# Patient Record
Sex: MALE | Race: WHITE | NOT HISPANIC OR LATINO | Employment: OTHER | URBAN - METROPOLITAN AREA
[De-identification: names, ages, dates, MRNs, and addresses within clinical notes are randomized per-mention and may not be internally consistent; named-entity substitution may affect disease eponyms.]

---

## 2017-02-06 DIAGNOSIS — C61 MALIGNANT NEOPLASM OF PROSTATE (H): ICD-10-CM

## 2017-02-06 LAB — PSA SERPL-MCNC: 1.17 UG/L (ref 0–4)

## 2017-02-06 PROCEDURE — 36415 COLL VENOUS BLD VENIPUNCTURE: CPT | Performed by: UROLOGY

## 2017-02-06 PROCEDURE — 84153 ASSAY OF PSA TOTAL: CPT | Performed by: UROLOGY

## 2017-02-13 ENCOUNTER — OFFICE VISIT (OUTPATIENT)
Dept: UROLOGY | Facility: CLINIC | Age: 77
End: 2017-02-13
Payer: MEDICARE

## 2017-02-13 VITALS — SYSTOLIC BLOOD PRESSURE: 136 MMHG | RESPIRATION RATE: 14 BRPM | HEART RATE: 60 BPM | DIASTOLIC BLOOD PRESSURE: 74 MMHG

## 2017-02-13 DIAGNOSIS — C61 MALIGNANT NEOPLASM OF PROSTATE (H): Primary | ICD-10-CM

## 2017-02-13 PROCEDURE — 99213 OFFICE O/P EST LOW 20 MIN: CPT | Performed by: UROLOGY

## 2017-02-13 RX ORDER — HYDROCHLOROTHIAZIDE 25 MG/1
25 TABLET ORAL DAILY
COMMUNITY
End: 2017-02-13

## 2017-02-13 RX ORDER — LISINOPRIL 5 MG/1
5 TABLET ORAL DAILY
COMMUNITY
End: 2017-02-13

## 2017-02-13 NOTE — PROGRESS NOTES
Chief Complaint   Patient presents with     RECHECK     6 month        Gustavo Ramon is a 76 year old male who presents today for follow up of   Chief Complaint   Patient presents with     RECHECK     6 month     f/u for prostate cancer.  He is s/p cryotherapy on 7/13 for prostate cancer kari 8 with initial psa of 7.4.  He did receive a hormonal shot prior to treatment.  His psa has gone up slowly from 0.27 (2/14) to 0.66 (6/14) and most recently 1.03 and then 1.09 and 0.77 ,1.05, and now 1.17.  He has some urgency of urination but is doing better.  He denies any incontinence/dysuria/hematuria.    Current Outpatient Prescriptions   Medication Sig Dispense Refill     [DISCONTINUED] lisinopril (PRINIVIL/ZESTRIL) 5 MG tablet Take 5 mg by mouth daily       [DISCONTINUED] hydrochlorothiazide (HYDRODIURIL) 25 MG tablet Take 25 mg by mouth daily Take 1/2 tab daily       triamcinolone (KENALOG) 0.1 % cream For Rash use twice daily for 2 weeks, take 2 weeks break. Not for face 80 g 1     hydrochlorothiazide (HYDRODIURIL) 25 MG tablet Take 0.5 tablets (12.5 mg) by mouth daily 45 tablet 1     lisinopril (PRINIVIL,ZESTRIL) 5 MG tablet Take 1 tablet (5 mg) by mouth daily 90 tablet 1     predniSONE (DELTASONE) 5 MG tablet Take 1 tablet (5 mg) by mouth every other day 45 tablet 3     pyridostigmine (MESTINON) 60 MG tablet Take 1 tablet (60 mg) by mouth 4 times daily 360 tablet 3     omeprazole 20 MG tablet Take 1 tablet (20 mg) by mouth every other day 45 tablet 0     aspirin 325 MG tablet Take 325 mg by mouth daily       simvastatin (ZOCOR) 20 MG tablet Take 1 tablet (20 mg) by mouth At Bedtime 90 tablet 3     Cholecalciferol (VITAMIN D) 2000 UNITS tablet Take 2,000 Units by mouth daily       cyanocolbalamin (VITAMIN  B-12) 1000 MCG tablet Take 1 tablet by mouth daily       Multiple Vitamins-Minerals (PRESERVISION/LUTEIN PO) Take 60 mg by mouth daily two tablets daily       Allergies   Allergen Reactions     Nkda [No  Known Drug Allergies]       Past Medical History   Diagnosis Date     Actinic keratosis      AK (actinic keratosis) 11/15/2012     Amaurosis fugax      Basal cell carcinoma 2009     R medial cheek/nose     Central serous retinopathy left     Eye     Diverticulosis 08/2006     DJD (degenerative joint disease)      GERD (gastroesophageal reflux disease)      Hearing loss      High frequency     HTN (hypertension)      Hyperlipidemia LDL goal < 130      Hyperplastic colon polyp 08/2006     IgA monoclonal gammopathy      IgA kappa     Lattice degeneration of retina right     Eye     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     [facility] consult     Rosacea      Vitreous detachment left     Eye     Past Surgical History   Procedure Laterality Date     Arthroscopy knee rt/lt Left Jan 2010     Knee     Diskectomy, lumbar, single sp  2003     L2-3     Soft tissue surgery  May 2010     Basal Cell/right side nose     Ent surgery  1943     Tonsils      Ent surgery  2-22-12     Biopsy lesion right pinna     Ent surgery  2-24-12     Biopsy leson right pinna     Cryotherapy  2013     Postate cancer     Family History   Problem Relation Age of Onset     HEART DISEASE Mother      Alzheimer Disease Mother 73     C.A.D. Father      HEART DISEASE Paternal Grandmother      DIABETES Grandchild      using a pump      DIABETES Paternal Uncle      History     Social History     Marital Status:      Spouse Name: Ceci     Number of Children: 2     Years of Education: 16     Occupational History      Retired     [employer]     Social History Main Topics     Smoking status: Never Smoker      Smokeless tobacco: Never Used     Alcohol Use: No     Drug Use: No     Sexual Activity: No     Other Topics Concern     Not on file     Social History Narrative       REVIEW OF SYSTEMS  =================  C: NEGATIVE for fever, chills, change in weight  I: NEGATIVE for worrisome rashes, moles or lesions  E/M: NEGATIVE for ear, mouth and throat  problems  R: NEGATIVE for significant cough or SHORTNESS OF BREATH,   CV: NEGATIVE for chest pain, palpitations or peripheral edema  GI: NEGATIVE for nausea, abdominal pain, heartburn, or change in bowel habits  NEURO: NEGATIVE any motor/sensory changes  PSYCH: NEGATIVE for recent mood disorder    Physical Exam:  /74 (BP Location: Right arm)  Pulse 60  Resp 14   Patient is pleasant, in no acute distress, good general condition.  Lung: no evidence of respiratory distress    Abdomen: Soft, nondistended, non tender. No masses. No rebound or guarding.   Exam: penis no d/c.  Testis no masses.   No scrotal lesion.  Prostate flat small.  Skin: Warm and dry.  No redness.  Psych: normal mood and affect  Neuro: alert and oriented    Assessment/Plan:   (185) Malignant neoplasm of prostate  (primary encounter diagnosis)  Comment:  psa is stable  Plan: see in 12 months with psa.

## 2017-02-13 NOTE — MR AVS SNAPSHOT
After Visit Summary   2/13/2017    Gustavo Ramon    MRN: [id number]           Patient Information     Date Of Birth          1940        Visit Information        Provider Department      2/13/2017 8:00 AM Glenn Jones MD [facility] [place]        Today's Diagnoses     Malignant neoplasm of prostate    -  1       Follow-ups after your visit        Follow-up notes from your care team     Return in about 1 year (around 2/13/2018) for Lab Work, Routine Visit.      Your next 10 appointments already scheduled     May 25, 2017 10:00 AM CDT   LAB with BE LAB   [place] ([facility])    [address] [place] [address]   [phone]           Patient must bring picture ID.  Patient should be prepared to give a urine specimen  Please do not eat 10-12 hours before your appointment if you are coming in fasting for labs on lipids, cholesterol, or glucose (sugar).  Pregnant women should follow their Care Team instructions. Water with medications is okay. Do not drink coffee or other fluids.   If you have concerns about taking  your medications, please ask at office or if scheduling via [employer], send a message by clicking on Secure Messaging, Message Your Care Team.            Jun 01, 2017 12:30 PM CDT   [facility]   [facility]    [address] [place] [address]   [phone]            Jun 06, 2017  9:20 AM CDT   Return Visit with Raheem Law MD   [facility] [place] ([facility])    [address] [address]   [phone]            Jun 20, 2017 11:15 AM CDT   Return Visit with Brooklyn Treviño MD   [facility]    [address] [place] [address]   [phone]            Feb 05, 2018  8:00 AM CST   LAB with BE LAB   [place] ([facility]  [place])    [address] [place] [address] [phone]           Patient must bring picture ID.  Patient should be prepared to give a urine specimen  Please do not eat 10-12 hours before your appointment if you are coming in fasting for labs on lipids, cholesterol, or glucose (sugar).  Pregnant women should follow their Care Team instructions. Water with medications is okay. Do not drink coffee or other fluids.   If you have concerns about taking  your medications, please ask at office or if scheduling via [employer], send a message by clicking on Secure Messaging, Message Your Care Team.            Feb 12, 2018  8:00 AM CST   [facility] Visit with Glenn Jones MD   [facility])    [address] [place] [address]  [place] [id number] [phone]              Future tests that were ordered for you today     Open Future Orders        Priority Expected Expires Ordered    PSA tumor marker Routine 2/13/2018 2/14/2018 2/13/2017            Who to contact     If you have questions or need follow up information about today's clinic visit or your schedule please contact [facility] directly at [phone].  Normal or non-critical lab and imaging results will be communicated to you by MyChart, letter or phone within 4 business days after the clinic has received the results. If you do not hear from us within 7 days, please contact the clinic through [employer] or phone. If you have a critical or abnormal lab result, we will notify you by phone as soon as possible.  Submit refill requests through [employer] or call your pharmacy and they will forward the refill request to us. Please allow 3 business days for your refill to be completed.          Additional Information About Your Visit        [employer] Information     [employer] gives you secure access to your electronic health record. If you see a primary care provider, you can also send messages to your care team and make  appointments. If you have questions, please call your primary care clinic.  If you do not have a primary care provider, please call [phone] and they will assist you.        Care EveryWhere ID     This is your Care EveryWhere ID. This could be used by other organizations to access your [facility] medical records  [id number]        Your Vitals Were     Pulse Respirations                60 14           Blood Pressure from Last 3 Encounters:   02/13/17 136/74   11/22/16 142/75   09/06/16 132/78    Weight from Last 3 Encounters:   11/22/16 81 kg (178 lb 9.6 oz)   09/06/16 79.4 kg (175 lb)   08/25/16 79.4 kg (175 lb)               Primary Care Provider Office Phone # Fax #    Winston Silverman PA-C [phone]       [facility] [place] [address]  [place] [address]        Thank you!     Thank you for choosing [facility]  for your care. Our goal is always to provide you with excellent care. Hearing back from our patients is one way we can continue to improve our services. Please take a few minutes to complete the written survey that you may receive in the mail after your visit with us. Thank you!             Your Updated Medication List - Protect others around you: Learn how to safely use, store and throw away your medicines at www.disposemymeds.org.          This list is accurate as of: 2/13/17  8:21 AM.  Always use your most recent med list.                   Brand Name Dispense Instructions for use    aspirin 325 MG tablet      Take 325 mg by mouth daily       cyanocobalamin 1000 MCG tablet    vitamin  B-12     Take 1 tablet by mouth daily       hydrochlorothiazide 25 MG tablet    HYDRODIURIL    45 tablet    Take 0.5 tablets (12.5 mg) by mouth daily       lisinopril 5 MG tablet    PRINIVIL/ZESTRIL    90 tablet    Take 1 tablet (5 mg) by mouth daily       omeprazole 20 MG tablet     45 tablet    Take 1 tablet (20 mg) by mouth every other day       predniSONE 5 MG tablet    DELTASONE     45 tablet    Take 1 tablet (5 mg) by mouth every other day       PRESERVISION/LUTEIN PO      Take 60 mg by mouth daily two tablets daily       pyridostigmine 60 MG tablet    MESTINON    360 tablet    Take 1 tablet (60 mg) by mouth 4 times daily       simvastatin 20 MG tablet    ZOCOR    90 tablet    Take 1 tablet (20 mg) by mouth At Bedtime       triamcinolone 0.1 % cream    KENALOG    80 g    For Rash use twice daily for 2 weeks, take 2 weeks break. Not for face       vitamin D 2000 UNITS tablet      Take 2,000 Units by mouth daily

## 2017-02-13 NOTE — NURSING NOTE
"Chief Complaint   Patient presents with     RECHECK     6 month        Initial /74 (BP Location: Right arm)  Pulse 60  Resp 14 Estimated body mass index is 27.36 kg/(m^2) as calculated from the following:    Height as of 11/22/16: 1.721 m (5' 7.75\").    Weight as of 11/22/16: 81 kg (178 lb 9.6 oz).  Medication Reconciliation: complete   Sheila Gagnon, SONIA      "

## 2017-03-23 ENCOUNTER — OFFICE VISIT (OUTPATIENT)
Dept: DERMATOLOGY | Facility: CLINIC | Age: 77
End: 2017-03-23
Payer: MEDICARE

## 2017-03-23 DIAGNOSIS — L57.0 ACTINIC KERATOSES: Primary | ICD-10-CM

## 2017-03-23 PROCEDURE — 99213 OFFICE O/P EST LOW 20 MIN: CPT | Performed by: DERMATOLOGY

## 2017-03-23 NOTE — PATIENT INSTRUCTIONS
Keep on using the Efudex on the cheeks once at night or twice a day at most. Treat for a couple of weeks or stop when it gets very angry.     Vaseline can help the skin be not so irritated.    Let the area heal for at least 2 weeks.

## 2017-03-23 NOTE — NURSING NOTE
Dermatology Rooming Note    Gustavo Ramon's goals for this visit include:   Chief Complaint   Patient presents with     RECHECK     Growth on nose       Is a scribe okay for this visit:YES,     Are records needed for this visit(If yes, obtain release of information): No,      Vitals: There were no vitals taken for this visit.    Referring Provider:  Brooklyn Treviño MD  [facility] DERMATOLOGY  [address]  [place] [address]      Nathalie Quinonez [facility]

## 2017-03-23 NOTE — LETTER
3/23/2017       RE: Gustavo Ramon  [address] [facility] [place] [address]     Dear Colleague,    Thank you for referring your patient, Gustavo Ramon, to the [facility] at [facility]. Please see a copy of my visit note below.    [facility] Dermatology Note      Dermatology Problem List:  1.NMSC  -BCC, right elbow s/p ED and C 1/2016  -BCC, left forearm s/p excision 1/2016  -BCC, right posterior shoulder and left posterior shoulder, s/p Aldara 11/2015  -BCC, nodular and sclerosing, right side of nose per pathology ,(right medial cheek per Dr. Christiansen's note 11/21/2011), s/p excision 5/12/2009 Dr. Bansal  2.MGUS  3. Actinic keratoses  -s/p Efudex 2015  -s/p cryotherapy  4. Seborrheic dermatitis  -clobetasol 0.05% solution  5. Eczema, left dorsal hand  -triamcinolone initiated 2/19/2016  6. Efudex: L nasal side wall and L cheek x 2-3 weeks. Recheck on follow up in 1 month from 3/23/17    Encounter Date: Mar 23, 2017    CC:  Chief Complaint   Patient presents with     RECHECK     Growth on nose         History of Present Illness:  Mr. Gustavo Ramon is a 76 year old male who presents as a follow-up for history of NMSC. The patient was last seen 12/20/2016 when 17 AKs were treated with cryotherapy, pt started on Triam 0.1% cream for eczema. Pt had 2 shave biopsies on the left anterior shoulder which came back as a benign lichenoid keratosis and Actinic keratosis on the left upper arm. About 1.5 weeks ago, pt noticed a small red spot on his nose. He used Efudex for 2 days with no improvement. Pt noticed the lesion grew in size and appear to be mobile. The patient reports no other lesions of concern.      Past Medical History:   Patient Active Problem List   Diagnosis     Rosacea     DJD (degenerative joint disease)     Ocular myasthenia gravis (H)     Macular pigment deposit     HYPERLIPIDEMIA LDL GOAL <130     IgA  monoclonal gammopathy     Retinal hole OS, operculated     Horseshoe tear of retina without detachment OD     History  of basal cell carcinoma     Seborrhea     AK (actinic keratosis)     Malignant neoplasm of prostate (H)     PVD (posterior vitreous detachment)     Amaurosis fugax by Hx neg carotid W/U     Advanced directives, counseling/discussion     Actinic keratosis     Peripheral neuropathy (H)     Nuclear sclerosis of both eyes     Essential hypertension with goal blood pressure less than 140/90     Gastroesophageal reflux disease without esophagitis     Past Medical History:   Diagnosis Date     Actinic keratosis      AK (actinic keratosis) 11/15/2012     Amaurosis fugax      Basal cell carcinoma 2009    R medial cheek/nose     Central serous retinopathy left    Eye     Diverticulosis 08/2006     DJD (degenerative joint disease)      GERD (gastroesophageal reflux disease)      Hearing loss     High frequency     HTN (hypertension)      Hyperlipidemia LDL goal < 130      Hyperplastic colon polyp 08/2006     IgA monoclonal gammopathy     IgA kappa     Lattice degeneration of retina right    Eye     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    [facility] consult     Rosacea      Vitreous detachment left    Eye     Past Surgical History:   Procedure Laterality Date     ARTHROSCOPY KNEE RT/LT Left Jan 2010    Knee     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     DISKECTOMY, LUMBAR, SINGLE SP  2003    L2-3     ENT SURGERY  1943    Tonsils      ENT SURGERY  2-22-12    Biopsy lesion right pinna     ENT SURGERY  2-24-12    Biopsy leson right pinna     SOFT TISSUE SURGERY  May 2010    Basal Cell/right side nose       Social History:  Reviewed and unchanged but kept in chart for clinician convenience  The patient is retired from working as an .  The patient denies use of tanning beds.    Family History:  Reviewed and unchanged but kept in chart for clinician convenience  There is no family history of skin  cancer.    Medications:  Current Outpatient Prescriptions   Medication Sig Dispense Refill     triamcinolone (KENALOG) 0.1 % cream For Rash use twice daily for 2 weeks, take 2 weeks break. Not for face 80 g 1     hydrochlorothiazide (HYDRODIURIL) 25 MG tablet Take 0.5 tablets (12.5 mg) by mouth daily 45 tablet 1     lisinopril (PRINIVIL,ZESTRIL) 5 MG tablet Take 1 tablet (5 mg) by mouth daily 90 tablet 1     predniSONE (DELTASONE) 5 MG tablet Take 1 tablet (5 mg) by mouth every other day 45 tablet 3     pyridostigmine (MESTINON) 60 MG tablet Take 1 tablet (60 mg) by mouth 4 times daily 360 tablet 3     omeprazole 20 MG tablet Take 1 tablet (20 mg) by mouth every other day 45 tablet 0     aspirin 325 MG tablet Take 325 mg by mouth daily       simvastatin (ZOCOR) 20 MG tablet Take 1 tablet (20 mg) by mouth At Bedtime 90 tablet 3     Cholecalciferol (VITAMIN D) 2000 UNITS tablet Take 2,000 Units by mouth daily       cyanocolbalamin (VITAMIN  B-12) 1000 MCG tablet Take 1 tablet by mouth daily       Multiple Vitamins-Minerals (PRESERVISION/LUTEIN PO) Take 60 mg by mouth daily two tablets daily            Allergies   Allergen Reactions     Nkda [No Known Drug Allergies]          Review of Systems:  -Skin: As above in HPI. No additional skin concerns.  -Const: The patient is generally feeling well today.     Physical exam:  -This is a well developed, well-nourished male in no acute distress, in a pleasant mood.    SKIN: Focused exam of the the face was examined.  -On the left nasal sidewall is a 8 mm shallow erosion with a clean base. No evidence of scaling, area appears to be healing.   -scattered red gritty papules on the L face.  -No other lesions of concern on areas examined.     Impression/Plan:  1. Post-Efudex response on the L nasal sidewall: Keep on using vaseline and let the area heal. Will re-examine on 1 month follow up. Likely he over treated an AK with greater than BID application with a thick amount of  efudex.  2. Actinic keratosis, left cheek, spoke about Efudex again and pt up for treating for a few weeks. Don't shave for the time being.  Efudex qday or BID for 2-3 weeks and let it heal for 2 weeks.    Follow-up in 1 months  CC: Dr. Brooklyn Treviño on close of encounter    Staff Involved:  Scribe/Staff      Scribe Disclosure:   I, Myrna Carranza, am serving as a scribe to document services personally performed by Dr. Micheal Key, based on data collection and the provider's statements to me.     Provider Disclosure:   I have reviewed the documentation recorded by the scribe and have edited it as needed. I have personally performed the services documented here and the documentation accurately represents those services and the decisions made by me.     Micheal Key MD, MS    Department of Dermatology  [facility]: Phone: [phone], Fax:[phone]  [facility]: Phone: [phone], Fax: [phone]

## 2017-03-23 NOTE — PROGRESS NOTES
[facility] Dermatology Note      Dermatology Problem List:  1.NMSC  -BCC, right elbow s/p ED and C 1/2016  -BCC, left forearm s/p excision 1/2016  -BCC, right posterior shoulder and left posterior shoulder, s/p Aldara 11/2015  -BCC, nodular and sclerosing, right side of nose per pathology ,(right medial cheek per Dr. Christiansen's note 11/21/2011), s/p excision 5/12/2009 Dr. Bansal  2.MGUS  3. Actinic keratoses  -s/p Efudex 2015  -s/p cryotherapy  4. Seborrheic dermatitis  -clobetasol 0.05% solution  5. Eczema, left dorsal hand  -triamcinolone initiated 2/19/2016  6. Efudex: L nasal side wall and L cheek x 2-3 weeks. Recheck on follow up in 1 month from 3/23/17    Encounter Date: Mar 23, 2017    CC:  Chief Complaint   Patient presents with     RECHECK     Growth on nose         History of Present Illness:  Mr. Gustavo Ramon is a 76 year old male who presents as a follow-up for history of NMSC. The patient was last seen 12/20/2016 when 17 AKs were treated with cryotherapy, pt started on Triam 0.1% cream for eczema. Pt had 2 shave biopsies on the left anterior shoulder which came back as a benign lichenoid keratosis and Actinic keratosis on the left upper arm. About 1.5 weeks ago, pt noticed a small red spot on his nose. He used Efudex for 2 days with no improvement. Pt noticed the lesion grew in size and appear to be mobile. The patient reports no other lesions of concern.      Past Medical History:   Patient Active Problem List   Diagnosis     Rosacea     DJD (degenerative joint disease)     Ocular myasthenia gravis (H)     Macular pigment deposit     HYPERLIPIDEMIA LDL GOAL <130     IgA monoclonal gammopathy     Retinal hole OS, operculated     Horseshoe tear of retina without detachment OD     History  of basal cell carcinoma     Seborrhea     AK (actinic keratosis)     Malignant neoplasm of prostate (H)     PVD (posterior vitreous detachment)     Amaurosis fugax by Hx neg carotid W/U      Advanced directives, counseling/discussion     Actinic keratosis     Peripheral neuropathy (H)     Nuclear sclerosis of both eyes     Essential hypertension with goal blood pressure less than 140/90     Gastroesophageal reflux disease without esophagitis     Past Medical History:   Diagnosis Date     Actinic keratosis      AK (actinic keratosis) 11/15/2012     Amaurosis fugax      Basal cell carcinoma 2009    R medial cheek/nose     Central serous retinopathy left    Eye     Diverticulosis 08/2006     DJD (degenerative joint disease)      GERD (gastroesophageal reflux disease)      Hearing loss     High frequency     HTN (hypertension)      Hyperlipidemia LDL goal < 130      Hyperplastic colon polyp 08/2006     IgA monoclonal gammopathy     IgA kappa     Lattice degeneration of retina right    Eye     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    [facility] consult     Rosacea      Vitreous detachment left    Eye     Past Surgical History:   Procedure Laterality Date     ARTHROSCOPY KNEE RT/LT Left Jan 2010    Knee     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     DISKECTOMY, LUMBAR, SINGLE SP  2003    L2-3     ENT SURGERY  1943    Tonsils      ENT SURGERY  2-22-12    Biopsy lesion right pinna     ENT SURGERY  2-24-12    Biopsy leson right pinna     SOFT TISSUE SURGERY  May 2010    Basal Cell/right side nose       Social History:  Reviewed and unchanged but kept in chart for clinician convenience  The patient is retired from working as an .  The patient denies use of tanning beds.    Family History:  Reviewed and unchanged but kept in chart for clinician convenience  There is no family history of skin cancer.    Medications:  Current Outpatient Prescriptions   Medication Sig Dispense Refill     triamcinolone (KENALOG) 0.1 % cream For Rash use twice daily for 2 weeks, take 2 weeks break. Not for face 80 g 1     hydrochlorothiazide (HYDRODIURIL) 25 MG tablet Take 0.5 tablets (12.5 mg) by mouth daily 45 tablet 1     lisinopril  (PRINIVIL,ZESTRIL) 5 MG tablet Take 1 tablet (5 mg) by mouth daily 90 tablet 1     predniSONE (DELTASONE) 5 MG tablet Take 1 tablet (5 mg) by mouth every other day 45 tablet 3     pyridostigmine (MESTINON) 60 MG tablet Take 1 tablet (60 mg) by mouth 4 times daily 360 tablet 3     omeprazole 20 MG tablet Take 1 tablet (20 mg) by mouth every other day 45 tablet 0     aspirin 325 MG tablet Take 325 mg by mouth daily       simvastatin (ZOCOR) 20 MG tablet Take 1 tablet (20 mg) by mouth At Bedtime 90 tablet 3     Cholecalciferol (VITAMIN D) 2000 UNITS tablet Take 2,000 Units by mouth daily       cyanocolbalamin (VITAMIN  B-12) 1000 MCG tablet Take 1 tablet by mouth daily       Multiple Vitamins-Minerals (PRESERVISION/LUTEIN PO) Take 60 mg by mouth daily two tablets daily            Allergies   Allergen Reactions     Nkda [No Known Drug Allergies]          Review of Systems:  -Skin: As above in HPI. No additional skin concerns.  -Const: The patient is generally feeling well today.     Physical exam:  -This is a well developed, well-nourished male in no acute distress, in a pleasant mood.    SKIN: Focused exam of the the face was examined.  -On the left nasal sidewall is a 8 mm shallow erosion with a clean base. No evidence of scaling, area appears to be healing.   -scattered red gritty papules on the L face.  -No other lesions of concern on areas examined.     Impression/Plan:  1. Post-Efudex response on the L nasal sidewall: Keep on using vaseline and let the area heal. Will re-examine on 1 month follow up. Likely he over treated an AK with greater than BID application with a thick amount of efudex.  2. Actinic keratosis, left cheek, spoke about Efudex again and pt up for treating for a few weeks. Don't shave for the time being.  Efudex qday or BID for 2-3 weeks and let it heal for 2 weeks.    Follow-up in 1 months  CC: Dr. Brooklyn Treviño on close of encounter    Staff Involved:  Scribe/Staff      Scribe Disclosure:   I,  Myrna Carranza, am serving as a scribe to document services personally performed by Dr. Micheal Key, based on data collection and the provider's statements to me.     Provider Disclosure:   I have reviewed the documentation recorded by the scribe and have edited it as needed. I have personally performed the services documented here and the documentation accurately represents those services and the decisions made by me.     Micheal Key MD, MS    Department of Dermatology  [facility]: Phone: [phone], Fax:[phone]  [facility]: Phone: [phone], Fax: [phone]

## 2017-03-23 NOTE — MR AVS SNAPSHOT
After Visit Summary   3/23/2017    Gustavo Ramon    MRN: [id number]           Patient Information     Date Of Birth          1940        Visit Information        Provider Department      3/23/2017 10:15 AM Micheal Key MD [facility]        Today's Diagnoses     Actinic keratoses    -  1      Care Instructions    Keep on using the Efudex on the cheeks once at night or twice a day at most. Treat for a couple of weeks or stop when it gets very angry.     Vaseline can help the skin be not so irritated.    Let the area heal for at least 2 weeks.        Follow-ups after your visit        Your next 10 appointments already scheduled     Apr 27, 2017 10:45 AM CDT   Return Visit with Micheal Key MD   [facility])    [address] [place] [id number] [phone]            May 25, 2017 10:00 AM CDT   LAB with BE LAB   [facility] [place] ([facility])    [address] [place] [address] [id number] [phone]           Patient must bring picture ID.  Patient should be prepared to give a urine specimen  Please do not eat 10-12 hours before your appointment if you are coming in fasting for labs on lipids, cholesterol, or glucose (sugar).  Pregnant women should follow their Care Team instructions. Water with medications is okay. Do not drink coffee or other fluids.   If you have concerns about taking  your medications, please ask at office or if scheduling via [facility], send a message by clicking on Secure Messaging, Message Your Care Team.            Jun 01, 2017 12:30 PM CDT   Return Visit with Susan Eller MD   [facility] ([facility])    [address] [place] [id number] [phone]            Jun 06, 2017  9:20 AM CDT   Return Visit with Raheem Law MD   [facility]    [address] [facility]  [place] [address]   [phone]            Jun 20, 2017 11:15 AM CDT   [facility] Visit with Brooklyn Treviño MD   [facility]    [address] [place] [address] [id number] [phone]            Feb 05, 2018  8:00 AM CST   LAB with BE LAB   [facility] [place] ([facility])    [address] [facility] [place] [address]   [phone]           Patient must bring picture ID.  Patient should be prepared to give a urine specimen  Please do not eat 10-12 hours before your appointment if you are coming in fasting for labs on lipids, cholesterol, or glucose (sugar).  Pregnant women should follow their Care Team instructions. Water with medications is okay. Do not drink coffee or other fluids.   If you have concerns about taking  your medications, please ask at office or if scheduling via [employer], send a message by clicking on Secure Messaging, Message Your Care Team.            Feb 12, 2018  8:00 AM CST   Return Visit with Glenn Jones MD   [facility] ([facility])    [address] [place] [address]  [place] [id number] [phone]              Who to contact     If you have questions or need follow up information about today's clinic visit or your schedule please contact [facility] directly at [phone].  Normal or non-critical lab and imaging results will be communicated to you by MyChart, letter or phone within 4 business days after the clinic has received the results. If you do not hear from us within 7 days, please contact the clinic through MyChart or phone. If you have a critical or abnormal lab result, we will notify you by phone as soon as possible.  Submit refill requests through [employer] or call your pharmacy and they will forward the refill request to us. Please allow 3 business days for your refill to be completed.          Additional Information About Your Visit        [employer]  Information     [employer] gives you secure access to your electronic health record. If you see a primary care provider, you can also send messages to your care team and make appointments. If you have questions, please call your primary care clinic.  If you do not have a primary care provider, please call [phone] and they will assist you.      [employer] is an electronic gateway that provides easy, online access to your medical records. With [employer], you can request a clinic appointment, read your test results, renew a prescription or communicate with your care team.     To access your existing account, please contact your [facility] or call [phone] for assistance.        Care EveryWhere ID     This is your Care EveryWhere ID. This could be used by other organizations to access your [facility] medical records  [id number]         Blood Pressure from Last 3 Encounters:   02/13/17 136/74   11/22/16 142/75   09/06/16 132/78    Weight from Last 3 Encounters:   11/22/16 178 lb 9.6 oz (81 kg)   09/06/16 175 lb (79.4 kg)   08/25/16 175 lb (79.4 kg)              Today, you had the following     No orders found for display       Primary Care Provider Office Phone # Fax #    Winston Silverman PA-C [phone]       [facility] [place] [address] [facility] [address]        Thank you!     Thank you for choosing [facility]  for your care. Our goal is always to provide you with excellent care. Hearing back from our patients is one way we can continue to improve our services. Please take a few minutes to complete the written survey that you may receive in the mail after your visit with us. Thank you!             Your Updated Medication List - Protect others around you: Learn how to safely use, store and throw away your medicines at www.disposemymeds.org.          This list is accurate as of: 3/23/17 11:00 AM.  Always use your most recent med list.                    Brand Name Dispense Instructions for use    aspirin 325 MG tablet      Take 325 mg by mouth daily       cyanocobalamin 1000 MCG tablet    vitamin  B-12     Take 1 tablet by mouth daily       hydrochlorothiazide 25 MG tablet    HYDRODIURIL    45 tablet    Take 0.5 tablets (12.5 mg) by mouth daily       lisinopril 5 MG tablet    PRINIVIL/ZESTRIL    90 tablet    Take 1 tablet (5 mg) by mouth daily       omeprazole 20 MG tablet     45 tablet    Take 1 tablet (20 mg) by mouth every other day       predniSONE 5 MG tablet    DELTASONE    45 tablet    Take 1 tablet (5 mg) by mouth every other day       PRESERVISION/LUTEIN PO      Take 60 mg by mouth daily two tablets daily       pyridostigmine 60 MG tablet    MESTINON    360 tablet    Take 1 tablet (60 mg) by mouth 4 times daily       simvastatin 20 MG tablet    ZOCOR    90 tablet    Take 1 tablet (20 mg) by mouth At Bedtime       triamcinolone 0.1 % cream    KENALOG    80 g    For Rash use twice daily for 2 weeks, take 2 weeks break. Not for face       vitamin D 2000 UNITS tablet      Take 2,000 Units by mouth daily

## 2017-03-25 ENCOUNTER — OFFICE VISIT (OUTPATIENT)
Dept: URGENT CARE | Facility: URGENT CARE | Age: 77
End: 2017-03-25
Payer: MEDICARE

## 2017-03-25 VITALS
TEMPERATURE: 98 F | BODY MASS INDEX: 26.35 KG/M2 | HEART RATE: 78 BPM | WEIGHT: 172 LBS | SYSTOLIC BLOOD PRESSURE: 117 MMHG | DIASTOLIC BLOOD PRESSURE: 77 MMHG | OXYGEN SATURATION: 98 %

## 2017-03-25 DIAGNOSIS — J01.90 ACUTE SINUSITIS WITH SYMPTOMS > 10 DAYS: Primary | ICD-10-CM

## 2017-03-25 PROCEDURE — 99213 OFFICE O/P EST LOW 20 MIN: CPT | Performed by: NURSE PRACTITIONER

## 2017-03-25 RX ORDER — DOXYCYCLINE 100 MG/1
100 CAPSULE ORAL 2 TIMES DAILY
Qty: 20 CAPSULE | Refills: 0 | Status: SHIPPED | OUTPATIENT
Start: 2017-03-25 | End: 2017-06-01

## 2017-03-25 NOTE — MR AVS SNAPSHOT
After Visit Summary   3/25/2017    Gustavo Ramon    MRN: [id number]           Patient Information     Date Of Birth          1940        Visit Information        Provider Department      3/25/2017 2:35 PM Carolee Woodard APRN [facility]        Today's Diagnoses     Acute sinusitis with symptoms > 10 days    -  1       Follow-ups after your visit        Your next 10 appointments already scheduled     Apr 27, 2017 10:45 AM CDT   Return Visit with Micheal Key MD   [facility])    [address] [place] [phone] [phone]            May 25, 2017 10:00 AM CDT   LAB with BE LAB   [facility] ([facility])    [address] [facility] [phone]           Patient must bring picture ID.  Patient should be prepared to give a urine specimen  Please do not eat 10-12 hours before your appointment if you are coming in fasting for labs on lipids, cholesterol, or glucose (sugar).  Pregnant women should follow their Care Team instructions. Water with medications is okay. Do not drink coffee or other fluids.   If you have concerns about taking  your medications, please ask at office or if scheduling via [employer], send a message by clicking on Secure Messaging, Message Your Care Team.            Jun 01, 2017 12:30 PM CDT   Return Visit with Susan Eller MD   [facility] ([facility])    [address] [place] [phone]            Jun 06, 2017  9:20 AM CDT   Return Visit with Raheem Law MD   [facility] ([facility])    [address] [facility] [phone]            Jun 20, 2017 11:15 AM CDT   Return Visit with Brooklyn Treviño MD   [facility] ([facility])    [address] [place] [address]    [phone]            Feb 05, 2018  8:00 AM CST   LAB with BE LAB   [facility] [place] ([facility] [place])    [address] [facility]  [place] [address] [phone]           Patient must bring picture ID.  Patient should be prepared to give a urine specimen  Please do not eat 10-12 hours before your appointment if you are coming in fasting for labs on lipids, cholesterol, or glucose (sugar).  Pregnant women should follow their Care Team instructions. Water with medications is okay. Do not drink coffee or other fluids.   If you have concerns about taking  your medications, please ask at office or if scheduling via [employer], send a message by clicking on Secure Messaging, Message Your Care Team.            Feb 12, 2018  8:00 AM CST   Return Visit with Glenn Jones MD   [facility] [place] ([facility] [place])    [address] [place] [address]  [place] [address] [phone]              Who to contact     If you have questions or need follow up information about today's clinic visit or your schedule please contact [facility] directly at [phone].  Normal or non-critical lab and imaging results will be communicated to you by MyChart, letter or phone within 4 business days after the clinic has received the results. If you do not hear from us within 7 days, please contact the clinic through [employer] or phone. If you have a critical or abnormal lab result, we will notify you by phone as soon as possible.  Submit refill requests through [employer] or call your pharmacy and they will forward the refill request to us. Please allow 3 business days for your refill to be completed.          Additional Information About Your Visit        [employer] Information     [employer] gives you secure access to your electronic health record. If you see a primary care provider, you can also send messages to your care team and make appointments. If you have questions, please call your primary  [facility] clinic.  If you do not have a primary care provider, please call [phone] and they will assist you.        Care EveryWhere ID     This is your Care EveryWhere ID. This could be used by other organizations to access your [facility] medical records  [id number]        Your Vitals Were     Pulse Temperature Pulse Oximetry BMI (Body Mass Index)          78 98  F (36.7  C) (Oral) 98% 26.35 kg/m2         Blood Pressure from Last 3 Encounters:   03/25/17 117/77   02/13/17 136/74   11/22/16 142/75    Weight from Last 3 Encounters:   03/25/17 172 lb (78 kg)   11/22/16 178 lb 9.6 oz (81 kg)   09/06/16 175 lb (79.4 kg)              Today, you had the following     No orders found for display         Today's Medication Changes          These changes are accurate as of: 3/25/17  3:52 PM.  If you have any questions, ask your nurse or doctor.               Start taking these medicines.        Dose/Directions    doxycycline 100 MG capsule   Commonly known as:  VIBRAMYCIN   Used for:  Acute sinusitis with symptoms > 10 days   Started by:  Carolee Woodard APRN CNP        Dose:  100 mg   Take 1 capsule (100 mg) by mouth 2 times daily   Quantity:  20 capsule   Refills:  0            Where to get your medicines      Some of these will need a paper prescription and others can be bought over the counter.  Ask your nurse if you have questions.     Bring a paper prescription for each of these medications     doxycycline 100 MG capsule                Primary Care Provider Office Phone # Fax #    Winston Silverman PA-C [phone]       [facility] [place] [address] [place] [address]        Thank you!     Thank you for choosing [facility]  for your care. Our goal is always to provide you with excellent care. Hearing back from our patients is one way we can continue to improve our services. Please take a few minutes to complete the written survey that you may receive in the mail after your visit  with us. Thank you!             Your Updated Medication List - Protect others around you: Learn how to safely use, store and throw away your medicines at www.disposemymeds.org.          This list is accurate as of: 3/25/17  3:52 PM.  Always use your most recent med list.                   Brand Name Dispense Instructions for use    aspirin 325 MG tablet      Take 325 mg by mouth daily       cyanocobalamin 1000 MCG tablet    vitamin  B-12     Take 1 tablet by mouth daily       doxycycline 100 MG capsule    VIBRAMYCIN    20 capsule    Take 1 capsule (100 mg) by mouth 2 times daily       hydrochlorothiazide 25 MG tablet    HYDRODIURIL    45 tablet    Take 0.5 tablets (12.5 mg) by mouth daily       lisinopril 5 MG tablet    PRINIVIL/ZESTRIL    90 tablet    Take 1 tablet (5 mg) by mouth daily       omeprazole 20 MG tablet     45 tablet    Take 1 tablet (20 mg) by mouth every other day       predniSONE 5 MG tablet    DELTASONE    45 tablet    Take 1 tablet (5 mg) by mouth every other day       PRESERVISION/LUTEIN PO      Take 60 mg by mouth daily two tablets daily       pyridostigmine 60 MG tablet    MESTINON    360 tablet    Take 1 tablet (60 mg) by mouth 4 times daily       simvastatin 20 MG tablet    ZOCOR    90 tablet    Take 1 tablet (20 mg) by mouth At Bedtime       triamcinolone 0.1 % cream    KENALOG    80 g    For Rash use twice daily for 2 weeks, take 2 weeks break. Not for face       vitamin D 2000 UNITS tablet      Take 2,000 Units by mouth daily

## 2017-03-25 NOTE — NURSING NOTE
Chief Complaint   Patient presents with     Cough     cough x10 day, runny nose, fatigue, off white/brownish sputum      Pam Coleman CMA

## 2017-03-25 NOTE — PROGRESS NOTES
SUBJECTIVE:                                                    Gustavo Ramon is a 76 year old male who presents to clinic today for the following health issues:      RESPIRATORY SYMPTOMS      Duration: 10 days    Description  rhinorrhea, cough, fever and fatigue/malaise    Severity: mild    Accompanying signs and symptoms: None    History (predisposing factors):  none    Precipitating or alleviating factors: None    Therapies tried and outcome:  rest and fluids       Problem list and histories reviewed & adjusted, as indicated.  Additional history: as documented    Patient Active Problem List   Diagnosis     Rosacea     DJD (degenerative joint disease)     Ocular myasthenia gravis (H)     Macular pigment deposit     HYPERLIPIDEMIA LDL GOAL <130     IgA monoclonal gammopathy     Retinal hole OS, operculated     Horseshoe tear of retina without detachment OD     History  of basal cell carcinoma     Seborrhea     AK (actinic keratosis)     Malignant neoplasm of prostate (H)     PVD (posterior vitreous detachment)     Amaurosis fugax by Hx neg carotid W/U     Advanced directives, counseling/discussion     Actinic keratosis     Peripheral neuropathy (H)     Nuclear sclerosis of both eyes     Essential hypertension with goal blood pressure less than 140/90     Gastroesophageal reflux disease without esophagitis     Past Surgical History:   Procedure Laterality Date     ARTHROSCOPY KNEE RT/LT Left Jan 2010    Knee     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     DISKECTOMY, LUMBAR, SINGLE SP  2003    L2-3     ENT SURGERY  1943    Tonsils      ENT SURGERY  2-22-12    Biopsy lesion right pinna     ENT SURGERY  2-24-12    Biopsy leson right pinna     SOFT TISSUE SURGERY  May 2010    Basal Cell/right side nose       Social History   Substance Use Topics     Smoking status: Never Smoker     Smokeless tobacco: Never Used     Alcohol use No     Family History   Problem Relation Age of Onset     HEART DISEASE Mother       Alzheimer Disease Mother 73     C.A.D. Father      HEART DISEASE Paternal Grandmother      DIABETES Grandchild      using a pump      DIABETES Paternal Uncle            ROS:  Constitutional, HEENT, cardiovascular, pulmonary, gi and gu systems are negative, except as otherwise noted.    OBJECTIVE:                                                    /77  Pulse 78  Temp 98  F (36.7  C) (Oral)  Wt 172 lb (78 kg)  SpO2 98%  BMI 26.35 kg/m2  Body mass index is 26.35 kg/(m^2).  GENERAL: healthy, alert and no distress  EYES: Eyes grossly normal to inspection, PERRL and conjunctivae and sclerae normal  HENT: normal cephalic/atraumatic, ear canals and TM's normal, rhinorrhea yellow, oropharynx clear, oral mucous membranes moist and sinuses: maxillary tenderness on bilateral  NECK: no adenopathy, no asymmetry, masses, or scars and thyroid normal to palpation  RESP: lungs clear to auscultation - no rales, rhonchi or wheezes  CV: regular rate and rhythm, normal S1 S2, no S3 or S4, no murmur, click or rub, no peripheral edema and peripheral pulses strong  SKIN: no suspicious lesions or rashes    Diagnostic Test Results:  none      ASSESSMENT/PLAN:                                                      1. Acute sinusitis with symptoms > 10 days    - doxycycline (VIBRAMYCIN) 100 MG capsule; Take 1 capsule (100 mg) by mouth 2 times daily  Dispense: 20 capsule; Refill: 0    See Patient Instructions    CORINA Harrell [facility]

## 2017-04-27 ENCOUNTER — MYC MEDICAL ADVICE (OUTPATIENT)
Dept: FAMILY MEDICINE | Facility: CLINIC | Age: 77
End: 2017-04-27

## 2017-04-27 ENCOUNTER — OFFICE VISIT (OUTPATIENT)
Dept: DERMATOLOGY | Facility: CLINIC | Age: 77
End: 2017-04-27
Payer: MEDICARE

## 2017-04-27 DIAGNOSIS — L57.0 ACTINIC KERATOSIS: Primary | ICD-10-CM

## 2017-04-27 DIAGNOSIS — K21.9 GASTROESOPHAGEAL REFLUX DISEASE WITHOUT ESOPHAGITIS: ICD-10-CM

## 2017-04-27 DIAGNOSIS — D69.2 SENILE PURPURA (H): ICD-10-CM

## 2017-04-27 DIAGNOSIS — L82.0 BENIGN LICHENOID KERATOSIS: ICD-10-CM

## 2017-04-27 DIAGNOSIS — L85.3 XEROSIS OF SKIN: ICD-10-CM

## 2017-04-27 PROCEDURE — 99212 OFFICE O/P EST SF 10 MIN: CPT | Performed by: DERMATOLOGY

## 2017-04-27 NOTE — LETTER
4/27/2017     RE: Gustavo Ramon  [address] [facility] [address]     Dear Colleague,    Thank you for referring your patient, Gustavo Ramon, to the [facility]. Please see a copy of my visit note below.    [facility] Dermatology Note      Dermatology Problem List:  1.NMSC  -BCC, right elbow s/p ED and C 1/2016  -BCC, left forearm s/p excision 1/2016  -BCC, right posterior shoulder and left posterior shoulder, s/p Aldara 11/2015  -BCC, nodular and sclerosing, right side of nose per pathology ,(right medial cheek per Dr. Christiansen's note 11/21/2011), s/p excision 5/12/2009 Dr. Bansal  2.MGUS  3. Actinic keratoses: left nasal sidewall and L cheeks in early 2017.   -s/p Efudex 2015  -s/p cryotherapy  4. Seborrheic dermatitis  -clobetasol 0.05% solution    Last FBSE 12/20/2016    Encounter Date: Apr 27, 2017    CC:  Chief Complaint   Patient presents with     Derm Problem     1 month recheck, post efudex treatment         History of Present Illness:  Mr. Gustavo Ramon is a 76 year old male who presents as a follow-up for history of NMSC. The patient was last seen 3/23/2017 when Efudex was restarted for Actinic keratosis on left cheek. Today the pt reports he used the Efudex for a few days and once the skin became red and inflamed he discontinued. Skin healed and now appears smooth to the touch. In addition, he would also like a recheck of BLK on left upper chest, and an AK on the left shoulder which was biopsied 12/20/2016. The patient reports no other lesions of concern.      Past Medical History:   Patient Active Problem List   Diagnosis     Rosacea     DJD (degenerative joint disease)     Ocular myasthenia gravis (H)     Macular pigment deposit     HYPERLIPIDEMIA LDL GOAL <130     IgA monoclonal gammopathy     Retinal hole OS, operculated     Horseshoe tear of retina without detachment OD      History  of basal cell carcinoma     Seborrhea     AK (actinic keratosis)     Malignant neoplasm of prostate (H)     PVD (posterior vitreous detachment)     Amaurosis fugax by Hx neg carotid W/U     Advanced directives, counseling/discussion     Actinic keratosis     Peripheral neuropathy (H)     Nuclear sclerosis of both eyes     Essential hypertension with goal blood pressure less than 140/90     Gastroesophageal reflux disease without esophagitis     Past Medical History:   Diagnosis Date     Actinic keratosis      AK (actinic keratosis) 11/15/2012     Amaurosis fugax      Basal cell carcinoma 2009    R medial cheek/nose     Central serous retinopathy left    Eye     Diverticulosis 08/2006     DJD (degenerative joint disease)      GERD (gastroesophageal reflux disease)      Hearing loss     High frequency     HTN (hypertension)      Hyperlipidemia LDL goal < 130      Hyperplastic colon polyp 08/2006     IgA monoclonal gammopathy     IgA kappa     Lattice degeneration of retina right    Eye     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    [facility] consult     Rosacea      Vitreous detachment left    Eye     Past Surgical History:   Procedure Laterality Date     ARTHROSCOPY KNEE RT/LT Left Jan 2010    Knee     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     DISKECTOMY, LUMBAR, SINGLE SP  2003    L2-3     ENT SURGERY  1943    Tonsils      ENT SURGERY  2-22-12    Biopsy lesion right pinna     ENT SURGERY  2-24-12    Biopsy leson right pinna     SOFT TISSUE SURGERY  May 2010    Basal Cell/right side nose       Social History:  Reviewed and unchanged but kept in chart for clinician convenience  The patient is retired from working as an .  The patient denies use of tanning beds.    Family History:  Reviewed and unchanged but kept in chart for clinician convenience  There is no family history of skin cancer.    Medications:  Current Outpatient Prescriptions   Medication Sig Dispense Refill     doxycycline (VIBRAMYCIN) 100 MG  capsule Take 1 capsule (100 mg) by mouth 2 times daily 20 capsule 0     triamcinolone (KENALOG) 0.1 % cream For Rash use twice daily for 2 weeks, take 2 weeks break. Not for face 80 g 1     hydrochlorothiazide (HYDRODIURIL) 25 MG tablet Take 0.5 tablets (12.5 mg) by mouth daily 45 tablet 1     lisinopril (PRINIVIL,ZESTRIL) 5 MG tablet Take 1 tablet (5 mg) by mouth daily 90 tablet 1     predniSONE (DELTASONE) 5 MG tablet Take 1 tablet (5 mg) by mouth every other day 45 tablet 3     pyridostigmine (MESTINON) 60 MG tablet Take 1 tablet (60 mg) by mouth 4 times daily 360 tablet 3     omeprazole 20 MG tablet Take 1 tablet (20 mg) by mouth every other day 45 tablet 0     aspirin 325 MG tablet Take 325 mg by mouth daily       simvastatin (ZOCOR) 20 MG tablet Take 1 tablet (20 mg) by mouth At Bedtime 90 tablet 3     Cholecalciferol (VITAMIN D) 2000 UNITS tablet Take 2,000 Units by mouth daily       cyanocolbalamin (VITAMIN  B-12) 1000 MCG tablet Take 1 tablet by mouth daily       Multiple Vitamins-Minerals (PRESERVISION/LUTEIN PO) Take 60 mg by mouth daily two tablets daily            Allergies   Allergen Reactions     Nkda [No Known Drug Allergies]          Review of Systems:  -Skin: As above in HPI. No additional skin concerns.  -Const: The patient is generally feeling well today.     Physical exam:  -This is a well developed, well-nourished male in no acute distress, in a pleasant mood.    SKIN: Focused exam of the the face was examined.  -AK on the left nasal sidewall and left cheek are mostly resolved with xerosis.   -The left anterior chest BLK has resolved  -Left shoulder AK has resolved  -There are a few Batman's purpura on b/l extremities   -No other lesions of concern on areas examined.     Impression/Plan:  1. Senile purpura on arms 2/2 sun damage, being on prednisone, and aging.     No further intervention required.     Arnica cream can be helpful as can moisturization.  2. Xerosis of skin: moisturize. Provided  some samples of good moisturizers.    Discussed consistent and daily use of gentle moisturizers, Preferable cream rather than lotion.   3. Actinic keratosis, left cheek, left nose, and left shoulder resolved. No need for efudex.  4. BLK on L chest, biopsy proven. Resolved.      Follow-up in 1 year for skin exam   CC: Dr. Treviño on close of this encounter.       Staff Involved:  Scribe/Staff      Scribe Disclosure:   I, Myrna Carranza, am serving as a scribe to document services personally performed by Dr. Micheal Key, based on data collection and the provider's statements to me.     Provider Disclosure:   I have reviewed the documentation recorded by the scribe and have edited it as needed. I have personally performed the services documented here and the documentation accurately represents those services and the decisions made by me.     Micheal Key MD, MS    Department of Dermatology  [facility]: Phone: [phone], Fax:[phone]  [facility]: Phone: [phone], Fax: [phone]

## 2017-04-27 NOTE — MR AVS SNAPSHOT
After Visit Summary   4/27/2017    Gustavo Ramon    MRN: [id number]           Patient Information     Date Of Birth          1940        Visit Information        Provider Department      4/27/2017 10:45 AM Micheal Key MD [facility]        Today's Diagnoses     Actinic keratosis    -  1    Xerosis of skin        Senile purpura (H)           Follow-ups after your visit        Your next 10 appointments already scheduled     May 25, 2017 10:00 AM CDT   LAB with BE LAB   [facility] ([facility] [place])    [address]  [place] [address]   [phone]           Patient must bring picture ID.  Patient should be prepared to give a urine specimen  Please do not eat 10-12 hours before your appointment if you are coming in fasting for labs on lipids, cholesterol, or glucose (sugar).  Pregnant women should follow their Care Team instructions. Water with medications is okay. Do not drink coffee or other fluids.   If you have concerns about taking  your medications, please ask at office or if scheduling via [employer]leaf, send a message by clicking on Secure Messaging, Message Your Care Team.            Jun 01, 2017 12:30 PM CDT   [facility]   [facility]    [id number] [place] [address]   [phone]            Jun 06, 2017  9:20 AM CDT   Return Visit with Raheem Law MD   [facility] [place] ([facility])    [address]  [place] [address]   [phone]            Dec 07, 2017 10:45 AM CST   [facility] Visit with Brooklyn [facility]ño MD   [facility]    [address] [place] [address]   [phone]            Feb 05, 2018  8:00 AM CST   LAB with BE LAB   [facility] ([facility] [place])    [address]  [place] [address]   [phone]            Patient must bring picture ID.  Patient should be prepared to give a urine specimen  Please do not eat 10-12 hours before your appointment if you are coming in fasting for labs on lipids, cholesterol, or glucose (sugar).  Pregnant women should follow their Care Team instructions. Water with medications is okay. Do not drink coffee or other fluids.   If you have concerns about taking  your medications, please ask at office or if scheduling via [employer], send a message by clicking on Secure Messaging, Message Your Care Team.            Feb 12, 2018  8:00 AM CST   Return Visit with Glenn Jones MD   [facility] ([facility])    [address] [place] [address] [phone]              Who to contact     If you have questions or need follow up information about today's clinic visit or your schedule please contact [facility] directly at [phone].  Normal or non-critical lab and imaging results will be communicated to you by Leondra musichart, letter or phone within 4 business days after the clinic has received the results. If you do not hear from us within 7 days, please contact the clinic through [employer] or phone. If you have a critical or abnormal lab result, we will notify you by phone as soon as possible.  Submit refill requests through [employer] or call your pharmacy and they will forward the refill request to us. Please allow 3 business days for your refill to be completed.          Additional Information About Your Visit        [employer] Information     [employer] gives you secure access to your electronic health record. If you see a primary care provider, you can also send messages to your care team and make appointments. If you have questions, please call your primary care clinic.  If you do not have a primary care provider, please call [phone] and they will assist you.      [employer] is an electronic gateway that provides easy, online access to  your medical records. With [employer], you can request a clinic appointment, read your test results, renew a prescription or communicate with your care team.     To access your existing account, please contact your [facility] or call [phone] for assistance.        Care EveryWhere ID     This is your Care EveryWhere ID. This could be used by other organizations to access your [place] medical records  [id number]         Blood Pressure from Last 3 Encounters:   03/25/17 117/77   02/13/17 136/74   11/22/16 142/75    Weight from Last 3 Encounters:   03/25/17 172 lb (78 kg)   11/22/16 178 lb 9.6 oz (81 kg)   09/06/16 175 lb (79.4 kg)              Today, you had the following     No orders found for display       Primary Care Provider Office Phone # Fax #    Winston Silverman PA-C [phone]       [facility] [place] [address]  [place] [address]        Thank you!     Thank you for choosing [facility]  for your care. Our goal is always to provide you with excellent care. Hearing back from our patients is one way we can continue to improve our services. Please take a few minutes to complete the written survey that you may receive in the mail after your visit with us. Thank you!             Your Updated Medication List - Protect others around you: Learn how to safely use, store and throw away your medicines at www.disposemymeds.org.          This list is accurate as of: 4/27/17 11:02 AM.  Always use your most recent med list.                   Brand Name Dispense Instructions for use    aspirin 325 MG tablet      Take 325 mg by mouth daily       cyanocobalamin 1000 MCG tablet    vitamin  B-12     Take 1 tablet by mouth daily       doxycycline 100 MG capsule    VIBRAMYCIN    20 capsule    Take 1 capsule (100 mg) by mouth 2 times daily       hydrochlorothiazide 25 MG tablet    HYDRODIURIL    45 tablet    Take 0.5 tablets (12.5 mg) by mouth daily        lisinopril 5 MG tablet    PRINIVIL/ZESTRIL    90 tablet    Take 1 tablet (5 mg) by mouth daily       omeprazole 20 MG tablet     45 tablet    Take 1 tablet (20 mg) by mouth every other day       predniSONE 5 MG tablet    DELTASONE    45 tablet    Take 1 tablet (5 mg) by mouth every other day       PRESERVISION/LUTEIN PO      Take 60 mg by mouth daily two tablets daily       pyridostigmine 60 MG tablet    MESTINON    360 tablet    Take 1 tablet (60 mg) by mouth 4 times daily       simvastatin 20 MG tablet    ZOCOR    90 tablet    Take 1 tablet (20 mg) by mouth At Bedtime       triamcinolone 0.1 % cream    KENALOG    80 g    For Rash use twice daily for 2 weeks, take 2 weeks break. Not for face       vitamin D 2000 UNITS tablet      Take 2,000 Units by mouth daily

## 2017-04-27 NOTE — NURSING NOTE
Dermatology Rooming Note    Gustavo Ramon's goals for this visit include:   Chief Complaint   Patient presents with     Derm Problem     1 month recheck, post efudex treatment, concerned about bruising        Is a scribe okay for this visit:YES    Are records needed for this visit(If yes, obtain release of information): No,     Vitals: There were no vitals taken for this visit.    Referring Provider:  No referring provider defined for this encounter.

## 2017-04-27 NOTE — PROGRESS NOTES
[facility] Dermatology Note      Dermatology Problem List:  1.NMSC  -BCC, right elbow s/p ED and C 1/2016  -BCC, left forearm s/p excision 1/2016  -BCC, right posterior shoulder and left posterior shoulder, s/p Aldara 11/2015  -BCC, nodular and sclerosing, right side of nose per pathology ,(right medial cheek per Dr. Christiansen's note 11/21/2011), s/p excision 5/12/2009 Dr. Bansal  2.MGUS  3. Actinic keratoses: left nasal sidewall and L cheeks in early 2017.   -s/p Efudex 2015  -s/p cryotherapy  4. Seborrheic dermatitis  -clobetasol 0.05% solution    Last FBSE 12/20/2016    Encounter Date: Apr 27, 2017    CC:  Chief Complaint   Patient presents with     Derm Problem     1 month recheck, post efudex treatment         History of Present Illness:  Mr. Gustavo Ramon is a 76 year old male who presents as a follow-up for history of NMSC. The patient was last seen 3/23/2017 when Efudex was restarted for Actinic keratosis on left cheek. Today the pt reports he used the Efudex for a few days and once the skin became red and inflamed he discontinued. Skin healed and now appears smooth to the touch. In addition, he would also like a recheck of BLK on left upper chest, and an AK on the left shoulder which was biopsied 12/20/2016. The patient reports no other lesions of concern.      Past Medical History:   Patient Active Problem List   Diagnosis     Rosacea     DJD (degenerative joint disease)     Ocular myasthenia gravis (H)     Macular pigment deposit     HYPERLIPIDEMIA LDL GOAL <130     IgA monoclonal gammopathy     Retinal hole OS, operculated     Horseshoe tear of retina without detachment OD     History  of basal cell carcinoma     Seborrhea     AK (actinic keratosis)     Malignant neoplasm of prostate (H)     PVD (posterior vitreous detachment)     Amaurosis fugax by Hx neg carotid W/U     Advanced directives, counseling/discussion     Actinic keratosis     Peripheral neuropathy (H)     Nuclear  sclerosis of both eyes     Essential hypertension with goal blood pressure less than 140/90     Gastroesophageal reflux disease without esophagitis     Past Medical History:   Diagnosis Date     Actinic keratosis      AK (actinic keratosis) 11/15/2012     Amaurosis fugax      Basal cell carcinoma 2009    R medial cheek/nose     Central serous retinopathy left    Eye     Diverticulosis 08/2006     DJD (degenerative joint disease)      GERD (gastroesophageal reflux disease)      Hearing loss     High frequency     HTN (hypertension)      Hyperlipidemia LDL goal < 130      Hyperplastic colon polyp 08/2006     IgA monoclonal gammopathy     IgA kappa     Lattice degeneration of retina right    Eye     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    [facility] consult     Rosacea      Vitreous detachment left    Eye     Past Surgical History:   Procedure Laterality Date     ARTHROSCOPY KNEE RT/LT Left Jan 2010    Knee     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     DISKECTOMY, LUMBAR, SINGLE SP  2003    L2-3     ENT SURGERY  1943    Tonsils      ENT SURGERY  2-22-12    Biopsy lesion right pinna     ENT SURGERY  2-24-12    Biopsy leson right pinna     SOFT TISSUE SURGERY  May 2010    Basal Cell/right side nose       Social History:  Reviewed and unchanged but kept in chart for clinician convenience  The patient is retired from working as an .  The patient denies use of tanning beds.    Family History:  Reviewed and unchanged but kept in chart for clinician convenience  There is no family history of skin cancer.    Medications:  Current Outpatient Prescriptions   Medication Sig Dispense Refill     doxycycline (VIBRAMYCIN) 100 MG capsule Take 1 capsule (100 mg) by mouth 2 times daily 20 capsule 0     triamcinolone (KENALOG) 0.1 % cream For Rash use twice daily for 2 weeks, take 2 weeks break. Not for face 80 g 1     hydrochlorothiazide (HYDRODIURIL) 25 MG tablet Take 0.5 tablets (12.5 mg) by mouth daily 45 tablet 1     lisinopril  (PRINIVIL,ZESTRIL) 5 MG tablet Take 1 tablet (5 mg) by mouth daily 90 tablet 1     predniSONE (DELTASONE) 5 MG tablet Take 1 tablet (5 mg) by mouth every other day 45 tablet 3     pyridostigmine (MESTINON) 60 MG tablet Take 1 tablet (60 mg) by mouth 4 times daily 360 tablet 3     omeprazole 20 MG tablet Take 1 tablet (20 mg) by mouth every other day 45 tablet 0     aspirin 325 MG tablet Take 325 mg by mouth daily       simvastatin (ZOCOR) 20 MG tablet Take 1 tablet (20 mg) by mouth At Bedtime 90 tablet 3     Cholecalciferol (VITAMIN D) 2000 UNITS tablet Take 2,000 Units by mouth daily       cyanocolbalamin (VITAMIN  B-12) 1000 MCG tablet Take 1 tablet by mouth daily       Multiple Vitamins-Minerals (PRESERVISION/LUTEIN PO) Take 60 mg by mouth daily two tablets daily            Allergies   Allergen Reactions     Nkda [No Known Drug Allergies]          Review of Systems:  -Skin: As above in HPI. No additional skin concerns.  -Const: The patient is generally feeling well today.     Physical exam:  -This is a well developed, well-nourished male in no acute distress, in a pleasant mood.    SKIN: Focused exam of the the face was examined.  -AK on the left nasal sidewall and left cheek are mostly resolved with xerosis.   -The left anterior chest BLK has resolved  -Left shoulder AK has resolved  -There are a few Batman's purpura on b/l extremities   -No other lesions of concern on areas examined.     Impression/Plan:  1. Senile purpura on arms 2/2 sun damage, being on prednisone, and aging.     No further intervention required.     Arnica cream can be helpful as can moisturization.  2. Xerosis of skin: moisturize. Provided some samples of good moisturizers.    Discussed consistent and daily use of gentle moisturizers, Preferable cream rather than lotion.   3. Actinic keratosis, left cheek, left nose, and left shoulder resolved. No need for efudex.  4. BLK on L chest, biopsy proven. Resolved.      Follow-up in 1 year for  skin exam   CC: Dr. Treviño on close of this encounter.       Staff Involved:  Scribe/Staff      Scribe Disclosure:   I, Myrna Carranza, am serving as a scribe to document services personally performed by Dr. Micheal Key, based on data collection and the provider's statements to me.     Provider Disclosure:   I have reviewed the documentation recorded by the scribe and have edited it as needed. I have personally performed the services documented here and the documentation accurately represents those services and the decisions made by me.     Micheal Key MD, MS    Department of Dermatology  [facility] [place]: Phone: [phone], Fax:[phone]  [facility]: Phone: [phone], Fax: [phone]

## 2017-05-25 DIAGNOSIS — D47.2 MGUS (MONOCLONAL GAMMOPATHY OF UNKNOWN SIGNIFICANCE): ICD-10-CM

## 2017-05-25 LAB
ALBUMIN SERPL-MCNC: 3.9 G/DL (ref 3.4–5)
ALP SERPL-CCNC: 68 U/L (ref 40–150)
ALT SERPL W P-5'-P-CCNC: 25 U/L (ref 0–70)
ANION GAP SERPL CALCULATED.3IONS-SCNC: 6 MMOL/L (ref 3–14)
AST SERPL W P-5'-P-CCNC: 19 U/L (ref 0–45)
BASOPHILS # BLD AUTO: 0 10E9/L (ref 0–0.2)
BASOPHILS NFR BLD AUTO: 0.6 %
BILIRUB SERPL-MCNC: 0.5 MG/DL (ref 0.2–1.3)
BUN SERPL-MCNC: 19 MG/DL (ref 7–30)
CALCIUM SERPL-MCNC: 9.6 MG/DL (ref 8.5–10.1)
CHLORIDE SERPL-SCNC: 106 MMOL/L (ref 94–109)
CO2 SERPL-SCNC: 32 MMOL/L (ref 20–32)
CREAT SERPL-MCNC: 1.11 MG/DL (ref 0.66–1.25)
CREAT UR-MCNC: 171 MG/DL
DIFFERENTIAL METHOD BLD: NORMAL
EOSINOPHIL # BLD AUTO: 0.5 10E9/L (ref 0–0.7)
EOSINOPHIL NFR BLD AUTO: 6.8 %
ERYTHROCYTE [DISTWIDTH] IN BLOOD BY AUTOMATED COUNT: 13.9 % (ref 10–15)
GFR SERPL CREATININE-BSD FRML MDRD: 64 ML/MIN/1.7M2
GLUCOSE SERPL-MCNC: 96 MG/DL (ref 70–99)
HCT VFR BLD AUTO: 46.6 % (ref 40–53)
HGB BLD-MCNC: 15.2 G/DL (ref 13.3–17.7)
LYMPHOCYTES # BLD AUTO: 1.2 10E9/L (ref 0.8–5.3)
LYMPHOCYTES NFR BLD AUTO: 17.9 %
MCH RBC QN AUTO: 29.8 PG (ref 26.5–33)
MCHC RBC AUTO-ENTMCNC: 32.6 G/DL (ref 31.5–36.5)
MCV RBC AUTO: 91 FL (ref 78–100)
MONOCYTES # BLD AUTO: 0.7 10E9/L (ref 0–1.3)
MONOCYTES NFR BLD AUTO: 10.1 %
NEUTROPHILS # BLD AUTO: 4.5 10E9/L (ref 1.6–8.3)
NEUTROPHILS NFR BLD AUTO: 64.6 %
PLATELET # BLD AUTO: 178 10E9/L (ref 150–450)
POTASSIUM SERPL-SCNC: 4.6 MMOL/L (ref 3.4–5.3)
PROT SERPL-MCNC: 7.2 G/DL (ref 6.8–8.8)
PROT UR-MCNC: 0.2 G/L
PROT/CREAT 24H UR: 0.12 G/G CR (ref 0–0.2)
RBC # BLD AUTO: 5.1 10E12/L (ref 4.4–5.9)
SODIUM SERPL-SCNC: 144 MMOL/L (ref 133–144)
WBC # BLD AUTO: 6.9 10E9/L (ref 4–11)

## 2017-05-25 PROCEDURE — 85025 COMPLETE CBC W/AUTO DIFF WBC: CPT | Performed by: FAMILY MEDICINE

## 2017-05-25 PROCEDURE — 36415 COLL VENOUS BLD VENIPUNCTURE: CPT | Performed by: FAMILY MEDICINE

## 2017-05-25 PROCEDURE — 83883 ASSAY NEPHELOMETRY NOT SPEC: CPT | Performed by: INTERNAL MEDICINE

## 2017-05-25 PROCEDURE — 82784 ASSAY IGA/IGD/IGG/IGM EACH: CPT | Performed by: INTERNAL MEDICINE

## 2017-05-25 PROCEDURE — 00000402 ZZHCL STATISTIC TOTAL PROTEIN: Performed by: INTERNAL MEDICINE

## 2017-05-25 PROCEDURE — 86335 IMMUNFIX E-PHORSIS/URINE/CSF: CPT | Performed by: INTERNAL MEDICINE

## 2017-05-25 PROCEDURE — 84165 PROTEIN E-PHORESIS SERUM: CPT | Performed by: INTERNAL MEDICINE

## 2017-05-25 PROCEDURE — 84166 PROTEIN E-PHORESIS/URINE/CSF: CPT | Performed by: INTERNAL MEDICINE

## 2017-05-25 PROCEDURE — 84156 ASSAY OF PROTEIN URINE: CPT | Performed by: INTERNAL MEDICINE

## 2017-05-25 PROCEDURE — 80053 COMPREHEN METABOLIC PANEL: CPT | Performed by: FAMILY MEDICINE

## 2017-05-26 LAB
ALBUMIN SERPL ELPH-MCNC: 4.2 G/DL (ref 3.7–5.1)
ALPHA1 GLOB SERPL ELPH-MCNC: 0.3 G/DL (ref 0.2–0.4)
ALPHA2 GLOB SERPL ELPH-MCNC: 0.7 G/DL (ref 0.5–0.9)
B-GLOBULIN SERPL ELPH-MCNC: 0.8 G/DL (ref 0.6–1)
ELP INTERPRETATION URINE: NORMAL
GAMMA GLOB SERPL ELPH-MCNC: 1.3 G/DL (ref 0.7–1.6)
IGA SERPL-MCNC: 572 MG/DL (ref 70–380)
IGG SERPL-MCNC: 867 MG/DL (ref 695–1620)
IGM SERPL-MCNC: 27 MG/DL (ref 60–265)
KAPPA LC UR-MCNC: 2.02 MG/DL (ref 0.33–1.94)
KAPPA LC/LAMBDA SER: 1.33 {RATIO} (ref 0.26–1.65)
LAMBDA LC SERPL-MCNC: 1.52 MG/DL (ref 0.57–2.63)
M PROTEIN SERPL ELPH-MCNC: 0.4 G/DL
PROT ELPH PNL UR ELPH: NORMAL
PROT PATTERN SERPL ELPH-IMP: ABNORMAL

## 2017-05-27 DIAGNOSIS — I10 BENIGN ESSENTIAL HYPERTENSION: ICD-10-CM

## 2017-05-27 NOTE — PROGRESS NOTES
Hematology Follow-up visit:  Date on this visit: 6/1/2017      Referring physician:  Dr.Philip Yunior Wright   Primary Care Physician: DENISSE Jewell  Urologist: Dr. Jones  Neurologist: Dr. Law  Neurologist at [facility]: Dr. Conchis Restrepo.    Diagnosis:   1. Monoclonal gammopathy of unknown significance (MGUS), IgA kappa.   Apparently, he had M spike discovered incidentally in 2009 during evaluation of myasthenia gravis at [facility] in [place]. He has not had a bone marrow biopsy. Serum protein immunofixation electrophoresis showed the presence of  monoclonal IgA immunoglobulin of kappa light chain type as well as small monoclonal free immunoglobulin light chain of kappa chain type.  IgA level was elevated at 620 on 6/7/2011 and urine protein electrophoresis and urine immunofixation electrophoresis showed no M protein. IgA level has remained unchanged and M spike has been stable. He has been followed by Dr. Wright and in 04/2015 transferred his care to [place].    2. Ocular MG    3. Hx of NMSC- -superficial BCC, right posterior shoulder and left posterior shoulder 11/2015- s/p aldara  -BCC, R elbow s/p ED and C 1/2016  -BCC, left forearm s/p excision 1/2016  -BCC, nodular and sclerosing, right side of nose per pathology ,(right medial cheek per Dr. Christiansen's note 11/21/2011), s/p excision 5/12/2009 Dr. Bansal    4. Prostate Cancer -  He has a history of prostate cancer (Dianna 8) and was treated with cryoablation surgery in 11/2013.  He is followed by Dr. Jones.      History Of Present Illness:  Mr. Ramon is a 76 year old male who presents for f/up of IgA kappa MGUS. He has been feeling very well.   He has ocular myasthenia gravis which is controlled with pyridostigmine and low dose prednisone (5 mg PO QOD). He follows up with Dr. Law and was last seen by him in Nov 2016. He was seen by Dr. Jones in February of 2017. His psa has gone up slowly from 0.27 (2/14) to 0.66 (6/14) and most  recently 1.03 and then 1.09 and 0.77 ,1.05, and in Feb 2017 was 1.17.  He has had some urgency on urination, mild, chronic. He was recommended to have PSA rechecked in 12 months.   His serum IGA level and M spike has remained stable.  urine protein electrophoresis/immunofixation showed no M spike and there was no proteinuria on urine testing.   Results for DAISHA LOZOYA (MRN [id number]) as of 6/1/2017 12:40   Ref. Range 11/20/2014 15:28 4/21/2015 14:48 8/10/2015 07:51 5/25/2016 09:54 5/27/2016 05:26 5/25/2017 07:59 5/25/2017 08:14   IGA Latest Ref Range: 70 - 380 mg/dL 644 (H) 685 (H) 627 (H) 627 (H)  572 (H)    IGG Latest Ref Range: 695 - 1620 mg/dL 838 1050 924 795  867    IGM Latest Ref Range: 60 - 265 mg/dL 33 (L) 35 (L) 26 (L) 25 (L)  27 (L)    Immunofix ELP Urine Unknown     No monoclonal pro...  No monoclonal pro...   Immunofixation ELP Unknown   (Note)... (Note)...      Kappa Free Lt Chain Latest Ref Range: 0.33 - 1.94 mg/dL 1.22 1.49  1.87  2.02 (H)    Kappa Lambda Ratio Latest Ref Range: 0.26 - 1.65  0.99 1.04  1.63  1.33    Lambda Free Lt Chain Latest Ref Range: 0.57 - 2.63 mg/dL 1.23 1.43  1.15  1.52    Monoclonal Peak Latest Ref Range: 0.0 g/dL 0.4 (H) 0.5 (H)  0.5 (H)  0.4 (H)     He also has a history of basal cell carcinoma of the skin of the right nose, and has had other multiple BCCs. He was last seen by Dr. Key in 04/2017.  He is a retired . He denies any bone pains, unexplained weight loss, night sweats or chills. He is staying very active.   In addition, a complete 12 point  review of systems is negative.      Past Medical/Surgical History:  Past Medical History:   Diagnosis Date     Actinic keratosis      AK (actinic keratosis) 11/15/2012     Amaurosis fugax      Basal cell carcinoma 2009    R medial cheek/nose     Central serous retinopathy left    Eye     Diverticulosis 08/2006     DJD (degenerative joint disease)      GERD (gastroesophageal reflux disease)       Hearing loss     High frequency     HTN (hypertension)      Hyperlipidemia LDL goal < 130      Hyperplastic colon polyp 08/2006     IgA monoclonal gammopathy     IgA kappa     Lattice degeneration of retina right    Eye     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    [facility] consult     Rosacea      Vitreous detachment left    Eye     Past Surgical History:   Procedure Laterality Date     ARTHROSCOPY KNEE RT/LT Left Jan 2010    Knee     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     DISKECTOMY, LUMBAR, SINGLE SP  2003    L2-3     ENT SURGERY  1943    Tonsils      ENT SURGERY  2-22-12    Biopsy lesion right pinna     ENT SURGERY  2-24-12    Biopsy leson right pinna     SOFT TISSUE SURGERY  May 2010    Basal Cell/right side nose     Allergies:  Allergies as of 06/01/2017 - Review Complete 04/27/2017   Allergen Reaction Noted     Nkda [no known drug allergies]  12/16/2009     Current Medications:  Current Outpatient Prescriptions   Medication Sig Dispense Refill     omeprazole (PRILOSEC) 20 MG CR capsule Take 1 Capsule Daily Concomitant To Prednisone Use 45 capsule 3     doxycycline (VIBRAMYCIN) 100 MG capsule Take 1 capsule (100 mg) by mouth 2 times daily 20 capsule 0     triamcinolone (KENALOG) 0.1 % cream For Rash use twice daily for 2 weeks, take 2 weeks break. Not for face 80 g 1     hydrochlorothiazide (HYDRODIURIL) 25 MG tablet Take 0.5 tablets (12.5 mg) by mouth daily 45 tablet 1     lisinopril (PRINIVIL,ZESTRIL) 5 MG tablet Take 1 tablet (5 mg) by mouth daily 90 tablet 1     predniSONE (DELTASONE) 5 MG tablet Take 1 tablet (5 mg) by mouth every other day 45 tablet 3     pyridostigmine (MESTINON) 60 MG tablet Take 1 tablet (60 mg) by mouth 4 times daily 360 tablet 3     omeprazole 20 MG tablet Take 1 tablet (20 mg) by mouth every other day 45 tablet 0     aspirin 325 MG tablet Take 325 mg by mouth daily       simvastatin (ZOCOR) 20 MG tablet Take 1 tablet (20 mg) by mouth At Bedtime 90 tablet 3     Cholecalciferol (VITAMIN D)  2000 UNITS tablet Take 2,000 Units by mouth daily       cyanocolbalamin (VITAMIN  B-12) 1000 MCG tablet Take 1 tablet by mouth daily       Multiple Vitamins-Minerals (PRESERVISION/LUTEIN PO) Take 60 mg by mouth daily two tablets daily        Physical Exam:  /71  Pulse 75  Temp 97  F (36.1  C) (Oral)  Resp 16  Wt 78.7 kg (173 lb 8 oz)  SpO2 93%  BMI 26.58 kg/m2        GENERAL APPEARANCE: healthy, alert and no distress     HENT: Mouth without ulcers or lesions     NECK: no adenopathy, no asymmetry or masses     LYMPHATICS: No cervical, supraclavicular, axillary  lymphadenopathy     RESP: lungs clear to auscultation - no rales, rhonchi or wheezes     CARDIOVASCULAR: regular rates and rhythm, normal S1 S2     ABDOMEN:  soft, nontender, no HSM or masses and bowel sounds normal     MUSCULOSKELETAL: extremities normal- no gross deformities noted, no evidence of inflammation in joints, FROM in all extremities. No edema b/l LE.     SKIN: no suspicious lesions or rashes     PSYCHIATRIC: mentation appears normal and affect normal    Laboratory/Imaging Studies  urine protein electrophoresis no M protein  urine immunofixation electrophoresis No M protein  Urine protein within normal limits      Results for DAISHA RAMON (MRN [id number]) as of 6/1/2017 12:40   Ref. Range 6/2/2014 15:22 10/8/2014 13:16 2/9/2015 08:51 8/10/2015 07:49 2/8/2016 07:56 2/6/2017 07:59   PSA Latest Ref Range: 0 - 4 ug/L 0.66 1.03 1.09 0.77 1.05 1.17   Results for DAISHA RAMON (MRN [id number]) as of 6/1/2017 12:40   Ref. Range 10/8/2014 13:16 11/20/2014 15:28 4/21/2015 14:48 5/25/2016 09:54 8/25/2016 11:13 5/25/2017 07:59   Creatinine Latest Ref Range: 0.66 - 1.25 mg/dL 1.30 (H) 1.24 0.99 1.03 1.00 1.11       ASSESSMENT/PLAN:   Mr. Ramon is a very pleasant 76 year man with multiple medical problems, including ocular myasthenia gravis, HTN, Hx of prostate cancer and BCC os the skin of the nose, with IgA kappa monoclonal  gammopathy of unknown significance (MGUS).     1. MGUS - M protein stable and IgA level stable. Serum FLC ratio normal. He has no M protein on urine protein electrophoresis/urine immunofixation electrophoresis.   We'll f/up with MGUS labs in one year.    2. Hx of prostate cancer- PSA relatively stable in 02/2017. F/up with Dr. Joens in 02/2018, with repeat PSA.    3. Ocular Myasthenia Gravis- f/up with Dr. Law. Cont low dose prednisone/Mestinon.  4. Hx of multiple BCC including the one removed from the skin of right side of the nose- f/up with Dr. Key in 04/2018.    At the end of our visit patient verbalized understanding and concurred with the plan.

## 2017-05-30 RX ORDER — LISINOPRIL 5 MG/1
TABLET ORAL
Qty: 90 TABLET | Refills: 0 | Status: SHIPPED | OUTPATIENT
Start: 2017-05-30 | End: 2017-08-24

## 2017-05-30 RX ORDER — HYDROCHLOROTHIAZIDE 25 MG/1
TABLET ORAL
Qty: 45 TABLET | Refills: 0 | Status: SHIPPED | OUTPATIENT
Start: 2017-05-30 | End: 2017-08-24

## 2017-05-30 NOTE — TELEPHONE ENCOUNTER
LISINOPRIL      Last Written Prescription Date: 3-1-17  Last Fill Quantity: 90, # refills: 0  [place] [facility] [place] or [facility] prescribing provider: 4-27-17  Next 5 appointments (look out 90 days)     Jun 01, 2017 12:30 PM CDT   Return Visit with Susan Eller MD   [facility] ([facility])    [address] [place] [id number]   [phone]            Jun 06, 2017  9:20 AM CDT   Return Visit with Raheem Law MD   [facility] [place] ([facility])    [address] [facility]  [place] [id number] [phone]                   Potassium   Date Value Ref Range Status   05/25/2017 4.6 3.4 - 5.3 mmol/L Final     Creatinine   Date Value Ref Range Status   05/25/2017 1.11 0.66 - 1.25 mg/dL Final     BP Readings from Last 3 Encounters:   03/25/17 117/77   02/13/17 136/74   11/22/16 142/75     HYDROCHLOROTHIAZIDE      Last Written Prescription Date: 3-1-17  Last Fill Quantity: 45, # refills: 0  [place] [facility] [place] [place] or [facility] prescribing provider: 4-27-17  Next 5 appointments (look out 90 days)     Jun 01, 2017 12:30 PM CDT   Return Visit with Susan Eller MD   [facility] ([facility])    [address] [place] [id number]   [phone]            Jun 06, 2017  9:20 AM CDT   Return Visit with Raheem Law MD   [facility] [place] ([facility])    [address] [facility]  [place] [address] [phone]                   Potassium   Date Value Ref Range Status   05/25/2017 4.6 3.4 - 5.3 mmol/L Final     Creatinine   Date Value Ref Range Status   05/25/2017 1.11 0.66 - 1.25 mg/dL Final     BP Readings from Last 3 Encounters:   03/25/17 117/77   02/13/17 136/74   11/22/16 142/75

## 2017-06-01 ENCOUNTER — ONCOLOGY VISIT (OUTPATIENT)
Dept: ONCOLOGY | Facility: CLINIC | Age: 77
End: 2017-06-01
Payer: MEDICARE

## 2017-06-01 VITALS
OXYGEN SATURATION: 93 % | RESPIRATION RATE: 16 BRPM | SYSTOLIC BLOOD PRESSURE: 111 MMHG | TEMPERATURE: 97 F | HEART RATE: 75 BPM | BODY MASS INDEX: 26.58 KG/M2 | WEIGHT: 173.5 LBS | DIASTOLIC BLOOD PRESSURE: 71 MMHG

## 2017-06-01 DIAGNOSIS — D47.2 IGA MONOCLONAL GAMMOPATHY: Primary | ICD-10-CM

## 2017-06-01 PROCEDURE — 99214 OFFICE O/P EST MOD 30 MIN: CPT | Performed by: INTERNAL MEDICINE

## 2017-06-01 ASSESSMENT — PAIN SCALES - GENERAL: PAINLEVEL: NO PAIN (0)

## 2017-06-01 NOTE — MR AVS SNAPSHOT
After Visit Summary   6/1/2017    Gustavo Ramon    MRN: [id number]           Patient Information     Date Of Birth          1940        Visit Information        Provider Department      6/1/2017 12:30 PM Susan Eller MD [facility]        Today's Diagnoses     IgA monoclonal gammopathy    -  1       Follow-ups after your visit        Your next 10 appointments already scheduled     Jun 06, 2017  9:20 AM CDT   [facility] Visit with Raheem Law MD   [facility]    [address] [facility] [address]   [phone]            Dec 07, 2017 10:45 AM CST   Return Visit with Brooklyn Treviño MD   [facility])    [id number] [place] [id number] [phone]            Feb 05, 2018  8:00 AM CST   LAB with BE LAB   [facility] [place] ([facility])    [address] [facility] [id number] [phone]           Patient must bring picture ID.  Patient should be prepared to give a urine specimen  Please do not eat 10-12 hours before your appointment if you are coming in fasting for labs on lipids, cholesterol, or glucose (sugar).  Pregnant women should follow their Care Team instructions. Water with medications is okay. Do not drink coffee or other fluids.   If you have concerns about taking  your medications, please ask at office or if scheduling via [employer], send a message by clicking on Secure Messaging, Message Your Care Team.            Feb 12, 2018  8:00 AM CST   Return Visit with Glenn Jones MD   [facility] [place] ([address] [address]   [phone]            May 25, 2018  9:40 AM CDT   LAB with LAB FIRST FLOOR [facility] ([facility])    [address] [place] [address] [phone]           Patient must bring  picture ID.  Patient should be prepared to give a urine specimen  Please do not eat 10-12 hours before your appointment if you are coming in fasting for labs on lipids, cholesterol, or glucose (sugar).  Pregnant women should follow their Care Team instructions. Water with medications is okay. Do not drink coffee or other fluids.   If you have concerns about taking  your medications, please ask at office or if scheduling via [employer], send a message by clicking on Secure Messaging, Message Your Care Team.            Jun 01, 2018  1:30 PM CDT   Return Visit with Susan Eller MD   [facility] ([facility])    [address] [place] [address] [phone]              Future tests that were ordered for you today     Open Future Orders        Priority Expected Expires Ordered    CBC with platelets differential Routine  2/26/2020 6/1/2017    Comprehensive metabolic panel Routine  2/26/2020 6/1/2017    Protein electrophoresis random urine Routine  2/26/2020 6/1/2017    Protein immunofixation urine Routine  2/26/2020 6/1/2017    Kappa and lambda light chain Routine  2/26/2020 6/1/2017    Protein electrophoresis Routine  2/26/2020 6/1/2017    Immunoglobulins A G and M Routine  2/26/2020 6/1/2017            Who to contact     If you have questions or need follow up information about today's clinic visit or your schedule please contact [facility] directly at [phone].  Normal or non-critical lab and imaging results will be communicated to you by MyChart, letter or phone within 4 business days after the clinic has received the results. If you do not hear from us within 7 days, please contact the clinic through [employer] or phone. If you have a critical or abnormal lab result, we will notify you by phone as soon as possible.  Submit refill requests through Sp[employer] to Share or call your pharmacy and they will forward the refill request to us. Please allow 3 business  days for your refill to be completed.          Additional Information About Your Visit        [employer] Information     [employer] gives you secure access to your electronic health record. If you see a primary care provider, you can also send messages to your care team and make appointments. If you have questions, please call your primary care clinic.  If you do not have a primary care provider, please call [phone] and they will assist you.      [employer] is an electronic gateway that provides easy, online access to your medical records. With [employer], you can request a clinic appointment, read your test results, renew a prescription or communicate with your care team.     To access your existing account, please contact your [facility] or call [phone] for assistance.        Care EveryWhere ID     This is your Care EveryWhere ID. This could be used by other organizations to access your [place] medical records  [id number]        Your Vitals Were     Pulse Temperature Respirations Pulse Oximetry BMI (Body Mass Index)       75 97  F (36.1  C) (Oral) 16 93% 26.58 kg/m2        Blood Pressure from Last 3 Encounters:   06/01/17 111/71   03/25/17 117/77   02/13/17 136/74    Weight from Last 3 Encounters:   06/01/17 78.7 kg (173 lb 8 oz)   03/25/17 78 kg (172 lb)   11/22/16 81 kg (178 lb 9.6 oz)               Primary Care Provider Office Phone # Fax #    Winston Silverman PA-C [phone]       [facility] [place] [address] [facility] [place] [address]        Thank you!     Thank you for choosing [facility]  for your care. Our goal is always to provide you with excellent care. Hearing back from our patients is one way we can continue to improve our services. Please take a few minutes to complete the written survey that you may receive in the mail after your visit with us. Thank you!             Your Updated Medication List - Protect others around you: Learn how  to safely use, store and throw away your medicines at www.disposemymeds.org.          This list is accurate as of: 6/1/17  2:29 PM.  Always use your most recent med list.                   Brand Name Dispense Instructions for use    aspirin 325 MG tablet      Take 325 mg by mouth daily       cyanocobalamin 1000 MCG tablet    vitamin  B-12     Take 1 tablet by mouth daily       hydrochlorothiazide 25 MG tablet    HYDRODIURIL    45 tablet    TAKE ONE-HALF (1/2) TABLET (12.5 MG) DAILY       lisinopril 5 MG tablet    PRINIVIL/ZESTRIL    90 tablet    TAKE 1 TABLET DAILY       omeprazole 20 MG CR capsule    priLOSEC    45 capsule    Take 1 Capsule Daily Concomitant To Prednisone Use       predniSONE 5 MG tablet    DELTASONE    45 tablet    Take 1 tablet (5 mg) by mouth every other day       PRESERVISION/LUTEIN PO      Take 60 mg by mouth daily two tablets daily       pyridostigmine 60 MG tablet    MESTINON    360 tablet    Take 1 tablet (60 mg) by mouth 4 times daily       simvastatin 20 MG tablet    ZOCOR    90 tablet    Take 1 tablet (20 mg) by mouth At Bedtime       triamcinolone 0.1 % cream    KENALOG    80 g    For Rash use twice daily for 2 weeks, take 2 weeks break. Not for face       vitamin D 2000 UNITS tablet      Take 2,000 Units by mouth daily

## 2017-06-01 NOTE — NURSING NOTE
"Oncology Rooming Note    June 1, 2017 12:37 PM   Gustavo Ramon is a 76 year old male who presents for:    Chief Complaint   Patient presents with     Oncology Clinic Visit     1 year follow up MGUS     Initial Vitals: /71  Pulse 75  Temp 97  F (36.1  C) (Oral)  Resp 16  Wt 78.7 kg (173 lb 8 oz)  SpO2 93%  BMI 26.58 kg/m2 Estimated body mass index is 26.58 kg/(m^2) as calculated from the following:    Height as of 11/22/16: 1.721 m (5' 7.75\").    Weight as of this encounter: 78.7 kg (173 lb 8 oz). Body surface area is 1.94 meters squared.  No Pain (0) Comment: Data Unavailable   No LMP for male patient.  Allergies reviewed: Yes  Medications reviewed: Yes    Medications: Medication refills not needed today.  Pharmacy name entered into EPIC:    [facility] EPRESCR[facility] ONLY  [facility] [place] [place] - [id number] [facility]  [facility] - [address]  [email or web address] - [address]  [email or web address] - [address]         10 minutes for nursing intake (face to face time)     DIANA RUFFIN RN              "

## 2017-06-06 ENCOUNTER — OFFICE VISIT (OUTPATIENT)
Dept: NEUROLOGY | Facility: CLINIC | Age: 77
End: 2017-06-06
Payer: MEDICARE

## 2017-06-06 VITALS
SYSTOLIC BLOOD PRESSURE: 146 MMHG | BODY MASS INDEX: 26.86 KG/M2 | HEART RATE: 73 BPM | HEIGHT: 68 IN | DIASTOLIC BLOOD PRESSURE: 82 MMHG | WEIGHT: 177.2 LBS

## 2017-06-06 DIAGNOSIS — G70.00 MYASTHENIA GRAVIS (H): Primary | ICD-10-CM

## 2017-06-06 DIAGNOSIS — R26.89 BALANCE PROBLEMS: ICD-10-CM

## 2017-06-06 PROCEDURE — 99213 OFFICE O/P EST LOW 20 MIN: CPT | Performed by: PSYCHIATRY & NEUROLOGY

## 2017-06-06 NOTE — MR AVS SNAPSHOT
After Visit Summary   6/6/2017    Gustavo Ramon    MRN: [id number]           Patient Information     Date Of Birth          1940        Visit Information        Provider Department      6/6/2017 9:20 AM Raheem Law MD [facility]        Today's Diagnoses     Myasthenia gravis (H)    -  1    Balance problems          Care Instructions    AFTER VISIT SUMMARY (AVS)    Preventive Neurology: Encouraged to keep physically and mentally active with particular emphasis on daily stretching exercises, walking, and healthy eating.        Follow-up appointment on June 20, 27 reporting by 4:00 to evaluate her initial symptoms of poor balance  Thanks                 Follow-ups after your visit        Follow-up notes from your care team     Return in about 2 weeks (around 6/20/2017) for Additional symptoms.      Your next 10 appointments already scheduled     Jun 20, 2017  4:20 PM CDT   Return Visit with Raheem Law MD   [facility] ([facility])    [address] [facility] [address]   [phone]            Dec 07, 2017 10:45 AM CST   Return Visit with Brooklyn Treviño MD   [facility] ([facility])    [address] [place] [address]   [phone]            Feb 05, 2018  8:00 AM CST   LAB with BE LAB   [facility] [place] ([facility])    [address] [facility] [address]   [phone]           Patient must bring picture ID.  Patient should be prepared to give a urine specimen  Please do not eat 10-12 hours before your appointment if you are coming in fasting for labs on lipids, cholesterol, or glucose (sugar).  Pregnant women should follow their Care Team instructions. Water with medications is okay. Do not drink coffee or other fluids.   If you have concerns about taking  your medications, please ask at office or if scheduling via [employer], send a message by clicking  on Secure Messaging, Message Your Care Team.            Feb 12, 2018  8:00 AM CST   Return Visit with Glenn Jones MD   [facility] ([facility] [place])    [address] [facility]  [place] [address] [phone]            May 25, 2018  9:40 AM CDT   LAB with BE LAB   [facility] [place] ([facility] [place])    [address] [facility]  [place] [id number] [phone]           Patient must bring picture ID.  Patient should be prepared to give a urine specimen  Please do not eat 10-12 hours before your appointment if you are coming in fasting for labs on lipids, cholesterol, or glucose (sugar).  Pregnant women should follow their Care Team instructions. Water with medications is okay. Do not drink coffee or other fluids.   If you have concerns about taking  your medications, please ask at office or if scheduling via [employer], send a message by clicking on Secure Messaging, Message Your Care Team.            Jun 01, 2018  1:30 PM CDT   Return Visit with Susan Eller MD   [facility] ([facility])    [address] [place] [address] [phone]              Who to contact     If you have questions or need follow up information about today's clinic visit or your schedule please contact [facility] [place] directly at [phone].  Normal or non-critical lab and imaging results will be communicated to you by MyChart, letter or phone within 4 business days after the clinic has received the results. If you do not hear from us within 7 days, please contact the clinic through [employer] or phone. If you have a critical or abnormal lab result, we will notify you by phone as soon as possible.  Submit refill requests through [employer] or call your pharmacy and they will forward the refill request to us. Please allow 3 business days for your refill to be completed.          Additional Information About Your Visit        [employer]  "Information     [employer] gives you secure access to your electronic health record. If you see a primary care provider, you can also send messages to your care team and make appointments. If you have questions, please call your primary care clinic.  If you do not have a primary care provider, please call [phone] and they will assist you.        Care EveryWhere ID     This is your Care EveryWhere ID. This could be used by other organizations to access your [place] medical records  [id number]        Your Vitals Were     Pulse Height BMI (Body Mass Index)             73 1.721 m (5' 7.75\") 27.14 kg/m2          Blood Pressure from Last 3 Encounters:   06/06/17 146/82   06/01/17 111/71   03/25/17 117/77    Weight from Last 3 Encounters:   06/06/17 80.4 kg (177 lb 3.2 oz)   06/01/17 78.7 kg (173 lb 8 oz)   03/25/17 78 kg (172 lb)              Today, you had the following     No orders found for display         Today's Medication Changes          These changes are accurate as of: 6/6/17 11:59 PM.  If you have any questions, ask your nurse or doctor.               These medicines have changed or have updated prescriptions.        Dose/Directions    omeprazole 20 MG CR capsule   Commonly known as:  priLOSEC   This may have changed:  additional instructions   Used for:  Gastroesophageal reflux disease without esophagitis        Take 1 Capsule Daily Concomitant To Prednisone Use   Quantity:  45 capsule   Refills:  3                Primary Care Provider Office Phone # Fax #    Winston Silverman PA-C [phone]       [facility] [place] [address]  [place] [address]        Thank you!     Thank you for choosing [facility]  for your care. Our goal is always to provide you with excellent care. Hearing back from our patients is one way we can continue to improve our services. Please take a few minutes to complete the written survey that you may receive in the mail after your visit with us. " Thank you!             Your Updated Medication List - Protect others around you: Learn how to safely use, store and throw away your medicines at www.disposemymeds.org.          This list is accurate as of: 6/6/17 11:59 PM.  Always use your most recent med list.                   Brand Name Dispense Instructions for use    aspirin 325 MG tablet      Take 325 mg by mouth daily       cyanocobalamin 1000 MCG tablet    vitamin  B-12     Take 1 tablet by mouth daily       hydrochlorothiazide 25 MG tablet    HYDRODIURIL    45 tablet    TAKE ONE-HALF (1/2) TABLET (12.5 MG) DAILY       lisinopril 5 MG tablet    PRINIVIL/ZESTRIL    90 tablet    TAKE 1 TABLET DAILY       omeprazole 20 MG CR capsule    priLOSEC    45 capsule    Take 1 Capsule Daily Concomitant To Prednisone Use       predniSONE 5 MG tablet    DELTASONE    45 tablet    Take 1 tablet (5 mg) by mouth every other day       PRESERVISION/LUTEIN PO      Take 60 mg by mouth daily two tablets daily       pyridostigmine 60 MG tablet    MESTINON    360 tablet    Take 1 tablet (60 mg) by mouth 4 times daily       simvastatin 20 MG tablet    ZOCOR    90 tablet    Take 1 tablet (20 mg) by mouth At Bedtime       triamcinolone 0.1 % cream    KENALOG    80 g    For Rash use twice daily for 2 weeks, take 2 weeks break. Not for face       vitamin D 2000 UNITS tablet      Take 2,000 Units by mouth daily

## 2017-06-06 NOTE — NURSING NOTE
"Chief Complaint   Patient presents with     RECHECK     Altered cognition       Initial /82 (BP Location: Left arm, Patient Position: Chair, Cuff Size: Adult Regular)  Pulse 73  Ht 5' 7.75\" (1.721 m)  Wt 177 lb 3.2 oz (80.4 kg)  BMI 27.14 kg/m2 Estimated body mass index is 27.14 kg/(m^2) as calculated from the following:    Height as of this encounter: 5' 7.75\" (1.721 m).    Weight as of this encounter: 177 lb 3.2 oz (80.4 kg).  Medication Reconciliation: complete   Carmen Collier MA      "

## 2017-06-10 NOTE — PATIENT INSTRUCTIONS
AFTER VISIT SUMMARY (AVS)    Preventive Neurology: Encouraged to keep physically and mentally active with particular emphasis on daily stretching exercises, walking, and healthy eating.        Follow-up appointment on June 20, 27 reporting by 4:00 to evaluate her initial symptoms of poor balance  Thanks

## 2017-06-10 NOTE — PROGRESS NOTES
"                                          ESTABLISHED PATIENT NEUROLOGY NOTE    LOCATION: [facility]  DATE OF VISIT: 2017  NAME: Mr.Clifford CEZAR Ramon  : 1940 (76 year old)  MR #: [id number]    PRIMARY/REFERRING PROVIDER: Winston Silverman PA-C    REASON FOR VISIT: Review of symptoms    HISTORY OF PRESENT ILLNESS: Mr. Ramon is 76-year-old man with following issues:  Previous history of myasthenia gravis. He had presented with double vision in . He has not been having any further episodes he continues taking PYRIDOSTIGMINE. He has been evaluated regarding it by [facility] neurologist. No further episodes of double vision or any weakness of arms or legs or head. He relates that he was advised to take the prednisone also. He takes omeprazole to prevent any GI side effects resulting from prednisone.    He relates that to he has the feeling that there is some swelling inside his throat although he does not have any difficulty in swallowing food-solids or liquids.  He gives history of muscle cramps also muscle cramps are mainly over his hand. He may have cramping of the hand lasting for 10 minutes.    He had another concern today he had \"nausea\" after having meal one time and if it is pointing towards anything serious condition.    He did not have any further spell as discussed during his recent last visit.    Today his additional concern is about new symptom of balance. It is not good for last one year. He was doing martial art to while in the . He has not been exercising on a daily basis.    CURRENT MEDICATIONS:   Current Outpatient Prescriptions on File Prior to Visit:  lisinopril (PRINIVIL/ZESTRIL) 5 MG tablet TAKE 1 TABLET DAILY   hydrochlorothiazide (HYDRODIURIL) 25 MG tablet TAKE ONE-HALF (1/2) TABLET (12.5 MG) DAILY   omeprazole (PRILOSEC) 20 MG CR capsule Take 1 Capsule Daily Concomitant To Prednisone Use (Patient taking differently: Take 1 Capsule Daily Concomitant To Prednisone " "Use, MEDICATION TAKEN EVERY OTHER DAY)   predniSONE (DELTASONE) 5 MG tablet Take 1 tablet (5 mg) by mouth every other day   pyridostigmine (MESTINON) 60 MG tablet Take 1 tablet (60 mg) by mouth 4 times daily   aspirin 325 MG tablet Take 325 mg by mouth daily   simvastatin (ZOCOR) 20 MG tablet Take 1 tablet (20 mg) by mouth At Bedtime   Cholecalciferol (VITAMIN D) 2000 UNITS tablet Take 2,000 Units by mouth daily   cyanocolbalamin (VITAMIN  B-12) 1000 MCG tablet Take 1 tablet by mouth daily   Multiple Vitamins-Minerals (PRESERVISION/LUTEIN PO) Take 60 mg by mouth daily two tablets daily   triamcinolone (KENALOG) 0.1 % cream For Rash use twice daily for 2 weeks, take 2 weeks break. Not for face (Patient not taking: Reported on 6/1/2017)     No current facility-administered medications on file prior to visit.   PAST MEDICAL HISTORY: Past Medical History:   Diagnosis Date     Actinic keratosis      AK (actinic keratosis) 11/15/2012     Amaurosis fugax      Basal cell carcinoma 2009    R medial cheek/nose     Central serous retinopathy left    Eye     Diverticulosis 08/2006     DJD (degenerative joint disease)      GERD (gastroesophageal reflux disease)      Hearing loss     High frequency     HTN (hypertension)      Hyperlipidemia LDL goal < 130      Hyperplastic colon polyp 08/2006     IgA monoclonal gammopathy     IgA kappa     Lattice degeneration of retina right    Eye     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    [facility] consult     Rosacea      Vitreous detachment left    Eye     Past Surgical, Personal & Social history reviewed & documented in the EPIC.  GENERAL EXAMINATION:  /82 (BP Location: Left arm, Patient Position: Chair, Cuff Size: Adult Regular)  Pulse 73  Ht 1.721 m (5' 7.75\")  Wt 80.4 kg (177 lb 3.2 oz)  BMI 27.14 kg/m2    IMPRESSION:   Encounter Diagnoses   Name Primary?     Myasthenia gravis (H) Yes     Balance problems    PLANS:   Patient Instructions   AFTER VISIT SUMMARY (AVS)    Preventive " Neurology: Encouraged to keep physically and mentally active with particular emphasis on daily stretching exercises, walking, and healthy eating.    Follow-up appointment on June 20, 27 reporting by 4:00 to evaluate her initial symptoms of poor balance  Thanks     Thanks to Winston Silverman PA-C for allowing me to participate in Mr. Ramon's care. Please feel free to call me with any questions or concerns.       Raheem Law MD, MRCPI  Neurologist    Cc: Winston Silverman PA-C

## 2017-06-20 ENCOUNTER — OFFICE VISIT (OUTPATIENT)
Dept: NEUROLOGY | Facility: CLINIC | Age: 77
End: 2017-06-20
Payer: MEDICARE

## 2017-06-20 VITALS
DIASTOLIC BLOOD PRESSURE: 74 MMHG | HEART RATE: 84 BPM | WEIGHT: 176.6 LBS | HEIGHT: 68 IN | BODY MASS INDEX: 26.76 KG/M2 | SYSTOLIC BLOOD PRESSURE: 129 MMHG

## 2017-06-20 DIAGNOSIS — H35.52 MACULAR PIGMENT DEPOSIT: ICD-10-CM

## 2017-06-20 DIAGNOSIS — D47.2 IGA MONOCLONAL GAMMOPATHY: ICD-10-CM

## 2017-06-20 DIAGNOSIS — G73.3: ICD-10-CM

## 2017-06-20 DIAGNOSIS — R26.89 POOR BALANCE: Primary | ICD-10-CM

## 2017-06-20 PROCEDURE — 84443 ASSAY THYROID STIM HORMONE: CPT | Performed by: PATHOLOGY

## 2017-06-20 PROCEDURE — 36415 COLL VENOUS BLD VENIPUNCTURE: CPT | Performed by: PATHOLOGY

## 2017-06-20 PROCEDURE — 99214 OFFICE O/P EST MOD 30 MIN: CPT | Performed by: PSYCHIATRY & NEUROLOGY

## 2017-06-20 NOTE — PATIENT INSTRUCTIONS
AFTER VISIT SUMMARY (AVS)    Orders Placed This Encounter   Procedures     TSH with free T4 reflex     PHYSICAL THERAPY REFERRAL       Electromyography LOWER LIMBS at [facility]  Reporting by 12:40 PM to  for registration    Diagnostic possibilities reviewed and reasons for work-up explained    Preventive Neurology: Encouraged to keep physically and mentally active with particular emphasis on daily stretching exercises, walking, and healthy eating.    Additional  recommendations after the work-up      Thanks

## 2017-06-20 NOTE — PROGRESS NOTES
ESTABLISHED PATIENT NEUROLOGY NOTE    LOCATION: [facility]   DATE OF VISIT: 2017  NAME: Mr.Clifford CEZAR Ramon  : 1940 (76 year old)  MR #: [id number]    PRIMARY/REFERRING PROVIDER: Winston Silverman PA-C    REASON FOR VISIT: Balance issues    HISTORY OF PRESENT ILLNESS: Mr. Ramon is 77-year-old man with previous history of myasthenia gravis and IgA monoclonal gammopathy.   His balance has not been good for last one year. Walks without any support. He relates that he needs to concentrate while walking. No history of falls. He relates that he is unsteady when he gets up in the night to go to the bathroom. There are enough nightlights in his house. He finds that he has tendency to veer one or the other side. He has history of macular degeneration, affecting predominantly the left eye. He has been seeing ophthalmologist. History of lumbar spine surgery 7 years ago. No history of chronic low back pain. No h/o hip pain or hip surgery. History of bilateral knee surgeries. Denies any knee  pain currently. Denies any ankle pain. Minimal paresthesias of feet. Predominantly over the bottom of the feet. No radiation of feet paresthesias to his legs. No hand paresthesias. No history of neck pain R cervical spine surgery.   No history of diabetes mellitus and/or hypothyroidism.    Previous Diagnostic Studies:  Blood tests:    Component      Latest Ref Rng & Units 2017   WBC      4.0 - 11.0 10e9/L 6.9   RBC Count      4.4 - 5.9 10e12/L 5.10   Hemoglobin      13.3 - 17.7 g/dL 15.2   Hematocrit      40.0 - 53.0 % 46.6   MCV      78 - 100 fl 91   MCH      26.5 - 33.0 pg 29.8   MCHC      31.5 - 36.5 g/dL 32.6   RDW      10.0 - 15.0 % 13.9   Platelet Count      150 - 450 10e9/L 178   Diff Method       Automated Method   % Neutrophils      % 64.6   % Lymphocytes      % 17.9   % Monocytes      % 10.1   % Eosinophils      % 6.8   % Basophils      % 0.6   Absolute  Neutrophil      1.6 - 8.3 10e9/L 4.5   Absolute Lymphocytes      0.8 - 5.3 10e9/L 1.2   Absolute Monocytes      0.0 - 1.3 10e9/L 0.7   Absolute Eosinophils      0.0 - 0.7 10e9/L 0.5   Absolute Basophils      0.0 - 0.2 10e9/L 0.0   Sodium      133 - 144 mmol/L 144   Potassium      3.4 - 5.3 mmol/L 4.6   Chloride      94 - 109 mmol/L 106   Carbon Dioxide      20 - 32 mmol/L 32   Anion Gap      3 - 14 mmol/L 6   Glucose      70 - 99 mg/dL 96   Urea Nitrogen      7 - 30 mg/dL 19   Creatinine      0.66 - 1.25 mg/dL 1.11   GFR Estimate      >60 mL/min/1.7m2 64   GFR Estimate If Black      >60 mL/min/1.7m2 77   Calcium      8.5 - 10.1 mg/dL 9.6   Bilirubin Total      0.2 - 1.3 mg/dL 0.5   Albumin      3.4 - 5.0 g/dL 3.9   Protein Total      6.8 - 8.8 g/dL 7.2   Alkaline Phosphatase      40 - 150 U/L 68   ALT      0 - 70 U/L 25   AST      0 - 45 U/L 19   Albumin Fraction      3.7 - 5.1 g/dL 4.2   Alpha 1 Fraction      0.2 - 0.4 g/dL 0.3   Alpha 2 Fraction      0.5 - 0.9 g/dL 0.7   Beta Fraction      0.6 - 1.0 g/dL 0.8   Gamma Fraction      0.7 - 1.6 g/dL 1.3   Monoclonal Peak      0.0 g/dL 0.4 (H)   ELP Interpretation:       Two monoclonal proteins (about 0.4 and 0.1 g/dL) seen in the gamma fraction. . . .   IGG      695 - 1620 mg/dL 867   IGA      70 - 380 mg/dL 572 (H)   IGM      60 - 265 mg/dL 27 (L)   [place] Free Lt Chain      0.33 - 1.94 mg/dL 2.02 (H)   Lambda Free Lt Chain      0.57 - 2.63 mg/dL 1.52   Kappa Lambda Ratio      0.26 - 1.65 1.33   Protein Random Urine      g/L 0.20   Protein Total Urine g/gr Creatinine      0 - 0.2 g/g Cr 0.12   ELP Interpretation Urine       (Note) . . .   Immunofix ELP Urine       No monoclonal protein seen on immunofixation.  Pathological significance . . .   Creatinine Urine      mg/dL 171       CURRENT MEDICATIONS:   Current Outpatient Prescriptions on File Prior to Visit:  lisinopril (PRINIVIL/ZESTRIL) 5 MG tablet TAKE 1 TABLET DAILY   hydrochlorothiazide (HYDRODIURIL) 25 MG tablet  "TAKE ONE-HALF (1/2) TABLET (12.5 MG) DAILY   omeprazole (PRILOSEC) 20 MG CR capsule Take 1 Capsule Daily Concomitant To Prednisone Use (Patient taking differently: Take 1 Capsule Daily Concomitant To Prednisone Use, MEDICATION TAKEN EVERY OTHER DAY)   predniSONE (DELTASONE) 5 MG tablet Take 1 tablet (5 mg) by mouth every other day   pyridostigmine (MESTINON) 60 MG tablet Take 1 tablet (60 mg) by mouth 4 times daily   aspirin 325 MG tablet Take 325 mg by mouth daily   simvastatin (ZOCOR) 20 MG tablet Take 1 tablet (20 mg) by mouth At Bedtime   Cholecalciferol (VITAMIN D) 2000 UNITS tablet Take 2,000 Units by mouth daily   cyanocolbalamin (VITAMIN  B-12) 1000 MCG tablet Take 1 tablet by mouth daily   Multiple Vitamins-Minerals (PRESERVISION/LUTEIN PO) Take 60 mg by mouth daily two tablets daily   triamcinolone (KENALOG) 0.1 % cream For Rash use twice daily for 2 weeks, take 2 weeks break. Not for face (Patient not taking: Reported on 6/1/2017)     No current facility-administered medications on file prior to visit.   PAST MEDICAL HISTORY: Past Medical History:   Diagnosis Date     Actinic keratosis      AK (actinic keratosis) 11/15/2012     Amaurosis fugax      Basal cell carcinoma 2009    R medial cheek/nose     Central serous retinopathy left    Eye     Diverticulosis 08/2006     DJD (degenerative joint disease)      GERD (gastroesophageal reflux disease)      Hearing loss     High frequency     HTN (hypertension)      Hyperlipidemia LDL goal < 130      Hyperplastic colon polyp 08/2006     IgA monoclonal gammopathy     IgA kappa     Lattice degeneration of retina right    Eye     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    [facility] consult     Rosacea      Vitreous detachment left    Eye     Past Surgical, Personal & Social history reviewed & documented in the [facility].  GENERAL EXAMINATION:  /74 (BP Location: Left arm, Patient Position: Chair, Cuff Size: Adult Regular)  Pulse 84  Ht 1.721 m (5' 7.75\")  Wt 80.1 kg (176 " lb 9.6 oz)  BMI 27.05 kg/m2    LIMITED NEUROLOGICAL EXAMINATION:    Coordination:  Heel-shin test normal bilaterally    Deep Tendon Reflexes:  Lower limbs: Equal and symmetrical with diminished ankle jerks and down going plantars    Sensations:    Touch/Pin prick: Normal at both and upper and lower limbs    Vibration (128 Hz): Diminished at both ankles    Position sense: Normal at both big toes    Gait:    Walks with a normal stride length     Can stand on heels and toes    Can walk on heels and toes    Minimally unsteady with tandem walking    Romberg Sign: Negative      IMPRESSION:   Encounter Diagnoses   Name Primary?     Poor balance Yes     IgA monoclonal gammopathy      Macular pigment deposit      Myasthenic syndromes in other diseases classified elsewhere (H)       COMMENTS:  Possibility of idiopathic peripheral neuropathy although I note that he has IgA monoclonal gammopathy. The other additional possibility would include musculoskeletal causes for his poor balance.     PLANS:   Patient Instructions     AFTER VISIT SUMMARY (AVS)    Orders Placed This Encounter   Procedures     TSH with free T4 reflex     PHYSICAL THERAPY REFERRAL       Electromyography LOWER LIMBS at [facility]  Reporting by 12:40 PM to  for registration    Diagnostic possibilities reviewed and reasons for work-up explained    Preventive Neurology: Encouraged to keep physically and mentally active with particular emphasis on daily stretching exercises, walking, and healthy eating.    Additional  recommendations after the work-up      Thanks to Winston Silverman PA-C for allowing me to participate in Mr. Ramon's care. Please feel free to call me with any questions or concerns.     Total Time: 25 minutes. More than 50% of the time spent on counseling/coordinating the care.    Raheem Law MD, MRCPI  Neurologist    Cc: Winston Silverman PA-C

## 2017-06-20 NOTE — NURSING NOTE
"Chief Complaint   Patient presents with     RECHECK     Balance       Initial /74 (BP Location: Left arm, Patient Position: Chair, Cuff Size: Adult Regular)  Pulse 84  Ht 5' 7.75\" (1.721 m)  Wt 176 lb 9.6 oz (80.1 kg)  BMI 27.05 kg/m2 Estimated body mass index is 27.05 kg/(m^2) as calculated from the following:    Height as of this encounter: 5' 7.75\" (1.721 m).    Weight as of this encounter: 176 lb 9.6 oz (80.1 kg).  Medication Reconciliation: complete   Carmen Collier MA      "

## 2017-06-21 LAB — TSH SERPL DL<=0.005 MIU/L-ACNC: 1.24 MU/L (ref 0.4–4)

## 2017-06-22 NOTE — PROGRESS NOTES
Star Mr. Ramon,     Your TSH is  normal. Follow-up as recommended during your recent visit.      Thanks,       Raheem Law MD, MRCPI  Neurologist

## 2017-07-20 ENCOUNTER — HOSPITAL ENCOUNTER (OUTPATIENT)
Dept: PHYSICAL THERAPY | Facility: CLINIC | Age: 77
Setting detail: THERAPIES SERIES
End: 2017-07-20
Attending: PSYCHIATRY & NEUROLOGY
Payer: MEDICARE

## 2017-07-20 PROCEDURE — G8979 MOBILITY GOAL STATUS: HCPCS | Mod: GP,CI | Performed by: PHYSICAL THERAPIST

## 2017-07-20 PROCEDURE — G8978 MOBILITY CURRENT STATUS: HCPCS | Mod: GP,CI | Performed by: PHYSICAL THERAPIST

## 2017-07-20 PROCEDURE — 40000719 ZZHC STATISTIC PT DEPARTMENT NEURO VISIT: Performed by: PHYSICAL THERAPIST

## 2017-07-20 PROCEDURE — 97161 PT EVAL LOW COMPLEX 20 MIN: CPT | Mod: GP | Performed by: PHYSICAL THERAPIST

## 2017-07-20 PROCEDURE — 97112 NEUROMUSCULAR REEDUCATION: CPT | Mod: GP | Performed by: PHYSICAL THERAPIST

## 2017-07-20 NOTE — PROGRESS NOTES
07/20/17 1500   Quick Adds   Quick Adds Certification   Type of Visit Initial OP PT Evaluation   General Information   Start of Care Date 07/20/17   Referring Physician Raheem Law MD    Orders Evaluate and Treat as Indicated   Order Date 06/20/17   Medical Diagnosis poor balance    Onset of illness/injury or Date of Surgery 06/20/16   Surgical/Medical history reviewed Yes   Pertinent history of current problem (include personal factors and/or comorbidities that impact the POC) Patient with increased feelings of unsteady and balance issues over the last year. Has not fallen but feels less steady on his feet, especially at night when the lights are off. Reports that he was very active most of his life but now is only playing tennis ~1 time per week and thinks that this may be an issue as he is less active- reports that he has no balance issues with tennis though. He feels that her veers right to L at times. He was recently diagnosed with a mild neuropathy at [facility] with neurologist. He has hx of myasthinia gravis- occular version- takes meds to manage this and has been stable.    Pertinent Visual History  has diplopia from MG- states that if he is diligent about taking medicine it happens less frequently.    Prior level of function comment used to be very active- now plays 1.5 hours/week, social with family and friends and leaves the house often    Patient role/Employment history Employed   [facility]   Home/Community Accessibility Comments no stairs -- lives with spouse    Assistive Devices Comments does not use    Patient/Family Goals Statement assess his balance and help to improve    Fall Risk Screen   Fall screen completed by PT   Per patient - Fall 2 or more times in past year? No   Per patient - Fall with injury in past year? No   Timed Up and Go score (seconds) (did FGA and walk speed instead )   Is patient a fall risk? No   Pain   Pain comments no   Cognitive Status Examination    Orientation orientation to person, place and time   Level of Consciousness alert   Follows Commands and Answers Questions 100% of the time   Personal Safety and Judgment intact   Memory intact   Integumentary   Integumentary Comments nothing remarkable    Posture   Posture Comments rigid posture with gait in trunk    Range of Motion (ROM)   ROM Comment DF to neutral B with tightness in plantarflexors with stretching in standing    Strength   Strength Comments 5/5 strength grossly in UE and LEs with testing- able to perform 22 sit<>stands in 30 secs; active with tennis but does feel that his activity tolerance is slightly decreased in the past year    Bed Mobility   Bed Mobility Comments independent   Transfer Skills   Transfer Comments independent with all transfers without his UEs    Gait   Gait Comments ambulates with even step length, appropriate gait speed for age, normal mechanics- does keep posture very rigid with decrease arm swing and head in neutral position foward- likes to look down at ground    Gait Special Tests   Gait Special Tests 25 FOOT TIMED WALK;FUNCTIONAL GAIT ASSESSMENT   Gait Special Tests 25 Foot Timed Walk   Seconds 7.75   Steps 14 Steps   Gait Special Tests Functional Gait Assessment Score out of 30   Score out of 30 24   Comments see PN for details    Balance   Balance Comments SLS x 5 secs on B LEs- challengning to keep balance longer then this. Overall has fair balance but is challenged with higher level tasks such as those on FGA- neuropathy plays a part in this.    Balance Special Tests   Balance Special Tests Single leg stance right;Single leg stance left   Balance Special Tests Single Leg Stance Right,   Right, seconds 5 Seconds   Comments increased sway   Balance Special Tests Single Leg Stance Left   Left, seconds 5 Seconds   Comments increased sway    Sensory Examination   Sensory Perception Comments mild decrease in sensation to light touch on B feet- per MD, decreaed protective  sensation when monofilament was used    Planned Therapy Interventions   Planned Therapy Interventions balance training;gait training;neuromuscular re-education;strengthening;stretching   Clinical Impression   Criteria for Skilled Therapeutic Interventions Met yes, treatment indicated   PT Diagnosis balance impairments   Influenced by the following impairments neuropathy, balance deficits, decreaed activity tolerance    Functional limitations due to impairments decreased safety and confidence with mobility at home and in the community    Clinical Presentation Stable/Uncomplicated   Clinical Presentation Rationale stable medical status, few PT impairments, co-morbidities, motiaved to work with therapy,    Clinical Decision Making (Complexity) Low complexity   Therapy Frequency other (see comments)  (2-3 more sessions )   Predicted Duration of Therapy Intervention (days/wks) 60 day   Risk & Benefits of therapy have been explained Yes   Patient, Family & other staff in agreement with plan of care Yes   Clinical Impression Comments Patient presents with mild balance deficits d/t neuropathy and changes in balance strategies that are impacting his safety and confidence with walking and functional mobility. Patient will benefit from skilled PT to address these impairments and provide education regarding balance and HEP.    Education Assessment   Preferred Learning Style Listening;Demonstration;Pictures/video   Barriers to Learning No barriers   GOALS   PT Eval Goals 1;2   Goal 1   Goal Identifier balance   Goal Description Patient will ambulate with head turns in horizontal and vertical direction in 3/3 trials x 50 feet with decreased lateral deviations from midline of 1 foot width line in order to show improvement with dynamic balance to improve confidence when shopping at [employer].    Target Date 09/17/17   Goal 2   Goal Identifier HEP   Goal Description Patient demonstrate independence with HEP to address balance  deficits with dynamic band static balance in order to improve safety with gait.    Target Date 09/17/17   Total Evaluation Time   Total Evaluation Time (Minutes) 30   Therapy Certification   Certification date from 07/20/17   Certification date to 09/17/17   Medical Diagnosis poor balance

## 2017-07-20 NOTE — PROGRESS NOTES
[facility]        OUTPATIENT PHYSICAL THERAPY FUNCTIONAL EVALUATION  PLAN OF TREATMENT FOR OUTPATIENT REHABILITATION  (COMPLETE FOR INITIAL CLAIMS ONLY)  Patient's Last Name, First Name, M.I.  YOB: 1940  Gustavo Ramon     Provider's Name   [facility]   Medical Record No.  [id number]     Start of Care Date:  07/20/17   Onset Date:  06/20/16   Type:     _X__PT   ____OT  ____SLP Medical Diagnosis:  poor balance     PT Diagnosis:  balance impairments Visits from SOC:  1                              __________________________________________________________________________________  Plan of Treatment/Functional Goals:  balance training, gait training, neuromuscular re-education, strengthening, stretching           GOALS  balance  Patient will ambulate with head turns in horizontal and vertical direction in 3/3 trials x 50 feet with decreased lateral deviations from midline of 1 foot width line in order to show improvement with dynamic balance to improve confidence when shopping at [employer].   09/17/17    HEP  Patient demonstrate independence with HEP to address balance deficits with dynamic band static balance in order to improve safety with gait.   09/17/17                Therapy Frequency:  other (see comments) (2-3 more sessions )   Predicted Duration of Therapy Intervention:  60 day    Alem Mccain, PT                                    I CERTIFY THE NEED FOR THESE SERVICES FURNISHED UNDER        THIS PLAN OF TREATMENT AND WHILE UNDER MY CARE     (Physician co-signature of this document indicates review and certification of the therapy plan).                  Certification Date From:  07/20/17   Certification Date To:  09/17/17    Referring Provider:  Raheem Law MD     Initial Assessment  See Epic Evaluation- Start of Care Date: 07/20/17

## 2017-07-20 NOTE — PROGRESS NOTES
Functional Gait Assessment (FGA): The FGA assesses postural stability during various walking tasks.   Gait assistive device used: none     Patient Score: 24/30  Scores of <22/30 have been correlated with predicting falls in community-dwelling older adults according to Courtney & Robert 2010.   Scores of <18/30 have been correlated with increased risk for falls in patients with Parkinsons Disease according to Silver Duvall Zhou et al 2014.  Minimal Detectable Change for patients with acute/chronic stroke = 4.2 according to Yomi & Bandarschel 2009  Minimal Detectable Change for patients with vestibular disorder = 8 according to Courtney & Robert 2010    Assessment (rationale for performing, application to patient s function & care plan): Performed to assess patient's balance during gait with dynamic challenges- patient overall performing well for age- difficulty when eyes were closed and when he had to perform head turns and tandem walking- appears most likely d/t mild neuropathy. Patient will benefit from HEP for balance to address.     Minutes billed as physical performance test: 8       07/20/17 1600   Signing Clinician's Name / Credentials   Signing clinician's name / credentials Alem Mccain, PT, DPT   Functional Gait Assessment (AILYN Valdez., Margoth GZachariah F., et al. (2004))   1. GAIT LEVEL SURFACE 3   2. CHANGE IN GAIT SPEED 3   3. GAIT WITH HORIZONTAL HEAD TURNS 2   4. GAIT WITH VERTICAL HEAD TURNS 2   5. GAIT AND PIVOT TURN 2   6. STEP OVER OBSTACLE 3   7. GAIT WITH NARROW BASE OF SUPPORT 2   8. GAIT WITH EYES CLOSED 1   9. AMBULATING BACKWARDS 3   10. STEPS 3   Total Functional Gait Assessment Score   TOTAL SCORE: (MAXIMUM SCORE 30) 24

## 2017-08-02 DIAGNOSIS — E78.5 HYPERLIPIDEMIA LDL GOAL <130: ICD-10-CM

## 2017-08-02 RX ORDER — SIMVASTATIN 20 MG
TABLET ORAL
Qty: 90 TABLET | Refills: 0 | Status: SHIPPED | OUTPATIENT
Start: 2017-08-02 | End: 2017-10-31

## 2017-08-02 NOTE — TELEPHONE ENCOUNTER
SIMVASTATIN     Last Written Prescription Date: 5-6-17  Last Fill Quantity: 90, # refills: 2  Last Office Visit with [facility], [facility] or [facility] prescribing provider: 7-20-17       Lab Results   Component Value Date    CHOL 155 09/06/2016     Lab Results   Component Value Date    HDL 53 09/06/2016     Lab Results   Component Value Date    LDL 84 09/06/2016     Lab Results   Component Value Date    TRIG 91 09/06/2016     Lab Results   Component Value Date    CHOLHDLRATIO 5.2 08/15/2012

## 2017-08-18 ENCOUNTER — HOSPITAL ENCOUNTER (OUTPATIENT)
Dept: PHYSICAL THERAPY | Facility: CLINIC | Age: 77
Setting detail: THERAPIES SERIES
End: 2017-08-18
Attending: PSYCHIATRY & NEUROLOGY
Payer: MEDICARE

## 2017-08-18 PROCEDURE — G8980 MOBILITY D/C STATUS: HCPCS | Mod: GP,CI | Performed by: PHYSICAL THERAPIST

## 2017-08-18 PROCEDURE — G8979 MOBILITY GOAL STATUS: HCPCS | Mod: GP,CI | Performed by: PHYSICAL THERAPIST

## 2017-08-18 PROCEDURE — 97112 NEUROMUSCULAR REEDUCATION: CPT | Mod: GP | Performed by: PHYSICAL THERAPIST

## 2017-08-18 PROCEDURE — 40000719 ZZHC STATISTIC PT DEPARTMENT NEURO VISIT: Performed by: PHYSICAL THERAPIST

## 2017-08-18 NOTE — DISCHARGE INSTRUCTIONS
PT exercises and ideas---     1. Continue balance exercises-- standing on one leg, heel toe walking, walking with head turns and practicing on uneven surfaces.    2. Taking slightly larger steps when walking in order to improve foot clearance and stability.     3. STAY ACTIVE! Continue tennis as able and walking for exercise.     4. Balance system education-- sensation feet in your feet, vision and inner are the main components.     HAVE FUN ON VACATION!   Let us know if you need anything in the future. Keep thinking about safety!!    Darius, PT, DPT

## 2017-08-18 NOTE — PROGRESS NOTES
Outpatient Physical Therapy Discharge Note     Patient: Gustavo Ramon  : 1940    Beginning/End Dates of Reporting Period:  17 to 2017; 2 PT sessions total    Referring Provider: Raheem Law MD    Therapy Diagnosis: balance impairments     Client Self Report: Patient is preparing to go on a [place] cruise for two week next year.      Goals:  Goal Identifier balance   Goal Description Patient will ambulate with head turns in horizontal and vertical direction in 3/3 trials x 50 feet with decreased lateral deviations from midline of 1 foot width line in order to show improvement with dynamic balance to improve confidence when shopping at [employer].    Target Date 17   Date Met  17   Progress:     Goal Identifier HEP   Goal Description Patient demonstrate independence with HEP to address balance deficits with dynamic band static balance in order to improve safety with gait.    Target Date 17   Date Met  17   Progress:       Progress Toward Goals:   Met PT goals.      Plan:  Discharge from therapy.    Discharge:    Reason for Discharge: Patient has met all goals.    Equipment Issued: none    Discharge Plan: Patient to continue home program.

## 2017-08-18 NOTE — IP AVS SNAPSHOT
MRN:[id number]                      After Visit Summary   8/18/2017    Gustavo Ramon    MRN: [id number]           Visit Information        Provider Department      8/18/2017  9:45 AM Alem Mccain PT [facility]        Your next 10 appointments already scheduled     Dec 07, 2017 10:45 AM CST   Return Visit with Brooklyn Treviño MD   [facility] ([facility])    [address] [place] [id number]   [phone]            Feb 05, 2018  8:00 AM CST   LAB with BE LAB   [facility] ([facility])    [address] [facility] [address]   [phone]           Patient must bring picture ID. Patient should be prepared to give a urine specimen  Please do not eat 10-12 hours before your appointment if you are coming in fasting for labs on lipids, cholesterol, or glucose (sugar). Pregnant women should follow their Care Team instructions. Water with medications is okay. Do not drink coffee or other fluids. If you have concerns about taking  your medications, please ask at office or if scheduling via tagga, send a message by clicking on Secure Messaging, Message Your Care Team.            Feb 12, 2018  8:00 AM CST   Return Visit with Glenn Jones MD   [facility]    [address] [place] [id number]   [phone]            May 25, 2018  9:40 AM CDT   LAB with BE LAB   [facility] ([facility])    [address] [facility] [address]   [phone]           Patient must bring picture ID. Patient should be prepared to give a urine specimen  Please do not eat 10-12 hours before your appointment if you are coming in fasting for labs on lipids, cholesterol, or glucose (sugar). Pregnant women should follow their Care Team instructions. Water with medications is okay. Do not drink coffee or other fluids. If you have  concerns about taking  your medications, please ask at office or if scheduling via LOAG, send a message by clicking on Secure Messaging, Message Your Care Team.            Jun 01, 2018  1:30 PM CDT   Return Visit with Susan Eller MD   [facility] ([facility])    [address] [address] [place] [address] [phone]                Further instructions from your care team       PT exercises and ideas---     1. Continue balance exercises-- standing on one leg, heel toe walking, walking with head turns and practicing on uneven surfaces.    2. Taking slightly larger steps when walking in order to improve foot clearance and stability.     3. STAY ACTIVE! Continue tennis as able and walking for exercise.     4. Balance system education-- sensation feet in your feet, vision and inner are the main components.     HAVE FUN ON VACATION!   Let us know if you need anything in the future. Keep thinking about safety!!    Darius, PT, DPT            MyChart Information     LOAG gives you secure access to your electronic health record. If you see a primary care provider, you can also send messages to your care team and make appointments. If you have questions, please call your primary care clinic.  If you do not have a primary care provider, please call [phone] and they will assist you.        Care EveryWhere ID     This is your Care EveryWhere ID. This could be used by other organizations to access your [place] medical records  [id number]        Equal Access to Services     RENAY MCCRAY : Hadii ankita Sims, waaxda luqadaha, qaybta kaalkaty cade. So [facility] [phone].    ATENCIÓN: Si habla español, tiene a perez disposición servicios gratuitos de asistencia lingüística. [place] al [phone].    We comply with applicable federal civil rights laws and [place] laws. We do not discriminate on the basis of race, color,  national origin, age, disability sex, sexual orientation or gender identity.

## 2017-08-24 ENCOUNTER — OFFICE VISIT (OUTPATIENT)
Dept: FAMILY MEDICINE | Facility: CLINIC | Age: 77
End: 2017-08-24
Payer: MEDICARE

## 2017-08-24 VITALS
BODY MASS INDEX: 27.11 KG/M2 | OXYGEN SATURATION: 98 % | DIASTOLIC BLOOD PRESSURE: 78 MMHG | WEIGHT: 177 LBS | RESPIRATION RATE: 18 BRPM | HEART RATE: 78 BPM | SYSTOLIC BLOOD PRESSURE: 126 MMHG | TEMPERATURE: 98.2 F

## 2017-08-24 DIAGNOSIS — E78.5 HYPERLIPIDEMIA LDL GOAL <130: ICD-10-CM

## 2017-08-24 DIAGNOSIS — G70.00 OCULAR MYASTHENIA GRAVIS (H): ICD-10-CM

## 2017-08-24 DIAGNOSIS — G70.00 MYASTHENIA GRAVIS WITHOUT EXACERBATION (H): ICD-10-CM

## 2017-08-24 DIAGNOSIS — I10 BENIGN ESSENTIAL HYPERTENSION: Primary | ICD-10-CM

## 2017-08-24 LAB
CHOLEST SERPL-MCNC: NORMAL MG/DL
SODIUM SERPL-SCNC: NORMAL MMOL/L (ref 133–144)

## 2017-08-24 PROCEDURE — 99214 OFFICE O/P EST MOD 30 MIN: CPT | Performed by: PHYSICIAN ASSISTANT

## 2017-08-24 RX ORDER — PYRIDOSTIGMINE BROMIDE 60 MG/1
60 TABLET ORAL 4 TIMES DAILY
Qty: 360 TABLET | Refills: 3 | Status: SHIPPED | OUTPATIENT
Start: 2017-08-24 | End: 2018-11-04

## 2017-08-24 RX ORDER — LISINOPRIL 5 MG/1
5 TABLET ORAL DAILY
Qty: 90 TABLET | Refills: 1 | Status: SHIPPED | OUTPATIENT
Start: 2017-08-24 | End: 2018-02-20

## 2017-08-24 RX ORDER — HYDROCHLOROTHIAZIDE 25 MG/1
12.5 TABLET ORAL DAILY
Qty: 3 TABLET | Refills: 0 | Status: SHIPPED | OUTPATIENT
Start: 2017-08-24 | End: 2018-03-26

## 2017-08-24 RX ORDER — PREDNISONE 5 MG/1
5 TABLET ORAL EVERY OTHER DAY
Qty: 45 TABLET | Refills: 3 | Status: SHIPPED | OUTPATIENT
Start: 2017-08-24 | End: 2018-02-12

## 2017-08-24 RX ORDER — HYDROCHLOROTHIAZIDE 25 MG/1
TABLET ORAL
Qty: 45 TABLET | Refills: 1 | Status: SHIPPED | OUTPATIENT
Start: 2017-08-24 | End: 2017-12-11

## 2017-08-24 NOTE — PROGRESS NOTES
SUBJECTIVE:   Gustavo Ramon is a 77 year old male who presents to clinic today for the following health issues:      Hyperlipidemia Follow-Up      Rate your low fat/cholesterol diet?: fair    Taking statin?  Yes, no muscle aches from statin    Other lipid medications/supplements?:  none    Hypertension Follow-up      Outpatient blood pressures are not being checked.    Low Salt Diet: low salt          Amount of exercise or physical activity: 4-5 days/week for an average of 45-60 minutes    Problems taking medications regularly: No    Medication side effects: none  Diet: low salt and low fat/cholesterol          Problem list and histories reviewed & adjusted, as indicated.  Additional history: as documented        Reviewed and updated as needed this visit by clinical staffTobacco  Allergies  Meds       Reviewed and updated as needed this visit by Provider         All other systems negative except as outline above  OBJECTIVE:  Eye exam - right eye normal lid, conjunctiva, cornea, pupil and fundus, left eye normal lid, conjunctiva, cornea, pupil and fundus.  Thyroid not palpable, not enlarged, no nodules detected.  CHEST:chest clear to IPPA, no tachypnea, retractions or cyanosis and S1, S2 normal, no murmur, no gallop, rate regular.  No peripheral edema noted on exam.  Pulses normal.    Gustavo was seen today for hypertension.    Diagnoses and all orders for this visit:    Benign essential hypertension  -     lisinopril (PRINIVIL/ZESTRIL) 5 MG tablet; Take 1 tablet (5 mg) by mouth daily  -     hydrochlorothiazide (HYDRODIURIL) 25 MG tablet; TAKE ONE-HALF (1/2) TABLET (12.5 MG) DAILY  -     Basic metabolic panel  (Ca, Cl, CO2, Creat, Gluc, K, Na, BUN)  -     hydrochlorothiazide (HYDRODIURIL) 25 MG tablet; Take 0.5 tablets (12.5 mg) by mouth daily    Ocular myasthenia gravis (H)  -     predniSONE (DELTASONE) 5 MG tablet; Take 1 tablet (5 mg) by mouth every other day    Myasthenia gravis without exacerbation  (H)  -     pyridostigmine (MESTINON) 60 MG tablet; Take 1 tablet (60 mg) by mouth 4 times daily    Hyperlipidemia LDL goal <130  -     Lipid panel reflex to direct LDL      work on lifestyle modification  Recheck in 6 mos.

## 2017-08-24 NOTE — MR AVS SNAPSHOT
After Visit Summary   8/24/2017    Gustavo Ramon    MRN: [id number]           Patient Information     Date Of Birth          1940        Visit Information        Provider Department      8/24/2017 7:00 AM Winston Silverman PA-C [facility]        Today's Diagnoses     Benign essential hypertension    -  1    Ocular myasthenia gravis (H)        Myasthenia gravis without exacerbation (H)        Hyperlipidemia LDL goal <130           Follow-ups after your visit        Your next 10 appointments already scheduled     Dec 07, 2017 10:45 AM CST   Return Visit with Brooklyn Treviño MD   [facility] ([facility])    [address] [place] [id number] [phone]            Feb 05, 2018  8:00 AM CST   LAB with BE LAB   [facility]    [address] [facility] [id number] [phone]           Patient must bring picture ID. Patient should be prepared to give a urine specimen  Please do not eat 10-12 hours before your appointment if you are coming in fasting for labs on lipids, cholesterol, or glucose (sugar). Pregnant women should follow their Care Team instructions. Water with medications is okay. Do not drink coffee or other fluids. If you have concerns about taking  your medications, please ask at office or if scheduling via [facility], send a message by clicking on Secure Messaging, Message Your Care Team.            Feb 12, 2018  8:00 AM CST   Return Visit with Glenn Jones MD   [facility] [place] ([facility])    [address] [place] [address]  [place] [address]   [phone]            May 25, 2018  9:40 AM CDT   LAB with BE LAB   [facility] ([facility])    [address] [facility] [id number] [phone]           Patient must bring picture ID. Patient should be prepared to give a urine specimen  Please do not eat 10-12 hours  before your appointment if you are coming in fasting for labs on lipids, cholesterol, or glucose (sugar). Pregnant women should follow their Care Team instructions. Water with medications is okay. Do not drink coffee or other fluids. If you have concerns about taking  your medications, please ask at office or if scheduling via Thrupoint, send a message by clicking on Secure Messaging, Message Your Care Team.            Jun 01, 2018  1:30 PM CDT   Return Visit with Susan Eller MD   [facility] ([facility])    [address] [place] [address] [phone]              Who to contact     Normal or non-critical lab and imaging results will be communicated to you by [facility], letter or phone within 4 business days after the clinic has received the results. If you do not hear from us within 7 days, please contact the clinic through Selerot or phone. If you have a critical or abnormal lab result, we will notify you by phone as soon as possible.  Submit refill requests through Thrupoint or call your pharmacy and they will forward the refill request to us. Please allow 3 business days for your refill to be completed.          If you need to speak with a  for additional information , please call: [phone]             Additional Information About Your Visit        Thrupoint Information     Thrupoint gives you secure access to your electronic health record. If you see a primary care provider, you can also send messages to your care team and make appointments. If you have questions, please call your primary care clinic.  If you do not have a primary care provider, please call [phone] and they will assist you.        Care EveryWhere ID     This is your Care EveryWhere ID. This could be used by other organizations to access your [place] medical records  [id number]        Your Vitals Were     Pulse Temperature Respirations Pulse Oximetry BMI (Body Mass  Index)       78 98.2  F (36.8  C) (Tympanic) 18 98% 27.11 kg/m2        Blood Pressure from Last 3 Encounters:   08/24/17 126/78   06/20/17 129/74   06/06/17 146/82    Weight from Last 3 Encounters:   08/24/17 177 lb (80.3 kg)   06/20/17 176 lb 9.6 oz (80.1 kg)   06/06/17 177 lb 3.2 oz (80.4 kg)              We Performed the Following     Basic metabolic panel  (Ca, Cl, CO2, Creat, Gluc, K, Na, BUN)     Lipid panel reflex to direct LDL          Today's Medication Changes          These changes are accurate as of: 8/24/17  7:37 AM.  If you have any questions, ask your nurse or doctor.               These medicines have changed or have updated prescriptions.        Dose/Directions    * hydrochlorothiazide 25 MG tablet   Commonly known as:  HYDRODIURIL   This may have changed:  See the new instructions.   Used for:  Benign essential hypertension   Changed by:  Winston Silverman PA-C        TAKE ONE-HALF (1/2) TABLET (12.5 MG) DAILY   Quantity:  45 tablet   Refills:  1       * hydrochlorothiazide 25 MG tablet   Commonly known as:  HYDRODIURIL   This may have changed:  You were already taking a medication with the same name, and this prescription was added. Make sure you understand how and when to take each.   Used for:  Benign essential hypertension   Changed by:  Winston Silverman PA-C        Dose:  12.5 mg   Take 0.5 tablets (12.5 mg) by mouth daily   Quantity:  3 tablet   Refills:  0       lisinopril 5 MG tablet   Commonly known as:  PRINIVIL/ZESTRIL   This may have changed:  See the new instructions.   Used for:  Benign essential hypertension   Changed by:  Winston Silverman PA-C        Dose:  5 mg   Take 1 tablet (5 mg) by mouth daily   Quantity:  90 tablet   Refills:  1       omeprazole 20 MG CR capsule   Commonly known as:  priLOSEC   This may have changed:  additional instructions   Used for:  Gastroesophageal reflux disease without esophagitis        Take 1 Capsule Daily Concomitant To Prednisone Use    Quantity:  45 capsule   Refills:  3       * Notice:  This list has 2 medication(s) that are the same as other medications prescribed for you. Read the directions carefully, and ask your doctor or other care provider to review them with you.      Stop taking these medicines if you haven't already. Please contact your care team if you have questions.     triamcinolone 0.1 % cream   Commonly known as:  KENALOG   Stopped by:  Winston Silverman PA-C                Where to get your medicines      These medications were sent to [facility] - [address] [address]     Phone:  [phone]     hydrochlorothiazide 25 MG tablet    lisinopril 5 MG tablet    predniSONE 5 MG tablet    pyridostigmine 60 MG tablet         Some of these will need a paper prescription and others can be bought over the counter.  Ask your nurse if you have questions.     Bring a paper prescription for each of these medications     hydrochlorothiazide 25 MG tablet                Primary Care Provider Office Phone # Fax #    Winston Silverman PA-C [phone]       [address] [facility] [address]        Equal Access to Services     [facility]: Hadii aad ku hadasho Soomaali, waaxda luqadaha, qaybta kaalmada adeegyada, katy hogan hayfran connors . So [facility] [phone].    ATENCIÓN: Si habla español, tiene a perez disposición servicios gratuitos de asistencia lingüística. [facility] [phone].    We comply with applicable federal civil rights laws and [place] laws. We do not discriminate on the basis of race, color, national origin, age, disability sex, sexual orientation or gender identity.            Thank you!     Thank you for choosing [facility]  for your care. Our goal is always to provide you with excellent care. Hearing back from our patients is one way we can continue to improve our services. Please take a few minutes  to complete the written survey that you may receive in the mail after your visit with us. Thank you!             Your Updated Medication List - Protect others around you: Learn how to safely use, store and throw away your medicines at www.disposemymeds.org.          This list is accurate as of: 8/24/17  7:37 AM.  Always use your most recent med list.                   Brand Name Dispense Instructions for use Diagnosis    aspirin 325 MG tablet      Take 325 mg by mouth daily        cyanocobalamin 1000 MCG tablet    vitamin  B-12     Take 1 tablet by mouth daily        * hydrochlorothiazide 25 MG tablet    HYDRODIURIL    45 tablet    TAKE ONE-HALF (1/2) TABLET (12.5 MG) DAILY    Benign essential hypertension       * hydrochlorothiazide 25 MG tablet    HYDRODIURIL    3 tablet    Take 0.5 tablets (12.5 mg) by mouth daily    Benign essential hypertension       lisinopril 5 MG tablet    PRINIVIL/ZESTRIL    90 tablet    Take 1 tablet (5 mg) by mouth daily    Benign essential hypertension       omeprazole 20 MG CR capsule    priLOSEC    45 capsule    Take 1 Capsule Daily Concomitant To Prednisone Use    Gastroesophageal reflux disease without esophagitis       predniSONE 5 MG tablet    DELTASONE    45 tablet    Take 1 tablet (5 mg) by mouth every other day    Ocular myasthenia gravis (H)       PRESERVISION/LUTEIN PO      Take 60 mg by mouth daily two tablets daily        pyridostigmine 60 MG tablet    MESTINON    360 tablet    Take 1 tablet (60 mg) by mouth 4 times daily    Myasthenia gravis without exacerbation (H)       simvastatin 20 MG tablet    ZOCOR    90 tablet    TAKE 1 TABLET AT BEDTIME    Hyperlipidemia LDL goal <130       vitamin D 2000 UNITS tablet      Take 2,000 Units by mouth daily    Vitamin D deficiency       * Notice:  This list has 2 medication(s) that are the same as other medications prescribed for you. Read the directions carefully, and ask your doctor or other care provider to review them with you.

## 2017-08-25 DIAGNOSIS — I10 BENIGN ESSENTIAL HYPERTENSION: ICD-10-CM

## 2017-08-25 DIAGNOSIS — E78.5 HYPERLIPIDEMIA LDL GOAL <130: ICD-10-CM

## 2017-08-25 LAB
ANION GAP SERPL CALCULATED.3IONS-SCNC: 6 MMOL/L (ref 3–14)
BUN SERPL-MCNC: 16 MG/DL (ref 7–30)
CALCIUM SERPL-MCNC: 9.3 MG/DL (ref 8.5–10.1)
CHLORIDE SERPL-SCNC: 106 MMOL/L (ref 94–109)
CHOLEST SERPL-MCNC: 155 MG/DL
CO2 SERPL-SCNC: 28 MMOL/L (ref 20–32)
CREAT SERPL-MCNC: 1.08 MG/DL (ref 0.66–1.25)
GFR SERPL CREATININE-BSD FRML MDRD: 66 ML/MIN/1.7M2
GLUCOSE SERPL-MCNC: 92 MG/DL (ref 70–99)
HDLC SERPL-MCNC: 61 MG/DL
LDLC SERPL CALC-MCNC: 70 MG/DL
NONHDLC SERPL-MCNC: 94 MG/DL
POTASSIUM SERPL-SCNC: 4.7 MMOL/L (ref 3.4–5.3)
SODIUM SERPL-SCNC: 140 MMOL/L (ref 133–144)
TRIGL SERPL-MCNC: 119 MG/DL

## 2017-08-25 PROCEDURE — 80048 BASIC METABOLIC PNL TOTAL CA: CPT | Performed by: PHYSICIAN ASSISTANT

## 2017-08-25 PROCEDURE — 80061 LIPID PANEL: CPT | Performed by: PHYSICIAN ASSISTANT

## 2017-08-25 PROCEDURE — 36415 COLL VENOUS BLD VENIPUNCTURE: CPT | Performed by: PHYSICIAN ASSISTANT

## 2017-09-27 ENCOUNTER — TELEPHONE (OUTPATIENT)
Dept: DERMATOLOGY | Facility: CLINIC | Age: 77
End: 2017-09-27

## 2017-09-27 NOTE — TELEPHONE ENCOUNTER
I left a message for patient to call [facility].  Patient has an appointment with Dr. Treviño on December 7 for skin check.  Provider is not available and appointment needs to be rescheduled.  Please offer to reschedule with Dr. Victor.    Josie Sheehan LPN

## 2017-10-31 DIAGNOSIS — E78.5 HYPERLIPIDEMIA LDL GOAL <130: ICD-10-CM

## 2017-10-31 RX ORDER — SIMVASTATIN 20 MG
TABLET ORAL
Qty: 90 TABLET | Refills: 1 | Status: SHIPPED | OUTPATIENT
Start: 2017-10-31 | End: 2018-06-01

## 2017-12-11 ENCOUNTER — OFFICE VISIT (OUTPATIENT)
Dept: DERMATOLOGY | Facility: CLINIC | Age: 77
End: 2017-12-11

## 2017-12-11 DIAGNOSIS — L57.0 ACTINIC KERATOSIS: ICD-10-CM

## 2017-12-11 DIAGNOSIS — L85.3 XEROSIS CUTIS: ICD-10-CM

## 2017-12-11 DIAGNOSIS — D48.5 NEOPLASM OF UNCERTAIN BEHAVIOR OF SKIN: Primary | ICD-10-CM

## 2017-12-11 DIAGNOSIS — L82.1 SK (SEBORRHEIC KERATOSIS): ICD-10-CM

## 2017-12-11 PROCEDURE — 88342 IMHCHEM/IMCYTCHM 1ST ANTB: CPT | Performed by: DERMATOLOGY

## 2017-12-11 PROCEDURE — 88305 TISSUE EXAM BY PATHOLOGIST: CPT | Performed by: DERMATOLOGY

## 2017-12-11 PROCEDURE — 99213 OFFICE O/P EST LOW 20 MIN: CPT | Mod: 25 | Performed by: DERMATOLOGY

## 2017-12-11 PROCEDURE — 17003 DESTRUCT PREMALG LES 2-14: CPT | Performed by: DERMATOLOGY

## 2017-12-11 PROCEDURE — 11100 HC BIOPSY SKIN/SUBQ/MUC MEM, SINGLE LESION: CPT | Mod: 59 | Performed by: DERMATOLOGY

## 2017-12-11 PROCEDURE — 17000 DESTRUCT PREMALG LESION: CPT | Performed by: DERMATOLOGY

## 2017-12-11 ASSESSMENT — PAIN SCALES - GENERAL: PAINLEVEL: NO PAIN (0)

## 2017-12-11 NOTE — NURSING NOTE
Dermatology Rooming Note    Gustavo Ramon's goals for this visit include:   Chief Complaint   Patient presents with     Skin Check     area of concern scalp  hx BCC       Is a scribe okay for this visit:YES    Are records needed for this visit(If yes, obtain release of information): No     Vitals: There were no vitals taken for this visit.    Referring Provider:  No referring provider defined for this encounter.    Josie Sheehan LPN

## 2017-12-11 NOTE — PROGRESS NOTES
[facility] Dermatology Note    Dermatology Problem List:  1.NMSC  -BCC, right elbow s/p ED and C 1/2016  -BCC, left forearm s/p excision 1/2016  -BCC, right posterior shoulder and left posterior shoulder, s/p Aldara 11/2015  -BCC, nodular and sclerosing, right side of nose per pathology, (right medial cheek per Dr. Christiansen's note 11/21/2011), s/p excision 5/12/2009 Dr. Bansal    2. MGUS  3. Actinic keratoses: left nasal sidewall and L cheeks in early 2017.   -s/p Efudex 2015  -s/p cryotherapy    4. Seborrheic dermatitis  -clobetasol 0.05% solution    Last FBSE 12/20/2016    Encounter Date: Dec 11, 2017    CC:  Chief Complaint   Patient presents with     Skin Check     area of concern scalp  hx BCC     History of Present Illness:  Mr. Gustavo Ramon is a 77 year old male with a history of NMSC who presents today for a skin check. He says he has always caught previous skin cancers early and has no concerns for recurrence in any of these sites. He has no particular areas of concern, but he mentioned recent use of Efudex cream due to some rough texture on his forehead. He reports having used the Efudex for about 2 weeks straight, once or twice a day, about 3 weeks ago. He also admits to using Efudex cream on his scalp a bit in the past, but was less specific about his use in that region.     The patient is otherwise feeling well. There are no other skin concerns at this time.      Past Medical History:   Patient Active Problem List   Diagnosis     Rosacea     DJD (degenerative joint disease)     Ocular myasthenia gravis (H)     Macular pigment deposit     HYPERLIPIDEMIA LDL GOAL <130     IgA monoclonal gammopathy     Retinal hole OS, operculated     Horseshoe tear of retina without detachment OD     History  of basal cell carcinoma     Seborrhea     AK (actinic keratosis)     Malignant neoplasm of prostate (H)     PVD (posterior vitreous detachment)     Amaurosis fugax by Hx neg carotid W/U      Advanced directives, counseling/discussion     Actinic keratosis     Peripheral neuropathy     Nuclear sclerosis of both eyes     Essential hypertension with goal blood pressure less than 140/90     Gastroesophageal reflux disease without esophagitis     Past Medical History:   Diagnosis Date     Actinic keratosis      AK (actinic keratosis) 11/15/2012     Amaurosis fugax      Basal cell carcinoma 2009    R medial cheek/nose     Central serous retinopathy left    Eye     Diverticulosis 08/2006     DJD (degenerative joint disease)      GERD (gastroesophageal reflux disease)      Hearing loss     High frequency     HTN (hypertension)      Hyperlipidemia LDL goal < 130      Hyperplastic colon polyp 08/2006     IgA monoclonal gammopathy     IgA kappa     Lattice degeneration of retina right    Eye     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    [facility] consult     Rosacea      Vitreous detachment left    Eye     Past Surgical History:   Procedure Laterality Date     ARTHROSCOPY KNEE RT/LT Left Jan 2010    Knee     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     DISKECTOMY, LUMBAR, SINGLE SP  2003    L2-3     ENT SURGERY  1943    Tonsils      ENT SURGERY  2-22-12    Biopsy lesion right pinna     ENT SURGERY  2-24-12    Biopsy leson right pinna     SOFT TISSUE SURGERY  May 2010    Basal Cell/right side nose       Social History:  The patient is retired from working as an .  The patient denies use of tanning beds.    Family History:  There is no family history of skin cancer.    Medications:  Current Outpatient Prescriptions   Medication Sig Dispense Refill     simvastatin (ZOCOR) 20 MG tablet TAKE 1 TABLET AT BEDTIME 90 tablet 1     lisinopril (PRINIVIL/ZESTRIL) 5 MG tablet Take 1 tablet (5 mg) by mouth daily 90 tablet 1     predniSONE (DELTASONE) 5 MG tablet Take 1 tablet (5 mg) by mouth every other day 45 tablet 3     pyridostigmine (MESTINON) 60 MG tablet Take 1 tablet (60 mg) by mouth 4 times daily 360 tablet 3     omeprazole  (PRILOSEC) 20 MG CR capsule Take 1 Capsule Daily Concomitant To Prednisone Use (Patient taking differently: Take 1 Capsule Daily Concomitant To Prednisone Use, MEDICATION TAKEN EVERY OTHER DAY) 45 capsule 3     aspirin 325 MG tablet Take 325 mg by mouth daily       Cholecalciferol (VITAMIN D) 2000 UNITS tablet Take 2,000 Units by mouth daily       cyanocolbalamin (VITAMIN  B-12) 1000 MCG tablet Take 1 tablet by mouth daily       Multiple Vitamins-Minerals (PRESERVISION/LUTEIN PO) Take 60 mg by mouth daily two tablets daily       hydrochlorothiazide (HYDRODIURIL) 25 MG tablet Take 0.5 tablets (12.5 mg) by mouth daily (Patient not taking: Reported on 12/11/2017) 3 tablet 0     [DISCONTINUED] hydrochlorothiazide (HYDRODIURIL) 25 MG tablet TAKE ONE-HALF (1/2) TABLET (12.5 MG) DAILY 45 tablet 1       Allergies   Allergen Reactions     Nkda [No Known Drug Allergies]      Review of Systems:  -Skin: As above in HPI. No additional skin concerns.  -Const: The patient is generally feeling well today.     Physical exam:  - This is a well developed, well-nourished male in no acute distress, in a pleasant mood.    SKIN: -Total skin excluding the undergarment areas was performed. The exam included the head/face, neck, both arms, chest, back, abdomen, both legs, digits and/or nails.   - Waxy stuck on tan brown macules and papules scattered on back  - Fine scale on lower leg and ankles   - 1.2 cm pink minimally elevated plaque with central scar, left deltoid   - There are erythematous macules with overyling adherent scale on the scalp x8, right temple, right sideburn and right ear x2.   - No other lesions of concern on areas examined.     Impression/Plan:  1. Seborrheic Keratosis, scattered on back    Benign nature discussed. No further intervention required.     2. Neoplasm of Unknown behavior, left deltoid. The differential diagnosis partially treated BCC, SCC, LPLK, irritant dermatitis.     Shave biopsy:  After discussion of  benefits and risks including but not limited to bleeding/bruising, pain/swelling, infection, scar, incomplete removal, nerve damage/numbness, recurrence, and non-diagnostic biopsy, written consent, verbal consent and photographs were obtained. Time-out was performed. The area was cleaned with isopropyl alcohol.  was injected to obtain adequate anesthesia of the lesion on the left deltoid. 2 ml of 1% lidocaine with 1:100,000 epinephrine was injected to obtain adequate anesthesia. A  shave biopsy was performed. Hemostasis was achieved with aluminium chloride. Vaseline and a sterile dressing were applied. The patient tolerated the procedure and no complications were noted. The patient was provided with verbal and written post care instructions.      Pending biopsy results     **Update- Scar with lichenoid keratosis.    3. Actinic Keratosis, scattered on scalp x8, right temple, right sideburn, right ear x2    Precancerous nature reviewed. Treatment options dicussed.     Cryotherapy procedure note: After verbal consent and discussion of risks and benefits including but no limited to dyspigmentation/scar, blister, and pain, 12 were treated with 1-2mm freeze border for 2 cycles with liquid nitrogen. Post cryotherapy instructions were provided.     4. Xerosis, lower legs and ankles   Dry skin care reviewed.     Follow-up in 6 months for skin exam, or earlier for new/changing lesions or pending biopsy results.    Staff Involved:  Scribe/Staff    Scribe Disclosure:   I, Gennaro Matta, am serving as a scribe to document services personally performed by Dr. Shashank Victor DO, based on data collection and the provider's statements to me.     Provider Disclosure:   The documentation recorded by the scribe accurately reflects the services I personally performed and the decisions made by me.  I personally performed the procedures today.    Shashank Victor DO    Department of Dermatology  [facility]  [facility]: Phone: [phone], Fax:[phone]  [facility]: Phone: [phone], Fax: [phone]

## 2017-12-11 NOTE — MR AVS SNAPSHOT
After Visit Summary   12/11/2017    Gustavo Ramon    MRN: [id number]           Patient Information     Date Of Birth          1940        Visit Information        Provider Department      12/11/2017 2:40 PM Shashank Victor MD [facility]        Care Instructions    Cryotherapy    What is it?    Use of a very cold liquid, such as liquid nitrogen, to freeze and destroy abnormal skin cells that need to be removed    What should I expect?    Tenderness and redness    A small blister that might grow and fill with dark purple blood. There may be crusting.    More than one treatment may be needed if the lesions do not go away.    How do I care for the treated area?    Gently wash the area with your hands when bathing.    Use a thin layer of Vaseline to help with healing. You may use a Band-Aid.     The area should heal within 7-10 days and may leave behind a pink or lighter color.     Do not use an antibiotic or Neosporin ointment.     You may take acetaminophen (Tylenol) for pain.     Call your Doctor if you have:    Severe pain    Signs of infection (warmth, redness, cloudy yellow drainage, and or a bad smell)    Questions or concerns    Who should I call with questions?       [facility]: [phone]       [facility]: [phone]       For urgent needs outside of business hours call the [facility] at [phone]        and ask for the dermatology resident on call      Wound Care After a Biopsy    What is a skin biopsy?  A skin biopsy allows the doctor to examine a very small piece of tissue under the microscope to determine the diagnosis and the best treatment for the skin condition. A local anesthetic (numbing medicine)  is injected with a very small needle into the skin area to be tested. A small piece of skin is taken from the area. Sometimes a suture (stitch) is used.     What are the risks of a skin  biopsy?  I will experience scar, bleeding, swelling, pain, crusting and redness. I may experience incomplete removal or recurrence. Risks of this procedure are excessive bleeding, bruising, infection, nerve damage, numbness, thick (hypertrophic or keloidal) scar and non-diagnostic biopsy.    How should I care for my wound for the first 24 hours?    Keep the wound dry and covered for 24 hours    If it bleeds, hold direct pressure on the area for 15 minutes. If bleeding does not stop then go to the emergency room    Avoid strenuous exercise the first 1-2 days or as your doctor instructs you    How should I care for the wound after 24 hours?    After 24 hours, remove the bandage    You may bathe or shower as normal    If you had a scalp biopsy, you can shampoo as usual and can use shower water to clean the biopsy site daily    Clean the wound twice a day with gentle soap and water    Do not scrub, be gentle    Apply white petroleum/Vaseline after cleaning the wound with a cotton swab or a clean finger, and keep the site covered with a Bandaid /bandage. Bandages are not necessary with a scalp biopsy    If you are unable to cover the site with a Bandaid /bandage, re-apply ointment 2-3 times a day to keep the site moist. Moisture will help with healing    Avoid strenuous activity for first 1-2 days    Avoid lakes, rivers, pools, and oceans until the stitches are removed or the site is healed    How do I clean my wound?    Wash hands thoroughly with soap or use hand  before all wound care    Clean the wound with gentle soap and water    Apply white petroleum/Vaseline  to wound after it is clean    Replace the Bandaid /bandage to keep the wound covered for the first few days or as instructed by your doctor    If you had a scalp biopsy, warm shower water to the area on a daily basis should suffice    What should I use to clean my wound?     Cotton-tipped applicators (Qtips )    White petroleum jelly (Vaseline ). Use  a clean new container and use Q-tips to apply.    Bandaids   as needed    Gentle soap     How should I care for my wound long term?    Do not get your wound dirty    Keep up with wound care for one week or until the area is healed.    A small scab will form and fall off by itself when the area is completely healed. The area will be red and will become pink in color as it heals. Sun protection is very important for how your scar will turn out. Sunscreen with an SPF 30 or greater is recommended once the area is healed.    You should have some soreness but it should be mild and slowly go away over several days. Talk to your doctor about using tylenol for pain,    When should I call my doctor?  If you have increased:     Pain or swelling    Pus or drainage (clear or slightly yellow drainage is ok)    Temperature over 100F    Spreading redness or warmth around wound    When will I hear about my results?  The biopsy results can take 2-3 weeks to come back. The clinic will call you with the results, send you a Author[facility] message, or have you schedule a follow-up clinic or phone time to discuss the results. Contact our clinics if you do not hear from us in 3 weeks.     Who should I call with questions?    [facility]: [phone]     [facility]: [phone]    For urgent needs outside of business hours call the [facility] at [phone] and ask for the dermatology resident on call              Follow-ups after your visit        Follow-up notes from your care team     Return in about 6 months (around 6/11/2018) for skin check.      Your next 10 appointments already scheduled     Feb 05, 2018  8:00 AM CST   LAB with BE LAB   [facility] [place] ([facility]    [address] [id number] [phone]           Please do not eat 10-12 hours before your appointment if you are coming in fasting for labs on lipids,  cholesterol, or glucose (sugar). This does not apply to pregnant women. Water, hot tea and black coffee (with nothing added) are okay. Do not drink other fluids, diet soda or chew gum.            Feb 12, 2018  8:00 AM CST   Return Visit with Glenn Jones MD   [facility]    [address]  [place] [address]   [phone]            May 25, 2018  9:40 AM CDT   LAB with BE LAB   [facility] ([facility])    [address]  [place] [id number] [phone]           Please do not eat 10-12 hours before your appointment if you are coming in fasting for labs on lipids, cholesterol, or glucose (sugar). This does not apply to pregnant women. Water, hot tea and black coffee (with nothing added) are okay. Do not drink other fluids, diet soda or chew gum.            Jun 01, 2018  1:30 PM CDT   [facility]   [facility]    [id number] [place] [address] [phone]            Jun 26, 2018  2:30 PM CDT   [facility] Visit with Brooklyn Treviño MD   [facility]    [address] [place] [address] [phone]              Who to contact     If you have questions or need follow up information about today's clinic visit or your schedule please contact [facility] directly at [phone].  Normal or non-critical lab and imaging results will be communicated to you by MyChart, letter or phone within 4 business days after the clinic has received the results. If you do not hear from us within 7 days, please contact the clinic through MyChart or phone. If you have a critical or abnormal lab result, we will notify you by phone as soon as possible.  Submit refill requests through [facility] or call your pharmacy and they will forward the refill request to us. Please allow 3 business  days for your refill to be completed.          Additional Information About Your Visit        [facility] Information     [facility] gives you secure access to your electronic health record. If you see a primary care provider, you can also send messages to your care team and make appointments. If you have questions, please call your primary care clinic.  If you do not have a primary care provider, please call [phone] and they will assist you.      [facility] is an electronic gateway that provides easy, online access to your medical records. With [facility], you can request a clinic appointment, read your test results, renew a prescription or communicate with your care team.     To access your existing account, please contact your [facility] or call [phone] for assistance.        Care EveryWhere ID     This is your Care EveryWhere ID. This could be used by other organizations to access your [place] medical records  [id number]         Blood Pressure from Last 3 Encounters:   08/24/17 126/78   06/20/17 129/74   06/06/17 146/82    Weight from Last 3 Encounters:   08/24/17 80.3 kg (177 lb)   06/20/17 80.1 kg (176 lb 9.6 oz)   06/06/17 80.4 kg (177 lb 3.2 oz)              Today, you had the following     No orders found for display         Today's Medication Changes          These changes are accurate as of: 12/11/17  3:58 PM.  If you have any questions, ask your nurse or doctor.               These medicines have changed or have updated prescriptions.        Dose/Directions    omeprazole 20 MG CR capsule   Commonly known as:  priLOSEC   This may have changed:  additional instructions   Used for:  Gastroesophageal reflux disease without esophagitis        Take 1 Capsule Daily Concomitant To Prednisone Use   Quantity:  45 capsule   Refills:  3                Primary Care Provider Office Phone # Fax #    Winston Silverman PA-C [phone]       [address]  [id number]        Equal Access to Services     [facility]: Hadii aad barber jossedeja Silvestreali, wacameliada luqjumana, qaybta kakellykaty garrison. So [facility] [phone].    ATENCIÓN: Si habla español, tiene a perez disposición servicios gratuitos de asistencia lingüística. Llame al [phone].    We comply with applicable federal civil rights laws and [place] laws. We do not discriminate on the basis of race, color, national origin, age, disability, sex, sexual orientation, or gender identity.            Thank you!     Thank you for choosing [facility]  for your care. Our goal is always to provide you with excellent care. Hearing back from our patients is one way we can continue to improve our services. Please take a few minutes to complete the written survey that you may receive in the mail after your visit with us. Thank you!             Your Updated Medication List - Protect others around you: Learn how to safely use, store and throw away your medicines at www.disposemymeds.org.          This list is accurate as of: 12/11/17  3:58 PM.  Always use your most recent med list.                   Brand Name Dispense Instructions for use Diagnosis    aspirin 325 MG tablet      Take 325 mg by mouth daily        cyanocobalamin 1000 MCG tablet    vitamin  B-12     Take 1 tablet by mouth daily        hydrochlorothiazide 25 MG tablet    HYDRODIURIL    3 tablet    Take 0.5 tablets (12.5 mg) by mouth daily    Benign essential hypertension       lisinopril 5 MG tablet    PRINIVIL/ZESTRIL    90 tablet    Take 1 tablet (5 mg) by mouth daily    Benign essential hypertension       omeprazole 20 MG CR capsule    priLOSEC    45 capsule    Take 1 Capsule Daily Concomitant To Prednisone Use    Gastroesophageal reflux disease without esophagitis       predniSONE 5 MG tablet    DELTASONE    45 tablet    Take 1 tablet (5 mg) by mouth every other day    Ocular myasthenia gravis (H)        PRESERVISION/LUTEIN PO      Take 60 mg by mouth daily two tablets daily        pyridostigmine 60 MG tablet    MESTINON    360 tablet    Take 1 tablet (60 mg) by mouth 4 times daily    Myasthenia gravis without exacerbation (H)       simvastatin 20 MG tablet    ZOCOR    90 tablet    TAKE 1 TABLET AT BEDTIME    Hyperlipidemia LDL goal <130       vitamin D 2000 UNITS tablet      Take 2,000 Units by mouth daily    Vitamin D deficiency

## 2017-12-11 NOTE — PATIENT INSTRUCTIONS
Cryotherapy    What is it?    Use of a very cold liquid, such as liquid nitrogen, to freeze and destroy abnormal skin cells that need to be removed    What should I expect?    Tenderness and redness    A small blister that might grow and fill with dark purple blood. There may be crusting.    More than one treatment may be needed if the lesions do not go away.    How do I care for the treated area?    Gently wash the area with your hands when bathing.    Use a thin layer of Vaseline to help with healing. You may use a Band-Aid.     The area should heal within 7-10 days and may leave behind a pink or lighter color.     Do not use an antibiotic or Neosporin ointment.     You may take acetaminophen (Tylenol) for pain.     Call your Doctor if you have:    Severe pain    Signs of infection (warmth, redness, cloudy yellow drainage, and or a bad smell)    Questions or concerns    Who should I call with questions?       [facility]: [phone]       [facility]: [phone]       For urgent needs outside of business hours call the [facility] at [phone]        and ask for the dermatology resident on call      Wound Care After a Biopsy    What is a skin biopsy?  A skin biopsy allows the doctor to examine a very small piece of tissue under the microscope to determine the diagnosis and the best treatment for the skin condition. A local anesthetic (numbing medicine)  is injected with a very small needle into the skin area to be tested. A small piece of skin is taken from the area. Sometimes a suture (stitch) is used.     What are the risks of a skin biopsy?  I will experience scar, bleeding, swelling, pain, crusting and redness. I may experience incomplete removal or recurrence. Risks of this procedure are excessive bleeding, bruising, infection, nerve damage, numbness, thick (hypertrophic or keloidal) scar and non-diagnostic biopsy.    How should I care  for my wound for the first 24 hours?    Keep the wound dry and covered for 24 hours    If it bleeds, hold direct pressure on the area for 15 minutes. If bleeding does not stop then go to the emergency room    Avoid strenuous exercise the first 1-2 days or as your doctor instructs you    How should I care for the wound after 24 hours?    After 24 hours, remove the bandage    You may bathe or shower as normal    If you had a scalp biopsy, you can shampoo as usual and can use shower water to clean the biopsy site daily    Clean the wound twice a day with gentle soap and water    Do not scrub, be gentle    Apply white petroleum/Vaseline after cleaning the wound with a cotton swab or a clean finger, and keep the site covered with a Bandaid /bandage. Bandages are not necessary with a scalp biopsy    If you are unable to cover the site with a Bandaid /bandage, re-apply ointment 2-3 times a day to keep the site moist. Moisture will help with healing    Avoid strenuous activity for first 1-2 days    Avoid lakes, rivers, pools, and oceans until the stitches are removed or the site is healed    How do I clean my wound?    Wash hands thoroughly with soap or use hand  before all wound care    Clean the wound with gentle soap and water    Apply white petroleum/Vaseline  to wound after it is clean    Replace the Bandaid /bandage to keep the wound covered for the first few days or as instructed by your doctor    If you had a scalp biopsy, warm shower water to the area on a daily basis should suffice    What should I use to clean my wound?     Cotton-tipped applicators (Qtips )    White petroleum jelly (Vaseline ). Use a clean new container and use Q-tips to apply.    Bandaids   as needed    Gentle soap     How should I care for my wound long term?    Do not get your wound dirty    Keep up with wound care for one week or until the area is healed.    A small scab will form and fall off by itself when the area is completely  healed. The area will be red and will become pink in color as it heals. Sun protection is very important for how your scar will turn out. Sunscreen with an SPF 30 or greater is recommended once the area is healed.    You should have some soreness but it should be mild and slowly go away over several days. Talk to your doctor about using tylenol for pain,    When should I call my doctor?  If you have increased:     Pain or swelling    Pus or drainage (clear or slightly yellow drainage is ok)    Temperature over 100F    Spreading redness or warmth around wound    When will I hear about my results?  The biopsy results can take 2-3 weeks to come back. The clinic will call you with the results, send you a [employer] message, or have you schedule a follow-up clinic or phone time to discuss the results. Contact our clinics if you do not hear from us in 3 weeks.     Who should I call with questions?    [facility]: [phone]     [facility]: [phone]    For urgent needs outside of business hours call the [facility] at [phone] and ask for the dermatology resident on call

## 2017-12-14 ENCOUNTER — TELEPHONE (OUTPATIENT)
Dept: DERMATOLOGY | Facility: CLINIC | Age: 77
End: 2017-12-14

## 2017-12-14 LAB — COPATH REPORT: NORMAL

## 2017-12-14 NOTE — TELEPHONE ENCOUNTER
Surgical pathology exam   Status:  Final result   Visible to patient:  Yes (MyChart) Dx:  Neoplasm of uncertain behavior of skin Order: [id number]       Notes Recorded by Lana Quintanilla LPN on 12/14/2017 at 3:46 PM  Patient returned call and pathology results were given. Patient understood and had no further questions at this time. Patient will follow up with Dr. Benavides 6/2018.    Lana Quintanilla LPN    ------    Notes Recorded by Lana Quintanilla LPN on 12/14/2017 at 3:36 PM  Writer left message for patient to call [employer] at [phone].    Lana Quintanilla LPN    ------    Notes Recorded by Uzma Benavides MD on 12/14/2017 at 1:49 PM  Please call the patient to let him know the biopsy did not show skin cancer.   It showed a small area of inflammation. Sometimes the body creates an inflammatory response to a freckle. This is benign, but no one really knows why this happens.   As long as the wound is healing well, no additional treatment is necessary.  Keep follow up in 6 months. Thank you.        3d ago       Copath Report Patient Name: DAISHA LOZOYA   MR#: [id number]   Specimen #: [id number]   Collected: 12/11/2017   Received: 12/12/2017   Reported: 12/14/2017 10:24   Ordering Phy(s): UZMA BENAVIDES     For improved result formatting, select 'View Enhanced Report Format'   under Linked Documents section.     SPECIMEN(S):   Shave, left deltoid                Lana Quintanilla LPN

## 2018-01-06 ENCOUNTER — OFFICE VISIT (OUTPATIENT)
Dept: URGENT CARE | Facility: URGENT CARE | Age: 78
End: 2018-01-06
Payer: MEDICARE

## 2018-01-06 VITALS
TEMPERATURE: 101.4 F | OXYGEN SATURATION: 95 % | HEART RATE: 95 BPM | SYSTOLIC BLOOD PRESSURE: 134 MMHG | DIASTOLIC BLOOD PRESSURE: 76 MMHG

## 2018-01-06 DIAGNOSIS — D84.9 IMMUNOSUPPRESSION (H): ICD-10-CM

## 2018-01-06 DIAGNOSIS — J10.1 INFLUENZA A: ICD-10-CM

## 2018-01-06 DIAGNOSIS — G70.00 OCULAR MYASTHENIA GRAVIS (H): ICD-10-CM

## 2018-01-06 DIAGNOSIS — R50.9 FEBRILE ILLNESS: Primary | ICD-10-CM

## 2018-01-06 LAB
FLUAV+FLUBV AG SPEC QL: NEGATIVE
FLUAV+FLUBV AG SPEC QL: POSITIVE
SPECIMEN SOURCE: ABNORMAL

## 2018-01-06 PROCEDURE — 99214 OFFICE O/P EST MOD 30 MIN: CPT | Performed by: FAMILY MEDICINE

## 2018-01-06 PROCEDURE — 87804 INFLUENZA ASSAY W/OPTIC: CPT | Performed by: FAMILY MEDICINE

## 2018-01-06 RX ORDER — ACETAMINOPHEN 325 MG/1
650 TABLET ORAL ONCE
Qty: 2 TABLET | Refills: 0 | COMMUNITY
Start: 2018-01-06 | End: 2019-04-16

## 2018-01-06 RX ORDER — OSELTAMIVIR PHOSPHATE 75 MG/1
75 CAPSULE ORAL 2 TIMES DAILY
Qty: 10 CAPSULE | Refills: 0 | Status: SHIPPED | OUTPATIENT
Start: 2018-01-06 | End: 2018-03-26

## 2018-01-06 NOTE — NURSING NOTE
"Chief Complaint   Patient presents with     Fever     weakness, little cough, chills- 2 days       Initial /76  Pulse 95  Temp 101.4  F (38.6  C) (Oral)  SpO2 95% Estimated body mass index is 27.11 kg/(m^2) as calculated from the following:    Height as of 6/20/17: 5' 7.75\" (1.721 m).    Weight as of 8/24/17: 177 lb (80.3 kg).  Medication Reconciliation: complete     TRIXIE HALL      "

## 2018-01-06 NOTE — MR AVS SNAPSHOT
After Visit Summary   1/6/2018    Gustavo Ramon    MRN: [id number]           Patient Information     Date Of Birth          1940        Visit Information        Provider Department      1/6/2018 3:40 PM Kimber Hicks MD [facility]        Today's Diagnoses     Febrile illness    -  1    Influenza A        Immunosuppression (H)        Ocular myasthenia gravis (H)           Follow-ups after your visit        Your next 10 appointments already scheduled     Feb 05, 2018  8:00 AM CST   LAB with BE LAB   [facility] [place] ([facility])    [address]  [place] [address]   [phone]           Please do not eat 10-12 hours before your appointment if you are coming in fasting for labs on lipids, cholesterol, or glucose (sugar). This does not apply to pregnant women. Water, hot tea and black coffee (with nothing added) are okay. Do not drink other fluids, diet soda or chew gum.            Feb 12, 2018  8:00 AM CST   [facility] Visit with Glenn Jones MD   [facility] ([facility] [place]  [place] [address]   [phone]            May 25, 2018  9:40 AM CDT   LAB with BE LAB   [facility] [place] ([facility] [place])    [address]  [place] [address]   [phone]           Please do not eat 10-12 hours before your appointment if you are coming in fasting for labs on lipids, cholesterol, or glucose (sugar). This does not apply to pregnant women. Water, hot tea and black coffee (with nothing added) are okay. Do not drink other fluids, diet soda or chew gum.            Jun 01, 2018  1:30 PM CDT   Return Visit with Susan Eller MD   [facility]    [address] [place] [id number] [phone]            Jun 26, 2018  2:30 PM CDT   Return Visit with Brooklyn Treviño MD   [facility]  [facility]    [address] [place] [address] [phone]              Who to contact     If you have questions or need follow up information about today's clinic visit or your schedule please contact [facility] directly at [phone].  Normal or non-critical lab and imaging results will be communicated to you by MyChart, letter or phone within 4 business days after the clinic has received the results. If you do not hear from us within 7 days, please contact the clinic through [employer] or phone. If you have a critical or abnormal lab result, we will notify you by phone as soon as possible.  Submit refill requests through [employer] or call your pharmacy and they will forward the refill request to us. Please allow 3 business days for your refill to be completed.          Additional Information About Your Visit        MyChart Information     [employer] gives you secure access to your electronic health record. If you see a primary care provider, you can also send messages to your care team and make appointments. If you have questions, please call your primary care clinic.  If you do not have a primary care provider, please call [phone] and they will assist you.        Care EveryWhere ID     This is your Care EveryWhere ID. This could be used by other organizations to access your [place] medical records  [id number]        Your Vitals Were     Pulse Temperature Pulse Oximetry             95 101.4  F (38.6  C) (Oral) 95%          Blood Pressure from Last 3 Encounters:   01/06/18 134/76   08/24/17 126/78   06/20/17 129/74    Weight from Last 3 Encounters:   08/24/17 177 lb (80.3 kg)   06/20/17 176 lb 9.6 oz (80.1 kg)   06/06/17 177 lb 3.2 oz (80.4 kg)              We Performed the Following     Influenza A/B antigen          Today's Medication Changes          These changes are accurate as of: 1/6/18  7:09 PM.  If you have any questions, ask your nurse or doctor.                Start taking these medicines.        Dose/Directions    oseltamivir 75 MG capsule   Commonly known as:  TAMIFLU   Used for:  Influenza A        Dose:  75 mg   Take 1 capsule (75 mg) by mouth 2 times daily   Quantity:  10 capsule   Refills:  0         These medicines have changed or have updated prescriptions.        Dose/Directions    omeprazole 20 MG CR capsule   Commonly known as:  priLOSEC   This may have changed:  additional instructions   Used for:  Gastroesophageal reflux disease without esophagitis        Take 1 Capsule Daily Concomitant To Prednisone Use   Quantity:  45 capsule   Refills:  3            Where to get your medicines      These medications were sent to [id number] - [place] - [address]  [address], [place] [address]     Phone:  [phone]     oseltamivir 75 MG capsule                Primary Care Provider Office Phone # Fax #    Winston Cuca Silverman PA-C [phone] [address]  [place] [address]        Equal Access to Services     [facility]: Hadii aad ku hadasho Soomaali, waaxda luqadaha, qaybta kaalmada adeegyada, waxay idiin hayaan yunior kharaxavi connors . So [facility] [phone].    ATENCIÓN: Si habla español, tiene a perez disposición servicios gratuitos de asistencia lingüística. [facility] [phone].    We comply with applicable federal civil rights laws and [place] laws. We do not discriminate on the basis of race, color, national origin, age, disability, sex, sexual orientation, or gender identity.            Thank you!     Thank you for choosing [facility]  [facility]. Our goal is always to provide you with excellent care. Hearing back from our patients is one way we can continue to improve our services. Please take a few minutes to complete the written survey that you may receive in the mail after your visit with us. Thank you!             Your Updated Medication List -  Protect others around you: Learn how to safely use, store and throw away your medicines at www.disposemymeds.org.          This list is accurate as of: 1/6/18  7:09 PM.  Always use your most recent med list.                   Brand Name Dispense Instructions for use Diagnosis    acetaminophen 325 MG tablet    TYLENOL    2 tablet    Take 2 tablets (650 mg) by mouth once for 1 dose    Febrile illness, Influenza A       aspirin 325 MG tablet      Take 325 mg by mouth daily        cyanocobalamin 1000 MCG tablet    vitamin  B-12     Take 1 tablet by mouth daily        hydrochlorothiazide 25 MG tablet    HYDRODIURIL    3 tablet    Take 0.5 tablets (12.5 mg) by mouth daily    Benign essential hypertension       lisinopril 5 MG tablet    PRINIVIL/ZESTRIL    90 tablet    Take 1 tablet (5 mg) by mouth daily    Benign essential hypertension       omeprazole 20 MG CR capsule    priLOSEC    45 capsule    Take 1 Capsule Daily Concomitant To Prednisone Use    Gastroesophageal reflux disease without esophagitis       oseltamivir 75 MG capsule    TAMIFLU    10 capsule    Take 1 capsule (75 mg) by mouth 2 times daily    Influenza A       predniSONE 5 MG tablet    DELTASONE    45 tablet    Take 1 tablet (5 mg) by mouth every other day    Ocular myasthenia gravis (H)       PRESERVISION/LUTEIN PO      Take 60 mg by mouth daily two tablets daily        pyridostigmine 60 MG tablet    MESTINON    360 tablet    Take 1 tablet (60 mg) by mouth 4 times daily    Myasthenia gravis without exacerbation (H)       simvastatin 20 MG tablet    ZOCOR    90 tablet    TAKE 1 TABLET AT BEDTIME    Hyperlipidemia LDL goal <130       vitamin D 2000 UNITS tablet      Take 2,000 Units by mouth daily    Vitamin D deficiency

## 2018-01-06 NOTE — PROGRESS NOTES
SUBJECTIVE:                                                    Gustavo Ramon is a 77 year old male who presents to clinic today for the following health issues:    Accompanied by wife    RESPIRATORY SYMPTOMS      Duration: 2 days    Description  cough, fever and chills    Severity: moderate    Accompanying signs and symptoms: None    History (predisposing factors):  none    Precipitating or alleviating factors: None    Therapies tried and outcome:  none    2 days ago felt very tired   Was trying to take his medications and felt nauseous.   Very weak and tired  Has been going on for past couple of days  Colds, sinus congestion, facial pain  Cough Yes  Fever Yes     No chest pain no shortness of breath no orthopnea pnd or edema     Problem list and histories reviewed & adjusted, as indicated.  Additional history: as documented    Problem list, Medication list, Allergies, and Medical/Social/Surgical histories reviewed in EPIC and updated as appropriate.    ROS:  Constitutional, HEENT, cardiovascular, pulmonary, gi and gu systems are negative, except as otherwise noted.    OBJECTIVE:                                                    /76  Pulse 95  Temp 101.4  F (38.6  C) (Oral)  SpO2 95%  There is no height or weight on file to calculate BMI.  GENERAL: alert and no distress  EYES: Eyes grossly normal to inspection, PERRL and conjunctivae and sclerae normal  HENT: ear canals and TM's normal, nose and mouth without ulcers or lesions  Sinuses: turbinates erythematous   NECK: no adenopathy, no asymmetry, masses, or scars and thyroid normal to palpation  RESP: lungs clear to auscultation - no rales, rhonchi or wheezes   CV: regular rate and rhythm, normal S1 S2, no S3 or S4, no murmur, click or rub, no peripheral edema and peripheral pulses strong  ABDOMEN: soft, nontender, no hepatosplenomegaly, no masses and bowel sounds normal  MS: no gross musculoskeletal defects noted, no edema  SKIN: no suspicious  lesions or rashes  NEURO: Normal strength and tone, mentation intact and speech normal  Cranial nerves were intact. MMT 5/5 bilateral upper and lower extremities. Sensory was intact. No gross neurologic deficits. Normal gait and cerebellar function at his baseline. No meningeal signs  PSYCH: mentation appears normal, affect normal/bright    Diagnostic Test Results:  Results for orders placed or performed in visit on 01/06/18 (from the past 24 hour(s))   Influenza A/B antigen   Result Value Ref Range    Influenza A/B Agn Specimen Nasal     Influenza A Positive (A) NEG^Negative    Influenza B Negative NEG^Negative        ASSESSMENT/PLAN:                                                        ICD-10-CM    1. Febrile illness R50.9 Influenza A/B antigen     acetaminophen (TYLENOL) 325 MG tablet   2. Influenza A J10.1 oseltamivir (TAMIFLU) 75 MG capsule     acetaminophen (TYLENOL) 325 MG tablet   3. Immunosuppression (H) D89.9    4. Ocular myasthenia gravis (H) G70.00      Prescribed with tamiflu  Patient high risk with influenza given his immunosuppression (on prednisone) and history of MG although this is currently not flared up  He complains of shakes and chills. None seen but he does have a fever, given a dose of tylenol  Given his high risk category, recommended ER evaluation for additional labs and possible IV fluids as per wife patient appeared so weak this morning could hardly get out of bed. Patient however declined  He knows that he is at an increased risk with influenza  Complications of influenza and signs or symptoms of pneumonia and meningitis discussed if concerns go to ER.  Recommend follow up with primary care provider if no relief in 2 days, sooner if worse  Adverse reactions of medications discussed.  Over the counter medications discussed.   Aware to come back in if with worsening symptoms or if no relief despite treatment plan  Patient voiced understanding and had no further questions.     [facility]  MD Kimber Pineda MD  [facility]

## 2018-02-05 DIAGNOSIS — C61 MALIGNANT NEOPLASM OF PROSTATE (H): ICD-10-CM

## 2018-02-05 LAB — PSA SERPL-MCNC: 1.21 UG/L (ref 0–4)

## 2018-02-05 PROCEDURE — 36415 COLL VENOUS BLD VENIPUNCTURE: CPT | Performed by: UROLOGY

## 2018-02-05 PROCEDURE — 84153 ASSAY OF PSA TOTAL: CPT | Performed by: UROLOGY

## 2018-02-12 ENCOUNTER — OFFICE VISIT (OUTPATIENT)
Dept: UROLOGY | Facility: CLINIC | Age: 78
End: 2018-02-12
Payer: MEDICARE

## 2018-02-12 VITALS — RESPIRATION RATE: 16 BRPM | HEART RATE: 80 BPM | DIASTOLIC BLOOD PRESSURE: 62 MMHG | SYSTOLIC BLOOD PRESSURE: 130 MMHG

## 2018-02-12 DIAGNOSIS — C61 MALIGNANT NEOPLASM OF PROSTATE (H): Primary | ICD-10-CM

## 2018-02-12 PROCEDURE — 99213 OFFICE O/P EST LOW 20 MIN: CPT | Performed by: UROLOGY

## 2018-02-12 NOTE — MR AVS SNAPSHOT
After Visit Summary   2/12/2018    Gustavo Ramon    MRN: [id number]           Patient Information     Date Of Birth          1940        Visit Information        Provider Department      2/12/2018 8:00 AM Jones, Glenn Long, MD [facility] [place]        Today's Diagnoses     Malignant neoplasm of prostate    -  1       Follow-ups after your visit        Your next 10 appointments already scheduled     May 25, 2018  9:40 AM CDT   LAB with BE LAB   [place] [facility] ([facility])    [address] [address] at the [facility]  [place] [address]   [phone]           Please do not eat 10-12 hours before your appointment if you are coming in fasting for labs on lipids, cholesterol, or glucose (sugar). This does not apply to pregnant women. Water, hot tea and black coffee (with nothing added) are okay. Do not drink other fluids, diet soda or chew gum.            Jun 01, 2018  1:30 PM CDT   Return Visit with Susan Eller MD   [facility])    [address] [place] [address]   [phone]            Jun 26, 2018  2:30 PM CDT   Return Visit with Brooklyn Treviño MD   [facility])    [address] [place] [address]   [phone]            Feb 05, 2019  8:30 AM CST   LAB with BE LAB   [place] [facility] ([facility] [place])    [address] [address] beds at the [facility]  [place] [address]   [phone]           Please do not eat 10-12 hours before your appointment if you are coming in fasting for labs on lipids, cholesterol, or glucose (sugar). This does not apply to pregnant women. Water, hot tea and black coffee (with nothing added) are okay. Do not drink other fluids, diet soda or chew gum.            Feb 12, 2019  8:00 AM CST   Return Visit with MD [facility] [place] [facility] ([facility])    [address] [place] [address]  [place]  [address]   [phone]              Future tests that were ordered for you today     Open Future Orders        Priority Expected Expires Ordered    PSA tumor marker Routine 2/12/2019 2/13/2019 2/12/2018            Who to contact     If you have questions or need follow up information about today's clinic visit or your schedule please contact [facility] directly at [phone].  Normal or non-critical lab and imaging results will be communicated to you by Siperianhart, letter or phone within 4 business days after the clinic has received the results. If you do not hear from us within 7 days, please contact the clinic through BiggerBoatt or phone. If you have a critical or abnormal lab result, we will notify you by phone as soon as possible.  Submit refill requests through FusionAds or call your pharmacy and they will forward the refill request to us. Please allow 3 business days for your refill to be completed.          Additional Information About Your Visit        SiperianharQuantcast Information     FusionAds gives you secure access to your electronic health record. If you see a primary care provider, you can also send messages to your care team and make appointments. If you have questions, please call your primary care clinic.  If you do not have a primary care provider, please call [phone] and they will assist you.        Care EveryWhere ID     This is your Care EveryWhere ID. This could be used by other organizations to access your [facility] medical records  [id number]        Your Vitals Were     Pulse Respirations                80 16           Blood Pressure from Last 3 Encounters:   02/12/18 130/62   01/06/18 134/76   08/24/17 126/78    Weight from Last 3 Encounters:   08/24/17 80.3 kg (177 lb)   06/20/17 80.1 kg (176 lb 9.6 oz)   06/06/17 80.4 kg (177 lb 3.2 oz)                 Today's Medication Changes          These changes are accurate as of 2/12/18  8:28 AM.  If you have any questions, ask your nurse or  doctor.               These medicines have changed or have updated prescriptions.        Dose/Directions    omeprazole 20 MG CR capsule   Commonly known as:  priLOSEC   This may have changed:  additional instructions   Used for:  Gastroesophageal reflux disease without esophagitis        Take 1 Capsule Daily Concomitant To Prednisone Use   Quantity:  45 capsule   Refills:  3                Primary Care Provider Office Phone # Fax #    Winston Cuca Silverman PA-C [phone]       [address] [place] [address]        Equal Access to Services     [facility] : Hadii aad ku hadasho Soomaali, waaxda luqadaha, qaybta kaalmada adeegyada, waxay idiin hayaan adeeg kharash la'fran . So [facility] [phone].    ATENCIÓN: Si habla español, tiene a perez disposición servicios gratuitos de asistencia lingüística. [facility] [phone].    We comply with applicable federal civil rights laws and [place] laws. We do not discriminate on the basis of race, color, national origin, age, disability, sex, sexual orientation, or gender identity.            Thank you!     Thank you for choosing [facility]  for your care. Our goal is always to provide you with excellent care. Hearing back from our patients is one way we can continue to improve our services. Please take a few minutes to complete the written survey that you may receive in the mail after your visit with us. Thank you!             Your Updated Medication List - Protect others around you: Learn how to safely use, store and throw away your medicines at www.disposemymeds.org.          This list is accurate as of 2/12/18  8:29 AM.  Always use your most recent med list.                   Brand Name Dispense Instructions for use Diagnosis    acetaminophen 325 MG tablet    TYLENOL    2 tablet    Take 2 tablets (650 mg) by mouth once for 1 dose    Febrile illness, Influenza A       aspirin 325 MG tablet      Take 325 mg by mouth daily        cyanocobalamin  1000 MCG tablet    vitamin  B-12     Take 1 tablet by mouth daily        hydrochlorothiazide 25 MG tablet    HYDRODIURIL    3 tablet    Take 0.5 tablets (12.5 mg) by mouth daily    Benign essential hypertension       lisinopril 5 MG tablet    PRINIVIL/ZESTRIL    90 tablet    Take 1 tablet (5 mg) by mouth daily    Benign essential hypertension       omeprazole 20 MG CR capsule    priLOSEC    45 capsule    Take 1 Capsule Daily Concomitant To Prednisone Use    Gastroesophageal reflux disease without esophagitis       oseltamivir 75 MG capsule    TAMIFLU    10 capsule    Take 1 capsule (75 mg) by mouth 2 times daily    Influenza A       PRESERVISION/LUTEIN PO      Take 60 mg by mouth daily two tablets daily        pyridostigmine 60 MG tablet    MESTINON    360 tablet    Take 1 tablet (60 mg) by mouth 4 times daily    Myasthenia gravis without exacerbation (H)       simvastatin 20 MG tablet    ZOCOR    90 tablet    TAKE 1 TABLET AT BEDTIME    Hyperlipidemia LDL goal <130       vitamin D 2000 UNITS tablet      Take 2,000 Units by mouth daily    Vitamin D deficiency

## 2018-02-12 NOTE — PROGRESS NOTES
Chief Complaint   Patient presents with     RECHECK     PSA       Gustavo Ramon is a 77 year old male who presents today for follow up of   Chief Complaint   Patient presents with     RECHECK     PSA    f/u for prostate cancer.  He is s/p cryotherapy on 7/13 for prostate cancer kari 8 with initial psa of 7.4.  He did receive a hormonal shot prior to treatment.  His psa has gone up slowly from 0.27 (2/14) to 0.66 (6/14) and most recently 1.03 and then 1.09 and 0.77 ,1.05, 1.17 and now 1.21.  He has some urgency of urination but is doing better.  He denies any incontinence/dysuria/hematuria.    Current Outpatient Prescriptions   Medication Sig Dispense Refill     oseltamivir (TAMIFLU) 75 MG capsule Take 1 capsule (75 mg) by mouth 2 times daily 10 capsule 0     acetaminophen (TYLENOL) 325 MG tablet Take 2 tablets (650 mg) by mouth once for 1 dose 2 tablet 0     simvastatin (ZOCOR) 20 MG tablet TAKE 1 TABLET AT BEDTIME 90 tablet 1     lisinopril (PRINIVIL/ZESTRIL) 5 MG tablet Take 1 tablet (5 mg) by mouth daily 90 tablet 1     pyridostigmine (MESTINON) 60 MG tablet Take 1 tablet (60 mg) by mouth 4 times daily 360 tablet 3     hydrochlorothiazide (HYDRODIURIL) 25 MG tablet Take 0.5 tablets (12.5 mg) by mouth daily 3 tablet 0     omeprazole (PRILOSEC) 20 MG CR capsule Take 1 Capsule Daily Concomitant To Prednisone Use (Patient taking differently: Take 1 Capsule Daily Concomitant To Prednisone Use, MEDICATION TAKEN EVERY OTHER DAY) 45 capsule 3     aspirin 325 MG tablet Take 325 mg by mouth daily       Cholecalciferol (VITAMIN D) 2000 UNITS tablet Take 2,000 Units by mouth daily       cyanocolbalamin (VITAMIN  B-12) 1000 MCG tablet Take 1 tablet by mouth daily       Multiple Vitamins-Minerals (PRESERVISION/LUTEIN PO) Take 60 mg by mouth daily two tablets daily       Allergies   Allergen Reactions     Nkda [No Known Drug Allergies]       Past Medical History:   Diagnosis Date     Actinic keratosis      AK (actinic  keratosis) 11/15/2012     Amaurosis fugax      Basal cell carcinoma 2009    R medial cheek/nose     Central serous retinopathy left    Eye     Diverticulosis 08/2006     DJD (degenerative joint disease)      GERD (gastroesophageal reflux disease)      Hearing loss     High frequency     HTN (hypertension)      Hyperlipidemia LDL goal < 130      Hyperplastic colon polyp 08/2006     IgA monoclonal gammopathy     IgA kappa     Lattice degeneration of retina right    Eye     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    [facility] consult     Rosacea      Vitreous detachment left    Eye     Past Surgical History:   Procedure Laterality Date     ARTHROSCOPY KNEE RT/LT Left Jan 2010    Knee     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     DISKECTOMY, LUMBAR, SINGLE SP  2003    L2-3     ENT SURGERY  1943    Tonsils      ENT SURGERY  2-22-12    Biopsy lesion right pinna     ENT SURGERY  2-24-12    Biopsy leson right pinna     SOFT TISSUE SURGERY  May 2010    Basal Cell/right side nose     Family History   Problem Relation Age of Onset     HEART DISEASE Mother      Alzheimer Disease Mother 73     C.A.D. Father      HEART DISEASE Paternal Grandmother      DIABETES Grandchild      using a pump      DIABETES Paternal Uncle      History     Social History     Marital Status:      Spouse Name: Ceci     Number of Children: 2     Years of Education: 16     Occupational History      Retired     2001, Electrical     Social History Main Topics     Smoking status: Never Smoker      Smokeless tobacco: Never Used     Alcohol Use: No     Drug Use: No     Sexual Activity: No     Other Topics Concern     Not on file     Social History Narrative       REVIEW OF SYSTEMS  =================  C: NEGATIVE for fever, chills, change in weight  I: NEGATIVE for worrisome rashes, moles or lesions  E/M: NEGATIVE for ear, mouth and throat problems  R: NEGATIVE for significant cough or SHORTNESS OF BREATH,   CV: NEGATIVE for chest pain, palpitations or  peripheral edema  GI: NEGATIVE for nausea, abdominal pain, heartburn, or change in bowel habits  NEURO: NEGATIVE any motor/sensory changes  PSYCH: NEGATIVE for recent mood disorder    Physical Exam:  /62 (BP Location: Right arm, Patient Position: Chair, Cuff Size: Adult Regular)  Pulse 80  Resp 16   Patient is pleasant, in no acute distress, good general condition.  Lung: no evidence of respiratory distress    Abdomen: Soft, nondistended, non tender. No masses. No rebound or guarding.   Exam: penis no d/c.  Testis no masses.   No scrotal lesion.  Prostate flat small.  Skin: Warm and dry.  No redness.  Psych: normal mood and affect  Neuro: alert and oriented    Assessment/Plan:   (185) Malignant neoplasm of prostate  (primary encounter diagnosis)  Comment:  psa is stable  Plan: see in 12 months with psa.

## 2018-02-20 DIAGNOSIS — I10 BENIGN ESSENTIAL HYPERTENSION: ICD-10-CM

## 2018-02-20 RX ORDER — LISINOPRIL 5 MG/1
TABLET ORAL
Qty: 30 TABLET | Refills: 0 | Status: SHIPPED | OUTPATIENT
Start: 2018-02-20 | End: 2018-03-20

## 2018-02-20 RX ORDER — HYDROCHLOROTHIAZIDE 25 MG/1
TABLET ORAL
Qty: 15 TABLET | Refills: 0 | Status: SHIPPED | OUTPATIENT
Start: 2018-02-20 | End: 2018-03-20

## 2018-02-20 NOTE — TELEPHONE ENCOUNTER
"Requested Prescriptions   Pending Prescriptions Disp Refills     hydrochlorothiazide (HYDRODIURIL) 25 MG tablet [Pharmacy Med Name: HYDROCHLOROTHIAZIDE TABS 25MG] 45 tablet 1    Last Written Prescription Date:  11-24-17  Last Fill Quantity: 45,  # refills: 4   Last office visit: 8/24/2017 with prescribing provider:  8-24-17   Future Office Visit:   Sig: TAKE ONE-HALF (1/2) TABLET DAILY    Diuretics (Including Combos) Protocol Passed    2/20/2018  1:45 AM       Passed - Blood pressure under 140/90 in past 12 months    BP Readings from Last 3 Encounters:   02/12/18 130/62   01/06/18 134/76   08/24/17 126/78                Passed - Recent or future visit with authorizing provider's specialty    Patient had office visit in the last year or has a visit in the next 30 days with authorizing provider.  See \"Patient Info\" tab in inbasket, or \"Choose Columns\" in Meds & Orders section of the refill encounter.            Passed - Patient is age 18 or older       Passed - Normal serum creatinine on file in past 12 months    Recent Labs   Lab Test  08/25/17   0748   CR  1.08             Passed - Normal serum potassium on file in past 12 months    Recent Labs   Lab Test  08/25/17   0748   POTASSIUM  4.7                   Passed - Normal serum sodium on file in past 12 months    Recent Labs   Lab Test  08/25/17   0748   NA  140              lisinopril (PRINIVIL/ZESTRIL) 5 MG tablet [Pharmacy Med Name: LISINOPRIL TABS 5MG] 90 tablet 1    Last Written Prescription Date:  11-24-17  Last Fill Quantity: 90,  # refills: 1   Last office visit: 8/24/2017 with prescribing provider:  8-24-17   Future Office Visit:   Sig: TAKE 1 TABLET DAILY    ACE Inhibitors (Including Combos) Protocol Passed    2/20/2018  1:45 AM       Passed - Blood pressure under 140/90 in past 12 months    BP Readings from Last 3 Encounters:   02/12/18 130/62   01/06/18 134/76   08/24/17 126/78                Passed - Recent or future visit with authorizing provider's " "specialty    Patient had office visit in the last year or has a visit in the next 30 days with authorizing provider.  See \"Patient Info\" tab in inbasket, or \"Choose Columns\" in Meds & Orders section of the refill encounter.            Passed - Patient is age 18 or older       Passed - Normal serum creatinine on file in past 12 months    Recent Labs   Lab Test  08/25/17   0748   07/25/13   1123   CR  1.08   < >   --    CRPOC   --    --   1.1    < > = values in this interval not displayed.            Passed - Normal serum potassium on file in past 12 months    Recent Labs   Lab Test  08/25/17   0748   POTASSIUM  4.7             "

## 2018-02-20 NOTE — TELEPHONE ENCOUNTER
Medication is being filled for 1 time refill only due to:  Patient needs to be seen because over due for appt, reminder sent..   Ava Krueger RN

## 2018-03-01 ENCOUNTER — OFFICE VISIT (OUTPATIENT)
Dept: OPHTHALMOLOGY | Facility: CLINIC | Age: 78
End: 2018-03-01
Payer: MEDICARE

## 2018-03-01 DIAGNOSIS — H43.813 PVD (POSTERIOR VITREOUS DETACHMENT), BILATERAL: ICD-10-CM

## 2018-03-01 DIAGNOSIS — G45.3 AMAUROSIS FUGAX: ICD-10-CM

## 2018-03-01 DIAGNOSIS — H52.4 PRESBYOPIA: ICD-10-CM

## 2018-03-01 DIAGNOSIS — G70.00 OCULAR MYASTHENIA GRAVIS (H): ICD-10-CM

## 2018-03-01 DIAGNOSIS — H25.13 NUCLEAR SCLEROSIS OF BOTH EYES: Primary | ICD-10-CM

## 2018-03-01 DIAGNOSIS — H35.52 MACULAR PIGMENT DEPOSIT: ICD-10-CM

## 2018-03-01 DIAGNOSIS — H33.322 RETINAL HOLE, LEFT: ICD-10-CM

## 2018-03-01 DIAGNOSIS — H33.311 HORSESHOE TEAR OF RETINA WITHOUT DETACHMENT, RIGHT: ICD-10-CM

## 2018-03-01 PROCEDURE — 92015 DETERMINE REFRACTIVE STATE: CPT | Mod: GY | Performed by: STUDENT IN AN ORGANIZED HEALTH CARE EDUCATION/TRAINING PROGRAM

## 2018-03-01 PROCEDURE — 92014 COMPRE OPH EXAM EST PT 1/>: CPT | Performed by: STUDENT IN AN ORGANIZED HEALTH CARE EDUCATION/TRAINING PROGRAM

## 2018-03-01 ASSESSMENT — REFRACTION_MANIFEST
OD_AXIS: 005
OD_ADD: +2.50
OD_CYLINDER: +1.25
OS_CYLINDER: +1.25
OS_SPHERE: -1.25
OS_ADD: +2.50
OD_SPHERE: -1.75
OS_AXIS: 005

## 2018-03-01 ASSESSMENT — SLIT LAMP EXAM - LIDS
COMMENTS: NORMAL
COMMENTS: NORMAL

## 2018-03-01 ASSESSMENT — VISUAL ACUITY
OD_PH_SC: 20/25-1
OS_SC: 20/50-1
OS_SC+: ECC
OS_PH_SC: 20/40-1
OD_SC: 20/40
METHOD: SNELLEN - LINEAR

## 2018-03-01 ASSESSMENT — CONF VISUAL FIELD
METHOD: COUNTING FINGERS
OS_NORMAL: 1
OD_NORMAL: 1

## 2018-03-01 ASSESSMENT — CUP TO DISC RATIO
OS_RATIO: 0.15
OD_RATIO: 0.2

## 2018-03-01 ASSESSMENT — EXTERNAL EXAM - RIGHT EYE: OD_EXAM: NORMAL

## 2018-03-01 ASSESSMENT — TONOMETRY
OD_IOP_MMHG: 12
OS_IOP_MMHG: 13
IOP_METHOD: APPLANATION

## 2018-03-01 ASSESSMENT — EXTERNAL EXAM - LEFT EYE: OS_EXAM: NORMAL

## 2018-03-01 NOTE — LETTER
3/1/2018         RE: Gustavo Ramon  [address] [facility] [address]        Dear Colleague,    Thank you for referring your patient, Gustavo Ramon, to the [facility]. His eye exam is stable.  Please see a copy of my visit note below.     Current Eye Medications:  none     Subjective:  Complete eye exam. Vision is doing well right, Vision is weak left eye. Zero pain, or discomfort both eyes. Has twitch in left eye on and off for several years, usually happens when getting ready to play tennis. Having a harder time keeping lids open both eyes.     Objective:  See Ophthalmology Exam.      Assessment:  Gustavo Ramon is a 77 year old male who presents with:   Encounter Diagnoses   Name Primary?     Nuclear sclerosis of both eyes Not visually significant      Retinal hole, left operculated      Ocular myasthenia gravis (H) On Prednisone 5mg every other day for years now     Macular pigment deposit      Horseshoe tear of retina without detachment,od      Amaurosis fugax by Hx neg carotid W/U      PVD (posterior vitreous detachment), bilateral        Plan:  Glasses prescription given - optional     Marjorie Mcclellan MD  [phone]      Again, thank you for allowing me to participate in the care of your patient.        Sincerely,        Marjorie Mcclellan MD

## 2018-03-01 NOTE — MR AVS SNAPSHOT
After Visit Summary   3/1/2018    Gustavo Ramon    MRN: [id number]           Patient Information     Date Of Birth          1940        Visit Information        Provider Department      3/1/2018 3:00 PM Mar[facility]an [facility]        Today's Diagnoses     Nuclear sclerosis of both eyes    -  1    Macular pigment deposit        Retinal hole, left operculated        PVD (posterior vitreous detachment), bilateral        Horseshoe tear of retina without detachment,od        Ocular myasthenia gravis (H)        Amaurosis fugax by Hx neg carotid W/U        Presbyopia          Care Instructions    Glasses prescription given - optional     Marjorie Mcclellan MD  [phone]            Follow-ups after your visit        Follow-up notes from your care team     Return in about 1 year (around 3/1/2019) for Complete Exam.      Your next 10 appointments already scheduled     May 25, 2018  9:40 AM CDT   LAB with BE LAB   [place] [facility] ([facility] [place])    [address] [facility]  [place] [id number] [phone]           Please do not eat 10-12 hours before your appointment if you are coming in fasting for labs on lipids, cholesterol, or glucose (sugar). This does not apply to pregnant women. Water, hot tea and black coffee (with nothing added) are okay. Do not drink other fluids, diet soda or chew gum.            Jun 01, 2018  1:30 PM CDT   Return Visit with Susan Eller MD   [facility])    [address] [place] [address] [phone]            Jun 26, 2018  2:30 PM CDT   Return Visit with Brooklyn Treviño MD   [facility] ([facility])    [address] [address] [place] [address] [phone]            Feb 05, 2019  8:30 AM CST   LAB with BE LAB   [place] [facility] ([facility] [place])    [address] [facility]  [place] [address]    [phone]           Please do not eat 10-12 hours before your appointment if you are coming in fasting for labs on lipids, cholesterol, or glucose (sugar). This does not apply to pregnant women. Water, hot tea and black coffee (with nothing added) are okay. Do not drink other fluids, diet soda or chew gum.            Feb 12, 2019  8:00 AM CST   Return Visit with Glenn Jones MD   [facility] [place] [facility] [address] [phone]              Who to contact     If you have questions or need follow up information about today's clinic visit or your schedule please contact [facility] directly at [phone].  Normal or non-critical lab and imaging results will be communicated to you by MyChart, letter or phone within 4 business days after the clinic has received the results. If you do not hear from us within 7 days, please contact the clinic through MyChart or phone. If you have a critical or abnormal lab result, we will notify you by phone as soon as possible.  Submit refill requests through Billdesk or call your pharmacy and they will forward the refill request to us. Please allow 3 business days for your refill to be completed.          Additional Information About Your Visit        WeTOWNShart Information     Billdesk gives you secure access to your electronic health record. If you see a primary care provider, you can also send messages to your care team and make appointments. If you have questions, please call your primary care clinic.  If you do not have a primary care provider, please call [phone] and they will assist you.        Care EveryWhere ID     This is your Care EveryWhere ID. This could be used by other organizations to access your [facility] medical records  [id number]         Blood Pressure from Last 3 Encounters:   02/12/18 130/62   01/06/18 134/76   08/24/17 126/78    Weight from Last 3 Encounters:   08/24/17  80.3 kg (177 lb)   06/20/17 80.1 kg (176 lb 9.6 oz)   06/06/17 80.4 kg (177 lb 3.2 oz)              We Performed the Following     EYE EXAM (SIMPLE-NONBILLABLE)     REFRACTIVE STATUS          Today's Medication Changes          These changes are accurate as of 3/1/18  3:44 PM.  If you have any questions, ask your nurse or doctor.               These medicines have changed or have updated prescriptions.        Dose/Directions    omeprazole 20 MG CR capsule   Commonly known as:  priLOSEC   This may have changed:  additional instructions   Used for:  Gastroesophageal reflux disease without esophagitis        Take 1 Capsule Daily Concomitant To Prednisone Use   Quantity:  45 capsule   Refills:  3                Primary Care Provider Office Phone # Fax #    Winston Silverman PA-C [phone]       [address] [place] [address]        Equal Access to Services     [facility]: Hadii ankita blandon hadasho Sorachel, waaxda luqadaha, qaybta kaalmada yunioryagómez, katy connors . So [facility] [phone].    ATENCIÓN: Si habla español, tiene a perez disposición servicios gratuitos de asistencia lingüística. [facility] [phone].    We comply with applicable federal civil rights laws and [place] laws. We do not discriminate on the basis of race, color, national origin, age, disability, sex, sexual orientation, or gender identity.            Thank you!     Thank you for choosing [facility]  for your care. Our goal is always to provide you with excellent care. Hearing back from our patients is one way we can continue to improve our services. Please take a few minutes to complete the written survey that you may receive in the mail after your visit with us. Thank you!             Your Updated Medication List - Protect others around you: Learn how to safely use, store and throw away your medicines at www.disposemymeds.org.          This list is accurate as of 3/1/18  3:44 PM.   Always use your most recent med list.                   Brand Name Dispense Instructions for use Diagnosis    acetaminophen 325 MG tablet    TYLENOL    2 tablet    Take 2 tablets (650 mg) by mouth once for 1 dose    Febrile illness, Influenza A       aspirin 325 MG tablet      Take 325 mg by mouth daily        cyanocobalamin 1000 MCG tablet    vitamin  B-12     Take 1 tablet by mouth daily        * hydrochlorothiazide 25 MG tablet    HYDRODIURIL    3 tablet    Take 0.5 tablets (12.5 mg) by mouth daily    Benign essential hypertension       * hydrochlorothiazide 25 MG tablet    HYDRODIURIL    15 tablet    TAKE ONE-HALF (1/2) TABLET DAILY    Benign essential hypertension       lisinopril 5 MG tablet    PRINIVIL/ZESTRIL    30 tablet    TAKE 1 TABLET DAILY    Benign essential hypertension       omeprazole 20 MG CR capsule    priLOSEC    45 capsule    Take 1 Capsule Daily Concomitant To Prednisone Use    Gastroesophageal reflux disease without esophagitis       oseltamivir 75 MG capsule    TAMIFLU    10 capsule    Take 1 capsule (75 mg) by mouth 2 times daily    Influenza A       PRESERVISION/LUTEIN PO      Take 60 mg by mouth daily two tablets daily        pyridostigmine 60 MG tablet    MESTINON    360 tablet    Take 1 tablet (60 mg) by mouth 4 times daily    Myasthenia gravis without exacerbation (H)       simvastatin 20 MG tablet    ZOCOR    90 tablet    TAKE 1 TABLET AT BEDTIME    Hyperlipidemia LDL goal <130       vitamin D 2000 UNITS tablet      Take 2,000 Units by mouth daily    Vitamin D deficiency       * Notice:  This list has 2 medication(s) that are the same as other medications prescribed for you. Read the directions carefully, and ask your doctor or other care provider to review them with you.

## 2018-03-01 NOTE — PROGRESS NOTES
Current Eye Medications:  none     Subjective:  Complete eye exam. Vision is doing well right, Vision is weak left eye. Zero pain, or discomfort both eyes. Has twitch in left eye on and off for several years, usually happens when getting ready to play tennis. Having a harder time keeping lids open both eyes.     Objective:  See Ophthalmology Exam.      Assessment:  Gustavo Ramon is a 77 year old male who presents with:   Encounter Diagnoses   Name Primary?     Nuclear sclerosis of both eyes Not visually significant      Retinal hole, left operculated      Ocular myasthenia gravis (H) On Prednisone 5mg every other day for years now     Macular pigment deposit      Horseshoe tear of retina without detachment,od      Amaurosis fugax by Hx neg carotid W/U      PVD (posterior vitreous detachment), bilateral        Plan:  Glasses prescription given - optional     Marjorie Mcclellan MD  [phone]

## 2018-03-20 ENCOUNTER — MYC MEDICAL ADVICE (OUTPATIENT)
Dept: FAMILY MEDICINE | Facility: CLINIC | Age: 78
End: 2018-03-20

## 2018-03-20 DIAGNOSIS — K21.9 GASTROESOPHAGEAL REFLUX DISEASE WITHOUT ESOPHAGITIS: ICD-10-CM

## 2018-03-20 DIAGNOSIS — I10 BENIGN ESSENTIAL HYPERTENSION: ICD-10-CM

## 2018-03-20 NOTE — TELEPHONE ENCOUNTER
"Requested Prescriptions   Pending Prescriptions Disp Refills     lisinopril (PRINIVIL/ZESTRIL) 5 MG tablet [Pharmacy Med Name: LISINOPRIL TABS 5MG] 30 tablet 0    Last Written Prescription Date:  2/21/18  Last Fill Quantity: 30,  # refills: 0   Last office visit: 8/24/2017 with prescribing provider:  8/24/17 KIMBERLI Silverman   Future Office Visit:   Next 5 appointments (look out 90 days)     Jun 01, 2018  1:30 PM CDT   [facility]   [facility]    [address] [address] [place] [address] [phone]                Sig: TAKE 1 TABLET DAILY (DUE FOR APPOINTMENT FOR FURTHER REFILLS)    ACE Inhibitors (Including Combos) Protocol Passed    3/20/2018 11:42 AM       Passed - Blood pressure under 140/90 in past 12 months    BP Readings from Last 3 Encounters:   02/12/18 130/62   01/06/18 134/76   08/24/17 126/78                Passed - Recent (12 mo) or future (30 days) visit within the authorizing provider's specialty    Patient had office visit in the last 12 months or has a visit in the next 30 days with authorizing provider or within the authorizing provider's specialty.  See \"Patient Info\" tab in inbasket, or \"Choose Columns\" in Meds & Orders section of the refill encounter.           Passed - Patient is age 18 or older       Passed - Normal serum creatinine on file in past 12 months    Recent Labs   Lab Test  08/25/17   0748   07/25/13   1123   CR  1.08   < >   --    CRPOC   --    --   1.1    < > = values in this interval not displayed.            Passed - Normal serum potassium on file in past 12 months    Recent Labs   Lab Test  08/25/17   0748   POTASSIUM  4.7             hydrochlorothiazide (HYDRODIURIL) 25 MG tablet [Pharmacy Med Name: HYDROCHLOROTHIAZIDE TABS 25MG] 15 tablet 0    Last Written Prescription Date:  2/21/18  Last Fill Quantity: 15,  # refills: 0   Last office visit: 8/24/2017 with prescribing provider:  8/24/17 KIMBERLI Silverman " "  Future Office Visit:   Next 5 appointments (look out 90 days)     Jun 01, 2018  1:30 PM CDT   Return Visit with Susan Eller MD   [facility]    [address] [address] [place] [address] [phone]                Sig: TAKE ONE-HALF (1/2) TABLET DAILY (DUE FOR APPOINTMENT FOR FURTHER REFILLS)    Diuretics (Including Combos) Protocol Passed    3/20/2018 11:42 AM       Passed - Blood pressure under 140/90 in past 12 months    BP Readings from Last 3 Encounters:   02/12/18 130/62   01/06/18 134/76   08/24/17 126/78                Passed - Recent (12 mo) or future (30 days) visit within the authorizing provider's specialty    Patient had office visit in the last 12 months or has a visit in the next 30 days with authorizing provider or within the authorizing provider's specialty.  See \"Patient Info\" tab in inbasket, or \"Choose Columns\" in Meds & Orders section of the refill encounter.           Passed - Patient is age 18 or older       Passed - Normal serum creatinine on file in past 12 months    Recent Labs   Lab Test  08/25/17   0748   CR  1.08             Passed - Normal serum potassium on file in past 12 months    Recent Labs   Lab Test  08/25/17   0748   POTASSIUM  4.7                   Passed - Normal serum sodium on file in past 12 months    Recent Labs   Lab Test  08/25/17   0748   NA  140              omeprazole (PRILOSEC) 20 MG CR capsule [Pharmacy Med Name: OMEPRAZOLE CAPS 20MG] 45 capsule 3    Last Written Prescription Date:  7/18/17  Last Fill Quantity: 45,  # refills: 3   Last office visit: 8/24/2017 with prescribing provider:  8/24/17 KIMBERLI Silverman   Future Office Visit:   Next 5 appointments (look out 90 days)     Jun 01, 2018  1:30 PM CDT   Return Visit with Susan Eller MD   [facility] ([facility])    [address] [place] [id number] [phone]                Sig: TAKE 1 CAPSULE DAILY " "CONCOMITANT TO PREDNISONE USE    PPI Protocol Passed    3/20/2018 11:42 AM       Passed - Not on Clopidogrel (unless Pantoprazole ordered)       Passed - No diagnosis of osteoporosis on record       Passed - Recent (12 mo) or future (30 days) visit within the authorizing provider's specialty    Patient had office visit in the last 12 months or has a visit in the next 30 days with authorizing provider or within the authorizing provider's specialty.  See \"Patient Info\" tab in inbasket, or \"Choose Columns\" in Meds & Orders section of the refill encounter.           Passed - Patient is age 18 or older        "

## 2018-03-21 RX ORDER — HYDROCHLOROTHIAZIDE 25 MG/1
TABLET ORAL
Qty: 15 TABLET | Refills: 0 | Status: SHIPPED | OUTPATIENT
Start: 2018-03-21 | End: 2018-03-26

## 2018-03-21 RX ORDER — LISINOPRIL 5 MG/1
TABLET ORAL
Qty: 30 TABLET | Refills: 0 | Status: SHIPPED | OUTPATIENT
Start: 2018-03-21 | End: 2018-03-26

## 2018-03-26 ENCOUNTER — OFFICE VISIT (OUTPATIENT)
Dept: FAMILY MEDICINE | Facility: CLINIC | Age: 78
End: 2018-03-26
Payer: MEDICARE

## 2018-03-26 ENCOUNTER — RADIANT APPOINTMENT (OUTPATIENT)
Dept: GENERAL RADIOLOGY | Facility: CLINIC | Age: 78
End: 2018-03-26
Attending: PHYSICIAN ASSISTANT
Payer: MEDICARE

## 2018-03-26 VITALS
HEIGHT: 68 IN | DIASTOLIC BLOOD PRESSURE: 57 MMHG | BODY MASS INDEX: 26.83 KG/M2 | TEMPERATURE: 97.5 F | SYSTOLIC BLOOD PRESSURE: 125 MMHG | HEART RATE: 67 BPM | OXYGEN SATURATION: 99 % | RESPIRATION RATE: 18 BRPM | WEIGHT: 177 LBS

## 2018-03-26 DIAGNOSIS — R20.2 PARESTHESIA OF RIGHT UPPER EXTREMITY: ICD-10-CM

## 2018-03-26 DIAGNOSIS — R20.2 PARESTHESIA OF RIGHT UPPER EXTREMITY: Primary | ICD-10-CM

## 2018-03-26 DIAGNOSIS — I10 BENIGN ESSENTIAL HYPERTENSION: ICD-10-CM

## 2018-03-26 PROCEDURE — 72040 X-RAY EXAM NECK SPINE 2-3 VW: CPT | Mod: FY

## 2018-03-26 PROCEDURE — 99214 OFFICE O/P EST MOD 30 MIN: CPT | Performed by: PHYSICIAN ASSISTANT

## 2018-03-26 RX ORDER — HYDROCHLOROTHIAZIDE 25 MG/1
12.5 TABLET ORAL DAILY
Qty: 45 TABLET | Refills: 1 | Status: SHIPPED | OUTPATIENT
Start: 2018-03-26 | End: 2018-09-25

## 2018-03-26 RX ORDER — LISINOPRIL 5 MG/1
TABLET ORAL
Qty: 90 TABLET | Refills: 1 | Status: SHIPPED | OUTPATIENT
Start: 2018-03-26 | End: 2018-09-25

## 2018-03-26 NOTE — PROGRESS NOTES
SUBJECTIVE:   Gustavo Ramon is a 77 year old male who presents to clinic today for the following health issues:      Hyperlipidemia Follow-Up      Rate your low fat/cholesterol diet?: fair    Taking statin?  Yes, no muscle aches from statin    Other lipid medications/supplements?:  none    Hypertension Follow-up      Outpatient blood pressures are not being checked.    Low Salt Diet: not monitoring salt      Amount of exercise or physical activity: None    Problems taking medications regularly: No    Medication side effects: none    Diet: low fat/cholesterol      Numbness/tingling- noted right arm over the past 3 months. Stretching seems to improve numbness.    No neck pain. No ha's . No weakness.   Recheck of blood pressure . No chest pain/sob/palps.  No extremity pain. No profound vision changes.   Problem list and histories reviewed & adjusted, as indicated.  Additional history: as documented    BP Readings from Last 3 Encounters:   03/26/18 125/57   02/12/18 130/62   01/06/18 134/76    Wt Readings from Last 3 Encounters:   03/26/18 177 lb (80.3 kg)   08/24/17 177 lb (80.3 kg)   06/20/17 176 lb 9.6 oz (80.1 kg)                    Reviewed and updated as needed this visit by clinical staff  Tobacco  Allergies  Meds  Med Hx  Surg Hx  Fam Hx  Soc Hx      Reviewed and updated as needed this visit by Provider  Tobacco  Med Hx  Surg Hx  Fam Hx  Soc Hx        All other systems negative except as outline above  OBJECTIVE:    Eye exam - right eye normal lid, conjunctiva, cornea, pupil and fundus, left eye normal lid, conjunctiva, cornea, pupil and fundus.  Thyroid not palpable, not enlarged, no nodules detected.  CHEST:chest clear to IPPA, no tachypnea, retractions or cyanosis and S1, S2 normal, no murmur, no gallop, rate regular.  Neck rom limited.  Exam for Carpal Tunnel syndrome shows both sides negative - Tinel and Phalen tests are negative, normal sensation, no atrophy.  Negative spurlings  test.  Negative TOS tests.  Cervical trigger points present  His disks are flat. Pupils equal, round, reactive to light. Extraocular movements full. Visual fields full. Face moves symmetrically. Tongue midline. Hearing mildly decreased to finger-rubbing at approximately 6-8 inches. Neck without bruits. CV: S1, S2. Motor strength 5/5. Reflexes were 2/4. Toe signs were downgoing. Normal position sense. Good finger-nose-finger and fine finger movement. Gait: he jeremiah from a chair without difficulty and has a mildly broad-based gait.    Gustavo was seen today for numbness, hypertension and lipids.    Diagnoses and all orders for this visit:    Paresthesia of right upper extremity  -     XR Cervical Spine 2/3 Views; Future  -     [facility] PT, HAND, AND CHIROPRACTIC REFERRAL    Benign essential hypertension  -     lisinopril (PRINIVIL/ZESTRIL) 5 MG tablet; TAKE 1 TABLET DAILY (DUE FOR APPOINTMENT FOR FURTHER REFILLS)  -     hydrochlorothiazide (HYDRODIURIL) 25 MG tablet; Take 0.5 tablets (12.5 mg) by mouth daily      Advised supportive and symptomatic treatment.  Follow up with Provider - if condition persists or worsens.

## 2018-03-26 NOTE — MR AVS SNAPSHOT
After Visit Summary   3/26/2018    Gustavo Ramon    MRN: [id number]           Patient Information     Date Of Birth          1940        Visit Information        Provider Department      3/26/2018 12:00 PM Winston Silverman PA-C [facility]        Today's Diagnoses     Paresthesia of right upper extremity    -  1    Benign essential hypertension           Follow-ups after your visit        Additional Services     [facility] PT, HAND, AND CHIROPRACTIC REFERRAL       **This order will print in the [facility] Scheduling Office**    Physical Therapy, Hand Therapy and Chiropractic Care are available through:    *[facility]  *[facility]  *[facility]    Call one number to schedule at any of the above locations: [phone].    Your provider has referred you to: Physical Therapy at [facility] or [facility]    Indication/Reason for Referral: right upper extremity paresthesias (suspect cervical radiculitis related to muscle tightness)  Onset of Illness: several wks  Therapy Orders: Evaluate and Treat  Special Programs: None  Special Request: None    Kate Love      Additional Comments for the Therapist or Chiropractor:     Please be aware that coverage of these services is subject to the terms and limitations of your health insurance plan.  Call member services at your health plan with any benefit or coverage questions.      Please bring the following to your appointment:    *Your personal calendar for scheduling future appointments  *Comfortable clothing                  Your next 10 appointments already scheduled     May 25, 2018  9:40 AM CDT   LAB with BE LAB   [facility] ([facility] [place])    [address] [facility]  [place] [id number] [phone]           Please do not eat 10-12 hours before your appointment if you are coming in fasting for labs on lipids, cholesterol, or glucose (sugar). This does not apply to pregnant  women. Water, hot tea and black coffee (with nothing added) are okay. Do not drink other fluids, diet soda or chew gum.            Jun 01, 2018  1:30 PM CDT   Return Visit with Susan Eller MD   [facility] ([facility])    [address] [place] [address]   [phone]            Jun 26, 2018  2:30 PM CDT   Return Visit with Brooklyn Treviño MD   [facility] ([facility])    [address] [place] [address]   [phone]            Feb 05, 2019  8:30 AM CST   LAB with  LAB   [facility] ([facility])    [address] [place] [id number] [phone]           Please do not eat 10-12 hours before your appointment if you are coming in fasting for labs on lipids, cholesterol, or glucose (sugar). This does not apply to pregnant women. Water, hot tea and black coffee (with nothing added) are okay. Do not drink other fluids, diet soda or chew gum.            Feb 12, 2019  8:00 AM CST   Return Visit with Glenn Jones MD   [facility] ([address] [address] [phone]              Who to contact     Normal or non-critical lab and imaging results will be communicated to you by MyChart, letter or phone within 4 business days after the clinic has received the results. If you do not hear from us within 7 days, please contact the clinic through MyChart or phone. If you have a critical or abnormal lab result, we will notify you by phone as soon as possible.  Submit refill requests through [employer] or call your pharmacy and they will forward the refill request to us. Please allow 3 business days for your refill to be completed.          If you need to speak with a  for additional information , please call: [phone]             Additional Information About Your Visit        MyChart Information      "[employer] gives you secure access to your electronic health record. If you see a primary care provider, you can also send messages to your care team and make appointments. If you have questions, please call your primary care clinic.  If you do not have a primary care provider, please call [phone] and they will assist you.        Care EveryWhere ID     This is your Care EveryWhere ID. This could be used by other organizations to access your [facility] medical records  [id number]        Your Vitals Were     Pulse Temperature Respirations Height Pulse Oximetry BMI (Body Mass Index)    67 97.5  F (36.4  C) (Tympanic) 18 5' 7.75\" (1.721 m) 99% 27.11 kg/m2       Blood Pressure from Last 3 Encounters:   03/26/18 125/57   02/12/18 130/62   01/06/18 134/76    Weight from Last 3 Encounters:   03/26/18 177 lb (80.3 kg)   08/24/17 177 lb (80.3 kg)   06/20/17 176 lb 9.6 oz (80.1 kg)              We Performed the Following     [facility] PT, HAND, AND CHIROPRACTIC REFERRAL          Today's Medication Changes          These changes are accurate as of 3/26/18  1:16 PM.  If you have any questions, ask your nurse or doctor.               These medicines have changed or have updated prescriptions.        Dose/Directions    hydrochlorothiazide 25 MG tablet   Commonly known as:  HYDRODIURIL   This may have changed:  Another medication with the same name was removed. Continue taking this medication, and follow the directions you see here.   Used for:  Benign essential hypertension   Changed by:  Winston Silverman PA-C        Dose:  12.5 mg   Take 0.5 tablets (12.5 mg) by mouth daily   Quantity:  45 tablet   Refills:  1       lisinopril 5 MG tablet   Commonly known as:  PRINIVIL/ZESTRIL   This may have changed:  See the new instructions.   Used for:  Benign essential hypertension   Changed by:  Winston Silverman PA-C        TAKE 1 TABLET DAILY (DUE FOR APPOINTMENT FOR FURTHER REFILLS)   Quantity:  90 tablet   Refills:  1         Stop taking " these medicines if you haven't already. Please contact your care team if you have questions.     oseltamivir 75 MG capsule   Commonly known as:  TAMIFLU   Stopped by:  Winston Silverman PA-C                Where to get your medicines      These medications were sent to [facility] - [place], [place] - [address] [place] [address]  [address] [facility] [address]     Phone:  [phone]     hydrochlorothiazide 25 MG tablet    lisinopril 5 MG tablet                Primary Care Provider Office Phone # Fax #    Winston Silverman PA-C [phone] [address] [facility] [address] [facility] [address]        Equal Access to Services     [facility] : Hadii ankita blandon hadasho Soomaali, waaxda luqadaha, qaybta kaalmada adeegyada, katy connors . So [facility] [phone].    ATENCIÓN: Si habla español, tiene a perez disposición servicios gratuitos de asistencia lingüística. [facility] [phone].    We comply with applicable federal civil rights laws and [place] laws. We do not discriminate on the basis of race, color, national origin, age, disability, sex, sexual orientation, or gender identity.            Thank you!     Thank you for choosing [facility]  for your care. Our goal is always to provide you with excellent care. Hearing back from our patients is one way we can continue to improve our services. Please take a few minutes to complete the written survey that you may receive in the mail after your visit with us. Thank you!             Your Updated Medication List - Protect others around you: Learn how to safely use, store and throw away your medicines at www.disposemymeds.org.          This list is accurate as of 3/26/18  1:16 PM.  Always use your most recent med list.                   Brand Name Dispense Instructions for use Diagnosis    acetaminophen 325 MG tablet    TYLENOL    2 tablet    Take 2 tablets (650 mg) by mouth once for 1 dose    Febrile illness,  Influenza A       aspirin 325 MG tablet      Take 325 mg by mouth daily        cyanocobalamin 1000 MCG tablet    vitamin  B-12     Take 1 tablet by mouth daily        hydrochlorothiazide 25 MG tablet    HYDRODIURIL    45 tablet    Take 0.5 tablets (12.5 mg) by mouth daily    Benign essential hypertension       lisinopril 5 MG tablet    PRINIVIL/ZESTRIL    90 tablet    TAKE 1 TABLET DAILY (DUE FOR APPOINTMENT FOR FURTHER REFILLS)    Benign essential hypertension       omeprazole 20 MG CR capsule    priLOSEC    45 capsule    TAKE 1 CAPSULE DAILY CONCOMITANT TO PREDNISONE USE    Gastroesophageal reflux disease without esophagitis       PRESERVISION/LUTEIN PO      Take 60 mg by mouth daily two tablets daily        pyridostigmine 60 MG tablet    MESTINON    360 tablet    Take 1 tablet (60 mg) by mouth 4 times daily    Myasthenia gravis without exacerbation (H)       simvastatin 20 MG tablet    ZOCOR    90 tablet    TAKE 1 TABLET AT BEDTIME    Hyperlipidemia LDL goal <130       vitamin D 2000 UNITS tablet      Take 2,000 Units by mouth daily    Vitamin D deficiency

## 2018-03-27 ENCOUNTER — THERAPY VISIT (OUTPATIENT)
Dept: PHYSICAL THERAPY | Facility: CLINIC | Age: 78
End: 2018-03-27
Payer: MEDICARE

## 2018-03-27 DIAGNOSIS — M54.2 CERVICAL PAIN: ICD-10-CM

## 2018-03-27 DIAGNOSIS — M54.12 CERVICAL RADICULOPATHY: Primary | ICD-10-CM

## 2018-03-27 PROCEDURE — 97110 THERAPEUTIC EXERCISES: CPT | Mod: GP | Performed by: PHYSICAL THERAPIST

## 2018-03-27 PROCEDURE — G8981 BODY POS CURRENT STATUS: HCPCS | Mod: GP | Performed by: PHYSICAL THERAPIST

## 2018-03-27 PROCEDURE — G8982 BODY POS GOAL STATUS: HCPCS | Mod: GP | Performed by: PHYSICAL THERAPIST

## 2018-03-27 PROCEDURE — 97161 PT EVAL LOW COMPLEX 20 MIN: CPT | Mod: GP | Performed by: PHYSICAL THERAPIST

## 2018-03-27 NOTE — LETTER
DEPARTMENT OF HEALTH AND HUMAN SERVICES  CENTERS FOR MEDICARE & MEDICAID SERVICES    PLAN/UPDATED PLAN OF PROGRESS FOR OUTPATIENT REHABILITATION    PATIENTS NAME:  Gustavo Ramon   : 1940  PROVIDER NUMBER:    [id number]  HICN: [id number]  PROVIDER NAME: [facility]  MEDICAL RECORD NUMBER: [id number]   START OF CARE DATE:  SOC Date: 18   TYPE:  PT  PRIMARY/TREATMENT DIAGNOSIS: (Pertinent Medical Diagnosis)     Cervical pain  Cervical radiculopathy    VISITS FROM START OF CARE:  Rxs Used: 1     [place] [facility] Initial Evaluation  Subjective:  Patient is a 77 year old male presenting with rehab cervical spine hpi. The history is provided by the patient. No  was used. Gustavo Ramon is a 77 year old male with a cervical spine (R u/e) condition.  Condition occurred with:  Insidious onset.  Condition occurred: for unknown reasons.  This is a new condition  Date of MD order for this episode was 3-26-18. Stephen states he has had neck pain for about 10 years off and on. The pain is after tennis or more physical activity. He can sit or be on the computer without neck pain, but he does have R u/e numbness and tingling with the sitting. Stephen c/o tingling in the R u/e for several weeks, unsure cause. It will happen 3-4x/day. The tingling is in the R shoulder to hand, mainly the forearm to the lateral 2 fingers. He very rarely with have tingling L forearm to hand. Patient reports pain:  Central cervical spine.  Radiates to:  Shoulder right, upper arm right, hand right and lower arm right.  Pain is described as aching and is intermittent and reported as 5/10.  Associated symptoms:  Tingling, loss of motion/stiffness and numbness. Pain is worse during the day.  Symptoms are exacerbated by other (staying still) and relieved by activity/movement.  Since onset symptoms are unchanged.  Special tests:  X-ray.      General health as reported by patient  is excellent.  Pertinent medical history includes:  Osteoarthritis and cancer.  Medical allergies: no.  Other surgeries include:  Other (prostate).  Current medications:  High blood pressure medication and other (med for eye mm).  Current occupation is retired.  Primary job tasks include:  Other (computer, home activites).  Barriers include:  None as reported by the patient.  Red flags:  None as reported by the patient.    Objective:  Standing Alignment:    Cervical/Thoracic:  Forward head and thoracic kyphosis increased  Shoulder/UE:  Rounded shoulders  Flexibility/Screens:   Spine:  Decreased left spine flexibility:  Upper Trap and Levator  Decreased right spine flexibility:  Upper Trap and Levator       Cervical/Thoracic Evaluation  PATIENTS NAME:  Gustavo Ramon   : 1940  AROM:  AROM Cervical:  Flexion:            100%  Extension:       25% pain  Rotation:         Left: 50%     Right: 25%  Side Bend:      Left: 10% pain     Right:  25%    Headaches: cervical  Cervical Myotomes:    C4 (shrug):  Left: 5    Right: 5  C5 (Deltoid):  Left: 5    Right: 4  C6 (Biceps):  Left: 5    Right: 5  C7 (Triceps):  Left: 5    Right: 5  DTR's:  normal  Cervical Dermatomes:  normal  Cervical Palpation:    Tenderness present at Left:    Upper Trap; Levator; Erector Spinae and Suboccipitals  Tenderness present at Right:    Upper Trap; Levator; Erector Spinae and Suboccipitals  Spinal Segmental Conclusions:  P/a glides stiff mid thoracic, mobile through cervical spine except upper cervical  [facility] Cervical Evaluation    Posture:  Sitting: poor  Standing: fair  Protruding Head: yes  Correction of Posture: better  Test Movements:  PRO: During: no effect  After: no better    Repeat PRO: During: no effect  After: peripheralizing    RET: During: produces  After: no better    Repeat RET: During: produces  After: centralizing    Assessment/Plan:    Patient is a 77 year old male with cervical complaints.    Patient has the  following significant findings with corresponding treatment plan.                Diagnosis 1:  Cervical radiculopathy/cervical pain  Pain -  manual therapy, self management, education, directional preference exercise and home program  Decreased ROM/flexibility - manual therapy, therapeutic exercise and home program  Decreased joint mobility - manual therapy, therapeutic exercise and home program  Decreased strength - therapeutic exercise, therapeutic activities and home program  Decreased function - therapeutic activities and home program  Impaired posture - neuro re-education, therapeutic activities and home program    Therapy Evaluation Codes:   1) History comprised of:   Personal factors that impact the plan of care:      Age.    Comorbidity factors that impact the plan of care are:      Osteoarthritis.     Medications impacting care: None.  2) Examination of Body Systems comprised of:  PATIENTS NAME:  Gustavo Ramon   : 1940   Body structures and functions that impact the plan of care:      Cervical spine.   Activity limitations that impact the plan of care are:      Lifting, Reading/Computer work and Sitting.  3) Clinical presentation characteristics are:   Stable/Uncomplicated.  4) Decision-Making    Low complexity using standardized patient assessment instrument and/or measureable assessment of functional outcome.  Cumulative Therapy Evaluation is: Low complexity.  Previous and current functional limitations:  (See Goal Flow Sheet for this information)    Short term and Long term goals: (See Goal Flow Sheet for this information)   Communication ability:  Patient appears to be able to clearly communicate and understand verbal and written communication and follow directions correctly.  Treatment Explanation - The following has been discussed with the patient:   RX ordered/plan of care  Anticipated outcomes  Possible risks and side effects  This patient would benefit from PT intervention to resume  "normal activities.   Rehab potential is good.  Frequency:  1 X week, once daily  Duration:  for 6 weeks  Discharge Plan:  Achieve all LTG.  Independent in home treatment program.  Reach maximal therapeutic benefit.  Please refer to the daily flowsheet for treatment today, total treatment time and time spent performing 1:1 timed codes.         Caregiver Signature/Credentials _____________________________ Date ________      Treating Provider: Amanda Rosenberg PT   I have reviewed and certified the need for these services and plan of treatment while under my care.        PHYSICIAN'S SIGNATURE:   _________________________________________  Date___________   Winston Silverman PA-C    Certification period:  Beginning of Cert date period: 03/27/18 to  End of Cert period date: 06/24/18     Functional Level Progress Report: Please see attached \"Goal Flow sheet for Functional level.\"    ____X____ Continue Services or       ________ DC Services                Service dates: From  SOC Date: 03/27/18 date to present                         "

## 2018-03-27 NOTE — PROGRESS NOTES
[place] [facility] Initial Evaluation  Subjective:  Patient is a 77 year old male presenting with rehab cervical spine hpi. The history is provided by the patient. No  was used.   Gustavo Ramon is a 77 year old male with a cervical spine (R u/e) condition.  Condition occurred with:  Insidious onset.  Condition occurred: for unknown reasons.  This is a new condition  Date of MD order for this episode was 3-26-18. Stephen states he has had neck pain for about 10 years off and on. The pain is after tennis or more physical activity. He can sit or be on the computer without neck pain, but he does have R u/e numbness and tingling with the sitting. Stephen c/o tingling in the R u/e for several weeks, unsure cause. It will happen 3-4x/day. The tingling is in the R shoulder to hand, mainly the forearm to the lateral 2 fingers. He very rarely with have tingling L forearm to hand. .    Patient reports pain:  Central cervical spine.  Radiates to:  Shoulder right, upper arm right, hand right and lower arm right.  Pain is described as aching and is intermittent and reported as 5/10.  Associated symptoms:  Tingling, loss of motion/stiffness and numbness. Pain is worse during the day.  Symptoms are exacerbated by other (staying still) and relieved by activity/movement.  Since onset symptoms are unchanged.  Special tests:  X-ray.      General health as reported by patient is excellent.  Pertinent medical history includes:  Osteoarthritis and cancer.  Medical allergies: no.  Other surgeries include:  Other (prostate).  Current medications:  High blood pressure medication and other (med for eye mm).  Current occupation is retired.    Primary job tasks include:  Other (computer, home activites).    Barriers include:  None as reported by the patient.    Red flags:  None as reported by the patient.                        Objective:  Standing Alignment:    Cervical/Thoracic:  Forward head and thoracic  kyphosis increased  Shoulder/UE:  Rounded shoulders                  Flexibility/Screens:         Spine:  Decreased left spine flexibility:  Upper Trap and Levator    Decreased right spine flexibility:  Upper Trap and Levator                  Cervical/Thoracic Evaluation    AROM:  AROM Cervical:    Flexion:            100%  Extension:       25% pain  Rotation:         Left: 50%     Right: 25%  Side Bend:      Left: 10% pain     Right:  25%      Headaches: cervical  Cervical Myotomes:        C4 (shrug):  Left: 5    Right: 5  C5 (Deltoid):  Left: 5    Right: 4  C6 (Biceps):  Left: 5    Right: 5  C7 (Triceps):  Left: 5    Right: 5      DTR's:  normal          Cervical Dermatomes:  normal                    Cervical Palpation:    Tenderness present at Left:    Upper Trap; Levator; Erector Spinae and Suboccipitals  Tenderness present at Right:    Upper Trap; Levator; Erector Spinae and Suboccipitals      Spinal Segmental Conclusions:  P/a glides stiff mid thoracic, mobile through cervical spine except upper cervical                                                  Dorota Cervical Evaluation    Posture:  Sitting: poor  Standing: fair  Protruding Head: yes    Correction of Posture: better      Test Movements:    PRO: During: no effect  After: no better    Repeat PRO: During: no effect  After: peripheralizing    RET: During: produces  After: no better    Repeat RET: During: produces  After: centralizing                                                                       ROS    Assessment/Plan:    Patient is a 77 year old male with cervical complaints.    Patient has the following significant findings with corresponding treatment plan.                Diagnosis 1:  Cervical radiculopathy/cervical pain  Pain -  manual therapy, self management, education, directional preference exercise and home program  Decreased ROM/flexibility - manual therapy, therapeutic exercise and home program  Decreased joint mobility - manual  therapy, therapeutic exercise and home program  Decreased strength - therapeutic exercise, therapeutic activities and home program  Decreased function - therapeutic activities and home program  Impaired posture - neuro re-education, therapeutic activities and home program    Therapy Evaluation Codes:   1) History comprised of:   Personal factors that impact the plan of care:      Age.    Comorbidity factors that impact the plan of care are:      Osteoarthritis.     Medications impacting care: None.  2) Examination of Body Systems comprised of:   Body structures and functions that impact the plan of care:      Cervical spine.   Activity limitations that impact the plan of care are:      Lifting, Reading/Computer work and Sitting.  3) Clinical presentation characteristics are:   Stable/Uncomplicated.  4) Decision-Making    Low complexity using standardized patient assessment instrument and/or measureable assessment of functional outcome.  Cumulative Therapy Evaluation is: Low complexity.    Previous and current functional limitations:  (See Goal Flow Sheet for this information)    Short term and Long term goals: (See Goal Flow Sheet for this information)     Communication ability:  Patient appears to be able to clearly communicate and understand verbal and written communication and follow directions correctly.  Treatment Explanation - The following has been discussed with the patient:   RX ordered/plan of care  Anticipated outcomes  Possible risks and side effects  This patient would benefit from PT intervention to resume normal activities.   Rehab potential is good.    Frequency:  1 X week, once daily  Duration:  for 6 weeks  Discharge Plan:  Achieve all LTG.  Independent in home treatment program.  Reach maximal therapeutic benefit.    Please refer to the daily flowsheet for treatment today, total treatment time and time spent performing 1:1 timed codes.

## 2018-03-27 NOTE — MR AVS SNAPSHOT
After Visit Summary   3/27/2018    Gustavo Ramon    MRN: [id number]           Patient Information     Date Of Birth          1940        Visit Information        Provider Department      3/27/2018 12:30 PM Amanda Rosenberg PT [facility] [place] [facility]        Today's Diagnoses     Cervical radiculopathy    -  1    Cervical pain           Follow-ups after your visit        Your next 10 appointments already scheduled     May 25, 2018  9:40 AM CDT   LAB with BE LAB   [place] [facility] ([facility])    [address]  [place] [address]   [phone]           Please do not eat 10-12 hours before your appointment if you are coming in fasting for labs on lipids, cholesterol, or glucose (sugar). This does not apply to pregnant women. Water, hot tea and black coffee (with nothing added) are okay. Do not drink other fluids, diet soda or chew gum.            Jun 01, 2018  1:30 PM CDT   Return Visit with Susan Eller MD   [facility])    [address] [place] [id number]   [phone]            Jun 26, 2018  2:30 PM CDT   Return Visit with Brooklyn Treviño MD   [facility])    [address] [place] [id number]   [phone]            Feb 05, 2019  8:30 AM CST   LAB with BE LAB   [place] [facility] ([facility] [place])    [address]  [place] [address]   [phone]           Please do not eat 10-12 hours before your appointment if you are coming in fasting for labs on lipids, cholesterol, or glucose (sugar). This does not apply to pregnant women. Water, hot tea and black coffee (with nothing added) are okay. Do not drink other fluids, diet soda or chew gum.            Feb 12, 2019  8:00 AM CST   Return Visit with Glenn Jones MD   [facility]    [address] [place] [id number] [phone]              Who to contact     If you have questions or need follow up information about today's clinic visit or your schedule please contact [facility] directly at [phone].  Normal or non-critical lab and imaging results will be communicated to you by MyChart, letter or phone within 4 business days after the clinic has received the results. If you do not hear from us within 7 days, please contact the clinic through [employer] [facility] or phone. If you have a critical or abnormal lab result, we will notify you by phone as soon as possible.  Submit refill requests through [employer] (mytaxi) or call your pharmacy and they will forward the refill request to us. Please allow 3 business days for your refill to be completed.          Additional Information About Your Visit        MyChart Information     [employer] (mytaxi) gives you secure access to your electronic health record. If you see a primary care provider, you can also send messages to your care team and make appointments. If you have questions, please call your primary care clinic.  If you do not have a primary care provider, please call [phone] and they will assist you.        Care EveryWhere ID     This is your Care EveryWhere ID. This could be used by other organizations to access your [facility] medical records  [id number]         Blood Pressure from Last 3 Encounters:   03/26/18 125/57   02/12/18 130/62   01/06/18 134/76    Weight from Last 3 Encounters:   03/26/18 80.3 kg (177 lb)   08/24/17 80.3 kg (177 lb)   06/20/17 80.1 kg (176 lb 9.6 oz)              We Performed the Following     LUIS CERT REPORT     LUIS Inital Eval Report     PT Eval, Low Complexity ([id number])     Therapeutic Exercises        Primary Care Provider Office Phone # Fax #    Winston Silverman PA-C [phone]       [address] [place] [address]        Equal Access to Services     RENAY MCCRAY : Hadii aad ku  figueroa Sims, wacameliada lutiffanieadaha, qaybta kakellyda steff, katy lenoin hayaan julirita agnieszkachin laRicardofran álvaro. So [facility] [phone].    ATENCIÓN: Si jollyla daniel, tiene a perez disposición servicios gratuitos de asistencia lingüística. [place] al [phone].    We comply with applicable federal civil rights laws and [place] laws. We do not discriminate on the basis of race, color, national origin, age, disability, sex, sexual orientation, or gender identity.            Thank you!     Thank you for choosing [facility]  for your care. Our goal is always to provide you with excellent care. Hearing back from our patients is one way we can continue to improve our services. Please take a few minutes to complete the written survey that you may receive in the mail after your visit with us. Thank you!             Your Updated Medication List - Protect others around you: Learn how to safely use, store and throw away your medicines at www.disposemymeds.org.          This list is accurate as of 3/27/18  3:23 PM.  Always use your most recent med list.                   Brand Name Dispense Instructions for use Diagnosis    acetaminophen 325 MG tablet    TYLENOL    2 tablet    Take 2 tablets (650 mg) by mouth once for 1 dose    Febrile illness, Influenza A       aspirin 325 MG tablet      Take 325 mg by mouth daily        cyanocobalamin 1000 MCG tablet    vitamin  B-12     Take 1 tablet by mouth daily        hydrochlorothiazide 25 MG tablet    HYDRODIURIL    45 tablet    Take 0.5 tablets (12.5 mg) by mouth daily    Benign essential hypertension       lisinopril 5 MG tablet    PRINIVIL/ZESTRIL    90 tablet    TAKE 1 TABLET DAILY (DUE FOR APPOINTMENT FOR FURTHER REFILLS)    Benign essential hypertension       omeprazole 20 MG CR capsule    priLOSEC    45 capsule    TAKE 1 CAPSULE DAILY CONCOMITANT TO PREDNISONE USE    Gastroesophageal reflux disease without esophagitis       PRESERVISION/LUTEIN PO      Take  60 mg by mouth daily two tablets daily        pyridostigmine 60 MG tablet    MESTINON    360 tablet    Take 1 tablet (60 mg) by mouth 4 times daily    Myasthenia gravis without exacerbation (H)       simvastatin 20 MG tablet    ZOCOR    90 tablet    TAKE 1 TABLET AT BEDTIME    Hyperlipidemia LDL goal <130       vitamin D 2000 UNITS tablet      Take 2,000 Units by mouth daily    Vitamin D deficiency

## 2018-03-28 DIAGNOSIS — K21.9 GASTROESOPHAGEAL REFLUX DISEASE WITHOUT ESOPHAGITIS: ICD-10-CM

## 2018-03-28 NOTE — TELEPHONE ENCOUNTER
"Requested Prescriptions   Pending Prescriptions Disp Refills     omeprazole (PRILOSEC) 20 MG CR capsule  Last Written Prescription Date:  ?  Last Fill Quantity: 90,  # refills: 0  Last office visit: 3/26/2018 with prescribing provider:  3/26/18 KIMBERLI Silverman   Future Office Visit:   Next 5 appointments (look out 90 days)     Jun 01, 2018  1:30 PM CDT   Return Visit with Susan Eller MD   [facility])    [address] [place] [id number]   [phone]            Jun 26, 2018  2:30 PM CDT   Return Visit with Brooklyn Treviño MD   [facility])    [address] [place] [id number] [phone]                45 capsule 0    PPI Protocol Passed    3/28/2018  1:22 PM       Passed - Not on Clopidogrel (unless Pantoprazole ordered)       Passed - No diagnosis of osteoporosis on record       Passed - Recent (12 mo) or future (30 days) visit within the authorizing provider's specialty    Patient had office visit in the last 12 months or has a visit in the next 30 days with authorizing provider or within the authorizing provider's specialty.  See \"Patient Info\" tab in inbasket, or \"Choose Columns\" in Meds & Orders section of the refill encounter.           Passed - Patient is age 18 or older        "

## 2018-03-29 ENCOUNTER — TELEPHONE (OUTPATIENT)
Dept: FAMILY MEDICINE | Facility: CLINIC | Age: 78
End: 2018-03-29

## 2018-04-13 DIAGNOSIS — K21.9 GASTROESOPHAGEAL REFLUX DISEASE WITHOUT ESOPHAGITIS: ICD-10-CM

## 2018-04-13 NOTE — TELEPHONE ENCOUNTER
"Requested Prescriptions   Pending Prescriptions Disp Refills     omeprazole (PRILOSEC) 20 MG CR capsule  Last Written Prescription Date:  3/2018  Last Fill Quantity: 90,  # refills: 0   Last office visit: 3/26/2018 with prescribing provider:  3/26/18 KIMBERLI RHOADES   Future Office Visit:   Next 5 appointments (look out 90 days)     Jun 01, 2018  1:30 PM CDT   Return Visit with Susan Eller MD   [facility])    [address] [place] [address]   [phone]            Jun 26, 2018  2:30 PM CDT   Return Visit with Brooklyn Treviño MD   [facility])    [address] [place] [id number] [phone]                45 capsule 0    PPI Protocol Passed    4/13/2018 12:16 PM       Passed - Not on Clopidogrel (unless Pantoprazole ordered)       Passed - No diagnosis of osteoporosis on record       Passed - Recent (12 mo) or future (30 days) visit within the authorizing provider's specialty    Patient had office visit in the last 12 months or has a visit in the next 30 days with authorizing provider or within the authorizing provider's specialty.  See \"Patient Info\" tab in inbasket, or \"Choose Columns\" in Meds & Orders section of the refill encounter.           Passed - Patient is age 18 or older        "

## 2018-04-13 NOTE — TELEPHONE ENCOUNTER
Routing refill request to provider for review/approval because:  This is mail order can this be for 90 tabs?  Pended for completion and approval.  Ava Krueger RN

## 2018-04-18 ENCOUNTER — THERAPY VISIT (OUTPATIENT)
Dept: PHYSICAL THERAPY | Facility: CLINIC | Age: 78
End: 2018-04-18
Payer: MEDICARE

## 2018-04-18 DIAGNOSIS — M54.2 CERVICAL PAIN: ICD-10-CM

## 2018-04-18 DIAGNOSIS — M54.12 CERVICAL RADICULOPATHY: ICD-10-CM

## 2018-04-18 PROCEDURE — 97110 THERAPEUTIC EXERCISES: CPT | Mod: GP | Performed by: PHYSICAL THERAPIST

## 2018-04-18 PROCEDURE — G8982 BODY POS GOAL STATUS: HCPCS | Mod: GP | Performed by: PHYSICAL THERAPIST

## 2018-04-18 PROCEDURE — G8983 BODY POS D/C STATUS: HCPCS | Mod: GP | Performed by: PHYSICAL THERAPIST

## 2018-04-18 PROCEDURE — 97112 NEUROMUSCULAR REEDUCATION: CPT | Mod: GP | Performed by: PHYSICAL THERAPIST

## 2018-04-18 NOTE — MR AVS SNAPSHOT
After Visit Summary   4/18/2018    Gustavo Ramon    MRN: [id number]           Patient Information     Date Of Birth          1940        Visit Information        Provider Department      4/18/2018 2:00 PM Amanda Rosenberg PT [facility] [place] [facility]        Today's Diagnoses     Cervical radiculopathy        Cervical pain           Follow-ups after your visit        Your next 10 appointments already scheduled     May 25, 2018  9:40 AM CDT   LAB with BE LAB   [facility] [place] ([facility])    [address]  [place] [address]   [phone]           Please do not eat 10-12 hours before your appointment if you are coming in fasting for labs on lipids, cholesterol, or glucose (sugar). This does not apply to pregnant women. Water, hot tea and black coffee (with nothing added) are okay. Do not drink other fluids, diet soda or chew gum.            Jun 01, 2018  1:30 PM CDT   Return Visit with Susan Eller MD   [facility])    [address] [place] [address]   [phone]            Jun 26, 2018  2:30 PM CDT   Return Visit with Brooklyn Treviño MD   [facility])    [address] [place] [address]   [phone]            Feb 05, 2019  8:30 AM CST   LAB with BE LAB   [facility] ([facility] [place])    [address]  [place] [address]   [phone]           Please do not eat 10-12 hours before your appointment if you are coming in fasting for labs on lipids, cholesterol, or glucose (sugar). This does not apply to pregnant women. Water, hot tea and black coffee (with nothing added) are okay. Do not drink other fluids, diet soda or chew gum.            Feb 12, 2019  8:00 AM CST   Return Visit with Glenn Jones MD   [facility] ([facility])    [address] [place] [address]  [place] [address] [phone]              Who to contact     If you have questions or need follow up information about today's clinic visit or your schedule please contact [facility] directly at [phone].  Normal or non-critical lab and imaging results will be communicated to you by MyChart, letter or phone within 4 business days after the clinic has received the results. If you do not hear from us within 7 days, please contact the clinic through [employer] or phone. If you have a critical or abnormal lab result, we will notify you by phone as soon as possible.  Submit refill requests through [employer] or call your pharmacy and they will forward the refill request to us. Please allow 3 business days for your refill to be completed.          Additional Information About Your Visit        [employer] Information     [employer] gives you secure access to your electronic health record. If you see a primary care provider, you can also send messages to your care team and make appointments. If you have questions, please call your primary care clinic.  If you do not have a primary care provider, please call [phone] and they will assist you.        Care EveryWhere ID     This is your Care EveryWhere ID. This could be used by other organizations to access your [facility] [place] medical records  [id number]         Blood Pressure from Last 3 Encounters:   03/26/18 125/57   02/12/18 130/62   01/06/18 134/76    Weight from Last 3 Encounters:   03/26/18 80.3 kg (177 lb)   08/24/17 80.3 kg (177 lb)   06/20/17 80.1 kg (176 lb 9.6 oz)              We Performed the Following     LUIS Progress Notes Report     Neuromuscular Re-Education     Therapeutic Exercises        Primary Care Provider Office Phone # Fax #    Winston Silverman PA-C [phone]       [address] [place] [address]        Equal Access to Services     RENAY MCCRAY : vee Garcia,  silver richardson julikaty finch lenoin hayaan adeeg kharash la'aan ah. So [facility] [phone].    ATENCIÓN: Si desean sanchez, tiene a perez disposición servicios gratuitos de asistencia lingüística. [place] al [phone].    We comply with applicable federal civil rights laws and [place] laws. We do not discriminate on the basis of race, color, national origin, age, disability, sex, sexual orientation, or gender identity.            Thank you!     Thank you for choosing [facility]  for your care. Our goal is always to provide you with excellent care. Hearing back from our patients is one way we can continue to improve our services. Please take a few minutes to complete the written survey that you may receive in the mail after your visit with us. Thank you!             Your Updated Medication List - Protect others around you: Learn how to safely use, store and throw away your medicines at www.disposemymeds.org.          This list is accurate as of 4/18/18  3:45 PM.  Always use your most recent med list.                   Brand Name Dispense Instructions for use Diagnosis    acetaminophen 325 MG tablet    TYLENOL    2 tablet    Take 2 tablets (650 mg) by mouth once for 1 dose    Febrile illness, Influenza A       aspirin 325 MG tablet      Take 325 mg by mouth daily        cyanocobalamin 1000 MCG tablet    vitamin  B-12     Take 1 tablet by mouth daily        hydrochlorothiazide 25 MG tablet    HYDRODIURIL    45 tablet    Take 0.5 tablets (12.5 mg) by mouth daily    Benign essential hypertension       lisinopril 5 MG tablet    PRINIVIL/ZESTRIL    90 tablet    TAKE 1 TABLET DAILY (DUE FOR APPOINTMENT FOR FURTHER REFILLS)    Benign essential hypertension       omeprazole 20 MG CR capsule    priLOSEC    90 capsule    TAKE 1 CAPSULE DAILY CONCOMITANT TO PREDNISONE USE    Gastroesophageal reflux disease without esophagitis       PRESERVISION/LUTEIN PO      Take 60 mg by mouth daily two tablets  daily        pyridostigmine 60 MG tablet    MESTINON    360 tablet    Take 1 tablet (60 mg) by mouth 4 times daily    Myasthenia gravis without exacerbation (H)       simvastatin 20 MG tablet    ZOCOR    90 tablet    TAKE 1 TABLET AT BEDTIME    Hyperlipidemia LDL goal <130       vitamin D 2000 units tablet      Take 2,000 Units by mouth daily    Vitamin D deficiency

## 2018-04-18 NOTE — PROGRESS NOTES
Subjective:  HPI                    Objective:  System    Physical Exam    General     ROS    Assessment/Plan:    DISCHARGE REPORT    Progress reporting period is from 3-27-18 to 4-18-18, 2 visits.       SUBJECTIVE  Subjective changes noted by patient:  .  Subjective: Spoke with pt by phone 2 weeks ago and was doing better. In for f/u today. States he is having much less tingling, may feel in evening(minimal intensity) to forearm.  Sitting is much improved, he has really been working on his posture. States he feels ready to continue on his own with his HEP and will call if he has any setbacks.      Current Pain level: 1/10.      Initial Pain level: 5/10 (intermittent).   Changes in function:  Yes (See Goal flowsheet attached for changes in current functional level)  Adverse reaction to treatment or activity: None    OBJECTIVE  Changes noted in objective findings:  Yes,   Objective: Posture improved though forward head still present. CAROM flx 100%, ext 25%, R rot 25%, L rot 50%, SB R 25%, L 10%  U/e strength 5/5.  Progressed to deep neck flexor strength and cerv ret with OP and tolerated both well.      ASSESSMENT/PLAN  Updated problem list and treatment plan: Diagnosis 1:  Cervical radiculopathy-continue HEP    STG/LTGs have been met or progress has been made towards goals:  Yes (See Goal flow sheet completed today.)  Assessment of Progress: The patient's condition is improving.  Self Management Plans:  Patient is independent in a home treatment program.  Patient is independent in self management of symptoms.  I have re-evaluated this patient and find that the nature, scope, duration and intensity of the therapy is appropriate for the medical condition of the patient.  Gustavo continues to require the following intervention to meet STG and LTG's:  PT intervention is no longer required to meet STG/LTG.    Recommendations:  This patient is ready to be discharged from therapy and continue their home treatment  program.  Stephen will call if he has any further issues otherwise will continue with planned HEP    Please refer to the daily flowsheet for treatment today, total treatment time and time spent performing 1:1 timed codes.

## 2018-05-25 DIAGNOSIS — D47.2 IGA MONOCLONAL GAMMOPATHY: ICD-10-CM

## 2018-05-25 LAB
ALBUMIN SERPL-MCNC: 4 G/DL (ref 3.4–5)
ALP SERPL-CCNC: 74 U/L (ref 40–150)
ALT SERPL W P-5'-P-CCNC: 20 U/L (ref 0–70)
ANION GAP SERPL CALCULATED.3IONS-SCNC: 8 MMOL/L (ref 3–14)
AST SERPL W P-5'-P-CCNC: 18 U/L (ref 0–45)
BASOPHILS # BLD AUTO: 0 10E9/L (ref 0–0.2)
BASOPHILS NFR BLD AUTO: 0.5 %
BILIRUB SERPL-MCNC: 0.6 MG/DL (ref 0.2–1.3)
BUN SERPL-MCNC: 21 MG/DL (ref 7–30)
CALCIUM SERPL-MCNC: 9.3 MG/DL (ref 8.5–10.1)
CHLORIDE SERPL-SCNC: 105 MMOL/L (ref 94–109)
CO2 SERPL-SCNC: 31 MMOL/L (ref 20–32)
CREAT SERPL-MCNC: 1.13 MG/DL (ref 0.66–1.25)
DIFFERENTIAL METHOD BLD: NORMAL
EOSINOPHIL # BLD AUTO: 0.2 10E9/L (ref 0–0.7)
EOSINOPHIL NFR BLD AUTO: 3.3 %
ERYTHROCYTE [DISTWIDTH] IN BLOOD BY AUTOMATED COUNT: 13.8 % (ref 10–15)
GFR SERPL CREATININE-BSD FRML MDRD: 62 ML/MIN/1.7M2
GLUCOSE SERPL-MCNC: 97 MG/DL (ref 70–99)
HCT VFR BLD AUTO: 45.6 % (ref 40–53)
HGB BLD-MCNC: 15 G/DL (ref 13.3–17.7)
LYMPHOCYTES # BLD AUTO: 1.2 10E9/L (ref 0.8–5.3)
LYMPHOCYTES NFR BLD AUTO: 16.2 %
MCH RBC QN AUTO: 29.4 PG (ref 26.5–33)
MCHC RBC AUTO-ENTMCNC: 32.9 G/DL (ref 31.5–36.5)
MCV RBC AUTO: 89 FL (ref 78–100)
MONOCYTES # BLD AUTO: 0.8 10E9/L (ref 0–1.3)
MONOCYTES NFR BLD AUTO: 11 %
NEUTROPHILS # BLD AUTO: 5 10E9/L (ref 1.6–8.3)
NEUTROPHILS NFR BLD AUTO: 69 %
PLATELET # BLD AUTO: 187 10E9/L (ref 150–450)
POTASSIUM SERPL-SCNC: 4.3 MMOL/L (ref 3.4–5.3)
PROT SERPL-MCNC: 7.8 G/DL (ref 6.8–8.8)
RBC # BLD AUTO: 5.11 10E12/L (ref 4.4–5.9)
SODIUM SERPL-SCNC: 144 MMOL/L (ref 133–144)
WBC # BLD AUTO: 7.3 10E9/L (ref 4–11)

## 2018-05-25 PROCEDURE — 84166 PROTEIN E-PHORESIS/URINE/CSF: CPT | Performed by: INTERNAL MEDICINE

## 2018-05-25 PROCEDURE — 84165 PROTEIN E-PHORESIS SERUM: CPT | Performed by: INTERNAL MEDICINE

## 2018-05-25 PROCEDURE — 85025 COMPLETE CBC W/AUTO DIFF WBC: CPT | Performed by: INTERNAL MEDICINE

## 2018-05-25 PROCEDURE — 00000402 ZZHCL STATISTIC TOTAL PROTEIN: Performed by: INTERNAL MEDICINE

## 2018-05-25 PROCEDURE — 82784 ASSAY IGA/IGD/IGG/IGM EACH: CPT | Performed by: INTERNAL MEDICINE

## 2018-05-25 PROCEDURE — 83883 ASSAY NEPHELOMETRY NOT SPEC: CPT | Performed by: INTERNAL MEDICINE

## 2018-05-25 PROCEDURE — 80053 COMPREHEN METABOLIC PANEL: CPT | Performed by: INTERNAL MEDICINE

## 2018-05-25 PROCEDURE — 36415 COLL VENOUS BLD VENIPUNCTURE: CPT | Performed by: INTERNAL MEDICINE

## 2018-05-25 PROCEDURE — 86335 IMMUNFIX E-PHORSIS/URINE/CSF: CPT | Performed by: INTERNAL MEDICINE

## 2018-05-29 LAB
ALBUMIN SERPL ELPH-MCNC: 4.5 G/DL (ref 3.7–5.1)
ALPHA1 GLOB SERPL ELPH-MCNC: 0.3 G/DL (ref 0.2–0.4)
ALPHA2 GLOB SERPL ELPH-MCNC: 0.7 G/DL (ref 0.5–0.9)
B-GLOBULIN SERPL ELPH-MCNC: 0.8 G/DL (ref 0.6–1)
GAMMA GLOB SERPL ELPH-MCNC: 1.4 G/DL (ref 0.7–1.6)
IGA SERPL-MCNC: 647 MG/DL (ref 70–380)
IGG SERPL-MCNC: 980 MG/DL (ref 695–1620)
IGM SERPL-MCNC: 29 MG/DL (ref 60–265)
KAPPA LC UR-MCNC: 1.93 MG/DL (ref 0.33–1.94)
KAPPA LC/LAMBDA SER: 1.41 {RATIO} (ref 0.26–1.65)
LAMBDA LC SERPL-MCNC: 1.37 MG/DL (ref 0.57–2.63)
M PROTEIN SERPL ELPH-MCNC: 0.4 G/DL
PROT ELPH PNL UR ELPH: NORMAL
PROT PATTERN SERPL ELPH-IMP: ABNORMAL
PROT PATTERN UR ELPH-IMP: NORMAL

## 2018-06-01 DIAGNOSIS — E78.5 HYPERLIPIDEMIA LDL GOAL <130: ICD-10-CM

## 2018-06-01 RX ORDER — SIMVASTATIN 20 MG
TABLET ORAL
Qty: 90 TABLET | Refills: 0 | Status: SHIPPED | OUTPATIENT
Start: 2018-06-01 | End: 2018-08-30

## 2018-06-01 NOTE — TELEPHONE ENCOUNTER
"Requested Prescriptions   Pending Prescriptions Disp Refills     simvastatin (ZOCOR) 20 MG tablet [Pharmacy Med Name: SIMVASTATIN TABS 20MG] 90 tablet 1    Last Written Prescription Date:  1/29/18  Last Fill Quantity: 90,  # refills: 1   Last office visit: 3/26/2018 with prescribing provider:  3/26/18 KIMBERLI Silverman   Future Office Visit:   Next 5 appointments (look out 90 days)     Jun 14, 2018  1:00 PM CDT   Return Visit with Susan Eller MD   [facility])    [address] [place] [id number] [phone]            Jun 26, 2018  2:30 PM CDT   Return Visit with Brooklyn Treviño MD   [facility])    [address] [place] [address] [phone]                Sig: TAKE 1 TABLET AT BEDTIME    Statins Protocol Passed    6/1/2018  2:14 AM       Passed - LDL on file in past 12 months    Recent Labs   Lab Test  08/25/17   0748   LDL  70            Passed - No abnormal creatine kinase in past 12 months    Recent Labs   Lab Test  08/10/15   0751   CKT  115               Passed - Recent (12 mo) or future (30 days) visit within the authorizing provider's specialty    Patient had office visit in the last 12 months or has a visit in the next 30 days with authorizing provider or within the authorizing provider's specialty.  See \"Patient Info\" tab in inbasket, or \"Choose Columns\" in Meds & Orders section of the refill encounter.           Passed - Patient is age 18 or older        "

## 2018-06-14 ENCOUNTER — ONCOLOGY VISIT (OUTPATIENT)
Dept: ONCOLOGY | Facility: CLINIC | Age: 78
End: 2018-06-14
Payer: MEDICARE

## 2018-06-14 VITALS
SYSTOLIC BLOOD PRESSURE: 132 MMHG | TEMPERATURE: 97.7 F | WEIGHT: 170 LBS | RESPIRATION RATE: 15 BRPM | OXYGEN SATURATION: 98 % | DIASTOLIC BLOOD PRESSURE: 76 MMHG | BODY MASS INDEX: 25.76 KG/M2 | HEART RATE: 65 BPM | HEIGHT: 68 IN

## 2018-06-14 DIAGNOSIS — D47.2 MGUS (MONOCLONAL GAMMOPATHY OF UNKNOWN SIGNIFICANCE): Primary | ICD-10-CM

## 2018-06-14 PROCEDURE — 99214 OFFICE O/P EST MOD 30 MIN: CPT | Performed by: INTERNAL MEDICINE

## 2018-06-14 RX ORDER — PREDNISONE 5 MG/1
5 TABLET ORAL EVERY OTHER DAY
COMMUNITY
Start: 2018-06-14 | End: 2018-11-01

## 2018-06-14 ASSESSMENT — PAIN SCALES - GENERAL: PAINLEVEL: NO PAIN (0)

## 2018-06-14 NOTE — MR AVS SNAPSHOT
After Visit Summary   6/14/2018    Gustavo Ramon    MRN: [id number]           Patient Information     Date Of Birth          1940        Visit Information        Provider Department      6/14/2018 1:00 PM Susan Eller MD [facility]        Today's Diagnoses     MGUS (monoclonal gammopathy of unknown significance)    -  1       Follow-ups after your visit        Your next 10 appointments already scheduled     Jun 26, 2018  2:30 PM CDT   Return Visit with Brooklyn Treviño MD   [facility]    [address] [place] [address]   [phone]            Feb 05, 2019  8:30 AM CST   LAB with BE LAB   [facility])    [address] [place] [id number] [phone]           Please do not eat 10-12 hours before your appointment if you are coming in fasting for labs on lipids, cholesterol, or glucose (sugar). This does not apply to pregnant women. Water, hot tea and black coffee (with nothing added) are okay. Do not drink other fluids, diet soda or chew gum.            Feb 12, 2019  8:00 AM CST   Return Visit with Glenn Jones MD   [facility]    [address] [place] [address]   [phone]              Future tests that were ordered for you today     Open Future Orders        Priority Expected Expires Ordered    Immunoglobulins A G and M Routine  6/14/2019 6/14/2018    CBC with platelets differential Routine  6/14/2019 6/14/2018    Comprehensive metabolic panel Routine  6/14/2019 6/14/2018    Protein electrophoresis Routine  6/14/2019 6/14/2018    Petronila and lambda light chain Routine  6/14/2019 6/14/2018    Protein immunofixation urine Routine  6/14/2019 6/14/2018    Protein electrophoresis random urine Routine  6/14/2019 6/14/2018            Who to contact     If you have questions or need follow up  "information about today's clinic visit or your schedule please contact [facility] directly at [phone].  Normal or non-critical lab and imaging results will be communicated to you by [employer], letter or phone within 4 business days after the clinic has received the results. If you do not hear from us within 7 days, please contact the clinic through [employer] or phone. If you have a critical or abnormal lab result, we will notify you by phone as soon as possible.  Submit refill requests through [employer] or call your pharmacy and they will forward the refill request to us. Please allow 3 business days for your refill to be completed.          Additional Information About Your Visit        [employer] Information     [employer] gives you secure access to your electronic health record. If you see a primary care provider, you can also send messages to your care team and make appointments. If you have questions, please call your primary care clinic.  If you do not have a primary care provider, please call [phone] and they will assist you.      [employer] is an electronic gateway that provides easy, online access to your medical records. With [employer], you can request a clinic appointment, read your test results, renew a prescription or communicate with your care team.     To access your existing account, please contact your [facility] or call [phone] for assistance.        Care EveryWhere ID     This is your Care EveryWhere ID. This could be used by other organizations to access your [facility] medical records  [id number]        Your Vitals Were     Pulse Temperature Respirations Height Pulse Oximetry BMI (Body Mass Index)    65 97.7  F (36.5  C) 15 1.721 m (5' 7.76\") 98% 26.03 kg/m2       Blood Pressure from Last 3 Encounters:   06/14/18 132/76   03/26/18 125/57   02/12/18 130/62    Weight from Last 3 Encounters:   06/14/18 77.1 kg (170 lb)   03/26/18 80.3 kg (177 lb) "   08/24/17 80.3 kg (177 lb)               Primary Care Provider Office Phone # Fax #    Winston Cuca Silverman PA-C [phone]       [address] [place] [address]        Equal Access to Services     [facility] MAHENDRA : Hadii aad ku hadasho Soomaali, waaxda luqadaha, qaybta kaalmada adeegyada, waxay idiin hayaan adeeg jay latanishajose luis . So [facility] [phone].    ATENCIÓN: Si habla español, tiene a perez disposición servicios gratuitos de asistencia lingüística. Llame al [phone].    We comply with applicable federal civil rights laws and [place] laws. We do not discriminate on the basis of race, color, national origin, age, disability, sex, sexual orientation, or gender identity.            Thank you!     Thank you for choosing [facility]  for your care. Our goal is always to provide you with excellent care. Hearing back from our patients is one way we can continue to improve our services. Please take a few minutes to complete the written survey that you may receive in the mail after your visit with us. Thank you!             Your Updated Medication List - Protect others around you: Learn how to safely use, store and throw away your medicines at www.disposemymeds.org.          This list is accurate as of 6/14/18  1:26 PM.  Always use your most recent med list.                   Brand Name Dispense Instructions for use Diagnosis    acetaminophen 325 MG tablet    TYLENOL    2 tablet    Take 2 tablets (650 mg) by mouth once for 1 dose    Febrile illness, Influenza A       aspirin 325 MG tablet      Take 325 mg by mouth daily        cyanocobalamin 1000 MCG tablet    vitamin  B-12     Take 1 tablet by mouth daily        hydrochlorothiazide 25 MG tablet    HYDRODIURIL    45 tablet    Take 0.5 tablets (12.5 mg) by mouth daily    Benign essential hypertension       lisinopril 5 MG tablet    PRINIVIL/ZESTRIL    90 tablet    TAKE 1 TABLET DAILY (DUE FOR APPOINTMENT FOR FURTHER REFILLS)    Benign  essential hypertension       omeprazole 20 MG CR capsule    priLOSEC    90 capsule    TAKE 1 CAPSULE DAILY CONCOMITANT TO PREDNISONE USE    Gastroesophageal reflux disease without esophagitis       predniSONE 5 MG tablet    DELTASONE     Take 1 tablet (5 mg) by mouth every other day        PRESERVISION/LUTEIN PO      Take 60 mg by mouth daily two tablets daily        pyridostigmine 60 MG tablet    MESTINON    360 tablet    Take 1 tablet (60 mg) by mouth 4 times daily    Myasthenia gravis without exacerbation (H)       simvastatin 20 MG tablet    ZOCOR    90 tablet    TAKE 1 TABLET AT BEDTIME    Hyperlipidemia LDL goal <130       vitamin D 2000 units tablet      Take 2,000 Units by mouth daily    Vitamin D deficiency

## 2018-06-14 NOTE — NURSING NOTE
"Oncology Rooming Note    June 14, 2018 1:05 PM   Gustavo Ramon is a 77 year old male who presents for:    Chief Complaint   Patient presents with     Oncology Clinic Visit     1 year follow up      Initial Vitals: /76  Pulse 65  Temp 97.7  F (36.5  C)  Resp 15  Ht 1.721 m (5' 7.76\")  Wt 77.1 kg (170 lb)  SpO2 98%  BMI 26.03 kg/m2 Estimated body mass index is 26.03 kg/(m^2) as calculated from the following:    Height as of this encounter: 1.721 m (5' 7.76\").    Weight as of this encounter: 77.1 kg (170 lb). Body surface area is 1.92 meters squared.  No Pain (0) Comment: Data Unavailable   No LMP for male patient.  Allergies reviewed: Yes  Medications reviewed: Yes    Medications: Medication refills not needed today.  Pharmacy name entered into EPIC:    [facility] - EPRESCRIBE ONLY  [facility] [place] [address] [facility] [place]  [facility] [address]  [facility] - [address]  [facility] - [address] DR[facility] STORE [address] [place] [address] [place]  [address] [facility] [address]      5 minutes for nursing intake (face to face time)     Josie Doherty LPN              "

## 2018-06-14 NOTE — LETTER
6/14/2018         RE: Gustavo Ramon  [address] [facility]  [place] [address]        Dear Colleague,    Thank you for referring your patient, Gustavo Ramon, to the [facility]. Please see a copy of my visit note below.    Hematology Follow-up visit:  Date on this visit: Jun 14, 2018        Referring physician:  Dr.Philip Yunior Wright   Primary Care Physician: DENISSE Jewell  Urologist: Dr. Jones  Neurologist: Dr. Law  Neurologist at [facility]: Dr. Conchis Restrepo.    Diagnosis:   1. Monoclonal gammopathy of unknown significance (MGUS), IgA kappa.   Apparently, he had M spike discovered incidentally in 2009 during evaluation of myasthenia gravis at [facility] in [place]. He has not had a bone marrow biopsy. Serum protein immunofixation electrophoresis showed the presence of  monoclonal IgA immunoglobulin of kappa light chain type as well as small monoclonal free immunoglobulin light chain of kappa chain type.  IgA level was elevated at 620 on 6/7/2011 and urine protein electrophoresis and urine immunofixation electrophoresis showed no M protein. IgA level has remained unchanged and M spike has been stable. He has been followed by Dr. Wright and in 04/2015 transferred his care to [place].    2. Ocular MG    3. Hx of NMSC- -superficial BCC, right posterior shoulder and left posterior shoulder 11/2015- s/p aldara  -BCC, R elbow s/p ED and C 1/2016  -BCC, left forearm s/p excision 1/2016  -BCC, nodular and sclerosing, right side of nose per pathology ,(right medial cheek per Dr. Christiansen's note 11/21/2011), s/p excision 5/12/2009 Dr. Bansal    4. Prostate Cancer -  He has a history of prostate cancer (Dianna 8) and was treated with cryoablation surgery in 11/2013.  He is followed by Dr. Jones.      History Of Present Illness:  Mr. Ramon is a 76 year old male who presents for f/up of IgA kappa MGUS. He has been feeling very well.   He has ocular myasthenia gravis  which is controlled with pyridostigmine and low dose prednisone (5 mg PO QOD). He takes Omeprazole on the days that he takes PO prednisone.He follows up with Dr. Law and was last seen by him in June 2017. He was seen by Dr. Jones in February of 2018.    He was recommended to have PSA rechecked in 12 months.   His serum IGA level and M spike has remained stable.  urine protein electrophoresis/immunofixation showed no M spike.   RResults for DAISHA LOZOYA (MRN [id number]) as of 6/14/2018 13:10   Ref. Range 4/21/2015 14:48 8/10/2015 07:51 5/25/2016 09:54 5/27/2016 05:26 5/25/2017 07:59 5/25/2017 08:14 5/25/2018 09:39   IGA Latest Ref Range: 70 - 380 mg/dL 685 (H) 627 (H) 627 (H)  572 (H)  647 (H)   IGG Latest Ref Range: 695 - 1620 mg/dL 1050 924 795  867  980   IGM Latest Ref Range: 60 - 265 mg/dL 35 (L) 26 (L) 25 (L)  27 (L)  29 (L)   Immunofix ELP Urine Unknown    No monoclonal pro...  No monoclonal pro... No monoclonal pro...   Immunofixation ELP Unknown  (Note)... (Note)...       Kappa Free Lt Chain Latest Ref Range: 0.33 - 1.94 mg/dL 1.49  1.87  2.02 (H)  1.93   Kappa Lambda Ratio Latest Ref Range: 0.26 - 1.65  1.04  1.63  1.33  1.41   Lambda Free Lt Chain Latest Ref Range: 0.57 - 2.63 mg/dL 1.43  1.15  1.52  1.37   Monoclonal Peak Latest Ref Range: 0.0 g/dL 0.5 (H)  0.5 (H)  0.4 (H)  0.4 (H)      He also has a history of basal cell carcinoma of the skin of the right nose, and has had other multiple BCCs. He was last seen by Dr. Victor in 12/2017.  He is a retired . He denies any bone pains, unexplained weight loss, night sweats or chills. He is staying active. He printed out his lab results with graphs and had multiple questions today all of which were answered.  In addition, a complete 12 point  review of systems is negative.      Past Medical/Surgical History:  Past Medical History:   Diagnosis Date     Actinic keratosis      AK (actinic keratosis) 11/15/2012     Amaurosis fugax       Basal cell carcinoma 2009    R medial cheek/nose     Central serous retinopathy left    Eye     Diverticulosis 08/2006     DJD (degenerative joint disease)      GERD (gastroesophageal reflux disease)      Hearing loss     High frequency     HTN (hypertension)      Hyperlipidemia LDL goal < 130      Hyperplastic colon polyp 08/2006     IgA monoclonal gammopathy     IgA kappa     Lattice degeneration of retina right    Eye     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    [facility] consult     Rosacea      Vitreous detachment left    Eye     Past Surgical History:   Procedure Laterality Date     ARTHROSCOPY KNEE RT/LT Left Jan 2010    Knee     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     DISKECTOMY, LUMBAR, SINGLE SP  2003    L2-3     ENT SURGERY  1943    Tonsils      ENT SURGERY  2-22-12    Biopsy lesion right pinna     ENT SURGERY  2-24-12    Biopsy leson right pinna     SOFT TISSUE SURGERY  May 2010    Basal Cell/right side nose     Allergies:  Allergies as of 06/14/2018 - Loi as Reviewed 06/14/2018   Allergen Reaction Noted     Nkda [no known drug allergies]  12/16/2009     Current Medications:  Current Outpatient Prescriptions   Medication Sig Dispense Refill     acetaminophen (TYLENOL) 325 MG tablet Take 2 tablets (650 mg) by mouth once for 1 dose 2 tablet 0     aspirin 325 MG tablet Take 325 mg by mouth daily       Cholecalciferol (VITAMIN D) 2000 UNITS tablet Take 2,000 Units by mouth daily       cyanocolbalamin (VITAMIN  B-12) 1000 MCG tablet Take 1 tablet by mouth daily       hydrochlorothiazide (HYDRODIURIL) 25 MG tablet Take 0.5 tablets (12.5 mg) by mouth daily 45 tablet 1     lisinopril (PRINIVIL/ZESTRIL) 5 MG tablet TAKE 1 TABLET DAILY (DUE FOR APPOINTMENT FOR FURTHER REFILLS) 90 tablet 1     Multiple Vitamins-Minerals (PRESERVISION/LUTEIN PO) Take 60 mg by mouth daily two tablets daily       omeprazole (PRILOSEC) 20 MG CR capsule TAKE 1 CAPSULE DAILY CONCOMITANT TO PREDNISONE USE 90 capsule 1     pyridostigmine  "(MESTINON) 60 MG tablet Take 1 tablet (60 mg) by mouth 4 times daily 360 tablet 3     simvastatin (ZOCOR) 20 MG tablet TAKE 1 TABLET AT BEDTIME 90 tablet 0      Physical Exam:  /76  Pulse 65  Temp 97.7  F (36.5  C)  Resp 15  Ht 1.721 m (5' 7.76\")  Wt 77.1 kg (170 lb)  SpO2 98%  BMI 26.03 kg/m2        GENERAL APPEARANCE: healthy, alert and no distress       NECK: no adenopathy, no asymmetry or masses      MUSCULOSKELETAL: extremities normal- no gross deformities noted, no evidence of inflammation in joints, FROM in all extremities. No edema b/l LE.     SKIN: no suspicious lesions or rashes     PSYCHIATRIC: mentation appears normal and affect normal    Laboratory/Imaging Studies  Labs reviewed and documented in the EMR.  Lab Results   Component Value Date    WBC 7.3 05/25/2018     Lab Results   Component Value Date    RBC 5.11 05/25/2018     Lab Results   Component Value Date    HGB 15.0 05/25/2018     Lab Results   Component Value Date    HCT 45.6 05/25/2018     No components found for: MCT  Lab Results   Component Value Date    MCV 89 05/25/2018     Lab Results   Component Value Date    MCH 29.4 05/25/2018     Lab Results   Component Value Date    MCHC 32.9 05/25/2018     Lab Results   Component Value Date    RDW 13.8 05/25/2018     Lab Results   Component Value Date     05/25/2018     CMP unremarkable  urine protein electrophoresis no M protein  urine immunofixation electrophoresis No M protein      Results for DAISHA RAMON (MRN [id number]) as of 6/14/2018 13:10   Ref. Range 10/8/2014 13:16 2/9/2015 08:51 8/10/2015 07:49 2/8/2016 07:56 2/6/2017 07:59 2/5/2018 07:54   PSA Latest Ref Range: 0 - 4 ug/L 1.03 1.09 0.77 1.05 1.17 1.21     ASSESSMENT/PLAN:   Mr. Ramon is a very pleasant 77 year man with multiple medical problems, including ocular myasthenia gravis, HTN, Hx of prostate cancer and BCC os the skin of the nose, with IgA kappa monoclonal gammopathy of unknown significance (MGUS). "     1. MGUS - M protein stable and IgA level stable. Serum FLC ratio normal. He has no M protein on urine protein electrophoresis/urine immunofixation electrophoresis.   We'll f/up with MGUS labs in 12-14 months.    2. Hx of prostate cancer- PSA relatively stable in 02/2018. F/up with Dr. Jones in 02/2019, with repeat PSA.    3. Ocular Myasthenia Gravis- f/up with Dr. Law. Cont low dose prednisone/Mestinon.  4. Hx of multiple BCC including the one removed from the skin of right side of the nose- f/up with dermatology later this month (Dr. Treviño)    At the end of our visit patient verbalized understanding and concurred with the plan.    Again, thank you for allowing me to participate in the care of your patient.        Sincerely,        Susan Eller MD, MD

## 2018-06-26 ENCOUNTER — OFFICE VISIT (OUTPATIENT)
Dept: DERMATOLOGY | Facility: CLINIC | Age: 78
End: 2018-06-26
Payer: MEDICARE

## 2018-06-26 DIAGNOSIS — Z85.828 HISTORY OF NONMELANOMA SKIN CANCER: ICD-10-CM

## 2018-06-26 DIAGNOSIS — D48.5 NEOPLASM OF UNCERTAIN BEHAVIOR OF SKIN: ICD-10-CM

## 2018-06-26 DIAGNOSIS — L57.0 AK (ACTINIC KERATOSIS): ICD-10-CM

## 2018-06-26 DIAGNOSIS — L82.1 SEBORRHEIC KERATOSIS: Primary | ICD-10-CM

## 2018-06-26 PROCEDURE — 11100 HC BIOPSY SKIN/SUBQ/MUC MEM, EACH ADDTL LESION: CPT | Mod: 59 | Performed by: DERMATOLOGY

## 2018-06-26 PROCEDURE — 88305 TISSUE EXAM BY PATHOLOGIST: CPT | Mod: TC | Performed by: DERMATOLOGY

## 2018-06-26 PROCEDURE — 11101 HC DESTRUCT PREMALIGNANT LESION, 15 OR MORE: CPT | Performed by: DERMATOLOGY

## 2018-06-26 PROCEDURE — 17004 DESTROY PREMAL LESIONS 15/>: CPT | Performed by: DERMATOLOGY

## 2018-06-26 PROCEDURE — 99213 OFFICE O/P EST LOW 20 MIN: CPT | Mod: 25 | Performed by: DERMATOLOGY

## 2018-06-26 ASSESSMENT — PAIN SCALES - GENERAL: PAINLEVEL: NO PAIN (0)

## 2018-06-26 NOTE — NURSING NOTE
Dermatology Rooming Note    Gustavo Ramon's goals for this visit include:   Chief Complaint   Patient presents with     Skin Check     area of concern left lower eyelid, forehead and right helix hx NMSC       Is a scribe okay for this visit:YES    Are records needed for this visit(If yes, obtain release of information): No     Vitals: There were no vitals taken for this visit.    Referring Provider:  No referring provider defined for this encounter.    Josie Fontaine LPN

## 2018-06-26 NOTE — PATIENT INSTRUCTIONS
CRYOTHERAPY    What is it?    Use of a very cold liquid, such as liquid nitrogen, to freeze and destroy abnormal skin cells that need to be removed    What should I expect?    Tenderness and redness    A small blister that might grow and fill with dark purple blood. There may be crusting.    More than one treatment may be needed if the lesions do not go away.    How do I care for the treated area?    Gently wash the area with your hands when bathing.    Use a thin layer of Vaselin to help with healing. You may use a Band-Aid.     The area should heal within 7-10 days.    Do not use an antibiotic or Neosporin ointment.     You may take acetaminophen (Tylenol) for pain.     Call your Doctor if you have:    Severe pain    Signs of infection (warmth, redness, cloudy yellow drainage, and or a bad smell)    Questions or concerns    Contact infromation:  [facility] [phone]  [facility] [phone]      Wound Care After a Biopsy    What is a skin biopsy?  A skin biopsy allows the doctor to examine a very small piece of tissue under the microscope to determine the diagnosis and the best treatment for the skin condition. A local anesthetic (numbing medicine)  is injected with a very small needle into the skin area to be tested. A small piece of skin is taken from the area. Sometimes a suture (stitch) is used.     What are the risks of a skin biopsy?  I will experience scar, bleeding, swelling, pain, crusting and redness. I may experience incomplete removal or recurrence. Risks of this procedure are excessive bleeding, bruising, infection, nerve damage, numbness, thick (hypertrophic or keloidal) scar and non-diagnostic biopsy.    How should I care for my wound for the first 24 hours?    Keep the wound dry and covered for 24 hours    If it bleeds, hold direct pressure on the area for 15 minutes. If bleeding does not stop then go to the emergency  room    Avoid strenuous exercise the first 1-2 days or as your doctor instructs you    How should I care for the wound after 24 hours?    After 24 hours, remove the bandage    You may bathe or shower as normal    If you had a scalp biopsy, you can shampoo as usual and can use shower water to clean the biopsy site daily    Clean the wound twice a day with gentle soap and water    Do not scrub, be gentle    Apply white petroleum/Vaseline after cleaning the wound with a cotton swab or a clean finger, and keep the site covered with a Bandaid /bandage. Bandages are not necessary with a scalp biopsy    If you are unable to cover the site with a Bandaid /bandage, re-apply ointment 2-3 times a day to keep the site moist. Moisture will help with healing    Avoid strenuous activity for first 1-2 days    Avoid lakes, rivers, pools, and oceans until the stitches are removed or the site is healed    How do I clean my wound?    Wash hands thoroughly with soap or use hand  before all wound care    Clean the wound with gentle soap and water    Apply white petroleum/Vaseline  to wound after it is clean    Replace the Bandaid /bandage to keep the wound covered for the first few days or as instructed by your doctor    If you had a scalp biopsy, warm shower water to the area on a daily basis should suffice    What should I use to clean my wound?     Cotton-tipped applicators (Qtips )    White petroleum jelly (Vaseline ). Use a clean new container and use Q-tips to apply.    Bandaids   as needed    Gentle soap     How should I care for my wound long term?    Do not get your wound dirty    Keep up with wound care for one week or until the area is healed.    A small scab will form and fall off by itself when the area is completely healed. The area will be red and will become pink in color as it heals. Sun protection is very important for how your scar will turn out. Sunscreen with an SPF 30 or greater is recommended once the area  is healed.      You should have some soreness but it should be mild and slowly go away over several days. Talk to your doctor about using tylenol for pain,    When should I call my doctor?  If you have increased:     Pain or swelling    Pus or drainage (clear or slightly yellow drainage is ok)    Temperature over 100F    Spreading redness or warmth around wound    When will I hear about my results?  The biopsy results can take 2-3 weeks to come back. The clinic will call you with the results, send you a [employer] message, or have you schedule a follow-up clinic or phone time to discuss the results. Contact our clinics if you do not hear from us in 3 weeks.     Who should I call with questions?    [facility]: [phone]     [facility]: [phone]    For urgent needs outside of business hours call the [facility] at [phone] and ask for the dermatology resident on call

## 2018-06-26 NOTE — PROGRESS NOTES
[facility] Dermatology Note    Dermatology Problem List:  1.NMSC  -BCC, right elbow s/p ED and C 1/2016  -BCC, left forearm s/p excision 1/2016  -BCC, right posterior shoulder and left posterior shoulder, s/p Aldara 11/2015  -BCC, nodular and sclerosing, right side of nose per pathology, (right medial cheek per Dr. Christiansen's note 11/21/2011), s/p excision 5/12/2009 Dr. Bansal    2. MGUS  3. Actinic keratoses: left nasal sidewall and L cheeks in early 2017.   -s/p Efudex 2015  -s/p cryotherapy    4. Seborrheic dermatitis  -clobetasol 0.05% solution    Last FBSE 12/20/2016    Encounter Date: Jun 26, 2018    CC:  Chief Complaint   Patient presents with     Skin Check     area of concern left lower eyelid, forehead and right helix hx NMSC     History of Present Illness:  Mr. Gustavo Ramon is a 78 year old male with a history of NMSC who presents today for a skin check. The patient was last seen 12/11/2017 when the patient was treated with shave biopsy showing lichenoid keratosis. The patient has several areas of concern:    1) On the left lower eyelid there is non tender. He used efudex for a few days until there was redness and stopped. He did this a doug time ago. It came bck     2) On the forehead there is a similar lesion to the left lower lid     3) On the right helix there is a spot he noted    The patient is otherwise feeling well. There are no other skin concerns at this time.      Past Medical History:   Patient Active Problem List   Diagnosis     Rosacea     DJD (degenerative joint disease)     Ocular myasthenia gravis (H)     Macular pigment deposit     HYPERLIPIDEMIA LDL GOAL <130     IgA monoclonal gammopathy     Retinal hole OS, operculated     Horseshoe tear of retina without detachment OD     History  of basal cell carcinoma     Seborrhea     AK (actinic keratosis)     Malignant neoplasm of prostate (H)     PVD (posterior vitreous detachment)     Amaurosis fugax by Hx neg  carotid W/U     Advanced directives, counseling/discussion     Actinic keratosis     Peripheral neuropathy     Nuclear sclerosis of both eyes     Essential hypertension with goal blood pressure less than 140/90     Gastroesophageal reflux disease without esophagitis     Past Medical History:   Diagnosis Date     Actinic keratosis      AK (actinic keratosis) 11/15/2012     Amaurosis fugax      Basal cell carcinoma 2009    R medial cheek/nose     Central serous retinopathy left    Eye     Diverticulosis 08/2006     DJD (degenerative joint disease)      GERD (gastroesophageal reflux disease)      Hearing loss     High frequency     HTN (hypertension)      Hyperlipidemia LDL goal < 130      Hyperplastic colon polyp 08/2006     IgA monoclonal gammopathy     IgA kappa     Lattice degeneration of retina right    Eye     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    [facility] consult     Rosacea      Vitreous detachment left    Eye     Past Surgical History:   Procedure Laterality Date     ARTHROSCOPY KNEE RT/LT Left Jan 2010    Knee     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     DISKECTOMY, LUMBAR, SINGLE SP  2003    L2-3     ENT SURGERY  1943    Tonsils      ENT SURGERY  2-22-12    Biopsy lesion right pinna     ENT SURGERY  2-24-12    Biopsy leson right pinna     SOFT TISSUE SURGERY  May 2010    Basal Cell/right side nose       Social History:  The patient is retired from working as an . He has a daughter and son  The patient denies use of tanning beds. He is [place].    Family History:  There is no family history of skin cancer.  Kept in chart for convenience.       Medications:  Current Outpatient Prescriptions   Medication Sig Dispense Refill     acetaminophen (TYLENOL) 325 MG tablet Take 2 tablets (650 mg) by mouth once for 1 dose 2 tablet 0     aspirin 325 MG tablet Take 325 mg by mouth daily       Cholecalciferol (VITAMIN D) 2000 UNITS tablet Take 2,000 Units by mouth daily       cyanocolbalamin (VITAMIN  B-12) 1000 MCG  tablet Take 1 tablet by mouth daily       hydrochlorothiazide (HYDRODIURIL) 25 MG tablet Take 0.5 tablets (12.5 mg) by mouth daily 45 tablet 1     lisinopril (PRINIVIL/ZESTRIL) 5 MG tablet TAKE 1 TABLET DAILY (DUE FOR APPOINTMENT FOR FURTHER REFILLS) 90 tablet 1     Multiple Vitamins-Minerals (PRESERVISION/LUTEIN PO) Take 60 mg by mouth daily two tablets daily       omeprazole (PRILOSEC) 20 MG CR capsule TAKE 1 CAPSULE DAILY CONCOMITANT TO PREDNISONE USE 90 capsule 1     predniSONE (DELTASONE) 5 MG tablet Take 1 tablet (5 mg) by mouth every other day       pyridostigmine (MESTINON) 60 MG tablet Take 1 tablet (60 mg) by mouth 4 times daily 360 tablet 3     simvastatin (ZOCOR) 20 MG tablet TAKE 1 TABLET AT BEDTIME 90 tablet 0       Allergies   Allergen Reactions     Nkda [No Known Drug Allergies]      Review of Systems:  -Skin: As above in HPI. No additional skin concerns.  -Const: The patient is generally feeling well today.     Physical exam:  - This is a well developed, well-nourished male in no acute distress, in a pleasant mood.    SKIN: -Total skin excluding the undergarment areas was performed. The exam included the head/face, neck, both arms, chest, back, abdomen, both legs, digits and/or nails.   - Waxy stuck on tan brown macules and papules scattered on forehead and the trunk and extremities.  - There are erythematous macules with overyling adherent scale on the left cheek and forehead, right cheek, right helix, left helix, vertex scalp, right forearm, right dorsal hand, left forearm   - Scattered brown macules on sun exposed areas.  -There is a waxy stuck on tan to brown papule on the trunk and extremities.  - On the central foread skin colored scaly patch 1.4 cm    - On the left infraorbital erythematous macule with scale 5 mm    - No other lesions of concern on areas examined.     Impression/Plan:  1. Hx of NMSC    Recommend sunscreens SPF #30 or greater, protective clothing and avoidance of tanning  beds.    2. Seborrheic Keratosis, non irritated, solar lentigines     Benign nature discussed. No further intervention required.     3. Neoplasm of uncertain behavior     A -  On the central foread skin colored scaly patch 1.4 cm possibly SK been treated with cryo will not resolve    After discussion of benefits and risks including but not limited to bleeding, infection, scar, incomplete removal, and non-diagnostic biopsy, written consent and photographs were obtained. The area was cleaned with isopropyl alcohol. 0.5 mL of 1% lidocaine was injected to obtain adequate anesthesia of the lesion on the central forehead, measuring 1.4 cm. A shave biopsywas performed. Hemostasis was achieved with aluminium chloride. Vaseline and a sterile dressing were applied. The patient tolerated the procedure and no complications were noted. The patient was provided with verbal and written post care instructions.       B - On the left infraorbital erythematous macule with scale 5 mm failed efudex SCC vs AK     After discussion of benefits and risks including but not limited to bleeding, infection, scar, incomplete removal, recurrence, and non-diagnostic biopsy, written consent and photographs were obtained. The area was cleaned with isopropyl alcohol. 0.5 mL of 1% lidocaine with epinephrine was injected to obtain adequate anesthesia of the lesion on the left infraorbital. A shave biopsy was performed. Hemostasis was achieved with aluminium chloride. Vaseline and a sterile dressing were applied. The patient tolerated the procedure and no complications were noted. The patient was provided with verbal and written post care instructions.       4. Actinic Keratosis, left cheek and forehead, right cheek, right helix, left helix, vertex scalp, dorsal hands, right forearm, left forearm     For Aks, Continue Efudex twice daily for 2-3 weeks or until the onset of irritation when rough areas occur on the face.    Cryotherapy procedure note:  After verbal consent and discussion of risks and benefits including but no limited to dyspigmentation/scar, blister, and pain, 17 were treated with 1-2mm freeze border for 2 cycles with liquid nitrogen. Post cryotherapy instructions were provided.       Follow-up in 6 months, earlier pending biopsy, earlier for new or changing lesions.     Staff Involved:  Scribe/Staff    Scribe Disclosure:   I, Deisy Pollard, am serving as a scribe to document services personally performed by Dr. Brooklyn Treviño, based on data collection and the provider's statements to me.       Provider Disclosure:   The documentation recorded by the scribe accurately reflects the services I personally performed and the decisions made by me.    Brooklyn Treviño MD    Department of Dermatology  [facility]: Phone: [phone], Fax:[phone]  [facility]: Phone: [phone], Fax: [phone]

## 2018-06-26 NOTE — MR AVS SNAPSHOT
After Visit Summary   6/26/2018    Gustavo Ramon    MRN: [id number]           Patient Information     Date Of Birth          1940        Visit Information        Provider Department      6/26/2018 2:30 PM Brooklyn Treviño MD [facility]        Today's Diagnoses     Seborrheic keratosis    -  1    History of nonmelanoma skin cancer        AK (actinic keratosis)        Neoplasm of uncertain behavior of skin          Care Instructions    CRYOTHERAPY    What is it?    Use of a very cold liquid, such as liquid nitrogen, to freeze and destroy abnormal skin cells that need to be removed    What should I expect?    Tenderness and redness    A small blister that might grow and fill with dark purple blood. There may be crusting.    More than one treatment may be needed if the lesions do not go away.    How do I care for the treated area?    Gently wash the area with your hands when bathing.    Use a thin layer of Vaselin to help with healing. You may use a Band-Aid.     The area should heal within 7-10 days.    Do not use an antibiotic or Neosporin ointment.     You may take acetaminophen (Tylenol) for pain.     Call your Doctor if you have:    Severe pain    Signs of infection (warmth, redness, cloudy yellow drainage, and or a bad smell)    Questions or concerns    Contact infromation:  [facility] [phone]  [facility] [phone]      Wound Care After a Biopsy    What is a skin biopsy?  A skin biopsy allows the doctor to examine a very small piece of tissue under the microscope to determine the diagnosis and the best treatment for the skin condition. A local anesthetic (numbing medicine)  is injected with a very small needle into the skin area to be tested. A small piece of skin is taken from the area. Sometimes a suture (stitch) is used.     What are the risks of a skin biopsy?  I will experience scar, bleeding,  swelling, pain, crusting and redness. I may experience incomplete removal or recurrence. Risks of this procedure are excessive bleeding, bruising, infection, nerve damage, numbness, thick (hypertrophic or keloidal) scar and non-diagnostic biopsy.    How should I care for my wound for the first 24 hours?    Keep the wound dry and covered for 24 hours    If it bleeds, hold direct pressure on the area for 15 minutes. If bleeding does not stop then go to the emergency room    Avoid strenuous exercise the first 1-2 days or as your doctor instructs you    How should I care for the wound after 24 hours?    After 24 hours, remove the bandage    You may bathe or shower as normal    If you had a scalp biopsy, you can shampoo as usual and can use shower water to clean the biopsy site daily    Clean the wound twice a day with gentle soap and water    Do not scrub, be gentle    Apply white petroleum/Vaseline after cleaning the wound with a cotton swab or a clean finger, and keep the site covered with a Bandaid /bandage. Bandages are not necessary with a scalp biopsy    If you are unable to cover the site with a Bandaid /bandage, re-apply ointment 2-3 times a day to keep the site moist. Moisture will help with healing    Avoid strenuous activity for first 1-2 days    Avoid lakes, rivers, pools, and oceans until the stitches are removed or the site is healed    How do I clean my wound?    Wash hands thoroughly with soap or use hand  before all wound care    Clean the wound with gentle soap and water    Apply white petroleum/Vaseline  to wound after it is clean    Replace the Bandaid /bandage to keep the wound covered for the first few days or as instructed by your doctor    If you had a scalp biopsy, warm shower water to the area on a daily basis should suffice    What should I use to clean my wound?     Cotton-tipped applicators (Qtips )    White petroleum jelly (Vaseline ). Use a clean new container and use Q-tips to  apply.    Bandaids   as needed    Gentle soap     How should I care for my wound long term?    Do not get your wound dirty    Keep up with wound care for one week or until the area is healed.    A small scab will form and fall off by itself when the area is completely healed. The area will be red and will become pink in color as it heals. Sun protection is very important for how your scar will turn out. Sunscreen with an SPF 30 or greater is recommended once the area is healed.      You should have some soreness but it should be mild and slowly go away over several days. Talk to your doctor about using tylenol for pain,    When should I call my doctor?  If you have increased:     Pain or swelling    Pus or drainage (clear or slightly yellow drainage is ok)    Temperature over 100F    Spreading redness or warmth around wound    When will I hear about my results?  The biopsy results can take 2-3 weeks to come back. The clinic will call you with the results, send you a Racemit message, or have you schedule a follow-up clinic or phone time to discuss the results. Contact our clinics if you do not hear from us in 3 weeks.     Who should I call with questions?    [facility]: [phone]     [facility]: [phone]    For urgent needs outside of business hours call the [facility] at [phone] and ask for the dermatology resident on call              Follow-ups after your visit        Follow-up notes from your care team     Return in about 6 months (around 12/26/2018) for hx of AKs.      Your next 10 appointments already scheduled     Jan 08, 2019  2:30 PM CST   Return Visit with Brooklyn Treviño MD   [facility] ([facility]    [address] [place] [id number] [phone]            Feb 05, 2019  8:30 AM CST   LAB with BE LAB   [facility]    [address] [facility]  [place] [id number] [phone]           Please do not eat 10-12 hours before your appointment if you are coming in fasting for labs on lipids, cholesterol, or glucose (sugar). This does not apply to pregnant women. Water, hot tea and black coffee (with nothing added) are okay. Do not drink other fluids, diet soda or chew gum.            Feb 12, 2019  8:00 AM CST   Return Visit with Glenn Jones MD   [facility] ([facility]    [address] [place] [address] [place] [id number] [phone]              Who to contact     If you have questions or need follow up information about today's clinic visit or your schedule please contact [facility] directly at [phone].  Normal or non-critical lab and imaging results will be communicated to you by [employer], letter or phone within 4 business days after the clinic has received the results. If you do not hear from us within 7 days, please contact the clinic through [employer] or phone. If you have a critical or abnormal lab result, we will notify you by phone as soon as possible.  Submit refill requests through [employer] or call your pharmacy and they will forward the refill request to us. Please allow 3 business days for your refill to be completed.          Additional Information About Your Visit        [employer] Information     [employer] gives you secure access to your electronic health record. If you see a primary care provider, you can also send messages to your care team and make appointments. If you have questions, please call your primary care clinic.  If you do not have a primary care provider, please call [phone] and they will assist you.      [employer] is an electronic gateway that provides easy, online access to your medical records. With [employer], you can request a clinic appointment, read your test results, renew a prescription or communicate with your care team.     To access your existing account,  please contact your [facility] or call [phone] for assistance.        Care EveryWhere ID     This is your Care EveryWhere ID. This could be used by other organizations to access your [place] medical records  [id number]         Blood Pressure from Last 3 Encounters:   06/14/18 132/76   03/26/18 125/57   02/12/18 130/62    Weight from Last 3 Encounters:   06/14/18 77.1 kg (170 lb)   03/26/18 80.3 kg (177 lb)   08/24/17 80.3 kg (177 lb)              We Performed the Following     BIOPSY SKIN/SUBQ/MUC MEM, EACH ADDTL LESION     BIOPSY SKIN/SUBQ/MUC MEM, SINGLE LESION     Dermatological path order and indications     DESTRUCT PREMALIGNANT LESION, 15 OR MORE        Primary Care Provider Office Phone # Fax #    Winston Silverman PA-C [phone]       [address]  [place] [address]        Equal Access to Services     [facility]: Hadii aad ku hadasho Soomaali, waaxda luqadaha, qaybta kaalmada adeegyada, waxay idiin hayaan adeeg kharash la'fran . So [facility] [phone].    ATENCIÓN: Si habla español, tiene a perez disposición servicios gratuitos de asistencia lingüística. Llame al [phone].    We comply with applicable federal civil rights laws and [place] laws. We do not discriminate on the basis of race, color, national origin, age, disability, sex, sexual orientation, or gender identity.            Thank you!     Thank you for choosing [facility]  for your care. Our goal is always to provide you with excellent care. Hearing back from our patients is one way we can continue to improve our services. Please take a few minutes to complete the written survey that you may receive in the mail after your visit with us. Thank you!             Your Updated Medication List - Protect others around you: Learn how to safely use, store and throw away your medicines at www.disposemymeds.org.          This list is accurate as of 6/26/18  3:33 PM.  Always use  your most recent med list.                   Brand Name Dispense Instructions for use Diagnosis    acetaminophen 325 MG tablet    TYLENOL    2 tablet    Take 2 tablets (650 mg) by mouth once for 1 dose    Febrile illness, Influenza A       aspirin 325 MG tablet      Take 325 mg by mouth daily        cyanocobalamin 1000 MCG tablet    vitamin  B-12     Take 1 tablet by mouth daily        hydrochlorothiazide 25 MG tablet    HYDRODIURIL    45 tablet    Take 0.5 tablets (12.5 mg) by mouth daily    Benign essential hypertension       lisinopril 5 MG tablet    PRINIVIL/ZESTRIL    90 tablet    TAKE 1 TABLET DAILY (DUE FOR APPOINTMENT FOR FURTHER REFILLS)    Benign essential hypertension       omeprazole 20 MG CR capsule    priLOSEC    90 capsule    TAKE 1 CAPSULE DAILY CONCOMITANT TO PREDNISONE USE    Gastroesophageal reflux disease without esophagitis       predniSONE 5 MG tablet    DELTASONE     Take 1 tablet (5 mg) by mouth every other day        PRESERVISION/LUTEIN PO      Take 60 mg by mouth daily two tablets daily        pyridostigmine 60 MG tablet    MESTINON    360 tablet    Take 1 tablet (60 mg) by mouth 4 times daily    Myasthenia gravis without exacerbation (H)       simvastatin 20 MG tablet    ZOCOR    90 tablet    TAKE 1 TABLET AT BEDTIME    Hyperlipidemia LDL goal <130       vitamin D 2000 units tablet      Take 2,000 Units by mouth daily    Vitamin D deficiency

## 2018-07-02 LAB — COPATH REPORT: NORMAL

## 2018-07-09 ENCOUNTER — TELEPHONE (OUTPATIENT)
Dept: DERMATOLOGY | Facility: CLINIC | Age: 78
End: 2018-07-09

## 2018-07-09 NOTE — TELEPHONE ENCOUNTER
Notes Recorded by Gladys Kurtz RN on 7/9/2018 at 9:15 AM  Pt returned call and notified of results and Dr. Victor's recommendations. Pt verbalized understanding. RN offered first available appt with Dr. Victor for a consult here in  on 7/19. Pt states that he will not be here that day. Next available appt is 7/26. Pt scheduled....Gladys Kurtz RN    ------    Notes Recorded by Shashank Victor MD on 7/6/2018 at 1:43 PM  I'll see him for a consult and decide then.   It looks like we can probably repair it. Thank you.  ------    Notes Recorded by Josie Fontaine LPN on 7/6/2018 at 12:49 PM  Left message for patient to call [facility] in [place] back at [phone]. Please schedule patient for a MOHS consult.     Dr. Victor please review pictures in chart, would you like us to set up for closure with occuplastics already or would you like to see patient for consult and decide at that time?     Thanks  Josie Fontaine LPN    ------    Notes Recorded by Brooklyn Treviño MD on 7/5/2018 at 6:50 PM  SCCIS, left infraorbital-notify and needs mohs  Central forehead HAK-precancer        13d ago       Copath Report Patient Name: DAISHA LOZOYA   MR#: [id number]   Specimen #: [id number]   Collected: 6/26/2018   Received: 6/27/2018   Reported: 7/2/2018 14:19   Ordering Phy(s): BROOKLYN TREVIÑO     For improved result formatting, select 'View Enhanced Report Format' under    Linked Documents section.     SPECIMEN(S):   A: Skin, left infraorbital   B: Skin, central forehead

## 2018-07-26 ENCOUNTER — OFFICE VISIT (OUTPATIENT)
Dept: DERMATOLOGY | Facility: CLINIC | Age: 78
End: 2018-07-26
Payer: MEDICARE

## 2018-07-26 DIAGNOSIS — D04.10: Primary | ICD-10-CM

## 2018-07-26 PROCEDURE — 99212 OFFICE O/P EST SF 10 MIN: CPT | Performed by: DERMATOLOGY

## 2018-07-26 ASSESSMENT — PAIN SCALES - GENERAL: PAINLEVEL: NO PAIN (0)

## 2018-07-26 NOTE — PROGRESS NOTES
[facility] Dermatology Note    Dermatology Problem List:  1.NMSC  - SCC, L infraorbital, Mohs scheduled   - BCC, R elbow, s/p ED & C 1/12/16  - BCC, L forearm, s/p excision 1/12/16  - BCC, R posterior shoulder and L posterior shoulder, s/p Aldara 11/2015  - BCC, R side of nose per pathology, (R medial cheek per Dr. Christiansen's note 11/21/2011), s/p excision 5/12/2009   2. Actinic keratoses  - s/p Efudex 2015 & cryotherapy  3. Seborrheic dermatitis  - clobetasol 0.05% solution  4. MGUS      Last TBSE 6/26/18     Encounter Date: Jul 26, 2018    CC:  Chief Complaint   Patient presents with     Consult For     MOHS left infraorbital SCCIS     History of Present Illness:  Mr. Gustavo Ramon is a 78 year old male who presents today for a Mohs consultation regarding a squamous cell carcinoma, at least in situ, that was diagnosed on his left infraorbital after a biopsy with Dr. Treviño on 6/26/18. The patient has history of several other non-melanoma skin cancers that were treated mostly with excision and ED & C. He has undergone Mohs surgery once before many years ago, but would like a reminder of what the process is like. He has some questions about his diagnosis as well, since he has mostly had basal cell carcinoma. He also inquires about whether or not his eye will be covered in case he needs a  the day of surgery. The patient is otherwise feeling well. There are no other skin concerns at this time.      Past Medical History:   Patient Active Problem List   Diagnosis     Rosacea     DJD (degenerative joint disease)     Ocular myasthenia gravis (H)     Macular pigment deposit     HYPERLIPIDEMIA LDL GOAL <130     IgA monoclonal gammopathy     Retinal hole OS, operculated     Horseshoe tear of retina without detachment OD     History  of basal cell carcinoma     Seborrhea     AK (actinic keratosis)     Malignant neoplasm of prostate (H)     PVD (posterior vitreous detachment)     Amaurosis fugax by  Hx neg carotid W/U     Advanced directives, counseling/discussion     Actinic keratosis     Peripheral neuropathy     Nuclear sclerosis of both eyes     Essential hypertension with goal blood pressure less than 140/90     Gastroesophageal reflux disease without esophagitis     Past Medical History:   Diagnosis Date     Actinic keratosis      AK (actinic keratosis) 11/15/2012     Amaurosis fugax      Basal cell carcinoma 2009    R medial cheek/nose     Central serous retinopathy left    Eye     Diverticulosis 08/2006     DJD (degenerative joint disease)      GERD (gastroesophageal reflux disease)      Hearing loss     High frequency     HTN (hypertension)      Hyperlipidemia LDL goal < 130      Hyperplastic colon polyp 08/2006     IgA monoclonal gammopathy     IgA kappa     Lattice degeneration of retina right    Eye     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    [place] consult     Rosacea      Vitreous detachment left    Eye     Past Surgical History:   Procedure Laterality Date     ARTHROSCOPY KNEE RT/LT Left Jan 2010    Knee     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     DISKECTOMY, LUMBAR, SINGLE SP  2003    L2-3     ENT SURGERY  1943    Tonsils      ENT SURGERY  2-22-12    Biopsy lesion right pinna     ENT SURGERY  2-24-12    Biopsy leson right pinna     SOFT TISSUE SURGERY  May 2010    Basal Cell/right side nose       Social History:  The patient is retired from working as an . He has a daughter and son  The patient denies use of tanning beds. He is [place].    Family History:  There is no family history of skin cancer.    Medications:  Current Outpatient Prescriptions   Medication Sig Dispense Refill     aspirin 325 MG tablet Take 325 mg by mouth daily       Cholecalciferol (VITAMIN D) 2000 UNITS tablet Take 2,000 Units by mouth daily       cyanocolbalamin (VITAMIN  B-12) 1000 MCG tablet Take 1 tablet by mouth daily       hydrochlorothiazide (HYDRODIURIL) 25 MG tablet Take 0.5 tablets (12.5 mg) by mouth daily  45 tablet 1     lisinopril (PRINIVIL/ZESTRIL) 5 MG tablet TAKE 1 TABLET DAILY (DUE FOR APPOINTMENT FOR FURTHER REFILLS) 90 tablet 1     Multiple Vitamins-Minerals (PRESERVISION/LUTEIN PO) Take 60 mg by mouth daily two tablets daily       omeprazole (PRILOSEC) 20 MG CR capsule TAKE 1 CAPSULE DAILY CONCOMITANT TO PREDNISONE USE 90 capsule 1     predniSONE (DELTASONE) 5 MG tablet Take 1 tablet (5 mg) by mouth every other day       pyridostigmine (MESTINON) 60 MG tablet Take 1 tablet (60 mg) by mouth 4 times daily 360 tablet 3     simvastatin (ZOCOR) 20 MG tablet TAKE 1 TABLET AT BEDTIME 90 tablet 0     acetaminophen (TYLENOL) 325 MG tablet Take 2 tablets (650 mg) by mouth once for 1 dose 2 tablet 0       Allergies   Allergen Reactions     Nkda [No Known Drug Allergies]        Review of Systems:  -Skin Establ Pt: The patient denies any new rash, pruritus, or lesions that are symptomatic, changing or bleeding, except as per HPI.  -Constitutional: The patient is feeling generally well.    Physical exam:  Vitals: There were no vitals taken for this visit.  GEN: This is a well developed, well-nourished male in no acute distress, in a pleasant mood.    SKIN: Focused examination of the face was performed.  - 0.4 x 0.2 cm pink atrophic macule on the left infraorbital.   - No other lesions of concern on areas examined.       Impression/Plan:  1. Squamous cell carcinoma, at least in situ, left infraorbital     Reviewed nature and etiology of squamous cell carcinoma.    AUC Score = 7    Risks, benefits, and process of Mohs micrographic surgery were discussed including possibility of damage to surrounding anatomical structures and infection. Patient is comfortable proceeding with the surgery; consent form was obtained. He will be scheduled early September upon his return from [place].     Discussed possible repairs for the resulting defect.     Instructed to find a designated  for surgery day.     No indications for pre-op  antibiotics.    Pre-op written instructions provided.     Follow-up as scheduled for MMS.       Staff Involved:    Scribe Disclosure  I, Gennaro Cox, am serving as a scribe to document services personally performed by Dr. Shashank Victor DO, based on data collection and the provider's statements to me.     Provider Disclosure:   The documentation recorded by the scribe accurately reflects the services I personally performed and the decisions made by me.    Shashank Victor DO    Department of Dermatology  [facility]: Phone: [phone], Fax:[phone]  [facility]: Phone: [phone], Fax: [phone]

## 2018-07-26 NOTE — PATIENT INSTRUCTIONS
Mohs Cutaneous Micrographic Surgery Unit  Shashank Victor DO      Pre-Surgical Instruction Sheet    1. Expect to be here majority of the morning.  The Mohs surgical procedure can be an extensive surgery requiring multiple stages. Each stage may take 1-2 hours.    ? You may eat breakfast  ? You may bring snacks or beverages with you  ? Take all normal medications morning of surgery -- unless otherwise indicated by your physician  ? If you have an artificial heart valve, or joint replacement within two years, we will prescribe an antibiotic. Please take one hour prior to surgery.    2. You will need a  to be with you if your surgical site is close to your eyes. You can get swelling/bruising immediately after surgery and will go home with a big bulky bandage that could obstruct your view of driving safely.    3. Wear comfortable clothing -- preferably a button down shirt or a loose fitted shirt. (to avoid pulling over pressure bandage off when you get home) If your surgery site is on your leg, try wearing loose fitted pants. If your surgery site is on your arm, try wearing a short-sleeve shirt.    4. Bring reading material or other items to help pass time. We do have internet access available if you own a laptop, iPad, etc.)    5. Women - do NOT wear make-up. We will be washing it off anyone needing surgery on the face    6. Do NOT stop blood thinning medication unless instructed to do so by your surgeon. This will include: Warfarin, Coumadin, Jantoven, Aspirin, Plavix and Pradaxa. If you are taking Warfarin or Coumadin, please have your INR blood test results sent to our office no more than 7 days prior to your surgery.     7. Tylenol is a good alternative to take for headaches and pain, and can be taken throughout your surgery and postoperative recovery.    8. If your surgery is on your trunk, arms, hands, legs, feet, fingernail or a toenail, please wash the area with Hibiclens, an over-the-counter antiseptic  soap, the night before and morning of your surgery.     If you have any questions, please feel free to contact us.    Phone numbers:  During business hours (M-F 8:00-4:30 p.m.)  [facility]:  [phone] - select option 1 for appt. desk  [phone] - select option 3 for nurse triage line  [facility]:   [phone] - goes straight to call center   ---------------------------------------------------------  Evenings/Weekends/Holidays  Hospital - [phone]   TTY for hearing [phone]  *Ask  to page dermatologist on-call  Emergency Fkyh-[phone]  TTY for hearing impaired- [phone]

## 2018-07-26 NOTE — NURSING NOTE
Gustavo Ramon's goals for this visit include: consult for MOHS SCCIS left infraorbital  He requests these members of his care team be copied on today's visit information:     PCP: Winston Silverman    Referring Provider:  No referring provider defined for this encounter.    There were no vitals taken for this visit.    Do you need any medication refills at today's visit? Darcie Fontaine LPN

## 2018-07-26 NOTE — LETTER
"        Mr.Clifford CEZAR Ramon  [address] [place] [id number]        July 26, 2018    Dear ,    You have an upcoming appointment with Dr. Victor for Mohs surgery. It is scheduled for Sept 6, 2018 at 7:30 AM. Please check-in 15 minutes prior to your appointment at check-in desk \"D\" on the 2nd floor. Our address is [address] [place] [address].  Please review these important reminders:    1) Expect to be here the majority of the morning.  The Mohs surgical procedure can be an extensive surgery requiring multiple stages. Each stage may take 1-2 hours.     2) You may eat breakfast. You may bring snacks or beverages with you.  3) Take all normal medications morning of surgery -- unless otherwise indicated by your physician   If you have an artificial heart valve we will prescribe an antibiotic. Please take one hour prior to surgery.     4) You will need a  to be with you if your surgical site is close to your eyes. You can get swelling/bruising immediately after surgery and will go home with a big bulky bandage that could obstruct your view of driving safely.     5) Wear comfortable clothing -- preferably a button down shirt or a loose fitted shirt (to avoid pulling over pressure bandage off when you get home). If your surgery site is on your leg, try wearing loose fitted pants. If your surgery site is on your arm, try wearing a short-sleeve shirt.     6) Bring reading material or other items to help pass time. We do have internet access available if you own a laptop, iPad, etc.    7) Women - do NOT wear make-up. We will be washing it off anyone needing surgery on the face     8) Do NOT stop blood thinning medication unless instructed to do so by your surgeon. This will include: Warfarin, Coumadin, Eliquis, Jantoven, Aspirin, Plavix and Pradaxa. If you are taking Warfarin or Coumadin, please have your INR blood test results sent to our office no more than 7 days prior to your surgery. "     9) Tylenol is a good alternative to take for headaches and pain, and can be taken throughout your surgery and postoperative recovery.    If you need to reschedule or have any questions or concerns, please call me at [phone].    Sincerely,      [facility]

## 2018-08-30 DIAGNOSIS — E78.5 HYPERLIPIDEMIA LDL GOAL <130: ICD-10-CM

## 2018-08-30 RX ORDER — SIMVASTATIN 20 MG
TABLET ORAL
Qty: 90 TABLET | Refills: 0 | Status: SHIPPED | OUTPATIENT
Start: 2018-08-30 | End: 2018-11-28

## 2018-08-30 NOTE — TELEPHONE ENCOUNTER
"Requested Prescriptions   Pending Prescriptions Disp Refills     simvastatin (ZOCOR) 20 MG tablet [Pharmacy Med Name: SIMVASTATIN TABS 20MG] 90 tablet 0    Last Written Prescription Date:  6-3-18  Last Fill Quantity: 90,  # refills: 0   Last office visit: 3/26/2018 with prescribing provider:  3-26-18   Future Office Visit:   Sig: TAKE 1 TABLET AT BEDTIME    Statins Protocol Failed    8/30/2018  2:05 AM       Failed - LDL on file in past 12 months    Recent Labs   Lab Test  08/25/17   0748   LDL  70            Passed - No abnormal creatine kinase in past 12 months    Recent Labs   Lab Test  08/10/15   0751   CKT  115               Passed - Recent (12 mo) or future (30 days) visit within the authorizing provider's specialty    Patient had office visit in the last 12 months or has a visit in the next 30 days with authorizing provider or within the authorizing provider's specialty.  See \"Patient Info\" tab in inbasket, or \"Choose Columns\" in Meds & Orders section of the refill encounter.           Passed - Patient is age 18 or older        "

## 2018-09-06 ENCOUNTER — OFFICE VISIT (OUTPATIENT)
Dept: DERMATOLOGY | Facility: CLINIC | Age: 78
End: 2018-09-06
Payer: MEDICARE

## 2018-09-06 VITALS — SYSTOLIC BLOOD PRESSURE: 111 MMHG | OXYGEN SATURATION: 98 % | DIASTOLIC BLOOD PRESSURE: 68 MMHG | HEART RATE: 64 BPM

## 2018-09-06 DIAGNOSIS — D04.10: Primary | ICD-10-CM

## 2018-09-06 PROCEDURE — 17311 MOHS 1 STAGE H/N/HF/G: CPT | Performed by: DERMATOLOGY

## 2018-09-06 PROCEDURE — 12051 INTMD RPR FACE/MM 2.5 CM/<: CPT | Performed by: DERMATOLOGY

## 2018-09-06 ASSESSMENT — PAIN SCALES - GENERAL: PAINLEVEL: NO PAIN (0)

## 2018-09-06 NOTE — NURSING NOTE
Gustavo Ramon's goals for this visit include:   Chief Complaint   Patient presents with     Procedure     MOHS left infraorbital SCCIS       He requests these members of his care team be copied on today's visit information:     PCP: Winston Silverman    Referring Provider:  No referring provider defined for this encounter.    /68  Pulse 64  SpO2 98%    Do you need any medication refills at today's visit? Darcie Fontaine LPN

## 2018-09-06 NOTE — MR AVS SNAPSHOT
After Visit Summary   9/6/2018    Gustavo Ramon    MRN: [id number]           Patient Information     Date Of Birth          1940        Visit Information        Provider Department      9/6/2018 7:30 AM Shashank Victor MD [facility]        Care Instructions    MOHS Wound Care Instructions  I will experience scar, altered skin color, bleeding, swelling, pain, crusting and redness. I may experience altered sensation. Risks are excessive bleeding, infection, muscle weakness, thick (hypertrophic or keloidal) scar, and recurrence,. A second procedure may be recommended to obtain the best cosmetic or functional result.  Possible complications of any surgical procedure are bleeding, infection, scarring, alteration in skin color and sensation, muscle weakness in the area, wound dehiscence or seperation, or recurrence of the lesion or disease. On occasion, after healing, a secondary procedure or revision may be recommended in order to obtain the best cosmetic or functional result.   After your surgery, a pressure bandage will be placed over the area that has sutures. This will help prevent bleeding. Please follow these instructions until you come back to clinic, as they will help you to prevent complications as your wound heals.  For the First 48 hours After Surgery:  1. Leave the pressure bandage on and keep it dry. If it should come loose, you may retape it, but do not take it off.  2. Relax and take it easy. Do not do any vigorous exercise, heavy lifting, or bending forward. This could cause the wound to bleed.  3. Post-operative pain is usually mild. You may take plain or extra strength Tylenol every 4 hours as needed (do not take more than 4,000mg in one day). Do not take any medicine that contains aspirin, ibuprofen or motrin unless you have been recommended these by a doctor.  Avoid alcohol and vitamin E as these may increase your tendency to bleed.  4. You may put an ice pack  around the bandaged area for 20 minutes every 2-3 hours. This may help reduce swelling, bruising, and pain. Make sure the ice pack is waterproof so that the pressure bandage does not get wet.   5. You may see a small amount of drainage or blood on your pressure bandage. This is normal. However, if drainage or bleeding continues or saturates the bandage, you will need to apply firm pressure over the bandage with a washcloth for 15 minutes. If bleeding continues after applying pressure for 15 minutes then go to the nearest emergency room.  48 Hours After Surgery  Carefully remove the bandage and start daily wound care and dressing changes. You may also now shower and get the wound wet. Wash wound with a mild soap and water.  Use caution when washing the wound. Be gentle and do not let the forceful shower stream hit the wound directly.  PAT dry.  Daily Wound Care:  1. Wash wound with a mild soap and water.  Use caution when washing the wound, be gentle and do not let the forceful shower stream hit the wound directly.  2. PAT DRY.  3. Apply Vaseline (from a new container or tube) over the suture line with a Q-tip. It is very important to keep the wound continuously moist, as wounds heal best in a moist environment.  4.  Keep the site covered until sutures are removed, you can cover it with a Telfa (non-stick) dressing and tape or a band-aid.    5. If you are unable to keep wound covered, you must apply Vaseline every 2 - 3 hours (while awake) to ensure it is being kept moist for optimal healing. A dressing overnight is recommended to keep the area moist.   Call Us If:  1. You have pain that is not controlled with Tylenol.  2. You have signs or symptoms of an infection, such as: fever over 100 degrees F, redness, warmth, or foul-smelling or yellow/creamy drainage from the wound.  Who should I call with questions?    [facility]: [phone]     [facility]:  [phone]    For urgent needs outside of business hours call the [facility] at [phone] and ask for the dermatology resident on call                Follow-ups after your visit        Follow-up notes from your care team     Return in about 6 months (around 3/6/2019) for skin check hx [facility].      Your next 10 appointments already scheduled     Sep 20, 2018  1:00 PM CDT   [facility]    [id number] [place] [id number] [phone]            Jan 08, 2019  2:30 PM CST   Return Visit with Brooklyn Treviño MD   [facility]    [address] [place] [address] [phone]            Feb 05, 2019  8:30 AM CST   LAB with [facility] LAB   [facility]    [address] [place] [id number] [phone]           Please do not eat 10-12 hours before your appointment if you are coming in fasting for labs on lipids, cholesterol, or glucose (sugar). This does not apply to pregnant women. Water, hot tea and black coffee (with nothing added) are okay. Do not drink other fluids, diet soda or chew gum.            Feb 12, 2019  8:00 AM CST   Return Visit with Glenn Jones MD   [facility] ([address] [address]   [phone]            Aug 15, 2019 12:30 PM CDT   Return Visit with Susan Eller MD   [facility]    [address] [place] [address] [phone]              Who to contact     If you have questions or need follow up information about today's clinic visit or your schedule please contact [facility] directly at [phone].  Normal or non-critical lab and imaging results will be communicated to you by MyChart, letter or phone within 4 business days after  the clinic has received the results. If you do not hear from us within 7 days, please contact the clinic through [employer] or phone. If you have a critical or abnormal lab result, we will notify you by phone as soon as possible.  Submit refill requests through [employer] or call your pharmacy and they will forward the refill request to us. Please allow 3 business days for your refill to be completed.          Additional Information About Your Visit        [employer] Information     [employer] gives you secure access to your electronic health record. If you see a primary care provider, you can also send messages to your care team and make appointments. If you have questions, please call your primary care clinic.  If you do not have a primary care provider, please call [phone] and they will assist you.      [employer] is an electronic gateway that provides easy, online access to your medical records. With [employer], you can request a clinic appointment, read your test results, renew a prescription or communicate with your care team.     To access your existing account, please contact your [facility] or call [phone] for assistance.        Care EveryWhere ID     This is your Care EveryWhere ID. This could be used by other organizations to access your [place] medical records  [id number]        Your Vitals Were     Pulse Pulse Oximetry                64 98%           Blood Pressure from Last 3 Encounters:   09/06/18 111/68   06/14/18 132/76   03/26/18 125/57    Weight from Last 3 Encounters:   06/14/18 77.1 kg (170 lb)   03/26/18 80.3 kg (177 lb)   08/24/17 80.3 kg (177 lb)              Today, you had the following     No orders found for display       Primary Care Provider Office Phone # Fax #    Winston Silverman PA-C [phone]       [address] [place] [address]        Equal Access to Services     RENAY MCCRAY : vee Garcia, silver  katy zhurita connors ah. [facility] [phone].    ATENCIÓN: Si desean sanchez, tiene a perez disposición servicios gratuitos de asistencia lingüística. [place] al [phone].    We comply with applicable federal civil rights laws and [place] laws. We do not discriminate on the basis of race, color, national origin, age, disability, sex, sexual orientation, or gender identity.            Thank you!     Thank you for choosing [facility]  for your care. Our goal is always to provide you with excellent care. Hearing back from our patients is one way we can continue to improve our services. Please take a few minutes to complete the written survey that you may receive in the mail after your visit with us. Thank you!             Your Updated Medication List - Protect others around you: Learn how to safely use, store and throw away your medicines at www.disposemymeds.org.          This list is accurate as of 9/6/18  9:54 AM.  Always use your most recent med list.                   Brand Name Dispense Instructions for use Diagnosis    acetaminophen 325 MG tablet    TYLENOL    2 tablet    Take 2 tablets (650 mg) by mouth once for 1 dose    Febrile illness, Influenza A       aspirin 325 MG tablet      Take 325 mg by mouth daily        cyanocobalamin 1000 MCG tablet    vitamin  B-12     Take 1 tablet by mouth daily        hydrochlorothiazide 25 MG tablet    HYDRODIURIL    45 tablet    Take 0.5 tablets (12.5 mg) by mouth daily    Benign essential hypertension       lisinopril 5 MG tablet    PRINIVIL/ZESTRIL    90 tablet    TAKE 1 TABLET DAILY (DUE FOR APPOINTMENT FOR FURTHER REFILLS)    Benign essential hypertension       omeprazole 20 MG CR capsule    priLOSEC    90 capsule    TAKE 1 CAPSULE DAILY CONCOMITANT TO PREDNISONE USE    Gastroesophageal reflux disease without esophagitis       predniSONE 5 MG tablet    DELTASONE     Take 1 tablet (5 mg) by mouth every other day         PRESERVISION/LUTEIN PO      Take 60 mg by mouth daily two tablets daily        pyridostigmine 60 MG tablet    MESTINON    360 tablet    Take 1 tablet (60 mg) by mouth 4 times daily    Myasthenia gravis without exacerbation (H)       simvastatin 20 MG tablet    ZOCOR    90 tablet    TAKE 1 TABLET AT BEDTIME    Hyperlipidemia LDL goal <130       vitamin D 2000 units tablet      Take 2,000 Units by mouth daily    Vitamin D deficiency

## 2018-09-06 NOTE — PROGRESS NOTES
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  The area was cleansed with chlorhexidine and infiltrated with 1% Lidocaine and epinephrine. Tumor was debulked. Using a #15-blade, complete excision was made around the tumor in 1 section.  Hemostasis was obtained by electrodesiccation.  A dressing was placed.  Tissue was kept as one tissue block that was subsequently mapped, color coded and processed in the Mohs Laboratory.  Microscopic tumor was no found in any of the tissue blocks.    With the lesion clear of micrographic tumor, surgery was considered complete.  The defect extended to the muscle and measured 1.1 x 1.0 cm.    RECONSTRUCTIVE DERMATOLOGIC SURGERY REPORT  We discussed the options for wound management in full with the patient including risks/benefits/possible outcomes.       INTERMEDIATE LINEAR LAYERED CLOSURE    The treatment area was anesthetized with 1% Lidocaine and epinephrine.  The area was washed with Betadine, rinsed with saline and draped with sterile towels.  The wound edges were undermined.  Hemostasis was obtained by electrocoagulation.  Closure was oriented to avoid tension on the lower lid free margin. Deep dermal and subcutaneous closure was performed using 5-0 Vicryl deep, intradermal and subcutaneous sutures.  One standing cones was removed by triangulation.  Final cutaneous approximation was achieved with 5-0 Fast absorbing gut simple running sutures.       The final repair length was 1.8 cm.  Total  anesthesia used was 4.5 ml and estimated blood loss was less than 5.0 ml.  A sterile pressure dressing was applied and wound care instructions, with a written handout, were given.  The patient was discharged from the [facility] alert and ambulatory.    Shashank Victor, DO             Test Performed at:   Dept. of Dermatology   [facility]   [address] [place] [address]          [phone]

## 2018-09-06 NOTE — PATIENT INSTRUCTIONS
MOHS Wound Care Instructions  I will experience scar, altered skin color, bleeding, swelling, pain, crusting and redness. I may experience altered sensation. Risks are excessive bleeding, infection, muscle weakness, thick (hypertrophic or keloidal) scar, and recurrence,. A second procedure may be recommended to obtain the best cosmetic or functional result.  Possible complications of any surgical procedure are bleeding, infection, scarring, alteration in skin color and sensation, muscle weakness in the area, wound dehiscence or seperation, or recurrence of the lesion or disease. On occasion, after healing, a secondary procedure or revision may be recommended in order to obtain the best cosmetic or functional result.   After your surgery, a pressure bandage will be placed over the area that has sutures. This will help prevent bleeding. Please follow these instructions until you come back to clinic, as they will help you to prevent complications as your wound heals.  For the First 48 hours After Surgery:  1. Leave the pressure bandage on and keep it dry. If it should come loose, you may retape it, but do not take it off.  2. Relax and take it easy. Do not do any vigorous exercise, heavy lifting, or bending forward. This could cause the wound to bleed.  3. Post-operative pain is usually mild. You may take plain or extra strength Tylenol every 4 hours as needed (do not take more than 4,000mg in one day). Do not take any medicine that contains aspirin, ibuprofen or motrin unless you have been recommended these by a doctor.  Avoid alcohol and vitamin E as these may increase your tendency to bleed.  4. You may put an ice pack around the bandaged area for 20 minutes every 2-3 hours. This may help reduce swelling, bruising, and pain. Make sure the ice pack is waterproof so that the pressure bandage does not get wet.   5. You may see a small amount of drainage or blood on your pressure bandage. This is normal. However, if  drainage or bleeding continues or saturates the bandage, you will need to apply firm pressure over the bandage with a washcloth for 15 minutes. If bleeding continues after applying pressure for 15 minutes then go to the nearest emergency room.  48 Hours After Surgery  Carefully remove the bandage and start daily wound care and dressing changes. You may also now shower and get the wound wet. Wash wound with a mild soap and water.  Use caution when washing the wound. Be gentle and do not let the forceful shower stream hit the wound directly.  PAT dry.  Daily Wound Care:  1. Wash wound with a mild soap and water.  Use caution when washing the wound, be gentle and do not let the forceful shower stream hit the wound directly.  2. PAT DRY.  3. Apply Vaseline (from a new container or tube) over the suture line with a Q-tip. It is very important to keep the wound continuously moist, as wounds heal best in a moist environment.  4.  Keep the site covered until sutures are removed, you can cover it with a Telfa (non-stick) dressing and tape or a band-aid.    5. If you are unable to keep wound covered, you must apply Vaseline every 2 - 3 hours (while awake) to ensure it is being kept moist for optimal healing. A dressing overnight is recommended to keep the area moist.   Call Us If:  1. You have pain that is not controlled with Tylenol.  2. You have signs or symptoms of an infection, such as: fever over 100 degrees F, redness, warmth, or foul-smelling or yellow/creamy drainage from the wound.  Who should I call with questions?    [facility]: [phone]     [facility]: [phone]    For urgent needs outside of business hours call the [facility] at [phone] and ask for the dermatology resident on call

## 2018-09-06 NOTE — NURSING NOTE
Vaseline and pressure dressing applied to Mohs site on left infraorbital.  Wound care instructions reviewed with patient and AVS provided.  Patient verbalized understanding. Patient will follow up for wound check in two weeks. No further questions or concerns at this time.    Josie Fontaine LPN

## 2018-09-20 ENCOUNTER — OFFICE VISIT (OUTPATIENT)
Dept: DERMATOLOGY | Facility: CLINIC | Age: 78
End: 2018-09-20
Payer: MEDICARE

## 2018-09-20 DIAGNOSIS — D04.10: Primary | ICD-10-CM

## 2018-09-20 DIAGNOSIS — K21.9 GASTROESOPHAGEAL REFLUX DISEASE WITHOUT ESOPHAGITIS: ICD-10-CM

## 2018-09-20 DIAGNOSIS — L57.0 ACTINIC KERATOSIS: ICD-10-CM

## 2018-09-20 PROCEDURE — 99212 OFFICE O/P EST SF 10 MIN: CPT | Performed by: DERMATOLOGY

## 2018-09-20 ASSESSMENT — PAIN SCALES - GENERAL: PAINLEVEL: NO PAIN (0)

## 2018-09-20 NOTE — PROGRESS NOTES
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 Christiansen's note 11/21/2011), s/p excision 5/12/2009   2. Actinic keratoses  - s/p Efudex 2015 & cryotherapy  3. Seborrheic dermatitis  - clobetasol 0.05% solution  4. MGUS       Last TBSE 6/26/18     Encounter Date: Sep 20, 2018    CC:   Chief Complaint   Patient presents with     Wound Check     left infraorbital sp MOHS 9-6-18     History of Present Illness:  Mr. Gustavo Ramon is a 78 year old male who presents as a follow-up for post op wound check. The patient was last seen 9/6/18 when he had MMS for an SCCIS on the left infraorbital lid. He reports that the surgery went well, no issues. Did not have any pain. Had absorbable sutures placed and these dissolved without issue. He is happy with the appearance of the scar. No tightness or issues with movement of the lower eyelid. No additional skin concerns today.    Past Medical History:   Patient Active Problem List   Diagnosis     Rosacea     DJD (degenerative joint disease)     Ocular myasthenia gravis (H)     Macular pigment deposit     HYPERLIPIDEMIA LDL GOAL <130     IgA monoclonal gammopathy     Retinal hole OS, operculated     Horseshoe tear of retina without detachment OD     History  of basal cell carcinoma     Seborrhea     AK (actinic keratosis)     Malignant neoplasm of prostate (H)     PVD (posterior vitreous detachment)     Amaurosis fugax by Hx neg carotid W/U     Advanced directives, counseling/discussion     Actinic keratosis     Peripheral neuropathy     Nuclear sclerosis of both eyes     Essential hypertension with goal blood pressure less than 140/90     Gastroesophageal reflux disease without esophagitis      Past Medical History:   Diagnosis Date     Actinic keratosis      AK (actinic keratosis) 11/15/2012     Amaurosis fugax      Basal cell carcinoma 2009    R medial cheek/nose     Central serous retinopathy left    Eye     Diverticulosis 08/2006     DJD (degenerative joint disease)      GERD (gastroesophageal reflux disease)      Hearing loss     High frequency     HTN (hypertension)      Hyperlipidemia LDL goal < 130      Hyperplastic colon polyp 08/2006     IgA monoclonal gammopathy     IgA kappa     Lattice degeneration of retina right    Eye     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    [facility] consult     Rosacea      Vitreous detachment left    Eye     Past Surgical History:   Procedure Laterality Date     ARTHROSCOPY KNEE RT/LT Left Jan 2010    Knee     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     DISKECTOMY, LUMBAR, SINGLE SP  2003    L2-3     ENT SURGERY  1943    Tonsils      ENT SURGERY  2-22-12    Biopsy lesion right pinna     ENT SURGERY  2-24-12    Biopsy leson right pinna     SOFT TISSUE SURGERY  May 2010    Basal Cell/right side nose       Social History:   reports that he has never smoked. He has never used smokeless tobacco. He reports that he does not drink alcohol or use illicit drugs.    Family History:  Family History   Problem Relation Age of Onset     HEART DISEASE Mother      Alzheimer Disease Mother 73     C.A.D. Father      Diabetes Grandchild      using a pump      Diabetes Paternal Uncle      HEART DISEASE Paternal Grandmother      Glaucoma No family hx of      Macular Degeneration No family hx of        Medications:  Current Outpatient Prescriptions   Medication Sig Dispense Refill     acetaminophen (TYLENOL) 325 MG tablet Take 2 tablets (650 mg) by mouth once for 1 dose 2 tablet 0     aspirin 325 MG tablet Take 325 mg by mouth daily       Cholecalciferol (VITAMIN D) 2000 UNITS tablet Take 2,000 Units by mouth daily       cyanocolbalamin (VITAMIN  B-12) 1000 MCG tablet Take 1 tablet by mouth  daily       hydrochlorothiazide (HYDRODIURIL) 25 MG tablet Take 0.5 tablets (12.5 mg) by mouth daily 45 tablet 1     lisinopril (PRINIVIL/ZESTRIL) 5 MG tablet TAKE 1 TABLET DAILY (DUE FOR APPOINTMENT FOR FURTHER REFILLS) 90 tablet 1     Multiple Vitamins-Minerals (PRESERVISION/LUTEIN PO) Take 60 mg by mouth daily two tablets daily       omeprazole (PRILOSEC) 20 MG CR capsule TAKE 1 CAPSULE DAILY (CONCOMITANT TO PREDNISONE USE) 90 capsule 3     predniSONE (DELTASONE) 5 MG tablet Take 1 tablet (5 mg) by mouth every other day       pyridostigmine (MESTINON) 60 MG tablet Take 1 tablet (60 mg) by mouth 4 times daily 360 tablet 3     simvastatin (ZOCOR) 20 MG tablet TAKE 1 TABLET AT BEDTIME 90 tablet 0        Allergies   Allergen Reactions     Nkda [No Known Drug Allergies]        Review of Systems:  -As per HPI  -Skin: As above in HPI. No additional skin concerns.    Physical exam:  Vitals: There were no vitals taken for this visit.  GEN: This is a well developed, well-nourished male in no acute distress, in a pleasant mood.    SKIN: Focused exam of the face performed to reveal  - Pink linear well healed surgical scar on the left infraorbital eyelid  - No ectropion.   - Erythematous macules with overyling adherent scale on the upper forehead and nasal tip.   - No other lesions of concern on areas examined.     Impression/Plan:  1. Hx of SCCIS, L infraorbital, s/p MMS 9/6/18, wound healing well without complications    Recommend scar massage 5 minutes per day    Recommend daily sunscreen SPF 15 or greater    Follow up with Dr. Treviño for next skin check    2.   Actinic keratosis, upper forehead and nasal tip     Lesions inflamed from patient applying Efudex intermittently over the past 2 weeks.     Start consistent Efudex cream BID for 2 weeks.    Will recheck at next visit.     Follow-up as scheduled in 4 months with Dr. Treviño for skin check      Dr. Victor staffed the patient.    Staff Involved:  Resident(Vinh Yin  Michel)/Staff(as above)    Staff Physician Comments:   I saw and evaluated the patient with the resident (Dr. Vinh Pearson) and I agree with the assessment and plan as above.     Shashank Victor DO    Department of Dermatology  [facility]: Phone: [phone], Fax:[phone]  [facility]: Phone: [phone], Fax: [phone]

## 2018-09-20 NOTE — MR AVS SNAPSHOT
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 Massage along the scar line in circular motions with your fingertip and a little petroleum jelly, applying substantial pressure. Do this for 1-2 minutes, 5-10 times a day.                Follow-ups after your visit        Your next 10 appointments already scheduled     Dec 06, 2018  2:00 PM CST   Return Visit with Shashank Victor MD   [facility] ([facility])    [address] [place] [address] [phone]            Jan 08, 2019  2:30 PM CST   Return Visit with Brooklyn Treviño MD   [facility])    [address] [place] [address]   [phone]            Feb 05, 2019  8:30 AM CST   LAB with BE LAB   [facility] ([address] [id number] [phone]           Please do not eat 10-12 hours before your appointment if you are coming in fasting for labs on lipids, cholesterol, or glucose (sugar). This does not apply to pregnant women. Water, hot tea and black coffee (with nothing added) are okay. Do not drink other fluids, diet soda or chew gum.            Feb 12, 2019  8:00 AM CST   Return Visit with Glenn Jones MD   [facility] ([address] [address] [phone]            Aug 15, 2019 12:30 PM CDT   Return Visit with Susan Eller MD     [facility] ([email or web address]    [address] [address] [place] [address] [phone]              Who to contact     If you have questions or need follow up information about today's clinic visit or your schedule please contact [facility] directly at [phone].  Normal or non-critical lab and imaging results will be communicated to you by MyChart, letter or phone within 4 business days after the clinic has received the results. If you do not hear from us within 7 days, please contact the clinic through Pro[employer]ush[employer] or phone. If you have a critical or abnormal lab result, we will notify you by phone as soon as possible.  Submit refill requests through [email or web address] or call your pharmacy and they will forward the refill request to us. Please allow 3 business days for your refill to be completed.          Additional Information About Your Visit        [email or web address] Information     [email or web address] gives you secure access to your electronic health record. If you see a primary care provider, you can also send messages to your care team and make appointments. If you have questions, please call your primary care clinic.  If you do not have a primary care provider, please call [phone] and they will assist you.      [email or web address] is an electronic gateway that provides easy, online access to your medical records. With [email or web address], you can request a clinic appointment, read your test results, renew a prescription or communicate with your care team.     To access your existing account, please contact your [facility] or call [phone] for assistance.        Care EveryWhere ID     This is your Care EveryWhere ID. This could be used by other organizations to access your [facility] medical records  [id number]         Blood Pressure from Last 3 Encounters:   09/06/18 111/68   06/14/18 132/76   03/26/18 125/57    Weight from Last 3 Encounters:   06/14/18 77.1 kg (170 lb)    03/26/18 80.3 kg (177 lb)   08/24/17 80.3 kg (177 lb)              Today, you had the following     No orders found for display         Today's Medication Changes          These changes are accurate as of 9/20/18 11:59 PM.  If you have any questions, ask your nurse or doctor.               These medicines have changed or have updated prescriptions.        Dose/Directions    omeprazole 20 MG CR capsule   Commonly known as:  priLOSEC   This may have changed:  See the new instructions.   Used for:  Gastroesophageal reflux disease without esophagitis   Changed by:  Winston Silverman PA-C        TAKE 1 CAPSULE DAILY (CONCOMITANT TO PREDNISONE USE)   Quantity:  90 capsule   Refills:  3            Where to get your medicines      These medications were sent to [facility] - [address] [address]     Phone:  [phone]     omeprazole 20 MG CR capsule                Primary Care Provider Office Phone # Fax #    Winston Silverman PA-C [phone]       [address] [address] [facility] [address]        Equal Access to Services     [facility]: Hadii aad ku hadasho Soomaali, waaxda luqadaha, qaybta kaalmada adeegyada, katy connors . So [facility] [phone].    ATENCIÓN: Si habla español, tiene a perez disposición servicios gratuitos de asistencia lingüística. [facility] [phone].    We comply with applicable federal civil rights laws and [place] laws. We do not discriminate on the basis of race, color, national origin, age, disability, sex, sexual orientation, or gender identity.            Thank you!     Thank you for choosing [facility]  for your care. Our goal is always to provide you with excellent care. Hearing back from our patients is one way we can continue to improve our services. Please take a few minutes to complete the written survey that you may receive in the mail after your  visit with us. Thank you!             Your Updated Medication List - Protect others around you: Learn how to safely use, store and throw away your medicines at www.disposemymeds.org.          This list is accurate as of 9/20/18 11:59 PM.  Always use your most recent med list.                   Brand Name Dispense Instructions for use Diagnosis    acetaminophen 325 MG tablet    TYLENOL    2 tablet    Take 2 tablets (650 mg) by mouth once for 1 dose    Febrile illness, Influenza A       aspirin 325 MG tablet      Take 325 mg by mouth daily        cyanocobalamin 1000 MCG tablet    vitamin  B-12     Take 1 tablet by mouth daily        omeprazole 20 MG CR capsule    priLOSEC    90 capsule    TAKE 1 CAPSULE DAILY (CONCOMITANT TO PREDNISONE USE)    Gastroesophageal reflux disease without esophagitis       predniSONE 5 MG tablet    DELTASONE     Take 1 tablet (5 mg) by mouth every other day        PRESERVISION/LUTEIN PO      Take 60 mg by mouth daily two tablets daily        pyridostigmine 60 MG tablet    MESTINON    360 tablet    Take 1 tablet (60 mg) by mouth 4 times daily    Myasthenia gravis without exacerbation (H)       simvastatin 20 MG tablet    ZOCOR    90 tablet    TAKE 1 TABLET AT BEDTIME    Hyperlipidemia LDL goal <130       vitamin D 2000 units tablet      Take 2,000 Units by mouth daily    Vitamin D deficiency

## 2018-09-20 NOTE — NURSING NOTE
Gustavo Ramon's goals for this visit include:   Chief Complaint   Patient presents with     Wound Check     left infraorbital sp MOHS 9-6-18       He requests these members of his care team be copied on today's visit information:     PCP: Winston Silverman    Referring Provider:  No referring provider defined for this encounter.    There were no vitals taken for this visit.    Do you need any medication refills at today's visit? Darcie Fontaine LPN

## 2018-09-20 NOTE — TELEPHONE ENCOUNTER
"Requested Prescriptions   Pending Prescriptions Disp Refills     omeprazole (PRILOSEC) 20 MG CR capsule [Pharmacy Med Name: OMEPRAZOLE DR CAPS 20MG] 90 capsule 1    Last Written Prescription Date:  7-11-18  Last Fill Quantity: 90,  # refills: 1   Last office visit: 3/26/2018 with prescribing provider:  3-26-18   Future Office Visit:   Next 5 appointments (look out 90 days)     Sep 20, 2018  1:00 PM CDT   Return Visit with Shashank Victor MD   [facility]    [address] [place] [address] [phone]                Sig: TAKE 1 CAPSULE DAILY (CONCOMITANT TO PREDNISONE USE)    PPI Protocol Passed    9/20/2018  2:20 AM       Passed - Not on Clopidogrel (unless Pantoprazole ordered)       Passed - No diagnosis of osteoporosis on record       Passed - Recent (12 mo) or future (30 days) visit within the authorizing provider's specialty    Patient had office visit in the last 12 months or has a visit in the next 30 days with authorizing provider or within the authorizing provider's specialty.  See \"Patient Info\" tab in inbasket, or \"Choose Columns\" in Meds & Orders section of the refill encounter.           Passed - Patient is age 18 or older        "

## 2018-09-20 NOTE — TELEPHONE ENCOUNTER
Prescription approved per [facility] Refill Protocol.  Johanny Haynes RN on 9/20/2018 at 8:01 AM

## 2018-09-25 DIAGNOSIS — I10 BENIGN ESSENTIAL HYPERTENSION: ICD-10-CM

## 2018-09-25 RX ORDER — LISINOPRIL 5 MG/1
TABLET ORAL
Qty: 90 TABLET | Refills: 1 | Status: SHIPPED | OUTPATIENT
Start: 2018-09-25 | End: 2019-02-26

## 2018-09-25 RX ORDER — HYDROCHLOROTHIAZIDE 25 MG/1
TABLET ORAL
Qty: 45 TABLET | Refills: 1 | Status: SHIPPED | OUTPATIENT
Start: 2018-09-25 | End: 2019-02-26

## 2018-09-25 NOTE — TELEPHONE ENCOUNTER
Prescription approved per [facility] Refill Protocol.  Nallely Armendariz RN, BSN, PHN

## 2018-09-25 NOTE — TELEPHONE ENCOUNTER
"Requested Prescriptions   Pending Prescriptions Disp Refills     lisinopril (PRINIVIL/ZESTRIL) 5 MG tablet [Pharmacy Med Name: LISINOPRIL TABS 5MG] 90 tablet 1    Last Written Prescription Date:  6-27-18  Last Fill Quantity: 90,  # refills: 1   Last office visit: 3/26/2018 with prescribing provider:  3-26-18   Future Office Visit:   Next 5 appointments (look out 90 days)     Dec 06, 2018  2:00 PM CST   Return Visit with Shashank Victor MD   [facility])    [address] [place] [address] [phone]                Sig: TAKE 1 TABLET DAILY (DUE FOR APPOINTMENT FOR FURTHER REFILLS)    ACE Inhibitors (Including Combos) Protocol Passed    9/25/2018  3:07 AM       Passed - Blood pressure under 140/90 in past 12 months    BP Readings from Last 3 Encounters:   09/06/18 111/68   06/14/18 132/76   03/26/18 125/57                Passed - Recent (12 mo) or future (30 days) visit within the authorizing provider's specialty    Patient had office visit in the last 12 months or has a visit in the next 30 days with authorizing provider or within the authorizing provider's specialty.  See \"Patient Info\" tab in inbasket, or \"Choose Columns\" in Meds & Orders section of the refill encounter.           Passed - Patient is age 18 or older       Passed - Normal serum creatinine on file in past 12 months    Recent Labs   Lab Test  05/25/18   0939   07/25/13   1123   CR  1.13   < >   --    CRPOC   --    --   1.1    < > = values in this interval not displayed.            Passed - Normal serum potassium on file in past 12 months    Recent Labs   Lab Test  05/25/18   0939   POTASSIUM  4.3             hydrochlorothiazide (HYDRODIURIL) 25 MG tablet [Pharmacy Med Name: HYDROCHLOROTHIAZIDE TABS 25MG] 45 tablet 1    Last Written Prescription Date:  6-27-18  Last Fill Quantity: 45,  # refills: 1   Last office visit: 3/26/2018 with prescribing provider:  3-26-18   Future Office Visit: " "  Next 5 appointments (look out 90 days)     Dec 06, 2018  2:00 PM CST   Return Visit with Shashank Victor MD   [facility])    [address] [address] [place] [address] [phone]                Sig: TAKE ONE-HALF (1/2) TABLET DAILY    Diuretics (Including Combos) Protocol Passed    9/25/2018  3:07 AM       Passed - Blood pressure under 140/90 in past 12 months    BP Readings from Last 3 Encounters:   09/06/18 111/68   06/14/18 132/76   03/26/18 125/57                Passed - Recent (12 mo) or future (30 days) visit within the authorizing provider's specialty    Patient had office visit in the last 12 months or has a visit in the next 30 days with authorizing provider or within the authorizing provider's specialty.  See \"Patient Info\" tab in inbasket, or \"Choose Columns\" in Meds & Orders section of the refill encounter.           Passed - Patient is age 18 or older       Passed - Normal serum creatinine on file in past 12 months    Recent Labs   Lab Test  05/25/18   0939   CR  1.13             Passed - Normal serum potassium on file in past 12 months    Recent Labs   Lab Test  05/25/18   0939   POTASSIUM  4.3                   Passed - Normal serum sodium on file in past 12 months    Recent Labs   Lab Test  05/25/18   0939   NA  144              "

## 2018-11-01 DIAGNOSIS — G70.00 OCULAR MYASTHENIA GRAVIS (H): Primary | ICD-10-CM

## 2018-11-01 NOTE — TELEPHONE ENCOUNTER
Requested Prescriptions   Pending Prescriptions Disp Refills     predniSONE (DELTASONE) 5 MG tablet [Pharmacy Med Name: PREDNISONE  TABS 5MG] 45 tablet 3     Sig: TAKE 1 TABLET EVERY OTHER DAY    There is no refill protocol information for this order      Last Written Prescription Date:  8-3-18  Last Fill Quantity: 45,  # refills: 3   Last office visit: 3/26/2018 with prescribing provider:  3-26-18   Future Office Visit:   Next 5 appointments (look out 90 days)     Dec 06, 2018  2:00 PM CST   Return Visit with Shashank Victor MD   [facility])    [address] [place] [address]   [phone]            Jan 08, 2019  2:30 PM CST   Return Visit with Brooklyn Treviño MD   [facility] ([facility])    [address] [place] [address]   [phone]

## 2018-11-02 NOTE — TELEPHONE ENCOUNTER
Routing refill request to provider for review/approval because:  Drug not on the [facility] refill protocol   Johanny Haynes, RN, BSN

## 2018-11-04 DIAGNOSIS — G70.00 MYASTHENIA GRAVIS WITHOUT EXACERBATION (H): ICD-10-CM

## 2018-11-05 RX ORDER — PYRIDOSTIGMINE BROMIDE 60 MG/1
TABLET ORAL
Qty: 360 TABLET | Refills: 3 | Status: SHIPPED | OUTPATIENT
Start: 2018-11-05 | End: 2019-02-26

## 2018-11-05 RX ORDER — PREDNISONE 5 MG/1
TABLET ORAL
Qty: 45 TABLET | Refills: 3 | Status: SHIPPED | OUTPATIENT
Start: 2018-11-05 | End: 2019-02-26

## 2018-11-05 NOTE — TELEPHONE ENCOUNTER
Routing refill request to provider for review/approval because:  Drug not on the [facility] refill protocol     Requested Prescriptions   Pending Prescriptions Disp Refills     pyridostigmine (MESTINON) 60 MG tablet [Pharmacy Med Name: PYRIDOSTIGMINE BROM TABS 60MG] 360 tablet 3     Sig: TAKE 1 TABLET FOUR TIMES A DAY    There is no refill protocol information for this order        Nallely Armendariz RN, BSN, PHN\

## 2018-11-05 NOTE — TELEPHONE ENCOUNTER
hesham is due for a recheck. Have him make an appt to see me for a wellness visit and fasting lab work

## 2018-11-05 NOTE — TELEPHONE ENCOUNTER
Requested Prescriptions   Pending Prescriptions Disp Refills     pyridostigmine (MESTINON) 60 MG tablet [Pharmacy Med Name: PYRIDOSTIGMINE BROM TABS 60MG] 360 tablet 3     Sig: TAKE 1 TABLET FOUR TIMES A DAY    There is no refill protocol information for this order      Last Written Prescription Date:  8-6-18  Last Fill Quantity: 360,  # refills: 3   Last office visit: 3/26/2018 with prescribing provider:  3-26-18   Future Office Visit:   Next 5 appointments (look out 90 days)     Dec 06, 2018  2:00 PM CST   Return Visit with Shashank Victor MD   [facility])    [address] [place] [address]   [phone]            Jan 08, 2019  2:30 PM CST   Return Visit with Brooklyn Trevñio MD   [facility])    [address] [place] [address]   [phone]

## 2018-11-28 DIAGNOSIS — E78.5 HYPERLIPIDEMIA LDL GOAL <130: ICD-10-CM

## 2018-11-28 NOTE — TELEPHONE ENCOUNTER
"Requested Prescriptions   Pending Prescriptions Disp Refills     simvastatin (ZOCOR) 20 MG tablet [Pharmacy Med Name: SIMVASTATIN TABS 20MG] 90 tablet 0    Last Written Prescription Date: 8 -30-18  Last Fill Quantity: 90,  # refills: 0   Last office visit: 3/26/2018 with prescribing provider:  3-26-18   Future Office Visit:   Next 5 appointments (look out 90 days)     Dec 06, 2018  2:00 PM CST   Return Visit with Shashank Victor MD   [facility])    [address] [place] [address]   [phone]            Jan 08, 2019  2:30 PM CST   Return Visit with Brooklyn Treviño MD   [facility])    [address] [place] [address]   [phone]            Feb 12, 2019  8:00 AM CST   Return Visit with Glenn Jones MD   [facility] ([address] [address]   [phone]                Sig: TAKE 1 TABLET AT BEDTIME (DUE FOR A FASTING LAB APPOINTMENT)    Statins Protocol Failed    11/28/2018  2:28 AM       Failed - LDL on file in past 12 months    Recent Labs   Lab Test  08/25/17   0748   LDL  70            Passed - No abnormal creatine kinase in past 12 months    Recent Labs   Lab Test  08/10/15   0751   CKT  115               Passed - Recent (12 mo) or future (30 days) visit within the authorizing provider's specialty    Patient had office visit in the last 12 months or has a visit in the next 30 days with authorizing provider or within the authorizing provider's specialty.  See \"Patient Info\" tab in inbasket, or \"Choose Columns\" in Meds & Orders section of the refill encounter.             Passed - Patient is age 18 or older        "

## 2018-11-28 NOTE — TELEPHONE ENCOUNTER
Routing refill request to provider for review/approval because:  Amalia given x1 and patient did not follow up, please advise

## 2018-11-29 RX ORDER — SIMVASTATIN 20 MG
TABLET ORAL
Qty: 30 TABLET | Refills: 0 | Status: SHIPPED | OUTPATIENT
Start: 2018-11-29 | End: 2019-02-26

## 2018-12-20 ENCOUNTER — OFFICE VISIT (OUTPATIENT)
Dept: DERMATOLOGY | Facility: CLINIC | Age: 78
End: 2018-12-20
Payer: MEDICARE

## 2018-12-20 DIAGNOSIS — D04.39 SQUAMOUS CELL CARCINOMA IN SITU OF SKIN OF LEFT CHEEK: Primary | ICD-10-CM

## 2018-12-20 PROCEDURE — 99024 POSTOP FOLLOW-UP VISIT: CPT | Performed by: DERMATOLOGY

## 2018-12-20 ASSESSMENT — PAIN SCALES - GENERAL: PAINLEVEL: NO PAIN (0)

## 2018-12-20 NOTE — NURSING NOTE
Gustavo Ramon's goals for this visit include:   Chief Complaint   Patient presents with     Wound Check     3 month left infraorbital       He requests these members of his care team be copied on today's visit information:     PCP: Winston Silverman    Referring Provider:  No referring provider defined for this encounter.    There were no vitals taken for this visit.    Do you need any medication refills at today's visit? Darcie Fontaine LPN

## 2018-12-20 NOTE — PROGRESS NOTES
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 The patient says the site has healed very well since his last visit. He has no concerns regarding how the scar looks. He feels content with the results overall. No other skin concerns at this time.   Objective: On exam, there is an appropriately healed surgical site on the left infraorbital cheek with no nodularity, tenderness or surrounding erythema.   Assessment and Plan: Appropriately healed surgical site without any signs of infection    Recommended sunscreens SPF #50 or greater and protective clothing. Risk of scar dyspigmentation discussed.     Continue periodic self skin exams and report of any new or changing lesions.     Please refer to previous clinic note for details regarding treatment.  Follow up in January for skin exam with Dr. Treviño.     Staff:    Scribe Disclosure  I, Gennaro Cox, am serving as a scribe to document services personally performed by Dr. Shashank Victor DO, based on data collection and the provider's statements to me.     Provider Disclosure:   The documentation recorded by the scribe accurately reflects the services I personally performed and the decisions made by me.    Shashank Victor DO    Department of Dermatology  [facility]: Phone: [phone], Fax:[phone]  [facility]: Phone: [phone], Fax: [phone]

## 2018-12-20 NOTE — LETTER
12/20/2018         RE: Gustavo Ramon  [address] [facility] [address]        Dear Colleague,    Thank you for referring your patient, Gustavo Ramon, to the [facility]. Please see a copy of my visit note below.    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 The patient says the site has healed very well since his last visit. He has no concerns regarding how the scar looks. He feels content with the results overall. No other skin concerns at this time.   Objective: On exam, there is an appropriately healed surgical site on the left infraorbital cheek with no nodularity, tenderness or surrounding erythema.   Assessment and Plan: Appropriately healed surgical site without any signs of infection    Recommended sunscreens SPF #50 or greater and protective clothing. Risk of scar dyspigmentation discussed.     Continue periodic self skin exams and report of any new or changing lesions.     Please refer to previous clinic note for details regarding treatment.  Follow up in January for skin exam with Dr. Treviño.     Staff:    Scribe Disclosure  I, Gennaro Cox, am serving as a scribe to document services personally performed by Dr. Shashank Victor DO, based on data collection and the provider's statements to me.     Provider Disclosure:   The documentation recorded by the scribe accurately reflects the services I personally performed and the decisions made by me.    Shashank Victor DO    Department of Dermatology  [facility]: Phone: [phone], Fax:[phone]  [facility]: Phone: [phone], Fax: [phone]    Again, thank you for allowing me to  participate in the care of your patient.        Sincerely,        Shashank Victor MD

## 2019-01-08 ENCOUNTER — OFFICE VISIT (OUTPATIENT)
Dept: DERMATOLOGY | Facility: CLINIC | Age: 79
End: 2019-01-08
Payer: MEDICARE

## 2019-01-08 DIAGNOSIS — L57.0 ACTINIC KERATOSIS: ICD-10-CM

## 2019-01-08 DIAGNOSIS — D48.5 NEOPLASM OF UNCERTAIN BEHAVIOR OF SKIN: Primary | ICD-10-CM

## 2019-01-08 PROCEDURE — 11102 TANGNTL BX SKIN SINGLE LES: CPT | Performed by: DERMATOLOGY

## 2019-01-08 PROCEDURE — 17000 DESTRUCT PREMALG LESION: CPT | Mod: 59 | Performed by: DERMATOLOGY

## 2019-01-08 PROCEDURE — 99213 OFFICE O/P EST LOW 20 MIN: CPT | Mod: 25 | Performed by: DERMATOLOGY

## 2019-01-08 PROCEDURE — 11103 TANGNTL BX SKIN EA SEP/ADDL: CPT | Performed by: DERMATOLOGY

## 2019-01-08 PROCEDURE — 17003 DESTRUCT PREMALG LES 2-14: CPT | Performed by: DERMATOLOGY

## 2019-01-08 PROCEDURE — 88305 TISSUE EXAM BY PATHOLOGIST: CPT | Mod: TC | Performed by: DERMATOLOGY

## 2019-01-08 ASSESSMENT — PAIN SCALES - GENERAL: PAINLEVEL: NO PAIN (0)

## 2019-01-08 NOTE — PROGRESS NOTES
"[facility] Dermatology Note    Dermatology Problem List:  1.NMSC  - SCC, L infraorbital, Mohs scheduled   - BCC, R elbow, s/p ED & C 1/12/16  - BCC, L forearm, s/p excision 1/12/16  - BCC, R posterior shoulder and L posterior shoulder, s/p Aldara 11/2015  - BCC, R side of nose per pathology, (R medial cheek per Dr. Christiansen's note 11/21/2011), s/p excision 5/12/2009   2. Actinic keratoses  - s/p Efudex 2015 & cryotherapy  3. Seborrheic dermatitis  - clobetasol 0.05% solution  4. MGUS     Encounter Date: Jan 8, 2019    CC:  Chief Complaint   Patient presents with     RECHECK     Gustavo is returning for a 6 month recheck; severl areas of concern on the scalp, ears, chest, and left ankle.      History of Present Illness:  Mr. Gustavo Ramon is a 78 year old male with a history of NMSC who presents today for a skin check. The patient was last seen with Dr. Victor on 12/20/18 for wound check of the Mohs site on the left infraorbital. Today, the patient has a number of new concerns to address. On his scalp, there are some crusty spots that he would like checked. He had dandruff for awhile, but he put Efudex on his scalp, and it went away. On the ears, there is also some similar rough patches.Next, the patient reports that his chest there is a lesion that \"doesn't want to go away.\" He picks at it frequently. Finally, on the left ankle, he had a lesion which he treated with Efudex. There is some residual redness in this area. He treated it roughly 6 months. Ago.     Past Medical History:   Patient Active Problem List   Diagnosis     Rosacea     DJD (degenerative joint disease)     Ocular myasthenia gravis (H)     Macular pigment deposit     HYPERLIPIDEMIA LDL GOAL <130     IgA monoclonal gammopathy     Retinal hole OS, operculated     Horseshoe tear of retina without detachment OD     History  of basal cell carcinoma     Seborrhea     AK (actinic keratosis)     Malignant neoplasm of prostate (H)     " PVD (posterior vitreous detachment)     Amaurosis fugax by Hx neg carotid W/U     Advanced directives, counseling/discussion     Actinic keratosis     Peripheral neuropathy     Nuclear sclerosis of both eyes     Essential hypertension with goal blood pressure less than 140/90     Gastroesophageal reflux disease without esophagitis     Past Medical History:   Diagnosis Date     Actinic keratosis      AK (actinic keratosis) 11/15/2012     Amaurosis fugax      Basal cell carcinoma 2009    R medial cheek/nose     Central serous retinopathy left    Eye     Diverticulosis 08/2006     DJD (degenerative joint disease)      GERD (gastroesophageal reflux disease)      Hearing loss     High frequency     HTN (hypertension)      Hyperlipidemia LDL goal < 130      Hyperplastic colon polyp 08/2006     IgA monoclonal gammopathy     IgA kappa     Lattice degeneration of retina right    Eye     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    [facility] consult     Rosacea      Vitreous detachment left    Eye     Past Surgical History:   Procedure Laterality Date     ARTHROSCOPY KNEE RT/LT Left Jan 2010    Knee     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     DISKECTOMY, LUMBAR, SINGLE SP  2003    L2-3     ENT SURGERY  1943    Tonsils      ENT SURGERY  2-22-12    Biopsy lesion right pinna     ENT SURGERY  2-24-12    Biopsy leson right pinna     SOFT TISSUE SURGERY  May 2010    Basal Cell/right side nose       Social History:  The patient is retired from working as an . He has a daughter and son  The patient denies use of tanning beds. He is [place].    Family History:  There is no family history of skin cancer.  Kept in chart for convenience.       Medications:  Current Outpatient Medications   Medication Sig Dispense Refill     aspirin 325 MG tablet Take 325 mg by mouth daily       Cholecalciferol (VITAMIN D) 2000 UNITS tablet Take 2,000 Units by mouth daily       cyanocolbalamin (VITAMIN  B-12) 1000 MCG tablet Take 1 tablet by mouth daily        hydrochlorothiazide (HYDRODIURIL) 25 MG tablet TAKE ONE-HALF (1/2) TABLET DAILY 45 tablet 1     lisinopril (PRINIVIL/ZESTRIL) 5 MG tablet TAKE 1 TABLET DAILY (DUE FOR APPOINTMENT FOR FURTHER REFILLS) 90 tablet 1     Multiple Vitamins-Minerals (PRESERVISION/LUTEIN PO) Take 60 mg by mouth daily two tablets daily       omeprazole (PRILOSEC) 20 MG CR capsule TAKE 1 CAPSULE DAILY (CONCOMITANT TO PREDNISONE USE) 90 capsule 3     predniSONE (DELTASONE) 5 MG tablet TAKE 1 TABLET EVERY OTHER DAY 45 tablet 3     pyridostigmine (MESTINON) 60 MG tablet TAKE 1 TABLET FOUR TIMES A  tablet 3     simvastatin (ZOCOR) 20 MG tablet TAKE 1 TABLET AT BEDTIME (DUE FOR A FASTING LAB APPOINTMENT) 30 tablet 0     acetaminophen (TYLENOL) 325 MG tablet Take 2 tablets (650 mg) by mouth once for 1 dose 2 tablet 0     cholecalciferol (VITAMIN D3) 22338 UNITS capsule Take 1 capsule (50,000 Units) by mouth once a week 8 capsule 0     pyridostigmine (MESTINON) 60 MG tablet Take 1 tablet (60 mg) by mouth 3 times daily (Patient taking differently: Take 60 mg by mouth 4 times daily ) 270 tablet 3       Allergies   Allergen Reactions     Nkda [No Known Drug Allergies]      Review of Systems:  -Skin: As above in HPI. No additional skin concerns.  -Const: The patient is generally feeling well today.     Physical exam:  - This is a well developed, well-nourished male in no acute distress, in a pleasant mood.    SKIN: Total skin excluding the undergarment areas was performed. The exam included the head/face, neck, both arms, chest, back, abdomen, both legs, digits and/or nails.   - There is no erythema, telangectasias, nodularity, or pigmentation on the sites of prior skin cancer.  - Faint salmon patch at the site of prior lesion on the left ankle  - There are erythematous macules with overyling adherent scale on the crown x6, right preauricular, left preauricular, right lower leg  - There is an erythematous patch with overlying scale on the  right cheek, 1 cm  - On the central chest, there is a 5 mm erythematous macule with overlying scale  - There are waxy stuck on tan to brown papules on the trunk.  -left ear normal  - No other lesions of concern on areas examined.     Impression/Plan:  1. Hx of NMSC    Recommend sunscreens SPF #30 or greater, protective clothing and avoidance of tanning beds.    2. Seborrheic Keratosis, non irritated, solar lentigines     Benign nature discussed. No further intervention required.     3. HAK, central forehead s/p biopsy - resolved    No further intervention needed.    4. Actinic Keratosis, crown x6, right preauricular, left preauricular, right lower leg  Cryotherapy procedure note: After verbal consent and discussion of risks and benefits including but no limited to dyspigmentation/scar, blister, and pain, 9 was(were) treated with 1-2mm freeze border for 2 cycles with liquid nitrogen. Post cryotherapy instructions were provided.   Discussed risks and benefits of PDT including but not limited to discomfort and pain with procedure, time for procedure, need for avoidance of sun exposure after treatment, expected irritation after treatment, risk of exuberant reaction. Consent obtained in clinic today.     5. Faint salmon patch at the site of prior lesion on the left ankle    Will recheck in 3 months - it it changes, we will consider biopsy.     6. There is an erythematous patch with overlying scale on the right cheek, 1 cm. Has failed cryotherapy in the past. Differential includes AK vs other.     Shave biopsy:  After discussion of benefits and risks including but not limited to bleeding/bruising, pain/swelling, infection, scar, incomplete removal, nerve damage/numbness, recurrence, and non-diagnostic biopsy, written consent, verbal consent and photographs were obtained. Time-out was performed. The area was cleaned with isopropyl alcohol. 0.5mL of 1% lidocaine with epinephrine was injected to obtain adequate anesthesia of  the lesion on the right cheek. A shave biopsy was performed. Hemostasis was achieved with aluminium chloride. Vaseline and a sterile dressing were applied. The patient tolerated the procedure and no complications were noted. The patient was provided with verbal and written post care instructions.     7. On the central chest, there is a 5 mm erythematous macule with overlying scale. NUB. Differential includes AK vs other.    Shave biopsy:  After discussion of benefits and risks including but not limited to bleeding/bruising, pain/swelling, infection, scar, incomplete removal, nerve damage/numbness, recurrence, and non-diagnostic biopsy, written consent, verbal consent and photographs were obtained. Time-out was performed. The area was cleaned with isopropyl alcohol. 0.5mL of 1% lidocaine with epinephrine was injected to obtain adequate anesthesia of the lesion on the central chest. A shave biopsy was performed. Hemostasis was achieved with aluminium chloride. Vaseline and a sterile dressing were applied. The patient tolerated the procedure and no complications were noted. The patient was provided with verbal and written post care instructions.     Follow-up in 3 months, recheck left lower leg spot check  Staff Involved:  Scribe/Staff    Scribe Disclosure  I, Darwin Miller, am serving as a scribe to document services personally performed by Dr. Brooklyn Treviño MD, based on data collection and the provider's statements to me.     Provider Disclosure:   The documentation recorded by the scribe accurately reflects the services I personally performed and the decisions made by me.    Brooklyn Treviño MD    Department of Dermatology  [facility][note 3]: Phone: [phone], Fax:[phone]  [facility]: Phone: [phone], Fax: [phone]

## 2019-01-08 NOTE — NURSING NOTE
Gustavo Ramon's goals for this visit include:   Chief Complaint   Patient presents with     RECHECK     Gustavo is returning for a 6 month recheck; severl areas of concern on the scalp, ears, chest, and left ankle.        He requests these members of his care team be copied on today's visit information:     PCP: Winston Silverman    Referring Provider:  No referring provider defined for this encounter.    There were no vitals taken for this visit.    Do you need any medication refills at today's visit? No  Shama Warren LPN

## 2019-01-08 NOTE — PATIENT INSTRUCTIONS
Wound Care After a Biopsy    What is a skin biopsy?  A skin biopsy allows the doctor to examine a very small piece of tissue under the microscope to determine the diagnosis and the best treatment for the skin condition. A local anesthetic (numbing medicine)  is injected with a very small needle into the skin area to be tested. A small piece of skin is taken from the area. Sometimes a suture (stitch) is used.     What are the risks of a skin biopsy?  I will experience scar, bleeding, swelling, pain, crusting and redness. I may experience incomplete removal or recurrence. Risks of this procedure are excessive bleeding, bruising, infection, nerve damage, numbness, thick (hypertrophic or keloidal) scar and non-diagnostic biopsy.    How should I care for my wound for the first 24 hours?    Keep the wound dry and covered for 24 hours    If it bleeds, hold direct pressure on the area for 15 minutes. If bleeding does not stop then go to the emergency room    Avoid strenuous exercise the first 1-2 days or as your doctor instructs you    How should I care for the wound after 24 hours?    After 24 hours, remove the bandage    You may bathe or shower as normal    If you had a scalp biopsy, you can shampoo as usual and can use shower water to clean the biopsy site daily    Clean the wound twice a day with gentle soap and water    Do not scrub, be gentle    Apply white petroleum/Vaseline after cleaning the wound with a cotton swab or a clean finger, and keep the site covered with a Bandaid /bandage. Bandages are not necessary with a scalp biopsy    If you are unable to cover the site with a Bandaid /bandage, re-apply ointment 2-3 times a day to keep the site moist. Moisture will help with healing    Avoid strenuous activity for first 1-2 days    Avoid lakes, rivers, pools, and oceans until the stitches are removed or the site is healed    How do I clean my wound?    Wash hands thoroughly with soap or use hand  before all  wound care    Clean the wound with gentle soap and water    Apply white petroleum/Vaseline  to wound after it is clean    Replace the Bandaid /bandage to keep the wound covered for the first few days or as instructed by your doctor    If you had a scalp biopsy, warm shower water to the area on a daily basis should suffice    What should I use to clean my wound?     Cotton-tipped applicators (Qtips )    White petroleum jelly (Vaseline ). Use a clean new container and use Q-tips to apply.    Bandaids   as needed    Gentle soap     How should I care for my wound long term?    Do not get your wound dirty    Keep up with wound care for one week or until the area is healed.    A small scab will form and fall off by itself when the area is completely healed. The area will be red and will become pink in color as it heals. Sun protection is very important for how your scar will turn out. Sunscreen with an SPF 30 or greater is recommended once the area is healed.    You should have some soreness but it should be mild and slowly go away over several days. Talk to your doctor about using tylenol for pain,    When should I call my doctor?  If you have increased:     Pain or swelling    Pus or drainage (clear or slightly yellow drainage is ok)    Temperature over 100F    Spreading redness or warmth around wound    When will I hear about my results?  The biopsy results can take 2-3 weeks to come back. The clinic will call you with the results, send you a [facility] message, or have you schedule a follow-up clinic or phone time to discuss the results. Contact our clinics if you do not hear from us in 3 weeks.     Who should I call with questions?    [facility]: [phone]     [facility]: [phone]    For urgent needs outside of business hours call the [facility] at [phone] and ask for the dermatology resident on call  Cryotherapy    What is it?    Use of  a very cold liquid, such as liquid nitrogen, to freeze and destroy abnormal skin cells that need to be removed    What should I expect?    Tenderness and redness    A small blister that might grow and fill with dark purple blood. There may be crusting.    More than one treatment may be needed if the lesions do not go away.    How do I care for the treated area?    Gently wash the area with your hands when bathing.    Use a thin layer of Vaseline to help with healing. You may use a Band-Aid.     The area should heal within 7-10 days and may leave behind a pink or lighter color.     Do not use an antibiotic or Neosporin ointment.     You may take acetaminophen (Tylenol) for pain.     Call your Doctor if you have:    Severe pain    Signs of infection (warmth, redness, cloudy yellow drainage, and or a bad smell)    Questions or concerns    Who should I call with questions?       [facility]: [phone]       [facility]: [phone]       For urgent needs outside of business hours call the [facility] at [phone]        and ask for the dermatology resident on call

## 2019-01-10 ASSESSMENT — PAIN SCALES - GENERAL: PAINLEVEL: NO PAIN (0)

## 2019-01-10 NOTE — NURSING NOTE
The following medication was given:     MEDICATION:  Lidocaine with epinephrine 1% [id number]  ROUTE: SQ  SITE: see procedure note  DOSE: 2cc  LOT #: -[id number]  : Pau  EXPIRATION DATE: 1Aug2019  NDC#: 3074-2810-92   Was there drug waste? No  Multi-dose vial: Yes    Shama Warren LPN  January 10, 2019

## 2019-01-14 LAB — COPATH REPORT: NORMAL

## 2019-02-05 DIAGNOSIS — C61 MALIGNANT NEOPLASM OF PROSTATE (H): ICD-10-CM

## 2019-02-05 LAB — PSA SERPL-MCNC: 1.03 UG/L (ref 0–4)

## 2019-02-05 PROCEDURE — 84153 ASSAY OF PSA TOTAL: CPT | Performed by: UROLOGY

## 2019-02-05 PROCEDURE — 36415 COLL VENOUS BLD VENIPUNCTURE: CPT | Performed by: UROLOGY

## 2019-02-12 ENCOUNTER — OFFICE VISIT (OUTPATIENT)
Dept: UROLOGY | Facility: CLINIC | Age: 79
End: 2019-02-12
Payer: MEDICARE

## 2019-02-12 VITALS — DIASTOLIC BLOOD PRESSURE: 70 MMHG | HEART RATE: 68 BPM | SYSTOLIC BLOOD PRESSURE: 112 MMHG | RESPIRATION RATE: 16 BRPM

## 2019-02-12 DIAGNOSIS — C61 MALIGNANT NEOPLASM OF PROSTATE (H): Primary | ICD-10-CM

## 2019-02-12 PROCEDURE — 99213 OFFICE O/P EST LOW 20 MIN: CPT | Performed by: UROLOGY

## 2019-02-12 NOTE — PROGRESS NOTES
Chief Complaint   Patient presents with     RECHECK       Gustavopadmini Ramon is a 78 year old male who presents today for follow up of   Chief Complaint   Patient presents with     RECHECK    f/u for prostate cancer.  He is s/p cryotherapy on 7/13 for prostate cancer kari 8 with initial psa of 7.4.  He did receive a hormonal shot prior to treatment.  His psa has gone up slowly from 0.27 (2/14) to 0.66 (6/14) and most recently 1.03 and then 1.09 and 0.77 ,1.05, 1.17, 1.21and now 1.03.  He has some urgency of urination but is doing better.  He denies any incontinence/dysuria/hematuria.    Current Outpatient Medications   Medication Sig Dispense Refill     acetaminophen (TYLENOL) 325 MG tablet Take 2 tablets (650 mg) by mouth once for 1 dose 2 tablet 0     aspirin 325 MG tablet Take 325 mg by mouth daily       Cholecalciferol (VITAMIN D) 2000 UNITS tablet Take 2,000 Units by mouth daily       cyanocolbalamin (VITAMIN  B-12) 1000 MCG tablet Take 1 tablet by mouth daily       hydrochlorothiazide (HYDRODIURIL) 25 MG tablet TAKE ONE-HALF (1/2) TABLET DAILY 45 tablet 1     lisinopril (PRINIVIL/ZESTRIL) 5 MG tablet TAKE 1 TABLET DAILY (DUE FOR APPOINTMENT FOR FURTHER REFILLS) 90 tablet 1     Multiple Vitamins-Minerals (PRESERVISION/LUTEIN PO) Take 60 mg by mouth daily two tablets daily       omeprazole (PRILOSEC) 20 MG CR capsule TAKE 1 CAPSULE DAILY (CONCOMITANT TO PREDNISONE USE) 90 capsule 3     predniSONE (DELTASONE) 5 MG tablet TAKE 1 TABLET EVERY OTHER DAY 45 tablet 3     pyridostigmine (MESTINON) 60 MG tablet TAKE 1 TABLET FOUR TIMES A  tablet 3     simvastatin (ZOCOR) 20 MG tablet TAKE 1 TABLET AT BEDTIME (DUE FOR A FASTING LAB APPOINTMENT) 30 tablet 0     cholecalciferol (VITAMIN D3) 60607 UNITS capsule Take 1 capsule (50,000 Units) by mouth once a week 8 capsule 0     pyridostigmine (MESTINON) 60 MG tablet Take 1 tablet (60 mg) by mouth 3 times daily (Patient taking differently: Take 60 mg by mouth 4  times daily ) 270 tablet 3     Allergies   Allergen Reactions     Nkda [No Known Drug Allergies]       Past Medical History:   Diagnosis Date     Actinic keratosis      AK (actinic keratosis) 11/15/2012     Amaurosis fugax      Basal cell carcinoma 2009    R medial cheek/nose     Central serous retinopathy left    Eye     Diverticulosis 08/2006     DJD (degenerative joint disease)      GERD (gastroesophageal reflux disease)      Hearing loss     High frequency     History of nonmelanoma skin cancer      History of nonmelanoma skin cancer      HTN (hypertension)      Hyperlipidemia LDL goal < 130      Hyperplastic colon polyp 08/2006     IgA monoclonal gammopathy     IgA kappa     Lattice degeneration of retina right    Eye     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 Father      Diabetes Grandchild         using a pump      Diabetes Paternal Uncle      Heart Disease Paternal Grandmother      Glaucoma No family hx of      Macular Degeneration No family hx of      Melanoma No family hx of      Skin Cancer No family hx of      History     Social History     Marital Status:      Spouse Name: Ceci     Number of Children: 2     Years of Education: 16     Occupational History      Retired     2001, Electrical     Social History Main Topics     Smoking status: Never Smoker      Smokeless tobacco: Never Used     Alcohol Use: No     Drug Use:  No     Sexual Activity: No     Other Topics Concern     Not on file     Social History Narrative       REVIEW OF SYSTEMS  =================  C: NEGATIVE for fever, chills, change in weight  I: NEGATIVE for worrisome rashes, moles or lesions  E/M: NEGATIVE for ear, mouth and throat problems  R: NEGATIVE for significant cough or SHORTNESS OF BREATH,   CV: NEGATIVE for chest pain, palpitations or peripheral edema  GI: NEGATIVE for nausea, abdominal pain, heartburn, or change in bowel habits  NEURO: NEGATIVE any motor/sensory changes  PSYCH: NEGATIVE for recent mood disorder    Physical Exam:  /70 (BP Location: Right arm, Patient Position: Chair, Cuff Size: Adult Regular)   Pulse 68   Resp 16    Patient is pleasant, in no acute distress, good general condition.  Lung: no evidence of respiratory distress    Abdomen: Soft, nondistended, non tender. No masses. No rebound or guarding.   Exam: penis no d/c.  Testis no masses.   No scrotal lesion.  Prostate flat small.  Skin: Warm and dry.  No redness.  Psych: normal mood and affect  Neuro: alert and oriented    Assessment/Plan:   (185) Malignant neoplasm of prostate  (primary encounter diagnosis)  Comment:  psa is stable  Plan: see in 12 months with psa.

## 2019-02-26 ENCOUNTER — OFFICE VISIT (OUTPATIENT)
Dept: FAMILY MEDICINE | Facility: CLINIC | Age: 79
End: 2019-02-26
Payer: MEDICARE

## 2019-02-26 VITALS
SYSTOLIC BLOOD PRESSURE: 119 MMHG | HEIGHT: 68 IN | BODY MASS INDEX: 26.98 KG/M2 | WEIGHT: 178 LBS | DIASTOLIC BLOOD PRESSURE: 70 MMHG | OXYGEN SATURATION: 97 % | TEMPERATURE: 98.1 F | HEART RATE: 70 BPM | RESPIRATION RATE: 16 BRPM

## 2019-02-26 DIAGNOSIS — E78.5 HYPERLIPIDEMIA LDL GOAL <130: ICD-10-CM

## 2019-02-26 DIAGNOSIS — K21.9 GASTROESOPHAGEAL REFLUX DISEASE WITHOUT ESOPHAGITIS: ICD-10-CM

## 2019-02-26 DIAGNOSIS — G70.00 OCULAR MYASTHENIA GRAVIS (H): ICD-10-CM

## 2019-02-26 DIAGNOSIS — G70.00 MYASTHENIA GRAVIS WITHOUT EXACERBATION (H): ICD-10-CM

## 2019-02-26 DIAGNOSIS — I10 BENIGN ESSENTIAL HYPERTENSION: ICD-10-CM

## 2019-02-26 DIAGNOSIS — I10 ESSENTIAL HYPERTENSION WITH GOAL BLOOD PRESSURE LESS THAN 140/90: Primary | ICD-10-CM

## 2019-02-26 LAB
ANION GAP SERPL CALCULATED.3IONS-SCNC: 5 MMOL/L (ref 3–14)
BUN SERPL-MCNC: 26 MG/DL (ref 7–30)
CALCIUM SERPL-MCNC: 9.1 MG/DL (ref 8.5–10.1)
CHLORIDE SERPL-SCNC: 107 MMOL/L (ref 94–109)
CHOLEST SERPL-MCNC: 163 MG/DL
CO2 SERPL-SCNC: 30 MMOL/L (ref 20–32)
CREAT SERPL-MCNC: 1.06 MG/DL (ref 0.66–1.25)
GFR SERPL CREATININE-BSD FRML MDRD: 66 ML/MIN/{1.73_M2}
GLUCOSE SERPL-MCNC: 91 MG/DL (ref 70–99)
HDLC SERPL-MCNC: 61 MG/DL
LDLC SERPL CALC-MCNC: 87 MG/DL
NONHDLC SERPL-MCNC: 102 MG/DL
POTASSIUM SERPL-SCNC: 3.9 MMOL/L (ref 3.4–5.3)
SODIUM SERPL-SCNC: 142 MMOL/L (ref 133–144)
TRIGL SERPL-MCNC: 76 MG/DL

## 2019-02-26 PROCEDURE — 80061 LIPID PANEL: CPT | Performed by: PHYSICIAN ASSISTANT

## 2019-02-26 PROCEDURE — 99214 OFFICE O/P EST MOD 30 MIN: CPT | Performed by: PHYSICIAN ASSISTANT

## 2019-02-26 PROCEDURE — 80048 BASIC METABOLIC PNL TOTAL CA: CPT | Performed by: PHYSICIAN ASSISTANT

## 2019-02-26 PROCEDURE — 36415 COLL VENOUS BLD VENIPUNCTURE: CPT | Performed by: PHYSICIAN ASSISTANT

## 2019-02-26 RX ORDER — PREDNISONE 5 MG/1
TABLET ORAL
Qty: 45 TABLET | Refills: 3 | Status: SHIPPED | OUTPATIENT
Start: 2019-02-26 | End: 2020-03-31

## 2019-02-26 RX ORDER — HYDROCHLOROTHIAZIDE 25 MG/1
12.5 TABLET ORAL DAILY
Qty: 45 TABLET | Refills: 1 | Status: SHIPPED | OUTPATIENT
Start: 2019-02-26 | End: 2019-09-19

## 2019-02-26 RX ORDER — PYRIDOSTIGMINE BROMIDE 60 MG/1
TABLET ORAL
Qty: 360 TABLET | Refills: 3 | Status: SHIPPED | OUTPATIENT
Start: 2019-02-26 | End: 2020-05-05

## 2019-02-26 RX ORDER — SIMVASTATIN 20 MG
TABLET ORAL
Qty: 30 TABLET | Refills: 0 | Status: SHIPPED | OUTPATIENT
Start: 2019-02-26 | End: 2019-03-18

## 2019-02-26 RX ORDER — LISINOPRIL 5 MG/1
TABLET ORAL
Qty: 90 TABLET | Refills: 1 | Status: SHIPPED | OUTPATIENT
Start: 2019-02-26 | End: 2019-09-01

## 2019-02-26 ASSESSMENT — MIFFLIN-ST. JEOR: SCORE: 1487.93

## 2019-02-26 NOTE — PROGRESS NOTES
SUBJECTIVE:   Gustavo Ramon is a 78 year old male who presents to clinic today for the following health issues:      Hyperlipidemia Follow-Up      Rate your low fat/cholesterol diet?: not monitoring fat    Taking statin?  Yes, no muscle aches from statin    Other lipid medications/supplements?:  none    Hypertension Follow-up      Outpatient blood pressures are not being checked.    Low Salt Diet: not monitoring salt      Amount of exercise or physical activity: None    Problems taking medications regularly: No    Medication side effects: none    Diet: regular (no restrictions)      No chest pain/sob/palps  No edema.  Some minimal dysphagia.  More challenge with speaking with normal voice quality. Thickening of his saliva.   No choking events.   Problem list and histories reviewed & adjusted, as indicated.  Additional history: as documented    BP Readings from Last 3 Encounters:   02/26/19 119/70   02/12/19 112/70   09/06/18 111/68    Wt Readings from Last 3 Encounters:   02/26/19 80.7 kg (178 lb)   06/14/18 77.1 kg (170 lb)   03/26/18 80.3 kg (177 lb)                    Reviewed and updated as needed this visit by clinical staff  Tobacco  Allergies       Reviewed and updated as needed this visit by Provider         All other systems negative except as outline above  OBJECTIVE:  Eye exam - right eye normal lid, conjunctiva, cornea, pupil and fundus, left eye normal lid, conjunctiva, cornea, pupil and fundus.  Thyroid not palpable, not enlarged, no nodules detected.  CHEST:chest clear to IPPA, no tachypnea, retractions or cyanosis and S1, S2 normal, no murmur, no gallop, rate regular.  Foot exam - both sides normal; no swelling, tenderness or skin or vascular lesions. Color and temperature is normal. Sensation is intact. Peripheral pulses are palpable. Toenails are normal.    Gustavo was seen today for lipids and hypertension.    Diagnoses and all orders for this visit:    Essential hypertension with goal  blood pressure less than 140/90  -     Lipid panel reflex to direct LDL Fasting    Hyperlipidemia LDL goal <130  -     Basic metabolic panel  (Ca, Cl, CO2, Creat, Gluc, K, Na, BUN)  -     simvastatin (ZOCOR) 20 MG tablet; TAKE 1 TABLET AT BEDTIME (DUE FOR A FASTING LAB APPOINTMENT)    Benign essential hypertension  -     hydrochlorothiazide (HYDRODIURIL) 25 MG tablet; Take 0.5 tablets (12.5 mg) by mouth daily  -     lisinopril (PRINIVIL/ZESTRIL) 5 MG tablet; TAKE 1 TABLET DAILY (DUE FOR APPOINTMENT FOR FURTHER REFILLS)    Myasthenia gravis without exacerbation (H)  -     pyridostigmine (MESTINON) 60 MG tablet; TAKE 1 TABLET FOUR TIMES A DAY  -     NEUROLOGY ADULT REFERRAL    Ocular myasthenia gravis (H)  -     predniSONE (DELTASONE) 5 MG tablet; TAKE 1 TABLET EVERY OTHER DAY.    Gastroesophageal reflux disease without esophagitis  -     omeprazole (PRILOSEC) 20 MG DR capsule; TAKE 1 CAPSULE DAILY (CONCOMITANT TO PREDNISONE USE)    work on lifestyle modification  Advised supportive and symptomatic treatment.  Follow up with Provider - if condition persists or worsens.

## 2019-03-05 ENCOUNTER — OFFICE VISIT (OUTPATIENT)
Dept: OPHTHALMOLOGY | Facility: CLINIC | Age: 79
End: 2019-03-05
Payer: MEDICARE

## 2019-03-05 DIAGNOSIS — H25.13 NUCLEAR SCLEROSIS OF BOTH EYES: ICD-10-CM

## 2019-03-05 DIAGNOSIS — H35.52 MACULAR PIGMENT DEPOSIT: ICD-10-CM

## 2019-03-05 DIAGNOSIS — H52.4 PRESBYOPIA: ICD-10-CM

## 2019-03-05 DIAGNOSIS — H33.322 RETINAL HOLE, LEFT: ICD-10-CM

## 2019-03-05 DIAGNOSIS — H33.311 HORSESHOE TEAR OF RETINA WITHOUT DETACHMENT, RIGHT: ICD-10-CM

## 2019-03-05 DIAGNOSIS — H43.813 PVD (POSTERIOR VITREOUS DETACHMENT), BILATERAL: ICD-10-CM

## 2019-03-05 DIAGNOSIS — G70.00 OCULAR MYASTHENIA GRAVIS (H): Primary | ICD-10-CM

## 2019-03-05 PROCEDURE — 92015 DETERMINE REFRACTIVE STATE: CPT | Mod: GY | Performed by: STUDENT IN AN ORGANIZED HEALTH CARE EDUCATION/TRAINING PROGRAM

## 2019-03-05 PROCEDURE — 92014 COMPRE OPH EXAM EST PT 1/>: CPT | Performed by: STUDENT IN AN ORGANIZED HEALTH CARE EDUCATION/TRAINING PROGRAM

## 2019-03-05 ASSESSMENT — REFRACTION_WEARINGRX
OS_ADD: +1.50
OS_SPHERE: -1.25
OS_AXIS: 022
OD_AXIS: 160
OS_ADD: +2.50
OD_ADD: +2.50
OD_SPHERE: -1.00
SPECS_TYPE: PAL
OD_SPHERE: -1.75
OD_AXIS: 001
SPECS_TYPE: PAL
OD_CYLINDER: +0.75
OD_ADD: +1.50
OD_CYLINDER: +0.25
OS_CYLINDER: +1.00
OS_AXIS: 004
OS_SPHERE: -1.00
OS_CYLINDER: +0.50

## 2019-03-05 ASSESSMENT — VISUAL ACUITY
METHOD: SNELLEN - LINEAR
OD_PH_SC+: +2
OS_SC: 20/60 ECC
OD_SC: 20/50
OS_SC+: -2
OD_SC: 3+
OS_SC: 8-
OD_PH_SC: 20/40
OD_SC+: +1

## 2019-03-05 ASSESSMENT — REFRACTION_MANIFEST
OD_AXIS: 175
OS_SPHERE: -1.25
OS_ADD: +2.75
OD_ADD: +2.75
OS_CYLINDER: +1.00
OD_SPHERE: -2.00
OD_CYLINDER: +1.00
OS_AXIS: 176

## 2019-03-05 ASSESSMENT — TONOMETRY
IOP_METHOD: APPLANATION
OS_IOP_MMHG: 17
OD_IOP_MMHG: 15

## 2019-03-05 ASSESSMENT — CONF VISUAL FIELD
OS_NORMAL: 1
OD_NORMAL: 1

## 2019-03-05 ASSESSMENT — EXTERNAL EXAM - LEFT EYE: OS_EXAM: NORMAL

## 2019-03-05 ASSESSMENT — CUP TO DISC RATIO
OS_RATIO: 0.15
OD_RATIO: 0.2

## 2019-03-05 ASSESSMENT — SLIT LAMP EXAM - LIDS
COMMENTS: 1+ BLEPHARITIS, 1+ MEIBOMIAN GLAND DYSFUNCTION
COMMENTS: 1+ BLEPHARITIS, 1+ MEIBOMIAN GLAND DYSFUNCTION

## 2019-03-05 ASSESSMENT — EXTERNAL EXAM - RIGHT EYE: OD_EXAM: NORMAL

## 2019-03-05 NOTE — PROGRESS NOTES
Current Eye Medications:  PreserVision  2 tabs twice a day.  No eye drops.       Subjective:  Comprehensive Eye Exam.  Patient complains of a gradual decrease in vision, right eye (without correction), distance and near.  He feels his vision in his left eye has remained about the same.  No changes in his diplopia, it continues to be controlled with Prednisone and Mestinon dosages. Still on 5mg prednisone every other day.  His wife is able to read road signs from a further distance, than he is.  He is not wearing glasses for distance or near currently- he just avoids them because he doesn't like the inconvenience of wearing glasses.  He brought 2 pair of glasses today, but doesn't wear either of them.      Objective:  See Ophthalmology Exam.       Assessment:  Gustavo Ramon is a 78 year old male who presents with:   Encounter Diagnoses   Name Primary?     Ocular myasthenia gravis (H) Stable on prednisone and Mestinon.    Is working on getting established with a new neurologist after Dr. Law retired.      Nuclear sclerosis of both eyes      Retinal hole, left operculated      Macular pigment deposit      Horseshoe tear of retina without detachment,od  Old, stable     PVD (posterior vitreous detachment), bilateral        Plan:  Continue Preservision tablets    Glasses prescription given - optional. Recommend wearing glasses when driving.    Marjorie Mcclellan MD  [phone]

## 2019-03-05 NOTE — LETTER
3/5/2019       RE: Gustavo Ramon  [address] [facility] [address]      Dear Winston,    Thank you for referring your patient, Gustavo Ramon, to the [facility].     His eye exam is stable.  Please see a copy of my visit note below.     Current Eye Medications:  PreserVision  2 tabs twice a day.  No eye drops.       Subjective:  Comprehensive Eye Exam.  Patient complains of a gradual decrease in vision, right eye (without correction), distance and near.  He feels his vision in his left eye has remained about the same.  No changes in his diplopia, it continues to be controlled with Prednisone and Mestinon dosages. Still on 5mg prednisone every other day.  His wife is able to read road signs from a further distance, than he is.  He is not wearing glasses for distance or near currently- he just avoids them because he doesn't like the inconvenience of wearing glasses.  He brought 2 pair of glasses today, but doesn't wear either of them.      Objective:  See Ophthalmology Exam.       Assessment:  Gustavo Ramon is a 78 year old male who presents with:   Encounter Diagnoses   Name Primary?     Ocular myasthenia gravis (H) Stable on prednisone and Mestinon.    Is working on getting established with a new neurologist after Dr. Law retired.      Nuclear sclerosis of both eyes      Retinal hole, left operculated      Macular pigment deposit      Horseshoe tear of retina without detachment,od  Old, stable     PVD (posterior vitreous detachment), bilateral        Plan:  Continue Preservision tablets    Glasses prescription given - optional. Recommend wearing glasses when driving.    Marjorie Mcclellan MD  [phone]            Again, thank you for allowing me to participate in the care of your patient.        Sincerely,        Marjorie Mcclellan MD

## 2019-03-05 NOTE — PATIENT INSTRUCTIONS
Continue Preservision tablets    Glasses prescription given - optional. Recommend wearing glasses when driving.    Marjorie Mcclellan MD  [phone]

## 2019-03-25 ENCOUNTER — MYC MEDICAL ADVICE (OUTPATIENT)
Dept: FAMILY MEDICINE | Facility: CLINIC | Age: 79
End: 2019-03-25

## 2019-03-25 DIAGNOSIS — E55.9 VITAMIN D DEFICIENCY: Primary | ICD-10-CM

## 2019-03-25 DIAGNOSIS — E53.8 VITAMIN B12 DEFICIENCY (NON ANEMIC): ICD-10-CM

## 2019-04-02 RX ORDER — CHOLECALCIFEROL (VITAMIN D3) 50 MCG
1 TABLET ORAL DAILY
Qty: 90 TABLET | Refills: 1 | Status: ON HOLD | OUTPATIENT
Start: 2019-04-02

## 2019-04-02 RX ORDER — ANTIOX #8/OM3/DHA/EPA/LUT/ZEAX 250-2.5 MG
CAPSULE ORAL
Qty: 90 CAPSULE | Refills: 1 | Status: SHIPPED | OUTPATIENT
Start: 2019-04-02 | End: 2022-04-29

## 2019-04-02 RX ORDER — LANOLIN ALCOHOL/MO/W.PET/CERES
1000 CREAM (GRAM) TOPICAL DAILY
Qty: 90 TABLET | Refills: 1 | Status: ON HOLD | OUTPATIENT
Start: 2019-04-02

## 2019-04-03 ENCOUNTER — MYC MEDICAL ADVICE (OUTPATIENT)
Dept: FAMILY MEDICINE | Facility: CLINIC | Age: 79
End: 2019-04-03

## 2019-04-03 NOTE — LETTER
[facility] [place]  [address] [facility]  [place] [address]  Phone: [phone]    April 5, 2019        Gustavo Ramon  [address] [place] [address]  [place] [address]          To whom it may concern:    RE: Gustavo Chen has been under my care for several years now. He requires 3 over the counter vitamins to maintain a more optimal level of health and wellness. These include a vitamin B12 and vitamin D3 supplement to help prevent deficient levels of these vitamins. Also, preservision, which has been recommended by his ophthalmologist to help mitigate the progression of macular degeneration.     Please contact me for questions or concerns.      Sincerely,        [place] Silverman PA-C

## 2019-04-05 NOTE — TELEPHONE ENCOUNTER
Call hesham and find out why he is taking the preservision? Who recommended it. Also, confirm that he simply needs a note stating why he is taking the vit d3, b12 and preservision?    bhakti

## 2019-04-05 NOTE — TELEPHONE ENCOUNTER
"Spoke with patient, stating that his ophthalmologist advised him to take the preservision. It is being used to \"hold off on advanced macular degeneration\". Pt is taking 2 pills per day as directed by his ophthalmologist.    His neurologist Dr. Drew Law ([facility]) recommended the other OTC vitamins.    Per patient he needs a letter from [place] stating that these OTC are necessary to continue for optimal health for patient.     The letter is so that patient can get reimbursement.     Patient will  letter from clinic when ready.    Johanny Haynes, RN, BSN            "

## 2019-04-16 ENCOUNTER — OFFICE VISIT (OUTPATIENT)
Dept: DERMATOLOGY | Facility: CLINIC | Age: 79
End: 2019-04-16
Payer: MEDICARE

## 2019-04-16 DIAGNOSIS — D48.5 NEOPLASM OF UNCERTAIN BEHAVIOR OF SKIN: Primary | ICD-10-CM

## 2019-04-16 DIAGNOSIS — L57.0 ACTINIC KERATOSIS: ICD-10-CM

## 2019-04-16 DIAGNOSIS — R21 RASH: ICD-10-CM

## 2019-04-16 PROCEDURE — 11102 TANGNTL BX SKIN SINGLE LES: CPT | Performed by: DERMATOLOGY

## 2019-04-16 PROCEDURE — 99213 OFFICE O/P EST LOW 20 MIN: CPT | Mod: 25 | Performed by: DERMATOLOGY

## 2019-04-16 PROCEDURE — 88305 TISSUE EXAM BY PATHOLOGIST: CPT | Mod: TC | Performed by: DERMATOLOGY

## 2019-04-16 RX ORDER — TRIAMCINOLONE ACETONIDE 1 MG/G
CREAM TOPICAL
Qty: 45 G | Refills: 0 | Status: SHIPPED | OUTPATIENT
Start: 2019-04-16 | End: 2021-07-15

## 2019-04-16 ASSESSMENT — PAIN SCALES - GENERAL: PAINLEVEL: NO PAIN (0)

## 2019-04-16 NOTE — LETTER
4/16/2019         RE: Gustavo Ramon  [address] [facility] [address]        Dear Colleague,    Thank you for referring your patient, Gustavo Ramon, to the [facility]. Please see a copy of my visit note below.    [facility] Dermatology Note    Dermatology Problem List:  1.NMSC  - SCC, L infraorbital, Mohs scheduled   - BCC, R elbow, s/p ED & C 1/12/16  - BCC, L forearm, s/p excision 1/12/16  - BCC, R posterior shoulder and L posterior shoulder, s/p [place] 11/2015  - BCC, R side of nose per pathology, (R medial cheek per Dr. Christiansen's note 11/21/2011), s/p excision 5/12/2009   2. Actinic keratoses  - s/p Efudex 2015 & cryotherapy  3. Seborrheic dermatitis  - clobetasol 0.05% solution  4. MGUS     Encounter Date: Apr 16, 2019    CC:  Chief Complaint   Patient presents with     RECHECK     Spot check     History of Present Illness:  Mr. Gustavo Ramon is a 78 year old male with a history of NMSC who presents today for a skin check. The patient was last seen with 1/8/18 when he had a pigmented AK on the right cheek and an acantholytic and hypertrophic actinic keratosis on the central chest biopsied. Today, the patient reports that he has multiple areas of concern that he would like to address in addition to these, there was a patch on the left ankle we planned to recheck. The patient has also noticed lesions on the left forearm and chest he would like examined. Denies pain, pruritus in these areas. Otherwise feeling well.     Past Medical History:   Patient Active Problem List   Diagnosis     Rosacea     DJD (degenerative joint disease)     Ocular myasthenia gravis (H)     Macular pigment deposit     HYPERLIPIDEMIA LDL GOAL <130     IgA monoclonal gammopathy     Retinal hole OS, operculated     Horseshoe tear of retina without detachment OD     History  of basal cell carcinoma     Seborrhea     AK (actinic keratosis)     Malignant neoplasm of  prostate (H)     PVD (posterior vitreous detachment)     Amaurosis fugax by Hx neg carotid W/U     Advanced directives, counseling/discussion     Actinic keratosis     Peripheral neuropathy     Nuclear sclerosis of both eyes     Essential hypertension with goal blood pressure less than 140/90     Gastroesophageal reflux disease without esophagitis     Past Medical History:   Diagnosis Date     Actinic keratosis      AK (actinic keratosis) 11/15/2012     Amaurosis fugax      Basal cell carcinoma 2009    R medial cheek/nose     Central serous retinopathy left    Eye     Diverticulosis 08/2006     DJD (degenerative joint disease)      GERD (gastroesophageal reflux disease)      Hearing loss     High frequency     History of nonmelanoma skin cancer      History of nonmelanoma skin cancer      HTN (hypertension)      Hyperlipidemia LDL goal < 130      Hyperplastic colon polyp 08/2006     IgA monoclonal gammopathy     IgA kappa     Lattice degeneration of retina right    Eye     Nonsenile cataract      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    [place] consult     Rosacea      Vitreous detachment left    Eye     Past Surgical History:   Procedure Laterality Date     ARTHROSCOPY KNEE RT/LT Left Jan 2010    Knee     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     DISKECTOMY, LUMBAR, SINGLE SP  2003    L2-3     ENT SURGERY  1943    Tonsils      ENT SURGERY  2-22-12    Biopsy lesion right pinna     ENT SURGERY  2-24-12    Biopsy leson right pinna     SOFT TISSUE SURGERY  May 2010    Basal Cell/right side nose       Social History:  The patient is retired from working as an . He has a daughter and son  The patient denies use of tanning beds. He is [place].    Family History:  There is no family history of skin cancer.  Kept in chart for convenience.       Medications:  Current Outpatient Medications   Medication Sig Dispense Refill     aspirin 325 MG tablet Take 325 mg by mouth daily       cyanocobalamin (VITAMIN B-12) 1000 MCG  tablet Take 1 tablet (1,000 mcg) by mouth daily 90 tablet 1     hydrochlorothiazide (HYDRODIURIL) 25 MG tablet Take 0.5 tablets (12.5 mg) by mouth daily 45 tablet 1     lisinopril (PRINIVIL/ZESTRIL) 5 MG tablet TAKE 1 TABLET DAILY (DUE FOR APPOINTMENT FOR FURTHER REFILLS) 90 tablet 1     Multiple Vitamins-Minerals (PRESERVISION AREDS 2) CAPS Take 2 soft gel caps daily 90 capsule 1     omeprazole (PRILOSEC) 20 MG DR capsule TAKE 1 CAPSULE DAILY (CONCOMITANT TO PREDNISONE USE) 90 capsule 3     predniSONE (DELTASONE) 5 MG tablet TAKE 1 TABLET EVERY OTHER DAY. 45 tablet 3     pyridostigmine (MESTINON) 60 MG tablet TAKE 1 TABLET FOUR TIMES A  tablet 3     simvastatin (ZOCOR) 20 MG tablet TAKE 1 TABLET AT BEDTIME (DUE FOR A FASTING LAB APPOINTMENT) 90 tablet 1     vitamin D3 (CHOLECALCIFEROL) 2000 units tablet Take 1 tablet by mouth daily 90 tablet 1       Allergies   Allergen Reactions     Nkda [No Known Drug Allergies]      Review of Systems:  -Skin: As above in HPI. No additional skin concerns.  -Const: Otherwise feeling well, in usual state of health.     Physical exam:  - This is a well developed, well-nourished male in no acute distress, in a pleasant mood.    SKIN: Total skin excluding the undergarment areas was performed. The exam included the head/face, neck, both arms, chest, back, abdomen, both legs, digits and/or nails.   - There is no erythema, telangectasias, nodularity, or pigmentation on the sites of prior skin cancer.  - Eczematous patch on the left upper arm   - Left distal forearm, 1 cm erythematous patch with scale  - There are erythematous macules with overyling adherent scale on the cheeks, crown forearms.   - Erythema and scale on the chest   - Left ankle is clear  - Well healed biopsy scar, central chest  - No other lesions of concern on areas examined.     Impression/Plan:  1. Hx of NMSC    Recommend sunscreens SPF #30 or greater, protective clothing and avoidance of tanning beds.    2.   Actinic keratoses, including pigmented AK, right cheek, forearms, scalp    For the forehead, temples, cheeks, temples, crown, dorsal hands, forearms - Discussed risks and benefits of PDT including but not limited to discomfort and pain with procedure, time for procedure, need for avoidance of sun exposure after treatment, expected irritation after treatment, risk of exuberant reaction. Consent obtained in clinic today.  No history of shingles or HSV in treatment area.     3. Acantholyic/hypertrophic HAK, central chest s/p biopsy 1/8/19 - resolved.     No further intervention needed.     5. Faint salmon patch at the site of prior lesion on the left ankle - resolved.     No further intervention needed.      6. Eczematous patch on the left upper arm      Start triamcinolone 0.1% cream, apply BID for up to two weeks.     7. Left distal forearm, 1 cm erythematous patch with scale. Neoplasm of uncertain behavior. Differential includes AK vs SCC    Shave biopsy:  After discussion of benefits and risks including but not limited to bleeding/bruising, pain/swelling, infection, scar, incomplete removal, nerve damage/numbness, recurrence, and non-diagnostic biopsy, written consent, verbal consent and photographs were obtained. Time-out was performed. The area was cleaned with isopropyl alcohol. 0.5mL of 1% lidocaine with epinephrine was injected to obtain adequate anesthesia of the lesion on the left distal forearm. A shave biopsy was performed. Hemostasis was achieved with aluminium chloride. Vaseline and a sterile dressing were applied. The patient tolerated the procedure and no complications were noted. The patient was provided with verbal and written post care instructions.     8. Erythema and scale on the chest - consistent with early seborrheic dermatitis. RECHECK at follow up  9. On hydrochlorothiazide, reviewed possible increased risk of skin cancer, will have him review with Dr. Silverman.     .  Follow-up in 4 months.    Staff Involved:  Scribe/Staff    Scribe Disclosure  I, Darwin Miller, am serving as a scribe to document services personally performed by Dr. Brooklyn Treviño MD, based on data collection and the provider's statements to me.     Provider Disclosure:   The documentation recorded by the scribe accurately reflects the services I personally performed and the decisions made by me.    Brooklyn Treviño MD    Department of Dermatology  [facility]: Phone: [phone], Fax:[phone]  [facility] [place]: Phone: [phone], Fax: [phone]        Again, thank you for allowing me to participate in the care of your patient.        Sincerely,        Brooklyn Treviño MD

## 2019-04-16 NOTE — NURSING NOTE
Gustavo Ramon's goals for this visit include:   Chief Complaint   Patient presents with     RECHECK     Spot check       He requests these members of his care team be copied on today's visit information:     PCP: Winston Silverman    Referring Provider:  No referring provider defined for this encounter.    There were no vitals taken for this visit.    Do you need any medication refills at today's visit? No  Shama Warren LPN

## 2019-04-16 NOTE — PROGRESS NOTES
[facility] Dermatology Note    Dermatology Problem List:  1.NMSC  - SCC, L infraorbital, Mohs scheduled   - BCC, R elbow, s/p ED & C 1/12/16  - BCC, L forearm, s/p excision 1/12/16  - BCC, R posterior shoulder and L posterior shoulder, s/p Aldara 11/2015  - BCC, R side of nose per pathology, (R medial cheek per Dr. Christiansen's note 11/21/2011), s/p excision 5/12/2009   2. Actinic keratoses  - s/p Efudex 2015 & cryotherapy  3. Seborrheic dermatitis  - clobetasol 0.05% solution  4. MGUS     Encounter Date: Apr 16, 2019    CC:  Chief Complaint   Patient presents with     RECHECK     Spot check     History of Present Illness:  Mr. Gustavo Ramon is a 78 year old male with a history of NMSC who presents today for a skin check. The patient was last seen with 1/8/18 when he had a pigmented AK on the right cheek and an acantholytic and hypertrophic actinic keratosis on the central chest biopsied. Today, the patient reports that he has multiple areas of concern that he would like to address in addition to these, there was a patch on the left ankle we planned to recheck. The patient has also noticed lesions on the left forearm and chest he would like examined. Denies pain, pruritus in these areas. Otherwise feeling well.     Past Medical History:   Patient Active Problem List   Diagnosis     Rosacea     DJD (degenerative joint disease)     Ocular myasthenia gravis (H)     Macular pigment deposit     HYPERLIPIDEMIA LDL GOAL <130     IgA monoclonal gammopathy     Retinal hole OS, operculated     Horseshoe tear of retina without detachment OD     History  of basal cell carcinoma     Seborrhea     AK (actinic keratosis)     Malignant neoplasm of prostate (H)     PVD (posterior vitreous detachment)     Amaurosis fugax by Hx neg carotid W/U     Advanced directives, counseling/discussion     Actinic keratosis     Peripheral neuropathy     Nuclear sclerosis of both eyes     Essential hypertension with goal blood  pressure less than 140/90     Gastroesophageal reflux disease without esophagitis     Past Medical History:   Diagnosis Date     Actinic keratosis      AK (actinic keratosis) 11/15/2012     Amaurosis fugax      Basal cell carcinoma 2009    R medial cheek/nose     Central serous retinopathy left    Eye     Diverticulosis 08/2006     DJD (degenerative joint disease)      GERD (gastroesophageal reflux disease)      Hearing loss     High frequency     History of nonmelanoma skin cancer      History of nonmelanoma skin cancer      HTN (hypertension)      Hyperlipidemia LDL goal < 130      Hyperplastic colon polyp 08/2006     IgA monoclonal gammopathy     IgA kappa     Lattice degeneration of retina right    Eye     Nonsenile cataract      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    [facility] consult     Rosacea      Vitreous detachment left    Eye     Past Surgical History:   Procedure Laterality Date     ARTHROSCOPY KNEE RT/LT Left Jan 2010    Knee     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     DISKECTOMY, LUMBAR, SINGLE SP  2003    L2-3     ENT SURGERY  1943    Tonsils      ENT SURGERY  2-22-12    Biopsy lesion right pinna     ENT SURGERY  2-24-12    Biopsy leson right pinna     SOFT TISSUE SURGERY  May 2010    Basal Cell/right side nose       Social History:  The patient is retired from working as an . He has a daughter and son  The patient denies use of tanning beds. He is [place].    Family History:  There is no family history of skin cancer.  Kept in chart for convenience.       Medications:  Current Outpatient Medications   Medication Sig Dispense Refill     aspirin 325 MG tablet Take 325 mg by mouth daily       cyanocobalamin (VITAMIN B-12) 1000 MCG tablet Take 1 tablet (1,000 mcg) by mouth daily 90 tablet 1     hydrochlorothiazide (HYDRODIURIL) 25 MG tablet Take 0.5 tablets (12.5 mg) by mouth daily 45 tablet 1     lisinopril (PRINIVIL/ZESTRIL) 5 MG tablet TAKE 1 TABLET DAILY (DUE FOR APPOINTMENT FOR FURTHER REFILLS)  90 tablet 1     Multiple Vitamins-Minerals (PRESERVISION AREDS 2) CAPS Take 2 soft gel caps daily 90 capsule 1     omeprazole (PRILOSEC) 20 MG DR capsule TAKE 1 CAPSULE DAILY (CONCOMITANT TO PREDNISONE USE) 90 capsule 3     predniSONE (DELTASONE) 5 MG tablet TAKE 1 TABLET EVERY OTHER DAY. 45 tablet 3     pyridostigmine (MESTINON) 60 MG tablet TAKE 1 TABLET FOUR TIMES A  tablet 3     simvastatin (ZOCOR) 20 MG tablet TAKE 1 TABLET AT BEDTIME (DUE FOR A FASTING LAB APPOINTMENT) 90 tablet 1     vitamin D3 (CHOLECALCIFEROL) 2000 units tablet Take 1 tablet by mouth daily 90 tablet 1       Allergies   Allergen Reactions     Nkda [No Known Drug Allergies]      Review of Systems:  -Skin: As above in HPI. No additional skin concerns.  -Const: Otherwise feeling well, in usual state of health.     Physical exam:  - This is a well developed, well-nourished male in no acute distress, in a pleasant mood.    SKIN: Total skin excluding the undergarment areas was performed. The exam included the head/face, neck, both arms, chest, back, abdomen, both legs, digits and/or nails.   - There is no erythema, telangectasias, nodularity, or pigmentation on the sites of prior skin cancer.  - Eczematous patch on the left upper arm   - Left distal forearm, 1 cm erythematous patch with scale  - There are erythematous macules with overyling adherent scale on the cheeks, crown forearms.   - Erythema and scale on the chest   - Left ankle is clear  - Well healed biopsy scar, central chest  - No other lesions of concern on areas examined.     Impression/Plan:  1. Hx of NMSC    Recommend sunscreens SPF #30 or greater, protective clothing and avoidance of tanning beds.    2.  Actinic keratoses, including pigmented AK, right cheek, forearms, scalp    For the forehead, temples, cheeks, temples, crown, dorsal hands, forearms - Discussed risks and benefits of PDT including but not limited to discomfort and pain with procedure, time for procedure,  need for avoidance of sun exposure after treatment, expected irritation after treatment, risk of exuberant reaction. Consent obtained in clinic today.  No history of shingles or HSV in treatment area.     3. Acantholyic/hypertrophic HAK, central chest s/p biopsy 1/8/19 - resolved.     No further intervention needed.     5. Faint salmon patch at the site of prior lesion on the left ankle - resolved.     No further intervention needed.      6. Eczematous patch on the left upper arm      Start triamcinolone 0.1% cream, apply BID for up to two weeks.     7. Left distal forearm, 1 cm erythematous patch with scale. Neoplasm of uncertain behavior. Differential includes AK vs SCC    Shave biopsy:  After discussion of benefits and risks including but not limited to bleeding/bruising, pain/swelling, infection, scar, incomplete removal, nerve damage/numbness, recurrence, and non-diagnostic biopsy, written consent, verbal consent and photographs were obtained. Time-out was performed. The area was cleaned with isopropyl alcohol. 0.5mL of 1% lidocaine with epinephrine was injected to obtain adequate anesthesia of the lesion on the left distal forearm. A shave biopsy was performed. Hemostasis was achieved with aluminium chloride. Vaseline and a sterile dressing were applied. The patient tolerated the procedure and no complications were noted. The patient was provided with verbal and written post care instructions.     8. Erythema and scale on the chest - consistent with early seborrheic dermatitis. RECHECK at follow up  9. On hydrochlorothiazide, reviewed possible increased risk of skin cancer, will have him review with Dr. Silverman.     .  Follow-up in 4 months.   Staff Involved:  Scribe/Staff    Scribe Disclosure  I, Darwin Miller, am serving as a scribe to document services personally performed by Dr. Brooklyn Treviño MD, based on data collection and the provider's statements to me.     Provider Disclosure:   The documentation recorded  by the scribe accurately reflects the services I personally performed and the decisions made by me.    Brooklyn Treviño MD    Department of Dermatology  [facility]: Phone: [phone], Fax:[phone]  [facility]: Phone: [phone], Fax: [phone]

## 2019-04-17 ENCOUNTER — DOCUMENTATION ONLY (OUTPATIENT)
Dept: DERMATOLOGY | Facility: CLINIC | Age: 79
End: 2019-04-17

## 2019-04-17 NOTE — PROGRESS NOTES
Financial Counselor Review:    Procedure to be performed (include CPT code): Photodynamic Therapy - 84244    Diagnosis code (include ICD-10 code): Actinic keratosis [L57.0]     Medication order (include J code):Yes Levulan -     Medication dose and frequency: 1-2 Levulan Sticks    Cosmetic procedure/medication: No    Coverage and patient financial responsibility information: YES    Does patient need to be contacted by Financial Counselor: YES    Route response back to the dermatology patient care [place] - [id number]

## 2019-04-19 NOTE — PROGRESS NOTES
Patient has Medicare as Primary and  for life, this will follow Medicare guidelines and no Pre-auth or Pre-d is needed on this plan. Patient will have a small or very little bill since he had two insurances on here.     Called and left vm to call us back if he would like benefits or if he wants to schedule this.

## 2019-04-22 LAB — COPATH REPORT: NORMAL

## 2019-04-24 NOTE — PROGRESS NOTES
I spoke with Stephen and reviewed PDT.  He will call us back when he is ready to schedule.    Marsha Dahl RN

## 2019-04-29 ENCOUNTER — TELEPHONE (OUTPATIENT)
Dept: DERMATOLOGY | Facility: CLINIC | Age: 79
End: 2019-04-29

## 2019-04-29 NOTE — TELEPHONE ENCOUNTER
Notes recorded by Josie Fontaine LPN on 4/29/2019 at 3:30 PM CDT  Called and informed patient of results. Patient verbalized understanding and had no further questions or concerns. Patient scheduled previously for another spot check in august therefore will have spot on forearm checked at that time.    Josie Fontaine LPN    ------    Notes recorded by Shama Warren LPN on 4/25/2019 at 9:43 AM CDT  Writer left a voice message to return office call from [employer] [place] at [phone].    Shama Warren LPN  ------    Notes recorded by Brooklyn Treviño MD on 4/24/2019 at 4:52 PM CDT  Precancer, recheck within 6 months   Dermatological path order and indications   Order: [id number]   Status:  Final result   Visible to patient:  Yes (MyChart) Dx:  Neoplasm of uncertain behavior of skin   Component 13d ago   Copath Report Patient Name: DAISHA LOZOYA   MR#: [id number]   Specimen #: [id number]   Collected: 4/16/2019   Received: 4/17/2019   Reported: 4/22/2019 14:32   Ordering Phy(s): BROOKLYN TREVIÑO     For improved result formatting, select 'View Enhanced Report Format' under    Linked Documents section.     SPECIMEN(S):   Skin, left distal forearm     FINAL DIAGNOSIS:   Skin, left distal forearm:   - Lichenoid actinic keratosis - (see comment)               Josie Fontaine LPN

## 2019-05-01 ENCOUNTER — TELEPHONE (OUTPATIENT)
Dept: NEUROLOGY | Facility: CLINIC | Age: 79
End: 2019-05-01

## 2019-05-01 NOTE — TELEPHONE ENCOUNTER
FUTURE VISIT INFORMATION        FUTURE VISIT INFORMATION:    Date: 5/2/19    Time:  0930    Location: [facility]   REFERRAL INFORMATION:    Referring provider:  Winston Silverman PA-C    Referring providers clinic:  [facility]    Reason for visit/diagnosis:  Myasthenia Gravis        NOTES (FOR ALL VISITS) STATUS DETAILS   OFFICE NOTE from referring provider     OFFICE NOTE from other specialist Printed  Dr. Law (neuro) 6/7/11, 1/28/14, 2/24/15, 9/15/15, 9/13/16, 11/22/16, 6/6/17, 6/20/17  Dr. Albarado ([facility]) 11/13/12  Dr. Ann (neuro) 5/5/14  Dr. Restrepo (neuro) 2/10/15, 8/12/14, 11/18/14  Dr. Mcclellan ([facility]) 3/1/18, 3/5/19  Heme/Onc notes 2/14/14 to 6/14/18  **will have pt sign for CE when arrives**   DISCHARGE SUMMARY from hospital      DISCHARGE REPORT from the ER     OPERATIVE REPORT      MEDICATION LIST In Epic     IMAGING  (FOR ALL VISITS)       EMG      EEG      MRI (HEAD, NECK, SPINE) Printed (images requested from [facility])  MRA [place] 8/24/16  MRA Carotid 8/24/16  MRI Brain 8/24/16                 OTHER

## 2019-05-02 ENCOUNTER — OFFICE VISIT (OUTPATIENT)
Dept: NEUROLOGY | Facility: CLINIC | Age: 79
End: 2019-05-02
Payer: MEDICARE

## 2019-05-02 VITALS
HEART RATE: 77 BPM | SYSTOLIC BLOOD PRESSURE: 116 MMHG | DIASTOLIC BLOOD PRESSURE: 75 MMHG | HEIGHT: 68 IN | TEMPERATURE: 97.2 F | WEIGHT: 177 LBS | BODY MASS INDEX: 26.83 KG/M2 | RESPIRATION RATE: 16 BRPM

## 2019-05-02 DIAGNOSIS — G70.00 OCULAR MYASTHENIA GRAVIS (H): Primary | ICD-10-CM

## 2019-05-02 PROCEDURE — 99358 PROLONG SERVICE W/O CONTACT: CPT | Performed by: PSYCHIATRY & NEUROLOGY

## 2019-05-02 PROCEDURE — 99204 OFFICE O/P NEW MOD 45 MIN: CPT | Performed by: PSYCHIATRY & NEUROLOGY

## 2019-05-02 ASSESSMENT — MIFFLIN-ST. JEOR: SCORE: 1483.4

## 2019-05-02 NOTE — NURSING NOTE
"Chief Complaint   Patient presents with     Myasthenia Gravis     consult       Initial /75 (BP Location: Right arm, Patient Position: Chair, Cuff Size: Adult Regular)   Pulse 77   Temp 97.2  F (36.2  C) (Tympanic)   Resp 16   Ht 1.721 m (5' 7.75\")   Wt 80.3 kg (177 lb)   BMI 27.11 kg/m   Estimated body mass index is 27.11 kg/m  as calculated from the following:    Height as of this encounter: 1.721 m (5' 7.75\").    Weight as of this encounter: 80.3 kg (177 lb).  Medications and allergies reviewed.    Yun SHORT CMA    "

## 2019-05-02 NOTE — LETTER
5/2/2019         RE: Gustavo Ramon  [address] [facility] [address]        Dear Colleague,    Thank you for referring your patient, Gustavo Ramon, to the [facility]. Please see a copy of my visit note below.    INITIAL NEUROLOGY CONSULTATION    DATE OF VISIT: 5/2/2019  MRN: [id number]  PATIENT NAME: Gustavo Ramon  YOB: 1940    REFERRING PROVIDER: No ref. provider found    Chief Complaint   Patient presents with     Myasthenia Gravis     consult       SUBJECTIVE:                                                      HPI:   Gustavo Ramon is a 78 year old male whom I was asked by Winston Silverman PA-C, to see in consultation for myasthenia gravis. Per chart review, The patient saw Dr. Law for the same in 2011. 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 He had an episode of double vision in 2003, self-resolving. Then had a similar event in 2007 which led to diagnosis. He was on pyridostigmine then, but not taking as previously directed. Ach R abs reportedly negative. Striated antibodies positive. No thymoma on CT. Dr. Law noted he also had IgA kappa monoclonal gammopathy, which required further evaluation. Ophthalomologist was Dr. Albarado. In 2012 Dr. Albarado had concerns about amaurosis fugax symptoms in the patient and recommended carotid US.  US showed <50% stenosis in 11.2012. Neurology notes  in 2012, where Dr. Law noted that the patient was not taking the pyridostigmine as directed (maybe 1-2 per day). He had been intended to be on the medication 4 times daily. He learned then that the patient also had been diagnosed with MGUS at [facility]. In 2014, the patient was seen back at [facility] for recurrence of diplopia treated with steroids. Plan was to taper the steroid over time, but symptoms reportedly resolved within days. He had another flare later that year, for which the prednisone was increased. Adjustments continued. No  steroid-sparing agent was tried at that time, as far as I can tell.  There was also concern about peripheral neuropathy along the way, and followed up with Dr. Law about this in 2015. He recommended lab work but did not find clear evidence of of peripheral neuropathy on exam. He did follow-up with hematology about the IGA gammopathy as well.     In 2016, Dr. Law saw the patient again for concerns regarding possible TIA. MRI/MRA unremarkable. No further spells upon follow-up. No PFO on echocardiogram. Next visit he complained about balance problems. Electromyogram, physical therapy and TSH were ordered. He saw ophthalmology last spring. Denied new symptoms. He reported controlled diplopia at a 3.2019 visit as well.     The patient is currently taking pyridostigmine 60mg QID and Prednisone 5mg every other day.    The patient tells me the double vision is under control. He says that they played around a lot with medications. He mentions that he takes prednisone every other day and omeprazole on those days. He clarifies that he does take pyridostigmine QID. He has no problems with the medications. He says he occasionally misses a dose of the pyridostigmine. He clarifies that he has not had to try an steroid-sparing agents.    He denies problems with breathing or swallowing. He has occasional problems with speech slowing down but these are brief. He does not think additional testing is warranted at this time.     He says it has been years since he has had a flare of his double vision.  He does still have some balance problems. For the most part he has been able to stay active. Physical therapy did help. No recent falls. He admits he has been more sedentary lately, but no distinct problems with weakness.     He mentions the event from 2016. He denies LOC with the injury in the boat. He tells me he was incontinent of urine twice. This occurred in setting of not feeling very hungry. He blackout out for a few seconds.  He says nothing like this has happened since.     Recent BMP was normal.       Past Medical History:   Diagnosis Date     Actinic keratosis      AK (actinic keratosis) 11/15/2012     Amaurosis fugax      Basal cell carcinoma 2009    R medial cheek/nose     Central serous retinopathy left    Eye     Diverticulosis 08/2006     DJD (degenerative joint disease)      GERD (gastroesophageal reflux disease)      Hearing loss     High frequency     History of nonmelanoma skin cancer      History of nonmelanoma skin cancer      HTN (hypertension)      Hyperlipidemia LDL goal < 130      Hyperplastic colon polyp 08/2006     IgA monoclonal gammopathy     IgA kappa     Lattice degeneration of retina right    Eye     Nonsenile cataract      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    [facility] consult     Rosacea      Vitreous detachment left    Eye     Past Surgical History:   Procedure Laterality Date     ARTHROSCOPY KNEE RT/LT Left Jan 2010    Knee     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     DISKECTOMY, LUMBAR, SINGLE SP  2003    L2-3     ENT SURGERY  1943    Tonsils      ENT SURGERY  2-22-12    Biopsy lesion right pinna     ENT SURGERY  2-24-12    Biopsy leson right pinna     SOFT TISSUE SURGERY  May 2010    Basal Cell/right side nose         Current Outpatient Medications on File Prior to Visit:  aspirin 325 MG tablet Take 325 mg by mouth daily   cyanocobalamin (VITAMIN B-12) 1000 MCG tablet Take 1 tablet (1,000 mcg) by mouth daily   hydrochlorothiazide (HYDRODIURIL) 25 MG tablet Take 0.5 tablets (12.5 mg) by mouth daily   lisinopril (PRINIVIL/ZESTRIL) 5 MG tablet TAKE 1 TABLET DAILY (DUE FOR APPOINTMENT FOR FURTHER REFILLS)   Multiple Vitamins-Minerals (PRESERVISION AREDS 2) CAPS Take 2 soft gel caps daily   omeprazole (PRILOSEC) 20 MG DR capsule TAKE 1 CAPSULE DAILY (CONCOMITANT TO PREDNISONE USE)   predniSONE (DELTASONE) 5 MG tablet TAKE 1 TABLET EVERY OTHER DAY.   pyridostigmine (MESTINON) 60 MG tablet TAKE 1 TABLET FOUR TIMES A  "DAY   simvastatin (ZOCOR) 20 MG tablet TAKE 1 TABLET AT BEDTIME (DUE FOR A FASTING LAB APPOINTMENT)   triamcinolone (KENALOG) 0.1 % external cream Apply twice daily for 2 weeks to rash on left upper arm   vitamin D3 (CHOLECALCIFEROL) 2000 units tablet Take 1 tablet by mouth daily     No current facility-administered medications on file prior to visit.   Allergies   Allergen Reactions     Nkda [No Known Drug Allergies]         Problem (# of Occurrences) Relation (Name,Age of Onset)    Alzheimer Disease (1) Mother (73)    C.A.D. (1) Father    Diabetes (2) Grandchild: using a pump , Paternal Uncle    Heart Disease (2) Mother, Paternal Grandmother       Negative family history of: Glaucoma, Macular Degeneration, Melanoma, Skin Cancer        Social History     Tobacco Use     Smoking status: Never Smoker     Smokeless tobacco: Never Used   Substance Use Topics     Alcohol use: No     Alcohol/week: 0.0 oz     Drug use: No       REVIEW OF SYSTEMS:                                                      10-point review of systems is negative except as mentioned above in HPI.     EXAM:                                                      Physical Exam:   Vitals: /75 (BP Location: Right arm, Patient Position: Chair, Cuff Size: Adult Regular)   Pulse 77   Temp 97.2  F (36.2  C) (Tympanic)   Resp 16   Ht 1.721 m (5' 7.75\")   Wt 80.3 kg (177 lb)   BMI 27.11 kg/m     BMI= Body mass index is 27.11 kg/m .  GENERAL: NAD.  HEENT: NC/AT.   CV: RRR. S1, S2.   NECK: No bruits.  Neurologic:  MENTAL STATUS: Alert, attentive. Speech is fluent, tangential. Normal comprehension. Normal concentration. Adequate fund of knowledge.   CRANIAL NERVES: Discs flat. Visual fields intact to confrontation. Pupils equally, round and reactive to light. Facial sensation and movement normal. EOM full. Hearing intact to conversation. Trapezius strength intact. Palate moves symmetrically. Tongue midline.  MOTOR: 5/5 in proximal and distal muscle " groups of upper and lower extremities. Tone and bulk normal.   DTRs: Intact and symmetric. Babinski down-going bilaterally.   SENSATION: Normal light touch and pinprick. Intact proprioception. Vibration: Decreased at both ankles.   COORDINATION: Normal finger nose finger. Finger tapping normal. Knee heel shin normal.  STATION AND GAIT: Romberg negative. Few seconds per single foot stand.     ASSESSMENT and PLAN:                                                      Assessment and Plan:     ICD-10-CM    1. Ocular myasthenia gravis (H) G70.00         Mr.. Ramon is a pleasant 77 yo man here for ocular myasthenia gravis. He has not had any flares of symptoms for years now, per patient. He relays no diplopia in recent passed. He is tolerating the Mestinon and Prednisone without difficulty so we will  Not make any changes for now. I would like to see Gustavo back in about 6 months. I asked him to let me know if the diplopia returns or if he develops any other concerns in the meantime. He understands and agrees with the plan.     Patient Instructions:  Continue the pyridostigmine 60mg four times daily.  Continue the prednisone 5mg every other day.   Annual eye exams.  Return to Neurology in 6 months. Let us know if any concerns arise in the meantime.     Total Time: 45 minutes were spent with the patient. Additional 45 minutes reviewing chart. More than 50% of the time spent on counseling (as described above in Assessment and Plan/Instructions) /coordinating the care.    Carmella Blake MD  Neurology    CC: Winston Silverman PA-C      Again, thank you for allowing me to participate in the care of your patient.        Sincerely,        Carmella Blake MD

## 2019-05-02 NOTE — PROGRESS NOTES
INITIAL NEUROLOGY CONSULTATION    DATE OF VISIT: 5/2/2019  MRN: [id number]  PATIENT NAME: Gustavo Ramon  YOB: 1940    REFERRING PROVIDER: No ref. provider found    Chief Complaint   Patient presents with     Myasthenia Gravis     consult       SUBJECTIVE:                                                      HPI:   Gustavo Ramon is a 78 year old male whom I was asked by Winston Silverman PA-C, to see in consultation for myasthenia gravis. Per chart review, The patient saw Dr. Law for the same in 2011. 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 He had an episode of double vision in 2003, self-resolving. Then had a similar event in 2007 which led to diagnosis. He was on pyridostigmine then, but not taking as previously directed. Ach R abs reportedly negative. Striated antibodies positive. No thymoma on CT. Dr. Law noted he also had IgA kappa monoclonal gammopathy, which required further evaluation. Ophthalomologist was Dr. Albarado. In 2012 Dr. Albarado had concerns about amaurosis fugax symptoms in the patient and recommended carotid US.  US showed <50% stenosis in 11.2012. Neurology notes  in 2012, where Dr. Law noted that the patient was not taking the pyridostigmine as directed (maybe 1-2 per day). He had been intended to be on the medication 4 times daily. He learned then that the patient also had been diagnosed with MGUS at [facility]. In 2014, the patient was seen back at [facility] for recurrence of diplopia treated with steroids. Plan was to taper the steroid over time, but symptoms reportedly resolved within days. He had another flare later that year, for which the prednisone was increased. Adjustments continued. No steroid-sparing agent was tried at that time, as far as I can tell.  There was also concern about peripheral neuropathy along the way, and followed up with Dr. Law about this in 2015. He recommended lab work but did not find clear evidence of of peripheral neuropathy  on exam. He did follow-up with hematology about the IGA gammopathy as well.     In 2016, Dr. Law saw the patient again for concerns regarding possible TIA. MRI/MRA unremarkable. No further spells upon follow-up. No PFO on echocardiogram. Next visit he complained about balance problems. Electromyogram, physical therapy and TSH were ordered. He saw ophthalmology last spring. Denied new symptoms. He reported controlled diplopia at a 3.2019 visit as well.     The patient is currently taking pyridostigmine 60mg QID and Prednisone 5mg every other day.    The patient tells me the double vision is under control. He says that they played around a lot with medications. He mentions that he takes prednisone every other day and omeprazole on those days. He clarifies that he does take pyridostigmine QID. He has no problems with the medications. He says he occasionally misses a dose of the pyridostigmine. He clarifies that he has not had to try an steroid-sparing agents.    He denies problems with breathing or swallowing. He has occasional problems with speech slowing down but these are brief. He does not think additional testing is warranted at this time.     He says it has been years since he has had a flare of his double vision.  He does still have some balance problems. For the most part he has been able to stay active. Physical therapy did help. No recent falls. He admits he has been more sedentary lately, but no distinct problems with weakness.     He mentions the event from 2016. He denies LOC with the injury in the boat. He tells me he was incontinent of urine twice. This occurred in setting of not feeling very hungry. He blackout out for a few seconds. He says nothing like this has happened since.     Recent BMP was normal.       Past Medical History:   Diagnosis Date     Actinic keratosis      AK (actinic keratosis) 11/15/2012     Amaurosis fugax      Basal cell carcinoma 2009    R medial cheek/nose     Central serous  retinopathy left    Eye     Diverticulosis 08/2006     DJD (degenerative joint disease)      GERD (gastroesophageal reflux disease)      Hearing loss     High frequency     History of nonmelanoma skin cancer      History of nonmelanoma skin cancer      HTN (hypertension)      Hyperlipidemia LDL goal < 130      Hyperplastic colon polyp 08/2006     IgA monoclonal gammopathy     IgA kappa     Lattice degeneration of retina right    Eye     Nonsenile cataract      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    [facility] consult     Rosacea      Vitreous detachment left    Eye     Past Surgical History:   Procedure Laterality Date     ARTHROSCOPY KNEE RT/LT Left Jan 2010    Knee     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     DISKECTOMY, LUMBAR, SINGLE SP  2003    L2-3     ENT SURGERY  1943    Tonsils      ENT SURGERY  2-22-12    Biopsy lesion right pinna     ENT SURGERY  2-24-12    Biopsy leson right pinna     SOFT TISSUE SURGERY  May 2010    Basal Cell/right side nose         Current Outpatient Medications on File Prior to Visit:  aspirin 325 MG tablet Take 325 mg by mouth daily   cyanocobalamin (VITAMIN B-12) 1000 MCG tablet Take 1 tablet (1,000 mcg) by mouth daily   hydrochlorothiazide (HYDRODIURIL) 25 MG tablet Take 0.5 tablets (12.5 mg) by mouth daily   lisinopril (PRINIVIL/ZESTRIL) 5 MG tablet TAKE 1 TABLET DAILY (DUE FOR APPOINTMENT FOR FURTHER REFILLS)   Multiple Vitamins-Minerals (PRESERVISION AREDS 2) CAPS Take 2 soft gel caps daily   omeprazole (PRILOSEC) 20 MG DR capsule TAKE 1 CAPSULE DAILY (CONCOMITANT TO PREDNISONE USE)   predniSONE (DELTASONE) 5 MG tablet TAKE 1 TABLET EVERY OTHER DAY.   pyridostigmine (MESTINON) 60 MG tablet TAKE 1 TABLET FOUR TIMES A DAY   simvastatin (ZOCOR) 20 MG tablet TAKE 1 TABLET AT BEDTIME (DUE FOR A FASTING LAB APPOINTMENT)   triamcinolone (KENALOG) 0.1 % external cream Apply twice daily for 2 weeks to rash on left upper arm   vitamin D3 (CHOLECALCIFEROL) 2000 units tablet Take 1 tablet by  "mouth daily     No current facility-administered medications on file prior to visit.   Allergies   Allergen Reactions     Nkda [No Known Drug Allergies]         Problem (# of Occurrences) Relation (Name,Age of Onset)    Alzheimer Disease (1) Mother (73)    C.A.D. (1) Father    Diabetes (2) Grandchild: using a pump , Paternal Uncle    Heart Disease (2) Mother, Paternal Grandmother       Negative family history of: Glaucoma, Macular Degeneration, Melanoma, Skin Cancer        Social History     Tobacco Use     Smoking status: Never Smoker     Smokeless tobacco: Never Used   Substance Use Topics     Alcohol use: No     Alcohol/week: 0.0 oz     Drug use: No       REVIEW OF SYSTEMS:                                                      10-point review of systems is negative except as mentioned above in HPI.     EXAM:                                                      Physical Exam:   Vitals: /75 (BP Location: Right arm, Patient Position: Chair, Cuff Size: Adult Regular)   Pulse 77   Temp 97.2  F (36.2  C) (Tympanic)   Resp 16   Ht 1.721 m (5' 7.75\")   Wt 80.3 kg (177 lb)   BMI 27.11 kg/m    BMI= Body mass index is 27.11 kg/m .  GENERAL: NAD.  HEENT: NC/AT.   CV: RRR. S1, S2.   NECK: No bruits.  Neurologic:  MENTAL STATUS: Alert, attentive. Speech is fluent, tangential. Normal comprehension. Normal concentration. Adequate fund of knowledge.   CRANIAL NERVES: Discs flat. Visual fields intact to confrontation. Pupils equally, round and reactive to light. Facial sensation and movement normal. EOM full. Hearing intact to conversation. Trapezius strength intact. Palate moves symmetrically. Tongue midline.  MOTOR: 5/5 in proximal and distal muscle groups of upper and lower extremities. Tone and bulk normal.   DTRs: Intact and symmetric. Babinski down-going bilaterally.   SENSATION: Normal light touch and pinprick. Intact proprioception. Vibration: Decreased at both ankles.   COORDINATION: Normal finger nose finger. " Finger tapping normal. Knee heel shin normal.  STATION AND GAIT: Romberg negative. Few seconds per single foot stand.     ASSESSMENT and PLAN:                                                      Assessment and Plan:     ICD-10-CM    1. Ocular myasthenia gravis (H) G70.00         Mr.. Ramon is a pleasant 77 yo man here for ocular myasthenia gravis. He has not had any flares of symptoms for years now, per patient. He relays no diplopia in recent passed. He is tolerating the Mestinon and Prednisone without difficulty so we will  Not make any changes for now. I would like to see Gustavo back in about 6 months. I asked him to let me know if the diplopia returns or if he develops any other concerns in the meantime. He understands and agrees with the plan.     Patient Instructions:  Continue the pyridostigmine 60mg four times daily.  Continue the prednisone 5mg every other day.   Annual eye exams.  Return to Neurology in 6 months. Let us know if any concerns arise in the meantime.     Total Time: 45 minutes were spent with the patient. Additional 45 minutes reviewing chart. More than 50% of the time spent on counseling (as described above in Assessment and Plan/Instructions) /coordinating the care.    Carmella Blake MD  Neurology    CC: Winston Silverman PA-C

## 2019-05-02 NOTE — PATIENT INSTRUCTIONS
Plan:    Continue the pyridostigmine 60mg four times daily.  Continue the prednisone 5mg every other day.   Annual eye exams.  Return to Neurology in 6 months. Let us know if any concerns arise in the meantime.

## 2019-08-07 ENCOUNTER — TELEPHONE (OUTPATIENT)
Dept: DERMATOLOGY | Facility: CLINIC | Age: 79
End: 2019-08-07

## 2019-08-07 NOTE — TELEPHONE ENCOUNTER
[facility] Call Center    Phone Message    May a detailed message be left on voicemail: yes    Reason for Call: Other: Pt called and requested to cancel his appt with Dr. Treviño on 8/13 as he wanted to go out of town.  He also wanted to schedule from order in April for his photodynamic therapy.  I called the clinic - no answer.  Please call pt to schedule and decide on whether he should cancel the 8/13 appt to see [facility].     Action Taken: Message routed to:  Adult Clinics: Dermatology p [phone]

## 2019-08-07 NOTE — TELEPHONE ENCOUNTER
I spoke with Stephen and rescheduled his full skin exam on October with recheck of AK on left forearm.    Patient wishes to start PDT in September.  I reviewed the PDT prep instructions with him.  I reminded him to bring a wide brimmed hat and the need for strict sun avoidance 7 days following the procedure.  Pt was advised to shave 24 hours prior to procedure.  He was notified to contact us if he is started on an antibiotic.    He verbalized understanding and agreed with the plan.    Marsha Dahl RN

## 2019-08-14 ENCOUNTER — MYC MEDICAL ADVICE (OUTPATIENT)
Dept: OPHTHALMOLOGY | Facility: CLINIC | Age: 79
End: 2019-08-14

## 2019-08-15 ENCOUNTER — ONCOLOGY VISIT (OUTPATIENT)
Dept: ONCOLOGY | Facility: CLINIC | Age: 79
End: 2019-08-15
Payer: MEDICARE

## 2019-08-15 VITALS
RESPIRATION RATE: 16 BRPM | OXYGEN SATURATION: 99 % | WEIGHT: 195.4 LBS | HEART RATE: 92 BPM | HEIGHT: 68 IN | SYSTOLIC BLOOD PRESSURE: 122 MMHG | BODY MASS INDEX: 29.61 KG/M2 | TEMPERATURE: 98.5 F | DIASTOLIC BLOOD PRESSURE: 73 MMHG

## 2019-08-15 DIAGNOSIS — D47.2 MONOCLONAL PARAPROTEINEMIA: ICD-10-CM

## 2019-08-15 DIAGNOSIS — D50.8 OTHER IRON DEFICIENCY ANEMIA: ICD-10-CM

## 2019-08-15 DIAGNOSIS — D64.9 NORMOCYTIC ANEMIA: Primary | ICD-10-CM

## 2019-08-15 DIAGNOSIS — D47.2 MGUS (MONOCLONAL GAMMOPATHY OF UNKNOWN SIGNIFICANCE): ICD-10-CM

## 2019-08-15 LAB
ALBUMIN SERPL-MCNC: 3.8 G/DL (ref 3.4–5)
ALP SERPL-CCNC: 71 U/L (ref 40–150)
ALT SERPL W P-5'-P-CCNC: 24 U/L (ref 0–70)
ANION GAP SERPL CALCULATED.3IONS-SCNC: 4 MMOL/L (ref 3–14)
AST SERPL W P-5'-P-CCNC: 20 U/L (ref 0–45)
BASOPHILS # BLD AUTO: 0.1 10E9/L (ref 0–0.2)
BASOPHILS NFR BLD AUTO: 1 %
BILIRUB SERPL-MCNC: 0.5 MG/DL (ref 0.2–1.3)
BUN SERPL-MCNC: 18 MG/DL (ref 7–30)
CALCIUM SERPL-MCNC: 8.7 MG/DL (ref 8.5–10.1)
CHLORIDE SERPL-SCNC: 109 MMOL/L (ref 94–109)
CO2 SERPL-SCNC: 28 MMOL/L (ref 20–32)
CREAT SERPL-MCNC: 1.01 MG/DL (ref 0.66–1.25)
DIFFERENTIAL METHOD BLD: ABNORMAL
EOSINOPHIL # BLD AUTO: 0.2 10E9/L (ref 0–0.7)
EOSINOPHIL NFR BLD AUTO: 3.3 %
ERYTHROCYTE [DISTWIDTH] IN BLOOD BY AUTOMATED COUNT: 14.6 % (ref 10–15)
FERRITIN SERPL-MCNC: 13 NG/ML (ref 26–388)
FOLATE SERPL-MCNC: 18.2 NG/ML
GFR SERPL CREATININE-BSD FRML MDRD: 69 ML/MIN/{1.73_M2}
GLUCOSE SERPL-MCNC: 91 MG/DL (ref 70–99)
HCT VFR BLD AUTO: 40 % (ref 40–53)
HGB BLD-MCNC: 12.6 G/DL (ref 13.3–17.7)
IMM GRANULOCYTES # BLD: 0 10E9/L (ref 0–0.4)
IMM GRANULOCYTES NFR BLD: 0.5 %
IRON SATN MFR SERPL: 14 % (ref 15–46)
IRON SERPL-MCNC: 52 UG/DL (ref 35–180)
LYMPHOCYTES # BLD AUTO: 1.1 10E9/L (ref 0.8–5.3)
LYMPHOCYTES NFR BLD AUTO: 18 %
MCH RBC QN AUTO: 26.5 PG (ref 26.5–33)
MCHC RBC AUTO-ENTMCNC: 31.5 G/DL (ref 31.5–36.5)
MCV RBC AUTO: 84 FL (ref 78–100)
MONOCYTES # BLD AUTO: 0.6 10E9/L (ref 0–1.3)
MONOCYTES NFR BLD AUTO: 10.3 %
NEUTROPHILS # BLD AUTO: 3.9 10E9/L (ref 1.6–8.3)
NEUTROPHILS NFR BLD AUTO: 66.9 %
PLATELET # BLD AUTO: 210 10E9/L (ref 150–450)
POTASSIUM SERPL-SCNC: 3.9 MMOL/L (ref 3.4–5.3)
PROT SERPL-MCNC: 7.1 G/DL (ref 6.8–8.8)
RBC # BLD AUTO: 4.76 10E12/L (ref 4.4–5.9)
SODIUM SERPL-SCNC: 141 MMOL/L (ref 133–144)
TIBC SERPL-MCNC: 373 UG/DL (ref 240–430)
TSH SERPL DL<=0.005 MIU/L-ACNC: 1.9 MU/L (ref 0.4–4)
VIT B12 SERPL-MCNC: 1436 PG/ML (ref 193–986)
WBC # BLD AUTO: 5.8 10E9/L (ref 4–11)

## 2019-08-15 PROCEDURE — 83883 ASSAY NEPHELOMETRY NOT SPEC: CPT | Performed by: INTERNAL MEDICINE

## 2019-08-15 PROCEDURE — 82746 ASSAY OF FOLIC ACID SERUM: CPT | Performed by: INTERNAL MEDICINE

## 2019-08-15 PROCEDURE — 83550 IRON BINDING TEST: CPT | Performed by: INTERNAL MEDICINE

## 2019-08-15 PROCEDURE — 99214 OFFICE O/P EST MOD 30 MIN: CPT | Performed by: INTERNAL MEDICINE

## 2019-08-15 PROCEDURE — 00000402 ZZHCL STATISTIC TOTAL PROTEIN: Performed by: INTERNAL MEDICINE

## 2019-08-15 PROCEDURE — 85025 COMPLETE CBC W/AUTO DIFF WBC: CPT | Performed by: INTERNAL MEDICINE

## 2019-08-15 PROCEDURE — 82607 VITAMIN B-12: CPT | Performed by: INTERNAL MEDICINE

## 2019-08-15 PROCEDURE — 82784 ASSAY IGA/IGD/IGG/IGM EACH: CPT | Performed by: INTERNAL MEDICINE

## 2019-08-15 PROCEDURE — 85045 AUTOMATED RETICULOCYTE COUNT: CPT | Performed by: INTERNAL MEDICINE

## 2019-08-15 PROCEDURE — 84443 ASSAY THYROID STIM HORMONE: CPT | Performed by: INTERNAL MEDICINE

## 2019-08-15 PROCEDURE — 80053 COMPREHEN METABOLIC PANEL: CPT | Performed by: INTERNAL MEDICINE

## 2019-08-15 PROCEDURE — 36415 COLL VENOUS BLD VENIPUNCTURE: CPT | Performed by: INTERNAL MEDICINE

## 2019-08-15 PROCEDURE — 40000611 ZZHCL STATISTIC MORPHOLOGY W/INTERP HEMEPATH TC 85060: Performed by: INTERNAL MEDICINE

## 2019-08-15 PROCEDURE — 84165 PROTEIN E-PHORESIS SERUM: CPT | Performed by: INTERNAL MEDICINE

## 2019-08-15 PROCEDURE — 82728 ASSAY OF FERRITIN: CPT | Performed by: INTERNAL MEDICINE

## 2019-08-15 PROCEDURE — 83540 ASSAY OF IRON: CPT | Performed by: INTERNAL MEDICINE

## 2019-08-15 ASSESSMENT — MIFFLIN-ST. JEOR: SCORE: 1561.96

## 2019-08-15 ASSESSMENT — PAIN SCALES - GENERAL: PAINLEVEL: MILD PAIN (2)

## 2019-08-15 NOTE — NURSING NOTE
"Oncology Rooming Note    August 15, 2019 12:35 PM   Gustavo Ramon is a 79 year old male who presents for:    Chief Complaint   Patient presents with     Oncology Clinic Visit     Follow up     Initial Vitals: /73 (BP Location: Right arm)   Pulse 92   Temp 98.5  F (36.9  C) (Oral)   Resp 16   Ht 1.721 m (5' 7.76\")   Wt 88.6 kg (195 lb 6.4 oz)   SpO2 99%   BMI 29.92 kg/m   Estimated body mass index is 29.92 kg/m  as calculated from the following:    Height as of this encounter: 1.721 m (5' 7.76\").    Weight as of this encounter: 88.6 kg (195 lb 6.4 oz). Body surface area is 2.06 meters squared.  Mild Pain (2) Comment: Data Unavailable   No LMP for male patient.  Allergies reviewed: Yes  Medications reviewed: Yes    Medications: Medication refills not needed today.  Pharmacy name entered into EPIC:    [facility] EPRESCRIB[facility] ONLY  [facility] [id number]  [facility] - [facility], [place] - [address]    Saskia Townsend LPN              "

## 2019-08-15 NOTE — LETTER
8/15/2019         RE: Gustavo Ramon  [address] [facility]  [place] [address]        Dear Colleague,    Thank you for referring your patient, Gustavo Ramon, to the [facility]. Please see a copy of my visit note below.    Hematology Follow-up visit:  Date on this visit: Aug 15, 2019        Referring physician:  Dr.Philip Yunior Wright   Primary Care Physician: DENISSE Jewell  Urologist: Dr. Jones  Neurologist: Dr. Law  Neurologist at [facility]: Dr. Conchis Restrepo.    Diagnosis:   1. Monoclonal gammopathy of unknown significance (MGUS), IgA kappa.   Apparently, he had M spike discovered incidentally in 2009 during evaluation of myasthenia gravis at [facility] in [place]. He has not had a bone marrow biopsy. Serum protein immunofixation electrophoresis showed the presence of  monoclonal IgA immunoglobulin of kappa light chain type as well as small monoclonal free immunoglobulin light chain of kappa chain type.  IgA level was elevated at 620 on 6/7/2011 and urine protein electrophoresis and urine immunofixation electrophoresis showed no M protein. IgA level has remained unchanged and M spike has been stable. He has been followed by Dr. Wright and in 04/2015 transferred his care to [facility].    2. Ocular MG    3. Hx of NMSC- -superficial BCC, right posterior shoulder and left posterior shoulder 11/2015- s/p aldara  -BCC, R elbow s/p ED and C 1/2016  -BCC, left forearm s/p excision 1/2016  -BCC, nodular and sclerosing, right side of nose per pathology ,(right medial cheek per Dr. Christiansen's note 11/21/2011), s/p excision 5/12/2009 Dr. Bansal    4. Prostate Cancer -  He has a history of prostate cancer (Dianna 8) and was treated with cryoablation surgery in 11/2013.  He is followed by Dr. Jones.      History Of Present Illness:  Mr. Ramon is a 79 year old male who presents for f/up of IgA kappa MGUS. He has been feeling very well.    He was seen by Dr. Jones in  February of 2019.    He was recommended to have PSA rechecked in 12 months.   His serum IGA level and M spike has remained stable.  urine protein electrophoresis/immunofixation showed no M spike.     Results for DAISHA LOZOYA (MRN [id number]) as of 9/4/2019 20:28   Ref. Range 5/25/2016 09:54 5/27/2016 05:26 5/25/2017 07:59 5/25/2017 08:14 5/25/2018 09:39 8/15/2019 12:40   IGA Latest Ref Range: 70 - 380 mg/dL 627 (H)  572 (H)  647 (H) 654 (H)   IGG Latest Ref Range: 695 - 1,620 mg/dL 795  867  980 881   IGM Latest Ref Range: 60 - 265 mg/dL 25 (L)  27 (L)  29 (L) 22 (L)   Immunofix ELP Urine Unknown  No monoclonal pro...  No monoclonal pro... No monoclonal pro...    Immunofixation ELP Unknown (Note)...        Kappa Free Lt Chain Latest Ref Range: 0.33 - 1.94 mg/dL 1.87  2.02 (H)  1.93 2.96 (H)   Kappa Lambda Ratio Latest Ref Range: 0.26 - 1.65  1.63  1.33  1.41 2.24 (H)   Lambda Free Lt Chain Latest Ref Range: 0.57 - 2.63 mg/dL 1.15  1.52  1.37 1.32   Monoclonal Peak Latest Ref Range: 0.0 g/dL 0.5 (H)  0.4 (H)  0.4 (H) 0.5 (H)      hemoglobin dropped down to 12.6 g/dL.  Iron studies consistent with iron deficiency anemia.    He also has a history of basal cell carcinoma of the skin of the right nose, and has had other multiple BCCs.  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  He follows with dermatology closely and was last seen by Dr. Treviño in April 2019. He has ocular myasthenia gravis which is controlled with pyridostigmine and low dose prednisone (5 mg PO QOD). He now follows with Dr. Blake. He  is a retired . He denies any bone pains, unexplained weight loss, night sweats or chills. He is staying active.  In addition, a complete 12 point  review of systems is negative.      Past Medical/Surgical History:  Past Medical History:   Diagnosis Date     Actinic keratosis      AK (actinic keratosis) 11/15/2012     Amaurosis fugax      Basal cell  carcinoma 2009    R medial cheek/nose     Central serous retinopathy left    Eye     Diverticulosis 08/2006     DJD (degenerative joint disease)      GERD (gastroesophageal reflux disease)      Hearing loss     High frequency     History of nonmelanoma skin cancer      History of nonmelanoma skin cancer      HTN (hypertension)      Hyperlipidemia LDL goal < 130      Hyperplastic colon polyp 08/2006     IgA monoclonal gammopathy     IgA kappa     Lattice degeneration of retina right    Eye     Nonsenile cataract      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    [facility] consult     Rosacea      Vitreous detachment left    Eye     Past Surgical History:   Procedure Laterality Date     ARTHROSCOPY KNEE RT/LT Left Jan 2010    Knee     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     DISKECTOMY, LUMBAR, SINGLE SP  2003    L2-3     ENT SURGERY  1943    Tonsils      ENT SURGERY  2-22-12    Biopsy lesion right pinna     ENT SURGERY  2-24-12    Biopsy leson right pinna     SOFT TISSUE SURGERY  May 2010    Basal Cell/right side nose     Allergies:  Allergies as of 08/15/2019 - Reviewed 05/02/2019   Allergen Reaction Noted     Nkda [no known drug allergies]  12/16/2009     Current Medications:  Current Outpatient Medications   Medication Sig Dispense Refill     aspirin 325 MG tablet Take 325 mg by mouth daily       cyanocobalamin (VITAMIN B-12) 1000 MCG tablet Take 1 tablet (1,000 mcg) by mouth daily 90 tablet 1     hydrochlorothiazide (HYDRODIURIL) 25 MG tablet Take 0.5 tablets (12.5 mg) by mouth daily 45 tablet 1     lisinopril (PRINIVIL/ZESTRIL) 5 MG tablet TAKE 1 TABLET DAILY (DUE FOR APPOINTMENT FOR FURTHER REFILLS) 90 tablet 1     Multiple Vitamins-Minerals (PRESERVISION AREDS 2) CAPS Take 2 soft gel caps daily 90 capsule 1     omeprazole (PRILOSEC) 20 MG DR capsule TAKE 1 CAPSULE DAILY (CONCOMITANT TO PREDNISONE USE) 90 capsule 3     predniSONE (DELTASONE) 5 MG tablet TAKE 1 TABLET EVERY OTHER DAY. 45 tablet 3     pyridostigmine  "(MESTINON) 60 MG tablet TAKE 1 TABLET FOUR TIMES A  tablet 3     simvastatin (ZOCOR) 20 MG tablet TAKE 1 TABLET AT BEDTIME (DUE FOR A FASTING LAB APPOINTMENT) 90 tablet 1     triamcinolone (KENALOG) 0.1 % external cream Apply twice daily for 2 weeks to rash on left upper arm 45 g 0     vitamin D3 (CHOLECALCIFEROL) 2000 units tablet Take 1 tablet by mouth daily 90 tablet 1      Physical Exam:      /73 (BP Location: Right arm)   Pulse 92   Temp 98.5  F (36.9  C) (Oral)   Resp 16   Ht 1.721 m (5' 7.76\")   Wt 88.6 kg (195 lb 6.4 oz)   SpO2 99%   BMI 29.92 kg/m         GENERAL APPEARANCE: healthy, alert and no distress       NECK: no adenopathy, no asymmetry or masses  CV: S1S2 reg  Resp: CTA b/l  Abdomen: +BS, soft, NT      MUSCULOSKELETAL: extremities normal- no gross deformities noted, no evidence of inflammation in joints, FROM in all extremities. No edema b/l LE.     SKIN: no suspicious lesions or rashes     PSYCHIATRIC: mentation appears normal and affect normal    Laboratory/Imaging Studies    Labs reviewed and documented in the EMR.    Results for DAISHA RAMON (MRN [id number]) as of 8/15/2019 12:27   Ref. Range 2/8/2016 07:56 2/6/2017 07:59 2/5/2018 07:54 2/5/2019 08:27   PSA Latest Ref Range: 0 - 4 ug/L 1.05 1.17 1.21 1.03       ASSESSMENT/PLAN:   Mr. 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     1. MGUS - M protein stable and IgA level relatively stable. Serum FLC ratio is mildly elevated. We'll check MGUS labs one more time with next visit. If FLC ratio is normal or stable at that time, we can discontinue MGUS lab follow-up, as risk of progression to a hematologic malignancy with M spike <1.5 g/sl and normal serum FLC ratio is very low, on the order of 5 % in 20 years.    2.  Normocytic " anemia-this is new compared to 14 months ago when his hemoglobin was normal.   B12 level, folate, iron, TIBC, ferritin, TSH, reticulocyte count checked and workup c/w iron deficiency anemia.  We will have patient see his PCP Winston Silverman and proceed with EGD and colonoscopy for evaluation of an occult blood loss.   He is on low dose prednisone for MG but is also on PPI.    After he has the procedures done he could start on oral ferrous sulfate 325 mg p.o. every other day and return in 3 months to see our NP, with repeat hemoglobin and iron studies.  3. Hx of prostate cancer- PSA  stable in 02/2019. F/up with Dr. Jones in 02/2020, with repeat PSA.    4. Hx of multiple BCC and squamous cell carcinoma in situ left infraorbital in June 2018- f/up with Dr. Treviño  5. Ocular Myasthenia Gravis- f/up with neurology. Cont low dose prednisone/Mestinon.  At the end of our visit patient verbalized understanding and concurred with the plan.    Addendum:  He was seen by PCP Winston Silverman on 9/3/2019. EGD and colonoscopy is planned.    Again, thank you for allowing me to participate in the care of your patient.        Sincerely,        Susan Eller MD, MD

## 2019-08-15 NOTE — PROGRESS NOTES
Hematology Follow-up visit:  Date on this visit: Aug 15, 2019        Referring physician:  Dr.Philip Yunior Wright   Primary Care Physician: DENISSE Jewell  Urologist: Dr. Jones  Neurologist: Dr. Law  Neurologist at [facility]: Dr. Conchis Restrepo.    Diagnosis:   1. Monoclonal gammopathy of unknown significance (MGUS), IgA kappa.   Apparently, he had M spike discovered incidentally in 2009 during evaluation of myasthenia gravis at [facility] in [place]. He has not had a bone marrow biopsy. Serum protein immunofixation electrophoresis showed the presence of  monoclonal IgA immunoglobulin of kappa light chain type as well as small monoclonal free immunoglobulin light chain of kappa chain type.  IgA level was elevated at 620 on 6/7/2011 and urine protein electrophoresis and urine immunofixation electrophoresis showed no M protein. IgA level has remained unchanged and M spike has been stable. He has been followed by Dr. Wright and in 04/2015 transferred his care to [place].    2. Ocular MG    3. Hx of NMSC- -superficial BCC, right posterior shoulder and left posterior shoulder 11/2015- s/p aldara  -BCC, R elbow s/p ED and C 1/2016  -BCC, left forearm s/p excision 1/2016  -BCC, nodular and sclerosing, right side of nose per pathology ,(right medial cheek per Dr. Christiansen's note 11/21/2011), s/p excision 5/12/2009 Dr. Bansal    4. Prostate Cancer -  He has a history of prostate cancer (Dianna 8) and was treated with cryoablation surgery in 11/2013.  He is followed by Dr. Jones.      History Of Present Illness:  Mr. Lozoya is a 79 year old male who presents for f/up of IgA kappa MGUS. He has been feeling very well.    He was seen by Dr. Jones in February of 2019.    He was recommended to have PSA rechecked in 12 months.   His serum IGA level and M spike has remained stable.  urine protein electrophoresis/immunofixation showed no M spike.     Results for DAISHA LOZOYA (MRN [id number]) as of 9/4/2019  20:28   Ref. Range 5/25/2016 09:54 5/27/2016 05:26 5/25/2017 07:59 5/25/2017 08:14 5/25/2018 09:39 8/15/2019 12:40   IGA Latest Ref Range: 70 - 380 mg/dL 627 (H)  572 (H)  647 (H) 654 (H)   IGG Latest Ref Range: 695 - 1,620 mg/dL 795  867  980 881   IGM Latest Ref Range: 60 - 265 mg/dL 25 (L)  27 (L)  29 (L) 22 (L)   Immunofix ELP Urine Unknown  No monoclonal pro...  No monoclonal pro... No monoclonal pro...    Immunofixation ELP Unknown (Note)...        Kappa Free Lt Chain Latest Ref Range: 0.33 - 1.94 mg/dL 1.87  2.02 (H)  1.93 2.96 (H)   Kappa Lambda Ratio Latest Ref Range: 0.26 - 1.65  1.63  1.33  1.41 2.24 (H)   Lambda Free Lt Chain Latest Ref Range: 0.57 - 2.63 mg/dL 1.15  1.52  1.37 1.32   Monoclonal Peak Latest Ref Range: 0.0 g/dL 0.5 (H)  0.4 (H)  0.4 (H) 0.5 (H)      hemoglobin dropped down to 12.6 g/dL.  Iron studies consistent with iron deficiency anemia.    He also has a history of basal cell carcinoma of the skin of the right nose, and has had other multiple BCCs.  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  He follows with dermatology closely and was last seen by Dr. Treviño in April 2019. He has ocular myasthenia gravis which is controlled with pyridostigmine and low dose prednisone (5 mg PO QOD). He now follows with Dr. Blake. He  is a retired . He denies any bone pains, unexplained weight loss, night sweats or chills. He is staying active.  In addition, a complete 12 point  review of systems is negative.      Past Medical/Surgical History:  Past Medical History:   Diagnosis Date     Actinic keratosis      AK (actinic keratosis) 11/15/2012     Amaurosis fugax      Basal cell carcinoma 2009    R medial cheek/nose     Central serous retinopathy left    Eye     Diverticulosis 08/2006     DJD (degenerative joint disease)      GERD (gastroesophageal reflux disease)      Hearing loss     High frequency     History of nonmelanoma skin cancer       History of nonmelanoma skin cancer      HTN (hypertension)      Hyperlipidemia LDL goal < 130      Hyperplastic colon polyp 08/2006     IgA monoclonal gammopathy     IgA kappa     Lattice degeneration of retina right    Eye     Nonsenile cataract      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    [place] consult     Rosacea      Vitreous detachment left    Eye     Past Surgical History:   Procedure Laterality Date     ARTHROSCOPY KNEE RT/LT Left Jan 2010    Knee     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     DISKECTOMY, LUMBAR, SINGLE SP  2003    L2-3     ENT SURGERY  1943    Tonsils      ENT SURGERY  2-22-12    Biopsy lesion right pinna     ENT SURGERY  2-24-12    Biopsy leson right pinna     SOFT TISSUE SURGERY  May 2010    Basal Cell/right side nose     Allergies:  Allergies as of 08/15/2019 - Reviewed 05/02/2019   Allergen Reaction Noted     Nkda [no known drug allergies]  12/16/2009     Current Medications:  Current Outpatient Medications   Medication Sig Dispense Refill     aspirin 325 MG tablet Take 325 mg by mouth daily       cyanocobalamin (VITAMIN B-12) 1000 MCG tablet Take 1 tablet (1,000 mcg) by mouth daily 90 tablet 1     hydrochlorothiazide (HYDRODIURIL) 25 MG tablet Take 0.5 tablets (12.5 mg) by mouth daily 45 tablet 1     lisinopril (PRINIVIL/ZESTRIL) 5 MG tablet TAKE 1 TABLET DAILY (DUE FOR APPOINTMENT FOR FURTHER REFILLS) 90 tablet 1     Multiple Vitamins-Minerals (PRESERVISION AREDS 2) CAPS Take 2 soft gel caps daily 90 capsule 1     omeprazole (PRILOSEC) 20 MG DR capsule TAKE 1 CAPSULE DAILY (CONCOMITANT TO PREDNISONE USE) 90 capsule 3     predniSONE (DELTASONE) 5 MG tablet TAKE 1 TABLET EVERY OTHER DAY. 45 tablet 3     pyridostigmine (MESTINON) 60 MG tablet TAKE 1 TABLET FOUR TIMES A  tablet 3     simvastatin (ZOCOR) 20 MG tablet TAKE 1 TABLET AT BEDTIME (DUE FOR A FASTING LAB APPOINTMENT) 90 tablet 1     triamcinolone (KENALOG) 0.1 % external cream Apply twice daily for 2 weeks to rash on left  "upper arm 45 g 0     vitamin D3 (CHOLECALCIFEROL) 2000 units tablet Take 1 tablet by mouth daily 90 tablet 1      Physical Exam:      /73 (BP Location: Right arm)   Pulse 92   Temp 98.5  F (36.9  C) (Oral)   Resp 16   Ht 1.721 m (5' 7.76\")   Wt 88.6 kg (195 lb 6.4 oz)   SpO2 99%   BMI 29.92 kg/m        GENERAL APPEARANCE: healthy, alert and no distress       NECK: no adenopathy, no asymmetry or masses  CV: S1S2 reg  Resp: CTA b/l  Abdomen: +BS, soft, NT      MUSCULOSKELETAL: extremities normal- no gross deformities noted, no evidence of inflammation in joints, FROM in all extremities. No edema b/l LE.     SKIN: no suspicious lesions or rashes     PSYCHIATRIC: mentation appears normal and affect normal    Laboratory/Imaging Studies    Labs reviewed and documented in the EMR.    Results for DAISHA RAMON (MRN [id number]) as of 8/15/2019 12:27   Ref. Range 2/8/2016 07:56 2/6/2017 07:59 2/5/2018 07:54 2/5/2019 08:27   PSA Latest Ref Range: 0 - 4 ug/L 1.05 1.17 1.21 1.03       ASSESSMENT/PLAN:   Mr. 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     1. MGUS - M protein stable and IgA level relatively stable. Serum FLC ratio is mildly elevated. We'll check MGUS labs one more time with next visit. If FLC ratio is normal or stable at that time, we can discontinue MGUS lab follow-up, as risk of progression to a hematologic malignancy with M spike <1.5 g/sl and normal serum FLC ratio is very low, on the order of 5 % in 20 years.    2.  Normocytic anemia-this is new compared to 14 months ago when his hemoglobin was normal.   B12 level, folate, iron, TIBC, ferritin, TSH, reticulocyte count checked and workup c/w iron deficiency anemia.  We will have patient see his PCP Winston Silverman and proceed with EGD and colonoscopy for " evaluation of an occult blood loss.   He is on low dose prednisone for MG but is also on PPI.    After he has the procedures done he could start on oral ferrous sulfate 325 mg p.o. every other day and return in 3 months to see our NP, with repeat hemoglobin and iron studies.  3. Hx of prostate cancer- PSA  stable in 02/2019. F/up with Dr. Jones in 02/2020, with repeat PSA.    4. Hx of multiple BCC and squamous cell carcinoma in situ left infraorbital in June 2018- f/up with Dr. Treviño  5. Ocular Myasthenia Gravis- f/up with neurology. Cont low dose prednisone/Mestinon.  At the end of our visit patient verbalized understanding and concurred with the plan.    Addendum:  He was seen by PCP Winston Silverman on 9/3/2019. EGD and colonoscopy is planned.

## 2019-08-15 NOTE — TELEPHONE ENCOUNTER
Will ask Dr. Mcclellan if we should see pt or should he see his neurologist.  I spoke with  and she said pt should contact his neurologist of a follow up appt. I called pt and to passed this info to him, pt stated he will be calling his neurologist.

## 2019-08-16 LAB
ALBUMIN SERPL ELPH-MCNC: 3.9 G/DL (ref 3.7–5.1)
ALPHA1 GLOB SERPL ELPH-MCNC: 0.3 G/DL (ref 0.2–0.4)
ALPHA2 GLOB SERPL ELPH-MCNC: 0.7 G/DL (ref 0.5–0.9)
B-GLOBULIN SERPL ELPH-MCNC: 0.8 G/DL (ref 0.6–1)
COPATH REPORT: NORMAL
GAMMA GLOB SERPL ELPH-MCNC: 1.2 G/DL (ref 0.7–1.6)
IGA SERPL-MCNC: 654 MG/DL (ref 70–380)
IGG SERPL-MCNC: 881 MG/DL (ref 695–1620)
IGM SERPL-MCNC: 22 MG/DL (ref 60–265)
KAPPA LC UR-MCNC: 2.96 MG/DL (ref 0.33–1.94)
KAPPA LC/LAMBDA SER: 2.24 {RATIO} (ref 0.26–1.65)
LAMBDA LC SERPL-MCNC: 1.32 MG/DL (ref 0.57–2.63)
M PROTEIN SERPL ELPH-MCNC: 0.5 G/DL
PROT PATTERN SERPL ELPH-IMP: ABNORMAL
RETICS # AUTO: 42.8 10E9/L (ref 25–95)
RETICS/RBC NFR AUTO: 0.9 % (ref 0.5–2)

## 2019-09-01 DIAGNOSIS — I10 BENIGN ESSENTIAL HYPERTENSION: ICD-10-CM

## 2019-09-03 ENCOUNTER — OFFICE VISIT (OUTPATIENT)
Dept: FAMILY MEDICINE | Facility: CLINIC | Age: 79
End: 2019-09-03
Payer: MEDICARE

## 2019-09-03 VITALS
SYSTOLIC BLOOD PRESSURE: 120 MMHG | DIASTOLIC BLOOD PRESSURE: 72 MMHG | HEIGHT: 68 IN | OXYGEN SATURATION: 97 % | HEART RATE: 69 BPM | RESPIRATION RATE: 18 BRPM | WEIGHT: 195 LBS | TEMPERATURE: 97.8 F | BODY MASS INDEX: 29.55 KG/M2

## 2019-09-03 DIAGNOSIS — R63.5 WEIGHT GAIN: ICD-10-CM

## 2019-09-03 DIAGNOSIS — K21.9 GASTROESOPHAGEAL REFLUX DISEASE WITHOUT ESOPHAGITIS: ICD-10-CM

## 2019-09-03 DIAGNOSIS — I10 ESSENTIAL HYPERTENSION WITH GOAL BLOOD PRESSURE LESS THAN 140/90: ICD-10-CM

## 2019-09-03 DIAGNOSIS — Z12.11 SPECIAL SCREENING FOR MALIGNANT NEOPLASMS, COLON: ICD-10-CM

## 2019-09-03 DIAGNOSIS — R71.0 DROP IN HEMOGLOBIN: Primary | ICD-10-CM

## 2019-09-03 LAB
BASOPHILS # BLD AUTO: 0.1 10E9/L (ref 0–0.2)
BASOPHILS NFR BLD AUTO: 0.6 %
DIFFERENTIAL METHOD BLD: ABNORMAL
EOSINOPHIL # BLD AUTO: 0.4 10E9/L (ref 0–0.7)
EOSINOPHIL NFR BLD AUTO: 5.4 %
ERYTHROCYTE [DISTWIDTH] IN BLOOD BY AUTOMATED COUNT: 14.9 % (ref 10–15)
HCT VFR BLD AUTO: 40 % (ref 40–53)
HGB BLD-MCNC: 12.7 G/DL (ref 13.3–17.7)
LYMPHOCYTES # BLD AUTO: 1.3 10E9/L (ref 0.8–5.3)
LYMPHOCYTES NFR BLD AUTO: 17.2 %
MCH RBC QN AUTO: 26.7 PG (ref 26.5–33)
MCHC RBC AUTO-ENTMCNC: 31.8 G/DL (ref 31.5–36.5)
MCV RBC AUTO: 84 FL (ref 78–100)
MONOCYTES # BLD AUTO: 1 10E9/L (ref 0–1.3)
MONOCYTES NFR BLD AUTO: 13.1 %
NEUTROPHILS # BLD AUTO: 4.9 10E9/L (ref 1.6–8.3)
NEUTROPHILS NFR BLD AUTO: 63.7 %
PLATELET # BLD AUTO: 228 10E9/L (ref 150–450)
RBC # BLD AUTO: 4.75 10E12/L (ref 4.4–5.9)
TSH SERPL DL<=0.005 MIU/L-ACNC: 1.94 MU/L (ref 0.4–4)
WBC # BLD AUTO: 7.7 10E9/L (ref 4–11)

## 2019-09-03 PROCEDURE — 84443 ASSAY THYROID STIM HORMONE: CPT | Performed by: PHYSICIAN ASSISTANT

## 2019-09-03 PROCEDURE — 85025 COMPLETE CBC W/AUTO DIFF WBC: CPT | Performed by: PHYSICIAN ASSISTANT

## 2019-09-03 PROCEDURE — 99214 OFFICE O/P EST MOD 30 MIN: CPT | Performed by: PHYSICIAN ASSISTANT

## 2019-09-03 PROCEDURE — 36415 COLL VENOUS BLD VENIPUNCTURE: CPT | Performed by: PHYSICIAN ASSISTANT

## 2019-09-03 RX ORDER — LISINOPRIL 5 MG/1
TABLET ORAL
Qty: 90 TABLET | Refills: 1 | Status: SHIPPED | OUTPATIENT
Start: 2019-09-03 | End: 2020-03-02

## 2019-09-03 ASSESSMENT — MIFFLIN-ST. JEOR: SCORE: 1560.04

## 2019-09-03 NOTE — TELEPHONE ENCOUNTER
Prescription approved per [facility] Refill Protocol.  Johanny Haynes, RN, BSN

## 2019-09-03 NOTE — TELEPHONE ENCOUNTER
"Requested Prescriptions   Pending Prescriptions Disp Refills     lisinopril (PRINIVIL/ZESTRIL) 5 MG tablet [Pharmacy Med Name: LISINOPRIL TABS 5MG] 90 tablet 4     Sig: TAKE 1 TABLET DAILY (DUE FOR APPOINTMENT FOR FURTHER REFILLS)       ACE Inhibitors (Including Combos) Protocol Passed - 9/1/2019  5:34 PM        Passed - Blood pressure under 140/90 in past 12 months     BP Readings from Last 3 Encounters:   08/15/19 122/73   05/02/19 116/75   02/26/19 119/70                 Passed - Recent (12 mo) or future (30 days) visit within the authorizing provider's specialty     Patient had office visit in the last 12 months or has a visit in the next 30 days with authorizing provider or within the authorizing provider's specialty.  See \"Patient Info\" tab in inbasket, or \"Choose Columns\" in Meds & Orders section of the refill encounter.              Passed - Medication is active on med list        Passed - Patient is age 18 or older        Passed - Normal serum creatinine on file in past 12 months     Recent Labs   Lab Test 08/15/19  1240  07/25/13  1123   CR 1.01   < >  --    CRPOC  --   --  1.1    < > = values in this interval not displayed.             Passed - Normal serum potassium on file in past 12 months     Recent Labs   Lab Test 08/15/19  1240   POTASSIUM 3.9             Last Written Prescription Date:  2/26/19  Last Fill Quantity: 90,  # refills: 1   Last office visit: 2/26/2019 with prescribing provider:  Winston Silverman     Future Office Visit:   Next 5 appointments (look out 90 days)    Sep 03, 2019  9:00 AM CDT  SHORT with Winston Silverman PA-C  [facility] ([facility]) [address] [facility] [id number] [phone]   Sep 09, 2019  9:00 AM CDT  Nurse Only with NURSE ONLY MG DERM  [facility] ([facility]) [address] [address] [place] [id number] [phone]   Oct 07, 2019  1:30 PM CDT  Nurse Only with NURSE ONLY MG DERM  M " [facility] ([facility]) [address] [place] [phone]  [phone]   Oct 28, 2019  1:00 PM CDT  Return Visit with Shashank Victor MD  [facility] ([facility]) [address] [place] [phone]  [phone]   Nov 04, 2019  9:00 AM CST  Nurse Only with NURSE ONLY MG DERM  [facility] ([facility]) [address] [place] [phone]  [phone]

## 2019-09-03 NOTE — PROGRESS NOTES
"Subjective     Gustavopadmini Ramon is a 79 year old male who presents to clinic today for the following health issues:    HPI        Recent drop in hgb.   No dietary changes.   Recent weight gain. No significant fatigue.   History of gerd. Patient denies DAJUAN abd pain. No n/v/d/c. No hematochezia or melena. No hematuria.   Over due for a colonoscopy.     Allergies   Allergen Reactions     Nkda [No Known Drug Allergies]      BP Readings from Last 3 Encounters:   09/03/19 120/72   08/15/19 122/73   05/02/19 116/75    Wt Readings from Last 3 Encounters:   09/03/19 88.5 kg (195 lb)   08/15/19 88.6 kg (195 lb 6.4 oz)   05/02/19 80.3 kg (177 lb)                      Reviewed and updated as needed this visit by Provider         Review of Systems   ROS COMP: Constitutional, HEENT, cardiovascular, pulmonary, GI, , musculoskeletal, neuro, skin, endocrine and psych systems are negative, except as otherwise noted.      Objective    /72   Pulse 69   Temp 97.8  F (36.6  C) (Tympanic)   Resp 18   Ht 1.721 m (5' 7.75\")   Wt 88.5 kg (195 lb)   SpO2 97%   BMI 29.87 kg/m    Body mass index is 29.87 kg/m .  Physical Exam   Eye exam - right eye normal lid, conjunctiva, cornea, pupil and fundus, left eye normal lid, conjunctiva, cornea, pupil and fundus.  Thyroid not palpable, not enlarged, no nodules detected.  CHEST:chest clear to IPPA, no tachypnea, retractions or cyanosis and S1, S2 normal, no murmur, no gallop, rate regular.  The abdomen is soft without tenderness, guarding, mass, rebound or organomegaly. Bowel sounds are normal. No CVA tenderness or inguinal adenopathy noted.  No edema.     Gustavo was seen today for anemia.    Diagnoses and all orders for this visit:    Drop in hemoglobin  -     GASTROENTEROLOGY ADULT REF PROCEDURE ONLY [facility] [phone]; No Preference - GI Provider Only  -     CBC with platelets differential    Weight gain  -     TSH with free T4 reflex    Essential hypertension with " goal blood pressure less than 140/90    Gastroesophageal reflux disease without esophagitis  -     GASTROENTEROLOGY ADULT REF PROCEDURE ONLY [facility] [phone]; No Preference - GI Provider Only    Special screening for malignant neoplasms, colon  -     GASTROENTEROLOGY ADULT REF PROCEDURE ONLY [facility] [phone]; No Preference - GI Provider Only      work on lifestyle modification  Recheck in 6 mos

## 2019-09-04 ENCOUNTER — TELEPHONE (OUTPATIENT)
Dept: DERMATOLOGY | Facility: CLINIC | Age: 79
End: 2019-09-04

## 2019-09-04 NOTE — TELEPHONE ENCOUNTER
Called patient. He was wondering if there was anything that he needed to do or bring for the appointment.  I told him to bring a wide brimmed hat and to not shave for 24 hours prior to the procedure.  I also told him to have some ice packs made up for the day of and the day after to help decrease any redness or swelling that he may have.  He then asked if he will need anyone with him and I told him that he will not.    He did not have any additional questions.    Annie Otero, CMA

## 2019-09-04 NOTE — TELEPHONE ENCOUNTER
Health Call Center    Phone Message    May a detailed message be left on voicemail: yes    Reason for Call: Other: Patient has questions about his prep for his procedure on 09/09/2019. Please advise.     Action Taken: Message routed to:  Adult Clinics: Dermatology p [phone]

## 2019-09-09 ENCOUNTER — DOCUMENTATION ONLY (OUTPATIENT)
Dept: OPHTHALMOLOGY | Facility: CLINIC | Age: 79
End: 2019-09-09

## 2019-09-09 ENCOUNTER — ALLIED HEALTH/NURSE VISIT (OUTPATIENT)
Dept: NURSING | Facility: CLINIC | Age: 79
End: 2019-09-09
Payer: MEDICARE

## 2019-09-09 DIAGNOSIS — L57.0 ACTINIC KERATOSIS: Primary | ICD-10-CM

## 2019-09-09 PROCEDURE — 96567 PDT DSTR PRMLG LES SKN: CPT | Performed by: DERMATOLOGY

## 2019-09-09 ASSESSMENT — PAIN SCALES - GENERAL: PAINLEVEL: NO PAIN (0)

## 2019-09-09 NOTE — PATIENT INSTRUCTIONS
Photodynamic Therapy (PDT) Home Care Instructions  I will experience redness, swelling, pain and discomfort which will last 5-10 days. I may experience pinkness or redness that persists for 2-3 weeks but longer duration is possible in some individuals. Risks are infection, eye injury, blistering, reactivation of the cold sore virus, skin lightening, skin darkening or scarring. Multiple treatments may be required.   DAY OF TREATMENT  1) Wash treated area with mild cleanser.  Redness of the treated skin is expected and can vary significantly from person to person and treatment to treatment.  2) Apply SPF 50 before leaving your home/office and while driving to and from work.  3) Remain indoors if possible and avoid direct as well as indirect sunlight.  If your head or face were treated, wear a broad brimmed hat wile going to and from your car.  If your hands/arms were treated, were long sleeves and gloves.  4) Ice packs every 1-2 hours may be helpful as well.  5) In the evening, cleanse treated area gently.  Your skin will feel dry; keep treated area moisturized with a moisturizer.  DAY TWO  1) IYou may take a shower.  Men should NOT shave if their face was treated.  2) Cleanse treated area gently.  3) Apply SPF 50  4) Most discomfort usually subsides by Day 3.  5) You should avoid direct sunlight and try to remain indoors on Day 2.  The sensitivity to sunlight will significantly decrease after 48 hours.  6) You should soak the treated areas with as soft washcloth with cold water for 20 minutes every 4-6 hours.  Ice may be applied after the cold-water soaks, if this feels good.  The area should be patted dry and moisturized following the cold-water soaks.  7) Follow evening routine as you did on Day 1    DAYS 3- 7   1) Try to avoid direct sunlight and minimize incidental exposure for one week.  NO BEACHES!  Continue using SPF 50.  This is very important in protecting your newly rejuvenated skin.  2) You may begin  applying make-up once any crusting has healed.  The area may be slightly red for a few weeks.  3) The skin will feel dry and tightened.  Moisturize once to twice daily.  Please plan to be at your appointment for approximately 90 minutes.  Your treatments are scheduled as follows.    Who should I call with questions?    [facility]: [phone]     [facility]: [phone]    For urgent needs outside of business hours call the [facility] at [phone] and ask for the dermatology resident on call

## 2019-09-09 NOTE — NURSING NOTE
Gustavo Ramon comes into clinic today at the request of Dr. Treviño  Ordering Provider for PDT.    Photodynamic Therapy Intake:    If patient has a hx of HSV or VZV (shingles in treatment area) they have been given prophylaxis or documented that they refused it:  NO    1.Close monitoring for more significant reaction, and avoid saran wrap if YES to any of the following:  New medications since seen by physician? NO (If yes, contact supervising physician)  NSAIDs, ibuprofen, aspirin, naproxen, piroxicam: YES  Antiarrhythmics (amiodarone, quinidine): NO  Hydrochlorothiazide: YES    2. Will hold PDT for YES to any of the following:  Pregnancy/breastfeeding/unknown pregnancy: NO  Sun burn: NO Dr. Victor assessed pt's skin and ok'd proceeding with treatment  Tetracyclines such as doxycycline: NO  Ciprofloxacin: NO  Methotrexate: NO    3. Will hold LEVULAN for YES to any of the following:   Treatment today is for acne: NO    4. If patient is receiving sand paper(RN only procedure) patient has no history of MRSA:  NO AND Laser intake checklist is complete: YES       5. Consent within 90 days with MD verified in patient chart?  YES  The sites to be treated were verified and discussed with patient. Sites verified were forehead, temples, cheeks and crown of scalp and dorsal hands   Expected symptoms and/or complications of redness, peeling, stinging, and burning were reviewed.  Comfort measures including moisturizer, cool compresses, and sun protection were also reviewed with patient.  After review, patient verbalized understanding of procedure and consent form signed by patient (as per MD documentation) and patient agrees to proceed with treatment.     Treatment site(s) cleansed with [employer]: YES   Treatment site(s) de-greased with Acetone: YES  Applied 2 Levulan stick to the site/s.  No saran wrap and no sand paper used     MEDICATION: Levulan  DOSE: 1.5 mL  ROUTE: Topical  : [employer]  LOCATION GIVEN: temples, forehead, cheeks, crown of scalp and dorsal hands  LOT NO: [id number]  EXP. DATE: 12/20  NDC: 24502-010-16    TREATMENT NUMBER (30 maximum treatments per site): 1    Photodynamic Therapy(PDT) Post Op Note    Patient successfully completed PDT treatment today. Patient tolerated treatment without complication.  Sites were cleansed with mild soap and water and SPF 50 was applied after treatment.     Sun protection was reviewed with patient:  Patient wore long sleeve shirt to protect or to shield arms and Patient brought personal hat    After care instructions were reviewed with patient. AVS printed with clinic contact information and given to patient. Patient verbalized understanding and had no further questions or concerns at this time. Patient left clinic in good condition.    This service provided today was under the supervising provider of the day Dr. Victor, who was available if needed.    Gladys Kurtz RN

## 2019-09-19 DIAGNOSIS — I10 BENIGN ESSENTIAL HYPERTENSION: ICD-10-CM

## 2019-09-19 NOTE — TELEPHONE ENCOUNTER
"Requested Prescriptions   Pending Prescriptions Disp Refills     hydrochlorothiazide (HYDRODIURIL) 25 MG tablet [Pharmacy Med Name: HYDROCHLOROTHIAZIDE TABS 25MG]  Last Written Prescription Date:  06/21/19  Last Fill Quantity: 45,  # refills: 4   Last office visit: 9/3/2019 with prescribing provider:  NUZHAT Silverman   Future Office Visit:   Next 5 appointments (look out 90 days)    Oct 07, 2019  1:30 PM CDT  Nurse Only with NURSE ONLY MG DERM  [facility]) [id number] [place] [phone]  [phone]   Oct 28, 2019  1:00 PM CDT  Return Visit with Shashank Victor MD  [facility]) [id number] [place] [phone]  [phone]   Nov 04, 2019  9:00 AM CST  Nurse Only with NURSE ONLY MG DERM  [facility]) [address] [place] [phone]  [phone]          45 tablet 4     Sig: TAKE ONE-HALF (1/2) TABLET DAILY       Diuretics (Including Combos) Protocol Passed - 9/19/2019  2:32 AM        Passed - Blood pressure under 140/90 in past 12 months     BP Readings from Last 3 Encounters:   09/03/19 120/72   08/15/19 122/73   05/02/19 116/75                 Passed - Recent (12 mo) or future (30 days) visit within the authorizing provider's specialty     Patient had office visit in the last 12 months or has a visit in the next 30 days with authorizing provider or within the authorizing provider's specialty.  See \"Patient Info\" tab in inbasket, or \"Choose Columns\" in Meds & Orders section of the refill encounter.              Passed - Medication is active on med list        Passed - Patient is age 18 or older        Passed - Normal serum creatinine on file in past 12 months     Recent Labs   Lab Test 08/15/19  1240   CR 1.01              Passed - Normal serum potassium on file in past 12 months     Recent Labs   Lab Test 08/15/19  1240   POTASSIUM 3.9 "                    Passed - Normal serum sodium on file in past 12 months     Recent Labs   Lab Test 08/15/19  1240

## 2019-09-20 RX ORDER — HYDROCHLOROTHIAZIDE 25 MG/1
TABLET ORAL
Qty: 45 TABLET | Refills: 1 | Status: SHIPPED | OUTPATIENT
Start: 2019-09-20 | End: 2020-03-19

## 2019-09-24 ENCOUNTER — HOSPITAL ENCOUNTER (OUTPATIENT)
Facility: AMBULATORY SURGERY CENTER | Age: 79
Discharge: HOME | End: 2019-09-24
Attending: SURGERY | Admitting: SURGERY
Payer: MEDICARE

## 2019-09-24 VITALS
HEART RATE: 70 BPM | RESPIRATION RATE: 16 BRPM | TEMPERATURE: 97.5 F | BODY MASS INDEX: 26.81 KG/M2 | WEIGHT: 175 LBS | DIASTOLIC BLOOD PRESSURE: 78 MMHG | SYSTOLIC BLOOD PRESSURE: 117 MMHG | OXYGEN SATURATION: 95 %

## 2019-09-24 LAB
COLONOSCOPY: NORMAL
UPPER GI ENDOSCOPY: NORMAL

## 2019-09-24 PROCEDURE — G0500 MOD SEDAT ENDO SERVICE >5YRS: HCPCS | Performed by: SURGERY

## 2019-09-24 PROCEDURE — G8918 PT W/O PREOP ORDER IV AB PRO: HCPCS

## 2019-09-24 PROCEDURE — 43239 EGD BIOPSY SINGLE/MULTIPLE: CPT | Mod: 51 | Performed by: SURGERY

## 2019-09-24 PROCEDURE — 43239 EGD BIOPSY SINGLE/MULTIPLE: CPT

## 2019-09-24 PROCEDURE — G0121 COLON CA SCRN NOT HI RSK IND: HCPCS | Performed by: SURGERY

## 2019-09-24 PROCEDURE — 45378 DIAGNOSTIC COLONOSCOPY: CPT

## 2019-09-24 PROCEDURE — G8907 PT DOC NO EVENTS ON DISCHARG: HCPCS

## 2019-09-24 RX ORDER — LIDOCAINE 40 MG/G
CREAM TOPICAL
Status: DISCONTINUED | OUTPATIENT
Start: 2019-09-24 | End: 2019-09-25 | Stop reason: HOSPADM

## 2019-09-24 RX ORDER — PROCHLORPERAZINE MALEATE 5 MG
5 TABLET ORAL EVERY 6 HOURS PRN
Status: DISCONTINUED | OUTPATIENT
Start: 2019-09-24 | End: 2019-09-25 | Stop reason: HOSPADM

## 2019-09-24 RX ORDER — FENTANYL CITRATE 50 UG/ML
INJECTION, SOLUTION INTRAMUSCULAR; INTRAVENOUS PRN
Status: DISCONTINUED | OUTPATIENT
Start: 2019-09-24 | End: 2019-09-24 | Stop reason: HOSPADM

## 2019-09-24 RX ORDER — ONDANSETRON 2 MG/ML
4 INJECTION INTRAMUSCULAR; INTRAVENOUS EVERY 6 HOURS PRN
Status: DISCONTINUED | OUTPATIENT
Start: 2019-09-24 | End: 2019-09-25 | Stop reason: HOSPADM

## 2019-09-24 RX ORDER — NALOXONE HYDROCHLORIDE 0.4 MG/ML
.1-.4 INJECTION, SOLUTION INTRAMUSCULAR; INTRAVENOUS; SUBCUTANEOUS
Status: DISCONTINUED | OUTPATIENT
Start: 2019-09-24 | End: 2019-09-25 | Stop reason: HOSPADM

## 2019-09-24 RX ORDER — METOCLOPRAMIDE HYDROCHLORIDE 5 MG/ML
10 INJECTION INTRAMUSCULAR; INTRAVENOUS EVERY 6 HOURS PRN
Status: DISCONTINUED | OUTPATIENT
Start: 2019-09-24 | End: 2019-09-25 | Stop reason: HOSPADM

## 2019-09-24 RX ORDER — METOCLOPRAMIDE 10 MG/1
10 TABLET ORAL EVERY 6 HOURS PRN
Status: DISCONTINUED | OUTPATIENT
Start: 2019-09-24 | End: 2019-09-25 | Stop reason: HOSPADM

## 2019-09-24 RX ORDER — ONDANSETRON 4 MG/1
4 TABLET, ORALLY DISINTEGRATING ORAL EVERY 6 HOURS PRN
Status: DISCONTINUED | OUTPATIENT
Start: 2019-09-24 | End: 2019-09-25 | Stop reason: HOSPADM

## 2019-09-24 RX ORDER — FLUMAZENIL 0.1 MG/ML
0.2 INJECTION, SOLUTION INTRAVENOUS
Status: SHIPPED | OUTPATIENT
Start: 2019-09-24 | End: 2019-09-24

## 2019-09-24 RX ORDER — ONDANSETRON 2 MG/ML
4 INJECTION INTRAMUSCULAR; INTRAVENOUS
Status: DISCONTINUED | OUTPATIENT
Start: 2019-09-24 | End: 2019-09-25 | Stop reason: HOSPADM

## 2019-09-26 LAB — COPATH REPORT: NORMAL

## 2019-10-04 ENCOUNTER — HEALTH MAINTENANCE LETTER (OUTPATIENT)
Age: 79
End: 2019-10-04

## 2019-10-07 ENCOUNTER — ALLIED HEALTH/NURSE VISIT (OUTPATIENT)
Dept: NURSING | Facility: CLINIC | Age: 79
End: 2019-10-07
Payer: MEDICARE

## 2019-10-07 DIAGNOSIS — L57.0 ACTINIC KERATOSIS: Primary | ICD-10-CM

## 2019-10-07 PROCEDURE — 96567 PDT DSTR PRMLG LES SKN: CPT | Performed by: DERMATOLOGY

## 2019-10-07 PROCEDURE — 99207 ZZC NO CHARGE NURSE ONLY: CPT | Performed by: DERMATOLOGY

## 2019-10-07 NOTE — PATIENT INSTRUCTIONS
Photodynamic Therapy (PDT) Home Care Instructions  I will experience redness, swelling, pain and discomfort which will last 5-10 days. I may experience pinkness or redness that persists for 2-3 weeks but longer duration is possible in some individuals. Risks are infection, eye injury, blistering, reactivation of the cold sore virus, skin lightening, skin darkening or scarring. Multiple treatments may be required.   DAY OF TREATMENT  1) Wash treated area with mild cleanser.  Redness of the treated skin is expected and can vary significantly from person to person and treatment to treatment.  2) Apply SPF 50 before leaving your home/office and while driving to and from work.  3) Remain indoors if possible and avoid direct as well as indirect sunlight.  If your head or face were treated, wear a broad brimmed hat wile going to and from your car.  If your hands/arms were treated, were long sleeves and gloves.  4) Ice packs every 1-2 hours may be helpful as well.  5) In the evening, cleanse treated area gently.  Your skin will feel dry; keep treated area moisturized with a moisturizer.  6) If you have a history of cold sores, take (1) Valtrex 500mg tablet before bedtime, which will be prescribed by your physician.  DAY TWO  1) If you have a history of cold sores, take (1) Valtrex 500 mg tablets in the morning and in the evening on days 2 and 3.  2) You may take a shower.  Men should NOT shave if their face was treated.  3) Cleanse treated area gently.  4) Apply SPF 50  5) Most discomfort usually subsides by Day 3.  6) You should avoid direct sunlight and try to remain indoors on Day 2.  The sensitivity to sunlight will significantly decrease after 48 hours.  7) You should soak the treated areas with as soft washcloth with cold water for 20 minutes every 4-6 hours.  Ice may be applied after the cold-water soaks, if this feels good.  The area should be patted dry and moisturized following the cold-water soaks.  8) Follow  evening routine as you did on Day 1    DAYS 3- 7   1) Try to avoid direct sunlight and minimize incidental exposure for one week.  NO BEACHES!  Continue using SPF 50.  This is very important in protecting your newly rejuvenated skin.  2) You may begin applying make-up once any crusting has healed.  The area may be slightly red for a few weeks.  3) The skin will feel dry and tightened.  Moisturize once to twice daily.  Please plan to be at your appointment for approximately 90 minutes.  Your treatments are scheduled as follows.  Your next PDT is scheduled for November 04, at 9:00am.    Who should I call with questions?    [facility]: [phone]     [facility]: [phone]    For urgent needs outside of business hours call the [facility] at [phone] and ask for the dermatology resident on call

## 2019-10-07 NOTE — NURSING NOTE
Gustavo Ramon's goals for this visit include:   Chief Complaint   Patient presents with     Phototherapy     PDT cheeks, scalp, forehead dorsal hands        Photodynamic Therapy Intake:    If patient has a hx of HSV or VZV (shingles in treatment area) they have been given prophylaxis or documented that they refused it:  NO    1.Close monitoring for more significant reaction, and avoid saran wrap if YES to any of the following:  New medications since seen by physician? NO (If yes, contact supervising physician)  NSAIDs, ibuprofen, aspirin, naproxen, piroxicam: YES  Antiarrhythmics (amiodarone, quinidine): NO  Hydrochlorothiazide: yes, and lisinopril    2. Will hold PDT for YES to any of the following:  Pregnancy/breastfeeding/unknown pregnancy: N/A  Sun burn: NO  Tetracyclines such as doxycycline: NO  Ciprofloxacin: NO  Methotrexate: NO    3. Will hold LEVULAN for YES to any of the following:   Treatment today is for acne: NO    4. If patient is receiving sand paper(RN only procedure) patient has no history of MRSA:  NO AND Laser intake checklist is complete: yes        5. Consent within 90 days with MD verified in patient chart?  YES  The sites to be treated were verified and discussed with patient. Sites verified were scalp, temples, forehead, cheeks, forearms   Expected symptoms and/or complications of redness, peeling, stinging, and burning were reviewed.  Comfort measures including moisturizer, cool compresses, and sun protection were also reviewed with patient.  After review, patient verbalized understanding of procedure and consent form signed by patient (as per MD documentation) and patient agrees to proceed with treatment.     Treatment site(s) cleansed with [employer]: YES   Treatment site(s) de-greased with Acetone: YES  Applied 3 Levulan stick to the site/s. (1 applied to forearms in error, cleansed)  .     MEDICATION: Levulan  DOSE: 1.5 mL  ROUTE: Topical  : [employer]  LOCATION GIVEN: scalp, temples,cheeks, forehead, dorsal hands  LOT NO: [id number]  EXP. DATE: 1/21  ND: 03198-582-21    TREATMENT NUMBER (30 maximum treatments per site): 2    Photodynamic Therapy(PDT) Post Op Note    Patient successfully completed PDT treatment today. Patient tolerated treatment without complication.  Sites were cleansed with mild soap and water and SPF 50 was applied after treatment.     Sun protection was reviewed with patient:  Patient brought personal hat    After care instructions were reviewed with patient. AVS printed with clinic contact information and given to patient. Patient verbalized understanding and had no further questions or concerns at this time. Patient left clinic in good condition.

## 2019-10-19 DIAGNOSIS — E78.5 HYPERLIPIDEMIA LDL GOAL <130: ICD-10-CM

## 2019-10-21 RX ORDER — SIMVASTATIN 20 MG
TABLET ORAL
Qty: 90 TABLET | Refills: 0 | Status: SHIPPED | OUTPATIENT
Start: 2019-10-21 | End: 2020-02-11

## 2019-10-21 NOTE — TELEPHONE ENCOUNTER
"Requested Prescriptions   Pending Prescriptions Disp Refills     simvastatin (ZOCOR) 20 MG tablet [Pharmacy Med Name: SIMVASTATIN TABS 20MG]  Last Written Prescription Date:  08/08/*19  Last Fill Quantity: 90,  # refills: 4   Last office visit: 9/3/2019 with prescribing provider:  NUZHAT Silverman   Future Office Visit:   Next 5 appointments (look out 90 days)    Oct 28, 2019  1:00 PM CDT  Return Visit with Shashank Victor MD  [facility]) [address] [place] [id number]  [phone]   Nov 04, 2019  9:00 AM CST  Nurse Only with NURSE ONLY [facility]  [facility]) [address] [place] [id number]  [phone]   Jan 02, 2020 10:45 AM CST  Return Visit with CORINA Walters CNP  [facility]) [address] [place] [id number]  [phone]          90 tablet 4     Sig: TAKE 1 TABLET AT BEDTIME (DUE FOR A FASTING LAB APPOINTMENT)       [facility] Protocol Passed - 10/19/2019  2:04 AM        Passed - LDL on file in past 12 months     Recent Labs   Lab Test 02/26/19  0914   LDL 87             Passed - No abnormal creatine kinase in past 12 months     Recent Labs   Lab Test 08/10/15  0751                   Passed - Recent (12 mo) or future (30 days) visit within the authorizing provider's specialty     Patient has had an office visit with the authorizing provider or a provider within the authorizing providers department within the previous 12 mos or has a future within next 30 days. See \"Patient Info\" tab in inbasket, or \"Choose Columns\" in Meds & Orders section of the refill encounter.              Passed - Medication is active on med list        Passed - Patient is age 18 or older          "

## 2019-10-21 NOTE — TELEPHONE ENCOUNTER
Prescription approved per [facility] Refill Protocol.  Ava Krueger RN

## 2019-10-28 ENCOUNTER — OFFICE VISIT (OUTPATIENT)
Dept: DERMATOLOGY | Facility: CLINIC | Age: 79
End: 2019-10-28
Payer: MEDICARE

## 2019-10-28 DIAGNOSIS — L21.9 DERMATITIS, SEBORRHEIC: ICD-10-CM

## 2019-10-28 DIAGNOSIS — Z85.828 HISTORY OF BASAL CELL CARCINOMA OF SKIN: ICD-10-CM

## 2019-10-28 DIAGNOSIS — D18.01 CHERRY ANGIOMA: ICD-10-CM

## 2019-10-28 DIAGNOSIS — L82.1 SEBORRHEIC KERATOSIS: ICD-10-CM

## 2019-10-28 DIAGNOSIS — Z86.007 HISTORY OF SQUAMOUS CELL CARCINOMA IN SITU OF SKIN: ICD-10-CM

## 2019-10-28 DIAGNOSIS — L57.0 AK (ACTINIC KERATOSIS): ICD-10-CM

## 2019-10-28 DIAGNOSIS — D48.5 NEOPLASM OF UNCERTAIN BEHAVIOR OF SKIN: Primary | ICD-10-CM

## 2019-10-28 PROCEDURE — 11102 TANGNTL BX SKIN SINGLE LES: CPT | Performed by: DERMATOLOGY

## 2019-10-28 PROCEDURE — 17003 DESTRUCT PREMALG LES 2-14: CPT | Performed by: DERMATOLOGY

## 2019-10-28 PROCEDURE — 17000 DESTRUCT PREMALG LESION: CPT | Mod: 59 | Performed by: DERMATOLOGY

## 2019-10-28 PROCEDURE — 88305 TISSUE EXAM BY PATHOLOGIST: CPT | Mod: TC | Performed by: DERMATOLOGY

## 2019-10-28 PROCEDURE — 99213 OFFICE O/P EST LOW 20 MIN: CPT | Mod: 25 | Performed by: DERMATOLOGY

## 2019-10-28 NOTE — PROGRESS NOTES
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 Christiansen's note 11/21/2011), s/p excision 5/12/2009   2. Actinic keratoses  - s/p Efudex 2015 & cryotherapy  3. Seborrheic dermatitis  - clobetasol 0.05% solution  4. MGUS       Last TBSE 6/26/18     Encounter Date: Oct 28, 2019    CC:  Chief Complaint   Patient presents with     Derm Problem     skin check- check ak on forearm      History of Present Illness:    No new skin cnacer concerns.   Has 3rd of 3 PDT treatments in 1 week. The first 2 seem to be effective.   Has also been using some Efudex 1-2 weeks and they tend to resolve.       There was a bump on the back of his left hand. It caught on his pocket tore and started to bleed. It seems to be better controlled now. No major bump before the bump. Has fragile skin and takes prednisone.           Mr. Gustavo Ramon is a 79 year old male with a history of NMSC who presents today for a skin check. The patient was last seen with Dr. Victor on 12/20/18 for wound check of the Mohs site on the left infraorbital. Today, the patient has a number of new concerns to address. On his scalp, there are some crusty spots that he would like checked. He had dandruff for awhile, but he put Efudex on his scalp, and it went away. On the ears, there is also some similar rough patches.Next, the patient reports that his chest there is a lesion that \"doesn't want to go away.\" He picks at it frequently. Finally, on the left ankle, he had a lesion which he treated with Efudex. There is some residual redness in this area. He treated it roughly 6 months. Ago.     Past Medical History:   Patient Active Problem List   Diagnosis     Rosacea     DJD " (degenerative joint disease)     Ocular myasthenia gravis (H)     Macular pigment deposit     HYPERLIPIDEMIA LDL GOAL <130     IgA monoclonal gammopathy     Retinal hole OS, operculated     Horseshoe tear of retina without detachment OD     History  of basal cell carcinoma     Seborrhea     AK (actinic keratosis)     Malignant neoplasm of prostate (H)     PVD (posterior vitreous detachment)     Amaurosis fugax by Hx neg carotid W/U     Advanced directives, counseling/discussion     Actinic keratosis     Peripheral neuropathy     Nuclear sclerosis of both eyes     Essential hypertension with goal blood pressure less than 140/90     Gastroesophageal reflux disease without esophagitis     Past Medical History:   Diagnosis Date     Actinic keratosis      AK (actinic keratosis) 11/15/2012     Amaurosis fugax      Basal cell carcinoma 2009    R medial cheek/nose     Central serous retinopathy left    Eye     Diverticulosis 08/2006     DJD (degenerative joint disease)      GERD (gastroesophageal reflux disease)      Hearing loss     High frequency     History of nonmelanoma skin cancer      History of nonmelanoma skin cancer      HTN (hypertension)      Hyperlipidemia LDL goal < 130      Hyperplastic colon polyp 08/2006     IgA monoclonal gammopathy     IgA kappa     Lattice degeneration of retina right    Eye     Nonsenile cataract      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    [facility] consult     Rosacea      Vitreous detachment left    Eye     Past Surgical History:   Procedure Laterality Date     ARTHROSCOPY KNEE RT/LT Left Jan 2010    Knee     COLONOSCOPY WITH CO2 INSUFFLATION N/A 9/24/2019    Procedure: COLONOSCOPY, WITH CO2 INSUFFLATION;  Surgeon: Loi Levine MD;  Location:  OR     COMBINED ESOPHAGOSCOPY, GASTROSCOPY, DUODENOSCOPY (EGD) WITH CO2 INSUFFLATION N/A 9/24/2019    Procedure: ESOPHAGOGASTRODUODENOSCOPY, WITH CO2 INSUFFLATION;  Surgeon: Loi Levine MD;  Location:  OR     CRYOTHERAPY   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     DISKECTOMY, LUMBAR, SINGLE SP  2003    L2-3     ENT SURGERY  1943    Tonsils      ENT SURGERY  2-22-12    Biopsy lesion right pinna     ENT SURGERY  2-24-12    Biopsy leson right pinna     ESOPHAGOSCOPY, GASTROSCOPY, DUODENOSCOPY (EGD), COMBINED N/A 9/24/2019    Procedure: Esophagogastroduodenoscopy, With Biopsy;  Surgeon: Loi Levine MD;  Location: [facility] OR     SOFT TISSUE SURGERY  May 2010    Basal Cell/right side nose       Social History:  The patient is retired from working as an . He has a daughter and son  The patient denies use of tanning beds. He is [place].    Family History:  There is no family history of skin cancer.  Kept in chart for convenience.       Medications:  Current Outpatient Medications   Medication Sig Dispense Refill     aspirin 325 MG tablet Take 325 mg by mouth daily       cyanocobalamin (VITAMIN B-12) 1000 MCG tablet Take 1 tablet (1,000 mcg) by mouth daily 90 tablet 1     hydrochlorothiazide (HYDRODIURIL) 25 MG tablet TAKE ONE-HALF (1/2) TABLET DAILY 45 tablet 1     lisinopril (PRINIVIL/ZESTRIL) 5 MG tablet TAKE 1 TABLET DAILY 90 tablet 1     Multiple Vitamins-Minerals (PRESERVISION AREDS 2) CAPS Take 2 soft gel caps daily 90 capsule 1     omeprazole (PRILOSEC) 20 MG DR capsule TAKE 1 CAPSULE DAILY (CONCOMITANT TO PREDNISONE USE) 90 capsule 3     predniSONE (DELTASONE) 5 MG tablet TAKE 1 TABLET EVERY OTHER DAY. 45 tablet 3     pyridostigmine (MESTINON) 60 MG tablet TAKE 1 TABLET FOUR TIMES A  tablet 3     simvastatin (ZOCOR) 20 MG tablet TAKE 1 TABLET AT BEDTIME 90 tablet 0     triamcinolone (KENALOG) 0.1 % external cream Apply twice daily for 2 weeks to rash on left upper arm 45 g 0     vitamin D3 (CHOLECALCIFEROL) 2000 units tablet Take 1 tablet by mouth daily 90 tablet 1       Allergies   Allergen Reactions     Nkda [No Known Drug Allergies]      Review of Systems:  -Skin: As above in HPI. No additional skin concerns.  -Const: The  patient is generally feeling well today.     Physical exam:  - This is a well developed, well-nourished male in no acute distress, in a pleasant mood.    SKIN: Total skin excluding the undergarment areas was performed. The exam included the head/face, neck, both arms, chest, back, abdomen, both legs, digits and/or nails.   - There is no erythema, telangectasias, nodularity, or pigmentation on the sites of prior skin cancer.  - Faint salmon patch at the site of prior lesion on the left ankle  - There are erythematous macules with overyling adherent scale on the crown x6, right preauricular, left preauricular, right lower leg  - There is an erythematous patch with overlying scale on the right cheek, 1 cm  - On the central chest, there is a 5 mm erythematous macule with overlying scale  - There are waxy stuck on tan to brown papules on the trunk.  -left ear normal  - No other lesions of concern on areas examined.     Impression/Plan:  1. Hx of NMSC    No evidence of recurrent disease.     Recommend sunscreens SPF #30 or greater, protective clothing and avoidance of tanning beds.    Continue Skin cancer screening every 6 months.     Agree with discussion of hydrochlorothiazide alternatives with PCP. BCC is his most common skin cancer. Literature is mixed on the risk of BCC associated with hydrochlorothiazide, SCC association is stronger.     2. Actinic Keratosis, Face and right and left forearms  Agree with 3rd of 3 PDT sessions next week.   Still with palpable AK's. Treated as below, but plan for 3rd PDT and reassess in 3-4 weeks.   Cryotherapy procedure note: After verbal consent and discussion of risks and benefits including but no limited to dyspigmentation/scar, blister, and pain, 9 was(were) treated with 1-2mm freeze border for 2 cycles with liquid nitrogen. Post cryotherapy instructions were provided.     3. There is an erythematous patch with overlying scale on left mid back, 1 cm. Differential includes SCCIS,  Healing skin/folliculitis, ISK     Shave biopsy:  After discussion of benefits and risks including but not limited to bleeding/bruising, pain/swelling, infection, scar, incomplete removal, nerve damage/numbness, recurrence, and non-diagnostic biopsy, written consent, verbal consent and photographs were obtained. Time-out was performed. The area was cleaned with isopropyl alcohol. 0.5mL of 1% lidocaine with epinephrine was injected to obtain adequate anesthesia of the lesion on the left mid back. A shave biopsy was performed. Hemostasis was achieved with aluminium chloride. Vaseline and a sterile dressing were applied. The patient tolerated the procedure and no complications were noted. The patient was provided with verbal and written post care instructions.     4. Seborrheic dermatitis, forehead and eyebrows.    Discussed nature. Patient will try Dandruff shampoo as a face wash.                If not improvement after 2 weeks, ok to add OTC Hydrocortisone.     5. Seborrheic keratosis, Cherry angioma.        Benign nature reviewed.     Follow up 3-4 weeks post PDT. check     Staff Involved:  Scribe/Staff    I personally performed the procedures today.    Shashank Victor DO    Department of Dermatology  [facility]: Phone: [phone], Fax:[phone]  [facility]: Phone: [phone], Fax: [phone]

## 2019-10-28 NOTE — LETTER
"    10/28/2019         RE: Gustavo Ramon  [address] [facility] [address]        Dear Colleague,    Thank you for referring your patient, Gustavo Ramon, to the [facility]. Please see a copy of my visit note below.    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 Christiansen's note 11/21/2011), s/p excision 5/12/2009   2. Actinic keratoses  - s/p Efudex 2015 & cryotherapy  3. Seborrheic dermatitis  - clobetasol 0.05% solution  4. MGUS       Last TBSE 6/26/18     Encounter Date: Oct 28, 2019    CC:  Chief Complaint   Patient presents with     Derm Problem     skin check- check ak on forearm      History of Present Illness:    No new skin cnacer concerns.   Has 3rd of 3 PDT treatments in 1 week. The first 2 seem to be effective.   Has also been using some Efudex 1-2 weeks and they tend to resolve.       There was a bump on the back of his left hand. It caught on his pocket tore and started to bleed. It seems to be better controlled now. No major bump before the bump. Has fragile skin and takes prednisone.           Mr. Gustavo Ramon is a 79 year old male with a history of NMSC who presents today for a skin check. The patient was last seen with Dr. Vitcor on 12/20/18 for wound check of the Mohs site on the left infraorbital. Today, the patient has a number of new concerns to address. On his scalp, there are some crusty spots that he would like checked. He had dandruff for awhile, but he put Efudex on his scalp, and it went away. On the ears, there is also some similar rough patches.Next, the patient reports that his chest there is a lesion that \"doesn't want to go away.\" He picks at it " frequently. Finally, on the left ankle, he had a lesion which he treated with Efudex. There is some residual redness in this area. He treated it roughly 6 months. Ago.     Past Medical History:   Patient Active Problem List   Diagnosis     Rosacea     DJD (degenerative joint disease)     Ocular myasthenia gravis (H)     Macular pigment deposit     HYPERLIPIDEMIA LDL GOAL <130     IgA monoclonal gammopathy     Retinal hole OS, operculated     Horseshoe tear of retina without detachment OD     History  of basal cell carcinoma     Seborrhea     AK (actinic keratosis)     Malignant neoplasm of prostate (H)     PVD (posterior vitreous detachment)     Amaurosis fugax by Hx neg carotid W/U     Advanced directives, counseling/discussion     Actinic keratosis     Peripheral neuropathy     Nuclear sclerosis of both eyes     Essential hypertension with goal blood pressure less than 140/90     Gastroesophageal reflux disease without esophagitis     Past Medical History:   Diagnosis Date     Actinic keratosis      AK (actinic keratosis) 11/15/2012     Amaurosis fugax      Basal cell carcinoma 2009    R medial cheek/nose     Central serous retinopathy left    Eye     Diverticulosis 08/2006     DJD (degenerative joint disease)      GERD (gastroesophageal reflux disease)      Hearing loss     High frequency     History of nonmelanoma skin cancer      History of nonmelanoma skin cancer      HTN (hypertension)      Hyperlipidemia LDL goal < 130      Hyperplastic colon polyp 08/2006     IgA monoclonal gammopathy     IgA kappa     Lattice degeneration of retina right    Eye     Nonsenile cataract      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    [facility] consult     Rosacea      Vitreous detachment left    Eye     Past Surgical History:   Procedure Laterality Date     ARTHROSCOPY KNEE RT/LT Left Jan 2010    Knee     COLONOSCOPY WITH CO2 INSUFFLATION N/A 9/24/2019    Procedure: COLONOSCOPY, WITH CO2 INSUFFLATION;  Surgeon: Loi Levine,  MD;  Location: [facility] OR     COMBINED ESOPHAGOSCOPY, GASTROSCOPY, DUODENOSCOPY (EGD) WITH CO2 INSUFFLATION N/A 9/24/2019    Procedure: ESOPHAGOGASTRODUODENOSCOPY, WITH CO2 INSUFFLATION;  Surgeon: Loi Levine MD;  Location: [facility] OR     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     DISKECTOMY, LUMBAR, SINGLE SP  2003    L2-3     ENT SURGERY  1943    Tonsils      ENT SURGERY  2-22-12    Biopsy lesion right pinna     ENT SURGERY  2-24-12    Biopsy leson right pinna     ESOPHAGOSCOPY, GASTROSCOPY, DUODENOSCOPY (EGD), COMBINED N/A 9/24/2019    Procedure: Esophagogastroduodenoscopy, With Biopsy;  Surgeon: Loi Levine MD;  Location: [facility] OR     SOFT TISSUE SURGERY  May 2010    Basal Cell/right side nose       Social History:  The patient is retired from working as an . He has a daughter and son  The patient denies use of tanning beds. He is [place].    Family History:  There is no family history of skin cancer.  Kept in chart for convenience.       Medications:  Current Outpatient Medications   Medication Sig Dispense Refill     aspirin 325 MG tablet Take 325 mg by mouth daily       cyanocobalamin (VITAMIN B-12) 1000 MCG tablet Take 1 tablet (1,000 mcg) by mouth daily 90 tablet 1     hydrochlorothiazide (HYDRODIURIL) 25 MG tablet TAKE ONE-HALF (1/2) TABLET DAILY 45 tablet 1     lisinopril (PRINIVIL/ZESTRIL) 5 MG tablet TAKE 1 TABLET DAILY 90 tablet 1     Multiple Vitamins-Minerals (PRESERVISION AREDS 2) CAPS Take 2 soft gel caps daily 90 capsule 1     omeprazole (PRILOSEC) 20 MG DR capsule TAKE 1 CAPSULE DAILY (CONCOMITANT TO PREDNISONE USE) 90 capsule 3     predniSONE (DELTASONE) 5 MG tablet TAKE 1 TABLET EVERY OTHER DAY. 45 tablet 3     pyridostigmine (MESTINON) 60 MG tablet TAKE 1 TABLET FOUR TIMES A  tablet 3     simvastatin (ZOCOR) 20 MG tablet TAKE 1 TABLET AT BEDTIME 90 tablet 0     triamcinolone (KENALOG) 0.1 % external cream Apply twice daily for 2 weeks to rash on left upper arm 45 g 0      vitamin D3 (CHOLECALCIFEROL) 2000 units tablet Take 1 tablet by mouth daily 90 tablet 1       Allergies   Allergen Reactions     Nkda [No Known Drug Allergies]      Review of Systems:  -Skin: As above in HPI. No additional skin concerns.  -Const: The patient is generally feeling well today.     Physical exam:  - This is a well developed, well-nourished male in no acute distress, in a pleasant mood.    SKIN: Total skin excluding the undergarment areas was performed. The exam included the head/face, neck, both arms, chest, back, abdomen, both legs, digits and/or nails.   - There is no erythema, telangectasias, nodularity, or pigmentation on the sites of prior skin cancer.  - Faint salmon patch at the site of prior lesion on the left ankle  - There are erythematous macules with overyling adherent scale on the crown x6, right preauricular, left preauricular, right lower leg  - There is an erythematous patch with overlying scale on the right cheek, 1 cm  - On the central chest, there is a 5 mm erythematous macule with overlying scale  - There are waxy stuck on tan to brown papules on the trunk.  -left ear normal  - No other lesions of concern on areas examined.     Impression/Plan:  1. Hx of NMSC    No evidence of recurrent disease.     Recommend sunscreens SPF #30 or greater, protective clothing and avoidance of tanning beds.    Continue Skin cancer screening every 6 months.     Agree with discussion of hydrochlorothiazide alternatives with PCP. BCC is his most common skin cancer. Literature is mixed on the risk of BCC associated with hydrochlorothiazide, SCC association is stronger.     2. Actinic Keratosis, Face and right and left forearms  Agree with 3rd of 3 PDT sessions next week.   Still with palpable AK's. Treated as below, but plan for 3rd PDT and reassess in 3-4 weeks.   Cryotherapy procedure note: After verbal consent and discussion of risks and benefits including but no limited to dyspigmentation/scar,  blister, and pain, 9 was(were) treated with 1-2mm freeze border for 2 cycles with liquid nitrogen. Post cryotherapy instructions were provided.     3. There is an erythematous patch with overlying scale on left mid back, 1 cm. Differential includes SCCIS, Healing skin/folliculitis, ISK     Shave biopsy:  After discussion of benefits and risks including but not limited to bleeding/bruising, pain/swelling, infection, scar, incomplete removal, nerve damage/numbness, recurrence, and non-diagnostic biopsy, written consent, verbal consent and photographs were obtained. Time-out was performed. The area was cleaned with isopropyl alcohol. 0.5mL of 1% lidocaine with epinephrine was injected to obtain adequate anesthesia of the lesion on the left mid back. A shave biopsy was performed. Hemostasis was achieved with aluminium chloride. Vaseline and a sterile dressing were applied. The patient tolerated the procedure and no complications were noted. The patient was provided with verbal and written post care instructions.     4. Seborrheic dermatitis, forehead and eyebrows.    Discussed nature. Patient will try Dandruff shampoo as a face wash.                If not improvement after 2 weeks, ok to add OTC Hydrocortisone.     5. Seborrheic keratosis, Cherry angioma.        Benign nature reviewed.     Follow up 3-4 weeks post PDT. check     Staff Involved:  Scribe/Staff    I personally performed the procedures today.    Shashank Victor DO    Department of Dermatology  [facility]: Phone: [phone], Fax:[phone]  [facility]: Phone: [phone], Fax: [phone]          Again, thank you for allowing me to participate in the care of your patient.        Sincerely,        Shashank Victor MD

## 2019-10-28 NOTE — PATIENT INSTRUCTIONS
Wound Care After a Biopsy    What is a skin biopsy?  A skin biopsy allows the doctor to examine a very small piece of tissue under the microscope to determine the diagnosis and the best treatment for the skin condition. A local anesthetic (numbing medicine)  is injected with a very small needle into the skin area to be tested. A small piece of skin is taken from the area. Sometimes a suture (stitch) is used.     What are the risks of a skin biopsy?  I will experience scar, bleeding, swelling, pain, crusting and redness. I may experience incomplete removal or recurrence. Risks of this procedure are excessive bleeding, bruising, infection, nerve damage, numbness, thick (hypertrophic or keloidal) scar and non-diagnostic biopsy.    How should I care for my wound for the first 24 hours?    Keep the wound dry and covered for 24 hours    If it bleeds, hold direct pressure on the area for 15 minutes. If bleeding does not stop then go to the emergency room    Avoid strenuous exercise the first 1-2 days or as your doctor instructs you    How should I care for the wound after 24 hours?    After 24 hours, remove the bandage    You may bathe or shower as normal    If you had a scalp biopsy, you can shampoo as usual and can use shower water to clean the biopsy site daily    Clean the wound twice a day with gentle soap and water    Do not scrub, be gentle    Apply white petroleum/Vaseline after cleaning the wound with a cotton swab or a clean finger, and keep the site covered with a Bandaid /bandage. Bandages are not necessary with a scalp biopsy    If you are unable to cover the site with a Bandaid /bandage, re-apply ointment 2-3 times a day to keep the site moist. Moisture will help with healing    Avoid strenuous activity for first 1-2 days    Avoid lakes, rivers, pools, and oceans until the stitches are removed or the site is healed    How do I clean my wound?    Wash hands thoroughly with soap or use hand  before all  wound care    Clean the wound with gentle soap and water    Apply white petroleum/Vaseline  to wound after it is clean    Replace the Bandaid /bandage to keep the wound covered for the first few days or as instructed by your doctor    If you had a scalp biopsy, warm shower water to the area on a daily basis should suffice    What should I use to clean my wound?     Cotton-tipped applicators (Qtips )    White petroleum jelly (Vaseline ). Use a clean new container and use Q-tips to apply.    Bandaids   as needed    Gentle soap     How should I care for my wound long term?    Do not get your wound dirty    Keep up with wound care for one week or until the area is healed.    A small scab will form and fall off by itself when the area is completely healed. The area will be red and will become pink in color as it heals. Sun protection is very important for how your scar will turn out. Sunscreen with an SPF 30 or greater is recommended once the area is healed.      You should have some soreness but it should be mild and slowly go away over several days. Talk to your doctor about using tylenol for pain,    When should I call my doctor?  If you have increased:     Pain or swelling    Pus or drainage (clear or slightly yellow drainage is ok)    Temperature over 100F    Spreading redness or warmth around wound    When will I hear about my results?  The biopsy results can take 2-3 weeks to come back. The clinic will call you with the results, send you a [employer] message, or have you schedule a follow-up clinic or phone time to discuss the results. Contact our clinics if you do not hear from us in 3 weeks.     Who should I call with questions?    [facility]: [phone]     [facility]: [phone]    For urgent needs outside of business hours call the [facility] at [phone] and ask for the dermatology resident on call    Cryotherapy    What is it?    Use  of a very cold liquid, such as liquid nitrogen, to freeze and destroy abnormal skin cells that need to be removed    What should I expect?    Tenderness and redness    A small blister that might grow and fill with dark purple blood. There may be crusting.    More than one treatment may be needed if the lesions do not go away.    How do I care for the treated area?    Gently wash the area with your hands when bathing.    Use a thin layer of Vaseline to help with healing. You may use a Band-Aid.     The area should heal within 7-10 days and may leave behind a pink or lighter color.     Do not use an antibiotic or Neosporin ointment.     You may take acetaminophen (Tylenol) for pain.     Call your Doctor if you have:    Severe pain    Signs of infection (warmth, redness, cloudy yellow drainage, and or a bad smell)    Questions or concerns    Who should I call with questions?       [facility]: [phone]       [facility]: [phone]       For urgent needs outside of business hours call the [facility] at [phone]        and ask for the dermatology resident on call

## 2019-10-30 LAB — COPATH REPORT: NORMAL

## 2019-10-31 ENCOUNTER — TELEPHONE (OUTPATIENT)
Dept: DERMATOLOGY | Facility: CLINIC | Age: 79
End: 2019-10-31

## 2019-10-31 NOTE — TELEPHONE ENCOUNTER
Associated Results     Result Notes for Dermatological path order and indications     Notes recorded by Lana Quintanilla LPN on 10/31/2019 at 1:42 PM CDT  Writer called and spoke with patient with pathology results. Patient verbalized understanding, stated the wound was healing well and has no additional questions at this time.    Lana Quintanilla LPN    ------    Notes recorded by Uzma Benavides MD on 10/31/2019 at 12:44 PM CDT  Please call the patient with pathology results.  The biopsy from his back was a benign keratosis.   As long as the wound is healing well, no additional surgery is necessary.  Thank you.    Dermatological path order and indications   Order: [id number]   Status:  Final result   Visible to patient:  Yes (MyChart) Dx:  Neoplasm of uncertain behavior of skin   Component 3d ago   Copath Report Patient Name: DAISHA LOZOYA   MR#: [id number]   Specimen #: [id number]   Collected: 10/28/2019   Received: 10/29/2019   Reported: 10/30/2019 19:35   Ordering Phy(s): UZMA BENAVIDES     For improved result formatting, select 'View Enhanced Report Format' under    Linked Documents section.     SPECIMEN(S):   Shave, left mid back     FINAL DIAGNOSIS:   Skin, left mid back:   - Benign lichenoid keratosis - (see description)            Lana Quintanilla LPN

## 2019-11-04 ENCOUNTER — ALLIED HEALTH/NURSE VISIT (OUTPATIENT)
Dept: NURSING | Facility: CLINIC | Age: 79
End: 2019-11-04
Payer: MEDICARE

## 2019-11-04 DIAGNOSIS — L57.0 ACTINIC KERATOSIS: Primary | ICD-10-CM

## 2019-11-04 PROCEDURE — 96567 PDT DSTR PRMLG LES SKN: CPT | Performed by: DERMATOLOGY

## 2019-11-04 NOTE — PATIENT INSTRUCTIONS
Photodynamic Therapy (PDT) Home Care Instructions  I will experience redness, swelling, pain and discomfort which will last 5-10 days. I may experience pinkness or redness that persists for 2-3 weeks but longer duration is possible in some individuals. Risks are infection, eye injury, blistering, reactivation of the cold sore virus, skin lightening, skin darkening or scarring. Multiple treatments may be required.   DAY OF TREATMENT  1) Wash treated area with mild cleanser.  Redness of the treated skin is expected and can vary significantly from person to person and treatment to treatment.  2) Apply SPF 50 before leaving your home/office and while driving to and from work.  3) Remain indoors if possible and avoid direct as well as indirect sunlight.  If your head or face were treated, wear a broad brimmed hat wile going to and from your car.  If your hands/arms were treated, were long sleeves and gloves.  4) Ice packs every 1-2 hours may be helpful as well.  5) In the evening, cleanse treated area gently.  Your skin will feel dry; keep treated area moisturized with a moisturizer.  DAY TWO  1) You may take a shower.  Men should NOT shave if their face was treated.  2) Cleanse treated area gently.  3) Apply SPF 50  4) Most discomfort usually subsides by Day 3.  5) You should avoid direct sunlight and try to remain indoors on Day 2.  The sensitivity to sunlight will significantly decrease after 48 hours.  6) You should soak the treated areas with as soft washcloth with cold water for 20 minutes every 4-6 hours.  Ice may be applied after the cold-water soaks, if this feels good.  The area should be patted dry and moisturized following the cold-water soaks.  7) Follow evening routine as you did on Day 1    DAYS 3- 7   1) Try to avoid direct sunlight and minimize incidental exposure for one week.  NO BEACHES!  Continue using SPF 50.  This is very important in protecting your newly rejuvenated skin.  2) You may begin  applying make-up once any crusting has healed.  The area may be slightly red for a few weeks.  3) The skin will feel dry and tightened.  Moisturize once to twice daily.  Please plan to be at your appointment for approximately 90 minutes.  Your treatments are scheduled as follows.  Who should I call with questions?    [facility]: [phone]     [facility]: [phone]    For urgent needs outside of business hours call the [facility] at [phone] and ask for the dermatology resident on call

## 2019-11-04 NOTE — NURSING NOTE
Gustavo Ramon comes into clinic today at the request of   Ordering Provider for PDT .    Photodynamic Therapy Intake:    If patient has a hx of HSV or VZV (shingles in treatment area) they have been given prophylaxis or documented that they refused it:  NO    1.Close monitoring for more significant reaction, and avoid saran wrap if YES to any of the following:  New medications since seen by physician? NO (If yes, contact supervising physician)  NSAIDs, ibuprofen, aspirin, naproxen, piroxicam: YES  Antiarrhythmics (amiodarone, quinidine): NO  Hydrochlorothiazide: YES    2. Will hold PDT for YES to any of the following:  Pregnancy/breastfeeding/unknown pregnancy: N/A  Sun burn: NO  Tetracyclines such as doxycycline: NO  Ciprofloxacin: NO  Methotrexate: NO    3. Will hold LEVULAN for YES to any of the following:   Treatment today is for acne: NO    4. If patient is receiving sand paper(RN only procedure) patient has no history of MRSA:  NO AND Laser intake checklist is complete: YES       5. Consent within 90 days with MD verified in patient chart?  YES  The sites to be treated were verified and discussed with patient. Sites verified were temple, cheeks, forehead, crown of scalp and dorsal hands .   Expected symptoms and/or complications of redness, peeling, stinging, and burning were reviewed.  Comfort measures including moisturizer, cool compresses, and sun protection were also reviewed with patient.  After review, patient verbalized understanding of procedure and consent form signed by patient (as per MD documentation) and patient agrees to proceed with treatment.     Treatment site(s) cleansed with [employer]: YES   Treatment site(s) de-greased with Acetone: YES  Applied 2 Levulan stick to the site/s.  NO, saran wrap applied to sites , no sand paper used on sites .     MEDICATION: Levulan  DOSE: 1.5 mL  ROUTE: Topical  : [employer]  LOCATION GIVEN: Temples, cheeks, forehead,  crown of scalp and dorsal hands   LOT NO: [id number]  EXP. DATE: 02/2021  NDC: 31863-570-13    TREATMENT NUMBER (30 maximum treatments per site): 3    Photodynamic Therapy(PDT) Post Op Note    Patient successfully completed PDT treatment today. Patient tolerated treatment without complication.  Sites were cleansed with mild soap and water and SPF 50 was applied after treatment.     Sun protection was reviewed with patient:  Patient wore long sleeve shirt to protect or to shield arms and Patient brought personal hat    After care instructions were reviewed with patient. AVS printed with clinic contact information and given to patient. Patient verbalized understanding and had no further questions or concerns at this time. Patient left clinic in good condition.    This service provided today was under the supervising provider of the day Dr. Victor, who was available if needed.    Gladys Kurtz RN

## 2019-12-04 ENCOUNTER — OFFICE VISIT (OUTPATIENT)
Dept: DERMATOLOGY | Facility: CLINIC | Age: 79
End: 2019-12-04
Payer: MEDICARE

## 2019-12-04 DIAGNOSIS — Z86.007 HISTORY OF SQUAMOUS CELL CARCINOMA IN SITU OF SKIN: ICD-10-CM

## 2019-12-04 DIAGNOSIS — L57.0 AK (ACTINIC KERATOSIS): Primary | ICD-10-CM

## 2019-12-04 DIAGNOSIS — Z85.828 HISTORY OF BASAL CELL CARCINOMA OF SKIN: ICD-10-CM

## 2019-12-04 DIAGNOSIS — L28.1 PRURIGO NODULARIS: ICD-10-CM

## 2019-12-04 PROCEDURE — 17003 DESTRUCT PREMALG LES 2-14: CPT | Mod: 59 | Performed by: DERMATOLOGY

## 2019-12-04 PROCEDURE — 17110 DESTRUCTION B9 LES UP TO 14: CPT | Performed by: DERMATOLOGY

## 2019-12-04 PROCEDURE — 17000 DESTRUCT PREMALG LESION: CPT | Mod: 59 | Performed by: DERMATOLOGY

## 2019-12-04 RX ORDER — FLUOROURACIL 50 MG/G
CREAM TOPICAL
Qty: 40 G | Refills: 1 | Status: SHIPPED | OUTPATIENT
Start: 2019-12-04 | End: 2020-04-07

## 2019-12-04 RX ORDER — CALCIPOTRIENE 50 UG/G
OINTMENT TOPICAL
Qty: 60 G | Refills: 1 | Status: SHIPPED | OUTPATIENT
Start: 2019-12-04 | End: 2020-04-07

## 2019-12-04 ASSESSMENT — PAIN SCALES - GENERAL: PAINLEVEL: NO PAIN (0)

## 2019-12-04 NOTE — NURSING NOTE
Gustavo Ramon's goals for this visit include:   Chief Complaint   Patient presents with     RECHECK     post PDT X3 on Temples, cheeks, forehead, crown of scalp and dorsal hands      Derm Problem     nonhealing area on left elbow       He requests these members of his care team be copied on today's visit information:     PCP: Winston Silverman    Referring Provider:  No referring provider defined for this encounter.    There were no vitals taken for this visit.    Do you need any medication refills at today's visit? Darcie Fontaine LPN

## 2019-12-04 NOTE — PROGRESS NOTES
[facility] D[facility]atology Note    Dermatology Problem List:  1. 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 Christiansen's note 11/21/11), s/p excision 5/12/09   2. Actinic keratoses  - s/p Efudex 2015, PDT (x3), LN2  3. Seborrheic dermatitis  - clobetasol 0.05% solution  4. MGUS       Last TBSE 10/28/19     Encounter Date: Dec 4, 2019    CC:  Chief Complaint   Patient presents with     RECHECK     post PDT X3 on Temples, cheeks, forehead, crown of scalp and dorsal hands      Derm Problem     nonhealing area on left elbow     History of Present Illness:  Mr. Gustavo Ramon is a 79 year old male who presents today for a post-PDT follow up. The patient has undergone 3 PDT treatments since September. He reports improvement on the areas treated. Notes many lesions that seemed to flake off. Also reports a lesion on the left elbow that has been non-resolving. Not sure if the lesion is in the process of healing. He tried applying efudex on it without resolution. Has been applying petroleum jelly lately to get it to heal. The patient is otherwise feeling well. There are no other skin concerns at this time.      Past Medical History:   Patient Active Problem List   Diagnosis     Rosacea     DJD (degenerative joint disease)     Ocular myasthenia gravis (H)     Macular pigment deposit     HYPERLIPIDEMIA LDL GOAL <130     IgA monoclonal gammopathy     Retinal hole OS, operculated     Horseshoe tear of retina without detachment OD     History  of basal cell carcinoma     Seborrhea     AK (actinic keratosis)     Malignant neoplasm of prostate (H)     PVD (posterior vitreous detachment)     Amaurosis fugax by Hx neg carotid W/U     Advanced directives, counseling/discussion      Actinic keratosis     Peripheral neuropathy     Nuclear sclerosis of both eyes     Essential hypertension with goal blood pressure less than 140/90     Gastroesophageal reflux disease without esophagitis     Past Medical History:   Diagnosis Date     Actinic keratosis      AK (actinic keratosis) 11/15/2012     Amaurosis fugax      Basal cell carcinoma 2009    R medial cheek/nose     Central serous retinopathy left    Eye     Diverticulosis 08/2006     DJD (degenerative joint disease)      GERD (gastroesophageal reflux disease)      Hearing loss     High frequency     History of nonmelanoma skin cancer      History of nonmelanoma skin cancer      HTN (hypertension)      Hyperlipidemia LDL goal < 130      Hyperplastic colon polyp 08/2006     IgA monoclonal gammopathy     IgA kappa     Lattice degeneration of retina right    Eye     Nonsenile cataract      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    [place] consult     Rosacea      Vitreous detachment left    Eye     Past Surgical History:   Procedure Laterality Date     ARTHROSCOPY KNEE RT/LT Left Jan 2010    Knee     COLONOSCOPY WITH CO2 INSUFFLATION N/A 9/24/2019    Procedure: COLONOSCOPY, WITH CO2 INSUFFLATION;  Surgeon: Loi Levine MD;  Location: [facility] OR     COMBINED ESOPHAGOSCOPY, GASTROSCOPY, DUODENOSCOPY (EGD) WITH CO2 INSUFFLATION N/A 9/24/2019    Procedure: ESOPHAGOGASTRODUODENOSCOPY, WITH CO2 INSUFFLATION;  Surgeon: Loi Levine MD;  Location: [facility] OR     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     DISKECTOMY, LUMBAR, SINGLE SP  2003    L2-3     ENT SURGERY  1943    Tonsils      ENT SURGERY  2-22-12    Biopsy lesion right pinna     ENT SURGERY  2-24-12    Biopsy leson right pinna     ESOPHAGOSCOPY, GASTROSCOPY, DUODENOSCOPY (EGD), COMBINED N/A 9/24/2019    Procedure: Esophagogastroduodenoscopy, With Biopsy;  Surgeon: Loi Levine MD;  Location: [facility] OR     SOFT TISSUE SURGERY  May 2010    Basal Cell/right side nose       Social History:  The  patient is retired from working as an . He has a daughter and son  The patient denies use of tanning beds. He is Citizen of [place].    Family History:  There is no family history of skin cancer.  Kept in chart for convenience.       Medications:  Current Outpatient Medications   Medication Sig Dispense Refill     aspirin 325 MG tablet Take 325 mg by mouth daily       cyanocobalamin (VITAMIN B-12) 1000 MCG tablet Take 1 tablet (1,000 mcg) by mouth daily 90 tablet 1     hydrochlorothiazide (HYDRODIURIL) 25 MG tablet TAKE ONE-HALF (1/2) TABLET DAILY 45 tablet 1     lisinopril (PRINIVIL/ZESTRIL) 5 MG tablet TAKE 1 TABLET DAILY 90 tablet 1     Multiple Vitamins-Minerals (PRESERVISION AREDS 2) CAPS Take 2 soft gel caps daily 90 capsule 1     omeprazole (PRILOSEC) 20 MG DR capsule TAKE 1 CAPSULE DAILY (CONCOMITANT TO PREDNISONE USE) 90 capsule 3     predniSONE (DELTASONE) 5 MG tablet TAKE 1 TABLET EVERY OTHER DAY. 45 tablet 3     pyridostigmine (MESTINON) 60 MG tablet TAKE 1 TABLET FOUR TIMES A  tablet 3     simvastatin (ZOCOR) 20 MG tablet TAKE 1 TABLET AT BEDTIME 90 tablet 0     triamcinolone (KENALOG) 0.1 % external cream Apply twice daily for 2 weeks to rash on left upper arm 45 g 0     vitamin D3 (CHOLECALCIFEROL) 2000 units tablet Take 1 tablet by mouth daily 90 tablet 1       Allergies   Allergen Reactions     Nkda [No Known Drug Allergies]      Review of Systems:  -Skin: As above in HPI. No additional skin concerns.  -Const: The patient is generally feeling well today.     Physical exam:  - This is a well developed, well-nourished male in no acute distress, in a pleasant mood.    SKIN: Sun-exposed skin, which includes the head/face, neck, both arms, digits, and/or nails was examined.   - Diffuse erythematous macules with overyling adherent scale on the frontal scalp, vertex scalp, dorsal hands, right cheek.  - 5 mm skin colored papule with superficial scale on the left elbow.   - No other lesions of concern on  areas examined.     Impression/Plan:  1.   Actinic Keratosis, improved with 3 PDT sessions, however still with palpable AK's.   Cryotherapy procedure note: After verbal consent and discussion of risks and benefits including but no limited to dyspigmentation/scar, blister, and pain, 10 were treated with 1-2mm freeze border for 2 cycles with liquid nitrogen. Post cryotherapy instructions were provided.   Start Efudex + calcipotriene cream. Apply a thin layer to scalp and forehead BID for 4 days. Complete total of 3 cycles with 2 weeks of healing in between.    2.   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   Cryotherapy procedure note: After verbal consent and discussion of risks and benefits including but no limited to dyspigmentation/scar, blister, and pain, 1 were treated with 1-2mm freeze border for 2 cycles with liquid nitrogen. Post cryotherapy instructions were provided.     Follow up in 3 months for recheck, earlier as needed for new or changing lesions.        Staff Involved:    Scribe Disclosure  I, Gennaro King, am serving as a scribe to document services personally performed by Dr. Shashank Victor DO, based on data collection and the provider's statements to me.     Provider Disclosure:   The documentation recorded by the scribe accurately reflects the services I personally performed and the decisions made by me.  I personally performed the procedures today.    Shashank Victor DO    Department of Dermatology  [facility]: Phone: [phone], Fax:[phone]  [facility]: Phone: [phone], Fax: [phone]

## 2019-12-04 NOTE — LETTER
12/4/2019         RE: Gustavo Ramon  [address] [facility] [id number]        Dear Colleague,    Thank you for referring your patient, Gustavo Ramon, to the [facility]. Please see a copy of my visit note below.    [facility] Dermatology Note    Dermatology Problem List:  1. 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 Christiansen's note 11/21/11), s/p excision 5/12/09   2. Actinic keratoses  - s/p Efudex 2015, PDT (x3), LN2  3. Seborrheic dermatitis  - clobetasol 0.05% solution  4. MGUS       Last TBSE 10/28/19     Encounter Date: Dec 4, 2019    CC:  Chief Complaint   Patient presents with     RECHECK     post PDT X3 on Temples, cheeks, forehead, crown of scalp and dorsal hands      Derm Problem     nonhealing area on left elbow     History of Present Illness:  Mr. uGstavo Ramon is a 79 year old male who presents today for a post-PDT follow up. The patient has undergone 3 PDT treatments since September. He reports improvement on the areas treated. Notes many lesions that seemed to flake off. Also reports a lesion on the left elbow that has been non-resolving. Not sure if the lesion is in the process of healing. He tried applying efudex on it without resolution. Has been applying petroleum jelly lately to get it to heal. The patient is otherwise feeling well. There are no other skin concerns at this time.      Past Medical History:   Patient Active Problem List   Diagnosis     Rosacea     DJD (degenerative joint disease)     Ocular myasthenia gravis (H)     Macular pigment deposit     HYPERLIPIDEMIA LDL GOAL <130     IgA monoclonal gammopathy     Retinal hole OS, operculated     Horseshoe tear of retina without  detachment OD     History  of basal cell carcinoma     Seborrhea     AK (actinic keratosis)     Malignant neoplasm of prostate (H)     PVD (posterior vitreous detachment)     Amaurosis fugax by Hx neg carotid W/U     Advanced directives, counseling/discussion     Actinic keratosis     Peripheral neuropathy     Nuclear sclerosis of both eyes     Essential hypertension with goal blood pressure less than 140/90     Gastroesophageal reflux disease without esophagitis     Past Medical History:   Diagnosis Date     Actinic keratosis      AK (actinic keratosis) 11/15/2012     Amaurosis fugax      Basal cell carcinoma 2009    R medial cheek/nose     Central serous retinopathy left    Eye     Diverticulosis 08/2006     DJD (degenerative joint disease)      GERD (gastroesophageal reflux disease)      Hearing loss     High frequency     History of nonmelanoma skin cancer      History of nonmelanoma skin cancer      HTN (hypertension)      Hyperlipidemia LDL goal < 130      Hyperplastic colon polyp 08/2006     IgA monoclonal gammopathy     IgA kappa     Lattice degeneration of retina right    Eye     Nonsenile cataract      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    [place] consult     Rosacea      Vitreous detachment left    Eye     Past Surgical History:   Procedure Laterality Date     ARTHROSCOPY KNEE RT/LT Left Jan 2010    Knee     COLONOSCOPY WITH CO2 INSUFFLATION N/A 9/24/2019    Procedure: COLONOSCOPY, WITH CO2 INSUFFLATION;  Surgeon: Loi Levine MD;  Location: [facility] OR     COMBINED ESOPHAGOSCOPY, GASTROSCOPY, DUODENOSCOPY (EGD) WITH CO2 INSUFFLATION N/A 9/24/2019    Procedure: ESOPHAGOGASTRODUODENOSCOPY, WITH CO2 INSUFFLATION;  Surgeon: Loi Levine MD;  Location: [facility] OR     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     DISKECTOMY, LUMBAR, SINGLE SP  2003    L2-3     ENT SURGERY  1943    Tonsils      ENT SURGERY  2-22-12    Biopsy lesion right pinna     ENT SURGERY  2-24-12    Biopsy leson right pinna      ESOPHAGOSCOPY, GASTROSCOPY, DUODENOSCOPY (EGD), COMBINED N/A 9/24/2019    Procedure: Esophagogastroduodenoscopy, With Biopsy;  Surgeon: Loi Levine MD;  Location: [facility] OR     SOFT TISSUE SURGERY  May 2010    Basal Cell/right side nose       Social History:  The patient is retired from working as an . He has a daughter and son  The patient denies use of tanning beds. He is [place].    Family History:  There is no family history of skin cancer.  Kept in chart for convenience.       Medications:  Current Outpatient Medications   Medication Sig Dispense Refill     aspirin 325 MG tablet Take 325 mg by mouth daily       cyanocobalamin (VITAMIN B-12) 1000 MCG tablet Take 1 tablet (1,000 mcg) by mouth daily 90 tablet 1     hydrochlorothiazide (HYDRODIURIL) 25 MG tablet TAKE ONE-HALF (1/2) TABLET DAILY 45 tablet 1     lisinopril (PRINIVIL/ZESTRIL) 5 MG tablet TAKE 1 TABLET DAILY 90 tablet 1     Multiple Vitamins-Minerals (PRESERVISION AREDS 2) CAPS Take 2 soft gel caps daily 90 capsule 1     omeprazole (PRILOSEC) 20 MG DR capsule TAKE 1 CAPSULE DAILY (CONCOMITANT TO PREDNISONE USE) 90 capsule 3     predniSONE (DELTASONE) 5 MG tablet TAKE 1 TABLET EVERY OTHER DAY. 45 tablet 3     pyridostigmine (MESTINON) 60 MG tablet TAKE 1 TABLET FOUR TIMES A  tablet 3     simvastatin (ZOCOR) 20 MG tablet TAKE 1 TABLET AT BEDTIME 90 tablet 0     triamcinolone (KENALOG) 0.1 % external cream Apply twice daily for 2 weeks to rash on left upper arm 45 g 0     vitamin D3 (CHOLECALCIFEROL) 2000 units tablet Take 1 tablet by mouth daily 90 tablet 1       Allergies   Allergen Reactions     Nkda [No Known Drug Allergies]      Review of Systems:  -Skin: As above in HPI. No additional skin concerns.  -Const: The patient is generally feeling well today.     Physical exam:  - This is a well developed, well-nourished male in no acute distress, in a pleasant mood.    SKIN: Sun-exposed skin, which includes the head/face, neck, both  arms, digits, and/or nails was examined.   - Diffuse erythematous macules with overyling adherent scale on the frontal scalp, vertex scalp, dorsal hands, right cheek.  - 5 mm skin colored papule with superficial scale on the left elbow.   - No other lesions of concern on areas examined.     Impression/Plan:  1.   Actinic Keratosis, improved with 3 PDT sessions, however still with palpable AK's.   Cryotherapy procedure note: After verbal consent and discussion of risks and benefits including but no limited to dyspigmentation/scar, blister, and pain, 10 were treated with 1-2mm freeze border for 2 cycles with liquid nitrogen. Post cryotherapy instructions were provided.   Start Efudex + calcipotriene cream. Apply a thin layer to scalp and forehead BID for 4 days. Complete total of 3 cycles with 2 weeks of healing in between.    2.   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   Cryotherapy procedure note: After verbal consent and discussion of risks and benefits including but no limited to dyspigmentation/scar, blister, and pain, 1 were treated with 1-2mm freeze border for 2 cycles with liquid nitrogen. Post cryotherapy instructions were provided.     Follow up in 3 months for recheck, earlier as needed for new or changing lesions.        Staff Involved:    Scribe Disclosure  I, Gennaro King, am serving as a scribe to document services personally performed by Dr. Shashank Victor DO, based on data collection and the provider's statements to me.     Provider Disclosure:   The documentation recorded by the scribe accurately reflects the services I personally performed and the decisions made by me.  I personally performed the procedures today.    Shashank Victor DO    Department of Dermatology  [facility]: Phone: [phone], Fax:[phone]  [facility]: Phone: [phone], Fax: [phone]          Again, thank you for allowing me to participate in the care of your patient.        Sincerely,        Shashank Victor MD

## 2019-12-04 NOTE — PATIENT INSTRUCTIONS
Efudex Treatment    Today, you are being prescribed Fluorouracil (Efudex) and Calcipotriene, two topical creams used for the treatment of Actinic Keratosis (AK's).  The medication is working to eliminate the unhealthy cells. Even though this treatment may be unattractive and somewhat uncomfortable.    Apply a thin layer to scalp and forehead twice daily for 4 days. Repeat after 2 weeks of healing.     You may experience some mild discomfort while being treated.    You will want to stop any other creams such as glycolic acid products, retin A, Tazorac, etc. to the area. You may use bland makeup/cover-up as long as it doesn't sting or cause you discomfort.    Apply the cream at night as your physician recommends. Use a cotton-tipped applicator, or use gloves if applying it with your fingertips. If applied with unprotected fingertips, it is important to wash your hands well after you apply this medicine.     Keep this medication away from pets.    We recommend avoiding excessive sun exposure to the treated area    You may use moisturizing creams over bothersome areas such as Vanicream or Cetaphil cream if the reaction becomes too bothersome. Please, call the clinic if this occurs.   Potential Side Effects    Your treated areas may be unsightly during therapy.  This will improve slowly following the discontinuation of therapy.     During the first week of application, mild inflammation may occur.     During the following weeks, redness, and swelling may occur with some crusting and burning.     Lesions resolve as the skin exfoliates.     Over 1 to 2 weeks, new skin grows into the treatment area.    Keep this medication away from pets  Specific side effects that usually do not require medical attention (report to your doctor or health care professional if they continue or are bothersome) include: Red or dark-colored skin     Mild erosion (loss of upper layer of skin)     Mild eye irritation including burning, itching,  sensitivity, stinging, or watering     Increased sensitivity of the skin to sun and ultraviolet light     Pain and burning of the affected area     Dryness, scaling or swelling of the affected area     Skin rash, itching of the affected area     Tenderness     If you have severe pain, please, call the clinic immediately and indicate that you have pain. Ask for a call from the RN.   Who should I call with questions?     [facility]: [phone]     [facility]: [phone]     For urgent needs outside of business hours call the [facility] at [phone]  and ask for the dermatology resident on call

## 2019-12-30 DIAGNOSIS — D50.8 OTHER IRON DEFICIENCY ANEMIA: ICD-10-CM

## 2019-12-30 DIAGNOSIS — D47.2 MGUS (MONOCLONAL GAMMOPATHY OF UNKNOWN SIGNIFICANCE): ICD-10-CM

## 2019-12-30 LAB
FERRITIN SERPL-MCNC: 10 NG/ML (ref 26–388)
HGB BLD-MCNC: 11.2 G/DL (ref 13.3–17.7)
IGA SERPL-MCNC: 648 MG/DL (ref 84–499)
IGG SERPL-MCNC: 827 MG/DL (ref 610–1616)
IGM SERPL-MCNC: 26 MG/DL (ref 35–242)
IRON SATN MFR SERPL: 6 % (ref 15–46)
IRON SERPL-MCNC: 24 UG/DL (ref 35–180)
KAPPA LC UR-MCNC: 3.28 MG/DL (ref 0.33–1.94)
KAPPA LC/LAMBDA SER: 2.78 {RATIO} (ref 0.26–1.65)
LAMBDA LC SERPL-MCNC: 1.18 MG/DL (ref 0.57–2.63)
TIBC SERPL-MCNC: 395 UG/DL (ref 240–430)

## 2019-12-30 PROCEDURE — 83883 ASSAY NEPHELOMETRY NOT SPEC: CPT | Performed by: INTERNAL MEDICINE

## 2019-12-30 PROCEDURE — 84165 PROTEIN E-PHORESIS SERUM: CPT | Performed by: INTERNAL MEDICINE

## 2019-12-30 PROCEDURE — 36415 COLL VENOUS BLD VENIPUNCTURE: CPT | Performed by: INTERNAL MEDICINE

## 2019-12-30 PROCEDURE — 82784 ASSAY IGA/IGD/IGG/IGM EACH: CPT | Performed by: INTERNAL MEDICINE

## 2019-12-30 PROCEDURE — 82728 ASSAY OF FERRITIN: CPT | Performed by: INTERNAL MEDICINE

## 2019-12-30 PROCEDURE — 83550 IRON BINDING TEST: CPT | Performed by: INTERNAL MEDICINE

## 2019-12-30 PROCEDURE — 83540 ASSAY OF IRON: CPT | Performed by: INTERNAL MEDICINE

## 2019-12-30 PROCEDURE — 00000402 ZZHCL STATISTIC TOTAL PROTEIN: Performed by: INTERNAL MEDICINE

## 2019-12-30 PROCEDURE — 85018 HEMOGLOBIN: CPT | Performed by: INTERNAL MEDICINE

## 2019-12-31 LAB
ALBUMIN SERPL ELPH-MCNC: 3.9 G/DL (ref 3.7–5.1)
ALPHA1 GLOB SERPL ELPH-MCNC: 0.2 G/DL (ref 0.2–0.4)
ALPHA2 GLOB SERPL ELPH-MCNC: 0.6 G/DL (ref 0.5–0.9)
B-GLOBULIN SERPL ELPH-MCNC: 0.7 G/DL (ref 0.6–1)
GAMMA GLOB SERPL ELPH-MCNC: 1.1 G/DL (ref 0.7–1.6)
M PROTEIN SERPL ELPH-MCNC: 0.5 G/DL
PROT PATTERN SERPL ELPH-IMP: ABNORMAL

## 2020-01-02 ENCOUNTER — ONCOLOGY VISIT (OUTPATIENT)
Dept: ONCOLOGY | Facility: CLINIC | Age: 80
End: 2020-01-02
Attending: INTERNAL MEDICINE
Payer: MEDICARE

## 2020-01-02 VITALS
BODY MASS INDEX: 26.81 KG/M2 | TEMPERATURE: 98 F | WEIGHT: 176.9 LBS | HEART RATE: 71 BPM | DIASTOLIC BLOOD PRESSURE: 75 MMHG | SYSTOLIC BLOOD PRESSURE: 133 MMHG | HEIGHT: 68 IN | OXYGEN SATURATION: 98 % | RESPIRATION RATE: 16 BRPM

## 2020-01-02 DIAGNOSIS — D50.9 IRON DEFICIENCY ANEMIA, UNSPECIFIED IRON DEFICIENCY ANEMIA TYPE: Primary | ICD-10-CM

## 2020-01-02 DIAGNOSIS — D47.2 MGUS (MONOCLONAL GAMMOPATHY OF UNKNOWN SIGNIFICANCE): ICD-10-CM

## 2020-01-02 PROCEDURE — 99214 OFFICE O/P EST MOD 30 MIN: CPT | Performed by: NURSE PRACTITIONER

## 2020-01-02 RX ORDER — FERROUS SULFATE 325(65) MG
TABLET ORAL
Qty: 60 TABLET | Refills: 1 | Status: SHIPPED | OUTPATIENT
Start: 2020-01-02 | End: 2020-02-26

## 2020-01-02 ASSESSMENT — PAIN SCALES - GENERAL: PAINLEVEL: NO PAIN (0)

## 2020-01-02 ASSESSMENT — MIFFLIN-ST. JEOR: SCORE: 1478.04

## 2020-01-02 NOTE — PROGRESS NOTES
Hematology Follow-up visit:January 2, 2020     Referring physician:  Dr.Philip Yunior Wright   Primary Care Physician: DENISSE Jewell  Urologist: Dr. Jones  Neurologist: Dr. Law  Neurologist at [facility]: Dr. Cocnhis Restrepo.    Diagnosis:   1. Monoclonal gammopathy of unknown significance (MGUS), IgA kappa.   Apparently, he had M spike discovered incidentally in 2009 during evaluation of myasthenia gravis at [facility] in [place]. He has not had a bone marrow biopsy. Serum protein immunofixation electrophoresis showed the presence of  monoclonal IgA immunoglobulin of kappa light chain type as well as small monoclonal free immunoglobulin light chain of kappa chain type.  IgA level was elevated at 620 on 6/7/2011 and urine protein electrophoresis and urine immunofixation electrophoresis showed no M protein. IgA level has remained unchanged and M spike has been stable. He has been followed by Dr. Wright and in 04/2015 transferred his care to [place].    2. Ocular MG    3. Hx of NMSC- -superficial BCC, right posterior shoulder and left posterior shoulder 11/2015- s/p aldara  -BCC, R elbow s/p ED and C 1/2016  -BCC, left forearm s/p excision 1/2016  -BCC, nodular and sclerosing, right side of nose per pathology ,(right medial cheek per Dr. Christiansen's note 11/21/2011), s/p excision 5/12/2009 Dr. Bansal    4. Prostate Cancer -  He has a history of prostate cancer ([place] 8) and was treated with cryoablation surgery in 11/2013.  He is followed by Dr. Jones.      History Of Present Illness:  Mr. Ramon is a 79 year old male who presents for f/up of IgA kappa MGUS and iron deficiency anemia. He reports he has been feeling well but is being treated for actinic keratosis to face and scalp. He does not feel any more or less fatigued. He continues to eat well and maintain weight with no new illness. He admits he did not start iron supplement after scoping procedures that took place in September as previously  recommended- he did not remember being told to start this supplement.   Rest of comprehensive review of systems is negative.     Past Medical/Surgical History:  Past Medical History:   Diagnosis Date     Actinic keratosis      AK (actinic keratosis) 11/15/2012     Amaurosis fugax      Basal cell carcinoma 2009    R medial cheek/nose     Central serous retinopathy left    Eye     Diverticulosis 08/2006     DJD (degenerative joint disease)      GERD (gastroesophageal reflux disease)      Hearing loss     High frequency     History of nonmelanoma skin cancer      History of nonmelanoma skin cancer      HTN (hypertension)      Hyperlipidemia LDL goal < 130      Hyperplastic colon polyp 08/2006     IgA monoclonal gammopathy     IgA kappa     Lattice degeneration of retina right    Eye     Nonsenile cataract      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    [place] consult     Rosacea      Vitreous detachment left    Eye     Past Surgical History:   Procedure Laterality Date     ARTHROSCOPY KNEE RT/LT Left Jan 2010    Knee     COLONOSCOPY WITH CO2 INSUFFLATION N/A 9/24/2019    Procedure: COLONOSCOPY, WITH CO2 INSUFFLATION;  Surgeon: Loi Levine MD;  Location: [facility]     COMBINED ESOPHAGOSCOPY, GASTROSCOPY, DUODENOSCOPY (EGD) WITH CO2 INSUFFLATION N/A 9/24/2019    Procedure: ESOPHAGOGASTRODUODENOSCOPY, WITH CO2 INSUFFLATION;  Surgeon: Loi Levine MD;  Location: [facility] OR     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     DISKECTOMY, LUMBAR, SINGLE SP  2003    L2-3     ENT SURGERY  1943    Tonsils      ENT SURGERY  2-22-12    Biopsy lesion right pinna     ENT SURGERY  2-24-12    Biopsy leson right pinna     ESOPHAGOSCOPY, GASTROSCOPY, DUODENOSCOPY (EGD), COMBINED N/A 9/24/2019    Procedure: Esophagogastroduodenoscopy, With Biopsy;  Surgeon: Loi Levine MD;  Location: [facility] OR     SOFT TISSUE SURGERY  May 2010    Basal Cell/right side nose     Allergies:  Allergies as of 01/02/2020 - Reviewed 12/04/2019   Allergen  "Reaction Noted     Nkda [no known drug allergies]  12/16/2009     Current Medications:  Current Outpatient Medications   Medication Sig Dispense Refill     aspirin 325 MG tablet Take 325 mg by mouth daily       calcipotriene (DOVONOX) 0.005 % external ointment Apply a thin layer to scalp and forehead twice daily for 4 days. Repeat after 2 weeks of healing. 60 g 1     cyanocobalamin (VITAMIN B-12) 1000 MCG tablet Take 1 tablet (1,000 mcg) by mouth daily 90 tablet 1     fluorouracil (EFUDEX) 5 % external cream Apply a thin layer to affected scalp and fore head twice daily for 4 days, then repeat after 2 weeks of healing. 40 g 1     hydrochlorothiazide (HYDRODIURIL) 25 MG tablet TAKE ONE-HALF (1/2) TABLET DAILY 45 tablet 1     lisinopril (PRINIVIL/ZESTRIL) 5 MG tablet TAKE 1 TABLET DAILY 90 tablet 1     Multiple Vitamins-Minerals (PRESERVISION AREDS 2) CAPS Take 2 soft gel caps daily 90 capsule 1     omeprazole (PRILOSEC) 20 MG DR capsule TAKE 1 CAPSULE DAILY (CONCOMITANT TO PREDNISONE USE) 90 capsule 3     predniSONE (DELTASONE) 5 MG tablet TAKE 1 TABLET EVERY OTHER DAY. 45 tablet 3     pyridostigmine (MESTINON) 60 MG tablet TAKE 1 TABLET FOUR TIMES A  tablet 3     simvastatin (ZOCOR) 20 MG tablet TAKE 1 TABLET AT BEDTIME 90 tablet 0     triamcinolone (KENALOG) 0.1 % external cream Apply twice daily for 2 weeks to rash on left upper arm 45 g 0     vitamin D3 (CHOLECALCIFEROL) 2000 units tablet Take 1 tablet by mouth daily 90 tablet 1      Physical Exam:/75 (BP Location: Right arm)   Pulse 71   Temp 98  F (36.7  C) (Oral)   Resp 16   Ht 1.721 m (5' 7.76\")   Wt 80.2 kg (176 lb 14.4 oz)   SpO2 98%   BMI 27.09 kg/m       Constitutional: Alert, cooperative, moving on own with no help needed to exam table  ENT: Eyes bright , No mouth sores  Neck: Supple, No adenopathy.  Cardiac: Heart rate and rhythm is regular and strong without murmur  Respiratory: Breathing easy. Lung sounds clear to " auscultation  Abdomen: Soft, non-tender, BS normal. No masses or organomegaly  MS: Muscle tone fair to good, extremities normal with edema that is now new- keeps feet up in evenings.   Skin: has redness from actinic keratosis treatment to forehead and scalp.   Neuro: Sensory grossly WNL  Lymph: Normal ant/post cervical, axillary, supraclavicular nodes  Psych: Mentation appears normal and affect normal/bright with no mental lapses noted but wants instructions clearly written so he will remember.     Laboratory/Imaging Studies:    Ref. Range 8/15/2019 12:40 9/3/2019 09:40 12/30/2019 09:10   Ferritin Latest Ref Range: 26 - 388 ng/mL 13 (L)  10 (L)   Folate Latest Ref Range: >5.4 ng/mL 18.2     Iron Latest Ref Range: 35 - 180 ug/dL 52  24 (L)   Iron Binding Cap Latest Ref Range: 240 - 430 ug/dL 373  395      Ref. Range 9/3/2019 09:40 12/30/2019 09:10   WBC Latest Ref Range: 4.0 - 11.0 10e9/L 7.7    Hemoglobin Latest Ref Range: 13.3 - 17.7 g/dL 12.7 (L) 11.2 (L)   Hematocrit Latest Ref Range: 40.0 - 53.0 % 40.0    Platelet Count Latest Ref Range: 150 - 450 10e9/L 228    RBC Count Latest Ref Range: 4.4 - 5.9 10e12/L 4.75    MCV Latest Ref Range: 78 - 100 fl 84    MCH Latest Ref Range: 26.5 - 33.0 pg 26.7    MCHC Latest Ref Range: 31.5 - 36.5 g/dL 31.8    RDW Latest Ref Range: 10.0 - 15.0 % 14.9       Ref. Range 8/15/2019 12:40 12/30/2019 09:10   Albumin Fraction Latest Ref Range: 3.7 - 5.1 g/dL 3.9 3.9   Alpha 1 Fraction Latest Ref Range: 0.2 - 0.4 g/dL 0.3 0.2   Alpha 2 Fraction Latest Ref Range: 0.5 - 0.9 g/dL 0.7 0.6   Beta Fraction Latest Ref Range: 0.6 - 1.0 g/dL 0.8 0.7   ELP Interpretation: Unknown Two monoclonal pr... Two monoclonal pr...   Gamma Fraction Latest Ref Range: 0.7 - 1.6 g/dL 1.2 1.1   IGA Latest Ref Range: 84 - 499 mg/dL 654 (H) 648 (H)   IGG Latest Ref Range: 610 - 1,616 mg/dL 881 827   IGM Latest Ref Range: 35 - 242 mg/dL 22 (L) 26 (L)   Kappa Free Lt Chain Latest Ref Range: 0.33 - 1.94 mg/dL 2.96  (H) 3.28 (H)   Kappa Lambda Ratio Latest Ref Range: 0.26 - 1.65  2.24 (H) 2.78 (H)   Lambda Free Lt Chain Latest Ref Range: 0.57 - 2.63 mg/dL 1.32 1.18   Monoclonal Peak Latest Ref Range: 0.0 g/dL 0.5 (H) 0.5 (H)       ASSESSMENT/PLAN:.   Iron deficiency anemia-Pt completed EGD and colonoscopy without source of bleeding evident.He denies new signs of blood loss but has seen a continued decline in iron. He did not remember being asked to add oral iron to his routine so is currently not on oral ferritin.   He would like to try oral supplementation prior to any IV iron so will order for him to use ferrous sulfate 325 mg every other day and return for recheck with Hgb and iron studies in 2-3 months but warned if any sign of bleeding or increased fatigue He may have lab recheck earlier.   Return with Hgb and Ferritin with TIBC in 3 months with Dr Eller.   Written instructions given to patient in AVS and iron supplement was ordered from pharmacy.    IgA MGUS - M protein is stable from last check 3 months previous and IgA level is down slightly to stable. Serum FLC ratio is mildly elevated and mild increase since last visit. Pt will need review with future visit but no sign of transformation at this time.   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   This was a 25 min visit with > 50% in education and management of concerns

## 2020-01-02 NOTE — LETTER
1/2/2020         RE: Gustavo Ramon  [address] [facility]  [place] [address]        Dear Colleague,    Thank you for referring your patient, Gustavo Ramon, to the [facility]. Please see a copy of my visit note below.    Hematology Follow-up visit:January 2, 2020     Referring physician:  Dr.Philip Yunior Wright   Primary Care Physician: DENISSE Jewell  Urologist: Dr. Jones  Neurologist: Dr. Law  Neurologist at [facility]: Dr. Conchis Restrepo.    Diagnosis:   1. Monoclonal gammopathy of unknown significance (MGUS), IgA kappa.   Apparently, he had M spike discovered incidentally in 2009 during evaluation of myasthenia gravis at [facility] in [place]. He has not had a bone marrow biopsy. Serum protein immunofixation electrophoresis showed the presence of  monoclonal IgA immunoglobulin of kappa light chain type as well as small monoclonal free immunoglobulin light chain of kappa chain type.  IgA level was elevated at 620 on 6/7/2011 and urine protein electrophoresis and urine immunofixation electrophoresis showed no M protein. IgA level has remained unchanged and M spike has been stable. He has been followed by Dr. Wright and in 04/2015 transferred his care to [place].    2. Ocular MG    3. Hx of NMSC- -superficial BCC, right posterior shoulder and left posterior shoulder 11/2015- s/p aldara  -BCC, R elbow s/p ED and C 1/2016  -BCC, left forearm s/p excision 1/2016  -BCC, nodular and sclerosing, right side of nose per pathology ,(right medial cheek per Dr. Christiansen's note 11/21/2011), s/p excision 5/12/2009 Dr. Bansal    4. Prostate Cancer -  He has a history of prostate cancer ([place] 8) and was treated with cryoablation surgery in 11/2013.  He is followed by Dr. Jones.      History Of Present Illness:  Mr. Ramon is a 79 year old male who presents for f/up of IgA kappa MGUS and iron deficiency anemia. He reports he has been feeling well but is being treated for  actinic keratosis to face and scalp. He does not feel any more or less fatigued. He continues to eat well and maintain weight with no new illness. He admits he did not start iron supplement after scoping procedures that took place in September as previously recommended- he did not remember being told to start this supplement.   Rest of comprehensive review of systems is negative.     Past Medical/Surgical History:  Past Medical History:   Diagnosis Date     Actinic keratosis      AK (actinic keratosis) 11/15/2012     Amaurosis fugax      Basal cell carcinoma 2009    R medial cheek/nose     Central serous retinopathy left    Eye     Diverticulosis 08/2006     DJD (degenerative joint disease)      GERD (gastroesophageal reflux disease)      Hearing loss     High frequency     History of nonmelanoma skin cancer      History of nonmelanoma skin cancer      HTN (hypertension)      Hyperlipidemia LDL goal < 130      Hyperplastic colon polyp 08/2006     IgA monoclonal gammopathy     IgA kappa     Lattice degeneration of retina right    Eye     Nonsenile cataract      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    [place] consult     Rosacea      Vitreous detachment left    Eye     Past Surgical History:   Procedure Laterality Date     ARTHROSCOPY KNEE RT/LT Left Jan 2010    Knee     COLONOSCOPY WITH CO2 INSUFFLATION N/A 9/24/2019    Procedure: COLONOSCOPY, WITH CO2 INSUFFLATION;  Surgeon: Loi Levine MD;  Location: [facility] OR     COMBINED ESOPHAGOSCOPY, GASTROSCOPY, DUODENOSCOPY (EGD) WITH CO2 INSUFFLATION N/A 9/24/2019    Procedure: ESOPHAGOGASTRODUODENOSCOPY, WITH CO2 INSUFFLATION;  Surgeon: Loi Levine MD;  Location: [facility] OR     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     DISKECTOMY, LUMBAR, SINGLE SP  2003    L2-3     ENT SURGERY  1943    Tonsils      ENT SURGERY  2-22-12    Biopsy lesion right pinna     ENT SURGERY  2-24-12    Biopsy leson right pinna     ESOPHAGOSCOPY, GASTROSCOPY, DUODENOSCOPY (EGD), COMBINED N/A  "9/24/2019    Procedure: Esophagogastroduodenoscopy, With Biopsy;  Surgeon: Loi Levine MD;  Location: [facility] OR     SOFT TISSUE SURGERY  May 2010    Basal Cell/right side nose     Allergies:  Allergies as of 01/02/2020 - Reviewed 12/04/2019   Allergen Reaction Noted     Nkda [no known drug allergies]  12/16/2009     Current Medications:  Current Outpatient Medications   Medication Sig Dispense Refill     aspirin 325 MG tablet Take 325 mg by mouth daily       calcipotriene (DOVONOX) 0.005 % external ointment Apply a thin layer to scalp and forehead twice daily for 4 days. Repeat after 2 weeks of healing. 60 g 1     cyanocobalamin (VITAMIN B-12) 1000 MCG tablet Take 1 tablet (1,000 mcg) by mouth daily 90 tablet 1     fluorouracil (EFUDEX) 5 % external cream Apply a thin layer to affected scalp and fore head twice daily for 4 days, then repeat after 2 weeks of healing. 40 g 1     hydrochlorothiazide (HYDRODIURIL) 25 MG tablet TAKE ONE-HALF (1/2) TABLET DAILY 45 tablet 1     lisinopril (PRINIVIL/ZESTRIL) 5 MG tablet TAKE 1 TABLET DAILY 90 tablet 1     Multiple Vitamins-Minerals (PRESERVISION AREDS 2) CAPS Take 2 soft gel caps daily 90 capsule 1     omeprazole (PRILOSEC) 20 MG DR capsule TAKE 1 CAPSULE DAILY (CONCOMITANT TO PREDNISONE USE) 90 capsule 3     predniSONE (DELTASONE) 5 MG tablet TAKE 1 TABLET EVERY OTHER DAY. 45 tablet 3     pyridostigmine (MESTINON) 60 MG tablet TAKE 1 TABLET FOUR TIMES A  tablet 3     simvastatin (ZOCOR) 20 MG tablet TAKE 1 TABLET AT BEDTIME 90 tablet 0     triamcinolone (KENALOG) 0.1 % external cream Apply twice daily for 2 weeks to rash on left upper arm 45 g 0     vitamin D3 (CHOLECALCIFEROL) 2000 units tablet Take 1 tablet by mouth daily 90 tablet 1      Physical Exam:/75 (BP Location: Right arm)   Pulse 71   Temp 98  F (36.7  C) (Oral)   Resp 16   Ht 1.721 m (5' 7.76\")   Wt 80.2 kg (176 lb 14.4 oz)   SpO2 98%   BMI 27.09 kg/m        Constitutional: Alert, " cooperative, moving on own with no help needed to exam table  ENT: Eyes bright , No mouth sores  Neck: Supple, No adenopathy.  Cardiac: Heart rate and rhythm is regular and strong without murmur  Respiratory: Breathing easy. Lung sounds clear to auscultation  Abdomen: Soft, non-tender, BS normal. No masses or organomegaly  MS: Muscle tone fair to good, extremities normal with edema that is now new- keeps feet up in evenings.   Skin: has redness from actinic keratosis treatment to forehead and scalp.   Neuro: Sensory grossly WNL  Lymph: Normal ant/post cervical, axillary, supraclavicular nodes  Psych: Mentation appears normal and affect normal/bright with no mental lapses noted but wants instructions clearly written so he will remember.     Laboratory/Imaging Studies:    Ref. Range 8/15/2019 12:40 9/3/2019 09:40 12/30/2019 09:10   Ferritin Latest Ref Range: 26 - 388 ng/mL 13 (L)  10 (L)   Folate Latest Ref Range: >5.4 ng/mL 18.2     Iron Latest Ref Range: 35 - 180 ug/dL 52  24 (L)   Iron Binding Cap Latest Ref Range: 240 - 430 ug/dL 373  395      Ref. Range 9/3/2019 09:40 12/30/2019 09:10   WBC Latest Ref Range: 4.0 - 11.0 10e9/L 7.7    Hemoglobin Latest Ref Range: 13.3 - 17.7 g/dL 12.7 (L) 11.2 (L)   Hematocrit Latest Ref Range: 40.0 - 53.0 % 40.0    Platelet Count Latest Ref Range: 150 - 450 10e9/L 228    RBC Count Latest Ref Range: 4.4 - 5.9 10e12/L 4.75    MCV Latest Ref Range: 78 - 100 fl 84    MCH Latest Ref Range: 26.5 - 33.0 pg 26.7    MCHC Latest Ref Range: 31.5 - 36.5 g/dL 31.8    RDW Latest Ref Range: 10.0 - 15.0 % 14.9       Ref. Range 8/15/2019 12:40 12/30/2019 09:10   Albumin Fraction Latest Ref Range: 3.7 - 5.1 g/dL 3.9 3.9   Alpha 1 Fraction Latest Ref Range: 0.2 - 0.4 g/dL 0.3 0.2   Alpha 2 Fraction Latest Ref Range: 0.5 - 0.9 g/dL 0.7 0.6   Beta Fraction Latest Ref Range: 0.6 - 1.0 g/dL 0.8 0.7   ELP Interpretation: Unknown Two monoclonal pr... Two monoclonal pr...   Gamma Fraction Latest Ref Range:  0.7 - 1.6 g/dL 1.2 1.1   IGA Latest Ref Range: 84 - 499 mg/dL 654 (H) 648 (H)   IGG Latest Ref Range: 610 - 1,616 mg/dL 881 827   IGM Latest Ref Range: 35 - 242 mg/dL 22 (L) 26 (L)   Kappa Free Lt Chain Latest Ref Range: 0.33 - 1.94 mg/dL 2.96 (H) 3.28 (H)   Kappa Lambda Ratio Latest Ref Range: 0.26 - 1.65  2.24 (H) 2.78 (H)   Lambda Free Lt Chain Latest Ref Range: 0.57 - 2.63 mg/dL 1.32 1.18   Monoclonal Peak Latest Ref Range: 0.0 g/dL 0.5 (H) 0.5 (H)       ASSESSMENT/PLAN:.   Iron deficiency anemia-Pt completed EGD and colonoscopy without source of bleeding evident.He denies new signs of blood loss but has seen a continued decline in iron. He did not remember being asked to add oral iron to his routine so is currently not on oral ferritin.   He would like to try oral supplementation prior to any IV iron so will order for him to use ferrous sulfate 325 mg every other day and return for recheck with Hgb and iron studies in 2-3 months but warned if any sign of bleeding or increased fatigue He may have lab recheck earlier.   Return with Hgb and Ferritin with TIBC in 3 months with Dr Eller.   Written instructions given to patient in AVS and iron supplement was ordered from pharmacy.    IgA MGUS - M protein is stable from last check 3 months previous and IgA level is down slightly to stable. Serum FLC ratio is mildly elevated and mild increase since last visit. Pt will need review with future visit but no sign of transformation at this time.   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   This was a 25 min visit with > 50% in education and management of concerns     Again, thank you for allowing me to participate in  the care of your patient.        Sincerely,        Nika Babcock, ABDIRASHID, APRN CNP

## 2020-01-02 NOTE — NURSING NOTE
"Oncology Rooming Note    January 2, 2020 10:49 AM   Gustavo Ramon is a 79 year old male who presents for:    Chief Complaint   Patient presents with     Oncology Clinic Visit     4 month follow up     Initial Vitals: /75 (BP Location: Right arm)   Pulse 71   Temp 98  F (36.7  C) (Oral)   Resp 16   Ht 1.721 m (5' 7.76\")   Wt 80.2 kg (176 lb 14.4 oz)   SpO2 98%   BMI 27.09 kg/m   Estimated body mass index is 27.09 kg/m  as calculated from the following:    Height as of this encounter: 1.721 m (5' 7.76\").    Weight as of this encounter: 80.2 kg (176 lb 14.4 oz). Body surface area is 1.96 meters squared.  No Pain (0) Comment: Data Unavailable   No LMP for male patient.  Allergies reviewed: Yes  Medications reviewed: Yes    Medications: Medication refills not needed today.  Pharmacy name entered into EPIC:    [facility] EPRESCRIB[facility] ONLY  [facility] - [id number] [facility]  [facility] HOME DELIVERY - [facility], [place] - [address] [facility]    Saskia Townsend LPN              "

## 2020-02-04 DIAGNOSIS — C61 MALIGNANT NEOPLASM OF PROSTATE (H): ICD-10-CM

## 2020-02-04 LAB — PSA SERPL-MCNC: 0.94 UG/L (ref 0–4)

## 2020-02-04 PROCEDURE — 36415 COLL VENOUS BLD VENIPUNCTURE: CPT | Performed by: UROLOGY

## 2020-02-04 PROCEDURE — 84153 ASSAY OF PSA TOTAL: CPT | Performed by: UROLOGY

## 2020-02-08 ENCOUNTER — HEALTH MAINTENANCE LETTER (OUTPATIENT)
Age: 80
End: 2020-02-08

## 2020-02-09 DIAGNOSIS — E78.5 HYPERLIPIDEMIA LDL GOAL <130: ICD-10-CM

## 2020-02-10 NOTE — TELEPHONE ENCOUNTER
"Requested Prescriptions   Pending Prescriptions Disp Refills     simvastatin (ZOCOR) 20 MG tablet [Pharmacy Med Name: SIMVASTATIN TABS 20MG] 90 tablet 4     Sig: TAKE 1 TABLET AT BEDTIME   Last Written Prescription Date:  10-21-19  Last Fill Quantity: 90,  # refills: 4   Last office visit: 9/3/2019 with prescribing provider:  9-3-19   Future Office Visit:   Next 5 appointments (look out 90 days)    Feb 11, 2020  9:00 AM CST  Return Visit with Glenn Jones MD  [facility] ([address] [address]  [phone]   Apr 07, 2020 11:00 AM CDT  Return Visit with Susan Eller MD  [facility]) [address] [place] [id number] [phone]   May 05, 2020 11:00 AM CDT  Return Visit with Brooklyn Treviño MD  [facility]) [address] [place] [id number] [phone]           Statins Protocol Passed - 2/9/2020  9:11 PM        Passed - LDL on file in past 12 months     Recent Labs   Lab Test 02/26/19  0914   LDL 87             Passed - No abnormal creatine kinase in past 12 months     Recent Labs   Lab Test 08/10/15  0751                   Passed - Recent (12 mo) or future (30 days) visit within the authorizing provider's specialty     Patient has had an office visit with the authorizing provider or a provider within the authorizing providers department within the previous 12 mos or has a future within next 30 days. See \"Patient Info\" tab in inbasket, or \"Choose Columns\" in Meds & Orders section of the refill encounter.              Passed - Medication is active on med list        Passed - Patient is age 18 or older        "

## 2020-02-11 ENCOUNTER — OFFICE VISIT (OUTPATIENT)
Dept: UROLOGY | Facility: CLINIC | Age: 80
End: 2020-02-11
Payer: MEDICARE

## 2020-02-11 VITALS — DIASTOLIC BLOOD PRESSURE: 83 MMHG | OXYGEN SATURATION: 99 % | HEART RATE: 76 BPM | SYSTOLIC BLOOD PRESSURE: 133 MMHG

## 2020-02-11 DIAGNOSIS — C61 MALIGNANT NEOPLASM OF PROSTATE (H): Primary | ICD-10-CM

## 2020-02-11 PROCEDURE — 99213 OFFICE O/P EST LOW 20 MIN: CPT | Performed by: UROLOGY

## 2020-02-11 RX ORDER — SIMVASTATIN 20 MG
TABLET ORAL
Qty: 30 TABLET | Refills: 0 | Status: SHIPPED | OUTPATIENT
Start: 2020-02-11 | End: 2020-03-13

## 2020-02-11 NOTE — PROGRESS NOTES
Chief Complaint   Patient presents with     RECHECK       Gustavopadmini Ramon is a 79 year old male who presents today for follow up of   Chief Complaint   Patient presents with     RECHECK    f/u for prostate cancer.  He is s/p cryotherapy on 7/13 for prostate cancer kari 8 with initial psa of 7.4.  He did receive a hormonal shot prior to treatment.  His psa has gone up slowly from 0.27 (2/14) to 0.66 (6/14) and most recently is 0.94 then 1.03 and then 1.09 and 0.77 ,1.05, 1.17, 1.21and now 1.03.  He has some urgency of urination but is doing better.  He denies any incontinence/dysuria/hematuria.    Current Outpatient Medications   Medication Sig Dispense Refill     aspirin 325 MG tablet Take 325 mg by mouth daily       calcipotriene (DOVONOX) 0.005 % external ointment Apply a thin layer to scalp and forehead twice daily for 4 days. Repeat after 2 weeks of healing. 60 g 1     cyanocobalamin (VITAMIN B-12) 1000 MCG tablet Take 1 tablet (1,000 mcg) by mouth daily 90 tablet 1     ferrous sulfate (FEROSUL) 325 (65 Fe) MG tablet Use 1 tab every other day with orange juice 60 tablet 1     fluorouracil (EFUDEX) 5 % external cream Apply a thin layer to affected scalp and fore head twice daily for 4 days, then repeat after 2 weeks of healing. 40 g 1     hydrochlorothiazide (HYDRODIURIL) 25 MG tablet TAKE ONE-HALF (1/2) TABLET DAILY 45 tablet 1     lisinopril (PRINIVIL/ZESTRIL) 5 MG tablet TAKE 1 TABLET DAILY 90 tablet 1     Multiple Vitamins-Minerals (PRESERVISION AREDS 2) CAPS Take 2 soft gel caps daily 90 capsule 1     omeprazole (PRILOSEC) 20 MG DR capsule TAKE 1 CAPSULE DAILY (CONCOMITANT TO PREDNISONE USE) 90 capsule 3     predniSONE (DELTASONE) 5 MG tablet TAKE 1 TABLET EVERY OTHER DAY. 45 tablet 3     pyridostigmine (MESTINON) 60 MG tablet TAKE 1 TABLET FOUR TIMES A  tablet 3     simvastatin (ZOCOR) 20 MG tablet TAKE 1 TABLET AT BEDTIME 90 tablet 0     triamcinolone (KENALOG) 0.1 % external cream Apply twice  daily for 2 weeks to rash on left upper arm 45 g 0     vitamin D3 (CHOLECALCIFEROL) 2000 units tablet Take 1 tablet by mouth daily 90 tablet 1     Allergies   Allergen Reactions     Nkda [No Known Drug Allergies]       Past Medical History:   Diagnosis Date     Actinic keratosis      AK (actinic keratosis) 11/15/2012     Amaurosis fugax      Basal cell carcinoma 2009    R medial cheek/nose     Central serous retinopathy left    Eye     Diverticulosis 08/2006     DJD (degenerative joint disease)      GERD (gastroesophageal reflux disease)      Hearing loss     High frequency     History of nonmelanoma skin cancer      History of nonmelanoma skin cancer      HTN (hypertension)      Hyperlipidemia LDL goal < 130      Hyperplastic colon polyp 08/2006     IgA monoclonal gammopathy     IgA kappa     Lattice degeneration of retina right    Eye     Nonsenile cataract      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    [place] consult     Rosacea      Vitreous detachment left    Eye     Past Surgical History:   Procedure Laterality Date     ARTHROSCOPY KNEE RT/LT Left Jan 2010    Knee     COLONOSCOPY WITH CO2 INSUFFLATION N/A 9/24/2019    Procedure: COLONOSCOPY, WITH CO2 INSUFFLATION;  Surgeon: Loi Levine MD;  Location: [facility] OR     COMBINED ESOPHAGOSCOPY, GASTROSCOPY, DUODENOSCOPY (EGD) WITH CO2 INSUFFLATION N/A 9/24/2019    Procedure: ESOPHAGOGASTRODUODENOSCOPY, WITH CO2 INSUFFLATION;  Surgeon: Loi Levine MD;  Location: [facility] OR     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     DISKECTOMY, LUMBAR, SINGLE SP  2003    L2-3     ENT SURGERY  1943    Tonsils      ENT SURGERY  2-22-12    Biopsy lesion right pinna     ENT SURGERY  2-24-12    Biopsy leson right pinna     ESOPHAGOSCOPY, GASTROSCOPY, DUODENOSCOPY (EGD), COMBINED N/A 9/24/2019    Procedure: Esophagogastroduodenoscopy, With Biopsy;  Surgeon: Loi Levine MD;  Location: [facility] OR     SOFT TISSUE SURGERY  May 2010    Basal Cell/right side nose     Family History    Problem Relation Age of Onset     Heart Disease Mother      Alzheimer Disease Mother 73     C.A.D. Father      Diabetes Grandchild         using a pump      Diabetes Paternal Uncle      Heart Disease Paternal Grandmother      Glaucoma No family hx of      Macular Degeneration No family hx of      Melanoma No family hx of      Skin Cancer No family hx of      History     Social History     Marital Status:      Spouse Name: Ceci     Number of Children: 2     Years of Education: 16     Occupational History      Retired     2001, Electrical     Social History Main Topics     Smoking status: Never Smoker      Smokeless tobacco: Never Used     Alcohol Use: No     Drug Use: No     Sexual Activity: No     Other Topics Concern     Not on file     Social History Narrative       REVIEW OF SYSTEMS  =================  C: NEGATIVE for fever, chills, change in weight  I: NEGATIVE for worrisome rashes, moles or lesions  E/M: NEGATIVE for ear, mouth and throat problems  R: NEGATIVE for significant cough or SHORTNESS OF BREATH,   CV: NEGATIVE for chest pain, palpitations or peripheral edema  GI: NEGATIVE for nausea, abdominal pain, heartburn, or change in bowel habits  NEURO: NEGATIVE any motor/sensory changes  PSYCH: NEGATIVE for recent mood disorder    Physical Exam:  /83 (BP Location: Right arm, Patient Position: Chair, Cuff Size: Adult Large)   Pulse 76   SpO2 99%    Patient is pleasant, in no acute distress, good general condition.  Lung: no evidence of respiratory distress    Abdomen: Soft, nondistended, non tender. No masses. No rebound or guarding.   Exam: penis no d/c.  Testis no masses.   No scrotal lesion.  Prostate flat small.  Skin: Warm and dry.  No redness.  Psych: normal mood and affect  Neuro: alert and oriented    Assessment/Plan:   (185) Malignant neoplasm of prostate  (primary encounter diagnosis)  Comment:  psa is stable  Plan: see in 12 months with psa.

## 2020-02-11 NOTE — TELEPHONE ENCOUNTER
Prescription approved per [facility] Refill Protocol.  30 day supply with reminder given.

## 2020-02-26 DIAGNOSIS — D50.9 IRON DEFICIENCY ANEMIA, UNSPECIFIED IRON DEFICIENCY ANEMIA TYPE: ICD-10-CM

## 2020-02-26 RX ORDER — FERROUS SULFATE 325(65) MG
TABLET ORAL
Qty: 60 TABLET | Refills: 1 | Status: SHIPPED | OUTPATIENT
Start: 2020-02-26 | End: 2020-05-08

## 2020-03-01 DIAGNOSIS — I10 BENIGN ESSENTIAL HYPERTENSION: ICD-10-CM

## 2020-03-02 RX ORDER — LISINOPRIL 5 MG/1
TABLET ORAL
Qty: 90 TABLET | Refills: 0 | Status: SHIPPED | OUTPATIENT
Start: 2020-03-02 | End: 2020-06-02

## 2020-03-02 NOTE — TELEPHONE ENCOUNTER
"Requested Prescriptions   Pending Prescriptions Disp Refills     lisinopril (ZESTRIL) 5 MG tablet [Pharmacy Med Name: LISINOPRIL TABS 5MG] 90 tablet 4     Sig: TAKE 1 TABLET DAILY   Last Written Prescription Date:  12/16/19  Last Fill Quantity: 90,  # refills: 4   Last office visit: 9/3/2019 with prescribing provider:  NUZHAT Silverman   Future Office Visit:   Next 5 appointments (look out 90 days)    Apr 07, 2020 11:00 AM CDT  [facility] with Susan [facility]er MD  [facility] [id number] [place] [id number]  [phone]   May 05, 2020 11:00 AM CDT  Return Visit with Brooklyn Treviño MD  [facility] [address] [place] [id number] [phone]             ACE Inhibitors (Including Combos) Protocol Passed - 3/1/2020  5:16 AM        Passed - Blood pressure under 140/90 in past 12 months     BP Readings from Last 3 Encounters:   02/11/20 133/83   01/02/20 133/75   09/24/19 117/78                 Passed - Recent (12 mo) or future (30 days) visit within the authorizing provider's specialty     Patient has had an office visit with the authorizing provider or a provider within the authorizing providers department within the previous 12 mos or has a future within next 30 days. See \"Patient Info\" tab in inbasket, or \"Choose Columns\" in Meds & Orders section of the refill encounter.              Passed - Medication is active on med list        Passed - Patient is age 18 or older        Passed - Normal serum creatinine on file in past 12 months     Recent Labs   Lab Test 08/15/19  1240  07/25/13  1123   CR 1.01   < >  --    CRPOC  --   --  1.1    < > = values in this interval not displayed.             Passed - Normal serum potassium on file in past 12 months     Recent Labs   Lab Test 08/15/19  1240   POTASSIUM 3.9               "

## 2020-03-02 NOTE — TELEPHONE ENCOUNTER
Prescription approved per [facility] Refill Protocol.    BP Readings from Last 3 Encounters:   02/11/20 133/83   01/02/20 133/75   09/24/19 117/78         Given 90 day supply with note to call to schedule annual physical.     Susy Dudley RN BSN

## 2020-03-12 DIAGNOSIS — E78.5 HYPERLIPIDEMIA LDL GOAL <130: ICD-10-CM

## 2020-03-12 NOTE — TELEPHONE ENCOUNTER
"Requested Prescriptions   Pending Prescriptions Disp Refills     simvastatin (ZOCOR) 20 MG tablet [Pharmacy Med Name: SIMVASTATIN TABS 20MG] 30 tablet 11     Sig: TAKE 1 TABLET AT BEDTIME (DUE FOR APPOINTMENT FOR FURTHER REFILLS)   Last Written Prescription Date:  2-11-20  Last Fill Quantity: 30,  # refills: 11   Last office visit: 9/3/2019 with prescribing provider:  9-3-19   Future Office Visit:   Next 5 appointments (look out 90 days)    Apr 07, 2020 11:00 AM CDT  [facility] [id number] [place] [phone]  [phone]   May 05, 2020 11:00 AM CDT  [facility] Visit with Brooklyn [facility] [address] [place] [phone]  [phone]           Statins Protocol Failed - 3/12/2020  1:29 PM        Failed - LDL on file in past 12 months     Recent Labs   Lab Test 02/26/19  0914   LDL 87             Passed - No abnormal creatine kinase in past 12 months     Recent Labs   Lab Test 08/10/15  0751                   Passed - Recent (12 mo) or future (30 days) visit within the authorizing provider's specialty     Patient has had an office visit with the authorizing provider or a provider within the authorizing providers department within the previous 12 mos or has a future within next 30 days. See \"Patient Info\" tab in inbasket, or \"Choose Columns\" in Meds & Orders section of the refill encounter.              Passed - Medication is active on med list        Passed - Patient is age 18 or older           "

## 2020-03-14 RX ORDER — SIMVASTATIN 20 MG
TABLET ORAL
Qty: 30 TABLET | Refills: 0 | Status: SHIPPED | OUTPATIENT
Start: 2020-03-14 | End: 2020-04-10

## 2020-03-14 NOTE — TELEPHONE ENCOUNTER
Routing refill request to provider for review/approval because:  Labs not current:  ldl  Patient needs to be seen because:  Due for follow up 3/3/20      Medication pended for approval, 30 day supply with reminder.

## 2020-03-18 DIAGNOSIS — I10 BENIGN ESSENTIAL HYPERTENSION: ICD-10-CM

## 2020-03-18 NOTE — TELEPHONE ENCOUNTER
"Requested Prescriptions   Pending Prescriptions Disp Refills     hydrochlorothiazide (HYDRODIURIL) 25 MG tablet [Pharmacy Med Name: HYDROCHLOROTHIAZIDE TABS 25MG] 45 tablet 3     Sig: TAKE ONE-HALF (1/2) TABLET DAILY   Last Written Prescription Date:  12-20-19  Last Fill Quantity: 45,  # refills: 3   Last office visit: 9/3/2019 with prescribing provider:  9-3-19   Future Office Visit:   Next 5 appointments (look out 90 days)    Apr 07, 2020 11:00 AM CDT  Return Visit with Susan Eller MD  [facility]) [id number] [place] [phone]  [phone]   May 05, 2020 11:00 AM CDT  Return Visit with Brooklyn Treviño MD  [facility]) [id number] [place] [phone]  [phone]           Diuretics (Including Combos) Protocol Passed - 3/18/2020  2:22 AM        Passed - Blood pressure under 140/90 in past 12 months     BP Readings from Last 3 Encounters:   02/11/20 133/83   01/02/20 133/75   09/24/19 117/78                 Passed - Recent (12 mo) or future (30 days) visit within the authorizing provider's specialty     Patient has had an office visit with the authorizing provider or a provider within the authorizing providers department within the previous 12 mos or has a future within next 30 days. See \"Patient Info\" tab in inbasket, or \"Choose Columns\" in Meds & Orders section of the refill encounter.              Passed - Medication is active on med list        Passed - Patient is age 18 or older        Passed - Normal serum creatinine on file in past 12 months     Recent Labs   Lab Test 08/15/19  1240   CR 1.01              Passed - Normal serum potassium on file in past 12 months     Recent Labs   Lab Test 08/15/19  1240   POTASSIUM 3.9                    Passed - Normal serum sodium on file in past 12 months     Recent Labs   Lab Test 08/15/19  1240                    "

## 2020-03-19 RX ORDER — HYDROCHLOROTHIAZIDE 25 MG/1
TABLET ORAL
Qty: 45 TABLET | Refills: 3 | Status: SHIPPED | OUTPATIENT
Start: 2020-03-19 | End: 2020-11-17

## 2020-04-06 NOTE — PROGRESS NOTES
"Subjective     Gustavo Ramon is a 79 year old male who is being evaluated via a billable telephone visit.      The patient has been notified of following:     \"This telephone visit will be conducted via a call between you and your physician/provider. We have found that certain health care needs can be provided without the need for a physical exam.  This service lets us provide the care you need with a short phone conversation.  If a prescription is necessary we can send it directly to your pharmacy.  If lab work is needed we can place an order for that and you can then stop by our lab to have the test done at a later time.    If during the course of the call the physician/provider feels a telephone visit is not appropriate, you will not be charged for this service.\"     Patient has given verbal consent for Telephone visit? Yes    Gustavo Ramon complains of f/up of IgA kappa MGUS and worsening anemia. Also, has a Hx of prostate cancer and MG.  Mspike and IgA levels are stable. [place] free light chains are slightly higher.   Results for ABDOULAYE RAMON (MRN [id number]) as of 4/6/2020 12:45   Ref. Range 5/25/2017 07:59 5/25/2017 08:14 5/25/2018 09:39 8/15/2019 12:40 12/30/2019 09:10   IGA Latest Ref Range: 84 - 499 mg/dL 572 (H)  647 (H) 654 (H) 648 (H)   IGG Latest Ref Range: 610 - 1,616 mg/dL 867  980 881 827   IGM Latest Ref Range: 35 - 242 mg/dL 27 (L)  29 (L) 22 (L) 26 (L)   Immunofix ELP Urine Unknown  No monoclonal pro... No monoclonal pro...     Kappa Free Lt Chain Latest Ref Range: 0.33 - 1.94 mg/dL 2.02 (H)  1.93 2.96 (H) 3.28 (H)   Kappa Lambda Ratio Latest Ref Range: 0.26 - 1.65  1.33  1.41 2.24 (H) 2.78 (H)   Lambda Free Lt Chain Latest Ref Range: 0.57 - 2.63 mg/dL 1.52  1.37 1.32 1.18   Monoclonal Peak Latest Ref Range: 0.0 g/dL 0.4 (H)  0.4 (H) 0.5 (H) 0.5 (H)   He has also had anemia, worsening as below.  He has not had his hemoglobin and iron studies rechecked secondary to Covid-19 " outbreak.  Results for ABDOULAYE LOZOYA (MRN [id number]) as of 4/6/2020 18:43   Ref. Range 4/21/2015 14:48 5/25/2016 09:54 5/25/2017 07:59 5/25/2018 09:39 8/15/2019 12:40 9/3/2019 09:40 12/30/2019 09:10   Hemoglobin Latest Ref Range: 13.3 - 17.7 g/dL 15.4 15.5 15.2 15.0 12.6 (L) 12.7 (L) 11.2 (L)     Creatinine was 1.01 on 8/15/2019. Still with low iron levels and Fe %. Ferritin low at 10 on 12/30/2019 and low on 8/15/2019 also low at 13.  PSA has been as below:  Results for ABDOULAYE LOZOYA (MRN [id number]) as of 4/6/2020 18:43   Ref. Range 2/5/2018 07:54 2/5/2019 08:27 2/4/2020 08:59   PSA Latest Ref Range: 0 - 4 ug/L 1.21 1.03 0.94   He has been on ferrous sulfate supplementation 325 mg PO QOD for three months.  He did have EGD in 09/2019 which showed in addition to hiatal hernia:  Esophageal mucosal changes suspicious for                             short-segment Stewart's esophagus, classified as                             Stewart's stage C1-M3 per Goodyear criteria. Biopsied.                             - 2 cm type-I sliding hiatal hernia.                             - Erythematous mucosa in the incisura.                             - Normal examined duodenum.                             - Biopsies were obtained at the incisura and in the                             gastric antrum.   Biopsies showed:  A. STOMACH, ANTRUM, BIOPSY:   - Gastric antral mucosa with no significant inflammation   - No H. pylori like organisms identified on routine staining   - Negative for intestinal metaplasia or dysplasia     B. ESOPHAGUS, AT 38CM, BIOPSY:   - Metaplastic columnar epithelium with goblet cells, consistent with   Stewart's esophagus   - Negative for dysplasia   He was recommended to start on daily PPI but has been taking it every other day as below.  Colonoscopy at that time showed diverticulosis, and normal appearing examined portion of the ileum.   He is feeling very well.  He has had no constitutional symptoms.  He  denies shortness of breath chest pain or headache.  He has been exercising at home and lifting weights as well.  Currently, he has been taking omeprazole every other day on the days that he takes 5 mg of prednisone for his myasthenia gravis.  He has remained on ferrous sulfate supplementation 325 mg p.o. daily on the days that he is not taking omeprazole.  He has been on ferrous sulfate supplementation for the past 3 months since January 2020.  He denies any bone pains.  In addition, a complete 12 point  review of systems is negative.      ALLERGIES  Nkda [no known drug allergies]    Current Outpatient Medications   Medication     aspirin 325 MG tablet     calcipotriene (DOVONOX) 0.005 % external ointment     cyanocobalamin (VITAMIN B-12) 1000 MCG tablet     ferrous sulfate (FEROSUL) 325 (65 Fe) MG tablet     fluorouracil (EFUDEX) 5 % external cream     hydrochlorothiazide (HYDRODIURIL) 25 MG tablet     lisinopril (ZESTRIL) 5 MG tablet     Multiple Vitamins-Minerals (PRESERVISION AREDS 2) CAPS     omeprazole (PRILOSEC) 20 MG DR capsule     predniSONE (DELTASONE) 5 MG tablet     pyridostigmine (MESTINON) 60 MG tablet     simvastatin (ZOCOR) 20 MG tablet     triamcinolone (KENALOG) 0.1 % external cream     vitamin D3 (CHOLECALCIFEROL) 2000 units tablet     Current Facility-Administered Medications   Medication     aminolevulinic acid (LEVULAN) solution 1.5 mL                   Objective   Reported vitals:    Psych: Alert and oriented times 3; coherent speech, normal   rate and volume, able to articulate logical thoughts, able   to abstract reason, no tangential thoughts, no hallucinations   or delusions  His affect is normal.    Labs:   Results for ABDOULAYE LOZOYA (MRN [id number]) as of 4/6/2020 12:45   Ref. Range 9/3/2019 09:40 12/30/2019 09:10   WBC Latest Ref Range: 4.0 - 11.0 10e9/L 7.7    Hemoglobin Latest Ref Range: 13.3 - 17.7 g/dL 12.7 (L) 11.2 (L)   Hematocrit Latest Ref Range: 40.0 - 53.0 % 40.0    Platelet  Count Latest Ref Range: 150 - 450 10e9/L 228    RBC Count Latest Ref Range: 4.4 - 5.9 10e12/L 4.75    MCV Latest Ref Range: 78 - 100 fl 84    MCH Latest Ref Range: 26.5 - 33.0 pg 26.7    MCHC Latest Ref Range: 31.5 - 36.5 g/dL 31.8    RDW Latest Ref Range: 10.0 - 15.0 % 14.9    Diff Method Unknown Automated Method    % Neutrophils Latest Units: % 63.7    % Lymphocytes Latest Units: % 17.2    % Monocytes Latest Units: % 13.1    % Eosinophils Latest Units: % 5.4    % Basophils Latest Units: % 0.6    Absolute Neutrophil Latest Ref Range: 1.6 - 8.3 10e9/L 4.9    Absolute Lymphocytes Latest Ref Range: 0.8 - 5.3 10e9/L 1.3    Absolute Monocytes Latest Ref Range: 0.0 - 1.3 10e9/L 1.0    Absolute Eosinophils Latest Ref Range: 0.0 - 0.7 10e9/L 0.4    Absolute Basophils Latest Ref Range: 0.0 - 0.2 10e9/L 0.1    Results for ABDOULAYE RAMON (MRN [id number]) as of 4/6/2020 12:45   Ref. Range 5/25/2018 09:39 8/15/2019 12:40 9/3/2019 09:40 12/30/2019 09:10   Hemoglobin Latest Ref Range: 13.3 - 17.7 g/dL 15.0 12.6 (L) 12.7 (L) 11.2 (L)     Assessment/Plan:     Mr. 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     1. MGUS - M protein stable and IgA level relatively stable. However, serum free kappa light chains slightly higher and serum FLC ratio is mildly elevated. We'll check MGUS labs in 9 -12 months from previous, due September-December 2020, and see the patient back at that time.      2.  Iron deficiency anemia, asymptomatic- he was noted to be newly anemic in August and September 2019 as compared to 2018.   B12 level, folate, iron, TIBC, ferritin, TSH, reticulocyte count checked and workup c/w iron deficiency anemia.  He was found to have changes of Stewart's esophagus on upper EGD and erythematous mucosa  in the incisura but no signs of  active bleeding.  It is possible that he had a silent GI bleeding event. He is on low dose prednisone for MG but is also on PPI although he has been taking omeprazole every other day and not daily as recommended for Stewart's esophagus.  We have discussed the recommendations to take omeprazole 20 mg orally daily and he will start starting today.  He has remained on oral ferrous sulfate 325 mg p.o. every other day since January 2020.  He has not yet had repeat hemoglobin or iron studies done due to COVID-19 outbreak.  He is entirely asymptomatic.  He will go into [facility] in 4-6 weeks once home quarantine and strict social distancing isolation restrictions are lifted and have hemoglobin and iron studies rechecked to see if those are improving with oral iron supplementation.  We will inform the patient of the results of his testing.  If his hemoglobin and iron stores are improving, we will plan to see him back in December 2020, with repeat CBCd, iron studies and MGUS labs.  If his hemoglobin and iron stores are not improving or declining, we will consider proceeding with intravenous iron administration to replete his iron stores and also with further evaluation if his hemoglobin drops.    3. Hx of prostate cancer- PSA  stable in 02/2020. F/up with Dr. Jones annually, with repeat PSA.    4. Ocular Myasthenia Gravis- f/up with neurology. Cont low dose prednisone/Mestinon.  At the end of our visit patient verbalized understanding and concurred with the plan.    Telephone visit time: 21 minutes

## 2020-04-07 ENCOUNTER — VIRTUAL VISIT (OUTPATIENT)
Dept: ONCOLOGY | Facility: CLINIC | Age: 80
End: 2020-04-07
Attending: NURSE PRACTITIONER
Payer: MEDICARE

## 2020-04-07 VITALS — BODY MASS INDEX: 27.26 KG/M2 | WEIGHT: 178 LBS

## 2020-04-07 DIAGNOSIS — D50.9 IRON DEFICIENCY ANEMIA, UNSPECIFIED IRON DEFICIENCY ANEMIA TYPE: Primary | ICD-10-CM

## 2020-04-07 DIAGNOSIS — D47.2 MGUS (MONOCLONAL GAMMOPATHY OF UNKNOWN SIGNIFICANCE): ICD-10-CM

## 2020-04-07 PROCEDURE — 99443 ZZC PHYSICIAN TELEPHONE EVALUATION 21-30 MIN: CPT | Performed by: INTERNAL MEDICINE

## 2020-04-07 ASSESSMENT — PAIN SCALES - GENERAL: PAINLEVEL: NO PAIN (0)

## 2020-04-07 NOTE — NURSING NOTE
"Gustavo Ramon is a 79 year old male who is being evaluated via a billable telephone visit.      The patient has been notified of following:     \"This telephone visit will be conducted via a call between you and your physician/provider. We have found that certain health care needs can be provided without the need for a physical exam.  This service lets us provide the care you need with a short phone conversation.  If a prescription is necessary we can send it directly to your pharmacy.  If lab work is needed we can place an order for that and you can then stop by our lab to have the test done at a later time.    If during the course of the call the physician/provider feels a telephone visit is not appropriate, you will not be charged for this service.\"     Patient has given verbal consent for Telephone visit?  Yes    Gustavo Ramon complains of    Chief Complaint   Patient presents with     Oncology Clinic Visit     3 month follow up       I have reviewed and updated the patient's Past Medical History, Social History, Family History and Medication List.    ALLERGIES  Nkda [no known drug allergies]    Annie Otero CMA    "

## 2020-04-07 NOTE — LETTER
"    4/7/2020         RE: Gustavo Ramon  [address] [facility] [address]        Dear Colleague,    Thank you for referring your patient, Gustavo Ramon, to the [facility]. Please see a copy of my visit note below.    Subjective     Gustavo Ramon is a 79 year old male who is being evaluated via a billable telephone visit.      The patient has been notified of following:     \"This telephone visit will be conducted via a call between you and your physician/provider. We have found that certain health care needs can be provided without the need for a physical exam.  This service lets us provide the care you need with a short phone conversation.  If a prescription is necessary we can send it directly to your pharmacy.  If lab work is needed we can place an order for that and you can then stop by our lab to have the test done at a later time.    If during the course of the call the physician/provider feels a telephone visit is not appropriate, you will not be charged for this service.\"     Patient has given verbal consent for Telephone visit? Yes    Gustavo Ramon complains of f/up of IgA kappa MGUS and worsening anemia. Also, has a Hx of prostate cancer and MG.  Mspike and IgA levels are stable. [place] free light chains are slightly higher.   Results for ABDOULAYE RAMON (MRN [id number]) as of 4/6/2020 12:45   Ref. Range 5/25/2017 07:59 5/25/2017 08:14 5/25/2018 09:39 8/15/2019 12:40 12/30/2019 09:10   IGA Latest Ref Range: 84 - 499 mg/dL 572 (H)  647 (H) 654 (H) 648 (H)   IGG Latest Ref Range: 610 - 1,616 mg/dL 867  980 881 827   IGM Latest Ref Range: 35 - 242 mg/dL 27 (L)  29 (L) 22 (L) 26 (L)   Immunofix ELP Urine Unknown  No monoclonal pro... No monoclonal pro...     Kappa Free Lt Chain Latest Ref Range: 0.33 - 1.94 mg/dL 2.02 (H)  1.93 2.96 (H) 3.28 (H)   Kappa Lambda Ratio Latest Ref Range: 0.26 - 1.65  1.33  1.41 2.24 (H) 2.78 (H)   Lambda Free Lt Chain Latest Ref " Range: 0.57 - 2.63 mg/dL 1.52  1.37 1.32 1.18   Monoclonal Peak Latest Ref Range: 0.0 g/dL 0.4 (H)  0.4 (H) 0.5 (H) 0.5 (H)   He has also had anemia, worsening as below.  He has not had his hemoglobin and iron studies rechecked secondary to Covid-19 outbreak.  Results for ABDOULAYE LOZOYA (MRN [id number]) as of 4/6/2020 18:43   Ref. Range 4/21/2015 14:48 5/25/2016 09:54 5/25/2017 07:59 5/25/2018 09:39 8/15/2019 12:40 9/3/2019 09:40 12/30/2019 09:10   Hemoglobin Latest Ref Range: 13.3 - 17.7 g/dL 15.4 15.5 15.2 15.0 12.6 (L) 12.7 (L) 11.2 (L)     Creatinine was 1.01 on 8/15/2019. Still with low iron levels and Fe %. Ferritin low at 10 on 12/30/2019 and low on 8/15/2019 also low at 13.  PSA has been as below:  Results for ABDOULAYE LOZOYA (MRN [id number]) as of 4/6/2020 18:43   Ref. Range 2/5/2018 07:54 2/5/2019 08:27 2/4/2020 08:59   PSA Latest Ref Range: 0 - 4 ug/L 1.21 1.03 0.94   He has been on ferrous sulfate supplementation 325 mg PO QOD for three months.  He did have EGD in 09/2019 which showed in addition to hiatal hernia:  Esophageal mucosal changes suspicious for                             short-segment Stewart's esophagus, classified as                             Stewart's stage C1-M3 per [place] criteria. Biopsied.                             - 2 cm type-I sliding hiatal hernia.                             - Erythematous mucosa in the incisura.                             - Normal examined duodenum.                             - Biopsies were obtained at the incisura and in the                             gastric antrum.   Biopsies showed:  A. STOMACH, ANTRUM, BIOPSY:   - Gastric antral mucosa with no significant inflammation   - No H. pylori like organisms identified on routine staining   - Negative for intestinal metaplasia or dysplasia     B. ESOPHAGUS, AT 38CM, BIOPSY:   - Metaplastic columnar epithelium with goblet cells, consistent with   Stewart's esophagus   - Negative for dysplasia   He was  recommended to start on daily PPI but has been taking it every other day as below.  Colonoscopy at that time showed diverticulosis, and normal appearing examined portion of the ileum.   He is feeling very well.  He has had no constitutional symptoms.  He denies shortness of breath chest pain or headache.  He has been exercising at home and lifting weights as well.  Currently, he has been taking omeprazole every other day on the days that he takes 5 mg of prednisone for his myasthenia gravis.  He has remained on ferrous sulfate supplementation 325 mg p.o. daily on the days that he is not taking omeprazole.  He has been on ferrous sulfate supplementation for the past 3 months since January 2020.  He denies any bone pains.  In addition, a complete 12 point  review of systems is negative.      ALLERGIES  Nkda [no known drug allergies]    Current Outpatient Medications   Medication     aspirin 325 MG tablet     calcipotriene (DOVONOX) 0.005 % external ointment     cyanocobalamin (VITAMIN B-12) 1000 MCG tablet     ferrous sulfate (FEROSUL) 325 (65 Fe) MG tablet     fluorouracil (EFUDEX) 5 % external cream     hydrochlorothiazide (HYDRODIURIL) 25 MG tablet     lisinopril (ZESTRIL) 5 MG tablet     Multiple Vitamins-Minerals (PRESERVISION AREDS 2) CAPS     omeprazole (PRILOSEC) 20 MG DR capsule     predniSONE (DELTASONE) 5 MG tablet     pyridostigmine (MESTINON) 60 MG tablet     simvastatin (ZOCOR) 20 MG tablet     triamcinolone (KENALOG) 0.1 % external cream     vitamin D3 (CHOLECALCIFEROL) 2000 units tablet     Current Facility-Administered Medications   Medication     aminolevulinic acid (LEVULAN) solution 1.5 mL                   Objective   Reported vitals:    Psych: Alert and oriented times 3; coherent speech, normal   rate and volume, able to articulate logical thoughts, able   to abstract reason, no tangential thoughts, no hallucinations   or delusions  His affect is normal.    Labs:   Results for ABDOULAYE LOZOYA  (MRN [id number]) as of 4/6/2020 12:45   Ref. Range 9/3/2019 09:40 12/30/2019 09:10   WBC Latest Ref Range: 4.0 - 11.0 10e9/L 7.7    Hemoglobin Latest Ref Range: 13.3 - 17.7 g/dL 12.7 (L) 11.2 (L)   Hematocrit Latest Ref Range: 40.0 - 53.0 % 40.0    Platelet Count Latest Ref Range: 150 - 450 10e9/L 228    RBC Count Latest Ref Range: 4.4 - 5.9 10e12/L 4.75    MCV Latest Ref Range: 78 - 100 fl 84    MCH Latest Ref Range: 26.5 - 33.0 pg 26.7    MCHC Latest Ref Range: 31.5 - 36.5 g/dL 31.8    RDW Latest Ref Range: 10.0 - 15.0 % 14.9    Diff Method Unknown Automated Method    % Neutrophils Latest Units: % 63.7    % Lymphocytes Latest Units: % 17.2    % Monocytes Latest Units: % 13.1    % Eosinophils Latest Units: % 5.4    % Basophils Latest Units: % 0.6    Absolute Neutrophil Latest Ref Range: 1.6 - 8.3 10e9/L 4.9    Absolute Lymphocytes Latest Ref Range: 0.8 - 5.3 10e9/L 1.3    Absolute Monocytes Latest Ref Range: 0.0 - 1.3 10e9/L 1.0    Absolute Eosinophils Latest Ref Range: 0.0 - 0.7 10e9/L 0.4    Absolute Basophils Latest Ref Range: 0.0 - 0.2 10e9/L 0.1    Results for ABDOULAYE RAMON (MRN [id number]) as of 4/6/2020 12:45   Ref. Range 5/25/2018 09:39 8/15/2019 12:40 9/3/2019 09:40 12/30/2019 09:10   Hemoglobin Latest Ref Range: 13.3 - 17.7 g/dL 15.0 12.6 (L) 12.7 (L) 11.2 (L)     Assessment/Plan:     Mr. 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     1. MGUS - M protein stable and IgA level relatively stable. However, serum free kappa light chains slightly higher and serum FLC ratio is mildly elevated. We'll check MGUS labs in 9 -12 months from previous, due September-December 2020, and see the patient back at that time.      2.  Iron deficiency anemia, asymptomatic- he was noted to be newly anemic in August and September 2019  as compared to 2018.   B12 level, folate, iron, TIBC, ferritin, TSH, reticulocyte count checked and workup c/w iron deficiency anemia.  He was found to have changes of Stewart's esophagus on upper EGD and erythematous mucosa  in the incisura but no signs of active bleeding.  It is possible that he had a silent GI bleeding event. He is on low dose prednisone for MG but is also on PPI although he has been taking omeprazole every other day and not daily as recommended for Stewart's esophagus.  We have discussed the recommendations to take omeprazole 20 mg orally daily and he will start starting today.  He has remained on oral ferrous sulfate 325 mg p.o. every other day since January 2020.  He has not yet had repeat hemoglobin or iron studies done due to COVID-19 outbreak.  He is entirely asymptomatic.  He will go into [facility] lab in 4-6 weeks once home quarantine and strict social distancing isolation restrictions are lifted and have hemoglobin and iron studies rechecked to see if those are improving with oral iron supplementation.  We will inform the patient of the results of his testing.  If his hemoglobin and iron stores are improving, we will plan to see him back in December 2020, with repeat CBCd, iron studies and MGUS labs.  If his hemoglobin and iron stores are not improving or declining, we will consider proceeding with intravenous iron administration to replete his iron stores and also with further evaluation if his hemoglobin drops.    3. Hx of prostate cancer- PSA  stable in 02/2020. F/up with Dr. Jones annually, with repeat PSA.    4. Ocular Myasthenia Gravis- f/up with neurology. Cont low dose prednisone/Mestinon.  At the end of our visit patient verbalized understanding and concurred with the plan.    Telephone visit time: 21 minutes      Again, thank you for allowing me to participate in the care of your patient.        Sincerely,        Susan Eller MD, MD

## 2020-04-08 DIAGNOSIS — K21.9 GASTROESOPHAGEAL REFLUX DISEASE WITHOUT ESOPHAGITIS: ICD-10-CM

## 2020-04-08 NOTE — TELEPHONE ENCOUNTER
"Requested Prescriptions   Pending Prescriptions Disp Refills     omeprazole (PRILOSEC) 20 MG DR capsule [Pharmacy Med Name: OMEPRAZOLE DR CAPS 20MG] 90 capsule 3     Sig: TAKE 1 CAPSULE DAILY ( CONCOMITANT TO PREDNISONE USE )   Last Written Prescription Date:  2-28-20  Last Fill Quantity: 90,  # refills: 3   Last office visit: 9/3/2019 with prescribing provider:  9-3-19   Future Office Visit:   Next 5 appointments (look out 90 days)    May 05, 2020 11:00 AM CDT  Return Visit with Brooklyn Treviño MD  [facility] ([facility]) [id number] [place] [id number] [phone]           PPI Protocol Passed - 4/8/2020  9:19 AM        Passed - Not on Clopidogrel (unless Pantoprazole ordered)        Passed - No diagnosis of osteoporosis on record        Passed - Recent (12 mo) or future (30 days) visit within the authorizing provider's specialty     Patient has had an office visit with the authorizing provider or a provider within the authorizing providers department within the previous 12 mos or has a future within next 30 days. See \"Patient Info\" tab in inbasket, or \"Choose Columns\" in Meds & Orders section of the refill encounter.              Passed - Medication is active on med list        Passed - Patient is age 18 or older           "

## 2020-04-09 ENCOUNTER — MYC MEDICAL ADVICE (OUTPATIENT)
Dept: FAMILY MEDICINE | Facility: CLINIC | Age: 80
End: 2020-04-09

## 2020-04-09 DIAGNOSIS — E78.5 HYPERLIPIDEMIA LDL GOAL <130: ICD-10-CM

## 2020-04-09 DIAGNOSIS — E78.5 HYPERLIPIDEMIA LDL GOAL <130: Primary | ICD-10-CM

## 2020-04-09 NOTE — TELEPHONE ENCOUNTER
"Requested Prescriptions   Pending Prescriptions Disp Refills     simvastatin (ZOCOR) 20 MG tablet [Pharmacy Med Name: SIMVASTATIN TABS 20MG] 30 tablet 11     Sig: TAKE 1 TABLET AT BEDTIME (DUE FOR APPOINTMENT FOR FURTHER REFILLS)   Last Written Prescription Date:  3-14-20  Last Fill Quantity: 30,  # refills: 11   Last office visit: 9/3/2019 with prescribing provider:  9-3-19   Future Office Visit:   Next 5 appointments (look out 90 days)    May 05, 2020 11:00 AM CDT  Return Visit with Brooklyn Treviño MD  [facility] ([facility]) [id number] [place] [id number] [phone]           Statins Protocol Failed - 4/9/2020  9:11 AM        Failed - LDL on file in past 12 months     Recent Labs   Lab Test 02/26/19  0914   LDL 87             Passed - No abnormal creatine kinase in past 12 months     Recent Labs   Lab Test 08/10/15  0751                   Passed - Recent (12 mo) or future (30 days) visit within the authorizing provider's specialty     Patient has had an office visit with the authorizing provider or a provider within the authorizing providers department within the previous 12 mos or has a future within next 30 days. See \"Patient Info\" tab in inbasket, or \"Choose Columns\" in Meds & Orders section of the refill encounter.              Passed - Medication is active on med list        Passed - Patient is age 18 or older           "

## 2020-04-10 RX ORDER — SIMVASTATIN 20 MG
TABLET ORAL
Qty: 30 TABLET | Refills: 4 | Status: SHIPPED | OUTPATIENT
Start: 2020-04-10 | End: 2020-07-30

## 2020-04-10 NOTE — TELEPHONE ENCOUNTER
Please let him know that he needs a 12 hr fasting blood draw post COVID for further refills. Labs and meds ordered.

## 2020-04-16 NOTE — TELEPHONE ENCOUNTER
Spoke with patient informed as below. Patient verbalized understanding, had no further questions or concerns.

## 2020-04-17 ENCOUNTER — MYC MEDICAL ADVICE (OUTPATIENT)
Dept: DERMATOLOGY | Facility: CLINIC | Age: 80
End: 2020-04-17

## 2020-04-22 NOTE — TELEPHONE ENCOUNTER
Patient states he has no areas of concern and appointment postponed to July    Josie Fontaine LPN

## 2020-04-29 ENCOUNTER — MYC MEDICAL ADVICE (OUTPATIENT)
Dept: FAMILY MEDICINE | Facility: CLINIC | Age: 80
End: 2020-04-29

## 2020-05-01 ENCOUNTER — VIRTUAL VISIT (OUTPATIENT)
Dept: FAMILY MEDICINE | Facility: CLINIC | Age: 80
End: 2020-05-01
Payer: MEDICARE

## 2020-05-01 DIAGNOSIS — G70.00 OCULAR MYASTHENIA GRAVIS (H): Primary | ICD-10-CM

## 2020-05-01 PROCEDURE — 99443 ZZC PHYSICIAN TELEPHONE EVALUATION 21-30 MIN: CPT | Performed by: PHYSICIAN ASSISTANT

## 2020-05-01 NOTE — PROGRESS NOTES
"Gustavo Ramon is a 79 year old male who is being evaluated via a billable telephone visit.      The patient has been notified of following:     \"This telephone visit will be conducted via a call between you and your physician/provider. We have found that certain health care needs can be provided without the need for a physical exam.  This service lets us provide the care you need with a short phone conversation.  If a prescription is necessary we can send it directly to your pharmacy.  If lab work is needed we can place an order for that and you can then stop by our lab to have the test done at a later time.    Telephone visits are billed at different rates depending on your insurance coverage. During this emergency period, for some insurers they may be billed the same as an in-person visit.  Please reach out to your insurance provider with any questions.    If during the course of the call the physician/provider feels a telephone visit is not appropriate, you will not be charged for this service.\"    Patient has given verbal consent for Telephone visit?  Yes    How would you like to obtain your AVS? MyChart    Subjective     Gustavo Ramon is a 79 year old male who presents to clinic today for the following health issues:    HPI  Eye(s) Problem      Duration: double vision ongoing for 10+ years, has been taking pyridostigmine and prednisone for this and it has kept it under control.  On Thursday, April 23rd, he woke up with the double vision and it hasn't gone away    Description:   Location: Left greater than Right  Pain: no   Redness: no   Discharge: no     Accompanying signs and symptoms: Double vision    History (Trauma, foreign body exposure,): Double vision x 10yrs +    Precipitating or alleviating factors (contact use): None    Therapies tried and outcome: pyridostigmine and prednisone    Seems to be left sided.   Currently taking pyridostigmine 60 mg qid and prednisone (every other day).  No ha's, " dizziness, hearing changes, unilateral paresthesia/paresis.   Hasn't seen neurology for several years.           Patient Active Problem List   Diagnosis     Rosacea     DJD (degenerative joint disease)     Ocular myasthenia gravis (H)     Macular pigment deposit     HYPERLIPIDEMIA LDL GOAL <130     IgA monoclonal gammopathy     Retinal hole OS, operculated     Horseshoe tear of retina without detachment OD     History  of basal cell carcinoma     Seborrhea     AK (actinic keratosis)     Malignant neoplasm of prostate (H)     PVD (posterior vitreous detachment)     Amaurosis fugax by Hx neg carotid W/U     Advanced directives, counseling/discussion     Actinic keratosis     Peripheral neuropathy     Nuclear sclerosis of both eyes     Essential hypertension with goal blood pressure less than 140/90     Gastroesophageal reflux disease without esophagitis     Past Surgical History:   Procedure Laterality Date     ARTHROSCOPY KNEE RT/LT Left Jan 2010    Knee     COLONOSCOPY WITH CO2 INSUFFLATION N/A 9/24/2019    Procedure: COLONOSCOPY, WITH CO2 INSUFFLATION;  Surgeon: Loi Levine MD;  Location: [facility] OR     COMBINED ESOPHAGOSCOPY, GASTROSCOPY, DUODENOSCOPY (EGD) WITH CO2 INSUFFLATION N/A 9/24/2019    Procedure: ESOPHAGOGASTRODUODENOSCOPY, WITH CO2 INSUFFLATION;  Surgeon: Loi Levine MD;  Location: [facility] OR     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     DISKECTOMY, LUMBAR, SINGLE SP  2003    L2-3     ENT SURGERY  1943    Tonsils      ENT SURGERY  2-22-12    Biopsy lesion right pinna     ENT SURGERY  2-24-12    Biopsy leson right pinna     ESOPHAGOSCOPY, GASTROSCOPY, DUODENOSCOPY (EGD), COMBINED N/A 9/24/2019    Procedure: Esophagogastroduodenoscopy, With Biopsy;  Surgeon: Loi Levine MD;  Location: [facility] OR     SOFT TISSUE SURGERY  May 2010    Basal Cell/right side nose       Social History     Tobacco Use     Smoking status: Never Smoker     Smokeless tobacco: Never Used   Substance Use Topics      Alcohol use: No     Alcohol/week: 0.0 standard drinks     Family History   Problem Relation Age of Onset     Heart Disease Mother      Alzheimer Disease Mother 73     C.A.D. Father      Diabetes Grandchild         using a pump      Diabetes Paternal Uncle      Heart Disease Paternal Grandmother      Glaucoma No family hx of      Macular Degeneration No family hx of      Melanoma No family hx of      Skin Cancer No family hx of          Current Outpatient Medications   Medication Sig Dispense Refill     aspirin 325 MG tablet Take 325 mg by mouth daily       cyanocobalamin (VITAMIN B-12) 1000 MCG tablet Take 1 tablet (1,000 mcg) by mouth daily 90 tablet 1     ferrous sulfate (FEROSUL) 325 (65 Fe) MG tablet Use 1 tab every other day with orange juice 60 tablet 1     hydrochlorothiazide (HYDRODIURIL) 25 MG tablet TAKE ONE-HALF (1/2) TABLET DAILY 45 tablet 3     lisinopril (ZESTRIL) 5 MG tablet TAKE 1 TABLET DAILY 90 tablet 0     Multiple Vitamins-Minerals (PRESERVISION AREDS 2) CAPS Take 2 soft gel caps daily 90 capsule 1     omeprazole (PRILOSEC) 20 MG DR capsule TAKE 1 CAPSULE DAILY ( CONCOMITANT TO PREDNISONE USE ) 90 capsule 0     predniSONE (DELTASONE) 5 MG tablet TAKE 1 TABLET EVERY OTHER DAY 45 tablet 3     pyridostigmine (MESTINON) 60 MG tablet TAKE 1 TABLET FOUR TIMES A  tablet 3     simvastatin (ZOCOR) 20 MG tablet TAKE 1 TABLET AT BEDTIME (DUE FOR APPOINTMENT FOR FURTHER REFILLS) 30 tablet 4     triamcinolone (KENALOG) 0.1 % external cream Apply twice daily for 2 weeks to rash on left upper arm 45 g 0     vitamin D3 (CHOLECALCIFEROL) 2000 units tablet Take 1 tablet by mouth daily 90 tablet 1     Allergies   Allergen Reactions     Nkda [No Known Drug Allergies]      Recent Labs   Lab Test 09/03/19  0940 08/15/19  1240 02/26/19  0914 05/25/18  0939 08/25/17  0748  05/25/17  0759 09/06/16  0812   LDL  --   --  87  --  70  --   --  84   HDL  --   --  61  --  61  --   --  53   TRIG  --   --  76  --  119  --    --  91   ALT  --  24  --  20  --   --  25  --    CR  --  1.01 1.06 1.13 1.08  --  1.11  --    GFRESTIMATED  --  69 66 62 66  --  64  --    GFRESTBLACK  --  80 76 76 80  --  77  --    POTASSIUM  --  3.9 3.9 4.3 4.7  --  4.6  --    TSH 1.94 1.90  --   --   --    < >  --   --     < > = values in this interval not displayed.      BP Readings from Last 3 Encounters:   02/11/20 133/83   01/02/20 133/75   09/24/19 117/78    Wt Readings from Last 3 Encounters:   04/07/20 80.7 kg (178 lb)   01/02/20 80.2 kg (176 lb 14.4 oz)   09/24/19 79.4 kg (175 lb)                    Reviewed and updated as needed this visit by Provider         Review of Systems   ROS COMP: Constitutional, HEENT, cardiovascular, pulmonary, GI, , musculoskeletal, neuro, skin, endocrine and psych systems are negative, except as otherwise noted.       Objective   Reported vitals:  There were no vitals taken for this visit.   healthy, alert and no distress  PSYCH: Alert and oriented times 3; coherent speech, normal   rate and volume, able to articulate logical thoughts, able   to abstract reason, no tangential thoughts, no hallucinations   or delusions  His affect is normal  RESP: No cough, no audible wheezing, able to talk in full sentences  Remainder of exam unable to be completed due to telephone visits    Diagnostic Test Results:  Labs reviewed in Epic        Assessment/Plan:  1. Ocular myasthenia gravis (H)  Flare up :  - NEUROLOGY ADULT REFERRAL  Trial increase of his pyridostigmine from 60 mg qid to 60 mg q2 hours.  Titrate prednisone from 5 mg every other day to 5 mg daily for 3-4 days, then increase to 10 mg daily.     F/u by phone next week.        Phone call duration:  28 minutes    Winston Silverman PA-C

## 2020-05-05 NOTE — PROGRESS NOTES
"Gustavo Ramon is a 79 year old male who is being evaluated via a billable telephone visit.      The patient has been notified of following:     \"This telephone visit will be conducted via a call between you and your physician/provider. We have found that certain health care needs can be provided without the need for a physical exam.  This service lets us provide the care you need with a short phone conversation.  If a prescription is necessary we can send it directly to your pharmacy.  If lab work is needed we can place an order for that and you can then stop by our lab to have the test done at a later time.    Telephone visits are billed at different rates depending on your insurance coverage. During this emergency period, for some insurers they may be billed the same as an in-person visit.  Please reach out to your insurance provider with any questions.    If during the course of the call the physician/provider feels a telephone visit is not appropriate, you will not be charged for this service.\"    Patient has given verbal consent for Telephone visit?  Yes    What phone number would you like to be contacted at? [phone]    How would you like to obtain your AVS? Carlita    Subjective     Gustavo Ramon is a 79 year old male who presents to clinic today for the following health issues:    HPI  Medication Followup of eye issues    Taking Medication as prescribed: yes    Side Effects:  None    Medication Helping Symptoms:  Yes-some improvement    Some modest improvement.   Diplopia seems to effect the left eye more profoundly. Some ptosis as well.   No dysarthria, dysphagia, walking difficulty.         Patient Active Problem List   Diagnosis     Rosacea     DJD (degenerative joint disease)     Ocular myasthenia gravis (H)     Macular pigment deposit     HYPERLIPIDEMIA LDL GOAL <130     IgA monoclonal gammopathy     Retinal hole OS, operculated     Horseshoe tear of retina without detachment OD     " History  of basal cell carcinoma     Seborrhea     AK (actinic keratosis)     Malignant neoplasm of prostate (H)     PVD (posterior vitreous detachment)     Amaurosis fugax by Hx neg carotid W/U     Advanced directives, counseling/discussion     Actinic keratosis     Peripheral neuropathy     Nuclear sclerosis of both eyes     Essential hypertension with goal blood pressure less than 140/90     Gastroesophageal reflux disease without esophagitis     Past Surgical History:   Procedure Laterality Date     ARTHROSCOPY KNEE RT/LT Left Jan 2010    Knee     COLONOSCOPY WITH CO2 INSUFFLATION N/A 9/24/2019    Procedure: COLONOSCOPY, WITH CO2 INSUFFLATION;  Surgeon: Loi Levine MD;  Location: [facility] OR     COMBINED ESOPHAGOSCOPY, GASTROSCOPY, DUODENOSCOPY (EGD) WITH CO2 INSUFFLATION N/A 9/24/2019    Procedure: ESOPHAGOGASTRODUODENOSCOPY, WITH CO2 INSUFFLATION;  Surgeon: Loi Levine MD;  Location: [facility] OR     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     DISKECTOMY, LUMBAR, SINGLE SP  2003    L2-3     ENT SURGERY  1943    Tonsils      ENT SURGERY  2-22-12    Biopsy lesion right pinna     ENT SURGERY  2-24-12    Biopsy leson right pinna     ESOPHAGOSCOPY, GASTROSCOPY, DUODENOSCOPY (EGD), COMBINED N/A 9/24/2019    Procedure: Esophagogastroduodenoscopy, With Biopsy;  Surgeon: Loi Levine MD;  Location: [facility]     SOFT TISSUE SURGERY  May 2010    Basal Cell/right side nose       Social History     Tobacco Use     Smoking status: Never Smoker     Smokeless tobacco: Never Used   Substance Use Topics     Alcohol use: No     Alcohol/week: 0.0 standard drinks     Family History   Problem Relation Age of Onset     Heart Disease Mother      Alzheimer Disease Mother 73     C.A.D. Father      Diabetes Grandchild         using a pump      Diabetes Paternal Uncle      Heart Disease Paternal Grandmother      Glaucoma No family hx of      Macular Degeneration No family hx of      Melanoma No family hx of      Skin Cancer  No family hx of          Current Outpatient Medications   Medication Sig Dispense Refill     aspirin 325 MG tablet Take 325 mg by mouth daily       cyanocobalamin (VITAMIN B-12) 1000 MCG tablet Take 1 tablet (1,000 mcg) by mouth daily 90 tablet 1     ferrous sulfate (FEROSUL) 325 (65 Fe) MG tablet Use 1 tab every other day with orange juice 60 tablet 1     hydrochlorothiazide (HYDRODIURIL) 25 MG tablet TAKE ONE-HALF (1/2) TABLET DAILY 45 tablet 3     lisinopril (ZESTRIL) 5 MG tablet TAKE 1 TABLET DAILY 90 tablet 0     Multiple Vitamins-Minerals (PRESERVISION AREDS 2) CAPS Take 2 soft gel caps daily 90 capsule 1     omeprazole (PRILOSEC) 20 MG DR capsule TAKE 1 CAPSULE DAILY ( CONCOMITANT TO PREDNISONE USE ) 90 capsule 0     predniSONE (DELTASONE) 5 MG tablet TAKE 1 TABLET EVERY OTHER DAY 45 tablet 3     pyridostigmine (MESTINON) 60 MG tablet TAKE 1 TABLET every 2 hours 540 tablet 3     simvastatin (ZOCOR) 20 MG tablet TAKE 1 TABLET AT BEDTIME (DUE FOR APPOINTMENT FOR FURTHER REFILLS) 30 tablet 4     vitamin D3 (CHOLECALCIFEROL) 2000 units tablet Take 1 tablet by mouth daily 90 tablet 1     triamcinolone (KENALOG) 0.1 % external cream Apply twice daily for 2 weeks to rash on left upper arm (Patient not taking: Reported on 5/5/2020) 45 g 0     Allergies   Allergen Reactions     Nkda [No Known Drug Allergies]      Recent Labs   Lab Test 09/03/19  0940 08/15/19  1240 02/26/19  0914 05/25/18  0939 08/25/17  0748  05/25/17  0759 09/06/16  0812   LDL  --   --  87  --  70  --   --  84   HDL  --   --  61  --  61  --   --  53   TRIG  --   --  76  --  119  --   --  91   ALT  --  24  --  20  --   --  25  --    CR  --  1.01 1.06 1.13 1.08  --  1.11  --    GFRESTIMATED  --  69 66 62 66  --  64  --    GFRESTBLACK  --  80 76 76 80  --  77  --    POTASSIUM  --  3.9 3.9 4.3 4.7  --  4.6  --    TSH 1.94 1.90  --   --   --    < >  --   --     < > = values in this interval not displayed.      BP Readings from Last 3 Encounters:    02/11/20 133/83   01/02/20 133/75   09/24/19 117/78    Wt Readings from Last 3 Encounters:   04/07/20 80.7 kg (178 lb)   01/02/20 80.2 kg (176 lb 14.4 oz)   09/24/19 79.4 kg (175 lb)                    Reviewed and updated as needed this visit by Provider         Review of Systems   ROS COMP: Constitutional, HEENT, cardiovascular, pulmonary, GI, , musculoskeletal, neuro, skin, endocrine and psych systems are negative, except as otherwise noted.       Objective   Reported vitals:  There were no vitals taken for this visit.   healthy, alert and no distress  PSYCH: Alert and oriented times 3; coherent speech, normal   rate and volume, able to articulate logical thoughts, able   to abstract reason, no tangential thoughts, no hallucinations   or delusions  His affect is normal  RESP: No cough, no audible wheezing, able to talk in full sentences  Remainder of exam unable to be completed due to telephone visits    Diagnostic Test Results:  Labs reviewed in Epic        Assessment/Plan:  1. Myasthenia gravis without exacerbation (H)  Will increase his dose to 120 mg qid (was taking 60 mg six times a day)  - pyridostigmine (MESTINON) 60 MG tablet; Take 2 tablets (120 mg) by mouth 4 times daily TAKE 1 TABLET every 2 hours  Dispense: 540 tablet; Refill: 3    2. Ocular myasthenia gravis (H)  Increase from 5 mg to 10 mg daily.  - predniSONE (DELTASONE) 5 MG tablet; Take 2 tablets (10 mg) by mouth daily  Dispense: 45 tablet; Refill: 3    work on lifestyle modification  Recheck in 1-2 weeks.        Phone call duration:  14 minutes    Winston Silverman PA-C

## 2020-05-06 ENCOUNTER — VIRTUAL VISIT (OUTPATIENT)
Dept: FAMILY MEDICINE | Facility: CLINIC | Age: 80
End: 2020-05-06
Payer: MEDICARE

## 2020-05-06 DIAGNOSIS — G70.00 MYASTHENIA GRAVIS WITHOUT EXACERBATION (H): ICD-10-CM

## 2020-05-06 DIAGNOSIS — G70.00 OCULAR MYASTHENIA GRAVIS (H): ICD-10-CM

## 2020-05-06 PROCEDURE — 99442 ZZC PHYSICIAN TELEPHONE EVALUATION 11-20 MIN: CPT | Performed by: PHYSICIAN ASSISTANT

## 2020-05-06 RX ORDER — PREDNISONE 5 MG/1
10 TABLET ORAL DAILY
Qty: 45 TABLET | Refills: 3 | COMMUNITY
Start: 2020-05-06 | End: 2020-05-08

## 2020-05-06 RX ORDER — PYRIDOSTIGMINE BROMIDE 60 MG/1
120 TABLET ORAL 4 TIMES DAILY
Qty: 540 TABLET | Refills: 3 | COMMUNITY
Start: 2020-05-06 | End: 2020-05-08

## 2020-05-06 NOTE — LETTER
[facility] [place]  [address]  [place] [address]  Phone: [phone]    May 6, 2020        Gustavo Ramon  [address] [place] [address]  [place] [address]          To whom it may concern:    RE: Gustavo Chen has been under my care for several years now. He requires 3 over the counter vitamins to maintain a more optimal level of health and wellness. These include a vitamin B12 and vitamin D3 supplement to help prevent deficient levels of these vitamins. Also, preservision, which has been recommended by his ophthalmologist to help mitigate the progression of macular degeneration.     Please contact me for questions or concerns.      Sincerely,        Winston Silverman PA-C

## 2020-05-08 ENCOUNTER — MYC MEDICAL ADVICE (OUTPATIENT)
Dept: DERMATOLOGY | Facility: CLINIC | Age: 80
End: 2020-05-08

## 2020-05-08 DIAGNOSIS — D50.9 IRON DEFICIENCY ANEMIA, UNSPECIFIED IRON DEFICIENCY ANEMIA TYPE: ICD-10-CM

## 2020-05-08 RX ORDER — FERROUS SULFATE 325(65) MG
TABLET ORAL
Qty: 60 TABLET | Refills: 1 | Status: SHIPPED | OUTPATIENT
Start: 2020-05-08 | End: 2022-04-07

## 2020-05-13 ENCOUNTER — PRE VISIT (OUTPATIENT)
Dept: NEUROLOGY | Facility: CLINIC | Age: 80
End: 2020-05-13

## 2020-05-13 NOTE — TELEPHONE ENCOUNTER
FUTURE VISIT INFORMATION      FUTURE VISIT INFORMATION:    Date: 5/21/2020    Time: 9am     Location: [facility]  REFERRAL INFORMATION:    Referring provider:  Winston Silverman PA-C    Referring providers clinic:  [facility] [place]     Reason for visit/diagnosis  Ocular Myasthenia     RECORDS REQUESTED FROM:       Clinic name Comments Records Status Imaging Status   Internal Winston Silverman -5/6/2020, 5/1/2020, 9/13/2019 Epic N/A

## 2020-05-14 ENCOUNTER — DOCUMENTATION ONLY (OUTPATIENT)
Dept: CARE COORDINATION | Facility: CLINIC | Age: 80
End: 2020-05-14

## 2020-05-18 ASSESSMENT — ENCOUNTER SYMPTOMS
MUSCLE CRAMPS: 1
NUMBNESS: 0
JOINT SWELLING: 0
STIFFNESS: 1
DOUBLE VISION: 1
MEMORY LOSS: 0
MYALGIAS: 0
EYE PAIN: 0
SEIZURES: 0
HEMATURIA: 0
SPEECH CHANGE: 0
PARALYSIS: 0
ARTHRALGIAS: 0
DIZZINESS: 1
EYE IRRITATION: 1
SWOLLEN GLANDS: 0
EYE WATERING: 0
FLANK PAIN: 0
WEAKNESS: 0
DISTURBANCES IN COORDINATION: 0
DYSURIA: 0
NECK PAIN: 0
HEADACHES: 0
LOSS OF CONSCIOUSNESS: 0
TREMORS: 0
MUSCLE WEAKNESS: 0
DIFFICULTY URINATING: 0
EYE REDNESS: 0
BRUISES/BLEEDS EASILY: 1
TINGLING: 1
BACK PAIN: 0

## 2020-05-21 ENCOUNTER — VIRTUAL VISIT (OUTPATIENT)
Dept: NEUROLOGY | Facility: CLINIC | Age: 80
End: 2020-05-21
Attending: PHYSICIAN ASSISTANT
Payer: MEDICARE

## 2020-05-21 DIAGNOSIS — G70.00 MG, OCULAR (MYASTHENIA GRAVIS) (H): Primary | ICD-10-CM

## 2020-05-21 RX ORDER — PREDNISONE 5 MG/1
TABLET ORAL
Qty: 120 TABLET | Refills: 1 | Status: SHIPPED | OUTPATIENT
Start: 2020-05-21 | End: 2020-09-22
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
## 2020-05-21 NOTE — PROGRESS NOTES
"Gustavo Ramon is a 79 year old male who is being evaluated via a billable video visit.      The patient has been notified of following:     \"This video visit will be conducted via a call between you and your physician/provider. We have found that certain health care needs can be provided without the need for an in-person physical exam.  This service lets us provide the care you need with a video conversation.  If a prescription is necessary we can send it directly to your pharmacy.  If lab work is needed we can place an order for that and you can then stop by our lab to have the test done at a later time.    Video visits are billed at different rates depending on your insurance coverage.  Please reach out to your insurance provider with any questions.    If during the course of the call the physician/provider feels a video visit is not appropriate, you will not be charged for this service.\"    Patient has given verbal consent for Video visit? YES    How would you like to obtain your AVS? JessicaLaredo    Patient would like the video invitation sent by: [email or web address]    Will anyone else be joining your video visit? No        Video-Visit Details    Type of service:  Video Visit    Video Start Time: 9.00 am  Video End Time: 9.38 am    Originating Location (pt. Location): Home    Distant Location (provider location):  [facility] NEUROLOGY     Platform used for Video Visit: Billy Rosenbaum, EMT        "

## 2020-05-21 NOTE — PATIENT INSTRUCTIONS
Increase your prednisone to 15 mg daily now, for 1 week, then 20 mg daily if 15 is not enough to control the double vision  Take pyridostigmine 60 mg every 4 hours while awake. I do not recommend taking it more often.    Follow up in 1 month by video visit.     Thank you!

## 2020-05-31 DIAGNOSIS — I10 BENIGN ESSENTIAL HYPERTENSION: ICD-10-CM

## 2020-06-02 RX ORDER — LISINOPRIL 5 MG/1
TABLET ORAL
Qty: 90 TABLET | Refills: 0 | Status: SHIPPED | OUTPATIENT
Start: 2020-06-02 | End: 2020-09-16

## 2020-06-02 NOTE — TELEPHONE ENCOUNTER
Prescription approved per [facility] Refill Protocol.  Johanny Haynes, RN, BSN

## 2020-06-30 ENCOUNTER — OFFICE VISIT (OUTPATIENT)
Dept: OPHTHALMOLOGY | Facility: CLINIC | Age: 80
End: 2020-06-30
Payer: MEDICARE

## 2020-06-30 DIAGNOSIS — H33.322 RETINAL HOLE, LEFT: ICD-10-CM

## 2020-06-30 DIAGNOSIS — H33.311 HORSESHOE TEAR OF RETINA WITHOUT DETACHMENT, RIGHT: ICD-10-CM

## 2020-06-30 DIAGNOSIS — H35.52 MACULAR PIGMENT DEPOSIT: ICD-10-CM

## 2020-06-30 DIAGNOSIS — Z01.00 EXAMINATION OF EYES AND VISION: Primary | ICD-10-CM

## 2020-06-30 DIAGNOSIS — G70.00 OCULAR MYASTHENIA GRAVIS (H): ICD-10-CM

## 2020-06-30 DIAGNOSIS — H43.813 PVD (POSTERIOR VITREOUS DETACHMENT), BILATERAL: ICD-10-CM

## 2020-06-30 DIAGNOSIS — H25.13 NUCLEAR SCLEROSIS OF BOTH EYES: ICD-10-CM

## 2020-06-30 DIAGNOSIS — H52.13 MYOPIA OF BOTH EYES: ICD-10-CM

## 2020-06-30 DIAGNOSIS — H52.4 PRESBYOPIA: ICD-10-CM

## 2020-06-30 DIAGNOSIS — H52.223 REGULAR ASTIGMATISM OF BOTH EYES: ICD-10-CM

## 2020-06-30 PROCEDURE — 92014 COMPRE OPH EXAM EST PT 1/>: CPT | Performed by: STUDENT IN AN ORGANIZED HEALTH CARE EDUCATION/TRAINING PROGRAM

## 2020-06-30 ASSESSMENT — REFRACTION_MANIFEST
OS_CYLINDER: +1.25
OS_ADD: +2.75
OD_SPHERE: -2.00
OD_ADD: +2.75
OD_AXIS: 175
OS_SPHERE: -1.25
OD_CYLINDER: +1.00
OS_AXIS: 180

## 2020-06-30 ASSESSMENT — REFRACTION_WEARINGRX
OD_SPHERE: -1.00
OS_ADD: +1.50
SPECS_TYPE: PAL
OD_CYLINDER: +0.75
SPECS_TYPE: PAL
OS_AXIS: 004
OD_SPHERE: -1.75
OS_SPHERE: -1.25
OD_AXIS: 160
OS_CYLINDER: +0.50
OS_ADD: +2.50
OS_AXIS: 022
OD_AXIS: 001
OS_SPHERE: -1.00
OD_ADD: +1.50
OD_ADD: +2.50
OS_CYLINDER: +1.00
OD_CYLINDER: +0.25

## 2020-06-30 ASSESSMENT — TONOMETRY
IOP_METHOD: APPLANATION
OD_IOP_MMHG: 13
OS_IOP_MMHG: 17

## 2020-06-30 ASSESSMENT — CONF VISUAL FIELD
OD_NORMAL: 1
METHOD: COUNTING FINGERS
OS_NORMAL: 1

## 2020-06-30 ASSESSMENT — EXTERNAL EXAM - RIGHT EYE: OD_EXAM: NORMAL

## 2020-06-30 ASSESSMENT — EXTERNAL EXAM - LEFT EYE: OS_EXAM: NORMAL

## 2020-06-30 ASSESSMENT — VISUAL ACUITY
OD_CC+: +1
METHOD: SNELLEN - LINEAR
OD_CC: 20/40
OS_CC: 20/40 SEARCHING
CORRECTION_TYPE: GLASSES
OS_CC+: +1

## 2020-06-30 ASSESSMENT — CUP TO DISC RATIO
OD_RATIO: 0.2
OS_RATIO: 0.15

## 2020-06-30 NOTE — PATIENT INSTRUCTIONS
Continue taking an eye vitamin with AREDS2 on the packaging (like Preservision, iCaps, or generic). Follow dosing directions on the package.      Glasses prescription given     Continue ocular myasthenia gravis treatment as recommended by your neurologist.     Marjorie Mcclellan MD  [phone]

## 2020-06-30 NOTE — LETTER
6/30/2020         RE: Gustavo Ramon  [address] [facility] [address]        Dear Colleague,    Thank you for referring your patient, Gustavo Ramon, to the [facility]. Please see a copy of my visit note below.     Current Eye Medications:  Preservision twice a day      Subjective:  Complete eye exam. Patient sees Neurologist at [facility] for double vision. Started increased dose of Pyridostigmine and Prednisone 20 mg of prednisone daily for 1 month. Now vision has kind of stabilized. Images cross sometimes, not having now. Has times when in car images are little out of alignment. Vision is very clear with each eye. Did have tired left upper eyelid would droop for a while improved with increase dose of Pyridostigmine. No eye pain or discomfort in either eye.      Objective:  See Ophthalmology Exam.       Assessment:  Gustavo Ramon is a 80 year old male who presents with:   Encounter Diagnoses   Name Primary?     Examination of eyes and vision      Myopia of both eyes      Regular astigmatism of both eyes      Presbyopia        Ocular myasthenia gravis (H) Has follow up appointment with neurology coming up.      Nuclear sclerosis of both eyes      Macular pigment deposit Stable     Retinal hole, left operculated      Horseshoe tear of retina without detachment,od      PVD (posterior vitreous detachment), bilateral        Plan:  Continue taking an eye vitamin with AREDS2 on the packaging (like Preservision, iCaps, or generic). Follow dosing directions on the package.      Glasses prescription given     Continue ocular myasthenia gravis treatment as recommended by your neurologist.     Marjorie Mcclellan MD  [phone]        Again, thank you for allowing me to participate in the care of your patient.        Sincerely,        Marjorie Mcclellan MD

## 2020-07-21 ENCOUNTER — OFFICE VISIT (OUTPATIENT)
Dept: DERMATOLOGY | Facility: CLINIC | Age: 80
End: 2020-07-21
Payer: MEDICARE

## 2020-07-21 DIAGNOSIS — L57.0 AK (ACTINIC KERATOSIS): ICD-10-CM

## 2020-07-21 DIAGNOSIS — Z85.828 HISTORY OF NONMELANOMA SKIN CANCER: Primary | ICD-10-CM

## 2020-07-21 DIAGNOSIS — D48.5 NEOPLASM OF UNCERTAIN BEHAVIOR OF SKIN: ICD-10-CM

## 2020-07-21 DIAGNOSIS — Z87.2 HISTORY OF ACTINIC KERATOSIS: ICD-10-CM

## 2020-07-21 PROCEDURE — 88305 TISSUE EXAM BY PATHOLOGIST: CPT | Mod: TC | Performed by: DERMATOLOGY

## 2020-07-21 PROCEDURE — 17004 DESTROY PREMAL LESIONS 15/>: CPT | Performed by: DERMATOLOGY

## 2020-07-21 PROCEDURE — 11103 TANGNTL BX SKIN EA SEP/ADDL: CPT | Performed by: DERMATOLOGY

## 2020-07-21 PROCEDURE — 99213 OFFICE O/P EST LOW 20 MIN: CPT | Mod: 25 | Performed by: DERMATOLOGY

## 2020-07-21 PROCEDURE — 11102 TANGNTL BX SKIN SINGLE LES: CPT | Mod: 59 | Performed by: DERMATOLOGY

## 2020-07-21 ASSESSMENT — PAIN SCALES - GENERAL: PAINLEVEL: NO PAIN (0)

## 2020-07-21 NOTE — NURSING NOTE
The following medication was given:     MEDICATION:  Lidocaine with epinephrine 1% 1:[id number]  ROUTE: SQ  SITE: see procedure note  DOSE: 1.5cc  LOT #: -[id number]  : Pau  EXPIRATION DATE: 1-1-2021  NDC#: 1580-7922-20   Was there drug waste? 0.5cc  Multi-dose vial: Yes    Josie Fontaine LPN  July 21, 2020

## 2020-07-21 NOTE — LETTER
7/21/2020         RE: Gustavo Ramon  [address] [facility] [address]        Dear Colleague,    Thank you for referring your patient, Gustavo Ramon, to the [facility]. Please see a copy of my visit note below.    [facility] Dermatology Note    Dermatology Problem List:  1. 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 Christiansen's note 11/21/11), s/p excision 5/12/09   2. Actinic keratoses  - s/p Efudex 2015, PDT (x3), LN2  3. Seborrheic dermatitis  - clobetasol 0.05% solution  4. MGUS    5. NUB sp biopsy 7/21/2020 right upper arm, left upper back and right lateral cheek      Last TBSE 7/21/2020    Encounter Date: Jul 21, 2020    CC:  Chief Complaint   Patient presents with     Derm Problem     Skin check - right cheek lesion, 2 lesions on left cheek, 1 lesion on nose, 1 lesion on scalp, lower right leg tenderness. Hx of nmsc      History of Present Illness:  Mr. Gustavo Ramon is a 79 year old male who presents today for skin check. The patient has undergone 3 PDT treatments since September and instructed at last visit with Dr. Victor 12-4-2019 to use Efudex + calcipotriene cream on forehead and scalp.  Patient reports areas of concern on the right and left cheek. The patient is otherwise feeling well. There are no other skin concerns at this time. Pt reprots lsion on left elbow fell off      Past Medical History:   Patient Active Problem List   Diagnosis     Rosacea     DJD (degenerative joint disease)     Ocular myasthenia gravis (H)     Macular pigment deposit     HYPERLIPIDEMIA LDL GOAL <130     IgA monoclonal gammopathy     Retinal hole OS, operculated     Horseshoe tear of retina without detachment OD      History  of basal cell carcinoma     Seborrhea     AK (actinic keratosis)     Malignant neoplasm of prostate (H)     PVD (posterior vitreous detachment)     Amaurosis fugax by Hx neg carotid W/U     Advanced directives, counseling/discussion     Actinic keratosis     Peripheral neuropathy     Nuclear sclerosis of both eyes     Essential hypertension with goal blood pressure less than 140/90     Gastroesophageal reflux disease without esophagitis     Past Medical History:   Diagnosis Date     Actinic keratosis      AK (actinic keratosis) 11/15/2012     Amaurosis fugax      Basal cell carcinoma 2009    R medial cheek/nose     Central serous retinopathy left    Eye     Diverticulosis 08/2006     DJD (degenerative joint disease)      GERD (gastroesophageal reflux disease)      Hearing loss     High frequency     History of nonmelanoma skin cancer      History of nonmelanoma skin cancer      HTN (hypertension)      Hyperlipidemia LDL goal < 130      Hyperplastic colon polyp 08/2006     IgA monoclonal gammopathy     IgA kappa     Lattice degeneration of retina right    Eye     Nonsenile cataract      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    [facility] consult     Rosacea      Vitreous detachment left    Eye     Past Surgical History:   Procedure Laterality Date     ARTHROSCOPY KNEE RT/LT Left Jan 2010    Knee     COLONOSCOPY WITH CO2 INSUFFLATION N/A 9/24/2019    Procedure: COLONOSCOPY, WITH CO2 INSUFFLATION;  Surgeon: Loi Levine MD;  Location: [facility] OR     COMBINED ESOPHAGOSCOPY, GASTROSCOPY, DUODENOSCOPY (EGD) WITH CO2 INSUFFLATION N/A 9/24/2019    Procedure: ESOPHAGOGASTRODUODENOSCOPY, WITH CO2 INSUFFLATION;  Surgeon: Loi Levine MD;  Location: [facility] OR     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     DISKECTOMY, LUMBAR, SINGLE SP  2003    L2-3     ENT SURGERY  1943    Tonsils      ENT SURGERY  2-22-12    Biopsy lesion right pinna     ENT SURGERY  2-24-12    Biopsy leson right pinna     ESOPHAGOSCOPY, GASTROSCOPY,  DUODENOSCOPY (EGD), COMBINED N/A 9/24/2019    Procedure: Esophagogastroduodenoscopy, With Biopsy;  Surgeon: Loi Levine MD;  Location: [facility] OR     SOFT TISSUE SURGERY  May 2010    Basal Cell/right side nose       Social History:  The patient is retired from working as an . He has a daughter and son  The patient denies use of tanning beds. He is [place].  Kept in chart for convenience.       Family History:  There is no family history of skin cancer.  Kept in chart for convenience.       Medications:  Current Outpatient Medications   Medication Sig Dispense Refill     aspirin 325 MG tablet Take 325 mg by mouth daily       cyanocobalamin (VITAMIN B-12) 1000 MCG tablet Take 1 tablet (1,000 mcg) by mouth daily 90 tablet 1     ferrous sulfate (FEROSUL) 325 (65 Fe) MG tablet Use 1 tab every other day with orange juice 60 tablet 1     hydrochlorothiazide (HYDRODIURIL) 25 MG tablet TAKE ONE-HALF (1/2) TABLET DAILY 45 tablet 3     lisinopril (ZESTRIL) 5 MG tablet TAKE 1 TABLET DAILY 90 tablet 0     Multiple Vitamins-Minerals (PRESERVISION AREDS 2) CAPS Take 2 soft gel caps daily 90 capsule 1     omeprazole (PRILOSEC) 20 MG DR capsule TAKE 1 CAPSULE DAILY ( CONCOMITANT TO PREDNISONE USE ) 90 capsule 0     predniSONE (DELTASONE) 20 MG tablet Take 1.5 tablets (30 mg) by mouth daily 45 tablet 1     simvastatin (ZOCOR) 20 MG tablet TAKE 1 TABLET AT BEDTIME (DUE FOR APPOINTMENT FOR FURTHER REFILLS) 30 tablet 4     sulfamethoxazole-trimethoprim (BACTRIM DS) 800-160 MG tablet Take 1 tablet by mouth three times a week 12 tablet 1     triamcinolone (KENALOG) 0.1 % external cream Apply twice daily for 2 weeks to rash on left upper arm 45 g 0     vitamin D3 (CHOLECALCIFEROL) 2000 units tablet Take 1 tablet by mouth daily 90 tablet 1     predniSONE (DELTASONE) 5 MG tablet Take 3 tablets daily for 1 week, then 4 tablets daily. 120 tablet 1     predniSONE (DELTASONE) 5 MG tablet Take 2 tablets (10 mg) by mouth daily 60  tablet 3     pyridostigmine (MESTINON) 60 MG tablet Take 2 tablets (120 mg) by mouth 4 times daily 720 tablet 3       Allergies   Allergen Reactions     Nkda [No Known Drug Allergies]      Review of Systems:  -Skin: As above in HPI. No additional skin concerns.  -Const: The patient is generally feeling well today.     Physical exam:  - This is a well developed, well-nourished male in no acute distress, in a pleasant mood.    SKIN:  Total skin excluding the undergarment areas was performed. The exam included the head/face, neck, both arms, chest, back, abdomen, both legs, digits and/or nails.       -There is/are erythematous macules with overlying adherent scale on the cheeks, nose , left nasal side wall and corwn and brows  -There is a well healed surgical scar without erythema, nodularity or telangiectasias on the sites of prior skin cancer.   -5mm red papule with pigment, left upper back, right upper arm X2  -1cm red scaly patch right pre auricular  -no ak of left distal forearm  - No other lesions of concern on areas examined.     Impression/Plan:  1. History NMSC-no recurrence  -sun protection    2.   MGUS    3. AK right brow, left brow, left preauricular, left nasal tip X2, left nasal sidewall, crown, left post auricular, posterior neck  -Cryotherapy procedure note: After verbal consent and discussion of risks and benefits including but no limited to dyspigmentation/scar, blister, and pain, 16was(were) treated with 1-2mm freeze border for 2 cycles with liquid nitrogen. Post cryotherapy instructions were provided.       4. Shave biopsy:  Right lateral cheek 1cm scaly patch AK vs SCC      After discussion of benefits and risks including but not limited to bleeding/bruising, pain/swelling, infection, scar, incomplete removal, nerve damage/numbness, recurrence, and non-diagnostic biopsy, written consent, verbal consent and photographs were obtained. Time-out was performed. The area was cleaned with isopropyl  alcohol. 0.5mL of 1% lidocaine with epinephrine was injected to obtain adequate anesthesia of the lesion on the right lateral cheek. A shave biopsy was performed. Hemostasis was achieved with aluminium chloride. Vaseline and a sterile dressing were applied. The patient tolerated the procedure and no complications were noted. The patient was provided with verbal and written post care instructions.     5. Shave biopsy: left upper back 5mm red papule with pigment BCC vs melanoma     After discussion of benefits and risks including but not limited to bleeding/bruising, pain/swelling, infection, scar, incomplete removal, nerve damage/numbness, recurrence, and non-diagnostic biopsy, written consent, verbal consent and photographs were obtained. Time-out was performed. The area was cleaned with isopropyl alcohol. 0.5mL of 1% lidocaine with epinephrine was injected to obtain adequate anesthesia of the lesion on the left upper back. A shave biopsy was performed. Hemostasis was achieved with aluminium chloride. Vaseline and a sterile dressing were applied. The patient tolerated the procedure and no complications were noted. The patient was provided with verbal and written post care instructions.     6. Shave biopsy: right upper arm 5mm red papule with pigment BCC vs melanoma     After discussion of benefits and risks including but not limited to bleeding/bruising, pain/swelling, infection, scar, incomplete removal, nerve damage/numbness, recurrence, and non-diagnostic biopsy, written consent, verbal consent and photographs were obtained. Time-out was performed. The area was cleaned with isopropyl alcohol. 0.5mL of 1% lidocaine with epinephrine was injected to obtain adequate anesthesia of the lesion on the right upper arm. A shave biopsy was performed. Hemostasis was achieved with aluminium chloride. Vaseline and a sterile dressing were applied. The patient tolerated the procedure and no complications were noted. The patient  was provided with verbal and written post care instructions.     Follow up in 2 months for photos and phone to discuss efudex treatment of temples and cheeks, earlier as needed for new or changing lesions.        Staff Involved:    Scribe Disclosure  I, Josie Fontaine LPN, am serving as a scribe to document services personally performed by Dr. Shashank Victor DO, based on data collection and the provider's statements to me.     Provider Disclosure:   The documentation recorded by the scribe accurately reflects the services I personally performed and the decisions made by me.    Brooklyn Treviño MD    Department of Dermatology  [facility]: Phone: [phone], Fax:[phone]  [facility]: Phone: [phone], Fax: [phone]              Again, thank you for allowing me to participate in the care of your patient.        Sincerely,        Brooklyn Treviño MD

## 2020-07-21 NOTE — NURSING NOTE
Gustavo Ramon's goals for this visit include:   Chief Complaint   Patient presents with     Derm Problem     Skin check - right cheek lesion, 2 lesions on left cheek, 1 lesion on nose, 1 lesion on scalp, lower right leg tenderness. Hx of nmsc        He requests these members of his care team be copied on today's visit information: Yes     PCP: Winston Silverman    Referring Provider:  No referring provider defined for this encounter.    There were no vitals taken for this visit.    Do you need any medication refills at today's visit? No  LXIONG[employer], MEDICAL ASSISTANT

## 2020-07-21 NOTE — PATIENT INSTRUCTIONS
Cryotherapy    What is it?    Use of a very cold liquid, such as liquid nitrogen, to freeze and destroy abnormal skin cells that need to be removed    What should I expect?    Tenderness and redness    A small blister that might grow and fill with dark purple blood. There may be crusting.    More than one treatment may be needed if the lesions do not go away.    How do I care for the treated area?    Gently wash the area with your hands when bathing.    Use a thin layer of Vaseline to help with healing. You may use a Band-Aid.     The area should heal within 7-10 days and may leave behind a pink or lighter color.     Do not use an antibiotic or Neosporin ointment.     You may take acetaminophen (Tylenol) for pain.     Call your Doctor if you have:    Severe pain    Signs of infection (warmth, redness, cloudy yellow drainage, and or a bad smell)    Questions or concerns    Who should I call with questions?       [facility]: [phone]       [facility]: [phone]       For urgent needs outside of business hours call the [facility] at [phone]        and ask for the dermatology resident on call      Wound Care After a Biopsy    What is a skin biopsy?  A skin biopsy allows the doctor to examine a very small piece of tissue under the microscope to determine the diagnosis and the best treatment for the skin condition. A local anesthetic (numbing medicine)  is injected with a very small needle into the skin area to be tested. A small piece of skin is taken from the area. Sometimes a suture (stitch) is used.     What are the risks of a skin biopsy?  I will experience scar, bleeding, swelling, pain, crusting and redness. I may experience incomplete removal or recurrence. Risks of this procedure are excessive bleeding, bruising, infection, nerve damage, numbness, thick (hypertrophic or keloidal) scar and non-diagnostic biopsy.    How should I care  for my wound for the first 24 hours?    Keep the wound dry and covered for 24 hours    If it bleeds, hold direct pressure on the area for 15 minutes. If bleeding does not stop then go to the emergency room    Avoid strenuous exercise the first 1-2 days or as your doctor instructs you    How should I care for the wound after 24 hours?    After 24 hours, remove the bandage    You may bathe or shower as normal    If you had a scalp biopsy, you can shampoo as usual and can use shower water to clean the biopsy site daily    Clean the wound twice a day with gentle soap and water    Do not scrub, be gentle    Apply white petroleum/Vaseline after cleaning the wound with a cotton swab or a clean finger, and keep the site covered with a Bandaid /bandage. Bandages are not necessary with a scalp biopsy    If you are unable to cover the site with a Bandaid /bandage, re-apply ointment 2-3 times a day to keep the site moist. Moisture will help with healing    Avoid strenuous activity for first 1-2 days    Avoid lakes, rivers, pools, and oceans until the stitches are removed or the site is healed    How do I clean my wound?    Wash hands thoroughly with soap or use hand  before all wound care    Clean the wound with gentle soap and water    Apply white petroleum/Vaseline  to wound after it is clean    Replace the Bandaid /bandage to keep the wound covered for the first few days or as instructed by your doctor    If you had a scalp biopsy, warm shower water to the area on a daily basis should suffice    What should I use to clean my wound?     Cotton-tipped applicators (Qtips )    White petroleum jelly (Vaseline ). Use a clean new container and use Q-tips to apply.    Bandaids   as needed    Gentle soap     How should I care for my wound long term?    Do not get your wound dirty    Keep up with wound care for one week or until the area is healed.    A small scab will form and fall off by itself when the area is completely  healed. The area will be red and will become pink in color as it heals. Sun protection is very important for how your scar will turn out. Sunscreen with an SPF 30 or greater is recommended once the area is healed.      You should have some soreness but it should be mild and slowly go away over several days. Talk to your doctor about using tylenol for pain,    When should I call my doctor?  If you have increased:     Pain or swelling    Pus or drainage (clear or slightly yellow drainage is ok)    Temperature over 100F    Spreading redness or warmth around wound    When will I hear about my results?  The biopsy results can take 2-3 weeks to come back. The clinic will call you with the results, send you a [employer] message, or have you schedule a follow-up clinic or phone time to discuss the results. Contact our clinics if you do not hear from us in 3 weeks.     Who should I call with questions?    [facility]: [phone]     [facility]: [phone]    For urgent needs outside of business hours call the [facility] at [phone] and ask for the dermatology resident on call

## 2020-07-21 NOTE — PROGRESS NOTES
[facility] Dermatology Note    Dermatology Problem List:  1. 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 Christiansen's note 11/21/11), s/p excision 5/12/09   2. Actinic keratoses  - s/p Efudex 2015, PDT (x3), LN2  3. Seborrheic dermatitis  - clobetasol 0.05% solution  4. MGUS    5. NUB sp biopsy 7/21/2020 right upper arm, left upper back and right lateral cheek      Last TBSE 7/21/2020    Encounter Date: Jul 21, 2020    CC:  Chief Complaint   Patient presents with     Derm Problem     Skin check - right cheek lesion, 2 lesions on left cheek, 1 lesion on nose, 1 lesion on scalp, lower right leg tenderness. Hx of nmsc      History of Present Illness:  Mr. Gustavo Ramon is a 79 year old male who presents today for skin check. The patient has undergone 3 PDT treatments since September and instructed at last visit with Dr. Victor 12-4-2019 to use Efudex + calcipotriene cream on forehead and scalp.  Patient reports areas of concern on the right and left cheek. The patient is otherwise feeling well. There are no other skin concerns at this time. Pt reprots lsion on left elbow fell off      Past Medical History:   Patient Active Problem List   Diagnosis     Rosacea     DJD (degenerative joint disease)     Ocular myasthenia gravis (H)     Macular pigment deposit     HYPERLIPIDEMIA LDL GOAL <130     IgA monoclonal gammopathy     Retinal hole OS, operculated     Horseshoe tear of retina without detachment OD     History  of basal cell carcinoma     Seborrhea     AK (actinic keratosis)     Malignant neoplasm of prostate (H)     PVD (posterior vitreous detachment)     Amaurosis fugax by Hx neg carotid W/U     Advanced directives, counseling/discussion     Actinic keratosis      Peripheral neuropathy     Nuclear sclerosis of both eyes     Essential hypertension with goal blood pressure less than 140/90     Gastroesophageal reflux disease without esophagitis     Past Medical History:   Diagnosis Date     Actinic keratosis      AK (actinic keratosis) 11/15/2012     Amaurosis fugax      Basal cell carcinoma 2009    R medial cheek/nose     Central serous retinopathy left    Eye     Diverticulosis 08/2006     DJD (degenerative joint disease)      GERD (gastroesophageal reflux disease)      Hearing loss     High frequency     History of nonmelanoma skin cancer      History of nonmelanoma skin cancer      HTN (hypertension)      Hyperlipidemia LDL goal < 130      Hyperplastic colon polyp 08/2006     IgA monoclonal gammopathy     IgA kappa     Lattice degeneration of retina right    Eye     Nonsenile cataract      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    [place] consult     Rosacea      Vitreous detachment left    Eye     Past Surgical History:   Procedure Laterality Date     ARTHROSCOPY KNEE RT/LT Left Jan 2010    Knee     COLONOSCOPY WITH CO2 INSUFFLATION N/A 9/24/2019    Procedure: COLONOSCOPY, WITH CO2 INSUFFLATION;  Surgeon: Loi Levine MD;  Location: [facility] OR     COMBINED ESOPHAGOSCOPY, GASTROSCOPY, DUODENOSCOPY (EGD) WITH CO2 INSUFFLATION N/A 9/24/2019    Procedure: ESOPHAGOGASTRODUODENOSCOPY, WITH CO2 INSUFFLATION;  Surgeon: Loi Levine MD;  Location: [facility] OR     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     DISKECTOMY, LUMBAR, SINGLE SP  2003    L2-3     ENT SURGERY  1943    Tonsils      ENT SURGERY  2-22-12    Biopsy lesion right pinna     ENT SURGERY  2-24-12    Biopsy leson right pinna     ESOPHAGOSCOPY, GASTROSCOPY, DUODENOSCOPY (EGD), COMBINED N/A 9/24/2019    Procedure: Esophagogastroduodenoscopy, With Biopsy;  Surgeon: Loi Levine MD;  Location: [facility] OR     SOFT TISSUE SURGERY  May 2010    Basal Cell/right side nose       Social History:  The patient is retired  from working as an . He has a daughter and son  The patient denies use of tanning beds. He is [place].  Kept in chart for convenience.       Family History:  There is no family history of skin cancer.  Kept in chart for convenience.       Medications:  Current Outpatient Medications   Medication Sig Dispense Refill     aspirin 325 MG tablet Take 325 mg by mouth daily       cyanocobalamin (VITAMIN B-12) 1000 MCG tablet Take 1 tablet (1,000 mcg) by mouth daily 90 tablet 1     ferrous sulfate (FEROSUL) 325 (65 Fe) MG tablet Use 1 tab every other day with orange juice 60 tablet 1     hydrochlorothiazide (HYDRODIURIL) 25 MG tablet TAKE ONE-HALF (1/2) TABLET DAILY 45 tablet 3     lisinopril (ZESTRIL) 5 MG tablet TAKE 1 TABLET DAILY 90 tablet 0     Multiple Vitamins-Minerals (PRESERVISION AREDS 2) CAPS Take 2 soft gel caps daily 90 capsule 1     omeprazole (PRILOSEC) 20 MG DR capsule TAKE 1 CAPSULE DAILY ( CONCOMITANT TO PREDNISONE USE ) 90 capsule 0     predniSONE (DELTASONE) 20 MG tablet Take 1.5 tablets (30 mg) by mouth daily 45 tablet 1     simvastatin (ZOCOR) 20 MG tablet TAKE 1 TABLET AT BEDTIME (DUE FOR APPOINTMENT FOR FURTHER REFILLS) 30 tablet 4     sulfamethoxazole-trimethoprim (BACTRIM DS) 800-160 MG tablet Take 1 tablet by mouth three times a week 12 tablet 1     triamcinolone (KENALOG) 0.1 % external cream Apply twice daily for 2 weeks to rash on left upper arm 45 g 0     vitamin D3 (CHOLECALCIFEROL) 2000 units tablet Take 1 tablet by mouth daily 90 tablet 1     predniSONE (DELTASONE) 5 MG tablet Take 3 tablets daily for 1 week, then 4 tablets daily. 120 tablet 1     predniSONE (DELTASONE) 5 MG tablet Take 2 tablets (10 mg) by mouth daily 60 tablet 3     pyridostigmine (MESTINON) 60 MG tablet Take 2 tablets (120 mg) by mouth 4 times daily 720 tablet 3       Allergies   Allergen Reactions     Nkda [No Known Drug Allergies]      Review of Systems:  -Skin: As above in HPI. No additional skin  concerns.  -Const: The patient is generally feeling well today.     Physical exam:  - This is a well developed, well-nourished male in no acute distress, in a pleasant mood.    SKIN:  Total skin excluding the undergarment areas was performed. The exam included the head/face, neck, both arms, chest, back, abdomen, both legs, digits and/or nails.       -There is/are erythematous macules with overlying adherent scale on the cheeks, nose , left nasal side wall and corwn and brows  -There is a well healed surgical scar without erythema, nodularity or telangiectasias on the sites of prior skin cancer.   -5mm red papule with pigment, left upper back, right upper arm X2  -1cm red scaly patch right pre auricular  -no ak of left distal forearm  - No other lesions of concern on areas examined.     Impression/Plan:  1. History NMSC-no recurrence  -sun protection    2.   MGUS    3. AK right brow, left brow, left preauricular, left nasal tip X2, left nasal sidewall, crown, left post auricular, posterior neck  -Cryotherapy procedure note: After verbal consent and discussion of risks and benefits including but no limited to dyspigmentation/scar, blister, and pain, 16was(were) treated with 1-2mm freeze border for 2 cycles with liquid nitrogen. Post cryotherapy instructions were provided.       4. Shave biopsy:  Right lateral cheek 1cm scaly patch AK vs SCC      After discussion of benefits and risks including but not limited to bleeding/bruising, pain/swelling, infection, scar, incomplete removal, nerve damage/numbness, recurrence, and non-diagnostic biopsy, written consent, verbal consent and photographs were obtained. Time-out was performed. The area was cleaned with isopropyl alcohol. 0.5mL of 1% lidocaine with epinephrine was injected to obtain adequate anesthesia of the lesion on the right lateral cheek. A shave biopsy was performed. Hemostasis was achieved with aluminium chloride. Vaseline and a sterile dressing were applied.  The patient tolerated the procedure and no complications were noted. The patient was provided with verbal and written post care instructions.     5. Shave biopsy: left upper back 5mm red papule with pigment BCC vs melanoma     After discussion of benefits and risks including but not limited to bleeding/bruising, pain/swelling, infection, scar, incomplete removal, nerve damage/numbness, recurrence, and non-diagnostic biopsy, written consent, verbal consent and photographs were obtained. Time-out was performed. The area was cleaned with isopropyl alcohol. 0.5mL of 1% lidocaine with epinephrine was injected to obtain adequate anesthesia of the lesion on the left upper back. A shave biopsy was performed. Hemostasis was achieved with aluminium chloride. Vaseline and a sterile dressing were applied. The patient tolerated the procedure and no complications were noted. The patient was provided with verbal and written post care instructions.     6. Shave biopsy: right upper arm 5mm red papule with pigment BCC vs melanoma     After discussion of benefits and risks including but not limited to bleeding/bruising, pain/swelling, infection, scar, incomplete removal, nerve damage/numbness, recurrence, and non-diagnostic biopsy, written consent, verbal consent and photographs were obtained. Time-out was performed. The area was cleaned with isopropyl alcohol. 0.5mL of 1% lidocaine with epinephrine was injected to obtain adequate anesthesia of the lesion on the right upper arm. A shave biopsy was performed. Hemostasis was achieved with aluminium chloride. Vaseline and a sterile dressing were applied. The patient tolerated the procedure and no complications were noted. The patient was provided with verbal and written post care instructions.     Follow up in 2 months for photos and phone to discuss efudex treatment of temples and cheeks, earlier as needed for new or changing lesions.        Staff Involved:    Nathen Disclosure  I,  Josie Fontaine LPN, am serving as a scribe to document services personally performed by Dr. Shashank Victor DO, based on data collection and the provider's statements to me.     Provider Disclosure:   The documentation recorded by the scribe accurately reflects the services I personally performed and the decisions made by me.    Brooklyn Treviño MD    Department of Dermatology  [facility]: Phone: [phone], Fax:[phone]  [facility]: Phone: [phone], Fax: [phone]

## 2020-07-25 LAB — COPATH REPORT: NORMAL

## 2020-08-20 DIAGNOSIS — Z29.89 NEED FOR PNEUMOCYSTIS PROPHYLAXIS: ICD-10-CM

## 2020-08-21 RX ORDER — SULFAMETHOXAZOLE/TRIMETHOPRIM 800-160 MG
TABLET ORAL
Qty: 12 TABLET | Refills: 12 | Status: SHIPPED | OUTPATIENT
Start: 2020-08-21 | End: 2021-02-08

## 2020-08-27 ENCOUNTER — OFFICE VISIT (OUTPATIENT)
Dept: DERMATOLOGY | Facility: CLINIC | Age: 80
End: 2020-08-27
Payer: MEDICARE

## 2020-08-27 VITALS — HEART RATE: 101 BPM | DIASTOLIC BLOOD PRESSURE: 79 MMHG | SYSTOLIC BLOOD PRESSURE: 139 MMHG

## 2020-08-27 DIAGNOSIS — D48.5 NEOPLASM OF UNCERTAIN BEHAVIOR OF SKIN: Primary | ICD-10-CM

## 2020-08-27 DIAGNOSIS — C44.519 BASAL CELL CARCINOMA (BCC) OF BACK: ICD-10-CM

## 2020-08-27 DIAGNOSIS — Z86.007 HISTORY OF SQUAMOUS CELL CARCINOMA IN SITU OF SKIN: ICD-10-CM

## 2020-08-27 DIAGNOSIS — C44.612 BASAL CELL CARCINOMA, ARM, RIGHT: ICD-10-CM

## 2020-08-27 PROCEDURE — 11102 TANGNTL BX SKIN SINGLE LES: CPT | Mod: 59 | Performed by: DERMATOLOGY

## 2020-08-27 PROCEDURE — 17263 DSTRJ MAL LES T/A/L 2.1-3.0: CPT | Mod: GC | Performed by: DERMATOLOGY

## 2020-08-27 PROCEDURE — 88305 TISSUE EXAM BY PATHOLOGIST: CPT | Mod: TC | Performed by: DERMATOLOGY

## 2020-08-27 PROCEDURE — 17262 DSTRJ MAL LES T/A/L 1.1-2.0: CPT | Mod: GC | Performed by: DERMATOLOGY

## 2020-08-27 ASSESSMENT — PAIN SCALES - GENERAL: PAINLEVEL: NO PAIN (0)

## 2020-08-27 NOTE — NURSING NOTE
The following medication was given:     MEDICATION:  Lidocaine with epinephrine 1% [id number]  ROUTE: SQ  SITE: see procedure note  DOSE: 6.5cc  LOT #: -[id number]  : Pau  EXPIRATION DATE: 1-2-2021  NDC#: 2842-5542-55   Was there drug waste? 1.5cc  Multi-dose vial: Yes    Josie Fontaine LPN  August 27, 2020

## 2020-08-27 NOTE — PATIENT INSTRUCTIONS
Wound Care:  Electrodesiccation and Curettage     I will experience scar, altered skin color, bleeding, swelling, pain, crusting and redness. I may experience altered sensation. Risks are excessive bleeding, infection, muscle weakness, thick (hypertrophic or keloidal) scar, and recurrence,. A second procedure may be recommended to obtain the best cosmetic or functional result.    What is electrodesiccation and curettage ?    Scraping off tissue (curettage)    Destroy tissue using electric current or cautery (electrodessication)    How do I perform wound care?    Keep dressing in place for two days. You may shower with the dressing in place(do not get wet)    After 2 days, wash hands and remove dressing. Clean wound with cotton-swab soaked in hydrogen peroxide to remove drainage and crust    Put on a thick layer of Vaseline on the wound using a cotton-swab     Cover the wound with a Band-AidTM to protect from dust and tight clothing    If wound is draining before two days, change your dressing as described above sooner    During wound care, do not allow the area to dry out or form a scab    What do I need?    Hydrogen peroxide     Cotton-swabs     Vaseline or petroleum jelly     Band-AidsTM or dressing supplies as needed     When should I call the doctor?  [facility]: [phone]  [facility]: [phone]  For urgent needs outside of business hours call the [facility] at [phone] and ask for the dermatology resident on call        Wound Care After a Biopsy    What is a skin biopsy?  A skin biopsy allows the doctor to examine a very small piece of tissue under the microscope to determine the diagnosis and the best treatment for the skin condition. A local anesthetic (numbing medicine)  is injected with a very small needle into the skin area to be tested. A small piece of skin is taken from the area. Sometimes a suture (stitch) is used.      What are the risks of a skin biopsy?  I will experience scar, bleeding, swelling, pain, crusting and redness. I may experience incomplete removal or recurrence. Risks of this procedure are excessive bleeding, bruising, infection, nerve damage, numbness, thick (hypertrophic or keloidal) scar and non-diagnostic biopsy.    How should I care for my wound for the first 24 hours?    Keep the wound dry and covered for 24 hours    If it bleeds, hold direct pressure on the area for 15 minutes. If bleeding does not stop then go to the emergency room    Avoid strenuous exercise the first 1-2 days or as your doctor instructs you    How should I care for the wound after 24 hours?    After 24 hours, remove the bandage    You may bathe or shower as normal    If you had a scalp biopsy, you can shampoo as usual and can use shower water to clean the biopsy site daily    Clean the wound twice a day with gentle soap and water    Do not scrub, be gentle    Apply white petroleum/Vaseline after cleaning the wound with a cotton swab or a clean finger, and keep the site covered with a Bandaid /bandage. Bandages are not necessary with a scalp biopsy    If you are unable to cover the site with a Bandaid /bandage, re-apply ointment 2-3 times a day to keep the site moist. Moisture will help with healing    Avoid strenuous activity for first 1-2 days    Avoid lakes, rivers, pools, and oceans until the stitches are removed or the site is healed    How do I clean my wound?    Wash hands thoroughly with soap or use hand  before all wound care    Clean the wound with gentle soap and water    Apply white petroleum/Vaseline  to wound after it is clean    Replace the Bandaid /bandage to keep the wound covered for the first few days or as instructed by your doctor    If you had a scalp biopsy, warm shower water to the area on a daily basis should suffice    What should I use to clean my wound?     Cotton-tipped applicators (Qtips )    White  petroleum jelly (Vaseline ). Use a clean new container and use Q-tips to apply.    Bandaids   as needed    Gentle soap     How should I care for my wound long term?    Do not get your wound dirty    Keep up with wound care for one week or until the area is healed.    A small scab will form and fall off by itself when the area is completely healed. The area will be red and will become pink in color as it heals. Sun protection is very important for how your scar will turn out. Sunscreen with an SPF 30 or greater is recommended once the area is healed.    You should have some soreness but it should be mild and slowly go away over several days. Talk to your doctor about using tylenol for pain,    When should I call my doctor?  If you have increased:     Pain or swelling    Pus or drainage (clear or slightly yellow drainage is ok)    Temperature over 100F    Spreading redness or warmth around wound    When will I hear about my results?  The biopsy results can take 2-3 weeks to come back. The clinic will call you with the results, send you a [employer] message, or have you schedule a follow-up clinic or phone time to discuss the results. Contact our clinics if you do not hear from us in 3 weeks.     Who should I call with questions?    [facility]: [phone]     [facility]: [phone]    For urgent needs outside of business hours call the [facility] at [phone] and ask for the dermatology resident on call

## 2020-08-27 NOTE — LETTER
8/27/2020         RE: Gustavo Ramon  [id number] [facility] [address]        Dear Colleague,    Thank you for referring your patient, Gustavo Ramon, to the [facility]. Please see a copy of my visit note below.    Brief Pre-Procedure Note    Dermatology Surgery Clinic  [facility]  [address] [place] [id number]    Dermatology Problem List:  1. 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 Christiansen's note 11/21/11), s/p excision 5/12/09   - BCC - Right upper arm, s/p ED&C 8/27/2020  - BCC - Left upper back, s/p ED&C 8/27/2020  2. Actinic keratoses  - s/p Efudex 2015, PDT (x3), LN2  3. Seborrheic dermatitis  - clobetasol 0.05% solution  4. MGUS   5. NUB - Left nasal side wall, bx [id number]      Subjective: Mr. Chen is a very pleasant 80 year old man with history of nonmelanoma skin cancer who presents today for treatment of two recently biopsied BCCs (Right upper arm and left upper back).     Overall, the patient is doing very well today.  He also has a lesion of concern over the left nasal side wall.     Objective: An exam of the trunk and face was performed today   - Left nasal side wall with an approximately 0.8 x 0.8 cm pearly, atrophic and somewhat depressed papule with rolled boarders and surrounding telangiectasias   - Right upper arm with healing erosion  - Left upper back with healing erosion.     Assessment and Plan:   (1)  Basal cell carcinoma x 2 - right upper arm and left upper back  - Discussed the nature of the diagnosis/condition  - Discussed the treatment options, including the risks benefits and expectations of these options.   - Patient elected for ED&C today for  both lesions     (2) Neoplasm of uncertain behavior of the left nasal side wall   - Favor basal cell carcinoma  - Biopsy today       1. Shave biopsy:  Left nasal sidewall     After discussion of benefits and risks including but not limited to bleeding/bruising, pain/swelling, infection, scar, incomplete removal, nerve damage/numbness, recurrence, and non-diagnostic biopsy, written consent, verbal consent and photographs were obtained. Time-out was performed. The area was cleaned with isopropyl alcohol. 0.5mL of 1% lidocaine with epinephrine was injected to obtain adequate anesthesia of the lesion on the 0.5cc. A shave biopsy was performed. Hemostasis was achieved with aluminium chloride. Vaseline and a sterile dressing were applied. The patient tolerated the procedure and no complications were noted. The patient was provided with verbal and written post care instructions.         Dermatology Procedure Note: Electrodesiccation and Curettage    PREOPERATIVE DIAGNOSIS: BCC    POSTOPERATIVE DIAGNOSIS: same    LOCATION: left upper back    SIZE: 1.0X0.8 cm     Treatment options including electrodessiccation and curettage (ED and C), excision and topicals were reviewed.  The expected cure rates, healing times and anticipated scars of each option were discussed and the patient elects to proceed with ED and C.     The risks and benefits of the procedure were described to the patient.  These include but are not limited to bleeding, infection, scar, incomplete removal, and recurrence. Written informed consent was obtained. Time-out was performed. The above site was cleansed with and injected with 2mL1% lidocaine with epinephrine. Once anesthesia was obtained, the site was prepped with Chlorhexidine and rinsed with sterile saline. The lesion was curetted with  in 3 directions with a 4 mm margin and this was followed by electrodessication.  This process was repeated three times. The defect measured 1.2 x 1.2 cm. Vaseline and a  bandage were applied to the wound. The patient tolerated the procedure well and was given post care instructions.          Dermatology Procedure Note: Electrodesiccation and Curettage    PREOPERATIVE DIAGNOSIS: BCC    POSTOPERATIVE DIAGNOSIS: same    LOCATION: right upper arm    SIZE: 1.3X1.2 cm     Treatment options including electrodessiccation and curettage (ED and C), excision and topicals were reviewed.  The expected cure rates, healing times and anticipated scars of each option were discussed and the patient elects to proceed with ED and C.     The risks and benefits of the procedure were described to the patient.  These include but are not limited to bleeding, infection, scar, incomplete removal, and recurrence. Written informed consent was obtained. Time-out was performed. The above site was cleansed with and injected with 4mL1% lidocaine with epinephrine. Once anesthesia was obtained, the site was prepped with Chlorhexidine and rinsed with sterile saline. The lesion was curetted with  in 3 directions with a 4 mm margin and this was followed by electrodessication.  This process was repeated three times. The defect measured 1.5 x 1.2 cm. Vaseline and a bandage were applied to the wound. The patient tolerated the procedure well and was given post care instructions.    Clinical Follow-up: September 2020 as scheduled with Dr. Hudson Victor staffed the patient.     Staff Involved:  Scribe Disclosure:   I, Josie Fontaine, am serving as a scribe to document services personally performed by Dr. Victor, based on data collection and the provider's statements to me.       Ilia Alford MD  PGY-6    Micrographic Surgery and Dermatologic Oncology Fellow  August 30, 2020      Staff Physician Comments:   I saw and evaluated the patient with the Mohs Surgery Fellow (Dr. Ilia Alford) and I agree with the assessment and plan and the above description of the procedure as recorded by the scribe. I was present for the  key portions of the procedure and entire exam.    Shashank Victor DO    Department of Dermatology  [facility]: Phone: [phone], Fax:[phone]  [facility]: Phone: [phone], Fax: [phone]      Again, thank you for allowing me to participate in the care of your patient.        Sincerely,        Shashank Victor MD

## 2020-08-27 NOTE — NURSING NOTE
Gustavo CEZAR Tristen's goals for this visit include:   Chief Complaint   Patient presents with     Derm Problem     Stephen is here today for a ED and C on right upper arm and left upper back. Also has spot of concern on nose.        He requests these members of his care team be copied on today's visit information:     PCP: Winston Silverman    Referring Provider:  No referring provider defined for this encounter.    /79 (BP Location: Right arm, Patient Position: Sitting, Cuff Size: Adult Regular)   Pulse 101     Do you need any medication refills at today's visit? No    Shaylee Livingston LPN

## 2020-08-27 NOTE — PROGRESS NOTES
Brief Pre-Procedure Note    Dermatology Surgery Clinic  [facility]  [address] [place] [id number]    Dermatology Problem List:  1. 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 Christiansen's note 11/21/11), s/p excision 5/12/09   - BCC - Right upper arm, s/p ED&C 8/27/2020  - BCC - Left upper back, s/p ED&C 8/27/2020  2. Actinic keratoses  - s/p Efudex 2015, PDT (x3), LN2  3. Seborrheic dermatitis  - clobetasol 0.05% solution  4. MGUS   5. NUB - Left nasal side wall, bx [id number]      Subjective: Mr. Chen is a very pleasant 80 year old man with history of nonmelanoma skin cancer who presents today for treatment of two recently biopsied BCCs (Right upper arm and left upper back).     Overall, the patient is doing very well today.  He also has a lesion of concern over the left nasal side wall.     Objective: An exam of the trunk and face was performed today   - Left nasal side wall with an approximately 0.8 x 0.8 cm pearly, atrophic and somewhat depressed papule with rolled boarders and surrounding telangiectasias   - Right upper arm with healing erosion  - Left upper back with healing erosion.     Assessment and Plan:   (1)  Basal cell carcinoma x 2 - right upper arm and left upper back  - Discussed the nature of the diagnosis/condition  - Discussed the treatment options, including the risks benefits and expectations of these options.   - Patient elected for ED&C today for both lesions     (2) Neoplasm of uncertain behavior of the left nasal side wall   - Favor basal cell carcinoma  - Biopsy today       1. Shave biopsy:  Left nasal sidewall     After discussion of benefits and risks including but not limited to bleeding/bruising,  pain/swelling, infection, scar, incomplete removal, nerve damage/numbness, recurrence, and non-diagnostic biopsy, written consent, verbal consent and photographs were obtained. Time-out was performed. The area was cleaned with isopropyl alcohol. 0.5mL of 1% lidocaine with epinephrine was injected to obtain adequate anesthesia of the lesion on the 0.5cc. A shave biopsy was performed. Hemostasis was achieved with aluminium chloride. Vaseline and a sterile dressing were applied. The patient tolerated the procedure and no complications were noted. The patient was provided with verbal and written post care instructions.         Dermatology Procedure Note: Electrodesiccation and Curettage    PREOPERATIVE DIAGNOSIS: BCC    POSTOPERATIVE DIAGNOSIS: same    LOCATION: left upper back    SIZE: 1.0X0.8 cm     Treatment options including electrodessiccation and curettage (ED and C), excision and topicals were reviewed.  The expected cure rates, healing times and anticipated scars of each option were discussed and the patient elects to proceed with ED and C.     The risks and benefits of the procedure were described to the patient.  These include but are not limited to bleeding, infection, scar, incomplete removal, and recurrence. Written informed consent was obtained. Time-out was performed. The above site was cleansed with and injected with 2mL1% lidocaine with epinephrine. Once anesthesia was obtained, the site was prepped with Chlorhexidine and rinsed with sterile saline. The lesion was curetted with  in 3 directions with a 4 mm margin and this was followed by electrodessication.  This process was repeated three times. The defect measured 1.2 x 1.2 cm. Vaseline and a bandage were applied to the wound. The patient tolerated the procedure well and was given post care instructions.          Dermatology Procedure Note: Electrodesiccation and Curettage    PREOPERATIVE DIAGNOSIS: BCC    POSTOPERATIVE DIAGNOSIS: same    LOCATION:  right upper arm    SIZE: 1.3X1.2 cm     Treatment options including electrodessiccation and curettage (ED and C), excision and topicals were reviewed.  The expected cure rates, healing times and anticipated scars of each option were discussed and the patient elects to proceed with ED and C.     The risks and benefits of the procedure were described to the patient.  These include but are not limited to bleeding, infection, scar, incomplete removal, and recurrence. Written informed consent was obtained. Time-out was performed. The above site was cleansed with and injected with 4mL1% lidocaine with epinephrine. Once anesthesia was obtained, the site was prepped with Chlorhexidine and rinsed with sterile saline. The lesion was curetted with  in 3 directions with a 4 mm margin and this was followed by electrodessication.  This process was repeated three times. The defect measured 1.5 x 1.2 cm. Vaseline and a bandage were applied to the wound. The patient tolerated the procedure well and was given post care instructions.    Clinical Follow-up: September 2020 as scheduled with Dr. Hudson Victor staffed the patient.     Staff Involved:  Scribe Disclosure:   I, Josie Fontaine, am serving as a scribe to document services personally performed by Dr. Victor, based on data collection and the provider's statements to me.       Ilia Alford MD  PGY-6    Micrographic Surgery and Dermatologic Oncology Fellow  August 30, 2020      Staff Physician Comments:   I saw and evaluated the patient with the Mohs Surgery Fellow (Dr. Ilia Alford) and I agree with the assessment and plan and the above description of the procedure as recorded by the scribe. I was present for the key portions of the procedure and entire exam.    Shashank Victor DO    Department of Dermatology  [facility]: Phone: [phone], Fax:[phone]  [facility]  Clinical Surgery [facility]: Phone: [phone], Fax: [phone]

## 2020-08-31 LAB — COPATH REPORT: NORMAL

## 2020-09-02 ENCOUNTER — TELEPHONE (OUTPATIENT)
Dept: DERMATOLOGY | Facility: CLINIC | Age: 80
End: 2020-09-02

## 2020-09-02 NOTE — TELEPHONE ENCOUNTER
[facility] Dermatologic Surgery Pre-Surgery Screening Note     Pre-screening Information:  Hx of Skin Cancer: Yes  Hx of Mohs Surgery: Yes  Transplant: No  Immunocompromised: Yes  Immunosuppressive medication(s): Prednisone  Current Anticoagulant(s): Aspirin  Bleeding Disorder(s): No  Stent: No  Pacemaker: No  Defibrillator: No  Brain/Nerve Stimulator: No  Endocarditis/Rheumatic Fever Hx: No  Vascular graft: No  Prophylactic Antibiotic Needed: No  Congenital heart defect: No  Prosthetic Heart Valve: No  Lesion on Leg/Groin: No  Prosthetic Joint : No  Diabetic: No  HIV/AIDS: No  Hepatitis: No  Pregnant: No  Prior Problem with Local Anesthesia: No  Patient wears CPAP mask: No  Current Tobacco Use: No  Current Alcohol Use: No  Extended Care Facility: No  Occupation: retired  Referring MD: n/a   needed?: No  Do you have mobility issues?: No  Do you use any assistive devices/DME?: No  Do you have any issues with lying for long periods of time?: No      Medications/Allergies  Medications and allergies review with patient: Yes     Current Outpatient Medications   Medication Sig Dispense Refill     aspirin 325 MG tablet Take 325 mg by mouth daily       cyanocobalamin (VITAMIN B-12) 1000 MCG tablet Take 1 tablet (1,000 mcg) by mouth daily 90 tablet 1     ferrous sulfate (FEROSUL) 325 (65 Fe) MG tablet Use 1 tab every other day with orange juice 60 tablet 1     hydrochlorothiazide (HYDRODIURIL) 25 MG tablet TAKE ONE-HALF (1/2) TABLET DAILY 45 tablet 3     lisinopril (ZESTRIL) 5 MG tablet TAKE 1 TABLET DAILY 90 tablet 0     Multiple Vitamins-Minerals (PRESERVISION AREDS 2) CAPS Take 2 soft gel caps daily 90 capsule 1     omeprazole (PRILOSEC) 20 MG DR capsule TAKE 1 CAPSULE DAILY ( CONCOMITANT TO PREDNISONE USE ) 90 capsule 0     predniSONE (DELTASONE) 20 MG tablet Take 1.5 tablets (30 mg) by mouth daily 45 tablet 1     predniSONE (DELTASONE) 5 MG tablet Take 3 tablets daily for 1 week, then 4 tablets  daily. 120 tablet 1     predniSONE (DELTASONE) 5 MG tablet Take 2 tablets (10 mg) by mouth daily 60 tablet 3     pyridostigmine (MESTINON) 60 MG tablet Take 2 tablets (120 mg) by mouth 4 times daily 720 tablet 3     simvastatin (ZOCOR) 20 MG tablet TAKE 1 TABLET AT BEDTIME (DUE FOR APPOINTMENT FOR FURTHER REFILLS) 90 tablet 1     sulfamethoxazole-trimethoprim (BACTRIM DS) 800-160 MG tablet TAKE 1 TABLET THREE TIMES A WEEK 12 tablet 12     triamcinolone (KENALOG) 0.1 % external cream Apply twice daily for 2 weeks to rash on left upper arm 45 g 0     vitamin D3 (CHOLECALCIFEROL) 2000 units tablet Take 1 tablet by mouth daily 90 tablet 1     Allergies   Allergen Reactions     Nkda [No Known Drug Allergies]        Pertinent Labs:  Last Creatine:   Creatinine   Date Value Ref Range Status   08/15/2019 1.01 0.66 - 1.25 mg/dL Final     Last INR: No results found for: INR    Other Questions:    Reminded patient to take any order prophylactic antibiotics 1 hour prior to appointment: No    If on a prescribed anticoagulant, advised patient to continue or check with prescribing provider: Yes    If on an OTC anticoagulant/supplement, advised patient to hold these medications 10-14 days prior to surgery and resume 48 hrs after surgery: No     Please be aware that this can be an all day procedure. Please bring your daily medications and food/cash. Encourage patient to eat prior to procedure(s). After care instructions were reviewed with patient.    Marsha Dahl RN

## 2020-09-14 ENCOUNTER — VIRTUAL VISIT (OUTPATIENT)
Dept: FAMILY MEDICINE | Facility: OTHER | Age: 80
End: 2020-09-14

## 2020-09-14 NOTE — PROGRESS NOTES
"Date: 2020 16:59:25  Clinician: Deric Juarez  Clinician NPI: [id number]  Patient: Gustavo Ramon  Patient : 1940  Patient Address: [address] [place] [address] [place] [address]  Patient Phone: [phone]  Visit Protocol: URI  Patient Summary:  Gustavo is a 80 year old ( : 1940 ) male who initiated a [facility] Visit for COVID-19 (Coronavirus) evaluation and screening. When asked the question \"Please sign me up to receive news, health information and promotions. \", Gustavo responded \"No\".    When asked when his symptoms started, Gustavo reported that he does not have any symptoms.   He denies taking antibiotic medication in the past month and having recent facial or sinus surgery in the past 60 days.    Pertinent COVID-19 (Coronavirus) information  In the past 14 days, Gustavo has not worked in a congregate living setting.   He does not work or volunteer as healthcare worker or a  and does not work or volunteer in a healthcare facility.   Gustavo also has not lived in a congregate living setting in the past 14 days. He does not live with a healthcare worker.   Gustavo has had a close contact with a laboratory-confirmed COVID-19 patient in the last 14 days. Additional information about contact with COVID-19 (Coronavirus) patient as reported by the patient (free text): My spouse got positive virus report this morning    Patient reported they are living in the same household with a COVID-19 positive patient.  Since 2019, Gustavo and has not had upper respiratory infection or influenza-like illness. Has not been diagnosed with lab-confirmed COVID-19 test   Pertinent medical history  Gustavo does not need a return to work/school note.   Weight: 180 lbs   Gustavo does not smoke or use smokeless tobacco.   Additional information as reported by the patient (free text): Current temperature 99.8   Weight: 180 lbs    MEDICATIONS: lisinopril-hydrochlorothiazide oral, lisinopril " oral, simvastatin oral, ferrous sulfate oral, sulfamethoxazole-trimethoprim intravenous, pyridostigmine bromide oral, prednisone oral, ALLERGIES: NKDA  Clinician Response:  Dear Gustavo,   Based on your exposure to COVID-19 (coronavirus), we would like to test you for this virus.  1. Please call [phone] to schedule your visit. Explain that you were referred by [facility] to have a COVID-19 test. Be [facility].  The following will serve as your written order for this COVID Test, ordered by me, for the indication of suspected COVID [Z20.828]: The test will be ordered in [facility], our electronic health record, after you are scheduled. It will show as ordered and authorized by Timothy Mae MD.  Order: COVID-19 (coronavirus) PCR for ASYMPTOMATIC EXPOSURE testing from [facility].  If you know you have had close contact with someone who tested positive, you should be quarantined for 14 days after this exposure. You should stay in quarantine for the14 days even if the covid test is negative, the optimal time to test after exposure is 5-7 days from the exposure  Quarantine means   What should I do?  For safety, it's very important to follow these rules. Do this for 14 days after the date you were last exposed to the virus..  Stay home and away from others. Don't go to school or anywhere else. Generally quarantine means staying home from work but there are some exceptions to this. Please contact your workplace.   No hugging, kissing or shaking hands.  Don't let anyone visit.  Cover your mouth and nose with a mask, tissue or washcloth to avoid spreading germs.  Wash your hands and face often. Use soap and water.  What are the symptoms of COVID-19?  The most common symptoms are cough, fever and trouble breathing. Less common symptoms include headache, body aches, fatigue (feeling very tired), chills, sore throat, stuffy or runny nose, diarrhea (loose poop), loss of taste or smell, belly pain, and nausea  or vomiting (feeling sick to your stomach or throwing up).  After 14 days, if you have still don't have symptoms, you likely don't have this virus.  If you develop symptoms, follow these guidelines.  If you're normally healthy: Please start another [facility] visit to report your symptoms. Go to [email or web address].  If you have a serious health problem (like cancer, heart failure, an organ transplant or kidney disease): Call your specialty clinic. Let them know that you might have COVID-19.  2. When it's time for your COVID test:  Stay at least 6 feet away from others. (If someone will drive you to your test, stay in the backseat, as far away from the  as you can.)  Cover your mouth and nose with a mask, tissue or washcloth.  Go straight to the testing site. Don't make any stops on the way there or back.  Please note  Caregivers in these groups are at risk for severe illness due to COVID-19:  o People 65 years and older  o People who live in a nursing home or long-term care facility  o People with chronic disease (lung, heart, cancer, diabetes, kidney, liver, immunologic)  o People who have a weakened immune system, including those who:  Are in cancer treatment  Take medicine that weakens the immune system, such as corticosteroids  Had a bone marrow or organ transplant  Have an immune deficiency  Have poorly controlled HIV or AIDS  Are obese (body mass index of 40 or higher)  Smoke regularly  Where can I get more information?  [facility] -- About COVID-19: [email or web address]  CDC -- What to Do If You're Sick: www.cdc.gov/coronavirus/2019-ncov/about/steps-when-sick.html  CDC -- Ending Home Isolation: www.cdc.gov/coronavirus/2019-ncov/hcp/disposition-in-home-patients.html  CDC -- Caring for Someone: www.cdc.gov/coronavirus/2019-ncov/if-you-are-sick/care-for-someone.html  [facility] -- Interim Guidance for Hospital Discharge to Home: www.health.[facility].[place].us/diseases/coronavirus/hcp/hospdischarge.pdf  [facility] clinical trials (COVID-19 research studies): clinicalaffairs.[facility]/n-clinical-trials  Below are the COVID-19 hotlines at the [facility] ([facility]). Interpreters are available.  For health questions: Call 693-402-1699 or 1-167.123.3107 (7 a.m. to 7 p.m.)  For questions about schools and childcare: Call 724-422-5005 or 1-300.487.6891 (7 a.m. to 7 p.m.)    Diagnosis: Contact with and (suspected) exposure to other viral communicable diseases  Diagnosis ICD: Z20.828

## 2020-09-15 DIAGNOSIS — Z20.822 SUSPECTED 2019 NOVEL CORONAVIRUS INFECTION: Primary | ICD-10-CM

## 2020-09-16 DIAGNOSIS — I10 BENIGN ESSENTIAL HYPERTENSION: ICD-10-CM

## 2020-09-16 DIAGNOSIS — Z20.822 SUSPECTED 2019 NOVEL CORONAVIRUS INFECTION: ICD-10-CM

## 2020-09-16 PROCEDURE — U0003 INFECTIOUS AGENT DETECTION BY NUCLEIC ACID (DNA OR RNA); SEVERE ACUTE RESPIRATORY SYNDROME CORONAVIRUS 2 (SARS-COV-2) (CORONAVIRUS DISEASE [COVID-19]), AMPLIFIED PROBE TECHNIQUE, MAKING USE OF HIGH THROUGHPUT TECHNOLOGIES AS DESCRIBED BY CMS-2020-01-R: HCPCS | Performed by: FAMILY MEDICINE

## 2020-09-16 NOTE — TELEPHONE ENCOUNTER
"Routing refill request to provider for review/approval because:  Labs not current:  Cr, K+  Patient needs to be seen because it has been more than 1 year since last office visit.      Medication pended for approval, 30 day supply with reminder.               Requested Prescriptions   Pending Prescriptions Disp Refills     lisinopril (ZESTRIL) 5 MG tablet [Pharmacy Med Name: LISINOPRIL TABS 5MG] 30 tablet 0     Sig: TAKE 1 TABLET DAILY       ACE Inhibitors (Including Combos) Protocol Failed - 9/16/2020  9:04 AM        Failed - Normal serum creatinine on file in past 12 months     Recent Labs   Lab Test 08/15/19  1240  07/25/13  1123   CR 1.01   < >  --    CRPOC  --   --  1.1    < > = values in this interval not displayed.       Ok to refill medication if creatinine is low          Failed - Normal serum potassium on file in past 12 months     Recent Labs   Lab Test 08/15/19  1240   POTASSIUM 3.9             Passed - Blood pressure under 140/90 in past 12 months     BP Readings from Last 3 Encounters:   08/27/20 139/79   02/11/20 133/83   01/02/20 133/75                 Passed - Recent (12 mo) or future (30 days) visit within the authorizing provider's specialty     Patient has had an office visit with the authorizing provider or a provider within the authorizing providers department within the previous 12 mos or has a future within next 30 days. See \"Patient Info\" tab in inbasket, or \"Choose Columns\" in Meds & Orders section of the refill encounter.              Passed - Medication is active on med list        Passed - Patient is age 18 or older             "

## 2020-09-17 LAB
SARS-COV-2 RNA SPEC QL NAA+PROBE: NOT DETECTED
SPECIMEN SOURCE: NORMAL

## 2020-09-18 RX ORDER — LISINOPRIL 5 MG/1
TABLET ORAL
Qty: 30 TABLET | Refills: 0 | Status: SHIPPED | OUTPATIENT
Start: 2020-09-18 | End: 2020-11-17

## 2020-09-21 ENCOUNTER — TELEPHONE (OUTPATIENT)
Dept: ONCOLOGY | Facility: CLINIC | Age: 80
End: 2020-09-21

## 2020-09-21 NOTE — TELEPHONE ENCOUNTER
Stephen states he prefers to go to [facility] to get lab work completed. He will call and make the appointment. Stephen is aware that he needs labs for Dr Eller.    Stephen states that he already had iron prescription refilled. Dr Eller made aware of this via inbasket message.    Kristie García RN

## 2020-09-22 ENCOUNTER — OFFICE VISIT (OUTPATIENT)
Dept: DERMATOLOGY | Facility: CLINIC | Age: 80
End: 2020-09-22
Payer: MEDICARE

## 2020-09-22 DIAGNOSIS — L82.1 SEBORRHEIC KERATOSIS: ICD-10-CM

## 2020-09-22 DIAGNOSIS — Z85.828 HISTORY OF NONMELANOMA SKIN CANCER: ICD-10-CM

## 2020-09-22 DIAGNOSIS — E78.5 HYPERLIPIDEMIA LDL GOAL <130: ICD-10-CM

## 2020-09-22 DIAGNOSIS — D50.9 IRON DEFICIENCY ANEMIA, UNSPECIFIED IRON DEFICIENCY ANEMIA TYPE: ICD-10-CM

## 2020-09-22 DIAGNOSIS — D72.829 LEUKOCYTOSIS, UNSPECIFIED TYPE: ICD-10-CM

## 2020-09-22 DIAGNOSIS — L57.0 AK (ACTINIC KERATOSIS): Primary | ICD-10-CM

## 2020-09-22 DIAGNOSIS — D47.2 MGUS (MONOCLONAL GAMMOPATHY OF UNKNOWN SIGNIFICANCE): Primary | ICD-10-CM

## 2020-09-22 LAB
CHOLEST SERPL-MCNC: 199 MG/DL
ERYTHROCYTE [DISTWIDTH] IN BLOOD BY AUTOMATED COUNT: 14.8 % (ref 10–15)
FERRITIN SERPL-MCNC: 24 NG/ML (ref 26–388)
HCT VFR BLD AUTO: 44.1 % (ref 40–53)
HDLC SERPL-MCNC: 90 MG/DL
HGB BLD-MCNC: 14.4 G/DL (ref 13.3–17.7)
IRON SATN MFR SERPL: 14 % (ref 15–46)
IRON SERPL-MCNC: 48 UG/DL (ref 35–180)
LDLC SERPL CALC-MCNC: 94 MG/DL
MCH RBC QN AUTO: 28.9 PG (ref 26.5–33)
MCHC RBC AUTO-ENTMCNC: 32.7 G/DL (ref 31.5–36.5)
MCV RBC AUTO: 89 FL (ref 78–100)
NONHDLC SERPL-MCNC: 109 MG/DL
PLATELET # BLD AUTO: 218 10E9/L (ref 150–450)
RBC # BLD AUTO: 4.98 10E12/L (ref 4.4–5.9)
TIBC SERPL-MCNC: 335 UG/DL (ref 240–430)
TRIGL SERPL-MCNC: 75 MG/DL
WBC # BLD AUTO: 12.2 10E9/L (ref 4–11)

## 2020-09-22 PROCEDURE — 85027 COMPLETE CBC AUTOMATED: CPT | Performed by: INTERNAL MEDICINE

## 2020-09-22 PROCEDURE — 83540 ASSAY OF IRON: CPT | Performed by: INTERNAL MEDICINE

## 2020-09-22 PROCEDURE — 80061 LIPID PANEL: CPT | Performed by: PHYSICIAN ASSISTANT

## 2020-09-22 PROCEDURE — 82728 ASSAY OF FERRITIN: CPT | Performed by: INTERNAL MEDICINE

## 2020-09-22 PROCEDURE — 99213 OFFICE O/P EST LOW 20 MIN: CPT | Mod: 25 | Performed by: DERMATOLOGY

## 2020-09-22 PROCEDURE — 17000 DESTRUCT PREMALG LESION: CPT | Performed by: DERMATOLOGY

## 2020-09-22 PROCEDURE — 36415 COLL VENOUS BLD VENIPUNCTURE: CPT | Performed by: INTERNAL MEDICINE

## 2020-09-22 PROCEDURE — 83550 IRON BINDING TEST: CPT | Performed by: INTERNAL MEDICINE

## 2020-09-22 RX ORDER — FLUOROURACIL 50 MG/G
CREAM TOPICAL 2 TIMES DAILY
Qty: 40 G | Refills: 0 | Status: CANCELLED | OUTPATIENT
Start: 2020-09-22

## 2020-09-22 ASSESSMENT — PAIN SCALES - GENERAL: PAINLEVEL: NO PAIN (0)

## 2020-09-22 NOTE — PATIENT INSTRUCTIONS
Efudex Treatment    Today, you are being prescribed Fluorouracil (Efudex) a topical cream used for the treatment of Actinic Keratosis (AK's).  The medication is working to eliminate the unhealthy cells. Even though this treatment may be unattractive and somewhat uncomfortable.    Your treatment will last twice daily for 2-4 weeks or until the onset of irritation on the forehead and scalp.  You may experience some mild discomfort while being treated.    You will want to stop any other creams such as glycolic acid products, retin A, Tazorac, etc. to the area. You may use bland makeup/cover-up as long as it doesn't sting or cause you discomfort.    Apply the cream at night as your physician recommends. Use a cotton-tipped applicator, or use gloves if applying it with your fingertips. If applied with unprotected fingertips, it is important to wash your hands well after you apply this medicine.     Keep this medication away from pets.    We recommend avoiding excessive sun exposure to the treated area    You may use moisturizing creams over bothersome areas such as Vanicream or Cetaphil cream if the reaction becomes too bothersome. Please, call the clinic if this occurs.   Potential Side Effects    Your treated areas may be unsightly during therapy.  This will improve slowly following the discontinuation of therapy.     During the first week of application, mild inflammation may occur.     During the following weeks, redness, and swelling may occur with some crusting and burning.     Lesions resolve as the skin exfoliates.     Over 1 to 2 weeks, new skin grows into the treatment area.    Keep this medication away from pets  Specific side effects that usually do not require medical attention (report to your doctor or health care professional if they continue or are bothersome) include: Red or dark-colored skin     Mild erosion (loss of upper layer of skin)     Mild eye irritation including burning, itching, sensitivity,  stinging, or watering     Increased sensitivity of the skin to sun and ultraviolet light     Pain and burning of the affected area     Dryness, scaling or swelling of the affected area     Skin rash, itching of the affected area     Tenderness     If you have severe pain, please, call the clinic immediately and indicate that you have pain. Ask for a call from the RN.   Who should I call with questions?     [facility]: [phone]     [facility]: [phone]     For urgent needs outside of business hours call the [facility] at [phone]  and ask for the dermatology resident on call        Cryotherapy    What is it?    Use of a very cold liquid, such as liquid nitrogen, to freeze and destroy abnormal skin cells that need to be removed    What should I expect?    Tenderness and redness    A small blister that might grow and fill with dark purple blood. There may be crusting.    More than one treatment may be needed if the lesions do not go away.    How do I care for the treated area?    Gently wash the area with your hands when bathing.    Use a thin layer of Vaseline to help with healing. You may use a Band-Aid.     The area should heal within 7-10 days and may leave behind a pink or lighter color.     Do not use an antibiotic or Neosporin ointment.     You may take acetaminophen (Tylenol) for pain.     Call your Doctor if you have:    Severe pain    Signs of infection (warmth, redness, cloudy yellow drainage, and or a bad smell)    Questions or concerns    Who should I call with questions?       [facility]: [phone]       [facility]: [phone]       For urgent needs outside of business hours call the [facility] at [phone]        and ask for the dermatology resident on call

## 2020-09-22 NOTE — PROGRESS NOTES
Gustavo Ramon's goals for this visit include:   Chief Complaint   Patient presents with     Skin Check     Hx NMSC, no family hx SC. Not immunosuppressed. Areas of concern: tip of right ear, scalp       He requests these members of his care team be copied on today's visit information: no    PCP: Winston Silverman    Referring Provider:  No referring provider defined for this encounter.    There were no vitals taken for this visit.    Do you need any medication refills at today's visit? Darcie Mercer LPN

## 2020-09-22 NOTE — PROGRESS NOTES
[facility] Dermatology Note    Dermatology Problem List:  1. 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 Christiansen's note 11/21/11), s/p excision 5/12/09   2. Actinic keratoses  - s/p Efudex 2015, will be repeating fall 2020  - s/p PDT (x3)  - s/p LN2  3. Seborrheic dermatitis  - clobetasol 0.05% solution  4. MGUS       Encounter Date: Sep 22, 2020    CC:  Chief Complaint   Patient presents with     Skin Check     Hx NMSC, no family hx SC. Not immunosuppressed. Areas of concern: tip of right ear, scalp       History of Present Illness:  Mr. Gustavo Ramon is a 80 year old male who presents for discussion of efudex field therapy.    The patient was last seen by Dr. Victor on 8/27/20 for ED&C of two BCCs and biopsy of a concerning lesion on the left nasal side wall. Biopsy on the nasal side wall did show BCC. Patient has MMS scheduled for 9/24/20.     Today, the patient reports concerns about spots on the tip of the right ear and on the right side of the scalp. He wonders if these can be frozen or if they need a biopsy. There are crusty, firm spots that are new. He often uses efudex cream as a spot treatment when he gets something new. He did not this time. He has never used efudex cream as a field treatment.     Otherwise denies any tender, nonhealing, or bleeding skin lesions.    No other concerns addressed today.    Past Medical History:   Patient Active Problem List   Diagnosis     Rosacea     DJD (degenerative joint disease)     Ocular myasthenia gravis (H)     Macular pigment  deposit     HYPERLIPIDEMIA LDL GOAL <130     IgA monoclonal gammopathy     Retinal hole OS, operculated     Horseshoe tear of retina without detachment OD     History  of basal cell carcinoma     Seborrhea     AK (actinic keratosis)     Malignant neoplasm of prostate (H)     PVD (posterior vitreous detachment)     Amaurosis fugax by Hx neg carotid W/U     Advanced directives, counseling/discussion     Actinic keratosis     Peripheral neuropathy     Nuclear sclerosis of both eyes     Essential hypertension with goal blood pressure less than 140/90     Gastroesophageal reflux disease without esophagitis     Past Medical History:   Diagnosis Date     Actinic keratosis      AK (actinic keratosis) 11/15/2012     Amaurosis fugax      Basal cell carcinoma 2009    R medial cheek/nose     Central serous retinopathy left    Eye     Diverticulosis 08/2006     DJD (degenerative joint disease)      GERD (gastroesophageal reflux disease)      Hearing loss     High frequency     History of nonmelanoma skin cancer      History of nonmelanoma skin cancer      HTN (hypertension)      Hyperlipidemia LDL goal < 130      Hyperplastic colon polyp 08/2006     IgA monoclonal gammopathy     IgA kappa     Lattice degeneration of retina right    Eye     Nonsenile cataract      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    [facility] consult     Rosacea      Vitreous detachment left    Eye     Past Surgical History:   Procedure Laterality Date     ARTHROSCOPY KNEE RT/LT Left Jan 2010    Knee     COLONOSCOPY WITH CO2 INSUFFLATION N/A 9/24/2019    Procedure: COLONOSCOPY, WITH CO2 INSUFFLATION;  Surgeon: Loi Levine MD;  Location: [facility] OR     COMBINED ESOPHAGOSCOPY, GASTROSCOPY, DUODENOSCOPY (EGD) WITH CO2 INSUFFLATION N/A 9/24/2019    Procedure: ESOPHAGOGASTRODUODENOSCOPY, WITH CO2 INSUFFLATION;  Surgeon: Loi Levine MD;  Location: [facility] OR     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     DISKECTOMY, LUMBAR, SINGLE SP  2003    L2-3     ENT  SURGERY  1943    Tonsils      ENT SURGERY  2-22-12    Biopsy lesion right pinna     ENT SURGERY  2-24-12    Biopsy leson right pinna     ESOPHAGOSCOPY, GASTROSCOPY, DUODENOSCOPY (EGD), COMBINED N/A 9/24/2019    Procedure: Esophagogastroduodenoscopy, With Biopsy;  Surgeon: Loi Levine MD;  Location: [facility] OR     SOFT TISSUE SURGERY  May 2010    Basal Cell/right side nose       Social History:  Patient reports that he has never smoked. He has never used smokeless tobacco. He reports that he does not drink alcohol or use drugs. The patient is retired from working as an . He has a daughter and son  The patient denies use of tanning beds. He is [place].  Kept in chart for convenience.     Family History:  There is no family history of skin cancer.  Kept in chart for convenience.     Medications:  Current Outpatient Medications   Medication Sig Dispense Refill     aspirin 325 MG tablet Take 325 mg by mouth daily       cyanocobalamin (VITAMIN B-12) 1000 MCG tablet Take 1 tablet (1,000 mcg) by mouth daily 90 tablet 1     ferrous sulfate (FEROSUL) 325 (65 Fe) MG tablet Use 1 tab every other day with orange juice 60 tablet 1     hydrochlorothiazide (HYDRODIURIL) 25 MG tablet TAKE ONE-HALF (1/2) TABLET DAILY 45 tablet 3     lisinopril (ZESTRIL) 5 MG tablet TAKE 1 TABLET DAILY 30 tablet 0     Multiple Vitamins-Minerals (PRESERVISION AREDS 2) CAPS Take 2 soft gel caps daily 90 capsule 1     omeprazole (PRILOSEC) 20 MG DR capsule TAKE 1 CAPSULE DAILY ( CONCOMITANT TO PREDNISONE USE ) 90 capsule 0     predniSONE (DELTASONE) 20 MG tablet Take 1.5 tablets (30 mg) by mouth daily 45 tablet 1     simvastatin (ZOCOR) 20 MG tablet TAKE 1 TABLET AT BEDTIME (DUE FOR APPOINTMENT FOR FURTHER REFILLS) 90 tablet 1     sulfamethoxazole-trimethoprim (BACTRIM DS) 800-160 MG tablet TAKE 1 TABLET THREE TIMES A WEEK 12 tablet 12     triamcinolone (KENALOG) 0.1 % external cream Apply twice daily for 2 weeks to rash on left upper arm  45 g 0     vitamin D3 (CHOLECALCIFEROL) 2000 units tablet Take 1 tablet by mouth daily 90 tablet 1     predniSONE (DELTASONE) 5 MG tablet Take 3 tablets daily for 1 week, then 4 tablets daily. 120 tablet 1     predniSONE (DELTASONE) 5 MG tablet Take 2 tablets (10 mg) by mouth daily 60 tablet 3     pyridostigmine (MESTINON) 60 MG tablet Take 2 tablets (120 mg) by mouth 4 times daily 720 tablet 3       Allergies   Allergen Reactions     Nkda [No Known Drug Allergies]        Review of Systems:  -Constitutional: Patient is otherwise feeling well, in usual state of health.   -Skin: As above in HPI. No additional skin concerns.    Physical exam:  Vitals: There were no vitals taken for this visit.  GEN: This is a well developed, well-nourished male in no acute distress, in a pleasant mood.    SKIN: Focused examination of the face, neck, upper back, and scalp was performed.  - Scattered brown macules on sun exposed areas.  - There are waxy stuck on tan to brown papules on the right parietal scalp.  - There is an isolated erythematous macule with overyling adherent scale on the vertex scalp.  - There are thin, erythematous macules with overyling adherent scale on the forehead, temples, and preauricular cheeks bilaterally.   - 2 well healed ulcerations from recent ED&Cs, 1 left upper back, 1 upper arm  - No other lesions of concern on areas examined.       Impression/Plan:    1. Actinic keratosis, one localized on the vertex scalp  - Cryotherapy procedure note: After verbal consent and discussion of risks and benefits including but no limited to dyspigmentation/scar, blister, and pain, 1 was(were) treated with 1-2mm freeze border for 2 cycles with liquid nitrogen. Post cryotherapy instructions were provided.    2. Thin but diffuse actinic keratosis on forehead, temples, and preauricular cheeks  - Plan to treat with Efudex 5% cream BID x 2-4 weeks until onset of irritation. Patient has previous prescription. Instructions and  handout provided. Will have patient wait until Mohs site is healed to start.    3. History of NMSC  - Recommend sunscreens SPF #30 or greater, protective clothing and avoidance of tanning beds.  - Discussed that skin cancer risk increases with cumulative use of hydrochlorothiazide. Patient will discuss switching with his PCP.  - Discussed use of nicotinamide to reduce AKs and NMSC. Patient does not wish to add on another pill at this time.   - Mohs scheduled 9/24/20 for BCC on left nasal side wall.    Follow-up in 2 days for MMS with Dr. Victor, claudia for new or changing lesions.     Staff Involved:  Staff only    Kylee Estevez MD    Department of Dermatology  [facility]: Phone: [phone], Fax:[phone]  [facility]: Phone: [phone], Fax: [phone]

## 2020-09-22 NOTE — LETTER
9/22/2020         RE: Gustavo Ramon  [address] [facility] [address]        Dear Colleague,    Thank you for referring your patient, Gustavo Ramon, to the [facility]. Please see a copy of my visit note below.    [facility] Dermatology Note    Dermatology Problem List:  1. 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 Christiansen's note 11/21/11), s/p excision 5/12/09   2. Actinic keratoses  - s/p Efudex 2015, will be repeating fall 2020  - s/p PDT (x3)  - s/p LN2  3. Seborrheic dermatitis  - clobetasol 0.05% solution  4. MGUS       Encounter Date: Sep 22, 2020    CC:  Chief Complaint   Patient presents with     Skin Check     Hx NMSC, no family hx SC. Not immunosuppressed. Areas of concern: tip of right ear, scalp       History of Present Illness:  Mr. Gustavo Ramon is a 80 year old male who presents for discussion of efudex field therapy.    The patient was last seen by Dr. Victor on 8/27/20 for ED&C of two BCCs and biopsy of a concerning lesion on the left nasal side wall. Biopsy on the nasal side wall did show BCC. Patient has MMS scheduled for 9/24/20.     Today, the patient reports concerns about spots on the tip of the right ear and on the right side of the scalp. He wonders if these can be frozen or if they need a biopsy. There are crusty, firm spots that are new. He often uses efudex cream as a spot treatment when he gets something new. He did not this time. He has never used efudex cream as a field treatment.     Otherwise denies  any tender, nonhealing, or bleeding skin lesions.    No other concerns addressed today.    Past Medical History:   Patient Active Problem List   Diagnosis     Rosacea     DJD (degenerative joint disease)     Ocular myasthenia gravis (H)     Macular pigment deposit     HYPERLIPIDEMIA LDL GOAL <130     IgA monoclonal gammopathy     Retinal hole OS, operculated     Horseshoe tear of retina without detachment OD     History  of basal cell carcinoma     Seborrhea     AK (actinic keratosis)     Malignant neoplasm of prostate (H)     PVD (posterior vitreous detachment)     Amaurosis fugax by Hx neg carotid W/U     Advanced directives, counseling/discussion     Actinic keratosis     Peripheral neuropathy     Nuclear sclerosis of both eyes     Essential hypertension with goal blood pressure less than 140/90     Gastroesophageal reflux disease without esophagitis     Past Medical History:   Diagnosis Date     Actinic keratosis      AK (actinic keratosis) 11/15/2012     Amaurosis fugax      Basal cell carcinoma 2009    R medial cheek/nose     Central serous retinopathy left    Eye     Diverticulosis 08/2006     DJD (degenerative joint disease)      GERD (gastroesophageal reflux disease)      Hearing loss     High frequency     History of nonmelanoma skin cancer      History of nonmelanoma skin cancer      HTN (hypertension)      Hyperlipidemia LDL goal < 130      Hyperplastic colon polyp 08/2006     IgA monoclonal gammopathy     IgA kappa     Lattice degeneration of retina right    Eye     Nonsenile cataract      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    [facility] consult     Rosacea      Vitreous detachment left    Eye     Past Surgical History:   Procedure Laterality Date     ARTHROSCOPY KNEE RT/LT Left Jan 2010    Knee     COLONOSCOPY WITH CO2 INSUFFLATION N/A 9/24/2019    Procedure: COLONOSCOPY, WITH CO2 INSUFFLATION;  Surgeon: Loi Levine MD;  Location: [facility] OR     COMBINED ESOPHAGOSCOPY, GASTROSCOPY, DUODENOSCOPY (EGD)  WITH CO2 INSUFFLATION N/A 9/24/2019    Procedure: ESOPHAGOGASTRODUODENOSCOPY, WITH CO2 INSUFFLATION;  Surgeon: Loi Levine MD;  Location: [facility] OR     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     DISKECTOMY, LUMBAR, SINGLE SP  2003    L2-3     ENT SURGERY  1943    Tonsils      ENT SURGERY  2-22-12    Biopsy lesion right pinna     ENT SURGERY  2-24-12    Biopsy leson right pinna     ESOPHAGOSCOPY, GASTROSCOPY, DUODENOSCOPY (EGD), COMBINED N/A 9/24/2019    Procedure: Esophagogastroduodenoscopy, With Biopsy;  Surgeon: Loi Levine MD;  Location: [facility] OR     SOFT TISSUE SURGERY  May 2010    Basal Cell/right side nose       Social History:  Patient reports that he has never smoked. He has never used smokeless tobacco. He reports that he does not drink alcohol or use drugs. The patient is retired from working as an . He has a daughter and son  The patient denies use of tanning beds. He is Fijian.  Kept in chart for convenience.     Family History:  There is no family history of skin cancer.  Kept in chart for convenience.     Medications:  Current Outpatient Medications   Medication Sig Dispense Refill     aspirin 325 MG tablet Take 325 mg by mouth daily       cyanocobalamin (VITAMIN B-12) 1000 MCG tablet Take 1 tablet (1,000 mcg) by mouth daily 90 tablet 1     ferrous sulfate (FEROSUL) 325 (65 Fe) MG tablet Use 1 tab every other day with orange juice 60 tablet 1     hydrochlorothiazide (HYDRODIURIL) 25 MG tablet TAKE ONE-HALF (1/2) TABLET DAILY 45 tablet 3     lisinopril (ZESTRIL) 5 MG tablet TAKE 1 TABLET DAILY 30 tablet 0     Multiple Vitamins-Minerals (PRESERVISION AREDS 2) CAPS Take 2 soft gel caps daily 90 capsule 1     omeprazole (PRILOSEC) 20 MG DR capsule TAKE 1 CAPSULE DAILY ( CONCOMITANT TO PREDNISONE USE ) 90 capsule 0     predniSONE (DELTASONE) 20 MG tablet Take 1.5 tablets (30 mg) by mouth daily 45 tablet 1     simvastatin (ZOCOR) 20 MG tablet TAKE 1 TABLET AT BEDTIME (DUE FOR APPOINTMENT  FOR FURTHER REFILLS) 90 tablet 1     sulfamethoxazole-trimethoprim (BACTRIM DS) 800-160 MG tablet TAKE 1 TABLET THREE TIMES A WEEK 12 tablet 12     triamcinolone (KENALOG) 0.1 % external cream Apply twice daily for 2 weeks to rash on left upper arm 45 g 0     vitamin D3 (CHOLECALCIFEROL) 2000 units tablet Take 1 tablet by mouth daily 90 tablet 1     predniSONE (DELTASONE) 5 MG tablet Take 3 tablets daily for 1 week, then 4 tablets daily. 120 tablet 1     predniSONE (DELTASONE) 5 MG tablet Take 2 tablets (10 mg) by mouth daily 60 tablet 3     pyridostigmine (MESTINON) 60 MG tablet Take 2 tablets (120 mg) by mouth 4 times daily 720 tablet 3       Allergies   Allergen Reactions     Nkda [No Known Drug Allergies]        Review of Systems:  -Constitutional: Patient is otherwise feeling well, in usual state of health.   -Skin: As above in HPI. No additional skin concerns.    Physical exam:  Vitals: There were no vitals taken for this visit.  GEN: This is a well developed, well-nourished male in no acute distress, in a pleasant mood.    SKIN: Focused examination of the face, neck, upper back, and scalp was performed.  - Scattered brown macules on sun exposed areas.  - There are waxy stuck on tan to brown papules on the right parietal scalp.  - There is an isolated erythematous macule with overyling adherent scale on the vertex scalp.  - There are thin, erythematous macules with overyling adherent scale on the forehead, temples, and preauricular cheeks bilaterally.   - 2 well healed ulcerations from recent ED&Cs, 1 left upper back, 1 upper arm  - No other lesions of concern on areas examined.       Impression/Plan:    1. Actinic keratosis, one localized on the vertex scalp  - Cryotherapy procedure note: After verbal consent and discussion of risks and benefits including but no limited to dyspigmentation/scar, blister, and pain, 1 was(were) treated with 1-2mm freeze border for 2 cycles with liquid nitrogen. Post cryotherapy  instructions were provided.    2. Thin but diffuse actinic keratosis on forehead, temples, and preauricular cheeks  - Plan to treat with Efudex 5% cream BID x 2-4 weeks until onset of irritation. Patient has previous prescription. Instructions and handout provided. Will have patient wait until Mohs site is healed to start.    3. History of NMSC  - Recommend sunscreens SPF #30 or greater, protective clothing and avoidance of tanning beds.  - Discussed that skin cancer risk increases with cumulative use of hydrochlorothiazide. Patient will discuss switching with his PCP.  - Discussed use of nicotinamide to reduce AKs and NMSC. Patient does not wish to add on another pill at this time.   - Mohs scheduled 9/24/20 for BCC on left nasal side wall.    Follow-up in 2 days for MMS with claudia Dwyer for new or changing lesions.     Staff Involved:  Staff only    Kylee Estevez MD    Department of Dermatology  [facility]: Phone: [phone], Fax:[phone]  [facility]: Phone: [phone], Fax: [phone]            Gustavo Ramon's goals for this visit include:   Chief Complaint   Patient presents with     Skin Check     Hx NMSC, no family hx SC. Not immunosuppressed. Areas of concern: tip of right ear, scalp       He requests these members of his care team be copied on today's visit information: no    PCP: Winston Silverman    Referring Provider:  No referring provider defined for this encounter.    There were no vitals taken for this visit.    Do you need any medication refills at today's visit? No  Vibha Mercer LPN        Again, thank you for allowing me to participate in the care of your patient.        Sincerely,        Kylee Estevez MD

## 2020-09-24 ENCOUNTER — OFFICE VISIT (OUTPATIENT)
Dept: DERMATOLOGY | Facility: CLINIC | Age: 80
End: 2020-09-24
Payer: MEDICARE

## 2020-09-24 VITALS — HEART RATE: 101 BPM | SYSTOLIC BLOOD PRESSURE: 145 MMHG | DIASTOLIC BLOOD PRESSURE: 78 MMHG

## 2020-09-24 DIAGNOSIS — C44.311 BASAL CELL CARCINOMA (BCC) OF LEFT NASAL SIDEWALL: Primary | ICD-10-CM

## 2020-09-24 DIAGNOSIS — G70.00 OCULAR MYASTHENIA (H): ICD-10-CM

## 2020-09-24 PROCEDURE — 17312 MOHS ADDL STAGE: CPT | Mod: GC | Performed by: DERMATOLOGY

## 2020-09-24 PROCEDURE — 17311 MOHS 1 STAGE H/N/HF/G: CPT | Mod: GC | Performed by: DERMATOLOGY

## 2020-09-24 PROCEDURE — 14061 TIS TRNFR E/N/E/L10.1-30SQCM: CPT | Mod: GC | Performed by: DERMATOLOGY

## 2020-09-24 ASSESSMENT — PAIN SCALES - GENERAL: PAINLEVEL: NO PAIN (0)

## 2020-09-24 NOTE — PROGRESS NOTES
[facility] Mohs Dermatologic Surgery Procedure Note    Date of Service:  Sep 24, 2020  Surgery: Mohs micrographic surgery    Case 1  Repair Type: Local flap - Advancement  Repair Size: 6.5 x 3.0 cm  Suture Material: 4-0 Monocryl, 5-0 Monocryl, Fast Absorbing Gut 5-0  Tumor Type: BCC - Basal cell carcinoma  Location: Left nasal sidewall  Derm-Path Accession #: [id number]  PreOp Size: 1.1 x 1.0 cm  PostOp Size: 2.1 x 2.0 cm  Mohs Accession #: [id number]  Level of Defect: fat      Procedure:  We discussed the principles of treatment and most likely complications including scarring, bleeding, infection, swelling, pain, crusting, nerve damage, large wound,  incomplete excision, wound dehiscence,  nerve damage, recurrence, and a second procedure may be recommended to obtain the best cosmetic or functional result.    Informed consent was obtained and the patient underwent the procedure as follows:  The patient was placed supine on the operating table.  The cancer was identified, outlined with a marker, and verified by the patient.  The entire surgical field was prepped with Hibiclens.  The surgical site was anesthetized using Lidocaine 1% with epi 1:100,000.      The area of clinically apparent tumor was debulked. The layer of tissue was then surgically excised using a #15 blade and was then transferred onto a specimen sheet maintaining the orientation of the specimen. Hemostasis was obtained using heat cautery. The wound site was then covered with a dressing while the tissue samples were processed for examination.    The excised tissue was transported to the Mohs histology laboratory maintaining the tissue orientation.  The tissue specimen was relaxed so that the entire surgical margin was in a a single horizontal plane for sectioning and inked for precise mapping.  A precise reference map was drawn to reflect the sectioning of the specimen, colored inking of the margins, and orientation on the patient.   The tissue was processed using horizontal sectioning of the base and continuous peripheral margins.  The histopathologic sections were reviewed in conjunction with the reference map.    Total blocks: 1    Total slides:  3    Residual tumor was identified and indicated in red on the reference map, identifying the location where further tissue excision was necessary. Therefore, an additional stage of Mohs Micrographic surgery was deemed necessary.     Stage II   The patient was returned to the operating room, and the area prepped in the usual manner. The residual tumor was excised using the reference map as a guide. The specimen was transfered to a labeled specimen sheet maintaining the orientation of the specimen. Hemostasis was obtained and the wound site was covered with a dressing while the tissue was processed for examination.     The excised tissue was transported to the Mohs histology laboratory maintaining orientation. The specimen margins were inked for precise mapping and a reference map was prepared for the is additional stage to maintain precise orientation as described above. The tissue was processed using horizontal sectioning of the base and continuous peripheral margins. The histopathologic sections were reviewed in conjunction with the reference map.     Total blocks: 1    Total slides:  1    There were no cancer cells visualized on examination, therefore Mohs surgery was complete.     Reconstruction:  Advancement Flap    PROCEDURE:    The wound was debeveled and undermined broadly in all directions to the level of fascia/periosteum medially and fat laterally. Hemostasis was obtained using electrocoagulation.  The advancement flap was incised to the level of fat removing a cone of redundant tissue from the superior aspect of the defect (left upper nasal side wall / left medial canthus). The flap was further undermined in all directions.    Hemostasis was again obtained.  The flap was then advanced  into the defect and secured using buried 4-0 Monocryl dermal sutures including two tacking sutures to periosteum.  Redundant areas of tissue were excised using the triangulation technique.  The flap wound edges of both the primary and secondary defects were then approximated with buried 4-0 Monocryl and 5-0 Monocryl dermal sutures and the epidermis was then carefully approximated using 5-0 Fast absorbing sutures.  The wound was cleansed with saline, and ointment was applied along the wound surface.  A sterile pressure dressing of non-adherent gauze was applied and wound care instructions were given verbally and in writing.  The patient will return for virtual visit in 14 days.  The patient left the operating suite in stable condition.  Anticipate Dermabrasion to be used as a second stage of this reconstruction.     Repair Size:  6.5 x 3.0 = 19.5cm2  Sutures Used:  Monocryl, Fast Absorbing Gut      Staff Physician Comments:   I saw and evaluated the patient with the Mohs Surgery Fellow (Dr. Ilia Alford) and I agree with the assessment and plan and the above description of the procedure. I was present for the key portions of the procedure and entire exam.    Shashank Victor DO    Department of Dermatology  [facility]: Phone: [phone], Fax:[phone]  [facility]: Phone: [phone], Fax: [phone]    Care and Laboratory Testing Performed at:  [facility]   Dermatology Clinic  [address] [place] [address]

## 2020-09-24 NOTE — NURSING NOTE
Gustavo Ramon's goals for this visit include:   Chief Complaint   Patient presents with     Derm Problem     Stephen is here today for MOHS procedure on left nasal sidewall due to BCC       He requests these members of his care team be copied on today's visit information:     PCP: Winston Silverman    Referring Provider:  No referring provider defined for this encounter.    BP (!) 145/78   Pulse 101     Do you need any medication refills at today's visit? No    Shaylee Livingston LPN

## 2020-09-24 NOTE — LETTER
9/24/2020         RE: Gustavo Ramon  [address] [facility] [address]        Dear Colleague,    Thank you for referring your patient, Gustavo Ramon, to the [facility]. Please see a copy of my visit note below.    [facility] Mohs Dermatologic Surgery Procedure Note    Date of Service:  Sep 24, 2020  Surgery: Mohs micrographic surgery    Case 1  Repair Type: Local flap - Advancement  Repair Size: 6.5 x 3.0 cm  Suture Material: 4-0 Monocryl, 5-0 Monocryl, Fast Absorbing Gut 5-0  Tumor Type: BCC - Basal cell carcinoma  Location: Left nasal sidewall  Derm-Path Accession #: [id number]  PreOp Size: 1.1 x 1.0 cm  PostOp Size: 2.1 x 2.0 cm  Mohs Accession #: [id number]  Level of Defect: fat      Procedure:  We discussed the principles of treatment and most likely complications including scarring, bleeding, infection, swelling, pain, crusting, nerve damage, large wound,  incomplete excision, wound dehiscence,  nerve damage, recurrence, and a second procedure may be recommended to obtain the best cosmetic or functional result.    Informed consent was obtained and the patient underwent the procedure as follows:  The patient was placed supine on the operating table.  The cancer was identified, outlined with a marker, and verified by the patient.  The entire surgical field was prepped with Hibiclens.  The surgical site was anesthetized using Lidocaine 1% with epi 1:100,000.      The area of clinically apparent tumor was debulked. The layer of tissue was then surgically excised using a #15 blade and was then transferred onto a specimen sheet maintaining the orientation of the specimen. Hemostasis was obtained using heat cautery. The wound site was then covered with a dressing while the tissue samples were processed for examination.    The excised tissue was transported to the Mohs histology laboratory maintaining the tissue orientation.  The tissue specimen  was relaxed so that the entire surgical margin was in a a single horizontal plane for sectioning and inked for precise mapping.  A precise reference map was drawn to reflect the sectioning of the specimen, colored inking of the margins, and orientation on the patient.  The tissue was processed using horizontal sectioning of the base and continuous peripheral margins.  The histopathologic sections were reviewed in conjunction with the reference map.    Total blocks: 1    Total slides:  3    Residual tumor was identified and indicated in red on the reference map, identifying the location where further tissue excision was necessary. Therefore, an additional stage of Mohs Micrographic surgery was deemed necessary.     Stage II   The patient was returned to the operating room, and the area prepped in the usual manner. The residual tumor was excised using the reference map as a guide. The specimen was transfered to a labeled specimen sheet maintaining the orientation of the specimen. Hemostasis was obtained and the wound site was covered with a dressing while the tissue was processed for examination.     The excised tissue was transported to the Mohs histology laboratory maintaining orientation. The specimen margins were inked for precise mapping and a reference map was prepared for the is additional stage to maintain precise orientation as described above. The tissue was processed using horizontal sectioning of the base and continuous peripheral margins. The histopathologic sections were reviewed in conjunction with the reference map.     Total blocks: 1    Total slides:  1    There were no cancer cells visualized on examination, therefore Mohs surgery was complete.     Reconstruction:  Advancement Flap    PROCEDURE:    The wound was debeveled and undermined broadly in all directions to the level of fascia/periosteum medially and fat laterally. Hemostasis was obtained using electrocoagulation.  The advancement flap was  incised to the level of fat removing a cone of redundant tissue from the superior aspect of the defect (left upper nasal side wall / left medial canthus). The flap was further undermined in all directions.    Hemostasis was again obtained.  The flap was then advanced into the defect and secured using buried 4-0 Monocryl dermal sutures including two tacking sutures to periosteum.  Redundant areas of tissue were excised using the triangulation technique.  The flap wound edges of both the primary and secondary defects were then approximated with buried 4-0 Monocryl and 5-0 Monocryl dermal sutures and the epidermis was then carefully approximated using 5-0 Fast absorbing sutures.  The wound was cleansed with saline, and ointment was applied along the wound surface.  A sterile pressure dressing of non-adherent gauze was applied and wound care instructions were given verbally and in writing.  The patient will return for virtual visit in 14 days.  The patient left the operating suite in stable condition.  Anticipate Dermabrasion to be used as a second stage of this reconstruction.     Repair Size:  6.5 x 3.0 = 19.5cm2  Sutures Used:  Monocryl, Fast Absorbing Gut      Staff Physician Comments:   I saw and evaluated the patient with the Mohs Surgery Fellow (Dr. Ilia Alford) and I agree with the assessment and plan and the above description of the procedure. I was present for the key portions of the procedure and entire exam.    Shashank Victor DO    Department of Dermatology  [facility]: Phone: [phone], Fax:[phone]  [facility]: Phone: [phone], Fax: [phone]    Care and Laboratory Testing Performed at:  [facility]   Dermatology Clinic  [address] [place] [address]      Again, thank you for allowing me to participate in the care of your patient.         Sincerely,        Shashank Victor MD

## 2020-09-24 NOTE — NURSING NOTE
The following medication was given:     MEDICATION:  Lidocaine with epinephrine 1% 1:131080  ROUTE: SQ  SITE: see procedure note  DOSE: 18cc  LOT #: -EV  : Hospira  EXPIRATION DATE: 02/2021  NDC#: 5957-0327-47   Was there drug waste? 0cc  Multi-dose vial: Yes    Shaylee Livingston LPN  September 24, 2020    Vaseline and pressure dressing applied to Mohs site on left nasal dorsum.  Wound care instructions reviewed with patient and AVS provided.  Patient verbalized understanding. No further questions or concerns at this time.    Shaylee Livingston LPN

## 2020-09-24 NOTE — PATIENT INSTRUCTIONS
MOHS Wound Care Instructions  I will experience scar, altered skin color, bleeding, swelling, pain, crusting and redness. I may experience altered sensation. Risks are excessive bleeding, infection, muscle weakness, thick (hypertrophic or keloidal) scar, and recurrence,. A second procedure may be recommended to obtain the best cosmetic or functional result.  Possible complications of any surgical procedure are bleeding, infection, scarring, alteration in skin color and sensation, muscle weakness in the area, wound dehiscence or seperation, or recurrence of the lesion or disease. On occasion, after healing, a secondary procedure or revision may be recommended in order to obtain the best cosmetic or functional result.   After your surgery, a pressure bandage will be placed over the area that has sutures. This will help prevent bleeding. Please follow these instructions as they will help you to prevent complications as your wound heals.  For the First 48 hours After Surgery:  1. Leave the pressure bandage on and keep it dry. If it should come loose, you may retape it, but do not take it off.  2. Relax and take it easy. Do not do any vigorous exercise, heavy lifting, or bending forward. This could cause the wound to bleed.  3. Post-operative pain is usually mild. You may take plain or extra strength Tylenol every 4 hours as needed (do not take more than 4,000mg in one day). Do not take any medicine that contains aspirin, ibuprofen or motrin unless you have been recommended these by a doctor.  Avoid alcohol and vitamin E as these may increase your tendency to bleed.  4. You may put an ice pack around the bandaged area for 20 minutes every 2-3 hours. This may help reduce swelling, bruising, and pain. Make sure the ice pack is waterproof so that the pressure bandage does not get wet.   5. You may see a small amount of drainage or blood on your pressure bandage. This is normal. However, if drainage or bleeding continues or  saturates the bandage, you will need to apply firm pressure over the bandage with a washcloth for 15 minutes. If bleeding continues after applying pressure for 15 minutes then go to the nearest emergency room.  48 Hours After Surgery  Carefully remove the bandage and start daily wound care and dressing changes. You may also now shower and get the wound wet. Wash wound with a mild soap and water.  Use caution when washing the wound. Be gentle and do not let the forceful shower stream hit the wound directly.  PAT dry.  Daily Wound Care:  1. Wash wound with a mild soap and water.  Use caution when washing the wound, be gentle and do not let the forceful shower stream hit the wound directly.  2. PAT DRY.  3. Apply Vaseline (from a new container or tube) over the suture line with a Q-tip. It is very important to keep the wound continuously moist, as wounds heal best in a moist environment.  4.  Keep the site covered until sutures are dissolved, you can cover it with a Telfa (non-stick) dressing and tape or a band-aid.    5. If you are unable to keep wound covered, you must apply Vaseline every 2 - 3 hours (while awake) to ensure it is being kept moist for optimal healing. A dressing overnight is recommended to keep the area moist.   Call Us If:  1. You have pain that is not controlled with Tylenol.  2. You have signs or symptoms of an infection, such as: fever over 100 degrees F, redness, warmth, or foul-smelling or yellow/creamy drainage from the wound.  Who should I call with questions?    [facility]: [phone]     [facility]: [phone]    For urgent needs outside of business hours call the [facility] at [phone] and ask for the dermatology resident on call

## 2020-09-25 RX ORDER — PREDNISONE 20 MG/1
TABLET ORAL
Qty: 45 TABLET | Refills: 1 | Status: SHIPPED | OUTPATIENT
Start: 2020-09-25 | End: 2020-11-28

## 2020-09-25 NOTE — TELEPHONE ENCOUNTER
Patient needs follow up appointment (video/telephone ok). I will give him only 30 day prescription with 1 refill. Thank you

## 2020-10-01 DIAGNOSIS — G70.00 OCULAR MYASTHENIA GRAVIS (H): ICD-10-CM

## 2020-10-02 NOTE — TELEPHONE ENCOUNTER
Routing refill request to provider for review/approval because:  Drug not on the [facility] refill protocol       Jelena MILLERN, RN, CPN

## 2020-10-04 RX ORDER — PREDNISONE 5 MG/1
TABLET ORAL
Qty: 60 TABLET | Refills: 11 | Status: SHIPPED | OUTPATIENT
Start: 2020-10-04 | End: 2020-12-14

## 2020-10-07 ENCOUNTER — VIRTUAL VISIT (OUTPATIENT)
Dept: NEUROLOGY | Facility: CLINIC | Age: 80
End: 2020-10-07
Payer: MEDICARE

## 2020-10-07 DIAGNOSIS — E09.9 STEROID-INDUCED DIABETES MELLITUS, INITIAL ENCOUNTER (H): Primary | ICD-10-CM

## 2020-10-07 DIAGNOSIS — Z79.52 CURRENT CHRONIC USE OF SYSTEMIC STEROIDS: ICD-10-CM

## 2020-10-07 DIAGNOSIS — T38.0X5A STEROID-INDUCED DIABETES MELLITUS, INITIAL ENCOUNTER (H): Primary | ICD-10-CM

## 2020-10-07 DIAGNOSIS — G70.00 MG, OCULAR (MYASTHENIA GRAVIS) (H): ICD-10-CM

## 2020-10-07 PROCEDURE — 99214 OFFICE O/P EST MOD 30 MIN: CPT | Mod: 95 | Performed by: PSYCHIATRY & NEUROLOGY

## 2020-10-07 RX ORDER — FLUOROURACIL 50 MG/G
1 CREAM TOPICAL PRN
COMMUNITY
Start: 2019-12-04 | End: 2021-05-19

## 2020-10-07 NOTE — PATIENT INSTRUCTIONS
Reduce your prednisone dose to 25 mg daily for 2 weeks, then 20 mg daily  Please contact me in about a month with update  Continue same dose of mestinon    I will contact our Eye doctors. There is a newly approved eye drop drug (oxymetazoline) which corrects droopy eyes (ptosis). I don't know if you are an appropriate candidate for the drug. Let me run it by our Eye doctors first before they see you.    You should get lab tests for diabetes and electrolytes with your next blood draw. I ordered them. I am also going to ask Winston Silverman to order a bone density scan to look for osteoporosis.     Follow up in 3 months by phone call

## 2020-10-07 NOTE — PROGRESS NOTES
"Service Date: 10/07/2020      2020        Winston Silverman PA-C   [facility] [place]    [address] [place] [address]      RE: Gustavo Ramon   MRN: [id number]    : 1940        Dear Mr. Silverman:        I had the pleasure to evaluate Stephen via billable video visit today.  An in-person visit was not recommended due to COVID-19.  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    I could not clarify his acetylcholine receptor antibody status based on the available medical record.  His major symptoms are ptosis and diplopia.  He continues without any symptoms suggestive of generalized disease for over a decade now.  He has no dysarthria, dysphagia, difficulty chewing, head drop, respiratory difficulties, weakness of arms or legs, etc.  He does have sialorrhea, but this is probably a side effect of pyridostigmine.      I first evaluated him in 2020 when he complained of significant double vision.  He was taking 10 mg daily of prednisone at that time.  I increased the prednisone gradually to 20 mg daily, but this was not enough to get his symptoms into remission so I further increased him to 30 mg slowly.  He has been on that dose of 30 mg more than 2 months now.  He feels much better and the diplopia is practically gone.  The ptosis is also much better although there are times still that he struggles to keep his left eye open and it feels \"tight\".  He also notes some fluttering with twitching of his eyelid muscles and less commonly the rest of the body, which is most likely pyridostigmine side effect along with cramping and sialorrhea.  I reduced his pyridostigmine dose to 60 mg b.i.d. (he used to take it 4 times a day and those symptoms improved).  He is tolerating the prednisone well.  He is taking vitamin D and calcium, but he has not had a bone density scan or any diabetes or electrolytes test recently.  He has a " monoclonal gammopathy of unknown significance, followed by Hematology.      Medications were reviewed and are as per Epic record.      On a focused examination, Mr. Ramon was in good spirits today.  He has no ptosis of the right eyelid at rest.  There is mild eyelid ptosis on the left at rest, which definitely gets aggravated after about 15-20 seconds of sustained upgaze with partial coverage of the pupil by the eyelid.  There is no obvious weakness of orbicularis oculi.  With sustained upward gaze he does develop diplopia with images diagonally , suggesting a vertical component.  He also may get mild diplopia after sustained horizontal gaze to the right for about 30 seconds.  Gaze to the left does not lead to double vision.  Ductions and versions are normal.  He has no weakness of lip seal, cheek puff, uvula, jaw, palate or tongue.  No dysphonia or dysarthria.  His neck flexion and extension range of motion are full.  His strength of the arm and leg looks normal.      In summary, Mr. Ramon has ocular myasthenia.  His symptoms are much better controlled now on 30 mg of prednisone daily, although I do not think they are fully controlled because he still has mild fatigable ptosis even an hour and a half after his last Mestinon dose.    I told him that at this point I want to be cautious with his immunosuppression.  In general, in elderly people with relatively mild ocular myasthenia and partial control of their symptoms on prednisone, I do not want to push the prednisone dose  too high and I ideally would like to avoid adding a nonsteroidal immunosuppressant drug especially amid the COVID-19 pandemic.  I will try to slowly taper the prednisone to 25 mg daily for the next 2 weeks, then 20 mg daily.  The patient should call me or send me a BERD message in a month.  If he remains stable, then we can continue slow further taper of the prednisone.  If, however, his diplopia reemerges or ptosis worsens, then  we should consider placing him on azathioprine or mycophenolate or other steroid-sparing drug.  He is in agreement with this plan and understands the benefits and risks.    I was wondering whether we can accomplish slightly better control of his left eyelid ptosis (which is the only bothersome symptom right now) with topicals like naphazoline or oxymetazoline eyedrops. Upneeq is a topical drop recently approved for ptosis by the FDA.  I will ask my Ophthalmology colleagues whether this would be appropriate for him.   Importantly, Mr. Ramon should have a hemoglobin A1c, electrolytes, basic metabolic panel and a bone density scan done by the end of the year, to monitor for side effects of prednisone.  I am kindly asking his primary care provider to assist with scheduling the DEXA scan.  I will see him in followup by video visit in 3 months or sooner if needed. TT spent on video call 20 minutes; more than half was counseling.       Sincerely,        MD BENJY Larson MD             D: 10/07/2020   T: 10/07/2020   MT: liz      Name:     DAISHA RAMON   MRN:      [id number]        Account:      [id number]   :      1940           Service Date: 10/07/2020      Document: [id number]

## 2020-10-07 NOTE — LETTER
"10/7/2020       RE: Gustavo Ramon  [address] [facility]  [place] [address]     Dear Colleague,    Thank you for referring your patient, Gustavo Ramon, to the [facility] [place] [facility]. Please see a copy of my visit note below.    Service Date: 10/07/2020     Winston Cucajuan miguel Silverman PA-C   [facility]    [address]   [place] [address]      RE: Gustavo Ramon   MRN: [id number]    : 1940        Dear Mr. Silverman:        I had the pleasure to evaluate Stephen via billable video visit today.  An in-person visit was not recommended due to COVID-19.  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    I could not clarify his acetylcholine receptor antibody status based on the available medical record.  His major symptoms are ptosis and diplopia.  He continues without any symptoms suggestive of generalized disease for over a decade now.  He has no dysarthria, dysphagia, difficulty chewing, head drop, respiratory difficulties, weakness of arms or legs, etc.  He does have sialorrhea, but this is probably a side effect of pyridostigmine.      I first evaluated him in 2020 when he complained of significant double vision.  He was taking 10 mg daily of prednisone at that time.  I increased the prednisone gradually to 20 mg daily, but this was not enough to get his symptoms into remission so I further increased him to 30 mg slowly.  He has been on that dose of 30 mg more than 2 months now.  He feels much better and the diplopia is practically gone.  The ptosis is also much better although there are times still that he struggles to keep his left eye open and it feels \"tight\".  He also notes some fluttering with twitching of his eyelid muscles and less commonly the rest of the body, which is most likely pyridostigmine side effect along with cramping and " sialorrhea.  I reduced his pyridostigmine dose to 60 mg b.i.d. (he used to take it 4 times a day and those symptoms improved).  He is tolerating the prednisone well.  He is taking vitamin D and calcium, but he has not had a bone density scan or any diabetes or electrolytes test recently.  He has a monoclonal gammopathy of unknown significance, followed by Hematology.      Medications were reviewed and are as per Epic record.      On a focused examination, Mr. Ramon was in good spirits today.  He has no ptosis of the right eyelid at rest.  There is mild eyelid ptosis on the left at rest, which definitely gets aggravated after about 15-20 seconds of sustained upgaze with partial coverage of the pupil by the eyelid.  There is no obvious weakness of orbicularis oculi.  With sustained upward gaze he does develop diplopia with images diagonally , suggesting a vertical component.  He also may get mild diplopia after sustained horizontal gaze to the right for about 30 seconds.  Gaze to the left does not lead to double vision.  Ductions and versions are normal.  He has no weakness of lip seal, cheek puff, uvula, jaw, palate or tongue.  No dysphonia or dysarthria.  His neck flexion and extension range of motion are full.  His strength of the arm and leg looks normal.      In summary, Mr. Ramon has ocular myasthenia.  His symptoms are much better controlled now on 30 mg of prednisone daily, although I do not think they are fully controlled because he still has mild fatigable ptosis even an hour and a half after his last Mestinon dose.    I told him that at this point I want to be cautious with his immunosuppression.  In general, in elderly people with relatively mild ocular myasthenia and partial control of their symptoms on prednisone, I do not want to push the prednisone dose  too high and I ideally would like to avoid adding a nonsteroidal immunosuppressant drug especially amid the COVID-19 pandemic.  I will  try to slowly taper the prednisone to 25 mg daily for the next 2 weeks, then 20 mg daily.  The patient should call me or send me a [employer] message in a month.  If he remains stable, then we can continue slow further taper of the prednisone.  If, however, his diplopia reemerges or ptosis worsens, then we should consider placing him on azathioprine or mycophenolate or other steroid-sparing drug.  He is in agreement with this plan and understands the benefits and risks.    I was wondering whether we can accomplish slightly better control of his left eyelid ptosis (which is the only bothersome symptom right now) with topicals like naphazoline or oxymetazoline eyedrops. Upneeq is a topical drop recently approved for ptosis by the FDA.  I will ask my Ophthalmology colleagues whether this would be appropriate for him.   Importantly, Mr. Ramon should have a hemoglobin A1c, electrolytes, basic metabolic panel and a bone density scan done by the end of the year, to monitor for side effects of prednisone.  I am kindly asking his primary care provider to assist with scheduling the DEXA scan.  I will see him in followup by video visit in 3 months or sooner if needed. TT spent on video call 20 minutes; more than half was counseling.       Again, thank you for allowing me to participate in the care of your patient.  Sincerely,        BENJY PASCUAL MD  D: 10/07/2020   T: 10/07/2020   MT: liz            Name:     DAISHA RAMON   MRN:      [id number]        Account:      [id number]   :      1940           Service Date: 10/07/2020      Document: [id number]

## 2020-10-07 NOTE — PROGRESS NOTES
"Gustavo Ramon is a 80 year old male who is being evaluated via a billable video visit.      The patient has been notified of following:     \"This video visit will be conducted via a call between you and your physician/provider. We have found that certain health care needs can be provided without the need for an in-person physical exam.  This service lets us provide the care you need with a video conversation.  If a prescription is necessary we can send it directly to your pharmacy.  If lab work is needed we can place an order for that and you can then stop by our lab to have the test done at a later time.    Video visits are billed at different rates depending on your insurance coverage.  Please reach out to your insurance provider with any questions.    If during the course of the call the physician/provider feels a video visit is not appropriate, you will not be charged for this service.\"    Patient has given verbal consent for Video visit?YES  How would you like to obtain your AVS? Mychart        Video-Visit Details    Type of service:  Video Visit    Video Start Time: 10.40 am  Video End Time: 11:00 am    Originating Location (pt. Location): Home    Distant Location (provider location):  [facility] NEUROLOGY [facility]     Platform used for Video Visit: Anu Mcgill, EMT      "

## 2020-10-07 NOTE — LETTER
"10/7/2020       RE: Gustavo Ramon  [address] [facility]  [place] [address]     Dear Colleague,    Thank you for referring your patient, Gustavo Ramon, to the [facility] NEUROLOGY [facility] at [facility]. Please see a copy of my visit note below.    Gustavo Ramon is a 80 year old male who is being evaluated via a billable video visit.      The patient has been notified of following:     \"This video visit will be conducted via a call between you and your physician/provider. We have found that certain health care needs can be provided without the need for an in-person physical exam.  This service lets us provide the care you need with a video conversation.  If a prescription is necessary we can send it directly to your pharmacy.  If lab work is needed we can place an order for that and you can then stop by our lab to have the test done at a later time.    Video visits are billed at different rates depending on your insurance coverage.  Please reach out to your insurance provider with any questions.    If during the course of the call the physician/provider feels a video visit is not appropriate, you will not be charged for this service.\"    Patient has given verbal consent for Video visit?YES  How would you like to obtain your AVS? Mychart        Video-Visit Details    Type of service:  Video Visit    Video Start Time: 10.40 am  Video End Time: 11:00 am    Originating Location (pt. Location): Home    Distant Location (provider location):  [facility] NEUROLOGY [facility]     Platform used for Video Visit: SANYA Ventura        Service Date: 10/07/2020      2020        Winston Silverman PA-C   [facility]    [address]   [place] [address]      RE: Gustavo Ramon   MRN: [id number]    : 1940        Dear Mr. Silverman:        I had the pleasure to evaluate Stephen via billable video " "visit today.  An in-person visit was not recommended due to COVID-19.  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    I could not clarify his acetylcholine receptor antibody status based on the available medical record.  His major symptoms are ptosis and diplopia.  He continues without any symptoms suggestive of generalized disease for over a decade now.  He has no dysarthria, dysphagia, difficulty chewing, head drop, respiratory difficulties, weakness of arms or legs, etc.  He does have sialorrhea, but this is probably a side effect of pyridostigmine.      I first evaluated him in 05/2020 when he complained of significant double vision.  He was taking 10 mg daily of prednisone at that time.  I increased the prednisone gradually to 20 mg daily, but this was not enough to get his symptoms into remission so I further increased him to 30 mg slowly.  He has been on that dose of 30 mg more than 2 months now.  He feels much better and the diplopia is practically gone.  The ptosis is also much better although there are times still that he struggles to keep his left eye open and it feels \"tight\".  He also notes some fluttering with twitching of his eyelid muscles and less commonly the rest of the body, which is most likely pyridostigmine side effect along with cramping and sialorrhea.  I reduced his pyridostigmine dose to 60 mg b.i.d. (he used to take it 4 times a day and those symptoms improved).  He is tolerating the prednisone well.  He is taking vitamin D and calcium, but he has not had a bone density scan or any diabetes or electrolytes test recently.  He has a monoclonal gammopathy of unknown significance, followed by Hematology.      Medications were reviewed and are as per Epic record.      On a focused examination, Mr. Ramon was in good spirits today.  He has no ptosis of the right eyelid at rest.  There is mild eyelid ptosis on the left at rest, which " definitely gets aggravated after about 15-20 seconds of sustained upgaze with partial coverage of the pupil by the eyelid.  There is no obvious weakness of orbicularis oculi.  With sustained upward gaze he does develop diplopia with images diagonally , suggesting a vertical component.  He also may get mild diplopia after sustained horizontal gaze to the right for about 30 seconds.  Gaze to the left does not lead to double vision.  Ductions and versions are normal.  He has no weakness of lip seal, cheek puff, uvula, jaw, palate or tongue.  No dysphonia or dysarthria.  His neck flexion and extension range of motion are full.  His strength of the arm and leg looks normal.      In summary, Mr. Ramon has ocular myasthenia.  His symptoms are much better controlled now on 30 mg of prednisone daily, although I do not think they are fully controlled because he still has mild fatigable ptosis even an hour and a half after his last Mestinon dose.    I told him that at this point I want to be cautious with his immunosuppression.  In general, in elderly people with relatively mild ocular myasthenia and partial control of their symptoms on prednisone, I do not want to push the prednisone dose  too high and I ideally would like to avoid adding a nonsteroidal immunosuppressant drug especially amid the COVID-19 pandemic.  I will try to slowly taper the prednisone to 25 mg daily for the next 2 weeks, then 20 mg daily.  The patient should call me or send me a oragenicst message in a month.  If he remains stable, then we can continue slow further taper of the prednisone.  If, however, his diplopia reemerges or ptosis worsens, then we should consider placing him on azathioprine or mycophenolate or other steroid-sparing drug.  He is in agreement with this plan and understands the benefits and risks.    I was wondering whether we can accomplish slightly better control of his left eyelid ptosis (which is the only bothersome symptom  right now) with topicals like naphazoline or oxymetazoline eyedrops. Upneeq is a topical drop recently approved for ptosis by the FDA.  I will ask my Ophthalmology colleagues whether this would be appropriate for him.   Importantly, Mr. Ramon should have a hemoglobin A1c, electrolytes, basic metabolic panel and a bone density scan done by the end of the year, to monitor for side effects of prednisone.  I am kindly asking his primary care provider to assist with scheduling the DEXA scan.  I will see him in followup by video visit in 3 months or sooner if needed. TT spent on video call 20 minutes; more than half was counseling.       Sincerely,        MD BENJY Larson MD             D: 10/07/2020   T: 10/07/2020   MT: liz      Name:     DAISHA RAMON   MRN:      [id number]        Account:      [id number]   :      1940           Service Date: 10/07/2020      Document: [id number]          Again, thank you for allowing me to participate in the care of your patient.      Sincerely,    Benjy Wheat MD

## 2020-10-08 ENCOUNTER — TELEPHONE (OUTPATIENT)
Dept: OPHTHALMOLOGY | Facility: CLINIC | Age: 80
End: 2020-10-08

## 2020-10-08 NOTE — TELEPHONE ENCOUNTER
Left voicemail for patient to schedule next available with Dr. Mae, but voicemail seemed to cut out.  Will attempt again on Monday if no return call.     Dixie Perdomo on 10/8/2020 at 4:17 PM

## 2020-10-12 ENCOUNTER — MYC MEDICAL ADVICE (OUTPATIENT)
Dept: FAMILY MEDICINE | Facility: CLINIC | Age: 80
End: 2020-10-12

## 2020-10-12 ENCOUNTER — VIRTUAL VISIT (OUTPATIENT)
Dept: DERMATOLOGY | Facility: CLINIC | Age: 80
End: 2020-10-12
Payer: MEDICARE

## 2020-10-12 DIAGNOSIS — E78.5 HYPERLIPIDEMIA LDL GOAL <130: ICD-10-CM

## 2020-10-12 DIAGNOSIS — I10 BENIGN ESSENTIAL HYPERTENSION: Primary | ICD-10-CM

## 2020-10-12 DIAGNOSIS — R73.01 IMPAIRED FASTING GLUCOSE: ICD-10-CM

## 2020-10-12 DIAGNOSIS — G70.00 MYASTHENIA GRAVIS WITHOUT EXACERBATION (H): ICD-10-CM

## 2020-10-12 DIAGNOSIS — C44.311 BASAL CELL CARCINOMA OF LEFT NASAL SIDEWALL: Primary | ICD-10-CM

## 2020-10-12 PROCEDURE — 99024 POSTOP FOLLOW-UP VISIT: CPT | Mod: 95 | Performed by: DERMATOLOGY

## 2020-10-12 NOTE — PATIENT INSTRUCTIONS
[facility] Dermatology Visit    Thank you for allowing us to participate in your care. Your findings, instructions and follow-up plan are as follows:    Keep your appointment for skin cancer screening.        When should I call my doctor?    If you are worsening or not improving, please, contact us or seek urgent care as noted below.     Who should I call with questions (adults)?    [facility] (adult and pediatric): [phone]     [facility] (adult): [phone]    For urgent needs outside of business hours call the [facility] at [phone] and ask for the dermatology resident on call    If this is a medical emergency and you are unable to reach an ER, Call 911      Who should I call with questions (pediatric)?  [facility]- Pediatric Dermatology  Dr. Phoebe Eddy, Dr. Sukhi Delaney, Dr. Carmella Perales, Bella Hernandez, PA  Dr. Ashley Avila, Dr. Liz Alexandra & Dr. Joe English  Non Urgent  Nurse Triage Line; [phone]- Regla and Lauren RN Care Coordinators   [facility] (/Complex ) [phone]    If you need a prescription refill, please contact your pharmacy. Refills are approved or denied by our Physicians during normal business hours, Monday through Fridays  Per office policy, refills will not be granted if you have not been seen within the past year (or sooner depending on your child's condition)    Scheduling Information:  Pediatric Appointment Scheduling and Call Center [phone]  Radiology Scheduling- [phone]  Sedation Unit Scheduling- [phone]  [facility] Scheduling- General [phone]; Pediatric Dermatology [phone]  Main  Services: [phone]  Maldivian: [phone]  Thai: [phone]  Hmong/Albanian/Ivorian: [phone]  Preadmission Nursing Department Fax Number: [phone] (Fax all pre-operative paperwork to this  number)    For urgent matters arising during evenings, weekends, or holidays that cannot wait for normal business hours please call [phone] and ask for the Dermatology Resident On-Call to be paged.

## 2020-10-12 NOTE — LETTER
10/12/2020         RE: Gustavo Ramon  [address] [facility] [address]        Dear Colleague,    Thank you for referring your patient, Gustavo Ramon, to the [facility]. Please see a copy of my visit note below.    [facility] Dermatology Record:  Store and Forward and Telephone [phone]    Dermatology Problem List:  1. 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 Christiansen's note 11/21/11), s/p excision 5/12/09   2. Actinic keratoses  - s/p Efudex 2015, will be repeating fall 2020  - s/p PDT (x3)  - s/p LN2  3. Seborrheic dermatitis  - clobetasol 0.05% solution  4. MGUS     Encounter Date: Oct 12, 2020    CC:   Chief Complaint   Patient presents with     Wound Check     left sidewall sp MOHS 9-       History of Present Illness:  Gustavo Ramon is a 80 year old male who presents for follow up of Mohs and cheek advancement flap for BCC on left nasal sidwall. The wound is healing well. He is breathing well through his nose. The pain is significantly better than it was in the beginning.     ROS: Patient is generally feeling well today     Physical Examination:  General: Well-appearing, appropriately-developed individual.  Skin: Focused examination including left nasal sidewall was performed.   - healing flap scar, no apparent necrosis or ischemia. Nostril heights are comparable.     Labs:  N/A    Past Medical History:   Patient Active Problem List   Diagnosis     Rosacea     DJD (degenerative joint disease)     Ocular myasthenia gravis (H)     Macular pigment deposit      HYPERLIPIDEMIA LDL GOAL <130     IgA monoclonal gammopathy     Retinal hole OS, operculated     Horseshoe tear of retina without detachment OD     History  of basal cell carcinoma     Seborrhea     AK (actinic keratosis)     Malignant neoplasm of prostate (H)     PVD (posterior vitreous detachment)     Amaurosis fugax by Hx neg carotid W/U     Advanced directives, counseling/discussion     Actinic keratosis     Peripheral neuropathy     Nuclear sclerosis of both eyes     Essential hypertension with goal blood pressure less than 140/90     Gastroesophageal reflux disease without esophagitis     Past Medical History:   Diagnosis Date     Actinic keratosis      AK (actinic keratosis) 11/15/2012     Amaurosis fugax      Basal cell carcinoma 2009    R medial cheek/nose     Central serous retinopathy left    Eye     Diverticulosis 08/2006     DJD (degenerative joint disease)      GERD (gastroesophageal reflux disease)      Hearing loss     High frequency     History of nonmelanoma skin cancer      History of nonmelanoma skin cancer      HTN (hypertension)      Hyperlipidemia LDL goal < 130      Hyperplastic colon polyp 08/2006     IgA monoclonal gammopathy     IgA kappa     Lattice degeneration of retina right    Eye     Nonsenile cataract      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    [place] consult     Rosacea      Vitreous detachment left    Eye     Past Surgical History:   Procedure Laterality Date     ARTHROSCOPY KNEE RT/LT Left Jan 2010    Knee     COLONOSCOPY WITH CO2 INSUFFLATION N/A 9/24/2019    Procedure: COLONOSCOPY, WITH CO2 INSUFFLATION;  Surgeon: Loi Levine MD;  Location: [facility] OR     COMBINED ESOPHAGOSCOPY, GASTROSCOPY, DUODENOSCOPY (EGD) WITH CO2 INSUFFLATION N/A 9/24/2019    Procedure: ESOPHAGOGASTRODUODENOSCOPY, WITH CO2 INSUFFLATION;  Surgeon: Loi Levine MD;  Location: [facility] OR     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     DISKECTOMY, LUMBAR, SINGLE SP  2003    L2-3     ENT SURGERY  1943     Tonsils      ENT SURGERY  2-22-12    Biopsy lesion right pinna     ENT SURGERY  2-24-12    Biopsy leson right pinna     ESOPHAGOSCOPY, GASTROSCOPY, DUODENOSCOPY (EGD), COMBINED N/A 9/24/2019    Procedure: Esophagogastroduodenoscopy, With Biopsy;  Surgeon: Loi Levine MD;  Location: [facility] OR     SOFT TISSUE SURGERY  May 2010    Basal Cell/right side nose       Social History:  Patient reports that he has never smoked. He has never used smokeless tobacco. He reports that he does not drink alcohol or use drugs.    Family History:  Family History   Problem Relation Age of Onset     Heart Disease Mother      Alzheimer Disease Mother 73     C.A.D. Father      Diabetes Grandchild         using a pump      Diabetes Paternal Uncle      Heart Disease Paternal Grandmother      Glaucoma No family hx of      Macular Degeneration No family hx of      Melanoma No family hx of      Skin Cancer No family hx of        Medications:  Current Outpatient Medications   Medication     aspirin 325 MG tablet     cyanocobalamin (VITAMIN B-12) 1000 MCG tablet     ferrous sulfate (FEROSUL) 325 (65 Fe) MG tablet     fluorouracil (EFUDEX) 5 % external cream     hydrochlorothiazide (HYDRODIURIL) 25 MG tablet     lisinopril (ZESTRIL) 5 MG tablet     Multiple Vitamins-Minerals (PRESERVISION AREDS 2) CAPS     omeprazole (PRILOSEC) 20 MG DR capsule     predniSONE (DELTASONE) 20 MG tablet     predniSONE (DELTASONE) 5 MG tablet     pyridostigmine (MESTINON) 60 MG tablet     simvastatin (ZOCOR) 20 MG tablet     sulfamethoxazole-trimethoprim (BACTRIM DS) 800-160 MG tablet     triamcinolone (KENALOG) 0.1 % external cream     vitamin D3 (CHOLECALCIFEROL) 2000 units tablet     No current facility-administered medications for this visit.         Allergies   Allergen Reactions     Nkda [No Known Drug Allergies]        Impression and Recommendations (Patient Counseled on the Following):  1. BCC, left nasal sidewall.   - s/p Mohs and advancement flap.    - Wound is healing appropriately.   - ok to treat as normal skin.   - Continue sun protection and avoidance.   - Patient decline 3 month scar eval, but will schedule 6 months skin cancer screening with general derm.       Staff only    Shashank Victor DO    Department of Dermatology  [facility]: Phone: [phone], Fax:[phone]  [facility]: Phone: [phone], Fax: [phone]      _____________________________________________________________________________    Teledermatology information:  - Location of patient: [place]  - Patient presented as: return  - Location of teledermatologist:  [facility] )  - Reason teledermatology is appropriate:  of National Emergency Regarding Coronavirus disease (COVID 19) Outbreak  - Image quality and interpretability: acceptable  - Physician has received verbal consent for a Video/Photos Visit from the patient? YES  - In-person dermatology visit recommendation: no  - Date of images: 10/11/20  - Service start time: 1155  - Service end time:1159  - Date of report: 10/12/2020         Again, thank you for allowing me to participate in the care of your patient.        Sincerely,        Shashank Victor MD

## 2020-10-12 NOTE — PROGRESS NOTES
[facility] Dermatology Record:  Store and Forward and Telephone [phone]    Dermatology Problem List:  1. 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 Christiansen's note 11/21/11), s/p excision 5/12/09   2. Actinic keratoses  - s/p Efudex 2015, will be repeating fall 2020  - s/p PDT (x3)  - s/p LN2  3. Seborrheic dermatitis  - clobetasol 0.05% solution  4. MGUS     Encounter Date: Oct 12, 2020    CC:   Chief Complaint   Patient presents with     Wound Check     left sidewall sp MOHS 9-       History of Present Illness:  Gustavo Ramon is a 80 year old male who presents for follow up of Mohs and cheek advancement flap for BCC on left nasal sidwall. The wound is healing well. He is breathing well through his nose. The pain is significantly better than it was in the beginning.     ROS: Patient is generally feeling well today     Physical Examination:  General: Well-appearing, appropriately-developed individual.  Skin: Focused examination including left nasal sidewall was performed.   - healing flap scar, no apparent necrosis or ischemia. Nostril heights are comparable.     Labs:  N/A    Past Medical History:   Patient Active Problem List   Diagnosis     Rosacea     DJD (degenerative joint disease)     Ocular myasthenia gravis (H)     Macular pigment deposit     HYPERLIPIDEMIA LDL GOAL <130     IgA monoclonal gammopathy     Retinal hole OS, operculated     Horseshoe tear of retina without detachment OD     History  of basal cell carcinoma     Seborrhea     AK (actinic keratosis)     Malignant neoplasm of prostate (H)     PVD  (posterior vitreous detachment)     Amaurosis fugax by Hx neg carotid W/U     Advanced directives, counseling/discussion     Actinic keratosis     Peripheral neuropathy     Nuclear sclerosis of both eyes     Essential hypertension with goal blood pressure less than 140/90     Gastroesophageal reflux disease without esophagitis     Past Medical History:   Diagnosis Date     Actinic keratosis      AK (actinic keratosis) 11/15/2012     Amaurosis fugax      Basal cell carcinoma 2009    R medial cheek/nose     Central serous retinopathy left    Eye     Diverticulosis 08/2006     DJD (degenerative joint disease)      GERD (gastroesophageal reflux disease)      Hearing loss     High frequency     History of nonmelanoma skin cancer      History of nonmelanoma skin cancer      HTN (hypertension)      Hyperlipidemia LDL goal < 130      Hyperplastic colon polyp 08/2006     IgA monoclonal gammopathy     IgA kappa     Lattice degeneration of retina right    Eye     Nonsenile cataract      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    [place] consult     Rosacea      Vitreous detachment left    Eye     Past Surgical History:   Procedure Laterality Date     ARTHROSCOPY KNEE RT/LT Left Jan 2010    Knee     COLONOSCOPY WITH CO2 INSUFFLATION N/A 9/24/2019    Procedure: COLONOSCOPY, WITH CO2 INSUFFLATION;  Surgeon: Loi Levine MD;  Location: [facility] OR     COMBINED ESOPHAGOSCOPY, GASTROSCOPY, DUODENOSCOPY (EGD) WITH CO2 INSUFFLATION N/A 9/24/2019    Procedure: ESOPHAGOGASTRODUODENOSCOPY, WITH CO2 INSUFFLATION;  Surgeon: Loi Levine MD;  Location: [facility] OR     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     DISKECTOMY, LUMBAR, SINGLE SP  2003    L2-3     ENT SURGERY  1943    Tonsils      ENT SURGERY  2-22-12    Biopsy lesion right pinna     ENT SURGERY  2-24-12    Biopsy leson right pinna     ESOPHAGOSCOPY, GASTROSCOPY, DUODENOSCOPY (EGD), COMBINED N/A 9/24/2019    Procedure: Esophagogastroduodenoscopy, With Biopsy;  Surgeon: Julianna  Loi Flores MD;  Location: [facility] OR     SOFT TISSUE SURGERY  May 2010    Basal Cell/right side nose       Social History:  Patient reports that he has never smoked. He has never used smokeless tobacco. He reports that he does not drink alcohol or use drugs.    Family History:  Family History   Problem Relation Age of Onset     Heart Disease Mother      Alzheimer Disease Mother 73     C.A.D. Father      Diabetes Grandchild         using a pump      Diabetes Paternal Uncle      Heart Disease Paternal Grandmother      Glaucoma No family hx of      Macular Degeneration No family hx of      Melanoma No family hx of      Skin Cancer No family hx of        Medications:  Current Outpatient Medications   Medication     aspirin 325 MG tablet     cyanocobalamin (VITAMIN B-12) 1000 MCG tablet     ferrous sulfate (FEROSUL) 325 (65 Fe) MG tablet     fluorouracil (EFUDEX) 5 % external cream     hydrochlorothiazide (HYDRODIURIL) 25 MG tablet     lisinopril (ZESTRIL) 5 MG tablet     Multiple Vitamins-Minerals (PRESERVISION AREDS 2) CAPS     omeprazole (PRILOSEC) 20 MG DR capsule     predniSONE (DELTASONE) 20 MG tablet     predniSONE (DELTASONE) 5 MG tablet     pyridostigmine (MESTINON) 60 MG tablet     simvastatin (ZOCOR) 20 MG tablet     sulfamethoxazole-trimethoprim (BACTRIM DS) 800-160 MG tablet     triamcinolone (KENALOG) 0.1 % external cream     vitamin D3 (CHOLECALCIFEROL) 2000 units tablet     No current facility-administered medications for this visit.         Allergies   Allergen Reactions     Nkda [No Known Drug Allergies]        Impression and Recommendations (Patient Counseled on the Following):  1. BCC, left nasal sidewall.   - s/p Mohs and advancement flap.   - Wound is healing appropriately.   - ok to treat as normal skin.   - Continue sun protection and avoidance.   - Patient decline 3 month scar eval, but will schedule 6 months skin cancer screening with general derm.       Staff only    Shashank Victor DO  Assistant  Professor  Department of Dermatology  [facility]: Phone: [phone], Fax:[phone]  [facility]: Phone: [phone], Fax: [phone]      _____________________________________________________________________________    Teledermatology information:  - Location of patient: [place]  - Patient presented as: return  - Location of teledermatologist:  [facility] )  - Reason teledermatology is appropriate:  of National Emergency Regarding Coronavirus disease (COVID 19) Outbreak  - Image quality and interpretability: acceptable  - Physician has received verbal consent for a Video/Photos Visit from the patient? YES  - In-person dermatology visit recommendation: no  - Date of images: 10/11/20  - Service start time: 1155  - Service end time:1159  - Date of report: 10/12/2020

## 2020-10-12 NOTE — NURSING NOTE
"Teledermatology Nurse Call for RETURN patients seen within the last 3 years:      The patient was contacted by phone and we reviewed they have a visit in teledermatology upcoming with an MD or [facility];  Importantly, \"a teledermatology visit may not be as thorough as an in-person visit, and the quality of the photograph and/or video sent may not be of the same quality as that taken by the dermatology clinic.\"     We have found that certain health care needs can be provided without the need for an in-person physical exam.   If a prescription is necessary we can send it directly to your pharmacy.  If lab work is needed we can place an order for that and you can then stop by our lab to have the test done at a later time.Visits are billed at different rates depending on your insurance coverage. Please reach out to your insurance provider with any questions.    The patient chose to:                                                                                                                                                                                                                 Consent to a teledermatology visit with [employer] photos. Patient told by nursing these are already uploaded. Instructions sent to patient via [employer]. They verified they will be in the state of [place] at the time of the encounter.                                                                                                                                                                                                                                     The patient denied skin pain, fever, mucosal symptoms(lesions, blisters, sores in the mouth, nose, eyes, or genitals)  IF PATIENT ENDORSES ANY OF THESE STOP AND PAGE ON CALL ATTENDING      Josie Fontaine LPN                                                                                                                                                                                                "

## 2020-10-13 NOTE — TELEPHONE ENCOUNTER
Left voicemail for patient.  Dr. gil and Dr. Mae spoke again and Dr. Gil was just planning to prescribe the eye drop for him instead of having him see the eye providers.  Left my direct number if he had any questions.     Dixie Perdomo on 10/13/2020 at 10:57 AM

## 2020-10-18 RX ORDER — PYRIDOSTIGMINE BROMIDE 60 MG/1
120 TABLET ORAL DAILY
Qty: 6 TABLET | Refills: 0 | COMMUNITY
Start: 2020-10-13 | End: 2020-10-18

## 2020-10-18 RX ORDER — PYRIDOSTIGMINE BROMIDE 60 MG/1
120 TABLET ORAL DAILY
Qty: 180 TABLET | Refills: 1 | Status: SHIPPED | OUTPATIENT
Start: 2020-10-18 | End: 2021-09-23

## 2020-10-18 NOTE — TELEPHONE ENCOUNTER
An rx has been approved. Have him make an appt to see me for a wellness visit. Will update lab work and recheck his blood pressure at our visit together.

## 2020-10-19 NOTE — TELEPHONE ENCOUNTER
My Chart reply sent to patient with the directives from Winston Silverman PA-C.     Susy Dudley RN BSN

## 2020-10-21 NOTE — TELEPHONE ENCOUNTER
"Patient requesting to do a \"Lab Only\" visit prior to his annual exam so that he can discuss results during his visit with you.     Please place lab orders and route to  after.     Susy Dudley RN BSN    "

## 2020-10-28 ENCOUNTER — TELEPHONE (OUTPATIENT)
Dept: NEUROLOGY | Facility: CLINIC | Age: 80
End: 2020-10-28

## 2020-10-28 DIAGNOSIS — H02.403 PTOSIS OF BOTH EYELIDS: ICD-10-CM

## 2020-10-28 DIAGNOSIS — G70.00 MYASTHENIA GRAVIS WITHOUT EXACERBATION (H): ICD-10-CM

## 2020-10-28 NOTE — TELEPHONE ENCOUNTER
[facility] Call Center    Phone Message    May a detailed message be left on voicemail: yes     Reason for Call: Medication Question or concern regarding medication   Prescription Clarification  Name of Medication: Oxymetazoline HCl 0.1 % SOLN [[id number]] (Order [id number])  Rx can only be filled at the [facility] pharmacy  Prescribing Provider: Dr. Wheat   Pharmacy: [facility]   What on the order needs clarification? Rx was originally sent to [facility] but needs be to sent to [facility] pharmacy.          Action Taken: Message routed to:  [facility]    Travel Screening: Not Applicable

## 2020-11-09 DIAGNOSIS — E78.5 HYPERLIPIDEMIA LDL GOAL <130: ICD-10-CM

## 2020-11-09 DIAGNOSIS — R73.01 IMPAIRED FASTING GLUCOSE: ICD-10-CM

## 2020-11-09 DIAGNOSIS — I10 BENIGN ESSENTIAL HYPERTENSION: ICD-10-CM

## 2020-11-09 LAB
ANION GAP SERPL CALCULATED.3IONS-SCNC: 3 MMOL/L (ref 3–14)
BUN SERPL-MCNC: 26 MG/DL (ref 7–30)
CALCIUM SERPL-MCNC: 9.1 MG/DL (ref 8.5–10.1)
CHLORIDE SERPL-SCNC: 106 MMOL/L (ref 94–109)
CHOLEST SERPL-MCNC: 189 MG/DL
CO2 SERPL-SCNC: 31 MMOL/L (ref 20–32)
CREAT SERPL-MCNC: 1.25 MG/DL (ref 0.66–1.25)
GFR SERPL CREATININE-BSD FRML MDRD: 53 ML/MIN/{1.73_M2}
GLUCOSE SERPL-MCNC: 82 MG/DL (ref 70–99)
HBA1C MFR BLD: 5.8 % (ref 0–5.6)
HDLC SERPL-MCNC: 77 MG/DL
LDLC SERPL CALC-MCNC: 92 MG/DL
NONHDLC SERPL-MCNC: 112 MG/DL
POTASSIUM SERPL-SCNC: 4.8 MMOL/L (ref 3.4–5.3)
SODIUM SERPL-SCNC: 140 MMOL/L (ref 133–144)
TRIGL SERPL-MCNC: 100 MG/DL

## 2020-11-09 PROCEDURE — 36415 COLL VENOUS BLD VENIPUNCTURE: CPT | Performed by: PHYSICIAN ASSISTANT

## 2020-11-09 PROCEDURE — 83036 HEMOGLOBIN GLYCOSYLATED A1C: CPT | Performed by: PHYSICIAN ASSISTANT

## 2020-11-09 PROCEDURE — 80061 LIPID PANEL: CPT | Performed by: PHYSICIAN ASSISTANT

## 2020-11-09 PROCEDURE — 80048 BASIC METABOLIC PNL TOTAL CA: CPT | Performed by: PHYSICIAN ASSISTANT

## 2020-11-17 ENCOUNTER — ANCILLARY PROCEDURE (OUTPATIENT)
Dept: GENERAL RADIOLOGY | Facility: CLINIC | Age: 80
End: 2020-11-17
Attending: PHYSICIAN ASSISTANT
Payer: MEDICARE

## 2020-11-17 ENCOUNTER — OFFICE VISIT (OUTPATIENT)
Dept: FAMILY MEDICINE | Facility: CLINIC | Age: 80
End: 2020-11-17
Payer: MEDICARE

## 2020-11-17 VITALS
BODY MASS INDEX: 27.71 KG/M2 | HEIGHT: 68 IN | RESPIRATION RATE: 20 BRPM | WEIGHT: 182.8 LBS | HEART RATE: 95 BPM | SYSTOLIC BLOOD PRESSURE: 115 MMHG | DIASTOLIC BLOOD PRESSURE: 80 MMHG | OXYGEN SATURATION: 96 % | TEMPERATURE: 96.9 F

## 2020-11-17 DIAGNOSIS — J31.0 CHRONIC RHINITIS: ICD-10-CM

## 2020-11-17 DIAGNOSIS — G62.9 NEUROPATHY: ICD-10-CM

## 2020-11-17 DIAGNOSIS — I10 BENIGN ESSENTIAL HYPERTENSION: ICD-10-CM

## 2020-11-17 DIAGNOSIS — E55.9 VITAMIN D DEFICIENCY: ICD-10-CM

## 2020-11-17 DIAGNOSIS — Z00.00 ENCOUNTER FOR ANNUAL WELLNESS EXAM IN MEDICARE PATIENT: ICD-10-CM

## 2020-11-17 DIAGNOSIS — E78.5 HYPERLIPIDEMIA LDL GOAL <130: ICD-10-CM

## 2020-11-17 DIAGNOSIS — N18.31 STAGE 3A CHRONIC KIDNEY DISEASE (H): ICD-10-CM

## 2020-11-17 DIAGNOSIS — Z00.00 ENCOUNTER FOR MEDICARE ANNUAL WELLNESS EXAM: Primary | ICD-10-CM

## 2020-11-17 LAB — VIT B12 SERPL-MCNC: 1430 PG/ML (ref 193–986)

## 2020-11-17 PROCEDURE — 70210 X-RAY EXAM OF SINUSES: CPT | Mod: TC

## 2020-11-17 PROCEDURE — 36415 COLL VENOUS BLD VENIPUNCTURE: CPT | Performed by: PHYSICIAN ASSISTANT

## 2020-11-17 PROCEDURE — 82306 VITAMIN D 25 HYDROXY: CPT | Performed by: PHYSICIAN ASSISTANT

## 2020-11-17 PROCEDURE — 72100 X-RAY EXAM L-S SPINE 2/3 VWS: CPT | Mod: TC

## 2020-11-17 PROCEDURE — 99213 OFFICE O/P EST LOW 20 MIN: CPT | Mod: 25 | Performed by: PHYSICIAN ASSISTANT

## 2020-11-17 PROCEDURE — 82607 VITAMIN B-12: CPT | Performed by: PHYSICIAN ASSISTANT

## 2020-11-17 PROCEDURE — G0439 PPPS, SUBSEQ VISIT: HCPCS | Performed by: PHYSICIAN ASSISTANT

## 2020-11-17 RX ORDER — HYDROCHLOROTHIAZIDE 25 MG/1
12.5 TABLET ORAL DAILY
Qty: 45 TABLET | Refills: 3 | Status: SHIPPED | OUTPATIENT
Start: 2020-11-17 | End: 2021-01-25

## 2020-11-17 RX ORDER — LISINOPRIL 5 MG/1
5 TABLET ORAL DAILY
Qty: 90 TABLET | Refills: 1 | Status: SHIPPED | OUTPATIENT
Start: 2020-11-17 | End: 2021-01-25

## 2020-11-17 RX ORDER — SIMVASTATIN 20 MG
TABLET ORAL
Qty: 90 TABLET | Refills: 1 | Status: SHIPPED | OUTPATIENT
Start: 2020-11-17 | End: 2021-01-29

## 2020-11-17 ASSESSMENT — MIFFLIN-ST. JEOR: SCORE: 1499.71

## 2020-11-17 NOTE — PROGRESS NOTES
"  SUBJECTIVE:   Gustavo Ramon is a 80 year old male who presents for Preventive Visit.      Patient has been advised of split billing requirements and indicates understanding: Yes  Are you in the first 12 months of your Medicare Part B coverage?  No    Physical Health:    In general, how would you rate your overall physical health? good    Outside of work, how many days during the week do you exercise? none    Outside of work, approximately how many minutes a day do you exercise?not applicable    If you drink alcohol do you typically have >3 drinks per day or >7 drinks per week? Not Applicable    Do you usually eat at least 4 servings of fruit and vegetables a day, include whole grains & fiber and avoid regularly eating high fat or \"junk\" foods? Yes    Do you have any problems taking medications regularly?  No    Do you have any side effects from medications? muscle cramps     Needs assistance for the following daily activities: no assistance needed    Which of the following safety concerns are present in your home?  none identified     Hearing impairment: Yes, uses hearing aides    In the past 6 months, have you been bothered by leaking of urine? no    Mental Health:    In general, how would you rate your overall mental or emotional health? excellent  PHQ-2 Score:  0    Do you feel safe in your environment? No    Have you ever done Advance Care Planning? (For example, a Health Directive, POLST, or a discussion with a medical provider or your loved ones about your wishes): Yes, advance care planning is on file.    Additional concerns to address?  YES    Sinus headaches at night: previous brain imaging has been normal.  Some sinus pressure and pnd.    Numbness in bilateral feet. Some low back pain.    Shaking - concerned about parkinson's or \"old age\". No pill rolling tremors. No cogwheel rigidity. More occasional myoclonic jerks.   Reviewed recent lab work.     Fall risk: 0  Fallen 2 or more times in the past " year?: No  Any fall with injury in the past year?: No    Cognitive Screenin) Repeat 3 items (Leader, Season, Table)    2) Clock draw: NORMAL  3) 3 item recall: Recalls 2 objects   Results: NORMAL clock, 1-2 items recalled: COGNITIVE IMPAIRMENT LESS LIKELY    Mini-CogTM Copyright CARLOS Carroll. Licensed by the author for use in [facility]; reprinted with permission (rupali@[facility]). All rights reserved.      Do you have sleep apnea, excessive snoring or daytime drowsiness?: yes - snoring            Reviewed and updated as needed this visit by clinical staff  Tobacco  Allergies  Meds     Fam Hx  Soc Hx        Reviewed and updated as needed this visit by Provider                Social History     Tobacco Use     Smoking status: Never Smoker     Smokeless tobacco: Never Used   Substance Use Topics     Alcohol use: No     Alcohol/week: 0.0 standard drinks                           Current providers sharing in care for this patient include:   Patient Care Team:  Winston Silverman PA-C as PCP - General (Physician Assistant)  Lisa Wheeler RN as Nurse Coordinator (Oncology)  Winston Silverman PA-C as Assigned PCP  Tyler Wheat MD as Assigned Neuroscience Provider  Shashank Victor MD as Assigned Surgical Provider  Susan Eller MD as Assigned Cancer Care Provider    The following health maintenance items are reviewed in Epic and correct as of today:  Health Maintenance   Topic Date Due     FALL RISK ASSESSMENT  2021     EYE EXAM  2021     BMP  2021     LIPID  2021     MEDICARE ANNUAL WELLNESS VISIT  2021     DTAP/TDAP/TD IMMUNIZATION (3 - Td) 08/15/2022     ADVANCE CARE PLANNING  2025     PHQ-2  Completed     INFLUENZA VACCINE  Completed     Pneumococcal Vaccine: 65+ Years  Completed     ZOSTER IMMUNIZATION  Completed     Pneumococcal Vaccine: Pediatrics (0 to 5 Years) and At-Risk Patients (6 to 64 Years)  Aged Out     IPV IMMUNIZATION  Aged  Out     MENINGITIS IMMUNIZATION  Aged Out     HEPATITIS B IMMUNIZATION  Aged Out     Lab work is in process  Labs reviewed in EPIC  BP Readings from Last 3 Encounters:   11/17/20 115/80   09/24/20 (!) 145/78   08/27/20 139/79    Wt Readings from Last 3 Encounters:   11/17/20 82.9 kg (182 lb 12.8 oz)   04/07/20 80.7 kg (178 lb)   01/02/20 80.2 kg (176 lb 14.4 oz)                  Patient Active Problem List   Diagnosis     Rosacea     DJD (degenerative joint disease)     Ocular myasthenia gravis (H)     Macular pigment deposit     HYPERLIPIDEMIA LDL GOAL <130     IgA monoclonal gammopathy     Retinal hole OS, operculated     Horseshoe tear of retina without detachment OD     History  of basal cell carcinoma     Seborrhea     AK (actinic keratosis)     Malignant neoplasm of prostate (H)     PVD (posterior vitreous detachment)     Amaurosis fugax by Hx neg carotid W/U     Advanced directives, counseling/discussion     Actinic keratosis     Peripheral neuropathy     Nuclear sclerosis of both eyes     Essential hypertension with goal blood pressure less than 140/90     Gastroesophageal reflux disease without esophagitis     Past Surgical History:   Procedure Laterality Date     ARTHROSCOPY KNEE RT/LT Left Jan 2010    Knee     COLONOSCOPY WITH CO2 INSUFFLATION N/A 9/24/2019    Procedure: COLONOSCOPY, WITH CO2 INSUFFLATION;  Surgeon: Loi Levine MD;  Location: [facility] OR     COMBINED ESOPHAGOSCOPY, GASTROSCOPY, DUODENOSCOPY (EGD) WITH CO2 INSUFFLATION N/A 9/24/2019    Procedure: ESOPHAGOGASTRODUODENOSCOPY, WITH CO2 INSUFFLATION;  Surgeon: Loi Levine MD;  Location: [facility] OR     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     DISKECTOMY, LUMBAR, SINGLE SP  2003    L2-3     ENT SURGERY  1943    Tonsils      ENT SURGERY  2-22-12    Biopsy lesion right pinna     ENT SURGERY  2-24-12    Biopsy leson right pinna     ESOPHAGOSCOPY, GASTROSCOPY, DUODENOSCOPY (EGD), COMBINED N/A 9/24/2019    Procedure:  Esophagogastroduodenoscopy, With Biopsy;  Surgeon: Loi Levine MD;  Location: [facility] OR     SOFT TISSUE SURGERY  May 2010    Basal Cell/right side nose       Social History     Tobacco Use     Smoking status: Never Smoker     Smokeless tobacco: Never Used   Substance Use Topics     Alcohol use: No     Alcohol/week: 0.0 standard drinks     Family History   Problem Relation Age of Onset     Heart Disease Mother      Alzheimer Disease Mother 73     C.A.D. Father      Diabetes Grandchild         using a pump      Diabetes Paternal Uncle      Heart Disease Paternal Grandmother      Glaucoma No family hx of      Macular Degeneration No family hx of      Melanoma No family hx of      Skin Cancer No family hx of          Current Outpatient Medications   Medication Sig Dispense Refill     aspirin 325 MG tablet Take 325 mg by mouth daily       cyanocobalamin (VITAMIN B-12) 1000 MCG tablet Take 1 tablet (1,000 mcg) by mouth daily 90 tablet 1     ferrous sulfate (FEROSUL) 325 (65 Fe) MG tablet Use 1 tab every other day with orange juice 60 tablet 1     fluorouracil (EFUDEX) 5 % external cream Apply 1 Application topically as needed       hydrochlorothiazide (HYDRODIURIL) 25 MG tablet Take 0.5 tablets (12.5 mg) by mouth daily 45 tablet 3     lisinopril (ZESTRIL) 5 MG tablet Take 1 tablet (5 mg) by mouth daily 90 tablet 1     Multiple Vitamins-Minerals (PRESERVISION AREDS 2) CAPS Take 2 soft gel caps daily 90 capsule 1     omeprazole (PRILOSEC) 20 MG DR capsule TAKE 1 CAPSULE DAILY ( CONCOMITANT TO PREDNISONE USE ) 90 capsule 0     predniSONE (DELTASONE) 20 MG tablet TAKE ONE AND ONE-HALF TABLETS DAILY 45 tablet 1     predniSONE (DELTASONE) 5 MG tablet TAKE 2 TABLETS DAILY 60 tablet 11     pyridostigmine (MESTINON) 60 MG tablet Take 2 tablets (120 mg) by mouth daily 180 tablet 1     simvastatin (ZOCOR) 20 MG tablet TAKE 1 TABLET AT BEDTIME 90 tablet 1     sulfamethoxazole-trimethoprim (BACTRIM DS) 800-160 MG tablet TAKE 1  "TABLET THREE TIMES A WEEK 12 tablet 12     triamcinolone (KENALOG) 0.1 % external cream Apply twice daily for 2 weeks to rash on left upper arm 45 g 0     vitamin D3 (CHOLECALCIFEROL) 2000 units tablet Take 1 tablet by mouth daily 90 tablet 1     Naphazoline-Glycerin 0.03-0.5 % SOLN Apply 1 drop to eye 3 times daily Both eyes (Patient not taking: Reported on 11/17/2020) 30 mL 0     Oxymetazoline HCl 0.1 % SOLN Apply 1 drop to eye daily (Patient not taking: Reported on 11/17/2020) 30 each 3     Allergies   Allergen Reactions     Nkda [No Known Drug Allergies]      Recent Labs   Lab Test 11/09/20  0824 09/22/20  1351 09/03/19  0940 08/15/19  1240 02/26/19  0914 05/25/18  0939 05/25/17  0759 05/25/17  0759   A1C 5.8*  --   --   --   --   --   --   --    LDL 92 94  --   --  87  --    < >  --    HDL 77 90  --   --  61  --    < >  --    TRIG 100 75  --   --  76  --    < >  --    ALT  --   --   --  24  --  20  --  25   CR 1.25  --   --  1.01 1.06 1.13   < > 1.11   GFRESTIMATED 53*  --   --  69 66 62   < > 64   GFRESTBLACK 62  --   --  80 76 76   < > 77   POTASSIUM 4.8  --   --  3.9 3.9 4.3   < > 4.6   TSH  --   --  1.94 1.90  --   --    < >  --     < > = values in this interval not displayed.          ROS:  Constitutional, HEENT, cardiovascular, pulmonary, GI, , musculoskeletal, neuro, skin, endocrine and psych systems are negative, except as otherwise noted.    OBJECTIVE:   /80   Pulse 95   Temp 96.9  F (36.1  C) (Tympanic)   Resp 20   Ht 1.721 m (5' 7.75\")   Wt 82.9 kg (182 lb 12.8 oz)   SpO2 96%   BMI 28.00 kg/m   Estimated body mass index is 28 kg/m  as calculated from the following:    Height as of this encounter: 1.721 m (5' 7.75\").    Weight as of this encounter: 82.9 kg (182 lb 12.8 oz).  EXAM:   GENERAL: healthy, alert and no distress  EYES: Eyes grossly normal to inspection, PERRL and conjunctivae and sclerae normal  HENT: ear canals and TM's normal, nose and mouth without ulcers or lesions  NECK: " no adenopathy, no asymmetry, masses, or scars and thyroid normal to palpation  RESP: lungs clear to auscultation - no rales, rhonchi or wheezes  CV: regular rate and rhythm, normal S1 S2, no S3 or S4, no murmur, click or rub, no peripheral edema and peripheral pulses strong  ABDOMEN: soft, nontender, no hepatosplenomegaly, no masses and bowel sounds normal  MS: no gross musculoskeletal defects noted, no edema  SKIN: no suspicious lesions or rashes  NEURO: Normal strength and tone, mentation intact and speech normal  PSYCH: mentation appears normal, affect normal/bright  Lumbosacral spine area reveals no local tenderness or mass.  Full and painless lumbosacral range of motion is noted. Straight leg raise is negative at 90 degrees on both sides. DTR's, motor strength and sensation normal, including heel and toe gait.  Peripheral pulses are palpable. Hips and knees have full range of motion without pain. No abdominal tenderness, mass or organomegaly.  Examination of the feet reveals normal DP and PT pulses, no trophic changes or ulcerative lesions and reduced sensation involving both feet.    Diagnostic Test Results:  Labs reviewed in Epic    ASSESSMENT / PLAN:       ICD-10-CM    1. Encounter for Medicare annual wellness exam  Z00.00    2. Encounter for annual wellness exam in Medicare patient  Z00.00    3. Benign essential hypertension  I10 lisinopril (ZESTRIL) 5 MG tablet     hydrochlorothiazide (HYDRODIURIL) 25 MG tablet   4. Hyperlipidemia LDL goal <130  E78.5 simvastatin (ZOCOR) 20 MG tablet   5. Stage 3a chronic kidney disease  N18.31    6. Chronic rhinitis  J31.0 XR Sinus 1/2 Views   7. Neuropathy  G62.9 XR Lumbar Spine 2/3 Views     Vitamin B12   8. Vitamin D deficiency  E55.9 Vitamin D Deficiency   Advised supportive and symptomatic treatment.  Follow up with Provider - if condition persists or worsens.   work on lifestyle modification  Recheck in 6 mos     Patient has been advised of split billing  "requirements and indicates understanding: Yes    COUNSELING:  Reviewed preventive health counseling, as reflected in patient instructions       Regular exercise       Healthy diet/nutrition    Estimated body mass index is 28 kg/m  as calculated from the following:    Height as of this encounter: 1.721 m (5' 7.75\").    Weight as of this encounter: 82.9 kg (182 lb 12.8 oz).    Weight management plan: Discussed healthy diet and exercise guidelines    He reports that he has never smoked. He has never used smokeless tobacco.    Appropriate preventive services were discussed with this patient, including applicable screening as appropriate for cardiovascular disease, diabetes, osteopenia/osteoporosis, and glaucoma.  As appropriate for age/gender, discussed screening for colorectal cancer, prostate cancer, breast cancer, and cervical cancer. Checklist reviewing preventive services available has been given to the patient.    Reviewed patients plan of care and provided an AVS. The Basic Care Plan (routine screening as documented in Health Maintenance) for Gustavo meets the Care Plan requirement. This Care Plan has been established and reviewed with the Patient.    Counseling Resources:  ATP IV Guidelines  Pooled Cohorts Equation Calculator  Breast Cancer Risk Calculator  BRCA-Related Cancer Risk Assessment: FHS-7 Tool  FRAX Risk Assessment  ICSI Preventive Guidelines  Dietary Guidelines for Americans, 2010  USDA's MyPlate  ASA Prophylaxis  Lung CA Screening    [facility]  "

## 2020-11-18 LAB — DEPRECATED CALCIDIOL+CALCIFEROL SERPL-MC: 42 UG/L (ref 20–75)

## 2020-11-26 ENCOUNTER — MYC REFILL (OUTPATIENT)
Dept: FAMILY MEDICINE | Facility: CLINIC | Age: 80
End: 2020-11-26

## 2020-11-26 DIAGNOSIS — K21.9 GASTROESOPHAGEAL REFLUX DISEASE WITHOUT ESOPHAGITIS: ICD-10-CM

## 2020-11-27 NOTE — TELEPHONE ENCOUNTER
"Prescription approved per [facility] Refill Protocol.        Requested Prescriptions   Pending Prescriptions Disp Refills     omeprazole (PRILOSEC) 20 MG DR capsule 90 capsule 0     Sig: TAKE 1 CAPSULE DAILY ( CONCOMITANT TO PREDNISONE USE )       PPI Protocol Passed - 11/26/2020 10:50 AM        Passed - Not on Clopidogrel (unless Pantoprazole ordered)        Passed - No diagnosis of osteoporosis on record        Passed - Recent (12 mo) or future (30 days) visit within the authorizing provider's specialty     Patient has had an office visit with the authorizing provider or a provider within the authorizing providers department within the previous 12 mos or has a future within next 30 days. See \"Patient Info\" tab in inbasket, or \"Choose Columns\" in Meds & Orders section of the refill encounter.              Passed - Medication is active on med list        Passed - Patient is age 18 or older             "

## 2020-11-28 ENCOUNTER — MYC REFILL (OUTPATIENT)
Dept: NEUROLOGY | Facility: CLINIC | Age: 80
End: 2020-11-28

## 2020-11-28 DIAGNOSIS — G70.00 OCULAR MYASTHENIA (H): ICD-10-CM

## 2020-11-30 RX ORDER — PREDNISONE 20 MG/1
TABLET ORAL
Qty: 30 TABLET | Refills: 1 | Status: SHIPPED | OUTPATIENT
Start: 2020-11-30 | End: 2021-02-08

## 2020-12-07 ENCOUNTER — ANCILLARY PROCEDURE (OUTPATIENT)
Dept: MRI IMAGING | Facility: CLINIC | Age: 80
End: 2020-12-07
Attending: PHYSICIAN ASSISTANT
Payer: MEDICARE

## 2020-12-07 DIAGNOSIS — M54.42 ACUTE BILATERAL LOW BACK PAIN WITH LEFT-SIDED SCIATICA: ICD-10-CM

## 2020-12-07 PROCEDURE — 72148 MRI LUMBAR SPINE W/O DYE: CPT | Mod: TC

## 2020-12-14 DIAGNOSIS — M54.42 ACUTE BILATERAL LOW BACK PAIN WITH LEFT-SIDED SCIATICA: Primary | ICD-10-CM

## 2020-12-31 DIAGNOSIS — E78.5 HYPERLIPIDEMIA LDL GOAL <130: ICD-10-CM

## 2021-01-04 ENCOUNTER — VIRTUAL VISIT (OUTPATIENT)
Dept: NEUROLOGY | Facility: CLINIC | Age: 81
End: 2021-01-04
Payer: MEDICARE

## 2021-01-04 DIAGNOSIS — I10 BENIGN ESSENTIAL HYPERTENSION: ICD-10-CM

## 2021-01-04 DIAGNOSIS — M48.062 SPINAL STENOSIS OF LUMBAR REGION WITH NEUROGENIC CLAUDICATION: Primary | ICD-10-CM

## 2021-01-04 DIAGNOSIS — G70.00 MG, OCULAR (MYASTHENIA GRAVIS) (H): ICD-10-CM

## 2021-01-04 PROCEDURE — 99215 OFFICE O/P EST HI 40 MIN: CPT | Mod: 95 | Performed by: PSYCHIATRY & NEUROLOGY

## 2021-01-04 NOTE — PROGRESS NOTES
Gustavo Ramon is a 80 year old male who is being evaluated via a billable video visit.      How would you like to obtain your AVS? orderTalk      Video Start Time: 9.42 am          Video-Visit Details    Type of service:  Video Visit    Video End Time:10.08 am    Originating Location (pt. Location):Home    Distant Location (provider location):  [facility] NEUROLOGY [facility]     Platform used for Video Visit: Anu Mcgill, EMT

## 2021-01-04 NOTE — PATIENT INSTRUCTIONS
Follow up 4 months  Dr Silverman should order a bone density scan, I will contact him  Continue prednisone 20 mg every other day  You can stop the sulfa drug.  You can take up to 3-4 tablets of Mestinon every day (1 tablet three times a day for a week, then 1 tablets four times a day). This will likely make your eyelid fatigue better but it may also cause more twitching of the eye, stomach cramps or diarrhea    For your lumbar spine stenosis I would recommend physical therapy first, but please get an opinion from Dr Davila as well

## 2021-01-04 NOTE — LETTER
2021       RE: Gustavo Ramon  [address] [facility] [address]     Dear Colleague,    Thank you for referring your patient, Gustavo Ramon, to the [facility] NEUROLOGY CLINIC [place] at [facility]. Please see a copy of my visit note below.    Gustavo Ramon is a 80 year old male who is being evaluated via a billable video visit.      How would you like to obtain your AVS? [employer]      Video Start Time: 9.42 am          Video-Visit Details    Type of service:  Video Visit    Video End Time:10.08 am    Originating Location (pt. Location):Home    Distant Location (provider location):  [facility] NEUROLOGY [facility]     Platform used for Video Visit: Anu Mcgill, SANYA      Service Date: 2021      Winston Silverman PA-C   [facility]    [address] [place] [address]      RE: Gustavo Ramon    MRN: [id number]   : 1940      Dear Mr. Silverman:       I had the pleasure to evaluate Mr. Ramon via a billable video visit today.  An in-person visit was not recommended due to COVID-19.  Mr. 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.  I last had a virtual visit with him on 10/07/2020.  His myasthenia is better controlled now.  He is currently on prednisone.  He used to be on 20 mg daily until about a week ago, when he self tapered to 20 mg every other day.  He has not noticed any worsening of his symptoms during this taper.  His double vision is largely gone.  His ptosis does occur particularly later during the day when he has a busy day or fatigues his eyes.  It does not happen at night.  It is not particularly bothersome and does not impair his reading, etc.   He denies dysphagia, dysarthria, sialorrhea, difficulty chewing, head drop, respiratory symptoms, weakness of arms or legs or other signs of generalized disease.  As  "part of surveillance for chronic prednisone use, he had recently a hemoglobin A1c, which was minimally elevated at 5.8%.  Basic metabolic panel was normal.  Lipid panel was normal.  Vitamin B12 was normal.  Vitamin D was normal at 42.  He has not had a bone density scan yet.  He is currently taking pyridostigmine only 60 mg in the morning and the evening.  In the past, he had some eye fluttering or twitching, and that is why I had reduced his dose, which used to be as high as 60 mg 4 times a day.      Mr. Ramon brought to my attention a recent \"warm sensation\" in his legs below the hips at the thighs and knees, which is worse when standing or walking.  Occasionally, his feet will tingle and go numb when he stands or walks.  Those symptoms are generally much better when he is recumbent.  He had a recent lumbar spine MRI ordered by Mr. Silverman, which showed moderate to severe central canal stenosis at L4-5 and at least moderate stenosis at L2-3 and L3-4 due to degenerative changes.  He will see a neurosurgeon, Dr. Davila, in a couple of weeks.  He also experiences intermittent back pain when standing.      MEDICATIONS:  Reviewed and are as per Epic record.      PHYSICAL EXAMINATION:  On a brief video exam, he has mild eyelid ptosis with sustained upward gaze.  It gets aggravated after about 10-15 seconds of gaze, but the eyelid still does not cover the pupil fully.  He seems to have fairly strong eyelid closure bilaterally.  He does get diplopia with horizontal gaze to the left instantly, but not when gazing to the right.  He also gets cross eyed when gazing upwards for more than 5-10 seconds, but not when gazing downwards.  He has no weakness of lip seal, cheek puff or tongue.  His voice is normal.  There is no dysarthria.  Uvula elevates normally.  Neck flexion and extension strength appear full.      In summary, Mr. Ramon's ocular myasthenia is under satisfactory control.  He can continue on 20 mg every other day " of prednisone.  I am asking his primary care doctor to order a bone density scan.  Labs will be repeated in about 3-6 months.  I am not starting him on immunosuppressant (azathioprine, MMF, etc) right now, especially if he is able to taper himself to 20 every other day of prednisone without worsening of his MG. He can stop the Bactrim for PCP prophylaxis if his daily prednisone dose is below 20 mg.  As for the pyridostigmine, he can increase the dose to 60 mg 3 times a day or 4 times a day as needed.  He understands that higher pyridostigmine doses may cause fluttering or twitching, but will also make his ptosis better.      He also has symptoms suggestive of lumbar spinal stenosis.  I told him that usually the first line of treatment is physical therapy and symptomatic management before jumping into surgery, but he will obtain an opinion by Dr. Davila on the same issue shortly.      Thank you for allowing me to participate in his care.  Follow up in 4 months or sooner if needed. TT spent on this encounter 40 minutes today- including 26 minutes on video call with patient, 10 minutes on dictating and editing progress note, and 4 minutes on pre-visit chart review.      Sincerely,      Tyler Wheat MD                 D: 2021   T: 2021   MT: minerva      Name:     DAISHA LOZOYA   MRN:      [id number]        Account:      [id number]   :      1940           Service Date: 2021      Document: [id number]

## 2021-01-04 NOTE — PROGRESS NOTES
"Service Date: 2021      Winston Silverman PA-C   [facility]    [address] [place] [address]      RE: Gustavo Ramon    MRN: [id number]   : 1940      Dear Mr. Mayberryon:       I had the pleasure to evaluate Mr. Ramon via a billable video visit today.  An in-person visit was not recommended due to COVID-19.  Mr. 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  I last had a virtual visit with him on 10/07/2020.  His myasthenia is better controlled now.  He is currently on prednisone.  He used to be on 20 mg daily until about a week ago, when he self tapered to 20 mg every other day.  He has not noticed any worsening of his symptoms during this taper.  His double vision is largely gone.  His ptosis does occur particularly later during the day when he has a busy day or fatigues his eyes.  It does not happen at night.  It is not particularly bothersome and does not impair his reading, etc.   He denies dysphagia, dysarthria, sialorrhea, difficulty chewing, head drop, respiratory symptoms, weakness of arms or legs or other signs of generalized disease.  As part of surveillance for chronic prednisone use, he had recently a hemoglobin A1c, which was minimally elevated at 5.8%.  Basic metabolic panel was normal.  Lipid panel was normal.  Vitamin B12 was normal.  Vitamin D was normal at 42.  He has not had a bone density scan yet.  He is currently taking pyridostigmine only 60 mg in the morning and the evening.  In the past, he had some eye fluttering or twitching, and that is why I had reduced his dose, which used to be as high as 60 mg 4 times a day.      Mr. Ramon brought to my attention a recent \"warm sensation\" in his legs below the hips at the thighs and knees, which is worse when standing or walking.  Occasionally, his feet will tingle and go numb when he stands or walks.  Those symptoms are generally much better when " he is recumbent.  He had a recent lumbar spine MRI ordered by  Andra, which showed moderate to severe central canal stenosis at L4-5 and at least moderate stenosis at L2-3 and L3-4 due to degenerative changes.  He will see a neurosurgeon, Dr. Davila, in a couple of weeks.  He also experiences intermittent back pain when standing.      MEDICATIONS:  Reviewed and are as per Epic record.      PHYSICAL EXAMINATION:  On a brief video exam, he has mild eyelid ptosis with sustained upward gaze.  It gets aggravated after about 10-15 seconds of gaze, but the eyelid still does not cover the pupil fully.  He seems to have fairly strong eyelid closure bilaterally.  He does get diplopia with horizontal gaze to the left instantly, but not when gazing to the right.  He also gets cross eyed when gazing upwards for more than 5-10 seconds, but not when gazing downwards.  He has no weakness of lip seal, cheek puff or tongue.  His voice is normal.  There is no dysarthria.  Uvula elevates normally.  Neck flexion and extension strength appear full.      In summary, Mr. Ramon's ocular myasthenia is under satisfactory control.  He can continue on 20 mg every other day of prednisone.  I am asking his primary care doctor to order a bone density scan.  Labs will be repeated in about 3-6 months.  I am not starting him on immunosuppressant (azathioprine, MMF, etc) right now, especially if he is able to taper himself to 20 every other day of prednisone without worsening of his MG. He can stop the Bactrim for PCP prophylaxis if his daily prednisone dose is below 20 mg.  As for the pyridostigmine, he can increase the dose to 60 mg 3 times a day or 4 times a day as needed.  He understands that higher pyridostigmine doses may cause fluttering or twitching, but will also make his ptosis better.      He also has symptoms suggestive of lumbar spinal stenosis.  I told him that usually the first line of treatment is physical therapy and symptomatic  management before jumping into surgery, but he will obtain an opinion by Dr. Davila on the same issue shortly.      Thank you for allowing me to participate in his care.  Follow up in 4 months or sooner if needed. TT spent on this encounter 40 minutes today- including 26 minutes on video call with patient, 10 minutes on dictating and editing progress note, and 4 minutes on pre-visit chart review.      Sincerely,      MD BENJY Disla MD             D: 2021   T: 2021   MT: minerva      Name:     DAISHA LOZOYA   MRN:      [id number]        Account:      [id number]   :      1940           Service Date: 2021      Document: [id number]

## 2021-01-05 RX ORDER — SIMVASTATIN 20 MG
TABLET ORAL
Qty: 90 TABLET | Refills: 3 | OUTPATIENT
Start: 2021-01-05

## 2021-01-05 NOTE — TELEPHONE ENCOUNTER
Duplicate, refilled at another pharmacy.  Ava Kreuger RN  [facility]

## 2021-01-06 RX ORDER — LISINOPRIL 5 MG/1
5 TABLET ORAL DAILY
Qty: 90 TABLET | Refills: 1 | OUTPATIENT
Start: 2021-01-06

## 2021-01-18 ENCOUNTER — TELEPHONE (OUTPATIENT)
Dept: FAMILY MEDICINE | Facility: CLINIC | Age: 81
End: 2021-01-18

## 2021-01-21 ENCOUNTER — OFFICE VISIT (OUTPATIENT)
Dept: NEUROSURGERY | Facility: CLINIC | Age: 81
End: 2021-01-21
Attending: PHYSICIAN ASSISTANT
Payer: MEDICARE

## 2021-01-21 VITALS
WEIGHT: 182 LBS | OXYGEN SATURATION: 98 % | BODY MASS INDEX: 26.96 KG/M2 | SYSTOLIC BLOOD PRESSURE: 139 MMHG | HEIGHT: 69 IN | DIASTOLIC BLOOD PRESSURE: 84 MMHG | HEART RATE: 99 BPM | TEMPERATURE: 98.5 F

## 2021-01-21 DIAGNOSIS — M48.07 STENOSIS OF LUMBOSACRAL SPINE: Primary | ICD-10-CM

## 2021-01-21 PROCEDURE — 99213 OFFICE O/P EST LOW 20 MIN: CPT | Performed by: NURSE PRACTITIONER

## 2021-01-21 ASSESSMENT — PAIN SCALES - GENERAL: PAINLEVEL: NO PAIN (0)

## 2021-01-21 ASSESSMENT — MIFFLIN-ST. JEOR: SCORE: 1515.93

## 2021-01-21 NOTE — PATIENT INSTRUCTIONS
Referral for physical therapy. They will call you to schedule.    If symptoms persist, change, or worsen, please  follow-up with Dr. Davila to discuss surgery.       [facility] Neurosurgery  [address] [address]  Tel [phone]

## 2021-01-21 NOTE — LETTER
1/21/2021         RE: Gustavo Ramon  [address] [facility] [address]        Dear Colleague,    Thank you for referring your patient, Gustavo Ramon, [facility] Please see a copy of my visit note below.    Dr. Pato Davila   [facility] Neurosurgery Clinic Visit      CC: low back pain     Primary care Provider: Winston Silverman    Reason For Visit:   I was asked by Winston Silverman PA-C to consult on the patient for acute bilateral low back pain with sciatica.      HPI: Gustavo Ramon is an 80 year old male with history of L2-3 discectomy with Dr. Potts about 18 years ago, did well post op for many years, then developed back pain in November 2020. Denies any precipitating trauma or injuries. Pain is located in the right side low back and radiates to right hip. He notes paresthesias in right leg. He also reports one episode of BLE numbness after standing for a prolonged period of time, but those symptoms resolved. Denies any weakness. Pain is worsened with flexion/extension. He's been trying to do some home exercises. Has not tried OTC pain medications yet. No recent PT or injections. Denies any falls, foot drop, or bladder/bowel incontinence.     Current pain: 0/10   At worst: 2/10    Past Medical History:   Diagnosis Date     Actinic keratosis      AK (actinic keratosis) 11/15/2012     Amaurosis fugax      Basal cell carcinoma 2009    R medial cheek/nose     Central serous retinopathy left    Eye     Diverticulosis 08/2006     DJD (degenerative joint disease)      GERD (gastroesophageal reflux disease)      Hearing loss     High frequency     History of nonmelanoma skin cancer      History of nonmelanoma skin cancer      HTN (hypertension)      Hyperlipidemia LDL goal < 130      Hyperplastic colon polyp 08/2006     IgA monoclonal gammopathy     IgA kappa     Lattice degeneration of retina right    Eye     Nonsenile cataract      Ocular  myasthenia gravis (H) 2/2009    [facility] consult     Rosacea      Vitreous detachment left    Eye       Past Medical History reviewed with patient during visit.    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    Current Outpatient Medications   Medication     aspirin 325 MG tablet     cyanocobalamin (VITAMIN B-12) 1000 MCG tablet     ferrous sulfate (FEROSUL) 325 (65 Fe) MG tablet     fluorouracil (EFUDEX) 5 % external cream     hydrochlorothiazide (HYDRODIURIL) 25 MG tablet     lisinopril (ZESTRIL) 5 MG tablet     Multiple Vitamins-Minerals (PRESERVISION AREDS 2) CAPS     omeprazole (PRILOSEC) 20 MG DR capsule     predniSONE (DELTASONE) 20 MG tablet     pyridostigmine (MESTINON) 60 MG tablet     simvastatin (ZOCOR) 20 MG tablet     triamcinolone (KENALOG) 0.1 % external cream     vitamin D3 (CHOLECALCIFEROL) 2000 units tablet     predniSONE (DELTASONE) 20 MG tablet     sulfamethoxazole-trimethoprim (BACTRIM DS) 800-160 MG tablet     No current facility-administered  medications for this visit.        Allergies   Allergen Reactions     Nkda [No Known Drug Allergies]        Social History     Socioeconomic History     Marital status:      Spouse name: Ceci     Number of children: 2     Years of education: 16     Highest education level: None   Occupational History     Occupation:      Employer: RETIRED     Comment: 2001,    Social Needs     Financial resource strain: None     Food insecurity     Worry: None     Inability: None     Transportation needs     Medical: None     Non-medical: None   Tobacco Use     Smoking status: Never Smoker     Smokeless tobacco: Never Used   Substance and Sexual Activity     Alcohol use: No     Alcohol/week: 0.0 standard drinks     Drug use: No     Sexual activity: Never   Lifestyle     Physical activity     Days per week: None     Minutes per session: None     Stress: None   Relationships     Social connections     Talks on phone: None     Gets together: None     Attends Sabianism service: None     Active member of club or organization: None     Attends meetings of clubs or organizations: None     Relationship status: None     Intimate partner violence     Fear of current or ex partner: None     Emotionally abused: None     Physically abused: None     Forced sexual activity: None   Other Topics Concern     Parent/sibling w/ CABG, MI or angioplasty before 65F 55M? Not Asked   Social History Narrative     None       Family History   Problem Relation Age of Onset     Heart Disease Mother      Alzheimer Disease Mother 73     C.A.D. Father      Diabetes Grandchild         using a pump      Diabetes Paternal Uncle      Heart Disease Paternal Grandmother      Glaucoma No family hx of      Macular Degeneration No family hx of      Melanoma No family hx of      Skin Cancer No family hx of        ROS: 10 point ROS neg other than the symptoms noted above in the HPI.    Vital Signs: /84 (BP Location: Left arm, Patient  "Position: Sitting)   Pulse 99   Temp 98.5  F (36.9  C)   Ht 5' 9\" (1.753 m)   Wt 182 lb (82.6 kg)   SpO2 98%   BMI 26.88 kg/m      Examination:  Constitutional:  Alert, well nourished, NAD.  HEENT: Normocephalic, atraumatic.   Pulmonary:  Without shortness of breath, normal effort.   Lymph: No lymphadenopathy to low back or LE.   Integumentary: Skin is free of rashes or lesions.   Cardiovascular:  No pitting edema of BLE.      Neurological:  Awake  Alert  Oriented x 3  Speech clear  Cranial nerves II - XII grossly intact  PERRL  EOMI  Face symmetric  Tongue midline  Motor exam   Hip Flexor:                 Right: 5/5  Left:  5/5  Quadriceps:               Right:  5/5  Left:  5/5  Hamstrings:               Right:  5/5  Left:  5/5  Gastroc Soleus:         Right:  5/5  Left:  5/5  Tib/Ant:                      Right:  5/5  Left:  5/5  EHL:                          Right:  5/5  Left:  5/5         Sensation normal to bilateral upper and lower extremities.    Reflexes are 2+ in the patellar and Achilles. There is no clonus.     Musculoskeletal:  Gait: Able to stand from a seated position. Normal non-antalgic, non-myelopathic gait. Able to heel/toe walk without loss of balance.    Lumbar examination reveals no tenderness of the spine or paraspinous muscles.  Hip height is symmetrical. Negative SI joint, sciatic notch or greater trochanteric tenderness to palpation bilaterally.  Straight leg raise is negative bilaterally.      Imaging:   MRI of the lumbar spine from 12/7/20 was reviewed in the office today.   IMPRESSION:    1. Multilevel degenerative change.  2. Central spinal stenosis and subarticular stenosis at L1-L2, L2-L3, L3-L4, and L4-L5. The central stenosis is most severe at the L4-L5 level.  3. Foraminal stenosis at multiple levels due to the degenerative changes.    Assessment/Plan:   80 year old male with history of L2-3 discectomy with Dr. Potts about 18 years ago, did well post op for many years, then " developed back pain in November 2020. Denies any precipitating trauma or injuries. Pain is located in the right side low back and radiates to right hip. He notes paresthesias in right leg. Denies any weakness, falls, foot drop, or bladder/bowel incontinence. MRI reviewed in clinic. We discussed conservative options vs surgery. Patient would like to try a course of PT at this time. I advised him to follow-up with Dr. Davila to discuss surgical options as well.     We discussed red flag symptoms and to seek medical attention if these develop. Patient voiced understanding and agreement with this plan.       Indira Philip CNP  [facility] Neurosurgery  [address] [address]  Tel [phone]  Pager [phone]        Again, thank you for allowing me to participate in the care of your patient.        Sincerely,        Indira Philip, ABDIRASHID

## 2021-01-21 NOTE — NURSING NOTE
"Gustavo Ramon is a 80 year old male who presents for:  Chief Complaint   Patient presents with     Consult     Bilateral LBP w/ L sciatica        Initial Vitals:  /84 (BP Location: Left arm, Patient Position: Sitting)   Pulse 99   Temp 98.5  F (36.9  C)   Ht 5' 9\" (1.753 m)   Wt 182 lb (82.6 kg)   SpO2 98%   BMI 26.88 kg/m   Estimated body mass index is 26.88 kg/m  as calculated from the following:    Height as of this encounter: 5' 9\" (1.753 m).    Weight as of this encounter: 182 lb (82.6 kg).. Body surface area is 2.01 meters squared. BP completed using cuff size: regular  No Pain (0)    Nursing Comments: Patient presents w/ bilateral LBP w rt sided sciatica    Hesham Camilo MA  "

## 2021-01-21 NOTE — PROGRESS NOTES
Dr. Pato Davila   [facility]surgery Clinic Visit      CC: low back pain     Primary care Provider: Winston Silverman    Reason For Visit:   I was asked by Winston Silverman PA-C to consult on the patient for acute bilateral low back pain with sciatica.      HPI: Gustavo Ramon is an 80 year old male with history of L2-3 discectomy with Dr. Potts about 18 years ago, did well post op for many years, then developed back pain in November 2020. Denies any precipitating trauma or injuries. Pain is located in the right side low back and radiates to right hip. He notes paresthesias in right leg. He also reports one episode of BLE numbness after standing for a prolonged period of time, but those symptoms resolved. Denies any weakness. Pain is worsened with flexion/extension. He's been trying to do some home exercises. Has not tried OTC pain medications yet. No recent PT or injections. Denies any falls, foot drop, or bladder/bowel incontinence.     Current pain: 0/10   At worst: 2/10    Past Medical History:   Diagnosis Date     Actinic keratosis      AK (actinic keratosis) 11/15/2012     Amaurosis fugax      Basal cell carcinoma 2009    R medial cheek/nose     Central serous retinopathy left    Eye     Diverticulosis 08/2006     DJD (degenerative joint disease)      GERD (gastroesophageal reflux disease)      Hearing loss     High frequency     History of nonmelanoma skin cancer      History of nonmelanoma skin cancer      HTN (hypertension)      Hyperlipidemia LDL goal < 130      Hyperplastic colon polyp 08/2006     IgA monoclonal gammopathy     IgA kappa     Lattice degeneration of retina right    Eye     Nonsenile cataract      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    [facility] consult     Rosacea      Vitreous detachment left    Eye       Past Medical History reviewed with patient during visit.    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    Current Outpatient Medications   Medication     aspirin 325 MG tablet     cyanocobalamin (VITAMIN B-12) 1000 MCG tablet     ferrous sulfate (FEROSUL) 325 (65 Fe) MG tablet     fluorouracil (EFUDEX) 5 % external cream     hydrochlorothiazide (HYDRODIURIL) 25 MG tablet     lisinopril (ZESTRIL) 5 MG tablet     Multiple Vitamins-Minerals (PRESERVISION AREDS 2) CAPS     omeprazole (PRILOSEC) 20 MG DR capsule     predniSONE (DELTASONE) 20 MG tablet     pyridostigmine (MESTINON) 60 MG tablet     simvastatin (ZOCOR) 20 MG tablet     triamcinolone (KENALOG) 0.1 % external cream     vitamin D3 (CHOLECALCIFEROL) 2000 units tablet     predniSONE (DELTASONE) 20 MG tablet     sulfamethoxazole-trimethoprim (BACTRIM DS) 800-160 MG tablet     No current facility-administered medications for this visit.        Allergies   Allergen Reactions     Nkda [No Known Drug Allergies]        Social History     Socioeconomic History     Marital status:      Spouse name: Ceci     Number of children: 2     Years of education: 16     Highest education level: None  "  Occupational History     Occupation:      Employer: RETIRED     Comment: 2001,    Social Needs     Financial resource strain: None     Food insecurity     Worry: None     Inability: None     Transportation needs     Medical: None     Non-medical: None   Tobacco Use     Smoking status: Never Smoker     Smokeless tobacco: Never Used   Substance and Sexual Activity     Alcohol use: No     Alcohol/week: 0.0 standard drinks     Drug use: No     Sexual activity: Never   Lifestyle     Physical activity     Days per week: None     Minutes per session: None     Stress: None   Relationships     Social connections     Talks on phone: None     Gets together: None     Attends Denominational service: None     Active member of club or organization: None     Attends meetings of clubs or organizations: None     Relationship status: None     Intimate partner violence     Fear of current or ex partner: None     Emotionally abused: None     Physically abused: None     Forced sexual activity: None   Other Topics Concern     Parent/sibling w/ CABG, MI or angioplasty before 65F 55M? Not Asked   Social History Narrative     None       Family History   Problem Relation Age of Onset     Heart Disease Mother      Alzheimer Disease Mother 73     C.A.D. Father      Diabetes Grandchild         using a pump      Diabetes Paternal Uncle      Heart Disease Paternal Grandmother      Glaucoma No family hx of      Macular Degeneration No family hx of      Melanoma No family hx of      Skin Cancer No family hx of        ROS: 10 point ROS neg other than the symptoms noted above in the HPI.    Vital Signs: /84 (BP Location: Left arm, Patient Position: Sitting)   Pulse 99   Temp 98.5  F (36.9  C)   Ht 5' 9\" (1.753 m)   Wt 182 lb (82.6 kg)   SpO2 98%   BMI 26.88 kg/m      Examination:  Constitutional:  Alert, well nourished, NAD.  HEENT: Normocephalic, atraumatic.   Pulmonary:  Without shortness of breath, normal effort. "   Lymph: No lymphadenopathy to low back or LE.   Integumentary: Skin is free of rashes or lesions.   Cardiovascular:  No pitting edema of BLE.      Neurological:  Awake  Alert  Oriented x 3  Speech clear  Cranial nerves II - XII grossly intact  PERRL  EOMI  Face symmetric  Tongue midline  Motor exam   Hip Flexor:                 Right: 5/5  Left:  5/5  Quadriceps:               Right:  5/5  Left:  5/5  Hamstrings:               Right:  5/5  Left:  5/5  Gastroc Soleus:         Right:  5/5  Left:  5/5  Tib/Ant:                      Right:  5/5  Left:  5/5  EHL:                          Right:  5/5  Left:  5/5         Sensation normal to bilateral upper and lower extremities.    Reflexes are 2+ in the patellar and Achilles. There is no clonus.     Musculoskeletal:  Gait: Able to stand from a seated position. Normal non-antalgic, non-myelopathic gait. Able to heel/toe walk without loss of balance.    Lumbar examination reveals no tenderness of the spine or paraspinous muscles.  Hip height is symmetrical. Negative SI joint, sciatic notch or greater trochanteric tenderness to palpation bilaterally.  Straight leg raise is negative bilaterally.      Imaging:   MRI of the lumbar spine from 12/7/20 was reviewed in the office today.   IMPRESSION:    1. Multilevel degenerative change.  2. Central spinal stenosis and subarticular stenosis at L1-L2, L2-L3, L3-L4, and L4-L5. The central stenosis is most severe at the L4-L5 level.  3. Foraminal stenosis at multiple levels due to the degenerative changes.    Assessment/Plan:   80 year old male with history of L2-3 discectomy with Dr. Potts about 18 years ago, did well post op for many years, then developed back pain in November 2020. Denies any precipitating trauma or injuries. Pain is located in the right side low back and radiates to right hip. He notes paresthesias in right leg. Denies any weakness, falls, foot drop, or bladder/bowel incontinence. MRI reviewed in clinic. We  discussed conservative options vs surgery. Patient would like to try a course of PT at this time. I advised him to follow-up with Dr. Davila to discuss surgical options as well.     We discussed red flag symptoms and to seek medical attention if these develop. Patient voiced understanding and agreement with this plan.       Indira Philip [facility] Neurosurgery  [address] [address]  Tel [phone]  Pager [phone]

## 2021-01-22 ENCOUNTER — THERAPY VISIT (OUTPATIENT)
Dept: PHYSICAL THERAPY | Facility: CLINIC | Age: 81
End: 2021-01-22
Attending: NURSE PRACTITIONER
Payer: MEDICARE

## 2021-01-22 DIAGNOSIS — M48.062 SPINAL STENOSIS, LUMBAR REGION, WITH NEUROGENIC CLAUDICATION: Primary | ICD-10-CM

## 2021-01-22 PROCEDURE — 97162 PT EVAL MOD COMPLEX 30 MIN: CPT | Mod: GP | Performed by: PHYSICAL THERAPIST

## 2021-01-22 PROCEDURE — 97112 NEUROMUSCULAR REEDUCATION: CPT | Mod: GP | Performed by: PHYSICAL THERAPIST

## 2021-01-22 NOTE — LETTER
"DEPARTMENT OF HEALTH AND HUMAN SERVICES  CENTERS FOR MEDICARE & MEDICAID SERVICES    PLAN/UPDATED PLAN OF PROGRESS FOR OUTPATIENT REHABILITATION    PATIENTS NAME:  Gustavo Ramon   : 1940  PROVIDER NUMBER:    [id number]  HICN:  [id number]  PROVIDER NAME: LUIS KLINE PT  MEDICAL RECORD NUMBER: [id number]   START OF CARE DATE:  SOC Date: 21   TYPE:  PT  PRIMARY/TREATMENT DIAGNOSIS: (Pertinent Medical Diagnosis)  Spinal stenosis, lumbar region, with neurogenic claudication  VISITS FROM START OF CARE:  Rxs Used: 1       [facility] Therapy Initial Evaluation  2021     Precautions/Restrictions/MD instructions: If symptoms persist, change, or worsen, please follow-up with Dr. Davila to discuss surgery.    Therapist Assessment: Gustavo Ramon is a 80 year old male patient presenting to Physical Therapy with low back and hip pain with paresthesias. Patient demonstrates decreased ROM, decreased strength, and decreased function. Signs and symptoms are consistent with low back pain and leg symptoms consistent with neurogenic claudication related to lumbar central stenosis.  These impairments limit their ability to stand for extended periods. Skilled PT services are necessary in order to reduce impairments and improve independent function.    Subjective History    Injury/Condition Details:  Presenting Complaint Low back pain (R)   Onset Timing/Date 2020   (Doctor's referral 21)   Mechanism unknown     Symptom Behavior Details    Primary Symptoms Sporadic symptoms; Activity/position dependent, pain (Location: R side low back, R hip, Quality: \"discomfort\") Denies locking, catching, giving way, or instability. Reports numbness in B feet, specifically denied descriptor of \"pins and needles.\" Reports feeling of \"warmth\" that goes down both legs to his toes and the soles of his feet which is variable in onset and resolution.  Feels warmth in buttocks and is " "worried about incontinence, but has not had instances of leakage so far except for once 3 years ago   Worst Pain 3/10 (with standing)   Symptom Provocators Standing causes feet to become \"numb\" (not pins and needles, \"feels like nothing's there\") after 15min; this resolves within 1-2 minutes of sitting; On Thursdays he organizes all of his medications for the following week - it takes him 25min   Best Pain 0/10    Symptom Relievers Sitting (especially sitting up with \"good posture\")   Time of day dependent? No   Recent symptom change? symptoms worsening     Prior Testing/Intervention for current condition:  Prior Tests  MRI 12/7/20 lumbar  IMPRESSION:    1. Multilevel degenerative change.  2. Central spinal stenosis and subarticular stenosis at L1-L2, L2-L3, L3-L4, and L4-L5. The central stenosis is most severe at the L4-L5 level.  3. Foraminal stenosis at multiple levels due to the degenerative changes.   Prior Treatment none     Lifestyle & General Medical History:  Employment Retired , [employer] 30 years   Usual physical activities  (within past year) Walking a few times a month (used to go 2 miles, but now is <0.5 miles)   Orthopaedic History  Hx of L2-3 discectomy in 2003, arthroscopy B knees 2010   Medication  Prednisone, high blood pressure   Notable medical history Ocular myasthenia gravis, hx of basil cell carcinoma & prostate cancer   Patient goals Wants to walk more   Patient Reported Health good     Red Flags: (Bold when present) - reviewed the following and denies  Cauda equina: progressive motor or sensory loss, urinary retention or overflow incontinence, new fecal incontinence, saddle anesthesia, significant motor deficits encompassing multiple nerve roots  Fracture: Significant trauma, prolonged corticosteroid use, osteoporosis, age >70  Infection: spinal procedure in the last 12 months, IV drug use, immunosuppression, fever, wound in spinal region, generally " "unwell  Malignancy: history of metastatic cancer, unexplained weight loss      PHYSICAL THERAPY SPINE EXAMINATION    Dynamic Movement Screen:  2 leg stance: wide stance    1 leg stance:   Right: NT  Left: NT    Gait: wide foot position, inc flexion, decreased step length, decreased trunk rotation    Pt has difficulty remaining upright when sitting without back support    Lumbar & Thoracic Spine ROM Screen   RIGHT LEFT   Standing Lumbar Spine ROM   Flexion Hands to knees with knees bent - no change in sx   Extension Decreased - inc feeling of \"pressure\" below B knees   Sidebend Unable to do without contra hip rotation - no change in sx Unable to do without contra hip rotation - no change in sx   Seated Thoracic Spine ROM   Rotation  Flexion   Extension Decreased  Normal  decreased decreased       Passive Joint Mobility Screen: NT    Tender to palpation at the following structures: NT - not enough time  NOT tender to palpation at the following structures: NT - not enough time     Motor Deficit:  Myotomes L R   L1-2 (hip flexion) 5/5 5/5   L3 (knee extension) 5/5 5/5   L4 (ankle DF) 5/5 5/5   L5 (g. toe ext) 5/5 5/5   S1 (ankle PF or knee flex) 5/5 5/5   (* = patient s pain)  Sensory Exam: SILT and symmetrical for bilateral LE's. Reflexes patellar 2+ L, 1+ R, Achilles 1+ B    Lower Extremity Muscle Strength (x/5)   Left Right   Hip Flex 4/5 4/5   Hip ER NT/5 NT/5   Hip IR NT/5 NT/5   Hip ABD NT/5 NT/5   Hip ADD NT/5 NT/5   Hip Ext NT/5 NT/5   Knee Flex 3+/5 3+/5       Lower Extremity Neural System Examination   Right Left   NEURAL TENSION     Seated Slump Test NT NT   Supine SLR Test (Sciatic n.) NT NT   Prone KF (Femoral n.) NT NT     Lower Extremity Flexibility Screen:   Right Left   Hamstring NT NT   KATIE NT NT   Goalie Stretch NT NT   Hip Flexor NT NT   ITB/Lat Hip in SL NT NT   Quadriceps NT NT   Gastroc/ Soleus NT NT   - = normal  + = mild tightness  ++ = moderate tightness  +++ = significant " tightness  ASSESSMENT/PLAN:  Was unable to complete full evaluation due to patient talking a great deal during the subjective. Pt brought a printed list of very detailed symptoms with a list of questions about his medical diagnoses.  The patient is a 80 year old male with chief complaint of low back pain and leg symptoms consistent with neurogenic claudication related to lumbar central stenosis.   The patient has the following significant findings with corresponding treatment plan.  Diagnosis 1:  Lumbarl stenosis with neurogenic claudication  Pain -  hot/cold therapy, US, electric stimulation, manual therapy, self management, education, directional preference exercise and home program  Decreased ROM/flexibility - manual therapy, therapeutic exercise and home program  Decreased joint mobility - manual therapy, therapeutic exercise and home program  Decreased strength - therapeutic exercise, therapeutic activities and home program  Impaired balance - neuro re-education, therapeutic activities and home program  Decreased proprioception - neuro re-education and therapeutic activities  Impaired gait - gait training and assistive devices  Impaired muscle performance - neuro re-education and home program  Decreased function - therapeutic activities and home program  Impaired posture - neuro re-education, therapeutic activities and home program  Instability -  Therapeutic Activity, Therapeutic Exercise, Neuromuscular Re-education, Splinting/Taping/Bracing/Orthotic, home program    Therapy Evaluation Codes:   1) History comprised of:   Personal factors that impact the plan of care:      Age and Coping style.    Comorbidity factors that impact the plan of care are:      Cancer.     Medications impacting care: High blood pressure and Steroids.  2) Examination of Body Systems comprised of:   Body structures and functions that impact the plan of care:      Lumbar spine and Sacral illiac joint.   Activity limitations that impact  "the plan of care are:      Standing.  3) Clinical presentation characteristics are:   Evolving/Changing.  4) Decision-Making    Moderate complexity using standardized patient assessment instrument and/or measureable assessment of functional outcome.  Cumulative Therapy Evaluation is: Moderate complexity.  Previous and current functional limitations:  (See Goal Flow Sheet for this information)    Short term and Long term goals: (See Goal Flow Sheet for this information)   Communication ability:  Patient appears to be able to clearly communicate and understand verbal and written communication and follow directions correctly.  Treatment Explanation - The following has been discussed with the patient:   RX ordered/plan of care  PATIENTS NAME:  Gustavo Ramon   : 1940    Anticipated outcomes  Possible risks and side effects  This patient would benefit from PT intervention to resume normal activities.   Rehab potential is fair.  Frequency:  1 X week, once daily  Duration:  for 6-8 weeks  Discharge Plan: Achieve all LTGs, be independent in home treatment program, and reach maximal therapeutic benefit.  Please refer to the daily flowsheet for treatment today, total treatment time and time spent performing 1:1 timed codes.           Caregiver Signature/Credentials _____________________________ Date ________     Treating Provider: Cindy Connolly, PT, DPT   I have reviewed and certified the need for these services and plan of treatment while under my care.        PHYSICIAN'S SIGNATURE:   _________________________________________  Date___________   Indira Philip NP    Certification period:  Beginning of Cert date period: 21 to  End of Cert period date: 21     Functional Level Progress Report: Please see attached \"Goal Flow sheet for Functional level.\"    ____X____ Continue Services or       ________ DC Services                Service dates: From  SOC Date: 21 date to present                  "

## 2021-01-22 NOTE — PROGRESS NOTES
"  [facility] Initial Evaluation  1/22/2021     Precautions/Restrictions/MD instructions: If symptoms persist, change, or worsen, please follow-up with Dr. Davila to discuss surgery.    Therapist Assessment: Gustavo Ramon is a 80 year old male patient presenting to Physical Therapy with low back and hip pain with paresthesias. Patient demonstrates decreased ROM, decreased strength, and decreased function. Signs and symptoms are consistent with low back pain and leg symptoms consistent with neurogenic claudication related to lumbar central stenosis.  These impairments limit their ability to stand for extended periods. Skilled PT services are necessary in order to reduce impairments and improve independent function.    Subjective History    Injury/Condition Details:  Presenting Complaint Low back pain (R)   Onset Timing/Date November 2020   (Doctor's referral 1/21/21)   Mechanism unknown     Symptom Behavior Details    Primary Symptoms Sporadic symptoms; Activity/position dependent, pain (Location: R side low back, R hip, Quality: \"discomfort\") Denies locking, catching, giving way, or instability. Reports numbness in B feet, specifically denied descriptor of \"pins and needles.\" Reports feeling of \"warmth\" that goes down both legs to his toes and the soles of his feet which is variable in onset and resolution.  Feels warmth in buttocks and is worried about incontinence, but has not had instances of leakage so far except for once 3 years ago   Worst Pain 3/10 (with standing)   Symptom Provocators Standing causes feet to become \"numb\" (not pins and needles, \"feels like nothing's there\") after 15min; this resolves within 1-2 minutes of sitting; On Thursdays he organizes all of his medications for the following week - it takes him 25min   Best Pain 0/10    Symptom Relievers Sitting (especially sitting up with \"good posture\")   Time of day dependent? No   Recent symptom change? " symptoms worsening     Prior Testing/Intervention for current condition:  Prior Tests  MRI 12/7/20 lumbar  IMPRESSION:    1. Multilevel degenerative change.  2. Central spinal stenosis and subarticular stenosis at L1-L2, L2-L3, L3-L4, and L4-L5. The central stenosis is most severe at the L4-L5 level.  3. Foraminal stenosis at multiple levels due to the degenerative changes.   Prior Treatment none     Lifestyle & General Medical History:  Employment Retired , [employer] 30 years   Usual physical activities  (within past year) Walking a few times a month (used to go 2 miles, but now is <0.5 miles)   Orthopaedic History  Hx of L2-3 discectomy in 2003, arthroscopy B knees 2010   Medication  Prednisone, high blood pressure   Notable medical history Ocular myasthenia gravis, hx of basil cell carcinoma & prostate cancer   Patient goals Wants to walk more   Patient Reported Health good     Red Flags: (Bold when present) - reviewed the following and denies  Cauda equina: progressive motor or sensory loss, urinary retention or overflow incontinence, new fecal incontinence, saddle anesthesia, significant motor deficits encompassing multiple nerve roots  Fracture: Significant trauma, prolonged corticosteroid use, osteoporosis, age >70  Infection: spinal procedure in the last 12 months, IV drug use, immunosuppression, fever, wound in spinal region, generally unwell  Malignancy: history of metastatic cancer, unexplained weight loss        PHYSICAL THERAPY SPINE EXAMINATION    Dynamic Movement Screen:  2 leg stance: wide stance    1 leg stance:   Right: NT  Left: NT    Gait: wide foot position, inc flexion, decreased step length, decreased trunk rotation    Pt has difficulty remaining upright when sitting without back support    Lumbar & Thoracic Spine ROM Screen   RIGHT LEFT   Standing Lumbar Spine ROM   Flexion Hands to knees with knees bent - no change in sx   Extension Decreased - inc feeling of  "\"pressure\" below B knees   Sidebend Unable to do without contra hip rotation - no change in sx Unable to do without contra hip rotation - no change in sx   Seated Thoracic Spine ROM   Rotation  Flexion   Extension Decreased  Normal  decreased decreased       Passive Joint Mobility Screen: NT    Tender to palpation at the following structures: NT - not enough time  NOT tender to palpation at the following structures: NT - not enough time     Motor Deficit:  Myotomes L R   L1-2 (hip flexion) 5/5 5/5   L3 (knee extension) 5/5 5/5   L4 (ankle DF) 5/5 5/5   L5 (g. toe ext) 5/5 5/5   S1 (ankle PF or knee flex) 5/5 5/5   (* = patient s pain)  Sensory Exam: SILT and symmetrical for bilateral LE's. Reflexes patellar 2+ L, 1+ R, Achilles 1+ B    Lower Extremity Muscle Strength (x/5)   Left Right   Hip Flex 4/5 4/5   Hip ER NT/5 NT/5   Hip IR NT/5 NT/5   Hip ABD NT/5 NT/5   Hip ADD NT/5 NT/5   Hip Ext NT/5 NT/5   Knee Flex 3+/5 3+/5       Lower Extremity Neural System Examination   Right Left   NEURAL TENSION     Seated Slump Test NT NT   Supine SLR Test (Sciatic n.) NT NT   Prone KF (Femoral n.) NT NT     Lower Extremity Flexibility Screen:   Right Left   Hamstring NT NT   KATIE NT NT   Goalie Stretch NT NT   Hip Flexor NT NT   ITB/Lat Hip in SL NT NT   Quadriceps NT NT   Gastroc/ Soleus NT NT   - = normal  + = mild tightness  ++ = moderate tightness  +++ = significant tightness    ASSESSMENT/PLAN:  Was unable to complete full evaluation due to patient talking a great deal during the subjective. Pt brought a printed list of very detailed symptoms with a list of questions about his medical diagnoses.  The patient is a 80 year old male with chief complaint of low back pain and leg symptoms consistent with neurogenic claudication related to lumbar central stenosis.   The patient has the following significant findings with corresponding treatment plan.  Diagnosis 1:  Lumbarl stenosis with neurogenic claudication  Pain -  hot/cold " therapy, US, electric stimulation, manual therapy, self management, education, directional preference exercise and home program  Decreased ROM/flexibility - manual therapy, therapeutic exercise and home program  Decreased joint mobility - manual therapy, therapeutic exercise and home program  Decreased strength - therapeutic exercise, therapeutic activities and home program  Impaired balance - neuro re-education, therapeutic activities and home program  Decreased proprioception - neuro re-education and therapeutic activities  Impaired gait - gait training and assistive devices  Impaired muscle performance - neuro re-education and home program  Decreased function - therapeutic activities and home program  Impaired posture - neuro re-education, therapeutic activities and home program  Instability -  Therapeutic Activity, Therapeutic Exercise, Neuromuscular Re-education, Splinting/Taping/Bracing/Orthotic, home program    Therapy Evaluation Codes:   1) History comprised of:   Personal factors that impact the plan of care:      Age and Coping style.    Comorbidity factors that impact the plan of care are:      Cancer.     Medications impacting care: High blood pressure and Steroids.  2) Examination of Body Systems comprised of:   Body structures and functions that impact the plan of care:      Lumbar spine and Sacral illiac joint.   Activity limitations that impact the plan of care are:      Standing.  3) Clinical presentation characteristics are:   Evolving/Changing.  4) Decision-Making    Moderate complexity using standardized patient assessment instrument and/or measureable assessment of functional outcome.  Cumulative Therapy Evaluation is: Moderate complexity.    Previous and current functional limitations:  (See Goal Flow Sheet for this information)    Short term and Long term goals: (See Goal Flow Sheet for this information)     Communication ability:  Patient appears to be able to clearly communicate and  understand verbal and written communication and follow directions correctly.  Treatment Explanation - The following has been discussed with the patient:   RX ordered/plan of care  Anticipated outcomes  Possible risks and side effects  This patient would benefit from PT intervention to resume normal activities.   Rehab potential is fair.    Frequency:  1 X week, once daily  Duration:  for 8 weeks  Discharge Plan: Achieve all LTGs, be independent in home treatment program, and reach maximal therapeutic benefit.    Please refer to the daily flowsheet for treatment today, total treatment time and time spent performing 1:1 timed codes.

## 2021-01-22 NOTE — LETTER
"DEPARTMENT OF HEALTH AND HUMAN SERVICES  [facility] FOR [facility]ARE & MEDICAID SERVICES    PLAN/UPDATED PLAN OF PROGRESS FOR OUTPATIENT REHABILITATION    PATIENTS NAME:  Gustavo Ramon   : 1940  PROVIDER NUMBER:    [id number]  HICN:  [id number]  PROVIDER NAME: LUIS [facility] PT  MEDICAL RECORD NUMBER: [id number]   START OF CARE DATE:  SOC Date: 21   TYPE:  PT  PRIMARY/TREATMENT DIAGNOSIS: (Pertinent Medical Diagnosis)  Spinal stenosis, lumbar region, with neurogenic claudication  VISITS FROM START OF CARE:  Rxs Used: 1       [facility] Initial Evaluation  2021   Precautions/Restrictions/MD instructions: If symptoms persist, change, or worsen, please follow-up with Dr. Davila to discuss surgery.    Therapist Assessment: Gustavo Ramon is a 80 year old male patient presenting to Physical Therapy with low back and hip pain with paresthesias. Patient demonstrates decreased ROM, decreased strength, and decreased function. Signs and symptoms are consistent with low back pain and leg symptoms consistent with neurogenic claudication related to lumbar central stenosis.  These impairments limit their ability to stand for extended periods. Skilled PT services are necessary in order to reduce impairments and improve independent function.    Subjective History  Injury/Condition Details:  Presenting Complaint Low back pain (R)   Onset Timing/Date 2020   (Doctor's referral 21)   Mechanism unknown     Symptom Behavior Details    Primary Symptoms Sporadic symptoms; Activity/position dependent, pain (Location: R side low back, R hip, Quality: \"discomfort\") Denies locking, catching, giving way, or instability. Reports numbness in B feet, specifically denied descriptor of \"pins and needles.\" Reports feeling of \"warmth\" that goes down both legs to his toes and the soles of his feet which is variable in onset and resolution.  Feels warmth in buttocks and is worried " "about incontinence, but has not had instances of leakage so far except for once 3 years ago   Worst Pain 3/10 (with standing)   Symptom Provocators Standing causes feet to become \"numb\" (not pins and needles, \"feels like nothing's there\") after 15min; this resolves within 1-2 minutes of sitting; On Thursdays he organizes all of his medications for the following week - it takes him 25min   Best Pain 0/10    Symptom Relievers Sitting (especially sitting up with \"good posture\")   Time of day dependent? No   Recent symptom change? symptoms worsening     Prior Testing/Intervention for current condition:  Prior Tests  MRI 12/7/20 lumbar  IMPRESSION:    1. Multilevel degenerative change.  2. Central spinal stenosis and subarticular stenosis at L1-L2, L2-L3, L3-L4, and L4-L5. The central stenosis is most severe at the L4-L5 level.  3. Foraminal stenosis at multiple levels due to the degenerative changes.   Prior Treatment none     Lifestyle & General Medical History:  Employment Retired , [employer] 30 years   Usual physical activities  (within past year) Walking a few times a month (used to go 2 miles, but now is <0.5 miles)   Orthopaedic History  Hx of L2-3 discectomy in 2003, arthroscopy B knees 2010   Medication  Prednisone, high blood pressure   Notable medical history Ocular myasthenia gravis, hx of basil cell carcinoma & prostate cancer   Patient goals Wants to walk more   Patient Reported Health good     Red Flags: (Bold when present) - reviewed the following and denies  Cauda equina: progressive motor or sensory loss, urinary retention or overflow incontinence, new fecal incontinence, saddle anesthesia, significant motor deficits encompassing multiple nerve roots  Fracture: Significant trauma, prolonged corticosteroid use, osteoporosis, age >70  Infection: spinal procedure in the last 12 months, IV drug use, immunosuppression, fever, wound in spinal region, generally unwell  Malignancy: " "history of metastatic cancer, unexplained weight loss    PHYSICAL THERAPY SPINE EXAMINATION    Dynamic Movement Screen:  2 leg stance: wide stance  1 leg stance:   Right: NT  Left: NT  Gait: wide foot position, inc flexion, decreased step length, decreased trunk rotation  Pt has difficulty remaining upright when sitting without back support  Lumbar & Thoracic Spine ROM Screen   RIGHT LEFT   Standing Lumbar Spine ROM   Flexion Hands to knees with knees bent - no change in sx   Extension Decreased - inc feeling of \"pressure\" below B knees   Sidebend Unable to do without contra hip rotation - no change in sx Unable to do without contra hip rotation - no change in sx   Seated Thoracic Spine ROM   Rotation  Flexion   Extension Decreased  Normal  decreased decreased     Passive Joint Mobility Screen: NT  Tender to palpation at the following structures: NT - not enough time  NOT tender to palpation at the following structures: NT - not enough time   Motor Deficit:  Myotomes L R   L1-2 (hip flexion) 5/5 5/5   L3 (knee extension) 5/5 5/5   L4 (ankle DF) 5/5 5/5   L5 (g. toe ext) 5/5 5/5   S1 (ankle PF or knee flex) 5/5 5/5   (* = patient s pain)  Sensory Exam: SILT and symmetrical for bilateral LE's. Reflexes patellar 2+ L, 1+ R, Achilles 1+ B  Lower Extremity Muscle Strength (x/5)   Left Right   Hip Flex 4/5 4/5   Hip ER NT/5 NT/5   Hip IR NT/5 NT/5   Hip ABD NT/5 NT/5   Hip ADD NT/5 NT/5   Hip Ext NT/5 NT/5   Knee Flex 3+/5 3+/5     Lower Extremity Neural System Examination   Right Left   NEURAL TENSION     Seated Slump Test NT NT   Supine SLR Test (Sciatic n.) NT NT   Prone KF (Femoral n.) NT NT     Lower Extremity Flexibility Screen:   Right Left   Hamstring NT NT   KATIE NT NT   Goalie Stretch NT NT   Hip Flexor NT NT   ITB/Lat Hip in SL NT NT   Quadriceps NT NT   Gastroc/ Soleus NT NT   - = normal  + = mild tightness  ++ = moderate tightness  +++ = significant tightness  ASSESSMENT/PLAN:  Was unable to complete full " evaluation due to patient talking a great deal during the subjective. Pt brought a printed list of very detailed symptoms with a list of questions about his medical diagnoses.  PATIENTS NAME:  Gustavo Ramon   : 1940    The patient is a 80 year old male with chief complaint of low back pain and leg symptoms consistent with neurogenic claudication related to lumbar central stenosis.   The patient has the following significant findings with corresponding treatment plan.  Diagnosis 1:  Lumbarl stenosis with neurogenic claudication  Pain -  hot/cold therapy, US, electric stimulation, manual therapy, self management, education, directional preference exercise and home program  Decreased ROM/flexibility - manual therapy, therapeutic exercise and home program  Decreased joint mobility - manual therapy, therapeutic exercise and home program  Decreased strength - therapeutic exercise, therapeutic activities and home program  Impaired balance - neuro re-education, therapeutic activities and home program  Decreased proprioception - neuro re-education and therapeutic activities  Impaired gait - gait training and assistive devices  Impaired muscle performance - neuro re-education and home program  Decreased function - therapeutic activities and home program  Impaired posture - neuro re-education, therapeutic activities and home program  Instability -  Therapeutic Activity, Therapeutic Exercise, Neuromuscular Re-education, Splinting/Taping/Bracing/Orthotic, home program    Therapy Evaluation Codes:   1) History comprised of:   Personal factors that impact the plan of care:      Age and Coping style.    Comorbidity factors that impact the plan of care are:      Cancer.     Medications impacting care: High blood pressure and Steroids.  2) Examination of Body Systems comprised of:   Body structures and functions that impact the plan of care:      Lumbar spine and Sacral illiac joint.   Activity limitations that impact the  "plan of care are:      Standing.  3) Clinical presentation characteristics are:   Evolving/Changing.  4) Decision-Making    Moderate complexity using standardized patient assessment instrument and/or measureable assessment of functional outcome.  Cumulative Therapy Evaluation is: Moderate complexity.  Previous and current functional limitations:  (See Goal Flow Sheet for this information)    Short term and Long term goals: (See Goal Flow Sheet for this information)   Communication ability:  Patient appears to be able to clearly communicate and understand verbal and written communication and follow directions correctly.  Treatment Explanation - The following has been discussed with the patient:   RX ordered/plan of care  Anticipated outcomes  Possible risks and side effects  This patient would benefit from PT intervention to resume normal activities.   Rehab potential is fair.  Frequency:  1 X week, once daily  Duration:  for 8 weeks  Discharge Plan: Achieve all LTGs, be independent in home treatment program, and reach maximal therapeutic benefit.    PATIENTS NAME:  Gustavo Ramon   : 1940Please refer to the daily flowsheet for treatment today, total treatment time and time spent performing 1:1 timed codes.           Caregiver Signature/Credentials _____________________________ Date ________      Treating Provider: Cindy Connolly, PT, DPT   I have reviewed and certified the need for these services and plan of treatment while under my care.        PHYSICIAN'S SIGNATURE:   _________________________________________  Date___________   Indira Philip NP    Certification period:  Beginning of Cert date period: 21 to  End of Cert period date: 21     Functional Level Progress Report: Please see attached \"Goal Flow sheet for Functional level.\"    ____X____ Continue Services or       ________ DC Services                Service dates: From  SOC Date: 21 date to present                     "

## 2021-01-24 ENCOUNTER — MYC MEDICAL ADVICE (OUTPATIENT)
Dept: FAMILY MEDICINE | Facility: CLINIC | Age: 81
End: 2021-01-24

## 2021-01-24 DIAGNOSIS — I10 BENIGN ESSENTIAL HYPERTENSION: ICD-10-CM

## 2021-01-25 RX ORDER — LISINOPRIL 5 MG/1
5 TABLET ORAL DAILY
Qty: 90 TABLET | Refills: 1 | Status: SHIPPED | OUTPATIENT
Start: 2021-01-25 | End: 2021-07-12

## 2021-01-25 RX ORDER — HYDROCHLOROTHIAZIDE 25 MG/1
12.5 TABLET ORAL DAILY
Qty: 45 TABLET | Refills: 1 | Status: SHIPPED | OUTPATIENT
Start: 2021-01-25 | End: 2021-07-19

## 2021-01-25 NOTE — TELEPHONE ENCOUNTER
Lisinopril (ZESTRIL) 5 MG tablet last written for 90 tablets with 1 refill on 11/17/20.     Hydrochlorothiazide (HYDRODIURIL) 25 MG tablet last written for 45 tables with 3 refills on 11/17/20.     These prescriptions were sent to the [place], [place] - [address] [facility].    According to the above pharmacy, patient filled both of these prescriptions last on 11/23/20.     Patient requesting these be sent to [facility].     Lisinopril (ZESTRIL) 5 MG sent for # 90 with 1 refill.      Hydrochlorothiazide 25 mg sent for #45 with 1 refill.    This will keep these 2 medications on the same refill schedule as patient had requested.        Last Office visit 11/17/20 with Winston Silverman with instructions to: Return in about 6 months (around 5/17/2021) for Annual Wellness Visit, BP Recheck.    My Chart message sent to patient informing him of these refills and reminding him of his next appointment.     Susy Dudley RN BSN  [facility]

## 2021-01-29 ENCOUNTER — THERAPY VISIT (OUTPATIENT)
Dept: PHYSICAL THERAPY | Facility: CLINIC | Age: 81
End: 2021-01-29
Payer: MEDICARE

## 2021-01-29 DIAGNOSIS — M48.062 SPINAL STENOSIS, LUMBAR REGION, WITH NEUROGENIC CLAUDICATION: ICD-10-CM

## 2021-01-29 PROCEDURE — 97110 THERAPEUTIC EXERCISES: CPT | Mod: GP | Performed by: PHYSICAL THERAPIST

## 2021-02-02 DIAGNOSIS — C61 MALIGNANT NEOPLASM OF PROSTATE (H): ICD-10-CM

## 2021-02-02 LAB — PSA SERPL-MCNC: 2.13 UG/L (ref 0–4)

## 2021-02-02 PROCEDURE — 36415 COLL VENOUS BLD VENIPUNCTURE: CPT | Performed by: UROLOGY

## 2021-02-02 PROCEDURE — 84153 ASSAY OF PSA TOTAL: CPT | Performed by: UROLOGY

## 2021-02-04 ENCOUNTER — IMMUNIZATION (OUTPATIENT)
Dept: NURSING | Facility: CLINIC | Age: 81
End: 2021-02-04
Payer: MEDICARE

## 2021-02-04 PROCEDURE — 0001A PR COVID VAC PFIZER DIL RECON 30 MCG/0.3 ML IM: CPT

## 2021-02-04 PROCEDURE — 91300 PR COVID VAC PFIZER DIL RECON 30 MCG/0.3 ML IM: CPT

## 2021-02-05 ENCOUNTER — THERAPY VISIT (OUTPATIENT)
Dept: PHYSICAL THERAPY | Facility: CLINIC | Age: 81
End: 2021-02-05
Payer: MEDICARE

## 2021-02-05 DIAGNOSIS — M48.062 SPINAL STENOSIS, LUMBAR REGION, WITH NEUROGENIC CLAUDICATION: ICD-10-CM

## 2021-02-05 PROCEDURE — 97110 THERAPEUTIC EXERCISES: CPT | Mod: GP | Performed by: PHYSICAL THERAPIST

## 2021-02-05 PROCEDURE — 97112 NEUROMUSCULAR REEDUCATION: CPT | Mod: GP | Performed by: PHYSICAL THERAPIST

## 2021-02-05 NOTE — PROGRESS NOTES
"SUBJECTIVE  Subjective changes as noted by pt:  Has had more frequent bouts of back pain this week, but it hasn't been any  more painful. Has been doing a lot of sitting and hasn't been feeling motivated to go walking. Has been feeling warmth in legs more frequently and at a higher intensity. Now feels it in the buttocks. R>L. \"I feel good most of the time, especially when I'm sitting.\" Today is his pill day, and he was able to stand for 20min without feet falling asleep. Reports noncompliance with HEP.      Current pain level: 0/10     Changes in function:  Yes (See Goal flowsheet attached for changes in current functional level)     Adverse reaction to treatment or activity:  None    OBJECTIVE  Changes in objective findings:  Yes, pt tolerates standing marching to beat of metronome for 3min without increase in symptoms or loss of balance        ASSESSMENT  Stephen continues to require intervention to meet STG and LTG's: PT  Patient is progressing as expected.  Response to therapy has shown an improvement in  function  Progress made towards STG/LTG?  Yes (See Goal flowsheet attached for updates on achievement of STG and LTG)    PLAN  Continue current treatment plan until patient demonstrates readiness to progress to higher level exercises.    PTA/ATC plan:  N/A    Please refer to the daily flowsheet for treatment today, total treatment time and time spent performing 1:1 timed codes.  "

## 2021-02-08 NOTE — PROGRESS NOTES
Stephen is a 80 year old who is being evaluated via a billable video visit.      How would you like to obtain your AVS? MyChart  If the video visit is dropped, the invitation should be resent by: [phone]  Will anyone else be joining your video visit? No      Video Start Time: 855  Assessment & Plan   Problem List Items Addressed This Visit     Malignant neoplasm of prostate (H) - Primary    Relevant Orders    PSA tumor marker           Review of the result(s) of each unique test - psa                15 minutes spent on the date of the encounter doing chart review, history and exam, documentation and further activities as noted above           Work on weight loss  Regular exercise    No follow-ups on file.    Glenn Jones MD  [facility]    Wally Mitchell is a 80 year old who presents to clinic today for the following health issues prostate cancer    HPI        f/u for prostate cancer.  He is s/p cryotherapy on 7/13 for prostate cancer kari 8 with initial psa of 7.4.  He did receive a hormonal shot prior to treatment.  His psa has gone up slowly from 0.27 (2/14) to 0.66 (6/14) and most recently is 2.13 from 0.94 then 1.03 and then 1.09 and 0.77 ,1.05, 1.17, 1.21and now 1.03.  He has some urgency of urination but is doing better.  He denies any incontinence/dysuria/hematuria.    Review of Systems   Constitutional, HEENT, cardiovascular, pulmonary, gi and gu systems are negative, except as otherwise noted.      Objective           Vitals:  No vitals were obtained today due to virtual visit.    Physical Exam   GENERAL: Healthy, alert and no distress  EYES: Eyes grossly normal to inspection.  No discharge or erythema, or obvious scleral/conjunctival abnormalities.  RESP: No audible wheeze, cough, or visible cyanosis.  No visible retractions or increased work of breathing.    SKIN: Visible skin clear. No significant rash, abnormal pigmentation or lesions.  NEURO: Cranial nerves  grossly intact.  Mentation and speech appropriate for age.  PSYCH: Mentation appears normal, affect normal/bright, judgement and insight intact, normal speech and appearance well-groomed.      Status post cryotherapy for prostate cancer with PSA stable for a number of years however recently has gone up to 2.  We will recheck PSA in 6 months.          Video-Visit Details    Type of service:  Video Visit    Video End Time:9:04 AM    Originating Location (pt. Location): Home    Distant Location (provider location):  [facility]     Platform used for Video Visit: [email or web address]

## 2021-02-09 ENCOUNTER — VIRTUAL VISIT (OUTPATIENT)
Dept: UROLOGY | Facility: CLINIC | Age: 81
End: 2021-02-09
Payer: MEDICARE

## 2021-02-09 DIAGNOSIS — C61 MALIGNANT NEOPLASM OF PROSTATE (H): Primary | ICD-10-CM

## 2021-02-09 PROCEDURE — 99213 OFFICE O/P EST LOW 20 MIN: CPT | Mod: 95 | Performed by: UROLOGY

## 2021-02-12 ENCOUNTER — THERAPY VISIT (OUTPATIENT)
Dept: PHYSICAL THERAPY | Facility: CLINIC | Age: 81
End: 2021-02-12
Payer: MEDICARE

## 2021-02-12 DIAGNOSIS — M48.062 SPINAL STENOSIS, LUMBAR REGION, WITH NEUROGENIC CLAUDICATION: ICD-10-CM

## 2021-02-12 PROCEDURE — 97110 THERAPEUTIC EXERCISES: CPT | Mod: GP | Performed by: PHYSICAL THERAPIST

## 2021-02-12 PROCEDURE — 97530 THERAPEUTIC ACTIVITIES: CPT | Mod: GP | Performed by: PHYSICAL THERAPIST

## 2021-02-12 PROCEDURE — 97112 NEUROMUSCULAR REEDUCATION: CPT | Mod: GP | Performed by: PHYSICAL THERAPIST

## 2021-02-13 ENCOUNTER — TELEPHONE (OUTPATIENT)
Dept: FAMILY MEDICINE | Facility: CLINIC | Age: 81
End: 2021-02-13

## 2021-02-19 ENCOUNTER — THERAPY VISIT (OUTPATIENT)
Dept: PHYSICAL THERAPY | Facility: CLINIC | Age: 81
End: 2021-02-19
Payer: MEDICARE

## 2021-02-19 DIAGNOSIS — M48.062 SPINAL STENOSIS, LUMBAR REGION, WITH NEUROGENIC CLAUDICATION: ICD-10-CM

## 2021-02-19 PROCEDURE — 97110 THERAPEUTIC EXERCISES: CPT | Mod: GP | Performed by: PHYSICAL THERAPIST

## 2021-02-19 PROCEDURE — 97112 NEUROMUSCULAR REEDUCATION: CPT | Mod: GP | Performed by: PHYSICAL THERAPIST

## 2021-02-19 PROCEDURE — 97530 THERAPEUTIC ACTIVITIES: CPT | Mod: GP | Performed by: PHYSICAL THERAPIST

## 2021-02-25 ENCOUNTER — IMMUNIZATION (OUTPATIENT)
Dept: NURSING | Facility: CLINIC | Age: 81
End: 2021-02-25
Attending: FAMILY MEDICINE
Payer: MEDICARE

## 2021-02-25 PROCEDURE — 91300 PR COVID VAC PFIZER DIL RECON 30 MCG/0.3 ML IM: CPT

## 2021-02-25 PROCEDURE — 0002A PR COVID VAC PFIZER DIL RECON 30 MCG/0.3 ML IM: CPT

## 2021-03-05 ENCOUNTER — THERAPY VISIT (OUTPATIENT)
Dept: PHYSICAL THERAPY | Facility: CLINIC | Age: 81
End: 2021-03-05
Payer: MEDICARE

## 2021-03-05 DIAGNOSIS — M48.062 SPINAL STENOSIS, LUMBAR REGION, WITH NEUROGENIC CLAUDICATION: ICD-10-CM

## 2021-03-05 PROCEDURE — 97110 THERAPEUTIC EXERCISES: CPT | Mod: GP | Performed by: PHYSICAL THERAPIST

## 2021-03-05 PROCEDURE — 97530 THERAPEUTIC ACTIVITIES: CPT | Mod: GP | Performed by: PHYSICAL THERAPIST

## 2021-03-19 ENCOUNTER — THERAPY VISIT (OUTPATIENT)
Dept: PHYSICAL THERAPY | Facility: CLINIC | Age: 81
End: 2021-03-19
Payer: MEDICARE

## 2021-03-19 DIAGNOSIS — M48.062 SPINAL STENOSIS, LUMBAR REGION, WITH NEUROGENIC CLAUDICATION: ICD-10-CM

## 2021-03-19 PROCEDURE — 97110 THERAPEUTIC EXERCISES: CPT | Mod: GP | Performed by: PHYSICAL THERAPIST

## 2021-03-19 PROCEDURE — 97112 NEUROMUSCULAR REEDUCATION: CPT | Mod: GP | Performed by: PHYSICAL THERAPIST

## 2021-03-19 NOTE — PROGRESS NOTES
"DISCHARGE REPORT    Progress reporting period is from 1/22/2021 to 3/19/2021.       SUBJECTIVE  Subjective: \"Its been good, possibly a little better than normal.\" He reports having fewer instances of back pain. He walked at [employer] for 1.5 hours without pain and soreness. Pt reports that he no longer feels numbness into his toes when he stands and doesn't feel the warmth in his legs and feet as often. Says he feels comfortable continuing on his own at home.     Initial Pain level: 0/10.   Changes in function:  Yes, patient is able to complete all ADLs and has been able to engage in more physical activity.  Adverse reaction to treatment or activity: None    OBJECTIVE  Objective: SLB improved to 10sec each leg, but only after working on it for around 8min     ASSESSMENT/PLAN  Updated problem list and treatment plan: Diagnosis 1:  Lumbar stenosis with neurogenic claudication  Pain -  home program  Decreased strength - home program  Impaired balance - home program  Decreased proprioception - home program  STG/LTGs have been met or progress has been made towards goals:  Yes (See Goal flow sheet completed today.)  Assessment of Progress: Patient is meeting short term goals and is progressing towards long term goals.  Self Management Plans:  Patient is independent in a home treatment program.  Patient is independent in self management of symptoms.  I have re-evaluated this patient and find that the nature, scope, duration and intensity of the therapy is appropriate for the medical condition of the patient.  Gustavo continues to require the following intervention to meet STG and LTG's:  PT intervention is no longer required to meet STG/LTG.    Recommendations:  This patient is ready to be discharged from therapy and continue their home treatment program.    Please refer to the daily flowsheet for treatment today, total treatment time and time spent performing 1:1 timed codes.        "

## 2021-03-22 ENCOUNTER — MYC MEDICAL ADVICE (OUTPATIENT)
Dept: OPHTHALMOLOGY | Facility: CLINIC | Age: 81
End: 2021-03-22

## 2021-03-23 ENCOUNTER — OFFICE VISIT (OUTPATIENT)
Dept: DERMATOLOGY | Facility: CLINIC | Age: 81
End: 2021-03-23
Payer: MEDICARE

## 2021-03-23 DIAGNOSIS — Z85.828 HISTORY OF NONMELANOMA SKIN CANCER: Primary | ICD-10-CM

## 2021-03-23 DIAGNOSIS — D22.9 MULTIPLE BENIGN NEVI: ICD-10-CM

## 2021-03-23 DIAGNOSIS — L91.8 SKIN TAG: ICD-10-CM

## 2021-03-23 DIAGNOSIS — D18.01 CHERRY ANGIOMA: ICD-10-CM

## 2021-03-23 DIAGNOSIS — Q82.8 POROKERATOSIS: ICD-10-CM

## 2021-03-23 DIAGNOSIS — L81.4 SOLAR LENTIGO: ICD-10-CM

## 2021-03-23 DIAGNOSIS — L57.0 ACTINIC KERATOSIS: ICD-10-CM

## 2021-03-23 DIAGNOSIS — L82.1 SEBORRHEIC KERATOSIS: ICD-10-CM

## 2021-03-23 PROCEDURE — 17000 DESTRUCT PREMALG LESION: CPT | Performed by: DERMATOLOGY

## 2021-03-23 PROCEDURE — 99214 OFFICE O/P EST MOD 30 MIN: CPT | Mod: 25 | Performed by: DERMATOLOGY

## 2021-03-23 PROCEDURE — 17003 DESTRUCT PREMALG LES 2-14: CPT | Performed by: DERMATOLOGY

## 2021-03-23 ASSESSMENT — PAIN SCALES - GENERAL: PAINLEVEL: NO PAIN (0)

## 2021-03-23 NOTE — NURSING NOTE
Gustavo Ramon's goals for this visit include:   Chief Complaint   Patient presents with     Skin Check     spot of concern on the left temple, scalp and right shoulder (previous bx site)     He requests these members of his care team be copied on today's visit information:     PCP: Winston Silverman    Referring Provider:  No referring provider defined for this encounter.    There were no vitals taken for this visit.    Do you need any medication refills at today's visit? No    Nikki Sellers LPN on 3/23/2021 at 10:00 AM

## 2021-03-23 NOTE — LETTER
3/23/2021         RE: Gustavo Ramon  [address] [facility] [address]        Dear Colleague,    Thank you for referring your patient, Gustavo Ramon, to the [facility]. Please see a copy of my visit note below.    [facility] Dermatology Note  Encounter Date: Mar 23, 2021  Office Visit     Dermatology Problem List:  1. 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 Christiansen's note 11/21/11), s/p excision 5/12/09   2. Actinic keratoses  - s/p Efudex 2015, Fall 2020 to face, Spring 2021 to forearms  - s/p PDT (x3)  - s/p LN2  3. Seborrheic dermatitis  - clobetasol 0.05% solution  4. Relevant medical history: MGUS   5. *Lesions to monitor: Erosion on right upper back and hemorrhagic crust at left temporal scalp noted 3/23/21. Biopsy at next visit if these do not resolve.    Social history: The patient is retired from working as an . He has a daughter and son. He is [place].  Family history: Neg for skin cancer.  ____________________________________________    Assessment & Plan:     # History of NMSC. No evidence of recurrence.   - Recommend sunscreens SPF #30 or greater, protective clothing and avoidance of tanning beds.   - Recommended yearly skin exams.  - Previously discussed that skin cancer risk increases with cumulative use of hydrochlorothiazide. Patient will discuss switching with his PCP.  - Previous discussed use of nicotinamide to reduce AKs and NMSC. Patient does not wish to add on another pill at this time.     # Sun damaged skin with solar  lentigines: Chronic, stable.  - Recommend sunscreens SPF #30 or greater, protective clothing and avoidance of tanning beds.    # Benign skin findings including: seborrheic keratoses, cherry angioma, skin tags - minor, self limited problem  - No further intervention required. Patient to report changes.   - Patient reassured of the benign nature of these lesions.    # Multiple clinically benign nevi: Chronic, stable  - No further intervention required. Patient to report changes.   - Patient reassured of the benign nature of these lesions.    # AKs x8. Discussed precancerous nature of these lesions. Recommended treatment to prevent progression to a squamous cell carcinoma. Advised patient to call for follow up if not resolved in 1 month.   - See procedure note    # 2 mm erosion on the right upper back   -Plan to recheck at next visit in 6 months, plan to biopsy then if not healed    # Hemorrhagic crusting at the left temporal scalp.   -Plan to recheck at the next visit in 6 months, will biopsy then if not healed    # Diffuse actinic damage to forearms: Actinic damage is chronic and stable. Will have patient treat with efudex 5% cream BID x around 4 weeks. He has medication at home already and does not need a refill. Discussed expectations for treatment and when to stop.     # Porokeratosis x1, right medial ankle   - See procedure note    Procedures Performed:   - Cryotherapy procedure note, location(s): vertex scalp (2), right frontal scalp (1), right temple (1),  left upper eyelid (1), left preauricular cheek (3) , right medial ankle. After verbal consent and discussion of risks and benefits including, but not limited to, dyspigmentation/scar, blister, and pain, 9 lesion(s) was(were) treated with 1-2 mm freeze border for 1-2 cycles with liquid nitrogen. Post cryotherapy instructions were provided.    Follow-up: 6 month(s) in-person, or earlier for new or changing lesions    Staff and Scribe:     Nikki ALFONSO,  LPN, am serving as a scribe to document services personally performed by Dr. Estevez, based on data collection and the provider's statements to me.     Provider Disclosure:   The documentation recorded by the scribe accurately reflects the services I personally performed and the decisions made by me.    Kylee Estevez MD    Department of Dermatology  [facility]: Phone: [phone], Fax:[phone]  [facility]: Phone: [phone], Fax: [phone]    ____________________________________________    CC: Skin Check (spot of concern on the left temple, scalp and right shoulder (previous bx site))    HPI:  Mr. Gustavo Ramon is a(n) 80 year old male who presents today as a return patient for skin check.    Last saw Dr. Victor for wound check on 10/12/20 virtually.     Today, the patient notes concern about an area on the left temple/scalp (keep absentmindedly picking here, won't heal as a result), and right shoulder (itch at prior EDC site). Did efudex to forehead and scalp in Fall 2020. Liked result, wouldn't mind repeating in future.    Patient is otherwise feeling well, without additional skin concerns.     Labs Reviewed:  N/A    Physical Exam:  Vitals: There were no vitals taken for this visit.  SKIN: Total skin excluding the undergarment areas was performed. The exam included the head/face, neck, both arms, chest, back, abdomen, both legs, digits and/or nails.   - Montemayor Type II  - Well healed scars in areas of past NMSC. No pearly appearance or telangiectasias.  - Scattered brown macules on sun exposed areas.  - There are dome shaped bright red papules on the trunk.   - Multiple regular brown pigmented macules and papules are identified on the trunk.   - There are waxy stuck on tan to brown papules on the trunk.  - There are erythematous macules with overyling adherent scale  on the vertex scalp (2), right frontal scalp (1), right temple (1), left upper eyelid (1), left preauricular cheek (3)  .   - *There is a 2 mm erosion on the right upper back  - *There is some hemorrhagic crusting at the left temporal scalp.  - There is a skin colored pedunculated papule on the right axilla.   - Porokeratosis at the right medial ankle.  - No other lesions of concern on areas examined.       Medications:  Current Outpatient Medications   Medication     aspirin 325 MG tablet     cyanocobalamin (VITAMIN B-12) 1000 MCG tablet     ferrous sulfate (FEROSUL) 325 (65 Fe) MG tablet     fluorouracil (EFUDEX) 5 % external cream     hydrochlorothiazide (HYDRODIURIL) 25 MG tablet     lisinopril (ZESTRIL) 5 MG tablet     Multiple Vitamins-Minerals (PRESERVISION AREDS 2) CAPS     omeprazole (PRILOSEC) 20 MG DR capsule     predniSONE (DELTASONE) 20 MG tablet     pyridostigmine (MESTINON) 60 MG tablet     simvastatin (ZOCOR) 20 MG tablet     vitamin D3 (CHOLECALCIFEROL) 2000 units tablet     triamcinolone (KENALOG) 0.1 % external cream     No current facility-administered medications for this visit.       Past Medical History:   Patient Active Problem List   Diagnosis     Rosacea     DJD (degenerative joint disease)     Ocular myasthenia gravis (H)     Macular pigment deposit     HYPERLIPIDEMIA LDL GOAL <130     IgA monoclonal gammopathy     Retinal hole OS, operculated     Horseshoe tear of retina without detachment OD     History  of basal cell carcinoma     Seborrhea     AK (actinic keratosis)     Malignant neoplasm of prostate (H)     PVD (posterior vitreous detachment)     Amaurosis fugax by Hx neg carotid W/U     Advanced directives, counseling/discussion     Actinic keratosis     Peripheral neuropathy     Nuclear sclerosis of both eyes     Essential hypertension with goal blood pressure less than 140/90     Gastroesophageal reflux disease without esophagitis     Past Medical History:   Diagnosis Date      Actinic keratosis      AK (actinic keratosis) 11/15/2012     Amaurosis fugax      Basal cell carcinoma 2009    R medial cheek/nose     Central serous retinopathy left    Eye     Diverticulosis 08/2006     DJD (degenerative joint disease)      GERD (gastroesophageal reflux disease)      Hearing loss     High frequency     History of nonmelanoma skin cancer      History of nonmelanoma skin cancer      HTN (hypertension)      Hyperlipidemia LDL goal < 130      Hyperplastic colon polyp 08/2006     IgA monoclonal gammopathy     IgA kappa     Lattice degeneration of retina right    Eye     Nonsenile cataract      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    [place] consult     Rosacea      Vitreous detachment left    Eye       CC No referring provider defined for this encounter. on close of this encounter.        Again, thank you for allowing me to participate in the care of your patient.        Sincerely,        Kylee Estevez MD

## 2021-03-23 NOTE — PROGRESS NOTES
[facility] Dermatology Note  Encounter Date: Mar 23, 2021  Office Visit     Dermatology Problem List:  1. 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 Christiansen's note 11/21/11), s/p excision 5/12/09   2. Actinic keratoses  - s/p Efudex 2015, Fall 2020 to face, Spring 2021 to forearms  - s/p PDT (x3)  - s/p LN2  3. Seborrheic dermatitis  - clobetasol 0.05% solution  4. Relevant medical history: MGUS   5. *Lesions to monitor: Erosion on right upper back and hemorrhagic crust at left temporal scalp noted 3/23/21. Biopsy at next visit if these do not resolve.    Social history: The patient is retired from working as an . He has a daughter and son. He is [place].  Family history: Neg for skin cancer.  ____________________________________________    Assessment & Plan:     # History of NMSC. No evidence of recurrence.   - Recommend sunscreens SPF #30 or greater, protective clothing and avoidance of tanning beds.   - Recommended yearly skin exams.  - Previously discussed that skin cancer risk increases with cumulative use of hydrochlorothiazide. Patient will discuss switching with his PCP.  - Previous discussed use of nicotinamide to reduce AKs and NMSC. Patient does not wish to add on another pill at this time.     # Sun damaged skin with solar lentigines: Chronic, stable.  - Recommend sunscreens SPF #30 or greater, protective clothing and avoidance of tanning beds.    # Benign skin findings including: seborrheic keratoses, cherry angioma, skin tags - minor, self limited problem  - No further intervention  required. Patient to report changes.   - Patient reassured of the benign nature of these lesions.    # Multiple clinically benign nevi: Chronic, stable  - No further intervention required. Patient to report changes.   - Patient reassured of the benign nature of these lesions.    # AKs x8. Discussed precancerous nature of these lesions. Recommended treatment to prevent progression to a squamous cell carcinoma. Advised patient to call for follow up if not resolved in 1 month.   - See procedure note    # 2 mm erosion on the right upper back   -Plan to recheck at next visit in 6 months, plan to biopsy then if not healed    # Hemorrhagic crusting at the left temporal scalp.   -Plan to recheck at the next visit in 6 months, will biopsy then if not healed    # Diffuse actinic damage to forearms: Actinic damage is chronic and stable. Will have patient treat with efudex 5% cream BID x around 4 weeks. He has medication at home already and does not need a refill. Discussed expectations for treatment and when to stop.     # Porokeratosis x1, right medial ankle   - See procedure note    Procedures Performed:   - Cryotherapy procedure note, location(s): vertex scalp (2), right frontal scalp (1), right temple (1),  left upper eyelid (1), left preauricular cheek (3) , right medial ankle. After verbal consent and discussion of risks and benefits including, but not limited to, dyspigmentation/scar, blister, and pain, 9 lesion(s) was(were) treated with 1-2 mm freeze border for 1-2 cycles with liquid nitrogen. Post cryotherapy instructions were provided.    Follow-up: 6 month(s) in-person, or earlier for new or changing lesions    Staff and Scribe:     I, Nikki Sellers LPN, am serving as a scribe to document services personally performed by Dr. Estevez, based on data collection and the provider's statements to me.     Provider Disclosure:   The documentation recorded by the scribe accurately reflects the services I personally  performed and the decisions made by me.    Kylee Estevez MD    Department of Dermatology  [facility]: Phone: [phone], Fax:[phone]  [facility]: Phone: [phone], Fax: [phone]    ____________________________________________    CC: Skin Check (spot of concern on the left temple, scalp and right shoulder (previous bx site))    HPI:  Mr. Gustavo Ramon is a(n) 80 year old male who presents today as a return patient for skin check.    Last saw Dr. Victor for wound check on 10/12/20 virtually.     Today, the patient notes concern about an area on the left temple/scalp (keep absentmindedly picking here, won't heal as a result), and right shoulder (itch at prior EDC site). Did efudex to forehead and scalp in Fall 2020. Liked result, wouldn't mind repeating in future.    Patient is otherwise feeling well, without additional skin concerns.     Labs Reviewed:  N/A    Physical Exam:  Vitals: There were no vitals taken for this visit.  SKIN: Total skin excluding the undergarment areas was performed. The exam included the head/face, neck, both arms, chest, back, abdomen, both legs, digits and/or nails.   - Montemayor Type II  - Well healed scars in areas of past NMSC. No pearly appearance or telangiectasias.  - Scattered brown macules on sun exposed areas.  - There are dome shaped bright red papules on the trunk.   - Multiple regular brown pigmented macules and papules are identified on the trunk.   - There are waxy stuck on tan to brown papules on the trunk.  - There are erythematous macules with overyling adherent scale on the vertex scalp (2), right frontal scalp (1), right temple (1), left upper eyelid (1), left preauricular cheek (3)  .   - *There is a 2 mm erosion on the right upper back  - *There is some hemorrhagic crusting at the left temporal scalp.  - There is a skin  colored pedunculated papule on the right axilla.   - Porokeratosis at the right medial ankle.  - No other lesions of concern on areas examined.       Medications:  Current Outpatient Medications   Medication     aspirin 325 MG tablet     cyanocobalamin (VITAMIN B-12) 1000 MCG tablet     ferrous sulfate (FEROSUL) 325 (65 Fe) MG tablet     fluorouracil (EFUDEX) 5 % external cream     hydrochlorothiazide (HYDRODIURIL) 25 MG tablet     lisinopril (ZESTRIL) 5 MG tablet     Multiple Vitamins-Minerals (PRESERVISION AREDS 2) CAPS     omeprazole (PRILOSEC) 20 MG DR capsule     predniSONE (DELTASONE) 20 MG tablet     pyridostigmine (MESTINON) 60 MG tablet     simvastatin (ZOCOR) 20 MG tablet     vitamin D3 (CHOLECALCIFEROL) 2000 units tablet     triamcinolone (KENALOG) 0.1 % external cream     No current facility-administered medications for this visit.       Past Medical History:   Patient Active Problem List   Diagnosis     Rosacea     DJD (degenerative joint disease)     Ocular myasthenia gravis (H)     Macular pigment deposit     HYPERLIPIDEMIA LDL GOAL <130     IgA monoclonal gammopathy     Retinal hole OS, operculated     Horseshoe tear of retina without detachment OD     History  of basal cell carcinoma     Seborrhea     AK (actinic keratosis)     Malignant neoplasm of prostate (H)     PVD (posterior vitreous detachment)     Amaurosis fugax by Hx neg carotid W/U     Advanced directives, counseling/discussion     Actinic keratosis     Peripheral neuropathy     Nuclear sclerosis of both eyes     Essential hypertension with goal blood pressure less than 140/90     Gastroesophageal reflux disease without esophagitis     Past Medical History:   Diagnosis Date     Actinic keratosis      AK (actinic keratosis) 11/15/2012     Amaurosis fugax      Basal cell carcinoma 2009    R medial cheek/nose     Central serous retinopathy left    Eye     Diverticulosis 08/2006     DJD (degenerative joint disease)      GERD (gastroesophageal  reflux disease)      Hearing loss     High frequency     History of nonmelanoma skin cancer      History of nonmelanoma skin cancer      HTN (hypertension)      Hyperlipidemia LDL goal < 130      Hyperplastic colon polyp 08/2006     IgA monoclonal gammopathy     IgA kappa     Lattice degeneration of retina right    Eye     Nonsenile cataract      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    [place] consult     Rosacea      Vitreous detachment left    Eye       CC No referring provider defined for this encounter. on close of this encounter.

## 2021-03-23 NOTE — PATIENT INSTRUCTIONS

## 2021-03-23 NOTE — PATIENT INSTRUCTIONS
Cryotherapy    What is it?    Use of a very cold liquid, such as liquid nitrogen, to freeze and destroy abnormal skin cells that need to be removed    What should I expect?    Tenderness and redness    A small blister that might grow and fill with dark purple blood. There may be crusting.    More than one treatment may be needed if the lesions do not go away.    How do I care for the treated area?    Gently wash the area with your hands when bathing.    Use a thin layer of Vaseline to help with healing. You may use a Band-Aid.     The area should heal within 7-10 days and may leave behind a pink or lighter color.     Do not use an antibiotic or Neosporin ointment.     You may take acetaminophen (Tylenol) for pain.     Call your Doctor if you have:    Severe pain    Signs of infection (warmth, redness, cloudy yellow drainage, and or a bad smell)    Questions or concerns    Who should I call with questions?       [facility]: [phone]       [facility]: [phone]       For urgent needs outside of business hours call the [facility] at [phone]        and ask for the dermatology resident on call      Efudex Treatment    Today, you are being prescribed Fluorouracil (Efudex) a topical cream used for the treatment of Actinic Keratosis (AK's).  The medication is working to eliminate the unhealthy cells. Even though this treatment may be unattractive and somewhat uncomfortable.    Your treatment will last twice daily for 2-3 weeks or until the onset of irritation on the bilateral arms.  You may experience some mild discomfort while being treated.    You will want to stop any other creams such as glycolic acid products, retin A, Tazorac, etc. to the area. You may use bland makeup/cover-up as long as it doesn't sting or cause you discomfort.    Apply the cream at night as your physician recommends. Use a cotton-tipped applicator, or use gloves if  applying it with your fingertips. If applied with unprotected fingertips, it is important to wash your hands well after you apply this medicine.     Keep this medication away from pets.    We recommend avoiding excessive sun exposure to the treated area    You may use moisturizing creams over bothersome areas such as Vanicream or Cetaphil cream if the reaction becomes too bothersome. Please, call the clinic if this occurs.   Potential Side Effects    Your treated areas may be unsightly during therapy.  This will improve slowly following the discontinuation of therapy.     During the first week of application, mild inflammation may occur.     During the following weeks, redness, and swelling may occur with some crusting and burning.     Lesions resolve as the skin exfoliates.     Over 1 to 2 weeks, new skin grows into the treatment area.    Keep this medication away from pets  Specific side effects that usually do not require medical attention (report to your doctor or health care professional if they continue or are bothersome) include: Red or dark-colored skin     Mild erosion (loss of upper layer of skin)     Mild eye irritation including burning, itching, sensitivity, stinging, or watering     Increased sensitivity of the skin to sun and ultraviolet light     Pain and burning of the affected area     Dryness, scaling or swelling of the affected area     Skin rash, itching of the affected area     Tenderness     If you have severe pain, please, call the clinic immediately and indicate that you have pain. Ask for a call from the RN.   Who should I call with questions?     [facility]: [phone]     [facility]: [phone]     For urgent needs outside of business hours call the [facility] at [phone]  and ask for the dermatology resident on call

## 2021-03-25 ENCOUNTER — OFFICE VISIT (OUTPATIENT)
Dept: OPHTHALMOLOGY | Facility: CLINIC | Age: 81
End: 2021-03-25
Payer: MEDICARE

## 2021-03-25 DIAGNOSIS — H35.52 MACULAR PIGMENT DEPOSIT: ICD-10-CM

## 2021-03-25 DIAGNOSIS — H10.33 ACUTE CONJUNCTIVITIS OF BOTH EYES, UNSPECIFIED ACUTE CONJUNCTIVITIS TYPE: ICD-10-CM

## 2021-03-25 DIAGNOSIS — H33.311 HORSESHOE TEAR OF RETINA WITHOUT DETACHMENT, RIGHT: ICD-10-CM

## 2021-03-25 DIAGNOSIS — H25.13 NUCLEAR SCLEROSIS OF BOTH EYES: ICD-10-CM

## 2021-03-25 DIAGNOSIS — G70.00 OCULAR MYASTHENIA GRAVIS (H): Primary | ICD-10-CM

## 2021-03-25 DIAGNOSIS — H43.813 PVD (POSTERIOR VITREOUS DETACHMENT), BILATERAL: ICD-10-CM

## 2021-03-25 DIAGNOSIS — H33.322 RETINAL HOLE, LEFT: ICD-10-CM

## 2021-03-25 PROCEDURE — 92012 INTRM OPH EXAM EST PATIENT: CPT | Performed by: STUDENT IN AN ORGANIZED HEALTH CARE EDUCATION/TRAINING PROGRAM

## 2021-03-25 RX ORDER — NEOMYCIN SULFATE, POLYMYXIN B SULFATE AND DEXAMETHASONE 3.5; 10000; 1 MG/ML; [USP'U]/ML; MG/ML
1 SUSPENSION/ DROPS OPHTHALMIC 3 TIMES DAILY
Qty: 5 ML | Refills: 1 | Status: SHIPPED | OUTPATIENT
Start: 2021-03-25 | End: 2021-08-16

## 2021-03-25 ASSESSMENT — VISUAL ACUITY
OS_CC: 20/70
OS_CC+: -2
OD_PH_CC+: -1
OS_SC+: -2
OD_SC+: +1
OD_CC+: -1
OD_PH_CC: 20/40
OS_PH_CC+: -2
OD_CC: 20/60
CORRECTION_TYPE: GLASSES
OS_SC: 20/70
OS_PH_CC: 20/50
OD_SC: 20/60
METHOD: SNELLEN - LINEAR

## 2021-03-25 ASSESSMENT — REFRACTION_MANIFEST
OS_SPHERE: -1.25
OD_AXIS: 175
OS_AXIS: 180
OD_ADD: +2.75
OS_CYLINDER: +1.25
OD_SPHERE: -2.00
OD_CYLINDER: +1.00
OS_ADD: +2.75

## 2021-03-25 ASSESSMENT — REFRACTION_WEARINGRX
OS_ADD: +1.50
OS_AXIS: 168
OS_SPHERE: -1.25
OD_AXIS: 173
OD_CYLINDER: +0.50
OS_CYLINDER: +0.50
OD_SPHERE: -1.00
OD_ADD: +1.50
SPECS_TYPE: PAL

## 2021-03-25 ASSESSMENT — EXTERNAL EXAM - RIGHT EYE: OD_EXAM: NORMAL

## 2021-03-25 ASSESSMENT — EXTERNAL EXAM - LEFT EYE: OS_EXAM: NORMAL

## 2021-03-25 NOTE — PATIENT INSTRUCTIONS
"Use maxitrol eyedrop three times a day in both eyes for 2 weeks    Use artificial tears up to four times a day (like Refresh Optive, Systane Balance, TheraTears, or generic artificial tears are ok. Avoid \"get the red out\" drops).     Glasses prescription given - recommend updating     Continue AREDS2 eye vitamins by mouth    Return for recheck in 3 months    Marjorie Mcclellan MD  [phone]          "

## 2021-03-25 NOTE — LETTER
3/25/2021         RE: Gustavo Ramon  [id number] [facility] [address]        Dear Colleague,    Thank you for referring your patient, Gustavo Ramon, to the [facility]. Please see a copy of my visit note below.     Current Eye Medications: No eye drops. (has never had any prednisone drops)  Prednisolone and pyigmatistine 120 mg. preservision AREDS BID daily.     Subjective: Here for Diplopia eval/MD check (see note from 3-22-21 question from patient). Neurologist suggested seeing you, wanted to up the oral prednisone.  Feels he is squinting and eyes flutter. Is taking prednisone 20 mg and alternating 10 mg every other day for a couple weeks now.  Weeping both eyes x couple weeks only.  Usually doesn't have allergy symptoms but wondering if that is the cause. Slightly itchy and aguila bad when he is outside and it is windy or cold. Feels it go down the throat too then. Eyes feel like they are more sluggish when he is trying to move the eyes.  Played tennis up to two years ago, but the pressing of the lids against the eyes are forcing him to think about them and doesn't like to have to think about them. Distracting. Drooping lids, has more of a flat appearance instead of a curled appearance.       Objective:  See Ophthalmology Exam.       Assessment:  Gustavo Ramon is a 80 year old male who presents with:   Encounter Diagnoses   Name Primary?     Ocular myasthenia gravis (H) Symptoms do not appear to be related to myesthenia at present - more related to ocular surface/conjunctival issues.     Nuclear sclerosis of both eyes      Macular pigment deposit      Retinal hole, left operculated      Horseshoe tear of retina without detachment,od      PVD (posterior vitreous detachment), bilateral        Plan:  Use maxitrol eyedrop three times a day in both eyes for 2 weeks    Use artificial tears up to four times a day (like Refresh Optive, Systane Balance, TheraTears, or  "generic artificial tears are ok. Avoid \"get the red out\" drops).     Glasses prescription given - recommend updating     Continue AREDS2 eye vitamins by mouth    Return for recheck in 3 months    Marjorie Mcclellan MD  [phone]                Again, thank you for allowing me to participate in the care of your patient.        Sincerely,        Marjorie Mcclellan MD    "

## 2021-03-25 NOTE — PROGRESS NOTES
" Current Eye Medications: No eye drops. (has never had any prednisone drops)  Prednisolone and pyigmatistine 120 mg. preservision AREDS BID daily.     Subjective: Here for Diplopia eval/MD check (see note from 3-22-21 question from patient). Neurologist suggested seeing you, wanted to up the oral prednisone.  Feels he is squinting and eyes flutter. Is taking prednisone 20 mg and alternating 10 mg every other day for a couple weeks now.  Weeping both eyes x couple weeks only.  Usually doesn't have allergy symptoms but wondering if that is the cause. Slightly itchy and aguila bad when he is outside and it is windy or cold. Feels it go down the throat too then. Eyes feel like they are more sluggish when he is trying to move the eyes.  Played tennis up to two years ago, but the pressing of the lids against the eyes are forcing him to think about them and doesn't like to have to think about them. Distracting. Drooping lids, has more of a flat appearance instead of a curled appearance.       Objective:  See Ophthalmology Exam.       Assessment:  Gustavo Ramon is a 80 year old male who presents with:   Encounter Diagnoses   Name Primary?     Ocular myasthenia gravis (H) Symptoms do not appear to be related to myesthenia at present - more related to ocular surface/conjunctival issues.     Nuclear sclerosis of both eyes      Macular pigment deposit      Retinal hole, left operculated      Horseshoe tear of retina without detachment,od      PVD (posterior vitreous detachment), bilateral        Plan:  Use maxitrol eyedrop three times a day in both eyes for 2 weeks    Use artificial tears up to four times a day (like Refresh Optive, Systane Balance, TheraTears, or generic artificial tears are ok. Avoid \"get the red out\" drops).     Glasses prescription given - recommend updating     Continue AREDS2 eye vitamins by mouth    Return for recheck in 3 months    Marjorie Mcclellan MD  [phone]            "

## 2021-03-26 NOTE — PROGRESS NOTES
Hematology Follow-up visit:  Date on this visit: Jun 14, 2018        Referring physician:  Dr.Philip Yunior Wright   Primary Care Physician: DENISSE Jewell  Urologist: Dr. Jones  Neurologist: Dr. Law  Neurologist at [facility]: Dr. Conchis Restrepo.    Diagnosis:   1. Monoclonal gammopathy of unknown significance (MGUS), IgA kappa.   Apparently, he had M spike discovered incidentally in 2009 during evaluation of myasthenia gravis at [facility] in [place]. He has not had a bone marrow biopsy. Serum protein immunofixation electrophoresis showed the presence of  monoclonal IgA immunoglobulin of kappa light chain type as well as small monoclonal free immunoglobulin light chain of kappa chain type.  IgA level was elevated at 620 on 6/7/2011 and urine protein electrophoresis and urine immunofixation electrophoresis showed no M protein. IgA level has remained unchanged and M spike has been stable. He has been followed by Dr. Wright and in 04/2015 transferred his care to [facility].    2. Ocular MG    3. Hx of NMSC- -superficial BCC, right posterior shoulder and left posterior shoulder 11/2015- s/p aldara  -BCC, R elbow s/p ED and C 1/2016  -BCC, left forearm s/p excision 1/2016  -BCC, nodular and sclerosing, right side of nose per pathology ,(right medial cheek per Dr. Christiansen's note 11/21/2011), s/p excision 5/12/2009 Dr. Bansal    4. Prostate Cancer -  He has a history of prostate cancer (Dianna 8) and was treated with cryoablation surgery in 11/2013.  He is followed by Dr. Jones.      History Of Present Illness:  Mr. Ramon is a 76 year old male who presents for f/up of IgA kappa MGUS. He has been feeling very well.   He has ocular myasthenia gravis which is controlled with pyridostigmine and low dose prednisone (5 mg PO QOD). He takes Omeprazole on the days that he takes PO prednisone.He follows up with Dr. Law and was last seen by him in June 2017. He was seen by Dr. Jones in February of 2018.    He  was recommended to have PSA rechecked in 12 months.   His serum IGA level and M spike has remained stable.  urine protein electrophoresis/immunofixation showed no M spike.   RResults for DAISHA LOZOYA (MRN [id number]) as of 6/14/2018 13:10   Ref. Range 4/21/2015 14:48 8/10/2015 07:51 5/25/2016 09:54 5/27/2016 05:26 5/25/2017 07:59 5/25/2017 08:14 5/25/2018 09:39   IGA Latest Ref Range: 70 - 380 mg/dL 685 (H) 627 (H) 627 (H)  572 (H)  647 (H)   IGG Latest Ref Range: 695 - 1620 mg/dL 1050 924 795  867  980   IGM Latest Ref Range: 60 - 265 mg/dL 35 (L) 26 (L) 25 (L)  27 (L)  29 (L)   Immunofix ELP Urine Unknown    No monoclonal pro...  No monoclonal pro... No monoclonal pro...   Immunofixation ELP Unknown  (Note)... (Note)...       Kappa Free Lt Chain Latest Ref Range: 0.33 - 1.94 mg/dL 1.49  1.87  2.02 (H)  1.93   Kappa Lambda Ratio Latest Ref Range: 0.26 - 1.65  1.04  1.63  1.33  1.41   Lambda Free Lt Chain Latest Ref Range: 0.57 - 2.63 mg/dL 1.43  1.15  1.52  1.37   Monoclonal Peak Latest Ref Range: 0.0 g/dL 0.5 (H)  0.5 (H)  0.4 (H)  0.4 (H)      He also has a history of basal cell carcinoma of the skin of the right nose, and has had other multiple BCCs. He was last seen by Dr. Victor in 12/2017.  He is a retired . He denies any bone pains, unexplained weight loss, night sweats or chills. He is staying active. He printed out his lab results with graphs and had multiple questions today all of which were answered.  In addition, a complete 12 point  review of systems is negative.      Past Medical/Surgical History:  Past Medical History:   Diagnosis Date     Actinic keratosis      AK (actinic keratosis) 11/15/2012     Amaurosis fugax      Basal cell carcinoma 2009    R medial cheek/nose     Central serous retinopathy left    Eye     Diverticulosis 08/2006     DJD (degenerative joint disease)      GERD (gastroesophageal reflux disease)      Hearing loss     High frequency     HTN (hypertension)  "     Hyperlipidemia LDL goal < 130      Hyperplastic colon polyp 08/2006     IgA monoclonal gammopathy     IgA kappa     Lattice degeneration of retina right    Eye     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    [facility] consult     Rosacea      Vitreous detachment left    Eye     Past Surgical History:   Procedure Laterality Date     ARTHROSCOPY KNEE RT/LT Left Jan 2010    Knee     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     DISKECTOMY, LUMBAR, SINGLE SP  2003    L2-3     ENT SURGERY  1943    Tonsils      ENT SURGERY  2-22-12    Biopsy lesion right pinna     ENT SURGERY  2-24-12    Biopsy leson right pinna     SOFT TISSUE SURGERY  May 2010    Basal Cell/right side nose     Allergies:  Allergies as of 06/14/2018 - Loi as Reviewed 06/14/2018   Allergen Reaction Noted     Nkda [no known drug allergies]  12/16/2009     Current Medications:  Current Outpatient Prescriptions   Medication Sig Dispense Refill     acetaminophen (TYLENOL) 325 MG tablet Take 2 tablets (650 mg) by mouth once for 1 dose 2 tablet 0     aspirin 325 MG tablet Take 325 mg by mouth daily       Cholecalciferol (VITAMIN D) 2000 UNITS tablet Take 2,000 Units by mouth daily       cyanocolbalamin (VITAMIN  B-12) 1000 MCG tablet Take 1 tablet by mouth daily       hydrochlorothiazide (HYDRODIURIL) 25 MG tablet Take 0.5 tablets (12.5 mg) by mouth daily 45 tablet 1     lisinopril (PRINIVIL/ZESTRIL) 5 MG tablet TAKE 1 TABLET DAILY (DUE FOR APPOINTMENT FOR FURTHER REFILLS) 90 tablet 1     Multiple Vitamins-Minerals (PRESERVISION/LUTEIN PO) Take 60 mg by mouth daily two tablets daily       omeprazole (PRILOSEC) 20 MG CR capsule TAKE 1 CAPSULE DAILY CONCOMITANT TO PREDNISONE USE 90 capsule 1     pyridostigmine (MESTINON) 60 MG tablet Take 1 tablet (60 mg) by mouth 4 times daily 360 tablet 3     simvastatin (ZOCOR) 20 MG tablet TAKE 1 TABLET AT BEDTIME 90 tablet 0      Physical Exam:  /76  Pulse 65  Temp 97.7  F (36.5  C)  Resp 15  Ht 1.721 m (5' 7.76\")  Wt 77.1 " kg (170 lb)  SpO2 98%  BMI 26.03 kg/m2        GENERAL APPEARANCE: healthy, alert and no distress       NECK: no adenopathy, no asymmetry or masses      MUSCULOSKELETAL: extremities normal- no gross deformities noted, no evidence of inflammation in joints, FROM in all extremities. No edema b/l LE.     SKIN: no suspicious lesions or rashes     PSYCHIATRIC: mentation appears normal and affect normal    Laboratory/Imaging Studies  Labs reviewed and documented in the EMR.  Lab Results   Component Value Date    WBC 7.3 05/25/2018     Lab Results   Component Value Date    RBC 5.11 05/25/2018     Lab Results   Component Value Date    HGB 15.0 05/25/2018     Lab Results   Component Value Date    HCT 45.6 05/25/2018     No components found for: MCT  Lab Results   Component Value Date    MCV 89 05/25/2018     Lab Results   Component Value Date    MCH 29.4 05/25/2018     Lab Results   Component Value Date    MCHC 32.9 05/25/2018     Lab Results   Component Value Date    RDW 13.8 05/25/2018     Lab Results   Component Value Date     05/25/2018     CMP unremarkable  urine protein electrophoresis no M protein  urine immunofixation electrophoresis No M protein      Results for DAISHA RAMON (MRN [id number]) as of 6/14/2018 13:10   Ref. Range 10/8/2014 13:16 2/9/2015 08:51 8/10/2015 07:49 2/8/2016 07:56 2/6/2017 07:59 2/5/2018 07:54   PSA Latest Ref Range: 0 - 4 ug/L 1.03 1.09 0.77 1.05 1.17 1.21     ASSESSMENT/PLAN:   Mr. Ramon is a very pleasant 77 year man with multiple medical problems, including ocular myasthenia gravis, HTN, Hx of prostate cancer and BCC os the skin of the nose, with IgA kappa monoclonal gammopathy of unknown significance (MGUS).     1. MGUS - M protein stable and IgA level stable. Serum FLC ratio normal. He has no M protein on urine protein electrophoresis/urine immunofixation electrophoresis.   We'll f/up with MGUS labs in 12-14 months.    2. Hx of prostate cancer- PSA relatively stable in  02/2018. F/up with Dr. Jones in 02/2019, with repeat PSA.    3. Ocular Myasthenia Gravis- f/up with Dr. Law. Cont low dose prednisone/Mestinon.  4. Hx of multiple BCC including the one removed from the skin of right side of the nose- f/up with dermatology later this month (Dr. Treviño)    At the end of our visit patient verbalized understanding and concurred with the plan.   4 6

## 2021-04-01 ENCOUNTER — MYC MEDICAL ADVICE (OUTPATIENT)
Dept: DERMATOLOGY | Facility: CLINIC | Age: 81
End: 2021-04-01

## 2021-04-01 DIAGNOSIS — L57.0 ACTINIC KERATOSIS: Primary | ICD-10-CM

## 2021-04-01 NOTE — TELEPHONE ENCOUNTER
Response for Stephen:  Calcipotriene is a vitamin D cream. By itself, it does nothing for actinic keratosis. Mixed with fluorouracil, it makes fluouracil work better and faster to get rid of the precancers. So you definitely need the fluorouracil component. If you have both cream, twice daily application for 4 days may be enough. If you only use the fluorouracil alone, you will need to apply twice daily for 2-4 weeks to get a good response. We will send in new prescription for you.      RN: Will you send in new fluorouracil rx for Stephen?    Thanks,    Kylee Estevez MD    Department of Dermatology  [facility]: Phone: [phone], Fax:[phone]  [facility]: Phone: [phone], Fax: [phone]

## 2021-04-02 RX ORDER — FLUOROURACIL 50 MG/G
CREAM TOPICAL 2 TIMES DAILY
Qty: 40 G | Refills: 0 | Status: SHIPPED | OUTPATIENT
Start: 2021-04-02 | End: 2022-04-29

## 2021-04-06 DIAGNOSIS — E78.5 HYPERLIPIDEMIA LDL GOAL <130: ICD-10-CM

## 2021-04-08 DIAGNOSIS — E78.5 HYPERLIPIDEMIA LDL GOAL <130: ICD-10-CM

## 2021-04-08 RX ORDER — SIMVASTATIN 20 MG
TABLET ORAL
Qty: 90 TABLET | Refills: 0 | Status: SHIPPED | OUTPATIENT
Start: 2021-04-08 | End: 2021-06-21

## 2021-04-08 NOTE — TELEPHONE ENCOUNTER
Prescription approved per [facility] Refill Protocol.  Maxine Bone RN

## 2021-04-11 ENCOUNTER — OFFICE VISIT (OUTPATIENT)
Dept: URGENT CARE | Facility: URGENT CARE | Age: 81
End: 2021-04-11
Payer: MEDICARE

## 2021-04-11 VITALS
OXYGEN SATURATION: 97 % | RESPIRATION RATE: 16 BRPM | DIASTOLIC BLOOD PRESSURE: 67 MMHG | SYSTOLIC BLOOD PRESSURE: 104 MMHG | HEART RATE: 97 BPM | TEMPERATURE: 97.4 F

## 2021-04-11 DIAGNOSIS — J01.90 ACUTE SINUSITIS WITH COEXISTING CONDITION, NEED PROPHYLACTIC TREATMENT: Primary | ICD-10-CM

## 2021-04-11 PROCEDURE — 99213 OFFICE O/P EST LOW 20 MIN: CPT | Mod: CS | Performed by: FAMILY MEDICINE

## 2021-04-11 PROCEDURE — U0005 INFEC AGEN DETEC AMPLI PROBE: HCPCS | Performed by: FAMILY MEDICINE

## 2021-04-11 PROCEDURE — U0003 INFECTIOUS AGENT DETECTION BY NUCLEIC ACID (DNA OR RNA); SEVERE ACUTE RESPIRATORY SYNDROME CORONAVIRUS 2 (SARS-COV-2) (CORONAVIRUS DISEASE [COVID-19]), AMPLIFIED PROBE TECHNIQUE, MAKING USE OF HIGH THROUGHPUT TECHNOLOGIES AS DESCRIBED BY CMS-2020-01-R: HCPCS | Performed by: FAMILY MEDICINE

## 2021-04-11 NOTE — PATIENT INSTRUCTIONS
Patient Education     Sinusitis (Antibiotic Treatment)    The sinuses are air-filled spaces within the bones of the face. They connect to the inside of the nose. Sinusitis is an inflammation of the tissue that lines the sinuses. Sinusitis can occur during a cold. It can also happen due to allergies to pollens and other particles in the air. Sinusitis can cause symptoms of sinus congestion and a feeling of fullness. A sinus infection causes fever, headache, and facial pain. There is often green or yellow fluid draining from the nose or into the back of the throat (post-nasal drip). You have been given antibiotics to treat this condition.   Home care    Take the full course of antibiotics as instructed. Don't stop taking them, even when you feel better.    Drink plenty of water, hot tea, and other liquids as directed by the healthcare provider. This may help thin nasal mucus. It also may help your sinuses drain fluids.    Heat may help soothe painful areas of your face. Use a towel soaked in hot water. Or,  the shower and direct the warm spray onto your face. Using a vaporizer along with a menthol rub at night may also help soothe symptoms.     An expectorant with guaifenesin may help thin nasal mucus and help your sinuses drain fluids. Talk with your provider or pharmacists before taking an over-the-counter (OTC) medicine if you have any questions about it or its side effects..    You can use an OTC decongestant, unless a similar medicine was prescribed to you. Nasal sprays work the fastest. Use one that contains phenylephrine or oxymetazoline. First blow your nose gently. Then use the spray. Don't use these medicines more often than directed on the label. If you do, your symptoms may get worse. You may also take pills that contain pseudoephedrine. Don t use products that combine multiple medicines. This is because side effects may be increased. Read labels. You can also ask the pharmacist for help. (People  with high blood pressure should not use decongestants. They can raise blood pressure.) Talk with your provider or pharmacist if you have any questions about the medicine..    OTC antihistamines may help if allergies contributed to your sinusitis. Talk with your provider or pharmacist if you have any questions about the medicine..    Don't use nasal rinses or irrigation during an acute sinus infection, unless your healthcare provider tells you to. Rinsing may spread the infection to other areas in your sinuses.    Use acetaminophen or ibuprofen to control pain, unless another pain medicine was prescribed to you. If you have chronic liver or kidney disease or ever had a stomach ulcer, talk with your healthcare provider before using these medicines. Never give aspirin to anyone under age 18 who is ill with a fever. It may cause severe liver damage.    Don't smoke. This can make symptoms worse.    Follow-up care  Follow up with your healthcare provider, or as advised.   When to seek medical advice  Call your healthcare provider if any of these occur:     Facial pain or headache that gets worse    Stiff neck    Unusual drowsiness or confusion    Swelling of your forehead or eyelids    Symptoms don't go away in 10 days    Vision problems, such as blurred or double vision    Fever of 100.4 F (38 C) or higher, or as directed by your healthcare provider  Call 911  Call 911 if any of these occur:     Seizure    Trouble breathing    Feeling dizzy or faint    Fingernails, skin or lips look blue, purple , or gray  Prevention  Here are steps you can take to help prevent an infection:     Keep good hand washing habits.    Don t have close contact with people who have sore throats, colds, or other upper respiratory infections.    Don t smoke, and stay away from secondhand smoke.    Stay up to date with of your vaccines.  [employer] last reviewed this educational content on 12/1/2019 2000-2021 [employer]. All rights  reserved. This information is not intended as a substitute for professional medical care. Always follow your healthcare professional's instructions.

## 2021-04-11 NOTE — PROGRESS NOTES
Assessment & Plan     Acute sinusitis with coexisting condition, need prophylactic treatment  Differentials discussed in detail including acute sinusitis.  Treatment options reviewed.  We will do COVID-19 for further evaluation.  Augmentin prescribed, common side effects discussed.  Suggested to continue well hydration, warm fluids, humidifier use and steam inhalation.  Follow-up with PCP early next week.  All questions answered.  - amoxicillin-clavulanate (AUGMENTIN) 875-125 MG tablet; Take 1 tablet by mouth 2 times daily for 10 days  - Symptomatic COVID-19 Virus (Coronavirus) by PCR; Future         Patient Instructions     Patient Education     Sinusitis (Antibiotic Treatment)    The sinuses are air-filled spaces within the bones of the face. They connect to the inside of the nose. Sinusitis is an inflammation of the tissue that lines the sinuses. Sinusitis can occur during a cold. It can also happen due to allergies to pollens and other particles in the air. Sinusitis can cause symptoms of sinus congestion and a feeling of fullness. A sinus infection causes fever, headache, and facial pain. There is often green or yellow fluid draining from the nose or into the back of the throat (post-nasal drip). You have been given antibiotics to treat this condition.   Home care    Take the full course of antibiotics as instructed. Don't stop taking them, even when you feel better.    Drink plenty of water, hot tea, and other liquids as directed by the healthcare provider. This may help thin nasal mucus. It also may help your sinuses drain fluids.    Heat may help soothe painful areas of your face. Use a towel soaked in hot water. Or,  the shower and direct the warm spray onto your face. Using a vaporizer along with a menthol rub at night may also help soothe symptoms.     An expectorant with guaifenesin may help thin nasal mucus and help your sinuses drain fluids. Talk with your provider or pharmacists before  taking an over-the-counter (OTC) medicine if you have any questions about it or its side effects..    You can use an OTC decongestant, unless a similar medicine was prescribed to you. Nasal sprays work the fastest. Use one that contains phenylephrine or oxymetazoline. First blow your nose gently. Then use the spray. Don't use these medicines more often than directed on the label. If you do, your symptoms may get worse. You may also take pills that contain pseudoephedrine. Don t use products that combine multiple medicines. This is because side effects may be increased. Read labels. You can also ask the pharmacist for help. (People with high blood pressure should not use decongestants. They can raise blood pressure.) Talk with your provider or pharmacist if you have any questions about the medicine..    OTC antihistamines may help if allergies contributed to your sinusitis. Talk with your provider or pharmacist if you have any questions about the medicine..    Don't use nasal rinses or irrigation during an acute sinus infection, unless your healthcare provider tells you to. Rinsing may spread the infection to other areas in your sinuses.    Use acetaminophen or ibuprofen to control pain, unless another pain medicine was prescribed to you. If you have chronic liver or kidney disease or ever had a stomach ulcer, talk with your healthcare provider before using these medicines. Never give aspirin to anyone under age 18 who is ill with a fever. It may cause severe liver damage.    Don't smoke. This can make symptoms worse.    Follow-up care  Follow up with your healthcare provider, or as advised.   When to seek medical advice  Call your healthcare provider if any of these occur:     Facial pain or headache that gets worse    Stiff neck    Unusual drowsiness or confusion    Swelling of your forehead or eyelids    Symptoms don't go away in 10 days    Vision problems, such as blurred or double vision    Fever of 100.4 F  (38 C) or higher, or as directed by your healthcare provider  Call 911  Call 911 if any of these occur:     Seizure    Trouble breathing    Feeling dizzy or faint    Fingernails, skin or lips look blue, purple , or gray  Prevention  Here are steps you can take to help prevent an infection:     Keep good hand washing habits.    Don t have close contact with people who have sore throats, colds, or other upper respiratory infections.    Don t smoke, and stay away from secondhand smoke.    Stay up to date with of your vaccines.  [employer] last reviewed this educational content on 12/1/2019 2000-2021 [employer]. All rights reserved. This information is not intended as a substitute for professional medical care. Always follow your healthcare professional's instructions.                 Andry Puente MD  [facility] URGENT [facility]   Stephen is a 80 year old who presents for the following health issues     HPI     Concern -   Onset: 1 week   Description: sinus congestion, some pain around left eye  Intensity: moderate  Progression of Symptoms:  same  Accompanying Signs & Symptoms: no fever, chills, sore throat, cough,   Previous history of similar problem:   Therapies tried and outcome: saline gargles  Had Covid 19 vaccine last month, history of ocular myasthenia gravis, using prednisone every other day      Review of Systems   Constitutional, HEENT, cardiovascular, pulmonary, gi and gu systems are negative, except as otherwise noted.      Objective    /67 (BP Location: Left arm, Patient Position: Chair, Cuff Size: Adult Regular)   Pulse 97   Temp 97.4  F (36.3  C)   Resp 16   SpO2 97%   There is no height or weight on file to calculate BMI.  Physical Exam   GENERAL: alert and no distress  EYES: Eyes grossly normal to inspection, PERRL and conjunctivae and sclerae normal  HENT: normal cephalic/atraumatic, ear canals and TM's normal, nasal mucosa edematous , oral mucous  membranes moist and sinuses: not tender  NECK: no adenopathy, no asymmetry, masses, or scars and thyroid normal to palpation  RESP: lungs clear to auscultation - no rales, rhonchi or wheezes  CV: regular rate and rhythm, normal S1 S2, no S3 or S4  ABDOMEN: soft, nontender, no hepatosplenomegaly  MS: no gross musculoskeletal defects noted, no edema

## 2021-04-12 LAB
LABORATORY COMMENT REPORT: NORMAL
SARS-COV-2 RNA RESP QL NAA+PROBE: NEGATIVE
SARS-COV-2 RNA RESP QL NAA+PROBE: NORMAL
SPECIMEN SOURCE: NORMAL
SPECIMEN SOURCE: NORMAL

## 2021-04-12 RX ORDER — SIMVASTATIN 20 MG
TABLET ORAL
Qty: 30 TABLET | Refills: 11 | OUTPATIENT
Start: 2021-04-12

## 2021-04-21 ENCOUNTER — VIRTUAL VISIT (OUTPATIENT)
Dept: NEUROLOGY | Facility: CLINIC | Age: 81
End: 2021-04-21
Payer: MEDICARE

## 2021-04-21 DIAGNOSIS — Z79.52 CURRENT CHRONIC USE OF SYSTEMIC STEROIDS: ICD-10-CM

## 2021-04-21 DIAGNOSIS — G70.00 OCULAR MYASTHENIA (H): Primary | ICD-10-CM

## 2021-04-21 DIAGNOSIS — R79.9 ABNORMAL FINDING OF BLOOD CHEMISTRY, UNSPECIFIED: ICD-10-CM

## 2021-04-21 PROCEDURE — 99214 OFFICE O/P EST MOD 30 MIN: CPT | Mod: 95 | Performed by: PSYCHIATRY & NEUROLOGY

## 2021-04-21 NOTE — LETTER
4/21/2021       RE: Gustavo Ramon  [address] [facility] [address]     Dear Colleague,    Thank you for referring your patient, Gustavo Ramon, to the [facility] NEUROLOGY CLINIC [place] at [facility]. Please see a copy of my visit note below.    Stephen is a 80 year old who is being evaluated via a billable video visit.      How would you like to obtain your AVS? MyChart  If the video visit is dropped, the invitation should be resent by: Send to e-mail at: [email or web address]  Will anyone else be joining your video visit? No    Video-Visit Details    Type of service:  Video Visit    Video Start Time: 11.03 am  Video End Time:11.19 am    Originating Location (pt. Location): Home    Distant Location (provider location):  [facility] NEUROLOGY [facility]     Platform used for Video Visit: Noteleaf    Service Date: 04/21/2021    I had the pleasure to evaluate Mr. Ramon via billable video visit today.  He is an 80-year-old gentleman with ocular myasthenia gravis.  He was diagnosed with single-fiber EMG at [facility] a number of years ago.  I last saw him by virtual visit in 01/2021.  He still denies any bulbar symptoms.  He has no dysphagia, dysarthria, sialorrhea, difficulty chewing, head drop, ptosis, respiratory symptoms or weakness of arms or legs.  Regarding ocular symptoms, he tells me that he continues to experience some ptosis on the left eye and he frequently has to strain his eyes when reading, which can become  uncomfortable.  He does not have diplopia anymore.  He does not notice right eyelid ptosis.  He has complex symptoms with his eyelids, including frequent quivering or fluttering.  I felt that the latter could be a side effect of pyridostigmine and that is why I had reduced his dose in the previous month.  He also reported more watery and red eyes and was recently evaluated by Ophthalmology and  prescribed treatment with Maxitrol drops for conjunctivitis; however, he has taken it for a week or so and has not noted improvement yet.  He is on prednisone currently 20 mg alternating with 10 mg a day and pyridostigmine 60 mg 3 times a day.  He has not been on other immunosuppression.  Last year, we discussed a prescription of oxymetazoline eyedrops (Upneeq) which is a newly approved medication to treat acquired blepharoptosis.  However, this was prohibitively expensive and he ended up not taking it.    In summary, Mr. Ramon has a purely ocular myasthenia gravis.  It is my impression that his ocular symptoms are still not fully controlled.  The fluttering or quivering could be due to fasciculations of the eyelids related to Mestinon use, but the ptosis and the fact that he strains his eyes frequently indicates that he still has incompletely controlled, ocular MG.   At this point, there is a fine balance between the efficacy and risk of side effects associated with increasing his immunosuppression with prednisone or other drugs. For that reason, I would prefer a lower risk approach.  I will investigate if there is any other low-cost alpha-agonist eye drop like enough naphazoline or over-the-counter oxymetazoline that can be prescribed instead of the Upneeq which is expensive.  If we can get those eyedrops approved, he can try them for 1-2 weeks and then report to me.  If he does not improve, my next step would be to double his Mestinon dose.  He understands that the eyelid fasciculations are a side effect of Mestinon and they may get a little worse from it, but if this substantially improves his ptosis and his tendency to strain his eyes then  the benefit may offset any risk.  If none of the two approaches work, then inevitably we will have to push the prednisone dose to 20 mg daily or more and in that case, I would strongly consider adding a nonsteroidal immunosuppressant drug like azathioprine.    Because he  is a long-term steroid user he needs some surveillance.  I will order a hemoglobin A1c and a basic metabolic panel and I will also order a DEXA bone density scan. He will return to clinic for followup in 6 months or sooner if needed.    Total time spent on this encounter today was 31 minutes, including 16 on video call, 10 in post visit dictation, editing, and orders, and 5 in visit chart review.      Tyler Wheat MD    cc:  KAZ Adams  [facility]  [address]  [place]  [address]    Marjorie Mcclellan MD    D: 2021   T: 2021   MT: terese    Name:     DAISHA LOZOYA  MRN:      [id number]        Account:      [id number]   :      1940           Service Date: 2021     Document: [id number]

## 2021-04-21 NOTE — PROGRESS NOTES
Stephen is a 80 year old who is being evaluated via a billable video visit.      How would you like to obtain your AVS? MyChart  If the video visit is dropped, the invitation should be resent by: Send to e-mail at: [email or web address]  Will anyone else be joining your video visit? No      Video Start Time: 11.03 am  Video-Visit Details    Type of service:  Video Visit    Video End Time:11.19 am    Originating Location (pt. Location): Home    Distant Location (provider location):  [facility] NEUROLOGY CLINIC [place]     Platform used for Video Visit: XMPie

## 2021-04-21 NOTE — PATIENT INSTRUCTIONS
Let's try another eye drop that does not cost 120$ per month (like Upneeq) and may improve your left droopy eye. I will have to do some research on it before prescribing.  If the eye drops do not work, I would try doubling your Mestinon dose.  If this doesn't work then we will have to push to higher prednisone doses.   You will need a blood test for diabetes and electrolytes, and a bone density test. Will try to get both scheduled.   Follow up in 6 months

## 2021-04-21 NOTE — PROGRESS NOTES
Service Date: 04/21/2021    I had the pleasure to evaluate Mr. Ramon via billable video visit today.  He is an 80-year-old gentleman with ocular myasthenia gravis.  He was diagnosed with single-fiber EMG at [facility] a number of years ago.  I last saw him by virtual visit in 01/2021.  He still denies any bulbar symptoms.  He has no dysphagia, dysarthria, sialorrhea, difficulty chewing, head drop, ptosis, respiratory symptoms or weakness of arms or legs.  Regarding ocular symptoms, he tells me that he continues to experience some ptosis on the left eye and he frequently has to strain his eyes when reading, which can become  uncomfortable.  He does not have diplopia anymore.  He does not notice right eyelid ptosis.  He has complex symptoms with his eyelids, including frequent quivering or fluttering.  I felt that the latter could be a side effect of pyridostigmine and that is why I had reduced his dose in the previous month.  He also reported more watery and red eyes and was recently evaluated by Ophthalmology and prescribed treatment with Maxitrol drops for conjunctivitis; however, he has taken it for a week or so and has not noted improvement yet.  He is on prednisone currently 20 mg alternating with 10 mg a day and pyridostigmine 60 mg 3 times a day.  He has not been on other immunosuppression.  Last year, we discussed a prescription of oxymetazoline eyedrops (Upneeq) which is a newly approved medication to treat acquired blepharoptosis.  However, this was prohibitively expensive and he ended up not taking it.    In summary, Mr. Ramon has a purely ocular myasthenia gravis.  It is my impression that his ocular symptoms are still not fully controlled.  The fluttering or quivering could be due to fasciculations of the eyelids related to Mestinon use, but the ptosis and the fact that he strains his eyes frequently indicates that he still has incompletely controlled, ocular MG.   At this point, there is a fine  balance between the efficacy and risk of side effects associated with increasing his immunosuppression with prednisone or other drugs. For that reason, I would prefer a lower risk approach.  I will investigate if there is any other low-cost alpha-agonist eye drop like enough naphazoline or over-the-counter oxymetazoline that can be prescribed instead of the Upneeq which is expensive.  If we can get those eyedrops approved, he can try them for 1-2 weeks and then report to me.  If he does not improve, my next step would be to double his Mestinon dose.  He understands that the eyelid fasciculations are a side effect of Mestinon and they may get a little worse from it, but if this substantially improves his ptosis and his tendency to strain his eyes then  the benefit may offset any risk.  If none of the two approaches work, then inevitably we will have to push the prednisone dose to 20 mg daily or more and in that case, I would strongly consider adding a nonsteroidal immunosuppressant drug like azathioprine.    Because he is a long-term steroid user he needs some surveillance.  I will order a hemoglobin A1c and a basic metabolic panel and I will also order a DEXA bone density scan. He will return to clinic for followup in 6 months or sooner if needed.    Total time spent on this encounter today was 31 minutes, including 16 on video call, 10 in post visit dictation, editing, and orders, and 5 in visit chart review.      cc:  KAZ Adams  [facility]  [address] [place]  [address]    MD Tyler Fernandez MD        D: 2021   T: 2021   MT: teerse    Name:     DAISHA LOZOYA  MRN:      [id number]        Account:      [id number]   :      1940           Service Date: 2021       Document: [id number]

## 2021-04-23 ENCOUNTER — MYC MEDICAL ADVICE (OUTPATIENT)
Dept: FAMILY MEDICINE | Facility: CLINIC | Age: 81
End: 2021-04-23

## 2021-04-23 ENCOUNTER — MYC MEDICAL ADVICE (OUTPATIENT)
Dept: DERMATOLOGY | Facility: CLINIC | Age: 81
End: 2021-04-23

## 2021-04-27 NOTE — TELEPHONE ENCOUNTER
Good response to efudex. Will heal over next 1-2 weeks. Nothing further to add.     Kylee Estevez MD    Department of Dermatology  [facility]: Phone: [phone], Fax:[phone]  [facility]: Phone: [phone], Fax: [phone]

## 2021-04-29 DIAGNOSIS — K21.9 GASTROESOPHAGEAL REFLUX DISEASE WITHOUT ESOPHAGITIS: ICD-10-CM

## 2021-05-01 NOTE — TELEPHONE ENCOUNTER
"Prescription approved per [employer] Refill Protocol.  Requested Prescriptions   Pending Prescriptions Disp Refills     omeprazole (PRILOSEC) 20 MG DR capsule [Pharmacy Med Name: OMEPRAZOLE DR CAPS 20MG] 90 capsule 0     Sig: TAKE 1 CAPSULE DAILY (CONCOMITANT TO PREDNISONE USE)       PPI Protocol Passed - 4/29/2021 10:58 PM        Passed - Not on Clopidogrel (unless Pantoprazole ordered)        Passed - No diagnosis of osteoporosis on record        Passed - Recent (12 mo) or future (30 days) visit within the authorizing provider's specialty     Patient has had an office visit with the authorizing provider or a provider within the authorizing providers department within the previous 12 mos or has a future within next 30 days. See \"Patient Info\" tab in inbasket, or \"Choose Columns\" in Meds & Orders section of the refill encounter.              Passed - Medication is active on med list        Passed - Patient is age 18 or older             "

## 2021-05-15 NOTE — PATIENT INSTRUCTIONS
Patient Education   Personalized Prevention Plan  You are due for the preventive services outlined below.  Your care team is available to assist you in scheduling these services.  If you have already completed any of these items, please share that information with your care team to update in your medical record.  Health Maintenance Due   Topic Date Due     Annual Wellness Visit  08/15/2013     Discuss Advance Care Planning  09/30/2020        Patient Education   Personalized Prevention Plan  You are due for the preventive services outlined below.  Your care team is available to assist you in scheduling these services.  If you have already completed any of these items, please share that information with your care team to update in your medical record.  Health Maintenance Due   Topic Date Due     Annual Wellness Visit  08/15/2013     Discuss Advance Care Planning  09/30/2020        
Zeny

## 2021-05-18 NOTE — PROGRESS NOTES
Wally Mitchell is a 80 year old who presents for the following health issues     HPI     Hypertension Follow-up      Do you check your blood pressure regularly outside of the clinic? Yes     Are you following a low salt diet? No    Are your blood pressures ever more than 140 on the top number (systolic) OR more   than 90 on the bottom number (diastolic), for example 140/90? No      How many servings of fruits and vegetables do you eat daily?  4 or more    On average, how many sweetened beverages do you drink each day (Examples: soda, juice, sweet tea, etc.  Do NOT count diet or artificially sweetened beverages)?   0-1    How many days per week do you exercise enough to make your heart beat faster? 3 or less    How many minutes a day do you exercise enough to make your heart beat faster? 9 or less    How many days per week do you miss taking your medication? 0-1    No chest pain/sob/palpitations/dizziness/ha's    Recheck of myasthenia: his condition has stabilized . No concerning diplopia currently.   Concerns re: blepharoptosis. Effecting visual fields. Noting eyelid twitching.       Review of Systems   Constitutional, HEENT, cardiovascular, pulmonary, GI, , musculoskeletal, neuro, skin, endocrine and psych systems are negative, except as otherwise noted.      Objective    /68   Pulse 72   Temp 97.9  F (36.6  C) (Tympanic)   Resp 20   Wt 79.8 kg (176 lb)   SpO2 95%   BMI 25.99 kg/m    Body mass index is 25.99 kg/m .  Physical Exam   Blepharoptosis noted bilaterally.  Thyroid exam reveals thyroid is normal in size without nodules or tenderness.  CHEST:chest clear to IPPA, no tachypnea, retractions or cyanosis and S1, S2 normal, no murmur, no gallop, rate regular.  No edema. Pulses normal.    Gustavo was seen today for hypertension.    Diagnoses and all orders for this visit:    Benign essential hypertension  -     Basic metabolic panel  (Ca, Cl, CO2, Creat, Gluc, K, Na, BUN)    Stage 3a  chronic kidney disease    Myasthenia gravis without exacerbation (H)    Acquired blepharoptosis of both eyes    Prediabetes  -     Hemoglobin A1c    Other orders  -     REVIEW OF HEALTH MAINTENANCE PROTOCOL ORDERS      work on lifestyle modification  Continue all current meds and current doses.  Recommend f/u with ophthalmology to discuss blepharoplasty.   F/u with neurology to discuss pyridostigmine

## 2021-05-19 ENCOUNTER — OFFICE VISIT (OUTPATIENT)
Dept: FAMILY MEDICINE | Facility: CLINIC | Age: 81
End: 2021-05-19
Payer: MEDICARE

## 2021-05-19 VITALS
BODY MASS INDEX: 25.99 KG/M2 | HEART RATE: 72 BPM | RESPIRATION RATE: 20 BRPM | TEMPERATURE: 97.9 F | OXYGEN SATURATION: 95 % | WEIGHT: 176 LBS | DIASTOLIC BLOOD PRESSURE: 68 MMHG | SYSTOLIC BLOOD PRESSURE: 112 MMHG

## 2021-05-19 DIAGNOSIS — G70.00 MYASTHENIA GRAVIS WITHOUT EXACERBATION (H): ICD-10-CM

## 2021-05-19 DIAGNOSIS — N18.31 STAGE 3A CHRONIC KIDNEY DISEASE (H): ICD-10-CM

## 2021-05-19 DIAGNOSIS — H02.403 ACQUIRED BLEPHAROPTOSIS OF BOTH EYES: ICD-10-CM

## 2021-05-19 DIAGNOSIS — I10 BENIGN ESSENTIAL HYPERTENSION: Primary | ICD-10-CM

## 2021-05-19 DIAGNOSIS — R73.03 PREDIABETES: ICD-10-CM

## 2021-05-19 LAB
ANION GAP SERPL CALCULATED.3IONS-SCNC: 6 MMOL/L (ref 3–14)
BUN SERPL-MCNC: 24 MG/DL (ref 7–30)
CALCIUM SERPL-MCNC: 8.7 MG/DL (ref 8.5–10.1)
CHLORIDE SERPL-SCNC: 105 MMOL/L (ref 94–109)
CO2 SERPL-SCNC: 28 MMOL/L (ref 20–32)
CREAT SERPL-MCNC: 1.18 MG/DL (ref 0.66–1.25)
GFR SERPL CREATININE-BSD FRML MDRD: 57 ML/MIN/{1.73_M2}
GLUCOSE SERPL-MCNC: 91 MG/DL (ref 70–99)
HBA1C MFR BLD: 5.7 % (ref 0–5.6)
POTASSIUM SERPL-SCNC: 3.7 MMOL/L (ref 3.4–5.3)
SODIUM SERPL-SCNC: 139 MMOL/L (ref 133–144)

## 2021-05-19 PROCEDURE — 36415 COLL VENOUS BLD VENIPUNCTURE: CPT | Performed by: PHYSICIAN ASSISTANT

## 2021-05-19 PROCEDURE — 80048 BASIC METABOLIC PNL TOTAL CA: CPT | Performed by: PHYSICIAN ASSISTANT

## 2021-05-19 PROCEDURE — 83036 HEMOGLOBIN GLYCOSYLATED A1C: CPT | Performed by: PHYSICIAN ASSISTANT

## 2021-05-19 PROCEDURE — 99214 OFFICE O/P EST MOD 30 MIN: CPT | Performed by: PHYSICIAN ASSISTANT

## 2021-06-01 DIAGNOSIS — G70.00 OCULAR MYASTHENIA (H): ICD-10-CM

## 2021-06-04 RX ORDER — PREDNISONE 20 MG/1
20 TABLET ORAL SEE ADMIN INSTRUCTIONS
Qty: 90 TABLET | Refills: 1 | Status: SHIPPED | OUTPATIENT
Start: 2021-06-04 | End: 2021-10-25

## 2021-06-20 DIAGNOSIS — E78.5 HYPERLIPIDEMIA LDL GOAL <130: ICD-10-CM

## 2021-06-21 RX ORDER — SIMVASTATIN 20 MG
TABLET ORAL
Qty: 90 TABLET | Refills: 0 | Status: SHIPPED | OUTPATIENT
Start: 2021-06-21 | End: 2021-09-22

## 2021-06-28 ENCOUNTER — OFFICE VISIT (OUTPATIENT)
Dept: OPHTHALMOLOGY | Facility: CLINIC | Age: 81
End: 2021-06-28
Payer: MEDICARE

## 2021-06-28 DIAGNOSIS — H04.123 DRY EYE SYNDROME, BILATERAL: ICD-10-CM

## 2021-06-28 DIAGNOSIS — H25.13 NUCLEAR SCLEROSIS OF BOTH EYES: ICD-10-CM

## 2021-06-28 DIAGNOSIS — G70.00 OCULAR MYASTHENIA GRAVIS (H): Primary | ICD-10-CM

## 2021-06-28 PROCEDURE — 92012 INTRM OPH EXAM EST PATIENT: CPT | Performed by: STUDENT IN AN ORGANIZED HEALTH CARE EDUCATION/TRAINING PROGRAM

## 2021-06-28 ASSESSMENT — TONOMETRY
OD_IOP_MMHG: 18
IOP_METHOD: APPLANATION
OS_IOP_MMHG: 18

## 2021-06-28 ASSESSMENT — EXTERNAL EXAM - RIGHT EYE: OD_EXAM: NORMAL

## 2021-06-28 ASSESSMENT — VISUAL ACUITY
OD_CC: 20/30-1
OS_CC: 20/70+1
METHOD: SNELLEN - LINEAR

## 2021-06-28 ASSESSMENT — EXTERNAL EXAM - LEFT EYE: OS_EXAM: NORMAL

## 2021-06-28 NOTE — PATIENT INSTRUCTIONS
Stop maxitrol daily    Could use a gel tear at bedtime and/or up to four times a day (like Refresh Liquigel or GenTeal Gel Tears)     Continue AREDS2 eye vitamins by mouth    Marjorie Mcclellan MD  [phone]

## 2021-06-28 NOTE — LETTER
"    6/28/2021         RE: Gustavo Ramon  [address] [facility] [address]        Dear Colleague,    Thank you for referring your patient, Gustavo Ramon, to the [facility]. Please see a copy of my visit note below.     Current Eye Medications:  Pyridostigmine and prednisolone 30mg daily and maxitrol daily.     Subjective:  3 mo iop recheck/myasthenia gravis  Pt reports that his eye lids have been 'quivering', lids droop and hard to open for the past few months, plus \"eye lids feel tight\". He relates to this above meds. Pt also reports his eyes have been watering a lot. Pt has not been having double vision for sometime now.     Objective:  See Ophthalmology Exam.       Assessment:  Gustavo Ramon is a 81 year old male who presents with:   Encounter Diagnoses   Name Primary?     Ocular myasthenia gravis (H) Has been increasing Mestinon and prednisone under guidance of Dr. Wheat. Droopy lid symptoms persist at present dose (just increased last week).      Nuclear sclerosis of both eyes      Dry eye syndrome, bilateral Suspect conjunctival chalasis may be playing a role. Will increase lubrication to gel tears.       Plan:  Stop maxitrol daily    Could use a gel tear at bedtime and/or up to four times a day (like Refresh Liquigel or GenTeal Gel Tears)     Continue AREDS2 eye vitamins by mouth    Marjorie Mcclellan MD  [phone]          Again, thank you for allowing me to participate in the care of your patient.        Sincerely,        Marjorie Mcclellan MD    "

## 2021-06-28 NOTE — PROGRESS NOTES
" Current Eye Medications:  Pyridostigmine and prednisolone 30mg daily and maxitrol daily.     Subjective:  3 mo iop recheck/myasthenia gravis  Pt reports that his eye lids have been 'quivering', lids droop and hard to open for the past few months, plus \"eye lids feel tight\". He relates to this above meds. Pt also reports his eyes have been watering a lot. Pt has not been having double vision for sometime now.     Objective:  See Ophthalmology Exam.       Assessment:  Gustavo Ramon is a 81 year old male who presents with:   Encounter Diagnoses   Name Primary?     Ocular myasthenia gravis (H) Has been increasing Mestinon and prednisone under guidance of Dr. Wheat. Droopy lid symptoms persist at present dose (just increased last week).      Nuclear sclerosis of both eyes      Dry eye syndrome, bilateral Suspect conjunctival chalasis may be playing a role. Will increase lubrication to gel tears.       Plan:  Stop maxitrol daily    Could use a gel tear at bedtime and/or up to four times a day (like Refresh Liquigel or GenTeal Gel Tears)     Continue AREDS2 eye vitamins by mouth    Marjorie Mcclellan MD  [phone]      "

## 2021-07-11 DIAGNOSIS — I10 BENIGN ESSENTIAL HYPERTENSION: ICD-10-CM

## 2021-07-12 RX ORDER — LISINOPRIL 5 MG/1
TABLET ORAL
Qty: 90 TABLET | Refills: 1 | Status: SHIPPED | OUTPATIENT
Start: 2021-07-12 | End: 2022-01-31

## 2021-07-22 DIAGNOSIS — K21.9 GASTROESOPHAGEAL REFLUX DISEASE WITHOUT ESOPHAGITIS: ICD-10-CM

## 2021-07-23 NOTE — TELEPHONE ENCOUNTER
"Prescription approved per [facility] Refill Protocol.  Requested Prescriptions   Pending Prescriptions Disp Refills     omeprazole (PRILOSEC) 20 MG DR capsule [Pharmacy Med Name: OMEPRAZOLE DR CAPS 20MG] 90 capsule 0     Sig: Take 1 capsule (20 mg) by mouth daily (CONCOMITANT TO PREDNISONE USE)       PPI Protocol Passed - 7/22/2021  5:08 PM        Passed - Not on Clopidogrel (unless Pantoprazole ordered)        Passed - No diagnosis of osteoporosis on record        Passed - Recent (12 mo) or future (30 days) visit within the authorizing provider's specialty     Patient has had an office visit with the authorizing provider or a provider within the authorizing providers department within the previous 12 mos or has a future within next 30 days. See \"Patient Info\" tab in inbasket, or \"Choose Columns\" in Meds & Orders section of the refill encounter.              Passed - Medication is active on med list        Passed - Patient is age 18 or older             "

## 2021-08-09 ENCOUNTER — LAB (OUTPATIENT)
Dept: LAB | Facility: CLINIC | Age: 81
End: 2021-08-09
Payer: MEDICARE

## 2021-08-09 DIAGNOSIS — C61 MALIGNANT NEOPLASM OF PROSTATE (H): ICD-10-CM

## 2021-08-09 LAB — PSA SERPL-MCNC: 1.1 UG/L (ref 0–4)

## 2021-08-09 PROCEDURE — 84153 ASSAY OF PSA TOTAL: CPT

## 2021-08-09 PROCEDURE — 36415 COLL VENOUS BLD VENIPUNCTURE: CPT

## 2021-08-16 ENCOUNTER — OFFICE VISIT (OUTPATIENT)
Dept: UROLOGY | Facility: CLINIC | Age: 81
End: 2021-08-16
Payer: MEDICARE

## 2021-08-16 VITALS — SYSTOLIC BLOOD PRESSURE: 138 MMHG | HEART RATE: 71 BPM | OXYGEN SATURATION: 98 % | DIASTOLIC BLOOD PRESSURE: 75 MMHG

## 2021-08-16 DIAGNOSIS — C61 MALIGNANT NEOPLASM OF PROSTATE (H): Primary | ICD-10-CM

## 2021-08-16 PROCEDURE — 99213 OFFICE O/P EST LOW 20 MIN: CPT | Performed by: UROLOGY

## 2021-08-16 ASSESSMENT — PAIN SCALES - GENERAL: PAINLEVEL: NO PAIN (0)

## 2021-08-16 NOTE — PROGRESS NOTES
Chief Complaint   Patient presents with     RECHECK     PSA       Gustavo Ramon is a 81 year old male who presents today for follow up of   Chief Complaint   Patient presents with     RECHECK     PSA    f/u for prostate cancer.  He is s/p cryotherapy on 7/13 for prostate cancer kari 8 with initial psa of 7.4.  He did receive a hormonal shot prior to treatment.  His psa has gone up slowly from 0.27 (2/14) to 0.66 (6/14) and most recently is 1.1  From 2.13 0.94 then 1.03 and then 1.09 and 0.77 ,1.05, 1.17, 1.21and now 1.03.  He has some urgency of urination but is doing better.  He denies any incontinence/dysuria/hematuria.    Current Outpatient Medications   Medication Sig Dispense Refill     aspirin 325 MG tablet Take 325 mg by mouth daily       cyanocobalamin (VITAMIN B-12) 1000 MCG tablet Take 1 tablet (1,000 mcg) by mouth daily 90 tablet 1     ferrous sulfate (FEROSUL) 325 (65 Fe) MG tablet Use 1 tab every other day with orange juice 60 tablet 1     fluorouracil (EFUDEX) 5 % external cream Apply topically 2 times daily 40 g 0     hydrochlorothiazide (HYDRODIURIL) 25 MG tablet TAKE ONE-HALF (1/2) TABLET DAILY 45 tablet 3     lisinopril (ZESTRIL) 5 MG tablet TAKE 1 TABLET DAILY 90 tablet 1     Multiple Vitamins-Minerals (PRESERVISION AREDS 2) CAPS Take 2 soft gel caps daily 90 capsule 1     omeprazole (PRILOSEC) 20 MG DR capsule Take 1 capsule (20 mg) by mouth daily (CONCOMITANT TO PREDNISONE USE) 90 capsule 0     predniSONE (DELTASONE) 20 MG tablet Take 1 tablet (20 mg) by mouth See Admin Instructions Take it every other day. If your eyes do not improve by next week, start taking it daily. 90 tablet 1     pyridostigmine (MESTINON) 60 MG tablet Take 2 tablets (120 mg) by mouth daily 180 tablet 1     simvastatin (ZOCOR) 20 MG tablet TAKE 1 TABLET AT BEDTIME 90 tablet 0     sulfamethoxazole-trimethoprim (BACTRIM DS) 800-160 MG tablet Take 1 tablet by mouth three times a week 36 tablet 0     triamcinolone  (KENALOG) 0.1 % external cream Apply twice daily for 2 weeks to rash on left upper arm 45 g 0     vitamin D3 (CHOLECALCIFEROL) 2000 units tablet Take 1 tablet by mouth daily 90 tablet 1     Allergies   Allergen Reactions     Nkda [No Known Drug Allergies]       Past Medical History:   Diagnosis Date     Actinic keratosis      AK (actinic keratosis) 11/15/2012     Amaurosis fugax      Basal cell carcinoma 2009    R medial cheek/nose     Central serous retinopathy left    Eye     Diverticulosis 08/2006     DJD (degenerative joint disease)      GERD (gastroesophageal reflux disease)      Hearing loss     High frequency     History of nonmelanoma skin cancer      History of nonmelanoma skin cancer      HTN (hypertension)      Hyperlipidemia LDL goal < 130      Hyperplastic colon polyp 08/2006     IgA monoclonal gammopathy     IgA kappa     Lattice degeneration of retina right    Eye     Macular degeneration      Nonsenile cataract      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    [place] consult     Rosacea      Strabismus      Vitreous detachment left    Eye     Past Surgical History:   Procedure Laterality Date     ARTHROSCOPY KNEE RT/LT Left Jan 2010    Knee     COLONOSCOPY WITH CO2 INSUFFLATION N/A 9/24/2019    Procedure: COLONOSCOPY, WITH CO2 INSUFFLATION;  Surgeon: Loi Levine MD;  Location:  OR     COMBINED ESOPHAGOSCOPY, GASTROSCOPY, DUODENOSCOPY (EGD) WITH CO2 INSUFFLATION N/A 9/24/2019    Procedure: ESOPHAGOGASTRODUODENOSCOPY, WITH CO2 INSUFFLATION;  Surgeon: Loi Levine MD;  Location:  OR     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     DISKECTOMY, LUMBAR, SINGLE SP  2003    L2-3     ENT SURGERY  1943    Tonsils      ENT SURGERY  2-22-12    Biopsy lesion right pinna     ENT SURGERY  2-24-12    Biopsy leson right pinna     ESOPHAGOSCOPY, GASTROSCOPY, DUODENOSCOPY (EGD), COMBINED N/A 9/24/2019    Procedure: Esophagogastroduodenoscopy, With Biopsy;  Surgeon: Loi Levine MD;  Location: [facility]  OR     SOFT TISSUE SURGERY  May 2010    Basal Cell/right side nose     Family History   Problem Relation Age of Onset     Heart Disease Mother      Alzheimer Disease Mother 73     C.A.D. Father      Diabetes Grandchild         using a pump      Diabetes Paternal Uncle      Heart Disease Paternal Grandmother      Glaucoma No family hx of      Macular Degeneration No family hx of      Melanoma No family hx of      Skin Cancer No family hx of      History     Social History     Marital Status:      Spouse Name: Ceci     Number of Children: 2     Years of Education: 16     Occupational History      Retired     2001, Electrical     Social History Main Topics     Smoking status: Never Smoker      Smokeless tobacco: Never Used     Alcohol Use: No     Drug Use: No     Sexual Activity: No     Other Topics Concern     Not on file     Social History Narrative       REVIEW OF SYSTEMS  =================  C: NEGATIVE for fever, chills, change in weight  I: NEGATIVE for worrisome rashes, moles or lesions  E/M: NEGATIVE for ear, mouth and throat problems  R: NEGATIVE for significant cough or SHORTNESS OF BREATH,   CV: NEGATIVE for chest pain, palpitations or peripheral edema  GI: NEGATIVE for nausea, abdominal pain, heartburn, or change in bowel habits  NEURO: NEGATIVE any motor/sensory changes  PSYCH: NEGATIVE for recent mood disorder    Physical Exam:  /75 (BP Location: Left arm, Patient Position: Sitting, Cuff Size: Adult Regular)   Pulse 71   SpO2 98%    Patient is pleasant, in no acute distress, good general condition.  Lung: no evidence of respiratory distress    Abdomen: Soft, nondistended, non tender. No masses. No rebound or guarding.   Exam: penis no d/c.  Testis no masses.   No scrotal lesion.  Prostate flat small.  Skin: Warm and dry.  No redness.  Psych: normal mood and affect  Neuro: alert and oriented    Assessment/Plan:   (185) Malignant neoplasm of prostate  (primary encounter  diagnosis)  Comment:  psa is stable  Plan: see in 12 months with psa.

## 2021-08-26 ASSESSMENT — ENCOUNTER SYMPTOMS
DISTURBANCES IN COORDINATION: 1
EYE PAIN: 0
MYALGIAS: 0
SPEECH CHANGE: 0
MEMORY LOSS: 0
PARALYSIS: 0
LOSS OF CONSCIOUSNESS: 0
HEADACHES: 0
NECK PAIN: 0
BACK PAIN: 1
EYE REDNESS: 1
DIZZINESS: 1
NUMBNESS: 1
STIFFNESS: 0
SEIZURES: 0
MUSCLE CRAMPS: 1
TINGLING: 1
EYE WATERING: 1
ARTHRALGIAS: 0
EYE IRRITATION: 1
TREMORS: 1
MUSCLE WEAKNESS: 1
WEAKNESS: 1
DOUBLE VISION: 0
JOINT SWELLING: 0

## 2021-09-01 ENCOUNTER — OFFICE VISIT (OUTPATIENT)
Dept: NEUROLOGY | Facility: CLINIC | Age: 81
End: 2021-09-01
Payer: MEDICARE

## 2021-09-01 VITALS
DIASTOLIC BLOOD PRESSURE: 88 MMHG | BODY MASS INDEX: 25.99 KG/M2 | SYSTOLIC BLOOD PRESSURE: 134 MMHG | RESPIRATION RATE: 16 BRPM | HEART RATE: 85 BPM | WEIGHT: 176 LBS | OXYGEN SATURATION: 97 %

## 2021-09-01 DIAGNOSIS — G70.00 MG, OCULAR (MYASTHENIA GRAVIS) (H): Primary | ICD-10-CM

## 2021-09-01 PROCEDURE — 99215 OFFICE O/P EST HI 40 MIN: CPT | Performed by: PSYCHIATRY & NEUROLOGY

## 2021-09-01 ASSESSMENT — PAIN SCALES - GENERAL: PAINLEVEL: NO PAIN (0)

## 2021-09-01 NOTE — NURSING NOTE
Chief Complaint   Patient presents with     Myasthenia Gravis     [facility] RETURN MYASTHENIA GRAVIS        Oliverio Quintanilla, EMT

## 2021-09-01 NOTE — LETTER
2021       RE: Gustavo Ramon  [address]  [place] [address]     Dear Colleague,    Thank you for referring your patient, Gustavo Ramon, to the [facility] [place] at [facility]. Please see a copy of my visit note below.    Service Date: 2021    Winston Silverman PA-C  [facility]  [address]  [place]  [address]    Marjorie Mcclellan MD  [facility]  [address] [place]  [address]    RE:  Gustavo Ramon  MRN:  [id number]  :  1940    Dear Doctors:    I had the pleasure to see Mr. Ramon in followup at the [facility].  This is the first time I am seeing him in person.  He has ocular myasthenia gravis diagnosed with single-fiber EMG at [facility] a number of years ago.  I last saw him virtually in 2021.  His acetylcholine receptor antibody status is unknown at this point.  Since the last time I saw him, he continues to have some uncomfortable symptoms.  He used to be on prednisone 20 mg daily.  In the last month or two because of persistent reports of left eyelid ptosis, I increased his dose to 30 mg daily and then 40 mg daily now.  He has been on the 40 mg dose for the past 2 weeks.  He also takes Bactrim for PCP prophylaxis and omeprazole for PUD prophylaxis.  He still continues to experience ptosis mostly at the left eyelid in the middle of the day or later in the afternoon.  He also has very watery eyes.  He has been seen by Ophthalmology.  He has no glaucoma.  His eye pressure is 18 mm Hg.  He is also complaining of annoying quivering of the eyelids, left more than right.  The quivering can happen anytime during the day.  He has not been able to identify a clear relationship of this to his previous pyridostigmine dose.  He currently takes 120 mg of Mestinon t.i.d.  At some point, he also expressed some concern  "whether his myasthenia is generalizing because he has a feeling of \"abnormal presence in his chest or throat\".  He is a little more conscious when swallowing, but he does not frankly have dysphagia.  He never chokes on any food consistency, solid or liquid, and he does not feel the need to alter his diet.  He is a slow eater always and this has not changed lately.  He denies any difficulty chewing.  He does not have any overt dysarthria or change in his voice pitch, although he sometimes sounds a little more \"restricted\" or \"slowed\" towards the end of the day in terms of his speech.  He denies head drop.  He does not have any dyspnea on exertion or orthopnea and he does not have any weakness of the arms or legs.      Medications were reviewed and are as per Epic record.  Today he did not take any Mestinon in the morning or prednisone, and interestingly, his quivering is better.    /88   Pulse 85   Resp 16   Wt 79.8 kg (176 lb)   SpO2 97%   BMI 25.99 kg/m      Exam shows mild eyelid ptosis at rest.  There is partial aggravation of the ptosis with sustained upward gaze after about 20-25 seconds bilaterally, a little worse on the left.  I did not see any eyelid fasciculations or myokymia on my exam.  Orbicularis oculi strength was not tested.  He does have diplopia with sustained upward gaze after about 20 seconds and with horizontal gaze after about the same time.  Ductions and versions of the eyes look normal.  He has no weakness of lip seal, cheek puff, uvula, jaw, palate or tongue.  No weakness of neck flexion or extension.  No dysphonia or dysarthria, no weakness in upper or lower extremities.    In summary, Mr. Ramon has a few different ocular complaints.  I do not know why he has watery eyes and I am curious about his ophthalmologist's opinion. His quivering of the eyelids or flutter may be fasciculations and could relate to Mestinon use.  I asked him to cut the Mestinon dose to 60 mg t.i.d. and see " if this is better.  He could even skip 1 dose of Mestinon, and if it clearly makes the quivering resolve, then it is a side effect.  However, he still has some ptosis and it is not fully controlled, which means his ocular myasthenia is still not in remission. At this point, the treatment choices have become a little more challenging.  I offered the option of increasing the prednisone dose to 50 mg or more, but he is rather reluctant to do it out of fear of side effects of higher doses.  I also offered him the option of starting mycophenolate, the disadvantage being that it is a slowly acting drug that can take 3-4 months or more to work, and it does have its own side effects including immunosuppression that may not be desired in his age.  Alternative option is getting a second opinion either from one of our Neuroophthalmologists, Conchita Mae or Glenis, at the , or from [facility].  He will consider his options and call me back in the next days. Until he clarifies his choice, he should continue taking 40 mg of prednisone. If he starts CellCept, then I will gradually taper his prednisone starting next month, 30 mg for a month, then 20 mg daily. I do not see any clinical evidence of generalized MG on his exam.     Thank you for allowing me to participate in his care.    Total time spent on this encounter 50 minutes, of which 35 face-to-face with the patient, 10 on post-visit note dictation, editing and orders, 5 in pre-visit chart review.    Sincerely,    Tyler Wheat MD

## 2021-09-01 NOTE — PATIENT INSTRUCTIONS
The quivering/twitching of the eyelids could be a side effect of Mestinon. The best way to figure it out is to cut down the Mestinon dose to half (60 mg three times a day), or to skip 1 dose of Mestinon. If quivering improves, it's a side effect.  I think that your report of intermittent ptosis (drooping) of the left eye indicates that your ocular myasthenia is still not fully controlled despite taking prednisone 40 mg daily. If you do not want to take more prednisone your options include:    1) Starting a new drug- mycophenolate (Cellcept). This is a suppressor of the immune system that we use for transplant patients. It does not cause most of the steroid side effects, but it can weaken the immune system, increase the risk of infection or poor response to vaccines, and cause low platelet or white cell counts. If you decide to start this drug, you will need blood draws every 2 weeks for the first month and monthly thereafter for at least 3 months.   The drug is slow to work- it won't make your eye symptoms any better until you use it for at least 3 months  2) Getting another opinion, either from one of our Neuroopthalmologists (Dr Mae/Glenis) or [facility].   Please let me know soon what you would like to do    I do not believe that you are developing signs of generalized myasthenia.    Follow up in 3 months

## 2021-09-01 NOTE — PROGRESS NOTES
"Service Date: 2021    AMRIT Polo [facility] [place]  [address]  [place]  [address]    MD MARK Fernandez [facility]  [address] [place]  [address]    RE:  Gustavo Ramon  MRN:  [id number]  :  1940    Dear Doctors:    I had the pleasure to see Mr. Ramon in followup at the [facility].  This is the first time I am seeing him in person.  He has ocular myasthenia gravis diagnosed with single-fiber EMG at [facility] a number of years ago.  I last saw him virtually in 2021.  His acetylcholine receptor antibody status is unknown at this point.  Since the last time I saw him, he continues to have some uncomfortable symptoms.  He used to be on prednisone 20 mg daily.  In the last month or two because of persistent reports of left eyelid ptosis, I increased his dose to 30 mg daily and then 40 mg daily now.  He has been on the 40 mg dose for the past 2 weeks.  He also takes Bactrim for PCP prophylaxis and omeprazole for PUD prophylaxis.  He still continues to experience ptosis mostly at the left eyelid in the middle of the day or later in the afternoon.  He also has very watery eyes.  He has been seen by Ophthalmology.  He has no glaucoma.  His eye pressure is 18 mm Hg.  He is also complaining of annoying quivering of the eyelids, left more than right.  The quivering can happen anytime during the day.  He has not been able to identify a clear relationship of this to his previous pyridostigmine dose.  He currently takes 120 mg of Mestinon t.i.d.  At some point, he also expressed some concern whether his myasthenia is generalizing because he has a feeling of \"abnormal presence in his chest or throat\".  He is a little more conscious when swallowing, but he does not frankly have dysphagia.  He never chokes on any food consistency, solid or liquid, and he does not feel the need to alter his diet.  He is a slow eater " "always and this has not changed lately.  He denies any difficulty chewing.  He does not have any overt dysarthria or change in his voice pitch, although he sometimes sounds a little more \"restricted\" or \"slowed\" towards the end of the day in terms of his speech.  He denies head drop.  He does not have any dyspnea on exertion or orthopnea and he does not have any weakness of the arms or legs.      Medications were reviewed and are as per Epic record.  Today he did not take any Mestinon in the morning or prednisone, and interestingly, his quivering is better.    /88   Pulse 85   Resp 16   Wt 79.8 kg (176 lb)   SpO2 97%   BMI 25.99 kg/m      Exam shows mild eyelid ptosis at rest.  There is partial aggravation of the ptosis with sustained upward gaze after about 20-25 seconds bilaterally, a little worse on the left.  I did not see any eyelid fasciculations or myokymia on my exam.  Orbicularis oculi strength was not tested.  He does have diplopia with sustained upward gaze after about 20 seconds and with horizontal gaze after about the same time.  Ductions and versions of the eyes look normal.  He has no weakness of lip seal, cheek puff, uvula, jaw, palate or tongue.  No weakness of neck flexion or extension.  No dysphonia or dysarthria, no weakness in upper or lower extremities.    In summary, Mr. Ramon has a few different ocular complaints.  I do not know why he has watery eyes and I am curious about his ophthalmologist's opinion. His quivering of the eyelids or flutter may be fasciculations and could relate to Mestinon use.  I asked him to cut the Mestinon dose to 60 mg t.i.d. and see if this is better.  He could even skip 1 dose of Mestinon, and if it clearly makes the quivering resolve, then it is a side effect.  However, he still has some ptosis and it is not fully controlled, which means his ocular myasthenia is still not in remission. At this point, the treatment choices have become a little more " challenging.  I offered the option of increasing the prednisone dose to 50 mg or more, but he is rather reluctant to do it out of fear of side effects of higher doses.  I also offered him the option of starting mycophenolate, the disadvantage being that it is a slowly acting drug that can take 3-4 months or more to work, and it does have its own side effects including immunosuppression that may not be desired in his age.  Alternative option is getting a second opinion either from one of our Neuroophthalmologists, Conchita Mae or Glenis, at the , or from [facility].  He will consider his options and call me back in the next days. Until he clarifies his choice, he should continue taking 40 mg of prednisone. If he starts CellCept, then I will gradually taper his prednisone starting next month, 30 mg for a month, then 20 mg daily. I do not see any clinical evidence of generalized MG on his exam.     Thank you for allowing me to participate in his care.    Total time spent on this encounter 50 minutes, of which 35 face-to-face with the patient, 10 on post-visit note dictation, editing and orders, 5 in pre-visit chart review.    Sincerely,    Tyler Wheat MD        D: 2021   T: 2021   MT: clarence    Name:     DAISHA LOZOYA  MRN:      [id number]        Account:      [id number]   :      1940           Service Date: 2021       Document: [id number]

## 2021-09-11 ENCOUNTER — HEALTH MAINTENANCE LETTER (OUTPATIENT)
Age: 81
End: 2021-09-11

## 2021-09-22 ENCOUNTER — MYC REFILL (OUTPATIENT)
Dept: FAMILY MEDICINE | Facility: CLINIC | Age: 81
End: 2021-09-22

## 2021-09-22 DIAGNOSIS — E78.5 HYPERLIPIDEMIA LDL GOAL <130: ICD-10-CM

## 2021-09-23 ENCOUNTER — OFFICE VISIT (OUTPATIENT)
Dept: DERMATOLOGY | Facility: CLINIC | Age: 81
End: 2021-09-23
Payer: MEDICARE

## 2021-09-23 DIAGNOSIS — L57.0 ACTINIC KERATOSIS: ICD-10-CM

## 2021-09-23 DIAGNOSIS — L57.8 SUN-DAMAGED SKIN: ICD-10-CM

## 2021-09-23 DIAGNOSIS — L82.1 SEBORRHEIC KERATOSIS: ICD-10-CM

## 2021-09-23 DIAGNOSIS — D18.01 CHERRY ANGIOMA: ICD-10-CM

## 2021-09-23 DIAGNOSIS — D22.9 MULTIPLE BENIGN NEVI: ICD-10-CM

## 2021-09-23 DIAGNOSIS — Z85.828 HISTORY OF NONMELANOMA SKIN CANCER: ICD-10-CM

## 2021-09-23 DIAGNOSIS — L21.9 SEBORRHEIC DERMATITIS: Primary | ICD-10-CM

## 2021-09-23 DIAGNOSIS — L81.4 SOLAR LENTIGO: ICD-10-CM

## 2021-09-23 PROCEDURE — 17000 DESTRUCT PREMALG LESION: CPT | Performed by: DERMATOLOGY

## 2021-09-23 PROCEDURE — 17003 DESTRUCT PREMALG LES 2-14: CPT | Performed by: DERMATOLOGY

## 2021-09-23 PROCEDURE — 99214 OFFICE O/P EST MOD 30 MIN: CPT | Mod: 25 | Performed by: DERMATOLOGY

## 2021-09-23 RX ORDER — TRIAMCINOLONE ACETONIDE 1 MG/G
CREAM TOPICAL
Qty: 30 G | Refills: 3 | Status: SHIPPED | OUTPATIENT
Start: 2021-09-23 | End: 2024-08-16

## 2021-09-23 NOTE — LETTER
9/23/2021         RE: Gustavo Ramon  [address] [facility] [address]        Dear Colleague,    Thank you for referring your patient, Gustavo Ramon, to the [facility]. Please see a copy of my visit note below.    [facility] Dermatology Note  Encounter Date: Sep 23, 2021  Office Visit     Dermatology Problem List:  Last skin check 9/23/21, recommended next in 6 months.  1. 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 Christiansen's note 11/21/11), s/p excision 5/12/09   2. Actinic keratoses  - s/p Efudex 2015, Fall 2020 to face, Spring 2021 to forearms  - s/p PDT (x3)  - s/p LN2  3. Seborrheic dermatitis  - clobetasol 0.05% solution  4. 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 He has a daughter and son. He is [place].  Family history: Negative for skin cancer.  ____________________________________________    Assessment & Plan:     # History of NMSC. No evidence of recurrence.   - Recommend sunscreens SPF #30 or greater, protective clothing and avoidance of tanning beds.   - Discussed that skin cancer risk increases with cumulative use of hydrochlorothiazide. Patient will discuss switching with his PCP.  - Previously discussed use of nicotinamide to reduce AKs and NMSC. Patient does not wish to add on another pill at this time.   - Recommended next skin check in 6 months.     # Immunosuppressed with prednisone for myasthenia gravis. Discussed increased risk of  skin cancers with immunosuppressing medications.   - Recommend sunscreens SPF #30 or greater, protective clothing and avoidance of tanning beds.     # Sun damaged skin with solar lentigines: Chronic, stable.  - Recommend sunscreens SPF #30 or greater, protective clothing and avoidance of tanning beds.     # Benign skin findings including: seborrheic keratoses, cherry angioma, skin tag - minor, self limited problem  - No further intervention required. Patient to report changes.   - Patient reassured of the benign nature of these lesions.     # Multiple clinically benign nevi: Chronic, stable  - No further intervention required. Patient to report changes.   - Patient reassured of the benign nature of these lesions.     # Thin AKs scattered over the left lateral neck and right lateral neck.  - Initiate Efudex 5% cream applied to the posterior ears and lateral neck BID for 2-4 weeks. Discussed that patient should expect redness, blistering and scabbing as well as time course discussed. Informed patient to keep away from children/pets and to wash hands after application.    # Localized AKs: There are also 11 localized AKs on the face. Discussed precancerous nature of these lesions. Recommended cryotherapy treatment to prevent progression to a squamous cell carcinoma. Advised patient to call for follow up if not resolved in 1 month.   - See cryotherapy procedure note below.    # Diffuse actinic damage to forearms: s/p efudex with excellent response.  - Resolved.     # Previously noted 2 mm erosion on the right upper back  - Resolved.     # Previously noted hemorrhagic crusting at the left temporal scalp.  - Resolved.    # Seborrheic dermatitis of the external ears. Chronic, stable.  - Triamcinolone 0.1% cream BID for flares. Refilled today.    Procedures Performed:   - Cryotherapy procedure note, locations: left superior shoulder, right antihelix, right cheek, right eyebrow, left forehead, left preauricular cheek, and  left inferior helix. After verbal consent and discussion of risks and benefits including, but not limited to, dyspigmentation/scar, blister, and pain, 11 AKs were treated with 1-2 mm freeze border for 1-2 cycles with liquid nitrogen. Post cryotherapy instructions were provided.    Follow-up: 6 months in-person for skin check, or earlier for new or changing lesions.    Staff and Scribe:     Scribe Disclosure:   I, Ya Porter, am serving as a scribe to document services personally performed by this physician, Dr. Kylee Estevez, based on data collection and the provider's statements to me.     Provider Disclosure:   The documentation recorded by the scribe accurately reflects the services I personally performed and the decisions made by me.    Kylee Estevez MD    Department of Dermatology  [facility]: Phone: [phone], Fax:[phone]  [facility]: Phone: [phone], Fax: [phone]    ____________________________________________    CC: Skin Check (Full skin check. Area of concern- AK's on ears, cheek and scalp. Hx of NMSC)    HPI:  Mr. Gustavo Ramon is a(n) 81 year old male who presents today as a return patient for skin check.    Last visit 3/23/21 for skin check. At that time, erosion on right upper back and hemorrhagic crust at left temporal scalp were noted - plan was to biopsy at next visit if these did not resolve. 9 AKs were treated with cryo. Patient was instructed to use efudex BID to forearms for around 4 weeks.    Today, patient notes concern of spots on the ears, cheek, and scalp. Applied efudex on the forearms. Believes there are no changes from the two lesions we marked last time. Has been applying triamcinolone for dry itchy ears.    Patient is otherwise feeling well, without additional skin concerns.     Labs Reviewed:  N/A    Physical Exam:  Vitals: There  were no vitals taken for this visit.  SKIN: Total skin excluding the undergarment areas was performed. The exam included the head/face, neck, both arms, chest, back, abdomen, buttocks, both legs, digits and/or nails.   - Montemayor Type I-II  - Well healed scars in areas of past NMSC. No pearly appearance or telangiectasias.  - Scattered brown macules on sun exposed areas.  - There are dome shaped bright red papules on the trunk.   - Multiple regular brown pigmented macules and papules are identified on the trunk.   - There are waxy stuck on tan to brown papules on the trunk.  - There are thin erythematous macules with overyling adherent scale on the left lateral neck and right lateral neck.  - There are erythematous macules with overyling adherent scale on the left superior shoulder x1, right antihelix x1, right cheek x1, right eyebrow x1, left forehead x2, left preauricular cheek x4, and left inferior helix x1.  - Right upper back is clear.  - Left temporal scalp is clear.  - No other lesions of concern on areas examined.       Medications:  Current Outpatient Medications   Medication     aspirin 325 MG tablet     cyanocobalamin (VITAMIN B-12) 1000 MCG tablet     ferrous sulfate (FEROSUL) 325 (65 Fe) MG tablet     hydrochlorothiazide (HYDRODIURIL) 25 MG tablet     lisinopril (ZESTRIL) 5 MG tablet     Multiple Vitamins-Minerals (PRESERVISION AREDS 2) CAPS     omeprazole (PRILOSEC) 20 MG DR capsule     predniSONE (DELTASONE) 20 MG tablet     pyridostigmine (MESTINON) 60 MG tablet     simvastatin (ZOCOR) 20 MG tablet     sulfamethoxazole-trimethoprim (BACTRIM DS) 800-160 MG tablet     vitamin D3 (CHOLECALCIFEROL) 2000 units tablet     fluorouracil (EFUDEX) 5 % external cream     triamcinolone (KENALOG) 0.1 % external cream     No current facility-administered medications for this visit.      Past Medical History:   Patient Active Problem List   Diagnosis     Rosacea     DJD (degenerative joint disease)     Ocular  myasthenia gravis (H)     Macular pigment deposit     HYPERLIPIDEMIA LDL GOAL <130     IgA monoclonal gammopathy     Retinal hole OS, operculated     Horseshoe tear of retina without detachment OD     History  of basal cell carcinoma     Seborrhea     AK (actinic keratosis)     Malignant neoplasm of prostate (H)     PVD (posterior vitreous detachment)     Amaurosis fugax by Hx neg carotid W/U     Advanced directives, counseling/discussion     Actinic keratosis     Peripheral neuropathy     Nuclear sclerosis of both eyes     Essential hypertension with goal blood pressure less than 140/90     Gastroesophageal reflux disease without esophagitis     Past Medical History:   Diagnosis Date     Actinic keratosis      AK (actinic keratosis) 11/15/2012     Amaurosis fugax      Basal cell carcinoma 2009    R medial cheek/nose     Central serous retinopathy left    Eye     Diverticulosis 08/2006     DJD (degenerative joint disease)      GERD (gastroesophageal reflux disease)      Hearing loss     High frequency     History of nonmelanoma skin cancer      History of nonmelanoma skin cancer      HTN (hypertension)      Hyperlipidemia LDL goal < 130      Hyperplastic colon polyp 08/2006     IgA monoclonal gammopathy     IgA kappa     Lattice degeneration of retina right    Eye     Macular degeneration      Nonsenile cataract      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    [facility] consult     Rosacea      Strabismus      Vitreous detachment left    Eye       CC No referring provider defined for this encounter. on close of this encounter.        Again, thank you for allowing me to participate in the care of your patient.        Sincerely,        Kylee Estevez MD

## 2021-09-23 NOTE — NURSING NOTE
Gustavo Ramon's goals for this visit include:   Chief Complaint   Patient presents with     Skin Check     Full skin check. Area of concern- AK's on ears, cheek and scalp. Hx of NMSC       He requests these members of his care team be copied on today's visit information:     PCP: Winston Silverman    Referring Provider:  No referring provider defined for this encounter.    There were no vitals taken for this visit.    Do you need any medication refills at today's visit? maura Bay on 9/23/2021 at 10:14 AM

## 2021-09-23 NOTE — PROGRESS NOTES
[facility] Dermatology Note  Encounter Date: Sep 23, 2021  Office Visit     Dermatology Problem List:  Last skin check 9/23/21, recommended next in 6 months.  1. 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 Christiansen's note 11/21/11), s/p excision 5/12/09   2. Actinic keratoses  - s/p Efudex 2015, Fall 2020 to face, Spring 2021 to forearms  - s/p PDT (x3)  - s/p LN2  3. Seborrheic dermatitis  - clobetasol 0.05% solution  4. 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 He has a daughter and son. He is [place].  Family history: Negative for skin cancer.  ____________________________________________    Assessment & Plan:     # History of NMSC. No evidence of recurrence.   - Recommend sunscreens SPF #30 or greater, protective clothing and avoidance of tanning beds.   - Discussed that skin cancer risk increases with cumulative use of hydrochlorothiazide. Patient will discuss switching with his PCP.  - Previously discussed use of nicotinamide to reduce AKs and NMSC. Patient does not wish to add on another pill at this time.   - Recommended next skin check in 6 months.     # Immunosuppressed with prednisone for myasthenia gravis. Discussed increased risk of skin cancers with immunosuppressing medications.   - Recommend sunscreens SPF #30 or greater, protective clothing and avoidance of tanning beds.     # Sun damaged skin with solar lentigines: Chronic, stable.  - Recommend sunscreens SPF #30 or greater, protective  clothing and avoidance of tanning beds.     # Benign skin findings including: seborrheic keratoses, cherry angioma, skin tag - minor, self limited problem  - No further intervention required. Patient to report changes.   - Patient reassured of the benign nature of these lesions.     # Multiple clinically benign nevi: Chronic, stable  - No further intervention required. Patient to report changes.   - Patient reassured of the benign nature of these lesions.     # Thin AKs scattered over the left lateral neck and right lateral neck.  - Initiate Efudex 5% cream applied to the posterior ears and lateral neck BID for 2-4 weeks. Discussed that patient should expect redness, blistering and scabbing as well as time course discussed. Informed patient to keep away from children/pets and to wash hands after application.    # Localized AKs: There are also 11 localized AKs on the face. Discussed precancerous nature of these lesions. Recommended cryotherapy treatment to prevent progression to a squamous cell carcinoma. Advised patient to call for follow up if not resolved in 1 month.   - See cryotherapy procedure note below.    # Diffuse actinic damage to forearms: s/p efudex with excellent response.  - Resolved.     # Previously noted 2 mm erosion on the right upper back  - Resolved.     # Previously noted hemorrhagic crusting at the left temporal scalp.  - Resolved.    # Seborrheic dermatitis of the external ears. Chronic, stable.  - Triamcinolone 0.1% cream BID for flares. Refilled today.    Procedures Performed:   - Cryotherapy procedure note, locations: left superior shoulder, right antihelix, right cheek, right eyebrow, left forehead, left preauricular cheek, and left inferior helix. After verbal consent and discussion of risks and benefits including, but not limited to, dyspigmentation/scar, blister, and pain, 11 AKs were treated with 1-2 mm freeze border for 1-2 cycles with liquid nitrogen. Post cryotherapy instructions  were provided.    Follow-up: 6 months in-person for skin check, or earlier for new or changing lesions.    Staff and Scribe:     Scribe Disclosure:   I, Ya Porter, am serving as a scribe to document services personally performed by this physician, Dr. Kylee Estevez, based on data collection and the provider's statements to me.     Provider Disclosure:   The documentation recorded by the scribe accurately reflects the services I personally performed and the decisions made by me.    Kylee Estevez MD    Department of Dermatology  [facility]: Phone: [phone], Fax:[phone]  [facility]: Phone: [phone], Fax: [phone]    ____________________________________________    CC: Skin Check (Full skin check. Area of concern- AK's on ears, cheek and scalp. Hx of NMSC)    HPI:  Mr. Gustavo Ramon is a(n) 81 year old male who presents today as a return patient for skin check.    Last visit 3/23/21 for skin check. At that time, erosion on right upper back and hemorrhagic crust at left temporal scalp were noted - plan was to biopsy at next visit if these did not resolve. 9 AKs were treated with cryo. Patient was instructed to use efudex BID to forearms for around 4 weeks.    Today, patient notes concern of spots on the ears, cheek, and scalp. Applied efudex on the forearms. Believes there are no changes from the two lesions we marked last time. Has been applying triamcinolone for dry itchy ears.    Patient is otherwise feeling well, without additional skin concerns.     Labs Reviewed:  N/A    Physical Exam:  Vitals: There were no vitals taken for this visit.  SKIN: Total skin excluding the undergarment areas was performed. The exam included the head/face, neck, both arms, chest, back, abdomen, buttocks, both legs, digits and/or nails.   - Montemayor Type I-II  - Well healed scars  in areas of past NMSC. No pearly appearance or telangiectasias.  - Scattered brown macules on sun exposed areas.  - There are dome shaped bright red papules on the trunk.   - Multiple regular brown pigmented macules and papules are identified on the trunk.   - There are waxy stuck on tan to brown papules on the trunk.  - There are thin erythematous macules with overyling adherent scale on the left lateral neck and right lateral neck.  - There are erythematous macules with overyling adherent scale on the left superior shoulder x1, right antihelix x1, right cheek x1, right eyebrow x1, left forehead x2, left preauricular cheek x4, and left inferior helix x1.  - Right upper back is clear.  - Left temporal scalp is clear.  - No other lesions of concern on areas examined.       Medications:  Current Outpatient Medications   Medication     aspirin 325 MG tablet     cyanocobalamin (VITAMIN B-12) 1000 MCG tablet     ferrous sulfate (FEROSUL) 325 (65 Fe) MG tablet     hydrochlorothiazide (HYDRODIURIL) 25 MG tablet     lisinopril (ZESTRIL) 5 MG tablet     Multiple Vitamins-Minerals (PRESERVISION AREDS 2) CAPS     omeprazole (PRILOSEC) 20 MG DR capsule     predniSONE (DELTASONE) 20 MG tablet     pyridostigmine (MESTINON) 60 MG tablet     simvastatin (ZOCOR) 20 MG tablet     sulfamethoxazole-trimethoprim (BACTRIM DS) 800-160 MG tablet     vitamin D3 (CHOLECALCIFEROL) 2000 units tablet     fluorouracil (EFUDEX) 5 % external cream     triamcinolone (KENALOG) 0.1 % external cream     No current facility-administered medications for this visit.      Past Medical History:   Patient Active Problem List   Diagnosis     Rosacea     DJD (degenerative joint disease)     Ocular myasthenia gravis (H)     Macular pigment deposit     HYPERLIPIDEMIA LDL GOAL <130     IgA monoclonal gammopathy     Retinal hole OS, operculated     Horseshoe tear of retina without detachment OD     History  of basal cell carcinoma     Seborrhea     AK (actinic  keratosis)     Malignant neoplasm of prostate (H)     PVD (posterior vitreous detachment)     Amaurosis fugax by Hx neg carotid W/U     Advanced directives, counseling/discussion     Actinic keratosis     Peripheral neuropathy     Nuclear sclerosis of both eyes     Essential hypertension with goal blood pressure less than 140/90     Gastroesophageal reflux disease without esophagitis     Past Medical History:   Diagnosis Date     Actinic keratosis      AK (actinic keratosis) 11/15/2012     Amaurosis fugax      Basal cell carcinoma 2009    R medial cheek/nose     Central serous retinopathy left    Eye     Diverticulosis 08/2006     DJD (degenerative joint disease)      GERD (gastroesophageal reflux disease)      Hearing loss     High frequency     History of nonmelanoma skin cancer      History of nonmelanoma skin cancer      HTN (hypertension)      Hyperlipidemia LDL goal < 130      Hyperplastic colon polyp 08/2006     IgA monoclonal gammopathy     IgA kappa     Lattice degeneration of retina right    Eye     Macular degeneration      Nonsenile cataract      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    [facility] consult     Rosacea      Strabismus      Vitreous detachment left    Eye       CC No referring provider defined for this encounter. on close of this encounter.

## 2021-09-24 RX ORDER — SIMVASTATIN 20 MG
20 TABLET ORAL AT BEDTIME
Qty: 90 TABLET | Refills: 1 | Status: SHIPPED | OUTPATIENT
Start: 2021-09-24 | End: 2023-10-30

## 2021-09-24 NOTE — TELEPHONE ENCOUNTER
Last Written Prescription Date:  6/21/21  Last Fill Quantity: 90,  # refills: 0   Last office visit: 5/19/2021 with prescribing provider:  Winston Silverman    Future Office Visit:   Next 5 appointments (look out 90 days)    Sep 28, 2021 11:00 AM  Return Visit with Marjorie Mcclellan MD  [facility] ([facility] - [address] [address]  [phone]         [facility].    Johanny Haynes, RN, BSN  [facility]

## 2021-09-28 ENCOUNTER — OFFICE VISIT (OUTPATIENT)
Dept: OPHTHALMOLOGY | Facility: CLINIC | Age: 81
End: 2021-09-28
Payer: MEDICARE

## 2021-09-28 DIAGNOSIS — H52.223 MYOPIA OF BOTH EYES WITH REGULAR ASTIGMATISM AND PRESBYOPIA: ICD-10-CM

## 2021-09-28 DIAGNOSIS — H04.123 DRY EYE SYNDROME, BILATERAL: ICD-10-CM

## 2021-09-28 DIAGNOSIS — H52.4 MYOPIA OF BOTH EYES WITH REGULAR ASTIGMATISM AND PRESBYOPIA: ICD-10-CM

## 2021-09-28 DIAGNOSIS — H33.322 RETINAL HOLE, LEFT: ICD-10-CM

## 2021-09-28 DIAGNOSIS — H11.823 CONJUNCTIVAL CHALASIS, BILATERAL: ICD-10-CM

## 2021-09-28 DIAGNOSIS — H43.813 PVD (POSTERIOR VITREOUS DETACHMENT), BILATERAL: ICD-10-CM

## 2021-09-28 DIAGNOSIS — G70.00 OCULAR MYASTHENIA GRAVIS (H): ICD-10-CM

## 2021-09-28 DIAGNOSIS — Z01.00 EXAMINATION OF EYES AND VISION: Primary | ICD-10-CM

## 2021-09-28 DIAGNOSIS — H52.13 MYOPIA OF BOTH EYES WITH REGULAR ASTIGMATISM AND PRESBYOPIA: ICD-10-CM

## 2021-09-28 DIAGNOSIS — H25.13 NUCLEAR SCLEROSIS OF BOTH EYES: ICD-10-CM

## 2021-09-28 DIAGNOSIS — H33.311 HORSESHOE TEAR OF RETINA WITHOUT DETACHMENT, RIGHT: ICD-10-CM

## 2021-09-28 PROCEDURE — 92015 DETERMINE REFRACTIVE STATE: CPT | Mod: GY | Performed by: STUDENT IN AN ORGANIZED HEALTH CARE EDUCATION/TRAINING PROGRAM

## 2021-09-28 PROCEDURE — 92014 COMPRE OPH EXAM EST PT 1/>: CPT | Performed by: STUDENT IN AN ORGANIZED HEALTH CARE EDUCATION/TRAINING PROGRAM

## 2021-09-28 ASSESSMENT — REFRACTION_WEARINGRX
SPECS_TYPE: PAL
OD_AXIS: 173
OD_CYLINDER: +0.50
OS_CYLINDER: +0.50
OS_SPHERE: -1.25
OS_ADD: +1.50
OS_AXIS: 168
OD_ADD: +1.50
OD_SPHERE: -1.00

## 2021-09-28 ASSESSMENT — CUP TO DISC RATIO
OS_RATIO: 0.15
OD_RATIO: 0.2

## 2021-09-28 ASSESSMENT — VISUAL ACUITY
METHOD: SNELLEN - LINEAR
OD_PH_CC+: -2
OS_CC+: -1
OD_PH_CC: 20/40
OD_CC+: -1
CORRECTION_TYPE: GLASSES
OS_PH_CC+: -1
OS_CC: 20/100
OD_CC: 20/60
OS_PH_CC: 20/80

## 2021-09-28 ASSESSMENT — REFRACTION_MANIFEST
OS_SPHERE: -1.25
OD_ADD: +2.50
OD_SPHERE: -1.75
OS_ADD: +2.50
OD_AXIS: 003
OS_AXIS: 168
OS_CYLINDER: +1.25
OD_CYLINDER: +2.25

## 2021-09-28 ASSESSMENT — TONOMETRY
OS_IOP_MMHG: 16
OD_IOP_MMHG: 15
IOP_METHOD: APPLANATION

## 2021-09-28 ASSESSMENT — CONF VISUAL FIELD
OS_NORMAL: 1
OD_NORMAL: 1
METHOD: COUNTING FINGERS

## 2021-09-28 ASSESSMENT — EXTERNAL EXAM - RIGHT EYE: OD_EXAM: NORMAL

## 2021-09-28 ASSESSMENT — EXTERNAL EXAM - LEFT EYE: OS_EXAM: NORMAL

## 2021-09-28 NOTE — PROGRESS NOTES
Current Eye Medications:  Preservision twice a day. Artificial tear as needed left eye.     Subjective:  Complete eye exam. Has little drooping left eye. Watering a lot both eyes. Eyelids twitching and quivering up and down, comes and goes. Feels tightness left upper eyelid like a weight or pressure is on it.Twitching getting worse used to only be in left upper eyelid. Now twitching happens both eyes upper and lower eyelids. Having some trouble with balance. Taking smaller steps, thinks it is because of vision. Feels peripheral vision may be little decreased. Vision may be decreasing distance and near a little in both eyes. No pain in either eye.     Objective:  See Ophthalmology Exam.      Assessment:  Gustavo Ramon is a 81 year old male who presents with:   Encounter Diagnoses   Name Primary?     Examination of eyes and vision      Myopia of both eyes with regular astigmatism and presbyopia        Conjunctival chalasis, bilateral Suspect this is interfering with his lid closure/ocular surface and wonder if conjunctival excision would be appropriate to help alleviate his symptoms.     Ocular myasthenia gravis (H)      Nuclear sclerosis of both eyes      Retinal hole, left operculated      Horseshoe tear of retina without detachment,od      PVD (posterior vitreous detachment), bilateral      Dry eye syndrome, bilateral          Plan:  Referral to corneal specialist for consideration of conjunctival chalasis excision both eyes     Use a gel tears three times a day in both eyes (like Refresh Liquigel or GenTeal Gel Tears)     Continue AREDS2 eye vitamins by mouth    Glasses prescription given - recommend updating     Marjorie Mcclellan MD  [phone]

## 2021-09-28 NOTE — PATIENT INSTRUCTIONS
Referral to corneal specialist for consideration of conjunctival chalasis excision both eyes     Use a gel tears three times a day in both eyes (like Refresh Liquigel or GenTeal Gel Tears)     Continue AREDS2 eye vitamins by mouth    Glasses prescription given - recommend updating     Marjorie Mcclellan MD  [phone]

## 2021-09-28 NOTE — LETTER
9/28/2021         RE: Gustavo Ramon  [address] [facility] [address]        Dear Colleague,    Thank you for referring your patient, Gustavo Ramon, to the [facility]. Please see a copy of my visit note below.     Current Eye Medications:  Preservision twice a day. Artificial tear as needed left eye.     Subjective:  Complete eye exam. Has little drooping left eye. Watering a lot both eyes. Eyelids twitching and quivering up and down, comes and goes. Feels tightness left upper eyelid like a weight or pressure is on it.Twitching getting worse used to only be in left upper eyelid. Now twitching happens both eyes upper and lower eyelids. Having some trouble with balance. Taking smaller steps, thinks it is because of vision. Feels peripheral vision may be little decreased. Vision may be decreasing distance and near a little in both eyes. No pain in either eye.     Objective:  See Ophthalmology Exam.      Assessment:  Gustavo Ramon is a 81 year old male who presents with:   Encounter Diagnoses   Name Primary?     Examination of eyes and vision      Myopia of both eyes with regular astigmatism and presbyopia        Conjunctival chalasis, bilateral Suspect this is interfering with his lid closure/ocular surface and wonder if conjunctival excision would be appropriate to help alleviate his symptoms.     Ocular myasthenia gravis (H)      Nuclear sclerosis of both eyes      Retinal hole, left operculated      Horseshoe tear of retina without detachment,od      PVD (posterior vitreous detachment), bilateral      Dry eye syndrome, bilateral          Plan:  Referral to corneal specialist for consideration of conjunctival chalasis excision both eyes     Use a gel tears three times a day in both eyes (like Refresh Liquigel or GenTeal Gel Tears)     Continue AREDS2 eye vitamins by mouth    Glasses prescription given - recommend updating     Marjorie Mcclellan MD  [phone]               Again, thank you for allowing me to participate in the care of your patient.        Sincerely,        Marjorie Mcclellan MD

## 2021-10-08 DIAGNOSIS — Z29.89 NEED FOR PNEUMOCYSTIS PROPHYLAXIS: ICD-10-CM

## 2021-10-08 RX ORDER — SULFAMETHOXAZOLE/TRIMETHOPRIM 800-160 MG
TABLET ORAL
Qty: 36 TABLET | Refills: 3 | Status: SHIPPED | OUTPATIENT
Start: 2021-10-08 | End: 2022-04-29

## 2021-10-11 ENCOUNTER — TELEPHONE (OUTPATIENT)
Dept: NEUROLOGY | Facility: CLINIC | Age: 81
End: 2021-10-11

## 2021-10-11 DIAGNOSIS — Z29.89 NEED FOR PNEUMOCYSTIS PROPHYLAXIS: ICD-10-CM

## 2021-10-11 NOTE — TELEPHONE ENCOUNTER
If 800/160 is out of stock, please write a prescription for 400/80 mg trimethoprim/Sulfamethoxazole (Bactrim), take 2 tablets three times weekly, and I will sign it.Thank you

## 2021-10-11 NOTE — TELEPHONE ENCOUNTER
Health Call Center    Phone Message    May a detailed message be left on voicemail: yes     Reason for Call: Medication Refill Request    Has the patient contacted the pharmacy for the refill? Yes     sulfamethoxazole-trimethoprim Bactrim is out of stock for the 800/160 dosing. They do have the 400/80 dosing though.      Please contact to advise what to do for patients fill request.   If the alternate 400/80 is ok, please fax a new script.    Call Reference # [id number]    Action Taken: Message routed to:  [facility] ([facility] NEUROLOGY    Travel Screening: Not Applicable

## 2021-10-25 ENCOUNTER — VIRTUAL VISIT (OUTPATIENT)
Dept: NEUROLOGY | Facility: CLINIC | Age: 81
End: 2021-10-25
Payer: MEDICARE

## 2021-10-25 DIAGNOSIS — G70.00 MG, OCULAR (MYASTHENIA GRAVIS) (H): Primary | ICD-10-CM

## 2021-10-25 DIAGNOSIS — R79.9 ABNORMAL FINDING OF BLOOD CHEMISTRY, UNSPECIFIED: ICD-10-CM

## 2021-10-25 DIAGNOSIS — Z79.52 LONG TERM (CURRENT) USE OF SYSTEMIC STEROIDS: ICD-10-CM

## 2021-10-25 DIAGNOSIS — G70.00 OCULAR MYASTHENIA (H): ICD-10-CM

## 2021-10-25 PROCEDURE — 99214 OFFICE O/P EST MOD 30 MIN: CPT | Mod: 95 | Performed by: PSYCHIATRY & NEUROLOGY

## 2021-10-25 RX ORDER — PREDNISONE 20 MG/1
20 TABLET ORAL DAILY
Qty: 90 TABLET | Refills: 1 | Status: SHIPPED | OUTPATIENT
Start: 2021-10-25 | End: 2022-04-25

## 2021-10-25 NOTE — PATIENT INSTRUCTIONS
6 month follow up  Please initiate referral to [facility] Neurology online. If you have any difficulties please let me know.   No changes to your meds right now  I will ask Mr Silverman to expedite your bone density scan. You should also have repeat blood tests next month

## 2021-10-25 NOTE — LETTER
10/25/2021       RE: Gustavo Ramon  [address]  [place] [address]     Dear Colleague,    Thank you for referring your patient, Gustavo Ramon, to the [facility] NEUROLOGY CLINIC [place] at [facility]. Please see a copy of my visit note below.      Service Date: 10/25/2021    Winston Silverman PA-C  [facility]  [address] [place] [address]    Eleuterio Amador MD  [facility] Ophthalmology  [address]  [place] [address]    RE:      Gustavo Ramon   MRN:  [id number]  :   1940    Dear Providers:    I had the pleasure to evaluate Stephen Ramon via billable video visit today.  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  I last saw him in person in 2021. He continues to have left more than right eyelid ptosis that is worse as the day goes by, but better earlier in the morning.  His right eyelid is becoming almost as droopy as the left.  His diplopia is gone, which satisfied him, because he initially presented with bothersome double vision.  He is currently taking 20 mg of prednisone and sometimes will increase the dose to 25 mg, as well as Mestinon 60 mg t.i.d.  He also is on Bactrim 800/160 1 tablet three times a week for PCP prophylaxis. At the last visit, we discussed using a steroid-sparing oral immunosuppressant drug like mycophenolate, but he was a bit uncertain and was considering a second opinion.  He does not have any prominent dysphagia, dysarthria, sialorrhea, difficulty chewing, head drop, respiratory symptoms or weakness of arms or legs.  His last hemoglobin A1c was 5.7 in 2021.  Basic metabolic panel the same day was essentially normal.  He has not had a recent bone density scan.  He takes vitamin D3 at 1000 units daily.  He continues to complain of watery eyes, although  they are better with the prednisone dose reduction from 40-20 mg.  The fluttering or twitching of his eyelids has largely disappeared.    MEDICATIONS:  Reviewed and are as per Epic record.    In summary, Mr. Ramon has ocular myasthenia gravis.  His symptoms are definitely better than when I first saw him last year, as the diplopia is largely gone, but his ptosis remains difficult to control.  At this point, I offered him again the option of a steroid-sparing oral drug, such as mycophenolate.  We can definitely increase the prednisone dose again, but I think that long-term use of more than 20 mg a day can have significant side effects.  He is still a bit uncertain and would like to get a second opinion from [facility] Neurology or Ophthalmology, and I encouraged him to initiate the referral.  Until he does this, I will not make any changes to his regimen. The twitching of the eyelids has largely disappeared, and this may have been a side effect of pyridostigmine, as we lowered his dose from 120 mg t.i.d. to 60 mg t.i.d. a few months ago.  In terms of surveillance for steroid side effects, he should get a bone density scan, and I am asking his primary care provider to expedite this.  I will also order a repeat hemoglobin A1c and basic metabolic panel to be done next month.  I will see him in followup in clinic in 6 months, or sooner if he decides to initiate a new drug.    Total time spent on this encounter today was 33 minutes, including 18 face to face with the patient, 10 on post visit note dictation, editing and orders and 5 in pre-visit chart review.    Sincerely,    Tyler Wheat MD

## 2021-10-25 NOTE — PROGRESS NOTES
Service Date: 10/25/2021    AMRIT Polo [facility]  [address] [address]    MD MARK Lee [facility] Ophthalmology  [address] [place] [address]    RE:      Gustavo Ramon   MRN:  [id number]  :   1940    Dear Providers:    I had the pleasure to evaluate Stephen Ramon via billable video visit today.  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  I last saw him in person in 2021. He continues to have left more than right eyelid ptosis that is worse as the day goes by, but better earlier in the morning.  His right eyelid is becoming almost as droopy as the left.  His diplopia is gone, which satisfied him, because he initially presented with bothersome double vision.  He is currently taking 20 mg of prednisone and sometimes will increase the dose to 25 mg, as well as Mestinon 60 mg t.i.d.  He also is on Bactrim 800/160 1 tablet three times a week for PCP prophylaxis. At the last visit, we discussed using a steroid-sparing oral immunosuppressant drug like mycophenolate, but he was a bit uncertain and was considering a second opinion.  He does not have any prominent dysphagia, dysarthria, sialorrhea, difficulty chewing, head drop, respiratory symptoms or weakness of arms or legs.  His last hemoglobin A1c was 5.7 in 2021.  Basic metabolic panel the same day was essentially normal.  He has not had a recent bone density scan.  He takes vitamin D3 at 1000 units daily.  He continues to complain of watery eyes, although they are better with the prednisone dose reduction from 40-20 mg.  The fluttering or twitching of his eyelids has largely disappeared.    MEDICATIONS:  Reviewed and are as per Epic record.    In summary, Mr. Ramon has ocular myasthenia gravis.  His symptoms are definitely better than when I first saw him last year, as the  diplopia is largely gone, but his ptosis remains difficult to control.  At this point, I offered him again the option of a steroid-sparing oral drug, such as mycophenolate.  We can definitely increase the prednisone dose again, but I think that long-term use of more than 20 mg a day can have significant side effects.  He is still a bit uncertain and would like to get a second opinion from [facility] Neurology or Ophthalmology, and I encouraged him to initiate the referral.  Until he does this, I will not make any changes to his regimen. The twitching of the eyelids has largely disappeared, and this may have been a side effect of pyridostigmine, as we lowered his dose from 120 mg t.i.d. to 60 mg t.i.d. a few months ago.  In terms of surveillance for steroid side effects, he should get a bone density scan, and I am asking his primary care provider to expedite this.  I will also order a repeat hemoglobin A1c and basic metabolic panel to be done next month.  I will see him in followup in clinic in 6 months, or sooner if he decides to initiate a new drug.    Total time spent on this encounter today was 33 minutes, including 18 face to face with the patient, 10 on post visit note dictation, editing and orders and 5 in pre-visit chart review.    Sincerely,    Tyler Wheat MD        D: 10/25/2021   T: 10/25/2021   MT: [email or web address]    Name:     DAISHA LOZOYA  MRN:      [id number]        Account:      [id number]   :      1940           Service Date: 10/25/2021       Document: [id number]

## 2021-10-25 NOTE — PROGRESS NOTES
Stephen is a 81 year old who is being evaluated via a billable video visit.      How would you like to obtain your AVS? MyChart  If the video visit is dropped, the invitation should be resent by: Send to e-mail at: [email or web address]  Will anyone else be joining your video visit? No      Video Start Time: 10.02 am  Video-Visit Details    Type of service:  Video Visit    Video End Time:10.20 am    Originating Location (pt. Location): Home    Distant Location (provider location):  [facility] NEUROLOGY CLINIC [place]     Platform used for Video Visit: [email or web address]

## 2021-11-12 ENCOUNTER — MYC MEDICAL ADVICE (OUTPATIENT)
Dept: FAMILY MEDICINE | Facility: CLINIC | Age: 81
End: 2021-11-12
Payer: MEDICARE

## 2021-12-14 ENCOUNTER — TELEPHONE (OUTPATIENT)
Dept: FAMILY MEDICINE | Facility: CLINIC | Age: 81
End: 2021-12-14
Payer: MEDICARE

## 2021-12-14 NOTE — TELEPHONE ENCOUNTER
Cheryl calling to request verbal orders for OT 2 times a weeks for 4 weeks. To work on ADL's and energy conservation. Ok to leave message on voicemail.

## 2021-12-16 ENCOUNTER — TELEPHONE (OUTPATIENT)
Dept: FAMILY MEDICINE | Facility: CLINIC | Age: 81
End: 2021-12-16

## 2021-12-16 NOTE — TELEPHONE ENCOUNTER
Windy calling to request verbal order melatonin and what dose patient should start with. Patient has not been sleeping well since COVID.     Also requesting directions for weaning off oxygen. Patient was started at 4 L and is now at 1L.    Ok to leave voicemail.

## 2021-12-16 NOTE — TELEPHONE ENCOUNTER
Patient was last seen by Winston Silverman 5/19/21.    I see Winston approved home care orders a couple days ago.      Routed to PCP to address:    1) melatonin dosing    2) parameters for weaning off oxygen, currently at 1 lpm.   Probably need to know what sp02 he needs to maintain.    Sheree Hopkins RN  [facility]

## 2021-12-27 ENCOUNTER — TELEPHONE (OUTPATIENT)
Dept: FAMILY MEDICINE | Facility: CLINIC | Age: 81
End: 2021-12-27

## 2021-12-29 ENCOUNTER — MEDICAL CORRESPONDENCE (OUTPATIENT)
Dept: HEALTH INFORMATION MANAGEMENT | Facility: CLINIC | Age: 81
End: 2021-12-29
Payer: MEDICARE

## 2022-01-01 ENCOUNTER — HEALTH MAINTENANCE LETTER (OUTPATIENT)
Age: 82
End: 2022-01-01

## 2022-01-03 ENCOUNTER — TELEPHONE (OUTPATIENT)
Dept: FAMILY MEDICINE | Facility: CLINIC | Age: 82
End: 2022-01-03
Payer: MEDICARE

## 2022-01-03 ENCOUNTER — MEDICAL CORRESPONDENCE (OUTPATIENT)
Dept: HEALTH INFORMATION MANAGEMENT | Facility: CLINIC | Age: 82
End: 2022-01-03
Payer: MEDICARE

## 2022-01-03 DIAGNOSIS — K21.9 GASTROESOPHAGEAL REFLUX DISEASE WITHOUT ESOPHAGITIS: ICD-10-CM

## 2022-01-07 ENCOUNTER — TELEPHONE (OUTPATIENT)
Dept: FAMILY MEDICINE | Facility: CLINIC | Age: 82
End: 2022-01-07
Payer: MEDICARE

## 2022-01-10 ENCOUNTER — MEDICAL CORRESPONDENCE (OUTPATIENT)
Dept: HEALTH INFORMATION MANAGEMENT | Facility: CLINIC | Age: 82
End: 2022-01-10
Payer: MEDICARE

## 2022-01-10 ENCOUNTER — TELEPHONE (OUTPATIENT)
Dept: FAMILY MEDICINE | Facility: CLINIC | Age: 82
End: 2022-01-10
Payer: MEDICARE

## 2022-01-11 ENCOUNTER — TELEPHONE (OUTPATIENT)
Dept: FAMILY MEDICINE | Facility: CLINIC | Age: 82
End: 2022-01-11

## 2022-01-11 NOTE — TELEPHONE ENCOUNTER
Reason for Call:  Other call back    Detailed comments: ORDER TO  OXYGEN EQUIPTMENT BEFORE APPOINTMENT HE NO LONGER NEEDS    Phone Number Patient can be reached at: Home number on file [phone] (home)    Best Time: ANYTIME    Can we leave a detailed message on this number? YES    Call taken on 1/11/2022 at 2:23 PM by Kelly Ghotra MA

## 2022-01-12 ENCOUNTER — MEDICAL CORRESPONDENCE (OUTPATIENT)
Dept: HEALTH INFORMATION MANAGEMENT | Facility: CLINIC | Age: 82
End: 2022-01-12
Payer: MEDICARE

## 2022-01-14 ENCOUNTER — TELEPHONE (OUTPATIENT)
Dept: FAMILY MEDICINE | Facility: CLINIC | Age: 82
End: 2022-01-14
Payer: MEDICARE

## 2022-01-14 DIAGNOSIS — R53.81 PHYSICAL DECONDITIONING: Primary | ICD-10-CM

## 2022-01-14 NOTE — TELEPHONE ENCOUNTER
Maxine with [facility] calling.   Says patient has made good progress at home and is being discharged from home care today.   Says he is interested in and would benefit from some ongoing outpatient PT for strength and balance post covid and post hospital stay.    Patient would like referral placed for [facility] physical therapy.    Routed to PCP Winston Silverman to address, place referral for outpatient PT if agreeable.    Patient is scheduled for an office visit but not until 1/31/22.    Sheree Hopkins RN  [facility]

## 2022-01-17 ENCOUNTER — OFFICE VISIT (OUTPATIENT)
Dept: NEUROLOGY | Facility: CLINIC | Age: 82
End: 2022-01-17
Payer: MEDICARE

## 2022-01-17 ENCOUNTER — LAB (OUTPATIENT)
Dept: LAB | Facility: CLINIC | Age: 82
End: 2022-01-17
Attending: PSYCHIATRY & NEUROLOGY
Payer: MEDICARE

## 2022-01-17 VITALS
DIASTOLIC BLOOD PRESSURE: 79 MMHG | OXYGEN SATURATION: 97 % | RESPIRATION RATE: 16 BRPM | HEART RATE: 82 BPM | SYSTOLIC BLOOD PRESSURE: 156 MMHG

## 2022-01-17 DIAGNOSIS — Z79.60 LONG-TERM USE OF IMMUNOSUPPRESSANT MEDICATION: Primary | ICD-10-CM

## 2022-01-17 DIAGNOSIS — G70.00 MYASTHENIA GRAVIS WITHOUT EXACERBATION (H): ICD-10-CM

## 2022-01-17 DIAGNOSIS — R13.10 DYSPHAGIA, UNSPECIFIED TYPE: ICD-10-CM

## 2022-01-17 DIAGNOSIS — T38.0X5A STEROID-INDUCED OSTEOPOROSIS: ICD-10-CM

## 2022-01-17 DIAGNOSIS — Z79.52 LONG TERM CURRENT USE OF SYSTEMIC STEROIDS: ICD-10-CM

## 2022-01-17 DIAGNOSIS — M81.8 STEROID-INDUCED OSTEOPOROSIS: ICD-10-CM

## 2022-01-17 DIAGNOSIS — Z79.60 LONG-TERM USE OF IMMUNOSUPPRESSANT MEDICATION: ICD-10-CM

## 2022-01-17 LAB
ALBUMIN SERPL-MCNC: 3.4 G/DL (ref 3.4–5)
ALP SERPL-CCNC: 53 U/L (ref 40–150)
ALT SERPL W P-5'-P-CCNC: 30 U/L (ref 0–70)
ANION GAP SERPL CALCULATED.3IONS-SCNC: 11 MMOL/L (ref 3–14)
AST SERPL W P-5'-P-CCNC: 19 U/L (ref 0–45)
BASOPHILS # BLD AUTO: 0 10E3/UL (ref 0–0.2)
BASOPHILS NFR BLD AUTO: 0 %
BILIRUB SERPL-MCNC: 0.2 MG/DL (ref 0.2–1.3)
BUN SERPL-MCNC: 15 MG/DL (ref 7–30)
CALCIUM SERPL-MCNC: 8.7 MG/DL (ref 8.5–10.1)
CHLORIDE BLD-SCNC: 107 MMOL/L (ref 94–109)
CO2 SERPL-SCNC: 26 MMOL/L (ref 20–32)
CREAT SERPL-MCNC: 0.97 MG/DL (ref 0.66–1.25)
EOSINOPHIL # BLD AUTO: 0 10E3/UL (ref 0–0.7)
EOSINOPHIL NFR BLD AUTO: 0 %
ERYTHROCYTE [DISTWIDTH] IN BLOOD BY AUTOMATED COUNT: 15.7 % (ref 10–15)
GFR SERPL CREATININE-BSD FRML MDRD: 77 ML/MIN/1.73M2
GLUCOSE BLD-MCNC: 124 MG/DL (ref 70–99)
HBA1C MFR BLD: 5.5 % (ref 0–5.6)
HCT VFR BLD AUTO: 40 % (ref 40–53)
HGB BLD-MCNC: 12.3 G/DL (ref 13.3–17.7)
IMM GRANULOCYTES # BLD: 0.1 10E3/UL
IMM GRANULOCYTES NFR BLD: 1 %
LYMPHOCYTES # BLD AUTO: 0.8 10E3/UL (ref 0.8–5.3)
LYMPHOCYTES NFR BLD AUTO: 8 %
MCH RBC QN AUTO: 27.3 PG (ref 26.5–33)
MCHC RBC AUTO-ENTMCNC: 30.8 G/DL (ref 31.5–36.5)
MCV RBC AUTO: 89 FL (ref 78–100)
MONOCYTES # BLD AUTO: 0.4 10E3/UL (ref 0–1.3)
MONOCYTES NFR BLD AUTO: 4 %
NEUTROPHILS # BLD AUTO: 8.7 10E3/UL (ref 1.6–8.3)
NEUTROPHILS NFR BLD AUTO: 87 %
NRBC # BLD AUTO: 0 10E3/UL
NRBC BLD AUTO-RTO: 0 /100
PLATELET # BLD AUTO: 253 10E3/UL (ref 150–450)
POTASSIUM BLD-SCNC: 4.2 MMOL/L (ref 3.4–5.3)
PROT SERPL-MCNC: 6.3 G/DL (ref 6.8–8.8)
RBC # BLD AUTO: 4.5 10E6/UL (ref 4.4–5.9)
SODIUM SERPL-SCNC: 144 MMOL/L (ref 133–144)
WBC # BLD AUTO: 10.1 10E3/UL (ref 4–11)

## 2022-01-17 PROCEDURE — 80053 COMPREHEN METABOLIC PANEL: CPT | Performed by: PATHOLOGY

## 2022-01-17 PROCEDURE — 83036 HEMOGLOBIN GLYCOSYLATED A1C: CPT | Performed by: PATHOLOGY

## 2022-01-17 PROCEDURE — 85025 COMPLETE CBC W/AUTO DIFF WBC: CPT | Performed by: PATHOLOGY

## 2022-01-17 PROCEDURE — 99214 OFFICE O/P EST MOD 30 MIN: CPT | Performed by: PSYCHIATRY & NEUROLOGY

## 2022-01-17 PROCEDURE — 36415 COLL VENOUS BLD VENIPUNCTURE: CPT | Performed by: PATHOLOGY

## 2022-01-17 RX ORDER — MYCOPHENOLATE MOFETIL 500 MG/1
TABLET ORAL
Qty: 120 TABLET | Refills: 1 | Status: SHIPPED | OUTPATIENT
Start: 2022-01-17 | End: 2022-03-11

## 2022-01-17 RX ORDER — PREDNISOLONE ACETATE 10 MG/ML
SUSPENSION/ DROPS OPHTHALMIC
COMMUNITY
Start: 2022-01-07 | End: 2022-12-08

## 2022-01-17 ASSESSMENT — PAIN SCALES - GENERAL: PAINLEVEL: NO PAIN (0)

## 2022-01-17 NOTE — PATIENT INSTRUCTIONS
Start taking mycophenolate as instructed for your myasthenia.  We will start cutting down on the steroids a few weeks after you begin the new drug  Please see your Eye doctor for the watery eyes. I am not sure of the cause  Lab tests today    Please go again to a [facility] 2, 4, 8, and 12 weeks after starting the new drug to get repeat blood draw.    Video swallow X ray and speech therapy consult for your swallowing problem; this may be related to myasthenia but I am not sure.    Follow up in 3 months

## 2022-01-17 NOTE — LETTER
2022       RE: Gustavo Ramon  [address]  [place] [address]     Dear Colleague,    Thank you for referring your patient, Gustavo Ramon, to the [facility] NEUROLOGY CLINIC [place] at [facility]. Please see a copy of my visit note below.      Service Date: 2022    Winston Silverman PA-C  [facility]  [address]  [place], [address]    RE:       Gustavo Ramon   MRN:   [id number]    :    1940        Dear Mr. Silverman:    I had the pleasure to see Mr. Ramon in followup at the [facility] Neuromuscular Clinic.  As you know, he has ocular myasthenia gravis diagnosed with single-fiber EMG at [facility] a number of years ago.  I last met with him virtually in 10/2021.  At that time, he continued to be bothered by left more than right eyelid ptosis, worse in the evening, affecting his reading.  He was on 20 mg of daily prednisone with incomplete relief.  Because higher dose of steroids were anticipated to cause more side effects, I offered him the option of a steroid-sparing drug like mycophenolate, but he was not sure and he wanted to think about it and get a second opinion from [facility].  At that time, he did not have any other bulbar symptoms. Unfortunately, on 2021, Mr. Ramon was admitted to [facility] with COVID-19.  He developed hypoxic respiratory failure.  He did not require intubation, but required supplemental oxygen with a nasal cannula 4-6 liters, and he was put on anticoagulation with Xarelto.  He was treated with baricitinib and IV steroids.  His last COVID vaccine dose was 2021, and he had pushed back his booster.  He was discharged 2021.  After his discharge, his shortness of breath has improved a lot and he does not experience any when walking now; he also denies orthopnea.  He denies any weakness of his arms or legs right now.  He can get up from a  chair with arms crossed on the chest without problems.  He also can brush his teeth or comb his hair without signs of fatigue.  He does report some dysphagia which is new from his last visit. Solid food sometimes gets stuck in his throat and he has to flush it with liquids or repeat the swallowing attempt. This problem does not occur with liquids. He denies any change in his voice or speech or difficulty chewing.  He does not have sialorrhea.  His eye symptoms with regard to the ptosis are about the same.  He does not have diplopia.  He has very watery eyes and the reason is not clear to me.  He is following with an ophthalmologist.      He is currently still on 20 mg of prednisone daily and Mestinon 60 mg t.i.d.  He is also on Bactrim for PJP prophylaxis and omeprazole.    BP (!) 156/79   Pulse 82   Resp 16   SpO2 97%     On exam today, he is in good spirits.  He does have a moderate degree of eyelid ptosis bilaterally that appears a little fatigable, left more than right.  However, his orbicularis oculi and oris strength is excellent.  There is no weakness of closure of the eyelids or lip.  He does not have diplopia in any cardinal gaze position and ductions and versions of the eyes are normal.  Strength of facial muscles, uvula, jaw, palate and tongue is completely normal.  There is no dysphonia or dysarthria.  Neck flexion and extension strength are full.  Strength is 5/5 in upper and lower extremities proximally.    In summary, Mr. Ramon has predominantly ocular myasthenia gravis.  He has a new dysphagia.  I am not sure if this is from the myasthenia, but it could be.  I would like him to get a video swallow study with an x-ray to help clarify.  I again offered him mycophenolate as a steroid-sparing agent, which I think is indicated here, because he has side effects from the steroids and he still does not have full control of his ocular symptoms with prednisone dose of 20 mg a day.  We will start  mycophenolate at 500 mg b.i.d. and gradually increase the dose by 500 mg every 2 weeks until a goal dose of 1 gram b.i.d.  Side effects of the drug were explained.  He will get a baseline CBC and a comprehensive metabolic panel today.  We will also get a hemoglobin A1c today to monitor for steroid side effects.  CBC will be repeated at 2, 4, 8 and 12 weeks after starting the CellCept for surveillance for possible leukopenia or thrombocytopenia. Once he has been on the Cellcept for a few weeks, we will start tapering the prednisone to 15 mg daily for a month, 12.5 mg daily and then 10; hopefully, without any worsening of his ocular symptoms.  I want him to consult his ophthalmologist regarding his watery eyes because the cause is not clear to me .  He will return to clinic for followup in 3 months or sooner if needed. Will also get a DEXA scan to evaluate for steroid induced osteoporosis. He is on vitamin D3 2000 units daily but no bisphosphonate yet.     Billing for this visit is MDM level 4 (moderate) based on: 1) problems assessed: One or more chronic illness with exacerbation, progression or side effects of treatment (myasthenia gravis with incomplete control of ocular symptoms currently) and 2) Risk: prescription drug management: initiation of mycophenolate, adjustments of steroid dose and surveillance for side effects as above.      Sincerely,     Tyler Wheat MD        D: 2022   T: 2022   MT: terese    Name:     DAISHA LOZOYA  MRN:      [id number]        Account:      [id number]   :      1940           Service Date: 2022       Document: [id number]

## 2022-01-18 ENCOUNTER — TELEPHONE (OUTPATIENT)
Dept: OPHTHALMOLOGY | Facility: CLINIC | Age: 82
End: 2022-01-18
Payer: MEDICARE

## 2022-01-18 ENCOUNTER — TELEPHONE (OUTPATIENT)
Dept: FAMILY MEDICINE | Facility: CLINIC | Age: 82
End: 2022-01-18
Payer: MEDICARE

## 2022-01-18 NOTE — TELEPHONE ENCOUNTER
Maxine with [facility] was given the message, she will make sure the pt schedules an appointment.

## 2022-01-18 NOTE — TELEPHONE ENCOUNTER
Called spoke with patient, explained Dr. Mcclellan recommended getting glasses updated at last visit. Also eplained Dr. Mcclellan referred to corneal specialist to get his opinion on if the procedure would help with the Dryness.

## 2022-01-18 NOTE — PROGRESS NOTES
Service Date: 2022    Winston Silverman PA-C  [facility] [place]  [address] [place], [address]    RE:       Gustavo Ramon   MRN:   [id number]    :    1940        Dear  Andra:    I had the pleasure to see Mr. Ramon in followup at the [facility] Neuromuscular Clinic.  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  I last met with him virtually in 10/2021.  At that time, he continued to be bothered by left more than right eyelid ptosis, worse in the evening, affecting his reading.  He was on 20 mg of daily prednisone with incomplete relief.  Because higher dose of steroids were anticipated to cause more side effects, I offered him the option of a steroid-sparing drug like mycophenolate, but he was not sure and he wanted to think about it and get a second opinion from [facility].  At that time, he did not have any other bulbar symptoms. Unfortunately, on 2021, Mr. Ramon was admitted to [facility] with COVID-19.  He developed hypoxic respiratory failure.  He did not require intubation, but required supplemental oxygen with a nasal cannula 4-6 liters, and he was put on anticoagulation with Xarelto.  He was treated with baricitinib and IV steroids.  His last COVID vaccine dose was 2021, and he had pushed back his booster.  He was discharged 2021.  After his discharge, his shortness of breath has improved a lot and he does not experience any when walking now; he also denies orthopnea.  He denies any weakness of his arms or legs right now.  He can get up from a chair with arms crossed on the chest without problems.  He also can brush his teeth or comb his hair without signs of fatigue.  He does report some dysphagia which is new from his last visit. Solid food sometimes gets stuck in his throat and he has to flush it with liquids or repeat the swallowing attempt. This problem does not occur  with liquids. He denies any change in his voice or speech or difficulty chewing.  He does not have sialorrhea.  His eye symptoms with regard to the ptosis are about the same.  He does not have diplopia.  He has very watery eyes and the reason is not clear to me.  He is following with an ophthalmologist.      He is currently still on 20 mg of prednisone daily and Mestinon 60 mg t.i.d.  He is also on Bactrim for PJP prophylaxis and omeprazole.    BP (!) 156/79   Pulse 82   Resp 16   SpO2 97%     On exam today, he is in good spirits.  He does have a moderate degree of eyelid ptosis bilaterally that appears a little fatigable, left more than right.  However, his orbicularis oculi and oris strength is excellent.  There is no weakness of closure of the eyelids or lip.  He does not have diplopia in any cardinal gaze position and ductions and versions of the eyes are normal.  Strength of facial muscles, uvula, jaw, palate and tongue is completely normal.  There is no dysphonia or dysarthria.  Neck flexion and extension strength are full.  Strength is 5/5 in upper and lower extremities proximally.    In summary, Mr. Ramon has predominantly ocular myasthenia gravis.  He has a new dysphagia.  I am not sure if this is from the myasthenia, but it could be.  I would like him to get a video swallow study with an x-ray to help clarify.  I again offered him mycophenolate as a steroid-sparing agent, which I think is indicated here, because he has side effects from the steroids and he still does not have full control of his ocular symptoms with prednisone dose of 20 mg a day.  We will start mycophenolate at 500 mg b.i.d. and gradually increase the dose by 500 mg every 2 weeks until a goal dose of 1 gram b.i.d.  Side effects of the drug were explained.  He will get a baseline CBC and a comprehensive metabolic panel today.  We will also get a hemoglobin A1c today to monitor for steroid side effects.  CBC will be repeated at 2, 4, 8  and 12 weeks after starting the CellCept for surveillance for possible leukopenia or thrombocytopenia. Once he has been on the Cellcept for a few weeks, we will start tapering the prednisone to 15 mg daily for a month, 12.5 mg daily and then 10; hopefully, without any worsening of his ocular symptoms.  I want him to consult his ophthalmologist regarding his watery eyes because the cause is not clear to me .  He will return to clinic for followup in 3 months or sooner if needed. Will also get a DEXA scan to evaluate for steroid induced osteoporosis. He is on vitamin D3 2000 units daily but no bisphosphonate yet.     Billing for this visit is MDM level 4 (moderate) based on: 1) problems assessed: One or more chronic illness with exacerbation, progression or side effects of treatment (myasthenia gravis with incomplete control of ocular symptoms currently) and 2) Risk: prescription drug management: initiation of mycophenolate, adjustments of steroid dose and surveillance for side effects as above.      Sincerely,     Tyler Wheat MD        D: 2022   T: 2022   MT: terese    Name:     DAISHA LOZOYA  MRN:      [id number]        Account:      [id number]   :      1940           Service Date: 2022       Document: [id number]

## 2022-01-18 NOTE — TELEPHONE ENCOUNTER
Patient is wanting a return phone call to discuss his watery eye problem. He is wanting to know if glasses would help this condition. Phone: [phone].

## 2022-01-20 ENCOUNTER — MEDICAL CORRESPONDENCE (OUTPATIENT)
Dept: HEALTH INFORMATION MANAGEMENT | Facility: CLINIC | Age: 82
End: 2022-01-20
Payer: MEDICARE

## 2022-01-25 ENCOUNTER — TELEPHONE (OUTPATIENT)
Dept: FAMILY MEDICINE | Facility: CLINIC | Age: 82
End: 2022-01-25
Payer: MEDICARE

## 2022-01-28 NOTE — PROGRESS NOTES
Wally Mitchell is a 81 year old who presents for the following health issues    HPI       Hospital Follow-up Visit:    Hospital/Nursing Home/IP Rehab Facility: [facility]  Date of Admission: 11/19/2021  Date of Discharge: 12/03/2021  Reason(s) for Admission: COVID 19      Was your hospitalization related to COVID-19? YES   How are you feeling today? Much better  In the past 24 hours have you had shortness of breath when speaking, walking, or climbing stairs? I don't have breathing problems  Do you have a cough? I don't have a cough  When is the last time you had a fever greater than 100? Never had one  Are you having any other symptoms? muscle weakness   Do you have any other stressors you would like to discuss with your provider? No       Was the patient in the ICU or did the patient experience delirium during hospitalization?  No    Problems taking medications regularly:  None  Medication changes since discharge:   Problems adhering to non-medication therapy:  None    Summary of hospitalization:  CareEverywhere information obtained and reviewed    No longer has required supplemental O2. . He discontinued O2 2 weeks ago. sats are running in the 90's.   No cough or fevers.    Some hand paresthesias.     rls issues. Intermittent.     On bactrim preventatively. His weakness and paresthesias seemed to have progressed upon starting bactrim several months ago.    Recommending a trial off of bactrim.          Review of Systems   Constitutional, HEENT, cardiovascular, pulmonary, GI, , musculoskeletal, neuro, skin, endocrine and psych systems are negative, except as otherwise noted.      Objective    /74   Pulse 90   Temp 97.3  F (36.3  C) (Tympanic)   Resp 20   Wt 77.8 kg (171 lb 9.6 oz)   SpO2 98%   BMI 25.34 kg/m    Body mass index is 25.34 kg/m .  Physical Exam   Eye exam - stable  Thyroid not palpable, not enlarged, no nodules detected.  CHEST:chest clear to IPPA, no tachypnea, retractions  or cyanosis and S1, S2 normal, no murmur, no gallop, rate regular.  Fine resting tremor involving UE's. No intention tremor noted.   Strength normal.     Gustavo was seen today for hospital f/u.    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)    Steroid-induced diabetes mellitus, initial encounter (H)    Stage 3a chronic kidney disease (H)    Malignant neoplasm of prostate (H)    Secondary adrenocortical insufficiency (H)    Pneumonia due to 2019 novel coronavirus  -     XR Chest 2 Views; Future  -     CBC with platelets and differential; Future  -     CBC with platelets and differential    Paresthesias  -     Vitamin B12; Future  -     Vitamin B12    Restless leg syndrome  -     pramipexole (MIRAPEX) 0.125 MG tablet; Take 1-2 tablets (0.125-0.25 mg) by mouth At Bedtime    Vitamin D deficiency  -     Vitamin D Deficiency; Future  -     Vitamin D Deficiency    Myasthenia gravis without exacerbation (H)    MGUS (monoclonal gammopathy of unknown significance)  -     Adult Oncology/Hematology Referral; Future    Other orders  -     MI FLU VACCINE, INCREASED ANTIGEN, PRESV FREE ([id number])      work on lifestyle modification  Advised supportive and symptomatic treatment.  Follow up with Provider - if condition persists or worsens.   Discontinue bactrim.

## 2022-01-31 ENCOUNTER — THERAPY VISIT (OUTPATIENT)
Dept: PHYSICAL THERAPY | Facility: CLINIC | Age: 82
End: 2022-01-31
Attending: PHYSICIAN ASSISTANT
Payer: MEDICARE

## 2022-01-31 ENCOUNTER — ANCILLARY PROCEDURE (OUTPATIENT)
Dept: GENERAL RADIOLOGY | Facility: CLINIC | Age: 82
End: 2022-01-31
Attending: PHYSICIAN ASSISTANT
Payer: MEDICARE

## 2022-01-31 ENCOUNTER — MEDICAL CORRESPONDENCE (OUTPATIENT)
Dept: HEALTH INFORMATION MANAGEMENT | Facility: CLINIC | Age: 82
End: 2022-01-31

## 2022-01-31 ENCOUNTER — OFFICE VISIT (OUTPATIENT)
Dept: FAMILY MEDICINE | Facility: CLINIC | Age: 82
End: 2022-01-31
Payer: MEDICARE

## 2022-01-31 VITALS
TEMPERATURE: 97.3 F | BODY MASS INDEX: 25.34 KG/M2 | HEART RATE: 90 BPM | OXYGEN SATURATION: 98 % | DIASTOLIC BLOOD PRESSURE: 74 MMHG | WEIGHT: 171.6 LBS | SYSTOLIC BLOOD PRESSURE: 128 MMHG | RESPIRATION RATE: 20 BRPM

## 2022-01-31 DIAGNOSIS — E55.9 VITAMIN D DEFICIENCY: ICD-10-CM

## 2022-01-31 DIAGNOSIS — C61 MALIGNANT NEOPLASM OF PROSTATE (H): ICD-10-CM

## 2022-01-31 DIAGNOSIS — N18.31 STAGE 3A CHRONIC KIDNEY DISEASE (H): ICD-10-CM

## 2022-01-31 DIAGNOSIS — T38.0X5A STEROID-INDUCED DIABETES MELLITUS, INITIAL ENCOUNTER (H): ICD-10-CM

## 2022-01-31 DIAGNOSIS — J80 ACUTE RESPIRATORY DISTRESS SYNDROME (ARDS) DUE TO COVID-19 VIRUS (H): Primary | ICD-10-CM

## 2022-01-31 DIAGNOSIS — U07.1 ACUTE RESPIRATORY DISTRESS SYNDROME (ARDS) DUE TO COVID-19 VIRUS (H): Primary | ICD-10-CM

## 2022-01-31 DIAGNOSIS — G25.81 RESTLESS LEG SYNDROME: ICD-10-CM

## 2022-01-31 DIAGNOSIS — R20.2 PARESTHESIAS: ICD-10-CM

## 2022-01-31 DIAGNOSIS — R53.81 PHYSICAL DECONDITIONING: ICD-10-CM

## 2022-01-31 DIAGNOSIS — E27.49 SECONDARY ADRENOCORTICAL INSUFFICIENCY (H): ICD-10-CM

## 2022-01-31 DIAGNOSIS — Z13.220 SCREENING FOR HYPERLIPIDEMIA: ICD-10-CM

## 2022-01-31 DIAGNOSIS — J12.82 PNEUMONIA DUE TO 2019 NOVEL CORONAVIRUS: ICD-10-CM

## 2022-01-31 DIAGNOSIS — D84.821 IMMUNODEFICIENCY DUE TO DRUGS (CODE) (H): ICD-10-CM

## 2022-01-31 DIAGNOSIS — G70.00 MYASTHENIA GRAVIS WITHOUT EXACERBATION (H): ICD-10-CM

## 2022-01-31 DIAGNOSIS — E09.9 STEROID-INDUCED DIABETES MELLITUS, INITIAL ENCOUNTER (H): ICD-10-CM

## 2022-01-31 DIAGNOSIS — D47.2 MGUS (MONOCLONAL GAMMOPATHY OF UNKNOWN SIGNIFICANCE): ICD-10-CM

## 2022-01-31 DIAGNOSIS — U07.1 PNEUMONIA DUE TO 2019 NOVEL CORONAVIRUS: ICD-10-CM

## 2022-01-31 LAB
BASOPHILS # BLD AUTO: 0 10E3/UL (ref 0–0.2)
BASOPHILS NFR BLD AUTO: 0 %
CHOLEST SERPL-MCNC: 208 MG/DL
EOSINOPHIL # BLD AUTO: 0.1 10E3/UL (ref 0–0.7)
EOSINOPHIL NFR BLD AUTO: 1 %
ERYTHROCYTE [DISTWIDTH] IN BLOOD BY AUTOMATED COUNT: 15.6 % (ref 10–15)
FASTING STATUS PATIENT QL REPORTED: ABNORMAL
HCT VFR BLD AUTO: 40.9 % (ref 40–53)
HDLC SERPL-MCNC: 79 MG/DL
HGB BLD-MCNC: 12.4 G/DL (ref 13.3–17.7)
LDLC SERPL CALC-MCNC: 92 MG/DL
LYMPHOCYTES # BLD AUTO: 1.8 10E3/UL (ref 0.8–5.3)
LYMPHOCYTES NFR BLD AUTO: 12 %
MCH RBC QN AUTO: 27 PG (ref 26.5–33)
MCHC RBC AUTO-ENTMCNC: 30.3 G/DL (ref 31.5–36.5)
MCV RBC AUTO: 89 FL (ref 78–100)
MONOCYTES # BLD AUTO: 1.1 10E3/UL (ref 0–1.3)
MONOCYTES NFR BLD AUTO: 8 %
NEUTROPHILS # BLD AUTO: 11.8 10E3/UL (ref 1.6–8.3)
NEUTROPHILS NFR BLD AUTO: 80 %
NONHDLC SERPL-MCNC: 129 MG/DL
PLATELET # BLD AUTO: 279 10E3/UL (ref 150–450)
RBC # BLD AUTO: 4.59 10E6/UL (ref 4.4–5.9)
TRIGL SERPL-MCNC: 183 MG/DL
VIT B12 SERPL-MCNC: 1372 PG/ML (ref 193–986)
WBC # BLD AUTO: 14.8 10E3/UL (ref 4–11)

## 2022-01-31 PROCEDURE — 82607 VITAMIN B-12: CPT | Performed by: PHYSICIAN ASSISTANT

## 2022-01-31 PROCEDURE — 97530 THERAPEUTIC ACTIVITIES: CPT | Mod: GP | Performed by: PHYSICAL THERAPIST

## 2022-01-31 PROCEDURE — 97110 THERAPEUTIC EXERCISES: CPT | Mod: GP | Performed by: PHYSICAL THERAPIST

## 2022-01-31 PROCEDURE — 85025 COMPLETE CBC W/AUTO DIFF WBC: CPT | Performed by: PHYSICIAN ASSISTANT

## 2022-01-31 PROCEDURE — G0008 ADMIN INFLUENZA VIRUS VAC: HCPCS | Performed by: PHYSICIAN ASSISTANT

## 2022-01-31 PROCEDURE — 82306 VITAMIN D 25 HYDROXY: CPT | Performed by: PHYSICIAN ASSISTANT

## 2022-01-31 PROCEDURE — 71046 X-RAY EXAM CHEST 2 VIEWS: CPT | Performed by: RADIOLOGY

## 2022-01-31 PROCEDURE — 99214 OFFICE O/P EST MOD 30 MIN: CPT | Mod: 25 | Performed by: PHYSICIAN ASSISTANT

## 2022-01-31 PROCEDURE — 80061 LIPID PANEL: CPT | Performed by: PHYSICIAN ASSISTANT

## 2022-01-31 PROCEDURE — 90662 IIV NO PRSV INCREASED AG IM: CPT | Performed by: PHYSICIAN ASSISTANT

## 2022-01-31 PROCEDURE — 36415 COLL VENOUS BLD VENIPUNCTURE: CPT | Performed by: PHYSICIAN ASSISTANT

## 2022-01-31 PROCEDURE — 97162 PT EVAL MOD COMPLEX 30 MIN: CPT | Mod: GP | Performed by: PHYSICAL THERAPIST

## 2022-01-31 RX ORDER — PRAMIPEXOLE DIHYDROCHLORIDE 0.12 MG/1
.125-.25 TABLET ORAL AT BEDTIME
Qty: 60 TABLET | Refills: 2 | Status: SHIPPED | OUTPATIENT
Start: 2022-01-31 | End: 2022-03-11

## 2022-01-31 ASSESSMENT — PAIN SCALES - GENERAL: PAINLEVEL: NO PAIN (0)

## 2022-01-31 NOTE — PROGRESS NOTES
PHYSICAL THERAPY INITIAL EVALUATION    Precautions/Restrictions/MD instructions:  Evaluate & Treat - Physical Deconditioning    Therapist Assessment: Stephen Elizalde is an 81 year old male who presents to Physical Therapy with post-COVID weakness.  Patient demonstrates altered gait, faulty posture, decreased flexibility, weakness, and decreased stamina.  Signs and symptoms are consistent with physical deconditioning.  These impairments limit his ability to ambulate for > 5 minutes, negotiate stairs, squat, and lift.  Skilled PT services are necessary in order to reduce impairments and improve independent function.     SUBJECTIVE:  Chief Complaint: generalized weakness  Onset Date:  11/18/21   MD referral date:  1/18/22     DOS: NA  Mechanism of Injury:  Initially diagnosed with COVID 11/18/21 and hospitalized for approximately 2 weeks.  He was discharged on 12/03/21 with supplemental oxygen which he used for a while, but then discontinued approximately 3 weeks ago.   New/Recurrent/Chronic:  chronic  Pain Location: currently has no c/o pain  Pain quality & severity: NA  Associated Symptoms:  Weakness, fatigue  Exacerbated by:  activity               Alleviated by: rest  Time of day: no effect  Progression of symptoms since onset:  Gradually improving  Previous Treatment:  Home care nursing, OT and PT in Dec/January    General Health as reported by patient: Good  Pertinent Medical History: Prostate cancer, skin cancer, myesthenia gravis, MGUS (monoconal gammopathy of unknown significance)  Surgical History: cancer surgery   Imaging:  none  Medications:  NSAIDs, steroid    Occupation:  Retired   Primary Job Tasks: NA    Social History:  ; lives with wife (Ceci) in one-level home  Restrictions: currently using shower chair to bathe and gets assistance from wife with this  Barriers to Treatment:  none  Red Flags:  patient denies any fever, chills, or night sweats; breathing difficulties or  "SOB    Current Functional Status: impaired   Previous Functional Status:  fully functional  Leisure Activities: none    Patient's Goal(s):  \"To get my entire body stronger\"; to increase stamina; be able to negotiate stairs to get into son's house              Objective:  System  Gait:  Ambulates independently with decreased step length, decreased arm swing, and decreased pelvic rotation  TUG Test (Timed Get Up and Go):  12 sec (> 12 seconds to complete indicates fall risk)  Physical Exam  Posture:  Demonstrates slightly forward flexed trunk in standing; forward head  Flexibility:  Decreased bilaterally in HS, Quads, Hip flexors, ITB, and G-S    Shoulder Screen:  AROM: all motions WFL  Strength:  5/5 for bilat UE's    Lumbar Spine Screen:  AROM:   Flexion mod loss  Ext major loss    Lumbar Myotomes:  5/5 bilat    Hip Screen:  AROM: all motions WFL except for hip extension which is moderately decreased bilat  Strength:    Flexion 5/5 bilat  Extension 3/5 bilat  Abduction 4/5 bilat    Functional Testing:   Able to perform sit to stand repetitively x 20+ w/o UE assist          General     ROS    Assessment/Plan:    Patient is an 81 year old male with deconditioning and weakness complaints.    Patient has the following significant findings with corresponding treatment plan.                Diagnosis 1:  Physical Deconditioning    Decreased ROM/flexibility - therapeutic exercise  Decreased strength - therapeutic exercise and therapeutic activities  Decreased function - therapeutic activities and home program  Impaired posture - neuro re-education    Therapy Evaluation Codes:   1) History comprised of:   Personal factors that impact the plan of care:      Age and Past/current experiences.    Comorbidity factors that impact the plan of care are:      Cancer, Weakness and Myesthenia Gravis, MGUS.     Medications impacting care: Anti-inflammatory and Steroids.  2) Examination of Body Systems comprised of:   Body structures and " functions that impact the plan of care:      Elbow, Hip, Knee, Pelvis and Shoulder.   Activity limitations that impact the plan of care are:      Bathing, Lifting, Stairs, Standing and Walking.  3) Clinical presentation characteristics are:   Evolving/Changing.  4) Decision-Making    Moderate complexity using standardized patient assessment instrument and/or measureable assessment of functional outcome.  Cumulative Therapy Evaluation is: Moderate complexity.    Previous and current functional limitations:  (See Goal Flow Sheet for this information)    Short term and Long term goals: (See Goal Flow Sheet for this information)     Communication ability:  Patient appears to be able to clearly communicate and understand verbal and written communication and follow directions correctly.  Treatment Explanation - The following has been discussed with the patient:   RX ordered/plan of care  Anticipated outcomes  Possible risks and side effects  This patient would benefit from PT intervention to resume normal activities.   Rehab potential is good.    Frequency:  1 X week, once daily  Duration:  for 6 weeks  Discharge Plan:  Achieve all LTG.  Independent in home treatment program.  Reach maximal therapeutic benefit.    Please refer to the daily flowsheet for treatment today, total treatment time and time spent performing 1:1 timed codes.

## 2022-02-01 ENCOUNTER — TELEPHONE (OUTPATIENT)
Dept: FAMILY MEDICINE | Facility: CLINIC | Age: 82
End: 2022-02-01
Payer: MEDICARE

## 2022-02-01 PROBLEM — R53.81 PHYSICAL DECONDITIONING: Status: ACTIVE | Noted: 2022-02-01

## 2022-02-01 LAB — DEPRECATED CALCIDIOL+CALCIFEROL SERPL-MC: 46 UG/L (ref 20–75)

## 2022-02-01 NOTE — TELEPHONE ENCOUNTER
Patients spouse calling to request letter stating patient no longer needs oxygen for tank to be picked up. Please fax letter to [phone] Att: Karena

## 2022-02-01 NOTE — PROGRESS NOTES
[facility]    OUTPATIENT Physical Therapy ORTHOPEDIC EVALUATION  PLAN OF TREATMENT FOR OUTPATIENT REHABILITATION  (COMPLETE FOR INITIAL CLAIMS ONLY)  Patient's Last Name, First Name, M.I.  YOB: 1940  Gustavo Ramon    Provider s Name:  [facility]   Medical Record No.  [id number]   Start of Care Date:  01/31/22   Onset Date:   11/18/21   Type:     _X__PT   ___OT Medical Diagnosis:    Encounter Diagnosis   Name Primary?     Physical deconditioning         Treatment Diagnosis:  Physical Deconditioning        Goals:     01/31/22 0500   Body Part   Goals listed below are for Deconditioning   Goal #1   Goal #1 ambulation   Previous Functional Level No restrictions   Current Functional Level Minutes patient can walk   Performance Level 10 min before onset of fatigue   STG Target Performance Minutes patient will be able to walk   Performance Level 15 min w/o fatigue   Rationale for safe household ambulation;for safe outdoor household ambulation;for safe community ambulation   Due Date 02/21/22    LTG Target Performance Minutes patient will be able to  walk   Performance Level 30 w/o fatigue   Rationale for safe household ambulation;for safe outdoor household ambulation;for safe community ambulation   Due Date 03/14/22   Goal #2   Goal #2 stairs   Previous Functional Level No restrictions   Current Functional Level   (Unable to ascend/descend stairs)   STG Target Performance Ascend stairs;in a normal reciprocal pattern;with a railing   Performance Level 5 steps   Rationale to reach upper level of home safely;for safe community access to buildings   Due Date 02/21/22   LTG Target Performance Ascend stairs;Descend stairs;in a normal reciprocal pattern;with railing   Performance Level 7 steps   Rationale to reach upper level of home safely;to reach lower level of home  safely;for safe community access to buildings   Due Date 03/14/22       Therapy Frequency:  1x/week  Predicted Duration of Therapy Intervention:  6 weeks    Alexandra Ceja, PT                 I CERTIFY THE NEED FOR THESE SERVICES FURNISHED UNDER        THIS PLAN OF TREATMENT AND WHILE UNDER MY CARE     (Physician attestation of this document indicates review and certification of the therapy plan).                       Certification Date From:  01/31/22   Certification Date To:  03/14/22    Referring Provider:  Winston Silverman    Initial Assessment        See Epic Evaluation SOC Date: 01/31/22

## 2022-02-01 NOTE — LETTER
February 1, 2022      Gustavo CEZAR Tristen  [id number] [facility] [address]        To Whom it may concern:    Re: Gustavo Ramon          1940      Patient no longer requires supplemental oxygen. Please discontinue oxygen orders.     Sincerely,     Winston Silverman PA-C

## 2022-02-04 ENCOUNTER — ANCILLARY PROCEDURE (OUTPATIENT)
Dept: GENERAL RADIOLOGY | Facility: CLINIC | Age: 82
End: 2022-02-04
Attending: PSYCHIATRY & NEUROLOGY
Payer: MEDICARE

## 2022-02-04 ENCOUNTER — THERAPY VISIT (OUTPATIENT)
Dept: SPEECH THERAPY | Facility: CLINIC | Age: 82
End: 2022-02-04
Attending: PSYCHIATRY & NEUROLOGY
Payer: MEDICARE

## 2022-02-04 DIAGNOSIS — G70.00 MYASTHENIA GRAVIS WITHOUT EXACERBATION (H): ICD-10-CM

## 2022-02-04 DIAGNOSIS — R13.10 DYSPHAGIA, UNSPECIFIED TYPE: ICD-10-CM

## 2022-02-04 PROCEDURE — 92611 MOTION FLUOROSCOPY/SWALLOW: CPT | Mod: GN | Performed by: SPEECH-LANGUAGE PATHOLOGIST

## 2022-02-04 PROCEDURE — 74230 X-RAY XM SWLNG FUNCJ C+: CPT | Mod: GC | Performed by: RADIOLOGY

## 2022-02-04 PROCEDURE — 92610 EVALUATE SWALLOWING FUNCTION: CPT | Mod: GN | Performed by: SPEECH-LANGUAGE PATHOLOGIST

## 2022-02-04 RX ORDER — BARIUM SULFATE 400 MG/ML
SUSPENSION ORAL ONCE
Status: COMPLETED | OUTPATIENT
Start: 2022-02-04 | End: 2022-02-04

## 2022-02-04 RX ADMIN — BARIUM SULFATE 60 ML: 400 SUSPENSION ORAL at 13:50

## 2022-02-04 NOTE — PROGRESS NOTES
"   02/04/22 0800       Present No   General Information   Type Of Visit Initial   Start Of Care Date 02/04/22   Referring Physician Tyler Wheat MD   Orders Evaluate And Treat   Orders Comment Video Swallow Study   Medical Diagnosis Myasthenia Gravis, Dysphagia   Onset Of Illness/injury Or Date Of Surgery 02/04/22   Precautions/limitations No Known Precautions/limitations   Hearing WFL  (required repetition )   Pertinent History of Current Problem/OT: Additional Occupational Profile Info Stephen is an 81 year old male with a PMH significant for prostate cancer, skin cancer, and myasthenia gravis. Pt was admitted with COVID-19 on 11/19/2021 and developed acute respiratory distress syndrome (ARDS) due to the virus.  Questionable PNA due to covid, but no other recent PNAs noted. He did not require intubation, but required supplemental oxygen, which he no longer needs. Pt reports that solid food (heavy foods) sometimes get stuck at the sternal notch and he has to flush it with liquids or repeat the swallowing attempt. Pt reports that this problem does not occur with liquids. He denies any change in his voice or speech. Pt has a history of reflux, which is controlled by medication.  Pt presents for a video swallow today due to complaints of dysphagia and doctor recommendation.    Respiratory Status Room air   Prior Level Of Function Swallowing   Patient Role/employment History Retired   Living Environment [facility]   General Observations Pt was sweet, cooperative, and highly engaged during study   Patient/family Goals \"To figure out if there is anything stuck in my throat\"   Clinical Swallow Evaluation   Oral Musculature generally intact   Structural Abnormalities none present   Dentition present and adequate   Mucosal Quality good   Mandibular Strength and Mobility intact   Oral Labial Strength and Mobility WFL   Lingual Strength and Mobility WFL   Velar Elevation intact "   Buccal Strength and Mobility intact   Laryngeal Function Cough;Throat clear;Swallow;Voicing initiated   Additional Documentation Yes   Additional evaluation(s) completed today Yes   Rationale for completing additional evaluation To view pharyngeal phase and rule out aspiration   Clinical Swallow Eval: Thin Liquid Texture Trial   Mode of Presentation, Thin Liquids cup;self-fed   Volume of Liquid or Food Presented 3oz   Oral Phase of Swallow WFL   Pharyngeal Phase of Swallow intact   Diagnostic Statement pt demonstrated no s/s of aspiration after 3 consecutive sips of water   VFSS Evaluation   VFSS Additional Documentation Yes   VFSS Eval: Radiology   Radiologist [facility] Radiology Resident   Views Taken left lateral;A/P   Physical Location of Procedure [facility] Radiology #2   VFSS Eval: Thin Liquid Texture Trial   Mode of Presentation, Thin Liquid cup;self-fed   Order of Presentation 1, 2, 3, 10 (straw), 11 (AP)   Preparatory Phase WFL   Oral Phase, Thin Liquid WFL   Pharyngeal Phase, Thin Liquid WFL   Rosenbek's Penetration Aspiration Scale: Thin Liquid Trial Results 2 - contrast enters airway, remains above the vocal cords, no residue remains (penetration)   Diagnostic Statement Flash penetration following 3 continuous swallows. No aspiration.    VFSS Eval: Mildly Thick Liquids    Mode of Presentation cup;self-fed   Order of Presentation 4, 5   Preparatory Phase WFL   Oral Phase WFL   Pharyngeal Phase WFL   Rosenbek's Penetration Aspiration Scale 1 - no aspiration, contrast does not enter airway   Diagnostic Statement No penetration, no aspiration.    VFSS Eval: Puree Solid Texture Trial   Mode of Presentation, Puree spoon;self-fed   Order of Presentation 6, 7   (Visualized in AP, but not saved due to radiologic error. )   Preparatory Phase WFL   Oral Phase, Puree WFL   Pharyngeal Phase, Puree WFL   Rosenbek's Penetration Aspiration Scale: Puree Food Trial Results 1 - no aspiration,  contrast does not enter airway   Diagnostic Statement No penetration, no aspiration.    VFSS Eval: Regular Texture Trial (Solid)   Mode of Presentation self-fed   Order of Presentation 8, 9(pill)  (Additional trials served, but not saved due to rad error)   Preparatory Phase WFL   Oral Phase WFL   Pharyngeal Phase WFL   Rosenbek's Penetration Aspiration Scale 1 - no aspiration, contrast does not enter airway   Diagnostic Statement No penetration, no aspiration. Pill went down effectively and efficiently.   Educational Assessment   Barriers to Learning No barriers   Esophageal Phase of Swallow   Patient reports or presents with symptoms of esophageal dysphagia Yes   Esophageal sweep performed during today s vidofluoroscopic exam  Yes;Please refer to radiologist's report for details   Swallow Eval: Clinical Impressions   Skilled Criteria for Therapy Intervention No problems identified which require skilled intervention   Dysphagia Outcome Severity Scale (JOSE) Level 7 - JOSE   Treatment Diagnosis Oropharyngeal swallow responses within normal limits   Diet texture recommendations Thin liquids (level 0);Regular diet   Anticipated Discharge Disposition home   Risks and Benefits of Treatment have been explained. Yes   Patient, family and/or staff in agreement with Plan of Care Yes   Clinical Impression Comments Pt presents with oropharyngeal function that falls within normal limits. Oral mechanism exam was unremarkable. Under fluoroscopy, Pt demonstrated flash penetration with 3 consecutive swallows of thin liquids. There was no aspiration. Pt demonstrated no pharyngeal residue. Pt is not at risk for aspiration and is appropriate for a regular texture diet and thin liquids. Pt should pace self and alternate consistencies for ease while eating. Pt is not demonstrating a need for dysphagia intervention at this time. Question esophageal phase and referred sensation as etiology of complaints. Pt and spouse were educated on  the results and rationale following today s study.     Total Session Time   SLP Eval: oral/pharyngeal swallow function, clinical minutes (04647) 20   SLP Eval: VideoFluoroscopic Swallow function Minutes (41957) 30   Total Evaluation Time 50

## 2022-02-07 ENCOUNTER — LAB (OUTPATIENT)
Dept: LAB | Facility: CLINIC | Age: 82
End: 2022-02-07
Payer: MEDICARE

## 2022-02-07 DIAGNOSIS — N18.31 STAGE 3A CHRONIC KIDNEY DISEASE (H): ICD-10-CM

## 2022-02-07 DIAGNOSIS — N18.31 STAGE 3A CHRONIC KIDNEY DISEASE (H): Primary | ICD-10-CM

## 2022-02-07 DIAGNOSIS — G70.00 MYASTHENIA GRAVIS WITHOUT EXACERBATION (H): ICD-10-CM

## 2022-02-07 DIAGNOSIS — G70.00 MG, OCULAR (MYASTHENIA GRAVIS) (H): ICD-10-CM

## 2022-02-07 DIAGNOSIS — R79.9 ABNORMAL FINDING OF BLOOD CHEMISTRY, UNSPECIFIED: ICD-10-CM

## 2022-02-07 DIAGNOSIS — Z79.52 LONG TERM (CURRENT) USE OF SYSTEMIC STEROIDS: ICD-10-CM

## 2022-02-07 DIAGNOSIS — Z79.60 LONG-TERM USE OF IMMUNOSUPPRESSANT MEDICATION: ICD-10-CM

## 2022-02-07 DIAGNOSIS — Z79.52 LONG TERM CURRENT USE OF SYSTEMIC STEROIDS: ICD-10-CM

## 2022-02-07 LAB
ANION GAP SERPL CALCULATED.3IONS-SCNC: 2 MMOL/L (ref 3–14)
BASOPHILS # BLD AUTO: 0 10E3/UL (ref 0–0.2)
BASOPHILS NFR BLD AUTO: 0 %
BUN SERPL-MCNC: 22 MG/DL (ref 7–30)
CALCIUM SERPL-MCNC: 9.1 MG/DL (ref 8.5–10.1)
CHLORIDE BLD-SCNC: 109 MMOL/L (ref 94–109)
CO2 SERPL-SCNC: 30 MMOL/L (ref 20–32)
CREAT SERPL-MCNC: 1.04 MG/DL (ref 0.66–1.25)
CREAT UR-MCNC: 164 MG/DL
EOSINOPHIL # BLD AUTO: 0.1 10E3/UL (ref 0–0.7)
EOSINOPHIL NFR BLD AUTO: 1 %
ERYTHROCYTE [DISTWIDTH] IN BLOOD BY AUTOMATED COUNT: 16 % (ref 10–15)
GFR SERPL CREATININE-BSD FRML MDRD: 71 ML/MIN/1.73M2
GLUCOSE BLD-MCNC: 135 MG/DL (ref 70–99)
HBA1C MFR BLD: 5.2 % (ref 0–5.6)
HCT VFR BLD AUTO: 38.7 % (ref 40–53)
HGB BLD-MCNC: 11.8 G/DL (ref 13.3–17.7)
LYMPHOCYTES # BLD AUTO: 1.9 10E3/UL (ref 0.8–5.3)
LYMPHOCYTES NFR BLD AUTO: 19 %
MCH RBC QN AUTO: 27 PG (ref 26.5–33)
MCHC RBC AUTO-ENTMCNC: 30.5 G/DL (ref 31.5–36.5)
MCV RBC AUTO: 89 FL (ref 78–100)
MICROALBUMIN UR-MCNC: 13 MG/L
MICROALBUMIN/CREAT UR: 7.93 MG/G CR (ref 0–17)
MONOCYTES # BLD AUTO: 1.1 10E3/UL (ref 0–1.3)
MONOCYTES NFR BLD AUTO: 11 %
NEUTROPHILS # BLD AUTO: 6.9 10E3/UL (ref 1.6–8.3)
NEUTROPHILS NFR BLD AUTO: 69 %
PLATELET # BLD AUTO: 267 10E3/UL (ref 150–450)
POTASSIUM BLD-SCNC: 4 MMOL/L (ref 3.4–5.3)
RBC # BLD AUTO: 4.37 10E6/UL (ref 4.4–5.9)
SODIUM SERPL-SCNC: 141 MMOL/L (ref 133–144)
WBC # BLD AUTO: 10.1 10E3/UL (ref 4–11)

## 2022-02-07 PROCEDURE — 83036 HEMOGLOBIN GLYCOSYLATED A1C: CPT

## 2022-02-07 PROCEDURE — 80048 BASIC METABOLIC PNL TOTAL CA: CPT

## 2022-02-07 PROCEDURE — 82043 UR ALBUMIN QUANTITATIVE: CPT

## 2022-02-07 PROCEDURE — 85025 COMPLETE CBC W/AUTO DIFF WBC: CPT

## 2022-02-07 PROCEDURE — 36415 COLL VENOUS BLD VENIPUNCTURE: CPT

## 2022-02-08 ENCOUNTER — TELEPHONE (OUTPATIENT)
Dept: FAMILY MEDICINE | Facility: CLINIC | Age: 82
End: 2022-02-08
Payer: MEDICARE

## 2022-02-08 NOTE — TELEPHONE ENCOUNTER
Forms received from: [employer]   Phone number listed: [phone]   Fax listed: [phone]  Date received: 2/2/22  Form description: PT  Once forms are completed, please return to [employer] via  Fax to [phone].  Is patient requesting to be contacted when forms are completed: na  Phone: na  Form placed:  In [facility]'s in basket for signature.  Jahaira Allen

## 2022-02-09 ENCOUNTER — MEDICAL CORRESPONDENCE (OUTPATIENT)
Dept: HEALTH INFORMATION MANAGEMENT | Facility: CLINIC | Age: 82
End: 2022-02-09
Payer: MEDICARE

## 2022-02-10 ENCOUNTER — TELEPHONE (OUTPATIENT)
Dept: NEUROLOGY | Facility: CLINIC | Age: 82
End: 2022-02-10
Payer: MEDICARE

## 2022-02-10 ENCOUNTER — TRANSFERRED RECORDS (OUTPATIENT)
Dept: HEALTH INFORMATION MANAGEMENT | Facility: CLINIC | Age: 82
End: 2022-02-10
Payer: MEDICARE

## 2022-02-10 NOTE — TELEPHONE ENCOUNTER
[facility]    Phone Message    May a detailed message be left on voicemail: yes     Reason for Call: Other: Patient's wife is asking should patient be fasting for blood test for medication  MYCOPHENOLATE.  Please call patient's wife back to discuss     Action Taken: Message routed to:  [facility]    Travel Screening: Not Applicable

## 2022-02-11 ENCOUNTER — THERAPY VISIT (OUTPATIENT)
Dept: PHYSICAL THERAPY | Facility: CLINIC | Age: 82
End: 2022-02-11
Payer: MEDICARE

## 2022-02-11 DIAGNOSIS — R53.81 PHYSICAL DECONDITIONING: Primary | ICD-10-CM

## 2022-02-11 PROCEDURE — 97110 THERAPEUTIC EXERCISES: CPT | Mod: GP | Performed by: PHYSICAL THERAPIST

## 2022-02-11 PROCEDURE — 97530 THERAPEUTIC ACTIVITIES: CPT | Mod: GP | Performed by: PHYSICAL THERAPIST

## 2022-02-15 ENCOUNTER — ONCOLOGY VISIT (OUTPATIENT)
Dept: ONCOLOGY | Facility: CLINIC | Age: 82
End: 2022-02-15
Payer: MEDICARE

## 2022-02-15 VITALS
WEIGHT: 177 LBS | HEART RATE: 98 BPM | OXYGEN SATURATION: 98 % | BODY MASS INDEX: 26.22 KG/M2 | HEIGHT: 69 IN | SYSTOLIC BLOOD PRESSURE: 151 MMHG | DIASTOLIC BLOOD PRESSURE: 75 MMHG

## 2022-02-15 DIAGNOSIS — D50.9 IRON DEFICIENCY ANEMIA, UNSPECIFIED IRON DEFICIENCY ANEMIA TYPE: ICD-10-CM

## 2022-02-15 DIAGNOSIS — C61 PROSTATE CANCER (H): ICD-10-CM

## 2022-02-15 DIAGNOSIS — G70.00 OCULAR MYASTHENIA GRAVIS (H): ICD-10-CM

## 2022-02-15 DIAGNOSIS — D47.2 MGUS (MONOCLONAL GAMMOPATHY OF UNKNOWN SIGNIFICANCE): Primary | ICD-10-CM

## 2022-02-15 LAB
ALBUMIN SERPL-MCNC: 3.4 G/DL (ref 3.4–5)
ALP SERPL-CCNC: 46 U/L (ref 40–150)
ALT SERPL W P-5'-P-CCNC: 30 U/L (ref 0–70)
ANION GAP SERPL CALCULATED.3IONS-SCNC: 1 MMOL/L (ref 3–14)
AST SERPL W P-5'-P-CCNC: 17 U/L (ref 0–45)
BASOPHILS # BLD AUTO: 0.1 10E3/UL (ref 0–0.2)
BASOPHILS NFR BLD AUTO: 1 %
BILIRUB SERPL-MCNC: 0.3 MG/DL (ref 0.2–1.3)
BUN SERPL-MCNC: 20 MG/DL (ref 7–30)
CALCIUM SERPL-MCNC: 9.1 MG/DL (ref 8.5–10.1)
CHLORIDE BLD-SCNC: 107 MMOL/L (ref 94–109)
CO2 SERPL-SCNC: 32 MMOL/L (ref 20–32)
CREAT SERPL-MCNC: 0.93 MG/DL (ref 0.66–1.25)
EOSINOPHIL # BLD AUTO: 0.1 10E3/UL (ref 0–0.7)
EOSINOPHIL NFR BLD AUTO: 1 %
ERYTHROCYTE [DISTWIDTH] IN BLOOD BY AUTOMATED COUNT: 15.3 % (ref 10–15)
FERRITIN SERPL-MCNC: 25 NG/ML (ref 26–388)
GFR SERPL CREATININE-BSD FRML MDRD: 81 ML/MIN/1.73M2
GLUCOSE BLD-MCNC: 103 MG/DL (ref 70–99)
HCT VFR BLD AUTO: 41.3 % (ref 40–53)
HGB BLD-MCNC: 12.4 G/DL (ref 13.3–17.7)
IMM GRANULOCYTES # BLD: 0.1 10E3/UL
IMM GRANULOCYTES NFR BLD: 1 %
IRON SATN MFR SERPL: 24 % (ref 15–46)
IRON SERPL-MCNC: 85 UG/DL (ref 35–180)
LYMPHOCYTES # BLD AUTO: 2 10E3/UL (ref 0.8–5.3)
LYMPHOCYTES NFR BLD AUTO: 17 %
MCH RBC QN AUTO: 26.7 PG (ref 26.5–33)
MCHC RBC AUTO-ENTMCNC: 30 G/DL (ref 31.5–36.5)
MCV RBC AUTO: 89 FL (ref 78–100)
MONOCYTES # BLD AUTO: 1.2 10E3/UL (ref 0–1.3)
MONOCYTES NFR BLD AUTO: 10 %
NEUTROPHILS # BLD AUTO: 8.1 10E3/UL (ref 1.6–8.3)
NEUTROPHILS NFR BLD AUTO: 70 %
NRBC # BLD AUTO: 0 10E3/UL
NRBC BLD AUTO-RTO: 0 /100
PLATELET # BLD AUTO: 256 10E3/UL (ref 150–450)
POTASSIUM BLD-SCNC: 3.7 MMOL/L (ref 3.4–5.3)
PROT SERPL-MCNC: 6.5 G/DL (ref 6.8–8.8)
RBC # BLD AUTO: 4.64 10E6/UL (ref 4.4–5.9)
SODIUM SERPL-SCNC: 140 MMOL/L (ref 133–144)
TIBC SERPL-MCNC: 350 UG/DL (ref 240–430)
TOTAL PROTEIN SERUM FOR ELP: 6.2 G/DL (ref 6.8–8.8)
WBC # BLD AUTO: 11.5 10E3/UL (ref 4–11)

## 2022-02-15 PROCEDURE — 80053 COMPREHEN METABOLIC PANEL: CPT | Performed by: INTERNAL MEDICINE

## 2022-02-15 PROCEDURE — 85025 COMPLETE CBC W/AUTO DIFF WBC: CPT | Performed by: INTERNAL MEDICINE

## 2022-02-15 PROCEDURE — 83521 IG LIGHT CHAINS FREE EACH: CPT | Performed by: INTERNAL MEDICINE

## 2022-02-15 PROCEDURE — 84155 ASSAY OF PROTEIN SERUM: CPT | Mod: 59 | Performed by: INTERNAL MEDICINE

## 2022-02-15 PROCEDURE — 84165 PROTEIN E-PHORESIS SERUM: CPT | Performed by: STUDENT IN AN ORGANIZED HEALTH CARE EDUCATION/TRAINING PROGRAM

## 2022-02-15 PROCEDURE — 82728 ASSAY OF FERRITIN: CPT | Performed by: INTERNAL MEDICINE

## 2022-02-15 PROCEDURE — 99214 OFFICE O/P EST MOD 30 MIN: CPT | Performed by: INTERNAL MEDICINE

## 2022-02-15 PROCEDURE — 36415 COLL VENOUS BLD VENIPUNCTURE: CPT | Performed by: INTERNAL MEDICINE

## 2022-02-15 PROCEDURE — 82784 ASSAY IGA/IGD/IGG/IGM EACH: CPT | Performed by: INTERNAL MEDICINE

## 2022-02-15 PROCEDURE — 83550 IRON BINDING TEST: CPT | Performed by: INTERNAL MEDICINE

## 2022-02-15 ASSESSMENT — MIFFLIN-ST. JEOR: SCORE: 1488.25

## 2022-02-15 ASSESSMENT — PAIN SCALES - GENERAL: PAINLEVEL: NO PAIN (0)

## 2022-02-15 NOTE — PROGRESS NOTES
"[facility] / Oncology  Progress Note  Name: Gustavo Ramon  :  1940    MRN:  [id number]    --------------------    Assessment / Plan:  From an MGUS standpoint, clip appears to be clinically well.  We will plan to repeat his labs today and assuming all is well, we will plan to see him back in approximately 12 months.    From a an ocular myasthenia standpoint, Stephen continues to work with his neurologist to optimize his Mestinon and prednisone dosing.  It is possible that his mycophenolate is causing his most recent tremors and I encouraged him to reach out to his neurologist to discuss.  I do suspect his MGUS and his myasthenia are related likely represents some immune dysregulation.  If his myasthenia proves difficult to treat, we may consider bone marrow biopsy evaluation as well as staging CT scan of the chest abdomen pelvis looking for occult myeloproliferative neoplasm or plasma cell neoplasm or needed treatment.    From a prostate cancer standpoint, he continues to follow with urology and his PSAs are stable.    Hakan Darden MD    --------------------    Interval History:  Gustavo returns for follow up of MGUS and anemia and prostate cancer.  All in all, doing quite well.  No major health concerns.  No unexplained fevers, chills or sweats.  No adenopathy.  No unexplained weight or appetite loss.  Good energy.  No bruising or bleeding.  Ocular myasthenia remains stable.  He is has discussions ongoing about possible blepharoplasty.  His dosing for both Mestinon prednisone is being treat.  He recently started on mycophenolate to get off prednisone.  No urinary complaints.    --------------------    Physical Exam:  VS: BP (!) 151/75 (BP Location: Left arm, Patient Position: Chair, Cuff Size: Adult Regular)   Pulse 98   Ht 1.753 m (5' 9\")   Wt 80.3 kg (177 lb)   SpO2 98%   BMI 26.14 kg/m    GEN: Well appearing.  HEENT: PERRL, EOMI, no scleral icterus or injection; OP " clear, moist mucous membranes, no oral lesions.  NECK: Supple.  JOSE ENRIQUE: No cervical, supraclavicular, axillary or inguinal JOSE ENRIQUE.  CHEST: Breathing comfortably, good airflow bilaterally, no crackles, no wheezing.  CV: RRR, s1/s2, no murmurs; strong distal pulses.  ABD: Non-tender, non-distended, soft, positive bowel sounds.  EXT: Warm and well perfused; no edema; no clubbing.  SKIN: Warm and dry, no visible rashes.  NEURO: Alert, engaged; CN 2-12 grossly intact; no gross sensorimotor deficits; gait and coordination intact; speech clear and fluent.    Labs / Imaging / Path:  We reviewed labs

## 2022-02-15 NOTE — LETTER
2/15/2022         RE: Gustavo Ramon  [address] [facility] [address]        Dear Colleague,    Thank you for referring your patient, Gustavo Ramon, to the [facility]. Please see a copy of my visit note below.    Error.    [facility] Hematology / Oncology  Progress Note  Name: Gustavo Ramon  :  1940    MRN:  [id number]    --------------------    Assessment / Plan:  From an MGUS standpoint, clip appears to be clinically well.  We will plan to repeat his labs today and assuming all is well, we will plan to see him back in approximately 12 months.    From a an ocular myasthenia standpoint, Stephen continues to work with his neurologist to optimize his Mestinon and prednisone dosing.  It is possible that his mycophenolate is causing his most recent tremors and I encouraged him to reach out to his neurologist to discuss.  I do suspect his MGUS and his myasthenia are related likely represents some immune dysregulation.  If his myasthenia proves difficult to treat, we may consider bone marrow biopsy evaluation as well as staging CT scan of the chest abdomen pelvis looking for occult myeloproliferative neoplasm or plasma cell neoplasm or needed treatment.    From a prostate cancer standpoint, he continues to follow with urology and his PSAs are stable.    Hakan Darden MD    --------------------    Interval History:  Gustavo returns for follow up of MGUS and anemia and prostate cancer.  All in all, doing quite well.  No major health concerns.  No unexplained fevers, chills or sweats.  No adenopathy.  No unexplained weight or appetite loss.  Good energy.  No bruising or bleeding.  Ocular myasthenia remains stable.  He is has discussions ongoing about possible blepharoplasty.  His dosing for both Mestinon prednisone is being treat.  He recently started on mycophenolate to get off prednisone.  No urinary  "complaints.    --------------------    Physical Exam:  VS: BP (!) 151/75 (BP Location: Left arm, Patient Position: Chair, Cuff Size: Adult Regular)   Pulse 98   Ht 1.753 m (5' 9\")   Wt 80.3 kg (177 lb)   SpO2 98%   BMI 26.14 kg/m    GEN: Well appearing.  HEENT: PERRL, EOMI, no scleral icterus or injection; OP clear, moist mucous membranes, no oral lesions.  NECK: Supple.  JOSE ENRIQUE: No cervical, supraclavicular, axillary or inguinal JOSE ENRIQUE.  CHEST: Breathing comfortably, good airflow bilaterally, no crackles, no wheezing.  CV: RRR, s1/s2, no murmurs; strong distal pulses.  ABD: Non-tender, non-distended, soft, positive bowel sounds.  EXT: Warm and well perfused; no edema; no clubbing.  SKIN: Warm and dry, no visible rashes.  NEURO: Alert, engaged; CN 2-12 grossly intact; no gross sensorimotor deficits; gait and coordination intact; speech clear and fluent.    Labs / Imaging / Path:  We reviewed labs      Again, thank you for allowing me to participate in the care of your patient.        Sincerely,        Hakan Darden MD    "

## 2022-02-15 NOTE — NURSING NOTE
"Oncology Rooming Note    February 15, 2022 10:01 AM   Gustavo Ramon is a 81 year old male who presents for:    Chief Complaint   Patient presents with     Oncology Clinic Visit     follow up - transfer of care from Dr. Eller     Initial Vitals: BP (!) 151/75 (BP Location: Left arm, Patient Position: Chair, Cuff Size: Adult Regular)   Pulse 98   Ht 1.753 m (5' 9\")   Wt 80.3 kg (177 lb)   SpO2 98%   BMI 26.14 kg/m   Estimated body mass index is 26.14 kg/m  as calculated from the following:    Height as of this encounter: 1.753 m (5' 9\").    Weight as of this encounter: 80.3 kg (177 lb). Body surface area is 1.98 meters squared.  No Pain (0) Comment: Data Unavailable   No LMP for male patient.  Allergies reviewed: Yes  Medications reviewed: Yes    Medications: Medication refills not needed today.  Pharmacy name entered into EPIC:    [facility] - EPRESCRIBE ONLY  [facility] - [facility], [place] - [address] [place] [address] PHARMACY [facility]  [place] - [address] [facility] [place]    Clinical concerns: NO Dr. Darden was notified.       Annie Otero CMA            "

## 2022-02-16 LAB
ALBUMIN SERPL ELPH-MCNC: 4 G/DL (ref 3.7–5.1)
ALPHA1 GLOB SERPL ELPH-MCNC: 0.3 G/DL (ref 0.2–0.4)
ALPHA2 GLOB SERPL ELPH-MCNC: 0.6 G/DL (ref 0.5–0.9)
B-GLOBULIN SERPL ELPH-MCNC: 0.6 G/DL (ref 0.6–1)
GAMMA GLOB SERPL ELPH-MCNC: 0.7 G/DL (ref 0.7–1.6)
IGA SERPL-MCNC: 366 MG/DL (ref 84–499)
IGG SERPL-MCNC: 517 MG/DL (ref 610–1616)
IGM SERPL-MCNC: 29 MG/DL (ref 35–242)
KAPPA LC FREE SER-MCNC: 2.24 MG/DL (ref 0.33–1.94)
KAPPA LC FREE/LAMBDA FREE SER NEPH: 1.76 {RATIO} (ref 0.26–1.65)
LAMBDA LC FREE SERPL-MCNC: 1.27 MG/DL (ref 0.57–2.63)
M PROTEIN SERPL ELPH-MCNC: 0.2 G/DL
PROT PATTERN SERPL ELPH-IMP: ABNORMAL

## 2022-02-16 NOTE — PATIENT INSTRUCTIONS
BJT MG 12 months 15 mins w/ labs 5-7 days prior to visit (CBC, CMP, SPEP, FLC assay, ferritin).    Hakan Darden MD.

## 2022-02-18 ENCOUNTER — ANCILLARY PROCEDURE (OUTPATIENT)
Dept: BONE DENSITY | Facility: CLINIC | Age: 82
End: 2022-02-18
Payer: MEDICARE

## 2022-02-18 ENCOUNTER — THERAPY VISIT (OUTPATIENT)
Dept: PHYSICAL THERAPY | Facility: CLINIC | Age: 82
End: 2022-02-18
Payer: MEDICARE

## 2022-02-18 DIAGNOSIS — R53.81 PHYSICAL DECONDITIONING: Primary | ICD-10-CM

## 2022-02-18 PROCEDURE — 97112 NEUROMUSCULAR REEDUCATION: CPT | Mod: GP | Performed by: PHYSICAL THERAPIST

## 2022-02-18 PROCEDURE — 77080 DXA BONE DENSITY AXIAL: CPT | Performed by: INTERNAL MEDICINE

## 2022-02-18 PROCEDURE — 97530 THERAPEUTIC ACTIVITIES: CPT | Mod: GP | Performed by: PHYSICAL THERAPIST

## 2022-02-18 PROCEDURE — 97110 THERAPEUTIC EXERCISES: CPT | Mod: GP | Performed by: PHYSICAL THERAPIST

## 2022-03-07 ENCOUNTER — DOCUMENTATION ONLY (OUTPATIENT)
Dept: OPHTHALMOLOGY | Facility: CLINIC | Age: 82
End: 2022-03-07
Payer: MEDICARE

## 2022-03-09 ENCOUNTER — TELEPHONE (OUTPATIENT)
Dept: FAMILY MEDICINE | Facility: CLINIC | Age: 82
End: 2022-03-09
Payer: MEDICARE

## 2022-03-09 NOTE — TELEPHONE ENCOUNTER
"Patient was last seen in the clinic on 1/31/22 for a Hospital Follow up visit. Patient was hospitalized with Covid in late November-early December x 2 weeks.    Wife Ceci called the clinic to report the following to Winston Silverman PA-C:     Stephen had been doing very well until yesterday afternoon. They were out to lunch and he had trouble getting up out of the murray and out to the car (using his walker). He became weak and  needed some help from his wife to get back in the house.     He was able to move around the house slowly last evening. However, when he got up to the bathroom around 3 AM his legs gave out and he ended up on the floor in the hallway. He did not sustain any injuries or hit his head. However, Ceci had to call 911 to get a paramedic to come in to help Stephen get back up and into bed.     No fever or breathing concerns. Appetite is good and he is drinking well. Wife notes that he is voiding more often now (about every 2 hours which is what happened when he first came home from the hospital).     BP at 3 /100 with HR 97  BP at 10 /90 with HR 81    Oximeter is 96-98% on RA    Wife reports his mind and speech are good. He has been still struggling with some \"Brain fog\" however that has been slowly improving. He has been on the computer working on some taxes recently.    Stephen has been sleeping better at night the past few weeks. He is currently napping and seems more tired today.     Ceci is willing to bring Stephen in to the clinic if necessary. She would like some guidelines to follow if he would get worse. She is concerned that this weakness/fatigue could be some sort of Covid Relapse.     Please review and advise.     Susy Dudley RN BSN  [facility]    "

## 2022-03-10 NOTE — TELEPHONE ENCOUNTER
Spoke with wife Ceci could hear Stephen/patient in background.  Informed them both per Winston Silverman PA-C, see below read word for word.  Wife stated Stephen is doing much better today.  Wife and patient would like to still do a virtual visit with PCP tomorrow.  Appointment scheduled for tomorrow.  Advised Ceci and patient again if symptoms return to go to UC or ED to be evaluated.  They both verbalized understanding.

## 2022-03-10 NOTE — TELEPHONE ENCOUNTER
I don't feel its related to covid. If they want we can talk via a virtual visit. If symptoms worsen, i'd recommend evaluation via urgent care/urgency center/ER.

## 2022-03-11 ENCOUNTER — VIRTUAL VISIT (OUTPATIENT)
Dept: FAMILY MEDICINE | Facility: CLINIC | Age: 82
End: 2022-03-11
Payer: MEDICARE

## 2022-03-11 DIAGNOSIS — R53.1 EPISODE OF GENERALIZED WEAKNESS: Primary | ICD-10-CM

## 2022-03-11 DIAGNOSIS — G25.81 RESTLESS LEG SYNDROME: ICD-10-CM

## 2022-03-11 DIAGNOSIS — R25.1 TREMULOUSNESS: ICD-10-CM

## 2022-03-11 PROCEDURE — 99214 OFFICE O/P EST MOD 30 MIN: CPT | Mod: 95 | Performed by: PHYSICIAN ASSISTANT

## 2022-03-11 RX ORDER — PROPRANOLOL HCL 60 MG
60 CAPSULE, EXTENDED RELEASE 24HR ORAL DAILY
Qty: 60 CAPSULE | Refills: 0 | Status: SHIPPED | OUTPATIENT
Start: 2022-03-11 | End: 2022-12-08

## 2022-03-11 RX ORDER — PRAMIPEXOLE DIHYDROCHLORIDE 0.12 MG/1
0.38 TABLET ORAL AT BEDTIME
Qty: 60 TABLET | Refills: 2
Start: 2022-03-11 | End: 2022-03-29

## 2022-03-15 ENCOUNTER — THERAPY VISIT (OUTPATIENT)
Dept: PHYSICAL THERAPY | Facility: CLINIC | Age: 82
End: 2022-03-15
Payer: MEDICARE

## 2022-03-15 DIAGNOSIS — R53.81 PHYSICAL DECONDITIONING: Primary | ICD-10-CM

## 2022-03-15 PROCEDURE — 97110 THERAPEUTIC EXERCISES: CPT | Mod: GP | Performed by: PHYSICAL THERAPIST

## 2022-03-15 PROCEDURE — 97530 THERAPEUTIC ACTIVITIES: CPT | Mod: GP | Performed by: PHYSICAL THERAPIST

## 2022-03-16 NOTE — PROGRESS NOTES
[facility]    OUTPATIENT Physical Therapy ORTHOPEDIC EVALUATION  PLAN OF TREATMENT FOR OUTPATIENT REHABILITATION  (COMPLETE FOR INITIAL CLAIMS ONLY)  Patient's Last Name, First Name, M.I.  YOB: 1940  Gustavo Ramon    Provider s Name:  [facility]   Medical Record No.  [id number]   Start of Care Date:  01/31/22   Onset Date:   11/18/21   Type:     _X__PT   ___OT Medical Diagnosis:    Encounter Diagnosis   Name Primary?     Physical deconditioning Yes        Treatment Diagnosis:  Physical Deconditioning        Goals:     03/15/22 0500   Body Part   Goals listed below are for Deconditioning   Goal #1   Goal #1 ambulation   Previous Functional Level No restrictions   Current Functional Level Minutes patient can walk   Performance Level 10 min before onset of fatigue   STG Target Performance Minutes patient will be able to walk   Performance Level 15 min w/o fatigue   Rationale for safe household ambulation;for safe outdoor household ambulation;for safe community ambulation   Due Date 04/05/22   If goal not met, Why? progress delayed by recent eye surgery; needs MD intervention for reasons stated in [place]    LTG Target Performance Minutes patient will be able to  walk   Performance Level 30 w/o fatigue   Rationale for safe household ambulation;for safe outdoor household ambulation;for safe community ambulation   Due Date 04/26/22   Goal #2   Goal #2 stairs   Previous Functional Level No restrictions   Current Functional Level   (Unable to ascend/descend stairs)   STG Target Performance Ascend stairs;in a normal reciprocal pattern;with a railing   Performance Level 5 steps   Rationale to reach upper level of home safely;for safe community access to buildings   Due Date 04/05/22   If goal not met, Why? progress delayed by recent eye surgery; needs MD intervention for  reasons stated in [place]   LTG Target Performance Ascend stairs;Descend stairs;in a normal reciprocal pattern;with railing   Performance Level 7 steps   Rationale to reach upper level of home safely;to reach lower level of home safely;for safe community access to buildings   Due Date 04/26/22       Therapy Frequency:  1 visit  Predicted Duration of Therapy Intervention:  1 week    Alexandra Ceja, PT                 I CERTIFY THE NEED FOR THESE SERVICES FURNISHED UNDER        THIS PLAN OF TREATMENT AND WHILE UNDER MY CARE     (Physician attestation of this document indicates review and certification of the therapy plan).                       Certification Date From:  03/15/22   Certification Date To:  03/15/22    Referring Provider:  Winston Silverman    Initial Assessment        See Epic Evaluation SOC Date: 01/31/22

## 2022-03-16 NOTE — PROGRESS NOTES
"Subjective:  HPI  Physical Exam                    Objective:  System    Physical Exam    General     ROS    Assessment/Plan:    PROGRESS  REPORT    Progress reporting period is from 1/31/22 to 3/15/22.       SUBJECTIVE  Patient returns to PT following recent left eye surgery.  States he has been cleared by eye doctor to resume exercise. Overall, reports no change in his status since the start of therapy. Had an incident last week at home where he collapsed onto the floor while trying to get off the commode.  Felt very weak but was able to break his fall by holding onto the tub and bathroom vanity as he went down. Denies any LOC, visual changes, or paresthesias at the time but had his wife call 911 because he couldn't stand up.  Once paramedics arrived and assisted patient to standing, he was able to stand on his own and did not seek further medical care. Had a video visit with MD a couple days after this incident and states MD \"did not think it was a stroke or anything serious.\"   Overall, he reports minimal change in his status since the start of therapy.  Admits he does not do his exercises at home.  Expresses concern regarding a worsening of his restless leg syndrome and states when he tries to do anything his upper body and arms begin to shake.  Patient appears very fixated and anxious on trying to understand \"why\" these things are happening to him and \"why\" he doesn't do his exercises when he knows he should.      Current pain level is NA  .     Previous pain level was  0/10  .   Changes in function:  None  Adverse reaction to treatment or activity: as noted above    OBJECTIVE  Objective status is essentially unchanged since initial evaluation 6 weeks ago -- largely due to patient's lack of follow through at home. At his last treatment session one month ago, patient had considerable difficulty performing any standing exercises and balance drills. He became somewhat anxious and said he felt like he was becoming " "incontinent (even though he felt \"dry\").   Had considerable difficulty following verbal commands and postural cues with exercises. Continues to demonstrate poor balance, poor posture, and very deconditioned status.  Ambulates using a walking stick.             ASSESSMENT/PLAN  Updated problem list and treatment plan: Diagnosis 1:  Deconditioning   Decreased ROM/flexibility - therapeutic exercise  Decreased strength - therapeutic exercise and therapeutic activities  Impaired balance - neuro re-education and therapeutic activities  Impaired gait - gait training  Decreased function - therapeutic activities    STG/LTGs have been met or progress has been made towards goals:  None  Assessment of Progress: The patient's condition is unchanged.  Self Management Plans:  Patient has been instructed in a very simplified home exercise program. I have discussed various motivation strategies with patient to improve exercise compliancy at home, but to no avail.  I have re-evaluated this patient and find that the nature, scope, duration and intensity of the therapy is appropriate for the medical condition of the patient.  Gustavo continues to require the following intervention to meet STG and LTG's:  MD    Recommendations:  Although it is clearly evident that Mr. Ramon needs supervised physical therapy to improve his deconditioned status, it appears there are other mental and physical factors vying for his attention right now, making Rx progression challenging. Patient agrees that he needs to \"figure out\" why these things are happening to him.  Given the complexity of his condition, I have advised patient to follow up with his physician to address his concerns. Wondering if neuro consult may be beneficial ??    Please refer to the daily flowsheet for treatment today, total treatment time and time spent performing 1:1 timed codes.          "

## 2022-03-22 NOTE — PROGRESS NOTES
[facility] Dermatology Note  Encounter Date: Mar 23, 2022  Office Visit     Dermatology Problem List:  Last skin check 3/23/22, recommended next in 6 months  1. 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 Christiansen's note 11/21/11), s/p excision 5/12/09   2. Actinic keratoses  - s/p Efudex 2015, Fall 2020 to face, Spring 2021 to forearms  - s/p PDT (x3)  - s/p LN2  3. Seborrheic dermatitis  - clobetasol 0.05% solution for scalp, triam 0.1% cream for ears  4. Relevant medical history: MGUS, myasthenia gravis currently on prednisone  5. Tinea cruris  - current tx: ketoconazole cream     Social history: The patient is retired from working as an . He has a daughter and son. He is [place].  Family history: Negative for skin cancer  ____________________________________________    ASSESSMENT/PLAN:    # History of NMSC. No evidence of recurrence.   - Recommend sunscreens SPF #30 or greater, protective clothing and avoidance of tanning beds.   - Previously on hydrochlorothiazide, now stopped.  - Previously discussed use of nicotinamide to reduce AKs and NMSC. Patient does not wish to add on another pill at this time.   - Recommended next skin check in 6 months.    # Immunosuppressed with prednisone for myasthenia gravis. Discussed increased risk of skin cancers with immunosuppressing medications.   - Recommend sunscreens SPF #30 or greater, protective clothing and avoidance of tanning beds.      # Sun damaged skin with solar lentigines. Chronic. Stable.   - Recommended sunscreens SPF #30 or greater, protective  clothing and avoidance of tanning beds.     # Benign findings include: seborrheic keratoses, cherry angioma, lipoma. Self limited, minor.   - No further intervention required. Patient to report changes.  - Patient reassured of the benign nature of these lesions.    # Multiple clinically benign nevi. Chronic. Stable.   - No further intervention required. Patient to report changes.  - Patient reassured of the benign nature of these lesions.    # AKs x 2. Good response to efudex on posterior ears and lateral neck. Few remaining AKs on the vertex scalp. Discussed precancerous nature of these lesions. Recommended treatment to prevent progression to a squamous cell carcinoma. Advised patient to call for follow up if not resolved in 1 month.   - See procedure note     # Seborrheic dermatitis of the external ears. Chronic, stable.  - Continue using triamcinolone 0.1% cream BID for flares.    # Tinea cruris. Chronic (likely to be recurrent), flared. Reviewed benign nature of this rash, as well as treatment options.   - Apply ketoconazole cream BID until resolved    - Hold triamcinolone.      Procedures Performed:   None.    Follow-up: 6 month(s) in-person, or earlier for new or changing lesions    Staff and Scribe:     Scribe Disclosure:   I, Alex Kenyon, am serving as a scribe to document services personally performed by this physician, Dr. Kylee Estevez, based on data collection and the provider's statements to me.     Provider Disclosure:   The documentation recorded by the scribe accurately reflects the services I personally performed and the decisions made by me.    Kylee Estevez MD    Department of Dermatology  [facility]: Phone: [phone], Fax:[phone]  [facility]: Phone: [phone], Fax: [phone]    ____________________________________________    CC: Derm Problem (skin  check, hx of NMSC, lesion on back of left ear, scalp, irritation groin - has used A&D ointment, trimacinolone and gold bond powder)    HPI:  Mr. Gustavo Ramon is a(n) 81 year old male who presents today as a return patient for a skin check.    Last seen 9/23/21 for a skin check. At that time, he was instructed to start Efudex for AKs on the lateral neck and posterior ears. 11 AKs on the face were treated with cryo. Triamcinolone 0.1% cream BID for flares of seb derm on the ears was refilled.     Today, he has a lesion of concern on the left ear and scalp. He has a patch on the right leg that his wife is curious about, but Stephen notes this has not been bothersome. He also has had some irritation in the groin. He's been using A&D ointment, triamcinolone and gold bond powder.    Patient is otherwise feeling well, without additional concerns.    Labs:  NA    Physical exam:  Vitals: There were no vitals taken for this visit.  SKIN: Full skin exam was performed. The exam included the head/face, neck, both arms, chest, back, abdomen, both legs, groin, buttocks, digits and/or nails.   - Montemayor Type I-II  - Annular appearing pink plaques in the bilateral inguinal creases  - Scrotum is spared  - Well healed scars in areas of past NMSC. No pearly appearance or telangiectasias.  - Scattered brown macules on sun exposed areas.  - There are dome shaped bright red papules on the trunk.   - Multiple regular brown pigmented macules and papules are identified on the trunk.  - There are waxy stuck on tan to brown papules on the trunk.  - Subcutaneous nodule on the left upper arm and left forearm  - There is macular erythema of the right postauricular with mild flaky white scale.  - There are erythematous macules with overyling adherent scale on the vertex scalp x 2.   - 2 cm soft rubbery nodule left upper arm  - 1.5 cm soft rubbery nodule on the left forearm  - No other lesions of concern on areas examined.      Medications:  Current Outpatient Medications   Medication     ACE/ARB/ARNI NOT PRESCRIBED (INTENTIONAL)     aspirin 325 MG tablet     cyanocobalamin (VITAMIN B-12) 1000 MCG tablet     ferrous sulfate (FEROSUL) 325 (65 Fe) MG tablet     fluorouracil (EFUDEX) 5 % external cream     Multiple Vitamins-Minerals (PRESERVISION AREDS 2) CAPS     omeprazole (PRILOSEC) 20 MG DR capsule     pramipexole (MIRAPEX) 0.125 MG tablet     prednisoLONE acetate (PRED FORTE) 1 % ophthalmic suspension     predniSONE (DELTASONE) 20 MG tablet     propranolol ER (INDERAL LA) 60 MG 24 hr capsule     pyridostigmine (MESTINON) 60 MG tablet     simvastatin (ZOCOR) 20 MG tablet     Sodium Chloride, Hypertonic, (HARRY 128 OP)     sulfamethoxazole-trimethoprim (BACTRIM DS) 800-160 MG tablet     triamcinolone (KENALOG) 0.1 % external cream     vitamin D3 (CHOLECALCIFEROL) 2000 units tablet     No current facility-administered medications for this visit.      Past Medical History:   Patient Active Problem List   Diagnosis     Rosacea     DJD (degenerative joint disease)     Ocular myasthenia gravis (H)     Macular pigment deposit     HYPERLIPIDEMIA LDL GOAL <130     IgA monoclonal gammopathy     Retinal hole OS, operculated     Horseshoe tear of retina without detachment OD     History  of basal cell carcinoma     Seborrhea     AK (actinic keratosis)     Malignant neoplasm of prostate (H)     PVD (posterior vitreous detachment)     Amaurosis fugax by Hx neg carotid W/U     Advanced directives, counseling/discussion     Actinic keratosis     Peripheral neuropathy     Nuclear sclerosis of both eyes     Essential hypertension with goal blood pressure less than 140/90     Gastroesophageal reflux disease without esophagitis     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)     Acute respiratory distress syndrome (ARDS) due to COVID-19 virus (H)     Steroid-induced diabetes mellitus, initial encounter (H)     Stage 3a chronic kidney disease (H)      Secondary adrenocortical insufficiency (H)     Physical deconditioning     Past Medical History:   Diagnosis Date     Actinic keratosis      AK (actinic keratosis) 11/15/2012     Amaurosis fugax      Basal cell carcinoma 2009    R medial cheek/nose     Central serous retinopathy left    Eye     Diverticulosis 08/2006     DJD (degenerative joint disease)      GERD (gastroesophageal reflux disease)      Hearing loss     High frequency     History of nonmelanoma skin cancer      History of nonmelanoma skin cancer      HTN (hypertension)      Hyperlipidemia LDL goal < 130      Hyperplastic colon polyp 08/2006     IgA monoclonal gammopathy     IgA kappa     Lattice degeneration of retina right    Eye     Macular degeneration      Nonsenile cataract      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 on close of this encounter.

## 2022-03-23 ENCOUNTER — OFFICE VISIT (OUTPATIENT)
Dept: DERMATOLOGY | Facility: CLINIC | Age: 82
End: 2022-03-23
Payer: MEDICARE

## 2022-03-23 DIAGNOSIS — L81.4 SOLAR LENTIGO: ICD-10-CM

## 2022-03-23 DIAGNOSIS — L57.0 ACTINIC KERATOSES: ICD-10-CM

## 2022-03-23 DIAGNOSIS — L57.8 SUN-DAMAGED SKIN: ICD-10-CM

## 2022-03-23 DIAGNOSIS — Z85.828 HISTORY OF NONMELANOMA SKIN CANCER: Primary | ICD-10-CM

## 2022-03-23 DIAGNOSIS — B35.6 TINEA CRURIS: ICD-10-CM

## 2022-03-23 DIAGNOSIS — D22.9 MULTIPLE BENIGN NEVI: ICD-10-CM

## 2022-03-23 DIAGNOSIS — L82.1 SEBORRHEIC KERATOSES: ICD-10-CM

## 2022-03-23 DIAGNOSIS — D18.01 CHERRY ANGIOMA: ICD-10-CM

## 2022-03-23 DIAGNOSIS — D17.22 LIPOMA OF LEFT UPPER EXTREMITY: ICD-10-CM

## 2022-03-23 DIAGNOSIS — L21.9 DERMATITIS, SEBORRHEIC: ICD-10-CM

## 2022-03-23 DIAGNOSIS — D84.9 IMMUNOSUPPRESSED STATUS (H): ICD-10-CM

## 2022-03-23 PROCEDURE — 17003 DESTRUCT PREMALG LES 2-14: CPT | Performed by: DERMATOLOGY

## 2022-03-23 PROCEDURE — 99214 OFFICE O/P EST MOD 30 MIN: CPT | Mod: 25 | Performed by: DERMATOLOGY

## 2022-03-23 PROCEDURE — 17000 DESTRUCT PREMALG LESION: CPT | Performed by: DERMATOLOGY

## 2022-03-23 RX ORDER — KETOCONAZOLE 20 MG/G
CREAM TOPICAL
Qty: 60 G | Refills: 11 | Status: SHIPPED | OUTPATIENT
Start: 2022-03-23 | End: 2022-04-29

## 2022-03-23 NOTE — PATIENT INSTRUCTIONS
Stop using triamcinolone for the groin rash.    Apply ketoconazole cream twice daily to the rash in the groin. This should resolve in 2-4 weeks after applying this      Cryotherapy    What is it?    Use of a very cold liquid, such as liquid nitrogen, to freeze and destroy abnormal skin cells that need to be removed    What should I expect?    Tenderness and redness    A small blister that might grow and fill with dark purple blood. There may be crusting.    More than one treatment may be needed if the lesions do not go away.    How do I care for the treated area?    Gently wash the area with your hands when bathing.    Use a thin layer of Vaseline to help with healing. You may use a Band-Aid.     The area should heal within 7-10 days and may leave behind a pink or lighter color.     Do not use an antibiotic or Neosporin ointment.     You may take acetaminophen (Tylenol) for pain.     Call your doctor if you have:    Severe pain    Signs of infection (warmth, redness, cloudy yellow drainage, and or a bad smell)    Questions or concerns    Who should I call with questions?       [facility]: [phone]       [facility]: [phone]       For urgent needs outside of business hours call the [facility] at [phone] and ask for the dermatology resident on call

## 2022-03-23 NOTE — NURSING NOTE
Gustavo Ramon's goals for this visit include:   Chief Complaint   Patient presents with     Derm Problem     skin check, hx of NMSC, lesion on back of left ear, scalp, irritation groin - has used A&D ointment, trimacinolone and gold bond powder       He requests these members of his care team be copied on today's visit information: no    PCP: Winston Silverman    Referring Provider:  No referring provider defined for this encounter.    There were no vitals taken for this visit.    Do you need any medication refills at today's visit? No    Lana Quintanilla LPN

## 2022-03-23 NOTE — LETTER
3/23/2022         RE: Gustavo Ramon  [id number] [facility] [id number]        Dear Colleague,    Thank you for referring your patient, Gustavo Ramon, to the [facility]. Please see a copy of my visit note below.    [facility] Dermatology Note  Encounter Date: Mar 23, 2022  Office Visit     Dermatology Problem List:  Last skin check 3/23/22, recommended next in 6 months  1. 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 Christiansen's note 11/21/11), s/p excision 5/12/09   2. Actinic keratoses  - s/p Efudex 2015, Fall 2020 to face, Spring 2021 to forearms  - s/p PDT (x3)  - s/p LN2  3. Seborrheic dermatitis  - clobetasol 0.05% solution for scalp, triam 0.1% cream for ears  4. Relevant medical history: MGUS, myasthenia gravis currently on prednisone  5. Tinea cruris  - current tx: ketoconazole cream     Social history: The patient is retired from working as an . He has a daughter and son. He is [place].  Family history: Negative for skin cancer  ____________________________________________    ASSESSMENT/PLAN:    # History of NMSC. No evidence of recurrence.   - Recommend sunscreens SPF #30 or greater, protective clothing and avoidance of tanning beds.   - Previously on hydrochlorothiazide, now stopped.  - Previously discussed use of nicotinamide to reduce AKs and NMSC. Patient does not wish to add on another pill at this time.   - Recommended next skin check in 6 months.    # Immunosuppressed with prednisone for myasthenia gravis. Discussed increased risk of  skin cancers with immunosuppressing medications.   - Recommend sunscreens SPF #30 or greater, protective clothing and avoidance of tanning beds.      # Sun damaged skin with solar lentigines. Chronic. Stable.   - Recommended sunscreens SPF #30 or greater, protective clothing and avoidance of tanning beds.     # Benign findings include: seborrheic keratoses, cherry angioma, lipoma. Self limited, minor.   - No further intervention required. Patient to report changes.  - Patient reassured of the benign nature of these lesions.    # Multiple clinically benign nevi. Chronic. Stable.   - No further intervention required. Patient to report changes.  - Patient reassured of the benign nature of these lesions.    # AKs x 2. Good response to efudex on posterior ears and lateral neck. Few remaining AKs on the vertex scalp. Discussed precancerous nature of these lesions. Recommended treatment to prevent progression to a squamous cell carcinoma. Advised patient to call for follow up if not resolved in 1 month.   - See procedure note     # Seborrheic dermatitis of the external ears. Chronic, stable.  - Continue using triamcinolone 0.1% cream BID for flares.    # Tinea cruris. Chronic (likely to be recurrent), flared. Reviewed benign nature of this rash, as well as treatment options.   - Apply ketoconazole cream BID until resolved    - Hold triamcinolone.      Procedures Performed:   None.    Follow-up: 6 month(s) in-person, or earlier for new or changing lesions    Staff and Scribe:     Scribe Disclosure:   I, Alex Kenyon, am serving as a scribe to document services personally performed by this physician, Dr. Kylee Estevez, based on data collection and the provider's statements to me.     Provider Disclosure:   The documentation recorded by the scribe accurately reflects the services I personally performed and the decisions made by me.    Kylee Estevez MD    Department of Dermatology  [facility]  [facility]: Phone: [phone], Fax:[phone]  [facility]: Phone: [phone], Fax: [phone]    ____________________________________________    CC: Derm Problem (skin check, hx of NMSC, lesion on back of left ear, scalp, irritation groin - has used A&D ointment, trimacinolone and gold bond powder)    HPI:  Mr. Gustavo Ramon is a(n) 81 year old male who presents today as a return patient for a skin check.    Last seen 9/23/21 for a skin check. At that time, he was instructed to start Efudex for AKs on the lateral neck and posterior ears. 11 AKs on the face were treated with cryo. Triamcinolone 0.1% cream BID for flares of seb derm on the ears was refilled.     Today, he has a lesion of concern on the left ear and scalp. He has a patch on the right leg that his wife is curious about, but Stephen notes this has not been bothersome. He also has had some irritation in the groin. He's been using A&D ointment, triamcinolone and gold bond powder.    Patient is otherwise feeling well, without additional concerns.    Labs:  NA    Physical exam:  Vitals: There were no vitals taken for this visit.  SKIN: Full skin exam was performed. The exam included the head/face, neck, both arms, chest, back, abdomen, both legs, groin, buttocks, digits and/or nails.   - Montemayor Type I-II  - Annular appearing pink plaques in the bilateral inguinal creases  - Scrotum is spared  - Well healed scars in areas of past NMSC. No pearly appearance or telangiectasias.  - Scattered brown macules on sun exposed areas.  - There are dome shaped bright red papules on the trunk.   - Multiple regular brown pigmented macules and papules are identified on the trunk.  - There are waxy stuck on tan to brown papules on the trunk.  - Subcutaneous nodule on the left upper arm and left forearm  - There is macular erythema of the right postauricular with mild  flaky white scale.  - There are erythematous macules with overyling adherent scale on the vertex scalp x 2.   - 2 cm soft rubbery nodule left upper arm  - 1.5 cm soft rubbery nodule on the left forearm  - No other lesions of concern on areas examined.     Medications:  Current Outpatient Medications   Medication     ACE/ARB/ARNI NOT PRESCRIBED (INTENTIONAL)     aspirin 325 MG tablet     cyanocobalamin (VITAMIN B-12) 1000 MCG tablet     ferrous sulfate (FEROSUL) 325 (65 Fe) MG tablet     fluorouracil (EFUDEX) 5 % external cream     Multiple Vitamins-Minerals (PRESERVISION AREDS 2) CAPS     omeprazole (PRILOSEC) 20 MG DR capsule     pramipexole (MIRAPEX) 0.125 MG tablet     prednisoLONE acetate (PRED FORTE) 1 % ophthalmic suspension     predniSONE (DELTASONE) 20 MG tablet     propranolol ER (INDERAL LA) 60 MG 24 hr capsule     pyridostigmine (MESTINON) 60 MG tablet     simvastatin (ZOCOR) 20 MG tablet     Sodium Chloride, Hypertonic, (HARRY 128 OP)     sulfamethoxazole-trimethoprim (BACTRIM DS) 800-160 MG tablet     triamcinolone (KENALOG) 0.1 % external cream     vitamin D3 (CHOLECALCIFEROL) 2000 units tablet     No current facility-administered medications for this visit.      Past Medical History:   Patient Active Problem List   Diagnosis     Rosacea     DJD (degenerative joint disease)     Ocular myasthenia gravis (H)     Macular pigment deposit     HYPERLIPIDEMIA LDL GOAL <130     IgA monoclonal gammopathy     Retinal hole OS, operculated     Horseshoe tear of retina without detachment OD     History  of basal cell carcinoma     Seborrhea     AK (actinic keratosis)     Malignant neoplasm of prostate (H)     PVD (posterior vitreous detachment)     Amaurosis fugax by Hx neg carotid W/U     Advanced directives, counseling/discussion     Actinic keratosis     Peripheral neuropathy     Nuclear sclerosis of both eyes     Essential hypertension with goal blood pressure less than 140/90     Gastroesophageal reflux disease  without esophagitis     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)     Acute respiratory distress syndrome (ARDS) due to COVID-19 virus (H)     Steroid-induced diabetes mellitus, initial encounter (H)     Stage 3a chronic kidney disease (H)     Secondary adrenocortical insufficiency (H)     Physical deconditioning     Past Medical History:   Diagnosis Date     Actinic keratosis      AK (actinic keratosis) 11/15/2012     Amaurosis fugax      Basal cell carcinoma 2009    R medial cheek/nose     Central serous retinopathy left    Eye     Diverticulosis 08/2006     DJD (degenerative joint disease)      GERD (gastroesophageal reflux disease)      Hearing loss     High frequency     History of nonmelanoma skin cancer      History of nonmelanoma skin cancer      HTN (hypertension)      Hyperlipidemia LDL goal < 130      Hyperplastic colon polyp 08/2006     IgA monoclonal gammopathy     IgA kappa     Lattice degeneration of retina right    Eye     Macular degeneration      Nonsenile cataract      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 on close of this encounter.      Again, thank you for allowing me to participate in the care of your patient.        Sincerely,        Kylee Estevez MD

## 2022-03-25 ENCOUNTER — TRANSFERRED RECORDS (OUTPATIENT)
Dept: HEALTH INFORMATION MANAGEMENT | Facility: CLINIC | Age: 82
End: 2022-03-25
Payer: MEDICARE

## 2022-03-29 ENCOUNTER — TELEPHONE (OUTPATIENT)
Dept: FAMILY MEDICINE | Facility: CLINIC | Age: 82
End: 2022-03-29
Payer: MEDICARE

## 2022-03-29 DIAGNOSIS — G25.81 RESTLESS LEG SYNDROME: ICD-10-CM

## 2022-03-29 NOTE — TELEPHONE ENCOUNTER
"Wife calling, says she missed a call from our clinic today.   I don't see any messages today.    She says she called [facility] for the pramipexole.    Says it has been cancelled.   I see last Rx was \"no print out\"; she wonders if Winston does not want Gustavo to take the mirapex at bedtime anymore?    I don't see that this was the plan, med still listed on med list (again, no print out).    She says she's been giving him just 2 at hs to conserve, should be on 3 at bedtime.    Tushar'd med and pharmacy (using [facility] for this as mail order will take too long).    She also reports he had another episode of not being able to use his legs.   This happened about a week ago as well.   He walked to kitchen just fine but after breakfast could not get up on his legs.   Says he was alert and responsive but had to use the transport chair to put him back in bed.   He rested and is now again walking with his walker.    Routed to PCP to address med request and plan for follow up on leg issue.    Sheree Hopkins RN  [facility]      "

## 2022-03-30 RX ORDER — PRAMIPEXOLE DIHYDROCHLORIDE 0.12 MG/1
0.38 TABLET ORAL AT BEDTIME
Qty: 90 TABLET | Refills: 2 | Status: SHIPPED | OUTPATIENT
Start: 2022-03-30 | End: 2023-04-06

## 2022-03-30 NOTE — TELEPHONE ENCOUNTER
I want him to continue taking mirapex (3 tabs at bedtime). Re: another episode of not being able to use his legs. I'd like him to f/up with his neurologist.

## 2022-03-30 NOTE — TELEPHONE ENCOUNTER
Called and relayed the message to the patient and his wife. They verbalized understanding.     Maryan Damon RN

## 2022-03-31 ENCOUNTER — HOSPITAL ENCOUNTER (EMERGENCY)
Facility: CLINIC | Age: 82
Discharge: HOME | End: 2022-03-31
Attending: EMERGENCY MEDICINE | Admitting: EMERGENCY MEDICINE
Payer: MEDICARE

## 2022-03-31 ENCOUNTER — APPOINTMENT (OUTPATIENT)
Dept: MRI IMAGING | Facility: CLINIC | Age: 82
End: 2022-03-31
Attending: EMERGENCY MEDICINE
Payer: MEDICARE

## 2022-03-31 VITALS
HEART RATE: 60 BPM | WEIGHT: 171 LBS | SYSTOLIC BLOOD PRESSURE: 173 MMHG | DIASTOLIC BLOOD PRESSURE: 83 MMHG | OXYGEN SATURATION: 96 % | BODY MASS INDEX: 25.25 KG/M2 | RESPIRATION RATE: 16 BRPM | TEMPERATURE: 98.2 F

## 2022-03-31 DIAGNOSIS — G25.3 MYOCLONUS: ICD-10-CM

## 2022-03-31 DIAGNOSIS — R26.9 ABNORMAL GAIT: Primary | ICD-10-CM

## 2022-03-31 DIAGNOSIS — R29.898 WEAKNESS OF BOTH LOWER EXTREMITIES: ICD-10-CM

## 2022-03-31 LAB
ALBUMIN SERPL-MCNC: 3.5 G/DL (ref 3.4–5)
ALBUMIN UR-MCNC: ABNORMAL MG/DL
ALP SERPL-CCNC: 48 U/L (ref 40–150)
ALT SERPL W P-5'-P-CCNC: 27 U/L (ref 0–70)
AMMONIA PLAS-SCNC: 13 UMOL/L (ref 10–50)
ANION GAP SERPL CALCULATED.3IONS-SCNC: 4 MMOL/L (ref 3–14)
APPEARANCE UR: CLEAR
AST SERPL W P-5'-P-CCNC: 16 U/L (ref 0–45)
BASE EXCESS BLDV CALC-SCNC: 4.3 MMOL/L (ref -7.7–1.9)
BASOPHILS # BLD AUTO: 0.1 10E3/UL (ref 0–0.2)
BASOPHILS NFR BLD AUTO: 0 %
BILIRUB DIRECT SERPL-MCNC: <0.1 MG/DL (ref 0–0.2)
BILIRUB SERPL-MCNC: 0.3 MG/DL (ref 0.2–1.3)
BILIRUB UR QL STRIP: NEGATIVE
BUN SERPL-MCNC: 23 MG/DL (ref 7–30)
CALCIUM SERPL-MCNC: 9.1 MG/DL (ref 8.5–10.1)
CHLORIDE BLD-SCNC: 107 MMOL/L (ref 94–109)
CK SERPL-CCNC: 60 U/L (ref 30–300)
CO2 SERPL-SCNC: 29 MMOL/L (ref 20–32)
COLOR UR AUTO: YELLOW
CREAT SERPL-MCNC: 0.91 MG/DL (ref 0.66–1.25)
EOSINOPHIL # BLD AUTO: 0 10E3/UL (ref 0–0.7)
EOSINOPHIL NFR BLD AUTO: 0 %
ERYTHROCYTE [DISTWIDTH] IN BLOOD BY AUTOMATED COUNT: 16.1 % (ref 10–15)
GFR SERPL CREATININE-BSD FRML MDRD: 84 ML/MIN/1.73M2
GLUCOSE BLD-MCNC: 93 MG/DL (ref 70–99)
GLUCOSE UR STRIP-MCNC: NEGATIVE MG/DL
HCO3 BLDV-SCNC: 31 MMOL/L (ref 21–28)
HCT VFR BLD AUTO: 46.7 % (ref 40–53)
HGB BLD-MCNC: 14.4 G/DL (ref 13.3–17.7)
HGB UR QL STRIP: NEGATIVE
HOLD SPECIMEN: NORMAL
HYALINE CASTS: 1 /LPF
IMM GRANULOCYTES # BLD: 0.1 10E3/UL
IMM GRANULOCYTES NFR BLD: 1 %
KETONES UR STRIP-MCNC: NEGATIVE MG/DL
LEUKOCYTE ESTERASE UR QL STRIP: ABNORMAL
LYMPHOCYTES # BLD AUTO: 1.3 10E3/UL (ref 0.8–5.3)
LYMPHOCYTES NFR BLD AUTO: 10 %
MAGNESIUM SERPL-MCNC: 2.3 MG/DL (ref 1.6–2.3)
MCH RBC QN AUTO: 26.7 PG (ref 26.5–33)
MCHC RBC AUTO-ENTMCNC: 30.8 G/DL (ref 31.5–36.5)
MCV RBC AUTO: 87 FL (ref 78–100)
MONOCYTES # BLD AUTO: 0.6 10E3/UL (ref 0–1.3)
MONOCYTES NFR BLD AUTO: 5 %
MUCOUS THREADS #/AREA URNS LPF: PRESENT /LPF
NEUTROPHILS # BLD AUTO: 11.3 10E3/UL (ref 1.6–8.3)
NEUTROPHILS NFR BLD AUTO: 84 %
NITRATE UR QL: NEGATIVE
NRBC # BLD AUTO: 0 10E3/UL
NRBC BLD AUTO-RTO: 0 /100
O2/TOTAL GAS SETTING VFR VENT: 30 %
PCO2 BLDV: 52 MM HG (ref 40–50)
PH BLDV: 7.38 [PH] (ref 7.32–7.43)
PH UR STRIP: 6 [PH] (ref 5–7)
PLATELET # BLD AUTO: 207 10E3/UL (ref 150–450)
PO2 BLDV: 22 MM HG (ref 25–47)
POTASSIUM BLD-SCNC: 4.2 MMOL/L (ref 3.4–5.3)
PROT SERPL-MCNC: 6.6 G/DL (ref 6.8–8.8)
RBC # BLD AUTO: 5.4 10E6/UL (ref 4.4–5.9)
RBC URINE: 9 /HPF
SODIUM SERPL-SCNC: 140 MMOL/L (ref 133–144)
SP GR UR STRIP: 1.02 (ref 1–1.03)
SQUAMOUS EPITHELIAL: <1 /HPF
TSH SERPL DL<=0.005 MIU/L-ACNC: 1.5 MU/L (ref 0.4–4)
UROBILINOGEN UR STRIP-MCNC: NORMAL MG/DL
WBC # BLD AUTO: 13.4 10E3/UL (ref 4–11)
WBC URINE: 1 /HPF

## 2022-03-31 PROCEDURE — 99285 EMERGENCY DEPT VISIT HI MDM: CPT | Performed by: EMERGENCY MEDICINE

## 2022-03-31 PROCEDURE — 99284 EMERGENCY DEPT VISIT MOD MDM: CPT | Mod: 25 | Performed by: EMERGENCY MEDICINE

## 2022-03-31 PROCEDURE — 81001 URINALYSIS AUTO W/SCOPE: CPT | Performed by: NURSE PRACTITIONER

## 2022-03-31 PROCEDURE — 82140 ASSAY OF AMMONIA: CPT | Performed by: EMERGENCY MEDICINE

## 2022-03-31 PROCEDURE — G1004 CDSM NDSC: HCPCS

## 2022-03-31 PROCEDURE — 83735 ASSAY OF MAGNESIUM: CPT | Performed by: NURSE PRACTITIONER

## 2022-03-31 PROCEDURE — 99207 PR NO BILLABLE SERVICE THIS VISIT: CPT | Performed by: PSYCHIATRY & NEUROLOGY

## 2022-03-31 PROCEDURE — G1004 CDSM NDSC: HCPCS | Mod: GC | Performed by: STUDENT IN AN ORGANIZED HEALTH CARE EDUCATION/TRAINING PROGRAM

## 2022-03-31 PROCEDURE — 70551 MRI BRAIN STEM W/O DYE: CPT | Mod: 26 | Performed by: STUDENT IN AN ORGANIZED HEALTH CARE EDUCATION/TRAINING PROGRAM

## 2022-03-31 PROCEDURE — 84443 ASSAY THYROID STIM HORMONE: CPT | Performed by: EMERGENCY MEDICINE

## 2022-03-31 PROCEDURE — 82550 ASSAY OF CK (CPK): CPT | Performed by: EMERGENCY MEDICINE

## 2022-03-31 PROCEDURE — 82803 BLOOD GASES ANY COMBINATION: CPT | Performed by: EMERGENCY MEDICINE

## 2022-03-31 PROCEDURE — 36415 COLL VENOUS BLD VENIPUNCTURE: CPT | Performed by: NURSE PRACTITIONER

## 2022-03-31 PROCEDURE — 85025 COMPLETE CBC W/AUTO DIFF WBC: CPT | Performed by: NURSE PRACTITIONER

## 2022-03-31 PROCEDURE — 82248 BILIRUBIN DIRECT: CPT | Performed by: EMERGENCY MEDICINE

## 2022-03-31 PROCEDURE — 87086 URINE CULTURE/COLONY COUNT: CPT | Performed by: NURSE PRACTITIONER

## 2022-03-31 PROCEDURE — 36415 COLL VENOUS BLD VENIPUNCTURE: CPT | Performed by: EMERGENCY MEDICINE

## 2022-03-31 PROCEDURE — 80053 COMPREHEN METABOLIC PANEL: CPT | Performed by: NURSE PRACTITIONER

## 2022-03-31 ASSESSMENT — ENCOUNTER SYMPTOMS
HEADACHES: 0
SORE THROAT: 0
LIGHT-HEADEDNESS: 0
FEVER: 0
FREQUENCY: 1
BACK PAIN: 0
SHORTNESS OF BREATH: 0
COUGH: 0
CHILLS: 0
VOMITING: 0
PALPITATIONS: 0
ABDOMINAL PAIN: 0
CHEST TIGHTNESS: 0
NAUSEA: 0
DIZZINESS: 0

## 2022-03-31 NOTE — ED TRIAGE NOTES
Pt was told to come to the ER to be assessed by Neuro for his increasing bilateral leg weakness. Wife states that he has had 3 episodes where he is unable to move his legs at all.     Pt states he has been feeling worse regarding his weakness since he had covid back in November

## 2022-03-31 NOTE — ED PROVIDER NOTES
[place] EMERGENCY DEPARTMENT ([facility])  3/31/22  History     Chief Complaint   Patient presents with     Referral     Extremity Weakness     The history is provided by the patient, medical records, the spouse and a relative.     Gustavo Ramon is a 81 year old male with a past medical history significant for macular degeneration, steroid induced diabetes mellitus, peripheral neuropathy, GERD, hypertension, and myasthenia gravis who presents to the Emergency Department for evaluation of bilateral leg weakness after recommendation from his neurologist to be seen for bilateral leg weakness.     Patient reports that over the last month he has been experiencing bilateral leg weakness.  He states that he has been trying to do squats to gain muscle strength but has not seen any improvements with this.  He says that he has had about 2-3 episodes where he has felt confident walking but notes that these episodes have only lasted for few hours before he starts to feel weak in his legs again.  He adds that he has to be very cautious when walking due to the weakness in his legs.  He says that he had an episode where he was sitting in his chair and when he went to stand up he realized he lost a lot of strength in his legs.    He adds that he has noticed that in the mornings he has shakiness in the upper extremities which resolves throughout the day.      Per patient's wife the patient's weakness began in mid-November 2021 which resulted in a visit to [facility] (11/15/21). He was discharged home the following day and was feeling fine until he collapsed and was taken back to [facility] on 11/19/21. The patient was evaluated for acute hypoxic respiratory failure due to Covid-19, and was discharged on 12/6/21 on home oxygen and with a walker. The patient began home physical therapy in mid-January and progressing until late February when he began to lose motivation and experience leg weakness. Three major  episodes of sudden leg weakness have occurred since then resulting in him having difficulty walking. The last episode occurred 3 days ago. However the leg weakness resolves in about 30 minutes and his strength and exercise in between episodes is normal. He denies any numbness, tingling, or pain prior to the episodes or after. He also notes tremors in the upper extremities in the mornings which resolves throughout the day.  Patient is not currently experiencing symptoms.  No other symptoms noted.    Social history: Patient is here in the ED today with his son and wife.    Past Medical History:   Diagnosis Date     Actinic keratosis      AK (actinic keratosis) 11/15/2012     Amaurosis fugax      Basal cell carcinoma 2009    R medial cheek/nose     Central serous retinopathy left    Eye     Diverticulosis 08/2006     DJD (degenerative joint disease)      GERD (gastroesophageal reflux disease)      Hearing loss     High frequency     History of nonmelanoma skin cancer      History of nonmelanoma skin cancer      HTN (hypertension)      Hyperlipidemia LDL goal < 130      Hyperplastic colon polyp 08/2006     IgA monoclonal gammopathy     IgA kappa     Lattice degeneration of retina right    Eye     Macular degeneration      Nonsenile cataract      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    [facility] consult     Rosacea      Steroid-induced diabetes mellitus, 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     Strabismus      Vitreous detachment left    Eye       Past Surgical History:   Procedure Laterality Date     ARTHROSCOPY KNEE RT/LT Left Jan 2010    Knee     BIOPSY  2009/2013/2016    Nose; Prostate; BCC - left arm     COLONOSCOPY  9/24/2019    w/Endoscopy     COLONOSCOPY WITH CO2 INSUFFLATION N/A 9/24/2019    Procedure: COLONOSCOPY, WITH CO2 INSUFFLATION;  Surgeon: Loi Levine MD;  Location: [facility] OR     COMBINED ESOPHAGOSCOPY, GASTROSCOPY, DUODENOSCOPY (EGD) WITH CO2 INSUFFLATION N/A 9/24/2019    Procedure:  ESOPHAGOGASTRODUODENOSCOPY, WITH CO2 INSUFFLATION;  Surgeon: Loi Levine MD;  Location: [facility] OR     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     DISKECTOMY, LUMBAR, SINGLE SP  2003    L2-3     ENT SURGERY  1943    Tonsils      ENT SURGERY  2-22-12    Biopsy lesion right pinna     ENT SURGERY  2-24-12    Biopsy leson right pinna     ESOPHAGOSCOPY, GASTROSCOPY, DUODENOSCOPY (EGD), COMBINED N/A 9/24/2019    Procedure: Esophagogastroduodenoscopy, With Biopsy;  Surgeon: Loi Levine MD;  Location: [facility] OR     SOFT TISSUE SURGERY  May 2010    Basal Cell/right side nose       Family History   Problem Relation Age of Onset     Heart Disease Mother      Alzheimer Disease Mother 73     C.A.D. Father      Coronary Artery Disease Father      Diabetes Grandchild         using a pump      Diabetes Paternal Uncle      Heart Disease Paternal Grandmother      Glaucoma No family hx of      Macular Degeneration No family hx of      Melanoma No family hx of      Skin Cancer No family hx of        Social History     Tobacco Use     Smoking status: Never Smoker     Smokeless tobacco: Never Used   Substance Use Topics     Alcohol use: No     Alcohol/week: 0.0 standard drinks       No current facility-administered medications for this encounter.     Current Outpatient Medications   Medication     ACE/ARB/ARNI NOT PRESCRIBED (INTENTIONAL)     aspirin 325 MG tablet     cyanocobalamin (VITAMIN B-12) 1000 MCG tablet     ferrous sulfate (FEROSUL) 325 (65 Fe) MG tablet     fluorouracil (EFUDEX) 5 % external cream     ketoconazole (NIZORAL) 2 % external cream     Multiple Vitamins-Minerals (PRESERVISION AREDS 2) CAPS     omeprazole (PRILOSEC) 20 MG DR capsule     pramipexole (MIRAPEX) 0.125 MG tablet     prednisoLONE acetate (PRED FORTE) 1 % ophthalmic suspension     predniSONE (DELTASONE) 20 MG tablet     propranolol ER (INDERAL LA) 60 MG 24 hr capsule     pyridostigmine (MESTINON) 60 MG tablet     simvastatin (ZOCOR) 20 MG tablet      Sodium Chloride, Hypertonic, (HARRY 128 OP)     sulfamethoxazole-trimethoprim (BACTRIM DS) 800-160 MG tablet     triamcinolone (KENALOG) 0.1 % external cream     vitamin D3 (CHOLECALCIFEROL) 2000 units tablet        Allergies   Allergen Reactions     Mycophenolate Other (See Comments)     Confusion, abnormal movements     Nkda [No Known Drug Allergies]         I have reviewed the Medications, Allergies, Past Medical and Surgical History, and Social History in the Epic system.    Review of Systems  A complete review of systems was performed with pertinent positives and negatives noted in the HPI, and all other systems negative.    Physical Exam   BP: (!) 158/73  Pulse: 50  Temp: 97.6  F (36.4  C)  Resp: 15  Weight: 77.6 kg (171 lb)  SpO2: 98 %      Physical Exam  Vitals and nursing note reviewed.   Constitutional:       General: He is not in acute distress.     Appearance: He is well-developed. He is not diaphoretic.   HENT:      Head: Normocephalic and atraumatic.      Mouth/Throat:      Pharynx: No oropharyngeal exudate.   Eyes:      General: No scleral icterus.        Right eye: No discharge.         Left eye: No discharge.      Pupils: Pupils are equal, round, and reactive to light.   Cardiovascular:      Rate and Rhythm: Normal rate and regular rhythm.      Heart sounds: Normal heart sounds. No murmur heard.    No friction rub. No gallop.   Pulmonary:      Effort: Pulmonary effort is normal. No respiratory distress.      Breath sounds: Normal breath sounds. No wheezing.   Chest:      Chest wall: No tenderness.   Abdominal:      General: Bowel sounds are normal. There is no distension.      Palpations: Abdomen is soft.      Tenderness: There is no abdominal tenderness.   Musculoskeletal:         General: No tenderness or deformity. Normal range of motion.      Cervical back: Normal range of motion and neck supple.   Skin:     General: Skin is warm and dry.      Coloration: Skin is not pale.      Findings: No  erythema or rash.   Neurological:      Mental Status: He is alert and oriented to person, place, and time.      Cranial Nerves: No cranial nerve deficit.         ED Course     At 4:30 PM the patient was seen and examined by Jean-Paul Carolina DO in Room ED17.        Procedures                    Results for orders placed or performed during the hospital encounter of 03/31/22 (from the past 24 hour(s))   CBC with platelets differential    Narrative    The following orders were created for panel order CBC with platelets differential.  Procedure                               Abnormality         Status                     ---------                               -----------         ------                     CBC with platelets and d...[[id number]]  Abnormal            Final result                 Please view results for these tests on the individual orders.   Basic metabolic panel   Result Value Ref Range    Sodium 140 133 - 144 mmol/L    Potassium 4.2 3.4 - 5.3 mmol/L    Chloride 107 94 - 109 mmol/L    Carbon Dioxide (CO2) 29 20 - 32 mmol/L    Anion Gap 4 3 - 14 mmol/L    Urea Nitrogen 23 7 - 30 mg/dL    Creatinine 0.91 0.66 - 1.25 mg/dL    Calcium 9.1 8.5 - 10.1 mg/dL    Glucose 93 70 - 99 mg/dL    GFR Estimate 84 >60 mL/min/1.73m2   Magnesium   Result Value Ref Range    Magnesium 2.3 1.6 - 2.3 mg/dL   [place] Draw    Narrative    The following orders were created for panel order [place] Draw.  Procedure                               Abnormality         Status                     ---------                               -----------         ------                     Extra Blue Top Tube[[id number]]                              Final result               Extra Red Top Tube[[id number]]                               Final result               Extra Green Top (Lithium...[[id number]]                      Final result               Extra Purple Top Tube[[id number]]                            Final result                 Please view  results for these tests on the individual orders.   CBC with platelets and differential   Result Value Ref Range    WBC Count 13.4 (H) 4.0 - 11.0 10e3/uL    RBC Count 5.40 4.40 - 5.90 10e6/uL    Hemoglobin 14.4 13.3 - 17.7 g/dL    Hematocrit 46.7 40.0 - 53.0 %    MCV 87 78 - 100 fL    MCH 26.7 26.5 - 33.0 pg    MCHC 30.8 (L) 31.5 - 36.5 g/dL    RDW 16.1 (H) 10.0 - 15.0 %    Platelet Count 207 150 - 450 10e3/uL    % Neutrophils 84 %    % Lymphocytes 10 %    % Monocytes 5 %    % Eosinophils 0 %    % Basophils 0 %    % Immature Granulocytes 1 %    NRBCs per 100 WBC 0 <1 /100    Absolute Neutrophils 11.3 (H) 1.6 - 8.3 10e3/uL    Absolute Lymphocytes 1.3 0.8 - 5.3 10e3/uL    Absolute Monocytes 0.6 0.0 - 1.3 10e3/uL    Absolute Eosinophils 0.0 0.0 - 0.7 10e3/uL    Absolute Basophils 0.1 0.0 - 0.2 10e3/uL    Absolute Immature Granulocytes 0.1 <=0.4 10e3/uL    Absolute NRBCs 0.0 10e3/uL   Extra Blue Top Tube   Result Value Ref Range    Hold Specimen JIC    Extra Red Top Tube   Result Value Ref Range    Hold Specimen JIC    Extra Green Top (Lithium Heparin) Tube   Result Value Ref Range    Hold Specimen JIC    Extra Purple Top Tube   Result Value Ref Range    Hold Specimen JIC    UA with Microscopic reflex to Culture    Specimen: Urine, Midstream   Result Value Ref Range    Color Urine Yellow Colorless, Straw, Light Yellow, Yellow    Appearance Urine Clear Clear    Glucose Urine Negative Negative mg/dL    Bilirubin Urine Negative Negative    Ketones Urine Negative Negative mg/dL    Specific Gravity Urine 1.020 1.003 - 1.035    Blood Urine Negative Negative    pH Urine 6.0 5.0 - 7.0    Protein Albumin Urine Trace (A) Negative mg/dL    Urobilinogen Urine Normal Normal, 2.0 mg/dL    Nitrite Urine Negative Negative    Leukocyte Esterase Urine Moderate (A) Negative    Mucus Urine Present (A) None Seen /LPF    RBC Urine 9 (H) <=2 /HPF    WBC Urine 1 <=5 /HPF    Squamous Epithelials Urine <1 <=1 /HPF    Hyaline Casts Urine 1 <=2  /LPF    Narrative    Urine Culture not indicated  Urine Culture ordered based on laboratory criteria     Medications - No data to display          Assessments & Plan (with Medical Decision Making)   This is an 81-year-old male who presents with episodic weakness in his legs.  This occurs without known provocation and lasts approximately 30 minutes before completely resolving.  This has occurred at rest as well as with ambulation.  He has had 3 episodes in the past 3 weeks.  He also notes tremor in the morning and feeling like he has difficulty with coordination however this resolves throughout the day.  Symptoms began after diagnosis of COVID last year.  Patient has ocular myasthenia gravis but has no other myasthenia symptoms.  Patient episodes of weakness do not occur as a result of activity.  Patient is currently asymptomatic.  Exam demonstrates no acute abnormalities.  Lab work shows no acute abnormalities.  Patient was seen by Neurology who recommends additional lab work as well as MRI.  Lab work shows WBC count of 13.4.  Patient is not experiencing any infectious symptoms.  MRI shows possible slight disproportionate enlargement of lateral ventricles and general atrophy.  This could be seen in normal pressure hydrocephalus.  Patient symptoms do not appear to be consistent with this however.  I discussed all results with Neurology.  They recommend patient follow-up in movement disorder clinic and will set this up.  They recommend no further work-up in the Emergency Department.  I discussed all results with patient and family.  At this time we do not know the cause of patient's episodic weakness but he will need further work-up for this. Will discharge home with return precautions. Discussed reasons to return to the emergency department.  Patient understands and agrees with this plan.    I have reviewed the nursing notes.    I have reviewed the findings, diagnosis, plan and need for follow up with the  patient.    New Prescriptions    No medications on file       Final diagnoses:   None     I, Sandra Nicole am serving as a trained medical scribe to document services personally performed by Jean-Paul Carolina DO, based on the provider's statements to me.      I, Jean-Paul Carolina DO, was physically present and have reviewed and verified the accuracy of this note documented by Sandra Nicole.     Jean-Paul Carolina DO  3/31/2022   [facility] EMERGENCY DEPARTMENT     Jean-Paul Carolina DO  04/01/22 0218

## 2022-03-31 NOTE — ED NOTES
ED Triage Provider Note  [facility]  Encounter Date: Mar 31, 2022    History:  Chief Complaint   Patient presents with     Referral     Extremity Weakness     Gustavo Ramon is a 81 year old male with a past history of macular degeneration, steroid induced DM, GERD, HTN and optic myasthenia gravis who presents to the ED with concerns over episodes of bilateral leg weakness after recommendation from his neurologist to be seen for the leg weakness.    Per patient wife patient was admitted November 19 at an outside hospital for weakness that was associated with COVID-19 and pneumonia.  Was discharged home on December 3, with PT and OT. He had been doing well until earlier this month when he had left eye surgery and stopped PT due to concern over increased eye pressure post eye surgery. Currently patient reports he is eating well, and feels he has recovered from his COVID infection. Does report some nights with frequent nocturia.    Patient describes multiple episodes of this month where the patient will have bilateral leg weakness.  He had one episode 3 weeks ago which resulted in him going to the ground and being unable to stand.  911 was called and was able to help him stand and was then able to ambulate.      He has also had episode he reports when he is sitting and will have difficulty using his legs where his legs are weak and he is unable to stand. These episodes last approximately 20 minutes with patient then able ambulate with normal strength in his legs.    Currently he reports he is also been experiencing upper arm tremor.  Which mostly occurs in the morning when he attempts to use utensils to eat breakfast, or when he is attempting to use his computer when he attempts to type on the keyboard.    He also reports recent restless leg symptoms, which occurred during the day, as well as at night.  Was recently started on medication for this by primary care,  believes it is helping at night but continues to have leg tremor in the day.       Review of Systems:  Review of Systems   Constitutional: Negative for chills and fever.   HENT: Negative for sore throat.    Respiratory: Negative for cough, chest tightness and shortness of breath.    Cardiovascular: Negative for chest pain and palpitations.   Gastrointestinal: Negative for abdominal pain, nausea and vomiting.   Genitourinary: Positive for frequency.   Musculoskeletal: Negative for back pain.   Skin: Negative for rash.   Neurological: Negative for dizziness, light-headedness and headaches.       Exam:  BP (!) 158/73   Pulse 50   Temp 97.6  F (36.4  C) (Tympanic)   Resp 15   Wt 77.6 kg (171 lb)   SpO2 98%   BMI 25.25 kg/m    Physical Exam  HENT:      Head: Normocephalic.      Right Ear: External ear normal.      Left Ear: External ear normal.      Mouth/Throat:      Mouth: Mucous membranes are moist.   Eyes:      Extraocular Movements: Extraocular movements intact.      Pupils: Pupils are equal, round, and reactive to light.   Cardiovascular:      Rate and Rhythm: Normal rate and regular rhythm.      Pulses: Normal pulses.      Heart sounds: Normal heart sounds.   Pulmonary:      Effort: Pulmonary effort is normal.      Breath sounds: Normal breath sounds.   Abdominal:      Palpations: Abdomen is soft.      Tenderness: There is no abdominal tenderness. There is no guarding.   Musculoskeletal:         General: Normal range of motion.      Cervical back: Normal range of motion.   Skin:     General: Skin is warm.   Neurological:      Mental Status: He is alert.      Cranial Nerves: No facial asymmetry.      Sensory: Sensation is intact.      Motor: Tremor present. No weakness or pronator drift.      Gait: Gait is intact.         Medical Decision Making:  Patient arriving to the ED with problem as above. A medical screening exam was performed. Lab orders initiated from Triage. The patient is appropriate to wait in  triage.      CORINA Peralta CNP on 3/31/2022 at 1:39 PM        Dennis Concepcion APRN CNP  03/31/22 2148

## 2022-04-01 ENCOUNTER — TELEPHONE (OUTPATIENT)
Dept: NEUROLOGY | Facility: CLINIC | Age: 82
End: 2022-04-01
Payer: MEDICARE

## 2022-04-01 ENCOUNTER — PATIENT OUTREACH (OUTPATIENT)
Dept: CARE COORDINATION | Facility: CLINIC | Age: 82
End: 2022-04-01
Payer: MEDICARE

## 2022-04-01 DIAGNOSIS — K21.9 GASTROESOPHAGEAL REFLUX DISEASE WITHOUT ESOPHAGITIS: ICD-10-CM

## 2022-04-01 DIAGNOSIS — Z71.89 OTHER SPECIFIED COUNSELING: ICD-10-CM

## 2022-04-01 DIAGNOSIS — R26.9 ABNORMAL GAIT: ICD-10-CM

## 2022-04-01 DIAGNOSIS — G91.2 IDIOPATHIC NORMAL PRESSURE HYDROCEPHALUS (H): Primary | ICD-10-CM

## 2022-04-01 NOTE — PROGRESS NOTES
Clinic Care Coordination Contact  [facility]: Post-Discharge Note  SITUATION                                                      Admission:    Admission Date: 03/31/22   Reason for Admission: Extremity Weakness  Discharge:   Discharge Date: 03/31/22  Discharge Diagnosis: Extremity Weakness    BACKGROUND                                                      Per hospital discharge summary and inpatient provider notes:    Gustavo Ramon is a 81 year old male with a past medical history significant for macular degeneration, steroid induced diabetes mellitus, peripheral neuropathy, GERD, hypertension, and myasthenia gravis who presents to the Emergency Department for evaluation of bilateral leg weakness after recommendation from his neurologist to be seen for bilateral leg weakness.     ASSESSMENT      Enrollment  Primary Care Care Coordination Status:  (CHW was unable to ask. Patient did not get home until 2am this morning and was very tired.)    Discharge Assessment  How are you doing now that you are home?: I am doing fine.  How are your symptoms? (Red Flag symptoms escalate to triage hotline per guidelines): Unchanged  Do you feel your condition is stable enough to be safe at home until your provider visit?: Yes  Does the patient have their discharge instructions? : Yes  Does the patient have questions regarding their discharge instructions? : No  Were you started on any new medications or were there changes to any of your previous medications? : No (Patient did not start any new medications)  Do you have all of your needed medical supplies or equipment (DME)?  (i.e. oxygen tank, CPAP, cane, etc.): Yes  Discharge follow-up appointment scheduled within 14 calendar days? : Yes  Discharge Follow Up Appointment Date: 04/18/22  Discharge Follow Up Appointment Scheduled with?: Specialty Care Provider                  PLAN                                                      Outpatient Plan: Please make an  appointment to follow up with  Neurology Clinic (phone: [phone]) if you  do not hear from them.    Future Appointments   Date Time Provider Department Center   4/18/2022  2:00 PM [place] Wheat MD [facility]   9/23/2022  1:15 PM [facility] [place]   2/1/2023 10:30 AM LAB [facility] [place]   2/8/2023 10:00 AM Hakan Darden MD [facility] [place]         For any urgent concerns, please contact our 24 hour nurse triage line: [phone] ([phone])         LORAINE Robert  [phone]  [facility]

## 2022-04-01 NOTE — DISCHARGE INSTRUCTIONS
Please make an appointment to follow up with Neurology Clinic (phone: [phone]) if you do not hear from them.    Return to the emergency department if symptoms get worse, there are any new symptoms or any cause for concern.

## 2022-04-01 NOTE — TELEPHONE ENCOUNTER
Called [phone] to discuss MRI results concerning for normal pressure hydrocephalus.  Reached wife and patient.  Discussed referral to neurosurgery for evaluation and management of possible normal pressure hydrocephalus.  Neurosurgery referral placed for Dr. Amadeo jones.

## 2022-04-01 NOTE — CONSULTS
[facility]  Neurology Consultation    Patient Name:  Gustavo Ramon  MRN:  [id number]    :  1940  Date of Service:  2022  Primary care provider:  Winston Silverman      Neurology consultation service was asked to see Gustavo Ramon by Dr. Carolina to evaluate gait impairment.    Chief Complaint: leg weakness    History of Present Illness:   Gustavo Ramon is a 81 year old male with history of ocular myasthenia gravis, monoclonal, apathy, hypertension retention, hyperlipidemia, prior prostate cancer, basal cell cancer, recent COVID-19 infection 2021 who presents with episodes of leg weakness and gait impairment.    Patient was present with son and wife who assisted with providing history.    Wife reports 3 major episodes of loss of ability to ambulate.  One occurred 2021 just after diagnosis of COVID-19.  He had gone to the bathroom around midnight, when he came out, he collapsed down at the foot of the bed.  He was subsequently unable to walk.  He presented to outside facility where he was diagnosed with COVID-19 pneumonia and required 3-week hospital stay.    A second episode occurred approximately 2 weeks ago (around 3/17/2022).  At that time, he had eaten breakfast and was getting up from the breakfast table.  He stated he was feeling weak in his feet.  He then requires assistance by wife to go to the couch.  He then stated that he was extremely tired and slept for 20 minutes.  Upon awakening, he was back to baseline ambulation ability.    On 3/27/2022, another episode occurred at the dining table after eating breakfast.  He stood up and then realized that he was unable to walk.  Wife noticed and assisted with transferring him to bed.  He stated he was extremely tired and fell asleep for 30 minutes.  After this nap, he was back to baseline ambulation and was walking around the house.    Patient also notes controlled falls of unclear  frequency, when his legs give out but he retains enough strength to ease his way down onto the floor.  Wife reports development of shuffling gait first noted around time of Covid diagnosis.  Gait has been worsening with time and has been especially progressively worseing over the past month.  Patient continues to attend physical therapy for deconditioning after COVID-19 infection.  Wife notes he was less participatory in PT session 2 weeks ago.    Regarding condition, patient denies cognitive concerns.  Son and wife endorse word finding difficulty.  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  He required assistance of family this year.  He has not driven a car since COVID-19 infection due to cognitive concern.    Patient also notes several years of limb jerking intermittently present.  He believes this jerking has increased since around the time of COVID-19 infection.  He also has diagnosis of restless leg syndrome and is prescribed pramipexole which he finds helpful.    ROS  A comprehensive ROS was performed and pertinent findings were included in HPI.     PMH  Past Medical History:   Diagnosis Date     Actinic keratosis      AK (actinic keratosis) 11/15/2012     Amaurosis fugax      Basal cell carcinoma 2009    R medial cheek/nose     Central serous retinopathy left    Eye     Diverticulosis 08/2006     DJD (degenerative joint disease)      GERD (gastroesophageal reflux disease)      Hearing loss     High frequency     History of nonmelanoma skin cancer      History of nonmelanoma skin cancer      HTN (hypertension)      Hyperlipidemia LDL goal < 130      Hyperplastic colon polyp 08/2006     IgA monoclonal gammopathy     IgA kappa     Lattice degeneration of retina right    Eye     Macular degeneration      Nonsenile cataract      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    [facility] consult     Rosacea      Steroid-induced diabetes mellitus, 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     Strabismus      Vitreous detachment left    Eye      Past Surgical History:   Procedure Laterality Date     ARTHROSCOPY KNEE RT/LT Left Jan 2010    Knee     BIOPSY  2009/2013/2016    Nose; Prostate; BCC - left arm     COLONOSCOPY  9/24/2019    w/Endoscopy     COLONOSCOPY WITH CO2 INSUFFLATION N/A 9/24/2019    Procedure: COLONOSCOPY, WITH CO2 INSUFFLATION;  Surgeon: Loi Levine MD;  Location: [facility] OR     COMBINED ESOPHAGOSCOPY, GASTROSCOPY, DUODENOSCOPY (EGD) WITH CO2 INSUFFLATION N/A 9/24/2019    Procedure: ESOPHAGOGASTRODUODENOSCOPY, WITH CO2 INSUFFLATION;  Surgeon: Loi Levine MD;  Location: [facility] OR     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     DISKECTOMY, LUMBAR, SINGLE SP  2003    L2-3     ENT SURGERY  1943    Tonsils      ENT SURGERY  2-22-12    Biopsy lesion right pinna     ENT SURGERY  2-24-12    Biopsy leson right pinna     ESOPHAGOSCOPY, GASTROSCOPY, DUODENOSCOPY (EGD), COMBINED N/A 9/24/2019    Procedure: Esophagogastroduodenoscopy, With Biopsy;  Surgeon: Loi Levine MD;  Location: [facility] OR     SOFT TISSUE SURGERY  May 2010    Basal Cell/right side nose       Medications   No current facility-administered medications for this encounter.     Current Outpatient Medications   Medication     ACE/ARB/ARNI NOT PRESCRIBED (INTENTIONAL)     aspirin 325 MG tablet     cyanocobalamin (VITAMIN B-12) 1000 MCG tablet     ferrous sulfate (FEROSUL) 325 (65 Fe) MG tablet     fluorouracil (EFUDEX) 5 % external cream     ketoconazole (NIZORAL) 2 % external cream     Multiple Vitamins-Minerals (PRESERVISION AREDS 2) CAPS     omeprazole (PRILOSEC) 20 MG DR capsule     pramipexole (MIRAPEX) 0.125 MG tablet     prednisoLONE acetate (PRED FORTE) 1 % ophthalmic suspension     predniSONE (DELTASONE) 20 MG tablet     propranolol ER (INDERAL LA) 60 MG 24 hr capsule     pyridostigmine (MESTINON) 60 MG tablet     simvastatin (ZOCOR) 20 MG tablet     Sodium Chloride, Hypertonic, (HARRY 128 OP)     sulfamethoxazole-trimethoprim (BACTRIM DS) 800-160 MG tablet      triamcinolone (KENALOG) 0.1 % external cream     vitamin D3 (CHOLECALCIFEROL) 2000 units tablet       Allergies  Allergies   Allergen Reactions     Mycophenolate Other (See Comments)     Confusion, abnormal movements     Nkda [No Known Drug Allergies]        Social History  Social History     Tobacco Use     Smoking status: Never Smoker     Smokeless tobacco: Never Used   Vaping Use     Vaping Use: Never used   Substance Use Topics     Alcohol use: No     Alcohol/week: 0.0 standard drinks     Drug use: No       Family History   Family History   Problem Relation Age of Onset     Heart Disease Mother      Alzheimer Disease Mother 73     C.A.D. Father      Coronary Artery Disease Father      Diabetes Grandchild         using a pump      Diabetes Paternal Uncle      Heart Disease Paternal Grandmother      Glaucoma No family hx of      Macular Degeneration No family hx of      Melanoma No family hx of      Skin Cancer No family hx of        Physical Examination   Vitals: BP (!) 165/95 (BP Location: Right arm)   Pulse 60   Temp 98.1  F (36.7  C) (Oral)   Resp 16   Wt 77.6 kg (171 lb)   SpO2 98%   BMI 25.25 kg/m    General: Lying in bed, NAD  Head: NC/AT  Eyes: no icterus, op pink and moist  Cardiac: RRR. Extremities warm, no edema.   Respiratory: non-labored on RA  GI: S/NT/ND  Skin: No rash or lesion on exposed skin  Psych: Mood pleasant, affect congruent  Neuro:  Mental status: Awake, alert, attentive.tangential speech.  Oriented to self.  Able to correct location from hotel to hospital.  Incorrectly gassed city as [place] or [place].  Able to identify month on second attempt (was off by 1 day on initial guess).  You are correct.  Able to follow three-step cross command.  Naming intact.  Repetition of sentences with 1 dropped word.  Required 6 tries to encode three words, recalled 2/3 words after three minutes.  Serial sevens attempted: 93 then 85 then 78 then 63 then 56 then 49.  Cranial nerves: VFF,  "funduscopic exam performed with crisp optic margins visualized bilaterally, PERRL, conjugate gaze, increase in ptosis bilaterally with prolonged upgaze (baseline per patient), no diplopia with prolonged upgaze, EOMI, facial sensation intact, face symmetric, shoulder shrug strong, tongue midline, no dysarthria.   Motor: Normal bulk and tone.  Positive and negative myoclonus present bilateral upper extremities.  Postural tremor present bilateral upper extremities.  5/5 strength bilaterally in deltoids, biceps, triceps, hand , hip flexors, knee flexion, knee extension, plantarflexion, dorsiflexion.   Reflexes: 3+ and symmetric biceps, brachioradialis, triceps, patellae, and dull 2+ bilateral achilles. Negative Harvey, no clonus, toes down-going.  Sensory: Intact to light touch throughout.  Vibration approximately 7 seconds right toe, 9 seconds left toe, intact at ankles, intact bilateral lower extremities.  Pinprick reportedly diminished at hands, absent lower extremities to above knee bilaterally.  Coordination: FNF and HS without ataxia or dysmetria. Rapid alternating movements intact.   Gait: Stable stance.  Shuffling gait with wide base, step length only several inches.  Minimal arm swing (arms held out behind body).  10 step turn.  Unable to perform toe walk, unable perform heel walk, unable to perform tandem gait.  Backwards pull test attempted with assistance required to maintain upward positioning.    Investigations   I have personally reviewed pertinent labs, tests, and radiological imaging. Discussion of notable findings is included under Impression.     Recent Labs   Lab 03/31/22  1342   WBC 13.4*   HGB 14.4   MCV 87         POTASSIUM 4.2   CHLORIDE 107   CO2 29   BUN 23   CR 0.91   ANIONGAP 4   TRISTIAN 9.1   GLC 93   ALBUMIN 3.5   PROTTOTAL 6.6*   BILITOTAL 0.3   ALKPHOS 48   ALT 27   AST 16     CT head 11/23/2021:  \"IMPRESSION:   1.  No evidence of acute hemorrhage or other acute intracranial " "abnormality.   2.  Age-related changes.\"    Impression  81-year-old man with past medical history including ocular myasthenia gravis, monoclonal gammopathy, hypertension, hyperlipidemia, prior prostate cancer, basal cell cancer of nose, recent COVID-19 infection November 2021 who presented to ED on 3/31/2022 due to intermittent episodes of gait impairment and reported leg weakness.  Per description, episodes of gait impairment occurred when patient stood up and reported feeling weak in his feet with subsequent inability to walk.  He also reported several episodes of controlled fall when he demonstrated incomplete loss of strength in both legs.  Exam notable for impairment in cognitive status, intact limb strength, sensory impairment consistent with peripheral neuropathy, shuffling gait.   Suspected gait impairment is multifactorial.  Shuffling gait with 10 step turn concerning for undiagnosed parkinsonism.  There is also possibility of normal pressure hydrocephalus given gait impairment and cognitive decline. Recommend evaluation with MRI brain given no recent MRI brain imaging evaluation.  Given reported weakness, would order screening myopathy laboratory studies (CK and TSH).  The peripheral neuropathy seen on exam may contribute to gait impairment.  In addition, there is positive and negative myoclonus seen upper extremities, which raises possibility of negative myoclonus as cause for weakness.  Suspect myoclonus associated with current prednisone use, would also evaluate for other metabolic etiologies with blood gas (potential hypercarbia) and ammonia level.  In addition, given significant fatigue after the event, although less likely, possible these events may be epileptiform in nature.    Recommendations  - MRI brain with/without contrast  - VBG, ammonia  - CK, TSH  - outpatient EEG (ordered for you)  - referral to movement disorders clinic for gait abnormality (ordered for you)  - Follow-up with " Mary Alice in neuromuscular clinic for ocular myasthenia gravis    Thank you for involving Neurology in the care of Gustavo Ramon.  Please do not hesitate to call with questions/concerns (consult pager [phone]).      Patient was discussed with Dr. Khoury.    Maxine Love MD  PGY-4 Neurology

## 2022-04-02 ENCOUNTER — TELEPHONE (OUTPATIENT)
Dept: NEUROSURGERY | Facility: CLINIC | Age: 82
End: 2022-04-02
Payer: MEDICARE

## 2022-04-02 LAB — BACTERIA UR CULT: NORMAL

## 2022-04-06 DIAGNOSIS — D50.9 IRON DEFICIENCY ANEMIA, UNSPECIFIED IRON DEFICIENCY ANEMIA TYPE: ICD-10-CM

## 2022-04-06 NOTE — TELEPHONE ENCOUNTER
SUBJECTIVE/OBJECTIVE:                                                    Refill request receive for:  Iron 325    Last refill per fax: 5/8/2020  Date of LOV r/t Medication: 2/15/22  Future appt scheduled? Yes: 2/15/23   Date faxed to clinic: 4/6/22    PLAN:                                                      Sent to provider to advise.

## 2022-04-07 RX ORDER — FERROUS SULFATE 325(65) MG
TABLET ORAL
Qty: 60 TABLET | Refills: 1 | Status: SHIPPED | OUTPATIENT
Start: 2022-04-07 | End: 2023-10-03

## 2022-04-10 NOTE — TELEPHONE ENCOUNTER
FUTURE VISIT INFORMATION      FUTURE VISIT INFORMATION:    Date: 5/4/2022    Time: 1230pm    [place] [facility] [place]  REFERRAL INFORMATION:    Referring provider:  Dr. Love     Referring providers clinic:  [facility] ED     Reason for visit/diagnosis  Abnormal MRI     RECORDS REQUESTED FROM:       Clinic name Comments Records Status Imaging Status   INternal ED Visit-3/31/2022    MR Brain-3/31/2022 [facility] PACS         [facility]([place]) CT Head-11/23/2021    CT Cervical Spine-11/13/2021 Care Everywhere Requested to PACS                       4/11/2022-Request for images faxed to [facility]-MR @ 758am    4/27/2022-[facility] now in PACS-MR @ 540am

## 2022-04-15 ENCOUNTER — TELEPHONE (OUTPATIENT)
Dept: NEUROLOGY | Facility: CLINIC | Age: 82
End: 2022-04-15
Payer: MEDICARE

## 2022-04-15 NOTE — TELEPHONE ENCOUNTER
I spoke to the Rehab physician yesterday on the phone. There are new neurological issues over the last 2 months with gait instability, tendency to fall, shuffling, etc. He has been seen by a couple of different Neurologists and diagnoses mentioned include parkinsonism, NPH, small vessel ischemic disease, etc. The physician asked me whether the patient would qualify for a trial of Sinemet. I told him that I have NOT assessed Mr Ramon for those issues as I did not have a chance to see him in Clinic in the last 2-3 months and this issue is new, therefore I cannot provide any meaningful recommendations over the phone. He needs an in person exam and I asked him to call a Neurology consult @ [facility] and to ask the patient to return to my Clinic 3-4 weeks after his discharge from Rehab for a face-to-face exam. This has nothing to do with his myasthenia.

## 2022-04-15 NOTE — TELEPHONE ENCOUNTER
[facility] Call Center    Phone Message    May a detailed message be left on voicemail: yes     Reason for Call: Other: pt wife called to update Dr. Wheat on where the pt currently is staying. [facility] due to pt testing positive for COVID.    Action Taken: Message routed to:  [facility] neurology    Travel Screening: Not Applicable

## 2022-04-18 NOTE — MR AVS SNAPSHOT
After Visit Summary   6/20/2017    Gustavo Ramon    MRN: [id number]           Patient Information     Date Of Birth          1940        Visit Information        Provider Department      6/20/2017 4:20 PM Raheem Law MD [facility]        Today's Diagnoses     Poor balance    -  1    IgA monoclonal gammopathy        Macular pigment deposit        Myasthenic syndromes in other diseases classified elsewhere (H)           Care Instructions    AFTER VISIT SUMMARY (AVS)    Orders Placed This Encounter   Procedures     TSH with free T4 reflex     PHYSICAL THERAPY REFERRAL       Electromyography LOWER LIMBS at [facility]  Reporting by 12:40 PM to  for registration    Diagnostic possibilities reviewed and reasons for work-up explained    Preventive Neurology: Encouraged to keep physically and mentally active with particular emphasis on daily stretching exercises, walking, and healthy eating.    Additional  recommendations after the work-up      Thanks                    Follow-ups after your visit        Additional Services     PHYSICAL THERAPY REFERRAL       *This therapy referral will be filtered to a centralized scheduling office at [facility] and the patient will receive a call to schedule an appointment at a [facility] location most convenient for them. *     [facility] provides Physical Therapy evaluation and treatment and many specialty services across the [facility] system.  If requesting a specialty program, please choose from the list below.    If you have not heard from the scheduling office within 2 business days, please call [phone] for all locations, with the exception of [place], please call [phone].  Treatment: Evaluation & Treatment  Special Instructions/Modalities: Gait evaluation/training/safety in view of poor balance.   Special Programs: See above.     Please be aware that coverage of  I-STAT Chem-8+ Results:   Value Reference [place]   Sodium 141 136-145 mmol/L   Potassium  4.3 3.5-5.1 mmol/L   Chloride 109  mmol/L   Ionized Calcium 1.22 1.06-1.42 mmol/L   CO2 (measured) 22 23-29 mmol/L   Glucose 99  mg/dL   BUN 20 6-30 mg/dL   Creatinine 1.5 0.5-1.4 mg/dL   Hematocrit 38 36-54%         "these services is subject to the terms and limitations of your health insurance plan.  Call member services at your health plan with any benefit or coverage questions.      **Note to Provider:  If you are referring outside of [facility] for the therapy appointment, please list the name of the location in the \"special instructions\" above, print the referral and give to the patient to schedule the appointment.                  Follow-up notes from your care team     Return in about 4 weeks (around 7/20/2017) for EMG 1 PM slot. Reporting by 12:40 PM.      Your next 10 appointments already scheduled     Dec 07, 2017 10:45 AM CST   [facility]    [address] [place] [id number] [phone]            Feb 05, 2018  8:00 AM CST   LAB with [facility] LAB   [facility]    [address] [facility] [id number] [phone]           Patient must bring picture ID.  Patient should be prepared to give a urine specimen  Please do not eat 10-12 hours before your appointment if you are coming in fasting for labs on lipids, cholesterol, or glucose (sugar).  Pregnant women should follow their Care Team instructions. Water with medications is okay. Do not drink coffee or other fluids.   If you have concerns about taking  your medications, please ask at office or if scheduling via Neurahart, send a message by clicking on Secure Messaging, Message Your Care Team.            Feb 12, 2018  8:00 AM CST   Return Visit with Glenn Jones MD   [facility] ([address] [address]   [phone]            May 25, 2018  9:40 AM CDT   LAB with BE LAB   [facility] ([facility])    [address] [facility] [id number] [phone]           Patient must bring picture ID.  Patient should be prepared to " give a urine specimen  Please do not eat 10-12 hours before your appointment if you are coming in fasting for labs on lipids, cholesterol, or glucose (sugar).  Pregnant women should follow their Care Team instructions. Water with medications is okay. Do not drink coffee or other fluids.   If you have concerns about taking  your medications, please ask at office or if scheduling via Synchrony, send a message by clicking on Secure Messaging, Message Your Care Team.            Jun 01, 2018  1:30 PM CDT   [facility] Visit with Susan Eller MD   [facility]    [id number] [place] [id number] [phone]              Who to contact     If you have questions or need follow up information about today's clinic visit or your schedule please contact [facility] directly at [phone].  Normal or non-critical lab and imaging results will be communicated to you by Plan[employer] NEThart, letter or phone within 4 business days after the clinic has received the results. If you do not hear from us within 7 days, please contact the clinic through Power Uniont or phone. If you have a critical or abnormal lab result, we will notify you by phone as soon as possible.  Submit refill requests through Synchrony or call your pharmacy and they will forward the refill request to us. Please allow 3 business days for your refill to be completed.          Additional Information About Your Visit        Plan[employer] NETharTeal Orbit Information     Synchrony gives you secure access to your electronic health record. If you see a primary care provider, you can also send messages to your care team and make appointments. If you have questions, please call your primary care clinic.  If you do not have a primary care provider, please call [phone] and they will assist you.        Care EveryWhere ID     This is your Care EveryWhere ID. This could be used by other organizations to access your [facility]  "records  [id number]        Your Vitals Were     Pulse Height BMI (Body Mass Index)             84 1.721 m (5' 7.75\") 27.05 kg/m2          Blood Pressure from Last 3 Encounters:   06/20/17 129/74   06/06/17 146/82   06/01/17 111/71    Weight from Last 3 Encounters:   06/20/17 80.1 kg (176 lb 9.6 oz)   06/06/17 80.4 kg (177 lb 3.2 oz)   06/01/17 78.7 kg (173 lb 8 oz)              We Performed the Following     PHYSICAL THERAPY REFERRAL     TSH with free T4 reflex          Today's Medication Changes          These changes are accurate as of: 6/20/17  6:00 PM.  If you have any questions, ask your nurse or doctor.               These medicines have changed or have updated prescriptions.        Dose/Directions    omeprazole 20 MG CR capsule   Commonly known as:  priLOSEC   This may have changed:  additional instructions   Used for:  Gastroesophageal reflux disease without esophagitis        Take 1 Capsule Daily Concomitant To Prednisone Use   Quantity:  45 capsule   Refills:  3                Primary Care Provider Office Phone # Fax #    Winston Silverman PA-C [phone]       [facility] [address] [facility] [address]        Thank you!     Thank you for choosing [facility]  for your care. Our goal is always to provide you with excellent care. Hearing back from our patients is one way we can continue to improve our services. Please take a few minutes to complete the written survey that you may receive in the mail after your visit with us. Thank you!             Your Updated Medication List - Protect others around you: Learn how to safely use, store and throw away your medicines at www.disposemymeds.org.          This list is accurate as of: 6/20/17  6:00 PM.  Always use your most recent med list.                   Brand Name Dispense Instructions for use    aspirin 325 MG tablet      Take 325 mg by mouth daily       cyanocobalamin 1000 MCG tablet    vitamin  B-12     Take 1 " tablet by mouth daily       hydrochlorothiazide 25 MG tablet    HYDRODIURIL    45 tablet    TAKE ONE-HALF (1/2) TABLET (12.5 MG) DAILY       lisinopril 5 MG tablet    PRINIVIL/ZESTRIL    90 tablet    TAKE 1 TABLET DAILY       omeprazole 20 MG CR capsule    priLOSEC    45 capsule    Take 1 Capsule Daily Concomitant To Prednisone Use       predniSONE 5 MG tablet    DELTASONE    45 tablet    Take 1 tablet (5 mg) by mouth every other day       PRESERVISION/LUTEIN PO      Take 60 mg by mouth daily two tablets daily       pyridostigmine 60 MG tablet    MESTINON    360 tablet    Take 1 tablet (60 mg) by mouth 4 times daily       simvastatin 20 MG tablet    ZOCOR    90 tablet    Take 1 tablet (20 mg) by mouth At Bedtime       triamcinolone 0.1 % cream    KENALOG    80 g    For Rash use twice daily for 2 weeks, take 2 weeks break. Not for face       vitamin D 2000 UNITS tablet      Take 2,000 Units by mouth daily

## 2022-04-22 ENCOUNTER — TELEPHONE (OUTPATIENT)
Dept: NEUROSURGERY | Facility: CLINIC | Age: 82
End: 2022-04-22
Payer: MEDICARE

## 2022-04-25 DIAGNOSIS — G70.00 MG, OCULAR (MYASTHENIA GRAVIS) (H): ICD-10-CM

## 2022-04-25 RX ORDER — PREDNISONE 20 MG/1
TABLET ORAL
Qty: 90 TABLET | Refills: 3 | Status: ON HOLD | OUTPATIENT
Start: 2022-04-25 | End: 2022-06-30

## 2022-04-28 NOTE — PROGRESS NOTES
"[facility] GERIATRICS    PRIMARY CARE PROVIDER AND CLINIC:  Winston Silverman PA-C, [address] / [place] [address]  Chief Complaint   Patient presents with     Hospital F/U      [place] Medical Record Number:  [id number]  Place of Service where encounter took place:  [facility]  AT [facility] ([facility]) [[id number]]    Gustavo Ramon  is a 81 year old  (1940), admitted to the above facility from  [facility] . Hospital stay 4/13/22 through 4/28/22..   HPI:    Stephen is an 81-year-old male with a past medical history of CKD, hypertension, ocular myasthenia, NPH, osteoporosis, history of COVID and hypoxic respiratory failure who was admitted to [facility] for gait disorder    Brief hospitalization summary  Per Dr. Gutierrez,\"Gustavo Ramon is a 81 y.o. 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 This has resulted in the following impairments: Balance deficits (30/56 on Garvin), L hip pain, endurance deficits, cognitive deficits (SLUMS 11/30), tremors, generalized weakness, poor R foot clearance with gait causing the following activity limitations: mobility, self-cares and cognition.    4/29 seen for admission    Nursing without acute concern.  Stephen was nonsensical during our visit at times.    Adverse Drug Reactions:  Reviewed and updated today.  See ADR list in the EHR.    Medications:  Reviewed and updated today.  See Medication list in the EHR.     OBJECTIVE:      Vit    CODE STATUS/ADVANCE DIRECTIVES DISCUSSION:  DNR  ALLERGIES:   Allergies   Allergen Reactions     Mycophenolate Other (See Comments)     Confusion, abnormal movements     Nkda [No Known Drug Allergies]       PAST MEDICAL HISTORY:   Past Medical History:   Diagnosis Date     Actinic keratosis      AK (actinic keratosis) 11/15/2012     Amaurosis fugax      Basal cell carcinoma 2009    R medial " cheek/nose     Central serous retinopathy left    Eye     Diverticulosis 08/2006     DJD (degenerative joint disease)      GERD (gastroesophageal reflux disease)      Hearing loss     High frequency     History of nonmelanoma skin cancer      History of nonmelanoma skin cancer      HTN (hypertension)      Hyperlipidemia LDL goal < 130      Hyperplastic colon polyp 08/2006     IgA monoclonal gammopathy     IgA kappa     Lattice degeneration of retina right    Eye     Macular degeneration      Nonsenile cataract      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    [place] consult     Rosacea      Steroid-induced diabetes mellitus, 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  FAMILY HISTORY: family history includes Alzheimer Disease (age of onset: 73) in his mother; C.A.D. in his father; Coronary Artery Disease in his father; Diabetes in his grandchild and paternal uncle; Heart Disease in his mother and paternal grandmother.  SOCIAL HISTORY:   reports that he has never smoked. He has never used smokeless tobacco. He reports that he does not drink alcohol and does not use drugs.      Post Discharge Medication Reconciliation Status: discharge medications reconciled, continue medications without change  Current Outpatient Medications   Medication Sig     ACE/ARB/ARNI NOT PRESCRIBED (INTENTIONAL) Please choose reason not prescribed from choices below.      aspirin 325 MG tablet Take 325 mg by mouth daily     cyanocobalamin (VITAMIN B-12) 1000 MCG tablet Take 1 tablet (1,000 mcg) by mouth daily     ferrous sulfate (FEROSUL) 325 (65 Fe) MG tablet Use 1 tab every other day with orange juice     fluorouracil (EFUDEX) 5 % external cream Apply topically 2 times daily     ketoconazole (NIZORAL) 2 % external cream Apply twice daily to the groin rash until resolved     Multiple Vitamins-Minerals (PRESERVISION AREDS 2) CAPS Take 2 soft gel caps daily     omeprazole (PRILOSEC) 20 MG DR capsule TAKE 1 CAPSULE DAILY (NEED APPOINTMENT)     pramipexole (MIRAPEX) 0.125 MG tablet Take 3 tablets (0.375 mg) by mouth At Bedtime     prednisoLONE acetate (PRED FORTE) 1 % ophthalmic suspension      predniSONE (DELTASONE) 20 MG tablet TAKE 1 TABLET DAILY     propranolol ER (INDERAL LA) 60 MG 24 hr capsule Take 1 capsule (60 mg) by mouth daily     pyridostigmine (MESTINON) 60 MG tablet Take 1 tablet (60 mg) by mouth 3 times daily     simvastatin (ZOCOR) 20 MG tablet Take 1 tablet (20 mg) by mouth At Bedtime     Sodium Chloride, Hypertonic, (MASOOD 128 OP) Uses Masood gel and drops. Gel once a day at night. Drops 4 times a day.     sulfamethoxazole-trimethoprim (BACTRIM DS) 800-160 MG tablet TAKE 1 TABLET THREE TIMES A WEEK     triamcinolone (KENALOG) 0.1 % external cream Apply a thin layer up to twice daily to affected areas as needed.     vitamin D3 (CHOLECALCIFEROL) 2000 units tablet Take 1 tablet by mouth daily     No current facility-administered medications for this visit.       ROS:  10 point ROS of systems including Constitutional, Eyes, Respiratory, Cardiovascular, Gastroenterology, Genitourinary, Integumentary, Musculoskeletal, Psychiatric were all negative except for pertinent positives noted in my HPI.    Vitals:  /57   Pulse 61   Temp 98.4  F (36.9  C)   Resp 18   SpO2 96%     Exam:  GENERAL APPEARANCE:  Alert, in no distress  ENT:  Mouth and posterior oropharynx normal,  moist mucous membranes  RESP:  respiratory effort and palpation of chest normal, lungs clear to auscultation , no respiratory distress  CV:  Palpation and auscultation of heart done , regular rate and rhythm, no murmur, rub, or gallop, no edema  ABDOMEN:  normal bowel sounds, soft, nontender, no hepatosplenomegaly or other masses  M/S:   no gross deformities  SKIN:  no rash  NEURO:   alert, clear speech, symmetrical smile, equal strength upper/lower extremities  PSYCH:  oriented to name and time    Lab/Diagnostic data:  Recent labs in [facility] reviewed by me today.     ASSESSMENT/PLAN:    (G91.9) Hydrocephalus, unspecified type (H)  (primary encounter diagnosis)  Plan:   -MRI on 3/30/2022 revealed NPH.   Follow-up with neurosurgery for possible  shunt placement down the road. 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  Follow-up with primary neurologist Dr. Wheat on 5/2/22    (G70.00) Ocular myasthenia gravis (H)  Plan:   -Recently undergone blepharoplasty.  Continue prednisone gtt, mestinon and pyridostigmine.  Follow-up with ophthalmologist    (E27.40) Adrenal insufficiency (H)  Plan:   -Continue home prednisone  -Check BMP 5/2      (I10) Hypertension, unspecified type  Plan:   -Continue propanolol 30 mg twice daily    (N18.31) Stage 3a chronic kidney disease (H)  Plan:   -Renally dose medications  -Avoid nephrotoxic agents  -Monitor hydration status  -Check BMP 5/2      (D47.2) MGUS (monoclonal gammopathy of unknown significance)  Plan:   -Followed by K[facility]ina at the [facility]    (I82.812) Acute superficial venous thrombosis of left lower extremity  Plan:   -Noted on Doppler during hospitalization and was treated with warm compresses    (D50.9) Iron deficiency anemia, unspecified iron deficiency anemia type  Plan:   -Continue iron supplement  -Check hgb 5/2        Total time spent with patient visit at the skilled nursing facility was 35 min including patient visit, review of past  records, plan of care, medication reconciliation, POLST form reviewed.  Counseling provided on fall prevention greater than 50% of total time spent with counseling and coordinating care due to medical complexity    Electronically signed by:  Micheal Salamanca CNP

## 2022-04-29 ENCOUNTER — TRANSITIONAL CARE UNIT VISIT (OUTPATIENT)
Dept: GERIATRICS | Facility: CLINIC | Age: 82
End: 2022-04-29
Payer: MEDICARE

## 2022-04-29 VITALS
DIASTOLIC BLOOD PRESSURE: 57 MMHG | OXYGEN SATURATION: 96 % | TEMPERATURE: 98.4 F | HEART RATE: 61 BPM | SYSTOLIC BLOOD PRESSURE: 102 MMHG | RESPIRATION RATE: 18 BRPM

## 2022-04-29 DIAGNOSIS — D50.9 IRON DEFICIENCY ANEMIA, UNSPECIFIED IRON DEFICIENCY ANEMIA TYPE: ICD-10-CM

## 2022-04-29 DIAGNOSIS — E27.40 ADRENAL INSUFFICIENCY (H): ICD-10-CM

## 2022-04-29 DIAGNOSIS — D47.2 MGUS (MONOCLONAL GAMMOPATHY OF UNKNOWN SIGNIFICANCE): ICD-10-CM

## 2022-04-29 DIAGNOSIS — G70.00 OCULAR MYASTHENIA GRAVIS (H): ICD-10-CM

## 2022-04-29 DIAGNOSIS — I82.812 ACUTE SUPERFICIAL VENOUS THROMBOSIS OF LEFT LOWER EXTREMITY: ICD-10-CM

## 2022-04-29 DIAGNOSIS — N18.31 STAGE 3A CHRONIC KIDNEY DISEASE (H): ICD-10-CM

## 2022-04-29 DIAGNOSIS — G91.9 HYDROCEPHALUS, UNSPECIFIED TYPE (H): Primary | ICD-10-CM

## 2022-04-29 DIAGNOSIS — I10 HYPERTENSION, UNSPECIFIED TYPE: ICD-10-CM

## 2022-04-29 PROCEDURE — 99310 SBSQ NF CARE HIGH MDM 45: CPT | Performed by: NURSE PRACTITIONER

## 2022-04-29 RX ORDER — PANTOPRAZOLE SODIUM 40 MG/1
40 TABLET, DELAYED RELEASE ORAL DAILY
COMMUNITY
Start: 2022-04-29 | End: 2024-06-04

## 2022-04-29 RX ORDER — LANOLIN ALCOHOL/MO/W.PET/CERES
3 CREAM (GRAM) TOPICAL AT BEDTIME
COMMUNITY
Start: 2022-04-29 | End: 2022-12-08

## 2022-04-29 RX ORDER — BISACODYL 10 MG
10 SUPPOSITORY, RECTAL RECTAL DAILY PRN
COMMUNITY
Start: 2022-04-29 | End: 2023-04-06

## 2022-04-29 RX ORDER — TAMSULOSIN HYDROCHLORIDE 0.4 MG/1
0.4 CAPSULE ORAL DAILY
COMMUNITY
Start: 2022-04-29 | End: 2024-10-04

## 2022-04-29 RX ORDER — VITS A,C,E/LUTEIN/MINERALS 300MCG-200
1 TABLET ORAL DAILY
COMMUNITY
Start: 2022-04-29

## 2022-04-29 RX ORDER — ONDANSETRON 4 MG/1
4 TABLET, FILM COATED ORAL EVERY 8 HOURS PRN
COMMUNITY
Start: 2022-04-29 | End: 2022-12-08

## 2022-04-29 RX ORDER — POLYETHYLENE GLYCOL 3350 17 G/17G
17 POWDER, FOR SOLUTION ORAL DAILY
COMMUNITY
Start: 2022-04-29 | End: 2022-12-08

## 2022-04-29 RX ORDER — LEVETIRACETAM 250 MG/1
250 TABLET ORAL 2 TIMES DAILY
COMMUNITY
Start: 2022-04-29 | End: 2023-10-18

## 2022-04-29 RX ORDER — ACETAMINOPHEN 325 MG/1
325-650 TABLET ORAL EVERY 4 HOURS PRN
COMMUNITY
Start: 2022-04-29

## 2022-04-29 NOTE — LETTER
"    4/29/2022        RE: Gustavo Ramon  [address]  [place] [address]        [facility] GERIATRICS    PRIMARY CARE PROVIDER AND CLINIC:  Winston Silverman PA-C, [address] / [place] [address]  Chief Complaint   Patient presents with     Hospital F/U      [place] Medical Record Number:  [id number]  Place of Service where encounter took place:  [facility] ([facility]) [[id number]]    Gustavo Ramon  is a 81 year old  (1940), admitted to the above facility from  [facility] . Hospital stay 4/13/22 through 4/28/22..   HPI:    Stephen is an 81-year-old male with a past medical history of CKD, hypertension, ocular myasthenia, NPH, osteoporosis, history of COVID and hypoxic respiratory failure who was admitted to [facility] for gait disorder    Brief hospitalization summary  Per Dr. Gutierrez,\"Gustavo Ramon is a 81 y.o. 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 This has resulted in the following impairments: Balance deficits (30/56 on Garvin), L hip pain, endurance deficits, cognitive deficits (SLUMS 11/30), tremors, generalized weakness, poor R foot clearance with gait causing the following activity limitations: mobility, self-cares and cognition.    4/29 seen for admission    Nursing without acute concern.  Stephen was nonsensical during our visit at times.    Adverse Drug Reactions:  Reviewed and updated today.  See ADR list in the EHR.    Medications:  Reviewed and updated today.  See Medication list in the EHR.     OBJECTIVE:      Vit    CODE STATUS/ADVANCE DIRECTIVES DISCUSSION:  DNR  ALLERGIES:   Allergies   Allergen Reactions     Mycophenolate Other (See Comments)     Confusion, abnormal movements     Nkda [No Known Drug Allergies]       PAST MEDICAL HISTORY:   Past Medical History:   Diagnosis Date     Actinic keratosis      " AK (actinic keratosis) 11/15/2012     Amaurosis fugax      Basal cell carcinoma 2009    R medial cheek/nose     Central serous retinopathy left    Eye     Diverticulosis 08/2006     DJD (degenerative joint disease)      GERD (gastroesophageal reflux disease)      Hearing loss     High frequency     History of nonmelanoma skin cancer      History of nonmelanoma skin cancer      HTN (hypertension)      Hyperlipidemia LDL goal < 130      Hyperplastic colon polyp 08/2006     IgA monoclonal gammopathy     IgA kappa     Lattice degeneration of retina right    Eye     Macular degeneration      Nonsenile cataract      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    [place] consult     Rosacea      Steroid-induced diabetes mellitus, 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  FAMILY HISTORY: family history includes Alzheimer Disease (age of onset: 73) in his mother; C.A.D. in his father; Coronary Artery Disease in his father; Diabetes in his grandchild and paternal uncle; Heart Disease in his mother and paternal grandmother.  SOCIAL HISTORY:   reports that he has never smoked. He has never used smokeless tobacco. He reports that he does not drink alcohol and does not use drugs.      Post Discharge Medication Reconciliation Status: discharge medications reconciled, continue medications without change  Current Outpatient Medications   Medication Sig      ACE/ARB/ARNI NOT PRESCRIBED (INTENTIONAL) Please choose reason not prescribed from choices below.     aspirin 325 MG tablet Take 325 mg by mouth daily     cyanocobalamin (VITAMIN B-12) 1000 MCG tablet Take 1 tablet (1,000 mcg) by mouth daily     ferrous sulfate (FEROSUL) 325 (65 Fe) MG tablet Use 1 tab every other day with orange juice     fluorouracil (EFUDEX) 5 % external cream Apply topically 2 times daily     ketoconazole (NIZORAL) 2 % external cream Apply twice daily to the groin rash until resolved     Multiple Vitamins-Minerals (PRESERVISION AREDS 2) CAPS Take 2 soft gel caps daily     omeprazole (PRILOSEC) 20 MG DR capsule TAKE 1 CAPSULE DAILY (NEED APPOINTMENT)     pramipexole (MIRAPEX) 0.125 MG tablet Take 3 tablets (0.375 mg) by mouth At Bedtime     prednisoLONE acetate (PRED FORTE) 1 % ophthalmic suspension      predniSONE (DELTASONE) 20 MG tablet TAKE 1 TABLET DAILY     propranolol ER (INDERAL LA) 60 MG 24 hr capsule Take 1 capsule (60 mg) by mouth daily     pyridostigmine (MESTINON) 60 MG tablet Take 1 tablet (60 mg) by mouth 3 times daily     simvastatin (ZOCOR) 20 MG tablet Take 1 tablet (20 mg) by mouth At Bedtime     Sodium Chloride, Hypertonic, (MASOOD 128 OP) Uses Masood gel and drops. Gel once a day at night. Drops 4 times a day.     sulfamethoxazole-trimethoprim (BACTRIM DS) 800-160 MG tablet TAKE 1 TABLET THREE TIMES A WEEK     triamcinolone (KENALOG) 0.1 % external cream Apply a thin layer up to twice daily to affected areas as needed.     vitamin D3 (CHOLECALCIFEROL) 2000 units tablet Take 1 tablet by mouth daily     No current facility-administered medications for this visit.       ROS:  10 point ROS of systems including Constitutional, Eyes, Respiratory, Cardiovascular, Gastroenterology, Genitourinary, Integumentary, Musculoskeletal, Psychiatric were all negative except for pertinent positives noted in my HPI.    Vitals:  /57   Pulse 61   Temp 98.4  F (36.9  C)   Resp 18   SpO2 96%      Exam:  GENERAL APPEARANCE:  Alert, in no distress  ENT:  Mouth and posterior oropharynx normal, moist mucous membranes  RESP:  respiratory effort and palpation of chest normal, lungs clear to auscultation , no respiratory distress  CV:  Palpation and auscultation of heart done , regular rate and rhythm, no murmur, rub, or gallop, no edema  ABDOMEN:  normal bowel sounds, soft, nontender, no hepatosplenomegaly or other masses  M/S:   no gross deformities  SKIN:  no rash  NEURO:   alert, clear speech, symmetrical smile, equal strength upper/lower extremities  PSYCH:  oriented to name and time    Lab/Diagnostic data:  Recent labs in [facility] reviewed by me today.     ASSESSMENT/PLAN:    (G91.9) Hydrocephalus, unspecified type (H)  (primary encounter diagnosis)  Plan:   -MRI on 3/30/2022 revealed NPH.   Follow-up with neurosurgery for possible  shunt placement down the road. 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  Follow-up with primary neurologist Dr. Wheat on 5/2/22    (G70.00) Ocular myasthenia gravis (H)  Plan:   -Recently undergone blepharoplasty.  Continue prednisone gtt, mestinon and pyridostigmine.  Follow-up with ophthalmologist    (E27.40) Adrenal insufficiency (H)  Plan:   -Continue home prednisone  -Check BMP 5/2      (I10) Hypertension, unspecified type  Plan:   -Continue propanolol 30 mg twice daily    (N18.31) Stage 3a chronic kidney disease (H)  Plan:   -Renally dose medications  -Avoid nephrotoxic agents  -Monitor hydration status  -Check BMP 5/2      (D47.2) MGUS (monoclonal gammopathy of unknown significance)  Plan:   -Followed by Katalina at the [facility]    (I82.812) Acute superficial venous thrombosis of left lower extremity  Plan:   -Noted on Doppler during hospitalization and was treated with warm compresses    (D50.9) Iron deficiency anemia, unspecified iron deficiency anemia type  Plan:   -Continue iron supplement  -Check hgb 5/2        Total time spent  with patient visit at the skilled nursing facility was 35 min including patient visit, review of past records, plan of care, medication reconciliation, POLST form reviewed.  Counseling provided on fall prevention greater than 50% of total time spent with counseling and coordinating care due to medical complexity    Electronically signed by:  Micheal Salamanca CNP                        Sincerely,        Micheal Salamanca CNP

## 2022-05-02 ENCOUNTER — LAB REQUISITION (OUTPATIENT)
Dept: LAB | Facility: CLINIC | Age: 82
End: 2022-05-02

## 2022-05-02 ENCOUNTER — OFFICE VISIT (OUTPATIENT)
Dept: NEUROLOGY | Facility: CLINIC | Age: 82
End: 2022-05-02
Payer: MEDICARE

## 2022-05-02 VITALS
BODY MASS INDEX: 25.25 KG/M2 | SYSTOLIC BLOOD PRESSURE: 102 MMHG | HEART RATE: 55 BPM | TEMPERATURE: 97.9 F | WEIGHT: 171 LBS | OXYGEN SATURATION: 95 % | RESPIRATION RATE: 16 BRPM | DIASTOLIC BLOOD PRESSURE: 70 MMHG

## 2022-05-02 DIAGNOSIS — G70.00 OCULAR MYASTHENIA (H): ICD-10-CM

## 2022-05-02 DIAGNOSIS — G91.2 NPH (NORMAL PRESSURE HYDROCEPHALUS) (H): Primary | ICD-10-CM

## 2022-05-02 DIAGNOSIS — D51.9 VITAMIN B12 DEFICIENCY ANEMIA, UNSPECIFIED: ICD-10-CM

## 2022-05-02 DIAGNOSIS — G21.9 SECONDARY PARKINSONISM, UNSPECIFIED SECONDARY PARKINSONISM TYPE (H): ICD-10-CM

## 2022-05-02 LAB
ANION GAP SERPL CALCULATED.3IONS-SCNC: 13 MMOL/L (ref 5–18)
BUN SERPL-MCNC: 15 MG/DL (ref 8–28)
CALCIUM SERPL-MCNC: 8.7 MG/DL (ref 8.5–10.5)
CHLORIDE BLD-SCNC: 104 MMOL/L (ref 98–107)
CO2 SERPL-SCNC: 22 MMOL/L (ref 22–31)
CREAT SERPL-MCNC: 0.9 MG/DL (ref 0.7–1.3)
GFR SERPL CREATININE-BSD FRML MDRD: 85 ML/MIN/1.73M2
GLUCOSE BLD-MCNC: 79 MG/DL (ref 70–125)
HGB BLD-MCNC: 12.7 G/DL (ref 13.3–17.7)
POTASSIUM BLD-SCNC: 4.2 MMOL/L (ref 3.5–5)
SODIUM SERPL-SCNC: 139 MMOL/L (ref 136–145)

## 2022-05-02 PROCEDURE — 99215 OFFICE O/P EST HI 40 MIN: CPT | Performed by: PSYCHIATRY & NEUROLOGY

## 2022-05-02 PROCEDURE — 80048 BASIC METABOLIC PNL TOTAL CA: CPT | Performed by: NURSE PRACTITIONER

## 2022-05-02 PROCEDURE — 99417 PROLNG OP E/M EACH 15 MIN: CPT | Performed by: PSYCHIATRY & NEUROLOGY

## 2022-05-02 PROCEDURE — 36415 COLL VENOUS BLD VENIPUNCTURE: CPT | Performed by: NURSE PRACTITIONER

## 2022-05-02 PROCEDURE — 85018 HEMOGLOBIN: CPT | Performed by: NURSE PRACTITIONER

## 2022-05-02 RX ORDER — PRAMIPEXOLE DIHYDROCHLORIDE 0.25 MG/1
0.25 TABLET ORAL
Status: ON HOLD | COMMUNITY
Start: 2022-04-27 | End: 2022-12-15

## 2022-05-02 RX ORDER — PROPRANOLOL HYDROCHLORIDE 10 MG/1
30 TABLET ORAL
COMMUNITY
Start: 2022-04-27 | End: 2022-12-08

## 2022-05-02 RX ORDER — LIDOCAINE 4 G/G
PATCH TOPICAL
COMMUNITY
Start: 2022-04-27 | End: 2022-12-08

## 2022-05-02 ASSESSMENT — PAIN SCALES - GENERAL: PAINLEVEL: MILD PAIN (3)

## 2022-05-02 NOTE — LETTER
2022       RE: Gustavo Ramon  [address] [facility]  [place] [address]     Dear Colleague,    Thank you for referring your patient, Gustavo Ramon, to the [facility] NEUROLOG[facility] [place] [facility]. Please see a copy of my visit note below.    Service Date: 2022    Winston Silverman PA-C  [facility]  [address]  [place] [address]    RE:  Gustavo Ramon  MRN:  [id number]  :  1940    Dear Mr. Silverman:    I had the pleasure to see Mr. Ramon in followup at the [facility] today.  He is a very pleasant 81-year-old gentleman with ocular myasthenia gravis that I have been following in clinic and I last saw him on 2022.  His myasthenia is relatively stable lately.  He continues to have a moderate degree of eyelid ptosis that occurs not daily.  He is on 20 mg of prednisone daily and pyridostigmine 60 mg t.i.d. to q.i.d.  In the last visit, I had offered him mycophenolate in an effort to taper the prednisone, but he stopped it in 2022, a month after starting it, for the reasons outlined below.  At this point, he denies diplopia and he no more has dysphagia.  He had some concerns about his swallowing and food getting stuck in his throat in January, and we ended up doing a video swallow study on 2022 which showed flash penetration without aspiration of thin liquids but no penetration or aspiration with pudding or cookie coated in barium.  He no more complains of dysphagia.  He has no difficulty chewing.  He does note that his voice pitch is lower than it used to be. He needs to use his arms at all times to get up from a chair but he does not need assistance from another person.  His arms also do fatigue a little bit when he brushes his teeth or tian his hair.  He denies shortness of breath with exertion.  He has no sialorrhea.      The new problem in the  last 3 months is a major decline of his gait and balance.  This was first noted around 02/15/2022.  At that time, he was 1 month on the mycophenolate mofetil, and family called me to report that he was very shaky.  He has noticed increasing tremor on both hands, left more than right, which happens mostly when he activates the hands such as when trying to pour coffee from a mug or writing.  Sometimes the tremor has a jerky irregular component as well.  His legs have become more shaky too. At the same time, he began losing his balance and falling several times.  His gait is shuffling, and he freezes at unpredictable times including in narrow passages.  He has difficulty turning.  He needs assistance to get around.  This was a rather rapid deterioration of his gait, as when I last saw him in January 2022, we had absolutely no concerns about that.  At the same time, the family has noted a change in his personality and cognition.  It has to be pointed out that he has been in and out of hospitals in the last month; he was hospitalized at [place] for 20 days in 4/2022, and he is now in Re[facility] ([facility]). He seems to be sundowning in the evening and becoming more confused, but his family has noted that even in the morning, his memory, attention and executive function are poor, much worse than last year. He does not have any hallucinations.      Because of the emergence of a new gait disorder, I asked him to stop mycophenolate on 03/2022 and to go to the Emergency Department.  On 03/31 he went to the [facility] ED, where he got a brain MRI, which I personally reviewed.  There is ventriculomegaly out of proportion to the cerebral atrophy.  The Dempsey ratio was 0.36 (normal is less than 0.3).  I calculated it.  The callosal angle was 84 degrees, normal is more than 100.  These can be signs of normal pressure hydrocephalus.  He was hospitalized at [facility] and then [facility].  He underwent Neurology  evaluation there, and an EEG showed generalized slowing but no seizures or epileptiform discharges.  He had two lumbar punctures attempted by Interventional Radiology that were very traumatic and difficult, and no more than 5-6 mL of fluid were removed each time.  The fluid was hemorrhagic with a very high protein and elevated white count which could be corrected due to the RBC.  I do not think that a proper assessment for hydrocephalus was done in this context.    He also has worsening restless leg syndrome. He is currently on tamsulosin, pantoprazole, ondansetron, multivitamin, MiraLax, melatonin, Keppra 250 mg daily, pramipexole 0.25 in the evening, propranolol for hypertension 10 mg in the morning and 30 in the evening, prednisone 20 daily, ferrous sulfate, pyridostigmine 60 t.i.d. and simvastatin 20 daily.    He has an appointment with Dr. Childs from Neurosurgery on 05/04.  He recently tested positive for COVID 04/13/2022.Additional labs he got an [employer] were vitamin E levels, which were mildly reduced, gamma was 0.4, but alpha was normal.  Comprehensive metabolic panel was significant for hyperglycemia and mildly elevated creatinine.  Encephalopathy autoimmune evaluation from serum was negative. TSH was within normal limits.  Ammonia was normal.  CK normal and UA showed no signs of UTI initially.    /70   Pulse 55   Temp 97.9  F (36.6  C)   Resp 16   Wt 77.6 kg (171 lb)   SpO2 95%   BMI 25.25 kg/m      PHYSICAL EXAMINATION:  On exam today, Mr. Ramon remains in good spirits.  He is awake, alert.  His MoCA test score was 16/30.  He lost points for Trail Making Test (-1) copying a cube (-1), drawing a clock (nothing was correct, -3).  He did 2 errors on serial 7 subtractions (-1), 1 in language repetition (-1), and he could recall 0 out of 5 objects in a delayed fashion (-5); l, categorical clues helped somewhat but not fully. He was oriented to month, year, place and city but not to the day of the  week and the exact date (-2).     On exam he has bilateral eyelid ptosis that did not appear fatigable, of moderate degree.  There was no weakness of orbicularis oculi.  There was no diplopia at the primary gaze position.  There was diplopia after about 15 seconds of sustained upward gaze and about 10 seconds of right lateral gaze.  Left lateral gaze or downward did not elicit double vision, and ductions and versions were normal.  There was no nystagmus or other saccadic abnormality.  Face was symmetric.  There was no weakness of orbicularis oris, uvula, jaw, palate or tongue on my exam.  No nasal quality of dysphonia or dysarthria.  Speech was slightly hypophonic.  Neck flexion and extension strength were full.  Strength was full in the upper and lower extremities proximally and distally.  There was no obvious weakness.  I did not detect any obvious spasticity or rigidity at rest in the upper and lower extremities, and tone looked normal.  However, there was a moderate degree of cogwheel rigidity at the right wrist when the left hand was exercised.  Agility of finger tapping was reduced with decrement and hypokinesia on the right hand but was excellent at the left hand.  Fist opening and closure was also a bit slower in the right hand compared to the left.  Agility of foot tapping was very good on both sides, albeit slightly slower in the right but still fairly brisk.  Reflexes were 3+ at the knees, 3+ at the upper extremities with mild vertical spread.  He had positive bilateral Jorge L.  Toes were equivocal on both sides.  There was no muscle atrophy or fasciculations.  He had a very prominent postural and kinetic tremor of the hands much more than resting.  Finger-to-nose was not done without any dysmetria, and I did not see any definite myoclonus or asterixis.  He had to use his arms to get up from a chair.  His gait was very impressive, totally magnetic with feet everted and glued to the floor, and marked  difficulty turning.  It was not a shuffling parkinsonian gait.  Arm swing was fair.  He had positive retropulsion and propulsion signs and moderate to severe postural instability.    In summary, Mr. Ramon's myasthenia is probably relatively stable.  Because steroids may cause side effects including increased risk of infection and tremor, which is very evident on today's exam, and the anticipation of possible upcoming surgeries, I will reduce the prednisone dose to 15 mg daily x1 month, then 10 mg daily.      I believe he likely has normal pressure hydrocephalus.  His gait is absolutely characteristic for this diagnosis, and importantly, both the gait disorder and the cognitive dysfunction have developed subacutely over a few months, as is typical for NPH, and not chronically and indolently as happens with other dementing disorders.  Despite the low MoCA score, I think he would be a reasonable candidate for a lumbar drain trial.  He had 2 very traumatic spinal taps at bedside by Interventional Radiology at [place], and I would not attempt a third LP.  If he passes the drain test after careful PT evaluation and videotaping of his gait prior and after the drain, then I would consider him for a  shunt.  I explained the procedure, and I will directly communicate my impression to Dr Childs.   Exam showed also some asymmetric rigidity and bradykinesia on the right hand and theoretically this can happen with Parkinson's disease, but I do not believe that his gait is consistent with Parkinson's for the reasons explained above.      I will see the patient in followup in clinic in 3 months or sooner if needed.    Total time spent on this visit today was very long, about 75 minutes, including 45 face-to-face, 10 on post-visit note dictation, editing and orders, and 20 in pre-visit chart review.    Sincerely,    Tyler Wheat MD        D: 05/02/2022   T: 05/02/2022   MT: yaima    Name:     DAISHA RAMON  MRN:       [id number]        Account:      [id number]   :      1940           Service Date: 2022       Document: [id number]

## 2022-05-02 NOTE — PATIENT INSTRUCTIONS
I think you likely have normal pressure hydrocephalus and you would potentially benefit from admission to the hospital under Neurosurgery for a lumbar drain trial. I will talk to Dr Childs in detail about it    Continue your rehab    Prednisone: reduce to 15 mg daily now for 1 month, then 10 mg daily    Follow up 3 months

## 2022-05-02 NOTE — NURSING NOTE
Chief Complaint   Patient presents with     Myasthenia Gravis     Return     Matthew Christiansen

## 2022-05-03 ENCOUNTER — TRANSITIONAL CARE UNIT VISIT (OUTPATIENT)
Dept: GERIATRICS | Facility: CLINIC | Age: 82
End: 2022-05-03
Payer: MEDICARE

## 2022-05-03 VITALS
SYSTOLIC BLOOD PRESSURE: 145 MMHG | TEMPERATURE: 99.1 F | HEART RATE: 69 BPM | WEIGHT: 175.5 LBS | HEIGHT: 68 IN | RESPIRATION RATE: 18 BRPM | OXYGEN SATURATION: 94 % | DIASTOLIC BLOOD PRESSURE: 67 MMHG | BODY MASS INDEX: 26.6 KG/M2

## 2022-05-03 DIAGNOSIS — G91.9 HYDROCEPHALUS, UNSPECIFIED TYPE (H): Primary | ICD-10-CM

## 2022-05-03 DIAGNOSIS — E27.40 ADRENAL INSUFFICIENCY (H): ICD-10-CM

## 2022-05-03 DIAGNOSIS — G70.00 OCULAR MYASTHENIA GRAVIS (H): ICD-10-CM

## 2022-05-03 PROCEDURE — 99308 SBSQ NF CARE LOW MDM 20: CPT | Performed by: NURSE PRACTITIONER

## 2022-05-03 NOTE — PROGRESS NOTES
Service Date: 2022    Winston Silverman PA-C  [facility]  [id number] [place], [place] [address], [place] [address]    RE:  Gustavo Ramon  MRN:  [id number]  :  1940    Dear Mr. Silverman:    I had the pleasure to see Mr. Ramon in followup at the [facility] today.  He is a very pleasant 81-year-old gentleman with ocular myasthenia gravis that I have been following in clinic and I last saw him on 2022.  His myasthenia is relatively stable lately.  He continues to have a moderate degree of eyelid ptosis that occurs not daily.  He is on 20 mg of prednisone daily and pyridostigmine 60 mg t.i.d. to q.i.d.  In the last visit, I had offered him mycophenolate in an effort to taper the prednisone, but he stopped it in 2022, a month after starting it, for the reasons outlined below.  At this point, he denies diplopia and he no more has dysphagia.  He had some concerns about his swallowing and food getting stuck in his throat in January, and we ended up doing a video swallow study on 2022 which showed flash penetration without aspiration of thin liquids but no penetration or aspiration with pudding or cookie coated in barium.  He no more complains of dysphagia.  He has no difficulty chewing.  He does note that his voice pitch is lower than it used to be. He needs to use his arms at all times to get up from a chair but he does not need assistance from another person.  His arms also do fatigue a little bit when he brushes his teeth or tian his hair.  He denies shortness of breath with exertion.  He has no sialorrhea.      The new problem in the last 3 months is a major decline of his gait and balance.  This was first noted around 02/15/2022.  At that time, he was 1 month on the mycophenolate mofetil, and family called me to report that he was very shaky.  He has noticed increasing tremor on both hands, left more than right, which happens mostly when he activates the  hands such as when trying to pour coffee from a mug or writing.  Sometimes the tremor has a jerky irregular component as well.  His legs have become more shaky too. At the same time, he began losing his balance and falling several times.  His gait is shuffling, and he freezes at unpredictable times including in narrow passages.  He has difficulty turning.  He needs assistance to get around.  This was a rather rapid deterioration of his gait, as when I last saw him in January 2022, we had absolutely no concerns about that.  At the same time, the family has noted a change in his personality and cognition.  It has to be pointed out that he has been in and out of hospitals in the last month; he was hospitalized at [place] for 20 days in 4/2022, and he is now in [facility] ([facility]). He seems to be sundowning in the evening and becoming more confused, but his family has noted that even in the morning, his memory, attention and executive function are poor, much worse than last year. He does not have any hallucinations.      Because of the emergence of a new gait disorder, I asked him to stop mycophenolate on 03/2022 and to go to the Emergency Department.  On 03/31 he went to the [facility] ED, where he got a brain MRI, which I personally reviewed.  There is ventriculomegaly out of proportion to the cerebral atrophy.  The Dempsey ratio was 0.36 (normal is less than 0.3).  I calculated it.  The callosal angle was 84 degrees, normal is more than 100.  These can be signs of normal pressure hydrocephalus.  He was hospitalized at [facility] and then [place].  He underwent Neurology evaluation there, and an EEG showed generalized slowing but no seizures or epileptiform discharges.  He had two lumbar punctures attempted by Interventional Radiology that were very traumatic and difficult, and no more than 5-6 mL of fluid were removed each time.  The fluid was hemorrhagic with a very high protein and elevated  white count which could be corrected due to the RBC.  I do not think that a proper assessment for hydrocephalus was done in this context.    He also has worsening restless leg syndrome. He is currently on tamsulosin, pantoprazole, ondansetron, multivitamin, MiraLax, melatonin, Keppra 250 mg daily, pramipexole 0.25 in the evening, propranolol for hypertension 10 mg in the morning and 30 in the evening, prednisone 20 daily, ferrous sulfate, pyridostigmine 60 t.i.d. and simvastatin 20 daily.    He has an appointment with Dr. Childs from Neurosurgery on 05/04.  He recently tested positive for COVID 04/13/2022.Additional labs he got an [employer] were vitamin E levels, which were mildly reduced, gamma was 0.4, but alpha was normal.  Comprehensive metabolic panel was significant for hyperglycemia and mildly elevated creatinine.  Encephalopathy autoimmune evaluation from serum was negative. TSH was within normal limits.  Ammonia was normal.  CK normal and UA showed no signs of UTI initially.    /70   Pulse 55   Temp 97.9  F (36.6  C)   Resp 16   Wt 77.6 kg (171 lb)   SpO2 95%   BMI 25.25 kg/m      PHYSICAL EXAMINATION:  On exam today, Mr. Ramon remains in good spirits.  He is awake, alert.  His MoCA test score was 16/30.  He lost points for Trail Making Test (-1) copying a cube (-1), drawing a clock (nothing was correct, -3).  He did 2 errors on serial 7 subtractions (-1), 1 in language repetition (-1), and he could recall 0 out of 5 objects in a delayed fashion (-5); l, categorical clues helped somewhat but not fully. He was oriented to month, year, place and city but not to the day of the week and the exact date (-2).     On exam he has bilateral eyelid ptosis that did not appear fatigable, of moderate degree.  There was no weakness of orbicularis oculi.  There was no diplopia at the primary gaze position.  There was diplopia after about 15 seconds of sustained upward gaze and about 10 seconds of right lateral  gaze.  Left lateral gaze or downward did not elicit double vision, and ductions and versions were normal.  There was no nystagmus or other saccadic abnormality.  Face was symmetric.  There was no weakness of orbicularis oris, uvula, jaw, palate or tongue on my exam.  No nasal quality of dysphonia or dysarthria.  Speech was slightly hypophonic.  Neck flexion and extension strength were full.  Strength was full in the upper and lower extremities proximally and distally.  There was no obvious weakness.  I did not detect any obvious spasticity or rigidity at rest in the upper and lower extremities, and tone looked normal.  However, there was a moderate degree of cogwheel rigidity at the right wrist when the left hand was exercised.  Agility of finger tapping was reduced with decrement and hypokinesia on the right hand but was excellent at the left hand.  Fist opening and closure was also a bit slower in the right hand compared to the left.  Agility of foot tapping was very good on both sides, albeit slightly slower in the right but still fairly brisk.  Reflexes were 3+ at the knees, 3+ at the upper extremities with mild vertical spread.  He had positive bilateral Jorge L.  Toes were equivocal on both sides.  There was no muscle atrophy or fasciculations.  He had a very prominent postural and kinetic tremor of the hands much more than resting.  Finger-to-nose was not done without any dysmetria, and I did not see any definite myoclonus or asterixis.  He had to use his arms to get up from a chair.  His gait was very impressive, totally magnetic with feet everted and glued to the floor, and marked difficulty turning.  It was not a shuffling parkinsonian gait.  Arm swing was fair.  He had positive retropulsion and propulsion signs and moderate to severe postural instability.    In summary, Mr. Ramon's myasthenia is probably relatively stable.  Because steroids may cause side effects including increased risk of infection  and tremor, which is very evident on today's exam, and the anticipation of possible upcoming surgeries, I will reduce the prednisone dose to 15 mg daily x1 month, then 10 mg daily.      I believe he likely has normal pressure hydrocephalus.  His gait is absolutely characteristic for this diagnosis, and importantly, both the gait disorder and the cognitive dysfunction have developed subacutely over a few months, as is typical for NPH, and not chronically and indolently as happens with other dementing disorders.  Despite the low MoCA score, I think he would be a reasonable candidate for a lumbar drain trial.  He had 2 very traumatic spinal taps at bedside by Interventional Radiology at [place], and I would not attempt a third LP.  If he passes the drain test after careful PT evaluation and videotaping of his gait prior and after the drain, then I would consider him for a  shunt.  I explained the procedure, and I will directly communicate my impression to Dr Childs.   Exam showed also some asymmetric rigidity and bradykinesia on the right hand and theoretically this can happen with Parkinson's disease, but I do not believe that his gait is consistent with Parkinson's for the reasons explained above.      I will see the patient in followup in clinic in 3 months or sooner if needed.    Total time spent on this visit today was very long, about 75 minutes, including 45 face-to-face, 10 on post-visit note dictation, editing and orders, and 20 in pre-visit chart review.    Sincerely,    Tyler Wheat MD        D: 2022   T: 2022   MT: yaima    Name:     DAISHA LOZOYA  MRN:      [id number]        Account:      [id number]   :      1940           Service Date: 2022       Document: [id number]

## 2022-05-03 NOTE — LETTER
"    5/3/2022        RE: Gustavo Ramon  [address] [facility] [address]        [facility] GERIATRICS    Chief Complaint   Patient presents with     RECHECK     HPI:  Gustavo Ramon is a 81 year old  (1940), who is being seen today for an episodic care visit at: [facility]) [[id number]].       Brief hospitalization summary  Per Dr. Gutierrez,\"Gustavo Ramon is a 81 y.o. 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 This has resulted in the following impairments: Balance deficits (30/56 on [place]), L hip pain, endurance deficits, cognitive deficits (SLUMS 11/30), tremors, generalized weakness, poor R foot clearance with gait causing the following activity limitations: mobility, self-cares and cognition.     4/29 seen for admission  5/3 doing well, working with therapies    Nursing reported no acute concerns. Stephen was able to recall some of his visit to the neurologist yesterday. No reported concerns from Stephen today    Allergies, and PMH/PSH reviewed in [facility] today.  REVIEW OF SYSTEMS:  4 point ROS including Respiratory, CV, GI and , other than that noted in the HPI,  is negative    Objective:   BP (!) 145/67   Pulse 69   Temp 99.1  F (37.3  C)   Resp 18   Ht 1.727 m (5' 8\")   Wt 79.6 kg (175 lb 8 oz)   SpO2 94%   BMI 26.68 kg/m    GENERAL APPEARANCE:  Alert, in no distress  EYES:  EOM normal, conjunctiva and lids normal  RESP:  respiratory effort and palpation of chest normal, lungs clear to auscultation , no respiratory distress  CV:  Palpation and auscultation of heart done , regular rate and rhythm, no murmur, rub, or gallop  ABDOMEN:  no guarding or rebound, bowel sounds normal  M/S:   no gross deformities  SKIN:  no rash  NEURO:   alert, clear speech, bilateral upper extremities resting tremor  PSYCH:  affect and mood " "normal    Recent Results (from the past 240 hour(s))   Basic metabolic panel    Collection Time: 05/02/22  6:50 AM   Result Value Ref Range    Sodium 139 136 - 145 mmol/L    Potassium 4.2 3.5 - 5.0 mmol/L    Chloride 104 98 - 107 mmol/L    Carbon Dioxide (CO2) 22 22 - 31 mmol/L    Anion Gap 13 5 - 18 mmol/L    Urea Nitrogen 15 8 - 28 mg/dL    Creatinine 0.90 0.70 - 1.30 mg/dL    Calcium 8.7 8.5 - 10.5 mg/dL    Glucose 79 70 - 125 mg/dL    GFR Estimate 85 >60 mL/min/1.73m2   Hemoglobin    Collection Time: 05/02/22  6:50 AM   Result Value Ref Range    Hemoglobin 12.7 (L) 13.3 - 17.7 g/dL       Assessment/Plan:    (G91.9) Hydrocephalus, unspecified type (H)  (primary encounter diagnosis)  Plan:   -Seen by primary neurologist yesterday and thought he would be a \"reasonable candidate\" for a lumbar drain placement.  -Appointment schedule with neurosurgery  tomorrow      (G70.00) Ocular myasthenia gravis (H)  Plan:   -Evaluated yesterday by primary neurologist yesterday and reported to be stable. His prednisone was decreased in anticipation of possible upcoming surgery.      (E27.40) Adrenal insufficiency (H)  Plan:  -Stable  -Lab looked good. Continue steroids     Electronically signed by: Micheal Salamanca CNP              Sincerely,        Micheal Salamanca CNP    "

## 2022-05-04 ENCOUNTER — VIRTUAL VISIT (OUTPATIENT)
Dept: NEUROSURGERY | Facility: CLINIC | Age: 82
End: 2022-05-04
Payer: MEDICARE

## 2022-05-04 ENCOUNTER — PRE VISIT (OUTPATIENT)
Dept: NEUROSURGERY | Facility: CLINIC | Age: 82
End: 2022-05-04

## 2022-05-04 DIAGNOSIS — R26.9 ABNORMAL GAIT: ICD-10-CM

## 2022-05-04 DIAGNOSIS — G91.2 IDIOPATHIC NORMAL PRESSURE HYDROCEPHALUS (H): ICD-10-CM

## 2022-05-04 PROCEDURE — 99203 OFFICE O/P NEW LOW 30 MIN: CPT | Mod: 95 | Performed by: NURSE PRACTITIONER

## 2022-05-04 NOTE — LETTER
2022       RE: Gustavo Ramon  [address] [facility] [address]     Dear Colleague,    Thank you for referring your patient, Gustavo Ramon, to the [facility] NEUROSURGERY CLINIC [place] at [facility]. Please see a copy of my visit note below.      [facility]  Department of Neurosurgery      Name: Gustavo Ramon  MRN: [id number]  Age: 81 year old  : 1940  Referring provider: Maxine Love  2022      Chief Complaint:   NPH  New patient consult    History of Present Illness:   Gustavo Ramon is a 81 year old male with a history of ocular myasthenia gravis who is here today for evaluation of gait imbalance. He follows with Dr. Wheat in our Neurology Dept. Today, I had a video visit with the patient, his wife, son, and daughter.    Patient presents with 3 months history of major decline in all of his gait and balance.  This was initially noted in 2022.  Patient had COVID in 2021 and recovered very well from this.  Until 2022, he was fairly independent with ambulation.  Currently he uses a wheelchair.  Family also reports shuffling gait.    Since 2022, family also noted a change in his personality and cognition.  Per family patient is having trouble with attention, word finding difficulty.  Patient also has urinary urgency, frequency with some hesitancy.  But no urinary incontinence.  Due to new gait disorder, brain MRI was recommended by Dr. Wheat which was completed on 3/31/2022.  Please see the results below.    Per prior notes, he was hospitalized at [facility]  He underwent Neurology evaluation there, and an EEG showed generalized slowing but no seizures or epileptiform discharges.  He had two lumbar punctures attempted by Interventional Radiology that were very traumatic and difficult, and no more than 5-6 mL of fluid were  removed each time.     Review of Systems:   Pertinent items are noted in HPI or as in patient entered ROS below, remainder of complete ROS is negative.   No flowsheet data found.     Active Medications:     Current Outpatient Medications:      ACE/ARB/ARNI NOT PRESCRIBED (INTENTIONAL), Please choose reason not prescribed from choices below., Disp: , Rfl:      acetaminophen (TYLENOL) 325 MG tablet, Take 1-2 tablets (325-650 mg) by mouth every 4 hours as needed for mild pain, Disp: , Rfl:      aspirin 325 MG tablet, Take 325 mg by mouth daily, Disp: , Rfl:      bisacodyl (DULCOLAX) 10 MG suppository, Place 1 suppository (10 mg) rectally daily as needed for constipation, Disp: , Rfl:      cyanocobalamin (VITAMIN B-12) 1000 MCG tablet, Take 1 tablet (1,000 mcg) by mouth daily, Disp: 90 tablet, Rfl: 1     ferrous sulfate (FEROSUL) 325 (65 Fe) MG tablet, Use 1 tab every other day with orange juice, Disp: 60 tablet, Rfl: 1     levETIRAcetam (KEPPRA) 250 MG tablet, Take 1 tablet (250 mg) by mouth 2 times daily, Disp: , Rfl:      Lidocaine (LIDOCARE) 4 % Patch, Apply to intact skin to cover most painful area for max 12hr per 24hr period., Disp: , Rfl:      melatonin 3 MG tablet, Take 1 tablet (3 mg) by mouth At Bedtime, Disp: , Rfl:      multivitamin (OCUVITE) TABS tablet, Take 1 tablet by mouth daily, Disp: , Rfl:      ondansetron (ZOFRAN) 4 MG tablet, Take 1 tablet (4 mg) by mouth every 8 hours as needed for nausea, Disp: , Rfl:      pantoprazole (PROTONIX) 40 MG EC tablet, Take 1 tablet (40 mg) by mouth daily, Disp: , Rfl:      polyethylene glycol (MIRALAX) 17 g packet, Take 17 g by mouth daily, Disp: , Rfl:      pramipexole (MIRAPEX) 0.125 MG tablet, Take 3 tablets (0.375 mg) by mouth At Bedtime (Patient not taking: Reported on 5/2/2022), Disp: 90 tablet, Rfl: 2     pramipexole (MIRAPEX) 0.25 MG tablet, Take 0.25 mg by mouth, Disp: , Rfl:      prednisoLONE acetate (PRED FORTE) 1 % ophthalmic suspension, , Disp: , Rfl:       predniSONE (DELTASONE) 20 MG tablet, TAKE 1 TABLET DAILY, Disp: 90 tablet, Rfl: 3     propranolol (INDERAL) 10 MG tablet, Take 30 mg by mouth, Disp: , Rfl:      propranolol ER (INDERAL LA) 60 MG 24 hr capsule, Take 1 capsule (60 mg) by mouth daily, Disp: 60 capsule, Rfl: 0     pyridostigmine (MESTINON) 60 MG tablet, Take 1 tablet (60 mg) by mouth 3 times daily, Disp: 270 tablet, Rfl: 3     simvastatin (ZOCOR) 20 MG tablet, Take 1 tablet (20 mg) by mouth At Bedtime, Disp: 90 tablet, Rfl: 1     Sodium Chloride, Hypertonic, (MASOOD 128 OP), Uses Masood gel and drops. Gel once a day at night. Drops 4 times a day., Disp: , Rfl:      tamsulosin (FLOMAX) 0.4 MG capsule, Take 1 capsule (0.4 mg) by mouth daily, Disp: , Rfl:      triamcinolone (KENALOG) 0.1 % external cream, Apply a thin layer up to twice daily to affected areas as needed., Disp: 30 g, Rfl: 3     vitamin D3 (CHOLECALCIFEROL) 2000 units tablet, Take 1 tablet by mouth daily, Disp: 90 tablet, Rfl: 1      Allergies:   Mycophenolate and Nkda [no known drug allergies]      Past Medical History:  Past Medical History:   Diagnosis Date     Actinic keratosis      AK (actinic keratosis) 11/15/2012     Amaurosis fugax      Basal cell carcinoma 2009    R medial cheek/nose     Central serous retinopathy left    Eye     Diverticulosis 08/2006     DJD (degenerative joint disease)      GERD (gastroesophageal reflux disease)      Hearing loss     High frequency     History of nonmelanoma skin cancer      History of nonmelanoma skin cancer      HTN (hypertension)      Hyperlipidemia LDL goal < 130      Hyperplastic colon polyp 08/2006     IgA monoclonal gammopathy     IgA kappa     Lattice degeneration of retina right    Eye     Macular degeneration      Nonsenile cataract      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    [facility] consult     Rosacea      Steroid-induced diabetes mellitus, 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     Strabismus      Vitreous detachment left    Eye     Patient  Active Problem List   Diagnosis     Rosacea     DJD (degenerative joint disease)     Ocular myasthenia gravis (H)     Macular pigment deposit     HYPERLIPIDEMIA LDL GOAL <130     IgA monoclonal gammopathy     Retinal hole OS, operculated     Horseshoe tear of retina without detachment OD     History  of basal cell carcinoma     Seborrhea     AK (actinic keratosis)     Malignant neoplasm of prostate (H)     PVD (posterior vitreous detachment)     Amaurosis fugax by Hx neg carotid W/U     Advanced directives, counseling/discussion     Actinic keratosis     Peripheral neuropathy     Nuclear sclerosis of both eyes     Essential hypertension with goal blood pressure less than 140/90     Gastroesophageal reflux disease without esophagitis     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)     Acute respiratory distress syndrome (ARDS) due to COVID-19 virus (H)     Steroid-induced diabetes mellitus, initial encounter (H)     Stage 3a chronic kidney disease (H)     Secondary adrenocortical insufficiency (H)     Physical deconditioning     NPH (normal pressure hydrocephalus) (H)        Past Surgical History:  Past Surgical History:   Procedure Laterality Date     ARTHROSCOPY KNEE RT/LT Left Jan 2010    Knee     BIOPSY  2009/2013/2016    Nose; Prostate; BCC - left arm     COLONOSCOPY  9/24/2019    w/Endoscopy     COLONOSCOPY WITH CO2 INSUFFLATION N/A 9/24/2019    Procedure: COLONOSCOPY, WITH CO2 INSUFFLATION;  Surgeon: Loi Levine MD;  Location: [facility] OR     COMBINED ESOPHAGOSCOPY, GASTROSCOPY, DUODENOSCOPY (EGD) WITH CO2 INSUFFLATION N/A 9/24/2019    Procedure: ESOPHAGOGASTRODUODENOSCOPY, WITH CO2 INSUFFLATION;  Surgeon: Loi Levine MD;  Location: [facility] OR     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     DISKECTOMY, LUMBAR, SINGLE SP  2003    L2-3     ENT SURGERY  1943    Tonsils      ENT SURGERY  2-22-12    Biopsy lesion right pinna     ENT SURGERY  2-24-12    Biopsy leson right pinna     ESOPHAGOSCOPY, GASTROSCOPY,  DUODENOSCOPY (EGD), COMBINED N/A 9/24/2019    Procedure: Esophagogastroduodenoscopy, With Biopsy;  Surgeon: Loi Levine MD;  Location: [facility] OR     SOFT TISSUE SURGERY  May 2010    Basal Cell/right side nose       Family History:   Family History   Problem Relation Age of Onset     Heart Disease Mother      Alzheimer Disease Mother 73     C.A.D. Father      Coronary Artery Disease Father      Diabetes Grandchild         using a pump      Diabetes Paternal Uncle      Heart Disease Paternal Grandmother      Glaucoma No family hx of      Macular Degeneration No family hx of      Melanoma No family hx of      Skin Cancer No family hx of          Social History:   Social History     Tobacco Use     Smoking status: Never Smoker     Smokeless tobacco: Never Used   Vaping Use     Vaping Use: Never used   Substance Use Topics     Alcohol use: No     Alcohol/week: 0.0 standard drinks     Drug use: No        Physical Exam: Limited (video visit)  General: No acute distress.  Oriented to self, date, time, and year.  Able to tell me some history of his symptoms.       Imaging:  3/31/2022 MRI brain:  Impression:  Diffuse generalized cerebral volume loss. Slightly disproportionate  dilation of the lateral ventricles with narrow pericallosal angle  compared with the adjacent cerebral sulci, which can be seen with  normal pressure hydrocephalus. Recommend clinical correlation.  Otherwise no acute infarct or intracranial hemorrhage.     Assessment:  Normal pressure hydrocephalus  New patient consult    Plan:  Patient is referred to our clinic for lumbar drain trial.  We discussed this in detail today.  Patient and family would like to proceed with a trial. I will discuss with Dr. Childs and will contact patient's wife Ceci at [phone] with more details.    Addendum on 5/9/2022: Discussed with Dr. Childs who recommended to proceed with Lumbar drain trial. We will contact patient's wife with additional details.       Antionette  LIZ Hunt [facility]  Department of Neurosurgery    I spent 30 minutes on patient care activities related to this encounter on the date of service, including time spent reviewing the chart, obtaining history and examination and in counseling the patient, and in documentation in the electronic medical record.

## 2022-05-04 NOTE — NURSING NOTE
Chief Complaint   Patient presents with     Video Visit     Wanting to talk about care- Idiopathic normal pressure hydrocephalus       Pt Cheri Ledezma, and Son Chandu answered all questionnaire answers.

## 2022-05-04 NOTE — PROGRESS NOTES
Stephen is a 81 year old who is being evaluated via a billable video visit.      How would you like to obtain your AVS? MyChart  If the video visit is dropped, the invitation should be resent by: Text to cell phone: [phone]  Will anyone else be joining your video visit? Yes: Brisa (daughter) Chandu (son) Ceci ( PT wife) . How would they like to receive their invitation? Text to cell phone: [phone]      Video Start Time: 3.10  Video-Visit Details    Type of service:  Video Visit    Video End Time:3:46 PM    Originating Location (pt. Location): Home    Distant Location (provider location):  [facility] NEUROSURGERY [facility]     Platform used for Video Visit: SpamLion     [facility]  Department of Neurosurgery      Name: Gustavo Ramon  MRN: [id number]  Age: 81 year old  : 1940  Referring provider: Maxine Love  2022      Chief Complaint:   NPH  New patient consult    History of Present Illness:   Gustavo Ramon is a 81 year old male with a history of ocular myasthenia gravis who is here today for evaluation of gait imbalance. He follows with Dr. Wheat in our Neurology Dept. Today, I had a video visit with the patient, his wife, son, and daughter.    Patient presents with 3 months history of major decline in all of his gait and balance.  This was initially noted in 2022.  Patient had COVID in 2021 and recovered very well from this.  Until 2022, he was fairly independent with ambulation.  Currently he uses a wheelchair.  Family also reports shuffling gait.    Since 2022, family also noted a change in his personality and cognition.  Per family patient is having trouble with attention, word finding difficulty.  Patient also has urinary urgency, frequency with some hesitancy.  But no urinary incontinence.  Due to new gait disorder, brain MRI was recommended by Dr. Wheat which was completed on 3/31/2022.  Please see the results  below.    Per prior notes, he was hospitalized at [facility] [place].  He underwent Neurology evaluation there, and an EEG showed generalized slowing but no seizures or epileptiform discharges.  He had two lumbar punctures attempted by Interventional Radiology that were very traumatic and difficult, and no more than 5-6 mL of fluid were removed each time.     Review of Systems:   Pertinent items are noted in HPI or as in patient entered ROS below, remainder of complete ROS is negative.   No flowsheet data found.     Active Medications:     Current Outpatient Medications:      ACE/ARB/ARNI NOT PRESCRIBED (INTENTIONAL), Please choose reason not prescribed from choices below., Disp: , Rfl:      acetaminophen (TYLENOL) 325 MG tablet, Take 1-2 tablets (325-650 mg) by mouth every 4 hours as needed for mild pain, Disp: , Rfl:      aspirin 325 MG tablet, Take 325 mg by mouth daily, Disp: , Rfl:      bisacodyl (DULCOLAX) 10 MG suppository, Place 1 suppository (10 mg) rectally daily as needed for constipation, Disp: , Rfl:      cyanocobalamin (VITAMIN B-12) 1000 MCG tablet, Take 1 tablet (1,000 mcg) by mouth daily, Disp: 90 tablet, Rfl: 1     ferrous sulfate (FEROSUL) 325 (65 Fe) MG tablet, Use 1 tab every other day with orange juice, Disp: 60 tablet, Rfl: 1     levETIRAcetam (KEPPRA) 250 MG tablet, Take 1 tablet (250 mg) by mouth 2 times daily, Disp: , Rfl:      Lidocaine (LIDOCARE) 4 % Patch, Apply to intact skin to cover most painful area for max 12hr per 24hr period., Disp: , Rfl:      melatonin 3 MG tablet, Take 1 tablet (3 mg) by mouth At Bedtime, Disp: , Rfl:      multivitamin (OCUVITE) TABS tablet, Take 1 tablet by mouth daily, Disp: , Rfl:      ondansetron (ZOFRAN) 4 MG tablet, Take 1 tablet (4 mg) by mouth every 8 hours as needed for nausea, Disp: , Rfl:      pantoprazole (PROTONIX) 40 MG EC tablet, Take 1 tablet (40 mg) by mouth daily, Disp: , Rfl:      polyethylene glycol (MIRALAX) 17 g packet,  Take 17 g by mouth daily, Disp: , Rfl:      pramipexole (MIRAPEX) 0.125 MG tablet, Take 3 tablets (0.375 mg) by mouth At Bedtime (Patient not taking: Reported on 5/2/2022), Disp: 90 tablet, Rfl: 2     pramipexole (MIRAPEX) 0.25 MG tablet, Take 0.25 mg by mouth, Disp: , Rfl:      prednisoLONE acetate (PRED FORTE) 1 % ophthalmic suspension, , Disp: , Rfl:      predniSONE (DELTASONE) 20 MG tablet, TAKE 1 TABLET DAILY, Disp: 90 tablet, Rfl: 3     propranolol (INDERAL) 10 MG tablet, Take 30 mg by mouth, Disp: , Rfl:      propranolol ER (INDERAL LA) 60 MG 24 hr capsule, Take 1 capsule (60 mg) by mouth daily, Disp: 60 capsule, Rfl: 0     pyridostigmine (MESTINON) 60 MG tablet, Take 1 tablet (60 mg) by mouth 3 times daily, Disp: 270 tablet, Rfl: 3     simvastatin (ZOCOR) 20 MG tablet, Take 1 tablet (20 mg) by mouth At Bedtime, Disp: 90 tablet, Rfl: 1     Sodium Chloride, Hypertonic, (MASOOD 128 OP), Uses Masood gel and drops. Gel once a day at night. Drops 4 times a day., Disp: , Rfl:      tamsulosin (FLOMAX) 0.4 MG capsule, Take 1 capsule (0.4 mg) by mouth daily, Disp: , Rfl:      triamcinolone (KENALOG) 0.1 % external cream, Apply a thin layer up to twice daily to affected areas as needed., Disp: 30 g, Rfl: 3     vitamin D3 (CHOLECALCIFEROL) 2000 units tablet, Take 1 tablet by mouth daily, Disp: 90 tablet, Rfl: 1      Allergies:   Mycophenolate and Nkda [no known drug allergies]      Past Medical History:  Past Medical History:   Diagnosis Date     Actinic keratosis      AK (actinic keratosis) 11/15/2012     Amaurosis fugax      Basal cell carcinoma 2009    R medial cheek/nose     Central serous retinopathy left    Eye     Diverticulosis 08/2006     DJD (degenerative joint disease)      GERD (gastroesophageal reflux disease)      Hearing loss     High frequency     History of nonmelanoma skin cancer      History of nonmelanoma skin cancer      HTN (hypertension)      Hyperlipidemia LDL goal < 130      Hyperplastic colon polyp  08/2006     IgA monoclonal gammopathy     IgA kappa     Lattice degeneration of retina right    Eye     Macular degeneration      Nonsenile cataract      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    [facility] consult     Rosacea      Steroid-induced diabetes mellitus, 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     Strabismus      Vitreous detachment left    Eye     Patient Active Problem List   Diagnosis     Rosacea     DJD (degenerative joint disease)     Ocular myasthenia gravis (H)     Macular pigment deposit     HYPERLIPIDEMIA LDL GOAL <130     IgA monoclonal gammopathy     Retinal hole OS, operculated     Horseshoe tear of retina without detachment OD     History  of basal cell carcinoma     Seborrhea     AK (actinic keratosis)     Malignant neoplasm of prostate (H)     PVD (posterior vitreous detachment)     Amaurosis fugax by Hx neg carotid W/U     Advanced directives, counseling/discussion     Actinic keratosis     Peripheral neuropathy     Nuclear sclerosis of both eyes     Essential hypertension with goal blood pressure less than 140/90     Gastroesophageal reflux disease without esophagitis     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)     Acute respiratory distress syndrome (ARDS) due to COVID-19 virus (H)     Steroid-induced diabetes mellitus, initial encounter (H)     Stage 3a chronic kidney disease (H)     Secondary adrenocortical insufficiency (H)     Physical deconditioning     NPH (normal pressure hydrocephalus) (H)        Past Surgical History:  Past Surgical History:   Procedure Laterality Date     ARTHROSCOPY KNEE RT/LT Left Jan 2010    Knee     BIOPSY  2009/2013/2016    Nose; Prostate; BCC - left arm     COLONOSCOPY  9/24/2019    w/Endoscopy     COLONOSCOPY WITH CO2 INSUFFLATION N/A 9/24/2019    Procedure: COLONOSCOPY, WITH CO2 INSUFFLATION;  Surgeon: Loi Levine MD;  Location: [facility] OR     COMBINED ESOPHAGOSCOPY, GASTROSCOPY, DUODENOSCOPY (EGD) WITH CO2 INSUFFLATION N/A 9/24/2019    Procedure:  ESOPHAGOGASTRODUODENOSCOPY, WITH CO2 INSUFFLATION;  Surgeon: Loi Levine MD;  Location: [facility] OR     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     DISKECTOMY, LUMBAR, SINGLE SP  2003    L2-3     ENT SURGERY  1943    Tonsils      ENT SURGERY  2-22-12    Biopsy lesion right pinna     ENT SURGERY  2-24-12    Biopsy leson right pinna     ESOPHAGOSCOPY, GASTROSCOPY, DUODENOSCOPY (EGD), COMBINED N/A 9/24/2019    Procedure: Esophagogastroduodenoscopy, With Biopsy;  Surgeon: Loi Levine MD;  Location: [facility] OR     SOFT TISSUE SURGERY  May 2010    Basal Cell/right side nose       Family History:   Family History   Problem Relation Age of Onset     Heart Disease Mother      Alzheimer Disease Mother 73     C.A.D. Father      Coronary Artery Disease Father      Diabetes Grandchild         using a pump      Diabetes Paternal Uncle      Heart Disease Paternal Grandmother      Glaucoma No family hx of      Macular Degeneration No family hx of      Melanoma No family hx of      Skin Cancer No family hx of          Social History:   Social History     Tobacco Use     Smoking status: Never Smoker     Smokeless tobacco: Never Used   Vaping Use     Vaping Use: Never used   Substance Use Topics     Alcohol use: No     Alcohol/week: 0.0 standard drinks     Drug use: No        Physical Exam: Limited (video visit)  General: No acute distress.  Oriented to self, date, time, and year.  Able to tell me some history of his symptoms.       Imaging:  3/31/2022 MRI brain:  Impression:  Diffuse generalized cerebral volume loss. Slightly disproportionate  dilation of the lateral ventricles with narrow pericallosal angle  compared with the adjacent cerebral sulci, which can be seen with  normal pressure hydrocephalus. Recommend clinical correlation.  Otherwise no acute infarct or intracranial hemorrhage.     Assessment:  Normal pressure hydrocephalus  New patient consult    Plan:  Patient is referred to our clinic for lumbar drain trial.   We discussed this in detail today.  Patient and family would like to proceed with a trial. I will discuss with Dr. Childs and will contact patient's wife Ceci at [phone] with more details.    Addendum on 5/9/2022: Discussed with Dr. Childs who recommended to proceed with Lumbar drain trial. We will contact patient's wife with additional details.       Antionette Hunt CNP  Department of Neurosurgery    I spent 30 minutes on patient care activities related to this encounter on the date of service, including time spent reviewing the chart, obtaining history and examination and in counseling the patient, and in documentation in the electronic medical record.

## 2022-05-05 ENCOUNTER — TELEPHONE (OUTPATIENT)
Dept: GERIATRICS | Facility: CLINIC | Age: 82
End: 2022-05-05
Payer: MEDICARE

## 2022-05-05 NOTE — TELEPHONE ENCOUNTER
[facility] Triage Nurse Telephone Encounter    Provider: CORINA Seo CNP  Facility: [facility] Facility Type:  TCU    Caller: Neal  Call Back Number: [phone]    Allergies:    Allergies   Allergen Reactions     Mycophenolate Other (See Comments)     Confusion, abnormal movements     Nkda [No Known Drug Allergies]         Reason for call: Nurse requesting to titrate oxygen. Patient's oxygen has been 97% on room air.     Verbal Order/Direction given by Provider: SO given: May wean supplemental O2 per nursing judgment to maintain O2 saturation > 88%; monitor O2 saturations tid x 3 days after O2 is discontinued, including one O2 saturation during night-time sleep.    Provider giving Order:  CORINA Seo CNP    Verbal Order given to: Neal Mitchell

## 2022-05-09 VITALS
TEMPERATURE: 98.6 F | WEIGHT: 173.3 LBS | SYSTOLIC BLOOD PRESSURE: 115 MMHG | BODY MASS INDEX: 26.27 KG/M2 | HEART RATE: 64 BPM | DIASTOLIC BLOOD PRESSURE: 58 MMHG | RESPIRATION RATE: 19 BRPM | HEIGHT: 68 IN | OXYGEN SATURATION: 95 %

## 2022-05-09 NOTE — PROGRESS NOTES
"[facility] GERIATRICS    Chief Complaint   Patient presents with     RECHECK     HPI:  Gustavo Ramon is a 81 year old  (1940), who is being seen today for an episodic care visit at: [facility] ([facility]) [[id number]].     Brief hospitalization summary  Per Dr. Gutierrez,\"Gustavo Ramon is a 81 y.o. 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 This has resulted in the following impairments: Balance deficits (30/56 on Garvin), L hip pain, endurance deficits, cognitive deficits (SLUMS 11/30), tremors, generalized weakness, poor R foot clearance with gait causing the following activity limitations: mobility, self-cares and cognition.     4/29 seen for admission  5/3 doing well, working with therapies  5/9 continues work with therapies, recently evaluated by neuro-surg for possible LP drain    Stephen reported that his balance remain off. He does continues to have urinary incontinence.     Allergies, and PMH/PSH reviewed in [facility] today.  REVIEW OF SYSTEMS:  4 point ROS including Respiratory, CV, GI and , other than that noted in the HPI,  is negative    Objective:   /58   Pulse 64   Temp 98.6  F (37  C)   Resp 19   Ht 1.727 m (5' 8\")   Wt 78.6 kg (173 lb 4.8 oz)   SpO2 95%   BMI 26.35 kg/m    GENERAL APPEARANCE:  Alert, in no distress  ENT:  Mouth and posterior oropharynx normal, moist mucous membranes  RESP:  respiratory effort and palpation of chest normal, lungs clear to auscultation , no respiratory distress  CV:  Palpation and auscultation of heart done , regular rate and rhythm, no murmur, rub, or gallop  SKIN:  no rash  NEURO:   alert, clear speech, resting tremor upper extremities  PSYCH:  affect and mood normal    Recent Results (from the past 240 hour(s))   Basic metabolic panel    Collection Time: 05/02/22  6:50 AM   Result Value Ref Range    " Sodium 139 136 - 145 mmol/L    Potassium 4.2 3.5 - 5.0 mmol/L    Chloride 104 98 - 107 mmol/L    Carbon Dioxide (CO2) 22 22 - 31 mmol/L    Anion Gap 13 5 - 18 mmol/L    Urea Nitrogen 15 8 - 28 mg/dL    Creatinine 0.90 0.70 - 1.30 mg/dL    Calcium 8.7 8.5 - 10.5 mg/dL    Glucose 79 70 - 125 mg/dL    GFR Estimate 85 >60 mL/min/1.73m2   Hemoglobin    Collection Time: 05/02/22  6:50 AM   Result Value Ref Range    Hemoglobin 12.7 (L) 13.3 - 17.7 g/dL       Assessment/Plan:    (G91.9) Hydrocephalus, unspecified type (H)  (primary encounter diagnosis)  Plan:   -Evaluated by Dr. Childs and recommended to proceed with lumbar drain trial.  Family is in agreement.  Timing per neurosurgery    (I10) Hypertension, unspecified type  Plan:   -Well controlled, continue propanolol         Electronically signed by: Micheal Salamanca, CNP

## 2022-05-10 ENCOUNTER — TELEPHONE (OUTPATIENT)
Dept: NEUROSURGERY | Facility: CLINIC | Age: 82
End: 2022-05-10

## 2022-05-10 ENCOUNTER — TRANSITIONAL CARE UNIT VISIT (OUTPATIENT)
Dept: GERIATRICS | Facility: CLINIC | Age: 82
End: 2022-05-10
Payer: MEDICARE

## 2022-05-10 DIAGNOSIS — G91.9 HYDROCEPHALUS, UNSPECIFIED TYPE (H): Primary | ICD-10-CM

## 2022-05-10 DIAGNOSIS — G91.2 IDIOPATHIC NORMAL PRESSURE HYDROCEPHALUS (H): Primary | ICD-10-CM

## 2022-05-10 DIAGNOSIS — R26.9 ABNORMAL GAIT: ICD-10-CM

## 2022-05-10 DIAGNOSIS — I10 HYPERTENSION, UNSPECIFIED TYPE: ICD-10-CM

## 2022-05-10 PROCEDURE — 99308 SBSQ NF CARE LOW MDM 20: CPT | Performed by: NURSE PRACTITIONER

## 2022-05-10 NOTE — TELEPHONE ENCOUNTER
Writer routed call to Neurosurgery Nurse Pool  Attn: Judie Sykes RNCC   *Patient requesting follow up on\ next steps    Nichol Quintanilla LPN  Neurosurgery

## 2022-05-10 NOTE — TELEPHONE ENCOUNTER
Spoke with son,  Chandu.  Chandu aware the Lumbar Puncture at [place] was not able to get enough CSF fluid to check on NPH.  Patient has been in and out of hospital for the past month, now in rehab center.  Good part of day is shrinking both in the morning and the evening.  Will start process for Lumbar Drain Trial and try to get scheduled asap.  Chandu voices understanding.  Note sent to Dr Persaud to review.    I will get back to patient with information when scheduled .  Voices understanidng.

## 2022-05-10 NOTE — LETTER
"    5/10/2022        RE: Gustavo Ramon  [address] [facility] [address]        [facility] GERIATRICS    Chief Complaint   Patient presents with     RECHECK     HPI:  Gustavo Ramon is a 81 year old  (1940), who is being seen today for an episodic care visit at: [facility] ([facility]) [[id number]].     Brief hospitalization summary  Per Dr. Gutierrez,\"Gustavo Ramon is a 81 y.o. 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 This has resulted in the following impairments: Balance deficits (30/56 on Garvin), L hip pain, endurance deficits, cognitive deficits (SLUMS 11/30), tremors, generalized weakness, poor R foot clearance with gait causing the following activity limitations: mobility, self-cares and cognition.     4/29 seen for admission  5/3 doing well, working with therapies  5/9 continues work with therapies, recently evaluated by neuro-surg for possible LP drain    Stephen reported that his balance remain off. He does continues to have urinary incontinence.     Allergies, and PMH/PSH reviewed in [facility] today.  REVIEW OF SYSTEMS:  4 point ROS including Respiratory, CV, GI and , other than that noted in the HPI,  is negative    Objective:   /58   Pulse 64   Temp 98.6  F (37  C)   Resp 19   Ht 1.727 m (5' 8\")   Wt 78.6 kg (173 lb 4.8 oz)   SpO2 95%   BMI 26.35 kg/m    GENERAL APPEARANCE:  Alert, in no distress  ENT:  Mouth and posterior oropharynx normal, moist mucous membranes  RESP:  respiratory effort and palpation of chest normal, lungs clear to auscultation , no respiratory distress  CV:  Palpation and auscultation of heart done , regular rate and rhythm, no murmur, rub, or gallop  SKIN:  no rash  NEURO:   alert, clear speech, resting tremor upper extremities  PSYCH:  affect and mood normal    Recent Results (from the past 240 " hour(s))   Basic metabolic panel    Collection Time: 05/02/22  6:50 AM   Result Value Ref Range    Sodium 139 136 - 145 mmol/L    Potassium 4.2 3.5 - 5.0 mmol/L    Chloride 104 98 - 107 mmol/L    Carbon Dioxide (CO2) 22 22 - 31 mmol/L    Anion Gap 13 5 - 18 mmol/L    Urea Nitrogen 15 8 - 28 mg/dL    Creatinine 0.90 0.70 - 1.30 mg/dL    Calcium 8.7 8.5 - 10.5 mg/dL    Glucose 79 70 - 125 mg/dL    GFR Estimate 85 >60 mL/min/1.73m2   Hemoglobin    Collection Time: 05/02/22  6:50 AM   Result Value Ref Range    Hemoglobin 12.7 (L) 13.3 - 17.7 g/dL       Assessment/Plan:    (G91.9) Hydrocephalus, unspecified type (H)  (primary encounter diagnosis)  Plan:   -Evaluated by Dr. Childs and recommended to proceed with lumbar drain trial.  Family is in agreement.  Timing per neurosurgery    (I10) Hypertension, unspecified type  Plan:   -Well controlled, continue propanolol         Electronically signed by: Micheal Salamanca CNP              Sincerely,        Micheal Salamanca, CNP

## 2022-05-10 NOTE — TELEPHONE ENCOUNTER
[facility] Call Center    Phone Message    May a detailed message be left on voicemail: yes     Reason for Call: Other: Pt's son Chandu called regarding follow up to appt with Antionette Hunt last week.      He states that they were to get a call about moving forward with lumbar drain.    Please call Chandu back at [phone] to discuss.    Action Taken: Message routed to:  [facility] ([facility]): Neurosurgery    Travel Screening: Not Applicable

## 2022-05-11 ENCOUNTER — TELEPHONE (OUTPATIENT)
Dept: NEUROSURGERY | Facility: CLINIC | Age: 82
End: 2022-05-11
Payer: MEDICARE

## 2022-05-11 NOTE — TELEPHONE ENCOUNTER
----- Message from Yoly Hager MA sent at 5/11/2022 11:12 AM CDT -----  Regarding: RE: Neuropsychological Evaluation for a NPH Lumbar Drain Trial  Not this Friday but tomorrow (5/12)  there is a 12:30 opening. We could do the follow up next week (the 19th) otherwise, we won't have adequate tech availability until Thursday, June 16th. If that doesn't work, we can do the 30th of June as well.   ----- Message -----  From: Judie Sykes RN  Sent: 5/11/2022   9:03 AM CDT  To: Easton Persaud, PhD LP, Yoly Hager MA  Subject: RE: Neuropsychological Evaluation for a NPH #    Yoly,    Thank you for the update.    In my Huddle meeting this morning, it said Dr Persaud had an opening this Friday? If that is correct anyway we could use that time and schedule In Patient whenever he could do a Thursday?    Thanks for the help,  Judie      ----- Message -----  From: Yoly Hager MA  Sent: 5/11/2022   8:15 AM CDT  To: Judie Sykes RN, Easton Persaud, PhD LP  Subject: RE: Neuropsychological Evaluation for a NPH #    Hi Judie!    First available is July 14th, 12:30 for the initial with a follow up on the 21st, also 12:30.    What do you think?    Nathalie   ----- Message -----  From: Easton Persaud, PhD LP  Sent: 5/10/2022   1:44 PM CDT  To: Judie Sykes RN, Yoly Hager MA  Subject: RE: Neuropsychological Evaluation for a NPH #    Nathalie - Can you help Judie figure out when the first available slot is for an NPH lumbar drain patient?    Easton    ----- Message -----  From: Judie Sykes RN  Sent: 5/10/2022   1:34 PM CDT  To: Easton Persaud, PhD LP  Subject: RE: Neuropsychological Evaluation for a NPH #    Thank you,  The Son, Chandu stated he has not had a comprehensive Neuro psych eval as yet.    I am sure you are busy what is the first available?    Thanks!  Judie      ----- Message -----  From: Easton Persaud, PhD LP  Sent: 5/10/2022   1:27 PM CDT  To: Judie Sykes RN  Subject: RE: Neuropsychological Evaluation for a NPH #    I don't  see a report in his chart. I guess you could call him and ask if he's had a comprehensive neuropsychological evaluation in the past?     If he hasn't, I'm happy to get him on the books.     Easton    ----- Message -----  From: Judie Sykes RN  Sent: 5/10/2022  11:46 AM CDT  To: Easton Persaud, PhD LP  Subject: RE: Neuropsychological Evaluation for a NPH #    Dr Hardyron,   I might be off base, but I clicked on the Red Admission Bed from [facility] on 4/22, and read the information about the Care Codes listed.  It might be more of a physical therapy coding, I am not sure.    Thanks Judie      ----- Message -----  From: Easton Persaud, PhD LP  Sent: 5/10/2022  10:50 AM CDT  To: Judie Sykes RN  Subject: RE: Neuropsychological Evaluation for a NPH #    Terrance Dimas,     I don't see a report from a neuropsych exam. I see lots of mentions from his [place] admission saying that neuropsych should be considered. I also see a note from a psychologist saying that he'd be happy to do a neuropsych exam. Can you point me in the right direction?    Easton    ----- Message -----  From: Judie Sykes RN  Sent: 5/10/2022  10:41 AM CDT  To: Easton Persaud, PhD LP  Subject: Neuropsychological Evaluation for a NPH Lumb#    Dr Persaud,  Thank you for working this note.    This patient was admitted and worked up for NPH at [facility] April /2022.  The Lumbar Puncture was not successful.  Dr Wheat had an OV on 5/2 recommending another try and patient was seen by NSG on 5/4/22.  It was determined another Lumbar Drain Trial would be tried for a diagnosis of NPH.  It appears some Neuropsych testing completed during the hospital stay at [place] seen in Care Everywhere.  My question is does he need another Neuropsych evaluation or is there enough information already completed?      Thanks for the help,  Judie AMBROSE    Neurosurgery Care Coordinator

## 2022-05-11 NOTE — TELEPHONE ENCOUNTER
Spoke with son Chandu, [place] scheduled for tomorrow 5/12/22 @ the [facility] with Dr Persaud @ 1230 PM    Direct Admit will be June 27th @ [facility] for Lumbar Drain Trial.  Covid testing needed 3-4 days prior.     Chandu aware of preliminary dates, patient is on ASA 325mg daily and will need to hold 7 days prior to LP.     Will be back in touch with confirmed dates.

## 2022-05-12 ENCOUNTER — OFFICE VISIT (OUTPATIENT)
Dept: NEUROPSYCHOLOGY | Facility: CLINIC | Age: 82
End: 2022-05-12
Attending: NEUROLOGICAL SURGERY
Payer: MEDICARE

## 2022-05-12 DIAGNOSIS — G91.2 IDIOPATHIC NORMAL PRESSURE HYDROCEPHALUS (H): ICD-10-CM

## 2022-05-12 DIAGNOSIS — R26.9 ABNORMAL GAIT: ICD-10-CM

## 2022-05-12 DIAGNOSIS — F06.8 OTHER SPECIFIED MENTAL DISORDERS DUE TO KNOWN PHYSIOLOGICAL CONDITION: ICD-10-CM

## 2022-05-12 DIAGNOSIS — R41.844 FRONTAL LOBE AND EXECUTIVE FUNCTION DEFICIT: ICD-10-CM

## 2022-05-12 DIAGNOSIS — R41.3 MEMORY LOSS: Primary | ICD-10-CM

## 2022-05-12 PROCEDURE — 96138 PSYCL/NRPSYC TECH 1ST: CPT | Performed by: CLINICAL NEUROPSYCHOLOGIST

## 2022-05-12 PROCEDURE — 96132 NRPSYC TST EVAL PHYS/QHP 1ST: CPT | Performed by: CLINICAL NEUROPSYCHOLOGIST

## 2022-05-12 PROCEDURE — 90791 PSYCH DIAGNOSTIC EVALUATION: CPT | Performed by: CLINICAL NEUROPSYCHOLOGIST

## 2022-05-12 PROCEDURE — 96139 PSYCL/NRPSYC TST TECH EA: CPT | Performed by: CLINICAL NEUROPSYCHOLOGIST

## 2022-05-12 PROCEDURE — 96133 NRPSYC TST EVAL PHYS/QHP EA: CPT | Performed by: CLINICAL NEUROPSYCHOLOGIST
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
## 2022-05-13 NOTE — NURSING NOTE
Pt was seen for neuropsychological evaluation at the request of Jean-Paul Childs MD for the purposes of diagnostic clarification and treatment planning. 283 minutes of test administration and scoring were provided by this writer. Please see Dr. Easton Persaud's report for a full interpretation of the findings.    Virgie Espinal  Psychometrist

## 2022-05-16 NOTE — PROGRESS NOTES
"NAME  Gustavo Ramon    MRN  [id number]      38     AGE  81     SEX  Male     HANDEDNESS Right     EDUCATION 16     CHUA  22     PROVIDER  EW     TECH  SW     STATION  OP            ORIENTATION      Time  -2     Personal Info.     Place      Presidents             WAIS-IV         WMI: 80      RDS: 9             Raw SS    Similarities  21 10    Block Design 24 10    Digit Span  18 8    Arithmetic  6 5    Coding  11 4           JOAQUÍN-O COMPLEX FIGURE       Raw T %ile   Copy  12.5  <1   Time to Copy 228  >16                       WRAT    5     SS %ile GE   Reading  109 73 >12.9          COWAT FAS       Raw 31      SS 8      T 43             ANIMAL FLUENCY      Raw 6      SS 2      T 20              BOSTON NAMING TEST     Raw 53 /60     SS 11      %ile 59-74             COMPLEX IDEATIONAL MATERIAL     Raw 5      SS 12      T 8             TRAIL MAKING TEST       Time Errors SSa %ile   A 121 0 2 3   B DC 0 0 0           ALLIE   H   Raw 15      %ile 10-14             GROOVED PEGBOARD       Raw Drops SS T   RH DC @72\" 1 0 0   LH Pt DC @ 169\" 2 0 0          VIV       Raw 5      Interp. Minimal             GDS       Raw 10      Interp. Minimal              HVLT   1     Raw T    Trial 1  3     Trial 2  5     Trial 3  3     Learning  2     Total Recall 11 27    Delayed Recall 1 26    Percent Retention 20% 21    True Positives 10     False Positives 6     Disc. Index  4 <20            BVMT   1     Raw T/%ile    Trial 1  1     Trial 2  5     Trial 3  4     Learning  4     Total Recall 10 -0.77    Delayed Recall 2 -1.59    Percent Retention 40% 0    Recognition Hits 4     Recognition F.P 1     Disc. Index  3 -2.88           DCT       E-Score 15             TSAT        Total z     Time 235 -3.97     Errors 17 -5.88         "

## 2022-05-17 ENCOUNTER — TELEPHONE (OUTPATIENT)
Dept: NEUROLOGY | Facility: CLINIC | Age: 82
End: 2022-05-17

## 2022-05-17 ENCOUNTER — TRANSITIONAL CARE UNIT VISIT (OUTPATIENT)
Dept: GERIATRICS | Facility: CLINIC | Age: 82
End: 2022-05-17
Payer: MEDICARE

## 2022-05-17 VITALS
OXYGEN SATURATION: 97 % | RESPIRATION RATE: 18 BRPM | HEART RATE: 55 BPM | DIASTOLIC BLOOD PRESSURE: 79 MMHG | BODY MASS INDEX: 25.52 KG/M2 | SYSTOLIC BLOOD PRESSURE: 158 MMHG | WEIGHT: 168.4 LBS | HEIGHT: 68 IN | TEMPERATURE: 98 F

## 2022-05-17 DIAGNOSIS — G70.00 OCULAR MYASTHENIA GRAVIS (H): ICD-10-CM

## 2022-05-17 DIAGNOSIS — G25.3 MYOCLONUS: ICD-10-CM

## 2022-05-17 DIAGNOSIS — G91.2 NPH (NORMAL PRESSURE HYDROCEPHALUS) (H): Primary | ICD-10-CM

## 2022-05-17 PROCEDURE — 99309 SBSQ NF CARE MODERATE MDM 30: CPT | Performed by: NURSE PRACTITIONER

## 2022-05-17 NOTE — PROGRESS NOTES
"[facility] GERIATRICS    Chief Complaint   Patient presents with     RECHECK     HPI:  Gustavo Ramon is a 81 year old  (1940), who is being seen today for an episodic care visit at: [facility] AT [facility] ([facility]) [[id number]]. Today's concern is:   1. NPH (normal pressure hydrocephalus) (H)    2. Myoclonus    3. Ocular myasthenia gravis (H)      Patient seen for follow up, moderate cognitive limitations, SLUMS 11/30 post NPH, seen by neuropsych last week, mild confusion and paranoia reported by staff, no apparent myoclonus present, up eating breakfast, pleasant, participating in therapies, mild vision limitations, overall appears healthy.    Allergies, and PMH/PSH reviewed in [facility] today.  REVIEW OF SYSTEMS:  4 point ROS including Respiratory, CV, GI and , other than that noted in the HPI,  is negative    Objective:   BP (!) 158/79   Pulse 55   Temp 98  F (36.7  C)   Resp 18   Ht 1.727 m (5' 8\")   Wt 76.4 kg (168 lb 6.4 oz)   SpO2 97%   BMI 25.61 kg/m    GENERAL APPEARANCE:  in no distress, appears healthy  ENT:  Mouth and posterior oropharynx normal, moist mucous membranes  RESP:  lungs clear to auscultation   CV:  no edema, rate-normal  ABDOMEN:  bowel sounds normal  M/S:   Gait and station abnormal transfer assist  SKIN:  Inspection of skin and subcutaneous tissue baseline  NEURO:   Examination of sensation by touch normal  PSYCH:  oriented X 3    Labs done in SNF are in [facility]. Please refer to them using [facility]/Care Everywhere.    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

## 2022-05-17 NOTE — LETTER
"    5/17/2022        RE: Gustavo Ramon  [address] [facility] [id number]        [facility] GERIATRICS    Chief Complaint   Patient presents with     RECHECK     HPI:  Gustavo Ramon is a 81 year old  (1940), who is being seen today for an episodic care visit at: [facility] AT [facility] ([facility]) [[id number]]. Today's concern is:   1. NPH (normal pressure hydrocephalus) (H)    2. Myoclonus    3. Ocular myasthenia gravis (H)      Patient seen for follow up, moderate cognitive limitations, SLUMS 11/30 post NPH, seen by neuropsych last week, mild confusion and paranoia reported by staff, no apparent myoclonus present, up eating breakfast, pleasant, participating in therapies, mild vision limitations, overall appears healthy.    Allergies, and PMH/PSH reviewed in [facility] today.  REVIEW OF SYSTEMS:  4 point ROS including Respiratory, CV, GI and , other than that noted in the HPI,  is negative    Objective:   BP (!) 158/79   Pulse 55   Temp 98  F (36.7  C)   Resp 18   Ht 1.727 m (5' 8\")   Wt 76.4 kg (168 lb 6.4 oz)   SpO2 97%   BMI 25.61 kg/m    GENERAL APPEARANCE:  in no distress, appears healthy  ENT:  Mouth and posterior oropharynx normal, moist mucous membranes  RESP:  lungs clear to auscultation   CV:  no edema, rate-normal  ABDOMEN:  bowel sounds normal  M/S:   Gait and station abnormal transfer assist  SKIN:  Inspection of skin and subcutaneous tissue baseline  NEURO:   Examination of sensation by touch normal  PSYCH:  oriented X 3    Labs done in SNF are in [facility]. Please refer to them using [employer]/Care Everywhere.    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

## 2022-05-20 ENCOUNTER — TRANSITIONAL CARE UNIT VISIT (OUTPATIENT)
Dept: GERIATRICS | Facility: CLINIC | Age: 82
End: 2022-05-20
Payer: MEDICARE

## 2022-05-20 VITALS
BODY MASS INDEX: 26.43 KG/M2 | RESPIRATION RATE: 16 BRPM | HEART RATE: 64 BPM | DIASTOLIC BLOOD PRESSURE: 84 MMHG | OXYGEN SATURATION: 96 % | HEIGHT: 68 IN | WEIGHT: 174.4 LBS | SYSTOLIC BLOOD PRESSURE: 154 MMHG | TEMPERATURE: 97.8 F

## 2022-05-20 DIAGNOSIS — U07.1 INFECTION DUE TO 2019 NOVEL CORONAVIRUS: ICD-10-CM

## 2022-05-20 DIAGNOSIS — G93.40 ENCEPHALOPATHY: ICD-10-CM

## 2022-05-20 DIAGNOSIS — G91.2 NPH (NORMAL PRESSURE HYDROCEPHALUS) (H): Primary | ICD-10-CM

## 2022-05-20 PROCEDURE — 99309 SBSQ NF CARE MODERATE MDM 30: CPT | Performed by: NURSE PRACTITIONER

## 2022-05-20 NOTE — PROGRESS NOTES
"[facility] GERIATRICS    Chief Complaint   Patient presents with     RECHECK     HPI:  Gustavo Ramon is a 81 year old  (1940), who is being seen today for an episodic care visit at: [facility] ([facility]) [[id number]]. Today's concern is:   1. NPH (normal pressure hydrocephalus) (H)    2. Encephalopathy    3. Infection due to 2019 novel coronavirus      Patient seen for follow up with therapy present, moderate cognitive limitations with SLUMS 11/30, able to verbalize needs, pleasantly confused, no falls nor behaviors reported, also has had covid 2x and tested positive on 11/15/21 and 4/13/22, no measurable residual effects noted, requires ADL assist, no distress.     Allergies, and PMH/PSH reviewed in [facility] today.  REVIEW OF SYSTEMS:  4 point ROS including Respiratory, CV, GI and , other than that noted in the HPI,  is negative    Objective:   BP (!) 154/84   Pulse 64   Temp 97.8  F (36.6  C)   Resp 16   Ht 1.727 m (5' 8\")   Wt 79.1 kg (174 lb 6.4 oz)   SpO2 96%   BMI 26.52 kg/m    GENERAL APPEARANCE:  in no distress, appears healthy  ENT:  Mouth and posterior oropharynx normal, moist mucous membranes  RESP:  lungs clear to auscultation   CV:  no edema, rate-normal  ABDOMEN:  bowel sounds normal  M/S:   Gait and station abnormal unsteady, transfer assist  SKIN:  Inspection of skin and subcutaneous tissue baseline  NEURO:   Examination of sensation by touch normal  PSYCH:  oriented X 3    Labs done in SNF are in [facility]. Please refer to them using [facility]/Care Everywhere.    Assessment/Plan:  (G91.2) NPH (normal pressure hydrocephalus) (H)  (primary encounter diagnosis)  (G93.40) Encephalopathy  Comment: seen by neuropsych  on 5/12, SLUMS 11/30  Plan:   -continue keppra, propranolol, prednisone  -has urology appt  on 6/7  -has [facility] direct admit 6/27 for lumbar drain placement  -PT/OT to continue  -consider f/u labs in 1-2 weeks    (U07.1) Infection due to 2019 novel " coronavirus  Comment: tested + on 11/15 and 4/13, no apparent post-covid symptoms  Plan: testing done at facility as scheduled  -oxygen discontinued; sats WNL        Electronically signed by: CORINA Dumont CNP

## 2022-05-20 NOTE — LETTER
"    5/20/2022        RE: Gustavo Ramon  [address] [facility] [address]        [facility]    Chief Complaint   Patient presents with     RECHECK     HPI:  Gustavo Ramon is a 81 year old  (1940), who is being seen today for an episodic care visit at: [facility] AT [facility] ([facility]) [[id number]]. Today's concern is:   1. NPH (normal pressure hydrocephalus) (H)    2. Encephalopathy    3. Infection due to 2019 novel coronavirus      Patient seen for follow up with therapy present, moderate cognitive limitations with SLUMS 11/30, able to verbalize needs, pleasantly confused, no falls nor behaviors reported, also has had covid 2x and tested positive on 11/15/21 and 4/13/22, no measurable residual effects noted, requires ADL assist, no distress.     Allergies, and PMH/PSH reviewed in EPIC today.  REVIEW OF SYSTEMS:  4 point ROS including Respiratory, CV, GI and , other than that noted in the HPI,  is negative    Objective:   BP (!) 154/84   Pulse 64   Temp 97.8  F (36.6  C)   Resp 16   Ht 1.727 m (5' 8\")   Wt 79.1 kg (174 lb 6.4 oz)   SpO2 96%   BMI 26.52 kg/m    GENERAL APPEARANCE:  in no distress, appears healthy  ENT:  Mouth and posterior oropharynx normal, moist mucous membranes  RESP:  lungs clear to auscultation   CV:  no edema, rate-normal  ABDOMEN:  bowel sounds normal  M/S:   Gait and station abnormal unsteady, transfer assist  SKIN:  Inspection of skin and subcutaneous tissue baseline  NEURO:   Examination of sensation by touch normal  PSYCH:  oriented X 3    Labs done in SNF are in [facility]. Please refer to them using [facility] Everywhere.    Assessment/Plan:  (G91.2) NPH (normal pressure hydrocephalus) (H)  (primary encounter diagnosis)  (G93.40) Encephalopathy  Comment: seen by neuropsych  on 5/12, SLUMS 11/30  Plan:   -continue keppra, propranolol, prednisone  -has urology appt  on 6/7  -has [facility] direct admit 6/27 for lumbar drain " placement  -PT/OT to continue  -consider f/u labs in 1-2 weeks    (U07.1) Infection due to 2019 novel coronavirus  Comment: tested + on 11/15 and 4/13, no apparent post-covid symptoms  Plan: testing done at facility as scheduled  -oxygen discontinued; sats WNL        Electronically signed by: CORINA Dumont CNP             Sincerely,        CORINA Dumont CNP

## 2022-05-20 NOTE — PROGRESS NOTES
"[place] [facility]  PRIMARY CARE PROVIDER AND CLINIC:  Winston Silverman PA-C, [address] / [place] [address]  Chief Complaint   Patient presents with     Hospital F/U     [place] Medical Record Number:  [id number]  Place of Service where encounter took place:  [facility]  AT [facility] (TCU) [[id number]]    Gustavo Ramon  is a 81 year old  (1940), admitted to the above facility from  [facility]. Hospital stay 4/13/22 through 4/28/22..  Admitted to this facility for  rehab, medical management and nursing care.    HPI:    HPI information obtained from: facility chart records, facility staff, patient report and [facility] chart review.   Brief Summary of Hospital Course:   * Pt with PMH notable for Ocular Myasthenia, NPH (MRI on 3/3/22, query shunt placement in the future) and covid19 / hypoxic respiratory failure,  Adrenal Insufficiency on chronic prednisone, therapy, hospitalized with gate imbalance.  * Neurology consulted, etiology felt to be 2/2 NPH, cerebral atrophy/small vs ischemic dz, non-traumatic brain injury.  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   * LLE superficial venous thrombosis, managed with warm compress      Today  - [place] / rehab:  Reports balance not good enough, cannot stand for a very long without support. Unsure if doing a progress, used to be able to squash on his own. RN reports walk using a walker with A1.   - Rear hurt: \" they putting med on it\". Wife reports he does everything they want him to do. \" He is not a morning person\".   - tremor: reports sometimes abrupt and violent, and he does not like that. Usually in the morning can be either hand. \" I do not have restless leg syndrome as I used to\".  Reports can handle a spoon and a for one at  time. \" I cannot use my computer I used to, I cannot hit the button\".   - Dementia: wife reports the memory good most " of the time, but at times could loose completely his memory, and would not recall previous day events, or who he met. Pt reports visual hallucination, seems real to him at night related to sleep or lack of sleep., but sometimes.     ===========================    CODE STATUS/ADVANCE DIRECTIVES DISCUSSION:   DNR  Patient's living condition: lives with spouse  ALLERGIES: Mycophenolate and Nkda [no known drug allergies]  PAST MEDICAL HISTORY:  has a past medical history of Actinic keratosis, AK (actinic keratosis) (11/15/2012), Amaurosis fugax, Basal cell carcinoma (2009), Central serous retinopathy (left), Diverticulosis (08/2006), DJD (degenerative joint disease), GERD (gastroesophageal reflux disease), Hearing loss, History of nonmelanoma skin cancer, History of nonmelanoma skin cancer, HTN (hypertension), Hyperlipidemia LDL goal < 130, Hyperplastic colon polyp (08/2006), IgA monoclonal gammopathy, Lattice degeneration of retina (right), Macular degeneration, Nonsenile cataract, 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    He has no past medical history of Acne vulgaris, Allergies, Amblyopia, Cerebral infarction (H), Complication of anesthesia, Congestive heart failure (H), COPD (chronic obstructive pulmonary disease) (H), Diabetic retinopathy associated with diabetes mellitus due to underlying condition (H), Eczema, Glaucoma (increased eye pressure), Heart valve disorder, History of blood transfusion, Malignant hyperthermia, Malignant melanoma nos, Pacemaker, Photosensitive contact dermatitis, PONV (postoperative nausea and vomiting), Psoriasis, Sleep apnea, Squamous cell carcinoma, Thyroid disease, Type II or unspecified type diabetes mellitus without mention of complication, not stated as uncontrolled, Uncomplicated asthma, Unspecified asthma(493.90), Urticaria, or Uveitis.  PAST SURGICAL  HISTORY:   has a past surgical history that includes arthroscopy knee rt/lt (Left, Jan 2010); diskectomy, lumbar, single sp (2003); Soft tissue surgery (May 2010); ENT surgery (1943); ENT surgery (2-22-12); ENT surgery (2-24-12); Cryotherapy (2013); Colonoscopy with CO2 insufflation (N/A, 9/24/2019); Combined Esophagoscopy, Gastroscopy, Duodenoscopy (Egd) With Co2 Insufflation (N/A, 9/24/2019); Esophagoscopy, gastroscopy, duodenoscopy (EGD), combined (N/A, 9/24/2019); biopsy (2009/2013/2016); and colonoscopy (9/24/2019).  FAMILY HISTORY: family history includes Alzheimer Disease (age of onset: 73) in his mother; C.A.D. in his father; Coronary Artery Disease in his father; Diabetes in his grandchild and paternal uncle; Heart Disease in his mother and paternal grandmother.  SOCIAL HISTORY:   reports that he has never smoked. He has never used smokeless tobacco. He reports that he does not drink alcohol and does not use drugs.    Post Discharge Medication Reconciliation Status: discharge medications reconciled and changed, per note/orders  Current Outpatient Medications   Medication Sig Dispense Refill     ACE/ARB/ARNI NOT PRESCRIBED (INTENTIONAL) Please choose reason not prescribed from choices below.       acetaminophen (TYLENOL) 325 MG tablet Take 1-2 tablets (325-650 mg) by mouth every 4 hours as needed for mild pain       aspirin 325 MG tablet Take 325 mg by mouth daily       bisacodyl (DULCOLAX) 10 MG suppository Place 1 suppository (10 mg) rectally daily as needed for constipation       cyanocobalamin (VITAMIN B-12) 1000 MCG tablet Take 1 tablet (1,000 mcg) by mouth daily 90 tablet 1     ferrous sulfate (FEROSUL) 325 (65 Fe) MG tablet Use 1 tab every other day with orange juice 60 tablet 1     levETIRAcetam (KEPPRA) 250 MG tablet Take 1 tablet (250 mg) by mouth 2 times daily       Lidocaine (LIDOCARE) 4 % Patch Apply to intact skin to cover most painful area for max 12hr per 24hr period.       melatonin 3 MG  "tablet Take 1 tablet (3 mg) by mouth At Bedtime       multivitamin (OCUVITE) TABS tablet Take 1 tablet by mouth daily       ondansetron (ZOFRAN) 4 MG tablet Take 1 tablet (4 mg) by mouth every 8 hours as needed for nausea       pantoprazole (PROTONIX) 40 MG EC tablet Take 1 tablet (40 mg) by mouth daily       polyethylene glycol (MIRALAX) 17 g packet Take 17 g by mouth daily       pramipexole (MIRAPEX) 0.125 MG tablet Take 3 tablets (0.375 mg) by mouth At Bedtime (Patient not taking: Reported on 5/2/2022) 90 tablet 2     pramipexole (MIRAPEX) 0.25 MG tablet Take 0.25 mg by mouth       prednisoLONE acetate (PRED FORTE) 1 % ophthalmic suspension        predniSONE (DELTASONE) 20 MG tablet TAKE 1 TABLET DAILY 90 tablet 3     propranolol (INDERAL) 10 MG tablet Take 30 mg by mouth       propranolol ER (INDERAL LA) 60 MG 24 hr capsule Take 1 capsule (60 mg) by mouth daily 60 capsule 0     pyridostigmine (MESTINON) 60 MG tablet Take 1 tablet (60 mg) by mouth 3 times daily 270 tablet 3     simvastatin (ZOCOR) 20 MG tablet Take 1 tablet (20 mg) by mouth At Bedtime 90 tablet 1     Sodium Chloride, Hypertonic, (MASOOD 128 OP) Uses Masood gel and drops. Gel once a day at night. Drops 4 times a day.       tamsulosin (FLOMAX) 0.4 MG capsule Take 1 capsule (0.4 mg) by mouth daily       triamcinolone (KENALOG) 0.1 % external cream Apply a thin layer up to twice daily to affected areas as needed. 30 g 3     vitamin D3 (CHOLECALCIFEROL) 2000 units tablet Take 1 tablet by mouth daily 90 tablet 1     ROS:  10 point ROS of systems including Constitutional, Eyes, Respiratory, Cardiovascular, Gastroenterology, Genitourinary, Integumentary, Musculoskeletal, Psychiatric were all negative except for pertinent positives noted in my HPI.    Vitals:  /71   Pulse 70   Temp 98.8  F (37.1  C)   Resp 17   Ht 1.727 m (5' 8\")   Wt 79.1 kg (174 lb 6.4 oz)   SpO2 95%   BMI 26.52 kg/m    Exam:  GENERAL APPEARANCE:  in no distress, " cooperative  ENT:  Mouth and posterior oropharynx normal, moist mucous membranes, oral mucosa moist, no lesion noted.   EYES:  EOMI, Pupil rounded and equal.  RESP:  lungs clear to auscultation   CV:  S1S2 audible, regular HR, no murmur appreciated.   ABDOMEN:  soft, NT/ND, BS audible. no mass appreciated on palpation.   M/S:   no joint deformity noted on observation.   SKIN:  No rash.   NEURO:   No NFD appreciated on observation.   PSYCH:  AAOx3. Pleasant.       Lab/Diagnostic data: Reviewed in the chart and EHR.        ASSESSMENT/PLAN:  ------------------------------  * Pt with PMH notable for Ocular Myasthenia, NPH (MRI on 3/3/22, query shunt placement in the future) and covid19 / hypoxic respiratory failure,  Adrenal Insufficiency on chronic prednisone, therapy, hospitalized with gate imbalance.  * Neurology consulted, etiology felt to be 2/2 NPH, cerebral atrophy/small vs ischemic dz, non-traumatic brain injury.  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 Improving, but still happens in am.   * SLUM 10/30 as a result of the above etiology  * * Evaluated by PMR and felt to benefit from TCU placement.   * LLE superficial venous thrombosis, managed with warm compress      - started rehab program, making a progress. PT/OT completed. Due to Plateau. SLP going. .   - followed by Neurology, Neuropsych, and Neurosurgery  - scheduled for lumbar drain placement on 6/27  - Repeat SLUM 11/30. This indicates dementia. Continue to anticipate needs. Chronic condition, ongoing decline expected.   -  Continue to provide redirection and reassurance as needed. Maintain safe living situation with goals focused on comfort.        Other Chronic conditions:  * Essential HTN:  Controlled.   * MGUS: routine follow up. No concern     Electronically signed by:  Shyann Bustos MD

## 2022-05-23 ENCOUNTER — TRANSITIONAL CARE UNIT VISIT (OUTPATIENT)
Dept: GERIATRICS | Facility: CLINIC | Age: 82
End: 2022-05-23
Payer: MEDICARE

## 2022-05-23 VITALS
WEIGHT: 174.4 LBS | OXYGEN SATURATION: 95 % | TEMPERATURE: 98.8 F | SYSTOLIC BLOOD PRESSURE: 124 MMHG | DIASTOLIC BLOOD PRESSURE: 71 MMHG | RESPIRATION RATE: 17 BRPM | HEIGHT: 68 IN | HEART RATE: 70 BPM | BODY MASS INDEX: 26.43 KG/M2

## 2022-05-23 DIAGNOSIS — E27.40 ADRENAL INSUFFICIENCY (H): ICD-10-CM

## 2022-05-23 DIAGNOSIS — I10 PRIMARY HYPERTENSION: ICD-10-CM

## 2022-05-23 DIAGNOSIS — G91.2 NPH (NORMAL PRESSURE HYDROCEPHALUS) (H): Primary | ICD-10-CM

## 2022-05-23 DIAGNOSIS — D47.2 MGUS (MONOCLONAL GAMMOPATHY OF UNKNOWN SIGNIFICANCE): ICD-10-CM

## 2022-05-23 DIAGNOSIS — G70.00 OCULAR MYASTHENIA GRAVIS (H): ICD-10-CM

## 2022-05-23 DIAGNOSIS — F03.90 DEMENTIA WITHOUT BEHAVIORAL DISTURBANCE, UNSPECIFIED DEMENTIA TYPE: ICD-10-CM

## 2022-05-23 PROCEDURE — 99305 1ST NF CARE MODERATE MDM 35: CPT | Performed by: FAMILY MEDICINE

## 2022-05-23 NOTE — LETTER
"    5/23/2022        RE: Gustavo Ramon  [address]  [place] [address]        [place] [facility]  PRIMARY CARE PROVIDER AND CLINIC:  Winston Silverman PA-C, [address] / [place] [address]  Chief Complaint   Patient presents with     Hospital F/U     [place] Medical Record Number:  [id number]  Place of Service where encounter took place:  [facility]  AT [facility] (TCU) [[id number]]    Gustavo Ramon  is a 81 year old  (1940), admitted to the above facility from  [facility]. Hospital stay 4/13/22 through 4/28/22..  Admitted to this facility for  rehab, medical management and nursing care.    HPI:    HPI information obtained from: facility chart records, facility staff, patient report and [facility] chart review.   Brief Summary of Hospital Course:   * Pt with PMH notable for Ocular Myasthenia, NPH (MRI on 3/3/22, query shunt placement in the future) and covid19 / hypoxic respiratory failure,  Adrenal Insufficiency on chronic prednisone, therapy, hospitalized with gate imbalance.  * Neurology consulted, etiology felt to be 2/2 NPH, cerebral atrophy/small vs ischemic dz, non-traumatic brain injury.  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   * LLE superficial venous thrombosis, managed with warm compress      Today  - [place] / rehab:  Reports balance not good enough, cannot stand for a very long without support. Unsure if doing a progress, used to be able to squash on his own. RN reports walk using a walker with A1.   - Rear hurt: \" they putting med on it\". Wife reports he does everything they want him to do. \" He is not a morning person\".   - tremor: reports sometimes abrupt and violent, and he does not like that. Usually in the morning can be either hand. \" I do not have restless leg syndrome as I used to\".  Reports can handle a spoon and a for one at  time. \" I cannot " "use my computer I used to, I cannot hit the button\".   - Dementia: wife reports the memory good most of the time, but at times could loose completely his memory, and would not recall previous day events, or who he met. Pt reports visual hallucination, seems real to him at night related to sleep or lack of sleep., but sometimes.     ===========================    CODE STATUS/ADVANCE DIRECTIVES DISCUSSION:   DNR  Patient's living condition: lives with spouse  ALLERGIES: Mycophenolate and Nkda [no known drug allergies]  PAST MEDICAL HISTORY:  has a past medical history of Actinic keratosis, AK (actinic keratosis) (11/15/2012), Amaurosis fugax, Basal cell carcinoma (2009), Central serous retinopathy (left), Diverticulosis (08/2006), DJD (degenerative joint disease), GERD (gastroesophageal reflux disease), Hearing loss, History of nonmelanoma skin cancer, History of nonmelanoma skin cancer, HTN (hypertension), Hyperlipidemia LDL goal < 130, Hyperplastic colon polyp (08/2006), IgA monoclonal gammopathy, Lattice degeneration of retina (right), Macular degeneration, Nonsenile cataract, 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    He has no past medical history of Acne vulgaris, Allergies, Amblyopia, Cerebral infarction (H), Complication of anesthesia, Congestive heart failure (H), COPD (chronic obstructive pulmonary disease) (H), Diabetic retinopathy associated with diabetes mellitus due to underlying condition (H), Eczema, Glaucoma (increased eye pressure), Heart valve disorder, History of blood transfusion, Malignant hyperthermia, Malignant melanoma nos, Pacemaker, Photosensitive contact dermatitis, PONV (postoperative nausea and vomiting), Psoriasis, Sleep apnea, Squamous cell carcinoma, Thyroid disease, Type II or unspecified type diabetes mellitus without mention of complication, not stated as " uncontrolled, Uncomplicated asthma, Unspecified asthma(493.90), Urticaria, or Uveitis.  PAST SURGICAL HISTORY:   has a past surgical history that includes arthroscopy knee rt/lt (Left, Jan 2010); diskectomy, lumbar, single sp (2003); Soft tissue surgery (May 2010); ENT surgery (1943); ENT surgery (2-22-12); ENT surgery (2-24-12); Cryotherapy (2013); Colonoscopy with CO2 insufflation (N/A, 9/24/2019); Combined Esophagoscopy, Gastroscopy, Duodenoscopy (Egd) With Co2 Insufflation (N/A, 9/24/2019); Esophagoscopy, gastroscopy, duodenoscopy (EGD), combined (N/A, 9/24/2019); biopsy (2009/2013/2016); and colonoscopy (9/24/2019).  FAMILY HISTORY: family history includes Alzheimer Disease (age of onset: 73) in his mother; C.A.D. in his father; Coronary Artery Disease in his father; Diabetes in his grandchild and paternal uncle; Heart Disease in his mother and paternal grandmother.  SOCIAL HISTORY:   reports that he has never smoked. He has never used smokeless tobacco. He reports that he does not drink alcohol and does not use drugs.    Post Discharge Medication Reconciliation Status: discharge medications reconciled and changed, per note/orders  Current Outpatient Medications   Medication Sig Dispense Refill     ACE/ARB/ARNI NOT PRESCRIBED (INTENTIONAL) Please choose reason not prescribed from choices below.       acetaminophen (TYLENOL) 325 MG tablet Take 1-2 tablets (325-650 mg) by mouth every 4 hours as needed for mild pain       aspirin 325 MG tablet Take 325 mg by mouth daily       bisacodyl (DULCOLAX) 10 MG suppository Place 1 suppository (10 mg) rectally daily as needed for constipation       cyanocobalamin (VITAMIN B-12) 1000 MCG tablet Take 1 tablet (1,000 mcg) by mouth daily 90 tablet 1     ferrous sulfate (FEROSUL) 325 (65 Fe) MG tablet Use 1 tab every other day with orange juice 60 tablet 1     levETIRAcetam (KEPPRA) 250 MG tablet Take 1 tablet (250 mg) by mouth 2 times daily       Lidocaine (LIDOCARE) 4 % Patch  "Apply to intact skin to cover most painful area for max 12hr per 24hr period.       melatonin 3 MG tablet Take 1 tablet (3 mg) by mouth At Bedtime       multivitamin (OCUVITE) TABS tablet Take 1 tablet by mouth daily       ondansetron (ZOFRAN) 4 MG tablet Take 1 tablet (4 mg) by mouth every 8 hours as needed for nausea       pantoprazole (PROTONIX) 40 MG EC tablet Take 1 tablet (40 mg) by mouth daily       polyethylene glycol (MIRALAX) 17 g packet Take 17 g by mouth daily       pramipexole (MIRAPEX) 0.125 MG tablet Take 3 tablets (0.375 mg) by mouth At Bedtime (Patient not taking: Reported on 5/2/2022) 90 tablet 2     pramipexole (MIRAPEX) 0.25 MG tablet Take 0.25 mg by mouth       prednisoLONE acetate (PRED FORTE) 1 % ophthalmic suspension        predniSONE (DELTASONE) 20 MG tablet TAKE 1 TABLET DAILY 90 tablet 3     propranolol (INDERAL) 10 MG tablet Take 30 mg by mouth       propranolol ER (INDERAL LA) 60 MG 24 hr capsule Take 1 capsule (60 mg) by mouth daily 60 capsule 0     pyridostigmine (MESTINON) 60 MG tablet Take 1 tablet (60 mg) by mouth 3 times daily 270 tablet 3     simvastatin (ZOCOR) 20 MG tablet Take 1 tablet (20 mg) by mouth At Bedtime 90 tablet 1     Sodium Chloride, Hypertonic, (MASOOD 128 OP) Uses Masood gel and drops. Gel once a day at night. Drops 4 times a day.       tamsulosin (FLOMAX) 0.4 MG capsule Take 1 capsule (0.4 mg) by mouth daily       triamcinolone (KENALOG) 0.1 % external cream Apply a thin layer up to twice daily to affected areas as needed. 30 g 3     vitamin D3 (CHOLECALCIFEROL) 2000 units tablet Take 1 tablet by mouth daily 90 tablet 1     ROS:  10 point ROS of systems including Constitutional, Eyes, Respiratory, Cardiovascular, Gastroenterology, Genitourinary, Integumentary, Musculoskeletal, Psychiatric were all negative except for pertinent positives noted in my HPI.    Vitals:  /71   Pulse 70   Temp 98.8  F (37.1  C)   Resp 17   Ht 1.727 m (5' 8\")   Wt 79.1 kg (174 lb " 6.4 oz)   SpO2 95%   BMI 26.52 kg/m    Exam:  GENERAL APPEARANCE:  in no distress, cooperative  ENT:  Mouth and posterior oropharynx normal, moist mucous membranes, oral mucosa moist, no lesion noted.   EYES:  EOMI, Pupil rounded and equal.  RESP:  lungs clear to auscultation   CV:  S1S2 audible, regular HR, no murmur appreciated.   ABDOMEN:  soft, NT/ND, BS audible. no mass appreciated on palpation.   M/S:   no joint deformity noted on observation.   SKIN:  No rash.   NEURO:   No NFD appreciated on observation.   PSYCH:  AAOx3. Pleasant.       Lab/Diagnostic data: Reviewed in the chart and EHR.        ASSESSMENT/PLAN:  ------------------------------  * Pt with PMH notable for Ocular Myasthenia, NPH (MRI on 3/3/22, query shunt placement in the future) and covid19 / hypoxic respiratory failure,  Adrenal Insufficiency on chronic prednisone, therapy, hospitalized with gate imbalance.  * Neurology consulted, etiology felt to be 2/2 NPH, cerebral atrophy/small vs ischemic dz, non-traumatic brain injury.  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 Improving, but still happens in am.   * SLUM 10/30 as a result of the above etiology  * * Evaluated by PMR and felt to benefit from TCU placement.   * LLE superficial venous thrombosis, managed with warm compress      - started rehab program, making a progress. PT/OT completed. Due to Plateau. SLP going. .   - followed by Neurology, Neuropsych, and Neurosurgery  - scheduled for lumbar drain placement on 6/27  - Repeat SLUM 11/30. This indicates dementia. Continue to anticipate needs. Chronic condition, ongoing decline expected.   -  Continue to provide redirection and reassurance as needed. Maintain safe living situation with goals focused on comfort.        Other Chronic conditions:  * Essential HTN:  Controlled.   * MGUS: routine follow up. No concern     Electronically signed by:  Shyann Bustos MD            Sincerely,        Shyann Bustos MD

## 2022-05-24 ENCOUNTER — TRANSITIONAL CARE UNIT VISIT (OUTPATIENT)
Dept: GERIATRICS | Facility: CLINIC | Age: 82
End: 2022-05-24
Payer: MEDICARE

## 2022-05-24 VITALS
HEART RATE: 55 BPM | HEIGHT: 68 IN | TEMPERATURE: 97.6 F | RESPIRATION RATE: 20 BRPM | OXYGEN SATURATION: 93 % | DIASTOLIC BLOOD PRESSURE: 84 MMHG | WEIGHT: 174.4 LBS | SYSTOLIC BLOOD PRESSURE: 147 MMHG | BODY MASS INDEX: 26.43 KG/M2

## 2022-05-24 DIAGNOSIS — G93.40 ENCEPHALOPATHY: ICD-10-CM

## 2022-05-24 DIAGNOSIS — G91.2 NPH (NORMAL PRESSURE HYDROCEPHALUS) (H): Primary | ICD-10-CM

## 2022-05-24 DIAGNOSIS — R44.3 HALLUCINATIONS: ICD-10-CM

## 2022-05-24 PROCEDURE — 99309 SBSQ NF CARE MODERATE MDM 30: CPT | Performed by: NURSE PRACTITIONER

## 2022-05-24 NOTE — PROGRESS NOTES
"[facility] GERIATRICS    Chief Complaint   Patient presents with     RECHECK     HPI:  Gustavo Ramon is a 81 year old  (1940), who is being seen today for an episodic care visit at: [facility] AT [facility] ([facility]) [[id number]]. Today's concern is:   1. NPH (normal pressure hydrocephalus) (H)    2. Encephalopathy    3. Hallucinations      Patient seen for follow up, moderate cognitive limitations, has confusion along with paranoia and hallucinations, states seeing people he does not know, repeats something about \"giving of urine\" and \"unable to handle urine\", understands thoughts are not natural, denies causing any distress, is followed by neuropsych, seems to understand plan for lumbar drain placement and that family are working on this, overall appears healthy.    Allergies, and PMH/PSH reviewed in EPIC today.  REVIEW OF SYSTEMS:  4 point ROS including Respiratory, CV, GI and , other than that noted in the HPI,  is negative    Objective:   BP (!) 147/84   Pulse 55   Temp 97.6  F (36.4  C)   Resp 20   Ht 1.727 m (5' 8\")   Wt 79.1 kg (174 lb 6.4 oz)   SpO2 93%   BMI 26.52 kg/m    GENERAL APPEARANCE:  in no distress, appears healthy  ENT:  Mouth and posterior oropharynx normal, moist mucous membranes  RESP:  lungs clear to auscultation   CV:  no edema, rate-normal  ABDOMEN:  bowel sounds normal  M/S:   Gait and station abnormal transfer assist  SKIN:  Inspection of skin and subcutaneous tissue baseline  NEURO:   Examination of sensation by touch normal  PSYCH:  oriented X 3    Labs done in SNF are in [facility]. Please refer to them using [employer]/Care Everywhere.    Assessment/Plan:  (G91.2) NPH (normal pressure hydrocephalus) (H)  (primary encounter diagnosis)  (G93.40) Encephalopathy  (R44.3) Hallucinations  Comment: moderate cognitive limitations, denies distress  Plan:   -has f/u appts with neurosurgery , neuromuscular  and urology   -plan is to be part of a lumbar " drain trial in June  -probable continuation of stay at TCU until then  -no plan to treat cognitive issues/psych med at this juncture unless thoughts become disturbing  -staff to support as indicated  -plan to follow up within 1 week RE: status and plan      Electronically signed by: CORINA Dumont CNP

## 2022-05-24 NOTE — LETTER
"    5/24/2022        RE: Gustavo Ramon  [address] [facility] [address]        [facility] GERIATRICS    Chief Complaint   Patient presents with     RECHECK     HPI:  Gustavo Ramon is a 81 year old  (1940), who is being seen today for an episodic care visit at: [facility] AT [facility] ([facility]) [[id number]]. Today's concern is:   1. NPH (normal pressure hydrocephalus) (H)    2. Encephalopathy    3. Hallucinations      Patient seen for follow up, moderate cognitive limitations, has confusion along with paranoia and hallucinations, states seeing people he does not know, repeats something about \"giving of urine\" and \"unable to handle urine\", understands thoughts are not natural, denies causing any distress, is followed by neuropsych, seems to understand plan for lumbar drain placement and that family are working on this, overall appears healthy.    Allergies, and PMH/PSH reviewed in EPIC today.  REVIEW OF SYSTEMS:  4 point ROS including Respiratory, CV, GI and , other than that noted in the HPI,  is negative    Objective:   BP (!) 147/84   Pulse 55   Temp 97.6  F (36.4  C)   Resp 20   Ht 1.727 m (5' 8\")   Wt 79.1 kg (174 lb 6.4 oz)   SpO2 93%   BMI 26.52 kg/m    GENERAL APPEARANCE:  in no distress, appears healthy  ENT:  Mouth and posterior oropharynx normal, moist mucous membranes  RESP:  lungs clear to auscultation   CV:  no edema, rate-normal  ABDOMEN:  bowel sounds normal  M/S:   Gait and station abnormal transfer assist  SKIN:  Inspection of skin and subcutaneous tissue baseline  NEURO:   Examination of sensation by touch normal  PSYCH:  oriented X 3    Labs done in SNF are in [facility]. Please refer to them using [employer]/Care Everywhere.    Assessment/Plan:  (G91.2) NPH (normal pressure hydrocephalus) (H)  (primary encounter diagnosis)  (G93.40) Encephalopathy  (R44.3) Hallucinations  Comment: moderate cognitive limitations, denies distress  Plan:   -has f/u appts with " neurosurgery , neuromuscular  and urology   -plan is to be part of a lumbar drain trial in June  -probable continuation of stay at TCU until then  -no plan to treat cognitive issues/psych med at this juncture unless thoughts become disturbing  -staff to support as indicated  -plan to follow up within 1 week RE: status and plan      Electronically signed by: CORINA Dumont CNP             Sincerely,        CORINA Dumont CNP

## 2022-05-25 ENCOUNTER — TELEPHONE (OUTPATIENT)
Dept: NEUROSURGERY | Facility: CLINIC | Age: 82
End: 2022-05-25
Payer: MEDICARE

## 2022-05-25 NOTE — TELEPHONE ENCOUNTER
Chandu calling asking about date of Lumbar Drain Trial on 6/27/22.  Per Dr Persaud patient Neuropsych exam states patient is a good candidate for a Lumbar Drain trial and should proceed.  Patient will need to HOLD ASA 7 days prior and get a Covid test so some advanced planning is necessary.    A note sent to Dr Persaud checking on any sooner date for post In Patient evaluation on a Thursday.    Chandu voices understanding, will callback after Dr Persaud responds.

## 2022-05-27 ENCOUNTER — TELEPHONE (OUTPATIENT)
Dept: FAMILY MEDICINE | Facility: CLINIC | Age: 82
End: 2022-05-27
Payer: MEDICARE

## 2022-05-27 DIAGNOSIS — G70.00 MYASTHENIA GRAVIS WITHOUT EXACERBATION (H): ICD-10-CM

## 2022-05-27 DIAGNOSIS — R53.1 EPISODE OF GENERALIZED WEAKNESS: Primary | ICD-10-CM

## 2022-05-27 NOTE — TELEPHONE ENCOUNTER
Looks like he is in TCU until a lumbar drain trial in June?      Patient had virtual visit with PCP [facility] Silverman 3/11/22 for weakness.      I called and spoke to wife Ceci, she says Stephen is in TCU but will me moving to memory care next Thursday most likely.    They do not provide shower chairs or wheelchairs.    See note per Ceci below, wants orders for these items faxed to [facility].      Routed to PCP [facility] Silverman to address.    Sheree Hopkins RN  [facility]

## 2022-05-27 NOTE — TELEPHONE ENCOUNTER
Spouse is calling in regards to a DME order for a shower chair and wheelchair. They prefer to use [facility].  [phone]  Fax [phone]. [place] location.   Please call Ceci at [phone] with any questions.

## 2022-05-31 ENCOUNTER — TRANSITIONAL CARE UNIT VISIT (OUTPATIENT)
Dept: GERIATRICS | Facility: CLINIC | Age: 82
End: 2022-05-31
Payer: MEDICARE

## 2022-05-31 VITALS
BODY MASS INDEX: 26.36 KG/M2 | WEIGHT: 173.9 LBS | TEMPERATURE: 97.5 F | OXYGEN SATURATION: 95 % | HEIGHT: 68 IN | HEART RATE: 54 BPM | RESPIRATION RATE: 16 BRPM

## 2022-05-31 DIAGNOSIS — G93.40 ENCEPHALOPATHY: ICD-10-CM

## 2022-05-31 DIAGNOSIS — F03.90 DEMENTIA WITHOUT BEHAVIORAL DISTURBANCE, UNSPECIFIED DEMENTIA TYPE: Primary | ICD-10-CM

## 2022-05-31 DIAGNOSIS — H10.023 OTHER MUCOPURULENT CONJUNCTIVITIS OF BOTH EYES: ICD-10-CM

## 2022-05-31 PROCEDURE — 99309 SBSQ NF CARE MODERATE MDM 30: CPT | Performed by: NURSE PRACTITIONER

## 2022-05-31 RX ORDER — POLYMYXIN B SULFATE AND TRIMETHOPRIM 1; 10000 MG/ML; [USP'U]/ML
1-2 SOLUTION OPHTHALMIC 4 TIMES DAILY
Qty: 3 ML | Refills: 0
Start: 2022-05-31 | End: 2022-06-07

## 2022-05-31 NOTE — PROGRESS NOTES
"[facility] GERIATRICS    Chief Complaint   Patient presents with     RECHECK     HPI:  Gustavo Ramon is a 81 year old  (1940), who is being seen today for an episodic care visit at: [facility] ([facility]) [[id number]]. Today's concern is:   1. Dementia without behavioral disturbance, unspecified dementia type (H)    2. Encephalopathy    3. Other mucopurulent conjunctivitis of both eyes      Patient seen for follow up, moderate cognitive limitations with SLUMs 11/30, pleasant, I&O adequate, appears safety aware, transfers with assist, intermittent hallucinations no distress, newer dry skin on back, bilateral eye sclera redness with purulent discharge, has been rubbing eyes.    Allergies, and PMH/PSH reviewed in [facility] today.  REVIEW OF SYSTEMS:  4 point ROS including Respiratory, CV, GI and , other than that noted in the HPI,  is negative    Objective:   Pulse 54   Temp 97.5  F (36.4  C)   Resp 16   Ht 1.727 m (5' 8\")   Wt 78.9 kg (173 lb 14.4 oz)   SpO2 95%   BMI 26.44 kg/m    GENERAL APPEARANCE:  in no distress, appears healthy  ENT:  Mouth and posterior oropharynx normal, moist mucous membranes  RESP:  lungs clear to auscultation   CV:  no edema  ABDOMEN:  bowel sounds normal  M/S:   Gait and station abnormal unsteady gait  SKIN:  Inspection of skin and subcutaneous tissue baseline  NEURO:   Examination of sensation by touch normal  PSYCH:  oriented X 3    Labs done in SNF are in [facility]. Please refer to them using [facility]/Care Everywhere.    Assessment/Plan:  (F03.90) Dementia without behavioral disturbance, unspecified dementia type (H)  (primary encounter diagnosis)  (G93.40) Encephalopathy  Comment: moderate cognitive limitations, pleasant  Plan:   -follow up , neuropsych  -to have lumbar drain trial install in June  -staff to support as indicated  -probable discharge back to home soon    (H10.023) Other mucopurulent conjunctivitis of both eyes  Comment: bilateral eye " sclera redness with discharge  Plan: trimethoprim-polymyxin QID x7 days  -if eyes still itchy will trial ketotifen          Electronically signed by: CORINA Dumont CNP

## 2022-06-01 ENCOUNTER — TELEPHONE (OUTPATIENT)
Dept: UROLOGY | Facility: CLINIC | Age: 82
End: 2022-06-01
Payer: MEDICARE

## 2022-06-01 NOTE — TELEPHONE ENCOUNTER
Reason for Call:  Other call back    Detailed comments: spouse is calling stating patient has upcoming appt 06/07,  doesn't know what the purpose of appt would be and would like to discuss. Please call to discuss. Thank you.    Phone Number Patient can be reached at: Home number on file [phone] (home)    Best Time:     Can we leave a detailed message on this number? NO    Call taken on 6/1/2022 at 11:51 AM by Kaylie Holcomb

## 2022-06-02 NOTE — TELEPHONE ENCOUNTER
Called and spoke with patient's wife who is wondering why patient needs to follow up with urology on 6/7/22. Wife reports patient has NPH (normal pressure hydrocephalus) and [facility]. As a result of that, patient has lost his ability to walk and has some issues with incontinence. Patient is taking flomax but wife is unsure if it is working.  Patient is not in urinary retention. The hospital encouraged that he follow up with urology. Wife is wondering what the point of a urology follow up is. Patient will be having surgery in late June for his NPH and she feels as though a urology follow up is not a priority with everything he has going on. After discussing his situation in depth, wife decided she would like us to cancel patient's follow up visit. Visit was cancelled. She plans to reschedule the follow up after patient has his surgery.    Beni ROBB RN....6/2/2022 9:45 AM

## 2022-06-03 ENCOUNTER — TELEPHONE (OUTPATIENT)
Dept: FAMILY MEDICINE | Facility: CLINIC | Age: 82
End: 2022-06-03
Payer: MEDICARE

## 2022-06-03 ENCOUNTER — TRANSITIONAL CARE UNIT VISIT (OUTPATIENT)
Dept: GERIATRICS | Facility: CLINIC | Age: 82
End: 2022-06-03
Payer: MEDICARE

## 2022-06-03 VITALS
OXYGEN SATURATION: 95 % | SYSTOLIC BLOOD PRESSURE: 168 MMHG | HEIGHT: 68 IN | WEIGHT: 173.2 LBS | BODY MASS INDEX: 26.25 KG/M2 | DIASTOLIC BLOOD PRESSURE: 77 MMHG | HEART RATE: 57 BPM | RESPIRATION RATE: 16 BRPM | TEMPERATURE: 97.3 F

## 2022-06-03 DIAGNOSIS — G91.2 NPH (NORMAL PRESSURE HYDROCEPHALUS) (H): Primary | ICD-10-CM

## 2022-06-03 DIAGNOSIS — H10.023 OTHER MUCOPURULENT CONJUNCTIVITIS OF BOTH EYES: ICD-10-CM

## 2022-06-03 DIAGNOSIS — R21 RASH: ICD-10-CM

## 2022-06-03 PROCEDURE — 99309 SBSQ NF CARE MODERATE MDM 30: CPT | Performed by: NURSE PRACTITIONER

## 2022-06-03 NOTE — TELEPHONE ENCOUNTER
Requesting DME prescription for wheelchair. Please send to:  [facility]   Fax: [phone]    Call [phone]  With any questions     Cindy GONZALEZ,   [facility]

## 2022-06-03 NOTE — PROGRESS NOTES
"[facility] GERIATRICS    Chief Complaint   Patient presents with     RECHECK     HPI:  Gustavo Ramon is a 81 year old  (1940), who is being seen today for an episodic care visit at: [facility] AT [facility] ([facility]) [[id number]]. Today's concern is:   1. NPH (normal pressure hydrocephalus) (H)    2. Rash    3. Other mucopurulent conjunctivitis of both eyes      Patient seen for follow up, moderate cognitive limitations along with hallucinations, denies headache, pleasant and joking around, exam today back rash/dermatitis improving, has newer rashy area pink and slightly moist along anterior neck fold, bilateral eyes slightly pink sclera still without purulence previously had this week, overall appears healthy.    Allergies, and PMH/PSH reviewed in [facility] today.  REVIEW OF SYSTEMS:  4 point ROS including Respiratory, CV, GI and , other than that noted in the HPI,  is negative    Objective:   BP (!) 168/77   Pulse 57   Temp 97.3  F (36.3  C)   Resp 16   Ht 1.727 m (5' 8\")   Wt 78.6 kg (173 lb 3.2 oz)   SpO2 95%   BMI 26.33 kg/m    GENERAL APPEARANCE:  in no distress, appears healthy  ENT:  Mouth and posterior oropharynx normal, moist mucous membranes  RESP:  lungs clear to auscultation   CV:  peripheral edema mild+ in lower legs  ABDOMEN:  bowel sounds normal  M/S:   Gait and station abnormal transfer assist  SKIN:  Inspection of skin and subcutaneous tissueas in HPI  NEURO:   Examination of sensation by touch normal  PSYCH:  oriented X 3    Labs done in SNF are in [facility]. Please refer to them using [facility]/Care Everywhere.    Assessment/Plan:  (G91.2) NPH (normal pressure hydrocephalus) (H)  (primary encounter diagnosis)  Comment: mildly symptomatic, no distress  Plan: continue keppra  -follow up neurology PRN  -plans to have lumbar drain trial in June  -potential transfer to AL facility soon    (R21) Rash  Comment: back improving, new neck redness  Plan: continue eucerin on back/affected " areas  -follow up next week on neck, mild yeasty  -if not resolving plan to start nystatin    (H10.023) Other mucopurulent conjunctivitis of both eyes  Comment: eyes previously very purulent, now clearing  Plan: continue polytrim  -staff to wash lids/orbits with warm water PRN        Electronically signed by: CORINA Dumont CNP

## 2022-06-03 NOTE — TELEPHONE ENCOUNTER
Script was completed and already faxed.    Please call [phone]    Liz RN,BSN  Triage Nurse  [facility]  Ph: [phone]

## 2022-06-03 NOTE — LETTER
"    6/3/2022        RE: Gustavo Ramon  [address] [facility] [address]        [facility] GERIATRICS    Chief Complaint   Patient presents with     RECHECK     HPI:  Gustavo Ramon is a 81 year old  (1940), who is being seen today for an episodic care visit at: [facility] AT [facility] ([facility]) [[id number]]. Today's concern is:   1. NPH (normal pressure hydrocephalus) (H)    2. Rash    3. Other mucopurulent conjunctivitis of both eyes      Patient seen for follow up, moderate cognitive limitations along with hallucinations, denies headache, pleasant and joking around, exam today back rash/dermatitis improving, has newer rashy area pink and slightly moist along anterior neck fold, bilateral eyes slightly pink sclera still without purulence previously had this week, overall appears healthy.    Allergies, and PMH/PSH reviewed in EPIC today.  REVIEW OF SYSTEMS:  4 point ROS including Respiratory, CV, GI and , other than that noted in the HPI,  is negative    Objective:   BP (!) 168/77   Pulse 57   Temp 97.3  F (36.3  C)   Resp 16   Ht 1.727 m (5' 8\")   Wt 78.6 kg (173 lb 3.2 oz)   SpO2 95%   BMI 26.33 kg/m    GENERAL APPEARANCE:  in no distress, appears healthy  ENT:  Mouth and posterior oropharynx normal, moist mucous membranes  RESP:  lungs clear to auscultation   CV:  peripheral edema mild+ in lower legs  ABDOMEN:  bowel sounds normal  M/S:   Gait and station abnormal transfer assist  SKIN:  Inspection of skin and subcutaneous tissueas in HPI  NEURO:   Examination of sensation by touch normal  PSYCH:  oriented X 3    Labs done in SNF are in [facility]. Please refer to them using [employer]/Care Everywhere.    Assessment/Plan:  (G91.2) NPH (normal pressure hydrocephalus) (H)  (primary encounter diagnosis)  Comment: mildly symptomatic, no distress  Plan: continue keppra  -follow up neurology PRN  -plans to have lumbar drain trial in June  -potential transfer to AL facility " soon    (R21) Rash  Comment: back improving, new neck redness  Plan: continue eucerin on back/affected areas  -follow up next week on neck, mild yeasty  -if not resolving plan to start nystatin    (H10.023) Other mucopurulent conjunctivitis of both eyes  Comment: eyes previously very purulent, now clearing  Plan: continue polytrim  -staff to wash lids/orbits with warm water PRN        Electronically signed by: CORINA Dumont CNP             Sincerely,        CORINA Dumont CNP

## 2022-06-06 ENCOUNTER — TELEPHONE (OUTPATIENT)
Dept: GERIATRICS | Facility: CLINIC | Age: 82
End: 2022-06-06
Payer: MEDICARE

## 2022-06-06 NOTE — TELEPHONE ENCOUNTER
Patient TCU discharge today 6/6 to [place] [facility] on memory care unit, no longer followed by [facility] team.

## 2022-06-09 ENCOUNTER — MEDICAL CORRESPONDENCE (OUTPATIENT)
Dept: HEALTH INFORMATION MANAGEMENT | Facility: CLINIC | Age: 82
End: 2022-06-09

## 2022-06-15 ENCOUNTER — TELEPHONE (OUTPATIENT)
Dept: NEUROSURGERY | Facility: CLINIC | Age: 82
End: 2022-06-15
Payer: MEDICARE

## 2022-06-15 ENCOUNTER — TELEPHONE (OUTPATIENT)
Dept: FAMILY MEDICINE | Facility: CLINIC | Age: 82
End: 2022-06-15
Payer: MEDICARE

## 2022-06-15 NOTE — TELEPHONE ENCOUNTER
Outbound call to Chandu, son leaving a message Lumbar Drain Trial for week of June 27th, needing to HOLD Aspirin starting Monday 6/20/22.    Please call Judie  [phone]

## 2022-06-15 NOTE — TELEPHONE ENCOUNTER
Ceci (Stephen's wife) came into [facility]  requesting chart notes to [facility] [phone]; fax [phone]. Stephen is requesting wheelchair from [facility]. Stephen hasn't been walking since April and is in Memory Care. Ok to call home number it will ring through to Ceci wife on her cell if she is not at home. [phone]. Says request x 3.

## 2022-06-15 NOTE — TELEPHONE ENCOUNTER
----- Message from Yoly Hager MA sent at 5/31/2022  9:48 AM CDT -----  Regarding: RE: Lumbar Drain Trial  Terrance Dimas,    Hope you had a good long weekend.     I did check our schedule and unfortunately, June 30th is the earliest we are able to see the patient for the post eval. We apologize for any inconvenience this causes the patient and family.     Jesús,  Nathalie   ----- Message -----  From: Easton Persaud, PhD LP  Sent: 5/25/2022  10:05 AM CDT  To: Judie Sykes RN, Yoly Hager MA  Subject: FW: Lumbar Drain Trial                           Judie -     I've copied Nathalie (Yoly Hager) above. She's out this week, but will look into this when she returns.     Easton    ----- Message -----  From: Judie Sykes RN  Sent: 5/25/2022  10:01 AM CDT  To: Easton Persaud, PhD LP  Subject: Lumbar Drain Trial                               Hello Dr Persaud,    Thank you for reading and working this note.    A Neuropsych evaluation completed by you on 5/12/22 encouraged above patient to continue plans for a Lumbar Drain Trial for NPH.      Patient is currently scheduled for your IN Patient post evaluation on Thursday June 30th, admitting on Monday June 27th.      The patients son, Chandu is calling asking if anything has opened up for a sooner date.  Chandu states the patient is losing his short term memory and having more walking /balance issues.    I told him I would check on any possible sooner date.  The patient will need to hold aspirin 7 days prior so some advanced organizing is needed, plus a Covid test.    Thanks for considering,  Judie Sykes RN  Neurosurgery Care Coordinator

## 2022-06-16 NOTE — TELEPHONE ENCOUNTER
Callback from [facility], Patient on ASA 325mg daily and it will be HELD starting 6/20/22.    PCR test for Covid will be done 3-4 days prior to admission date of 6/27/22.    Voices understanding.

## 2022-06-16 NOTE — TELEPHONE ENCOUNTER
Spoke with Matilda, Patient now at [facility]   [phone] Ext [phone].  Nazanin Muir RN  Left detailed message to return call to discuss upcoming procedure for Lumbar Drain Trial June 20th.  Questions:  1.  Covid Test PCR Test needed  2.  Aspirin, if on ASA HOLD starting Monday 6/20/22.    Left detailed message to return call to Judie AMBROSE @ [phone]

## 2022-06-17 ENCOUNTER — HOSPITAL ENCOUNTER (OUTPATIENT)
Facility: CLINIC | Age: 82
DRG: 057 | End: 2022-06-17
Attending: NEUROLOGICAL SURGERY | Admitting: NEUROLOGICAL SURGERY
Payer: MEDICARE

## 2022-06-17 ENCOUNTER — DOCUMENTATION ONLY (OUTPATIENT)
Dept: NEUROSURGERY | Facility: CLINIC | Age: 82
End: 2022-06-17
Payer: MEDICARE

## 2022-06-17 NOTE — PROGRESS NOTES
[facility] Direct Admit completed  Judie Sykes ([email or web address]) just nowjust now  [id number] Created  For June 27th @ 1000 AM for a Lumbar Drain Trial.    In basket note sent to NSG Chief Resident and In Patient MARGARITO's.

## 2022-06-18 NOTE — TELEPHONE ENCOUNTER
Its there additional paperwork required. If all she needs are the chart notes. Print them off and she can take them to [facility]. Maybe we need to contact [facility] and see what they need specifically. Does he even have a dme order for a wheelchair?

## 2022-06-20 NOTE — TELEPHONE ENCOUNTER
Spoke with Joby Diaz medical needs chart note from [facility] faxed to [phone]. Emily does have order for wheelchair. Information faxed.

## 2022-06-21 ENCOUNTER — TELEPHONE (OUTPATIENT)
Dept: NEUROSURGERY | Facility: CLINIC | Age: 82
End: 2022-06-21

## 2022-06-21 DIAGNOSIS — G91.2 IDIOPATHIC NORMAL PRESSURE HYDROCEPHALUS (H): Primary | ICD-10-CM

## 2022-06-22 ENCOUNTER — DOCUMENTATION ONLY (OUTPATIENT)
Dept: NEUROSURGERY | Facility: CLINIC | Age: 82
End: 2022-06-22

## 2022-06-22 ENCOUNTER — TRANSFERRED RECORDS (OUTPATIENT)
Dept: HEALTH INFORMATION MANAGEMENT | Facility: CLINIC | Age: 82
End: 2022-06-22

## 2022-06-22 ENCOUNTER — TELEPHONE (OUTPATIENT)
Dept: NEUROSURGERY | Facility: CLINIC | Age: 82
End: 2022-06-22

## 2022-06-22 DIAGNOSIS — G91.2 IDIOPATHIC NORMAL PRESSURE HYDROCEPHALUS (H): Primary | ICD-10-CM

## 2022-06-22 NOTE — PROGRESS NOTES
Hakan Hair MD Messer, Ann, RN; Rosita Cuevas APRN CNP; Bebo Bright MD  Cc: Jean-Paul Childs MD  That is great new Judie, thanks! I did not know Amadeo was on vacation.  Would it be possible to schedule the case under Melchor? He has been totally willing to do these procedures with us in IR in the past. I am a resident, so cases should not be scheduled under me. I think post procedure admit would be fine. Thanks again.       Hakan             Previous Messages       ----- Message -----   From: Judie Sykes RN   Sent: 6/22/2022  10:46 AM CDT   To: Jean-Paul Childs MD, *   Subject: RE: Lumbar Drain Trial Scheduled for 6/27/22     Hakan,     Ok, I worked with the hospital scheduling folks and got the patient scheduled in IR but it is at 11AM.  That is the only time available.  The procedure is scheduled under you with Dr Childs on vacation.   I will have the patient come early for the Direct Admit at 8am if that would help.  It is a great idea to just complete the LP in IR with IV Sedation.       Or the patient could just go to the [facility] Waiting Room for the procedure and get admitted post procedure.  What are your thoughts??     Thanks   Judie               ----- Message -----   From: Hakan Hair MD   Sent: 6/21/2022   8:52 PM CDT   To: Jean-Paul Childs MD, Judie Sykes RN, *   Subject: RE: Lumbar Drain Trial Scheduled for 6/27/22     I think there is a misunderstanding. An IR consult is not needed. This could be scheduled like Dr Childs's rhizotomies, just in this case a lumbar drain.       Hakan       ----- Message -----   From: Judie Sykes RN   Sent: 6/21/2022  11:29 AM CDT   To: Jean-Paul Childs MD, *   Subject: RE: Lumbar Drain Trial Scheduled for 6/27/22     Thanks for the note!     I called IR and they will not schedule an In Patient for the IR .  IR, Mi said the In Patient team would need to call IR for Consult for the LP.       It would be so much  easier to schedule now.  I know IR will not fit the patient in on Monday, thus the Lumbar Drain Trial would go until Friday... not a good thing.     Do you have a connection I can call?       Albert Dimas           ----- Message -----   From: Hakan Hair MD   Sent: 6/21/2022  10:08 AM CDT   To: Jean-Paul Childs MD, Judie Sykes RN, *   Subject: RE: Lumbar Drain Trial Scheduled for 6/27/22     Terrance Dimas,     I understand the family's concerns. Would it be possible to schedule this as a case in IR with Dr Chilsd as the staff?  The patient could get MAC anesthesia and we could use image guidance to minimize the traumatic experience.  A neurosurgery resident can be assigned to the case to help place the drain. This is the kind of thing we can add-on on the day of admission, but I think things will go smoother if it is scheduled.  Thanks.       Hakan       ----- Message -----   From: Judie Sykes RN   Sent: 6/21/2022   8:54 AM CDT   To: Jean-Paul Childs MD, *   Subject: Lumbar Drain Trial Scheduled for 6/27/22         Reginaldo Finnegan and Rosita,     Dr Childs has a scheduled Lumbar Drain Admit on 6/27/22 @ 10:00 AM.     Stephen Ramon is off his aspirin and will have a Covid test.       He has a history of being  hospitalized at [facility].  He underwent Neurology evaluation there, and an EEG showed generalized slowing but no seizures or epileptiform discharges.  He had two lumbar punctures attempted by Interventional Radiology that were very traumatic and difficult, and no more than 5-6 mL of fluid were removed each time.     Do you want me to order any IR help for the Lumbar Puncture?  His family is very concerned the LP will be difficult or impossible.     Please let me know if I can order or try to help with any imaging needed.     Thank you!!!   Judie

## 2022-06-23 ENCOUNTER — TRANSFERRED RECORDS (OUTPATIENT)
Dept: HEALTH INFORMATION MANAGEMENT | Facility: CLINIC | Age: 82
End: 2022-06-23

## 2022-06-23 LAB
CREATININE (EXTERNAL): 1.03 MG/DL (ref 0.72–1.25)
GFR ESTIMATED (EXTERNAL): 72 ML/MIN/1.73M2
GLUCOSE (EXTERNAL): 85 MG/DL (ref 65–100)
POTASSIUM (EXTERNAL): 4.1 MMOL/L (ref 3.5–5)

## 2022-06-24 ENCOUNTER — TELEPHONE (OUTPATIENT)
Dept: NEUROSURGERY | Facility: CLINIC | Age: 82
End: 2022-06-24

## 2022-06-24 NOTE — TELEPHONE ENCOUNTER
Spoke with Chandu, explained Monday June 27th, to arrive at the [facility] Waiting Room [facility] at 9:30, no food after midnight, clear liquids ok, for LP under IV Sedation.  After LP patient will be admitted, Hospital bed requested on 6A.  COvid test completed and Aspirin has been stopped for one week pre procedure.    Chandu voices understanding and has my number for any issues on Monday.

## 2022-06-24 NOTE — TELEPHONE ENCOUNTER
Spoke with Chandu, explained Monday June 27th, to arrive at the [facility] Waiting Room [facility] at 9:30, no food after midnight, clear liquids ok, for LP under IV Sedation.  After LP patient will be admitted, Hospital bed requested on 6A.

## 2022-06-24 NOTE — TELEPHONE ENCOUNTER
----- Message from Hakan Hair MD sent at 6/24/2022  3:29 PM CDT -----  Regarding: RE: Lumbar Drain Trial Scheduled for 6/27/22  Sounds great! Thanks!      Hakan      ----- Message -----  From: Judie Sykes RN  Sent: 6/24/2022   1:38 PM CDT  To: Jean-Paul Childs MD, #  Subject: RE: Lumbar Drain Trial Scheduled for 6/27/22     Hakan,    Thanks for your help.      I got the official OK from Dr Roche he could staff the LP on Monday set for 11am.    I have the surgery scheduler adding his name.  The patient will arrive at the [facility] Waiting room at 9:30am, and I requested a direct admit bed last week for 6A for the Lumbar Drain Trial.  He has been off Aspirin for 7 days, and Covid testing today.    I hope all is set.    Thanks for all the great help!    Judie      ----- Message -----  From: Hakan Hair MD  Sent: 6/22/2022   5:27 PM CDT  To: Jean-Paul Childs MD, Judie Sykes RN, #  Subject: RE: Lumbar Drain Trial Scheduled for 6/27/22     Yes, RT or Jag can staff the procedure. It can be up to RT.  We are happy to have a resident available to help either way. Thanks!      Hakan    ----- Message -----  From: Judie Sykes RN  Sent: 6/22/2022  12:09 PM CDT  To: Jean-Paul Childs MD, #  Subject: RE: Lumbar Drain Trial Scheduled for 6/27/22     Star Mcclendon,  I am so happy this will work as the patient has been waiting a long time for this Lumbar Drain Trial.  Hey RT is out today too, suppose to be back tomorrow.  He is in a long surgery on 6/27 should I ask Jag?  I will check in with RT tomorrow and get back with you.  Thanks for the help!!  Judie    ----- Message -----  From: Hakan Hair MD  Sent: 6/22/2022  11:02 AM CDT  To: Jean-Paul Childs MD, Judie Onel, RN, #  Subject: RE: Lumbar Drain Trial Scheduled for 6/27/22     That is great new Judie, thanks! I did not know Amadeo was on vacation.  Would it be possible to schedule the case under [facility]? He has been totally willing to do  these procedures with us in IR in the past. I am a resident, so cases should not be scheduled under me. I think post procedure admit would be fine. Thanks again.      Hakan      ----- Message -----  From: Judie Sykes RN  Sent: 6/22/2022  10:46 AM CDT  To: Jean-Paul Childs MD, #  Subject: RE: Lumbar Drain Trial Scheduled for 6/27/22     Hakan,    Ok, I worked with the hospital scheduling folks and got the patient scheduled in IR but it is at 11AM.  That is the only time available.  The procedure is scheduled under you with Dr Childs on vacation.   I will have the patient come early for the Direct Admit at 8am if that would help.  It is a great idea to just complete the LP in IR with IV Sedation.      Or the patient could just go to the [facility] Waiting Room for the procedure and get admitted post procedure.  What are your thoughts??    Thanks  Judie              ----- Message -----  From: Hakan Hair MD  Sent: 6/21/2022   8:52 PM CDT  To: Jean-Paul Childs MD, Judie Sykes RN, #  Subject: RE: Lumbar Drain Trial Scheduled for 6/27/22     I think there is a misunderstanding. An IR consult is not needed. This could be scheduled like Dr Childs's rhizotomies, just in this case a lumbar drain.      Hakan      ----- Message -----  From: Judie Sykes RN  Sent: 6/21/2022  11:29 AM CDT  To: Jean-Paul Childs MD, #  Subject: RE: Lumbar Drain Trial Scheduled for 6/27/22     Thanks for the note!    I called IR and they will not schedule an In Patient for the IR .  IR, Mi said the In Patient team would need to call IR for Consult for the LP.      It would be so much easier to schedule now.  I know IR will not fit the patient in on Monday, thus the Lumbar Drain Trial would go until Friday... not a good thing.    Do you have a connection I can call?      Albert Dimas          ----- Message -----  From: Hakan Hair MD  Sent: 6/21/2022  10:08 AM CDT  To: Jean-Paul Childs MD, Judie Sykes RN, #  Subject: RE:  Lumbar Drain Trial Scheduled for 6/27/22     Terrance Dimas,    I understand the family's concerns. Would it be possible to schedule this as a case in IR with Dr Childs as the staff?  The patient could get MAC anesthesia and we could use image guidance to minimize the traumatic experience.  A neurosurgery resident can be assigned to the case to help place the drain. This is the kind of thing we can add-on on the day of admission, but I think things will go smoother if it is scheduled.  Thanks.      Hakan      ----- Message -----  From: Judie Sykes RN  Sent: 6/21/2022   8:54 AM CDT  To: Jean-Paul Childs MD, #  Subject: Lumbar Drain Trial Scheduled for 6/27/22         Reginaldo Finnegan and Rosita,    Dr Childs has a scheduled Lumbar Drain Admit on 6/27/22 @ 10:00 AM.    Stephen Ramon is off his aspirin and will have a Covid test.      He has a history of being  hospitalized at [facility] and [facility].  He underwent Neurology evaluation there, and an EEG showed generalized slowing but no seizures or epileptiform discharges.  He had two lumbar punctures attempted by Interventional Radiology that were very traumatic and difficult, and no more than 5-6 mL of fluid were removed each time.     Do you want me to order any IR help for the Lumbar Puncture?  His family is very concerned the LP will be difficult or impossible.    Please let me know if I can order or try to help with any imaging needed.    Thank you!!!  Judie

## 2022-06-27 ENCOUNTER — APPOINTMENT (OUTPATIENT)
Dept: INTERVENTIONAL RADIOLOGY/VASCULAR | Facility: CLINIC | Age: 82
DRG: 057 | End: 2022-06-27
Attending: NEUROLOGICAL SURGERY
Payer: MEDICARE

## 2022-06-27 ENCOUNTER — HOSPITAL ENCOUNTER (INPATIENT)
Facility: CLINIC | Age: 82
LOS: 4 days | Discharge: HOME HEALTH | DRG: 057 | End: 2022-07-01
Attending: NEUROLOGICAL SURGERY | Admitting: NEUROLOGICAL SURGERY
Payer: MEDICARE

## 2022-06-27 ENCOUNTER — APPOINTMENT (OUTPATIENT)
Dept: MEDSURG UNIT | Facility: CLINIC | Age: 82
DRG: 057 | End: 2022-06-27
Attending: NEUROLOGICAL SURGERY
Payer: MEDICARE

## 2022-06-27 ENCOUNTER — APPOINTMENT (OUTPATIENT)
Dept: PHYSICAL THERAPY | Facility: CLINIC | Age: 82
DRG: 057 | End: 2022-06-27
Attending: NEUROLOGICAL SURGERY
Payer: MEDICARE

## 2022-06-27 ENCOUNTER — TELEPHONE (OUTPATIENT)
Dept: NEUROSURGERY | Facility: CLINIC | Age: 82
End: 2022-06-27

## 2022-06-27 ENCOUNTER — APPOINTMENT (OUTPATIENT)
Dept: SPEECH THERAPY | Facility: CLINIC | Age: 82
DRG: 057 | End: 2022-06-27
Attending: NEUROLOGICAL SURGERY
Payer: MEDICARE

## 2022-06-27 DIAGNOSIS — G91.2 IDIOPATHIC NORMAL PRESSURE HYDROCEPHALUS (H): ICD-10-CM

## 2022-06-27 LAB
APTT PPP: 31 SECONDS (ref 22–38)
INR PPP: 1.12 (ref 0.85–1.15)
PLATELET # BLD AUTO: 249 10E3/UL (ref 150–450)

## 2022-06-27 PROCEDURE — 97530 THERAPEUTIC ACTIVITIES: CPT | Mod: GP | Performed by: PHYSICAL THERAPIST

## 2022-06-27 PROCEDURE — 99152 MOD SED SAME PHYS/QHP 5/>YRS: CPT

## 2022-06-27 PROCEDURE — 250N000013 HC RX MED GY IP 250 OP 250 PS 637: Performed by: STUDENT IN AN ORGANIZED HEALTH CARE EDUCATION/TRAINING PROGRAM

## 2022-06-27 PROCEDURE — 250N000011 HC RX IP 250 OP 636: Performed by: STUDENT IN AN ORGANIZED HEALTH CARE EDUCATION/TRAINING PROGRAM

## 2022-06-27 PROCEDURE — 62329 THER SPI PNXR CSF FLUOR/CT: CPT | Performed by: NEUROLOGICAL SURGERY

## 2022-06-27 PROCEDURE — 009U30Z DRAINAGE OF SPINAL CANAL WITH DRAINAGE DEVICE, PERCUTANEOUS APPROACH: ICD-10-PCS | Performed by: NEUROLOGICAL SURGERY

## 2022-06-27 PROCEDURE — 85049 AUTOMATED PLATELET COUNT: CPT | Performed by: STUDENT IN AN ORGANIZED HEALTH CARE EDUCATION/TRAINING PROGRAM

## 2022-06-27 PROCEDURE — 250N000009 HC RX 250: Performed by: STUDENT IN AN ORGANIZED HEALTH CARE EDUCATION/TRAINING PROGRAM

## 2022-06-27 PROCEDURE — 258N000003 HC RX IP 258 OP 636: Performed by: STUDENT IN AN ORGANIZED HEALTH CARE EDUCATION/TRAINING PROGRAM

## 2022-06-27 PROCEDURE — 62329 THER SPI PNXR CSF FLUOR/CT: CPT

## 2022-06-27 PROCEDURE — 96125 COGNITIVE TEST BY HC PRO: CPT | Mod: GN

## 2022-06-27 PROCEDURE — 85730 THROMBOPLASTIN TIME PARTIAL: CPT | Performed by: STUDENT IN AN ORGANIZED HEALTH CARE EDUCATION/TRAINING PROGRAM

## 2022-06-27 PROCEDURE — 97163 PT EVAL HIGH COMPLEX 45 MIN: CPT | Mod: GP | Performed by: PHYSICAL THERAPIST

## 2022-06-27 PROCEDURE — 250N000012 HC RX MED GY IP 250 OP 636 PS 637: Performed by: NEUROLOGICAL SURGERY

## 2022-06-27 PROCEDURE — 120N000002 HC R&B MED SURG/OB UMMC

## 2022-06-27 PROCEDURE — 36415 COLL VENOUS BLD VENIPUNCTURE: CPT | Performed by: STUDENT IN AN ORGANIZED HEALTH CARE EDUCATION/TRAINING PROGRAM

## 2022-06-27 PROCEDURE — 85610 PROTHROMBIN TIME: CPT | Performed by: STUDENT IN AN ORGANIZED HEALTH CARE EDUCATION/TRAINING PROGRAM

## 2022-06-27 PROCEDURE — 999N000142 HC STATISTIC PROCEDURE PREP ONLY

## 2022-06-27 RX ORDER — NALOXONE HYDROCHLORIDE 0.4 MG/ML
0.2 INJECTION, SOLUTION INTRAMUSCULAR; INTRAVENOUS; SUBCUTANEOUS
Status: DISCONTINUED | OUTPATIENT
Start: 2022-06-27 | End: 2022-06-27

## 2022-06-27 RX ORDER — ACETAMINOPHEN 325 MG/1
650 TABLET ORAL EVERY 4 HOURS PRN
Status: DISCONTINUED | OUTPATIENT
Start: 2022-06-27 | End: 2022-07-01 | Stop reason: HOSPADM

## 2022-06-27 RX ORDER — VITS A,C,E/LUTEIN/MINERALS 300MCG-200
1 TABLET ORAL DAILY
Status: DISCONTINUED | OUTPATIENT
Start: 2022-06-27 | End: 2022-07-01 | Stop reason: HOSPADM

## 2022-06-27 RX ORDER — PROCHLORPERAZINE MALEATE 5 MG
5 TABLET ORAL EVERY 6 HOURS PRN
Status: DISCONTINUED | OUTPATIENT
Start: 2022-06-27 | End: 2022-06-27

## 2022-06-27 RX ORDER — TAMSULOSIN HYDROCHLORIDE 0.4 MG/1
0.4 CAPSULE ORAL DAILY
Status: DISCONTINUED | OUTPATIENT
Start: 2022-06-27 | End: 2022-07-01 | Stop reason: HOSPADM

## 2022-06-27 RX ORDER — ONDANSETRON 4 MG/1
4 TABLET, FILM COATED ORAL EVERY 8 HOURS PRN
Status: DISCONTINUED | OUTPATIENT
Start: 2022-06-27 | End: 2022-06-27

## 2022-06-27 RX ORDER — NALOXONE HYDROCHLORIDE 0.4 MG/ML
0.4 INJECTION, SOLUTION INTRAMUSCULAR; INTRAVENOUS; SUBCUTANEOUS
Status: DISCONTINUED | OUTPATIENT
Start: 2022-06-27 | End: 2022-06-27

## 2022-06-27 RX ORDER — ONDANSETRON 4 MG/1
4 TABLET, ORALLY DISINTEGRATING ORAL EVERY 6 HOURS PRN
Status: DISCONTINUED | OUTPATIENT
Start: 2022-06-27 | End: 2022-06-27

## 2022-06-27 RX ORDER — PREDNISONE 20 MG/1
20 TABLET ORAL DAILY
Status: DISCONTINUED | OUTPATIENT
Start: 2022-06-27 | End: 2022-06-27

## 2022-06-27 RX ORDER — BISACODYL 10 MG
10 SUPPOSITORY, RECTAL RECTAL DAILY PRN
Status: DISCONTINUED | OUTPATIENT
Start: 2022-06-27 | End: 2022-07-01 | Stop reason: HOSPADM

## 2022-06-27 RX ORDER — PROCHLORPERAZINE MALEATE 5 MG
5 TABLET ORAL EVERY 6 HOURS PRN
Status: DISCONTINUED | OUTPATIENT
Start: 2022-06-27 | End: 2022-07-01 | Stop reason: HOSPADM

## 2022-06-27 RX ORDER — LEVETIRACETAM 250 MG/1
250 TABLET ORAL 2 TIMES DAILY
Status: DISCONTINUED | OUTPATIENT
Start: 2022-06-27 | End: 2022-07-01 | Stop reason: HOSPADM

## 2022-06-27 RX ORDER — AMOXICILLIN 250 MG
1 CAPSULE ORAL 2 TIMES DAILY
Status: DISCONTINUED | OUTPATIENT
Start: 2022-06-27 | End: 2022-07-01 | Stop reason: HOSPADM

## 2022-06-27 RX ORDER — PYRIDOSTIGMINE BROMIDE 60 MG/1
60 TABLET ORAL 3 TIMES DAILY
Status: DISCONTINUED | OUTPATIENT
Start: 2022-06-27 | End: 2022-07-01 | Stop reason: HOSPADM

## 2022-06-27 RX ORDER — PANTOPRAZOLE SODIUM 40 MG/1
40 TABLET, DELAYED RELEASE ORAL DAILY
Status: DISCONTINUED | OUTPATIENT
Start: 2022-06-27 | End: 2022-07-01 | Stop reason: HOSPADM

## 2022-06-27 RX ORDER — POLYETHYLENE GLYCOL 3350 17 G/17G
17 POWDER, FOR SOLUTION ORAL DAILY
Status: DISCONTINUED | OUTPATIENT
Start: 2022-06-28 | End: 2022-07-01 | Stop reason: HOSPADM

## 2022-06-27 RX ORDER — BISACODYL 10 MG
10 SUPPOSITORY, RECTAL RECTAL DAILY PRN
Status: DISCONTINUED | OUTPATIENT
Start: 2022-06-27 | End: 2022-06-27

## 2022-06-27 RX ORDER — HEPARIN SODIUM 200 [USP'U]/100ML
1 INJECTION, SOLUTION INTRAVENOUS CONTINUOUS PRN
Status: DISCONTINUED | OUTPATIENT
Start: 2022-06-27 | End: 2022-06-27

## 2022-06-27 RX ORDER — PROCHLORPERAZINE 25 MG
12.5 SUPPOSITORY, RECTAL RECTAL EVERY 12 HOURS PRN
Status: DISCONTINUED | OUTPATIENT
Start: 2022-06-27 | End: 2022-07-01 | Stop reason: HOSPADM

## 2022-06-27 RX ORDER — FERROUS SULFATE 325(65) MG
325 TABLET ORAL EVERY OTHER DAY
Status: DISCONTINUED | OUTPATIENT
Start: 2022-06-28 | End: 2022-07-01 | Stop reason: HOSPADM

## 2022-06-27 RX ORDER — FLUMAZENIL 0.1 MG/ML
0.2 INJECTION, SOLUTION INTRAVENOUS
Status: DISCONTINUED | OUTPATIENT
Start: 2022-06-27 | End: 2022-06-27

## 2022-06-27 RX ORDER — OXYCODONE HYDROCHLORIDE 5 MG/1
5 TABLET ORAL EVERY 4 HOURS PRN
Status: ACTIVE | OUTPATIENT
Start: 2022-06-27 | End: 2022-06-28

## 2022-06-27 RX ORDER — ONDANSETRON 2 MG/ML
4 INJECTION INTRAMUSCULAR; INTRAVENOUS EVERY 6 HOURS PRN
Status: DISCONTINUED | OUTPATIENT
Start: 2022-06-27 | End: 2022-07-01 | Stop reason: HOSPADM

## 2022-06-27 RX ORDER — LIDOCAINE 40 MG/G
CREAM TOPICAL
Status: DISCONTINUED | OUTPATIENT
Start: 2022-06-27 | End: 2022-06-27

## 2022-06-27 RX ORDER — SIMVASTATIN 20 MG
20 TABLET ORAL AT BEDTIME
Status: DISCONTINUED | OUTPATIENT
Start: 2022-06-27 | End: 2022-07-01 | Stop reason: HOSPADM

## 2022-06-27 RX ORDER — SODIUM CHLORIDE 9 MG/ML
75 INJECTION, SOLUTION INTRAVENOUS CONTINUOUS
Status: DISCONTINUED | OUTPATIENT
Start: 2022-06-27 | End: 2022-06-27

## 2022-06-27 RX ORDER — PREDNISONE 5 MG/1
10 TABLET ORAL DAILY
Status: DISCONTINUED | OUTPATIENT
Start: 2022-06-28 | End: 2022-07-01 | Stop reason: HOSPADM

## 2022-06-27 RX ORDER — PREDNISONE 5 MG/1
10 TABLET ORAL ONCE
Status: COMPLETED | OUTPATIENT
Start: 2022-06-27 | End: 2022-06-27

## 2022-06-27 RX ORDER — FENTANYL CITRATE 50 UG/ML
25-50 INJECTION, SOLUTION INTRAMUSCULAR; INTRAVENOUS EVERY 5 MIN PRN
Status: DISCONTINUED | OUTPATIENT
Start: 2022-06-27 | End: 2022-06-27

## 2022-06-27 RX ORDER — CEFAZOLIN SODIUM 1 G/3ML
1 INJECTION, POWDER, FOR SOLUTION INTRAMUSCULAR; INTRAVENOUS
Status: COMPLETED | OUTPATIENT
Start: 2022-06-27 | End: 2022-06-27

## 2022-06-27 RX ORDER — ONDANSETRON 4 MG/1
4 TABLET, ORALLY DISINTEGRATING ORAL EVERY 6 HOURS PRN
Status: DISCONTINUED | OUTPATIENT
Start: 2022-06-27 | End: 2022-07-01 | Stop reason: HOSPADM

## 2022-06-27 RX ORDER — LANOLIN ALCOHOL/MO/W.PET/CERES
1000 CREAM (GRAM) TOPICAL DAILY
Status: DISCONTINUED | OUTPATIENT
Start: 2022-06-27 | End: 2022-07-01 | Stop reason: HOSPADM

## 2022-06-27 RX ORDER — ONDANSETRON 2 MG/ML
4 INJECTION INTRAMUSCULAR; INTRAVENOUS EVERY 6 HOURS PRN
Status: DISCONTINUED | OUTPATIENT
Start: 2022-06-27 | End: 2022-06-27

## 2022-06-27 RX ADMIN — MIDAZOLAM 1 MG: 1 INJECTION INTRAMUSCULAR; INTRAVENOUS at 11:59

## 2022-06-27 RX ADMIN — PYRIDOSTIGMINE BROMIDE 60 MG: 60 TABLET ORAL at 20:25

## 2022-06-27 RX ADMIN — PRAMIPEXOLE DIHYDROCHLORIDE 0.38 MG: 0.12 TABLET ORAL at 20:24

## 2022-06-27 RX ADMIN — LIDOCAINE HYDROCHLORIDE 5 ML: 10 INJECTION, SOLUTION EPIDURAL; INFILTRATION; INTRACAUDAL; PERINEURAL at 12:09

## 2022-06-27 RX ADMIN — Medication 1 TABLET: at 16:14

## 2022-06-27 RX ADMIN — PREDNISONE 10 MG: 5 TABLET ORAL at 17:42

## 2022-06-27 RX ADMIN — SIMVASTATIN 20 MG: 20 TABLET, FILM COATED ORAL at 20:24

## 2022-06-27 RX ADMIN — MIDAZOLAM 1 MG: 1 INJECTION INTRAMUSCULAR; INTRAVENOUS at 11:50

## 2022-06-27 RX ADMIN — PANTOPRAZOLE SODIUM 40 MG: 40 TABLET, DELAYED RELEASE ORAL at 16:16

## 2022-06-27 RX ADMIN — SODIUM CHLORIDE 75 ML/HR: 9 INJECTION, SOLUTION INTRAVENOUS at 10:10

## 2022-06-27 RX ADMIN — LEVETIRACETAM 250 MG: 250 TABLET, FILM COATED ORAL at 20:24

## 2022-06-27 RX ADMIN — CEFAZOLIN 1 G: 1 INJECTION, POWDER, FOR SOLUTION INTRAMUSCULAR; INTRAVENOUS at 11:57

## 2022-06-27 RX ADMIN — SENNOSIDES AND DOCUSATE SODIUM 1 TABLET: 8.6; 5 TABLET ORAL at 20:25

## 2022-06-27 RX ADMIN — TAMSULOSIN HYDROCHLORIDE 0.4 MG: 0.4 CAPSULE ORAL at 16:15

## 2022-06-27 RX ADMIN — Medication 1000 MCG: at 16:14

## 2022-06-27 ASSESSMENT — ACTIVITIES OF DAILY LIVING (ADL)
NUMBER_OF_TIMES_PATIENT_HAS_FALLEN_WITHIN_LAST_SIX_MONTHS: 3
ADLS_ACUITY_SCORE: 38
TOILETING: 1-->ASSISTANCE (EQUIPMENT/PERSON) NEEDED (NOT DEVELOPMENTALLY APPROPRIATE)
BATHING: 1-->ASSISTANCE NEEDED
DRESSING/BATHING_DIFFICULTY: YES
DRESS: 1-->ASSISTANCE (EQUIPMENT/PERSON) NEEDED
TOILETING_ISSUES: YES
ADLS_ACUITY_SCORE: 40
TOILETING: 1-->ASSISTANCE (EQUIPMENT/PERSON) NEEDED
WALKING_OR_CLIMBING_STAIRS: AMBULATION DIFFICULTY, REQUIRES EQUIPMENT;AMBULATION DIFFICULTY, ASSISTANCE 1 PERSON
DRESS: 1-->ASSISTANCE (EQUIPMENT/PERSON) NEEDED (NOT DEVELOPMENTALLY APPROPRIATE)
TOILETING_ASSISTANCE: TOILETING DIFFICULTY, ASSISTANCE 1 PERSON
TRANSFERRING: 1-->ASSISTANCE (EQUIPMENT/PERSON) NEEDED
ADLS_ACUITY_SCORE: 38
DOING_ERRANDS_INDEPENDENTLY_DIFFICULTY: NO
TRANSFERRING: 1-->ASSISTANCE (EQUIPMENT/PERSON) NEEDED (NOT DEVELOPMENTALLY APPROPRIATE)
ADLS_ACUITY_SCORE: 38
DIFFICULTY_EATING/SWALLOWING: NO
WEAR_GLASSES_OR_BLIND: YES
EQUIPMENT_CURRENTLY_USED_AT_HOME: CANE, STRAIGHT;WHEELCHAIR, MANUAL
ADLS_ACUITY_SCORE: 40
FALL_HISTORY_WITHIN_LAST_SIX_MONTHS: YES
WALKING_OR_CLIMBING_STAIRS_DIFFICULTY: YES
CONCENTRATING,_REMEMBERING_OR_MAKING_DECISIONS_DIFFICULTY: NO
ADLS_ACUITY_SCORE: 40
DRESSING/BATHING: BATHING DIFFICULTY, ASSISTANCE 1 PERSON

## 2022-06-27 ASSESSMENT — VISUAL ACUITY
OU: BASELINE
OU: BASELINE

## 2022-06-27 NOTE — OP NOTE
Procedure Date: 06/27/2022    PREOPERATIVE DIAGNOSIS:  Normal pressure hydrocephalus.    POSTOPERATIVE DIAGNOSIS:  Normal pressure hydrocephalus.    TITLE OF PROCEDURE:  1. Fluoroscopic guided lumbar drain placement. 2. Interpretation of images.    ATTENDING SURGEON:  Carlos Roche M.D.     ASSISTANTS:     1.  Dr. Jerzy Zazueta.  2.  Dr. Bebo Wolf.    ANESTHESIA:  Conscious sedation and local anesthesia were provided.    MEDICATIONS:  Midazolam 1 mg IV, 50 mcg IV fentanyl.    OTHER MEDICATIONS:  6 mL of 1% lidocaine local, 1 gram IV Ancef.    SEDATION TIME:  20 minutes of 1:1 attending monitoring time.    FLUOROSCOPY TIME:  2.3 minutes.    FLUOROSCOPY DOSE:  77 mGy.    INDICATIONS:  Mr. Ramon is an 82-year-old man being evaluated by the Neurosurgery Service for normal pressure hydrocephalus.  He is being admitted for a lumbar drainage trial.  He has a history of lumbar stenosis, and lumbar drain placement under fluoroscopic guidance has been requested by the Neurosurgery Service.  He presents for the above stated procedure.  Verbal and written consent was obtained from the patient prior to the procedure.    DESCRIPTION OF PROCEDURE:  The patient was placed in the lateral position with the right side up on the angiography table.  His lower back was prepared and draped in the usual sterile fashion.  A timeout was performed.  IV medications were administered by the IR nursing staff under my supervision.  The IR nursing staff monitored the patient's vital signs throughout the procedure.    Upon administration of conscious sedation, we imaged his lumbar spine with fluoroscopy to determine an appropriate entry site.  He had a midline lumbar incision from a previous operation.  We planned entry point at the L3-L4 level.  This area was infiltrated with 1% lidocaine.  We advanced a 14-gauge Tuohy needle through the L3-L4 interspinous space.  The needle was advanced under fluoroscopic guidance, and we obtained  return of CSF.  We advanced the lumbar drain catheter over a wire.  We could not advance the catheter beyond the L1-L2 level.  The wire and needle were removed.  The tip of the catheter was positioned at the L1-L2 level.  There was spontaneous return of CSF from the catheter.  The catheter was secured to the skin and connected to a closed drainage system.  Sterile dressing was applied.  Blood loss was minimal.      I was present for the entire procedure, including the key portions.     IMAGING FINDINGS:    1. Series 1 shows fluoroscopic views of the lumbar spine in the AP and lateral views. The Tuohy needle is in the midline and the entry is in the L3-L4 interspinous space. There are diffuse degenerative changes in the lumbar spine with Grade 1 spondylolisthesis at L4-L5.    2. Series 2 shows lateral fluoroscopic view with the tip of the needle in the spinal canal.    3. Series 3 and 4 show lateral fluoroscopic views with the tip of the catheter at the L1-L2 level.        Carlos Roche MD        D: 2022   T: 2022   MT: BRENDA    Name:     DAISHA LOZOYA  MRN:      [id number]        Account:        [id number]   :      1940           Procedure Date: 2022     Document: [id number]

## 2022-06-27 NOTE — TELEPHONE ENCOUNTER
IR Calling stating patient should have come from In Patient status first, not directly to IR.  Explained Mi said patient could not come from In Patient, per Dr SARA Hair, please set patient up in IR.  IR set up, Dr Hair aware asking patient to go directly to IR.  Ok, call to Bed Placement, Bed placement said they see the request, it just never got entered for some reason.  Now it would need to be a provider asking for bed placement in 6A after Lumbar Drain placement.    Rosita Cuevas NP said she could complete bed placement request on 6A for a medical bed.    It appears all is set at this point, Lumbar Drain placement in IR set for 11am for Lumbar Drain trial post for 4 day 3 night hospital stay.

## 2022-06-27 NOTE — UTILIZATION REVIEW
[facility] Utilization Review  Admission Status; Secondary Review Determination     Admission Date: 6/27/2022  9:13 AM      Under the authority of the Utilization Management Committee, the utilization review process indicated a secondary review on the above patient.  The review outcome is based on review of the medical records, discussions with staff, and applying clinical experience noted on the date of the review.        (X)      Inpatient Status Appropriate - This patient's medical care is consistent with medical management for inpatient care and reasonable inpatient medical practice.          RATIONALE FOR DETERMINATION    82-year-old male with history of ocular myasthenia gravis, macular degeneration, cataract, hearing loss, idiopathic normal pressure hydrocephalus, admitted after lumbar drain placement by IR.  SLP consulted post drain placement, MoCA 8.1, administered post drain placement.  Patient was lethargic but cooperative.  Procedure is outpatient, evaluated by speech therapy, Paged adult Neurosurgery  And discussed with them, likely will stay in the hospital at least 2 to 3 days for ongoing drainage from lumbar drain, neuropsych assessment at the end of bed, possible need for  shunt at that point, with continued PT, agree with inpatient stay with ongoing interventions and need for staying in the hospital      The severity of illness, intensity of service provided, expected LOS and risk for adverse outcome make the care complex, high risk and appropriate for hospital admission.The patient requires hospital based medical care which is anticipated to require a stay of 2 or more midnights; according to CMS guidelines the patient should be admitted as inpatient        The information on this document is developed by the utilization review team in order for the business office to ensure compliance.  This only denotes the appropriateness of proper admission status and does not reflect the quality  of care rendered.         The definitions of Inpatient Status and Observation Status used in making the determination above are those provided in the CMS Coverage Manual, Chapter 1 and Chapter 6, section 70.4.      Sincerely,       Charlene Cruz MD  Physician Advisor  Utilization Review-[place]    Phone: [phone]

## 2022-06-27 NOTE — PROGRESS NOTES
Normal Pressure Hydrocephalus - Speech Language Pathology Assessment     Date: 06/27/2022       Lumbar Drain Placement Day (pre-drain, post-drain 1, 2, etc.):  SLP consulted post drain placement. MOCA 8.1 administered post drain placement at 1515 on 6/27/22.   1. General Observations (command following, safety awareness, motor planning): The patient was very pleasant and cooperative, but lethargic post op. Pt often closed his eyes frequently and repeatedly stated how tired he was.  2. Formal Cognitive Assessment:     MoCA Version: MOCA 8.1    Visuospatial/executive: 1 /5  Naming: 3 /3  Attention: 2 /6  Language: 2 /3  Abstraction:  1/2  Delayed Recall: 1 /5  Delayed Recall Comments: MIS= 3/15. Pt was able to name 1 with no cue, 0 with a categorical cue and 3 with a multiple choice cue.  Orientation: 1 /6    Total Score: 11 /30  Norm Score: 26 or greater /30  Formal Cognitive Assessment Comments:  SLP was consulted after drain placement so MOCA 8.1 scores were obtained post procedure, day of placement.  Assessment comparison per NPH protocol.        Pertinent Past Medical History: Ocular myasthenia gravis, macular degeneration, cataract, hearing loss.    Administration Time:  20 minutes  Total Time: 35 minutes

## 2022-06-27 NOTE — PLAN OF CARE
Normal Pressure Hydrocephalus - Physical Therapy Assessment    Date:  06/27/2022  Assessment:        1. Timed Up and Go (TUG) - average of 3 trials  Assistive device used:  Pre-drain: N/A  Post-drain day 1:  3 min 8 sec, 1 trial as pt too fatigued for additional trials  Day 2:  Day 3:  Minimal Detectable Change for patients with Alzheimer s = 4.09 sec   Minimal Detectable Change for patients with Parkinson s Disease = 3.5 sec   according to Amna & John Mullins 2011      Gait Observations: ambulated slowly with FWW, JANE-MODA of 2, shuffling gait, reduced upright posture, step length      2. Additional Functional Measure: modified 30 sec sit<>stand  Pre-drain: N/A  Post-drain Day 1: pt unable to perform (without assist), 0 reps  Day 2:  Day 3:     Balance Observations: see eval    3. General Observations (command following, safety awareness, motor planning): see eval    4. General ADL performance (Assist level at baseline and currently): see eval

## 2022-06-27 NOTE — IR NOTE
Interventional Radiology Intra-procedural Nursing Note    Patient Name: Gustavo Ramon  Medical Record Number: [id number]  Today's Date: June 27, 2022    Procedure: lumbar drain placement    Proceduralist: Dr Roche, Dr Zazueta, Dr Wolf    Procedure Start Time: 1145  Procedure Puncture time: 1215  Procedure End Time: 1215    Sedation start time: 1150  Sedation end time: 1215  Sedation medications given: versed 1 mg, fentanyl 50 mcg IV    Sedation notes: Tolerated conscious sedation without immediate adverse effect.    Report given to: Ashlyn AMBROSE 6A  : not needed    D: Patient brought to IR room 3 at 1130. Verified Patient's ID and informed consent using two identifiers. He denied having questions or concerns.  A: Patient was positioned side lying with left side down. He was monitored per IR conscious sedation protocol. Patient tolerated the procedure without apparent incident. The dressing over the lumbar drain is clean, dry and intact.   P: Patient transferred to  for post procedure recovery and admission.    Stephanie Hauser RN  [phone]

## 2022-06-27 NOTE — PROGRESS NOTES
06/27/22 1621   Quick Adds   Type of Visit Initial PT Evaluation   Living Environment   Current Living Arrangements extended care facility   Home Accessibility no concerns   Transportation Anticipated family or friend will provide   Living Environment Comments Per spouse pt lives in LTC on memory care unit. Reports diagnosis May 4th of NPH by Dr. Childs.   Self-Care   Usual Activity Tolerance fair   Current Activity Tolerance fair   Equipment Currently Used at Home cane, straight;wheelchair, manual;other (see comments)  (hospital bed)   Fall history within last six months yes   Number of times patient has fallen within last six months 3   Activity/Exercise/Self-Care Comment 20 ft walk yesterday with assist wtih a gait belt and walker, but not a lot of walking in recent 3 wks, has had two PT visits. Notes pt tends to struggle with pivoting and moving R foot. Spends a lot of time in a WC. Has lift chair and hospital bed to provide additional assist to mobilize.   General Information   Onset of Illness/Injury or Date of Surgery 06/27/22   Referring Physician Bebo Wolf MD   Patient/Family Therapy Goals Statement (PT) Improved mobility.   Pertinent History of Current Problem (include personal factors and/or comorbidities that impact the POC) Mr. Ramon is an 82-year-old man being evaluated by the Neurosurgery Service for normal pressure hydrocephalus.  He is being admitted for a lumbar drainage trial.  He has a history of lumbar stenosis and lumbar drain placement under fluoroscopic guidance as has been requested by the Neurosurgery Service.  He presents for the above stated procedure.  Verbal and written consent was obtained from the patient prior to the procedure.   Existing Precautions/Restrictions fall  (lumbar drain)   General Observations orders for NPH trial with lumbar drain; drain already in place so unable to perform pre-testing   Cognition   Cognitive Status Comments resides in memory care  unit per family, pt appeared to have less eye contact and awareness at times of situation   Pain Assessment   Patient Currently in Pain No   Posture    Posture Comments reduced upright posture, forward head, rounded shoulders   Range of Motion (ROM)   ROM Comment see gait   Strength (Manual Muscle Testing)   Strength Comments reduced functionally, see below   Bed Mobility   Comment, (Bed Mobility) MODA-MAXA x 2 supine>sit   Transfers   Comment, (Transfers) MODA x 2 sit<>stand with FWW, NBOS with R LE inward impacting stability; reduced ability to weight shift forward in preparation to stand, impaired ability to stand noted, unstable with posterior lean in standing   Gait/Stairs (Locomotion)   Comment, (Gait/Stairs) ambulated slowly with FWW, JANE-MODA of 2, shuffling gait, reduced upright posture   Balance   Balance Comments TUG 3 min 8 sec, 0 reps 30 sec STS   Clinical Impression   Criteria for Skilled Therapeutic Intervention Yes, treatment indicated   PT Diagnosis (PT) impaired mobility   Influenced by the following impairments reduced ROM, strength, balance, activity tolerance   Functional limitations due to impairments impaired gait, transfers, gait   Clinical Presentation (PT Evaluation Complexity) Evolving/Changing   Clinical Presentation Rationale clinical judgement, [facility]   Clinical Decision Making (Complexity) high complexity   Planned Therapy Interventions (PT) balance training;bed mobility training;gait training;home exercise program;neuromuscular re-education;patient/family education;postural re-education;ROM (range of motion);stair training;strengthening;stretching;transfer training;risk factor education;home program guidelines;progressive activity/exercise   Risk & Benefits of therapy have been explained evaluation/treatment results reviewed;care plan/treatment goals reviewed;risks/benefits reviewed;current/potential barriers reviewed;participants voiced agreement with care plan;participants  included;patient;son;spouse/significant other   PT Discharge Planning   PT Discharge Recommendation (DC Rec) Long term care facility  (w/continued PT)   PT Rationale for DC Rec Anticipate pt will benefit from continued PT at LTC. Will re-assess pending status/progress with NPH work-up.   PT Brief overview of current status Ax2 for pivoting, gait   Physical Therapy Goals   PT Frequency Daily   PT Predicted Duration/Target Date for Goal Attainment 07/02/22   PT Goals Bed Mobility;Transfers;Gait;PT Goal 1   PT: Bed Mobility Supervision/stand-by assist;Supine to/from sit;Rolling;Bridging;Within precautions   PT: Transfers Independent;Supervision/stand-by assist;Sit to/from stand;Bed to/from chair;Assistive device;Within precautions   PT: Gait Supervision/stand-by assist;Rolling walker;Within precautions;100 feet   PT: Goal 1 Demonstrate low falls risk with a balance assessment score of < 13.5 seconds on TUG,  > 19/24 on Dynamic Gait Index, > 9/12 on 4-Item Dynamic Gait Index, >22/30 on Functional Gait Assessment, or > 45/56 on Garvin Balance Assessment.

## 2022-06-27 NOTE — PROGRESS NOTES
[facility]     Endovascular Surgical Neuroradiology Pre-Procedure Note      HPI:  Gustavo Ramon is a 82 year old male with clinical and radiographic findings concerning for normal pressure hydrocephalus presenting for an elective lumbar drain placement.    Medical History:  Past Medical History:   Diagnosis Date     Actinic keratosis      AK (actinic keratosis) 11/15/2012     Amaurosis fugax      Basal cell carcinoma 2009    R medial cheek/nose     Central serous retinopathy left    Eye     Diverticulosis 08/2006     DJD (degenerative joint disease)      GERD (gastroesophageal reflux disease)      Hearing loss     High frequency     History of nonmelanoma skin cancer      History of nonmelanoma skin cancer      HTN (hypertension)      Hyperlipidemia LDL goal < 130      Hyperplastic colon polyp 08/2006     IgA monoclonal gammopathy     IgA kappa     Lattice degeneration of retina right    Eye     Macular degeneration      Nonsenile cataract      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    [place] consult     Rosacea      Steroid-induced diabetes mellitus, 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     Strabismus      Vitreous detachment left    Eye       Surgical History:  Past Surgical History:   Procedure Laterality Date     ARTHROSCOPY KNEE RT/LT Left Jan 2010    Knee     BIOPSY  2009/2013/2016    Nose; Prostate; BCC - left arm     COLONOSCOPY  9/24/2019    w/Endoscopy     COLONOSCOPY WITH CO2 INSUFFLATION N/A 9/24/2019    Procedure: COLONOSCOPY, WITH CO2 INSUFFLATION;  Surgeon: Loi Levine MD;  Location: [facility] OR     COMBINED ESOPHAGOSCOPY, GASTROSCOPY, DUODENOSCOPY (EGD) WITH CO2 INSUFFLATION N/A 9/24/2019    Procedure: ESOPHAGOGASTRODUODENOSCOPY, WITH CO2 INSUFFLATION;  Surgeon: Loi Levine MD;  Location: [facility] OR     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     DISKECTOMY, LUMBAR, SINGLE SP  2003    L2-3     ENT SURGERY  1943    Tonsils      ENT SURGERY  2-22-12     Biopsy lesion right pinna     ENT SURGERY  2-24-12    Biopsy leson right pinna     ESOPHAGOSCOPY, GASTROSCOPY, DUODENOSCOPY (EGD), COMBINED N/A 9/24/2019    Procedure: Esophagogastroduodenoscopy, With Biopsy;  Surgeon: Loi Levine MD;  Location: [facility] OR     SOFT TISSUE SURGERY  May 2010    Basal Cell/right side nose       Family History:  Family History   Problem Relation Age of Onset     Heart Disease Mother      Alzheimer Disease Mother 73     C.A.D. Father      Coronary Artery Disease Father      Diabetes Grandchild         using a pump      Diabetes Paternal Uncle      Heart Disease Paternal Grandmother      Glaucoma No family hx of      Macular Degeneration No family hx of      Melanoma No family hx of      Skin Cancer No family hx of        Social History:  Social History     Socioeconomic History     Marital status:      Spouse name: Ceci     Number of children: 2     Years of education: 16     Highest education level: Not on file   Occupational History     Occupation:      Employer: RETIRED     Comment: 2001,    Tobacco Use     Smoking status: Never Smoker     Smokeless tobacco: Never Used   Vaping Use     Vaping Use: Never used   Substance and Sexual Activity     Alcohol use: No     Alcohol/week: 0.0 standard drinks     Drug use: No     Sexual activity: Never   Other Topics Concern     Parent/sibling w/ CABG, MI or angioplasty before 65F 55M? No   Social History Narrative     Not on file     Social Determinants of Health     Financial Resource Strain: Not on file   Food Insecurity: Not on file   Transportation Needs: Not on file   Physical Activity: Not on file   Stress: Not on file   Social Connections: Not on file   Intimate Partner Violence: Not on file   Housing Stability: Not on file       Allergies:  Allergies   Allergen Reactions     Mycophenolate Other (See Comments)     Confusion, abnormal movements     Nkda [No Known Drug Allergies]        Is there a  contrast allergy?  No    Medications:  Medications Prior to Admission   Medication Sig Dispense Refill Last Dose     acetaminophen (TYLENOL) 325 MG tablet Take 1-2 tablets (325-650 mg) by mouth every 4 hours as needed for mild pain   Past Month at Unknown time     bisacodyl (DULCOLAX) 10 MG suppository Place 1 suppository (10 mg) rectally daily as needed for constipation   Unknown at Unknown time     cyanocobalamin (VITAMIN B-12) 1000 MCG tablet Take 1 tablet (1,000 mcg) by mouth daily 90 tablet 1 6/26/2022 at 0800     ferrous sulfate (FEROSUL) 325 (65 Fe) MG tablet Use 1 tab every other day with orange juice 60 tablet 1 6/26/2022 at 0800     levETIRAcetam (KEPPRA) 250 MG tablet Take 1 tablet (250 mg) by mouth 2 times daily   6/26/2022 at 2000     Lidocaine (LIDOCARE) 4 % Patch Apply to intact skin to cover most painful area for max 12hr per 24hr period.   Unknown at Unknown time     melatonin 3 MG tablet Take 1 tablet (3 mg) by mouth At Bedtime   6/26/2022 at 2000     multivitamin (OCUVITE) TABS tablet Take 1 tablet by mouth daily   6/26/2022 at 0800     ondansetron (ZOFRAN) 4 MG tablet Take 1 tablet (4 mg) by mouth every 8 hours as needed for nausea   Unknown at Unknown time     pantoprazole (PROTONIX) 40 MG EC tablet Take 1 tablet (40 mg) by mouth daily   6/26/2022 at 0800     polyethylene glycol (MIRALAX) 17 g packet Take 17 g by mouth daily   More than a month at Unknown time     pramipexole (MIRAPEX) 0.125 MG tablet Take 3 tablets (0.375 mg) by mouth At Bedtime 90 tablet 2 6/26/2022 at 2000     prednisoLONE acetate (PRED FORTE) 1 % ophthalmic suspension    6/26/2022 at 2000     predniSONE (DELTASONE) 20 MG tablet TAKE 1 TABLET DAILY 90 tablet 3 6/26/2022 at 0800     propranolol (INDERAL) 10 MG tablet Take 30 mg by mouth   6/26/2022 at 0800     pyridostigmine (MESTINON) 60 MG tablet Take 1 tablet (60 mg) by mouth 3 times daily 270 tablet 3 6/26/2022 at 2000     simvastatin (ZOCOR) 20 MG tablet Take 1 tablet (20  mg) by mouth At Bedtime 90 tablet 1 6/26/2022 at 2000     Sodium Chloride, Hypertonic, (MASOOD 128 OP) Uses Masood gel and drops. Gel once a day at night. Drops 4 times a day.   6/26/2022 at 0800     tamsulosin (FLOMAX) 0.4 MG capsule Take 1 capsule (0.4 mg) by mouth daily   6/26/2022 at 0800     triamcinolone (KENALOG) 0.1 % external cream Apply a thin layer up to twice daily to affected areas as needed. 30 g 3 6/26/2022 at 0800     vitamin D3 (CHOLECALCIFEROL) 2000 units tablet Take 1 tablet by mouth daily (Patient taking differently: Take 800 tablets by mouth daily) 90 tablet 1 6/26/2022 at 0800     ACE/ARB/ARNI NOT PRESCRIBED (INTENTIONAL) Please choose reason not prescribed from choices below.        aspirin 325 MG tablet Take 325 mg by mouth daily   6/20/2022     pramipexole (MIRAPEX) 0.25 MG tablet Take 0.25 mg by mouth        propranolol ER (INDERAL LA) 60 MG 24 hr capsule Take 1 capsule (60 mg) by mouth daily 60 capsule 0    .    ROS:  The 10 point Review of Systems is negative other than noted in the HPI or here.     PHYSICAL EXAMINATION  Vital Signs:  B/P: 147/82,  T: 97.9,  P: 64,  R: 18    Elderly  man, lying in bed in no acute distress.  He is awake, alert and oriented to person, place and situation.  Moves all 4 extremities symmetrically.  Sensation grossly intact to touch bilaterally.  No obvious dysmetria on finger-to-nose test.    LABS  (most recent Cr, BUN, GFR, PLT, INR, PTT within the past 7 days):  Recent Labs   Lab 06/27/22  1006      INR 1.12   PTT 31        Platelet Function P2Y12 (PRU):  Not applicable      ASSESSMENT:  Fluoroscopy guided lumbar drain placement  PLAN:   Fluoroscopy guided lumbar drain placement     PRE-PROCEDURE SEDATION ASSESSMENT     Pre-Procedure Sedation Assessment done at 1030.    Expected Level:  Moderate Sedation    Indication:  Sedation is required to allow for neurointerventional procedure.    Consent obtained from patient after discussing the risks,  benefits and alternatives.     PO Intake:  Appropriately NPO for procedure    ASA Class:  Class 2 - MILD SYSTEMIC DISEASE, NO ACUTE PROBLEMS, NO FUNCTIONAL LIMITATIONS.    Mallampati:  Grade 2:  Soft palate, base of uvula, tonsillar pillars, and portion of posterior pharyngeal wall visible    History and physical reviewed and no updates needed. I have reviewed the lab findings, diagnostic data, medications, and the plan for sedation. I have determined this patient to be an appropriate candidate for the planned sedation and procedure and have reassessed the patient IMMEDIATELY PRIOR to sedation and procedure.    Patient was discussed with the Attending, Dr. Roche, who agrees with the plan.    MICHELLE RAMIREZ MD   Pager: [phone]

## 2022-06-27 NOTE — PROGRESS NOTES
06/27/22 1621   Quick Adds   Type of Visit Initial PT Evaluation   Living Environment   Current Living Arrangements extended care facility   Home Accessibility no concerns   Transportation Anticipated family or friend will provide   Living Environment Comments Per spouse pt lives in LTC on memory care unit. Reports diagnosis May 4th of NPH by Dr. Childs.   Self-Care   Usual Activity Tolerance fair   Current Activity Tolerance fair   Equipment Currently Used at Home cane, straight;wheelchair, manual;other (see comments)  (hospital bed)   Fall history within last six months yes   Number of times patient has fallen within last six months 3   Activity/Exercise/Self-Care Comment 20 ft walk yesterday with assist wtih a gait belt and walker, but not a lot of walking in recent 3 wks, has had two PT visits. Notes pt tends to struggle with pivoting and moving R foot. Spends a lot of time in a WC. Has lift chair and hospital bed to provide additional assist to mobilize.   General Information   Onset of Illness/Injury or Date of Surgery 06/27/22   Referring Physician Bebo Wolf MD   Patient/Family Therapy Goals Statement (PT) Improved mobility.   Pertinent History of Current Problem (include personal factors and/or comorbidities that impact the POC) Mr. Ramon is an 82-year-old man being evaluated by the Neurosurgery Service for normal pressure hydrocephalus.  He is being admitted for a lumbar drainage trial.  He has a history of lumbar stenosis and lumbar drain placement under fluoroscopic guidance as has been requested by the Neurosurgery Service.  He presents for the above stated procedure.  Verbal and written consent was obtained from the patient prior to the procedure.   Existing Precautions/Restrictions fall  (lumbar drain)   General Observations orders for NPH trial with lumbar drain; drain already in place so unable to perform pre-testing   Cognition   Cognitive Status Comments resides in memory care  unit per family, pt appeared to have less eye contact and awareness at times of situation   Pain Assessment   Patient Currently in Pain No   Posture    Posture Comments reduced upright posture, forward head, rounded shoulders   Range of Motion (ROM)   ROM Comment see gait   Strength (Manual Muscle Testing)   Strength Comments reduced functionally, see below   Bed Mobility   Comment, (Bed Mobility) MODA-MAXA x 2 supine>sit   Transfers   Comment, (Transfers) MODA x 2 sit<>stand with FWW, NBOS with R LE inward impacting stability; reduced ability to weight shift forward in preparation to stand, impaired ability to stand noted, unstable with posterior lean in standing   Gait/Stairs (Locomotion)   Comment, (Gait/Stairs) ambulated slowly with FWW, JANE-MODA of 2, shuffling gait, reduced upright posture   Balance   Balance Comments TUG 3 min 8 sec, 0 reps 30 sec STS   Clinical Impression   Criteria for Skilled Therapeutic Intervention Yes, treatment indicated   PT Diagnosis (PT) impaired mobility   Influenced by the following impairments reduced ROM, strength, balance, activity tolerance   Functional limitations due to impairments impaired gait, transfers, gait   Clinical Presentation (PT Evaluation Complexity) Evolving/Changing   Clinical Presentation Rationale clinical judgement, [facility]   Clinical Decision Making (Complexity) high complexity   Planned Therapy Interventions (PT) balance training;bed mobility training;gait training;home exercise program;neuromuscular re-education;patient/family education;postural re-education;ROM (range of motion);stair training;strengthening;stretching;transfer training;risk factor education;home program guidelines;progressive activity/exercise   Risk & Benefits of therapy have been explained evaluation/treatment results reviewed;care plan/treatment goals reviewed;risks/benefits reviewed;current/potential barriers reviewed;participants voiced agreement with care plan;participants  included;patient;son;spouse/significant other   PT Discharge Planning   PT Discharge Recommendation (DC Rec) Long term care facility  (w/continued PT)   PT Rationale for DC Rec Anticipate pt will benefit from continued PT at LTC. Will re-assess pending status/progress with NPH work-up.   PT Brief overview of current status Ax2 for pivoting, gait   Total Evaluation Time   Total Evaluation Time (Minutes) 35   Physical Therapy Goals   PT Frequency Daily   PT Predicted Duration/Target Date for Goal Attainment 07/02/22   PT Goals Bed Mobility;Transfers;Gait;PT Goal 1   PT: Bed Mobility Supervision/stand-by assist;Supine to/from sit;Rolling;Bridging;Within precautions   PT: Transfers Independent;Supervision/stand-by assist;Sit to/from stand;Bed to/from chair;Assistive device;Within precautions   PT: Gait Supervision/stand-by assist;Rolling walker;Within precautions;100 feet   PT: Goal 1 Demonstrate low falls risk with a balance assessment score of < 13.5 seconds on TUG,  > 19/24 on Dynamic Gait Index, > 9/12 on 4-Item Dynamic Gait Index, >22/30 on Functional Gait Assessment, or > 45/56 on Garvin Balance Assessment.

## 2022-06-27 NOTE — PROGRESS NOTES
Pt is here for LP drainage placement.  Prep completed. Consent signed. All lab resulted (COVID- neg, INR=1.12,  Eicl=278, PTT=31 ).  Spouse, Ceci at bed side and her cell # is  [phone] ).  Pt will be admitted in patient post procedure.  Pt is a fall risk due to his mobility issues and recent falls.

## 2022-06-28 ENCOUNTER — APPOINTMENT (OUTPATIENT)
Dept: SPEECH THERAPY | Facility: CLINIC | Age: 82
DRG: 057 | End: 2022-06-28
Attending: NEUROLOGICAL SURGERY
Payer: MEDICARE

## 2022-06-28 ENCOUNTER — APPOINTMENT (OUTPATIENT)
Dept: PHYSICAL THERAPY | Facility: CLINIC | Age: 82
DRG: 057 | End: 2022-06-28
Attending: NEUROLOGICAL SURGERY
Payer: MEDICARE

## 2022-06-28 PROCEDURE — 120N000002 HC R&B MED SURG/OB UMMC

## 2022-06-28 PROCEDURE — 97750 PHYSICAL PERFORMANCE TEST: CPT | Mod: GP | Performed by: PHYSICAL THERAPIST

## 2022-06-28 PROCEDURE — 250N000012 HC RX MED GY IP 250 OP 636 PS 637: Performed by: STUDENT IN AN ORGANIZED HEALTH CARE EDUCATION/TRAINING PROGRAM

## 2022-06-28 PROCEDURE — 97530 THERAPEUTIC ACTIVITIES: CPT | Mod: GP | Performed by: PHYSICAL THERAPIST

## 2022-06-28 PROCEDURE — 250N000013 HC RX MED GY IP 250 OP 250 PS 637: Performed by: STUDENT IN AN ORGANIZED HEALTH CARE EDUCATION/TRAINING PROGRAM

## 2022-06-28 PROCEDURE — G0463 HOSPITAL OUTPT CLINIC VISIT: HCPCS

## 2022-06-28 PROCEDURE — 96125 COGNITIVE TEST BY HC PRO: CPT | Mod: GN | Performed by: SPEECH-LANGUAGE PATHOLOGIST

## 2022-06-28 RX ADMIN — SENNOSIDES AND DOCUSATE SODIUM 1 TABLET: 8.6; 5 TABLET ORAL at 20:06

## 2022-06-28 RX ADMIN — PYRIDOSTIGMINE BROMIDE 60 MG: 60 TABLET ORAL at 13:25

## 2022-06-28 RX ADMIN — PREDNISONE 10 MG: 5 TABLET ORAL at 07:56

## 2022-06-28 RX ADMIN — PYRIDOSTIGMINE BROMIDE 60 MG: 60 TABLET ORAL at 20:31

## 2022-06-28 RX ADMIN — FERROUS SULFATE TAB 325 MG (65 MG ELEMENTAL FE) 325 MG: 325 (65 FE) TAB at 08:03

## 2022-06-28 RX ADMIN — Medication 1 TABLET: at 11:06

## 2022-06-28 RX ADMIN — TAMSULOSIN HYDROCHLORIDE 0.4 MG: 0.4 CAPSULE ORAL at 07:56

## 2022-06-28 RX ADMIN — SIMVASTATIN 20 MG: 20 TABLET, FILM COATED ORAL at 20:06

## 2022-06-28 RX ADMIN — PYRIDOSTIGMINE BROMIDE 60 MG: 60 TABLET ORAL at 07:56

## 2022-06-28 RX ADMIN — POLYETHYLENE GLYCOL 3350 17 G: 17 POWDER, FOR SOLUTION ORAL at 08:05

## 2022-06-28 RX ADMIN — LEVETIRACETAM 250 MG: 250 TABLET, FILM COATED ORAL at 20:06

## 2022-06-28 RX ADMIN — SENNOSIDES AND DOCUSATE SODIUM 1 TABLET: 8.6; 5 TABLET ORAL at 08:03

## 2022-06-28 RX ADMIN — PANTOPRAZOLE SODIUM 40 MG: 40 TABLET, DELAYED RELEASE ORAL at 07:56

## 2022-06-28 RX ADMIN — Medication 1000 MCG: at 07:56

## 2022-06-28 RX ADMIN — PRAMIPEXOLE DIHYDROCHLORIDE 0.38 MG: 0.12 TABLET ORAL at 20:31

## 2022-06-28 RX ADMIN — LEVETIRACETAM 250 MG: 250 TABLET, FILM COATED ORAL at 07:56

## 2022-06-28 ASSESSMENT — ACTIVITIES OF DAILY LIVING (ADL)
ADLS_ACUITY_SCORE: 42
ADLS_ACUITY_SCORE: 40
ADLS_ACUITY_SCORE: 40
ADLS_ACUITY_SCORE: 50
ADLS_ACUITY_SCORE: 42
ADLS_ACUITY_SCORE: 40
ADLS_ACUITY_SCORE: 50
ADLS_ACUITY_SCORE: 50
ADLS_ACUITY_SCORE: 40
ADLS_ACUITY_SCORE: 50

## 2022-06-28 NOTE — PLAN OF CARE
Status: Admitted for lumbar drain placement with history of ocular myasthenia gravis, macular degeneration, cataract, hearing loss, idiopathic normal pressure hydrocephalus.  Vitals: VSS on RA  Neuros: Aox4. Intermittent BUE tremors. LLE 4/5, all others 5/5. GW. Confused early this morning but otherwise intact.   IV: PIV SL  Diet: Regular  Bowel status: BS+ LBM 6/26  : voiding with urinal. Bladder scanned for 334, pt voided 200. Pt voids best leaning to R side.   Skin: Camila area significantly reddened, extending to thighs. Barrier cream applied, WOC order on CN list. LD to back  Pain: denies  Activity: A2 with GB/walker/pivot.   Plan: continue to monitor

## 2022-06-28 NOTE — PROGRESS NOTES
Normal Pressure Hydrocephalus - Speech Language Pathology Assessment     Date: 06/28/2022       Lumbar Drain Placement Day (pre-drain, post-drain 1, 2, etc.): SLP consulted post drain placement. MOCA 8.2 administered post drain placement at 1515 on 6/28/22.    1. General Observations (command following, safety awareness, motor planning): Patient was pleasant and cooperative, not fatigued like yesterdays session. Willing to participate in testing and conversational.  2. Formal Cognitive Assessment:    MoCA Version: 8.2    Visuospatial/executive: 1 /5  Naming: 3 /3  Attention: 3 /6  Language: 2 /3  Abstraction: 2 /2  Delayed Recall: 0 /5  Delayed Recall Comments: MIS= 5/15. Pt was able to name 0 with no cue, 2 with a categorical cue and 1 with a multiple choice cue.  Orientation: 3 /6    Total Score: 15 /30  Norm Score: 26 or greater /30  Formal Cognitive Assessment Comments: Improved score compared to previous test, but decreased delayed recall score.    Assessment comparison per NPH protocol.  Pre 0: MoCA 8.1  11   Post 1: MoCA 8.2       15   Post 2: MoCA 8.3       TBD   Post 3: MoCA 8.1       TBD       Pertinent Past Medical History: Ocular myasthenia gravis, macular degeneration, cataract, hearing loss.    Administration Time:  20 minutes  Total Time: 32 minutes

## 2022-06-28 NOTE — PROGRESS NOTES
"[facility], [place]   Neurosurgery Progress Note:    Date of service: 6/28/2022    Assessment: Gustavo Ramon is a 82-year-old male with history of ocular myasthenia gravis, macular degeneration, cataract, and hearing loss. He presented to Neurosurgery clinic on 5/4/2022 with 3 month history of major decline in gait and balance, he currently is unable to ambulate independently and uses a wheelchair.  Since February 2022, family also reports changes in his personality and cognition. Per family patient is having trouble with attention, and experiencing word finding difficulty. Patient has history of urinary urgency, frequency with some hesitancy, but no urinary incontinence. He was diagnosed with normal pressure hydrocephalus and is now admitted for Lumbar drain trial. His LD was placed in IR on 6/27/2022.     Clinically Significant Risk Factors Present on Admission               # Overweight: Estimated body mass index is 26.78 kg/m  as calculated from the following:    Height as of this encounter: 1.702 m (5' 7\").    Weight as of this encounter: 77.6 kg (171 lb).  # Myasthenia gravis  # Dementia    Plan:  - Neuro checks/vital signs: Every 4 hours   - SBP: <160  - Pain control: PRN tylenol   - Activity: Please clamp LD during transfer. OOB with assist from Nursing staff.   - Regular diet  - Lumbar drain to drain 10 ml/hr. Clamp while transferring OOB.   - Hold anti-coagulation  - DVT prophylaxis: SCDs   - Continue PTA keppra 250 mg BID and prednisone 10 mg daily  - PT/Speech: Serial exams   - Expected discharge date dependent upon possible  shunt placement Friday       CORINA Guaman, CNP  Neurosurgery  Pager [phone]      I have spent a total of 25 minutes total time counseling, coordination, and chart review, over 50% of the time was spent in direct patient care.       Interval History: Patient denies HA, chest pain or shortness of breath. He states he is \"comfortable\" today.  He " is neurologically stable today.  LD in place and continue to drain 10 mL/hr. Initial MOCA score was 11/30, will repeat today.       Objective:   Temp:  [96.2  F (35.7  C)-99.3  F (37.4  C)] 97  F (36.1  C)  Pulse:  [57-94] 75  Resp:  [13-19] 16  BP: ()/(55-91) 146/90  SpO2:  [92 %-99 %] 98 %  I/O last 3 completed shifts:  In: 1277.5 [P.O.:840; I.V.:437.5]  Out: 1010 [Urine:850; Drains:160]    Gen: Appears comfortable, NAD  Neurologic:  - Alert & Oriented to person, place, time, and situation  - Follows commands briskly  - Speech fluent, spontaneous. No aphasia or dysarthria.  - No gaze preference. No apparent hemineglect.  - PERRL, EOMI  - Strong eye closure, jaw clench, and cheek puff  - Face symmetric with sensation intact to light touch  - Palate elevates symmetrically, uvula midline, tongue protrudes midline  - Trapezii and sternocleidomastoid muscles 5/5 bilaterally  - No pronator drift     Del Tr Bi WE WF Gr   R 5 5 5 5 5 5   L 5 5 5 5 5 5    HF KE KF DF PF EHL   R 5 5 5 5 5 5   L 5 5 5 5 5 5     Reflexes 2+ throughout    Sensation intact and symmetric to light touch throughout    LABS  Recent Labs   Lab Test 06/27/22  1006 05/02/22  0650 03/31/22  1342 02/15/22  1057 02/07/22  1112   WBC  --   --  13.4* 11.5* 10.1   HGB  --  12.7* 14.4 12.4* 11.8*   MCV  --   --  87 89 89     --  207 256 267       Recent Labs   Lab Test 05/02/22  0650 03/31/22  1342 02/15/22  1057    140 140   POTASSIUM 4.2 4.2 3.7   CHLORIDE 104 107 107   CO2 22 29 32   BUN 15 23 20   CR 0.90 0.91 0.93   ANIONGAP 13 4 1*   TRISTIAN 8.7 9.1 9.1   GLC 79 93 103*       IMAGING    Recent Results (from the past 24 hour(s))   IR Lumbar Drain Placement w Fluoro    Narrative    Procedure Date: 06/27/2022    PREOPERATIVE DIAGNOSIS:  Normal pressure hydrocephalus.    POSTOPERATIVE DIAGNOSIS:  Normal pressure hydrocephalus.    TITLE OF PROCEDURE:  1. Fluoroscopic guided lumbar drain placement. 2.  Interpretation of images.    ATTENDING  SURGEON:  Carlos Roche M.D.     ASSISTANTS:     1.  Dr. Jerzy Zazueta.  2.  Dr. Bebo Wolf.    ANESTHESIA:  Conscious sedation and local anesthesia were provided.    MEDICATIONS:  Midazolam 1 mg IV, 50 mcg IV fentanyl.    OTHER MEDICATIONS:  6 mL of 1% lidocaine local, 1 gram IV Ancef.    SEDATION TIME:  20 minutes of 1:1 attending monitoring time.    FLUOROSCOPY TIME:  2.3 minutes.    FLUOROSCOPY DOSE:  77 mGy.    INDICATIONS:  Mr. Ramon is an 82-year-old man being evaluated by the  Neurosurgery Service for normal pressure hydrocephalus.  He is  admitted for a lumbar drainage trial.  He has a history of lumbar  stenosis, and lumbar drain placement under fluoroscopic guidance has  been requested by the Neurosurgery Service.  He presents for the above  stated procedure.  Verbal and written consent was obtained from the  patient prior to the procedure.    DESCRIPTION OF PROCEDURE:  The patient was placed in the lateral  position with the right side up on the angiography table.  His lower  back was prepared and draped in the usual sterile fashion.  A timeout  was performed.  IV medications were administered by the IR nursing  staff under my supervision.  The IR nursing staff monitored the  patient's vital signs throughout the procedure.    Upon administration of conscious sedation, we imaged his lumbar spine  with fluoroscopy to determine an appropriate entry site.  He had a  midline lumbar incision from a previous operation.  We planned entry  point at the L3-L4 level.  This area was infiltrated with 1%  lidocaine.  We advanced a 14-gauge Tuohy needle through the L3-L4  interspinous space.  The needle was advanced under fluoroscopic  guidance, and we obtained return of CSF.  We advanced the lumbar drain  catheter over a wire.  We could not advance the catheter beyond the  L1-L2 level.  The wire and needle were removed.  The tip of the  catheter was positioned at the L1-L2 level.  There was  spontaneous  return of CSF from the catheter.  The catheter was secured to the skin  and connected to a closed drainage system.  Sterile dressing was  applied.  Blood loss was minimal.      I was present for the entire procedure, including the key portions.     IMAGING FINDINGS:    1. Series 1 shows fluoroscopic views of the lumbar spine in the AP and  lateral views. The Tuohy needle is in the midline and the entry is in  the L3-L4 interspinous space. There are diffuse degenerative changes  in the lumbar spine with Grade 1 spondylolisthesis at L4-L5.    2. Series 2 shows lateral fluoroscopic view with the tip of the needle  in the spinal canal.    3. Series 3 and 4 show lateral fluoroscopic views with the tip of the  catheter at the L1-L2 level.      Carlos Retana MD        D: 2022   T: 2022   MT: BRENDA    Name:     DAISHA LOZOYA  MRN:      [id number]        Account:        [id number]   :      1940           Procedure Date: 2022     CARLOS RETANA MD         SYSTEM ID:  [id number]

## 2022-06-28 NOTE — PLAN OF CARE
Status: Admitted for lumbar drain placement with history of ocular myasthenia gravis, macular degeneration, cataract, hearing loss, idiopathic normal pressure hydrocephalus.  Vitals: VSS ex for intermit. tachycardia  Neuros: A&Ox3-4. Forgetful of date. Intermittent BUE tremors. 5/5 throughout, generalized weakness. LD in place, drain 10mL q1hr. Denies HA  IV: PIV SL  Resp/trach: LS diminished.   Diet: Regular  Bowel status: 2 BM this shift, urgency.  : Voiding via urinal. Intermittent incontinence.  Skin: Camila area significantly reddened, extending to thighs-Seen by WOC-cared for per orders. LD site to back intact.  Pain: Denies  Activity: A2, GB, walker, pivot.  Social: Wife and daughter at bedside, supportive  Plan: Continue to monitor and follow POC.   Updates this shift: worked with therapies today.

## 2022-06-28 NOTE — CONSULTS
[facility]  WOC Nurse Inpatient Assessment     Consulted for: buttock, perianal redness    Patient History (according to provider note(s):      Gustavo Ramon is a 82 year old male with clinical and radiographic findings concerning for normal pressure hydrocephalus presenting for an elective lumbar drain placement.    Areas Assessed:      Areas visualized during today's visit: Perineal area    Wound location: buttock/ groin    Last photo: 6/28  Wound due to: Incontinence Associated Dermatitis (IAD)  Wound history/plan of care: POA  Wound base: 100 % maroon and epidermis     Palpation of the wound bed: normal      Drainage: none     Description of drainage: none     Measurements (length x width x depth, in cm): see photo, bony area redness is blanchable. All consistent with moisture      Tunneling: N/A     Undermining: N/A  Periwound skin: Intact      Color: normal and consistent with surrounding tissue      Temperature: normal   Odor: none  Pain: denies , none  Pain interventions prior to dressing change: N/A  Treatment goal: Decrease moisture and Protection  STATUS: initial assessment  Supplies ordered: supplies stored on unit       Treatment Plan:     Buttock/ groin wound(s): BID and PRN after each incontinent cleanse with ashley cleanse and protect and yoseph dry wipes. AVOID pre moistened wipes. Apply thin layer of critic aid barrier paste. Only remove soiled paste and reapply as needed. If complete removal is needed use baby oil (order#[id number]) and yoseph dry wipes. AVOID brief in bed.      Orders: Written    RECOMMEND PRIMARY TEAM ORDER: None, at this time  Education provided: plan of care and Moisture management  Discussed plan of care with: Patient and Nurse  WOC nurse follow-up plan: signing off  Notify WOC if wound(s) deteriorate.  Nursing to notify the Provider(s) and re-consult the WOC Nurse if new skin concern.    DATA:     Current support surface:  Standard  Atmos Air mattress  Containment of urine/stool: Brief and Incontinent pad in bed  BMI: Body mass index is 26.78 kg/m .   Active diet order: Orders Placed This Encounter      Regular Diet Adult     Output: I/O last 3 completed shifts:  In: 1277.5 [P.O.:840; I.V.:437.5]  Out: 1010 [Urine:850; Drains:160]     Labs: Recent Labs   Lab 06/27/22  1006   INR 1.12     Pressure injury risk assessment:   Sensory Perception: 4-->no impairment  Moisture: 4-->rarely moist  Activity: 2-->chairfast  Mobility: 3-->slightly limited  Nutrition: 3-->adequate  Friction and Shear: 2-->potential problem  Emeka Score: 18    Delia Wyatt RN [facility]   Dept. Pager: [phone]  Dept. Office Number: [phone]

## 2022-06-28 NOTE — PLAN OF CARE
Status: Admitted for lumbar drain placement with history of ocular myasthenia gravis, macular degeneration, cataract, hearing loss, idiopathic normal pressure hydrocephalus.  Vitals: VSS on RA  Neuros: Aox4. Intermittent BUE tremors. LLE 4/5, all others 5/5. GW. Confused intermittently. Forgetful    IV: PIV SL  Diet: Regular  Bowel status: BS+ LBM 6/26  : voiding with urinal-standing at bedside   Skin: Camila area significantly reddened, extending to thighs-Seen by WOC-cared for per orders. LD site to back intact.  Pain: denies  Activity: A2 with GB/walker/pivot.   Plan: To be seen by therapies today. Continue to monitor and follow POC.

## 2022-06-28 NOTE — PROGRESS NOTES
Major Shift Events:       Neuro:  Neuro status intact. General weakness, more notable with right lower extremity. Up in chair and walked in mills x2. Uses walker. Baseline upper extremity tremors. Lumbar drain unclamped hourly for 10 ml drainage and then re-clamped.  Resp/Card: Lungs clear on room air. BP stable.  GI/: Regular diet, tolerating well. Medications in pudding/applesauce. Voiding adequate amounts.   Skin: Intact.   Plan:  Continue to closely monitor.    For vital signs and complete assessments, please see documentation flowsheets.

## 2022-06-29 ENCOUNTER — APPOINTMENT (OUTPATIENT)
Dept: PHYSICAL THERAPY | Facility: CLINIC | Age: 82
DRG: 057 | End: 2022-06-29
Attending: NEUROLOGICAL SURGERY
Payer: MEDICARE

## 2022-06-29 ENCOUNTER — APPOINTMENT (OUTPATIENT)
Dept: SPEECH THERAPY | Facility: CLINIC | Age: 82
DRG: 057 | End: 2022-06-29
Attending: NEUROLOGICAL SURGERY
Payer: MEDICARE

## 2022-06-29 PROCEDURE — 120N000002 HC R&B MED SURG/OB UMMC

## 2022-06-29 PROCEDURE — 97750 PHYSICAL PERFORMANCE TEST: CPT | Mod: GP | Performed by: PHYSICAL THERAPIST

## 2022-06-29 PROCEDURE — 250N000012 HC RX MED GY IP 250 OP 636 PS 637: Performed by: STUDENT IN AN ORGANIZED HEALTH CARE EDUCATION/TRAINING PROGRAM

## 2022-06-29 PROCEDURE — 99232 SBSQ HOSP IP/OBS MODERATE 35: CPT | Performed by: NURSE PRACTITIONER

## 2022-06-29 PROCEDURE — 250N000013 HC RX MED GY IP 250 OP 250 PS 637: Performed by: STUDENT IN AN ORGANIZED HEALTH CARE EDUCATION/TRAINING PROGRAM

## 2022-06-29 PROCEDURE — 96125 COGNITIVE TEST BY HC PRO: CPT | Mod: GN

## 2022-06-29 PROCEDURE — 97530 THERAPEUTIC ACTIVITIES: CPT | Mod: GP | Performed by: PHYSICAL THERAPIST

## 2022-06-29 PROCEDURE — 258N000003 HC RX IP 258 OP 636: Performed by: NURSE PRACTITIONER

## 2022-06-29 RX ORDER — SODIUM CHLORIDE 9 MG/ML
INJECTION, SOLUTION INTRAVENOUS CONTINUOUS
Status: DISCONTINUED | OUTPATIENT
Start: 2022-06-29 | End: 2022-07-01 | Stop reason: HOSPADM

## 2022-06-29 RX ADMIN — PYRIDOSTIGMINE BROMIDE 60 MG: 60 TABLET ORAL at 13:28

## 2022-06-29 RX ADMIN — LEVETIRACETAM 250 MG: 250 TABLET, FILM COATED ORAL at 20:06

## 2022-06-29 RX ADMIN — PREDNISONE 10 MG: 5 TABLET ORAL at 08:04

## 2022-06-29 RX ADMIN — LEVETIRACETAM 250 MG: 250 TABLET, FILM COATED ORAL at 08:04

## 2022-06-29 RX ADMIN — SODIUM CHLORIDE: 9 INJECTION, SOLUTION INTRAVENOUS at 13:28

## 2022-06-29 RX ADMIN — PYRIDOSTIGMINE BROMIDE 60 MG: 60 TABLET ORAL at 08:04

## 2022-06-29 RX ADMIN — Medication 1000 MCG: at 08:04

## 2022-06-29 RX ADMIN — SIMVASTATIN 20 MG: 20 TABLET, FILM COATED ORAL at 20:06

## 2022-06-29 RX ADMIN — Medication 1 TABLET: at 08:04

## 2022-06-29 RX ADMIN — PRAMIPEXOLE DIHYDROCHLORIDE 0.38 MG: 0.12 TABLET ORAL at 20:06

## 2022-06-29 RX ADMIN — PANTOPRAZOLE SODIUM 40 MG: 40 TABLET, DELAYED RELEASE ORAL at 08:04

## 2022-06-29 RX ADMIN — TAMSULOSIN HYDROCHLORIDE 0.4 MG: 0.4 CAPSULE ORAL at 08:04

## 2022-06-29 RX ADMIN — PYRIDOSTIGMINE BROMIDE 60 MG: 60 TABLET ORAL at 20:06

## 2022-06-29 ASSESSMENT — ACTIVITIES OF DAILY LIVING (ADL)
ADLS_ACUITY_SCORE: 46
ADLS_ACUITY_SCORE: 50
ADLS_ACUITY_SCORE: 46
ADLS_ACUITY_SCORE: 50
ADLS_ACUITY_SCORE: 50
ADLS_ACUITY_SCORE: 46

## 2022-06-29 NOTE — PLAN OF CARE
Status:  Admitted for lumbar drain placement with history of ocular myasthenia gravis, macular degeneration, cataract, hearing loss, idiopathic normal pressure hydrocephalus.   Vitals: VSS on RA.  Neuros: A&Ox3-4.  Forgets date.  Strength 5/5 throughout.  Generalized weakness.  Intermittent BUE tremors.    IV: PIV SL  Resp/trach: diminished  Diet: regular  Bowel status: LBM 6/28  : voiding spont, via urinal.  Intermittently incontinent.    Skin: Redness to kitty area, extending to thighs.  WOC following.  LD site CDI.  Pain: denies  Activity: A1/GB/walker  Plan: Continue with current POC  Drain: LD in place.  Drain 10ml q1hr.

## 2022-06-29 NOTE — PLAN OF CARE
Status: Patient admitted on 6/27 for NPH trial with lumbar drain placement, clamped draining 10mL Q1hr  Vitals: VSS  Neuros: Disoriented to date, forgetful, BUE tremors, nystagmus, generalized weakness  IV: PIV infusing TKO  Labs/Electrolytes: WNL  Resp/trach: Lung sounds clear throughout  Diet: Regular  Bowel status: Large BM today  : Voiding spontaneously  Skin: Significant bruising throughout, kitty area/bottom with incontinence related excoriation, cleansed with ashley cream and criticaid applied per WOC recommendation  Pain: Denies  Activity: Up with A1-2, walker, gaitbelt ambulated in hallway 1x today  Social: Cooperative with michaels, wife Ceci at bedside supportive  Plan: Neuro psych evaluation tomorrow from 830-1100, continue with NPH trial and follow POC    Goal Outcome Evaluation:    Plan of Care Reviewed With: patient, spouse     Overall Patient Progress: improving

## 2022-06-29 NOTE — CONSULTS
Care Management Initial Consult    General Information  Assessment completed with:  Nazanin RN [facility]  #[phone]  Ext [phone]  Type of CM/SW Visit: Offer D/C Planning    Primary Care Provider verified and updated as needed: Yes   Readmission within the last 30 days:  No              Communication Assessment  Patient's communication style: spoken language (English or Bilingual)    Hearing Difficulty or Deaf: yes   Wear Glasses or Blind: yes    Cognitive  Cognitive/Neuro/Behavioral: .WDL except  Level of Consciousness: alert  Arousal Level: opens eyes spontaneously  Orientation: disoriented to, time  Mood/Behavior: calm, cooperative  Best Language: 0 - No aphasia  Speech: clear, spontaneous, logical    Living Environment:   People in home: facility resident     Current living Arrangements: assisted living  Name of Facility: [facility]   Pharmacy: [facility]  Able to return to prior arrangements: yes       Family/Social Support:  Care provided by:  Spouse/significant other (RN at facility)  Provides care for:    Marital Status:   Wife  Ceci       Description of Support System: Involved, Supportive         Current Resources:   Patient receiving home care services:  Care provided by AL staff     Equipment currently used at home: shower chair, wheelchair  Hospital bed   Lift chair  Supplies currently used at home:      Employment/Financial:  Employment Status:    Retired      Financial Concerns:   None identified          Lifestyle & Psychosocial Needs:  Social Determinants of Health     Tobacco Use: Low Risk      Smoking Tobacco Use: Never Smoker     Smokeless Tobacco Use: Never Used   Alcohol Use: Not on file   Financial Resource Strain: Not on file   Food Insecurity: Not on file   Transportation Needs: Not on file   Physical Activity: Not on file   Stress: Not on file   Social Connections: Not on file   Intimate Partner Violence: Not on file    Depression: Not at risk     PHQ-2 Score: 0   Housing Stability: Not on file       Functional Status:  Prior to admission patient needed assistance: ADLs, transfers, meal set up           Values/Beliefs:  Spiritual, Cultural Beliefs, Buddhist Practices, Values that affect care:                 Additional Information:  Pt with hydrocephalus, s/p lumbar drain placement 6/27 with plan of  shunt on 7/1/22.  Prior to admission Pt was living at [facility]. I have called and spoke to Nazanin Walker RN at the above care facility who states Pt receives assist with ADLS, transfers, meds, meals, toileting. Nazanin states she was informed anticipated discharge is 7/2, Wife will transport. A discharge on [place] 7/3 is an option if arrangement is made in advance, she will need to be notified.    Caitlin Tsai RN   6A Care Coordinator

## 2022-06-29 NOTE — PLAN OF CARE
Normal Pressure Hydrocephalus - Speech Language Pathology Assessment     Date: 2022       Lumbar Drain Placement Day (pre-drain, post-drain 1, 2, etc.): 2   1. General Observations (command following, safety awareness, motor planning): alert and agreeable; pt wore hearing aids and glasses for evaluation  2. Formal Cognitive Assessment:    MoCA Version: 8.3    Visuospatial/executive: 15  Namin/3  Attention: 6  Language: 13  Abstraction: 2/2  Delayed Recall: 0/5  Delayed Recall Comments: MIS= 0/15. Despite semantic and multiple choice cues, pt was unable to recall any of the previously trained items.   Orientation: , pt disoriented to day of week    Total Score:   Norm Score:   Formal Cognitive Assessment Comments: Pt pleasant and agreeable, uses humor to deflect from errors.     Assessment comparison per NPH protocol.  Post drain day 0: MoCA 8.1     Post 1: MoCA 8.2       15/30   Post 2: MoCA 8.3          Post 3: MoCA 8.1       TBD       Pertinent Past Medical History: Ocular myasthenia gravis, macular degeneration, cataract, hearing loss    Administration Time:  25  Total Time: 35

## 2022-06-29 NOTE — PLAN OF CARE
Status: Patient admitted on 6/27 for NPH trial with lumbar drain placement, clamped draining 10mL Q1hr  Vitals: VSS on RA  Neuros: A&Ox4. Forgetful. 5/5 throughout, generalized weakness. N/T to fingertips - states baseline since having COVID. BUE tremors. Restless leg syndrome.  IV: PIV SL  Resp/trach: LS diminished  Diet: Regular  Bowel status: LBM 6/29  : Voiding in the urinal. Intermittent incontinence  Skin: excoriation on perineum, follow WOC orders  Pain: Denies  Activity: A1-2, GB, walker  Social: wife and son at bedside, supportive  Plan: Neuro psych evaluation tomorrow from 830-1100, continue with NPH trial and follow POC

## 2022-06-29 NOTE — PROGRESS NOTES
"[facility], [place]   Neurosurgery Progress Note:    Date of service: 6/29/2022    Assessment: Gustavo Ramon is a 82-year-old male with history of ocular myasthenia gravis, macular degeneration, cataract, and hearing loss. He presented to Neurosurgery clinic on 5/4/2022 with 3 month history of major decline in gait and balance, he currently is unable to ambulate independently and uses a wheelchair.  Since February 2022, family also reports changes in his personality and cognition. Per family patient is having trouble with attention, and experiencing word finding difficulty. Patient has history of urinary urgency, frequency with some hesitancy, but no urinary incontinence. He was diagnosed with normal pressure hydrocephalus and is now admitted for Lumbar drain trial. His LD was placed in IR on 6/27/2022.     Clinically Significant Risk Factors Present on Admission           # Overweight: Estimated body mass index is 26.78 kg/m  as calculated from the following:    Height as of this encounter: 1.702 m (5' 7\").    Weight as of this encounter: 77.6 kg (171 lb).  # Myasthenia gravis    Plan:  - Neuro checks/vital signs: Every 4 hours   - SBP: <160  - Pain control: PRN tylenol   - Activity: Please clamp LD during transfer. OOB with assist from Nursing staff.   - Regular diet  - Lumbar drain to drain 10 ml/hr. Clamp while transferring OOB.   - Hold anti-coagulation  - DVT prophylaxis: SCDs   - Continue PTA keppra 250 mg BID and prednisone 10 mg daily  - PT/Speech: Serial exams   - Expected discharge date dependent upon possible  shunt placement Friday       CORINA Guaman, CNP  Neurosurgery  Pager [phone]      I have spent a total of 25 minutes total time counseling, coordination, and chart review, over 50% of the time was spent in direct patient care.       Interval History: Patient denies HA, chest pain or shortness of breath. He reports feeling\"great\" today.  He remains " neurologically stable.  LD in place and continue to drain 10 mL/hr. Initial MOCA score was 11/30, improved to 15/30 yesterday, will repeat again today. PT reports slight improvements in patient's gait and balance yesterday compared to admission.        Objective:   Temp:  [96.8  F (36  C)-97.9  F (36.6  C)] 96.8  F (36  C)  Pulse:  [] 67  Resp:  [16] 16  BP: (109-138)/(58-83) 113/68  SpO2:  [96 %-99 %] 96 %  I/O last 3 completed shifts:  In: 240 [P.O.:240]  Out: 1115 [Urine:875; Drains:240]    Gen: Appears comfortable, NAD  Neurologic:  - Alert & Oriented to person, place, time, and situation  - Follows commands briskly  - Speech fluent, spontaneous. No aphasia or dysarthria.  - No gaze preference. No apparent hemineglect.  - PERRL, EOMI  - Strong eye closure, jaw clench, and cheek puff  - Face symmetric with sensation intact to light touch  - Palate elevates symmetrically, uvula midline, tongue protrudes midline  - Trapezii and sternocleidomastoid muscles 5/5 bilaterally  - No pronator drift     Del Tr Bi WE WF Gr   R 5 5 5 5 5 5   L 5 5 5 5 5 5    HF KE KF DF PF EHL   R 5 5 5 5 5 5   L 5 5 5 5 5 5     Reflexes 2+ throughout    Sensation intact and symmetric to light touch throughout    LABS  Recent Labs   Lab Test 06/27/22  1006 05/02/22  0650 03/31/22  1342 02/15/22  1057 02/07/22  1112   WBC  --   --  13.4* 11.5* 10.1   HGB  --  12.7* 14.4 12.4* 11.8*   MCV  --   --  87 89 89     --  207 256 267       Recent Labs   Lab Test 05/02/22  0650 03/31/22  1342 02/15/22  1057    140 140   POTASSIUM 4.2 4.2 3.7   CHLORIDE 104 107 107   CO2 22 29 32   BUN 15 23 20   CR 0.90 0.91 0.93   ANIONGAP 13 4 1*   TRISTIAN 8.7 9.1 9.1   GLC 79 93 103*       IMAGING  No new imaging completed.

## 2022-06-30 ENCOUNTER — HOSPITAL (OUTPATIENT)
Dept: NEUROLOGY | Facility: CLINIC | Age: 82
End: 2022-06-30

## 2022-06-30 ENCOUNTER — APPOINTMENT (OUTPATIENT)
Dept: PHYSICAL THERAPY | Facility: CLINIC | Age: 82
DRG: 057 | End: 2022-06-30
Attending: NEUROLOGICAL SURGERY
Payer: MEDICARE

## 2022-06-30 ENCOUNTER — MEDICAL CORRESPONDENCE (OUTPATIENT)
Dept: NURSING | Facility: CLINIC | Age: 82
End: 2022-06-30

## 2022-06-30 ENCOUNTER — APPOINTMENT (OUTPATIENT)
Dept: SPEECH THERAPY | Facility: CLINIC | Age: 82
DRG: 057 | End: 2022-06-30
Attending: NEUROLOGICAL SURGERY
Payer: MEDICARE

## 2022-06-30 PROCEDURE — 96125 COGNITIVE TEST BY HC PRO: CPT | Mod: GN

## 2022-06-30 PROCEDURE — 250N000012 HC RX MED GY IP 250 OP 636 PS 637: Performed by: STUDENT IN AN ORGANIZED HEALTH CARE EDUCATION/TRAINING PROGRAM

## 2022-06-30 PROCEDURE — 99232 SBSQ HOSP IP/OBS MODERATE 35: CPT | Performed by: NURSE PRACTITIONER

## 2022-06-30 PROCEDURE — 97530 THERAPEUTIC ACTIVITIES: CPT | Mod: GP

## 2022-06-30 PROCEDURE — 90791 PSYCH DIAGNOSTIC EVALUATION: CPT | Performed by: CLINICAL NEUROPSYCHOLOGIST

## 2022-06-30 PROCEDURE — 96133 NRPSYC TST EVAL PHYS/QHP EA: CPT | Performed by: CLINICAL NEUROPSYCHOLOGIST

## 2022-06-30 PROCEDURE — 96132 NRPSYC TST EVAL PHYS/QHP 1ST: CPT | Performed by: CLINICAL NEUROPSYCHOLOGIST

## 2022-06-30 PROCEDURE — 250N000013 HC RX MED GY IP 250 OP 250 PS 637: Performed by: STUDENT IN AN ORGANIZED HEALTH CARE EDUCATION/TRAINING PROGRAM

## 2022-06-30 PROCEDURE — 120N000002 HC R&B MED SURG/OB UMMC

## 2022-06-30 PROCEDURE — 97750 PHYSICAL PERFORMANCE TEST: CPT | Mod: GP

## 2022-06-30 RX ORDER — PREDNISONE 10 MG/1
10 TABLET ORAL DAILY
Qty: 30 TABLET | Refills: 0 | COMMUNITY
Start: 2022-07-01 | End: 2023-06-01

## 2022-06-30 RX ADMIN — LEVETIRACETAM 250 MG: 250 TABLET, FILM COATED ORAL at 20:05

## 2022-06-30 RX ADMIN — SENNOSIDES AND DOCUSATE SODIUM 1 TABLET: 8.6; 5 TABLET ORAL at 08:01

## 2022-06-30 RX ADMIN — PYRIDOSTIGMINE BROMIDE 60 MG: 60 TABLET ORAL at 14:53

## 2022-06-30 RX ADMIN — PYRIDOSTIGMINE BROMIDE 60 MG: 60 TABLET ORAL at 20:06

## 2022-06-30 RX ADMIN — Medication 1000 MCG: at 07:56

## 2022-06-30 RX ADMIN — Medication 1 TABLET: at 07:57

## 2022-06-30 RX ADMIN — PYRIDOSTIGMINE BROMIDE 60 MG: 60 TABLET ORAL at 07:57

## 2022-06-30 RX ADMIN — SIMVASTATIN 20 MG: 20 TABLET, FILM COATED ORAL at 20:05

## 2022-06-30 RX ADMIN — TAMSULOSIN HYDROCHLORIDE 0.4 MG: 0.4 CAPSULE ORAL at 07:57

## 2022-06-30 RX ADMIN — SENNOSIDES AND DOCUSATE SODIUM 1 TABLET: 8.6; 5 TABLET ORAL at 20:05

## 2022-06-30 RX ADMIN — PREDNISONE 10 MG: 5 TABLET ORAL at 07:57

## 2022-06-30 RX ADMIN — PANTOPRAZOLE SODIUM 40 MG: 40 TABLET, DELAYED RELEASE ORAL at 07:56

## 2022-06-30 RX ADMIN — LEVETIRACETAM 250 MG: 250 TABLET, FILM COATED ORAL at 07:56

## 2022-06-30 RX ADMIN — PRAMIPEXOLE DIHYDROCHLORIDE 0.38 MG: 0.12 TABLET ORAL at 20:19

## 2022-06-30 RX ADMIN — FERROUS SULFATE TAB 325 MG (65 MG ELEMENTAL FE) 325 MG: 325 (65 FE) TAB at 08:02

## 2022-06-30 ASSESSMENT — ACTIVITIES OF DAILY LIVING (ADL)
ADLS_ACUITY_SCORE: 50
ADLS_ACUITY_SCORE: 46
ADLS_ACUITY_SCORE: 50
ADLS_ACUITY_SCORE: 46
ADLS_ACUITY_SCORE: 47
ADLS_ACUITY_SCORE: 46
ADLS_ACUITY_SCORE: 50

## 2022-06-30 NOTE — PROGRESS NOTES
Care Management Follow Up    Length of Stay (days): 3    Expected Discharge Date: 07/04/2022     Additional Information:  Call place to Nazanin Peters [phone] to inform patient may be discharging earlier than 7/2. Called twice, leaving voicemail each time and have not yet heard back. Will need to inquire about any time restrictions to when the patient can return, their preferred pharmacy, and if they need nurse to nurse report. Spoke with patient's wife who confirms she should be able to transport the patient back to his facility whenever he does discharge.    Conchis Boyd, RN, BSN  6A RN Care Coordinator  Ph: [phone]   Pager: [phone]

## 2022-06-30 NOTE — PLAN OF CARE
Status: Admitted for lumbar drain placement with history of ocular myasthenia gravis, macular degeneration, cataract, hearing loss, idiopathic normal pressure hydrocephalus.  Vitals: VSS on RA  Neuros: Aox4. Intermittent BUE tremors. LLE 4/5, all others 5/5. GW. Confused intermittently. Forgetful    IV: PIV SL  Diet: Regular  Bowel status: BS+ LBM 6/26  : voiding with urinal-standing at bedside   Skin: Camila area significantly reddened, extending to thighs-Seen by WOC-cared for per orders. LD site to back intact.  Pain: denies  Activity: A2 with GB/walker/pivot.   Plan: To be seen by therapies today. Continue to monitor and follow POC. After therapies pt is able to get drain taken out. Possibly discharge tomorrow.

## 2022-06-30 NOTE — PROGRESS NOTES
Neurosurgery Brief Progress Note    Lumbar drain removal after successful LD trail    Drain trail successful with clinical improvement. Plan for  shunt surgery next week. Drain site was prepped in usual sterile fashion with chloraprep. The drain was clamped. The sutures securing the drain were cut and the site was re-prepped. The drain was removed with tip intact. A single figure of 8 stitch with 4-0 monocryl was placed with adequate hemostasis. No immediate complication was observed and the patient tolerated the procedure well. The patient is advised to stay flat for 2 hours. There were no complications during entirety of the procedure    Dc Wood MD  PGY-1 Neurosurgery  Pager: [phone]

## 2022-06-30 NOTE — PLAN OF CARE
Status: Admitted for lumbar drain placement with history of ocular myasthenia gravis, macular degeneration, cataract, hearing loss, idiopathic normal pressure hydrocephalus.  Vitals: VSS on RA  Neuros: A&Ox4, forgetful. Intermittent BUE tremors. 5/5 throughout. Restless legs at night.  IV: PIV SL  Diet: Regular  Bowel status: BS+ LBM 6/29  : voiding with urinal-standing at bedside. Intermittent incontinence/urgency  Skin: Camila area reddened, extending to thighs-Seen by WOC-cared for per orders. LD site to back intact.  Pain: denies  Activity: A2 with GB/walker/pivot.   Plan: Discharge tomorrow.   Updates this shift: LD removed tonight, bedrest until 2000.

## 2022-06-30 NOTE — PLAN OF CARE
Status: Admitted for NPH trial w/ lumbar drain. Initially presented with confusion and gait instability.    Vitals: VSS  Neuros: A/Ox4, forgetful at times, generalized weakness, Strengths 5/5. BLE restless legs, N/T to fingertips   IV: PIV w/ NS @ TKO   Resp/trach: RA  Diet: Regular   Bowel status: LBM 6/29  : Voiding via urinal   Skin: Excoriation to perineum, WOC consulted   Pain: Denies pain  Activity: Ax1-2 with walker   Plan: Neuropsych appointment today from [id number], continue emptying drain q1hr

## 2022-06-30 NOTE — PROGRESS NOTES
Pt was seen for neuropsychological evaluation at the request of Jean-Paul Childs MD found for the purposes of diagnostic clarification and treatment planning. 235 minutes of test administration and scoring were provided by this writer. Please see Dr. Easton Persaud's report for a full interpretation of the findings.    Virgie Espinal  Psychometrist

## 2022-06-30 NOTE — PLAN OF CARE
Normal Pressure Hydrocephalus - Physical Therapy Assessment     Date:  06/30/2022  Assessment:         1. Timed Up and Go (TUG) - average of 3 trials  Assistive device used:  Pre-drain: N/A  Post-drain day 1 (same day):  3 min 8 sec, 1 trial as pt too fatigued for additional trials  Day 2: 58 sec, 1 trial performed only as pt needed to urgently use the toilet  Day 3: 52.25 sec  Day 4: 44.1 sec (average of 3 trials)  Minimal Detectable Change for patients with Alzheimer s = 4.09 sec   Minimal Detectable Change for patients with Parkinson s Disease = 3.5 sec   according to Amna & John Mullins 2011        Gait Observations: (from 6/29) Gait speed yet slowed, less stable but improved from initial testing date as less assist required, pt ambulating on TUG in less time. Improvement noted including ability to pick-up feet while ambulating including when stepping from bed>chair gait using FWW with fewer safety cues and less assist than first testing session, including to R side (which had been a challenge for pt).           2. Additional Functional Measure: modified 30 sec sit<>stand  Pre-drain: N/A  Post-drain Day 1: pt unable to perform (without assist), 0 reps  Day 2: 5 reps, CGA  Day 3: 5 reps, CGA  Day 4: 5 reps, Mod I     Balance Observations: (from 6/29) Still presently with gait instability and reduced eccentric control with transfers, but improvements noted today compared to initial testing date. Better able to control descent at times with stand>sit transfers.      3. General Observations (command following, safety awareness, motor planning): (from 6/29) while deficits yet present, improved initiation of movements noted     4. General ADL performance (Assist level at baseline and currently): (from 6/29) CGA-JANE required for standing and using urinal with walker present. Pt able to assist partially in pulling down and up his pull-ups. See eval for additional info.

## 2022-06-30 NOTE — PROGRESS NOTES
"[facility], [place]   Neurosurgery Progress Note:    Date of service: 6/30/2022    Assessment: Gustavo Ramon is a 82-year-old male with history of ocular myasthenia gravis, macular degeneration, cataract, and hearing loss. He presented to Neurosurgery clinic on 5/4/2022 with 3 month history of major decline in gait and balance, he currently is unable to ambulate independently and uses a wheelchair.  Since February 2022, family also reports changes in his personality and cognition. Per family patient is having trouble with attention, and experiencing word finding difficulty. Patient has history of urinary urgency, frequency with some hesitancy, but no urinary incontinence. He was diagnosed with normal pressure hydrocephalus and is now admitted for Lumbar drain trial. His LD was placed in IR on 6/27/2022.     Clinically Significant Risk Factors Present on Admission           # Overweight: Estimated body mass index is 26.78 kg/m  as calculated from the following:    Height as of this encounter: 1.702 m (5' 7\").    Weight as of this encounter: 77.6 kg (171 lb).  # Myasthenia gravis    Plan:  - Neuro checks/vital signs: Every 4 hours   - SBP: <160  - Pain control: PRN tylenol   - Activity: Please clamp LD during transfer. OOB with assist from Nursing staff.   - Regular diet  - Lumbar drain to drain 10 ml/hr. Clamp while transferring OOB.   - Hold anti-coagulation  - DVT prophylaxis: SCDs   - Continue PTA keppra 250 mg BID and prednisone 10 mg daily  - PT/Speech: Serial exams   - Expected discharge date today following final LD trial exams  - Plan for lumbar drain removal today     CORINA Guaman, CNP  Neurosurgery  Pager [phone]      I have spent a total of 25 minutes total time counseling, coordination, and chart review, over 50% of the time was spent in direct patient care.       Interval History: Patient denies HA, chest pain or shortness of breath.  He remains neurologically stable. "  LD in place and continue to drain 10 mL/hr. Initial MOCA score was 11/30, continues to improve, 17/30 yesterday. Neuro-psych exam to be completed at 0830 today. Will remove LD later today.  He will likely be discharged this afternoon to his assisted living.  He will return next week for  shunt placement.       Objective:   Temp:  [97.1  F (36.2  C)-98.2  F (36.8  C)] 97.6  F (36.4  C)  Pulse:  [69-94] 84  Resp:  [14-16] 16  BP: ()/(57-91) 124/78  SpO2:  [94 %-99 %] 95 %  I/O last 3 completed shifts:  In: -   Out: 795 [Urine:565; Drains:230]    Gen: Appears comfortable, NAD  Neurologic:  - Alert & Oriented to person, place, time, and situation  - Follows commands briskly  - Speech fluent, spontaneous. No aphasia or dysarthria.  - No gaze preference. No apparent hemineglect.  - PERRL, EOMI  - Strong eye closure, jaw clench, and cheek puff  - Face symmetric with sensation intact to light touch  - Palate elevates symmetrically, uvula midline, tongue protrudes midline  - Trapezii and sternocleidomastoid muscles 5/5 bilaterally  - No pronator drift     Del Tr Bi WE WF Gr   R 5 5 5 5 5 5   L 5 5 5 5 5 5    HF KE KF DF PF EHL   R 5 5 5 5 5 5   L 5 5 5 5 5 5     Reflexes 2+ throughout    Sensation intact and symmetric to light touch throughout    LABS  Recent Labs   Lab Test 06/27/22  1006 05/02/22  0650 03/31/22  1342 02/15/22  1057 02/07/22  1112   WBC  --   --  13.4* 11.5* 10.1   HGB  --  12.7* 14.4 12.4* 11.8*   MCV  --   --  87 89 89     --  207 256 267       Recent Labs   Lab Test 05/02/22  0650 03/31/22  1342 02/15/22  1057    140 140   POTASSIUM 4.2 4.2 3.7   CHLORIDE 104 107 107   CO2 22 29 32   BUN 15 23 20   CR 0.90 0.91 0.93   ANIONGAP 13 4 1*   TRISTIAN 8.7 9.1 9.1   GLC 79 93 103*       IMAGING  No new imaging completed.

## 2022-06-30 NOTE — PLAN OF CARE
Normal Pressure Hydrocephalus - Speech Language Pathology Assessment     Date: 06/30/2022       Lumbar Drain Placement Day (pre-drain, post-drain 1, 2, etc.): 3   1. General Observations (command following, safety awareness, motor planning): Alert and pleasant, eager to work with SLP   2. Formal Cognitive Assessment:    MoCA Version: 8.1     Visuospatial/executive: 3/5  Naming: 3/3  Attention: 4/6  Language: 3/3  Abstraction: 2/2  Delayed Recall: 1/5  Delayed Recall Comments: MIS= 8/15. Pt was able to name 1 item with no cue, 1 item with a categorical cue and 3 items with a multiple choice cue.  Orientation: 5/6    Total Score: 21/30  Norm Score: 26/30  Formal Cognitive Assessment Comments: Pt glad to see improvements since drain placement.    Assessment comparison per NPH protocol.  Post-drain day 0: MoCA 8.1  11/30   Post 1: MoCA 8.2       15/30   Post 2: MoCA 8.3       17/30   Post 3: MoCA 8.1       21/30       Pertinent Past Medical History: Ocular myasthenia gravis, macular degeneration, cataract, hearing loss    Administration Time:  16  Total Time: 30

## 2022-06-30 NOTE — PROGRESS NOTES
"Name: Gustavo Ramon  MR#: [id number]  YOB: 1940  Date of Exam: Jun 30, 2022    NEUROPSYCHOLOGICAL EVALUATION    IDENTIFYING INFORMATION  Gustavo Ramon is a 82 year old year old, right handed, retired , with 16 years of formal education. I also spoke with his son, Chandu, at the time of the interview.    The interview and testing were completed face-to-face, on the hospital unit.    BACKGROUND INFORMATION / INTERVIEW FINDINGS    Records indicate that Mr. Ramon's medical history includes steroid-induced diabetes mellitus, stage III chronic kidney disease, secondary adrenal cortical insufficiency, gastroesophageal reflux disease, hypertension, nuclear sclerosis of both eyes, actinic keratosis, peripheral neuropathy, posterior vitreous detachment, amaurosis fugax, malignant neoplasm of the prostate, history of basal cell carcinoma, seborrhea, actinic keratosis, horseshoe tear of the retina, IgA monoclonal gammopathy, hyperlipidemia, macular pigment deposit, rosacea, degenerative joint disease, and ocular myasthenia gravis.  He was ill with COVID-19 in November, 2021, and was hospitalized at [facility] from November 19 through December 1, 2021.  He developed a significant and rapid decline in his gait and balance in mid February, 2022.  He also developed a tremor.  Additionally, he developed problems with cognition and urinary continence.  He had a hospitalization at [facility] for 20 days in April, 2022. Initially, he was hospitalized for pain, and then there was suspicion for normal pressure hydrocephalus.  He had 2 lumbar punctures performed, but these procedures were unsuccessful.  He was then in rehabilitation at [facility].  An MRI of his brain on March 31, 2022 documented \"diffuse generalized cerebral volume loss.  Slightly disproportionate dilation of the lateral ventricles with narrow pericallosal angle compared to the adjacent cerebral sulci, which can " "be seen with normal pressure hydrocephalus.\"  No other acute findings or hemorrhage were noted.  An EEG study on April 16, 2022 was read as abnormal due to generalized slowing of the background.  No seizures or epileptiform features were noted.     I saw the patient for a baseline neuropsychological exam on May 12, 2022.  My evaluation documented deficits and weaknesses that were felt to be consistent with dysfunction of frontal, subcortical, and temporal lobe brain networks.  I felt that the pattern of dysfunction and the reported course of his symptoms were consistent with normal pressure hydrocephalus.  In the exam, deficits were noted in learning, memory, executive function, attention, working memory, semantic verbal fluency, verbal comprehension, and fine motor speed.  Milder weaknesses also noted in basic visuospatial judgments.    Mr. Ramon was admitted to the [facility] on June 27, 2022.  A lumbar drain was placed on that same date.  He has been following with physical therapy and speech-language pathology since that time.  He current follow-up evaluation was requested by Dr. Jean-Paul Childs, in this context.    On interview, Mr. Ramon reported that he has been doing fine.  He stated that he had not identified any major changes in his thinking between the time of his exam with me in May, and his admission to the hospital in late June.  He noted that he was still experiencing a mental fog.  He did note that he does better if he is familiar with the topic area.  He reported that he also did not notice significant changes in his thinking after the drain was placed.  He did, however, state that his gait has been improving, and his urinary continence has improved since the drain was placed.    The patient's son, Chandu, reported that since the drain has been placed, the patient has been more articulate, and is making more witty comments.  He stated that his behavior is closer to the " patient's baseline.  He noted significant improvements in the patient's thinking since the drain was placed.  He noted that he is also tracking conversations better.  Additionally, he indicated that the patient is not having as many emotional swings as he was before the drain was placed.    With respect to mental health, Mr. Ramon reported that his mood is good.  He denied significant changes in his mental health status.  He denied suicidal ideation, hallucinations, or interval suicide attempts.    With regard to other medical background, Mr. Ramon denied interval head injury, stroke, or seizure.  He stated that his sleep is pretty good most of the time.  He did note that he had some difficulties getting to sleep the night before the exam, but eventually was able to fall asleep around midnight.  He slept about 5 hours the night before the exam.  He denied pain.    Per records, his current medications include:      acetaminophen (TYLENOL) tablet 650 mg, 650 mg, Oral, Q4H PRN, Bebo Wolf MD     bisacodyl (DULCOLAX) suppository 10 mg, 10 mg, Rectal, Daily PRN, Bebo Wolf MD     cyanocobalamin (VITAMIN B-12) tablet 1,000 mcg, 1,000 mcg, Oral, Daily, Bebo Wolf MD, 1,000 mcg at 06/30/22 0756     ferrous sulfate (FEROSUL) tablet 325 mg, 325 mg, Oral, Every Other Day, Bebo Wolf MD, 325 mg at 06/30/22 0802     levETIRAcetam (KEPPRA) tablet 250 mg, 250 mg, Oral, BID, Bebo Wolf MD, 250 mg at 06/30/22 0756     magnesium hydroxide (MILK OF MAGNESIA) suspension 30 mL, 30 mL, Oral, Daily PRN, Bebo Wolf MD     multivitamin (OCUVITE) tablet 1 tablet, 1 tablet, Oral, Daily, Bebo Wolf MD, 1 tablet at 06/30/22 0757     ondansetron (ZOFRAN ODT) ODT tab 4 mg, 4 mg, Oral, Q6H PRN **OR** ondansetron (ZOFRAN) injection 4 mg, 4 mg, Intravenous, Q6H PRN, Bebo Wolf MD     pantoprazole (PROTONIX) EC tablet 40 mg, 40 mg, Oral, Daily, Bebo Wolf  MD Shai, 40 mg at 06/30/22 0756     polyethylene glycol (MIRALAX) Packet 17 g, 17 g, Oral, Daily, Bebo Wolf MD, 17 g at 06/28/22 0805     pramipexole (MIRAPEX) tablet 0.375 mg, 0.375 mg, Oral, At Bedtime, Bebo Wolf MD, 0.375 mg at 06/29/22 2006     predniSONE (DELTASONE) tablet 10 mg, 10 mg, Oral, Daily, Bebo Wolf MD, 10 mg at 06/30/22 0757     prochlorperazine (COMPAZINE) injection 5 mg, 5 mg, Intravenous, Q6H PRN **OR** prochlorperazine (COMPAZINE) tablet 5 mg, 5 mg, Oral, Q6H PRN **OR** prochlorperazine (COMPAZINE) suppository 12.5 mg, 12.5 mg, Rectal, Q12H PRN, Bebo Wolf MD     pyridostigmine (MESTINON) tablet 60 mg, 60 mg, Oral, TID, Bebo Wolf MD, 60 mg at 06/30/22 1453     senna-docusate (SENOKOT-S/PERICOLACE) 8.6-50 MG per tablet 1 tablet, 1 tablet, Oral, BID, Bebo Wolf MD, 1 tablet at 06/30/22 0801     simvastatin (ZOCOR) tablet 20 mg, 20 mg, Oral, At Bedtime, Bebo Wolf MD, 20 mg at 06/29/22 2006     sodium chloride 0.9% infusion, , Intravenous, Continuous, Michael Paiz, CORINA CNP, Last Rate: 10 mL/hr at 06/29/22 1328, New Bag at 06/29/22 1328     tamsulosin (FLOMAX) capsule 0.4 mg, 0.4 mg, Oral, Daily, Bebo Wolf MD, 0.4 mg at 06/30/22 0757    Mr. Ramon had some difficulty remembering where he had been living prior to his admission to the hospital.  His son stated that he had moved out of the rehabilitation facility, and had been staying in a senior living/memory care unit since early June.  He indicated that his everyday life has been pretty plain and simple, and he was still receiving assistance with some of his basic and instrumental daily activities.    By way of background, Mr. Ramon denied significant changes in his family status, educational background, or work history.  Please see my May 12, 2022 report for more details and background information.    BEHAVIORAL OBSERVATIONS  Mr. Ramon was polite  and cooperative with the exam. There were environmental distractions in the patient' hospital room, with noise from his roommate. He became drowsy during testing.  His hands were noted to be a bit shaky at times.  His speech was normal. His comprehension was normal. His thought processes were notable for fatigue and slowing. His mood was neutral with congruent affect. His effort was good. The current results are felt to be an accurate reflection of his cognitive functioning.     RESULTS OF EXAM  His performances on standardized measures of neuropsychological functioning were as follows.     He provided a different home address that is listed in his chart, but was otherwise oriented to various aspects of personal information.  He was oriented to time and place.  He was able to state the names of 3 presidents who had served in office since 1988.  Auditory attention for digits was average.  Learning of words in a list format was borderline impaired.  Delayed recall of list words was borderline impaired.  Percent retention of list words was borderline impaired.  Delayed recognition of list words was average.  Learning of simple geometric shapes and their spatial locations was borderline impaired.  Delayed recall of the shapes and their locations was borderline impaired.  He retained 50% of the information.  Delayed recognition of the shapes was impaired.  Visuospatial judgments for variably oriented lines were impaired, and discontinued.  His drawing of a complicated geometric figure was borderline impaired, and notable for inattention to some of the figure s details.  Nonverbal reasoning for incomplete matrices was low average.  Comprehension of phrases and short stories was impaired.  Verbal associative fluency was low average.  Animal fluency was impaired.  Naming to confrontation was superior.  Verbal abstract reasoning was high average.  Speeded visual sequencing under focused attention was impaired. A similar  measure with a divided attention component was impaired. Both of these measures were discontinued. Measures of speeded attention and tracking were borderline impaired. He committed 12 errors on these tasks, which is an impaired range performance. He was unable to complete a measure of speeded fine motor dexterity with either hand.    He endorsed items consistent with minimal symptoms of depression, and minimal symptoms of anxiety on self-report measures.    IMPRESSIONS  Mr. Ramon demonstrated deficits that are again consistent with multifocal brain dysfunction, but most prominently so for frontal and subcortical brain networks.  There is also some evidence to suggest dysfunction of right parietal and left lateral temporal regions.  Most importantly, his cognition is improved relative to the baseline, pre-lumbar drain evaluation that was completed in May, 2022.  This improvement suggests that the lumbar drain was effective and provides support for placement of a shunt.  In this exam, he continues to have weaknesses in verbal comprehension, visual problem-solving, basic visuospatial judgments, executive functioning, verbal comprehension, learning, and recall.  Delayed recognition is relatively intact, as is naming.  The intact nature of these naming and recognition memory again argues against Alzheimer's disease.  He is not reporting elevated symptoms of depression or anxiety.    RECOMMENDATIONS    1.  The results of this exam demonstrate improvement relative to the pre-drain baseline.  These findings provide support for pursuing placement of a shunt to treat hydrocephalus.    2.  He will continue to require support and supervision of his daily activities.    3.  Given his current cognitive dysfunction, he should continue to refrain from driving.    4.  If he continues to have difficulties with memory, routine use of a memory notebook or other assistive device could be of benefit.    5.  Given his cognitive  dysfunction, follow-up neuropsychological evaluation is recommended in 1 year in order to assess and update recommendations as appropriate.    Easton Persaud, Ph.D., L.P., ABPP  Board Certified in Clinical Neuropsychology   / Licensed Psychologist [id number]    Time spent:  One unit (60 minutes) neuropsychological testing evaluation by licensed and board-certified neuropsychologist, including integration of patient data, interpretation of standardized test results and clinical data, clinical decision-making, treatment planning, and report, first hour (CPT 80574). One unit(s) (35 minutes) of neuropsychological testing evaluation by licensed and board-certified neuropsychologist, including integration of patient data, interpretation of standardized test results and clinical data, clinical decision-making, treatment planning, and report, subsequent hours (CPT 52422). One unit (30 minutes) of psychological and neuropsychological test administration and scoring by technician, first 30 minutes (CPT 50655). Seven units (205 minutes) psychological or neuropsychological test administration and scoring by technician, subsequent 30 minutes (CPT 28335). Diagnoses: G91.2, R41.844, F06.8.

## 2022-06-30 NOTE — PLAN OF CARE
Normal Pressure Hydrocephalus - Physical Therapy Assessment    Date:  06/30/2022  Assessment:        1. Timed Up and Go (TUG) - average of 3 trials  Assistive device used:  Pre-drain: N/A  Post-drain day 1 (same day):  3 min 8 sec, 1 trial as pt too fatigued for additional trials  Day 2: 58 sec, 1 trial performed only as pt needed to urgently use the toilet  Day 3: 52.25 sec  Day 4:   Minimal Detectable Change for patients with Alzheimer s = 4.09 sec   Minimal Detectable Change for patients with Parkinson s Disease = 3.5 sec   according to Amna & John Mullins 2011      Gait Observations: Gait speed yet slowed, less stable but improved from initial testing date as less assist required, pt ambulating on TUG in less time. Improvement noted including ability to pick-up feet while ambulating including when stepping from bed>chair gait using FWW with fewer safety cues and less assist than first testing session, including to R side (which had been a challenge for pt).        2. Additional Functional Measure: modified 30 sec sit<>stand  Pre-drain: N/A  Post-drain Day 1: pt unable to perform (without assist), 0 reps  Day 2: 5 reps, CGA  Day 3: 5 reps, CGA  Day 4    Balance Observations: Still presently with gait instability and reduced eccentric control with transfers, but improvements noted today compared to initial testing date. Better able to control descent at times with stand>sit transfers.     3. General Observations (command following, safety awareness, motor planning): while deficits yet present, improved initiation of movements noted    4. General ADL performance (Assist level at baseline and currently): CGA-JANE required for standing and using urinal with walker present. Pt able to assist partially in pulling down and up his pull-ups. See eval for additional info.

## 2022-06-30 NOTE — DISCHARGE SUMMARY
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    Weight as of this encounter: 77.6 kg (171 lb).           Procedures:   Lumbar drain placement on 6/27/2022           Brief History of Illness:    Gustavo Ramon is a 82-year-old male with history of ocular myasthenia gravis, macular degeneration, cataract, and hearing loss. He presented to Neurosurgery clinic on 5/4/2022 with 3 month history of major decline in gait and balance, he currently is unable to ambulate independently and uses a wheelchair.  Since February 2022, family also reports changes in his personality and cognition. Per family patient is having trouble with attention, and experiencing word finding difficulty. Patient has history of urinary urgency, frequency with some hesitancy, but no urinary incontinence. He was diagnosed with normal pressure hydrocephalus and is now admitted for Lumbar drain trial. Patient has elected to undergo above-mentioned procedure.           Hospital Course:   Lumbar drain was placed on 6/27/2022.  Speech therapy and Physical therapy have performed daily assessment.  Lumbar drain output was 10 mL/hr since placement. The Neuro psych exam was completed today, results pending. LD removed following final assessments on 6/30/2022.    He is tentatively scheduled for  shunt placement on 7/7/2022 with Dr." Amadeo.          Discharge Medications:     Current Discharge Medication List      CONTINUE these medications which have CHANGED    Details   predniSONE (DELTASONE) 10 MG tablet Take 1 tablet (10 mg) by mouth daily  Qty: 30 tablet, Refills: 0         CONTINUE these medications which have NOT CHANGED    Details   acetaminophen (TYLENOL) 325 MG tablet Take 1-2 tablets (325-650 mg) by mouth every 4 hours as needed for mild pain      bisacodyl (DULCOLAX) 10 MG suppository Place 1 suppository (10 mg) rectally daily as needed for constipation      cyanocobalamin (VITAMIN B-12) 1000 MCG tablet Take 1 tablet (1,000 mcg) by mouth daily  Qty: 90 tablet, Refills: 1    Associated Diagnoses: Vitamin B12 deficiency (non anemic)      ferrous sulfate (FEROSUL) 325 (65 Fe) MG tablet Use 1 tab every other day with orange juice  Qty: 60 tablet, Refills: 1    Associated Diagnoses: Iron deficiency anemia, unspecified iron deficiency anemia type      levETIRAcetam (KEPPRA) 250 MG tablet Take 1 tablet (250 mg) by mouth 2 times daily      Lidocaine (LIDOCARE) 4 % Patch Apply to intact skin to cover most painful area for max 12hr per 24hr period.      melatonin 3 MG tablet Take 1 tablet (3 mg) by mouth At Bedtime      multivitamin (OCUVITE) TABS tablet Take 1 tablet by mouth daily      ondansetron (ZOFRAN) 4 MG tablet Take 1 tablet (4 mg) by mouth every 8 hours as needed for nausea      pantoprazole (PROTONIX) 40 MG EC tablet Take 1 tablet (40 mg) by mouth daily      polyethylene glycol (MIRALAX) 17 g packet Take 17 g by mouth daily      !! pramipexole (MIRAPEX) 0.125 MG tablet Take 3 tablets (0.375 mg) by mouth At Bedtime  Qty: 90 tablet, Refills: 2    Associated Diagnoses: Restless leg syndrome      prednisoLONE acetate (PRED FORTE) 1 % ophthalmic suspension       propranolol (INDERAL) 10 MG tablet Take 30 mg by mouth      pyridostigmine (MESTINON) 60 MG tablet Take 1 tablet (60 mg) by mouth 3 times daily  Qty: 270 tablet, Refills: 3     Associated Diagnoses: Myasthenia gravis without exacerbation (H)      simvastatin (ZOCOR) 20 MG tablet Take 1 tablet (20 mg) by mouth At Bedtime  Qty: 90 tablet, Refills: 1    Associated Diagnoses: Hyperlipidemia LDL goal <130      Sodium Chloride, Hypertonic, (MASOOD 128 OP) Uses Masood gel and drops. Gel once a day at night. Drops 4 times a day.      tamsulosin (FLOMAX) 0.4 MG capsule Take 1 capsule (0.4 mg) by mouth daily      triamcinolone (KENALOG) 0.1 % external cream Apply a thin layer up to twice daily to affected areas as needed.  Qty: 30 g, Refills: 3    Associated Diagnoses: Seborrheic dermatitis      vitamin D3 (CHOLECALCIFEROL) 2000 units tablet Take 1 tablet by mouth daily  Qty: 90 tablet, Refills: 1    Associated Diagnoses: Vitamin D deficiency      ACE/ARB/ARNI NOT PRESCRIBED (INTENTIONAL) Please choose reason not prescribed from choices below.    Associated Diagnoses: Stage 3a chronic kidney disease (H)      aspirin 325 MG tablet Take 325 mg by mouth daily      !! pramipexole (MIRAPEX) 0.25 MG tablet Take 0.25 mg by mouth      propranolol ER (INDERAL LA) 60 MG 24 hr capsule Take 1 capsule (60 mg) by mouth daily  Qty: 60 capsule, Refills: 0    Associated Diagnoses: Tremulousness       !! - Potential duplicate medications found. Please discuss with provider.                  Discharge Instructions and Follow-Up:     Discharge diet: Regular   Discharge activity: You may advance activity as tolerated. No strenuous exercise or heay lifting greater than 10 lbs for 4 weeks or until seen and cleared in clinic.   Discharge follow-up:   All additional follow-up visits to be determined by Dr. Jean-Paul Childs MD       Wound care: Ok to shower,however no scrubbing of the wound and no soaking of the wound, meaning no bathtubs or swimming pools. Pat dry only. Leave wound open to air.  Patient to have wound checked 2 weeks following surgery.    Wound location: lumbar spine  Closure technique: single  suture  Dressing needs: No dressing required   Post-op care at follow-up: Keep dry and clean     Please call if you have:  1. increased pain, redness, drainage, swelling at your incision  2. fevers > 101.5 F degrees  3. with any questions or concerns.  You may reach the Neurosurgery clinic at [phone] during regular work hours. ER at [phone].    and ask for the Neurosurgery Resident on call at [phone], during off hours or weekends.         Discharge Disposition:     Discharged to assisted living        CORINA Guaman, CNP  Neurosurgery  Pager [phone]

## 2022-07-01 VITALS
SYSTOLIC BLOOD PRESSURE: 138 MMHG | TEMPERATURE: 97.7 F | DIASTOLIC BLOOD PRESSURE: 63 MMHG | RESPIRATION RATE: 16 BRPM | OXYGEN SATURATION: 96 % | HEART RATE: 83 BPM | BODY MASS INDEX: 26.84 KG/M2 | WEIGHT: 171 LBS | HEIGHT: 67 IN

## 2022-07-01 PROCEDURE — 250N000013 HC RX MED GY IP 250 OP 250 PS 637: Performed by: STUDENT IN AN ORGANIZED HEALTH CARE EDUCATION/TRAINING PROGRAM

## 2022-07-01 PROCEDURE — 250N000012 HC RX MED GY IP 250 OP 636 PS 637: Performed by: STUDENT IN AN ORGANIZED HEALTH CARE EDUCATION/TRAINING PROGRAM

## 2022-07-01 RX ADMIN — Medication 1 TABLET: at 08:24

## 2022-07-01 RX ADMIN — PANTOPRAZOLE SODIUM 40 MG: 40 TABLET, DELAYED RELEASE ORAL at 08:23

## 2022-07-01 RX ADMIN — LEVETIRACETAM 250 MG: 250 TABLET, FILM COATED ORAL at 08:23

## 2022-07-01 RX ADMIN — PREDNISONE 10 MG: 5 TABLET ORAL at 08:23

## 2022-07-01 RX ADMIN — PYRIDOSTIGMINE BROMIDE 60 MG: 60 TABLET ORAL at 10:22

## 2022-07-01 RX ADMIN — Medication 1000 MCG: at 08:23

## 2022-07-01 RX ADMIN — TAMSULOSIN HYDROCHLORIDE 0.4 MG: 0.4 CAPSULE ORAL at 08:23

## 2022-07-01 ASSESSMENT — ACTIVITIES OF DAILY LIVING (ADL)
ADLS_ACUITY_SCORE: 45

## 2022-07-01 NOTE — PROGRESS NOTES
Care Management Discharge Note    Discharge Date: 07/01/2022    Discharge Disposition:  [facility]    Discharge Services:  Home PT    Discharge DME:  None    Discharge Transportation: Family or Friend will provide (Wife & son)    Patient/family educated on Medicare website which has current facility and service quality ratings:  Yes    Education Provided on the Discharge Plan:  Yes  Persons Notified of Discharge Plans: Patient, Family, bedside RN  Patient/Family in Agreement with the Plan:  Demi    Handoff Referral Completed: Yes    Additional Information:  Patient status reviewed, discharge orders placed this morning. Call placed to Nazanin at [facility]. Per Nazanin, it is best for patient to return early in the day, ideally before noon. Nazanin asks that discharge orders be faxed to her at [phone] and medication list be faxed to [facility] at [phone] (pt discharging with no new meds). Informed Nazanin that home PT is recommended, she states they generally use [facility]. She's requests a nurse to nurse report at [phone] ext [phone]. Orders faxed and bedside nurse updated with number for report, she will also call patient's wife to arrange for her to transport patient back to facility before noon.     1100 Addendum: Met with patient, patient's wife and son. Per family, patient is receiving PT services from [facility]. They would like to resume PT with [facility], and [facility] would be second choice. Call placed to [facility] to verify patient is open, they currently have patient on hold and set to resume PT on 7/5. Discharge orders faxed to Freya and family updated. IMM for given.     [facility]  Ph: [phone]    [facility]  Ph: [phone]    [facility] - resume PT services  Ph: [phone]  Fax: [phone]    Conchis Boyd RN, BSN  6A RN Care Coordinator  Ph: [phone]   Pager: [phone]

## 2022-07-01 NOTE — PLAN OF CARE
Admitted for NPH. LD site CDI.  Neuros: forgetful, 5/5 throughout, some gait instability 2/2 weak core muscles, UE tremors. N/T fingers. VSS on RA. Denies pain. PIV SL. Regular diet. Last BM 6/29. Up 1-2/gb/walker pivot., Voids, intermittently incontinent. Plan discharge back to senior living facility, possibly today. Continue to monitor.

## 2022-07-01 NOTE — PROGRESS NOTES
"NAME  Gustavo Ramon    MRN  [id number]      38     AGE  82     SEX  Male     HANDEDNESS Right     EDUCATION 16     CHUA  22     PROVIDER  [facility]  SW     STATION  OP            ORIENTATION      Time  -1     Personal Info.     Place  2 /    Presidents  2.5            WAIS-IV         RDS: 9             Raw SS    Similarities  27 13    Matrix Reasoning 6 7    Digit Span  22 10           JOAQUÍN-O COMPLEX FIGURE       Raw T %ile   Copy  23  2-5   Time to Copy 250  >16          COWAT FAS       Raw 26      SS 7      T 39             ANIMAL FLUENCY      Raw 7      SS 2      T 20              BOSTON NAMING TEST     Raw 57 /60     SS 14      %ile 93             COMPLEX IDEATIONAL MATERIAL     Raw 8      SS 3      T 13             TRAIL MAKING TEST       Time Errors SSa %ile   A  1 0 0   B DC 0 0 0           ALLIE   V   Raw DC      %ile              GROOVED PEGBOARD       Raw Drops SS T   RH DC @ 60\"      LH DC @ 60\"             VIV       Raw 0      Interp. Minimal             GDS       Raw 6      Interp. Minimal              HVLT   3     Raw T    Trial 1  4     Trial 2  5     Trial 3  6     Learning  2     Total Recall 15 35    Delayed Recall 3 33    Percent Retention 50% 35    True Positives 12     False Positives 3     Disc. Index  9 45            BVMT   2     Raw T/%ile    Trial 1  0     Trial 2  2     Trial 3  1     Learning  2     Total Recall 3 -1.93    Delayed Recall 1 -1.96    Percent Retention 50% 0    Recognition Hits 4     Recognition F.P 1     Disc. Index  3 -2.88           DCT       E-Score 14             TSAT        Total z     Time 165 -1.98     Errors 12 -3.88         "

## 2022-07-01 NOTE — PLAN OF CARE
Speech Language Therapy Discharge Summary    Reason for therapy discharge:    Pt planning to proceed with shunt.    Progress towards therapy goal(s). See goals on Care Plan in [facility] electronic health record for goal details.  N/A    Therapy recommendation(s):    Recommend OT consult at next level of care to address functional cognition (pt scoring 21/30 on the MoCA on 6/30).

## 2022-07-01 NOTE — PLAN OF CARE
Discharge time/date: 7/1 1120  Walked or Wheelchair: Wheelchair with family   PIV removed: yes   Reviewed AVS with patient: yes and family   Medication due times added to AVS in EPIC: Yes   Verbalized understanding of discharge with teachback: yes   Medications retrieved from pharmacy: N/A     Report called by C.C to facility for report.   Facility called back with medication questions on discharge paperwork. Paged MD at 1430 with # to call facility to answer questions.

## 2022-07-02 ENCOUNTER — MEDICAL CORRESPONDENCE (OUTPATIENT)
Dept: HEALTH INFORMATION MANAGEMENT | Facility: CLINIC | Age: 82
End: 2022-07-02

## 2022-07-05 ENCOUNTER — PATIENT OUTREACH (OUTPATIENT)
Dept: CARE COORDINATION | Facility: CLINIC | Age: 82
End: 2022-07-05

## 2022-07-05 NOTE — PROGRESS NOTES
[facility]    Background: Care Coordination referral placed from [facility] discharge report for reason of patient meeting criteria for a TCM outreach call by [facility] team.    Assessment: Upon chart review, CCRC Team member will cancel/close the referral for TCM outreach due to reason below:    The patent was discharged to a skilled nursing facility with a planned readmission. At this time the CHW will close the referral for CCC.    Plan: Care Coordination referral for TCM outreach canceled.      LORAINE Putnam  [facility]

## 2022-07-06 ENCOUNTER — TELEPHONE (OUTPATIENT)
Dept: NEUROSURGERY | Facility: CLINIC | Age: 82
End: 2022-07-06

## 2022-07-06 ENCOUNTER — ANESTHESIA EVENT (OUTPATIENT)
Dept: SURGERY | Facility: CLINIC | Age: 82
DRG: 033 | End: 2022-07-06
Payer: MEDICARE

## 2022-07-06 ENCOUNTER — MEDICAL CORRESPONDENCE (OUTPATIENT)
Dept: HEALTH INFORMATION MANAGEMENT | Facility: CLINIC | Age: 82
End: 2022-07-06

## 2022-07-06 RX ORDER — CEFAZOLIN SODIUM/WATER 2 G/20 ML
2 SYRINGE (ML) INTRAVENOUS SEE ADMIN INSTRUCTIONS
Status: DISCONTINUED | OUTPATIENT
Start: 2022-07-06 | End: 2022-07-07 | Stop reason: HOSPADM

## 2022-07-06 RX ORDER — CEFAZOLIN SODIUM/WATER 2 G/20 ML
2 SYRINGE (ML) INTRAVENOUS
Status: COMPLETED | OUTPATIENT
Start: 2022-07-06 | End: 2022-07-07

## 2022-07-06 NOTE — OR NURSING
RE: pre-op H&P for surgery tomorow?  Also, pre-op orders are needed  Received: Today  Judie Sykes RN Johnson, Judy, ARNOL Perez,     Thanks for the note.     Yes, patient was just discharged from [facility] In Patient service on 7/1/22.  He will not need another H&P, can be done by [facility] staff morning of surgery.  Pre Op Orders done.   Thanks   Judie             Previous Messages       ----- Message -----   From: Ana Coleman RN   Sent: 7/6/2022  11:59 AM CDT   To: Judie Sykes RN   Subject: pre-op H&P for surgery tomorow?  Also, pre-o*     Hi Judie     Pt is scheduled for surgery tomorrow. Do you know what the plan is for the pre-op H&P?     The H&P scanned into his chart is from Neurosurgery on 5/4 which is outdated. I called the nurse at [facility]. She said he had an H&P by [facility]. They thought it was within 30 days and will try to find it and fax it. If it is outdated, can Dr Childs update the H&P on DOS?     Also, pre-op orders are needed     Thanks so much.     Ana Coleman RN, BSN   Preanesthesia Screening   [phone]

## 2022-07-06 NOTE — TELEPHONE ENCOUNTER
shunt set for 7/7 @ 1pm  NPO 8 hours prior, clear liquids until 2 hours prior  Arrival time [facility] Waiting room at 11AM  Shower and wash hair prior  Normally an over night stay.  Patient remains off ASA.    Voices understanding , Chandu has my name and number if needed.

## 2022-07-07 ENCOUNTER — APPOINTMENT (OUTPATIENT)
Dept: GENERAL RADIOLOGY | Facility: CLINIC | Age: 82
DRG: 033 | End: 2022-07-07
Attending: STUDENT IN AN ORGANIZED HEALTH CARE EDUCATION/TRAINING PROGRAM
Payer: MEDICARE

## 2022-07-07 ENCOUNTER — DOCUMENTATION ONLY (OUTPATIENT)
Dept: OTHER | Facility: CLINIC | Age: 82
End: 2022-07-07

## 2022-07-07 ENCOUNTER — HOSPITAL ENCOUNTER (INPATIENT)
Facility: CLINIC | Age: 82
LOS: 1 days | Discharge: HOME | DRG: 033 | End: 2022-07-08
Attending: NEUROLOGICAL SURGERY | Admitting: NEUROLOGICAL SURGERY
Payer: MEDICARE

## 2022-07-07 ENCOUNTER — APPOINTMENT (OUTPATIENT)
Dept: CT IMAGING | Facility: CLINIC | Age: 82
DRG: 033 | End: 2022-07-07
Attending: STUDENT IN AN ORGANIZED HEALTH CARE EDUCATION/TRAINING PROGRAM
Payer: MEDICARE

## 2022-07-07 ENCOUNTER — ANESTHESIA (OUTPATIENT)
Dept: SURGERY | Facility: CLINIC | Age: 82
DRG: 033 | End: 2022-07-07
Payer: MEDICARE

## 2022-07-07 DIAGNOSIS — Z98.2 S/P VP SHUNT: ICD-10-CM

## 2022-07-07 DIAGNOSIS — G91.2 IDIOPATHIC NORMAL PRESSURE HYDROCEPHALUS (H): Primary | ICD-10-CM

## 2022-07-07 LAB — GLUCOSE BLDC GLUCOMTR-MCNC: 107 MG/DL (ref 70–99)

## 2022-07-07 PROCEDURE — 62223 ESTABLISH BRAIN CAVITY SHUNT: CPT | Mod: 62 | Performed by: SURGERY

## 2022-07-07 PROCEDURE — 250N000011 HC RX IP 250 OP 636: Performed by: STUDENT IN AN ORGANIZED HEALTH CARE EDUCATION/TRAINING PROGRAM

## 2022-07-07 PROCEDURE — C1894 INTRO/SHEATH, NON-LASER: HCPCS | Performed by: NEUROLOGICAL SURGERY

## 2022-07-07 PROCEDURE — 250N000009 HC RX 250: Performed by: ANESTHESIOLOGY

## 2022-07-07 PROCEDURE — G1010 CDSM STANSON: HCPCS

## 2022-07-07 PROCEDURE — 250N000011 HC RX IP 250 OP 636: Performed by: ANESTHESIOLOGY

## 2022-07-07 PROCEDURE — 00163J6 BYPASS CEREBRAL VENTRICLE TO PERITONEAL CAVITY WITH SYNTHETIC SUBSTITUTE, PERCUTANEOUS APPROACH: ICD-10-PCS | Performed by: NEUROLOGICAL SURGERY

## 2022-07-07 PROCEDURE — 272N000001 HC OR GENERAL SUPPLY STERILE: Performed by: NEUROLOGICAL SURGERY

## 2022-07-07 PROCEDURE — 70450 CT HEAD/BRAIN W/O DYE: CPT | Mod: 26 | Performed by: RADIOLOGY

## 2022-07-07 PROCEDURE — 62223 ESTABLISH BRAIN CAVITY SHUNT: CPT | Mod: 62 | Performed by: NEUROLOGICAL SURGERY

## 2022-07-07 PROCEDURE — 250N000009 HC RX 250: Performed by: NEUROLOGICAL SURGERY

## 2022-07-07 PROCEDURE — 999N000065 XR SHUNT MALFUNCTION SURVEY

## 2022-07-07 PROCEDURE — 250N000013 HC RX MED GY IP 250 OP 250 PS 637: Performed by: STUDENT IN AN ORGANIZED HEALTH CARE EDUCATION/TRAINING PROGRAM

## 2022-07-07 PROCEDURE — 0WJG4ZZ INSPECTION OF PERITONEAL CAVITY, PERCUTANEOUS ENDOSCOPIC APPROACH: ICD-10-PCS | Performed by: SURGERY

## 2022-07-07 PROCEDURE — 70250 X-RAY EXAM OF SKULL: CPT | Mod: 26 | Performed by: RADIOLOGY

## 2022-07-07 PROCEDURE — 8E09XBZ COMPUTER ASSISTED PROCEDURE OF HEAD AND NECK REGION: ICD-10-PCS | Performed by: NEUROLOGICAL SURGERY

## 2022-07-07 PROCEDURE — 120N000002 HC R&B MED SURG/OB UMMC

## 2022-07-07 PROCEDURE — 74018 RADEX ABDOMEN 1 VIEW: CPT | Mod: 26 | Performed by: RADIOLOGY

## 2022-07-07 PROCEDURE — 250N000025 HC SEVOFLURANE, PER MIN: Performed by: NEUROLOGICAL SURGERY

## 2022-07-07 PROCEDURE — 278N000051 HC OR IMPLANT GENERAL: Performed by: NEUROLOGICAL SURGERY

## 2022-07-07 PROCEDURE — 250N000011 HC RX IP 250 OP 636: Performed by: NEUROLOGICAL SURGERY

## 2022-07-07 PROCEDURE — 258N000003 HC RX IP 258 OP 636: Performed by: STUDENT IN AN ORGANIZED HEALTH CARE EDUCATION/TRAINING PROGRAM

## 2022-07-07 PROCEDURE — 258N000003 HC RX IP 258 OP 636: Performed by: ANESTHESIOLOGY

## 2022-07-07 PROCEDURE — 370N000017 HC ANESTHESIA TECHNICAL FEE, PER MIN: Performed by: NEUROLOGICAL SURGERY

## 2022-07-07 PROCEDURE — 710N000010 HC RECOVERY PHASE 1, LEVEL 2, PER MIN: Performed by: NEUROLOGICAL SURGERY

## 2022-07-07 PROCEDURE — 360N000079 HC SURGERY LEVEL 6, PER MIN: Performed by: NEUROLOGICAL SURGERY

## 2022-07-07 PROCEDURE — 272N000480 CT HEAD W/O CONTRAST

## 2022-07-07 PROCEDURE — 999N000141 HC STATISTIC PRE-PROCEDURE NURSING ASSESSMENT: Performed by: NEUROLOGICAL SURGERY

## 2022-07-07 PROCEDURE — 61781 SCAN PROC CRANIAL INTRA: CPT | Performed by: NEUROLOGICAL SURGERY

## 2022-07-07 PROCEDURE — G1010 CDSM STANSON: HCPCS | Mod: GC | Performed by: RADIOLOGY

## 2022-07-07 PROCEDURE — 71045 X-RAY EXAM CHEST 1 VIEW: CPT | Mod: 26 | Performed by: RADIOLOGY

## 2022-07-07 DEVICE — SHUNT CATH VENTRICULAR BACTISEAL 82-3073: Type: IMPLANTABLE DEVICE | Site: CRANIAL | Status: FUNCTIONAL

## 2022-07-07 DEVICE — CODMAN® CERTAS® PLUS PROGRAMMABLE VALVE INLINE VALVE ONLY
Type: IMPLANTABLE DEVICE | Site: CRANIAL | Status: FUNCTIONAL
Brand: CODMAN CERTAS® PLUS

## 2022-07-07 DEVICE — SHUNT CATH PERITONEAL 120CM 23047: Type: IMPLANTABLE DEVICE | Site: CRANIAL | Status: FUNCTIONAL

## 2022-07-07 RX ORDER — PANTOPRAZOLE SODIUM 40 MG/1
40 TABLET, DELAYED RELEASE ORAL DAILY
Status: CANCELLED | OUTPATIENT
Start: 2022-07-07

## 2022-07-07 RX ORDER — NALOXONE HYDROCHLORIDE 0.4 MG/ML
0.4 INJECTION, SOLUTION INTRAMUSCULAR; INTRAVENOUS; SUBCUTANEOUS
Status: DISCONTINUED | OUTPATIENT
Start: 2022-07-07 | End: 2022-07-07

## 2022-07-07 RX ORDER — PREDNISOLONE ACETATE 10 MG/ML
2 SUSPENSION/ DROPS OPHTHALMIC 3 TIMES DAILY PRN
Status: DISCONTINUED | OUTPATIENT
Start: 2022-07-07 | End: 2022-07-08 | Stop reason: HOSPADM

## 2022-07-07 RX ORDER — LABETALOL HYDROCHLORIDE 5 MG/ML
10-40 INJECTION, SOLUTION INTRAVENOUS EVERY 10 MIN PRN
Status: DISCONTINUED | OUTPATIENT
Start: 2022-07-07 | End: 2022-07-08 | Stop reason: HOSPADM

## 2022-07-07 RX ORDER — HYDROMORPHONE HCL IN WATER/PF 6 MG/30 ML
0.2 PATIENT CONTROLLED ANALGESIA SYRINGE INTRAVENOUS EVERY 5 MIN PRN
Status: DISCONTINUED | OUTPATIENT
Start: 2022-07-07 | End: 2022-07-07 | Stop reason: HOSPADM

## 2022-07-07 RX ORDER — LANOLIN ALCOHOL/MO/W.PET/CERES
1000 CREAM (GRAM) TOPICAL DAILY
Status: DISCONTINUED | OUTPATIENT
Start: 2022-07-08 | End: 2022-07-08 | Stop reason: HOSPADM

## 2022-07-07 RX ORDER — ONDANSETRON 2 MG/ML
4 INJECTION INTRAMUSCULAR; INTRAVENOUS EVERY 6 HOURS PRN
Status: DISCONTINUED | OUTPATIENT
Start: 2022-07-07 | End: 2022-07-08 | Stop reason: HOSPADM

## 2022-07-07 RX ORDER — PREDNISONE 5 MG/1
10 TABLET ORAL DAILY
Status: DISCONTINUED | OUTPATIENT
Start: 2022-07-08 | End: 2022-07-08 | Stop reason: HOSPADM

## 2022-07-07 RX ORDER — NALOXONE HYDROCHLORIDE 0.4 MG/ML
0.2 INJECTION, SOLUTION INTRAMUSCULAR; INTRAVENOUS; SUBCUTANEOUS
Status: DISCONTINUED | OUTPATIENT
Start: 2022-07-07 | End: 2022-07-08 | Stop reason: HOSPADM

## 2022-07-07 RX ORDER — BUPIVACAINE HYDROCHLORIDE 2.5 MG/ML
INJECTION, SOLUTION EPIDURAL; INFILTRATION; INTRACAUDAL
Status: COMPLETED | OUTPATIENT
Start: 2022-07-07 | End: 2022-07-07

## 2022-07-07 RX ORDER — ONDANSETRON 4 MG/1
4 TABLET, ORALLY DISINTEGRATING ORAL EVERY 30 MIN PRN
Status: DISCONTINUED | OUTPATIENT
Start: 2022-07-07 | End: 2022-07-07 | Stop reason: HOSPADM

## 2022-07-07 RX ORDER — OXYCODONE HYDROCHLORIDE 5 MG/1
5 TABLET ORAL EVERY 4 HOURS PRN
Status: DISCONTINUED | OUTPATIENT
Start: 2022-07-07 | End: 2022-07-07 | Stop reason: HOSPADM

## 2022-07-07 RX ORDER — SODIUM CHLORIDE 9 MG/ML
INJECTION, SOLUTION INTRAVENOUS CONTINUOUS
Status: ACTIVE | OUTPATIENT
Start: 2022-07-07 | End: 2022-07-08

## 2022-07-07 RX ORDER — CEFAZOLIN SODIUM 2 G/100ML
2 INJECTION, SOLUTION INTRAVENOUS EVERY 8 HOURS
Status: DISCONTINUED | OUTPATIENT
Start: 2022-07-07 | End: 2022-07-08 | Stop reason: HOSPADM

## 2022-07-07 RX ORDER — OXYCODONE HYDROCHLORIDE 5 MG/1
5 TABLET ORAL
Status: DISCONTINUED | OUTPATIENT
Start: 2022-07-07 | End: 2022-07-08 | Stop reason: HOSPADM

## 2022-07-07 RX ORDER — LIDOCAINE 40 MG/G
CREAM TOPICAL
Status: DISCONTINUED | OUTPATIENT
Start: 2022-07-07 | End: 2022-07-08 | Stop reason: HOSPADM

## 2022-07-07 RX ORDER — FENTANYL CITRATE 50 UG/ML
25 INJECTION, SOLUTION INTRAMUSCULAR; INTRAVENOUS EVERY 5 MIN PRN
Status: DISCONTINUED | OUTPATIENT
Start: 2022-07-07 | End: 2022-07-07 | Stop reason: HOSPADM

## 2022-07-07 RX ORDER — ONDANSETRON 4 MG/1
4 TABLET, FILM COATED ORAL EVERY 8 HOURS PRN
Status: CANCELLED | OUTPATIENT
Start: 2022-07-07

## 2022-07-07 RX ORDER — ONDANSETRON 2 MG/ML
INJECTION INTRAMUSCULAR; INTRAVENOUS PRN
Status: DISCONTINUED | OUTPATIENT
Start: 2022-07-07 | End: 2022-07-07

## 2022-07-07 RX ORDER — POLYETHYLENE GLYCOL 3350 17 G/17G
17 POWDER, FOR SOLUTION ORAL DAILY
Status: DISCONTINUED | OUTPATIENT
Start: 2022-07-08 | End: 2022-07-08 | Stop reason: HOSPADM

## 2022-07-07 RX ORDER — LIDOCAINE HYDROCHLORIDE 20 MG/ML
INJECTION, SOLUTION INFILTRATION; PERINEURAL PRN
Status: DISCONTINUED | OUTPATIENT
Start: 2022-07-07 | End: 2022-07-07

## 2022-07-07 RX ORDER — LEVETIRACETAM 250 MG/1
250 TABLET ORAL 2 TIMES DAILY
Status: CANCELLED | OUTPATIENT
Start: 2022-07-07

## 2022-07-07 RX ORDER — HYDRALAZINE HYDROCHLORIDE 20 MG/ML
10-20 INJECTION INTRAMUSCULAR; INTRAVENOUS EVERY 30 MIN PRN
Status: DISCONTINUED | OUTPATIENT
Start: 2022-07-07 | End: 2022-07-08 | Stop reason: HOSPADM

## 2022-07-07 RX ORDER — NALOXONE HYDROCHLORIDE 0.4 MG/ML
0.4 INJECTION, SOLUTION INTRAMUSCULAR; INTRAVENOUS; SUBCUTANEOUS
Status: DISCONTINUED | OUTPATIENT
Start: 2022-07-07 | End: 2022-07-08 | Stop reason: HOSPADM

## 2022-07-07 RX ORDER — MAGNESIUM HYDROXIDE 1200 MG/15ML
LIQUID ORAL PRN
Status: DISCONTINUED | OUTPATIENT
Start: 2022-07-07 | End: 2022-07-07 | Stop reason: HOSPADM

## 2022-07-07 RX ORDER — LIDOCAINE HYDROCHLORIDE AND EPINEPHRINE 10; 10 MG/ML; UG/ML
INJECTION, SOLUTION INFILTRATION; PERINEURAL PRN
Status: DISCONTINUED | OUTPATIENT
Start: 2022-07-07 | End: 2022-07-07 | Stop reason: HOSPADM

## 2022-07-07 RX ORDER — ONDANSETRON 4 MG/1
4 TABLET, ORALLY DISINTEGRATING ORAL EVERY 6 HOURS PRN
Status: DISCONTINUED | OUTPATIENT
Start: 2022-07-07 | End: 2022-07-07

## 2022-07-07 RX ORDER — NALOXONE HYDROCHLORIDE 0.4 MG/ML
0.2 INJECTION, SOLUTION INTRAMUSCULAR; INTRAVENOUS; SUBCUTANEOUS
Status: DISCONTINUED | OUTPATIENT
Start: 2022-07-07 | End: 2022-07-07

## 2022-07-07 RX ORDER — PROPOFOL 10 MG/ML
INJECTION, EMULSION INTRAVENOUS PRN
Status: DISCONTINUED | OUTPATIENT
Start: 2022-07-07 | End: 2022-07-07

## 2022-07-07 RX ORDER — LEVETIRACETAM 250 MG/1
250 TABLET ORAL 2 TIMES DAILY
Status: DISCONTINUED | OUTPATIENT
Start: 2022-07-07 | End: 2022-07-08 | Stop reason: HOSPADM

## 2022-07-07 RX ORDER — POLYETHYLENE GLYCOL 3350 17 G/17G
17 POWDER, FOR SOLUTION ORAL DAILY
Status: CANCELLED | OUTPATIENT
Start: 2022-07-07

## 2022-07-07 RX ORDER — ACETAMINOPHEN 325 MG/1
650 TABLET ORAL EVERY 4 HOURS PRN
Status: DISCONTINUED | OUTPATIENT
Start: 2022-07-10 | End: 2022-07-08 | Stop reason: HOSPADM

## 2022-07-07 RX ORDER — AMOXICILLIN 250 MG
2 CAPSULE ORAL 2 TIMES DAILY
Status: DISCONTINUED | OUTPATIENT
Start: 2022-07-07 | End: 2022-07-07

## 2022-07-07 RX ORDER — PYRIDOSTIGMINE BROMIDE 60 MG/1
60 TABLET ORAL 3 TIMES DAILY
Status: DISCONTINUED | OUTPATIENT
Start: 2022-07-07 | End: 2022-07-08 | Stop reason: HOSPADM

## 2022-07-07 RX ORDER — ONDANSETRON 4 MG/1
4 TABLET, ORALLY DISINTEGRATING ORAL EVERY 6 HOURS PRN
Status: DISCONTINUED | OUTPATIENT
Start: 2022-07-07 | End: 2022-07-08 | Stop reason: HOSPADM

## 2022-07-07 RX ORDER — FENTANYL CITRATE 50 UG/ML
25-50 INJECTION, SOLUTION INTRAMUSCULAR; INTRAVENOUS
Status: DISCONTINUED | OUTPATIENT
Start: 2022-07-07 | End: 2022-07-07 | Stop reason: HOSPADM

## 2022-07-07 RX ORDER — TAMSULOSIN HYDROCHLORIDE 0.4 MG/1
0.4 CAPSULE ORAL DAILY
Status: DISCONTINUED | OUTPATIENT
Start: 2022-07-07 | End: 2022-07-08 | Stop reason: HOSPADM

## 2022-07-07 RX ORDER — DEXMEDETOMIDINE HYDROCHLORIDE 4 UG/ML
INJECTION, SOLUTION INTRAVENOUS
Status: COMPLETED | OUTPATIENT
Start: 2022-07-07 | End: 2022-07-07

## 2022-07-07 RX ORDER — PROCHLORPERAZINE MALEATE 5 MG
5 TABLET ORAL EVERY 6 HOURS PRN
Status: DISCONTINUED | OUTPATIENT
Start: 2022-07-07 | End: 2022-07-08 | Stop reason: HOSPADM

## 2022-07-07 RX ORDER — ONDANSETRON 2 MG/ML
4 INJECTION INTRAMUSCULAR; INTRAVENOUS EVERY 6 HOURS PRN
Status: DISCONTINUED | OUTPATIENT
Start: 2022-07-07 | End: 2022-07-07

## 2022-07-07 RX ORDER — VITAMIN B COMPLEX
50 TABLET ORAL DAILY
Status: DISCONTINUED | OUTPATIENT
Start: 2022-07-08 | End: 2022-07-08 | Stop reason: HOSPADM

## 2022-07-07 RX ORDER — PROCHLORPERAZINE 25 MG
12.5 SUPPOSITORY, RECTAL RECTAL EVERY 12 HOURS PRN
Status: DISCONTINUED | OUTPATIENT
Start: 2022-07-07 | End: 2022-07-08 | Stop reason: HOSPADM

## 2022-07-07 RX ORDER — VITS A,C,E/LUTEIN/MINERALS 300MCG-200
1 TABLET ORAL DAILY
Status: CANCELLED | OUTPATIENT
Start: 2022-07-07

## 2022-07-07 RX ORDER — SODIUM CHLORIDE, SODIUM LACTATE, POTASSIUM CHLORIDE, CALCIUM CHLORIDE 600; 310; 30; 20 MG/100ML; MG/100ML; MG/100ML; MG/100ML
INJECTION, SOLUTION INTRAVENOUS CONTINUOUS PRN
Status: DISCONTINUED | OUTPATIENT
Start: 2022-07-07 | End: 2022-07-07

## 2022-07-07 RX ORDER — SODIUM CHLORIDE, SODIUM LACTATE, POTASSIUM CHLORIDE, CALCIUM CHLORIDE 600; 310; 30; 20 MG/100ML; MG/100ML; MG/100ML; MG/100ML
INJECTION, SOLUTION INTRAVENOUS CONTINUOUS
Status: DISCONTINUED | OUTPATIENT
Start: 2022-07-07 | End: 2022-07-07 | Stop reason: HOSPADM

## 2022-07-07 RX ORDER — BISACODYL 10 MG
10 SUPPOSITORY, RECTAL RECTAL DAILY PRN
Status: DISCONTINUED | OUTPATIENT
Start: 2022-07-07 | End: 2022-07-08 | Stop reason: HOSPADM

## 2022-07-07 RX ORDER — AMOXICILLIN 250 MG
1 CAPSULE ORAL 2 TIMES DAILY
Status: DISCONTINUED | OUTPATIENT
Start: 2022-07-07 | End: 2022-07-08 | Stop reason: HOSPADM

## 2022-07-07 RX ORDER — HYDROMORPHONE HCL IN WATER/PF 6 MG/30 ML
0.2 PATIENT CONTROLLED ANALGESIA SYRINGE INTRAVENOUS
Status: DISCONTINUED | OUTPATIENT
Start: 2022-07-07 | End: 2022-07-08 | Stop reason: HOSPADM

## 2022-07-07 RX ORDER — DEXAMETHASONE SODIUM PHOSPHATE 10 MG/ML
INJECTION, SOLUTION INTRAMUSCULAR; INTRAVENOUS
Status: COMPLETED | OUTPATIENT
Start: 2022-07-07 | End: 2022-07-07

## 2022-07-07 RX ORDER — GLYCOPYRROLATE 0.2 MG/ML
INJECTION, SOLUTION INTRAMUSCULAR; INTRAVENOUS PRN
Status: DISCONTINUED | OUTPATIENT
Start: 2022-07-07 | End: 2022-07-07

## 2022-07-07 RX ORDER — DEXAMETHASONE SODIUM PHOSPHATE 4 MG/ML
INJECTION, SOLUTION INTRA-ARTICULAR; INTRALESIONAL; INTRAMUSCULAR; INTRAVENOUS; SOFT TISSUE PRN
Status: DISCONTINUED | OUTPATIENT
Start: 2022-07-07 | End: 2022-07-07

## 2022-07-07 RX ORDER — LANOLIN ALCOHOL/MO/W.PET/CERES
1000 CREAM (GRAM) TOPICAL DAILY
Status: CANCELLED | OUTPATIENT
Start: 2022-07-07

## 2022-07-07 RX ORDER — FLUMAZENIL 0.1 MG/ML
0.2 INJECTION, SOLUTION INTRAVENOUS
Status: DISCONTINUED | OUTPATIENT
Start: 2022-07-07 | End: 2022-07-07 | Stop reason: HOSPADM

## 2022-07-07 RX ORDER — POLYETHYLENE GLYCOL 3350 17 G/17G
17 POWDER, FOR SOLUTION ORAL DAILY
Status: DISCONTINUED | OUTPATIENT
Start: 2022-07-07 | End: 2022-07-07

## 2022-07-07 RX ORDER — SIMVASTATIN 20 MG
20 TABLET ORAL AT BEDTIME
Status: DISCONTINUED | OUTPATIENT
Start: 2022-07-07 | End: 2022-07-08 | Stop reason: HOSPADM

## 2022-07-07 RX ORDER — ONDANSETRON 2 MG/ML
4 INJECTION INTRAMUSCULAR; INTRAVENOUS EVERY 30 MIN PRN
Status: DISCONTINUED | OUTPATIENT
Start: 2022-07-07 | End: 2022-07-07 | Stop reason: HOSPADM

## 2022-07-07 RX ORDER — FENTANYL CITRATE 50 UG/ML
25 INJECTION, SOLUTION INTRAMUSCULAR; INTRAVENOUS
Status: DISCONTINUED | OUTPATIENT
Start: 2022-07-07 | End: 2022-07-07 | Stop reason: HOSPADM

## 2022-07-07 RX ORDER — PROCHLORPERAZINE MALEATE 5 MG
5 TABLET ORAL EVERY 6 HOURS PRN
Status: DISCONTINUED | OUTPATIENT
Start: 2022-07-07 | End: 2022-07-07

## 2022-07-07 RX ORDER — EPHEDRINE SULFATE 50 MG/ML
INJECTION, SOLUTION INTRAMUSCULAR; INTRAVENOUS; SUBCUTANEOUS PRN
Status: DISCONTINUED | OUTPATIENT
Start: 2022-07-07 | End: 2022-07-07

## 2022-07-07 RX ADMIN — Medication 2 G: at 13:28

## 2022-07-07 RX ADMIN — GLYCOPYRROLATE 0.2 MG: 0.2 INJECTION, SOLUTION INTRAMUSCULAR; INTRAVENOUS at 13:31

## 2022-07-07 RX ADMIN — DEXAMETHASONE SODIUM PHOSPHATE 2 MG: 10 INJECTION, SOLUTION INTRAMUSCULAR; INTRAVENOUS at 12:35

## 2022-07-07 RX ADMIN — SODIUM CHLORIDE: 9 INJECTION, SOLUTION INTRAVENOUS at 18:04

## 2022-07-07 RX ADMIN — GLYCOPYRROLATE 0.2 MG: 0.2 INJECTION, SOLUTION INTRAMUSCULAR; INTRAVENOUS at 14:14

## 2022-07-07 RX ADMIN — PHENYLEPHRINE HYDROCHLORIDE 100 MCG: 10 INJECTION INTRAVENOUS at 13:58

## 2022-07-07 RX ADMIN — PHENYLEPHRINE HYDROCHLORIDE 50 MCG: 10 INJECTION INTRAVENOUS at 14:38

## 2022-07-07 RX ADMIN — LIDOCAINE HYDROCHLORIDE 100 MG: 20 INJECTION, SOLUTION INFILTRATION; PERINEURAL at 13:09

## 2022-07-07 RX ADMIN — Medication 10 MG: at 14:38

## 2022-07-07 RX ADMIN — TAMSULOSIN HYDROCHLORIDE 0.4 MG: 0.4 CAPSULE ORAL at 19:51

## 2022-07-07 RX ADMIN — PHENYLEPHRINE HYDROCHLORIDE 50 MCG: 10 INJECTION INTRAVENOUS at 14:01

## 2022-07-07 RX ADMIN — ONDANSETRON 4 MG: 2 INJECTION INTRAMUSCULAR; INTRAVENOUS at 15:21

## 2022-07-07 RX ADMIN — BUPIVACAINE HYDROCHLORIDE 40 ML: 2.5 INJECTION, SOLUTION EPIDURAL; INFILTRATION; INTRACAUDAL; PERINEURAL at 12:35

## 2022-07-07 RX ADMIN — SENNOSIDES AND DOCUSATE SODIUM 1 TABLET: 8.6; 5 TABLET ORAL at 19:51

## 2022-07-07 RX ADMIN — Medication 5 MG: at 13:09

## 2022-07-07 RX ADMIN — PHENYLEPHRINE HYDROCHLORIDE 50 MCG: 10 INJECTION INTRAVENOUS at 14:04

## 2022-07-07 RX ADMIN — PYRIDOSTIGMINE BROMIDE 60 MG: 60 TABLET ORAL at 19:51

## 2022-07-07 RX ADMIN — Medication 30 MG: at 13:09

## 2022-07-07 RX ADMIN — PROPOFOL 30 MG: 10 INJECTION, EMULSION INTRAVENOUS at 13:23

## 2022-07-07 RX ADMIN — Medication 40 MCG: at 12:35

## 2022-07-07 RX ADMIN — Medication 5 MG: at 13:58

## 2022-07-07 RX ADMIN — PROPOFOL 120 MG: 10 INJECTION, EMULSION INTRAVENOUS at 13:09

## 2022-07-07 RX ADMIN — SUGAMMADEX 200 MG: 100 INJECTION, SOLUTION INTRAVENOUS at 15:36

## 2022-07-07 RX ADMIN — CEFAZOLIN SODIUM 2 G: 2 INJECTION, SOLUTION INTRAVENOUS at 20:46

## 2022-07-07 RX ADMIN — Medication 5 MG: at 13:21

## 2022-07-07 RX ADMIN — Medication 10 MG: at 14:57

## 2022-07-07 RX ADMIN — PRAMIPEXOLE DIHYDROCHLORIDE 0.38 MG: 0.12 TABLET ORAL at 20:01

## 2022-07-07 RX ADMIN — SODIUM CHLORIDE, POTASSIUM CHLORIDE, SODIUM LACTATE AND CALCIUM CHLORIDE: 600; 310; 30; 20 INJECTION, SOLUTION INTRAVENOUS at 13:00

## 2022-07-07 RX ADMIN — DEXAMETHASONE SODIUM PHOSPHATE 8 MG: 4 INJECTION, SOLUTION INTRA-ARTICULAR; INTRALESIONAL; INTRAMUSCULAR; INTRAVENOUS; SOFT TISSUE at 13:25

## 2022-07-07 RX ADMIN — SIMVASTATIN 20 MG: 20 TABLET, FILM COATED ORAL at 20:01

## 2022-07-07 RX ADMIN — LEVETIRACETAM 250 MG: 250 TABLET, FILM COATED ORAL at 19:51

## 2022-07-07 ASSESSMENT — ACTIVITIES OF DAILY LIVING (ADL)
ADLS_ACUITY_SCORE: 43
ADLS_ACUITY_SCORE: 35
ADLS_ACUITY_SCORE: 43

## 2022-07-07 NOTE — ANESTHESIA PREPROCEDURE EVALUATION
Anesthesia Pre-Procedure Evaluation    Patient: Gustavo Ramon   MRN: [id number] : 1940        Procedure : Procedure(s):  INSERTION, SHUNT, VENTRICULOPERITONEAL, USING OPTICAL TRACKING SYSTEM  possible INSERTION, SHUNT, VENTRICULOPERITONEAL, LAPAROSCOPY-ASSISTED          Gustavo Ramon is a 82-year-old male with history of ocular myasthenia gravis, macular degeneration, cataract, and hearing loss. He presented to Neurosurgery clinic on 2022 with 3 month history of major decline in gait and balance, he currently is unable to ambulate independently and uses a wheelchair.  Since 2022, family also reports changes in his personality and cognition. Per family patient is having trouble with attention, and experiencing word finding difficulty. Patient has history of urinary urgency, frequency with some hesitancy, but no urinary incontinence. He was diagnosed with normal pressure hydrocephalus    Past Medical History:   Diagnosis Date     Actinic keratosis      AK (actinic keratosis) 11/15/2012     Amaurosis fugax      Basal cell carcinoma     R medial cheek/nose     Central serous retinopathy left    Eye     Diverticulosis 2006     DJD (degenerative joint disease)      GERD (gastroesophageal reflux disease)      Hearing loss     High frequency     History of nonmelanoma skin cancer      History of nonmelanoma skin cancer      HTN (hypertension)      Hyperlipidemia LDL goal < 130      Hyperplastic colon polyp 2006     IgA monoclonal gammopathy     IgA kappa     Lattice degeneration of retina right    Eye     Macular degeneration      Nonsenile cataract      Ocular myasthenia gravis (H) 2009    [facility] consult     Rosacea      Steroid-induced diabetes mellitus, initial encounter (H) 2022     Strabismus      Vitreous detachment left    Eye      Past Surgical History:   Procedure Laterality Date     ARTHROSCOPY KNEE RT/LT Left 2010    Knee     BIOPSY      Nose;  Prostate; BCC - left arm     COLONOSCOPY  9/24/2019    w/Endoscopy     COLONOSCOPY WITH CO2 INSUFFLATION N/A 9/24/2019    Procedure: COLONOSCOPY, WITH CO2 INSUFFLATION;  Surgeon: Loi Levine MD;  Location: [facility] OR     COMBINED ESOPHAGOSCOPY, GASTROSCOPY, DUODENOSCOPY (EGD) WITH CO2 INSUFFLATION N/A 9/24/2019    Procedure: ESOPHAGOGASTRODUODENOSCOPY, WITH CO2 INSUFFLATION;  Surgeon: Loi Levine MD;  Location: [facility] OR     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     DISKECTOMY, LUMBAR, SINGLE SP  2003    L2-3     ENT SURGERY  1943    Tonsils      ENT SURGERY  2-22-12    Biopsy lesion right pinna     ENT SURGERY  2-24-12    Biopsy leson right pinna     ESOPHAGOSCOPY, GASTROSCOPY, DUODENOSCOPY (EGD), COMBINED N/A 9/24/2019    Procedure: Esophagogastroduodenoscopy, With Biopsy;  Surgeon: Loi Levine MD;  Location: [facility] OR     IR LUMBAR DRAIN PLACEMENT W FLUORO  6/27/2022     SOFT TISSUE SURGERY  May 2010    Basal Cell/right side nose      Allergies   Allergen Reactions     Mycophenolate Other (See Comments)     Confusion, abnormal movements     Nkda [No Known Drug Allergies]       Social History     Tobacco Use     Smoking status: Never Smoker     Smokeless tobacco: Never Used   Substance Use Topics     Alcohol use: No     Alcohol/week: 0.0 standard drinks      Wt Readings from Last 1 Encounters:   07/07/22 77.6 kg (171 lb 1.2 oz)        Anesthesia Evaluation            ROS/MED HX  ENT/Pulmonary:       Neurologic: Comment: Myasthenia gravis    (+) dementia, peripheral neuropathy,     Cardiovascular:     (+) Dyslipidemia hypertension-----    METS/Exercise Tolerance:     Hematologic:       Musculoskeletal:       GI/Hepatic:     (+) GERD,     Renal/Genitourinary:     (+) renal disease,     Endo:     (+) type II DM, Chronic steroid usage for Obesity,     Psychiatric/Substance Use:       Infectious Disease:       Malignancy:       Other:            Physical Exam    Airway        Mallampati: III        Respiratory Devices and Support         Dental       (+) missing      Cardiovascular   cardiovascular exam normal          Pulmonary   pulmonary exam normal                OUTSIDE LABS:  CBC:   Lab Results   Component Value Date    WBC 13.4 (H) 03/31/2022    WBC 11.5 (H) 02/15/2022    HGB 12.7 (L) 05/02/2022    HGB 14.4 03/31/2022    HCT 46.7 03/31/2022    HCT 41.3 02/15/2022     06/27/2022     03/31/2022     BMP:   Lab Results   Component Value Date     05/02/2022     03/31/2022    POTASSIUM 4.2 05/02/2022    POTASSIUM 4.2 03/31/2022    CHLORIDE 104 05/02/2022    CHLORIDE 107 03/31/2022    CO2 22 05/02/2022    CO2 29 03/31/2022    BUN 15 05/02/2022    BUN 23 03/31/2022    CR 0.90 05/02/2022    CR 0.91 03/31/2022     (H) 07/07/2022    GLC 79 05/02/2022     COAGS:   Lab Results   Component Value Date    PTT 31 06/27/2022    INR 1.12 06/27/2022     POC: No results found for: BGM, HCG, HCGS  HEPATIC:   Lab Results   Component Value Date    ALBUMIN 3.5 03/31/2022    PROTTOTAL 6.6 (L) 03/31/2022    ALT 27 03/31/2022    AST 16 03/31/2022    ALKPHOS 48 03/31/2022    BILITOTAL 0.3 03/31/2022    JOSELINE 13 03/31/2022     OTHER:   Lab Results   Component Value Date    A1C 5.2 02/07/2022    TRISTIAN 8.7 05/02/2022    MAG 2.3 03/31/2022    LIPASE 228 08/25/2016    TSH 1.50 03/31/2022    SED 8 08/15/2012       Anesthesia Plan    ASA Status:  3   NPO Status:  NPO Appropriate    Anesthesia Type: General.      Maintenance: Balanced.        Consents    Anesthesia Plan(s) and associated risks, benefits, and realistic alternatives discussed. Questions answered and patient/representative(s) expressed understanding.     - Discussed: Risks, Benefits and Alternatives for BOTH SEDATION and the PROCEDURE were discussed     - Discussed with:  Patient      - Extended Intubation/Ventilatory Support Discussed: No.      - Patient is DNR/DNI Status: No    Use of blood products discussed: No .     Postoperative  Care    Pain management: IV analgesics.   PONV prophylaxis: Ondansetron (or other 5HT-3), Dexamethasone or Solumedrol     Comments:           H&P reviewed: Unable to attach H&P to encounter due to EHR limitations. H&P Update: appropriate H&P reviewed, patient examined. No interval changes since H&P (within 30 days).         Low Aburto MD

## 2022-07-07 NOTE — BRIEF OP NOTE
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 Certas valve set to 4.  Complications: None.  Implants:   Implant Name Type Inv. Item Serial No.  Lot No. LRB No. Used Action   SHUNT VALVE CERTAS PLUS REG WITHOUT SIPHONGUARD  [id number] - [id number] Shunt SHUNT VALVE CERTAS PLUS REG WITHOUT SIPHONGUARD  [id number]  [employer] [id number] N/A 1 Implanted   SHUNT CATH PERITONEAL 120CM [id number] - [id number] Shunt SHUNT CATH PERITONEAL 120CM [id number]  [employer]-NEURO [id number] N/A 1 Implanted   SHUNT CATH VENTRICULAR BACTISEAL  - [id number] Shunt SHUNT CATH VENTRICULAR BACTISEAL   [employer] [facility] INC-   NA N/A 1 Implanted

## 2022-07-07 NOTE — ANESTHESIA POSTPROCEDURE EVALUATION
Patient: Gustavo Ramon    Procedure: Procedure(s):  INSERTION, SHUNT, VENTRICULOPERITONEAL, USING OPTICAL TRACKING SYSTEM  possible INSERTION, SHUNT, VENTRICULOPERITONEAL, LAPAROSCOPY-ASSISTED       Anesthesia Type:  General    Note:  Disposition: Inpatient   Postop Pain Control: Uneventful            Sign Out: Well controlled pain   PONV: No   Neuro/Psych: Uneventful            Sign Out: Acceptable/Baseline neuro status   Airway/Respiratory: Uneventful            Sign Out: Acceptable/Baseline resp. status   CV/Hemodynamics: Uneventful            Sign Out: Acceptable CV status; No obvious hypovolemia; No obvious fluid overload   Other NRE: NONE   DID A NON-ROUTINE EVENT OCCUR? No           Last vitals:  Vitals Value Taken Time   /89 07/07/22 1645   Temp     Pulse 61 07/07/22 1659   Resp 20 07/07/22 1659   SpO2 96 % 07/07/22 1659   Vitals shown include unvalidated device data.    Electronically Signed By: Andria Mccall MD  July 7, 2022  5:00 PM

## 2022-07-07 NOTE — OP NOTE
Procedure Date: 07/07/2022    PREOPERATIVE DIAGNOSES:     1.  Idiopathic normal pressure hydrocephalus.  2.  History of lumbar drain trial.    POSTOPERATIVE DIAGNOSES:    1.  Idiopathic normal pressure hydrocephalus.  2.  History of lumbar drain trial.    PROCEDURES PERFORMED:     1.  Stealth-assisted right high parietal ventriculoperitoneal shunt placement.  2.  Use of intraoperative Stealth navigation.  3.  General surgery laparoscopic-assisted placement of catheter into the peritoneum.    ATTENDING SURGEON:  Jean-Paul Childs MD    RESIDENT SURGEONS:    1.  Hakan Hair MD  2.  Matt Ramos MD    ANESTHESIA:  General.    ESTIMATED BLOOD LOSS:  20 mL    IMPLANTS:     1.  Codman Certas, lot #[id number]. Set to 4.  2.  Medtronic peritoneal catheter 120 cm, lot #[id number].  3.  [employer] Bactiseal ventricular catheter.    INTRAOPERATIVE FINDINGS:  Brisk CSF flow through the ventricular catheter and peritoneal catheter prior to implantation, Certas valve set to 4.    INDICATIONS FOR PROCEDURE:  Mr. Ramon is an 82-year-old male who presented to clinic initially with difficulty initiating ambulation, including getting up out of a chair, urinary incontinence, and cognitive dysfunction.  He underwent a several-day lumbar drain trial and was assessed daily by our therapist as well as neuropsych.  The patient demonstrated a very good response to CSF diversion.  Given the patient's symptoms as well as a great response to lumbar drain trial, he was indicated for the aforementioned procedure.  After thorough conversation of the risks and benefits, the patient elected to move forward with the offered surgical procedure.    DESCRIPTION OF PROCEDURE:  The patient was marked and consented in the preoperative area.  He was brought to the operating room where general anesthesia was induced and the patient was intubated.  A shoulder bump was placed under his right side.  Norris was attached to his head and his head was turned  slightly to the left.  The patient's preoperative CT was loaded into the Stealth machine and a high parietal trajectory was planned.  We then registered the OceanT[employer]er software to the patient's head with good registration error.  The entry point was localized and the incision was planned.  Electrical clippers were then used to shave a small patch of hair.  The patient was prepped and draped in normal sterile fashion.  Time-out was called, confirming the patient's identity, laterality of procedure, and procedure intended.  10mL of local anesthetic with epinephrine was injected along the intended cranial incision as well is being used to initiate the tunneling.    A #10 blade was used to open the scalp incision.  A scalpel was used to continue the incision down to the galea, which was opened and dissected from the pericranial layer.  The scalp flap was held in the open position using a Vicryl suture.  A mosquito followed by Leslye was then used to create a pocket and an initial tract along the hinge of the scalp flap.  The pericranium was opened using monopolar cautery and a #5 cutting ball was used to create the bur hole.  The bur hole was widened and straightened using a Kerrison punch.  Hemostasis was obtained.  The dura was coagulated using bipolar cautery.    The abdominal incision to the right and above the umbilicus was opened using a #10 blade and we dissected bluntly through the adipose tissue and Chinmay's fascia until the external fascia had been reached. We then tunneled from the abdominal incision up to the postauricular area where a skip incision was made.  A uterine was then used to tunnel from the skip incision up to the cranial incision.  On the back table, the valve and peritoneal catheter were opened and attached to each other.  The junction was secured using a silk tie.  The valve and tubing were primed.  We then passed the peritoneal catheter from the cranial incision to the skip incision and then from  the skip incision down to the peritoneal incision.  We applied gentle traction to the catheter and the valve was seated in the pocket nicely.  Using a #4 Penfield with monopolar cautery, the dura was opened and the corticectomy completed.  The ventricular catheter was opened and we set our depth to 8 cm.  Using Stealth navigation, the catheter was then placed into the ipsilateral lateral ventricle.  CSF return was achieved and we soft passed the catheter to a depth.  We then cut the catheter to length and attached it to the valve.  We checked the distal peritoneal catheter and it was dripping fluid very nicely.  We then secured the connection between the ventricular catheter and the valve with a silk tie.  The ventricular catheter was then fixed to the blue 90-degree component.  The valve base was then sutured to the periosteum with a Vicryl suture.  The wound was copiously irrigated as well as the skip incision.  We again checked the distal peritoneal catheter and it was still dripping CSF quite nicely.    At this point, our General Surgery colleagues entered the case and placed the catheter into the peritoneal cavity using laparoscopic assistance.  Please refer to their dictations for details of the procedure.      We then turned our attention to closure of the cranial incisions.  A piece of Gelfoam was placed in the bur hole around the catheter.  The galea was reapproximated using 2-0 Vicryl sutures in an inverted interrupted fashion.  Skin was closed using 3-0 Monocryl in a running fashion.  The skip incision was closed using a 3-0 Monocryl.  Both wounds were cleaned with a wet followed by dry sponge.  ChloraPrep was applied to both incisions.  Exofin was then applied to both incisions.  The drapes were taken down, and the Norris was detached from the bed and the patient's head.  The head of bed was returned.  General anesthesia was gently reversed and the patient was extubated.  The patient was then  transferred back to the hospital bed and taken to the postoperative care area for further postoperative cares.  All counts were correct at the end of the procedure x2.  Dr. Jean-Paul Childs was present throughout all critical portions of the case and otherwise immediately available.        Jean-Paul Childs MD    As Dictated by TEO MOSQUEDA MD        D: 2022   T: 2022   MT: PAKMT    Name:     DAISHA LOZOYA  MRN:      [id number]        Account:        [id number]   :      1940           Procedure Date: 2022     Document: [id number]

## 2022-07-07 NOTE — ANESTHESIA PROCEDURE NOTES
Airway       Patient location during procedure: OR       Procedure Start/Stop Times: 7/7/2022 1:27 PM  Staff -        CRNA: Alex Perez APRN CRNA       Performed By: CRNA  Consent for Airway        Urgency: elective  Indications and Patient Condition       Indications for airway management: kitty-procedural       Induction type:intravenous       Mask difficulty assessment: 1 - vent by mask    Final Airway Details       Final airway type: endotracheal airway       Successful airway: ETT - single  Endotracheal Airway Details        ETT size (mm): 7.0       Cuffed: yes       Successful intubation technique: direct laryngoscopy       DL Blade Type: Dinh 2       Grade View of Cords: 1 (cords open and clear)       Adjucts: stylet       Position: Left       Measured from: lips       Secured at (cm): 22       Bite block used: None    Post intubation assessment        Placement verified by: capnometry, equal breath sounds and chest rise        Number of attempts at approach: 1       Number of other approaches attempted: 0       Secured with: silk tape       Ease of procedure: easy       Dentition: Intact and Unchanged    Medication(s) Administered   Medication Administration Time: 7/7/2022 1:27 PM

## 2022-07-07 NOTE — ANESTHESIA PROCEDURE NOTES
TAP Procedure Note    Pre-Procedure   Staff -        Anesthesiologist:  Irving Pope MD       Resident/Fellow: Isi Landa MD       Performed By: with fellow       Procedure performed by resident/fellow/CRNA in presence of a teaching physician.         Location: pre-op       Procedure Start/Stop Times: 7/7/2022 12:35 PM and 7/7/2022 12:45 PM       Pre-Anesthestic Checklist: patient identified, IV checked, site marked, risks and benefits discussed, informed consent, monitors and equipment checked, pre-op evaluation, at physician/surgeon's request and post-op pain management  Timeout:       Correct Patient: Yes        Correct Procedure: Yes        Correct Site: Yes        Correct Position: Yes        Correct Laterality: Yes        Site Marked: Yes  Procedure Documentation  Procedure: TAP       Diagnosis: POST OPERATIVE PAIN       Laterality: bilateral       Patient Position: supine       Patient Prep/Sterile Barriers: sterile gloves, mask       Skin prep: Chloraprep       Needle Type: short bevel       Needle Gauge: 21.        Needle Length (millimeters): 110        Ultrasound guided       1. Ultrasound was used to identify targeted nerve, plexus, vascular marker, or fascial plane and place a needle adjacent to it in real-time.       2. Ultrasound was used to visualize the spread of anesthetic in close proximity to the above referenced structure.       3. A permanent image is entered into the patient's record.    Assessment/Narrative         The placement was negative for: blood aspirated, painful injection and site bleeding       Paresthesias: No.       Bolus given via needle..        Secured via.        Insertion/Infusion Method: Single Shot       Complications: none       Injection made incrementally with aspirations every 5 mL.    Medication(s) Administered   Bupivacaine 0.25% PF (Infiltration) - Infiltration   40 mL - 7/7/2022 12:35:00 PM  Dexmedetomidine 4 mcg/mL (Perineural) - Perineural   40 mcg -  7/7/2022 12:35:00 PM  Dexamethasone 10 mg/mL PF (Perineural) - Perineural   2 mg - 7/7/2022 12:35:00 PM  Medication Administration Time: 7/7/2022 12:35 PM

## 2022-07-07 NOTE — ANESTHESIA CARE TRANSFER NOTE
Patient: Gustavo Ramon    Procedure: Procedure(s):  INSERTION, SHUNT, VENTRICULOPERITONEAL, USING OPTICAL TRACKING SYSTEM  possible INSERTION, SHUNT, VENTRICULOPERITONEAL, LAPAROSCOPY-ASSISTED       Diagnosis: Idiopathic normal pressure hydrocephalus (H) [G91.2]  Diagnosis Additional Information: No value filed.    Anesthesia Type:   General     Note:    Oropharynx: oropharynx clear of all foreign objects  Level of Consciousness: awake  Oxygen Supplementation: nasal cannula  Level of Supplemental Oxygen (L/min / FiO2): 4  Independent Airway: airway patency satisfactory and stable  Dentition: dentition unchanged  Vital Signs Stable: post-procedure vital signs reviewed and stable  Report to RN Given: handoff report given  Patient transferred to: PACU    Handoff Report: Identifed the Patient, Identified the Reponsible Provider, Reviewed the pertinent medical history, Discussed the surgical course, Reviewed Intra-OP anesthesia mangement and issues during anesthesia, Set expectations for post-procedure period and Allowed opportunity for questions and acknowledgement of understanding      Vitals:  Vitals Value Taken Time   BP 93/53 07/07/22 1554   Temp     Pulse 63 07/07/22 1600   Resp 13 07/07/22 1600   SpO2 98 % 07/07/22 1600   Vitals shown include unvalidated device data.    Electronically Signed By: CORINA Yap CRNA  July 7, 2022  4:01 PM

## 2022-07-08 ENCOUNTER — APPOINTMENT (OUTPATIENT)
Dept: PHYSICAL THERAPY | Facility: CLINIC | Age: 82
DRG: 033 | End: 2022-07-08
Attending: STUDENT IN AN ORGANIZED HEALTH CARE EDUCATION/TRAINING PROGRAM
Payer: MEDICARE

## 2022-07-08 ENCOUNTER — APPOINTMENT (OUTPATIENT)
Dept: OCCUPATIONAL THERAPY | Facility: CLINIC | Age: 82
DRG: 033 | End: 2022-07-08
Attending: STUDENT IN AN ORGANIZED HEALTH CARE EDUCATION/TRAINING PROGRAM
Payer: MEDICARE

## 2022-07-08 VITALS
SYSTOLIC BLOOD PRESSURE: 102 MMHG | RESPIRATION RATE: 16 BRPM | HEART RATE: 84 BPM | TEMPERATURE: 97.1 F | BODY MASS INDEX: 26.85 KG/M2 | WEIGHT: 171.08 LBS | HEIGHT: 67 IN | OXYGEN SATURATION: 95 % | DIASTOLIC BLOOD PRESSURE: 50 MMHG

## 2022-07-08 LAB
ANION GAP SERPL CALCULATED.3IONS-SCNC: 9 MMOL/L (ref 7–15)
BUN SERPL-MCNC: 14.8 MG/DL (ref 8–23)
CALCIUM SERPL-MCNC: 9 MG/DL (ref 8.8–10.2)
CHLORIDE SERPL-SCNC: 102 MMOL/L (ref 98–107)
CREAT SERPL-MCNC: 0.88 MG/DL (ref 0.67–1.17)
DEPRECATED HCO3 PLAS-SCNC: 24 MMOL/L (ref 22–29)
ERYTHROCYTE [DISTWIDTH] IN BLOOD BY AUTOMATED COUNT: 14.4 % (ref 10–15)
GFR SERPL CREATININE-BSD FRML MDRD: 85 ML/MIN/1.73M2
GLUCOSE SERPL-MCNC: 107 MG/DL (ref 70–99)
HCT VFR BLD AUTO: 43.9 % (ref 40–53)
HGB BLD-MCNC: 13.6 G/DL (ref 13.3–17.7)
MAGNESIUM SERPL-MCNC: 2.2 MG/DL (ref 1.7–2.3)
MCH RBC QN AUTO: 28.6 PG (ref 26.5–33)
MCHC RBC AUTO-ENTMCNC: 31 G/DL (ref 31.5–36.5)
MCV RBC AUTO: 92 FL (ref 78–100)
PHOSPHATE SERPL-MCNC: 3.2 MG/DL (ref 2.5–4.5)
PLATELET # BLD AUTO: 202 10E3/UL (ref 150–450)
POTASSIUM SERPL-SCNC: 3.9 MMOL/L (ref 3.4–5.3)
RBC # BLD AUTO: 4.75 10E6/UL (ref 4.4–5.9)
SODIUM SERPL-SCNC: 135 MMOL/L (ref 136–145)
WBC # BLD AUTO: 15 10E3/UL (ref 4–11)

## 2022-07-08 PROCEDURE — 36415 COLL VENOUS BLD VENIPUNCTURE: CPT | Performed by: STUDENT IN AN ORGANIZED HEALTH CARE EDUCATION/TRAINING PROGRAM

## 2022-07-08 PROCEDURE — 85014 HEMATOCRIT: CPT | Performed by: STUDENT IN AN ORGANIZED HEALTH CARE EDUCATION/TRAINING PROGRAM

## 2022-07-08 PROCEDURE — 83735 ASSAY OF MAGNESIUM: CPT | Performed by: STUDENT IN AN ORGANIZED HEALTH CARE EDUCATION/TRAINING PROGRAM

## 2022-07-08 PROCEDURE — 97166 OT EVAL MOD COMPLEX 45 MIN: CPT | Mod: GO

## 2022-07-08 PROCEDURE — 97535 SELF CARE MNGMENT TRAINING: CPT | Mod: GO

## 2022-07-08 PROCEDURE — 250N000011 HC RX IP 250 OP 636: Performed by: STUDENT IN AN ORGANIZED HEALTH CARE EDUCATION/TRAINING PROGRAM

## 2022-07-08 PROCEDURE — 82310 ASSAY OF CALCIUM: CPT | Performed by: STUDENT IN AN ORGANIZED HEALTH CARE EDUCATION/TRAINING PROGRAM

## 2022-07-08 PROCEDURE — 84100 ASSAY OF PHOSPHORUS: CPT | Performed by: STUDENT IN AN ORGANIZED HEALTH CARE EDUCATION/TRAINING PROGRAM

## 2022-07-08 PROCEDURE — 97162 PT EVAL MOD COMPLEX 30 MIN: CPT | Mod: GP

## 2022-07-08 PROCEDURE — 250N000012 HC RX MED GY IP 250 OP 636 PS 637: Performed by: STUDENT IN AN ORGANIZED HEALTH CARE EDUCATION/TRAINING PROGRAM

## 2022-07-08 PROCEDURE — 97112 NEUROMUSCULAR REEDUCATION: CPT | Mod: GP

## 2022-07-08 PROCEDURE — 250N000013 HC RX MED GY IP 250 OP 250 PS 637: Performed by: STUDENT IN AN ORGANIZED HEALTH CARE EDUCATION/TRAINING PROGRAM

## 2022-07-08 PROCEDURE — 97116 GAIT TRAINING THERAPY: CPT | Mod: GP

## 2022-07-08 PROCEDURE — 97530 THERAPEUTIC ACTIVITIES: CPT | Mod: GP

## 2022-07-08 RX ORDER — OXYCODONE HYDROCHLORIDE 5 MG/1
5 TABLET ORAL EVERY 6 HOURS PRN
Qty: 16 TABLET | Refills: 0 | Status: SHIPPED | OUTPATIENT
Start: 2022-07-08 | End: 2022-07-08

## 2022-07-08 RX ORDER — OXYCODONE HYDROCHLORIDE 5 MG/1
5 TABLET ORAL EVERY 6 HOURS PRN
Qty: 15 TABLET | Refills: 0 | Status: SHIPPED | OUTPATIENT
Start: 2022-07-08 | End: 2022-07-08

## 2022-07-08 RX ORDER — AMOXICILLIN 250 MG
1 CAPSULE ORAL 2 TIMES DAILY
Qty: 60 TABLET | Refills: 0 | Status: SHIPPED | OUTPATIENT
Start: 2022-07-08 | End: 2024-10-04

## 2022-07-08 RX ORDER — OXYCODONE HYDROCHLORIDE 5 MG/1
5 TABLET ORAL EVERY 6 HOURS PRN
Qty: 15 TABLET | Refills: 0 | Status: SHIPPED | OUTPATIENT
Start: 2022-07-08 | End: 2022-12-08

## 2022-07-08 RX ORDER — ASPIRIN 325 MG
325 TABLET ORAL DAILY
Status: ON HOLD | COMMUNITY
Start: 2022-07-14 | End: 2022-12-15

## 2022-07-08 RX ADMIN — CEFAZOLIN SODIUM 2 G: 2 INJECTION, SOLUTION INTRAVENOUS at 05:45

## 2022-07-08 RX ADMIN — Medication 1000 MCG: at 08:16

## 2022-07-08 RX ADMIN — SENNOSIDES AND DOCUSATE SODIUM 1 TABLET: 8.6; 5 TABLET ORAL at 08:17

## 2022-07-08 RX ADMIN — Medication 50 MCG: at 08:16

## 2022-07-08 RX ADMIN — LEVETIRACETAM 250 MG: 250 TABLET, FILM COATED ORAL at 08:16

## 2022-07-08 RX ADMIN — PROPRANOLOL HYDROCHLORIDE 30 MG: 20 TABLET ORAL at 08:16

## 2022-07-08 RX ADMIN — PYRIDOSTIGMINE BROMIDE 60 MG: 60 TABLET ORAL at 08:15

## 2022-07-08 RX ADMIN — PREDNISONE 10 MG: 5 TABLET ORAL at 08:16

## 2022-07-08 RX ADMIN — POLYETHYLENE GLYCOL 3350 17 G: 17 POWDER, FOR SOLUTION ORAL at 08:15

## 2022-07-08 RX ADMIN — OXYCODONE HYDROCHLORIDE 5 MG: 5 TABLET ORAL at 08:16

## 2022-07-08 ASSESSMENT — ACTIVITIES OF DAILY LIVING (ADL)
ADLS_ACUITY_SCORE: 41
ADLS_ACUITY_SCORE: 43

## 2022-07-08 NOTE — CONSULTS
"Care Management Initial Consult    General Information  Assessment completed with: Spouse or significant other,    Type of CM/SW Visit: Initial Assessment    Primary Care Provider verified and updated as needed:     Readmission within the last 30 days: no previous admission in last 30 days      Reason for Consult: discharge planning  Advance Care Planning:            Communication Assessment  Patient's communication style: spoken language (English or Bilingual)    Hearing Difficulty or Deaf: yes   Wear Glasses or Blind: yes (eyes \"weep\")    Cognitive  Cognitive/Neuro/Behavioral: WDL  Level of Consciousness: alert  Arousal Level: opens eyes spontaneously  Orientation: oriented x 4  Mood/Behavior: calm, cooperative  Best Language: 0 - No aphasia  Speech: clear, logical    Living Environment:   People in home: facility resident     Current living Arrangements: assisted living  Name of Facility: [place], [place]   Able to return to prior arrangements: yes       Family/Social Support:  Care provided by:    Provides care for:    Marital Status:              Description of Support System:           Current Resources:   Patient receiving home care services:       Community Resources:    Equipment currently used at home: wheelchair, manual, walker, rolling, shower chair  Supplies currently used at home:      Employment/Financial:  Employment Status: retired        Financial Concerns:             Lifestyle & Psychosocial Needs:  Social Determinants of Health     Tobacco Use: Low Risk      Smoking Tobacco Use: Never Smoker     Smokeless Tobacco Use: Never Used   Alcohol Use: Not on file   Financial Resource Strain: Not on file   Food Insecurity: Not on file   Transportation Needs: Not on file   Physical Activity: Not on file   Stress: Not on file   Social Connections: Not on file   Intimate Partner Violence: Not on file   Depression: Not at risk     PHQ-2 Score: 0   Housing Stability: Not on file " GENERAL PRE-PROCEDURE:   Procedure:  Pelvic abscess drain  Date/Time:  12/9/2021 9:37 AM    Written consent obtained?: Yes    Risks and benefits: Risks, benefits and alternatives were discussed    Consent given by:  Patient  Patient states understanding of procedure being performed: Yes    Patient's understanding of procedure matches consent: Yes    Procedure consent matches procedure scheduled: Yes    Expected level of sedation:  Moderate  Appropriately NPO:  Yes  Mallampati  :  Grade 1- soft palate, uvula, tonsillar pillars, and posterior pharyngeal wall visible  Lungs:  Lungs clear with good breath sounds bilaterally  Heart:  Normal heart sounds and rate  History & Physical reviewed:  History and physical reviewed and no updates needed  Statement of review:  I have reviewed the lab findings, diagnostic data, medications, and the plan for sedation           Functional Status:  Prior to admission patient needed assistance:              Mental Health Status:          Chemical Dependency Status:                Values/Beliefs:  Spiritual, Cultural Beliefs, Cheondoism Practices, Values that affect care:                 Additional Information:  Pt's spouse called to go over pt discharge, IMM for discharge, offered questions, obtained verbal for signature on copy faxed to [facility] ([phone]). Pt return to [facility] [place], [place]. Spouse and daughter to transport patient back. RNCC available for any further needs.    ANGELA HutchinsonN, BA, RN, CMSRN, RNCC  Pager: [phone]  Phone: [phone]  6th floor Weekend/Holiday Pager: [phone]  Observation weekend/after hours: [phone]

## 2022-07-08 NOTE — OP NOTE
Abdominal portion of the  shunt:      The  shunt had been placed within the ventricle and tunneled down to the right upper abdomen by the NSGY team.  The abdomen had been prepped and an OG tube placed.    We made a 6mm incision at casanova's point in the LUQ. A Veress needle was placed and the abdomen insufflated.  A 5mm trocar was then placed under direct vision and the laparoscope inserted.  No injury from placement was seen.     We then placed the 10F introducer into the abdomen in the RUQ- a needle and then wire were placed followed by the peel-away sheath.  The shunt was placed in the RUQ through this sheath.    The abdomen was allowed to collapse.  Skin was closed with 3.0 vicryl and then 4.0 monocryl, skin glue applied.    I performed the procedure

## 2022-07-08 NOTE — DISCHARGE SUMMARY
[facility] Discharge Summary and Instructions    Gustavo Ramon MRN# [id number]   Age: 82 year old YOB: 1940     Date of Admission:  7/7/2022  Date of Discharge::  7/8/2022  Admitting Physician:  Jean-Paul Childs MD  Discharge Physician:  Jean-Paul Childs MD          Admission Diagnoses:   Idiopathic normal pressure hydrocephalus (H) [G91.2]  NPH (normal pressure hydrocephalus) (H) [G91.2]          Discharge Diagnosis:     Idiopathic normal pressure hydrocephalus (H) [G91.2]  NPH (normal pressure hydrocephalus) (H) [G91.2]         Procedures:   06/26 Fluoroscopic guided lumbar drain placement.  07/07 Stealth-assisted right high parietal VPS. Laparoscopic-assisted placement of catheter into the peritoneum.           Brief History of Illness:   Gustavo Ramon is a 81 yo male with PMHx of lumbar stenosis and previous lumbar drain placement, HTN, HLD, IgA monoclonal gammopathy, cataract, Ocular myasthenia gravis, and hearing loss. Mr. Ramon presented to Neurosurgery clinic on 5/24 with a 3mo hx of major decline in gait and balance and unable to ambulate. Family had mentioned that the patient was having trouble with attention and word finding difficulty and hx of urinary frequency. On 6/27 he was admitted for a trial of lumbar drainage due to clinical and radiographic findings concerning for NPH. Intervention went well w/o any complications and patient was transferred to  for post procedure recovery. On 7/7 patient underwent VPS placement.            Hospital Course:   Patient underwent above-mentioned procedure on 6/27. The operation was uncomplicated and he was admitted to the surgical ICU for routine post operative cares. On the same operative day he was doing well and was transferred to the floor on . On 6/28 [facility] nursing was consulted due perineal erythema w/o pain. Skin reaction was associated to urinary incontinence. Skin reaction was managed with barrier paste with  good improvement. During his hospitalization patient was still unable to ambulate. The patient and his family noted marked improvement in his cognition, mood and speaking since the placement of lumbar drain. Improvements were confirmed by neuropsychologist and speech therapist. Given the patient's symptoms as well as a great response to lumbar drain trial, on 7/7 VPS was placed. On post operative day 1, he was  voiding without a weiner, eating a regular diet, pain was well controlled and therefore he was discharged.    Neurologic:  - Alert & Oriented to person, place, time, and situation  - Follows commands briskly  - Speech fluent, spontaneous. No aphasia or dysarthria.  - No gaze preference. No apparent hemineglect.  - PERRL, EOMI  - Strong eye closure, jaw clench, and cheek puff  - Face symmetric with sensation intact to light touch  - Palate elevates symmetrically, uvula midline, tongue protrudes midline  - Trapezii and sternocleidomastoid muscles 5/5 bilaterally  - No pronator drift       Del Tr Bi WE WF Gr   R 5 5 5 5 5 5   L 5 5 5 5 5 5     HF KE KF DF PF EHL   R 5 5 5 5 5 5   L 5 5 5 5 5 5      Reflexes 2+ throughout     Sensation intact and symmetric to light touch throughout            Discharge Medications:     Current Discharge Medication List      START taking these medications    Details   oxyCODONE (ROXICODONE) 5 MG tablet Take 1 tablet (5 mg) by mouth every 6 hours as needed for moderate to severe pain  Qty: 16 tablet, Refills: 0    Associated Diagnoses: S/P  shunt      senna-docusate (SENOKOT-S/PERICOLACE) 8.6-50 MG tablet Take 1 tablet by mouth 2 times daily  Qty: 60 tablet, Refills: 0    Associated Diagnoses: Idiopathic normal pressure hydrocephalus (H)         CONTINUE these medications which have CHANGED    Details   aspirin (ASA) 325 MG tablet Take 1 tablet (325 mg) by mouth daily    Comments: Please start back 1 week after surgery         CONTINUE these medications which have NOT CHANGED     Details   ACE/ARB/ARNI NOT PRESCRIBED (INTENTIONAL) Please choose reason not prescribed from choices below.    Associated Diagnoses: Stage 3a chronic kidney disease (H)      acetaminophen (TYLENOL) 325 MG tablet Take 1-2 tablets (325-650 mg) by mouth every 4 hours as needed for mild pain      bisacodyl (DULCOLAX) 10 MG suppository Place 1 suppository (10 mg) rectally daily as needed for constipation      cyanocobalamin (VITAMIN B-12) 1000 MCG tablet Take 1 tablet (1,000 mcg) by mouth daily  Qty: 90 tablet, Refills: 1    Associated Diagnoses: Vitamin B12 deficiency (non anemic)      ferrous sulfate (FEROSUL) 325 (65 Fe) MG tablet Use 1 tab every other day with orange juice  Qty: 60 tablet, Refills: 1    Associated Diagnoses: Iron deficiency anemia, unspecified iron deficiency anemia type      levETIRAcetam (KEPPRA) 250 MG tablet Take 1 tablet (250 mg) by mouth 2 times daily      Lidocaine (LIDOCARE) 4 % Patch Apply to intact skin to cover most painful area for max 12hr per 24hr period.      melatonin 3 MG tablet Take 1 tablet (3 mg) by mouth At Bedtime      multivitamin (OCUVITE) TABS tablet Take 1 tablet by mouth daily      ondansetron (ZOFRAN) 4 MG tablet Take 1 tablet (4 mg) by mouth every 8 hours as needed for nausea      pantoprazole (PROTONIX) 40 MG EC tablet Take 1 tablet (40 mg) by mouth daily      polyethylene glycol (MIRALAX) 17 g packet Take 17 g by mouth daily      !! pramipexole (MIRAPEX) 0.25 MG tablet Take 0.25 mg by mouth      prednisoLONE acetate (PRED FORTE) 1 % ophthalmic suspension       predniSONE (DELTASONE) 10 MG tablet Take 1 tablet (10 mg) by mouth daily  Qty: 30 tablet, Refills: 0      propranolol (INDERAL) 10 MG tablet Take 30 mg by mouth      propranolol ER (INDERAL LA) 60 MG 24 hr capsule Take 1 capsule (60 mg) by mouth daily  Qty: 60 capsule, Refills: 0    Associated Diagnoses: Tremulousness      pyridostigmine (MESTINON) 60 MG tablet Take 1 tablet (60 mg) by mouth 3 times daily  Qty:  270 tablet, Refills: 3    Associated Diagnoses: Myasthenia gravis without exacerbation (H)      simvastatin (ZOCOR) 20 MG tablet Take 1 tablet (20 mg) by mouth At Bedtime  Qty: 90 tablet, Refills: 1    Associated Diagnoses: Hyperlipidemia LDL goal <130      Sodium Chloride, Hypertonic, (MASOOD 128 OP) Uses Masood gel and drops. Gel once a day at night. Drops 4 times a day.      tamsulosin (FLOMAX) 0.4 MG capsule Take 1 capsule (0.4 mg) by mouth daily      triamcinolone (KENALOG) 0.1 % external cream Apply a thin layer up to twice daily to affected areas as needed.  Qty: 30 g, Refills: 3    Associated Diagnoses: Seborrheic dermatitis      vitamin D3 (CHOLECALCIFEROL) 2000 units tablet Take 1 tablet by mouth daily  Qty: 90 tablet, Refills: 1    Associated Diagnoses: Vitamin D deficiency      !! pramipexole (MIRAPEX) 0.125 MG tablet Take 3 tablets (0.375 mg) by mouth At Bedtime  Qty: 90 tablet, Refills: 2    Associated Diagnoses: Restless leg syndrome       !! - Potential duplicate medications found. Please discuss with provider.                  Discharge Instructions and Follow-Up:     Discharge diet: Regular   Discharge activity: You may advance activity as tolerated. No strenuous exercise or heay lifting greater than 10 lbs for 4 weeks or until seen and cleared in clinic.   Discharge follow-up: Follow-up with Neurosurgery MARGARITO in 2 weeks for wound check/post-hospital evaluation     Wound care: Ok to shower,however no scrubbing of the wound and no soaking of the wound, meaning no bathtubs or swimming pools. Pat dry only. Leave wound open to air.  Patient to have wound checked 2 weeks following surgery.    Wound location: Right scalp  Closure technique: absorbable sutures  Dressing needs: none  Post-op care at follow-up: Keep dry and clean     Please call if you have:  1. Increased pain, redness, drainage, swelling at your incision  2. Fevers > 101.5 F degrees  3. Any questions or concerns.  You may reach the Neurosurgery  clinic at [phone] during regular work hours. ER at [phone].    and ask for the Neurosurgery Resident on call at [phone], during off hours or weekends.         Discharge Disposition:     Discharged to home        Mario Brown MD, PhD  Department of Neurosurgery

## 2022-07-08 NOTE — PLAN OF CARE
Status: POD#0 R  shunt placement   Vitals: VSS  Neuros: Alert and oriented x4, [place], mild BUE tremors  IV: PIV TKO for abx  Resp/trach: RA  Diet: Regular   Bowel status: LBM PTA  : Intermittently incontinent, bladder scanned and straight cath x1 overnight   Skin: Head incision, 2x abdominal incisions, Forehead pin site covered   Pain: denies  Activity: Ax1 with walker to stand edge of bed  Plan: Potential discharge today

## 2022-07-08 NOTE — PROGRESS NOTES
Surgery Progress Note  07/08/2022       Subjective:  - Patient feeling well this morning, some complaint of headache in his right head where incision is.  Denies abdominal pain, nausea, vomiting.      Objective:  Temp:  [95.9  F (35.5  C)-98.1  F (36.7  C)] 97.1  F (36.2  C)  Pulse:  [57-96] 84  Resp:  [8-24] 16  BP: ()/() 83/50  Cuff Mean (mmHg):  [] 116  SpO2:  [68 %-98 %] 95 %    I/O last 3 completed shifts:  In: 700 [I.V.:700]  Out: 755 [Urine:735; Blood:20]      Gen: Awake, alert, NAD  Resp: NLB on RA  Abd: soft, nondistended, nontender. Reducible fat containing umbilical hernia present.  Incision: c/d/I, skin glue in tact, no surrounding erythema  Ext: WWP, no edema     Labs:  Recent Labs   Lab 07/08/22  0701   WBC 15.0*   HGB 13.6          Recent Labs   Lab 07/08/22  0701 07/07/22  1128   *  --    POTASSIUM 3.9  --    CHLORIDE 102  --    CO2 24  --    BUN 14.8  --    CR 0.88  --    * 107*   TRISTIAN 9.0  --    MAG 2.2  --    PHOS 3.2  --        Imaging:  No imaging     Assessment/Plan:   82 year old male with idiopathic normal pressure hydrocephalus POD1 s/p  shunt placement, laparoscopic assisted insertion of catheter into peritoneum with general surgery.  He is doing well today, incision looks clean, dry, in tact, no surrounding erythema.     - General Surgery will sign off at this time, please feel free to recall with questions or concerns.      Seen, examined, and discussed with chief resident, who will discuss with staff.  - - - - - - - - - - - - - - - - - -  Kenroy Cintron, PGY-2  Surgery Resident

## 2022-07-08 NOTE — PROGRESS NOTES
"   07/08/22 0843   Quick Adds   Type of Visit Initial PT Evaluation   Living Environment   People in Home spouse   Current Living Arrangements extended care facility   Home Accessibility no concerns   Transportation Anticipated family or friend will provide   Living Environment Comments [facility] memory unit- pt reports it is accessable, no stairs. Pt reports wife is present, and has RN available   Self-Care   Usual Activity Tolerance fair   Current Activity Tolerance fair   Equipment Currently Used at Home wheelchair, manual;walker, rolling;shower chair   Fall history within last six months yes   Activity/Exercise/Self-Care Comment Per PT on 6/27 \"20 ft walk yesterday with assist wtih a gait belt and walker, but not a lot of walking in recent 3 wks, has had two PT visits. Notes pt tends to struggle with pivoting and moving R foot. Spends a lot of time in a WC. Has lift chair and hospital bed to provide additional assist to mobilize.\"   General Information   Onset of Illness/Injury or Date of Surgery 07/07/22   Referring Physician Matt Ramos MD   Patient/Family Therapy Goals Statement (PT) To improve strength, improve transfers and amb   Pertinent History of Current Problem (include personal factors and/or comorbidities that impact the POC) Per H&P: \"Gustavo Ramon is a 82-year-old male with history of ocular myasthenia gravis, macular degeneration, cataract, and hearing loss. He presented to Neurosurgery clinic on 5/4/2022 with 3 month history of major decline in gait and balance, he currently is unable to ambulate independently and uses a wheelchair.  Since February 2022, family also reports changes in his personality and cognition. Per family patient is having trouble with attention, and experiencing word finding difficulty. Patient has history of urinary urgency, frequency with some hesitancy, but no urinary incontinence. He was diagnosed with normal pressure hydrocephalus\" - pt is s/p shunt placment "   Existing Precautions/Restrictions fall   Cognition   Orientation Status (Cognition) person;place;oriented to;situation   Cognitive Status Comments impaired motor planning   Pain Assessment   Patient Currently in Pain No   Integumentary/Edema   Integumentary/Edema Comments Posterior head incision closed, brusing on forehead   Posture    Posture Kyphosis;Forward head position;Protracted shoulders   Range of Motion (ROM)   ROM Comment excessive kyphosis, otherwise WFL   Strength (Manual Muscle Testing)   Strength Comments Pt UE WFL, LE WFL as seen by functional movement, quads/DF/PF 4+/5   Bed Mobility   Comment, (Bed Mobility) PT: not tested, Ax1 per RN   Transfers   Comment, (Transfers) MinAx1 w/ FWW STS and SPT   Gait/Stairs (Locomotion)   Distance in Feet (Required for LE Total Joints) 20'   Comment, (Gait/Stairs) Deng w/FWW short distances.   Balance   Balance Comments Seated balance w/o UE support maintained <1 min, standing balance maintains balance w/o UE support for <20 seconds requires B UE support on FWW. Impaired dynamic balance as seen in amb   Sensory Examination   Sensory Perception patient reports no sensory changes   Coordination   Coordination Comments Difficulty coordinating and motor planing upright posture when moving extremeties   Muscle Tone   Muscle Tone no deficits were identified   Neuromuscular Re-education   Minutes of Treatment 8   Clinical Impression   Criteria for Skilled Therapeutic Intervention Yes, treatment indicated   PT Diagnosis (PT) Impaired functional mobility s/p R  shunt placment   Influenced by the following impairments Impaired static and dynamic balance, impaired LE strength, impaired postural control, impaired motor planning   Functional limitations due to impairments transfers, bed mobility, amb, stairs   Clinical Presentation (PT Evaluation Complexity) Evolving/Changing   Clinical Presentation Rationale Pt presents with multiple defecits (impaired static  balance/dynamic balance, imparied LE strength and postural control, impaired motor planning) in presence of multiple comorbidities. Pt variable depending on cognition.   Clinical Decision Making (Complexity) moderate complexity   Planned Therapy Interventions (PT) balance training;bed mobility training;gait training;motor coordination training;neuromuscular re-education;patient/family education;postural re-education;strengthening;transfer training;risk factor education   Risk & Benefits of therapy have been explained evaluation/treatment results reviewed;care plan/treatment goals reviewed;risks/benefits reviewed;current/potential barriers reviewed;participants voiced agreement with care plan;participants included;patient   PT Discharge Planning   PT Discharge Recommendation (DC Rec) Long term care facility;home with home care physical therapy;home with assist   PT Rationale for DC Rec Recommend home to his LTC facility with assistance for all transfers, short distance amb with use of FWW, and use of wc for longer distance (all equipment pt reports having).   PT Brief overview of current status Ax1 w/ FWW and gait belt short distances to bathroom   Plan of Care Review   Plan of Care Reviewed With patient   Total Evaluation Time   Total Evaluation Time (Minutes) 10   Physical Therapy Goals   PT Frequency 4x/week   PT Predicted Duration/Target Date for Goal Attainment 07/14/22   PT Goals Transfers;Gait;Bed Mobility   PT: Bed Mobility Supervision/stand-by assist;Supine to/from sit;Rolling;Within precautions  (from adjustable bed which pt has at LTC)   PT: Transfers Modified independent;Assistive device;Sit to/from stand  (FWW)   PT: Gait Modified independent;50 feet;Rolling walker

## 2022-07-08 NOTE — PLAN OF CARE
RN went over discharge packet with pt and his family. All PIV removed, orders were re-sent to his assisted living. Pt left via w/c to assisted living.

## 2022-07-08 NOTE — PLAN OF CARE
Physical Therapy Discharge Summary    Reason for therapy discharge:    Discharged to long term care facility.    Progress towards therapy goal(s). See goals on Care Plan in [facility] electronic health record for goal details.  Goals partially met.  Barriers to achieving goals:   discharge from facility.    Therapy recommendation(s):    Continued therapy is recommended.  Rationale/Recommendations:  Recommend continued PT for progressing IND in functional mobility.

## 2022-07-08 NOTE — PLAN OF CARE
Status: POD #0 s/p R  shunt placement  Vitals: VSS on RA  Neuros: A&Ox4, forgetful, [place], BUE tremors  IV: PIV infusing NS @ 50 mL/hr, PIV SL  Diet: Regular, good PO  Bowel status: LBM PTA, took senna  : Intermittently incontinent, straight cathed @ 2140 for 675 mL  Skin: R posterior head incision closed with sutures, 2 abdominal sites closed with liquid bandage, forehead pin site and L forearm skin tear covered with primapore, bruising throughout  Pain: Denies  Activity: Stood at edge of bed with assist of 1 and walker, unable to ambulate @ baseline  Social: Patients son and wife at bedside, wife will be back in the AM  Plan: Likely to discharge back to [place] tomorrow    Son Chandu would like to be called during rounds or speak with MD, has questions regarding shunt [phone]      Arrived from: PACU   Belongings/meds: Eyedrops in pt bin (waiting for MD order)  2 RN Skin Assessment Completed by: Joe RUTH And Amada MCGEE    Non-intact findings documented (yes/no/NA): Yes

## 2022-07-08 NOTE — CARE PLAN
Occupational Therapy Discharge Summary    Reason for therapy discharge:    Discharged to long term care facility.    Progress towards therapy goal(s). See goals on Care Plan in [facility] electronic health record for goal details.  Goals not met.  Barriers to achieving goals:   discharge from facility.    Therapy recommendation(s):    Continued therapy is recommended.  Rationale/Recommendations:  Pt is below baseline for ADLs. Recommend Home OT for purpose of increasing independence, activity tolerance, and safety for ADLs..

## 2022-07-11 ENCOUNTER — DOCUMENTATION ONLY (OUTPATIENT)
Dept: OTHER | Facility: CLINIC | Age: 82
End: 2022-07-11

## 2022-07-11 ENCOUNTER — TELEPHONE (OUTPATIENT)
Dept: NEUROLOGY | Facility: CLINIC | Age: 82
End: 2022-07-11

## 2022-07-11 NOTE — TELEPHONE ENCOUNTER
Telephone Encounter    I received a call today from the nurse taking care of Mr. Chen regarding his medications.  She had questions regarding inconsistencies in certain medications.  I clarified that the only medications that were changed or added during the admission for the  shunt surgery is oxycodone and senna, and restarting aspirin 1 week after the surgery.  I clarified that he can continue all the other medications as he used to take as prior to surgery.  Any modifications and alterations should be done in consultation with the primary care physician for Mr. Chen.  We have no recommendations regarding any other medications of his from a neurosurgical standpoint.    Dc Wood MD  Neurosurgery Resident  Pager: [phone]

## 2022-07-14 NOTE — TELEPHONE ENCOUNTER
FUTURE VISIT INFORMATION      FUTURE VISIT INFORMATION:    Date: 8/4/2022    Time: 1230pm    Location: [facility]  REFERRAL INFORMATION:    Referring provider:  Dr. Love     Referring providers clinic:  [facility]     Reason for visit/diagnosis  Abnormal Gait     RECORDS REQUESTED FROM:       Clinic name Comments Records Status Imaging Status   Internal ED Visit-3/31/2022    MR Brain-3/31/2022    CT Head-7/7/2022    [facility] Hunt-5/4/2022    Dr. Persaud-5/12/2022 [facility] PACS         [facility] MR Brain-4/16/2022 Care Everywhere Requested to PACS                       8/1/2022-Spoke with [facility] to push images ASAP-MR @ 626am    8/3/2022-[facility] now in PACS-MR @ 452am

## 2022-07-20 ENCOUNTER — MEDICAL CORRESPONDENCE (OUTPATIENT)
Dept: HEALTH INFORMATION MANAGEMENT | Facility: CLINIC | Age: 82
End: 2022-07-20

## 2022-07-26 ENCOUNTER — VIRTUAL VISIT (OUTPATIENT)
Dept: NEUROSURGERY | Facility: CLINIC | Age: 82
End: 2022-07-26
Payer: MEDICARE

## 2022-07-26 DIAGNOSIS — G91.2 IDIOPATHIC NORMAL PRESSURE HYDROCEPHALUS (H): Primary | ICD-10-CM

## 2022-07-26 PROCEDURE — 99024 POSTOP FOLLOW-UP VISIT: CPT | Mod: 95 | Performed by: NURSE PRACTITIONER

## 2022-07-26 NOTE — LETTER
2022       RE: Gustavo Ramon  [address] [facility] [address]     Dear Colleague,    Thank you for referring your patient, Gustavo Ramon, to the [facility] NEUROSURGERY CLINIC [place] at [facility]. Please see a copy of my visit note below.      [facility]  Department of Neurosurgery      Name: Gustavo Ramon  MRN: [id number]  Age: 82 year old  : 1940  Referring provider: Jean-Paul Childs  2022      Chief Complaint:   NPH  S/p  Shunt placement on 2022  Post discharge follow up    History of Present Illness:   Gustavo Ramon is a 82 year old male with a history of ocular myasthenia gravis, NPH, s/p  shunt placement who is seen today for 2 weeks post hospital discharge follow up. He presented to the clinic initially with difficulty initiating ambulation, including getting up out of a chair, urinary incontinence, and cognitive dysfunction.  On , he was admitted for a trial of lumbar drainage due to clinical and radiographic findings concerning for NPH. Intervention went well w/o any complications and patient was transferred to  for post procedure recovery. On  patient underwent VPS placement. he was discharged from the hospital on 2022.     Today, I had a virtual visit with the patient, wife and their son. Per patient and his family, they have noticed some improvement in his cognition. Balance overall the same. He currently works with PT and able to walk short distances with a walker.     Review of Systems:   Pertinent items are noted in HPI or as in patient entered ROS below, remainder of complete ROS is negative.   No flowsheet data found.     Physical Exam:   There were no vitals taken for this visit.   General: No acute distress.      Assessment:  NPH  S/p  Shunt placement on 2022  Post discharge follow up    Plan:  Patient will need a follow up head CT after  the  shunt placement. We will plan to see the patient in clinic on 7/29/2022 after a head CT.        I spent 15 minutes on patient care activities related to this encounter on the date of service, including time spent reviewing the chart, obtaining history and examination and in counseling the patient, and in documentation in the electronic medical record.      Antionette ARRIAGA CNP  Department of Neurosurgery

## 2022-07-26 NOTE — NURSING NOTE
Chief Complaint   Patient presents with     Video Visit     Return video visit for 2wk post op.

## 2022-07-26 NOTE — PROGRESS NOTES
Stephen is a 82 year old who is being evaluated via a billable video visit.      How would you like to obtain your AVS? MyChart  If the video visit is dropped, the invitation should be resent by: Send to e-mail at: [email or web address]  Will anyone else be joining your video visit? No        Video-Visit Details    Video Start Time: 930    Type of service:  Video Visit    Video End Time:945    Originating Location (pt. Location): Home    Distant Location (provider location):  [facility] NEUROSURGERY CLINIC [place]     Platform used for Video Visit: Yee[employer]     [facility]  Department of Neurosurgery      Name: Gustavo Ramon  MRN: [id number]  Age: 82 year old  : 1940  Referring provider: Jean-Paul Childs  2022      Chief Complaint:   NPH  S/p  Shunt placement on 2022  Post discharge follow up    History of Present Illness:   Gustavo Ramon is a 82 year old male with a history of ocular myasthenia gravis, NPH, s/p  shunt placement who is seen today for 2 weeks post hospital discharge follow up. He presented to the clinic initially with difficulty initiating ambulation, including getting up out of a chair, urinary incontinence, and cognitive dysfunction.  On , he was admitted for a trial of lumbar drainage due to clinical and radiographic findings concerning for NPH. Intervention went well w/o any complications and patient was transferred to  for post procedure recovery. On  patient underwent VPS placement. he was discharged from the hospital on 2022.     Today, I had a virtual visit with the patient, wife and their son. Per patient and his family, they have noticed some improvement in his cognition. Balance overall the same. He currently works with PT and able to walk short distances with a walker.     Review of Systems:   Pertinent items are noted in HPI or as in patient entered ROS below, remainder of complete ROS is negative.   No flowsheet  data found.     Physical Exam:   There were no vitals taken for this visit.   General: No acute distress.      Assessment:  NPH  S/p  Shunt placement on 7/7/2022  Post discharge follow up    Plan:  Patient will need a follow up head CT after the  shunt placement. We will plan to see the patient in clinic on 7/29/2022 after a head CT.        I spent 15 minutes on patient care activities related to this encounter on the date of service, including time spent reviewing the chart, obtaining history and examination and in counseling the patient, and in documentation in the electronic medical record.      Antionette ARRIAGA CNP  Department of Neurosurgery

## 2022-07-29 ENCOUNTER — OFFICE VISIT (OUTPATIENT)
Dept: NEUROSURGERY | Facility: CLINIC | Age: 82
End: 2022-07-29
Payer: MEDICARE

## 2022-07-29 ENCOUNTER — ANCILLARY PROCEDURE (OUTPATIENT)
Dept: CT IMAGING | Facility: CLINIC | Age: 82
End: 2022-07-29
Attending: NURSE PRACTITIONER
Payer: MEDICARE

## 2022-07-29 VITALS
HEART RATE: 65 BPM | RESPIRATION RATE: 16 BRPM | OXYGEN SATURATION: 100 % | DIASTOLIC BLOOD PRESSURE: 78 MMHG | SYSTOLIC BLOOD PRESSURE: 136 MMHG

## 2022-07-29 DIAGNOSIS — G91.2 IDIOPATHIC NORMAL PRESSURE HYDROCEPHALUS (H): Primary | ICD-10-CM

## 2022-07-29 DIAGNOSIS — G91.2 IDIOPATHIC NORMAL PRESSURE HYDROCEPHALUS (H): ICD-10-CM

## 2022-07-29 PROCEDURE — 70450 CT HEAD/BRAIN W/O DYE: CPT | Mod: TC | Performed by: RADIOLOGY

## 2022-07-29 PROCEDURE — 99024 POSTOP FOLLOW-UP VISIT: CPT | Performed by: NURSE PRACTITIONER

## 2022-07-29 PROCEDURE — G1010 CDSM STANSON: HCPCS | Performed by: RADIOLOGY

## 2022-07-29 ASSESSMENT — PAIN SCALES - GENERAL: PAINLEVEL: NO PAIN (0)

## 2022-07-29 NOTE — LETTER
"2022       RE: Gustavo Ramon  [address] [facility] [address]     Dear Colleague,    Thank you for referring your patient, Gustavo Ramon, to the [facility] NEUROSURGERY CLINIC [place] at [facility]. Please see a copy of my visit note below.    [facility]  Department of Neurosurgery      Name: Gustavo Ramon  MRN: [id number]  Age: 82 year old  : 1940  Referring provider: Winston Silverman  2022      Chief Complaint:   NPH  S/p  shunt placement on 2022 (Codman Certas at 4.0)  Post op follow up    History of Present Illness:   Gustavo Ramon is a 82 year old male with a history of ocular myasthenia gravis, gait instability and cognitive dysfunction. Patient had a head CT showing normal pressure hydrocephalus. LD trial and possible  shunt placement was discussed. He underwent several-days of lumbar drain trial. During this hospitalization patient was still unable to ambulate. The patient and his family noted marked improvement in his cognition, mood and speaking since the placement of lumbar drain. Improvements were confirmed by neuropsychologist and speech therapist.  Given the patient's symptoms as well as a great response to lumbar drain trial, he subsequently underwent a  shunt placement (Codman Certas valve set to 4). He was discharged from the hospital on 2022.     I had a virtual follow-up visit with him on 2022. Head CT and a clinic follow-up was recommended at that time. Today, patient presents to the evaluation with his wife and son. Overall they are very happy with the progress Stephen made since the  shunt placement. Per patient and family, his cognitive function improved since  shunt placement. He reports that his \"thinking is much better and clear\". He is more attentive and able to participate more in conversations. He is ambulating with PT. He is able to get up " from the wheel chair without assistance. No recent falls. He is in the memory care unit still. Discharge to home vs to a long term care facility depending on the progress he will make.    Review of Systems:   Pertinent items are noted in HPI or as in patient entered ROS below, remainder of complete ROS is negative.   No flowsheet data found.     Physical Exam:   There were no vitals taken for this visit.   General: No acute distress.    Neuro: The patient is fully oriented. Speech is normal. Extraocular movements are intact without nystagmus. Facial sensation is intact in V1, V2, V3 distributions. Facial nerve function is normal, rated as a House Brackmann 1.  Shoulders are full strength. There is no pronator drift. Full strength in all extremities. Sensation intact throughout. No dysmetria with finger-nose-finger testing. Able to get up from the wheel chair independently and was able to take a few steps on his own.   Psych: Normal mood and affect. Very pleasant. Behavior is normal.    Incision: Right crani incision CDI. Some skin glue present. No swelling, or drainage.     Imagin2022 Head CT:  IMPRESSION:  1. Redemonstrated right parietal approach ventricular shunt catheter,  unchanged. Slightly decreased size of the supratentorial ventricular  system.  2. New subtle thin bifrontal subdural fluid collections without  significant mass effect. No midline shift/herniation.  3. Unchanged generalized brain atrophy and presumed chronic small  vessel ischemic changes, as described.     Procedure:  With patient's permission,  shunt setting verified with a  and it is at 4.0.     Assessment:  NPH  S/p  shunt placement on 2022 (Codman Certas at 4.0)  Post op follow up    Plan: Discussed and images reviewed with Dr. Flores.   Overall patient is doing well since the  shunt placement. Reports improvement in cognition and gait. Today's head CT shows a very thin SDH. We will plan for a follow-up visit  in 2 weeks after a head CT. Patient is a participant in NPH study here at the [facility]. Research staff will contact the patient about upcoming research study appointments.       I spent 30 minutes on patient care activities related to this encounter on the date of service, including time spent reviewing the chart, obtaining history and examination and in counseling the patient, and in documentation in the electronic medical record.      Antoinette ARRIAGA CNP  Department of Neurosurgery

## 2022-07-29 NOTE — PROGRESS NOTES
"[facility]  Department of Neurosurgery      Name: Gustavo Ramon  MRN: [id number]  Age: 82 year old  : 1940  Referring provider: Winston Silverman  2022      Chief Complaint:   NPH  S/p  shunt placement on 2022 (Codman Certas at 4.0)  Post op follow up    History of Present Illness:   Gustavo Ramon is a 82 year old male with a history of ocular myasthenia gravis, gait instability and cognitive dysfunction. Patient had a head CT showing normal pressure hydrocephalus. LD trial and possible  shunt placement was discussed. He underwent several-days of lumbar drain trial. During this hospitalization patient was still unable to ambulate. The patient and his family noted marked improvement in his cognition, mood and speaking since the placement of lumbar drain. Improvements were confirmed by neuropsychologist and speech therapist.  Given the patient's symptoms as well as a great response to lumbar drain trial, he subsequently underwent a  shunt placement (Codman Certas valve set to 4). He was discharged from the hospital on 2022.     I had a virtual follow-up visit with him on 2022. Head CT and a clinic follow-up was recommended at that time. Today, patient presents to the evaluation with his wife and son. Overall they are very happy with the progress Stephen made since the  shunt placement. Per patient and family, his cognitive function improved since  shunt placement. He reports that his \"thinking is much better and clear\". He is more attentive and able to participate more in conversations. He is ambulating with PT. He is able to get up from the wheel chair without assistance. No recent falls. He is in the memory care unit still. Discharge to home vs to a long term care facility depending on the progress he will make.    Review of Systems:   Pertinent items are noted in HPI or as in patient entered ROS below, remainder of complete ROS is negative.   No flowsheet " data found.     Physical Exam:   There were no vitals taken for this visit.   General: No acute distress.    Neuro: The patient is fully oriented. Speech is normal. Extraocular movements are intact without nystagmus. Facial sensation is intact in V1, V2, V3 distributions. Facial nerve function is normal, rated as a House Brackmann 1.  Shoulders are full strength. There is no pronator drift. Full strength in all extremities. Sensation intact throughout. No dysmetria with finger-nose-finger testing. Able to get up from the wheel chair independently and was able to take a few steps on his own.   Psych: Normal mood and affect. Very pleasant. Behavior is normal.    Incision: Right crani incision CDI. Some skin glue present. No swelling, or drainage.     Imagin2022 Head CT:  IMPRESSION:  1. Redemonstrated right parietal approach ventricular shunt catheter,  unchanged. Slightly decreased size of the supratentorial ventricular  system.  2. New subtle thin bifrontal subdural fluid collections without  significant mass effect. No midline shift/herniation.  3. Unchanged generalized brain atrophy and presumed chronic small  vessel ischemic changes, as described.     Procedure:  With patient's permission,  shunt setting verified with a  and it is at 4.0.     Assessment:  NPH  S/p  shunt placement on 2022 (Codman Certas at 4.0)  Post op follow up    Plan: Discussed and images reviewed with Dr. Flores.   Overall patient is doing well since the  shunt placement. Reports improvement in cognition and gait. Today's head CT shows a very thin SDH. We will plan for a follow-up visit in 2 weeks after a head CT. Patient is a participant in NPH study here at the [facility]. Research staff will contact the patient about upcoming research study appointments.       I spent 30 minutes on patient care activities related to this encounter on the date of service, including time spent reviewing the chart, obtaining  history and examination and in counseling the patient, and in documentation in the electronic medical record.      Antionette ARRIAGA CNP  Department of Neurosurgery

## 2022-08-01 ENCOUNTER — OFFICE VISIT (OUTPATIENT)
Dept: NEUROLOGY | Facility: CLINIC | Age: 82
End: 2022-08-01
Payer: MEDICARE

## 2022-08-01 ENCOUNTER — LAB (OUTPATIENT)
Dept: LAB | Facility: CLINIC | Age: 82
End: 2022-08-01
Payer: MEDICARE

## 2022-08-01 VITALS — HEART RATE: 68 BPM | SYSTOLIC BLOOD PRESSURE: 97 MMHG | DIASTOLIC BLOOD PRESSURE: 63 MMHG | OXYGEN SATURATION: 96 %

## 2022-08-01 DIAGNOSIS — G25.81 RESTLESS LEG SYNDROME: ICD-10-CM

## 2022-08-01 DIAGNOSIS — Z79.52 LONG TERM CURRENT USE OF SYSTEMIC STEROIDS: ICD-10-CM

## 2022-08-01 DIAGNOSIS — E61.1 IRON DEFICIENCY: ICD-10-CM

## 2022-08-01 DIAGNOSIS — G70.00 MYASTHENIA GRAVIS WITHOUT EXACERBATION (H): ICD-10-CM

## 2022-08-01 DIAGNOSIS — Z79.60 LONG-TERM USE OF IMMUNOSUPPRESSANT MEDICATION: ICD-10-CM

## 2022-08-01 DIAGNOSIS — G70.00 MYASTHENIA GRAVIS IN REMISSION (H): Primary | ICD-10-CM

## 2022-08-01 DIAGNOSIS — G91.2 NPH (NORMAL PRESSURE HYDROCEPHALUS) (H): ICD-10-CM

## 2022-08-01 LAB
BASOPHILS # BLD AUTO: 0.1 10E3/UL (ref 0–0.2)
BASOPHILS NFR BLD AUTO: 1 %
EOSINOPHIL # BLD AUTO: 0.1 10E3/UL (ref 0–0.7)
EOSINOPHIL NFR BLD AUTO: 1 %
ERYTHROCYTE [DISTWIDTH] IN BLOOD BY AUTOMATED COUNT: 14.3 % (ref 10–15)
FERRITIN SERPL-MCNC: 45 NG/ML (ref 26–388)
HCT VFR BLD AUTO: 45 % (ref 40–53)
HGB BLD-MCNC: 14.3 G/DL (ref 13.3–17.7)
IMM GRANULOCYTES # BLD: 0.1 10E3/UL
IMM GRANULOCYTES NFR BLD: 1 %
LYMPHOCYTES # BLD AUTO: 1.6 10E3/UL (ref 0.8–5.3)
LYMPHOCYTES NFR BLD AUTO: 16 %
MCH RBC QN AUTO: 28.7 PG (ref 26.5–33)
MCHC RBC AUTO-ENTMCNC: 31.8 G/DL (ref 31.5–36.5)
MCV RBC AUTO: 90 FL (ref 78–100)
MONOCYTES # BLD AUTO: 0.7 10E3/UL (ref 0–1.3)
MONOCYTES NFR BLD AUTO: 7 %
NEUTROPHILS # BLD AUTO: 7.5 10E3/UL (ref 1.6–8.3)
NEUTROPHILS NFR BLD AUTO: 74 %
NRBC # BLD AUTO: 0 10E3/UL
NRBC BLD AUTO-RTO: 0 /100
PLATELET # BLD AUTO: 214 10E3/UL (ref 150–450)
RBC # BLD AUTO: 4.99 10E6/UL (ref 4.4–5.9)
WBC # BLD AUTO: 10.1 10E3/UL (ref 4–11)

## 2022-08-01 PROCEDURE — 36415 COLL VENOUS BLD VENIPUNCTURE: CPT | Performed by: PATHOLOGY

## 2022-08-01 PROCEDURE — 85025 COMPLETE CBC W/AUTO DIFF WBC: CPT | Performed by: PATHOLOGY

## 2022-08-01 PROCEDURE — 82728 ASSAY OF FERRITIN: CPT | Performed by: PATHOLOGY

## 2022-08-01 PROCEDURE — 99214 OFFICE O/P EST MOD 30 MIN: CPT | Performed by: PSYCHIATRY & NEUROLOGY

## 2022-08-01 RX ORDER — PROPRANOLOL HYDROCHLORIDE 20 MG/1
TABLET ORAL 2 TIMES DAILY
COMMUNITY
Start: 2022-07-15 | End: 2022-12-08

## 2022-08-01 NOTE — PATIENT INSTRUCTIONS
For the myasthenia: I am happy with your course, it is in remission. Continue prednisone 10 mg daily    For the restless legs: Let's recheck your ferritin levels. Iron deficiency can aggravate RLS. If it is low the iron dose will have to be increased. If it is normal, we can increase the mirapex to 0.5 mg daily    For the NPH: I am optimistic. Continue working with Pt/Rehab and let's reassess in 3 months

## 2022-08-01 NOTE — LETTER
2022       RE: Gustavo Ramon  [address] [facility]  [place] [address]     Dear Colleague,    Thank you for referring your patient, Gustavo Ramon, to the [facility] NEUROLOGY CLINIC [place] at [facility]. Please see a copy of my visit note below.    Service Date: 2022    Winston Silverman PA-C   [facility]  [address] [facility]   [place]  [address]    RE:  Gustavo Ramon  MRN:  [id number]  :  1940    Dear  Andra:    I had the pleasure to see Mr. Ramon in followup at the [facility].  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  He is on prednisone 10 mg daily.  He is happy with his myasthenia course.  He takes pyridostigmine 60 mg b.i.d.  He does not experience significant ptosis or diplopia at this point.  Regarding his voice, he may have to repeat himself a couple of times in the evening when he chats with people, but he has no difficulty swallowing or chewing.  No sialorrhea.  No shortness of breath with exertion.  No fatigue of his arms when brushing teeth or combing hair and he does not need assistance to get up from a chair now and he only rarely uses his arms, which is very satisfying.  He has not taken mycophenolate.      He was diagnosed with normal pressure hydrocephalus in 2022.  He developed subacute cognitive impairment and a gait disorder with imbalance and freezing at the beginning of the year and noted increased tremor in both hands, left more than right.  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  He was referred to Neurosurgery.  He  had a lumbar drain trial in 06/2022 and subsequently he underwent a successful  shunt on 07/07/2022.  He is doing better now.  He is in the memory care unit.  He is getting PT and rehab who  started to walk with him again.  He can get up from a chair without assistance, which is great.  He has not had any new falls.  He also notes that a few things are more clear in his mind and he has better perception of time and date. An appointment was made for him to see General Neurology on 08/04/2022.     A symptom that bothers him a lot lately is restless legs syndrome.  He had it in the past, but it has been markedly aggravated.  Of note, this patient has iron deficiency with ferritin levels of 25, five months ago and 10, two years ago.  He is on ferrous sulfate only 325 mg daily.  For the restless leg, he was placed on pramipexole.  He is on 0.375 mg at bedtime.  This is still not enough.    BP 97/63   Pulse 68   SpO2 96%     On exam today, he does not have ptosis, even after 1 minute of upward gaze.  He does have diplopia instantly when gazing upwards and is binocular.  He also has instant diplopia when gazing to the right horizontal but not with horizontal gaze to the left or with downward gaze.  There is no weakness of orbicularis oculi today.  There is no weakness of orbicularis oris, uvula, jaw, palate or tongue.  There is no dysphonia or dysarthria.  Neck flexion and extension strength are full.  Strength is 5/5 in upper and lower extremities proximally including hip flexors.  I did not repeat his cognitive testing and he could get up from a chair unassisted, which was satisfactory, although I did not test his gait.    In summary, Mr. Ramon is doing well after his recent  shunt placement.  I told him I am optimistic and he should definitely give it more time to see if his cognition and gait will improve.  We will reassess in 3 months.  He will follow in Neurosurgery as necessary.      For the myasthenia, he  will continue 10 mg of prednisone daily.  I am not tapering the dose further and I am not ordering any additional lab draws today.      Regarding his restless leg syndrome, I will recheck a ferritin level and if it is below 40-50, the ferrous sulfate dose should be increased to b.i.d. or t.i.d. to improve the ferritin levels.  If the ferritin level is satisfactory, then his Pramipexole dose should be increased to 0.5 mg daily at bedtime.  Those should be done by his doctor at the memory care unit.      Follow up in clinic in 3 months or sooner if needed.  The patient has an appointment in the General Neuro Resident Clinic on 2022.  I am not sure this is necessary.  I will message the resident.    Billing MDM level 4 (moderate) due to 1) Problems assessed: Three stable chronic disorders (MG, RLS, NPH) and 2) Risk: prescription drug management, see above discussion    Sincerely,    Tyler Wheat MD    cc:  CORINA [place] [address]    Jean-Paul Childs MD  [id number] [address]    Bertrand Dominguez MD  [address] [place] [address]        D: 2022   T: 2022   MT: BENITA    Name:     DAISHA LOZOYA  MRN:      [id number]        Account:      [id number]   :      1940           Service Date: 2022       Document: [id number]

## 2022-08-02 NOTE — PROGRESS NOTES
Service Date: 2022    Winston Silverman PA-C   [facility]  [id number] [place] [address]  [id number]    RE:  Gustavo Ramon  MRN:  [id number]  :  1940    Dear Mr. Mayberryon:    I had the pleasure to see Mr. Ramon in followup at the [facility].  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  He is on prednisone 10 mg daily.  He is happy with his myasthenia course.  He takes pyridostigmine 60 mg b.i.d.  He does not experience significant ptosis or diplopia at this point.  Regarding his voice, he may have to repeat himself a couple of times in the evening when he chats with people, but he has no difficulty swallowing or chewing.  No sialorrhea.  No shortness of breath with exertion.  No fatigue of his arms when brushing teeth or combing hair and he does not need assistance to get up from a chair now and he only rarely uses his arms, which is very satisfying.  He has not taken mycophenolate.      He was diagnosed with normal pressure hydrocephalus in 2022.  He developed subacute cognitive impairment and a gait disorder with imbalance and freezing at the beginning of the year and noted increased tremor in both hands, left more than right.  The tremor was likely essential and exacerbated by steroids, but the gait disorder and cognitive impairment were deemed to be related to possible normal pressure hydrocephalus, which was seen on the brain MRI he had on 2022 at the [facility].  He was referred to Neurosurgery.  He had a lumbar drain trial in 2022 and subsequently he underwent a successful  shunt on 2022.  He is doing better now.  He is in the memory care unit.  He is getting PT and rehab who  started to walk with him again.  He can get up from a chair without assistance, which is great.  He has not had any new falls.  He also  notes that a few things are more clear in his mind and he has better perception of time and date. An appointment was made for him to see General Neurology on 08/04/2022.     A symptom that bothers him a lot lately is restless legs syndrome.  He had it in the past, but it has been markedly aggravated.  Of note, this patient has iron deficiency with ferritin levels of 25, five months ago and 10, two years ago.  He is on ferrous sulfate only 325 mg daily.  For the restless leg, he was placed on pramipexole.  He is on 0.375 mg at bedtime.  This is still not enough.    BP 97/63   Pulse 68   SpO2 96%     On exam today, he does not have ptosis, even after 1 minute of upward gaze.  He does have diplopia instantly when gazing upwards and is binocular.  He also has instant diplopia when gazing to the right horizontal but not with horizontal gaze to the left or with downward gaze.  There is no weakness of orbicularis oculi today.  There is no weakness of orbicularis oris, uvula, jaw, palate or tongue.  There is no dysphonia or dysarthria.  Neck flexion and extension strength are full.  Strength is 5/5 in upper and lower extremities proximally including hip flexors.  I did not repeat his cognitive testing and he could get up from a chair unassisted, which was satisfactory, although I did not test his gait.    In summary, Mr. Ramon is doing well after his recent  shunt placement.  I told him I am optimistic and he should definitely give it more time to see if his cognition and gait will improve.  We will reassess in 3 months.  He will follow in Neurosurgery as necessary.      For the myasthenia, he will continue 10 mg of prednisone daily.  I am not tapering the dose further and I am not ordering any additional lab draws today.      Regarding his restless leg syndrome, I will recheck a ferritin level and if it is below 40-50, the ferrous sulfate dose should be increased to b.i.d. or t.i.d. to improve the ferritin levels.   If the ferritin level is satisfactory, then his Pramipexole dose should be increased to 0.5 mg daily at bedtime.  Those should be done by his doctor at the memory care unit.      Follow up in clinic in 3 months or sooner if needed.  The patient has an appointment in the General Neuro Resident Clinic on 2022.  I am not sure this is necessary.  I will message the resident.    Billing MDM level 4 (moderate) due to 1) Problems assessed: Three stable chronic disorders (MG, RLS, NPH) and 2) Risk: prescription drug management, see above discussion    Sincerely,    Tyler Wheat MD    cc:  CORINA [place] [address]    [place] [address]    Bertrand Dominguez MD  [address] [place] [address]    Tyler Wheat MD        D: 2022   T: 2022   MT: BENITA    Name:     DAISHA LOZOYA  MRN:      [id number]        Account:      [id number]   :      1940           Service Date: 2022       Document: [id number]

## 2022-08-03 ENCOUNTER — TELEPHONE (OUTPATIENT)
Dept: NEUROLOGY | Facility: CLINIC | Age: 82
End: 2022-08-03

## 2022-08-03 NOTE — TELEPHONE ENCOUNTER
"I spoke with pt spouse Ceci. I let her know Dr. Wheat updated Dr. colvin that appointment with her tomorrow is no longer needed. \"The appointment was made over 4 months ago, and in the interim, he was diagnosed with normal pressure hydrocephalus and had a  shunt placed 3 weeks ago. He follows with Dr. Wheat for his ocular myasthenia gravis and saw him yesterday in clinic. He sent me a message that patient's gait problems are much better now and being appropriately treated with the shunt/followed by neurosurgery. Dr. Wheat is also treating his restless leg syndrome and feels comfortable managing that.\".     Ceci understands. We will cancel visit tomorrow and they will continue to follow up with Dr. Wheat.     Lainey AMBROSE    "

## 2022-08-04 ENCOUNTER — MYC MEDICAL ADVICE (OUTPATIENT)
Dept: NEUROSURGERY | Facility: CLINIC | Age: 82
End: 2022-08-04

## 2022-08-04 ENCOUNTER — PRE VISIT (OUTPATIENT)
Dept: NEUROLOGY | Facility: CLINIC | Age: 82
End: 2022-08-04

## 2022-08-08 ENCOUNTER — TELEPHONE (OUTPATIENT)
Dept: NEUROSURGERY | Facility: CLINIC | Age: 82
End: 2022-08-08

## 2022-08-19 ENCOUNTER — ANCILLARY PROCEDURE (OUTPATIENT)
Dept: CT IMAGING | Facility: CLINIC | Age: 82
End: 2022-08-19
Attending: NURSE PRACTITIONER
Payer: MEDICARE

## 2022-08-19 ENCOUNTER — OFFICE VISIT (OUTPATIENT)
Dept: NEUROSURGERY | Facility: CLINIC | Age: 82
End: 2022-08-19

## 2022-08-19 VITALS
HEART RATE: 60 BPM | DIASTOLIC BLOOD PRESSURE: 80 MMHG | BODY MASS INDEX: 26.94 KG/M2 | RESPIRATION RATE: 16 BRPM | OXYGEN SATURATION: 95 % | SYSTOLIC BLOOD PRESSURE: 137 MMHG | WEIGHT: 172 LBS

## 2022-08-19 DIAGNOSIS — G91.2 IDIOPATHIC NORMAL PRESSURE HYDROCEPHALUS (H): ICD-10-CM

## 2022-08-19 DIAGNOSIS — G91.2 NPH (NORMAL PRESSURE HYDROCEPHALUS) (H): Primary | ICD-10-CM

## 2022-08-19 PROCEDURE — 70450 CT HEAD/BRAIN W/O DYE: CPT | Mod: MG | Performed by: RADIOLOGY

## 2022-08-19 PROCEDURE — G1010 CDSM STANSON: HCPCS | Mod: GC | Performed by: RADIOLOGY

## 2022-08-19 PROCEDURE — 99024 POSTOP FOLLOW-UP VISIT: CPT | Performed by: NURSE PRACTITIONER

## 2022-08-19 ASSESSMENT — PAIN SCALES - GENERAL: PAINLEVEL: NO PAIN (0)

## 2022-08-19 NOTE — LETTER
2022       RE: Gustavo Ramon  [address] [facility] [id number]     Dear Colleague,    Thank you for referring your patient, Gustavo Ramon, to the [facility] [place] [facility]. Please see a copy of my visit note below.    [facility]  Department of Neurosurgery      Name: Gustavo Ramon  MRN: [id number]  Age: 82 year old  : 1940  Referring provider: Antionette Hunt  2022      Chief Complaint:   NPH  S/p  shunt placement on 2022 (Codman Certas at 4.0)  Follow up    History of Present Illness:   Gustavo Ramon is a 82 year old male with a history of ocular myasthenia gravis, gait instability and cognitive dysfunction, s/p L trial and  shunt placement on 2022 who is seen today for a follow up. Most recent visit with me on 2022. Clinically he reported improvement in his gait and cognition after  shunt placement. But his had CT that day showed a new subtle thin bifrontal subdural fluid collections without significant mass effect. Follow-up head CT in 2 weeks was recommended at that time.     Patient is here today afer head CT. Please see the results below. He is accompanied by his wife, son and daughter. Per patient and his family, his gait and cognition continue to improve. He works with PT regularly. He don't need to use the gait belt anymore and is able to walk longer distances with a walker. No recent falls. He reports that his thinking is more clear now and it continues to improve. He reports a few episodes of word finding difficulty and stuttering.     Review of Systems:   Pertinent items are noted in HPI or as in patient entered ROS below, remainder of complete ROS is negative.   No flowsheet data found.     Physical Exam:   There were no vitals taken for this visit.   General: No acute distress. Very pleasant. Oriented x 4.     Imagin2022  Head CT:                                                                   IMPRESSION:   1. Stable shunted ventricular system.  2. Resolved bifrontal extra-axial collections. No new intracranial  hemorrhage or extra-axial collection.  3. Mild chronic small vessel ischemic disease.      Assessment:  NPH  S/p  shunt placement on 7/7/2022 (Codman Certas at 4.0)  Follow up      Plan:  Patient's gait and cognition improved since  shunt placement and they are extremely happy with the progress he has made. I told him that we would expect to see ongoing improvement in his gait and cognition in the upcoming months. Today's head CT shows resolution of previously seen extra axial collections. Patient to follow-up with us in a year.        I spent 25 minutes on patient care activities related to this encounter on the date of service, including time spent reviewing the chart, obtaining history and examination and in counseling the patient, and in documentation in the electronic medical record.      Antionette ARRIAGA, CNP  Department of Neurosurgery

## 2022-08-19 NOTE — PROGRESS NOTES
[facility]  Department of Neurosurgery      Name: Gustavo Ramon  MRN: [id number]  Age: 82 year old  : 1940  Referring provider: Antionette Hunt  2022      Chief Complaint:   NPH  S/p  shunt placement on 2022 (Codman Certas at 4.0)  Follow up    History of Present Illness:   Gustavo Ramon is a 82 year old male with a history of ocular myasthenia gravis, gait instability and cognitive dysfunction, s/p L trial and  shunt placement on 2022 who is seen today for a follow up. Most recent visit with me on 2022. Clinically he reported improvement in his gait and cognition after  shunt placement. But his had CT that day showed a new subtle thin bifrontal subdural fluid collections without significant mass effect. Follow-up head CT in 2 weeks was recommended at that time.     Patient is here today afer head CT. Please see the results below. He is accompanied by his wife, son and daughter. Per patient and his family, his gait and cognition continue to improve. He works with PT regularly. He don't need to use the gait belt anymore and is able to walk longer distances with a walker. No recent falls. He reports that his thinking is more clear now and it continues to improve. He reports a few episodes of word finding difficulty and stuttering.     Review of Systems:   Pertinent items are noted in HPI or as in patient entered ROS below, remainder of complete ROS is negative.   No flowsheet data found.     Physical Exam:   There were no vitals taken for this visit.   General: No acute distress. Very pleasant. Oriented x 4.     Imagin2022 Head CT:                                                                   IMPRESSION:   1. Stable shunted ventricular system.  2. Resolved bifrontal extra-axial collections. No new intracranial  hemorrhage or extra-axial collection.  3. Mild chronic small vessel ischemic disease.      Assessment:  NPH  S/p  shunt placement on  7/7/2022 (Bria Ny at 4.0)  Follow up      Plan:  Patient's gait and cognition improved since  shunt placement and they are extremely happy with the progress he has made. I told him that we would expect to see ongoing improvement in his gait and cognition in the upcoming months. Today's head CT shows resolution of previously seen extra axial collections. Patient to follow-up with us in a year.        I spent 25 minutes on patient care activities related to this encounter on the date of service, including time spent reviewing the chart, obtaining history and examination and in counseling the patient, and in documentation in the electronic medical record.      Antionette ARRIAGA, CNP  Department of Neurosurgery

## 2022-08-24 ENCOUNTER — DOCUMENTATION ONLY (OUTPATIENT)
Dept: OPHTHALMOLOGY | Facility: CLINIC | Age: 82
End: 2022-08-24

## 2022-09-08 ENCOUNTER — TELEPHONE (OUTPATIENT)
Dept: NEUROLOGY | Facility: CLINIC | Age: 82
End: 2022-09-08

## 2022-09-08 NOTE — TELEPHONE ENCOUNTER
[facility] Call Center    Phone Message    May a detailed message be left on voicemail: yes     Reason for Call: Other: Nurse calling from [facility] relaying that the family would like Dr. Wheat to have patient's PCP name and number.  PCP is Chelsea Mcneill [facility]      Action Taken: Message routed to:  [facility] ([facility]    Travel Screening: Not Applicable

## 2022-09-15 ENCOUNTER — TELEPHONE (OUTPATIENT)
Dept: UROLOGY | Facility: CLINIC | Age: 82
End: 2022-09-15

## 2022-09-15 NOTE — TELEPHONE ENCOUNTER
[facility] Call Center    Phone Message    May a detailed message be left on voicemail: yes     Reason for Call: Order(s): PSA lab orders  Reason for requested: labs per appointment  Date needed: Asap (November appointment)  Provider name: Glenn Jones    Patients wife Ceci called to schedule a F/u visit for patient with Dr. Jones. Patient in scheduled in November 4th for next available appointment. Patient is currently in a care facility and will be doing labs there. Patients wife would like lab orders faxed over to care facility. Chelsea Mcneill is the CNP at the facility and the orders can be faxed to her at [phone]. Please contact patients wife Ceci once the orders are faxed. Thank you      Action Taken: Other: Urology    Travel Screening: Not Applicable

## 2022-09-23 ENCOUNTER — ALLIED HEALTH/NURSE VISIT (OUTPATIENT)
Dept: DERMATOLOGY | Facility: CLINIC | Age: 82
End: 2022-09-23
Payer: MEDICARE

## 2022-09-23 ENCOUNTER — OFFICE VISIT (OUTPATIENT)
Dept: DERMATOLOGY | Facility: CLINIC | Age: 82
End: 2022-09-23
Payer: MEDICARE

## 2022-09-23 DIAGNOSIS — Z71.9 COUNSELED BY NURSE: Primary | ICD-10-CM

## 2022-09-23 DIAGNOSIS — D48.9 NEOPLASM OF UNCERTAIN BEHAVIOR: ICD-10-CM

## 2022-09-23 DIAGNOSIS — B35.9 RINGWORM: ICD-10-CM

## 2022-09-23 DIAGNOSIS — L57.0 AK (ACTINIC KERATOSIS): ICD-10-CM

## 2022-09-23 DIAGNOSIS — L82.1 SK (SEBORRHEIC KERATOSIS): ICD-10-CM

## 2022-09-23 DIAGNOSIS — D49.2 SKIN NEOPLASM: ICD-10-CM

## 2022-09-23 DIAGNOSIS — D84.9 IMMUNOSUPPRESSED STATUS (H): ICD-10-CM

## 2022-09-23 DIAGNOSIS — Z85.828 HISTORY OF NONMELANOMA SKIN CANCER: Primary | ICD-10-CM

## 2022-09-23 DIAGNOSIS — D48.5 NEOPLASM OF UNCERTAIN BEHAVIOR OF SKIN: ICD-10-CM

## 2022-09-23 DIAGNOSIS — D49.2 NEOPLASM OF UNSPECIFIED BEHAVIOR OF BONE, SOFT TISSUE, AND SKIN: ICD-10-CM

## 2022-09-23 PROCEDURE — 17003 DESTRUCT PREMALG LES 2-14: CPT | Mod: XS | Performed by: DERMATOLOGY

## 2022-09-23 PROCEDURE — 11104 PUNCH BX SKIN SINGLE LESION: CPT | Performed by: DERMATOLOGY

## 2022-09-23 PROCEDURE — 17110 DESTRUCTION B9 LES UP TO 14: CPT | Mod: XS | Performed by: DERMATOLOGY

## 2022-09-23 PROCEDURE — 11103 TANGNTL BX SKIN EA SEP/ADDL: CPT | Mod: XS | Performed by: DERMATOLOGY

## 2022-09-23 PROCEDURE — 99207 PR NO CHARGE NURSE ONLY: CPT | Performed by: DERMATOLOGY

## 2022-09-23 PROCEDURE — 99213 OFFICE O/P EST LOW 20 MIN: CPT | Mod: 25 | Performed by: DERMATOLOGY

## 2022-09-23 PROCEDURE — 17000 DESTRUCT PREMALG LESION: CPT | Mod: XS | Performed by: DERMATOLOGY

## 2022-09-23 PROCEDURE — 88305 TISSUE EXAM BY PATHOLOGIST: CPT | Performed by: PATHOLOGY

## 2022-09-23 RX ORDER — KETOCONAZOLE 20 MG/G
CREAM TOPICAL DAILY
Qty: 60 G | Refills: 3 | Status: SHIPPED | OUTPATIENT
Start: 2022-09-23 | End: 2022-12-08

## 2022-09-23 ASSESSMENT — PAIN SCALES - GENERAL: PAINLEVEL: MILD PAIN (3)

## 2022-09-23 NOTE — LETTER
9/23/2022         RE: Gustavo Ramon  [address] [facility] [id number]        Dear Colleague,    Thank you for referring your patient, Gustavo Ramon, to the [facility]. Please see a copy of my visit note below.    [facility] Dermatology Note  Encounter Date: Sep 23, 2022  Office Visit     Dermatology Problem List:  Last skin check 9/23/22, recommended next in 6 months  0. NUB - right tragus, left upper cutaneous lip, and left bicep, s/p bx 9/23/22  1. 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 Christiansen's note 11/21/11), s/p excision 5/12/09   2. Actinic keratoses  - s/p Efudex 2015, Fall 2020 to face, Spring 2021 to forearms  - s/p PDT (x3)  - s/p LN2  3. Seborrheic dermatitis  - clobetasol 0.05% solution for scalp, triam 0.1% cream for ears  4. Relevant medical history: MGUS, myasthenia gravis currently on prednisone  5. Tinea cruris  - current tx: ketoconazole cream     Social history: The patient is retired from working as an . He has a daughter and son. He is [place].  Family history: Negative for skin cancer  ____________________________________________     ASSESSMENT/PLAN:     # History of NMSC. No evidence of recurrence.   - Recommend sunscreens SPF #30 or greater, protective clothing and avoidance of tanning beds.   - Previously on hydrochlorothiazide, now stopped.  - Previously discussed use of nicotinamide to reduce AKs and NMSC. Patient does not wish to add on another pill at this time.   - Recommended next skin check in 6 months.     #  Immunosuppressed with prednisone for myasthenia gravis. Discussed increased risk of skin cancers with immunosuppressing medications.   - Recommend sunscreens SPF #30 or greater, protective clothing and avoidance of tanning beds.     # Neoplasm of unspecified behavior of the skin (D49.2) on the right tragus. The differential diagnosis includes HAK vs SCC.  - Photograph obtained.  - See procedure note.     # Neoplasm of unspecified behavior of the skin (D49.2) on the left upper cutaneous lip. The differential diagnosis includes BCC vs other.  - Photograph obtained.  - See procedure note.     # Neoplasm of unspecified behavior of the skin (D49.2) on the left bicep. The differential diagnosis includes lipoma vs other. Patient reports new nodule in the past 6 months.   - Photograph obtained.  - See procedure note.      # Tinea cruris. Chronic, stable. Reviewed benign nature of this rash, as well as treatment options. Patient has been treating it with barrier creams.    - Apply ketoconazole cream BID until resolved     # Actinic keratosis - left sideburn x 1, left earlobe x 1, left preauricular x 1. Right helix and right temporal hair line Based on the location and chronic irritation to this lesion, treatment with cryotherapy is warranted.   - See cryo procedure note.     # Seborrheic keratosis, symptomatic - left nasal tip x 1. Based on the location and chronic irritation to this lesion, treatment with cryotherapy is warranted.   - See cryo procedure note.     # Tinea pedis - bilateral feet.  - Apply ketoconazole cream BID.    # Seborrheic dermatitis of the external ears. Chronic, stable.  - Continue using triamcinolone 0.1% cream BID for flares.      Procedures Performed:   - Shave biopsy procedure note, location(s): right tragus and left upper cutaneous lip. After discussion of benefits and risks including but not limited to bleeding, infection, scar, incomplete removal, recurrence, and non-diagnostic biopsy, written  consent and photographs were obtained. The area was cleaned with isopropyl alcohol. 0.5mL of 1% lidocaine with epinephrine was injected to obtain adequate anesthesia of lesion(s). Shave biopsy at site(s) performed. Hemostasis was achieved with aluminium chloride. Petrolatum ointment and a sterile dressing were applied. The patient tolerated the procedure and no complications were noted. The patient was provided with verbal and written post care instructions.     - Punch biopsy procedure note, location(s): left bicep. After discussion of benefits and risks including but not limited to bleeding, infection, scar, incomplete removal, recurrence, and non-diagnostic biopsy, written consent and photographs were obtained. The area was cleaned with isopropyl alcohol. 0.5mL of 1% lidocaine with epinephrine was injected to obtain adequate anesthesia and a 4 mm punch biopsy was performed at site(s). 3-0 Prolene sutures were utilized to approximate the epidermal edges. White petrolatum ointment and a bandage was applied to the wound. Explicit verbal and written wound care instructions were provided. The patient left the dermatology clinic in good condition.     - Cryotherapy procedure note, location(s): left sideburn, left earlobe, left preauricular, left nasal tip. Right helix and right temporal hair line. After verbal consent and discussion of risks and benefits including, but not limited to, dyspigmentation/scar, blister, and pain, 5 AK(s) and 1 ISK(s) was(were) treated with 1-2 mm freeze border for 1-2 cycles with liquid nitrogen. Post cryotherapy instructions were provided.      Follow-up: 7 month(s) in-person, or earlier for new or changing lesions    Staff and Scribe:     Scribe Disclosure:   Juan ALFONSO, am serving as a scribe to document services personally performed by this physician, Dr. Brooklyn Treviño, based on data collection and the provider's statements to me.       Provider Disclosure:   The documentation  "recorded by the scribe accurately reflects the services I personally performed and the decisions made by me.    Brooklyn Treviño MD    Department of Dermatology  [facility]: Phone: [phone], Fax:[phone]  [facility]: Phone: [phone], Fax: [phone]      ____________________________________________    CC: Skin Check (FBSC: scalp, ears and nose are areas of concern. New \"lipoma\"/mass on left biceps. Hx of NMSC. )    HPI:  Mr. Gustavo Ramon is a(n) 82 year old male who presents today as a return patient for a skin check.    Last seen 3/23/22 for a skin check. At that time, patient had several AKs treated with cryotherapy, instructed to continue triamcinolone 0.1% cream twice daily for flares of seborrheic dermatits, and continue ketoconazole cream twice daily for his tinea cruris.     Today, he has areas of concern on the scalp, ears, and nose. He notes a new \"lipoma\" on his left biceps.    Wife present and wants biopsy, not utrasound    Patient is otherwise feeling well, without additional skin concerns.    Labs Reviewed:  N/A    Physical Exam:  Vitals: There were no vitals taken for this visit.  SKIN: Full skin, which includes the head/face, both arms, chest, back, abdomen,both legs, genitalia and/or groin buttocks, digits and/or nails, was examined.  - Well healed scars at sites of previous NMSC, no repigmentation or nodularity noted.   - Scaly annular plaque on the buttocks.  - Tender 4 mm erythematous macule with scale on the right tragus.  - 3 mm white papule with telangiectasias on the left upper cutaneous lip.   - Genital exam normal.  - Subcutaneous nodule approximately 2 cm, mobile on the left bicep.  - There are erythematous macules with overyling adherent scale on the left sideburn x 1, left earlobe x 1, left preauricular x 1.   There is a tan to brown waxy stuck on " papule with surrounding erythema on the left nasal tip x 1.  - No other lesions of concern on areas examined.     Medications:  Current Outpatient Medications   Medication     ACE/ARB/ARNI NOT PRESCRIBED (INTENTIONAL)     acetaminophen (TYLENOL) 325 MG tablet     aspirin (ASA) 325 MG tablet     bisacodyl (DULCOLAX) 10 MG suppository     cyanocobalamin (VITAMIN B-12) 1000 MCG tablet     ferrous sulfate (FEROSUL) 325 (65 Fe) MG tablet     levETIRAcetam (KEPPRA) 250 MG tablet     Lidocaine (LIDOCARE) 4 % Patch     melatonin 3 MG tablet     multivitamin (OCUVITE) TABS tablet     ondansetron (ZOFRAN) 4 MG tablet     oxyCODONE (ROXICODONE) 5 MG tablet     pantoprazole (PROTONIX) 40 MG EC tablet     polyethylene glycol (MIRALAX) 17 g packet     pramipexole (MIRAPEX) 0.125 MG tablet     pramipexole (MIRAPEX) 0.25 MG tablet     prednisoLONE acetate (PRED FORTE) 1 % ophthalmic suspension     predniSONE (DELTASONE) 10 MG tablet     propranolol (INDERAL) 10 MG tablet     propranolol (INDERAL) 20 MG tablet     propranolol ER (INDERAL LA) 60 MG 24 hr capsule     pyridostigmine (MESTINON) 60 MG tablet     senna-docusate (SENOKOT-S/PERICOLACE) 8.6-50 MG tablet     simvastatin (ZOCOR) 20 MG tablet     Sodium Chloride, Hypertonic, (HARRY 128 OP)     tamsulosin (FLOMAX) 0.4 MG capsule     triamcinolone (KENALOG) 0.1 % external cream     vitamin D3 (CHOLECALCIFEROL) 2000 units tablet     No current facility-administered medications for this visit.      Past Medical History:   Patient Active Problem List   Diagnosis     Rosacea     DJD (degenerative joint disease)     Ocular myasthenia gravis (H)     Macular pigment deposit     HYPERLIPIDEMIA LDL GOAL <130     IgA monoclonal gammopathy     Retinal hole OS, operculated     Horseshoe tear of retina without detachment OD     History  of basal cell carcinoma     Seborrhea     AK (actinic keratosis)     Malignant neoplasm of prostate (H)     PVD (posterior vitreous detachment)     Amaurosis  fugax by Hx neg carotid W/U     Advanced directives, counseling/discussion     Actinic keratosis     Peripheral neuropathy     Nuclear sclerosis of both eyes     Essential hypertension with goal blood pressure less than 140/90     Gastroesophageal reflux disease without esophagitis     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)     Acute respiratory distress syndrome (ARDS) due to COVID-19 virus (H)     Steroid-induced diabetes mellitus, initial encounter (H)     Stage 3a chronic kidney disease (H)     Secondary adrenocortical insufficiency (H)     Physical deconditioning     NPH (normal pressure hydrocephalus) (H)     Myoclonus     Infection due to 2019 novel coronavirus     Encephalopathy     Dementia without behavioral disturbance, unspecified dementia type (H)     Idiopathic normal pressure hydrocephalus (H)     Past Medical History:   Diagnosis Date     Actinic keratosis      AK (actinic keratosis) 11/15/2012     Amaurosis fugax      Basal cell carcinoma 2009    R medial cheek/nose     Central serous retinopathy left    Eye     Diverticulosis 08/2006     DJD (degenerative joint disease)      GERD (gastroesophageal reflux disease)      Hearing loss     High frequency     History of nonmelanoma skin cancer      History of nonmelanoma skin cancer      HTN (hypertension)      Hyperlipidemia LDL goal < 130      Hyperplastic colon polyp 08/2006     IgA monoclonal gammopathy     IgA kappa     Lattice degeneration of retina right    Eye     Macular degeneration      Nonsenile cataract      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 on close of this encounter.       Again, thank you for allowing me to participate in the care of your patient.        Sincerely,        Brooklyn Treviño MD

## 2022-09-23 NOTE — PROGRESS NOTES
[facility] Dermatology Note  Encounter Date: Sep 23, 2022  Office Visit     Dermatology Problem List:  Last skin check 9/23/22, recommended next in 6 months  0. NUB - right tragus, left upper cutaneous lip, and left bicep, s/p bx 9/23/22  1. 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 Christiansen's note 11/21/11), s/p excision 5/12/09   2. Actinic keratoses  - s/p Efudex 2015, Fall 2020 to face, Spring 2021 to forearms  - s/p PDT (x3)  - s/p LN2  3. Seborrheic dermatitis  - clobetasol 0.05% solution for scalp, triam 0.1% cream for ears  4. Relevant medical history: MGUS, myasthenia gravis currently on prednisone  5. Tinea cruris  - current tx: ketoconazole cream     Social history: The patient is retired from working as an . He has a daughter and son. He is [place].  Family history: Negative for skin cancer  ____________________________________________     ASSESSMENT/PLAN:     # History of NMSC. No evidence of recurrence.   - Recommend sunscreens SPF #30 or greater, protective clothing and avoidance of tanning beds.   - Previously on hydrochlorothiazide, now stopped.  - Previously discussed use of nicotinamide to reduce AKs and NMSC. Patient does not wish to add on another pill at this time.   - Recommended next skin check in 6 months.     # Immunosuppressed with prednisone for myasthenia gravis. Discussed increased risk of skin cancers with immunosuppressing medications.   - Recommend sunscreens SPF #30 or greater, protective clothing and avoidance of tanning beds.     # Neoplasm of unspecified behavior of  the skin (D49.2) on the right tragus. The differential diagnosis includes HAK vs SCC.  - Photograph obtained.  - See procedure note.     # Neoplasm of unspecified behavior of the skin (D49.2) on the left upper cutaneous lip. The differential diagnosis includes BCC vs other.  - Photograph obtained.  - See procedure note.     # Neoplasm of unspecified behavior of the skin (D49.2) on the left bicep. The differential diagnosis includes lipoma vs other. Patient reports new nodule in the past 6 months.   - Photograph obtained.  - See procedure note.      # Tinea cruris. Chronic, stable. Reviewed benign nature of this rash, as well as treatment options. Patient has been treating it with barrier creams.    - Apply ketoconazole cream BID until resolved     # Actinic keratosis - left sideburn x 1, left earlobe x 1, left preauricular x 1. Right helix and right temporal hair line Based on the location and chronic irritation to this lesion, treatment with cryotherapy is warranted.   - See cryo procedure note.     # Seborrheic keratosis, symptomatic - left nasal tip x 1. Based on the location and chronic irritation to this lesion, treatment with cryotherapy is warranted.   - See cryo procedure note.     # Tinea pedis - bilateral feet.  - Apply ketoconazole cream BID.    # Seborrheic dermatitis of the external ears. Chronic, stable.  - Continue using triamcinolone 0.1% cream BID for flares.      Procedures Performed:   - Shave biopsy procedure note, location(s): right tragus and left upper cutaneous lip. After discussion of benefits and risks including but not limited to bleeding, infection, scar, incomplete removal, recurrence, and non-diagnostic biopsy, written consent and photographs were obtained. The area was cleaned with isopropyl alcohol. 0.5mL of 1% lidocaine with epinephrine was injected to obtain adequate anesthesia of lesion(s). Shave biopsy at site(s) performed. Hemostasis was achieved with aluminium chloride.  Petrolatum ointment and a sterile dressing were applied. The patient tolerated the procedure and no complications were noted. The patient was provided with verbal and written post care instructions.     - Punch biopsy procedure note, location(s): left bicep. After discussion of benefits and risks including but not limited to bleeding, infection, scar, incomplete removal, recurrence, and non-diagnostic biopsy, written consent and photographs were obtained. The area was cleaned with isopropyl alcohol. 0.5mL of 1% lidocaine with epinephrine was injected to obtain adequate anesthesia and a 4 mm punch biopsy was performed at site(s). 3-0 Prolene sutures were utilized to approximate the epidermal edges. White petrolatum ointment and a bandage was applied to the wound. Explicit verbal and written wound care instructions were provided. The patient left the dermatology clinic in good condition.     - Cryotherapy procedure note, location(s): left sideburn, left earlobe, left preauricular, left nasal tip. Right helix and right temporal hair line. After verbal consent and discussion of risks and benefits including, but not limited to, dyspigmentation/scar, blister, and pain, 5 AK(s) and 1 ISK(s) was(were) treated with 1-2 mm freeze border for 1-2 cycles with liquid nitrogen. Post cryotherapy instructions were provided.      Follow-up: 7 month(s) in-person, or earlier for new or changing lesions    Staff and Scribe:     Scribe Disclosure:   I, Juan Villalobos, am serving as a scribe to document services personally performed by this physician, Dr. Brooklyn Treviño, based on data collection and the provider's statements to me.       Provider Disclosure:   The documentation recorded by the scribe accurately reflects the services I personally performed and the decisions made by me.    Brooklyn Treviño MD    Department of Dermatology  [facility]: Phone: [phone],  "Fax:[phone]  [facility]: Phone: [phone], Fax: [phone]      ____________________________________________    CC: Skin Check (FBSC: scalp, ears and nose are areas of concern. New \"lipoma\"/mass on left biceps. Hx of NMSC. )    HPI:  Mr. Gustavo Ramon is a(n) 82 year old male who presents today as a return patient for a skin check.    Last seen 3/23/22 for a skin check. At that time, patient had several AKs treated with cryotherapy, instructed to continue triamcinolone 0.1% cream twice daily for flares of seborrheic dermatits, and continue ketoconazole cream twice daily for his tinea cruris.     Today, he has areas of concern on the scalp, ears, and nose. He notes a new \"lipoma\" on his left biceps.    Wife present and wants biopsy, not utrasound    Patient is otherwise feeling well, without additional skin concerns.    Labs Reviewed:  N/A    Physical Exam:  Vitals: There were no vitals taken for this visit.  SKIN: Full skin, which includes the head/face, both arms, chest, back, abdomen,both legs, genitalia and/or groin buttocks, digits and/or nails, was examined.  - Well healed scars at sites of previous NMSC, no repigmentation or nodularity noted.   - Scaly annular plaque on the buttocks.  - Tender 4 mm erythematous macule with scale on the right tragus.  - 3 mm white papule with telangiectasias on the left upper cutaneous lip.   - Genital exam normal.  - Subcutaneous nodule approximately 2 cm, mobile on the left bicep.  - There are erythematous macules with overyling adherent scale on the left sideburn x 1, left earlobe x 1, left preauricular x 1.   There is a tan to brown waxy stuck on papule with surrounding erythema on the left nasal tip x 1.  - No other lesions of concern on areas examined.     Medications:  Current Outpatient Medications   Medication     ACE/ARB/ARNI NOT PRESCRIBED (INTENTIONAL)     acetaminophen (TYLENOL) 325 MG tablet     " aspirin (ASA) 325 MG tablet     bisacodyl (DULCOLAX) 10 MG suppository     cyanocobalamin (VITAMIN B-12) 1000 MCG tablet     ferrous sulfate (FEROSUL) 325 (65 Fe) MG tablet     levETIRAcetam (KEPPRA) 250 MG tablet     Lidocaine (LIDOCARE) 4 % Patch     melatonin 3 MG tablet     multivitamin (OCUVITE) TABS tablet     ondansetron (ZOFRAN) 4 MG tablet     oxyCODONE (ROXICODONE) 5 MG tablet     pantoprazole (PROTONIX) 40 MG EC tablet     polyethylene glycol (MIRALAX) 17 g packet     pramipexole (MIRAPEX) 0.125 MG tablet     pramipexole (MIRAPEX) 0.25 MG tablet     prednisoLONE acetate (PRED FORTE) 1 % ophthalmic suspension     predniSONE (DELTASONE) 10 MG tablet     propranolol (INDERAL) 10 MG tablet     propranolol (INDERAL) 20 MG tablet     propranolol ER (INDERAL LA) 60 MG 24 hr capsule     pyridostigmine (MESTINON) 60 MG tablet     senna-docusate (SENOKOT-S/PERICOLACE) 8.6-50 MG tablet     simvastatin (ZOCOR) 20 MG tablet     Sodium Chloride, Hypertonic, (HARRY 128 OP)     tamsulosin (FLOMAX) 0.4 MG capsule     triamcinolone (KENALOG) 0.1 % external cream     vitamin D3 (CHOLECALCIFEROL) 2000 units tablet     No current facility-administered medications for this visit.      Past Medical History:   Patient Active Problem List   Diagnosis     Rosacea     DJD (degenerative joint disease)     Ocular myasthenia gravis (H)     Macular pigment deposit     HYPERLIPIDEMIA LDL GOAL <130     IgA monoclonal gammopathy     Retinal hole OS, operculated     Horseshoe tear of retina without detachment OD     History  of basal cell carcinoma     Seborrhea     AK (actinic keratosis)     Malignant neoplasm of prostate (H)     PVD (posterior vitreous detachment)     Amaurosis fugax by Hx neg carotid W/U     Advanced directives, counseling/discussion     Actinic keratosis     Peripheral neuropathy     Nuclear sclerosis of both eyes     Essential hypertension with goal blood pressure less than 140/90     Gastroesophageal reflux disease  without esophagitis     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)     Acute respiratory distress syndrome (ARDS) due to COVID-19 virus (H)     Steroid-induced diabetes mellitus, initial encounter (H)     Stage 3a chronic kidney disease (H)     Secondary adrenocortical insufficiency (H)     Physical deconditioning     NPH (normal pressure hydrocephalus) (H)     Myoclonus     Infection due to 2019 novel coronavirus     Encephalopathy     Dementia without behavioral disturbance, unspecified dementia type (H)     Idiopathic normal pressure hydrocephalus (H)     Past Medical History:   Diagnosis Date     Actinic keratosis      AK (actinic keratosis) 11/15/2012     Amaurosis fugax      Basal cell carcinoma 2009    R medial cheek/nose     Central serous retinopathy left    Eye     Diverticulosis 08/2006     DJD (degenerative joint disease)      GERD (gastroesophageal reflux disease)      Hearing loss     High frequency     History of nonmelanoma skin cancer      History of nonmelanoma skin cancer      HTN (hypertension)      Hyperlipidemia LDL goal < 130      Hyperplastic colon polyp 08/2006     IgA monoclonal gammopathy     IgA kappa     Lattice degeneration of retina right    Eye     Macular degeneration      Nonsenile cataract      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 on close of this encounter.

## 2022-09-23 NOTE — NURSING NOTE
"Gustavo Ramon's chief complaint for this visit includes:  Chief Complaint   Patient presents with     Skin Check     FBSC: scalp, ears and nose are areas of concern. New \"lipoma\"/mass on left biceps. Hx of NMSC.      PCP: Chelsea Mcneill    Referring Provider:  No referring provider defined for this encounter.    There were no vitals taken for this visit.  Mild Pain (3)        Allergies   Allergen Reactions     Mycophenolate Other (See Comments)     Confusion, abnormal movements     Nkda [No Known Drug Allergies]          Do you need any medication refills at today's visit? No    Kylee Jon CMA        "

## 2022-09-23 NOTE — PATIENT INSTRUCTIONS
Wound Care After a Biopsy    Stitches out in 11-14 days   What is a skin biopsy?  A skin biopsy allows the doctor to examine a very small piece of tissue under the microscope to determine the diagnosis and the best treatment for the skin condition. A local anesthetic (numbing medicine)  is injected with a very small needle into the skin area to be tested. A small piece of skin is taken from the area. Sometimes a suture (stitch) is used.     What are the risks of a skin biopsy?  I will experience scar, bleeding, swelling, pain, crusting and redness. I may experience incomplete removal or recurrence. Risks of this procedure are excessive bleeding, bruising, infection, nerve damage, numbness, thick (hypertrophic or keloidal) scar and non-diagnostic biopsy.    How should I care for my wound for the first 24 hours?  Keep the wound dry and covered for 24 hours  If it bleeds, hold direct pressure on the area for 15 minutes. If bleeding does not stop then go to the emergency room  Avoid strenuous exercise the first 1-2 days or as your doctor instructs you    How should I care for the wound after 24 hours?  After 24 hours, remove the bandage  You may bathe or shower as normal  If you had a scalp biopsy, you can shampoo as usual and can use shower water to clean the biopsy site daily  Clean the wound twice a day with gentle soap and water  Do not scrub, be gentle  Apply white petroleum/Vaseline after cleaning the wound with a cotton swab or a clean finger, and keep the site covered with a Bandaid /bandage. Bandages are not necessary with a scalp biopsy  If you are unable to cover the site with a Bandaid /bandage, re-apply ointment 2-3 times a day to keep the site moist. Moisture will help with healing  Avoid strenuous activity for first 1-2 days  Avoid lakes, rivers, pools, and oceans until the stitches are removed or the site is healed    How do I clean my wound?  Wash hands thoroughly with soap or use hand  before  all wound care  Clean the wound with gentle soap and water  Apply white petroleum/Vaseline  to wound after it is clean  Replace the Bandaid /bandage to keep the wound covered for the first few days or as instructed by your doctor  If you had a scalp biopsy, warm shower water to the area on a daily basis should suffice    What should I use to clean my wound?   Cotton-tipped applicators (Qtips )  White petroleum jelly (Vaseline ). Use a clean new container and use Q-tips to apply.  Bandaids   as needed  Gentle soap     How should I care for my wound long term?  Do not get your wound dirty  Keep up with wound care for one week or until the area is healed.  A small scab will form and fall off by itself when the area is completely healed. The area will be red and will become pink in color as it heals. Sun protection is very important for how your scar will turn out. Sunscreen with an SPF 30 or greater is recommended once the area is healed.  You should have some soreness but it should be mild and slowly go away over several days. Talk to your doctor about using tylenol for pain,    When should I call my doctor?  If you have increased:   Pain or swelling  Pus or drainage (clear or slightly yellow drainage is ok)  Temperature over 100F  Spreading redness or warmth around wound    When will I hear about my results?  The biopsy results can take 2 weeks to come back.  Your results will automatically release to [facility] before your provider has even reviewed them.  The clinic will call you with the results, send you a [employer] message, or have you schedule a follow-up clinic or phone time to discuss the results.  Contact our clinics if you do not hear from us in 2 weeks.    Who should I call with questions?  [facility]: [phone]  [facility]: [phone]  For urgent needs outside of business hours call the [facility] at [phone] and ask for the  dermatology resident on call      Cryotherapy    What is it?  Use of a very cold liquid, such as liquid nitrogen, to freeze and destroy abnormal skin cells that need to be removed    What should I expect?  Tenderness and redness  A small blister that might grow and fill with dark purple blood. There may be crusting.  More than one treatment may be needed if the lesions do not go away.    How do I care for the treated area?  Gently wash the area with your hands when bathing.  Use a thin layer of Vaseline to help with healing. You may use a Band-Aid.   The area should heal within 7-10 days and may leave behind a pink or lighter color.   Do not use an antibiotic or Neosporin ointment.   You may take acetaminophen (Tylenol) for pain.     Call your doctor if you have:  Severe pain  Signs of infection (warmth, redness, cloudy yellow drainage, and or a bad smell)  Questions or concerns    Who should I call with questions?      [facility]: [phone]      [facility]: [phone]      For urgent needs outside of business hours call the [facility] at [phone] and ask for the dermatology resident on call          Patient Education     Checking for Skin Cancer  You can find cancer early by checking your skin each month. There are 3 kinds of skin cancer. They are melanoma, basal cell carcinoma, and squamous cell carcinoma. Doing monthly skin checks is the best way to find new marks or skin changes. Follow the instructions below for checking your skin.   The ABCDEs of checking moles for melanoma   Check your moles or growths for signs of melanoma using ABCDE:   Asymmetry: the sides of the mole or growth don t match  Border: the edges are ragged, notched, or blurred  Color: the color within the mole or growth varies  Diameter: the mole or growth is larger than 6 mm (size of a pencil eraser)  Evolving: the size, shape, or color of the mole or growth is  changing (evolving is not shown in the images below)    Checking for other types of skin cancer  Basal cell carcinoma or squamous cell carcinoma have symptoms such as:     A spot or mole that looks different from all other marks on your skin  Changes in how an area feels, such as itching, tenderness, or pain  Changes in the skin's surface, such as oozing, bleeding, or scaliness  A sore that does not heal  New swelling or redness beyond the border of a mole    Who s at risk?  Anyone can get skin cancer. But you are at greater risk if you have:   Fair skin, light-colored hair, or light-colored eyes  Many moles or abnormal moles on your skin  A history of sunburns from sunlight or tanning beds  A family history of skin cancer  A history of exposure to radiation or chemicals  A weakened immune system  If you have had skin cancer in the past, you are at risk for recurring skin cancer.   How to check your skin  Do your monthly skin checkups in front of a full-length mirror. Check all parts of your body, including your:   Head (ears, face, neck, and scalp)  Torso (front, back, and sides)  Arms (tops, undersides, upper, and lower armpits)  Hands (palms, backs, and fingers, including under the nails)  Buttocks and genitals  Legs (front, back, and sides)  Feet (tops, soles, toes, including under the nails, and between toes)  If you have a lot of moles, take digital photos of them each month. Make sure to take photos both up close and from a distance. These can help you see if any moles change over time.   Most skin changes are not cancer. But if you see any changes in your skin, call your doctor right away. Only he or she can diagnose a problem. If you have skin cancer, seeing your doctor can be the first step toward getting the treatment that could save your life.   [employer] last reviewed this educational content on 4/1/2019 2000-2020 [employer]. [address], [place] [address]. All rights  reserved. This information is not intended as a substitute for professional medical care. Always follow your healthcare professional's instructions.       When should I call my doctor?  If you are worsening or not improving, please, contact us or seek urgent care as noted below.     Who should I call with questions (adults)?  [facility] (adult and pediatric): [phone]  [facility] (adult): [phone]  For urgent needs outside of business hours call the [facility] at [phone] and ask for the dermatology resident on call to be paged  If this is a medical emergency and you are unable to reach an ER, Call [phone]    Who should I call with questions (pediatric)?  [facility]- Pediatric Dermatology  Dr. Phoebe Eddy, Dr. Sukhi Delaney, Dr. Carmella Perales, KAZ Siegel, Dr. Ashley Avila, Dr. Liz Alexandra & Dr. Joe English  Non-urgent nurse triage line; [phone]- Regla and Lauren AMBROSE Care Coordinatorash Braxton (/Complex ) [phone]    If you need a prescription refill, please contact your pharmacy. Refills are approved or denied by our Physicians during normal business hours, Monday through Fridays  Per office policy, refills will not be granted if you have not been seen within the past year (or sooner depending on your child's condition)    Scheduling Information:  Pediatric Appointment Scheduling and Call Center [phone]  Radiology Scheduling- [phone]  Sedation Unit Scheduling- [phone]  [place] Scheduling- General [phone]; Pediatric Dermatology [phone]  Main  Services: [phone]  Surinamese: [phone]  Emirati: [phone]  Hmong/Johnny/Czech: [phone]  Preadmission Nursing Department Fax Number: [phone] (Fax all pre-operative paperwork to this number)    For urgent matters arising during evenings, weekends, or holidays that  cannot wait for normal business hours please call [phone] and ask for the dermatology resident on call to be paged.

## 2022-09-28 ENCOUNTER — TELEPHONE (OUTPATIENT)
Dept: DERMATOLOGY | Facility: CLINIC | Age: 82
End: 2022-09-28

## 2022-10-03 ENCOUNTER — TELEPHONE (OUTPATIENT)
Dept: NEUROLOGY | Facility: CLINIC | Age: 82
End: 2022-10-03

## 2022-10-03 LAB
PATH REPORT.COMMENTS IMP SPEC: NORMAL
PATH REPORT.FINAL DX SPEC: NORMAL
PATH REPORT.GROSS SPEC: NORMAL
PATH REPORT.MICROSCOPIC SPEC OTHER STN: NORMAL
PATH REPORT.RELEVANT HX SPEC: NORMAL

## 2022-10-03 NOTE — TELEPHONE ENCOUNTER
[facility] Call Center    Phone Message    May a detailed message be left on voicemail: no     Reason for Call: Other: Ceci calling to confirm 10/5/22 at 12:30 pm appointment in [place] for Stephen.     Action Taken: Message routed to:  Other: [facility]    Travel Screening: Not Applicable

## 2022-10-03 NOTE — TELEPHONE ENCOUNTER
Health Call Center    Phone Message    May a detailed message be left on voicemail: yes     Reason for Call: Medication Question or concern regarding medication   Prescription Clarification  Name of Medication: pramipexole (MIRAPEX) 0.125 MG tablet  Prescribing Provider: Tyler Wheat MD   Pharmacy: [facility] [place] - [address] [address] [facility] [address]     What on the order needs clarification? Pt's wife Ceci is calling because pt is losing feeling of strength in his body. Dr. Wheat was supposed to change his pramipexole medication to gabapentin but they have not heard anything about that yet. Ceci say pt now has a similar feeling like RLS but in his right arm that she wants to discuss with him.     Ceci says she has been hearing that Magnesium glycinate 400 MG is good for muscle spasms and she's wondering if that is something pt can try. Pt is also experiencing tiredness- pt was not feeling like this 2 weeks ago. Ceci says if Dr. Wheat is changing pt's medication, then pt's PCP Dr. Chelsea Mcneill should be contacted. Ph: [phone]. Please call Ceci to discuss.      Action Taken: Message routed to:  [facility] NEUROLOGY    Travel Screening: Not Applicable

## 2022-10-03 NOTE — TELEPHONE ENCOUNTER
Scheduled pt for appt requested by Dr. Rollins's.  Left message for Ceci with address and confirmation.  Advised to call if any further questions before Wednesday.     Jyoti SHIELDS RN, BSN  [facility]

## 2022-10-03 NOTE — TELEPHONE ENCOUNTER
M for Ceci requesting she call back to accept Wed, Oct 5th at 12:30 for Stephen with Dr Wheat in [place] office.  Also let her know he should not be taking magnesium.

## 2022-10-03 NOTE — TELEPHONE ENCOUNTER
The symptoms reported by Ceci are concerning and I would not blame it to restless leg syndrome. Question is if he has worsening myasthenia or something else going on.   Mr Ramon requires in person exam. I can offer him 12.30 pm at [facility] on Wednesday October 5. No other availability until next week; recommend that he accepts this appointment.  He should not take magnesium. Magnesium will make myasthenia worse!

## 2022-10-05 ENCOUNTER — OFFICE VISIT (OUTPATIENT)
Dept: NEUROLOGY | Facility: CLINIC | Age: 82
End: 2022-10-05
Payer: MEDICARE

## 2022-10-05 VITALS
OXYGEN SATURATION: 98 % | DIASTOLIC BLOOD PRESSURE: 74 MMHG | SYSTOLIC BLOOD PRESSURE: 113 MMHG | HEIGHT: 67 IN | HEART RATE: 58 BPM | WEIGHT: 172 LBS | BODY MASS INDEX: 27 KG/M2

## 2022-10-05 DIAGNOSIS — R27.8 SENSORY ATAXIA: Primary | ICD-10-CM

## 2022-10-05 DIAGNOSIS — E83.09 OTHER DISORDERS OF COPPER METABOLISM (H): ICD-10-CM

## 2022-10-05 PROCEDURE — 99215 OFFICE O/P EST HI 40 MIN: CPT | Performed by: PSYCHIATRY & NEUROLOGY

## 2022-10-05 NOTE — PATIENT INSTRUCTIONS
1) We will contact Dr Mcneill and ask her to change your pramipexole to gabapentin for the restless legs  2) Your unsteadiness when standing could be due to many reasons. 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 The second may be a neuropathy in your feet- your exam today shows some signs of it    I looked at the neuro notes back 7 years and Dr Law had suspected neuropathy in the past but I don't think you ever got an EMG to confirm it so let's do it. I would also like to get one blood test done for the neuropathy not previously done (copper levels).    Follow up 2 months as scheduled

## 2022-10-05 NOTE — NURSING NOTE
"Gustavo Ramon is a 82 year old male who presents for:  Chief Complaint   Patient presents with     Follow Up     Medication review        Initial Vitals:  /74   Pulse 58   Ht 1.702 m (5' 7\")   Wt 78 kg (172 lb)   SpO2 98%   BMI 26.94 kg/m   Estimated body mass index is 26.94 kg/m  as calculated from the following:    Height as of this encounter: 1.702 m (5' 7\").    Weight as of this encounter: 78 kg (172 lb).. Body surface area is 1.92 meters squared. BP completed using cuff size: jesús Carranza    "

## 2022-10-06 NOTE — PROGRESS NOTES
Service Date: 10/05/2022    Chelsea Mcneill, [facility]  [address]  [place]  [address]    RE:  Gustavo Ramon  MRN:  [id number]  :  1940    Dear Ms. Mcneill:    I had the pleasure to see Mr. Ramon in followup at the [facility] in [place] today and urgently. 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  He is on prednisone 10 mg daily and pyridostigmine 60 mg b.i.d.  His diplopia went away with treatment.  He still has a little ptosis in the evening when he reads. It occasionally bothers him, but it is not very severe.  He has no dysphagia, dysarthria, or difficulty chewing, and no new weakness of his arms.  In addition, Mr. Ramon was diagnosed with normal pressure hydrocephalus in 2022 based on the development of subacute cognitive impairment and a gait disorder with imbalance and freezing.  He had also noticed hand tremors, left more than right, which were likely essential.  Brain MRI showed significant ventriculomegaly out of proportion to atrophy.  He had a positive response to lumbar drain and underwent successful  shunt placement in 2022.  He is gradually improving regarding his gait.  He is in a memory care unit.  He is getting PT and rehab through [facility].      One problem lately has been his restless legs syndrome.  He has had this diagnosis for many years.  His last ferritin levels 2 months ago were normal at 45 and he is on ferrous sulfate 325 mg daily because his ferritin in the past was low.  He is on pramipexole 0.5 mg daily in the evening, but his symptoms continue with violent shaking of the left more than right leg and occasionally the arms. In the last few months, he has also experienced some augmentation with symptoms occurring earlier during the day or towards the morning.  This calls for changing from pramipexole  "to gabapentin.  I have made this recommendation and I was trying to reach you to ask you to write this order, because I cannot write orders myself in his memory care unit.  This needs to be done as soon as possible. Mr. Ramon's sleep is very disrupted and he wakes up non-refreshed in the morning with all those periodic leg movements.      In addition, he tells me that when he tries to go from sitting to standing position, he is experiencing pain in his right knee and both hips but not his back.  The pain does not shoot down in the legs.  His legs feel like transiently giving out and he feels he is not standing straight enough and he needs to reposition his trunk.  He has to hold firmly on his walker or he may fall in this setting.  This is happening a few times lately.  He has no new upper extremity weakness.    Medications were reviewed and are as per Epic record.    /74   Pulse 58   Ht 1.702 m (5' 7\")   Wt 78 kg (172 lb)   SpO2 98%   BMI 26.94 kg/m      On a brief exam, he has mild bilateral eyelid ptosis with sustained upward gaze, but it is not severe.  There is no weakness of orbicularis oculi or oris.  There is no diplopia when gazing right, left, up or downwards initially, but he does get diplopia after about 5-10 seconds of sustained upward gaze that is binocular.  Neck flexion and extension strength is full.  Strength is 5/5 in the upper extremities proximally and distally and frankly in the lower as well.  There is no leg weakness.  His reflexes are brisk at the knees, 2+ on the right and 3+ on the left, 2+ at the right ankle, but absent at the left ankle.  Toes are mute.  Vibration is clearly reduced at the toes- no more than a couple of seconds- and also a little reduced at the medial malleoli, about 6-7 seconds. On position sensation testing, there are a couple of errors he does on both sides so it is not perfectly normal.  Finger-to-nose is done without dysmetria.  When he stands up, he " has a wide base and I think he has a positive Romberg sign.  I asked him then to lay supine in bed.  He does not have any tenderness in the hip or SI joint on palpation and KATIE maneuver was negative.     Review of his chart indicates that there was a suspicion of a diagnosis of polyneuropathy dating back 7 years ago when he saw Dr. Law, and he followed with him for a number of years.  However, an EMG was never done.  He did have several labs for neuropathy workup over the years including vitamin B12 multiple times, which was always normal, vitamin E and the latest result showed borderline reduced gamma tocopherol at 0.4 but normal alpha tocopherol at 22.2.  He has not had a copper level.  TSH within normal limits.  He does not have diabetes, A1c is less than 6%.  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  He also had a CSF examination when he was evaluated for NPH, which showed elevated protein at 320 mg/dl, but this was quite a traumatic tap.  Lyme serologies were negative.  Hepatitis C serology tested in 2015 was negative.     In summary, Mr. Ramon's myasthenia is overall stable, and I am not making any changes to his treatment with 10 mg of daily prednisone and 60 mg b.i.d. of pyridostigmine.     His restless legs syndrome is worse and he is experiencing augmentation.  I called Ms. Mcnelil from [facility] today and requested new orders for gabapentin 300 mg b.i.d. and tapering the pramipexole dose.  Gabapentin dose can be gradually increased in the evening to 600 mg on week 2 and even 900 mg on week 3, until he gets better relief of this.     Regarding his unsteadiness when standing, this could be multifactorial.  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  I would like him to get a copper level, because copper deficiency is a treatable reason for sensory ataxia.  Otherwise, he had a good neuropathy workup without much else found. He has a monoclonal protein in his blood for more than 5 years, but it is likely a coincidental finding.  I doubt it is related to the neuropathy.      That said, I would like him to have an EMG to confirm the neuropathy, assess the severity, and make sure we are not dealing with a demyelinating disorder like CIDP, that would require specific treatment.  He agrees with this plan.      I will see him in followup in 2 months or sooner if needed.  He should continue physical therapy at his facility. Total time spent on this encounter today was 45 minutes, including 20 face-to-face with the patient, 10 on post-visit note dictation, editing and orders and 15 in pre-visit chart review.    Sincerely,    Tyler Wheat MD        D: 10/05/2022   T: 10/05/2022   MT: [email or web address]    Name:     DAISHA LOZOYA  MRN:      [id number]        Account:      [id number]   :      1940           Service Date: 10/05/2022       Document: [id number]

## 2022-10-10 ENCOUNTER — TELEPHONE (OUTPATIENT)
Dept: OPHTHALMOLOGY | Facility: CLINIC | Age: 82
End: 2022-10-10

## 2022-10-10 NOTE — TELEPHONE ENCOUNTER
[facility] Call Center    Phone Message    May a detailed message be left on voicemail: yes     Reason for Call: Other: Jelena, with [employer], called requesting a call back. She is wondering if there are any medications Gustavo should hold off on taking prior to his procedure. Please call [phone] to advise.      Action Taken: Other: eye    Travel Screening: Not Applicable

## 2022-10-14 ENCOUNTER — TELEPHONE (OUTPATIENT)
Dept: DERMATOLOGY | Facility: CLINIC | Age: 82
End: 2022-10-14

## 2022-10-25 ENCOUNTER — TELEPHONE (OUTPATIENT)
Dept: DERMATOLOGY | Facility: CLINIC | Age: 82
End: 2022-10-25

## 2022-10-25 ENCOUNTER — TELEPHONE (OUTPATIENT)
Dept: UROLOGY | Facility: CLINIC | Age: 82
End: 2022-10-25

## 2022-10-25 DIAGNOSIS — C61 MALIGNANT NEOPLASM OF PROSTATE (H): Primary | ICD-10-CM

## 2022-10-25 NOTE — TELEPHONE ENCOUNTER
[facility] Call [facility]    Phone Message    May a detailed message be left on voicemail: yes     Reason for Call: Other: Pt's wife called and wanted to know if we have received the pt's photos via email.  They sent it last night. Please call her back. Thanks     Action Taken: Message routed to:  Adult Clinics: Dermatology p [phone]    Travel Screening: Not Applicable

## 2022-10-25 NOTE — TELEPHONE ENCOUNTER
Per MD note, patient due for PSA 1 year from 8/21.  Order entered and faxed to number provided.  Wife contacted and advised.  Sheila Gagnon, CMA

## 2022-10-26 ENCOUNTER — VIRTUAL VISIT (OUTPATIENT)
Dept: DERMATOLOGY | Facility: CLINIC | Age: 82
End: 2022-10-26
Payer: MEDICARE

## 2022-10-26 DIAGNOSIS — D23.9 TRICHOEPITHELIOMA: Primary | ICD-10-CM

## 2022-10-26 PROCEDURE — 99441 PR PHYSICIAN TELEPHONE EVALUATION 5-10 MIN: CPT | Mod: 95 | Performed by: DERMATOLOGY

## 2022-10-26 NOTE — NURSING NOTE
Excision/Mohs previsit information                                                    Diagnosis: Features consistent with trichoepithelioma   Site(s): left upper cutaneous lip    Over the counter Chlorhexidine surgical soap to wash all skin below the belly button twice before surgery should be recommended for the following:  - Surgical sites below the waist  - Immunosuppressed  - Previous surgical site infection  - Anticipated wound care challenges    Medication & Allergy Information                                                      Review and update allergy and medication list.    Do you take the following medications:    Coumadin, Eliquis, Pradaxa, Xarelto:  NO   -If on Coumadin, INR should be checked within 7 days of surgery.  Range should be 3.5 or less or within therapeutic range.    Past Medical History                                                    Do you currently or have you previously had any of the following conditions:      Hepatitis:  NO    HIV/AIDS:  NO    Prolonged bleeding or bleeding disorder:  NO    Pacemaker or Defibrillator:  NO.      History of artificial or heart valve replacement:  NO    Endocarditis (inflammation of the inner lining of the heart's chambers and valves):  NO    Have you ever had a prosthetic joint infection:  NO    Pregnant or Breastfeeding:  NO    Mobility device (wheelchair, transfer difficulty): YES    Important Reminders:                                                        Ok to take all of their medications as prescribed    Patients can eat, no need to be fasting    Patient will not be able to get the site wet for 48 hrs    No submerging wound in standing water (lake, pool, bathtub, hot tub) for 2 weeks    No physical activity for 48 hrs (further restrictions will be discussed by MD at time of visit)    If any positives, send to RN for further review  Josie Fontaine LPN

## 2022-10-26 NOTE — PROGRESS NOTES
[facility] Dermatology Note  Encounter Date: Oct 26, 2022  Store-and-Forward and Telephone ([phone]). Location of teledermatologist: [facility].  Start time: 1221. End time: 1230.    Dermatology Problem List:  Last skin check 9/23/22, recommended next in 6 months  0. NUB - right tragus, left upper cutaneous lip, and left bicep, s/p bx 9/23/22  1. 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 Christiansen's note 11/21/11), s/p excision 5/12/09   2. Actinic keratoses  - s/p Efudex 2015, Fall 2020 to face, Spring 2021 to forearms  - s/p PDT (x3)  - s/p LN2  3. Seborrheic dermatitis  - clobetasol 0.05% solution for scalp, triam 0.1% cream for ears  4. Relevant medical history: MGUS, myasthenia gravis currently on prednisone  5. Tinea cruris  - current tx: ketoconazole cream  6. 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 He has a daughter and son. He is [place].  Family history: Negative for skin cancer  ____________________________________________    Assessment & Plan:     # L upper lip trichoepihelioma  Discussion of the diagnosis and treatment plan was had with the patient's wife. He is now in a nursing home.   The nature, risks, benefits, and alternatives to Mohs surgery were discussed. The patient would like to proceed with Mohs surgery.  Likely repair linear or sliding flap.   No indication for antibiotics.   He does not take anticoagulants.       Follow-up: Mohs scheduled 12/1/22    Staff:     Shashank  DO Valente    Department of Dermatology  [facility]: Phone: [phone], Fax:[phone]  [facility]: Phone: [phone], Fax: [phone]      ____________________________________________    CC: Consult For (MOHS lip)    HPI:  Mr. Gustavo Ramon is a(n) 82 year old male who presents today as a return patient for Mohs consultation for trichoepithelioma on the left upper lip.     Patient is otherwise feeling well, without additional skin concerns.    Labs Reviewed:  dermpath report 9/23/22 reviewed. Trichoepithelioma L upper cutaneous lip.     Physical Exam:  Vitals: There were no vitals taken for this visit.  SKIN: Teledermatology photos were reviewed; image quality and interpretability: acceptable. Image date: 10/26/22.  - faint pink macule left upper cutaneous lip  - No other lesions of concern on areas examined.     Medications:  Current Outpatient Medications   Medication     ACE/ARB/ARNI NOT PRESCRIBED (INTENTIONAL)     acetaminophen (TYLENOL) 325 MG tablet     aspirin (ASA) 325 MG tablet     bisacodyl (DULCOLAX) 10 MG suppository     cyanocobalamin (VITAMIN B-12) 1000 MCG tablet     ferrous sulfate (FEROSUL) 325 (65 Fe) MG tablet     ketoconazole (NIZORAL) 2 % external cream     levETIRAcetam (KEPPRA) 250 MG tablet     multivitamin (OCUVITE) TABS tablet     ondansetron (ZOFRAN) 4 MG tablet     pantoprazole (PROTONIX) 40 MG EC tablet     polyethylene glycol (MIRALAX) 17 g packet     pramipexole (MIRAPEX) 0.125 MG tablet     prednisoLONE acetate (PRED FORTE) 1 % ophthalmic suspension     predniSONE (DELTASONE) 10 MG tablet     propranolol (INDERAL) 20 MG tablet     propranolol ER (INDERAL LA) 60 MG 24 hr capsule     pyridostigmine (MESTINON) 60 MG tablet     senna-docusate (SENOKOT-S/PERICOLACE) 8.6-50 MG tablet     simvastatin (ZOCOR) 20 MG tablet     Sodium Chloride, Hypertonic,  (HARRY 128 OP)     tamsulosin (FLOMAX) 0.4 MG capsule     triamcinolone (KENALOG) 0.1 % external cream     vitamin D3 (CHOLECALCIFEROL) 2000 units tablet     Lidocaine (LIDOCARE) 4 % Patch     melatonin 3 MG tablet     oxyCODONE (ROXICODONE) 5 MG tablet     pramipexole (MIRAPEX) 0.25 MG tablet     propranolol (INDERAL) 10 MG tablet     No current facility-administered medications for this visit.      Past Medical/Surgical History:   Patient Active Problem List   Diagnosis     Rosacea     DJD (degenerative joint disease)     Ocular myasthenia gravis (H)     Macular pigment deposit     HYPERLIPIDEMIA LDL GOAL <130     IgA monoclonal gammopathy     Retinal hole OS, operculated     Horseshoe tear of retina without detachment OD     History  of basal cell carcinoma     Seborrhea     AK (actinic keratosis)     Malignant neoplasm of prostate (H)     PVD (posterior vitreous detachment)     Amaurosis fugax by Hx neg carotid W/U     Advanced directives, counseling/discussion     Actinic keratosis     Peripheral neuropathy     Nuclear sclerosis of both eyes     Essential hypertension with goal blood pressure less than 140/90     Gastroesophageal reflux disease without esophagitis     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)     Acute respiratory distress syndrome (ARDS) due to COVID-19 virus (H)     Steroid-induced diabetes mellitus, initial encounter (H)     Stage 3a chronic kidney disease (H)     Secondary adrenocortical insufficiency (H)     Physical deconditioning     NPH (normal pressure hydrocephalus) (H)     Myoclonus     Infection due to 2019 novel coronavirus     Encephalopathy     Dementia without behavioral disturbance, unspecified dementia type     Idiopathic normal pressure hydrocephalus (H)     Past Medical History:   Diagnosis Date     Actinic keratosis      AK (actinic keratosis) 11/15/2012     Amaurosis fugax      Basal cell carcinoma 2009    R medial cheek/nose     Central serous retinopathy left    Eye      Diverticulosis 08/2006     DJD (degenerative joint disease)      GERD (gastroesophageal reflux disease)      Hearing loss     High frequency     History of nonmelanoma skin cancer      History of nonmelanoma skin cancer      HTN (hypertension)      Hyperlipidemia LDL goal < 130      Hyperplastic colon polyp 08/2006     IgA monoclonal gammopathy     IgA kappa     Lattice degeneration of retina right    Eye     Macular degeneration      Nonsenile cataract      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    [facility] consult     Rosacea      Steroid-induced diabetes mellitus, 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     Strabismus      Vitreous detachment left    Eye       CC No referring provider defined for this encounter. on close of this encounter.

## 2022-10-26 NOTE — LETTER
10/26/2022         RE: Gustavo Ramon  [address] [facility] [address]        Dear Colleague,    Thank you for referring your patient, Gustavo Ramon, to the [facility]. Please see a copy of my visit note below.    [facility] Dermatology Note  Encounter Date: Oct 26, 2022  Store-and-Forward and Telephone ([phone]). Location of teledermatologist: [facility].  Start time: 1221. End time: 1230.    Dermatology Problem List:  Last skin check 9/23/22, recommended next in 6 months  0. NUB - right tragus, left upper cutaneous lip, and left bicep, s/p bx 9/23/22  1. 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 Christiansen's note 11/21/11), s/p excision 5/12/09   2. Actinic keratoses  - s/p Efudex 2015, Fall 2020 to face, Spring 2021 to forearms  - s/p PDT (x3)  - s/p LN2  3. Seborrheic dermatitis  - clobetasol 0.05% solution for scalp, triam 0.1% cream for ears  4. Relevant medical history: MGUS, myasthenia gravis currently on prednisone  5. Tinea cruris  - current tx: ketoconazole cream  6. 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 He has a daughter and son. He is [place].  Family history: Negative for skin cancer  ____________________________________________    Assessment & Plan:     # L upper lip trichoepihelioma  Discussion of the diagnosis and treatment plan was had with the patient's wife. He is now in a nursing home.   The nature, risks, benefits,  and alternatives to Mohs surgery were discussed. The patient would like to proceed with Mohs surgery.  Likely repair linear or sliding flap.   No indication for antibiotics.   He does not take anticoagulants.       Follow-up: Mohs scheduled 12/1/22    Staff:     Shashank Victor DO    Department of Dermatology  [facility]: Phone: [phone], Fax:[phone]  [facility]: Phone: [phone], Fax: [phone]      ____________________________________________    CC: Consult For (MOHS lip)    HPI:  Mr. Gustavo Ramon is a(n) 82 year old male who presents today as a return patient for Mohs consultation for trichoepithelioma on the left upper lip.     Patient is otherwise feeling well, without additional skin concerns.    Labs Reviewed:  dermpath report 9/23/22 reviewed. Trichoepithelioma L upper cutaneous lip.     Physical Exam:  Vitals: There were no vitals taken for this visit.  SKIN: Teledermatology photos were reviewed; image quality and interpretability: acceptable. Image date: 10/26/22.  - faint pink macule left upper cutaneous lip  - No other lesions of concern on areas examined.     Medications:  Current Outpatient Medications   Medication     ACE/ARB/ARNI NOT PRESCRIBED (INTENTIONAL)     acetaminophen (TYLENOL) 325 MG tablet     aspirin (ASA) 325 MG tablet     bisacodyl (DULCOLAX) 10 MG suppository     cyanocobalamin (VITAMIN B-12) 1000 MCG tablet     ferrous sulfate (FEROSUL) 325 (65 Fe) MG tablet     ketoconazole (NIZORAL) 2 % external cream     levETIRAcetam (KEPPRA) 250 MG tablet     multivitamin (OCUVITE) TABS tablet     ondansetron (ZOFRAN) 4 MG tablet     pantoprazole (PROTONIX) 40 MG EC tablet     polyethylene glycol (MIRALAX) 17 g packet     pramipexole (MIRAPEX) 0.125 MG tablet     prednisoLONE acetate (PRED FORTE) 1 % ophthalmic suspension     predniSONE (DELTASONE) 10 MG  tablet     propranolol (INDERAL) 20 MG tablet     propranolol ER (INDERAL LA) 60 MG 24 hr capsule     pyridostigmine (MESTINON) 60 MG tablet     senna-docusate (SENOKOT-S/PERICOLACE) 8.6-50 MG tablet     simvastatin (ZOCOR) 20 MG tablet     Sodium Chloride, Hypertonic, (HARRY 128 OP)     tamsulosin (FLOMAX) 0.4 MG capsule     triamcinolone (KENALOG) 0.1 % external cream     vitamin D3 (CHOLECALCIFEROL) 2000 units tablet     Lidocaine (LIDOCARE) 4 % Patch     melatonin 3 MG tablet     oxyCODONE (ROXICODONE) 5 MG tablet     pramipexole (MIRAPEX) 0.25 MG tablet     propranolol (INDERAL) 10 MG tablet     No current facility-administered medications for this visit.      Past Medical/Surgical History:   Patient Active Problem List   Diagnosis     Rosacea     DJD (degenerative joint disease)     Ocular myasthenia gravis (H)     Macular pigment deposit     HYPERLIPIDEMIA LDL GOAL <130     IgA monoclonal gammopathy     Retinal hole OS, operculated     Horseshoe tear of retina without detachment OD     History  of basal cell carcinoma     Seborrhea     AK (actinic keratosis)     Malignant neoplasm of prostate (H)     PVD (posterior vitreous detachment)     Amaurosis fugax by Hx neg carotid W/U     Advanced directives, counseling/discussion     Actinic keratosis     Peripheral neuropathy     Nuclear sclerosis of both eyes     Essential hypertension with goal blood pressure less than 140/90     Gastroesophageal reflux disease without esophagitis     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)     Acute respiratory distress syndrome (ARDS) due to COVID-19 virus (H)     Steroid-induced diabetes mellitus, initial encounter (H)     Stage 3a chronic kidney disease (H)     Secondary adrenocortical insufficiency (H)     Physical deconditioning     NPH (normal pressure hydrocephalus) (H)     Myoclonus     Infection due to 2019 novel coronavirus     Encephalopathy     Dementia without behavioral disturbance, unspecified dementia type      Idiopathic normal pressure hydrocephalus (H)     Past Medical History:   Diagnosis Date     Actinic keratosis      AK (actinic keratosis) 11/15/2012     Amaurosis fugax      Basal cell carcinoma 2009    R medial cheek/nose     Central serous retinopathy left    Eye     Diverticulosis 08/2006     DJD (degenerative joint disease)      GERD (gastroesophageal reflux disease)      Hearing loss     High frequency     History of nonmelanoma skin cancer      History of nonmelanoma skin cancer      HTN (hypertension)      Hyperlipidemia LDL goal < 130      Hyperplastic colon polyp 08/2006     IgA monoclonal gammopathy     IgA kappa     Lattice degeneration of retina right    Eye     Macular degeneration      Nonsenile cataract      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    [facility] consult     Rosacea      Steroid-induced diabetes mellitus, 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     Strabismus      Vitreous detachment left    Eye       CC No referring provider defined for this encounter. on close of this encounter.        Again, thank you for allowing me to participate in the care of your patient.        Sincerely,        Shashank Victor MD

## 2022-11-04 ENCOUNTER — OFFICE VISIT (OUTPATIENT)
Dept: UROLOGY | Facility: CLINIC | Age: 82
End: 2022-11-04
Payer: MEDICARE

## 2022-11-04 VITALS — OXYGEN SATURATION: 95 % | DIASTOLIC BLOOD PRESSURE: 76 MMHG | HEART RATE: 90 BPM | SYSTOLIC BLOOD PRESSURE: 137 MMHG

## 2022-11-04 DIAGNOSIS — C61 MALIGNANT NEOPLASM OF PROSTATE (H): Primary | ICD-10-CM

## 2022-11-04 DIAGNOSIS — R39.15 URINARY URGENCY: ICD-10-CM

## 2022-11-04 PROCEDURE — 99213 OFFICE O/P EST LOW 20 MIN: CPT | Mod: 25 | Performed by: UROLOGY

## 2022-11-04 PROCEDURE — 51798 US URINE CAPACITY MEASURE: CPT | Performed by: UROLOGY

## 2022-11-04 RX ORDER — GABAPENTIN 300 MG/1
300 CAPSULE ORAL EVERY MORNING
Status: ON HOLD | COMMUNITY
Start: 2022-10-16 | End: 2022-12-15

## 2022-11-04 NOTE — PROGRESS NOTES
No chief complaint on file.      Gustavo Ramon is a 82 year old male who presents today for follow up of   No chief complaint on file.   f/u for prostate cancer.  He is s/p cryotherapy on 7/13 for prostate cancer kari 8 with initial psa of 7.4.  He did receive a hormonal shot prior to treatment. Recent psa was 0.4.  He only has mild urgency.    Current Outpatient Medications   Medication Sig Dispense Refill     acetaminophen (TYLENOL) 325 MG tablet Take 1-2 tablets (325-650 mg) by mouth every 4 hours as needed for mild pain       aspirin (ASA) 325 MG tablet Take 1 tablet (325 mg) by mouth daily       bisacodyl (DULCOLAX) 10 MG suppository Place 10 mg rectally daily as needed for constipation       cyanocobalamin (VITAMIN B-12) 1000 MCG tablet Take 1 tablet (1,000 mcg) by mouth daily 90 tablet 1     ferrous sulfate (FEROSUL) 325 (65 Fe) MG tablet Use 1 tab every other day with orange juice 60 tablet 1     gabapentin (NEURONTIN) 300 MG capsule        levETIRAcetam (KEPPRA) 250 MG tablet Take 1 tablet (250 mg) by mouth 2 times daily       pantoprazole (PROTONIX) 40 MG EC tablet Take 40 mg by mouth daily       pramipexole (MIRAPEX) 0.25 MG tablet Take 0.25 mg by mouth       prednisoLONE acetate (PRED FORTE) 1 % ophthalmic suspension        predniSONE (DELTASONE) 10 MG tablet Take 1 tablet (10 mg) by mouth daily 30 tablet 0     simvastatin (ZOCOR) 20 MG tablet Take 1 tablet (20 mg) by mouth At Bedtime 90 tablet 1     tamsulosin (FLOMAX) 0.4 MG capsule Take 1 capsule (0.4 mg) by mouth daily       ACE/ARB/ARNI NOT PRESCRIBED (INTENTIONAL) Please choose reason not prescribed from choices below.       ketoconazole (NIZORAL) 2 % external cream Apply topically daily Apply topically to groin area and feet once daily 60 g 3     Lidocaine (LIDOCARE) 4 % Patch Apply to intact skin to cover most painful area for max 12hr per 24hr period. (Patient not taking: Reported on 8/1/2022)       melatonin 3 MG tablet Take 1 tablet (3  mg) by mouth At Bedtime (Patient not taking: Reported on 8/1/2022)       multivitamin (OCUVITE) TABS tablet Take 1 tablet by mouth daily       ondansetron (ZOFRAN) 4 MG tablet Take 4 mg by mouth every 8 hours as needed for nausea       oxyCODONE (ROXICODONE) 5 MG tablet Take 1 tablet (5 mg) by mouth every 6 hours as needed for moderate to severe pain (Patient not taking: Reported on 8/1/2022) 15 tablet 0     polyethylene glycol (MIRALAX) 17 g packet Take 17 g by mouth daily       pramipexole (MIRAPEX) 0.125 MG tablet Take 3 tablets (0.375 mg) by mouth At Bedtime 90 tablet 2     propranolol (INDERAL) 10 MG tablet Take 30 mg by mouth (Patient not taking: Reported on 8/1/2022)       propranolol (INDERAL) 20 MG tablet 2 times daily       propranolol ER (INDERAL LA) 60 MG 24 hr capsule Take 1 capsule (60 mg) by mouth daily 60 capsule 0     pyridostigmine (MESTINON) 60 MG tablet Take 1 tablet (60 mg) by mouth 3 times daily (Patient taking differently: Take 90 mg by mouth 2 times daily) 270 tablet 3     senna-docusate (SENOKOT-S/PERICOLACE) 8.6-50 MG tablet Take 1 tablet by mouth 2 times daily 60 tablet 0     Sodium Chloride, Hypertonic, (MASOOD 128 OP) Uses Masood gel and drops. Gel once a day at night. Drops 4 times a day.       triamcinolone (KENALOG) 0.1 % external cream Apply a thin layer up to twice daily to affected areas as needed. 30 g 3     vitamin D3 (CHOLECALCIFEROL) 2000 units tablet Take 1 tablet by mouth daily (Patient taking differently: Take 50 mcg by mouth daily) 90 tablet 1     Allergies   Allergen Reactions     Mycophenolate Other (See Comments)     Confusion, abnormal movements     Nkda [No Known Drug Allergies]       Past Medical History:   Diagnosis Date     Actinic keratosis      AK (actinic keratosis) 11/15/2012     Amaurosis fugax      Basal cell carcinoma 2009    R medial cheek/nose     Central serous retinopathy left    Eye     Diverticulosis 08/2006     DJD (degenerative joint disease)      GERD  (gastroesophageal reflux disease)      Hearing loss     High frequency     History of nonmelanoma skin cancer      History of nonmelanoma skin cancer      HTN (hypertension)      Hyperlipidemia LDL goal < 130      Hyperplastic colon polyp 08/2006     IgA monoclonal gammopathy     IgA kappa     Lattice degeneration of retina right    Eye     Macular degeneration      Nonsenile cataract      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    [facility] consult     Rosacea      Steroid-induced diabetes mellitus, 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     Strabismus      Vitreous detachment left    Eye     Past Surgical History:   Procedure Laterality Date     ARTHROSCOPY KNEE RT/LT Left Jan 2010    Knee     BIOPSY  2009/2013/2016    Nose; Prostate; BCC - left arm     COLONOSCOPY  9/24/2019    w/Endoscopy     COLONOSCOPY WITH CO2 INSUFFLATION N/A 9/24/2019    Procedure: COLONOSCOPY, WITH CO2 INSUFFLATION;  Surgeon: Loi Levine MD;  Location: [facility] OR     COMBINED ESOPHAGOSCOPY, GASTROSCOPY, DUODENOSCOPY (EGD) WITH CO2 INSUFFLATION N/A 9/24/2019    Procedure: ESOPHAGOGASTRODUODENOSCOPY, WITH CO2 INSUFFLATION;  Surgeon: Loi Levine MD;  Location: [facility] OR     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     DISKECTOMY, LUMBAR, SINGLE SP  2003    L2-3     ENT SURGERY  1943    Tonsils      ENT SURGERY  2-22-12    Biopsy lesion right pinna     ENT SURGERY  2-24-12    Biopsy leson right pinna     ESOPHAGOSCOPY, GASTROSCOPY, DUODENOSCOPY (EGD), COMBINED N/A 9/24/2019    Procedure: Esophagogastroduodenoscopy, With Biopsy;  Surgeon: Loi Levine MD;  Location: [facility] OR     IR LUMBAR DRAIN PLACEMENT W FLUORO  6/27/2022     LAPAROSCOPIC ASSISTED IMPLANT SHUNT VENTRICULOPERITONEAL N/A 7/7/2022    Procedure: possible INSERTION, SHUNT, VENTRICULOPERITONEAL, LAPAROSCOPY-ASSISTED;  Surgeon: Joe Damon MD;  Location: [facility] OR     OPTICAL TRACKING SYSTEM IMPLANT SHUNT VENTRICULOPERITONEAL N/A 7/7/2022    Procedure: INSERTION, SHUNT,  VENTRICULOPERITONEAL, USING OPTICAL TRACKING SYSTEM;  Surgeon: Jean-Paul Childs MD;  Location: [facility] OR     SOFT TISSUE SURGERY  May 2010    Basal Cell/right side nose     Family History   Problem Relation Age of Onset     Heart Disease Mother      Alzheimer Disease Mother 73     C.A.D. Father      Coronary Artery Disease Father      Diabetes Grandchild         using a pump      Diabetes Paternal Uncle      Heart Disease Paternal Grandmother      Glaucoma No family hx of      Macular Degeneration No family hx of      Melanoma No family hx of      Skin Cancer No family hx of      History     Social History     Marital Status:      Spouse Name: Ceci     Number of Children: 2     Years of Education: 16     Occupational History      Retired     2001, Electrical     Social History Main Topics     Smoking status: Never Smoker      Smokeless tobacco: Never Used     Alcohol Use: No     Drug Use: No     Sexual Activity: No     Other Topics Concern     Not on file     Social History Narrative       REVIEW OF SYSTEMS  =================  C: NEGATIVE for fever, chills, change in weight  I: NEGATIVE for worrisome rashes, moles or lesions  E/M: NEGATIVE for ear, mouth and throat problems  R: NEGATIVE for significant cough or SHORTNESS OF BREATH,   CV: NEGATIVE for chest pain, palpitations or peripheral edema  GI: NEGATIVE for nausea, abdominal pain, heartburn, or change in bowel habits  NEURO: NEGATIVE any motor/sensory changes  PSYCH: NEGATIVE for recent mood disorder    Physical Exam:  GENERAL: Healthy, alert and no distress  EYES: Eyes grossly normal to inspection.  No discharge or erythema, or obvious scleral/conjunctival abnormalities.  RESP: No audible wheeze, cough, or visible cyanosis.  No visible retractions or increased work of breathing.    SKIN: Visible skin clear. No significant rash, abnormal pigmentation or lesions.  NEURO: Cranial nerves grossly intact.  Mentation and speech appropriate for  age.  PSYCH: Mentation appears normal, affect normal/bright, judgement and insight intact, normal speech and appearance well-groomed.  Assessment/Plan:   (185) Malignant neoplasm of prostate  (primary encounter diagnosis)  Comment:  psa 0.4  Plan: see in 12 months with psa.

## 2022-11-21 ENCOUNTER — TELEPHONE (OUTPATIENT)
Dept: DERMATOLOGY | Facility: CLINIC | Age: 82
End: 2022-11-21

## 2022-11-21 NOTE — TELEPHONE ENCOUNTER
[facility] Call Center    Phone Message    May a detailed message be left on voicemail: yes     Reason for Call: Appointment Intake    Referring Provider Name: NA  Diagnosis and/or Symptoms: mohs lip  Pt canceled Appt for 12/01/22 and would like to reschedule sometime in Jan. Please call pt's wife Ceci, pt is in a long term living. Thanks     Action Taken: Message routed to:  [facility] Derm    Travel Screening: Not Applicable

## 2022-11-21 NOTE — TELEPHONE ENCOUNTER
Pt's wife called to reschedule pt's mohs procedure on 12/1, she wanted something in January. Writer was able to get pt scheduled for 1/9 at 8:30. Consult and checklist have been completed. Pt's wife denied having any questions or concerns.    Alem York RN on 11/21/2022 at 3:48 PM

## 2022-12-05 ENCOUNTER — LAB (OUTPATIENT)
Dept: LAB | Facility: CLINIC | Age: 82
End: 2022-12-05
Payer: MEDICARE

## 2022-12-05 ENCOUNTER — OFFICE VISIT (OUTPATIENT)
Dept: NEUROLOGY | Facility: CLINIC | Age: 82
End: 2022-12-05
Payer: MEDICARE

## 2022-12-05 VITALS
HEIGHT: 67 IN | OXYGEN SATURATION: 96 % | HEART RATE: 70 BPM | WEIGHT: 183 LBS | SYSTOLIC BLOOD PRESSURE: 156 MMHG | RESPIRATION RATE: 16 BRPM | BODY MASS INDEX: 28.72 KG/M2 | DIASTOLIC BLOOD PRESSURE: 82 MMHG

## 2022-12-05 DIAGNOSIS — G70.00 MYASTHENIA GRAVIS WITHOUT EXACERBATION (H): ICD-10-CM

## 2022-12-05 DIAGNOSIS — E61.1 IRON DEFICIENCY: ICD-10-CM

## 2022-12-05 DIAGNOSIS — E83.09 OTHER DISORDERS OF COPPER METABOLISM (H): ICD-10-CM

## 2022-12-05 DIAGNOSIS — G25.81 RESTLESS LEG SYNDROME: ICD-10-CM

## 2022-12-05 DIAGNOSIS — G91.2 NPH (NORMAL PRESSURE HYDROCEPHALUS) (H): ICD-10-CM

## 2022-12-05 DIAGNOSIS — M48.02 STENOSIS OF CERVICAL SPINE WITH MYELOPATHY (H): Primary | ICD-10-CM

## 2022-12-05 DIAGNOSIS — Z79.60 LONG-TERM USE OF IMMUNOSUPPRESSANT MEDICATION: ICD-10-CM

## 2022-12-05 DIAGNOSIS — R27.8 SENSORY ATAXIA: ICD-10-CM

## 2022-12-05 DIAGNOSIS — R79.9 ABNORMAL BLOOD CHEMISTRY: ICD-10-CM

## 2022-12-05 DIAGNOSIS — G70.00 OCULAR MYASTHENIA (H): ICD-10-CM

## 2022-12-05 DIAGNOSIS — Z79.52 LONG TERM CURRENT USE OF SYSTEMIC STEROIDS: ICD-10-CM

## 2022-12-05 DIAGNOSIS — G99.2 STENOSIS OF CERVICAL SPINE WITH MYELOPATHY (H): Primary | ICD-10-CM

## 2022-12-05 PROBLEM — U07.1 PNEUMONIA DUE TO COVID-19 VIRUS: Status: ACTIVE | Noted: 2021-11-15

## 2022-12-05 PROBLEM — M25.552 HIP PAIN, ACUTE, LEFT: Status: ACTIVE | Noted: 2022-12-05

## 2022-12-05 PROBLEM — U09.9 COVID-19 LONG HAULER: Status: ACTIVE | Noted: 2022-04-14

## 2022-12-05 PROBLEM — R35.0 URINARY FREQUENCY: Status: ACTIVE | Noted: 2022-04-14

## 2022-12-05 PROBLEM — A41.89 SEPSIS DUE TO COVID-19 (H): Status: ACTIVE | Noted: 2021-11-19

## 2022-12-05 PROBLEM — U07.1 SEPSIS DUE TO COVID-19 (H): Status: ACTIVE | Noted: 2021-11-19

## 2022-12-05 PROBLEM — R26.81 GAIT INSTABILITY: Status: ACTIVE | Noted: 2022-12-05

## 2022-12-05 PROBLEM — J96.01 ACUTE RESPIRATORY FAILURE WITH HYPOXIA (H): Status: ACTIVE | Noted: 2021-11-23

## 2022-12-05 PROBLEM — H10.10 ACUTE ATOPIC CONJUNCTIVITIS: Status: ACTIVE | Noted: 2022-06-06

## 2022-12-05 PROBLEM — J12.82 PNEUMONIA DUE TO COVID-19 VIRUS: Status: ACTIVE | Noted: 2021-11-15

## 2022-12-05 LAB
BASOPHILS # BLD AUTO: 0.1 10E3/UL (ref 0–0.2)
BASOPHILS NFR BLD AUTO: 1 %
EOSINOPHIL # BLD AUTO: 0.1 10E3/UL (ref 0–0.7)
EOSINOPHIL NFR BLD AUTO: 1 %
ERYTHROCYTE [DISTWIDTH] IN BLOOD BY AUTOMATED COUNT: 14.2 % (ref 10–15)
FERRITIN SERPL-MCNC: 61 NG/ML (ref 31–409)
HBA1C MFR BLD: 6.2 %
HCT VFR BLD AUTO: 45.5 % (ref 40–53)
HGB BLD-MCNC: 14.7 G/DL (ref 13.3–17.7)
IMM GRANULOCYTES # BLD: 0 10E3/UL
IMM GRANULOCYTES NFR BLD: 0 %
LYMPHOCYTES # BLD AUTO: 1.3 10E3/UL (ref 0.8–5.3)
LYMPHOCYTES NFR BLD AUTO: 14 %
MCH RBC QN AUTO: 28.8 PG (ref 26.5–33)
MCHC RBC AUTO-ENTMCNC: 32.3 G/DL (ref 31.5–36.5)
MCV RBC AUTO: 89 FL (ref 78–100)
MONOCYTES # BLD AUTO: 0.6 10E3/UL (ref 0–1.3)
MONOCYTES NFR BLD AUTO: 6 %
NEUTROPHILS # BLD AUTO: 7 10E3/UL (ref 1.6–8.3)
NEUTROPHILS NFR BLD AUTO: 78 %
NRBC # BLD AUTO: 0 10E3/UL
NRBC BLD AUTO-RTO: 0 /100
PLATELET # BLD AUTO: 201 10E3/UL (ref 150–450)
RBC # BLD AUTO: 5.1 10E6/UL (ref 4.4–5.9)
WBC # BLD AUTO: 9 10E3/UL (ref 4–11)

## 2022-12-05 PROCEDURE — 82728 ASSAY OF FERRITIN: CPT | Performed by: PSYCHIATRY & NEUROLOGY

## 2022-12-05 PROCEDURE — 85025 COMPLETE CBC W/AUTO DIFF WBC: CPT | Performed by: PATHOLOGY

## 2022-12-05 PROCEDURE — 82525 ASSAY OF COPPER: CPT | Mod: 90 | Performed by: PATHOLOGY

## 2022-12-05 PROCEDURE — 83036 HEMOGLOBIN GLYCOSYLATED A1C: CPT | Performed by: PSYCHIATRY & NEUROLOGY

## 2022-12-05 PROCEDURE — 99215 OFFICE O/P EST HI 40 MIN: CPT | Performed by: PSYCHIATRY & NEUROLOGY

## 2022-12-05 PROCEDURE — 99000 SPECIMEN HANDLING OFFICE-LAB: CPT | Performed by: PATHOLOGY

## 2022-12-05 PROCEDURE — 36415 COLL VENOUS BLD VENIPUNCTURE: CPT | Performed by: PATHOLOGY

## 2022-12-05 ASSESSMENT — PAIN SCALES - GENERAL: PAINLEVEL: NO PAIN (0)

## 2022-12-05 NOTE — PATIENT INSTRUCTIONS
1) Myasthenia gravis: No change in treatments, continue steroids and pyridostigmine    2) Imbalance: There could be more reasons than the hydrocephalus. I would like you to get an MRI of the cervical spine (neck) to rule out arthritis compressing the spinal cord. Will also get EMG on the 22nd to assess for neuropathy.    3) Restless legs: Let's check your iron stores (ferritin levels) again. If it is below 40-45 you should get extra iron.If it is normal, you should increase the gabapentin to 600 mg in the morning and 900 in the evening. Please ask Dr Mcneill to to that. If that still doesn't work, you may need to start taking an opioid like morphine or oxycodone.    Follow up 4 months

## 2022-12-05 NOTE — LETTER
2022       RE: Gustavo Ramon  [address] [facility] [id number]     Dear Colleague,    Thank you for referring your patient, Gustavo Ramon, to the [facility] [place] at [facility]. Please see a copy of my visit note below.    Service Date: 2022    Chelsea Mcneill NP    RE:  Gustavo Ramon  MRN:  [id number]  :  1940    Dear Ms. Mcneill:    I had the pleasure to see Mr. Ramon in followup at the [facility] today.  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  He is on prednisone 10 mg daily and pyridostigmine 60 mg b.i.d.  His diplopia went away with treatment.  Ptosis still happens a couple times a week, which is a bit bothersome but not that severe.  He has no dysphagia, dysarthria, difficulty chewing and no new weakness of his arms.  Mr. Ramon was also diagnosed with normal pressure hydrocephalus in 2022 based on the development of subacute cognitive impairment and a gait disorder with imbalance and freezing; he also noted some hand tremors.  Brain MRI showed no infarct, but there was ventriculomegaly out of proportion to atrophy.  He had a positive response to lumbar drain and underwent successful  shunt placement in 2022. His gait gradually improved, but he continues with some proximal leg weakness and difficulty getting up from low-seated chairs, and he also still feels unsteady when walking.  He has had 2 or 3 falls lately.  Previous neurological examination showed absent ankle reflexes and reduced vibration at the toes suggestive of underlying polyneuropathy, which was actually diagnosed by another neurologist a number of years ago.  Serological workup for the neuropathy was unrevealing, including copper levels that I checked recently.  He does not have  "diabetes.  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  When he was evaluated for NPH, he also had a spinal tap showing a very elevated protein at 320 mg per dL but no pleocytosis; that was a bit traumatic tap, however.      Mr. Ramon has quite severe restless leg syndrome.  It alternates between the right and left leg and occasionally happens in the arms and it can also happen in the morning hours.  This is distressing.  He used to be on pramipexole, but it was not very efficient and he developed augmentation, with symptoms occurring earlier in the morning and becoming more severe.  We gradually switched him from pramipexole to gabapentin.  He is now on 300 mg of gabapentin in the morning and 600 mg in the evening, but it still does not work well.  He has a history of iron deficiency and he is on ferrous sulfate, but his most recent ferritin levels from 08/2022 were 45, which is within normal limits.    Medications were reviewed and are as per Epic record.    BP (!) 156/82   Pulse 70   Resp 16   Ht 1.702 m (5' 7\")   Wt 83 kg (183 lb)   SpO2 96%   BMI 28.66 kg/m      PHYSICAL EXAMINATION:  I did not repeat a cranial nerve exam today.  His strength, however, is 5/5 for bilateral deltoids, biceps, triceps, wrist extensors, finger extensors and hand .  FDI is 5 on the right.  Interestingly, it is 4 on the left.  APB is slightly weak on the right at 4, normal on the left.  His hip flexor is a little weaker on the right compared to the left, I would say 4, on the left is 4+ to 5-.  Knee extension, knee flexion and foot dorsiflexion are bilaterally 5 and symmetric, and so is great toe extension.  His agility of foot tapping is slowed on the right foot compared to the left.  On the left, it is pretty brisk.  Agility of finger tapping is also slightly slower in the right compared to the left, which is interesting because he is right-handed.  " There is no dysmetria on finger-to-nose.  His upper extremities are hyperreflexic.  Triceps reflexes are 3+ with spread.  He does not have a pectoralis response, but he has a brisk bilateral Jorge L.  Biceps reflexes are diminished on the right and 1+ on the left.  Brachioradialis reflexes are brisk on both sides.  Knee reflexes are brisk with crossed adductor response on both sides, but ankle jerks are absent.  Right plantar response is mute, left is equivocal, questionable upgoing.  Proprioception is impaired at the left great toe, but probably normal on the right.  There is some asymmetry there.    In summary, Mr. Ramon presents with the above problems.  He has ocular myasthenia, but this is under decent control and I am not making any changes to his pyridostigmine and prednisone dose.  Prednisone is 10 mg daily.  We will check hemoglobin A1c today.  His restless legs is still problematic despite switching from pramipexole to gabapentin.  I will recheck a ferritin level today.  If it is below 40-45, we will increase his iron supplementation.  If it is normal, then we will increase his gabapentin to 600 mg in the morning and 900 mg in the evening.  This should be done by Dr. Mcneill.  If this does not work after 1-2 weeks, opioids should be considered.      His unsteadiness when walking is multifactorial.  I do not think it is only NPH related.  He may have a sensory neuropathy, and we will study this with EMG, which is coming on 12/22.  I also, however, noted hyperreflexia in the upper extremities and knees, and an asymmetric sensory and motor exam (regarding weakness and bradykinesia), which behooves me to rule out cervical spine stenosis.  We will get an MRI without contrast.  He has a shunt, which may need to be programmed after MRI and we will notify Neurosurgery.      Followup will be on 12/22 at the EMG Lab, and I will see him again in clinic in 4 months. Total time spent on this encounter today 42  minutes, of which 22 face-to-face, 10 in post-visit note dictation, editing and orders, and 10 in pre-visit chart review.      D: 2022   T: 2022   MT: yaima    Name:     DAISHA LOZOYA  MRN:      [id number]        Account:      [id number]   :      1940           Service Date: 2022       Document: [id number]          Again, thank you for allowing me to participate in the care of your patient.      Sincerely,    Tyler Wheat MD

## 2022-12-05 NOTE — NURSING NOTE
Chief Complaint   Patient presents with     Myasthenia Gravis     RECHECK       Oliverio Quintanilla, EMT

## 2022-12-06 NOTE — PROGRESS NOTES
Service Date: 2022    Chelsea Mcneill NP    RE:  Gustavo Ramon  MRN:  [id number]  :  1940    Dear Ms. Osito:    I had the pleasure to see Mr. Ramon in followup at the [facility] today.  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  He is on prednisone 10 mg daily and pyridostigmine 60 mg b.i.d.  His diplopia went away with treatment.  Ptosis still happens a couple times a week, which is a bit bothersome but not that severe.  He has no dysphagia, dysarthria, difficulty chewing and no new weakness of his arms.  Mr. Ramon was also diagnosed with normal pressure hydrocephalus in 2022 based on the development of subacute cognitive impairment and a gait disorder with imbalance and freezing; he also noted some hand tremors.  Brain MRI showed no infarct, but there was ventriculomegaly out of proportion to atrophy.  He had a positive response to lumbar drain and underwent successful  shunt placement in 2022. His gait gradually improved, but he continues with some proximal leg weakness and difficulty getting up from low-seated chairs, and he also still feels unsteady when walking.  He has had 2 or 3 falls lately.  Previous neurological examination showed absent ankle reflexes and reduced vibration at the toes suggestive of underlying polyneuropathy, which was actually diagnosed by another neurologist a number of years ago.  Serological workup for the neuropathy was unrevealing, including copper levels that I checked recently.  He does not have diabetes.  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  When he was evaluated for NPH, he also had a spinal tap showing a very elevated protein at 320 mg per dL but no pleocytosis; that was a bit traumatic tap, however.      Mr. Ramon has quite severe restless  "leg syndrome.  It alternates between the right and left leg and occasionally happens in the arms and it can also happen in the morning hours.  This is distressing.  He used to be on pramipexole, but it was not very efficient and he developed augmentation, with symptoms occurring earlier in the morning and becoming more severe.  We gradually switched him from pramipexole to gabapentin.  He is now on 300 mg of gabapentin in the morning and 600 mg in the evening, but it still does not work well.  He has a history of iron deficiency and he is on ferrous sulfate, but his most recent ferritin levels from 08/2022 were 45, which is within normal limits.    Medications were reviewed and are as per Epic record.    BP (!) 156/82   Pulse 70   Resp 16   Ht 1.702 m (5' 7\")   Wt 83 kg (183 lb)   SpO2 96%   BMI 28.66 kg/m      PHYSICAL EXAMINATION:  I did not repeat a cranial nerve exam today.  His strength, however, is 5/5 for bilateral deltoids, biceps, triceps, wrist extensors, finger extensors and hand .  FDI is 5 on the right.  Interestingly, it is 4 on the left.  APB is slightly weak on the right at 4, normal on the left.  His hip flexor is a little weaker on the right compared to the left, I would say 4, on the left is 4+ to 5-.  Knee extension, knee flexion and foot dorsiflexion are bilaterally 5 and symmetric, and so is great toe extension.  His agility of foot tapping is slowed on the right foot compared to the left.  On the left, it is pretty brisk.  Agility of finger tapping is also slightly slower in the right compared to the left, which is interesting because he is right-handed.  There is no dysmetria on finger-to-nose.  His upper extremities are hyperreflexic.  Triceps reflexes are 3+ with spread.  He does not have a pectoralis response, but he has a brisk bilateral Jorge L.  Biceps reflexes are diminished on the right and 1+ on the left.  Brachioradialis reflexes are brisk on both sides.  Knee reflexes " are brisk with crossed adductor response on both sides, but ankle jerks are absent.  Right plantar response is mute, left is equivocal, questionable upgoing.  Proprioception is impaired at the left great toe, but probably normal on the right.  There is some asymmetry there.    In summary, Mr. aRmon presents with the above problems.  He has ocular myasthenia, but this is under decent control and I am not making any changes to his pyridostigmine and prednisone dose.  Prednisone is 10 mg daily.  We will check hemoglobin A1c today.  His restless legs is still problematic despite switching from pramipexole to gabapentin.  I will recheck a ferritin level today.  If it is below 40-45, we will increase his iron supplementation.  If it is normal, then we will increase his gabapentin to 600 mg in the morning and 900 mg in the evening.  This should be done by Dr. Mcneill.  If this does not work after 1-2 weeks, opioids should be considered.      His unsteadiness when walking is multifactorial.  I do not think it is only NPH related.  He may have a sensory neuropathy, and we will study this with EMG, which is coming on .  I also, however, noted hyperreflexia in the upper extremities and knees, and an asymmetric sensory and motor exam (regarding weakness and bradykinesia), which behooves me to rule out cervical spine stenosis.  We will get an MRI without contrast.  He has a shunt, which may need to be programmed after MRI and we will notify Neurosurgery.      Followup will be on  at the EMG Lab, and I will see him again in clinic in 4 months. Total time spent on this encounter today 42 minutes, of which 22 face-to-face, 10 in post-visit note dictation, editing and orders, and 10 in pre-visit chart review.    Sincerely,    Tyler Wheat MD        D: 2022   T: 2022   MT: yaima    Name:     DAISHA RAMON  MRN:      [id number]        Account:      [id number]   :      1940            Service Date: 12/05/2022       Document: [id number]

## 2022-12-07 ENCOUNTER — APPOINTMENT (OUTPATIENT)
Dept: MRI IMAGING | Facility: CLINIC | Age: 82
DRG: 520 | End: 2022-12-07
Attending: EMERGENCY MEDICINE
Payer: MEDICARE

## 2022-12-07 ENCOUNTER — HOSPITAL ENCOUNTER (INPATIENT)
Facility: CLINIC | Age: 82
LOS: 8 days | Discharge: NURSING, REHAB OR LONG-TERM CARE | DRG: 520 | End: 2022-12-16
Attending: EMERGENCY MEDICINE | Admitting: PEDIATRICS
Payer: MEDICARE

## 2022-12-07 DIAGNOSIS — Z85.46 PERSONAL HISTORY OF MALIGNANT NEOPLASM OF PROSTATE: ICD-10-CM

## 2022-12-07 DIAGNOSIS — M48.02 SPINAL STENOSIS IN CERVICAL REGION: ICD-10-CM

## 2022-12-07 DIAGNOSIS — Z20.822 CONTACT WITH AND (SUSPECTED) EXPOSURE TO COVID-19: ICD-10-CM

## 2022-12-07 DIAGNOSIS — R29.898 WEAKNESS OF BOTH LOWER EXTREMITIES: ICD-10-CM

## 2022-12-07 DIAGNOSIS — Z98.2 PRESENCE OF CEREBROSPINAL FLUID DRAINAGE DEVICE: ICD-10-CM

## 2022-12-07 DIAGNOSIS — B35.9 RINGWORM: ICD-10-CM

## 2022-12-07 DIAGNOSIS — G25.81 RESTLESS LEGS SYNDROME (RLS): ICD-10-CM

## 2022-12-07 DIAGNOSIS — R53.1 WEAKNESS: ICD-10-CM

## 2022-12-07 DIAGNOSIS — G70.00 MYASTHENIA GRAVIS WITHOUT EXACERBATION (H): ICD-10-CM

## 2022-12-07 DIAGNOSIS — M48.02 CERVICAL STENOSIS OF SPINAL CANAL: ICD-10-CM

## 2022-12-07 DIAGNOSIS — G99.2 MYELOPATHY IN DISEASES CLASSIFIED ELSEWHERE (H): ICD-10-CM

## 2022-12-07 LAB
ALBUMIN SERPL BCG-MCNC: 4 G/DL (ref 3.5–5.2)
ALBUMIN UR-MCNC: NEGATIVE MG/DL
ALP SERPL-CCNC: 78 U/L (ref 40–129)
ALT SERPL W P-5'-P-CCNC: 25 U/L (ref 10–50)
ANION GAP SERPL CALCULATED.3IONS-SCNC: 13 MMOL/L (ref 7–15)
APPEARANCE UR: CLEAR
AST SERPL W P-5'-P-CCNC: 20 U/L (ref 10–50)
BACTERIA #/AREA URNS HPF: ABNORMAL /HPF
BASOPHILS # BLD AUTO: 0.1 10E3/UL (ref 0–0.2)
BASOPHILS NFR BLD AUTO: 1 %
BILIRUB SERPL-MCNC: 0.3 MG/DL
BILIRUB UR QL STRIP: NEGATIVE
BUN SERPL-MCNC: 19.6 MG/DL (ref 8–23)
CALCIUM SERPL-MCNC: 9.3 MG/DL (ref 8.8–10.2)
CHLORIDE SERPL-SCNC: 106 MMOL/L (ref 98–107)
COLOR UR AUTO: ABNORMAL
COPPER SERPL-MCNC: 75.4 UG/DL
CREAT SERPL-MCNC: 1.1 MG/DL (ref 0.67–1.17)
DEPRECATED HCO3 PLAS-SCNC: 25 MMOL/L (ref 22–29)
EOSINOPHIL # BLD AUTO: 0.1 10E3/UL (ref 0–0.7)
EOSINOPHIL NFR BLD AUTO: 1 %
ERYTHROCYTE [DISTWIDTH] IN BLOOD BY AUTOMATED COUNT: 14.6 % (ref 10–15)
GFR SERPL CREATININE-BSD FRML MDRD: 66 ML/MIN/1.73M2
GLUCOSE SERPL-MCNC: 91 MG/DL (ref 70–99)
GLUCOSE UR STRIP-MCNC: NEGATIVE MG/DL
HCT VFR BLD AUTO: 47.2 % (ref 40–53)
HGB BLD-MCNC: 15 G/DL (ref 13.3–17.7)
HGB UR QL STRIP: NEGATIVE
IMM GRANULOCYTES # BLD: 0 10E3/UL
IMM GRANULOCYTES NFR BLD: 0 %
KETONES UR STRIP-MCNC: NEGATIVE MG/DL
LEUKOCYTE ESTERASE UR QL STRIP: ABNORMAL
LYMPHOCYTES # BLD AUTO: 1.3 10E3/UL (ref 0.8–5.3)
LYMPHOCYTES NFR BLD AUTO: 15 %
MAGNESIUM SERPL-MCNC: 2.3 MG/DL (ref 1.7–2.3)
MCH RBC QN AUTO: 29 PG (ref 26.5–33)
MCHC RBC AUTO-ENTMCNC: 31.8 G/DL (ref 31.5–36.5)
MCV RBC AUTO: 91 FL (ref 78–100)
MONOCYTES # BLD AUTO: 0.6 10E3/UL (ref 0–1.3)
MONOCYTES NFR BLD AUTO: 6 %
MUCOUS THREADS #/AREA URNS LPF: PRESENT /LPF
NEUTROPHILS # BLD AUTO: 7.1 10E3/UL (ref 1.6–8.3)
NEUTROPHILS NFR BLD AUTO: 77 %
NITRATE UR QL: POSITIVE
NRBC # BLD AUTO: 0 10E3/UL
NRBC BLD AUTO-RTO: 0 /100
PH UR STRIP: 5.5 [PH] (ref 5–7)
PHOSPHATE SERPL-MCNC: 4 MG/DL (ref 2.5–4.5)
PLATELET # BLD AUTO: 229 10E3/UL (ref 150–450)
POTASSIUM SERPL-SCNC: 4.3 MMOL/L (ref 3.4–5.3)
PROT SERPL-MCNC: 6.5 G/DL (ref 6.4–8.3)
RBC # BLD AUTO: 5.17 10E6/UL (ref 4.4–5.9)
RBC URINE: 1 /HPF
SODIUM SERPL-SCNC: 144 MMOL/L (ref 136–145)
SP GR UR STRIP: 1.02 (ref 1–1.03)
TROPONIN T SERPL HS-MCNC: 26 NG/L
UROBILINOGEN UR STRIP-MCNC: NORMAL MG/DL
WBC # BLD AUTO: 9.2 10E3/UL (ref 4–11)
WBC URINE: 19 /HPF

## 2022-12-07 PROCEDURE — 36415 COLL VENOUS BLD VENIPUNCTURE: CPT | Performed by: EMERGENCY MEDICINE

## 2022-12-07 PROCEDURE — 72141 MRI NECK SPINE W/O DYE: CPT | Mod: 26 | Performed by: RADIOLOGY

## 2022-12-07 PROCEDURE — 99285 EMERGENCY DEPT VISIT HI MDM: CPT | Mod: 25 | Performed by: EMERGENCY MEDICINE

## 2022-12-07 PROCEDURE — C9803 HOPD COVID-19 SPEC COLLECT: HCPCS | Performed by: EMERGENCY MEDICINE

## 2022-12-07 PROCEDURE — 83735 ASSAY OF MAGNESIUM: CPT | Performed by: EMERGENCY MEDICINE

## 2022-12-07 PROCEDURE — 250N000013 HC RX MED GY IP 250 OP 250 PS 637: Performed by: EMERGENCY MEDICINE

## 2022-12-07 PROCEDURE — 85004 AUTOMATED DIFF WBC COUNT: CPT | Performed by: EMERGENCY MEDICINE

## 2022-12-07 PROCEDURE — 82310 ASSAY OF CALCIUM: CPT | Performed by: EMERGENCY MEDICINE

## 2022-12-07 PROCEDURE — 81001 URINALYSIS AUTO W/SCOPE: CPT | Performed by: EMERGENCY MEDICINE

## 2022-12-07 PROCEDURE — G1010 CDSM STANSON: HCPCS | Mod: GC | Performed by: RADIOLOGY

## 2022-12-07 PROCEDURE — 87086 URINE CULTURE/COLONY COUNT: CPT | Performed by: EMERGENCY MEDICINE

## 2022-12-07 PROCEDURE — 99285 EMERGENCY DEPT VISIT HI MDM: CPT | Performed by: EMERGENCY MEDICINE

## 2022-12-07 PROCEDURE — G1010 CDSM STANSON: HCPCS

## 2022-12-07 PROCEDURE — 96374 THER/PROPH/DIAG INJ IV PUSH: CPT | Performed by: EMERGENCY MEDICINE

## 2022-12-07 PROCEDURE — 84100 ASSAY OF PHOSPHORUS: CPT | Performed by: EMERGENCY MEDICINE

## 2022-12-07 PROCEDURE — 84484 ASSAY OF TROPONIN QUANT: CPT | Performed by: EMERGENCY MEDICINE

## 2022-12-07 PROCEDURE — 250N000011 HC RX IP 250 OP 636: Performed by: EMERGENCY MEDICINE

## 2022-12-07 PROCEDURE — 82550 ASSAY OF CK (CPK): CPT | Performed by: STUDENT IN AN ORGANIZED HEALTH CARE EDUCATION/TRAINING PROGRAM

## 2022-12-07 RX ORDER — GABAPENTIN 300 MG/1
300 CAPSULE ORAL ONCE
Status: COMPLETED | OUTPATIENT
Start: 2022-12-07 | End: 2022-12-07

## 2022-12-07 RX ORDER — LORAZEPAM 2 MG/ML
0.5 INJECTION INTRAMUSCULAR ONCE
Status: COMPLETED | OUTPATIENT
Start: 2022-12-07 | End: 2022-12-07

## 2022-12-07 RX ADMIN — LORAZEPAM 0.5 MG: 2 INJECTION INTRAMUSCULAR; INTRAVENOUS at 20:23

## 2022-12-07 RX ADMIN — GABAPENTIN 300 MG: 300 CAPSULE ORAL at 23:01

## 2022-12-07 ASSESSMENT — ACTIVITIES OF DAILY LIVING (ADL)
ADLS_ACUITY_SCORE: 36
ADLS_ACUITY_SCORE: 33

## 2022-12-07 NOTE — ED TRIAGE NOTES
Patient ambulatory to triage for increased generalized weakness. Patient had scheduled MRI in a few weeks but his PCP is worried the weakness is progressing to fast and he needs one sooner.

## 2022-12-07 NOTE — ED PROVIDER NOTES
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  He is followed by Dr. Wheat.   Pt contacted him regarding increased weakness and falls, Dr. Wheat recommended he come to the ED for MRI rule out cervical stenosis.  Patient reports over the past few weeks he has had increasing weakness particularly in his proximal lower extremity muscles.  He reports last night he was attempting to urinate and was using a urinal while kneeling.  He was unable to get up off the floor.  He denies any injuries.  He had a similar event about a week ago.  He reports that he has been eating well without nausea or vomiting.  He has had no fevers.  He denies chest pain or shortness of breath, but reports that he did have some dyspnea with trying to get up off the floor.  He denies increased head pain.  He reports that his gait problems have significantly improved since having the shunt placed last July.  He has no dysuria or urinary frequency.  Patient did have blood work done 2 days ago which was unremarkable.  Hemoglobin A1c was slightly elevated at 6.2.    Patient was seen 2 days ago in neurology clinic and noted to have some proximal lower extremity weakness associated with hyperreflexia and an MRI of the cervical spine was planned.  On having continued falls and difficulty with  weakness, his primary neurologist wished the test to be performed sooner.        EXAM: CT HEAD W/O CONTRAST  8/19/2022 8:43 AM      HISTORY:  Idiopathic normal pressure hydrocephalus (H)        COMPARISON:  Head CT 7/29/2022, 7/7/2022     TECHNIQUE: Using multidetector thin collimation helical acquisition  technique, axial, coronal and sagittal CT images from the skull base  to the vertex were obtained without intravenous contrast.   (topogram) image(s) also obtained and reviewed.     FINDINGS:  Postoperative changes right posterior approach  shunt with tip in  the body of the right lateral ventricle. Stable size of the  ventricular system with 14 mm anterior horn of the right lateral  ventricle, 13 mm anterior horn of the left lateral ventricle, 5 mm of  both lateral ventricular temporal horns, and 9 mm the third ventricle.  No periventricular edema. Basal cisterns are clear. The previously  visualized bifrontal extra-axial collections have resolved. No  intracranial hemorrhage, mass effect, or midline shift. No acute loss  of gray-white matter differentiation in the cerebral hemispheres. Mild  patchy periventricular and subcortical white matter hypoattenuation,  unchanged.     The bones of the skull base are normal. The visualized portions of the  paranasal sinuses and mastoid air cells are clear. Grossly normal  orbits.                                                                       IMPRESSION:   1. Stable shunted ventricular system.  2. Resolved bifrontal extra-axial collections. No new intracranial  hemorrhage or extra-axial collection.  3. Mild chronic small vessel ischemic disease    Past Medical History  Past Medical History:   Diagnosis Date     Actinic keratosis      AK (actinic keratosis) 11/15/2012     Amaurosis fugax      Basal cell carcinoma 2009    R medial cheek/nose     Central serous retinopathy left    Eye     Diverticulosis 08/2006     DJD (degenerative joint disease)      GERD  (gastroesophageal reflux disease)      Hearing loss     High frequency     History of nonmelanoma skin cancer      History of nonmelanoma skin cancer      HTN (hypertension)      Hyperlipidemia LDL goal < 130      Hyperplastic colon polyp 08/2006     IgA monoclonal gammopathy     IgA kappa     Lattice degeneration of retina right    Eye     Macular degeneration      Nonsenile cataract      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    [place] consult     Rosacea      Steroid-induced diabetes mellitus, 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     Strabismus      Vitreous detachment left    Eye     Past Surgical History:   Procedure Laterality Date     ARTHROSCOPY KNEE RT/LT Left Jan 2010    Knee     BIOPSY  2009/2013/2016    Nose; Prostate; BCC - left arm     COLONOSCOPY  9/24/2019    w/Endoscopy     COLONOSCOPY WITH CO2 INSUFFLATION N/A 9/24/2019    Procedure: COLONOSCOPY, WITH CO2 INSUFFLATION;  Surgeon: Loi Levine MD;  Location: [facility] OR     COMBINED ESOPHAGOSCOPY, GASTROSCOPY, DUODENOSCOPY (EGD) WITH CO2 INSUFFLATION N/A 9/24/2019    Procedure: ESOPHAGOGASTRODUODENOSCOPY, WITH CO2 INSUFFLATION;  Surgeon: Loi Levine MD;  Location: [facility] OR     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     DISKECTOMY, LUMBAR, SINGLE SP  2003    L2-3     ENT SURGERY  1943    Tonsils      ENT SURGERY  2-22-12    Biopsy lesion right pinna     ENT SURGERY  2-24-12    Biopsy leson right pinna     ESOPHAGOSCOPY, GASTROSCOPY, DUODENOSCOPY (EGD), COMBINED N/A 9/24/2019    Procedure: Esophagogastroduodenoscopy, With Biopsy;  Surgeon: Loi Levine MD;  Location: [facility] OR     IR LUMBAR DRAIN PLACEMENT W FLUORO  6/27/2022     LAPAROSCOPIC ASSISTED IMPLANT SHUNT VENTRICULOPERITONEAL N/A 7/7/2022    Procedure: possible INSERTION, SHUNT, VENTRICULOPERITONEAL, LAPAROSCOPY-ASSISTED;  Surgeon: Joe Damon MD;  Location: [facility] OR     OPTICAL TRACKING SYSTEM IMPLANT SHUNT VENTRICULOPERITONEAL N/A 7/7/2022    Procedure: INSERTION, SHUNT,  VENTRICULOPERITONEAL, USING OPTICAL TRACKING SYSTEM;  Surgeon: Jean-Paul Childs MD;  Location: [facility] OR     SOFT TISSUE SURGERY  May 2010    Basal Cell/right side nose     ACE/ARB/ARNI NOT PRESCRIBED (INTENTIONAL)  acetaminophen (TYLENOL) 325 MG tablet  aspirin (ASA) 325 MG tablet  bisacodyl (DULCOLAX) 10 MG suppository  cyanocobalamin (VITAMIN B-12) 1000 MCG tablet  ferrous sulfate (FEROSUL) 325 (65 Fe) MG tablet  gabapentin (NEURONTIN) 300 MG capsule  ketoconazole (NIZORAL) 2 % external cream  levETIRAcetam (KEPPRA) 250 MG tablet  Lidocaine (LIDOCARE) 4 % Patch  melatonin 3 MG tablet  multivitamin (OCUVITE) TABS tablet  ondansetron (ZOFRAN) 4 MG tablet  oxyCODONE (ROXICODONE) 5 MG tablet  pantoprazole (PROTONIX) 40 MG EC tablet  polyethylene glycol (MIRALAX) 17 g packet  pramipexole (MIRAPEX) 0.125 MG tablet  pramipexole (MIRAPEX) 0.25 MG tablet  prednisoLONE acetate (PRED FORTE) 1 % ophthalmic suspension  predniSONE (DELTASONE) 10 MG tablet  propranolol (INDERAL) 10 MG tablet  propranolol (INDERAL) 20 MG tablet  propranolol ER (INDERAL LA) 60 MG 24 hr capsule  pyridostigmine (MESTINON) 60 MG tablet  senna-docusate (SENOKOT-S/PERICOLACE) 8.6-50 MG tablet  simvastatin (ZOCOR) 20 MG tablet  Sodium Chloride, Hypertonic, (HARRY 128 OP)  tamsulosin (FLOMAX) 0.4 MG capsule  triamcinolone (KENALOG) 0.1 % external cream  vitamin D3 (CHOLECALCIFEROL) 2000 units tablet      Allergies   Allergen Reactions     Mycophenolate Other (See Comments)     Confusion, abnormal movements     Nkda [No Known Drug Allergies]      Family History  Family History   Problem Relation Age of Onset     Heart Disease Mother      Alzheimer Disease Mother 73     C.A.D. Father      Coronary Artery Disease Father      Diabetes Grandchild         using a pump      Diabetes Paternal Uncle      Heart Disease Paternal Grandmother      Glaucoma No family hx of      Macular Degeneration No family hx of      Melanoma No family hx of      Skin Cancer No  family hx of      Social History   Social History     Tobacco Use     Smoking status: Never     Smokeless tobacco: Never   Vaping Use     Vaping Use: Never used   Substance Use Topics     Alcohol use: No     Alcohol/week: 0.0 standard drinks     Drug use: No      Past medical history, past surgical history, medications, allergies, family history, and social history were reviewed with the patient. No additional pertinent items.       Review of Systems  A complete review of systems was performed with pertinent positives and negatives noted in the HPI, and all other systems negative.    Physical Exam   BP: 127/76  Pulse: 72  Temp: 97.8  F (36.6  C)  Resp: 16  SpO2: 98 %  Physical Exam  Vitals and nursing note reviewed.   Constitutional:       General: He is not in acute distress.     Appearance: He is well-developed and well-nourished. He is not diaphoretic.   HENT:      Head: Normocephalic and atraumatic.      Mouth/Throat:      Mouth: Oropharynx is clear and moist.   Eyes:      General: No scleral icterus.     Pupils: Pupils are equal, round, and reactive to light.   Cardiovascular:      Rate and Rhythm: Normal rate and regular rhythm.      Pulses: Intact distal pulses.      Heart sounds: Normal heart sounds. No murmur heard.  Pulmonary:      Effort: No respiratory distress.      Breath sounds: No stridor. No wheezing or rales.   Abdominal:      General: Bowel sounds are normal.      Palpations: Abdomen is soft.      Tenderness: There is no abdominal tenderness.   Musculoskeletal:         General: No tenderness or edema.      Cervical back: Normal range of motion and neck supple.   Skin:     General: Skin is warm and dry.      Coloration: Skin is not pale.      Findings: No erythema or rash.   Neurological:      Mental Status: He is alert and oriented to person, place, and time.      Sensory: No sensory deficit.      Motor: Motor strength is normal. Weakness (proximal lower extremity, hip flexors) present.       Coordination: Coordination normal.      Deep Tendon Reflexes: Reflexes abnormal (hyperreflexia).   Psychiatric:         Mood and Affect: Mood and affect normal.         ED Course      Procedures             Results for orders placed or performed during the hospital encounter of 12/07/22   Comprehensive metabolic panel     Status: Normal   Result Value Ref Range    Sodium 144 136 - 145 mmol/L    Potassium 4.3 3.4 - 5.3 mmol/L    Chloride 106 98 - 107 mmol/L    Carbon Dioxide (CO2) 25 22 - 29 mmol/L    Anion Gap 13 7 - 15 mmol/L    Urea Nitrogen 19.6 8.0 - 23.0 mg/dL    Creatinine 1.10 0.67 - 1.17 mg/dL    Calcium 9.3 8.8 - 10.2 mg/dL    Glucose 91 70 - 99 mg/dL    Alkaline Phosphatase 78 40 - 129 U/L    AST 20 10 - 50 U/L    ALT 25 10 - 50 U/L    Protein Total 6.5 6.4 - 8.3 g/dL    Albumin 4.0 3.5 - 5.2 g/dL    Bilirubin Total 0.3 <=1.2 mg/dL    GFR Estimate 66 >60 mL/min/1.73m2   Magnesium     Status: Normal   Result Value Ref Range    Magnesium 2.3 1.7 - 2.3 mg/dL   Phosphorus     Status: Normal   Result Value Ref Range    Phosphorus 4.0 2.5 - 4.5 mg/dL   Troponin T, High Sensitivity     Status: Abnormal   Result Value Ref Range    Troponin T, High Sensitivity 26 (H) <=22 ng/L   CBC with platelets and differential     Status: None   Result Value Ref Range    WBC Count 9.2 4.0 - 11.0 10e3/uL    RBC Count 5.17 4.40 - 5.90 10e6/uL    Hemoglobin 15.0 13.3 - 17.7 g/dL    Hematocrit 47.2 40.0 - 53.0 %    MCV 91 78 - 100 fL    MCH 29.0 26.5 - 33.0 pg    MCHC 31.8 31.5 - 36.5 g/dL    RDW 14.6 10.0 - 15.0 %    Platelet Count 229 150 - 450 10e3/uL    % Neutrophils 77 %    % Lymphocytes 15 %    % Monocytes 6 %    % Eosinophils 1 %    % Basophils 1 %    % Immature Granulocytes 0 %    NRBCs per 100 WBC 0 <1 /100    Absolute Neutrophils 7.1 1.6 - 8.3 10e3/uL    Absolute Lymphocytes 1.3 0.8 - 5.3 10e3/uL    Absolute Monocytes 0.6 0.0 - 1.3 10e3/uL    Absolute Eosinophils 0.1 0.0 - 0.7 10e3/uL    Absolute Basophils 0.1 0.0 - 0.2  10e3/uL    Absolute Immature Granulocytes 0.0 <=0.4 10e3/uL    Absolute NRBCs 0.0 10e3/uL     Medications - No data to display     Assessments & Plan (with Medical Decision Making)   82 year old male with history of 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  He had a recent neurology visit 2 days ago and was found to have some proximal lower extremity weakness associate with hyperreflexia, and an MRI of the cervical spine was planned.  Patient had another episode last night where he decided not to use his walker and was kneeling on the floor trying to use a urinal and could not get up off the floor.  He had no injury.  He has no pain.    On exam, patient has a blood pressure 127/76 with a temperature 97.8 and pulse rate of 72.  Respirations are 16 with O2 saturations of 98%.  He does have some proximal lower extremity muscle weakness as well as hyperreflexia.    Patient does report improvement in ambulation since a  shunt was placed last July for [facility].    An MRI was ordered.  Blood work will also be performed.    Patient's white count returned at 9.2 with a hemoglobin of 15.0.    Comprehensive metabolic profile is unremarkable.  Phosphorus and magnesium are normal which would not explain his weakness.    High-sensitivity troponin returned at 26.    Patient had some of his restless leg syndrome which switched from his right to his left and then back again.  He was concerned about lying still in the MRI scanner for the test.  He will be given a small amount of Ativan.    MRI is still pending.  Patient will be signed out to Dr. Carolina who will follow-up on the MRI findings.  If they are  positive, patient could be admitted to the hospital for neurologic and/or neurosurgical evaluation and if negative, patient has an outpatient care plan which includes EMG and neurologic follow-up.    I have reviewed the nursing notes. I have reviewed the findings, diagnosis, plan and need for follow up with the patient.    New Prescriptions    No medications on file       Final diagnoses:   Weakness of both lower extremities   Cervical stenosis of spinal canal       I, Krista Cotter, am serving as a trained medical scribe to document services personally performed by Joe Blake MD based on the provider's statements to me on December 7, 2022.  This document has been checked and approved by the attending provider.    I, Joe Blake MD, was physically present and have reviewed and verified the accuracy of this note documented by Krista Cotter, medical scribe.      Joe Blake MD     [facility] EMERGENCY DEPARTMENT  12/7/2022     Joe Blake MD  12/08/22 0819

## 2022-12-07 NOTE — LETTER
"Transition Communication Hand-off for Care Transitions to Next Level of Care Provider    Name: Gustavo Ramon  : 1940  MRN #: [id number]  Primary Care Provider: Chelsea Mcneill     Primary Clinic: [address] [facility] [address]     Reason for Hospitalization:  Cervical stenosis of spinal canal [M48.02]  Weakness of both lower extremities [R29.898]  Admit Date/Time: 2022  4:18 PM  Discharge Date: 2022  Payor Source: Payor: MEDICARE / Plan: MEDICARE / Product Type: Medicare /              Reason for Communication Hand-off Referral:    Notification of the discharge plan    Discharge Plan:     Care Management Discharge Note     Discharge Date: 2022        Discharge Disposition: [facility] and [facility] ([phone])     Discharge Services:  Short term rehab placement at [facility] and [facility]     Discharge DME:  Not applicable at this time     Discharge Transportation: fSW spoke with pt's floor nurse (Alem) who indicates that pt can travel by w/c.  SW arranged for  "[employer] [facility] ([place] [phone]) to provide private pay w/c transport at 1pm     Private pay costs discussed:  Pt's wife has been informed that the cost of transport from [facility] to [facility] and [facility] is private pay     PAS Confirmation Code: [id number]   Patient/family educated on Medicare website which has current facility and service quality ratings:  yes     Education Provided on the Discharge Plan:  yes  Persons Notified of Discharge Plans: pt, wife, [facility] nursing and Dr. Curran  Patient/Family in Agreement with the Plan:  yes  Handoff Referral Completed: Yes     Additional Information:  - Dr. Curran has confirmed readiness for discharge  - Admissions (Jena[facility]) at [facility] and [facility] has confirmed acceptance for admit  - SW completed \"Important Message from Medicare\" with pt's wife via phone call  - SW faxed pt's " completed/signed discharge orders and discharge summary to [facility] and [facility]  - Arrangements had been in place for pt to discharge to [facility] today at 2, however, arrangements cancelled as pt's wife changed mind this am and requested [facility], who assessed and approved pt for admit  - [facility] will be providing pt with a private room that has a extra daily private pay charge of $12.00  Per day.  SW informed pt's wife of this charge and pt's wife states that they will accept this charge.     ANA LILIA Roldan  Social Work, 6A  Phone:  [phone]  Pager:  [phone]  12/16/2022

## 2022-12-08 ENCOUNTER — APPOINTMENT (OUTPATIENT)
Dept: CT IMAGING | Facility: CLINIC | Age: 82
DRG: 520 | End: 2022-12-08
Attending: EMERGENCY MEDICINE
Payer: MEDICARE

## 2022-12-08 PROBLEM — R29.898 WEAKNESS OF BOTH LOWER EXTREMITIES: Status: ACTIVE | Noted: 2022-12-08

## 2022-12-08 LAB
CK SERPL-CCNC: 160 U/L (ref 39–308)
FLUAV RNA SPEC QL NAA+PROBE: NEGATIVE
FLUBV RNA RESP QL NAA+PROBE: NEGATIVE
RSV RNA SPEC NAA+PROBE: NEGATIVE
SARS-COV-2 RNA RESP QL NAA+PROBE: NEGATIVE

## 2022-12-08 PROCEDURE — 99221 1ST HOSP IP/OBS SF/LOW 40: CPT | Mod: GC | Performed by: NEUROLOGICAL SURGERY

## 2022-12-08 PROCEDURE — 70450 CT HEAD/BRAIN W/O DYE: CPT | Mod: 26 | Performed by: RADIOLOGY

## 2022-12-08 PROCEDURE — 99223 1ST HOSP IP/OBS HIGH 75: CPT | Mod: AI | Performed by: PEDIATRICS

## 2022-12-08 PROCEDURE — G1010 CDSM STANSON: HCPCS | Performed by: RADIOLOGY

## 2022-12-08 PROCEDURE — 36415 COLL VENOUS BLD VENIPUNCTURE: CPT

## 2022-12-08 PROCEDURE — 120N000002 HC R&B MED SURG/OB UMMC

## 2022-12-08 PROCEDURE — 99222 1ST HOSP IP/OBS MODERATE 55: CPT | Mod: GC | Performed by: STUDENT IN AN ORGANIZED HEALTH CARE EDUCATION/TRAINING PROGRAM

## 2022-12-08 PROCEDURE — G1010 CDSM STANSON: HCPCS

## 2022-12-08 PROCEDURE — 93005 ELECTROCARDIOGRAM TRACING: CPT

## 2022-12-08 PROCEDURE — 250N000011 HC RX IP 250 OP 636: Performed by: EMERGENCY MEDICINE

## 2022-12-08 PROCEDURE — 250N000013 HC RX MED GY IP 250 OP 250 PS 637: Performed by: STUDENT IN AN ORGANIZED HEALTH CARE EDUCATION/TRAINING PROGRAM

## 2022-12-08 PROCEDURE — 93010 ELECTROCARDIOGRAM REPORT: CPT | Performed by: INTERNAL MEDICINE

## 2022-12-08 PROCEDURE — 250N000012 HC RX MED GY IP 250 OP 636 PS 637: Performed by: STUDENT IN AN ORGANIZED HEALTH CARE EDUCATION/TRAINING PROGRAM

## 2022-12-08 PROCEDURE — 36415 COLL VENOUS BLD VENIPUNCTURE: CPT | Performed by: STUDENT IN AN ORGANIZED HEALTH CARE EDUCATION/TRAINING PROGRAM

## 2022-12-08 PROCEDURE — 84484 ASSAY OF TROPONIN QUANT: CPT

## 2022-12-08 PROCEDURE — 87637 SARSCOV2&INF A&B&RSV AMP PRB: CPT | Performed by: STUDENT IN AN ORGANIZED HEALTH CARE EDUCATION/TRAINING PROGRAM

## 2022-12-08 PROCEDURE — 87040 BLOOD CULTURE FOR BACTERIA: CPT | Performed by: STUDENT IN AN ORGANIZED HEALTH CARE EDUCATION/TRAINING PROGRAM

## 2022-12-08 RX ORDER — ACETAMINOPHEN 325 MG/1
325-650 TABLET ORAL EVERY 4 HOURS PRN
Status: DISCONTINUED | OUTPATIENT
Start: 2022-12-08 | End: 2022-12-16 | Stop reason: HOSPADM

## 2022-12-08 RX ORDER — FERROUS SULFATE 325(65) MG
325 TABLET ORAL EVERY OTHER DAY
Status: DISCONTINUED | OUTPATIENT
Start: 2022-12-08 | End: 2022-12-16 | Stop reason: HOSPADM

## 2022-12-08 RX ORDER — TAMSULOSIN HYDROCHLORIDE 0.4 MG/1
0.4 CAPSULE ORAL DAILY
Status: DISCONTINUED | OUTPATIENT
Start: 2022-12-08 | End: 2022-12-16 | Stop reason: HOSPADM

## 2022-12-08 RX ORDER — ONDANSETRON 4 MG/1
4 TABLET, ORALLY DISINTEGRATING ORAL EVERY 6 HOURS PRN
Status: DISCONTINUED | OUTPATIENT
Start: 2022-12-08 | End: 2022-12-16 | Stop reason: HOSPADM

## 2022-12-08 RX ORDER — PROCHLORPERAZINE 25 MG
12.5 SUPPOSITORY, RECTAL RECTAL EVERY 12 HOURS PRN
Status: DISCONTINUED | OUTPATIENT
Start: 2022-12-08 | End: 2022-12-16 | Stop reason: HOSPADM

## 2022-12-08 RX ORDER — LEVETIRACETAM 250 MG/1
250 TABLET ORAL 2 TIMES DAILY
Status: DISCONTINUED | OUTPATIENT
Start: 2022-12-08 | End: 2022-12-16 | Stop reason: HOSPADM

## 2022-12-08 RX ORDER — AMOXICILLIN 250 MG
1 CAPSULE ORAL 2 TIMES DAILY
Status: DISCONTINUED | OUTPATIENT
Start: 2022-12-08 | End: 2022-12-13

## 2022-12-08 RX ORDER — GABAPENTIN 300 MG/1
300 CAPSULE ORAL AT BEDTIME
Status: DISCONTINUED | OUTPATIENT
Start: 2022-12-08 | End: 2022-12-16 | Stop reason: HOSPADM

## 2022-12-08 RX ORDER — PROCHLORPERAZINE MALEATE 5 MG
5 TABLET ORAL EVERY 6 HOURS PRN
Status: DISCONTINUED | OUTPATIENT
Start: 2022-12-08 | End: 2022-12-16 | Stop reason: HOSPADM

## 2022-12-08 RX ORDER — KETOCONAZOLE 20 MG/G
CREAM TOPICAL DAILY
Status: DISCONTINUED | OUTPATIENT
Start: 2022-12-08 | End: 2022-12-16 | Stop reason: HOSPADM

## 2022-12-08 RX ORDER — LANOLIN ALCOHOL/MO/W.PET/CERES
1000 CREAM (GRAM) TOPICAL DAILY
Status: DISCONTINUED | OUTPATIENT
Start: 2022-12-08 | End: 2022-12-16 | Stop reason: HOSPADM

## 2022-12-08 RX ORDER — BISACODYL 10 MG
10 SUPPOSITORY, RECTAL RECTAL DAILY PRN
Status: DISCONTINUED | OUTPATIENT
Start: 2022-12-08 | End: 2022-12-16 | Stop reason: HOSPADM

## 2022-12-08 RX ORDER — ONDANSETRON 4 MG/1
4 TABLET, FILM COATED ORAL EVERY 8 HOURS PRN
Status: DISCONTINUED | OUTPATIENT
Start: 2022-12-08 | End: 2022-12-08

## 2022-12-08 RX ORDER — PREDNISONE 10 MG/1
10 TABLET ORAL DAILY
Status: DISCONTINUED | OUTPATIENT
Start: 2022-12-08 | End: 2022-12-12

## 2022-12-08 RX ORDER — CEFTRIAXONE 1 G/1
1 INJECTION, POWDER, FOR SOLUTION INTRAMUSCULAR; INTRAVENOUS ONCE
Status: COMPLETED | OUTPATIENT
Start: 2022-12-08 | End: 2022-12-08

## 2022-12-08 RX ORDER — PANTOPRAZOLE SODIUM 40 MG/1
40 TABLET, DELAYED RELEASE ORAL DAILY
Status: DISCONTINUED | OUTPATIENT
Start: 2022-12-08 | End: 2022-12-16 | Stop reason: HOSPADM

## 2022-12-08 RX ORDER — VITS A,C,E/LUTEIN/MINERALS 300MCG-200
1 TABLET ORAL DAILY
Status: DISCONTINUED | OUTPATIENT
Start: 2022-12-08 | End: 2022-12-16 | Stop reason: HOSPADM

## 2022-12-08 RX ORDER — ONDANSETRON 2 MG/ML
4 INJECTION INTRAMUSCULAR; INTRAVENOUS EVERY 6 HOURS PRN
Status: DISCONTINUED | OUTPATIENT
Start: 2022-12-08 | End: 2022-12-16 | Stop reason: HOSPADM

## 2022-12-08 RX ORDER — ASPIRIN 325 MG
325 TABLET ORAL DAILY
Status: DISCONTINUED | OUTPATIENT
Start: 2022-12-08 | End: 2022-12-12

## 2022-12-08 RX ORDER — VITAMIN B COMPLEX
50 TABLET ORAL DAILY
Status: DISCONTINUED | OUTPATIENT
Start: 2022-12-08 | End: 2022-12-16 | Stop reason: HOSPADM

## 2022-12-08 RX ORDER — LIDOCAINE 40 MG/G
CREAM TOPICAL
Status: DISCONTINUED | OUTPATIENT
Start: 2022-12-08 | End: 2022-12-16 | Stop reason: HOSPADM

## 2022-12-08 RX ADMIN — Medication 90 MG: at 19:52

## 2022-12-08 RX ADMIN — LEVETIRACETAM 250 MG: 250 TABLET, FILM COATED ORAL at 19:52

## 2022-12-08 RX ADMIN — CEFTRIAXONE SODIUM 1 G: 1 INJECTION, POWDER, FOR SOLUTION INTRAMUSCULAR; INTRAVENOUS at 09:45

## 2022-12-08 RX ADMIN — SENNOSIDES AND DOCUSATE SODIUM 1 TABLET: 8.6; 5 TABLET ORAL at 19:52

## 2022-12-08 RX ADMIN — LEVETIRACETAM 250 MG: 250 TABLET, FILM COATED ORAL at 11:55

## 2022-12-08 RX ADMIN — SENNOSIDES AND DOCUSATE SODIUM 1 TABLET: 8.6; 5 TABLET ORAL at 11:11

## 2022-12-08 RX ADMIN — Medication 50 MCG: at 11:12

## 2022-12-08 RX ADMIN — Medication 1 TABLET: at 11:56

## 2022-12-08 RX ADMIN — Medication 1000 MCG: at 11:12

## 2022-12-08 RX ADMIN — PANTOPRAZOLE SODIUM 40 MG: 40 TABLET, DELAYED RELEASE ORAL at 11:12

## 2022-12-08 RX ADMIN — PRAMIPEXOLE DIHYDROCHLORIDE 0.38 MG: 0.25 TABLET ORAL at 21:08

## 2022-12-08 RX ADMIN — GABAPENTIN 300 MG: 300 CAPSULE ORAL at 21:08

## 2022-12-08 RX ADMIN — TAMSULOSIN HYDROCHLORIDE 0.4 MG: 0.4 CAPSULE ORAL at 11:12

## 2022-12-08 RX ADMIN — Medication 90 MG: at 11:56

## 2022-12-08 RX ADMIN — PREDNISONE 10 MG: 5 TABLET ORAL at 11:12

## 2022-12-08 RX ADMIN — KETOCONAZOLE: 20 CREAM TOPICAL at 11:58

## 2022-12-08 RX ADMIN — FERROUS SULFATE TAB 325 MG (65 MG ELEMENTAL FE) 325 MG: 325 (65 FE) TAB at 11:54

## 2022-12-08 ASSESSMENT — ACTIVITIES OF DAILY LIVING (ADL)
ADLS_ACUITY_SCORE: 38
ADLS_ACUITY_SCORE: 38
DRESSING/BATHING: BATHING DIFFICULTY, ASSISTANCE 1 PERSON;DRESSING DIFFICULTY, ASSISTANCE 1 PERSON
FALL_HISTORY_WITHIN_LAST_SIX_MONTHS: YES
ADLS_ACUITY_SCORE: 36
ADLS_ACUITY_SCORE: 42
CONCENTRATING,_REMEMBERING_OR_MAKING_DECISIONS_DIFFICULTY: NO
TOILETING_ASSISTANCE: TOILETING DIFFICULTY, ASSISTANCE 1 PERSON
ADLS_ACUITY_SCORE: 31
DRESS: 1-->ASSISTANCE (EQUIPMENT/PERSON) NEEDED
DIFFICULTY_EATING/SWALLOWING: NO
DRESS: 1-->ASSISTANCE (EQUIPMENT/PERSON) NEEDED (NOT DEVELOPMENTALLY APPROPRIATE)
TOILETING: 1-->ASSISTANCE (EQUIPMENT/PERSON) NEEDED (NOT DEVELOPMENTALLY APPROPRIATE)
ADLS_ACUITY_SCORE: 40
ADLS_ACUITY_SCORE: 38
ADLS_ACUITY_SCORE: 38
WALKING_OR_CLIMBING_STAIRS_DIFFICULTY: NO
TOILETING: 1-->ASSISTANCE (EQUIPMENT/PERSON) NEEDED
DRESSING/BATHING_DIFFICULTY: YES
DOING_ERRANDS_INDEPENDENTLY_DIFFICULTY: YES
TOILETING_ISSUES: YES
CHANGE_IN_FUNCTIONAL_STATUS_SINCE_ONSET_OF_CURRENT_ILLNESS/INJURY: YES
ADLS_ACUITY_SCORE: 30
ADLS_ACUITY_SCORE: 40
ADLS_ACUITY_SCORE: 38
EQUIPMENT_CURRENTLY_USED_AT_HOME: WALKER, STANDARD
BATHING: 1-->ASSISTANCE NEEDED
WEAR_GLASSES_OR_BLIND: NO
ADLS_ACUITY_SCORE: 42

## 2022-12-08 NOTE — UTILIZATION REVIEW
"  Admission Status; Secondary Review Determination     Under the authority of the Utilization Management Commitee, the utilization review process indicated a secondary review on the above patient. The review outcome is based on review of the medical records, discussions with staff, and applying clinical experience noted on the date of the review.     (x) Inpatient Status Appropriate - This patient's medical care is consistent with medical management for inpatient care and reasonable inpatient medical practice.     RATIONALE FOR DETERMINATION:   Per neurology provider note:  \"82 year old male with history of acetylcholine receptor antibody positive myasthenia gravis (see predominantly ocular symptoms) as well as normal pressure hydrocephalus s/p  shunt in July 2022 who presents with increasing falls and unsteadiness.     The patient is presenting with increasing weakness and falls that on exam appears to be largely in the deltoid and knee extension in the right hemibody.  He also has developed hyperreflexia more recently which is quite pronounced.  I noted similar exam findings to Dr. Wheat, who noted that he felt these findings were most likely related to a cervical spine lesion.  The patient has had marked worsening in his exam and quality of life based on chart review and in discussion with him in the last few weeks.  His cervical spine MRI is consistent with fairly advanced degenerative disease of the spine with severe spinal canal stenosis and myelopathic changes that may warrant decompression.  His findings of weakness are not consistent with a pattern of myopathy.  He has had no changes in his prednisone that might be consistent with a steroid-induced myopathy.  His myasthenia has previously been ocular in nature, and he has no symptoms consistent with this.  At this time, his weakness seems to be best accounted for by the most recent findings on cervical spine MRI which are further substantiated by his " "changes on examination compared to prior and his subjective increases in weakness and new radicular pain in the right upper extremity.\"    Vital signs notable for elevated blood pressures, o/w unremarkable.    Labs show abnormal UA    CT of the c-spine shows:  1. Marked cervical spondylosis, with severe spinal canal stenosis at   C5-C6 and moderate at C6-C7, with myelopathic T2 hyperintense cord   signal at C5-C6.   2. Extensive uncovertebral joint spurring and facet arthropathy with   multilevel lower cervical predominant severe left, moderate right   neural foraminal stenosis as above.    A several day hospitalization is anticipated.  It is suspected his symptoms are due to severe c-spine stenosis.  inpateint status appears appropriate.    At the time of admission with the information available to the attending physician more than 2 nights Hospital complex care was anticipated, based on patient risk of adverse outcome if treated as outpatient and complex care required. Inpatient admission is appropriate based on the Medicare guidelines.    The information on this document is developed by the utilization review team in order for the business office to ensure compliance. This only denotes the appropriateness of proper admission status and does not reflect the quality of care rendered.   The definitions of Inpatient Status and Observation Status used in making the determination above are those provided in the CMS Coverage Manual, Chapter 1 and Chapter 6, section 70.4.     Sincerely,     David Marshall MD  Utilization Review   Physician Advisor   [facility]          "

## 2022-12-08 NOTE — PHARMACY-ADMISSION MEDICATION HISTORY
Admission Medication History Completed by Pharmacy    See [facility] Admission Navigator for allergy information, preferred outpatient pharmacy, prior to admission medications and immunization status.     Medication History Sources:     Patient medication list provided by wife    Changes made to PTA medication list (reason):    Added: Prednisolone/ Moxifloxacin/ Nepafenac opth drops    Deleted: Ketoconazole cream, Propranolol, Lidocaine 2% cream, Miralax, Prednisolone 1% ophth suspension    Changed: Iron supplement (every other day --> daily)    Additional Information:  Prednisolone 1%/ Moxifloxacin 0.5%, Nepafenac 0.1% (Opth suspension - Non formulary)  1 drop to the Left eye at 0800, 1200, 1800, and 2200 for 7 days then to switch BID (on 12/14/2022)    Prior to Admission medications    Medication Sig Last Dose Taking? Auth Provider Long Term End Date   NONFORMULARY Apply 1 drop to eye 4 times daily Prednisolone 1%/ Moxifloxacin 0.5%, Nepafenac 0.1%  Uses 1 drop at 0800, 1200, 1800, and 2200  Yes Unknown, Entered By History     ACE/ARB/ARNI NOT PRESCRIBED (INTENTIONAL) Please choose reason not prescribed from choices below.   Winston Silverman PA-C Yes    acetaminophen (TYLENOL) 325 MG tablet Take 1-2 tablets (325-650 mg) by mouth every 4 hours as needed for mild pain   Micheal Salamanca CNP No    aspirin (ASA) 325 MG tablet Take 1 tablet (325 mg) by mouth daily   Mario Brown MD     bisacodyl (DULCOLAX) 10 MG suppository Place 10 mg rectally daily as needed for constipation   Micheal Salamanca CNP     cyanocobalamin (VITAMIN B-12) 1000 MCG tablet Take 1 tablet (1,000 mcg) by mouth daily   Winston Silverman PA-C     ferrous sulfate (FEROSUL) 325 (65 Fe) MG tablet Use 1 tab every other day with orange juice  Patient taking differently: Take 325 mg by mouth daily Use 1 tab daily with orange juice   Hakan Darden MD     gabapentin (NEURONTIN) 300 MG capsule Take 300 mg by mouth every morning   Reported,  Patient     levETIRAcetam (KEPPRA) 250 MG tablet Take 1 tablet (250 mg) by mouth 2 times daily   Micheal Salamanca CNP Yes    multivitamin (OCUVITE) TABS tablet Take 1 tablet by mouth daily   Micheal Salamanca CNP     pantoprazole (PROTONIX) 40 MG EC tablet Take 40 mg by mouth daily   Micheal Salamanca CNP     pramipexole (MIRAPEX) 0.125 MG tablet Take 3 tablets (0.375 mg) by mouth At Bedtime   Winston Silverman PA-C Yes    pramipexole (MIRAPEX) 0.25 MG tablet Take 0.25 mg by mouth   Reported, Patient Yes    predniSONE (DELTASONE) 10 MG tablet Take 1 tablet (10 mg) by mouth daily   Michael Paiz APRN CNP     pyridostigmine (MESTINON) 60 MG tablet Take 1 tablet (60 mg) by mouth 3 times daily  Patient taking differently: Take 90 mg by mouth 2 times daily   Winston Silverman PA-C Yes    senna-docusate (SENOKOT-S/PERICOLACE) 8.6-50 MG tablet Take 1 tablet by mouth 2 times daily   Mario Brown MD     simvastatin (ZOCOR) 20 MG tablet Take 1 tablet (20 mg) by mouth At Bedtime   Winston Silverman PA-C Yes    Sodium Chloride, Hypertonic, (MASOOD 128 OP) Place 0.25 inches into both eyes At Bedtime Uses Masood 128 ointment 5%. once a day at night   Reported, Patient     tamsulosin (FLOMAX) 0.4 MG capsule Take 1 capsule (0.4 mg) by mouth daily   Micheal Salamanca CNP     triamcinolone (KENALOG) 0.1 % external cream Apply a thin layer up to twice daily to affected areas as needed.   Kylee Estevez MD     vitamin D3 (CHOLECALCIFEROL) 2000 units tablet Take 1 tablet by mouth daily  Patient taking differently: Take 50 mcg by mouth daily   Winston Silverman PA-C Yes        Date completed: 12/08/22    Medication history completed by: Alfredo Monahan [facility]

## 2022-12-08 NOTE — ED NOTES
"Pt A/Ox4, calm/cooperative, Ax1 with walker to bedside for urinal. Wife at bedside. VS stable, afebrile and on RA. Will continue to monitor and POC.     Vitals: /76   Pulse 67   Temp 98  F (36.7  C) (Oral)   Resp 16   Ht 1.753 m (5' 9\")   Wt 83 kg (183 lb)   SpO2 95%   BMI 27.02 kg/m    BMI= Body mass index is 27.02 kg/m .    "

## 2022-12-08 NOTE — CONSULTS
[facility]       NEUROSURGERY CONSULTATION NOTE    This consultation was requested by Dr. Carolina from the ED service.    Reason for Consultation: Repeated falls and lower extremities weakness    HPI:    Gustavo Ramon is an 82 year old man with PMH significant for myasthenia gravis, currently on prednisone 10 mg daily and by pyridostigmine 60 mg twice daily.  Patient is also on daily aspirin 325 mg.  All the above medications were taken last night as per the patient and his wife.  Patient is also known case of polyneuropathy for which he was scheduled to undergo sensory EMG on December 22, 2022.  Patient presented after his neurologist recommended that the patient should come to the ED for MRI of the C-spine given his repeated falls and weakness in bilateral lower extremities.  Patient reports that he has been feeling weak in the hip region since the last 6 or 7 months which has been worsening in the last 3 to 4 weeks.  Patient also reports that he has been feeling weak in the right upper extremity since the last few months but it has not worsened in the last 2 to 3 weeks.  Patient also reports that he has been feeling numbness and tingling in bilateral hands involving all the fingers and the palm since last 2 months which has been worsening since last 2 to 3 weeks.  Patient reports that he has not fallen as such but whenever he tends to fall he kinds of balances himself which prevents him from hitting his head or neck or back region to the ground.  Of note, patient uses a stick to walk.  Patient reports that he feels weak and he feels pain in bilateral hip region mainly in the gluteal muscles region whenever he gets up to micturate in the middle of the night or early in the morning.  As per the patient and his wife his weakness is kind of resolved towards the end of the day and around middle of the day he is back to his baseline.    Of note, patient is also known  case of normal pressure hydrocephalus for which he received right parieto-occipital ventriculoperitoneal shunt-Certas at 4 in July 2022 with our neurosurgery team.  Patient and his wife reports that in terms of his cognition patient has dramatically improved after receiving the  shunt.    Patient reports that he has been taking steroids in the last many years and he has not faced any problems with steroids.  Of note, patient is also known case of restless syndrome for which she was recently started on gabapentin from pramipexole.  He reports that gabapentin has helped him.  Patient denies any bladder or bowel incontinence episode.    Patient has now presented to the [facility] ED for MRI of the C-spine for further evaluation management.    PAST MEDICAL HISTORY:   Past Medical History:   Diagnosis Date    Actinic keratosis     AK (actinic keratosis) 11/15/2012    Amaurosis fugax     Basal cell carcinoma 2009    R medial cheek/nose    Central serous retinopathy left    Eye    Diverticulosis 08/2006    DJD (degenerative joint disease)     GERD (gastroesophageal reflux disease)     Hearing loss     High frequency    History of nonmelanoma skin cancer     History of nonmelanoma skin cancer     HTN (hypertension)     Hyperlipidemia LDL goal < 130     Hyperplastic colon polyp 08/2006    IgA monoclonal gammopathy     IgA kappa    Lattice degeneration of retina right    Eye    Macular degeneration     Nonsenile cataract     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    [place] consult    Rosacea     Steroid-induced diabetes mellitus, 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    Strabismus     Vitreous detachment left    Eye       PAST SURGICAL HISTORY:   Past Surgical History:   Procedure Laterality Date    ARTHROSCOPY KNEE RT/LT Left Jan 2010    Knee    BIOPSY  2009/2013/2016    Nose; Prostate; BCC - left arm    COLONOSCOPY  9/24/2019    w/Endoscopy    COLONOSCOPY WITH CO2 INSUFFLATION N/A 9/24/2019    Procedure: COLONOSCOPY, WITH CO2  INSUFFLATION;  Surgeon: Loi eLvine MD;  Location: [facility] OR    COMBINED ESOPHAGOSCOPY, GASTROSCOPY, DUODENOSCOPY (EGD) WITH CO2 INSUFFLATION N/A 9/24/2019    Procedure: ESOPHAGOGASTRODUODENOSCOPY, WITH CO2 INSUFFLATION;  Surgeon: Loi Levine MD;  Location: [facility] OR    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    DISKECTOMY, LUMBAR, SINGLE SP  2003    L2-3    ENT SURGERY  1943    Tonsils     ENT SURGERY  2-22-12    Biopsy lesion right pinna    ENT SURGERY  2-24-12    Biopsy leson right pinna    ESOPHAGOSCOPY, GASTROSCOPY, DUODENOSCOPY (EGD), COMBINED N/A 9/24/2019    Procedure: Esophagogastroduodenoscopy, With Biopsy;  Surgeon: Loi Levine MD;  Location: [facility] OR    IR LUMBAR DRAIN PLACEMENT W FLUORO  6/27/2022    LAPAROSCOPIC ASSISTED IMPLANT SHUNT VENTRICULOPERITONEAL N/A 7/7/2022    Procedure: possible INSERTION, SHUNT, VENTRICULOPERITONEAL, LAPAROSCOPY-ASSISTED;  Surgeon: Joe Damon MD;  Location: [facility] OR    OPTICAL TRACKING SYSTEM IMPLANT SHUNT VENTRICULOPERITONEAL N/A 7/7/2022    Procedure: INSERTION, SHUNT, VENTRICULOPERITONEAL, USING OPTICAL TRACKING SYSTEM;  Surgeon: Jean-Paul Childs MD;  Location: [facility] OR    SOFT TISSUE SURGERY  May 2010    Basal Cell/right side nose       FAMILY HISTORY:   Family History   Problem Relation Age of Onset    Heart Disease Mother     Alzheimer Disease Mother 73    C.A.D. Father     Coronary Artery Disease Father     Diabetes Grandchild         using a pump     Diabetes Paternal Uncle     Heart Disease Paternal Grandmother     Glaucoma No family hx of     Macular Degeneration No family hx of     Melanoma No family hx of     Skin Cancer No family hx of        SOCIAL HISTORY:   Social History     Tobacco Use    Smoking status: Never    Smokeless tobacco: Never   Substance Use Topics    Alcohol use: No     Alcohol/week: 0.0 standard drinks       MEDICATIONS:  Current Outpatient Medications   Medication Sig Dispense Refill    ACE/ARB/ARNI NOT  PRESCRIBED (INTENTIONAL) Please choose reason not prescribed from choices below.      acetaminophen (TYLENOL) 325 MG tablet Take 1-2 tablets (325-650 mg) by mouth every 4 hours as needed for mild pain      aspirin (ASA) 325 MG tablet Take 1 tablet (325 mg) by mouth daily      bisacodyl (DULCOLAX) 10 MG suppository Place 10 mg rectally daily as needed for constipation      cyanocobalamin (VITAMIN B-12) 1000 MCG tablet Take 1 tablet (1,000 mcg) by mouth daily 90 tablet 1    ferrous sulfate (FEROSUL) 325 (65 Fe) MG tablet Use 1 tab every other day with orange juice 60 tablet 1    gabapentin (NEURONTIN) 300 MG capsule       ketoconazole (NIZORAL) 2 % external cream Apply topically daily Apply topically to groin area and feet once daily 60 g 3    levETIRAcetam (KEPPRA) 250 MG tablet Take 1 tablet (250 mg) by mouth 2 times daily      Lidocaine (LIDOCARE) 4 % Patch Apply to intact skin to cover most painful area for max 12hr per 24hr period. (Patient not taking: Reported on 8/1/2022)      melatonin 3 MG tablet Take 1 tablet (3 mg) by mouth At Bedtime (Patient not taking: Reported on 8/1/2022)      multivitamin (OCUVITE) TABS tablet Take 1 tablet by mouth daily      ondansetron (ZOFRAN) 4 MG tablet Take 4 mg by mouth every 8 hours as needed for nausea      oxyCODONE (ROXICODONE) 5 MG tablet Take 1 tablet (5 mg) by mouth every 6 hours as needed for moderate to severe pain (Patient not taking: Reported on 8/1/2022) 15 tablet 0    pantoprazole (PROTONIX) 40 MG EC tablet Take 40 mg by mouth daily      polyethylene glycol (MIRALAX) 17 g packet Take 17 g by mouth daily      pramipexole (MIRAPEX) 0.125 MG tablet Take 3 tablets (0.375 mg) by mouth At Bedtime 90 tablet 2    pramipexole (MIRAPEX) 0.25 MG tablet Take 0.25 mg by mouth      prednisoLONE acetate (PRED FORTE) 1 % ophthalmic suspension       predniSONE (DELTASONE) 10 MG tablet Take 1 tablet (10 mg) by mouth daily 30 tablet 0    propranolol (INDERAL) 10 MG tablet Take 30 mg  by mouth (Patient not taking: Reported on 8/1/2022)      propranolol (INDERAL) 20 MG tablet 2 times daily (Patient not taking: Reported on 12/5/2022)      propranolol ER (INDERAL LA) 60 MG 24 hr capsule Take 1 capsule (60 mg) by mouth daily (Patient not taking: Reported on 12/5/2022) 60 capsule 0    pyridostigmine (MESTINON) 60 MG tablet Take 1 tablet (60 mg) by mouth 3 times daily (Patient taking differently: Take 90 mg by mouth 2 times daily) 270 tablet 3    senna-docusate (SENOKOT-S/PERICOLACE) 8.6-50 MG tablet Take 1 tablet by mouth 2 times daily 60 tablet 0    simvastatin (ZOCOR) 20 MG tablet Take 1 tablet (20 mg) by mouth At Bedtime 90 tablet 1    Sodium Chloride, Hypertonic, (MASOOD 128 OP) Uses Masood gel and drops. Gel once a day at night. Drops 4 times a day.      tamsulosin (FLOMAX) 0.4 MG capsule Take 1 capsule (0.4 mg) by mouth daily      triamcinolone (KENALOG) 0.1 % external cream Apply a thin layer up to twice daily to affected areas as needed. 30 g 3    vitamin D3 (CHOLECALCIFEROL) 2000 units tablet Take 1 tablet by mouth daily (Patient taking differently: Take 50 mcg by mouth daily) 90 tablet 1       Allergies:  Allergies   Allergen Reactions    Mycophenolate Other (See Comments)     Confusion, abnormal movements    Nkda [No Known Drug Allergies]        ROS: 10 point ROS of systems including Constitutional, Eyes, Respiratory, Cardiovascular, Gastroenterology, Genitourinary, Integumentary, Muscularskeletal, Psychiatric were all negative except for pertinent positives noted in my HPI.    Physical exam:   Blood pressure (!) 152/79, pulse 65, temperature 97.8  F (36.6  C), temperature source Oral, resp. rate 16, SpO2 95 %.  General: awake and alert    Neurological examination:    Patient is full strength in all extremities other than right triceps where he is 4+ out of 5.  Bilateral Jorge L's present.  Bilateral patellar hyperreflexic with left 4+ and the right 3+.  Patient is hyperreflexic in bilateral  ankles with no signs of Babinski's.  Patient does not report any sensory impairment in any of the 4 extremities.  Patient reports numbness and tingling in all fingers in bilateral hands.    IMAGING:  MRI of the C-spine was reviewed:  Severe spinal stenosis at C5-C6 and C6-C7 level with T2 signal changes in the cord.    LABS:   Last Comprehensive Metabolic Panel:  Sodium   Date Value Ref Range Status   12/07/2022 144 136 - 145 mmol/L Final   05/19/2021 139 133 - 144 mmol/L Final     Potassium   Date Value Ref Range Status   12/07/2022 4.3 3.4 - 5.3 mmol/L Final   05/02/2022 4.2 3.5 - 5.0 mmol/L Final   05/19/2021 3.7 3.4 - 5.3 mmol/L Final     Chloride   Date Value Ref Range Status   12/07/2022 106 98 - 107 mmol/L Final   05/02/2022 104 98 - 107 mmol/L Final   05/19/2021 105 94 - 109 mmol/L Final     Carbon Dioxide   Date Value Ref Range Status   05/19/2021 28 20 - 32 mmol/L Final     Carbon Dioxide (CO2)   Date Value Ref Range Status   12/07/2022 25 22 - 29 mmol/L Final   05/02/2022 22 22 - 31 mmol/L Final     Anion Gap   Date Value Ref Range Status   12/07/2022 13 7 - 15 mmol/L Final   05/02/2022 13 5 - 18 mmol/L Final   05/19/2021 6 3 - 14 mmol/L Final     Glucose   Date Value Ref Range Status   12/07/2022 91 70 - 99 mg/dL Final   05/02/2022 79 70 - 125 mg/dL Final   05/19/2021 91 70 - 99 mg/dL Final     GLUCOSE BY METER POCT   Date Value Ref Range Status   07/07/2022 107 (H) 70 - 99 mg/dL Final     Urea Nitrogen   Date Value Ref Range Status   12/07/2022 19.6 8.0 - 23.0 mg/dL Final   05/02/2022 15 8 - 28 mg/dL Final   05/19/2021 24 7 - 30 mg/dL Final     Creatinine   Date Value Ref Range Status   12/07/2022 1.10 0.67 - 1.17 mg/dL Final   05/19/2021 1.18 0.66 - 1.25 mg/dL Final     GFR Estimate   Date Value Ref Range Status   12/07/2022 66 >60 mL/min/1.73m2 Final     Comment:     Effective December 21, 2021 eGFRcr in adults is calculated using the 2021 CKD-EPI creatinine equation which includes age and gender  (Tashi degroot al., [facility] DOI: 10.1056/ORUZjx4667101)   05/19/2021 57 (L) >60 mL/min/[1.73_m2] Final     Comment:     Non  GFR Calc  Starting 12/18/2018, serum creatinine based estimated GFR (eGFR) will be   calculated using the Chronic Kidney Disease Epidemiology Collaboration   (CKD-EPI) equation.       Calcium   Date Value Ref Range Status   12/07/2022 9.3 8.8 - 10.2 mg/dL Final   05/19/2021 8.7 8.5 - 10.1 mg/dL Final     Lab Results   Component Value Date    WBC 9.2 12/07/2022    WBC 12.2 09/22/2020     Lab Results   Component Value Date    RBC 5.17 12/07/2022    RBC 4.98 09/22/2020     Lab Results   Component Value Date    HGB 15.0 12/07/2022    HGB 14.4 09/22/2020     Lab Results   Component Value Date    HCT 47.2 12/07/2022    HCT 44.1 09/22/2020     Lab Results   Component Value Date    MCV 91 12/07/2022    MCV 89 09/22/2020     Lab Results   Component Value Date    MCH 29.0 12/07/2022    MCH 28.9 09/22/2020     Lab Results   Component Value Date    MCHC 31.8 12/07/2022    MCHC 32.7 09/22/2020     Lab Results   Component Value Date    RDW 14.6 12/07/2022    RDW 14.8 09/22/2020     Lab Results   Component Value Date     12/07/2022     09/22/2020     INR   Date Value Ref Range Status   06/27/2022 1.12 0.85 - 1.15 Final      aPTT   Date Value Ref Range Status   06/27/2022 31 22 - 38 Seconds Final      ASSESSMENT:    82-year-old male with known myasthenia gravis, polyneuropathy, currently on prednisone 10 mg daily and pyridostigmine 60 mg twice daily, aspirin 325 mg, gabapentin-the last time the medications were taken were yesterday night, who now presents with weakness in bilateral lower extremities and right upper extremity with numbness in bilateral hands involving all the fingers with bilateral Jorge L's and bilateral hyperreflexia in the knees with severe spinal stenosis at C5-C6 and C6-C7 level and MRI C-spine with T2 signal changes in the spinal  cord.    RECOMMENDATIONS:    -Agree with admission to medicine for further work-up and optimization  - Agree with neurology opinion to rule out proximal muscle weakness given the patient has a history of myasthenia gravis and is on long-term steroids as well  - Discussed with spine staff for surgical intervention for decompression for the severe spinal stenosis present at C5-C6 and C6-7 level  - We will update the primary team once the time for surgical procedure is finalized  - Please hold aspirin in anticipation of the surgical procedure  - PT/OT  - We will place the preoperative orders and orders regarding n.p.o. in anticipation of the surgery  - Serial neuro examination  - Please inform the neurosurgery team if there is any acute change in neurological examination    Matt Ramos  Neurosurgery Resident, PGY-2    The patient was discussed with Dr. Hair, neurosurgery chief resident, and Dr. Levin, neurosurgery staff.  I have seen this patient with the resident and formulated a plan and agree with this note.  AMP

## 2022-12-08 NOTE — ED NOTES
I took signout on the patient from Dr. Lopez.  This is a 82 year old male with lower extremity weakness. Patient was signed out to me pending MRI c-spine. This shows marked cervical spondylosis, with severe spinal canal stenosis at C5-C6 and moderate at C6-C7, with myelopathic T2 hyperintense cord signal at C5-C6. I discussed the case with Neurosurgery and Neurology who will see the patient. Patient was signed out to incoming physician pending their recommendations with plans for likely admission.      Jean-Paul Carolina,   12/08/22 [id number]

## 2022-12-08 NOTE — PLAN OF CARE
Goal Outcome Evaluation:      Plan of Care Reviewed With: patient, spouse    Overall Patient Progress: no changeOverall Patient Progress: no change    Outcome Evaluation: Pt arrived to unit from ED.  Assist x 1 with walker.  A&O and VSS on RA.  PIV SL. Regular diet.  Still needs 4 eyes skin assessment.  Spouse at bedside.  No c/o of pain.  Call light in reach, pt verbalized understanding to call for help before getting up.

## 2022-12-08 NOTE — H&P
[facility]    History and Physical - Medicine Service, MARO[facility] TEAM 3       Date of Admission:  12/7/2022    Assessment & Plan      Gustavo Ramon is a 82 year old male admitted on 12/7/2022 he has a past medical of 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      # Weakness and falls  # Frailty and debilitation  # Hx of NPH s/p  shunt (07/2022)  # Hx myasthenia gravis with predominant ocular symptoms  The patient presents with increased weakness and unsteadiness and 2 recent falls. Duration of increased weakness is unclear but seems to correspond with admission 10/15/22 for COVID19. Patient has a history of NPH treated with  shunt and reported improved gait following shunt placement although per chart review is still seemingly unsteady. CT head w/o contrast without acute pathology. 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 Cervical spinal canal stenosis most likely contributing to weakness and hyperreflexia noted on exam. He has reported impaired vibratory and positional sensation. Patient with bacteruria although is asymptomatic and w/o confusion so low concern for UTI and thus unlikely UTI exacerbating myasthenia gravis. Additionally has had predominant ocular symptoms and no reported diplopia on interview and no drooping eyelid on exam. Weakness improves throughout the day also making myasthenia gravis contributions less likely. Has denied lightheadedness and dizziness specifically with standing making orthostatic hypotension as the primary cause unlikely. He receives prednisone  for myasthenia gravis although no recent changes in dosage making steroid-induced myopathy unlikely. Overall, weakness and unsteadiness most likely multifactorial and related to severe cervical stenosis, sensory neuropathy, and age-related frailty and debilitation.  - Neurosurgery consult   - Surgery planned for Monday (12/12)  - Neurology consult  - PT/OT consult  - Hold aspirin in anticipation of surgical intervention  - PTA prednisone (10mg) and pyridostigmine (60mg) for myasthenia gravis    # Asymptomatic bacteruria  No reported dysuria, changes in frequency, hematuria. Some reported urinary retention but this is not new.   - Discontinue ceftriaxone  - F/u urine cultures  - Monitor for developing symptoms of UTI    # Elevated troponin  Noted at 26 on admission. No comparisons, denies chest pain.  - EKG     Chronic medical problems:  Restless leg syndrome, myoclonus - PTA pramipexole, levetiracetam  Neuropathy - PTA gabapentin  GERD - PTA pantoprazole  HLD - PTA simvastatin HELD  Urinary retention - PTA tamsulosin  Prostate cancer s/p cryotherapy (07/2022)  Prediabetes (Hghb A1c 6.2 - 12/5/22)  Constipation - PTA senna  Anemia - PTA ferrous sulfate        Diet:   Regular diet  DVT Prophylaxis: Pneumatic Compression Devices  Rodriguez Catheter: Not present  Fluids: None  Central Lines: None  Cardiac Monitoring: None  Code Status:  Full code      Disposition Plan      Expected Discharge Date: 12/10/2022                The patient's care was discussed with the Attending Physician, Dr. Harper.    Terry Wheeler  Medical Student  Medicine Service, [address]  Securely message with the [employer] Web Console (learn more here)  Text page via [facility] Paging/Directory   Please see signed in provider for up to date coverage information    Resident/Fellow Attestation   I, Donato So MD, was present with the medical/MARGARITO student who participated in the service and in  the documentation of the note.  I have verified the history and personally performed the physical exam and medical decision making.  I agree with the assessment and plan of care as documented in the note.      Donato So MD  PGY3  Date of Service (when I saw the patient): 12/08/22    ______________________________________________________________________    Chief Complaint   Weakness and falls    History is obtained from the patient    History of Present Illness   Gustavo Ramon is a 82 year old male who presents with weakness and falls. He has a history of normal pressure hydrocephalous s/p  shunt 07/2022. He reports improved gait following shunt placement until recently. Although per chart review, unsteadiness still prominent. He reports worsening weakness that seems to have started around the time of an admission for COVID19 in October 2022. He notes a recent fall yesterday and another fall around a week ago. He denies hitting his head and describes the falls as controlled falls. He denies any syncope, dizziness or lightheadedness. He states that he feels more weak on the right than the left. He notes right arm numbness, but he denies any tingling.     He reports frequent urination overnight, every 1.5 to 2 hours, this frequency is unchanged. No pain with urination. He is incontinent of urine and has been for a couple of years, he wears a brief that is generally wet when he changes it. He reports worsening urinary retention.     He denies any chest pain, palpitations, and SOB. He denies cough and night sweats. He denies visions changes.      ED course:  -Vitals: 97.8F, HR 72, blood pressure 127/76, respiratory rate 16, 97% on room air  -Labs: CBC and CMP unremarkable, UA nitrite urine +, leukocyte esterase large, bacteria in urine,   -Micro: Urine culture in process  -Imaging: CT head w/o contrast: no acute intracranial abnormality or significant change, compared to the prior study. MR C Spine w/o  contrast: Marked cervical spondylosis, with severe spinal canal stenosis at C5-C6 and moderate at C6-C7, with myelopathic T2 hyperintense cord signal at C5-C6. Extensive uncovertebral joint spurring and facet arthropathy with multilevel lower cervical predominant severe left, moderate right neural foraminal stenosis as above.  -Interventions: Neurology and Neurosurgery consult     Review of Systems    The 10 point Review of Systems is negative other than noted in the HPI or here.    Past Medical History    I have reviewed this patient's medical history and updated it with pertinent information if needed.   Past Medical History:   Diagnosis Date     Actinic keratosis      AK (actinic keratosis) 11/15/2012     Amaurosis fugax      Basal cell carcinoma 2009    R medial cheek/nose     Central serous retinopathy left    Eye     Diverticulosis 08/2006     DJD (degenerative joint disease)      GERD (gastroesophageal reflux disease)      Hearing loss     High frequency     History of nonmelanoma skin cancer      History of nonmelanoma skin cancer      HTN (hypertension)      Hyperlipidemia LDL goal < 130      Hyperplastic colon polyp 08/2006     IgA monoclonal gammopathy     IgA kappa     Lattice degeneration of retina right    Eye     Macular degeneration      Nonsenile cataract      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  Past Surgical History:   Procedure Laterality Date     ARTHROSCOPY KNEE RT/LT Left Jan 2010    Knee     BIOPSY  2009/2013/2016    Nose; Prostate; BCC - left arm     COLONOSCOPY  9/24/2019    w/Endoscopy     COLONOSCOPY WITH CO2 INSUFFLATION N/A 9/24/2019    Procedure: COLONOSCOPY, WITH CO2 INSUFFLATION;  Surgeon: Loi Levine  MD Mark;  Location: [facility] OR     COMBINED ESOPHAGOSCOPY, GASTROSCOPY, DUODENOSCOPY (EGD) WITH CO2 INSUFFLATION N/A 9/24/2019    Procedure: ESOPHAGOGASTRODUODENOSCOPY, WITH CO2 INSUFFLATION;  Surgeon: Loi Levine MD;  Location: [facility] OR     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     DISKECTOMY, LUMBAR, SINGLE SP  2003    L2-3     ENT SURGERY  1943    Tonsils      ENT SURGERY  2-22-12    Biopsy lesion right pinna     ENT SURGERY  2-24-12    Biopsy leson right pinna     ESOPHAGOSCOPY, GASTROSCOPY, DUODENOSCOPY (EGD), COMBINED N/A 9/24/2019    Procedure: Esophagogastroduodenoscopy, With Biopsy;  Surgeon: Loi Levine MD;  Location: [facility] OR     IR LUMBAR DRAIN PLACEMENT W FLUORO  6/27/2022     LAPAROSCOPIC ASSISTED IMPLANT SHUNT VENTRICULOPERITONEAL N/A 7/7/2022    Procedure: possible INSERTION, SHUNT, VENTRICULOPERITONEAL, LAPAROSCOPY-ASSISTED;  Surgeon: Joe Damon MD;  Location: [facility] OR     OPTICAL TRACKING SYSTEM IMPLANT SHUNT VENTRICULOPERITONEAL N/A 7/7/2022    Procedure: INSERTION, SHUNT, VENTRICULOPERITONEAL, USING OPTICAL TRACKING SYSTEM;  Surgeon: Jean-Paul Childs MD;  Location: [facility] OR     SOFT TISSUE SURGERY  May 2010    Basal Cell/right side nose        Social History   I have reviewed this patient's social history and updated it with pertinent information if needed. Gustavo Ramon  reports that he has never smoked. He has never used smokeless tobacco. He reports that he does not drink alcohol and does not use drugs. He lives with his wife in an assisted living facility.    Family History   I have reviewed this patient's family history and updated it with pertinent information if needed.  Family History   Problem Relation Age of Onset     Heart Disease Mother      Alzheimer Disease Mother 73     C.A.D. Father      Coronary Artery Disease Father      Diabetes Grandchild         using a pump      Diabetes Paternal Uncle      Heart Disease Paternal Grandmother      Glaucoma No  family hx of      Macular Degeneration No family hx of      Melanoma No family hx of      Skin Cancer No family hx of        Prior to Admission Medications   Prior to Admission Medications   Prescriptions Last Dose Informant Patient Reported? Taking?   ACE/ARB/ARNI NOT PRESCRIBED (INTENTIONAL)   No No   Sig: Please choose reason not prescribed from choices below.   Lidocaine (LIDOCARE) 4 % Patch   Yes No   Sig: Apply to intact skin to cover most painful area for max 12hr per 24hr period.   Patient not taking: Reported on 8/1/2022   Sodium Chloride, Hypertonic, (MASOOD 128 OP)   Yes No   Sig: Uses Masood gel and drops. Gel once a day at night. Drops 4 times a day.   acetaminophen (TYLENOL) 325 MG tablet   Yes No   Sig: Take 1-2 tablets (325-650 mg) by mouth every 4 hours as needed for mild pain   aspirin (ASA) 325 MG tablet   Yes No   Sig: Take 1 tablet (325 mg) by mouth daily   bisacodyl (DULCOLAX) 10 MG suppository   Yes No   Sig: Place 10 mg rectally daily as needed for constipation   cyanocobalamin (VITAMIN B-12) 1000 MCG tablet   No No   Sig: Take 1 tablet (1,000 mcg) by mouth daily   ferrous sulfate (FEROSUL) 325 (65 Fe) MG tablet   No No   Sig: Use 1 tab every other day with orange juice   gabapentin (NEURONTIN) 300 MG capsule   Yes No   ketoconazole (NIZORAL) 2 % external cream   No No   Sig: Apply topically daily Apply topically to groin area and feet once daily   levETIRAcetam (KEPPRA) 250 MG tablet   Yes No   Sig: Take 1 tablet (250 mg) by mouth 2 times daily   melatonin 3 MG tablet   Yes No   Sig: Take 1 tablet (3 mg) by mouth At Bedtime   Patient not taking: Reported on 8/1/2022   multivitamin (OCUVITE) TABS tablet   Yes No   Sig: Take 1 tablet by mouth daily   ondansetron (ZOFRAN) 4 MG tablet   Yes No   Sig: Take 4 mg by mouth every 8 hours as needed for nausea   oxyCODONE (ROXICODONE) 5 MG tablet   No No   Sig: Take 1 tablet (5 mg) by mouth every 6 hours as needed for moderate to severe pain   Patient not  taking: Reported on 2022   pantoprazole (PROTONIX) 40 MG EC tablet   Yes No   Sig: Take 40 mg by mouth daily   polyethylene glycol (MIRALAX) 17 g packet   Yes No   Sig: Take 17 g by mouth daily   pramipexole (MIRAPEX) 0.125 MG tablet   No No   Sig: Take 3 tablets (0.375 mg) by mouth At Bedtime   pramipexole (MIRAPEX) 0.25 MG tablet   Yes No   Sig: Take 0.25 mg by mouth   predniSONE (DELTASONE) 10 MG tablet   Yes No   Sig: Take 1 tablet (10 mg) by mouth daily   prednisoLONE acetate (PRED FORTE) 1 % ophthalmic suspension   Yes No   propranolol (INDERAL) 10 MG tablet   Yes No   Sig: Take 30 mg by mouth   Patient not taking: Reported on 2022   propranolol (INDERAL) 20 MG tablet   Yes No   Si times daily   Patient not taking: Reported on 2022   propranolol ER (INDERAL LA) 60 MG 24 hr capsule   No No   Sig: Take 1 capsule (60 mg) by mouth daily   Patient not taking: Reported on 2022   pyridostigmine (MESTINON) 60 MG tablet   No No   Sig: Take 1 tablet (60 mg) by mouth 3 times daily   Patient taking differently: Take 90 mg by mouth 2 times daily   senna-docusate (SENOKOT-S/PERICOLACE) 8.6-50 MG tablet   No No   Sig: Take 1 tablet by mouth 2 times daily   simvastatin (ZOCOR) 20 MG tablet   No No   Sig: Take 1 tablet (20 mg) by mouth At Bedtime   tamsulosin (FLOMAX) 0.4 MG capsule   Yes No   Sig: Take 1 capsule (0.4 mg) by mouth daily   triamcinolone (KENALOG) 0.1 % external cream   No No   Sig: Apply a thin layer up to twice daily to affected areas as needed.   vitamin D3 (CHOLECALCIFEROL) 2000 units tablet   No No   Sig: Take 1 tablet by mouth daily   Patient taking differently: Take 50 mcg by mouth daily      Facility-Administered Medications: None     Allergies   Allergies   Allergen Reactions     Mycophenolate Other (See Comments)     Confusion, abnormal movements     Nkda [No Known Drug Allergies]        Physical Exam   Vital Signs: Temp: 98  F (36.7  C) Temp src: Oral BP: 113/84 Pulse: 83   Resp:  16 SpO2: 93 % O2 Device: None (Room air)    Weight: 183 lbs 0 oz    Orthostatic Blood Pressures  Lyin/80, HR: 71  Seated: 112/86, HR: 75  Standin/84, HR: 83    Constitutional: lying in bed in no acute distress  Respiratory: No increased work of breathing, clear to auscultation auscultated anteriorly  Cardiovascular: Normal rate and regular rhythm  GI: Soft, non-distended, non-tender  Musculoskeletal: no lower extremity pitting edema present  there is no redness, warmth, or swelling of the joints  Neurologic: Awake, alert, oriented to name, place, time and circumstance.  Motor: normal bulk and tone, full range of motion  Strength: 5/5 in bilat deltoids, biceps, triceps, hand , knee flexion, knee extension, plantar flexion and dorsiflextion  Sensory: intact to light touch, unable to differentiated goldsmith vs dull in all extremities.  Coordination: Finger-to-nose dysmetria on right. Heal to shin without dysmetria.    Data   Data reviewed today: I reviewed all medications, new labs and imaging results over the last 24 hours.    Recent Labs   Lab 22  1639 22  1738   WBC 9.2 9.0   HGB 15.0 14.7   MCV 91 89    201     --    POTASSIUM 4.3  --    CHLORIDE 106  --    CO2 25  --    BUN 19.6  --    CR 1.10  --    ANIONGAP 13  --    TRISTIAN 9.3  --    GLC 91  --    ALBUMIN 4.0  --    PROTTOTAL 6.5  --    BILITOTAL 0.3  --    ALKPHOS 78  --    ALT 25  --    AST 20  --      Recent Results (from the past 24 hour(s))   Cervical spine MRI w/o contrast    Narrative    MR CERVICAL SPINE W/O CONTRAST 2022 8:42 PM    Provided History: Neck pain; Neurologic deficit, non-traumatic;  Balance/gait abnormality; No new-onset balance/gait abnormality; No  chronic or history of balance/gait abnormality; No known/automatically  detected potential contraindications to imaging    Comparison: 2021    Technique: Sagittal T1-weighted, sagittal T2-weighted, sagittal STIR,  sagittal diffusion weighted,  axial T2-weighted, and axial T2* gradient  echo images of the cervical spine were obtained without intravenous  contrast.    Findings:  There is trace degenerative type 2 mm anterolisthesis of C3 on C4, C4  on C5, as well as C5 on C6. 3 mm anterolisthesis of C7 on T1..  There  is moderate-severe disc height narrowing C5 on C6 and C6 on C7.  There  is abnormal myelopathic T2 hyperintense signal seen within the  cervical cord at C5-C6 resultant of severe spinal canal stenosis at  this level..  The findings on a level by level basis are as follows:    C2-3:  No spinal canal or neural foraminal stenosis.    C3-4:  Mild bilateral neural foraminal and spinal canal stenosis.    C4-5:  Small disc bulge, left greater than right as well as extensive  right greater than left facet arthropathy with mild spinal canal and  bilateral neural foraminal stenosis. Disc bulge with left greater than  right uncovertebral spurring with moderate-severe left, mild-moderate  right neural foraminal stenosis. Mild-moderate spinal canal stenosis.    C5-6:  Posterior disc bulge/disc osteophyte complex with severe spinal  canal stenosis. Severe left, moderate severe right neural foraminal  stenosis.    C6-7:  Posterior disc bulge with left subarticular extension with  severe left, moderate severe right neural foraminal stenosis. Severe  spinal canal stenosis.    C7-T1:  Mild bilateral neural foraminal stenosis.     No abnormality of the paraspinous soft tissues.      Impression    Impression:   1. Marked cervical spondylosis, with severe spinal canal stenosis at  C5-C6 and moderate at C6-C7, with myelopathic T2 hyperintense cord  signal at C5-C6.  2. Extensive uncovertebral joint spurring and facet arthropathy with  multilevel lower cervical predominant severe left, moderate right  neural foraminal stenosis as above.    I have personally reviewed the examination and initial interpretation  and I agree with the findings.    BRIANDA ARAGON MD          SYSTEM ID:  [id number]   CT Head w/o Contrast    Narrative    EXAM: CT HEAD W/O CONTRAST  LOCATION: [facility]  DATE/TIME: 12/8/2022 12:35 AM    INDICATION: Shunt for NPH, falls  COMPARISON: CT head 10/15/2022  TECHNIQUE: Routine CT Head without IV contrast. Multiplanar reformats. Dose reduction techniques were used.    FINDINGS:  INTRACRANIAL CONTENTS: No intracranial hemorrhage, extraaxial collection, or mass effect.  No CT evidence of acute infarct. Stable right parietal shunt catheter position and ventricular size. Volume loss and presumed chronic small vessel ischemia are   stable.    VISUALIZED ORBITS/SINUSES/MASTOIDS: No intraorbital abnormality. No paranasal sinus mucosal disease. No middle ear or mastoid effusion.    BONES/SOFT TISSUES: No acute abnormality.      Impression    IMPRESSION:  1.  No acute intracranial abnormality or significant change compared to the prior study.

## 2022-12-08 NOTE — PROGRESS NOTES
Brief neurosurgery progress note:    Given that the patient received MRI of the C-spine the shunt valve settings were rechecked bedside.  The baseline settings were Certas at 4.  On recheck the settings were found to be at 4.  The settings were checked 3 times.  No immediate complications.  The patient tolerated the procedure well.    Matt Ramos  Neurosurgery Resident PGY2

## 2022-12-08 NOTE — PLAN OF CARE
Goal Outcome Evaluation:    BP (!) 160/88 (BP Location: Left arm)   Pulse 62   Temp 97.7  F (36.5  C) (Oral)   Resp 16   SpO2 96%     Admitted for frequent falls and increased generalized weakness. A&Ox4, restless leg syndrome,  shunt placement in 07/2022. MRI done yesterday, neurosurgery following him.

## 2022-12-08 NOTE — CONSULTS
[facility]  Neurology Consultation    Patient Name:  Gustavo Ramon  MRN:  [id number]    :  1940  Date of Service:  2022  Primary care provider:  Chelsea Mcneill      Neurology consultation service was asked to see Gustavo Ramon by Dr. Wright to evaluate weakness.    Chief Complaint: Weakness    History of Present Illness:   Gustavo Ramon is a 82 year old male with history of acetylcholine receptor antibody positive myasthenia gravis (see predominantly ocular symptoms) as well as normal pressure hydrocephalus s/p  shunt in 2022 who presents with increasing falls and unsteadiness.    The patient has long been a patient of Dr. Wheat in the outpatient setting.  He has been maintained on prednisone 10 mg daily and pyridostigmine 60 mg twice daily.  His myasthenia presentation primarily consisted of diplopia and ptosis, his diplopia has resolved now.  He is still has ptosis several times per week currently.  He was diagnosed with NPH in March of this year and improved considerably following placement of the  shunt.  At that time he had presented primarily with gait and balance and freezing.  In the last several months however he has started to develop increasing falls and imbalance.  He describes being rather active at baseline and he is having increasing difficulty with this.    He recently saw Dr. Wheat  on  who also noted some of these changes.  He also noted that the patient was having hyperreflexia in the upper and lower extremities.  He asked him to come to the ED for further evaluation after seeing him as an outpatient at that time.  In the ED the patient was found to have severe cervical spondylosis and canal stenosis as well as T2 enhancement at the C5-C6 segment.  He is describing radicular symptoms in the right upper extremity, with shooting pain down from his neck into his hands.    The neurology team was  involved to ensure that there was no contribution of normal pressure hydrocephalus, myasthenia gravis, or other myopathy contributing to his presentation.    ROS  A comprehensive ROS was performed and pertinent findings were included in HPI.     PMH  Past Medical History:   Diagnosis Date    Actinic keratosis     AK (actinic keratosis) 11/15/2012    Amaurosis fugax     Basal cell carcinoma 2009    R medial cheek/nose    Central serous retinopathy left    Eye    Diverticulosis 08/2006    DJD (degenerative joint disease)     GERD (gastroesophageal reflux disease)     Hearing loss     High frequency    History of nonmelanoma skin cancer     History of nonmelanoma skin cancer     HTN (hypertension)     Hyperlipidemia LDL goal < 130     Hyperplastic colon polyp 08/2006    IgA monoclonal gammopathy     IgA kappa    Lattice degeneration of retina right    Eye    Macular degeneration     Nonsenile cataract     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    [facility] consult    Rosacea     Steroid-induced diabetes mellitus, 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    Strabismus     Vitreous detachment left    Eye     Past Surgical History:   Procedure Laterality Date    ARTHROSCOPY KNEE RT/LT Left Jan 2010    Knee    BIOPSY  2009/2013/2016    Nose; Prostate; BCC - left arm    COLONOSCOPY  9/24/2019    w/Endoscopy    COLONOSCOPY WITH CO2 INSUFFLATION N/A 9/24/2019    Procedure: COLONOSCOPY, WITH CO2 INSUFFLATION;  Surgeon: Loi Levine MD;  Location: [facility] OR    COMBINED ESOPHAGOSCOPY, GASTROSCOPY, DUODENOSCOPY (EGD) WITH CO2 INSUFFLATION N/A 9/24/2019    Procedure: ESOPHAGOGASTRODUODENOSCOPY, WITH CO2 INSUFFLATION;  Surgeon: Loi Levine MD;  Location: [facility] OR    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    DISKECTOMY, LUMBAR, SINGLE SP  2003    L2-3    ENT SURGERY  1943    Tonsils     ENT SURGERY  2-22-12    Biopsy lesion right pinna    ENT SURGERY  2-24-12    Biopsy leson right pinna    ESOPHAGOSCOPY, GASTROSCOPY, DUODENOSCOPY (EGD),  COMBINED N/A 9/24/2019    Procedure: Esophagogastroduodenoscopy, With Biopsy;  Surgeon: Loi Levine MD;  Location: [facility] OR    IR LUMBAR DRAIN PLACEMENT W FLUORO  6/27/2022    LAPAROSCOPIC ASSISTED IMPLANT SHUNT VENTRICULOPERITONEAL N/A 7/7/2022    Procedure: possible INSERTION, SHUNT, VENTRICULOPERITONEAL, LAPAROSCOPY-ASSISTED;  Surgeon: Joe Damon MD;  Location: [facility] OR    OPTICAL TRACKING SYSTEM IMPLANT SHUNT VENTRICULOPERITONEAL N/A 7/7/2022    Procedure: INSERTION, SHUNT, VENTRICULOPERITONEAL, USING OPTICAL TRACKING SYSTEM;  Surgeon: Jean-Paul Childs MD;  Location: [facility] OR    SOFT TISSUE SURGERY  May 2010    Basal Cell/right side nose       Medications   I have personally reviewed the patient's medication list.     Allergies  I have personally reviewed the patient's allergy list.     Social History  Denies tobacco, alcohol, and recreational drug use     Family History    Family History   Problem Relation Age of Onset    Heart Disease Mother     Alzheimer Disease Mother 73    C.A.D. Father     Coronary Artery Disease Father     Diabetes Grandchild         using a pump     Diabetes Paternal Uncle     Heart Disease Paternal Grandmother     Glaucoma No family hx of     Macular Degeneration No family hx of     Melanoma No family hx of     Skin Cancer No family hx of        Physical Examination   Vitals: BP (!) 160/88 (BP Location: Left arm)   Pulse 62   Temp 97.7  F (36.5  C) (Oral)   Resp 16   SpO2 96%   General: Lying in bed, NAD  Head: NC/AT  Eyes: no icterus, op pink and moist  Cardiac: RRR. Extremities warm, no edema.   Respiratory: non-labored on RA  GI: S/NT/ND  Skin: No rash or lesion on exposed skin  Psych: Mood pleasant, affect congruent  Neuro:  Mental status: Awake, alert, attentive, oriented to self, time, place, and circumstance. Language is fluent and coherent with intact comprehension of complex commands, naming and repetition.  Cranial nerves: VFF, PERRL, conjugate gaze,  EOMI, facial sensation intact, face symmetric, shoulder shrug strong, tongue/uvula midline, no dysarthria.   Motor:   Spontaneous triple flexion of the left lower extremity-favored to be a sign of extreme hyperreflexia secondary to upper motor neuron lesion.  Normal bulk and tone.  strength exam: 4+5 R deltoid and 5/5 L deltoid, biceps 5/5 bilat, triceps 4+/5 L and 5/5 R, hand  5/5 bilat except for 4/5 thumb flexion on R, hip flexors 5/5 bilat, hip extensors 5/5 bilat, knee flexion 4/5 R knee and 5-/5 L knee, knee extension 5/5 bilat, plantarflexion 5/5 bilat, dorsiflexion 5/5 bilat.   Reflexes: 3+ hyperreflexia at the patella bilaterally.  3+ hyperreflexia at the brachioradialis bilaterally.  2+ reflexes at the biceps bilaterally.  Absent ankle jerk reflex bilaterally.  Spontaneous triple flexion on the left  Sensory: Decreased vibratory and pinprick sensation at the feet, lower calves.  Otherwise normal sensory exam  Coordination: Dysmetria with finger-to-nose in the right upper extremity.  No dysmetria in the left upper extremity.  Gait: Deferred.    Investigations   I have personally reviewed pertinent labs, tests, and radiological imaging. Discussion of notable findings is included under Impression.     C spine MRI                                                                   Impression:   1. Marked cervical spondylosis, with severe spinal canal stenosis at  C5-C6 and moderate at C6-C7, with myelopathic T2 hyperintense cord  signal at C5-C6.  2. Extensive uncovertebral joint spurring and facet arthropathy with  multilevel lower cervical predominant severe left, moderate right  neural foraminal stenosis as above.    Was patient transferred from outside hospital?   No    Impression  Gustavo Ramon is a 82 year old male with history of acetylcholine receptor antibody positive myasthenia gravis (see predominantly ocular symptoms) as well as normal pressure hydrocephalus s/p  shunt in July 2022 who  presents with increasing falls and unsteadiness.    The patient is presenting with increasing weakness and falls that on exam appears to be largely in the deltoid and knee extension in the right hemibody.  He also has developed hyperreflexia more recently which is quite pronounced.  I noted similar exam findings to Dr. Wheat, who noted that he felt these findings were most likely related to a cervical spine lesion.  The patient has had marked worsening in his exam and quality of life based on chart review and in discussion with him in the last few weeks.  His cervical spine MRI is consistent with fairly advanced degenerative disease of the spine with severe spinal canal stenosis and myelopathic changes that may warrant decompression.  His findings of weakness are not consistent with a pattern of myopathy.  He has had no changes in his prednisone that might be consistent with a steroid-induced myopathy.  His myasthenia has previously been ocular in nature, and he has no symptoms consistent with this.  At this time, his weakness seems to be best accounted for by the most recent findings on cervical spine MRI which are further substantiated by his changes on examination compared to prior and his subjective increases in weakness and new radicular pain in the right upper extremity.    Recommendations  -no additional workup at this time from the general neurology perspective  -Day team to evaluate    Thank you for involving Neurology in the care of Gustavo Ramon.  Please do not hesitate to call with questions/concerns (consult pager [phone]).      Patient was seen and discussed with Dr. Westfall.    Nicholas Hdez MD      Neurology brief progress note FROM DAY TEAM 12/8/22:     Subjective:  Gustavo Ramon is a 82 year old male with history of acetylcholine receptor antibody positive myasthenia gravis (predominantly ocular symptoms) as well as normal pressure hydrocephalus s/p  shunt in July 2022 who  "presents with increasing falls and unsteadiness.     He was initially seen for consult by neurology at 5 AM this morning. On interview at 11 am, the patient states he is feeling well; says he is here for evaluation of falls and weakness per Dr. Wheat recommendation. In addition to his history gathered this AM, the patient states that prior to his  shunt, he was having executive function difficulty such as with mathematics and also he was having gait difficulty. Since the  shunt in July, his executive function has improved but his gait never improved and has actually continued to worsen. He states this progression of worsening gait has increased in rate since he had COVID 2 months ago. He is able to ambulate around his assistive living facility, but has had multiple episodes of falls in the past month. He has not fallen from standing level because he says if he feels unsteady he is able to ease himself to the ground.      The patient states his urination has been difficult to initiate while sitting, but no issue while standing. He follows with urology 1x/year due to a history of prostate cancer s/p cryoablation 7 years ago.  He has had no constipation but does endorse one episode of bowel urgency where he did not make it to the bathroom in time. There is no anesthesia or paresthesia in the groins, but he does endorse longstanding numbness in his distal legs and says he has a diagnosis of neuropathy. He also has numbness in his fingertips that began since COVID 2 months ago. He has noticed muscle fasciculations in BUE. He has a diagnosis of restless leg syndrome that he says has gotten much worse recently. He describes this \"RLS worsening\" as happening at all times of day and is not worse at night. He does not have the urge to move his leg, it just moves itself.      He has no history of diabetes. He has no speech difficulty. He does not have any dyspnea or other breathing difficulty. He has spoken with  " Mary Alice about possible dysphagia, but according to the patient, Dr. Wheat believes this was not related to his myasthenia gravis. He has no lower back pain, but does endorse neck pain and right side pain radiating to elbow. He does have shock-like pain in the sciatic distribution.      Objective:  General: Lying in bed, NAD  Neuro:  Mental status: Awake, alert, attentive, grossly oriented. Language to conversational speech is fluent and coherent. Spells world forward and backwards  Cranial nerves: conjugate gaze, EOMI, facial sensation intact, face symmetric, shoulder shrug strong, tongue/uvula midline, no dysarthria. Eye close, lip close, and tongue of normal strength. Upward gaze for 20 seconds induces ptosis bilaterally L>R  Motor: Normal bulk and tone. No abnormal movements. strength exam: 4/5 right wrist extension, right finger flexion. 5/5 R deltoid, R elbow flexion and extension, left upper extremity, bilateral hip flexors, bilateral knee flexion and extension, bilateral dorsiflexion and plantarflexion. Left lower leg and thigh noted fasciculations. Also, spontaneous L leg jerk noted. This movement also elicited with touching feet. Appears to be triple flexion. There is 5/5 strength of neck flexion and extension. Patient is able to count to 40 on one breath of air.  Reflexes: 3+ hyperreflexia at the patella bilaterally. 3+ triceps bilaterally,  2+ at the brachioradialis bilaterally.  2+ reflexes at the biceps bilaterally.  Absent ankle jerk reflex bilaterally. Bilateral goins test positive.   Sensory: Intact to light touch, pin, vibration, and proprioception in proximal and distal aspects of all 4 extremities   Coordination: Dysmetria with finger-to-nose in the bilateral upper extremity when nearing endpoint.   Gait: With walker, the patient is able to walk down the mills with normal gait and turns en bloc in 5 steps.        Imaging results (copied from radiologist reports)     C spine MRI 12/7/22                                                                  Impression:   1. Marked cervical spondylosis, with severe spinal canal stenosis at  C5-C6 and moderate at C6-C7, with myelopathic T2 hyperintense cord  signal at C5-C6.  2. Extensive uncovertebral joint spurring and facet arthropathy with  multilevel lower cervical predominant severe left, moderate right  neural foraminal stenosis as above.     CT Head 12/8/22  FINDINGS:  INTRACRANIAL CONTENTS: No intracranial hemorrhage, extraaxial collection, or mass effect.  No CT evidence of acute infarct. Stable right parietal shunt catheter position and ventricular size. Volume loss and presumed chronic small vessel ischemia are   stable.     IMPRESSION:  1.  No acute intracranial abnormality or significant change compared to the prior study.     Assessment  Gustavo Ramon is a 82 year old male with history of acetylcholine receptor antibody positive myasthenia gravis (see predominantly ocular symptoms) as well as normal pressure hydrocephalus s/p  shunt in July 2022 who presents with increasing falls and unsteadiness.     The patient is presenting with increasing weakness and falls that on exam appears to be in the RUE and possibly also RLE.  He also has developed hyperreflexia more recently which is quite pronounced; triple flexion of L leg and fasciculations noted today as well.  We noted similar exam findings to Dr. Wheat, who noted that he felt these findings were most likely related to a cervical spine lesion.  The patient has had marked worsening in his exam and quality of life based on chart review and in discussion with him in the last few weeks.  His cervical spine MRI is consistent with fairly advanced degenerative disease of the spine with severe spinal canal stenosis and myelopathic changes that may warrant decompression; per neurosurgery, they recommend surgical decompression sometime next week. There is question of whether he will be safe at  his assisted living facility until the surgery or whether to admit hiim until surgery. His findings of weakness are not consistent with a pattern of myopathy or generalization of his ocular myasthenia gravis.  He has had no changes in his prednisone that might be consistent with a steroid-induced myopathy.  His myasthenia has previously been ocular in nature, and he has no symptoms consistent with this other than some inducible ptosis.  At this time, his weakness seems to be best accounted for by the most recent findings on cervical spine MRI which are further substantiated by his changes on examination compared to prior and his subjective increases in weakness and new radicular pain in the right upper extremity.     Recommendations:   -defer to neurosurgery for decision to admit vs discharge home prior to decompression surgery next week. From neurology standpoint, OK for him to go home given assistance at home.  -General neurology will sign off at this time    I saw and evaluated the patient with the medical student, Chase Fowler MS3.  I oversaw the examination and reviewed the above documentation and agree with the above as written.    Glenn Diallo,   Resident Physician, PGY-2  Department of Neurology

## 2022-12-09 ENCOUNTER — APPOINTMENT (OUTPATIENT)
Dept: PHYSICAL THERAPY | Facility: CLINIC | Age: 82
DRG: 520 | End: 2022-12-09
Payer: MEDICARE

## 2022-12-09 ENCOUNTER — APPOINTMENT (OUTPATIENT)
Dept: OCCUPATIONAL THERAPY | Facility: CLINIC | Age: 82
DRG: 520 | End: 2022-12-09
Payer: MEDICARE

## 2022-12-09 LAB
ANION GAP SERPL CALCULATED.3IONS-SCNC: 13 MMOL/L (ref 7–15)
ATRIAL RATE - MUSE: 89 BPM
BUN SERPL-MCNC: 13.9 MG/DL (ref 8–23)
CALCIUM SERPL-MCNC: 8.9 MG/DL (ref 8.8–10.2)
CHLORIDE SERPL-SCNC: 109 MMOL/L (ref 98–107)
CREAT SERPL-MCNC: 0.88 MG/DL (ref 0.67–1.17)
DEPRECATED HCO3 PLAS-SCNC: 21 MMOL/L (ref 22–29)
DIASTOLIC BLOOD PRESSURE - MUSE: NORMAL MMHG
ERYTHROCYTE [DISTWIDTH] IN BLOOD BY AUTOMATED COUNT: 14.3 % (ref 10–15)
GFR SERPL CREATININE-BSD FRML MDRD: 85 ML/MIN/1.73M2
GLUCOSE SERPL-MCNC: 81 MG/DL (ref 70–99)
HCT VFR BLD AUTO: 45.8 % (ref 40–53)
HGB BLD-MCNC: 15 G/DL (ref 13.3–17.7)
INTERPRETATION ECG - MUSE: NORMAL
MCH RBC QN AUTO: 29 PG (ref 26.5–33)
MCHC RBC AUTO-ENTMCNC: 32.8 G/DL (ref 31.5–36.5)
MCV RBC AUTO: 88 FL (ref 78–100)
P AXIS - MUSE: 53 DEGREES
PLATELET # BLD AUTO: 198 10E3/UL (ref 150–450)
POTASSIUM SERPL-SCNC: 3.7 MMOL/L (ref 3.4–5.3)
PR INTERVAL - MUSE: 200 MS
QRS DURATION - MUSE: 92 MS
QT - MUSE: 382 MS
QTC - MUSE: 464 MS
R AXIS - MUSE: 25 DEGREES
RBC # BLD AUTO: 5.18 10E6/UL (ref 4.4–5.9)
SODIUM SERPL-SCNC: 143 MMOL/L (ref 136–145)
SYSTOLIC BLOOD PRESSURE - MUSE: NORMAL MMHG
T AXIS - MUSE: 69 DEGREES
TROPONIN T SERPL HS-MCNC: 26 NG/L
TROPONIN T SERPL HS-MCNC: 26 NG/L
VENTRICULAR RATE- MUSE: 89 BPM
WBC # BLD AUTO: 7.9 10E3/UL (ref 4–11)

## 2022-12-09 PROCEDURE — 97166 OT EVAL MOD COMPLEX 45 MIN: CPT | Mod: GO

## 2022-12-09 PROCEDURE — 250N000011 HC RX IP 250 OP 636: Performed by: PEDIATRICS

## 2022-12-09 PROCEDURE — 97530 THERAPEUTIC ACTIVITIES: CPT | Mod: GP

## 2022-12-09 PROCEDURE — 99232 SBSQ HOSP IP/OBS MODERATE 35: CPT

## 2022-12-09 PROCEDURE — 36415 COLL VENOUS BLD VENIPUNCTURE: CPT

## 2022-12-09 PROCEDURE — 80048 BASIC METABOLIC PNL TOTAL CA: CPT | Performed by: STUDENT IN AN ORGANIZED HEALTH CARE EDUCATION/TRAINING PROGRAM

## 2022-12-09 PROCEDURE — 84484 ASSAY OF TROPONIN QUANT: CPT

## 2022-12-09 PROCEDURE — 120N000002 HC R&B MED SURG/OB UMMC

## 2022-12-09 PROCEDURE — 36415 COLL VENOUS BLD VENIPUNCTURE: CPT | Performed by: STUDENT IN AN ORGANIZED HEALTH CARE EDUCATION/TRAINING PROGRAM

## 2022-12-09 PROCEDURE — 85027 COMPLETE CBC AUTOMATED: CPT | Performed by: STUDENT IN AN ORGANIZED HEALTH CARE EDUCATION/TRAINING PROGRAM

## 2022-12-09 PROCEDURE — 97112 NEUROMUSCULAR REEDUCATION: CPT | Mod: GP

## 2022-12-09 PROCEDURE — 97161 PT EVAL LOW COMPLEX 20 MIN: CPT | Mod: GP

## 2022-12-09 PROCEDURE — 250N000013 HC RX MED GY IP 250 OP 250 PS 637: Performed by: STUDENT IN AN ORGANIZED HEALTH CARE EDUCATION/TRAINING PROGRAM

## 2022-12-09 PROCEDURE — 97535 SELF CARE MNGMENT TRAINING: CPT | Mod: GO

## 2022-12-09 PROCEDURE — 99232 SBSQ HOSP IP/OBS MODERATE 35: CPT | Mod: GC | Performed by: PEDIATRICS

## 2022-12-09 PROCEDURE — 250N000012 HC RX MED GY IP 250 OP 636 PS 637: Performed by: STUDENT IN AN ORGANIZED HEALTH CARE EDUCATION/TRAINING PROGRAM

## 2022-12-09 RX ORDER — CEFTRIAXONE 1 G/1
1 INJECTION, POWDER, FOR SOLUTION INTRAMUSCULAR; INTRAVENOUS ONCE
Status: COMPLETED | OUTPATIENT
Start: 2022-12-09 | End: 2022-12-09

## 2022-12-09 RX ORDER — CEFTRIAXONE 1 G/1
1 INJECTION, POWDER, FOR SOLUTION INTRAMUSCULAR; INTRAVENOUS EVERY 24 HOURS
Status: DISCONTINUED | OUTPATIENT
Start: 2022-12-10 | End: 2022-12-13

## 2022-12-09 RX ADMIN — Medication 50 MCG: at 08:52

## 2022-12-09 RX ADMIN — KETOCONAZOLE: 20 CREAM TOPICAL at 08:57

## 2022-12-09 RX ADMIN — Medication 90 MG: at 20:21

## 2022-12-09 RX ADMIN — PANTOPRAZOLE SODIUM 40 MG: 40 TABLET, DELAYED RELEASE ORAL at 08:53

## 2022-12-09 RX ADMIN — LEVETIRACETAM 250 MG: 250 TABLET, FILM COATED ORAL at 08:52

## 2022-12-09 RX ADMIN — PREDNISONE 10 MG: 5 TABLET ORAL at 08:52

## 2022-12-09 RX ADMIN — PRAMIPEXOLE DIHYDROCHLORIDE 0.38 MG: 0.25 TABLET ORAL at 20:19

## 2022-12-09 RX ADMIN — TAMSULOSIN HYDROCHLORIDE 0.4 MG: 0.4 CAPSULE ORAL at 08:52

## 2022-12-09 RX ADMIN — Medication 1000 MCG: at 08:53

## 2022-12-09 RX ADMIN — CEFTRIAXONE SODIUM 1 G: 1 INJECTION, POWDER, FOR SOLUTION INTRAMUSCULAR; INTRAVENOUS at 10:38

## 2022-12-09 RX ADMIN — Medication 90 MG: at 08:52

## 2022-12-09 RX ADMIN — GABAPENTIN 300 MG: 300 CAPSULE ORAL at 20:20

## 2022-12-09 RX ADMIN — LEVETIRACETAM 250 MG: 250 TABLET, FILM COATED ORAL at 20:21

## 2022-12-09 RX ADMIN — SENNOSIDES AND DOCUSATE SODIUM 1 TABLET: 8.6; 5 TABLET ORAL at 20:21

## 2022-12-09 RX ADMIN — SENNOSIDES AND DOCUSATE SODIUM 1 TABLET: 8.6; 5 TABLET ORAL at 08:52

## 2022-12-09 RX ADMIN — Medication 1 TABLET: at 08:53

## 2022-12-09 ASSESSMENT — ACTIVITIES OF DAILY LIVING (ADL)
ADLS_ACUITY_SCORE: 42
PREVIOUS_RESPONSIBILITIES: MEAL PREP;HOUSEKEEPING;LAUNDRY;MEDICATION MANAGEMENT;DRIVING;FINANCES
ADLS_ACUITY_SCORE: 42

## 2022-12-09 NOTE — PROGRESS NOTES
Admission/Transfer from: ED  2 RN skin assessment completed. Yes, 2nd nurse Tom  Significant findings include: None  WOC Nurse Consult Ordered? No

## 2022-12-09 NOTE — PROGRESS NOTES
This note was initiated by Kenroy WISE, medical student. I personally overwrote and edited each section of this note as appropriate to reflect the patient's care as well as my sole personal and direct involvement of that care.    Physician Attestation   I saw this patient with the resident and agree with the below findings and plan of care as documented in the note.    I personally reviewed vital signs, medications, labs, and emr notes.  EXAM  General: frail appearing man sitting up in bed  Head: NC, AT  Eye: symm gaze, anicteric sclerae  ENT: patent nares wo drainage/epistaxis  Pulm: no stridor, comfortable WOB on RA  Neuro: awake, alert, hearing speech and phonation, intact grossly     Minh Harper MD  Date of Service (when I saw the patient): 12/09/22    [facility]    Progress Note - Medicine Service, [facility] TEAM 3       Date of Admission:  12/7/2022    Assessment & Plan   Gustavo Ramon is a 82 year old male admitted on 12/7/2022 he has a past medical of 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 Awaiting neurosurgical intervention scheduled 12/12.    Today:  - Ceftriaxone for UTI  - Serial neuro checks    # Weakness and falls  # Frailty and debilitation  # Hx of NPH s/p  shunt (07/2022)  # Hx myasthenia gravis with predominant ocular symptoms  The patient presents with increased weakness, unsteadiness, 2 recent falls, b/l numbness in hands and hyperreflexia on exam. 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 Overall, weakness and unsteadiness most likely multifactorial and related  to severe cervical stenosis, sensory neuropathy, and age-related frailty and debilitation.  - Neurosurgery consult, appreciate recs              - Surgery (laminoplasty) planned for Monday (12/12)  - Neurology consult, appreciate recs  - Q4hr neuro checks  - PT/OT consult  - Hold aspirin in anticipation of surgical intervention  - PTA prednisone (10mg) and pyridostigmine (60mg) for myasthenia gravis     # Asymptomatic bacteruria  No reported dysuria, changes in frequency, hematuria. Some reported urinary retention but this is not new. Will empirically treat in the setting of neurosurgical intervention planned 12/12.  - Continue ceftriaxone  - F/u urine cultures  - Monitor for developing symptoms of UTI     # Elevated troponin  Noted at 26 on admission and platueaued on recheck. Denies chest pain. EKG with sinus rhythym     Chronic medical problems:  Restless leg syndrome, myoclonus - PTA pramipexole, levetiracetam  Neuropathy - PTA gabapentin  GERD - PTA pantoprazole  HLD - PTA simvastatin HELD  Urinary retention - PTA tamsulosin  Prostate cancer s/p cryotherapy (07/2022)  Prediabetes (Hghb A1c 6.2 - 12/5/22)  Constipation - PTA senna  Anemia - PTA ferrous sulfate       Diet: Combination Diet Regular Diet Adult    DVT Prophylaxis: Pneumatic Compression Devices  Rodriguez Catheter: Not present  Fluids: None  Central Lines: None  Cardiac Monitoring: None  Code Status: Full Code      Disposition Plan      Expected Discharge Date: 12/16/2022        Discharge Comments: dispo pending NSG laminectomy; likely next week        The patient's care was discussed with the Attending Physician, Dr. Harper.    Terry Wheeler  Medical Student  Medicine Service, [facility] TEAM [address] [place] [address]  Securely message with the Vocera Web Console (learn more here)  Text page via [facility] Paging/Directory   Please see signed in provider for up to date coverage  information      ______________________________________________________________________    Interval History   Nursing notes reviewed, no acute events overnight. Patient was seated in the chair during rounds, wife present at bedside. He notes continued numbness in b/l upper extremities. He denies any tingling. He states that he has minimally ambulated. He denies any changes to weakness/unsteadiness. He denies dysuria, chest pain, SOB.    ROS: Otherwise negative unless noted above    Data reviewed today: I reviewed all medications, new labs and imaging results over the last 24 hours.    Physical Exam   Vital Signs: Temp: 98.1  F (36.7  C) Temp src: Oral BP: 137/69 Pulse: 66   Resp: 18 SpO2: 94 % O2 Device: None (Room air)    Weight: 175 lbs 3.2 oz  Constitutional: seated in chair with wife present, no apparent distress  Respiratory: No increased work of breathing on RA, clear to auscultation bilaterally, no crackles or wheezing  Cardiovascular: Normal rate and regular rhythm  GI: Soft, non-distended, non-tender  Musculoskeletal: no lower extremity pitting edema present  Neurologic: awake, alert, 5/5 strength in handgrips      Data   Recent Labs   Lab 12/09/22  0638 12/07/22  1639 12/05/22  1738   WBC 7.9 9.2 9.0   HGB 15.0 15.0 14.7   MCV 88 91 89    229 201    144  --    POTASSIUM 3.7 4.3  --    CHLORIDE 109* 106  --    CO2 21* 25  --    BUN 13.9 19.6  --    CR 0.88 1.10  --    ANIONGAP 13 13  --    TRISTIAN 8.9 9.3  --    GLC 81 91  --    ALBUMIN  --  4.0  --    PROTTOTAL  --  6.5  --    BILITOTAL  --  0.3  --    ALKPHOS  --  78  --    ALT  --  25  --    AST  --  20  --      No results found for this or any previous visit (from the past 24 hour(s)).  Medications       [Held by provider] aspirin  325 mg Oral Daily     [START ON 12/10/2022] cefTRIAXone  1 g Intravenous Q24H     cyanocobalamin  1,000 mcg Oral Daily     ferrous sulfate  325 mg Oral Every Other Day     gabapentin  300 mg Oral At Bedtime      ketoconazole   Topical Daily     levETIRAcetam  250 mg Oral BID     multivitamin  1 tablet Oral Daily     NONFORMULARY 1 drop  1 drop Left Eye 4 times per day     pantoprazole  40 mg Oral Daily     pramipexole  0.375 mg Oral At Bedtime     predniSONE  10 mg Oral Daily     pyridostigmine  90 mg Oral BID     senna-docusate  1 tablet Oral BID     sodium chloride (PF)  3 mL Intracatheter Q8H     tamsulosin  0.4 mg Oral Daily     vitamin D3  50 mcg Oral Daily

## 2022-12-09 NOTE — PLAN OF CARE
Goal Outcome Evaluation:      Plan of Care Reviewed With: patient    Overall Patient Progress: improvingOverall Patient Progress: improving     A&OX4. VSS on RA. Pleasent. No c/o pain. SBA to bathroom. Bedside urinal. Reg diet, good appetite. Worked with PT/OT. Up in chair. Wife at bedside. Calls appropriately. Plan of care continued.

## 2022-12-09 NOTE — PLAN OF CARE
Goal Outcome Evaluation:      Plan of Care Reviewed With: patient    Overall Patient Progress: no changeOverall Patient Progress: no change    Outcome Evaluation: Pt A&Ox4 with intermit confusion but able to be reoriented. Attempted up w/walker to toilet but was unstable & aide had pt use urinal instead. VSS on RA.

## 2022-12-09 NOTE — PROGRESS NOTES
"[facility], [place]   Neurosurgery Progress Note:    Date of service: 12/9/2022    Assessment: Gustavo Ramon is a 82 year old male with a past medical history of polyneuropathy, NPH with certas at 2,  and myasthenia gravis presents with a 6 month history of worsening bilateral lower extremity weakness and right upper extremity. MRI C-Spine revealed C5-C7 spinal canal stenosis.    Clinically Significant Risk Factors Present on Admission          # Overweight: Estimated body mass index is 25.87 kg/m  as calculated from the following:    Height as of this encounter: 1.753 m (5' 9\").    Weight as of this encounter: 79.5 kg (175 lb 3.2 oz).  # Hypertension  # Ocular Myasthenia Gravis  # Steroid-induced diabetes mellitus      Recommendations:  - Serial Neuro Checks  - Activity as tolerated with assist  - Regular diet  - Pain management per primary team  - Plan for Laminoplasty on Monday (12/12)  - Hold Aspirin in anticipation of surgical procedure  - Hold subcutaneous Heparin DVT prophylaxis in anticipation of surgical procedure  - PT/OT  - Please empirically treat UTI with IV antibiotics in setting of neurosurgical intervention on Monday  - All other cares per primary team  - Please inform the neurosurgery team if there is any acute change in neurological exam        Sade Heath PA-C  Neurosurgery      I have spent a total of 25 minutes total time counseling, coordination, and chart review, over 50% of the time was spent in direct patient care.       Interval History:  No acute events overnight. Patient still reporting radicular pain of right arm from shoulder to elbow. Recommend empiric IV antibiotic treatment of UTI to optimize patient for neurosurgical intervention Monday.           Objective:   Temp:  [98.1  F (36.7  C)-98.5  F (36.9  C)] 98.1  F (36.7  C)  Pulse:  [65-69] 66  Resp:  [16-18] 18  BP: (110-153)/(65-76) 137/69  SpO2:  [94 %-96 %] 94 %  I/O last 3 completed " shifts:  In: -   Out: 600 [Urine:600]    Gen: Appears comfortable, NAD  Neurologic:  - Alert & Oriented to person, place, time, and situation  - Follows commands briskly  - Speech fluent, spontaneous. No aphasia or dysarthria.  - No gaze preference. No apparent hemineglect.  - PER, EOMI  - Strong eye closure, jaw clench, and cheek puff  - Face symmetric with sensation intact to light touch  - Trapezii and sternocleidomastoid muscles 5/5 bilaterally  - Bilateral Jorge L's present       Del Tr Bi WE WF Gr   R 5 4+ 5 5 5 5   L 5 5 5 5 5 5    HF KE KF DF PF EHL   R 5 5 5 5 5 5   L 5 5 5 5 5 5       Sensation intact and symmetric to light touch throughout    LABS  Recent Labs   Lab Test 12/09/22  0638 12/07/22  1639 12/05/22  1738   WBC 7.9 9.2 9.0   HGB 15.0 15.0 14.7   MCV 88 91 89    229 201       Recent Labs   Lab Test 12/09/22  0638 12/07/22  1639 07/08/22  0701    144 135*   POTASSIUM 3.7 4.3 3.9   CHLORIDE 109* 106 102   CO2 21* 25 24   BUN 13.9 19.6 14.8   CR 0.88 1.10 0.88   ANIONGAP 13 13 9   TRISTIAN 8.9 9.3 9.0   GLC 81 91 107*       IMAGING    No results found for this or any previous visit (from the past 24 hour(s)).

## 2022-12-09 NOTE — PROGRESS NOTES
12/09/22 1400   Appointment Info   Signing Clinician's Name / Credentials (PT) BARNEY Beebe   Student Supervision Direct supervision provided;Therapy services provided with the co-signing licensed therapist guiding and directing the services, and providing the skilled judgement and assessment throughout the session   Rehab Comments (PT) falls risk   Living Environment   People in Home spouse   Current Living Arrangements extended care facility;house   Home Accessibility no concerns   Transportation Anticipated family or friend will provide   Living Environment Comments Pt has 24/7 A availabe with care center which is extended facility pt intends to return to post discharge. Pt's spouse visits pt at facility every day. Pt has availability to PT 1x/week.   Self-Care   Usual Activity Tolerance good   Current Activity Tolerance fair   Equipment Currently Used at Home walker, rolling   Fall history within last six months yes   Number of times patient has fallen within last six months 2  (one instance was an intention descend without capability to ascend from half kneeling position. Second instance was a predicted upcoming LOB that was preceded by a controlled descent before complete LOB.)   Activity/Exercise/Self-Care Comment Pt reports ambulating w/ FWW with all ambulation and transfers and has available SBA at facility. 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   Existing Precautions/Restrictions fall   General Observations Activity okay'd by RN   Cognition   Affect/Mental Status (Cognition) WFL   Orientation Status (Cognition) oriented x 4   Follows Commands (Cognition) WFL   Pain Assessment   Patient Currently in Pain Yes, see Vital Sign flowsheet   Integumentary/Edema   Integumentary/Edema no deficits were identifed   Posture    Posture Forward head position;Protracted shoulders;Kyphosis   Range of Motion (ROM)   Range of Motion ROM is WFL   Left Hip (Manual Muscle Testing)   Hip Flexion - Left Side (5/5) normal,left   Right Hip (Manual Muscle Testing)   Hip Flexion - Right Side (5/5) normal,right   Left Knee (Manual Muscle Testing)   Knee Flexion - Left Side (5/5) normal,left   Knee Extension - Left Side (5/5) normal,left   Right Knee (Manual Muscle Testing)   Knee Flexion - Right Side (5/5) normal,right   Knee Extension - Right Side (5/5) normal,right   Left Ankle/Foot (Manual Muscle Testing)   Ankle Dorsiflexion - Left Side 5/5) normal,left   Ankle Plantarflexion - Left Side 5/5) normal,left   Right Ankle/Foot (Manual Muscle Testing)   Ankle Dorsiflexion - Right Side 5/5) normal,right   Ankle Plantarflexion - Right Side 5/5) normal, right   Bed Mobility   Comment, (Bed Mobility) Pt mod IND with bed mobility   Transfers   Comment, (Transfers) Pt is SBA with sit to stand and w/ FWW for all transfers   Gait/Stairs (Locomotion)   Comment, (Gait/Stairs) Pt ambulated 10' w/ FWW CGA  within room. Pt demo'd slow gait speed, short stride length, mild general unsteadiness.   Balance   Balance Comments Pt demo'd unsteadiness with feet together positioning and horizontal head turns with ambulation w/ FWW CGA.   Sensory Examination   Sensory Perception Comments Light touch sensation intact B UE and LE   Clinical Impression   Criteria for Skilled Therapeutic Intervention Yes, treatment indicated   PT Diagnosis (PT) Impaired functional mobility   Influenced by the following impairments General deconditioning, mysthenia gravis hx, neuromuscular condition, impaired balance, falls risk   Functional limitations due to impairments transfers, gait   Clinical Presentation (PT Evaluation Complexity) Evolving/Changing   Clinical Presentation Rationale Clinical Judgement   Clinical Decision Making (Complexity) low complexity   Planned Therapy Interventions (PT) balance training;bed mobility training;gait training;home exercise program;neuromuscular re-education;postural re-education;ROM (range of motion);strengthening;transfer training;progressive activity/exercise   Anticipated Equipment Needs at Discharge (PT) other (see comments)  (TBD)   Risk & Benefits of therapy have been explained evaluation/treatment results reviewed;care plan/treatment goals reviewed;risks/benefits reviewed;current/potential barriers reviewed;participants voiced agreement with care plan;participants included;patient   Clinical Impression Comments Pt is a 83 yo male presenting with impaired functional mobility presenting as impaired dynamic balance and unsteadiness leading to high falls risk. Pt also reports ebb and flow of periods of weakness that was not present during course of evaluation. Pt reports that weakness is most present after long periods of sleep. Pt diagnosis affected by history of mysthenia gravis. Pt would benefit from therapy in order to improve dynamic balance in order to reduce falls risks.   PT Total Evaluation Time    PT Eval, Low Complexity Minutes (95077) 10   Physical Therapy Goals   PT Frequency 3x/week   PT Predicted Duration/Target Date for Goal Attainment 12/16/22   PT Goals PT Goal 1;Gait   PT: Gait Assistive device;Modified independent;Greater than 200 feet   PT: Goal 1 Demonstrate low falls risk with a balance assessment score of < 13.5 seconds on TUG,  > 19/24 on Dynamic Gait Index, > 9/12 on 4-Item Dynamic Gait Index, >22/30 on Functional Gait Assessment, or > 45/56 on Garvin Balance Assessment.   PT Discharge Planning   PT Plan progress gait w/ FWW, dynamic balance, TUG   PT Discharge Recommendation (DC Rec) Transitional Care Facility;home with assist;home with home care physical therapy   PT Rationale for DC Rec Pt is below baseline with functional mobility. Pt requires supervision with all transfers and ambulation w/ FWW. Pt presents with apparent high falls risk indicated by significantly slow gait speed and general unsteadiness. Pt would benefit and is agreeable to TCU stint in order to improve dynamic balance and reducing falls risk. Anticipate progress rec to home with assist within acute stay.   PT Brief overview of current status SBA w/ FWW   Total Session Time   Total Session Time (sum of timed and untimed services) 10

## 2022-12-09 NOTE — CONSULTS
Care Management Initial Consult    General Information  Assessment completed with: -chart review, Spouse or significant other,    Type of CM/SW Visit: Initial Assessment    Primary Care Provider verified and updated as needed:     Readmission within the last 30 days: no previous admission in last 30 days         Advance Care Planning: Advance Care Planning Reviewed: present on chart          Communication Assessment  Patient's communication style: spoken language (English or Bilingual)    Hearing Difficulty or Deaf: yes   Wear Glasses or Blind: no    Cognitive  Cognitive/Neuro/Behavioral: WDL                      Living Environment:   People in home: facility resident     Current living Arrangements: assisted living (Name of Facility: [place], [place])  Name of Facility: [facility] [place], [place]   Able to return to prior arrangements: yes       Family/Social Support:  Care provided by:    Provides care for: no one  Marital Status:   Wife, Facility resident(s)/Staff          Description of Support System: Supportive, Involved         Current Resources:   Patient receiving home care services:       Community Resources:    Equipment currently used at home:    Supplies currently used at home:      Employment/Financial:  Employment Status: retired        Financial Concerns:   None    Lifestyle & Psychosocial Needs:  Social Determinants of Health     Tobacco Use: Low Risk      Smoking Tobacco Use: Never     Smokeless Tobacco Use: Never     Passive Exposure: Not on file   Alcohol Use: Not on file   Financial Resource Strain: Not on file   Food Insecurity: Not on file   Transportation Needs: Not on file   Physical Activity: Not on file   Stress: Not on file   Social Connections: Not on file   Intimate Partner Violence: Not on file   Depression: Not at risk     PHQ-2 Score: 0   Housing Stability: Not on file       Mental Health Status: no concern         Values/Beliefs:  Spiritual, Cultural Beliefs, Restorationist Practices, Values that affect care:              Background: Gustavo Ramon is a 82 year old male admitted on 12/7/2022 he has a past medical of 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     Additional Information:  Pt status reviewed in chart. Plan to discharge back to [facility] when stable. Neuro/PT/OT consulted. RNCC continue to follow.    [facility]. [facility]  [address] [place] [address]  Phone: [phone]      Leisa Quintanilla RN  5A RNCC

## 2022-12-09 NOTE — PLAN OF CARE
Goal Outcome Evaluation:      Plan of Care Reviewed With: patient    Overall Patient Progress: no changeOverall Patient Progress: no change    Outcome Evaluation: A&O x4 overnight, no signs of confusion overnight, AVSS on room air. Slept well throughout the night. Up with SBA to use urinal at bedside, voiding WDL. Denies pain or nausea. Calls appropriately, continue with plan of care.

## 2022-12-10 ENCOUNTER — APPOINTMENT (OUTPATIENT)
Dept: PHYSICAL THERAPY | Facility: CLINIC | Age: 82
DRG: 520 | End: 2022-12-10
Payer: MEDICARE

## 2022-12-10 LAB
ERYTHROCYTE [DISTWIDTH] IN BLOOD BY AUTOMATED COUNT: 14.3 % (ref 10–15)
HCT VFR BLD AUTO: 45.2 % (ref 40–53)
HGB BLD-MCNC: 14.5 G/DL (ref 13.3–17.7)
MAGNESIUM SERPL-MCNC: 2.2 MG/DL (ref 1.7–2.3)
MCH RBC QN AUTO: 29 PG (ref 26.5–33)
MCHC RBC AUTO-ENTMCNC: 32.1 G/DL (ref 31.5–36.5)
MCV RBC AUTO: 90 FL (ref 78–100)
PLATELET # BLD AUTO: 197 10E3/UL (ref 150–450)
POTASSIUM SERPL-SCNC: 4.8 MMOL/L (ref 3.4–5.3)
RBC # BLD AUTO: 5 10E6/UL (ref 4.4–5.9)
WBC # BLD AUTO: 8.5 10E3/UL (ref 4–11)

## 2022-12-10 PROCEDURE — 250N000013 HC RX MED GY IP 250 OP 250 PS 637: Performed by: STUDENT IN AN ORGANIZED HEALTH CARE EDUCATION/TRAINING PROGRAM

## 2022-12-10 PROCEDURE — 36415 COLL VENOUS BLD VENIPUNCTURE: CPT

## 2022-12-10 PROCEDURE — 250N000011 HC RX IP 250 OP 636: Performed by: PEDIATRICS

## 2022-12-10 PROCEDURE — 97110 THERAPEUTIC EXERCISES: CPT | Mod: GP | Performed by: PHYSICAL THERAPIST

## 2022-12-10 PROCEDURE — 36415 COLL VENOUS BLD VENIPUNCTURE: CPT | Performed by: STUDENT IN AN ORGANIZED HEALTH CARE EDUCATION/TRAINING PROGRAM

## 2022-12-10 PROCEDURE — 99232 SBSQ HOSP IP/OBS MODERATE 35: CPT | Mod: GC | Performed by: PEDIATRICS

## 2022-12-10 PROCEDURE — 120N000002 HC R&B MED SURG/OB UMMC

## 2022-12-10 PROCEDURE — 84132 ASSAY OF SERUM POTASSIUM: CPT

## 2022-12-10 PROCEDURE — 97116 GAIT TRAINING THERAPY: CPT | Mod: GP | Performed by: PHYSICAL THERAPIST

## 2022-12-10 PROCEDURE — 84132 ASSAY OF SERUM POTASSIUM: CPT | Performed by: STUDENT IN AN ORGANIZED HEALTH CARE EDUCATION/TRAINING PROGRAM

## 2022-12-10 PROCEDURE — 83735 ASSAY OF MAGNESIUM: CPT

## 2022-12-10 PROCEDURE — 250N000012 HC RX MED GY IP 250 OP 636 PS 637: Performed by: STUDENT IN AN ORGANIZED HEALTH CARE EDUCATION/TRAINING PROGRAM

## 2022-12-10 PROCEDURE — 97112 NEUROMUSCULAR REEDUCATION: CPT | Mod: GP | Performed by: PHYSICAL THERAPIST

## 2022-12-10 PROCEDURE — 85014 HEMATOCRIT: CPT | Performed by: STUDENT IN AN ORGANIZED HEALTH CARE EDUCATION/TRAINING PROGRAM

## 2022-12-10 PROCEDURE — 999N000128 HC STATISTIC PERIPHERAL IV START W/O US GUIDANCE

## 2022-12-10 RX ADMIN — Medication 50 MCG: at 09:01

## 2022-12-10 RX ADMIN — PREDNISONE 10 MG: 5 TABLET ORAL at 09:01

## 2022-12-10 RX ADMIN — GABAPENTIN 300 MG: 300 CAPSULE ORAL at 21:02

## 2022-12-10 RX ADMIN — TAMSULOSIN HYDROCHLORIDE 0.4 MG: 0.4 CAPSULE ORAL at 09:01

## 2022-12-10 RX ADMIN — PRAMIPEXOLE DIHYDROCHLORIDE 0.38 MG: 0.25 TABLET ORAL at 21:02

## 2022-12-10 RX ADMIN — Medication 1000 MCG: at 09:00

## 2022-12-10 RX ADMIN — FERROUS SULFATE TAB 325 MG (65 MG ELEMENTAL FE) 325 MG: 325 (65 FE) TAB at 09:01

## 2022-12-10 RX ADMIN — PANTOPRAZOLE SODIUM 40 MG: 40 TABLET, DELAYED RELEASE ORAL at 09:01

## 2022-12-10 RX ADMIN — Medication 90 MG: at 09:07

## 2022-12-10 RX ADMIN — Medication 1 TABLET: at 09:00

## 2022-12-10 RX ADMIN — CEFTRIAXONE SODIUM 1 G: 1 INJECTION, POWDER, FOR SOLUTION INTRAMUSCULAR; INTRAVENOUS at 09:03

## 2022-12-10 RX ADMIN — LEVETIRACETAM 250 MG: 250 TABLET, FILM COATED ORAL at 21:02

## 2022-12-10 RX ADMIN — KETOCONAZOLE: 20 CREAM TOPICAL at 09:08

## 2022-12-10 RX ADMIN — LEVETIRACETAM 250 MG: 250 TABLET, FILM COATED ORAL at 09:01

## 2022-12-10 RX ADMIN — SENNOSIDES AND DOCUSATE SODIUM 1 TABLET: 8.6; 5 TABLET ORAL at 09:01

## 2022-12-10 RX ADMIN — SENNOSIDES AND DOCUSATE SODIUM 1 TABLET: 8.6; 5 TABLET ORAL at 21:02

## 2022-12-10 RX ADMIN — Medication 90 MG: at 21:02

## 2022-12-10 ASSESSMENT — ACTIVITIES OF DAILY LIVING (ADL)
ADLS_ACUITY_SCORE: 42

## 2022-12-10 NOTE — PLAN OF CARE
Goal Outcome Evaluation:           Overall Patient Progress: improvingOverall Patient Progress: improving    Outcome Evaluation: Alert and oriented  x 4. Up to the bathroom with walker, GB and assist of 1. Denies pain. Incontinent of urine. Vital signs stable on room air. Plan is to discharge back to assisted living once medically stable

## 2022-12-10 NOTE — PROGRESS NOTES
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 Awaiting neurosurgical intervention scheduled 12/12.    Today:  - Continue Ceftriaxone x5 days  - Serial neuro checks    # Weakness and falls  # Frailty and debilitation  # Hx of NPH s/p  shunt (07/2022)  # Hx myasthenia gravis with predominant ocular symptoms  # C5-C7 Spinal canal stenosis, severe  The patient presents with increased weakness, unsteadiness, 2 recent falls, b/l numbness in hands and hyperreflexia on exam. 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 Overall, weakness and unsteadiness most likely multifactorial and related to severe cervical stenosis, sensory neuropathy, and age-related frailty and debilitation.  - Neurosurgery consult, appreciate recs              - Surgery (laminoplasty) planned for Monday (12/12)  - Neurology consult, appreciate recs  - Q4hr neuro checks  - PT/OT consult  - Hold aspirin in anticipation of surgical intervention  - PTA prednisone (10mg) and pyridostigmine (60mg) for myasthenia gravis     # Asymptomatic bacteruria  No reported dysuria, changes in frequency, hematuria. Some reported urinary retention but this is not new. Will empirically treat in the setting of neurosurgical intervention planned 12/12.  - Continue ceftriaxone x5 days (12/8-12/12)  - F/u urine cultures  - Monitor for developing  symptoms of UTI     # Elevated troponin, stable  Noted at 26 on admission and platueaued on recheck. Denies chest pain. EKG with sinus rhythym     Chronic medical problems:  Restless leg syndrome, myoclonus - PTA pramipexole, levetiracetam  Neuropathy - PTA gabapentin  GERD - PTA pantoprazole  HLD - PTA simvastatin HELD  Urinary retention - PTA tamsulosin  Prostate cancer s/p cryotherapy (07/2022)  Prediabetes (Hghb A1c 6.2 - 12/5/22)  Constipation - PTA senna  Anemia - PTA ferrous sulfate       Diet: Combination Diet Regular Diet Adult    DVT Prophylaxis: Pneumatic Compression Devices  Rodriguez Catheter: Not present  Fluids: None  Central Lines: None  Cardiac Monitoring: None  Code Status: Full Code      Disposition Plan     Expected Discharge Date: 12/16/2022        Discharge Comments: dispo pending NSG laminectomy; likely next week        The patient's care was discussed with the Attending Physician, Dr. Harper.    Jermaine Hensley DO, PGY1  Internal Medicine  Medicine Service, [address]  Securely message with the Vocera Web Console (learn more here)  Text page via [facility] Paging/Directory   Please see signed in provider for up to date coverage information      ______________________________________________________________________    Interval History   Pt seen and examined at bedside, daughter at bedside. Questions regarding treatment plan answered. Pt denies any changes to weakness/unsteadiness, denies dysuria, chest pain, SOB, fever/chills. Will continue to monitor    ROS: Otherwise negative unless noted above    Data reviewed today: I reviewed all medications, new labs and imaging results over the last 24 hours.    Physical Exam   Vital Signs: Temp: 98.1  F (36.7  C) Temp src: Oral BP: 121/73 Pulse: 70   Resp: 18 SpO2: 97 % O2 Device: None (Room air)    Weight: 175 lbs 3.2 oz  Gen: NAD, alert, cooperative, seating in chair, pleasant  Resp: CTAB, no crackles  or wheezes, no increased WOB  Cardiac: RRR, no S3/S4, no M/R/G appreciated  GI: soft, non-tender, non-distended  Ext: WWP, no edema, no erythema  Neuro: AOx3, sensation intact b/l  Psych: appropriate mood & affect      Data   Recent Labs   Lab 12/10/22  1223 12/10/22  0641 12/09/22  0638 12/07/22  1639   WBC  --  8.5 7.9 9.2   HGB  --  14.5 15.0 15.0   MCV  --  90 88 91   PLT  --  197 198 229   NA  --   --  143 144   POTASSIUM 4.8  --  3.7 4.3   CHLORIDE  --   --  109* 106   CO2  --   --  21* 25   BUN  --   --  13.9 19.6   CR  --   --  0.88 1.10   ANIONGAP  --   --  13 13   TRISTIAN  --   --  8.9 9.3   GLC  --   --  81 91   ALBUMIN  --   --   --  4.0   PROTTOTAL  --   --   --  6.5   BILITOTAL  --   --   --  0.3   ALKPHOS  --   --   --  78   ALT  --   --   --  25   AST  --   --   --  20     No results found for this or any previous visit (from the past 24 hour(s)).  Medications       [Held by provider] aspirin  325 mg Oral Daily     cefTRIAXone  1 g Intravenous Q24H     cyanocobalamin  1,000 mcg Oral Daily     ferrous sulfate  325 mg Oral Every Other Day     gabapentin  300 mg Oral At Bedtime     ketoconazole   Topical Daily     levETIRAcetam  250 mg Oral BID     multivitamin  1 tablet Oral Daily     NONFORMULARY 1 drop  1 drop Left Eye 4 times per day     pantoprazole  40 mg Oral Daily     pramipexole  0.375 mg Oral At Bedtime     predniSONE  10 mg Oral Daily     pyridostigmine  90 mg Oral BID     senna-docusate  1 tablet Oral BID     sodium chloride (PF)  3 mL Intracatheter Q8H     tamsulosin  0.4 mg Oral Daily     vitamin D3  50 mcg Oral Daily

## 2022-12-10 NOTE — PROGRESS NOTES
"[facility], [place]   Neurosurgery Progress Note:    Date of service: 12/10/2022    Assessment: Gustavo Ramon is a 82 year old male with a past medical history of polyneuropathy, NPH with certas at 2,  and myasthenia gravis presents with a 6 month history of worsening bilateral lower extremity weakness and right upper extremity. MRI C-Spine revealed C5-C7 spinal canal stenosis.    Clinically Significant Risk Factors Present on Admission          # Overweight: Estimated body mass index is 25.87 kg/m  as calculated from the following:    Height as of this encounter: 1.753 m (5' 9\").    Weight as of this encounter: 79.5 kg (175 lb 3.2 oz).  # Hypertension  # Ocular Myasthenia Gravis  # Steroid-induced diabetes mellitus      Recommendations:  - Serial Neuro Checks  - Activity as tolerated with assist  - Regular diet  - Pain management per primary team  - Plan for Laminoplasty on Monday (12/12)  - Hold Aspirin in anticipation of surgical procedure  - Hold subcutaneous Heparin DVT prophylaxis in anticipation of surgical procedure  - PT/OT  - Agree with ceftriaxone for UTI  - All other cares per primary team  - Please inform the neurosurgery team if there is any acute change in neurological exam    Matt Ramos  Neurosurgery Resident PGY2    Interval History:  No acute events overnight. Patient still reporting radicular pain of right arm from shoulder to elbow.     Objective:   Temp:  [97.7  F (36.5  C)-99.1  F (37.3  C)] 98.1  F (36.7  C)  Pulse:  [70-85] 70  Resp:  [18] 18  BP: (100-142)/(57-73) 121/73  SpO2:  [97 %] 97 %  I/O last 3 completed shifts:  In: 420 [P.O.:420]  Out: -     Gen: Appears comfortable, NAD  Neurologic:  - Alert & Oriented to person, place, time, and situation  - Follows commands briskly  - Speech fluent, spontaneous. No aphasia or dysarthria.  - No gaze preference. No apparent hemineglect.  - PER, EOMI  - Strong eye closure, jaw clench, and cheek puff  - Face " symmetric with sensation intact to light touch  - Trapezii and sternocleidomastoid muscles 5/5 bilaterally  - Bilateral Jorge L's present       Del Tr Bi WE WF Gr   R 5 4+ 5 5 5 5   L 5 5 5 5 5 5    HF KE KF DF PF EHL   R 5 5 5 5 5 5   L 5 5 5 5 5 5       Sensation intact and symmetric to light touch throughout    LABS  Recent Labs   Lab Test 12/10/22  0641 12/09/22  0638 12/07/22  1639   WBC 8.5 7.9 9.2   HGB 14.5 15.0 15.0   MCV 90 88 91    198 229       Recent Labs   Lab Test 12/09/22  0638 12/07/22  1639 07/08/22  0701    144 135*   POTASSIUM 3.7 4.3 3.9   CHLORIDE 109* 106 102   CO2 21* 25 24   BUN 13.9 19.6 14.8   CR 0.88 1.10 0.88   ANIONGAP 13 13 9   TRISTIAN 8.9 9.3 9.0   GLC 81 91 107*       IMAGING    No results found for this or any previous visit (from the past 24 hour(s)).

## 2022-12-11 ENCOUNTER — ANESTHESIA EVENT (OUTPATIENT)
Dept: SURGERY | Facility: CLINIC | Age: 82
DRG: 520 | End: 2022-12-11
Payer: MEDICARE

## 2022-12-11 LAB
ABO/RH(D): NORMAL
ANION GAP SERPL CALCULATED.3IONS-SCNC: 10 MMOL/L (ref 7–15)
ANION GAP SERPL CALCULATED.3IONS-SCNC: 17 MMOL/L (ref 7–15)
ANION GAP SERPL CALCULATED.3IONS-SCNC: 9 MMOL/L (ref 7–15)
ANTIBODY SCREEN: NEGATIVE
APTT PPP: 28 SECONDS (ref 22–38)
BACTERIA UR CULT: ABNORMAL
BUN SERPL-MCNC: 21.4 MG/DL (ref 8–23)
BUN SERPL-MCNC: 25.6 MG/DL (ref 8–23)
BUN SERPL-MCNC: 26.4 MG/DL (ref 8–23)
CALCIUM SERPL-MCNC: 8.9 MG/DL (ref 8.8–10.2)
CALCIUM SERPL-MCNC: 9 MG/DL (ref 8.8–10.2)
CALCIUM SERPL-MCNC: 9.2 MG/DL (ref 8.8–10.2)
CHLORIDE SERPL-SCNC: 107 MMOL/L (ref 98–107)
CHLORIDE SERPL-SCNC: 109 MMOL/L (ref 98–107)
CHLORIDE SERPL-SCNC: 110 MMOL/L (ref 98–107)
CREAT SERPL-MCNC: 0.85 MG/DL (ref 0.67–1.17)
CREAT SERPL-MCNC: 0.94 MG/DL (ref 0.67–1.17)
CREAT SERPL-MCNC: 0.95 MG/DL (ref 0.67–1.17)
DEPRECATED HCO3 PLAS-SCNC: 16 MMOL/L (ref 22–29)
DEPRECATED HCO3 PLAS-SCNC: 21 MMOL/L (ref 22–29)
DEPRECATED HCO3 PLAS-SCNC: 24 MMOL/L (ref 22–29)
ERYTHROCYTE [DISTWIDTH] IN BLOOD BY AUTOMATED COUNT: 14.6 % (ref 10–15)
ERYTHROCYTE [DISTWIDTH] IN BLOOD BY AUTOMATED COUNT: 14.6 % (ref 10–15)
GFR SERPL CREATININE-BSD FRML MDRD: 79 ML/MIN/1.73M2
GFR SERPL CREATININE-BSD FRML MDRD: 80 ML/MIN/1.73M2
GFR SERPL CREATININE-BSD FRML MDRD: 86 ML/MIN/1.73M2
GLUCOSE SERPL-MCNC: 118 MG/DL (ref 70–99)
GLUCOSE SERPL-MCNC: 89 MG/DL (ref 70–99)
GLUCOSE SERPL-MCNC: 92 MG/DL (ref 70–99)
HCT VFR BLD AUTO: 42.6 % (ref 40–53)
HCT VFR BLD AUTO: 47.7 % (ref 40–53)
HGB BLD-MCNC: 13.7 G/DL (ref 13.3–17.7)
HGB BLD-MCNC: 14.7 G/DL (ref 13.3–17.7)
INR PPP: 1.09 (ref 0.85–1.15)
MAGNESIUM SERPL-MCNC: 1.9 MG/DL (ref 1.7–2.3)
MCH RBC QN AUTO: 28.7 PG (ref 26.5–33)
MCH RBC QN AUTO: 28.9 PG (ref 26.5–33)
MCHC RBC AUTO-ENTMCNC: 30.8 G/DL (ref 31.5–36.5)
MCHC RBC AUTO-ENTMCNC: 32.2 G/DL (ref 31.5–36.5)
MCV RBC AUTO: 89 FL (ref 78–100)
MCV RBC AUTO: 94 FL (ref 78–100)
PLATELET # BLD AUTO: 185 10E3/UL (ref 150–450)
PLATELET # BLD AUTO: 198 10E3/UL (ref 150–450)
POTASSIUM SERPL-SCNC: 3.8 MMOL/L (ref 3.4–5.3)
POTASSIUM SERPL-SCNC: 3.9 MMOL/L (ref 3.4–5.3)
POTASSIUM SERPL-SCNC: 4.2 MMOL/L (ref 3.4–5.3)
RBC # BLD AUTO: 4.77 10E6/UL (ref 4.4–5.9)
RBC # BLD AUTO: 5.09 10E6/UL (ref 4.4–5.9)
SODIUM SERPL-SCNC: 140 MMOL/L (ref 136–145)
SODIUM SERPL-SCNC: 141 MMOL/L (ref 136–145)
SODIUM SERPL-SCNC: 142 MMOL/L (ref 136–145)
SPECIMEN EXPIRATION DATE: NORMAL
WBC # BLD AUTO: 8.4 10E3/UL (ref 4–11)
WBC # BLD AUTO: 9.2 10E3/UL (ref 4–11)

## 2022-12-11 PROCEDURE — 250N000011 HC RX IP 250 OP 636: Performed by: PEDIATRICS

## 2022-12-11 PROCEDURE — 258N000003 HC RX IP 258 OP 636: Performed by: STUDENT IN AN ORGANIZED HEALTH CARE EDUCATION/TRAINING PROGRAM

## 2022-12-11 PROCEDURE — 120N000002 HC R&B MED SURG/OB UMMC

## 2022-12-11 PROCEDURE — 36415 COLL VENOUS BLD VENIPUNCTURE: CPT | Performed by: STUDENT IN AN ORGANIZED HEALTH CARE EDUCATION/TRAINING PROGRAM

## 2022-12-11 PROCEDURE — 85027 COMPLETE CBC AUTOMATED: CPT | Performed by: STUDENT IN AN ORGANIZED HEALTH CARE EDUCATION/TRAINING PROGRAM

## 2022-12-11 PROCEDURE — 85610 PROTHROMBIN TIME: CPT

## 2022-12-11 PROCEDURE — 83735 ASSAY OF MAGNESIUM: CPT

## 2022-12-11 PROCEDURE — 85730 THROMBOPLASTIN TIME PARTIAL: CPT

## 2022-12-11 PROCEDURE — 86901 BLOOD TYPING SEROLOGIC RH(D): CPT

## 2022-12-11 PROCEDURE — 80048 BASIC METABOLIC PNL TOTAL CA: CPT

## 2022-12-11 PROCEDURE — 85027 COMPLETE CBC AUTOMATED: CPT

## 2022-12-11 PROCEDURE — 250N000012 HC RX MED GY IP 250 OP 636 PS 637: Performed by: STUDENT IN AN ORGANIZED HEALTH CARE EDUCATION/TRAINING PROGRAM

## 2022-12-11 PROCEDURE — 86850 RBC ANTIBODY SCREEN: CPT

## 2022-12-11 PROCEDURE — 250N000013 HC RX MED GY IP 250 OP 250 PS 637: Performed by: STUDENT IN AN ORGANIZED HEALTH CARE EDUCATION/TRAINING PROGRAM

## 2022-12-11 PROCEDURE — 36415 COLL VENOUS BLD VENIPUNCTURE: CPT

## 2022-12-11 PROCEDURE — 82310 ASSAY OF CALCIUM: CPT | Performed by: STUDENT IN AN ORGANIZED HEALTH CARE EDUCATION/TRAINING PROGRAM

## 2022-12-11 PROCEDURE — 99232 SBSQ HOSP IP/OBS MODERATE 35: CPT | Mod: GC | Performed by: PEDIATRICS

## 2022-12-11 RX ORDER — ACETAMINOPHEN 325 MG/1
975 TABLET ORAL ONCE
Status: DISCONTINUED | OUTPATIENT
Start: 2022-12-12 | End: 2022-12-12 | Stop reason: HOSPADM

## 2022-12-11 RX ORDER — SODIUM CHLORIDE, SODIUM LACTATE, POTASSIUM CHLORIDE, CALCIUM CHLORIDE 600; 310; 30; 20 MG/100ML; MG/100ML; MG/100ML; MG/100ML
INJECTION, SOLUTION INTRAVENOUS CONTINUOUS
Status: DISCONTINUED | OUTPATIENT
Start: 2022-12-11 | End: 2022-12-12 | Stop reason: HOSPADM

## 2022-12-11 RX ORDER — LIDOCAINE 40 MG/G
CREAM TOPICAL
Status: DISCONTINUED | OUTPATIENT
Start: 2022-12-11 | End: 2022-12-12 | Stop reason: HOSPADM

## 2022-12-11 RX ADMIN — PREDNISONE 10 MG: 5 TABLET ORAL at 08:47

## 2022-12-11 RX ADMIN — Medication 90 MG: at 20:20

## 2022-12-11 RX ADMIN — PANTOPRAZOLE SODIUM 40 MG: 40 TABLET, DELAYED RELEASE ORAL at 08:47

## 2022-12-11 RX ADMIN — PRAMIPEXOLE DIHYDROCHLORIDE 0.38 MG: 0.25 TABLET ORAL at 21:50

## 2022-12-11 RX ADMIN — SODIUM CHLORIDE, POTASSIUM CHLORIDE, SODIUM LACTATE AND CALCIUM CHLORIDE: 600; 310; 30; 20 INJECTION, SOLUTION INTRAVENOUS at 20:21

## 2022-12-11 RX ADMIN — Medication 90 MG: at 08:47

## 2022-12-11 RX ADMIN — Medication 50 MCG: at 08:47

## 2022-12-11 RX ADMIN — KETOCONAZOLE: 20 CREAM TOPICAL at 08:48

## 2022-12-11 RX ADMIN — Medication 1000 MCG: at 08:47

## 2022-12-11 RX ADMIN — CEFTRIAXONE SODIUM 1 G: 1 INJECTION, POWDER, FOR SOLUTION INTRAMUSCULAR; INTRAVENOUS at 08:42

## 2022-12-11 RX ADMIN — LEVETIRACETAM 250 MG: 250 TABLET, FILM COATED ORAL at 08:47

## 2022-12-11 RX ADMIN — SENNOSIDES AND DOCUSATE SODIUM 1 TABLET: 8.6; 5 TABLET ORAL at 20:20

## 2022-12-11 RX ADMIN — SENNOSIDES AND DOCUSATE SODIUM 1 TABLET: 8.6; 5 TABLET ORAL at 08:47

## 2022-12-11 RX ADMIN — GABAPENTIN 300 MG: 300 CAPSULE ORAL at 21:51

## 2022-12-11 RX ADMIN — Medication 1 TABLET: at 08:47

## 2022-12-11 RX ADMIN — LEVETIRACETAM 250 MG: 250 TABLET, FILM COATED ORAL at 20:19

## 2022-12-11 RX ADMIN — TAMSULOSIN HYDROCHLORIDE 0.4 MG: 0.4 CAPSULE ORAL at 08:47

## 2022-12-11 ASSESSMENT — ACTIVITIES OF DAILY LIVING (ADL)
ADLS_ACUITY_SCORE: 46
ADLS_ACUITY_SCORE: 50
ADLS_ACUITY_SCORE: 46
ADLS_ACUITY_SCORE: 50
ADLS_ACUITY_SCORE: 46
ADLS_ACUITY_SCORE: 42
ADLS_ACUITY_SCORE: 42
ADLS_ACUITY_SCORE: 46

## 2022-12-11 NOTE — PLAN OF CARE
Goal Outcome Evaluation:      Plan of Care Reviewed With: patient    Overall Patient Progress: no changeOverall Patient Progress: no change    Outcome Evaluation: Pt is A&Ox4, VSS on RA, denies pain. No acute changes.

## 2022-12-11 NOTE — ANESTHESIA PREPROCEDURE EVALUATION
Anesthesia Pre-Procedure Evaluation    Patient: Gustavo Ramon   MRN: [id number] : 1940        Procedure : Procedure(s):  POSTERIOR APPROACH Cervical 3-7 laminoplasty          Past Medical History:   Diagnosis Date     Actinic keratosis      AK (actinic keratosis) 11/15/2012     Amaurosis fugax      Basal cell carcinoma 2009    R medial cheek/nose     Central serous retinopathy left    Eye     Diverticulosis 2006     DJD (degenerative joint disease)      GERD (gastroesophageal reflux disease)      Hearing loss     High frequency     History of nonmelanoma skin cancer      History of nonmelanoma skin cancer      HTN (hypertension)      Hyperlipidemia LDL goal < 130      Hyperplastic colon polyp 2006     IgA monoclonal gammopathy     IgA kappa     Lattice degeneration of retina right    Eye     Macular degeneration      Nonsenile cataract      Ocular myasthenia gravis (H) 2009    [place] consult     Rosacea      Steroid-induced diabetes mellitus, initial encounter (H) 2022     Strabismus      Vitreous detachment left    Eye      Past Surgical History:   Procedure Laterality Date     ARTHROSCOPY KNEE RT/LT Left 2010    Knee     BIOPSY      Nose; Prostate; BCC - left arm     COLONOSCOPY  2019    w/Endoscopy     COLONOSCOPY WITH CO2 INSUFFLATION N/A 2019    Procedure: COLONOSCOPY, WITH CO2 INSUFFLATION;  Surgeon: Loi Levine MD;  Location: [facility]     COMBINED ESOPHAGOSCOPY, GASTROSCOPY, DUODENOSCOPY (EGD) WITH CO2 INSUFFLATION N/A 2019    Procedure: ESOPHAGOGASTRODUODENOSCOPY, WITH CO2 INSUFFLATION;  Surgeon: Loi Levine MD;  Location: [facility]     CRYOTHERAPY      Postate cancer     DISKECTOMY, LUMBAR, SINGLE SP      L2-3     ENT SURGERY      Tonsils      ENT SURGERY  12    Biopsy lesion right pinna     ENT SURGERY  12    Biopsy leson right pinna     ESOPHAGOSCOPY, GASTROSCOPY, DUODENOSCOPY (EGD), COMBINED N/A 2019     Procedure: Esophagogastroduodenoscopy, With Biopsy;  Surgeon: Loi Levine MD;  Location: [facility]     IR LUMBAR DRAIN PLACEMENT W FLUORO  6/27/2022     LAPAROSCOPIC ASSISTED IMPLANT SHUNT VENTRICULOPERITONEAL N/A 7/7/2022    Procedure: possible INSERTION, SHUNT, VENTRICULOPERITONEAL, LAPAROSCOPY-ASSISTED;  Surgeon: Joe Damon MD;  Location: [facility]     OPTICAL TRACKING SYSTEM IMPLANT SHUNT VENTRICULOPERITONEAL N/A 7/7/2022    Procedure: INSERTION, SHUNT, VENTRICULOPERITONEAL, USING OPTICAL TRACKING SYSTEM;  Surgeon: Jean-Paul Childs MD;  Location: [facility] OR     SOFT TISSUE SURGERY  May 2010    Basal Cell/right side nose      Allergies   Allergen Reactions     Mycophenolate Other (See Comments)     Confusion, abnormal movements     Nkda [No Known Drug Allergies]       Social History     Tobacco Use     Smoking status: Never     Smokeless tobacco: Never   Substance Use Topics     Alcohol use: No     Alcohol/week: 0.0 standard drinks      Wt Readings from Last 1 Encounters:   12/09/22 79.5 kg (175 lb 3.2 oz)        Anesthesia Evaluation   Pt has had prior anesthetic. Type: General.    No history of anesthetic complications       ROS/MED HX  ENT/Pulmonary:  - neg pulmonary ROS     Neurologic: Comment: Myasthenia gravis, mainly ocular symptoms    (+) dementia, peripheral neuropathy,     Cardiovascular:     (+) Dyslipidemia hypertension-----    METS/Exercise Tolerance:     Hematologic: Comments: IgA monoclonal gammopathy      Musculoskeletal: Comment: DJD      GI/Hepatic:     (+) GERD, Asymptomatic on medication,     Renal/Genitourinary:     (+) renal disease, type: CRI,     Endo:     (+) type II DM, Last HgA1c: 6.2, date: 12/5/22, Chronic steroid usage for Obesity,     Psychiatric/Substance Use:  - neg psychiatric ROS     Infectious Disease:  - neg infectious disease ROS     Malignancy:  - neg malignancy ROS     Other:               OUTSIDE LABS:  CBC:   Lab Results   Component Value Date    WBC 8.4  12/11/2022    WBC 8.5 12/10/2022    HGB 13.7 12/11/2022    HGB 14.5 12/10/2022    HCT 42.6 12/11/2022    HCT 45.2 12/10/2022     12/11/2022     12/10/2022     BMP:   Lab Results   Component Value Date     12/11/2022     12/10/2022    POTASSIUM 3.9 12/11/2022    POTASSIUM 4.8 12/10/2022    CHLORIDE 110 (H) 12/11/2022    CHLORIDE 109 (H) 12/10/2022    CO2 21 (L) 12/11/2022    CO2 24 12/10/2022    BUN 25.6 (H) 12/11/2022    BUN 21.4 12/10/2022    CR 0.95 12/11/2022    CR 0.94 12/10/2022    GLC 92 12/11/2022    GLC 89 12/10/2022     COAGS:   Lab Results   Component Value Date    PTT 31 06/27/2022    INR 1.12 06/27/2022     POC: No results found for: BGM, HCG, HCGS  HEPATIC:   Lab Results   Component Value Date    ALBUMIN 4.0 12/07/2022    PROTTOTAL 6.5 12/07/2022    ALT 25 12/07/2022    AST 20 12/07/2022    ALKPHOS 78 12/07/2022    BILITOTAL 0.3 12/07/2022    JOSELINE 13 03/31/2022     OTHER:   Lab Results   Component Value Date    A1C 6.2 (H) 12/05/2022    TRISTIAN 8.9 12/11/2022    PHOS 4.0 12/07/2022    MAG 1.9 12/11/2022    LIPASE 228 08/25/2016    TSH 1.50 03/31/2022    SED 8 08/15/2012       Anesthesia Plan    ASA Status:  3      Anesthesia Type: General.     - Airway: ETT   Induction: Intravenous, Propofol.   Maintenance: Balanced.   Techniques and Equipment:     - Lines/Monitors: 2nd IV     - Drips/Meds: Fentanyl     Consents            Postoperative Care    Pain management: IV analgesics, Oral pain medications.   PONV prophylaxis: Ondansetron (or other 5HT-3), Dexamethasone or Solumedrol     Comments:                Matthew Julien MD

## 2022-12-11 NOTE — PLAN OF CARE
"Goal Outcome Evaluation:        /61 (BP Location: Right arm)   Pulse 93   Temp 98  F (36.7  C) (Oral)   Resp 18   Ht 1.753 m (5' 9\")   Wt 79.5 kg (175 lb 3.2 oz)   SpO2 95%   BMI 25.87 kg/m        3369-4161    Neuro: A&Ox4.   Cardiac: SR. VSS.   Respiratory: Sating 95% on RA.  GI/: Adequate urine output. BM X1  Diet/appetite: Tolerating regular diet. Eating well.  Activity:  Assist of one with a gait belt and a walker up to chair and in halls.  Pain: At acceptable level on current regimen.   Skin: No new deficits noted.  LDA's:PIV saline locked.    Plan: Neurosurgical intervention on Monday.Continue with POC. Notify primary team with changes.  "

## 2022-12-11 NOTE — PROGRESS NOTES
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 Awaiting neurosurgical intervention scheduled 12/12.    Today:  - NPO at midnight  - No medical changes today    # Weakness and falls  # Frailty and debilitation  # Hx of NPH s/p  shunt (07/2022)  # Hx myasthenia gravis with predominant ocular symptoms  # C5-C7 Spinal canal stenosis, severe  The patient presents with increased weakness, unsteadiness, 2 recent falls, b/l numbness in hands and hyperreflexia on exam. 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 Overall, weakness and unsteadiness most likely multifactorial and related to severe cervical stenosis, sensory neuropathy, and age-related frailty and debilitation.  - Neurosurgery consult, appreciate recs              - Surgery (laminoplasty) planned for Monday (12/12)  - Neurology consult, appreciate recs  - Q4hr neuro checks  - PT/OT consult  - Hold aspirin in anticipation of surgical intervention  - PTA prednisone (10mg) and pyridostigmine (60mg) for myasthenia gravis     # Asymptomatic bacteruria  No reported dysuria, changes in frequency, hematuria. Some reported urinary retention but this is not new. Will empirically treat in the setting of neurosurgical intervention planned 12/12.  - Continue ceftriaxone x5 days (12/8-12/12)  - F/u urine cultures  - Monitor for developing symptoms  of UTI     # Elevated troponin, stable  Noted at 26 on admission and platueaued on recheck. Denies chest pain. EKG with sinus rhythym     Chronic medical problems:  Restless leg syndrome, myoclonus - PTA pramipexole, levetiracetam  Neuropathy - PTA gabapentin  GERD - PTA pantoprazole  HLD - PTA simvastatin HELD  Urinary retention - PTA tamsulosin  Prostate cancer s/p cryotherapy (07/2022)  Prediabetes (Hghb A1c 6.2 - 12/5/22)  Constipation - PTA senna  Anemia - PTA ferrous sulfate       Diet: Combination Diet Regular Diet Adult    DVT Prophylaxis: Pneumatic Compression Devices  Rodriguez Catheter: Not present  Fluids: None  Central Lines: None  Cardiac Monitoring: None  Code Status: Full Code      Disposition Plan      Expected Discharge Date: 12/16/2022        Discharge Comments: dispo pending NSG laminectomy; likely next week.        The patient's care was discussed with the Attending Physician, Dr. Harper.    Terry Wheeler, Medical Student  Medicine Service, [address]  Securely message with the Vocera Web Console (learn more here)  Text page via [facility] Paging/Directory   Please see signed in provider for up to date coverage information    Resident/Fellow Attestation   I, Donato So MD, was present with the medical/MARGARITO student who participated in the service and in the documentation of the note.  I have verified the history and personally performed the physical exam and medical decision making.  I agree with the assessment and plan of care as documented in the note.      Donato So MD  PGY3  Date of Service (when I saw the patient): 12/11/22    ______________________________________________________________________    Interval History   Nursing notes reviewed, no acute events overnight. Patient was observed walking around the unit with a walker and PT. He notes that he still feels weak and unsteady in the legs and that he is drifts from side to  side and does not note any changes. He reports continued numbness in b/l upper extremities, but unchanged. He denies any dysuria, fevers/chills, chest pain, SOB, N/V. Bowel movements have been formed and brown.     ROS: Otherwise negative unless noted above    Data reviewed today: I reviewed all medications, new labs and imaging results over the last 24 hours.    Physical Exam   Vital Signs: Temp: 98.7  F (37.1  C) Temp src: Oral BP: 115/58 Pulse: 63   Resp: 18 SpO2: 92 % O2 Device: None (Room air)    Weight: 175 lbs 3.2 oz  Gen: NAD, alert, cooperative, seating in chair, pleasant  Resp: CTAB, no crackles or wheezes, no increased WOB  Cardiac: Normal rate, regular rythym  GI: soft, non-tender, non-distended  Ext: WWP, no edema, no erythema  Neuro: AOx4, sensation intact b/l  Psych: appropriate mood & affect      Data   Recent Labs   Lab 12/11/22  0744 12/10/22  1223 12/10/22  0641 12/09/22  0638 12/07/22  1639   WBC 8.4  --  8.5 7.9 9.2   HGB 13.7  --  14.5 15.0 15.0   MCV 89  --  90 88 91     --  197 198 229     --  142 143 144   POTASSIUM 3.9 4.8 3.8 3.7 4.3   CHLORIDE 110*  --  109* 109* 106   CO2 21*  --  24 21* 25   BUN 25.6*  --  21.4 13.9 19.6   CR 0.95  --  0.94 0.88 1.10   ANIONGAP 10  --  9 13 13   TRISTIAN 8.9  --  9.0 8.9 9.3   GLC 92  --  89 81 91   ALBUMIN  --   --   --   --  4.0   PROTTOTAL  --   --   --   --  6.5   BILITOTAL  --   --   --   --  0.3   ALKPHOS  --   --   --   --  78   ALT  --   --   --   --  25   AST  --   --   --   --  20     No results found for this or any previous visit (from the past 24 hour(s)).  Medications       [Held by provider] aspirin  325 mg Oral Daily     cefTRIAXone  1 g Intravenous Q24H     cyanocobalamin  1,000 mcg Oral Daily     ferrous sulfate  325 mg Oral Every Other Day     gabapentin  300 mg Oral At Bedtime     ketoconazole   Topical Daily     levETIRAcetam  250 mg Oral BID     multivitamin  1 tablet Oral Daily     NONFORMULARY 1 drop  1 drop Left Eye 4  times per day     pantoprazole  40 mg Oral Daily     pramipexole  0.375 mg Oral At Bedtime     predniSONE  10 mg Oral Daily     pyridostigmine  90 mg Oral BID     senna-docusate  1 tablet Oral BID     sodium chloride (PF)  3 mL Intracatheter Q8H     tamsulosin  0.4 mg Oral Daily     vitamin D3  50 mcg Oral Daily

## 2022-12-11 NOTE — PLAN OF CARE
Goal Outcome Evaluation:      Plan of Care Reviewed With: patient    Overall Patient Progress: no changeOverall Patient Progress: no change    Outcome Evaluation: Alert and orietned x 4. Denies pain. Up to the bathroom with assist of 1 and walker. For laminoplasty on 12/12. Vital signs stable on room air

## 2022-12-11 NOTE — PROGRESS NOTES
"[facility], [place]   Neurosurgery Progress Note:    Date of service: 12/11/2022    Assessment: Gustavo Ramon is a 82 year old male with a past medical history of polyneuropathy, NPH with certas at 2,  and myasthenia gravis presents with a 6 month history of worsening bilateral lower extremity weakness and right upper extremity. MRI C-Spine revealed C5-C7 spinal canal stenosis.  Current plan for OR 12/12 for C4-7 laminoplasty.    Clinically Significant Risk Factors Present on Admission          # Overweight: Estimated body mass index is 25.87 kg/m  as calculated from the following:    Height as of this encounter: 1.753 m (5' 9\").    Weight as of this encounter: 79.5 kg (175 lb 3.2 oz).  # Hypertension  # Ocular Myasthenia Gravis  # Steroid-induced diabetes mellitus      Recommendations:  - Serial neuro checks  - Activity as tolerated with assist  - Regular diet; NPO at midnight  - Pain management per primary team  - Plan for laminoplasty on Monday (12/12)  - Hold aspirin in anticipation of surgical procedure  - Hold subcutaneous heparin DVT prophylaxis in anticipation of surgical procedure  - PT/OT  - Agree with ceftriaxone for UTI  - All other cares per primary team  - Please inform the neurosurgery team if there is any acute change in neurological exam    Bebo Wolf M.D.  Neurosurgery Resident, PGY-4    Please contact neurosurgery resident on call with questions.    Dial * * [phone], enter [phone] when prompted.     Interval History:  Remains neurologically stable on exam.  Started on ceftriaxone for UTI.    Objective:   Temp:  [98  F (36.7  C)-98.2  F (36.8  C)] 98.2  F (36.8  C)  Pulse:  [70-93] 74  Resp:  [18] 18  BP: (102-134)/(61-76) 134/75  SpO2:  [95 %-99 %] 95 %  I/O last 3 completed shifts:  In: 750 [P.O.:750]  Out: -     Gen: Appears comfortable, NAD  Neurologic:  - Alert & Oriented to person, place, time, and situation  - Follows commands briskly  - Speech fluent, " spontaneous. No aphasia or dysarthria.  - No gaze preference. No apparent hemineglect.  - PER, EOMI  - Strong eye closure, jaw clench, and cheek puff  - Face symmetric with sensation intact to light touch  - Trapezii and sternocleidomastoid muscles 5/5 bilaterally  - Bilateral Jorge L's present       Del Tr Bi WE WF Gr   R 5 4+ 5 5 5 5   L 5 5 5 5 5 5    HF KE KF DF PF EHL   R 5 5 5 5 5 5   L 5 5 5 5 5 5       Sensation intact and symmetric to light touch throughout    LABS  Recent Labs   Lab Test 12/10/22  0641 12/09/22  0638 12/07/22  1639   WBC 8.5 7.9 9.2   HGB 14.5 15.0 15.0   MCV 90 88 91    198 229       Recent Labs   Lab Test 12/10/22  1223 12/09/22  0638 12/07/22  1639 07/08/22  0701   NA  --  143 144 135*   POTASSIUM 4.8 3.7 4.3 3.9   CHLORIDE  --  109* 106 102   CO2  --  21* 25 24   BUN  --  13.9 19.6 14.8   CR  --  0.88 1.10 0.88   ANIONGAP  --  13 13 9   TRISTIAN  --  8.9 9.3 9.0   GLC  --  81 91 107*       IMAGING    No results found for this or any previous visit (from the past 24 hour(s)).

## 2022-12-12 ENCOUNTER — APPOINTMENT (OUTPATIENT)
Dept: GENERAL RADIOLOGY | Facility: CLINIC | Age: 82
DRG: 520 | End: 2022-12-12
Attending: NEUROLOGICAL SURGERY
Payer: MEDICARE

## 2022-12-12 ENCOUNTER — APPOINTMENT (OUTPATIENT)
Dept: PHYSICAL THERAPY | Facility: CLINIC | Age: 82
DRG: 520 | End: 2022-12-12
Payer: MEDICARE

## 2022-12-12 ENCOUNTER — ANESTHESIA (OUTPATIENT)
Dept: SURGERY | Facility: CLINIC | Age: 82
DRG: 520 | End: 2022-12-12
Payer: MEDICARE

## 2022-12-12 LAB
ANION GAP SERPL CALCULATED.3IONS-SCNC: 10 MMOL/L (ref 7–15)
BUN SERPL-MCNC: 16.9 MG/DL (ref 8–23)
CALCIUM SERPL-MCNC: 8.3 MG/DL (ref 8.8–10.2)
CHLORIDE SERPL-SCNC: 109 MMOL/L (ref 98–107)
CREAT SERPL-MCNC: 0.89 MG/DL (ref 0.67–1.17)
DEPRECATED HCO3 PLAS-SCNC: 21 MMOL/L (ref 22–29)
ERYTHROCYTE [DISTWIDTH] IN BLOOD BY AUTOMATED COUNT: 14.4 % (ref 10–15)
GFR SERPL CREATININE-BSD FRML MDRD: 85 ML/MIN/1.73M2
GLUCOSE BLDC GLUCOMTR-MCNC: 111 MG/DL (ref 70–99)
GLUCOSE BLDC GLUCOMTR-MCNC: 118 MG/DL (ref 70–99)
GLUCOSE BLDC GLUCOMTR-MCNC: 122 MG/DL (ref 70–99)
GLUCOSE SERPL-MCNC: 87 MG/DL (ref 70–99)
HCT VFR BLD AUTO: 40.6 % (ref 40–53)
HGB BLD-MCNC: 13 G/DL (ref 13.3–17.7)
MAGNESIUM SERPL-MCNC: 1.8 MG/DL (ref 1.7–2.3)
MCH RBC QN AUTO: 28.9 PG (ref 26.5–33)
MCHC RBC AUTO-ENTMCNC: 32 G/DL (ref 31.5–36.5)
MCV RBC AUTO: 90 FL (ref 78–100)
PLATELET # BLD AUTO: 162 10E3/UL (ref 150–450)
POTASSIUM SERPL-SCNC: 3.8 MMOL/L (ref 3.4–5.3)
RBC # BLD AUTO: 4.5 10E6/UL (ref 4.4–5.9)
SODIUM SERPL-SCNC: 140 MMOL/L (ref 136–145)
WBC # BLD AUTO: 7 10E3/UL (ref 4–11)

## 2022-12-12 PROCEDURE — 250N000013 HC RX MED GY IP 250 OP 250 PS 637: Performed by: STUDENT IN AN ORGANIZED HEALTH CARE EDUCATION/TRAINING PROGRAM

## 2022-12-12 PROCEDURE — 999N000128 HC STATISTIC PERIPHERAL IV START W/O US GUIDANCE

## 2022-12-12 PROCEDURE — 250N000011 HC RX IP 250 OP 636: Performed by: STUDENT IN AN ORGANIZED HEALTH CARE EDUCATION/TRAINING PROGRAM

## 2022-12-12 PROCEDURE — 258N000003 HC RX IP 258 OP 636: Performed by: STUDENT IN AN ORGANIZED HEALTH CARE EDUCATION/TRAINING PROGRAM

## 2022-12-12 PROCEDURE — 250N000009 HC RX 250: Performed by: NEUROLOGICAL SURGERY

## 2022-12-12 PROCEDURE — 99232 SBSQ HOSP IP/OBS MODERATE 35: CPT | Mod: 57

## 2022-12-12 PROCEDURE — C1713 ANCHOR/SCREW BN/BN,TIS/BN: HCPCS | Performed by: NEUROLOGICAL SURGERY

## 2022-12-12 PROCEDURE — 250N000011 HC RX IP 250 OP 636: Performed by: PEDIATRICS

## 2022-12-12 PROCEDURE — 99232 SBSQ HOSP IP/OBS MODERATE 35: CPT | Mod: GC | Performed by: PEDIATRICS

## 2022-12-12 PROCEDURE — 82310 ASSAY OF CALCIUM: CPT | Performed by: STUDENT IN AN ORGANIZED HEALTH CARE EDUCATION/TRAINING PROGRAM

## 2022-12-12 PROCEDURE — 250N000012 HC RX MED GY IP 250 OP 636 PS 637: Performed by: STUDENT IN AN ORGANIZED HEALTH CARE EDUCATION/TRAINING PROGRAM

## 2022-12-12 PROCEDURE — 250N000011 HC RX IP 250 OP 636: Performed by: NURSE ANESTHETIST, CERTIFIED REGISTERED

## 2022-12-12 PROCEDURE — 370N000017 HC ANESTHESIA TECHNICAL FEE, PER MIN: Performed by: NEUROLOGICAL SURGERY

## 2022-12-12 PROCEDURE — 36415 COLL VENOUS BLD VENIPUNCTURE: CPT | Performed by: STUDENT IN AN ORGANIZED HEALTH CARE EDUCATION/TRAINING PROGRAM

## 2022-12-12 PROCEDURE — 360N000084 HC SURGERY LEVEL 4 W/ FLUORO, PER MIN: Performed by: NEUROLOGICAL SURGERY

## 2022-12-12 PROCEDURE — 999N000141 HC STATISTIC PRE-PROCEDURE NURSING ASSESSMENT: Performed by: NEUROLOGICAL SURGERY

## 2022-12-12 PROCEDURE — 0PU30KZ SUPPLEMENT CERVICAL VERTEBRA WITH NONAUTOLOGOUS TISSUE SUBSTITUTE, OPEN APPROACH: ICD-10-PCS | Performed by: NEUROLOGICAL SURGERY

## 2022-12-12 PROCEDURE — 97116 GAIT TRAINING THERAPY: CPT | Mod: GP | Performed by: REHABILITATION PRACTITIONER

## 2022-12-12 PROCEDURE — 250N000009 HC RX 250: Performed by: STUDENT IN AN ORGANIZED HEALTH CARE EDUCATION/TRAINING PROGRAM

## 2022-12-12 PROCEDURE — 85027 COMPLETE CBC AUTOMATED: CPT | Performed by: STUDENT IN AN ORGANIZED HEALTH CARE EDUCATION/TRAINING PROGRAM

## 2022-12-12 PROCEDURE — 4A11X4G MONITORING OF PERIPHERAL NERVOUS ELECTRICAL ACTIVITY, INTRAOPERATIVE, EXTERNAL APPROACH: ICD-10-PCS | Performed by: NEUROLOGICAL SURGERY

## 2022-12-12 PROCEDURE — 999N000179 XR SURGERY CARM FLUORO LESS THAN 5 MIN W STILLS: Mod: TC

## 2022-12-12 PROCEDURE — 250N000025 HC SEVOFLURANE, PER MIN: Performed by: NEUROLOGICAL SURGERY

## 2022-12-12 PROCEDURE — 272N000001 HC OR GENERAL SUPPLY STERILE: Performed by: NEUROLOGICAL SURGERY

## 2022-12-12 PROCEDURE — 120N000002 HC R&B MED SURG/OB UMMC

## 2022-12-12 PROCEDURE — 00NW0ZZ RELEASE CERVICAL SPINAL CORD, OPEN APPROACH: ICD-10-PCS | Performed by: NEUROLOGICAL SURGERY

## 2022-12-12 PROCEDURE — 250N000009 HC RX 250: Performed by: NURSE ANESTHETIST, CERTIFIED REGISTERED

## 2022-12-12 PROCEDURE — P9045 ALBUMIN (HUMAN), 5%, 250 ML: HCPCS | Performed by: NURSE ANESTHETIST, CERTIFIED REGISTERED

## 2022-12-12 PROCEDURE — 710N000011 HC RECOVERY PHASE 1, LEVEL 3, PER MIN: Performed by: NEUROLOGICAL SURGERY

## 2022-12-12 PROCEDURE — 82374 ASSAY BLOOD CARBON DIOXIDE: CPT | Performed by: STUDENT IN AN ORGANIZED HEALTH CARE EDUCATION/TRAINING PROGRAM

## 2022-12-12 PROCEDURE — 258N000003 HC RX IP 258 OP 636: Performed by: NURSE ANESTHETIST, CERTIFIED REGISTERED

## 2022-12-12 PROCEDURE — 83735 ASSAY OF MAGNESIUM: CPT

## 2022-12-12 PROCEDURE — 97110 THERAPEUTIC EXERCISES: CPT | Mod: GP | Performed by: REHABILITATION PRACTITIONER

## 2022-12-12 RX ORDER — DEXAMETHASONE SODIUM PHOSPHATE 4 MG/ML
INJECTION, SOLUTION INTRA-ARTICULAR; INTRALESIONAL; INTRAMUSCULAR; INTRAVENOUS; SOFT TISSUE PRN
Status: DISCONTINUED | OUTPATIENT
Start: 2022-12-12 | End: 2022-12-12

## 2022-12-12 RX ORDER — OXYCODONE HYDROCHLORIDE 10 MG/1
10 TABLET ORAL EVERY 4 HOURS PRN
Status: DISCONTINUED | OUTPATIENT
Start: 2022-12-12 | End: 2022-12-16 | Stop reason: HOSPADM

## 2022-12-12 RX ORDER — METOPROLOL TARTRATE 1 MG/ML
1-2 INJECTION, SOLUTION INTRAVENOUS EVERY 5 MIN PRN
Status: DISCONTINUED | OUTPATIENT
Start: 2022-12-12 | End: 2022-12-12

## 2022-12-12 RX ORDER — ONDANSETRON 2 MG/ML
4 INJECTION INTRAMUSCULAR; INTRAVENOUS EVERY 6 HOURS PRN
Status: DISCONTINUED | OUTPATIENT
Start: 2022-12-12 | End: 2022-12-16 | Stop reason: HOSPADM

## 2022-12-12 RX ORDER — ONDANSETRON 4 MG/1
4 TABLET, ORALLY DISINTEGRATING ORAL EVERY 30 MIN PRN
Status: DISCONTINUED | OUTPATIENT
Start: 2022-12-12 | End: 2022-12-12

## 2022-12-12 RX ORDER — LIDOCAINE HYDROCHLORIDE AND EPINEPHRINE 10; 10 MG/ML; UG/ML
INJECTION, SOLUTION INFILTRATION; PERINEURAL PRN
Status: DISCONTINUED | OUTPATIENT
Start: 2022-12-12 | End: 2022-12-12 | Stop reason: HOSPADM

## 2022-12-12 RX ORDER — FENTANYL CITRATE 50 UG/ML
25 INJECTION, SOLUTION INTRAMUSCULAR; INTRAVENOUS EVERY 5 MIN PRN
Status: DISCONTINUED | OUTPATIENT
Start: 2022-12-12 | End: 2022-12-12

## 2022-12-12 RX ORDER — POLYETHYLENE GLYCOL 3350 17 G/17G
17 POWDER, FOR SOLUTION ORAL DAILY
Status: DISCONTINUED | OUTPATIENT
Start: 2022-12-13 | End: 2022-12-16 | Stop reason: HOSPADM

## 2022-12-12 RX ORDER — GLYCOPYRROLATE 0.2 MG/ML
INJECTION, SOLUTION INTRAMUSCULAR; INTRAVENOUS PRN
Status: DISCONTINUED | OUTPATIENT
Start: 2022-12-12 | End: 2022-12-12

## 2022-12-12 RX ORDER — FAMOTIDINE 20 MG/1
20 TABLET, FILM COATED ORAL 2 TIMES DAILY
Status: DISCONTINUED | OUTPATIENT
Start: 2022-12-12 | End: 2022-12-16 | Stop reason: HOSPADM

## 2022-12-12 RX ORDER — METHOCARBAMOL 500 MG/1
500 TABLET, FILM COATED ORAL EVERY 6 HOURS PRN
Status: DISCONTINUED | OUTPATIENT
Start: 2022-12-12 | End: 2022-12-16 | Stop reason: HOSPADM

## 2022-12-12 RX ORDER — EPHEDRINE SULFATE 50 MG/ML
INJECTION, SOLUTION INTRAMUSCULAR; INTRAVENOUS; SUBCUTANEOUS PRN
Status: DISCONTINUED | OUTPATIENT
Start: 2022-12-12 | End: 2022-12-12

## 2022-12-12 RX ORDER — OXYCODONE HCL 5 MG/5 ML
5 SOLUTION, ORAL ORAL EVERY 4 HOURS PRN
Status: DISCONTINUED | OUTPATIENT
Start: 2022-12-12 | End: 2022-12-12

## 2022-12-12 RX ORDER — PROPOFOL 10 MG/ML
INJECTION, EMULSION INTRAVENOUS PRN
Status: DISCONTINUED | OUTPATIENT
Start: 2022-12-12 | End: 2022-12-12

## 2022-12-12 RX ORDER — LIDOCAINE HYDROCHLORIDE 20 MG/ML
INJECTION, SOLUTION INFILTRATION; PERINEURAL PRN
Status: DISCONTINUED | OUTPATIENT
Start: 2022-12-12 | End: 2022-12-12

## 2022-12-12 RX ORDER — ONDANSETRON 2 MG/ML
4 INJECTION INTRAMUSCULAR; INTRAVENOUS EVERY 30 MIN PRN
Status: DISCONTINUED | OUTPATIENT
Start: 2022-12-12 | End: 2022-12-12

## 2022-12-12 RX ORDER — SODIUM CHLORIDE 9 MG/ML
INJECTION, SOLUTION INTRAVENOUS CONTINUOUS
Status: DISCONTINUED | OUTPATIENT
Start: 2022-12-12 | End: 2022-12-13

## 2022-12-12 RX ORDER — AMOXICILLIN 250 MG
1 CAPSULE ORAL 2 TIMES DAILY
Status: DISCONTINUED | OUTPATIENT
Start: 2022-12-12 | End: 2022-12-16 | Stop reason: HOSPADM

## 2022-12-12 RX ORDER — LIDOCAINE 40 MG/G
CREAM TOPICAL
Status: DISCONTINUED | OUTPATIENT
Start: 2022-12-12 | End: 2022-12-16 | Stop reason: HOSPADM

## 2022-12-12 RX ORDER — ONDANSETRON 2 MG/ML
INJECTION INTRAMUSCULAR; INTRAVENOUS PRN
Status: DISCONTINUED | OUTPATIENT
Start: 2022-12-12 | End: 2022-12-12

## 2022-12-12 RX ORDER — OXYCODONE HYDROCHLORIDE 5 MG/1
5 TABLET ORAL EVERY 4 HOURS PRN
Status: DISCONTINUED | OUTPATIENT
Start: 2022-12-12 | End: 2022-12-16 | Stop reason: HOSPADM

## 2022-12-12 RX ORDER — HYDROMORPHONE HCL IN WATER/PF 6 MG/30 ML
0.2 PATIENT CONTROLLED ANALGESIA SYRINGE INTRAVENOUS
Status: DISCONTINUED | OUTPATIENT
Start: 2022-12-12 | End: 2022-12-13

## 2022-12-12 RX ORDER — FENTANYL CITRATE 50 UG/ML
INJECTION, SOLUTION INTRAMUSCULAR; INTRAVENOUS PRN
Status: DISCONTINUED | OUTPATIENT
Start: 2022-12-12 | End: 2022-12-12

## 2022-12-12 RX ORDER — HYDROMORPHONE HYDROCHLORIDE 1 MG/ML
0.2 INJECTION, SOLUTION INTRAMUSCULAR; INTRAVENOUS; SUBCUTANEOUS EVERY 5 MIN PRN
Status: DISCONTINUED | OUTPATIENT
Start: 2022-12-12 | End: 2022-12-12

## 2022-12-12 RX ORDER — MEPERIDINE HYDROCHLORIDE 25 MG/ML
12.5 INJECTION INTRAMUSCULAR; INTRAVENOUS; SUBCUTANEOUS
Status: DISCONTINUED | OUTPATIENT
Start: 2022-12-12 | End: 2022-12-12

## 2022-12-12 RX ORDER — HYDROMORPHONE HCL IN WATER/PF 6 MG/30 ML
0.4 PATIENT CONTROLLED ANALGESIA SYRINGE INTRAVENOUS
Status: DISCONTINUED | OUTPATIENT
Start: 2022-12-12 | End: 2022-12-13

## 2022-12-12 RX ORDER — CEFAZOLIN SODIUM/WATER 2 G/20 ML
2 SYRINGE (ML) INTRAVENOUS SEE ADMIN INSTRUCTIONS
Status: DISCONTINUED | OUTPATIENT
Start: 2022-12-12 | End: 2022-12-12 | Stop reason: HOSPADM

## 2022-12-12 RX ORDER — ONDANSETRON 4 MG/1
4 TABLET, ORALLY DISINTEGRATING ORAL EVERY 6 HOURS PRN
Status: DISCONTINUED | OUTPATIENT
Start: 2022-12-12 | End: 2022-12-16 | Stop reason: HOSPADM

## 2022-12-12 RX ORDER — SODIUM CHLORIDE, SODIUM LACTATE, POTASSIUM CHLORIDE, CALCIUM CHLORIDE 600; 310; 30; 20 MG/100ML; MG/100ML; MG/100ML; MG/100ML
INJECTION, SOLUTION INTRAVENOUS CONTINUOUS
Status: DISCONTINUED | OUTPATIENT
Start: 2022-12-12 | End: 2022-12-12

## 2022-12-12 RX ORDER — BISACODYL 10 MG
10 SUPPOSITORY, RECTAL RECTAL DAILY PRN
Status: DISCONTINUED | OUTPATIENT
Start: 2022-12-12 | End: 2022-12-16 | Stop reason: HOSPADM

## 2022-12-12 RX ORDER — PROCHLORPERAZINE MALEATE 5 MG
5 TABLET ORAL EVERY 6 HOURS PRN
Status: DISCONTINUED | OUTPATIENT
Start: 2022-12-12 | End: 2022-12-16 | Stop reason: HOSPADM

## 2022-12-12 RX ORDER — CEFAZOLIN SODIUM/WATER 2 G/20 ML
2 SYRINGE (ML) INTRAVENOUS
Status: DISCONTINUED | OUTPATIENT
Start: 2022-12-12 | End: 2022-12-12 | Stop reason: HOSPADM

## 2022-12-12 RX ORDER — OXYCODONE HCL 5 MG/5 ML
10 SOLUTION, ORAL ORAL EVERY 4 HOURS PRN
Status: DISCONTINUED | OUTPATIENT
Start: 2022-12-12 | End: 2022-12-12

## 2022-12-12 RX ORDER — ACETAMINOPHEN 325 MG/1
975 TABLET ORAL ONCE
Status: COMPLETED | OUTPATIENT
Start: 2022-12-12 | End: 2022-12-12

## 2022-12-12 RX ORDER — PHENYLEPHRINE HCL IN 0.9% NACL 50MG/250ML
.5-1.25 PLASTIC BAG, INJECTION (ML) INTRAVENOUS CONTINUOUS
Status: DISCONTINUED | OUTPATIENT
Start: 2022-12-12 | End: 2022-12-12

## 2022-12-12 RX ORDER — HYDROMORPHONE HYDROCHLORIDE 1 MG/ML
0.4 INJECTION, SOLUTION INTRAMUSCULAR; INTRAVENOUS; SUBCUTANEOUS EVERY 5 MIN PRN
Status: DISCONTINUED | OUTPATIENT
Start: 2022-12-12 | End: 2022-12-12

## 2022-12-12 RX ORDER — FENTANYL CITRATE 50 UG/ML
25 INJECTION, SOLUTION INTRAMUSCULAR; INTRAVENOUS
Status: DISCONTINUED | OUTPATIENT
Start: 2022-12-12 | End: 2022-12-12

## 2022-12-12 RX ORDER — FENTANYL CITRATE 50 UG/ML
50 INJECTION, SOLUTION INTRAMUSCULAR; INTRAVENOUS EVERY 5 MIN PRN
Status: DISCONTINUED | OUTPATIENT
Start: 2022-12-12 | End: 2022-12-12

## 2022-12-12 RX ADMIN — SODIUM CHLORIDE, POTASSIUM CHLORIDE, SODIUM LACTATE AND CALCIUM CHLORIDE: 600; 310; 30; 20 INJECTION, SOLUTION INTRAVENOUS at 15:01

## 2022-12-12 RX ADMIN — PROPOFOL 50 MG: 10 INJECTION, EMULSION INTRAVENOUS at 15:08

## 2022-12-12 RX ADMIN — Medication 10 MG: at 14:58

## 2022-12-12 RX ADMIN — FENTANYL CITRATE 100 MCG: 50 INJECTION, SOLUTION INTRAMUSCULAR; INTRAVENOUS at 14:50

## 2022-12-12 RX ADMIN — FENTANYL CITRATE 50 MCG: 50 INJECTION, SOLUTION INTRAMUSCULAR; INTRAVENOUS at 15:02

## 2022-12-12 RX ADMIN — DEXAMETHASONE SODIUM PHOSPHATE 6 MG: 4 INJECTION, SOLUTION INTRA-ARTICULAR; INTRALESIONAL; INTRAMUSCULAR; INTRAVENOUS; SOFT TISSUE at 17:15

## 2022-12-12 RX ADMIN — Medication 1 TABLET: at 08:03

## 2022-12-12 RX ADMIN — PROPOFOL 100 MG: 10 INJECTION, EMULSION INTRAVENOUS at 14:45

## 2022-12-12 RX ADMIN — SODIUM CHLORIDE: 9 INJECTION, SOLUTION INTRAVENOUS at 19:34

## 2022-12-12 RX ADMIN — SODIUM CHLORIDE, POTASSIUM CHLORIDE, SODIUM LACTATE AND CALCIUM CHLORIDE: 600; 310; 30; 20 INJECTION, SOLUTION INTRAVENOUS at 05:36

## 2022-12-12 RX ADMIN — LIDOCAINE HYDROCHLORIDE 100 MG: 20 INJECTION, SOLUTION INFILTRATION; PERINEURAL at 14:45

## 2022-12-12 RX ADMIN — CEFTRIAXONE SODIUM 1 G: 1 INJECTION, POWDER, FOR SOLUTION INTRAMUSCULAR; INTRAVENOUS at 08:01

## 2022-12-12 RX ADMIN — PROPOFOL 40 MG: 10 INJECTION, EMULSION INTRAVENOUS at 15:17

## 2022-12-12 RX ADMIN — SODIUM CHLORIDE, POTASSIUM CHLORIDE, SODIUM LACTATE AND CALCIUM CHLORIDE: 600; 310; 30; 20 INJECTION, SOLUTION INTRAVENOUS at 14:29

## 2022-12-12 RX ADMIN — ONDANSETRON 4 MG: 2 INJECTION INTRAMUSCULAR; INTRAVENOUS at 17:15

## 2022-12-12 RX ADMIN — KETOCONAZOLE: 20 CREAM TOPICAL at 08:02

## 2022-12-12 RX ADMIN — FERROUS SULFATE TAB 325 MG (65 MG ELEMENTAL FE) 325 MG: 325 (65 FE) TAB at 08:02

## 2022-12-12 RX ADMIN — PREDNISONE 10 MG: 5 TABLET ORAL at 08:02

## 2022-12-12 RX ADMIN — FAMOTIDINE 20 MG: 20 TABLET ORAL at 21:28

## 2022-12-12 RX ADMIN — FENTANYL CITRATE 25 MCG: 50 INJECTION, SOLUTION INTRAMUSCULAR; INTRAVENOUS at 19:01

## 2022-12-12 RX ADMIN — Medication 90 MG: at 08:02

## 2022-12-12 RX ADMIN — PROPOFOL 75 MCG/KG/MIN: 10 INJECTION, EMULSION INTRAVENOUS at 15:20

## 2022-12-12 RX ADMIN — Medication 25 MG: at 14:55

## 2022-12-12 RX ADMIN — PANTOPRAZOLE SODIUM 40 MG: 40 TABLET, DELAYED RELEASE ORAL at 08:02

## 2022-12-12 RX ADMIN — ALBUMIN (HUMAN): 12.5 SOLUTION INTRAVENOUS at 16:05

## 2022-12-12 RX ADMIN — LEVETIRACETAM 250 MG: 250 TABLET, FILM COATED ORAL at 08:02

## 2022-12-12 RX ADMIN — Medication 1000 MCG: at 08:02

## 2022-12-12 RX ADMIN — GLYCOPYRROLATE 0.4 MG: 0.2 INJECTION, SOLUTION INTRAMUSCULAR; INTRAVENOUS at 14:58

## 2022-12-12 RX ADMIN — FENTANYL CITRATE 100 MCG: 50 INJECTION, SOLUTION INTRAMUSCULAR; INTRAVENOUS at 14:45

## 2022-12-12 RX ADMIN — ACETAMINOPHEN 650 MG: 325 TABLET, FILM COATED ORAL at 08:43

## 2022-12-12 RX ADMIN — SENNOSIDES AND DOCUSATE SODIUM 1 TABLET: 8.6; 5 TABLET ORAL at 08:03

## 2022-12-12 RX ADMIN — Medication 50 MCG: at 08:02

## 2022-12-12 RX ADMIN — PHENYLEPHRINE HYDROCHLORIDE 100 MCG: 10 INJECTION INTRAVENOUS at 16:54

## 2022-12-12 RX ADMIN — Medication 0.3 MCG/KG/MIN: at 14:52

## 2022-12-12 RX ADMIN — FENTANYL CITRATE 100 MCG: 50 INJECTION, SOLUTION INTRAMUSCULAR; INTRAVENOUS at 14:53

## 2022-12-12 RX ADMIN — EPHEDRINE SULFATE 10 MG: 50 INJECTION, SOLUTION INTRAMUSCULAR; INTRAVENOUS; SUBCUTANEOUS at 14:57

## 2022-12-12 RX ADMIN — TAMSULOSIN HYDROCHLORIDE 0.4 MG: 0.4 CAPSULE ORAL at 08:02

## 2022-12-12 RX ADMIN — FENTANYL CITRATE 100 MCG: 50 INJECTION, SOLUTION INTRAMUSCULAR; INTRAVENOUS at 14:48

## 2022-12-12 RX ADMIN — PHENYLEPHRINE HYDROCHLORIDE 100 MCG: 10 INJECTION INTRAVENOUS at 16:39

## 2022-12-12 ASSESSMENT — ACTIVITIES OF DAILY LIVING (ADL)
ADLS_ACUITY_SCORE: 50
ADLS_ACUITY_SCORE: 50
ADLS_ACUITY_SCORE: 46
ADLS_ACUITY_SCORE: 46
ADLS_ACUITY_SCORE: 50
ADLS_ACUITY_SCORE: 50
ADLS_ACUITY_SCORE: 46
ADLS_ACUITY_SCORE: 46
ADLS_ACUITY_SCORE: 50
ADLS_ACUITY_SCORE: 46
ADLS_ACUITY_SCORE: 50
ADLS_ACUITY_SCORE: 46

## 2022-12-12 NOTE — BRIEF OP NOTE
[facility]    Brief Operative Note    Pre-operative diagnosis: Cervical stenosis of spinal canal [M48.02]  Post-operative diagnosis Cervical stenosis of spinal canal [M48.02]    Procedure: Procedure(s):  POSTERIOR APPROACH Cervical 4-7 laminoplasty  Surgeon: Surgeon(s) and Role:     * Judie Levin MD - Primary     * Hakan Hair MD - Resident - Assisting     * Radha Echavarria MD - Resident - Assisting  Anesthesia: General   Estimated Blood Loss: 50cc    Drains:  Hemovac (subfascial)  Specimens: * No specimens in log *  Findings:  Expansile laminoplasty completed. Very well decompressed.  Complications: None.  Implants:   Implant Name Type Inv. Item Serial No.  Lot No. LRB No. Used Action   Rib Segment  50-130mmL [id number]      Right 1 Implanted

## 2022-12-12 NOTE — PLAN OF CARE
"Goal Outcome Evaluation:          /73 (BP Location: Left arm)   Pulse 83   Temp 98  F (36.7  C) (Oral)   Resp 18   Ht 1.753 m (5' 9\")   Wt 79.5 kg (175 lb 3.2 oz)   SpO2 98%   BMI 25.87 kg/m        9597-0808    Neuro: A&Ox4.   Cardiac: SR. VSS.   Respiratory: Sating 98% on RA.  GI/: Adequate urine output. BM X 2.  Diet/appetite: Tolerating regular diet. Eating well.  Activity:  Assist of one with gait belt and a walker up to chair and in halls.  Pain: At acceptable level on current regimen.   Skin: No new deficits noted.  LDA's:PIV saline locked.  Plan: Neurosurgical procedure on Monday. Continue with POC. Notify primary team with changes.  "

## 2022-12-12 NOTE — PLAN OF CARE
"/73 (BP Location: Left arm)   Pulse 83   Temp 98  F (36.7  C) (Oral)   Resp 18   Ht 1.753 m (5' 9\")   Wt 79.5 kg (175 lb 3.2 oz)   SpO2 98%   BMI 25.87 kg/m       Time: 2433-6159   Reason for admission: Weakness of lower extremities.   Activity: assist of one person with gait belt and walker.   Pain: denied pain or discomfort.   Neuro: alert and oriented.   Cardiac: WDL  Resp: denied SOB, lung sounds clear bilaterally.   GI/: NPO after midnight, voids without difficulties, patient had BM today, bowel sounds present x four quadrants.   Lines: R PIV, LR infusing at 100 ml/hr.   Skin: WDL X,  L forearm bruises.   Labs/Imaging: No lab or imaging this shift.   New changes to shift: refused shower, cleansed with CHG wipes at bed time and this morning and hair shampoo done.   Plan: laminoplasty surgery today.                         "

## 2022-12-12 NOTE — PROGRESS NOTES
[facility]    Progress Note - Medicine Service, SUG[facility] TEAM 3       Date of Admission:  12/7/2022    Resident Attestation    I, Joe Granados, evaluated the patient alongside the medical student and can confirm that the below plan represents the updated evaluation and plan decided by the [facility] 3 team in consultation with medical staff.    Patient was clinically and vitally stable when seen pre-operatively. Plan at this time will be for cervical spine laminectomy with post operative recommendations per neurosurgery. Post operative disposition to be determined.     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 Awaiting neurosurgical intervention scheduled 12/12.    Today:  - Laminoplasty  - Follow up neurosurgery recs    # Weakness and falls  # Frailty and debilitation  # Hx of NPH s/p  shunt (07/2022)  # Hx myasthenia gravis with predominant ocular symptoms  # C5-C7 Spinal canal stenosis, severe  The patient presents with increased weakness, unsteadiness, 2 recent falls, b/l numbness in hands and hyperreflexia on exam. 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 Overall, weakness and unsteadiness most likely multifactorial and related to severe cervical stenosis, sensory neuropathy, and age-related frailty and debilitation.  - Neurosurgery consult, appreciate recs              - Surgery (laminoplasty) Monday (12/12)  -  Neurology consult, appreciate recs  - Q4hr neuro checks  - PT/OT consult  - Hold aspirin in anticipation of surgical intervention, wait for neurosurgery to clear prior to restarting  - PTA prednisone (10mg) and pyridostigmine (60mg) for myasthenia gravis     # Asymptomatic bacteruria  No reported dysuria, changes in frequency, hematuria. Some reported urinary retention but this is not new. Will empirically treat in the setting of neurosurgical intervention planned 12/12.  - Continue ceftriaxone x5 days (12/8-12/12)  - F/u urine cultures  - Monitor for developing symptoms of UTI     # Elevated troponin, stable  Noted at 26 on admission and platueaued on recheck. Denies chest pain. EKG with sinus rhythym     Chronic medical problems:  Restless leg syndrome, myoclonus - PTA pramipexole, levetiracetam  Neuropathy - PTA gabapentin  GERD - PTA pantoprazole  HLD - PTA simvastatin HELD  Urinary retention - PTA tamsulosin  Prostate cancer s/p cryotherapy (07/2022)  Prediabetes (Hghb A1c 6.2 - 12/5/22)  Constipation - PTA senna  Anemia - PTA ferrous sulfate       Diet: NPO per Anesthesia Guidelines for Procedure/Surgery Except for: Meds    DVT Prophylaxis: Pneumatic Compression Devices, do not start pharmaceutical DVT prophylaxis until consult with neurosurgery  Rodriguez Catheter: Not present  Fluids: None  Central Lines: None  Cardiac Monitoring: None  Code Status: Full Code      Disposition Plan      Expected Discharge Date: 12/16/2022        Discharge Comments: dispo pending NSG laminectomy; likely next week.        The patient's care was discussed with the Attending Physician, Dr. Harper.    Terry Wheeler, Medical Student  Medicine Service, [address]  Securely message with the Vocera Web Console (learn more here)  Text page via [facility] Paging/Directory   Please see signed in provider for up to date coverage  information    ______________________________________________________________________    Interval History   Nursing notes reviewed, no acute events overnight. Patient was lying in bed this morning. He reports that he is looking forward to the surgery and does not have any additional questions at this time. He reports he is feeling good this morning, no changes to numbness in b/l upper extremities or weakness/unsteadiness. He denies chest pain, SOB, and N/V. He reports normal, formed bowel movements during admission.    ROS: Otherwise negative unless noted above    Data reviewed today: I reviewed all medications, new labs and imaging results over the last 24 hours.    Physical Exam   Vital Signs: Temp: 98.2  F (36.8  C) Temp src: Oral BP: (!) 154/94 Pulse: 70   Resp: 16 SpO2: 98 % O2 Device: None (Room air)    Weight: 175 lbs 3.2 oz  Gen: lying in bed, in no acute distress  Resp: No increased work of breathing on RA, clear lung sounds auscultated bilaterally - anteriorly  Cardiac: Normal rate, regular rythym  GI: soft, non-tender, non-distended  Ext: WWP, no edema, no erythema  Neuro: AOx4, moves extremities spontaneously  Psych: appropriate mood & affect      Data   Recent Labs   Lab 12/12/22  1138 12/12/22  0701 12/12/22  0049 12/11/22  1351 12/11/22  0744 12/09/22  0638 12/07/22  1639   WBC  --  7.0  --  9.2 8.4   < > 9.2   HGB  --  13.0*  --  14.7 13.7   < > 15.0   MCV  --  90  --  94 89   < > 91   PLT  --  162  --  185 198   < > 229   INR  --   --   --  1.09  --   --   --    NA  --  140  --  140 141   < > 144   POTASSIUM  --  3.8  --  4.2 3.9   < > 4.3   CHLORIDE  --  109*  --  107 110*   < > 106   CO2  --  21*  --  16* 21*   < > 25   BUN  --  16.9  --  26.4* 25.6*   < > 19.6   CR  --  0.89  --  0.85 0.95   < > 1.10   ANIONGAP  --  10  --  17* 10   < > 13   TRISTIAN  --  8.3*  --  9.2 8.9   < > 9.3   * 87 111* 118* 92   < > 91   ALBUMIN  --   --   --   --   --   --  4.0   PROTTOTAL  --   --   --   --   --   --   6.5   BILITOTAL  --   --   --   --   --   --  0.3   ALKPHOS  --   --   --   --   --   --  78   ALT  --   --   --   --   --   --  25   AST  --   --   --   --   --   --  20    < > = values in this interval not displayed.     No results found for this or any previous visit (from the past 24 hour(s)).  Medications     lactated ringers 100 mL/hr at 12/12/22 0536     phenylephrine         acetaminophen  975 mg Oral Once     [Held by provider] aspirin  325 mg Oral Daily     ceFAZolin  2 g Intravenous Pre-Op/Pre-procedure x 1 dose     ceFAZolin  2 g Intravenous See Admin Instructions     [Auto Hold] cefTRIAXone  1 g Intravenous Q24H     [Auto Hold] cyanocobalamin  1,000 mcg Oral Daily     [Auto Hold] ferrous sulfate  325 mg Oral Every Other Day     [Auto Hold] gabapentin  300 mg Oral At Bedtime     [Auto Hold] ketoconazole   Topical Daily     [Auto Hold] levETIRAcetam  250 mg Oral BID     [Auto Hold] multivitamin  1 tablet Oral Daily     [Auto Hold] NONFORMULARY 1 drop  1 drop Left Eye 4 times per day     [Auto Hold] pantoprazole  40 mg Oral Daily     [Auto Hold] pramipexole  0.375 mg Oral At Bedtime     [Auto Hold] predniSONE  10 mg Oral Daily     [Auto Hold] pyridostigmine  90 mg Oral BID     [Auto Hold] senna-docusate  1 tablet Oral BID     sodium chloride (PF)  3 mL Intracatheter Q8H     [Auto Hold] sodium chloride (PF)  3 mL Intracatheter Q8H     [Auto Hold] tamsulosin  0.4 mg Oral Daily     [Auto Hold] vitamin D3  50 mcg Oral Daily

## 2022-12-12 NOTE — ANESTHESIA PROCEDURE NOTES
Airway       Patient location during procedure: OR       Procedure Start/Stop Times: 12/12/2022 2:48 PM  Staff -        Performed By: residentIndications and Patient Condition       Indications for airway management: kitty-procedural       Induction type:intravenous       Mask difficulty assessment: 1 - vent by mask    Final Airway Details       Final airway type: endotracheal airway       Successful airway: ETT - single  Endotracheal Airway Details        ETT size (mm): 8.0       Cuffed: yes       Successful intubation technique: video laryngoscopy       VL Blade Size: MAC 4       Grade View of Cords: 1       Adjucts: stylet       Bite block used: Molar    Post intubation assessment        Placement verified by: capnometry, equal breath sounds and chest rise        Number of attempts at approach: 1       Number of other approaches attempted: 0       Secured with: pink tape       Ease of procedure: easy       Dentition: Intact and Unchanged    Medication(s) Administered   Medication Administration Time: 12/12/2022 2:48 PM

## 2022-12-12 NOTE — PROGRESS NOTES
[facility], [place]   Neurosurgery Progress Note:    Date of service: 12/12/2022    Assessment: Gustavo Ramon is a 82 year old male with a past medical history of polyneuropathy, NPH with certas at 2,  and myasthenia gravis presents with a 6 month history of worsening bilateral lower extremity weakness and right upper extremity. MRI C-Spine revealed C5-C7 spinal canal stenosis.  Current plan for OR 12/12 for C4-7 laminoplasty.    Clinically Significant Risk Factors Present on Admission            # Hypertension  # Ocular Myasthenia Gravis  # Steroid-induced diabetes mellitus      Recommendations:  - Serial neuro checks  - Activity as tolerated with assist  - Regular diet; NPO at midnight  - Pain management per primary team  - Plan for laminoplasty on Monday (12/12)  - Hold aspirin in anticipation of surgical procedure and do not restart until cleared with neurosurgery.  - Hold subcutaneous heparin DVT prophylaxis in anticipation of surgical procedure and do not restart  Until celared with neurosurgery.  - PT/OT  - Agree with ceftriaxone for UTI  - All other cares per primary team  - Please inform the neurosurgery team if there is any acute change in neurological exam      Sade Heath PA-C  Neurosurgery      Interval History:  No acute events overnight. Patient neurologically stable on exam. Plan for OR today for decompression.     Objective:   Temp:  [97  F (36.1  C)-98.4  F (36.9  C)] 97  F (36.1  C)  Pulse:  [65-83] 65  Resp:  [16-18] 16  BP: (125-142)/(59-73) 125/59  SpO2:  [93 %-98 %] 93 %  I/O last 3 completed shifts:  In: 1063.33 [I.V.:1063.33]  Out: 420 [Urine:420]    Gen: Appears comfortable, NAD  Neurologic:  - Alert & Oriented to person, place, time, and situation  - Follows commands briskly  - Speech fluent, spontaneous. No aphasia or dysarthria.  - No gaze preference. No apparent hemineglect.  - PER, EOMI  - Strong eye closure, jaw clench, and cheek puff  - Face  symmetric with sensation intact to light touch  - Trapezii and sternocleidomastoid muscles 5/5 bilaterally  - Jorge L negative today  - No clonus bilaterally       Del Tr Bi WE WF Gr   R 5 4+ 5 5 5 5   L 5 5 5 5 5 5    HF KE KF DF PF EHL   R 5 5 5 5 5 5   L 5 5 5 5 5 5       Sensation intact and symmetric to light touch throughout    LABS  Recent Labs   Lab Test 12/12/22  0701 12/11/22  1351 12/11/22  0744   WBC 7.0 9.2 8.4   HGB 13.0* 14.7 13.7   MCV 90 94 89    185 198       Recent Labs   Lab Test 12/12/22  0701 12/12/22  0049 12/11/22  1351 12/11/22  0744     --  140 141   POTASSIUM 3.8  --  4.2 3.9   CHLORIDE 109*  --  107 110*   CO2 21*  --  16* 21*   BUN 16.9  --  26.4* 25.6*   CR 0.89  --  0.85 0.95   ANIONGAP 10  --  17* 10   TRISTIAN 8.3*  --  9.2 8.9   GLC 87 111* 118* 92       IMAGING    No results found for this or any previous visit (from the past 24 hour(s)).

## 2022-12-13 ENCOUNTER — APPOINTMENT (OUTPATIENT)
Dept: PHYSICAL THERAPY | Facility: CLINIC | Age: 82
DRG: 520 | End: 2022-12-13
Attending: STUDENT IN AN ORGANIZED HEALTH CARE EDUCATION/TRAINING PROGRAM
Payer: MEDICARE

## 2022-12-13 ENCOUNTER — APPOINTMENT (OUTPATIENT)
Dept: OCCUPATIONAL THERAPY | Facility: CLINIC | Age: 82
DRG: 520 | End: 2022-12-13
Attending: STUDENT IN AN ORGANIZED HEALTH CARE EDUCATION/TRAINING PROGRAM
Payer: MEDICARE

## 2022-12-13 LAB
ANION GAP SERPL CALCULATED.3IONS-SCNC: 10 MMOL/L (ref 7–15)
BACTERIA BLD CULT: NO GROWTH
BACTERIA BLD CULT: NO GROWTH
BUN SERPL-MCNC: 11.2 MG/DL (ref 8–23)
CALCIUM SERPL-MCNC: 8.4 MG/DL (ref 8.8–10.2)
CHLORIDE SERPL-SCNC: 105 MMOL/L (ref 98–107)
CREAT SERPL-MCNC: 0.83 MG/DL (ref 0.67–1.17)
DEPRECATED HCO3 PLAS-SCNC: 22 MMOL/L (ref 22–29)
ERYTHROCYTE [DISTWIDTH] IN BLOOD BY AUTOMATED COUNT: 14.1 % (ref 10–15)
GFR SERPL CREATININE-BSD FRML MDRD: 86 ML/MIN/1.73M2
GLUCOSE BLDC GLUCOMTR-MCNC: 103 MG/DL (ref 70–99)
GLUCOSE SERPL-MCNC: 103 MG/DL (ref 70–99)
HCT VFR BLD AUTO: 40.3 % (ref 40–53)
HGB BLD-MCNC: 13.2 G/DL (ref 13.3–17.7)
LACTATE SERPL-SCNC: 1.7 MMOL/L (ref 0.7–2)
MAGNESIUM SERPL-MCNC: 1.9 MG/DL (ref 1.7–2.3)
MCH RBC QN AUTO: 29.3 PG (ref 26.5–33)
MCHC RBC AUTO-ENTMCNC: 32.8 G/DL (ref 31.5–36.5)
MCV RBC AUTO: 90 FL (ref 78–100)
PLATELET # BLD AUTO: 167 10E3/UL (ref 150–450)
POTASSIUM SERPL-SCNC: 3.8 MMOL/L (ref 3.4–5.3)
RBC # BLD AUTO: 4.5 10E6/UL (ref 4.4–5.9)
SODIUM SERPL-SCNC: 137 MMOL/L (ref 136–145)
WBC # BLD AUTO: 13 10E3/UL (ref 4–11)

## 2022-12-13 PROCEDURE — 83735 ASSAY OF MAGNESIUM: CPT | Performed by: STUDENT IN AN ORGANIZED HEALTH CARE EDUCATION/TRAINING PROGRAM

## 2022-12-13 PROCEDURE — L1499 SPINAL ORTHOSIS NOS: HCPCS

## 2022-12-13 PROCEDURE — 97530 THERAPEUTIC ACTIVITIES: CPT | Mod: GO

## 2022-12-13 PROCEDURE — 97164 PT RE-EVAL EST PLAN CARE: CPT | Mod: GP

## 2022-12-13 PROCEDURE — 97168 OT RE-EVAL EST PLAN CARE: CPT | Mod: GO

## 2022-12-13 PROCEDURE — 99232 SBSQ HOSP IP/OBS MODERATE 35: CPT | Performed by: INTERNAL MEDICINE

## 2022-12-13 PROCEDURE — L0174 CERV SR 2PC THOR EXT PRE OTS: HCPCS

## 2022-12-13 PROCEDURE — 258N000003 HC RX IP 258 OP 636: Performed by: STUDENT IN AN ORGANIZED HEALTH CARE EDUCATION/TRAINING PROGRAM

## 2022-12-13 PROCEDURE — 250N000011 HC RX IP 250 OP 636: Performed by: STUDENT IN AN ORGANIZED HEALTH CARE EDUCATION/TRAINING PROGRAM

## 2022-12-13 PROCEDURE — 83605 ASSAY OF LACTIC ACID: CPT | Performed by: INTERNAL MEDICINE

## 2022-12-13 PROCEDURE — 250N000013 HC RX MED GY IP 250 OP 250 PS 637: Performed by: STUDENT IN AN ORGANIZED HEALTH CARE EDUCATION/TRAINING PROGRAM

## 2022-12-13 PROCEDURE — 36415 COLL VENOUS BLD VENIPUNCTURE: CPT | Performed by: STUDENT IN AN ORGANIZED HEALTH CARE EDUCATION/TRAINING PROGRAM

## 2022-12-13 PROCEDURE — 85018 HEMOGLOBIN: CPT | Performed by: STUDENT IN AN ORGANIZED HEALTH CARE EDUCATION/TRAINING PROGRAM

## 2022-12-13 PROCEDURE — 82310 ASSAY OF CALCIUM: CPT | Performed by: STUDENT IN AN ORGANIZED HEALTH CARE EDUCATION/TRAINING PROGRAM

## 2022-12-13 PROCEDURE — 36415 COLL VENOUS BLD VENIPUNCTURE: CPT | Performed by: INTERNAL MEDICINE

## 2022-12-13 PROCEDURE — 97110 THERAPEUTIC EXERCISES: CPT | Mod: GP

## 2022-12-13 PROCEDURE — 97535 SELF CARE MNGMENT TRAINING: CPT | Mod: GO

## 2022-12-13 PROCEDURE — 250N000012 HC RX MED GY IP 250 OP 636 PS 637: Performed by: STUDENT IN AN ORGANIZED HEALTH CARE EDUCATION/TRAINING PROGRAM

## 2022-12-13 PROCEDURE — 97530 THERAPEUTIC ACTIVITIES: CPT | Mod: GP

## 2022-12-13 PROCEDURE — 120N000002 HC R&B MED SURG/OB UMMC

## 2022-12-13 RX ORDER — PREDNISONE 10 MG/1
10 TABLET ORAL DAILY
Status: DISCONTINUED | OUTPATIENT
Start: 2022-12-13 | End: 2022-12-16 | Stop reason: HOSPADM

## 2022-12-13 RX ADMIN — KETOCONAZOLE: 20 CREAM TOPICAL at 08:21

## 2022-12-13 RX ADMIN — Medication 1 MG: at 21:55

## 2022-12-13 RX ADMIN — PRAMIPEXOLE DIHYDROCHLORIDE 0.38 MG: 0.25 TABLET ORAL at 21:52

## 2022-12-13 RX ADMIN — ACETAMINOPHEN 650 MG: 325 TABLET, FILM COATED ORAL at 06:36

## 2022-12-13 RX ADMIN — ACETAMINOPHEN 650 MG: 325 TABLET, FILM COATED ORAL at 12:29

## 2022-12-13 RX ADMIN — Medication 90 MG: at 08:22

## 2022-12-13 RX ADMIN — LEVETIRACETAM 250 MG: 250 TABLET, FILM COATED ORAL at 21:56

## 2022-12-13 RX ADMIN — ACETAMINOPHEN 650 MG: 325 TABLET, FILM COATED ORAL at 22:28

## 2022-12-13 RX ADMIN — Medication 1000 MCG: at 08:22

## 2022-12-13 RX ADMIN — FAMOTIDINE 20 MG: 20 TABLET ORAL at 08:23

## 2022-12-13 RX ADMIN — TAMSULOSIN HYDROCHLORIDE 0.4 MG: 0.4 CAPSULE ORAL at 08:23

## 2022-12-13 RX ADMIN — LEVETIRACETAM 250 MG: 250 TABLET, FILM COATED ORAL at 08:24

## 2022-12-13 RX ADMIN — Medication 90 MG: at 21:55

## 2022-12-13 RX ADMIN — Medication 50 MCG: at 08:22

## 2022-12-13 RX ADMIN — ACETAMINOPHEN 650 MG: 325 TABLET, FILM COATED ORAL at 16:29

## 2022-12-13 RX ADMIN — CEFTRIAXONE SODIUM 1 G: 1 INJECTION, POWDER, FOR SOLUTION INTRAMUSCULAR; INTRAVENOUS at 08:24

## 2022-12-13 RX ADMIN — PANTOPRAZOLE SODIUM 40 MG: 40 TABLET, DELAYED RELEASE ORAL at 08:22

## 2022-12-13 RX ADMIN — FAMOTIDINE 20 MG: 20 TABLET ORAL at 21:56

## 2022-12-13 RX ADMIN — SENNOSIDES AND DOCUSATE SODIUM 1 TABLET: 8.6; 5 TABLET ORAL at 21:55

## 2022-12-13 RX ADMIN — Medication 1 TABLET: at 08:23

## 2022-12-13 RX ADMIN — SENNOSIDES AND DOCUSATE SODIUM 1 TABLET: 8.6; 5 TABLET ORAL at 08:22

## 2022-12-13 RX ADMIN — GABAPENTIN 300 MG: 300 CAPSULE ORAL at 21:55

## 2022-12-13 RX ADMIN — POLYETHYLENE GLYCOL 3350 17 G: 17 POWDER, FOR SOLUTION ORAL at 08:21

## 2022-12-13 RX ADMIN — PREDNISONE 10 MG: 10 TABLET ORAL at 16:29

## 2022-12-13 RX ADMIN — SODIUM CHLORIDE: 9 INJECTION, SOLUTION INTRAVENOUS at 06:33

## 2022-12-13 ASSESSMENT — ACTIVITIES OF DAILY LIVING (ADL)
ADLS_ACUITY_SCORE: 50
ADLS_ACUITY_SCORE: 50
ADLS_ACUITY_SCORE: 45
ADLS_ACUITY_SCORE: 45
ADLS_ACUITY_SCORE: 50
ADLS_ACUITY_SCORE: 45
ADLS_ACUITY_SCORE: 45
ADLS_ACUITY_SCORE: 50
ADLS_ACUITY_SCORE: 45
ADLS_ACUITY_SCORE: 50
ADLS_ACUITY_SCORE: 50
ADLS_ACUITY_SCORE: 46

## 2022-12-13 NOTE — PROGRESS NOTES
[facility]    Progress Note - Medicine Service, [facility] TEAM 3       Date of Admission:  12/7/2022    Physician Attestation   I, Ernesto Curran MD, was present with the medical/MARGARITO student who participated in the service and in the documentation of the note.  I have verified the history and personally performed the physical exam and medical decision making.  I agree with the assessment and plan of care as documented in the note.      I personally reviewed vital signs, medications and labs.    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 Laminoplasty for C5-C7 spinal canal stenosis 12/12.    Today, patient hemodynamically stable, plan is to discharge to TCU, discussed with social work, and he is clinically ready for discharge.     Changes Today:  - Restart prednisone 10mg daily  - Hold aspirin until POD #7 (12/19)  - Hemovac drain in place, likely removed tomorrow (12/14)  - Orthosis consult for c-collar for comfort  - Discontinue IV fluids and remove weiner catheter, started for surg 12/12  - Discontinue ceftriaxone  - Social work coordinating TCU    # Weakness and falls  # Frailty and debilitation  # Hx of NPH s/p  shunt (07/2022)  # Hx myasthenia gravis with predominant ocular symptoms  # C5-C7 Spinal canal stenosis, severe  The patient presents with increased weakness, unsteadiness, 2 recent falls, b/l numbness in hands and hyperreflexia on exam. MRI c-spine w/o contrast with marked cervical spondylosis, with severe spinal canal  stenosis at C5-C6 and moderate at C6-7 and myelopathic hyperintense cord signal at C5-C6. Overall, weakness and unsteadiness most likely multifactorial and related to severe cervical stenosis, sensory neuropathy, and age-related frailty and debilitation.  - Neurosurgery consult, following, appreciate recs              - Surgery (laminoplasty) Monday (12/12)   - Hemovac drain in place, remove when output < 30ml for 2-3 shifts  - Neurology consult, signed off 12/8, appreciate recs  - Q4hr neuro checks  - Orthosis consult for c-collar for comfort  - PT/OT consult, recs to discharge to TCU  - Hold aspirin until POD #7  - PTA prednisone (10mg) and pyridostigmine (60mg) for myasthenia gravis   - Prednisone held for surgery, restarted 12/13     # Asymptomatic bacteruria  No reported dysuria, changes in frequency, hematuria. Some reported urinary retention but this is not new. Will empirically treat in the setting of neurosurgical intervention planned 12/12.  - Ceftriaxone for UTI (12/8-12/13)  - F/u urine cultures  - Monitor for developing symptoms of UTI     # Elevated troponin, stable  Noted at 26 on admission and platueaued on recheck. Denies chest pain. EKG with sinus rhythym     Chronic medical problems:  Restless leg syndrome, myoclonus - PTA pramipexole, levetiracetam  Neuropathy - PTA gabapentin  GERD - PTA pantoprazole  HLD - PTA simvastatin HELD  Urinary retention - PTA tamsulosin  Prostate cancer s/p cryotherapy (07/2022)  Prediabetes (Hghb A1c 6.2 - 12/5/22)  Constipation - PTA senna  Anemia - PTA ferrous sulfate       Diet: Advance Diet as Tolerated: Regular Diet Adult    DVT Prophylaxis: Pneumatic Compression Devices, do not start pharmaceutical DVT prophylaxis until consult with neurosurgery  Rodriguez Catheter: Not present  Fluids: None  Central Lines: None  Cardiac Monitoring: None  Code Status: Full Code      Disposition Plan     Expected Discharge Date: 12/16/2022        Discharge Comments: dispo pending SARINA  laminectomy; likely next week.        The patient's care was discussed with the Attending Physician, Dr. Curran.    Terry Wheeler, Medical Student  Medicine Service, [facility] TEAM [address] [place] [address]  Securely message with the Vocera Web Console (learn more here)  Text page via [facility] Paging/Directory   Please see signed in provider for up to date coverage information    ______________________________________________________________________    Interval History   Nursing notes reviewed, no acute events overnight. Patient was seated in the chair during rounds this morning. He noted some pain in his right upper arm. He also notes some soreness when moving his head side to side and his upper back. Physical therapy note continued weakness and unsteadiness on assessment this morning. No changes to numbness in b/l upper extremities or weakness/unsteadiness.     ROS: Otherwise negative unless noted above    Data reviewed today: I reviewed all medications, new labs and imaging results over the last 24 hours.    Physical Exam   Vital Signs: Temp: 98.4  F (36.9  C) Temp src: Oral BP: 126/74 Pulse: 75   Resp: 13 SpO2: 97 % O2 Device: Nasal cannula Oxygen Delivery: 2 LPM  Weight: 175 lbs 3.2 oz  Gen: lying in bed, in no acute distress  Resp: No increased work of breathing on RA, clear lung sounds auscultated bilaterally - anteriorly  Cardiac: Normal rate, regular rythym  GI: soft, non-tender, non-distended  Ext: WWP, no edema, no erythema  Skin: dressing and hemovac in place at site of surgical incision, no surrounding erythema  Neuro: AOx4, moves extremities spontaneously  Psych: appropriate mood & affect      Data   Recent Labs   Lab 12/13/22  1023 12/13/22  0656 12/12/22  1800 12/12/22  1138 12/12/22  0701 12/12/22  0049 12/11/22  1351 12/09/22  0638 12/07/22  1639   WBC 13.0*  --   --   --  7.0  --  9.2   < > 9.2   HGB 13.2*  --   --   --  13.0*  --  14.7   < > 15.0   MCV 90  --   --    --  90  --  94   < > 91     --   --   --  162  --  185   < > 229   INR  --   --   --   --   --   --  1.09  --   --      --   --   --  140  --  140   < > 144   POTASSIUM 3.8  --   --   --  3.8  --  4.2   < > 4.3   CHLORIDE 105  --   --   --  109*  --  107   < > 106   CO2 22  --   --   --  21*  --  16*   < > 25   BUN 11.2  --   --   --  16.9  --  26.4*   < > 19.6   CR 0.83  --   --   --  0.89  --  0.85   < > 1.10   ANIONGAP 10  --   --   --  10  --  17*   < > 13   TRISTIAN 8.4*  --   --   --  8.3*  --  9.2   < > 9.3   * 103* 122*   < > 87   < > 118*   < > 91   ALBUMIN  --   --   --   --   --   --   --   --  4.0   PROTTOTAL  --   --   --   --   --   --   --   --  6.5   BILITOTAL  --   --   --   --   --   --   --   --  0.3   ALKPHOS  --   --   --   --   --   --   --   --  78   ALT  --   --   --   --   --   --   --   --  25   AST  --   --   --   --   --   --   --   --  20    < > = values in this interval not displayed.     Recent Results (from the past 24 hour(s))   XR Surgery WILNER L/T 5 Min Fluoro w Stills    Narrative    This exam was marked as non-reportable because it will not be read by a   radiologist or a [facility] non-radiologist provider.           Medications       cyanocobalamin  1,000 mcg Oral Daily     famotidine  20 mg Oral BID    Or     famotidine  20 mg Intravenous BID     ferrous sulfate  325 mg Oral Every Other Day     gabapentin  300 mg Oral At Bedtime     ketoconazole   Topical Daily     levETIRAcetam  250 mg Oral BID     multivitamin  1 tablet Oral Daily     pantoprazole  40 mg Oral Daily     polyethylene glycol  17 g Oral Daily     pramipexole  0.375 mg Oral At Bedtime     pyridostigmine  90 mg Oral BID     senna-docusate  1 tablet Oral BID     sodium chloride (PF)  3 mL Intracatheter Q8H     tamsulosin  0.4 mg Oral Daily     vitamin D3  50 mcg Oral Daily

## 2022-12-13 NOTE — PROGRESS NOTES
Admitted/transferred from: PACU  2 RN full   skin assessment completed by Oh Hutchinson, RN and Leslie LAO RN.  Skin assessment finding: issues found redness all over arms bilaterally, with two skin tears on right arm and left shin. Upper right back incision, marked drainage under dressing. Small left head bleeding.  Interventions/actions: skin interventions put dressing on skin right arm. CDI.     Will continue to monitor.

## 2022-12-13 NOTE — DISCHARGE SUMMARY
[facility]  Discharge Summary - Medicine & Pediatrics       Date of Admission:  12/7/2022  Date of Discharge:  12/16/2022  Discharging Provider: Juan Antonio Curran MD  Discharge Service: Medicine Service, ADILENE TEAM 3    Discharge Diagnoses   C5-7 spinal canal stenosis s/p laminoplasty  Weakness and falls  See hospital course below    Follow-ups Needed After Discharge   Follow up with nursing home physician and neurosurgery as scheduled    Discharge Disposition   Discharged to short-term care facility  Condition at discharge: Stable    Hospital Course    Gustavo Ramon is a 82 year old male admitted on 12/7/2022. 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 Laminoplasty for C5-C7 spinal canal stenosis was performed on 12/12. He did well post-operatively and is discharged to work with physical and occupational therapy.     # Weakness and falls  # Frailty and debilitation  # Hx of NPH s/p  shunt (07/2022)    # C5-C7 Spinal canal stenosis, severe, s/p laminoplasty  The patient presented with increased weakness, unsteadiness, 2 recent falls, b/l numbness in hands and hyperreflexia on exam. 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 Overall, weakness and unsteadiness most likely multifactorial and related to severe cervical stenosis, sensory neuropathy, and age-related frailty and debilitation. Laminoplasty performed by neurosurgery 12/12, and patient is recovering well post-op. Hemovac was removed 12/14. He has a soft c-collar for comfort. He will hold aspirin until  after POD #7 (12/20).     # Hx myasthenia gravis with predominant ocular symptoms  PTA prednisone (10mg) and pyridostigmine (60mg) for myasthenia gravis     # Asymptomatic bacteruria (resolved)  No reported dysuria, changes in frequency, hematuria. Some reported urinary retention but this is not new. Treated empirically in the setting of neurosurgical intervention 12/12. Completed ceftriaxone 12/8-12/13.       # Elevated troponin, stable  Noted at 26 on admission and platueaued on recheck. Denies chest pain. EKG with sinus rhythym     Chronic medical problems:  Restless leg syndrome, myoclonus - PTA pramipexole, levetiracetam  Neuropathy - PTA gabapentin  GERD - PTA pantoprazole  HLD - PTA simvastatin  Urinary retention - PTA tamsulosin  Prostate cancer s/p cryotherapy (07/2022)  Prediabetes (Hghb A1c 6.2 - 12/5/22)  Constipation - PTA senna, polyethylene glycol PRN  Anemia - PTA ferrous sulfate    Consultations This Hospital Stay   PHYSICAL THERAPY ADULT IP CONSULT  OCCUPATIONAL THERAPY ADULT IP CONSULT  CARE MANAGEMENT / SOCIAL WORK IP CONSULT  NURSING TO CONSULT FOR VASCULAR ACCESS CARE IP CONSULT  NURSING TO CONSULT FOR VASCULAR ACCESS CARE IP CONSULT  NUTRITION SERVICES ADULT IP CONSULT  OCCUPATIONAL THERAPY ADULT IP CONSULT  PHYSICAL THERAPY ADULT IP CONSULT  ORTHOSIS CERVICAL COLLAR IP CONSULT  NURSING TO CONSULT FOR VASCULAR ACCESS CARE IP CONSULT  PHYSICAL THERAPY ADULT IP CONSULT  OCCUPATIONAL THERAPY ADULT IP CONSULT    Code Status   Full Code     Ernesto Curran MD  [place] [address] Medicine hospitalist Service  [facility] [address] [address]  Phone: [phone]    I personally spent 45 minutes in discharge activities  ______________________________________________________________________    Physical Exam   Vital Signs: Temp: 98.2  F (36.8  C) Temp src: Oral BP: 114/61 Pulse: 69   Resp: 18 SpO2: 97 % O2 Device: Nasal cannula Oxygen Delivery: 1 LPM  Weight: 175  lbs 3.2 oz  Constitutional: awake, alert, cooperative, no apparent distress, and appears stated age  Neurologic: 5/5 strength in bilateral hands and legs. Normal sensation      Primary Care Physician   Chelsea Mcneill    Discharge Orders      General info for SNF    Length of Stay Estimate: Short Term Care: Estimated # of Days <30  Condition at Discharge: Improving  Level of care:skilled   Rehabilitation Potential: Excellent  Admission H&P remains valid and up-to-date: Yes  Recent Chemotherapy: N/A  Use Nursing Home Standing Orders: Yes     Mantoux instructions    Give two-step Mantoux (PPD) Per Facility Policy Yes     Follow Up and recommended labs and tests    Follow up with Nursing home physician.    Follow up with neurosurgery as scheduled     Reason for your hospital stay    You were admitted after your cervical spine surgery. You should continue to work with physical therapy to get stronger.     Activity - Up with nursing assistance    NO lifting GREATER than 10 pounds, NO repetitive twisting, bending, and NO lumbar flexion greater than 10 degrees.     Physical Therapy Adult Consult    Evaluate and treat as clinically indicated.    Reason:  S/P cervical spine surgery     Occupational Therapy Adult Consult    Evaluate and treat as clinically indicated.    Reason:  S/P cervical spine surgery     Diet    Follow this diet upon discharge: Regular diet      Snacks/Supplements Adult: Ensure Enlive; With Meals      Snacks/Supplements Adult: Ensure Enlive; Between Meals       Significant Results and Procedures   Most Recent 3 CBC's:  Recent Labs   Lab Test 12/15/22  0550 12/14/22  0943 12/13/22  1023   WBC 10.0 13.7* 13.0*   HGB 12.2* 13.1* 13.2*   MCV 89 89 90    166 167     Most Recent 3 BMP's:  Recent Labs   Lab Test 12/15/22  0550 12/13/22  1023 12/13/22  0656 12/12/22  1138 12/12/22  0701 12/12/22  0049 12/11/22  1351   NA  --  137  --   --  140  --  140   POTASSIUM  --  3.8  --   --  3.8  --  4.2   CHLORIDE   --  105  --   --  109*  --  107   CO2  --  22  --   --  21*  --  16*   BUN  --  11.2  --   --  16.9  --  26.4*   CR  --  0.83  --   --  0.89  --  0.85   ANIONGAP  --  10  --   --  10  --  17*   TRISTIAN  --  8.4*  --   --  8.3*  --  9.2   GLC 98 103* 103*   < > 87   < > 118*    < > = values in this interval not displayed.   ,   Results for orders placed or performed during the hospital encounter of 12/07/22   Cervical spine MRI w/o contrast    Narrative    MR CERVICAL SPINE W/O CONTRAST 12/7/2022 8:42 PM    Provided History: Neck pain; Neurologic deficit, non-traumatic;  Balance/gait abnormality; No new-onset balance/gait abnormality; No  chronic or history of balance/gait abnormality; No known/automatically  detected potential contraindications to imaging    Comparison: 11/23/2021    Technique: Sagittal T1-weighted, sagittal T2-weighted, sagittal STIR,  sagittal diffusion weighted, axial T2-weighted, and axial T2* gradient  echo images of the cervical spine were obtained without intravenous  contrast.    Findings:  There is trace degenerative type 2 mm anterolisthesis of C3 on C4, C4  on C5, as well as C5 on C6. 3 mm anterolisthesis of C7 on T1..  There  is moderate-severe disc height narrowing C5 on C6 and C6 on C7.  There  is abnormal myelopathic T2 hyperintense signal seen within the  cervical cord at C5-C6 resultant of severe spinal canal stenosis at  this level..  The findings on a level by level basis are as follows:    C2-3:  No spinal canal or neural foraminal stenosis.    C3-4:  Mild bilateral neural foraminal and spinal canal stenosis.    C4-5:  Small disc bulge, left greater than right as well as extensive  right greater than left facet arthropathy with mild spinal canal and  bilateral neural foraminal stenosis. Disc bulge with left greater than  right uncovertebral spurring with moderate-severe left, mild-moderate  right neural foraminal stenosis. Mild-moderate spinal canal stenosis.    C5-6:  Posterior  disc bulge/disc osteophyte complex with severe spinal  canal stenosis. Severe left, moderate severe right neural foraminal  stenosis.    C6-7:  Posterior disc bulge with left subarticular extension with  severe left, moderate severe right neural foraminal stenosis. Severe  spinal canal stenosis.    C7-T1:  Mild bilateral neural foraminal stenosis.     No abnormality of the paraspinous soft tissues.      Impression    Impression:   1. Marked cervical spondylosis, with severe spinal canal stenosis at  C5-C6 and moderate at C6-C7, with myelopathic T2 hyperintense cord  signal at C5-C6.  2. Extensive uncovertebral joint spurring and facet arthropathy with  multilevel lower cervical predominant severe left, moderate right  neural foraminal stenosis as above.    I have personally reviewed the examination and initial interpretation  and I agree with the findings.    BRIANDA ARAGON MD         SYSTEM ID:  [id number]   CT Head w/o Contrast    Narrative    EXAM: CT HEAD W/O CONTRAST  LOCATION: [facility]  DATE/TIME: 12/8/2022 12:35 AM    INDICATION: Shunt for NPH, falls  COMPARISON: CT head 10/15/2022  TECHNIQUE: Routine CT Head without IV contrast. Multiplanar reformats. Dose reduction techniques were used.    FINDINGS:  INTRACRANIAL CONTENTS: No intracranial hemorrhage, extraaxial collection, or mass effect.  No CT evidence of acute infarct. Stable right parietal shunt catheter position and ventricular size. Volume loss and presumed chronic small vessel ischemia are   stable.    VISUALIZED ORBITS/SINUSES/MASTOIDS: No intraorbital abnormality. No paranasal sinus mucosal disease. No middle ear or mastoid effusion.    BONES/SOFT TISSUES: No acute abnormality.      Impression    IMPRESSION:  1.  No acute intracranial abnormality or significant change compared to the prior study.   XR Surgery WILNER L/T 5 Min Fluoro w Stills    Narrative    This exam was marked as non-reportable because  it will not be read by a   radiologist or a [place] non-radiologist provider.               Discharge Medications   Current Discharge Medication List      START taking these medications    Details   methocarbamol (ROBAXIN) 500 MG tablet Take 1 tablet (500 mg) by mouth every 6 hours as needed for muscle spasms    Associated Diagnoses: Spinal stenosis in cervical region      ondansetron (ZOFRAN ODT) 4 MG ODT tab Take 1 tablet (4 mg) by mouth every 6 hours as needed for nausea or vomiting    Associated Diagnoses: Spinal stenosis in cervical region      oxyCODONE (ROXICODONE) 5 MG tablet Take 1 tablet (5 mg) by mouth every 4 hours as needed for moderate pain (4-6)  Refills: 0    Associated Diagnoses: Spinal stenosis in cervical region      polyethylene glycol (MIRALAX) 17 GM/Dose powder Take 17 g by mouth daily as needed for constipation  Qty: 510 g    Associated Diagnoses: Spinal stenosis in cervical region         CONTINUE these medications which have CHANGED    Details   aspirin (ASA) 325 MG tablet Take 1 tablet (325 mg) by mouth daily    Associated Diagnoses: Spinal stenosis in cervical region      gabapentin (NEURONTIN) 300 MG capsule Take 1 capsule (300 mg) by mouth At Bedtime    Associated Diagnoses: Spinal stenosis in cervical region      ketoconazole (NIZORAL) 2 % external cream Apply topically daily Apply topically to groin area and feet once daily  Qty: 60 g, Refills: 3    Associated Diagnoses: Ringworm      !! NONFORMULARY Apply 1 drop to eye 4 times daily BID (on 12/14/2022) until supply gone    Associated Diagnoses: Spinal stenosis in cervical region      pyridostigmine (MESTINON) 60 MG tablet Take 1.5 tablets (90 mg) by mouth 2 times daily    Associated Diagnoses: Myasthenia gravis without exacerbation (H)       !! - Potential duplicate medications found. Please discuss with provider.      CONTINUE these medications which have NOT CHANGED    Details   !! ACE/ARB/ARNI NOT PRESCRIBED (INTENTIONAL) Please  choose reason not prescribed from choices below.    Associated Diagnoses: Stage 3a chronic kidney disease (H)      acetaminophen (TYLENOL) 325 MG tablet Take 1-2 tablets (325-650 mg) by mouth every 4 hours as needed for mild pain      bisacodyl (DULCOLAX) 10 MG suppository Place 10 mg rectally daily as needed for constipation      cyanocobalamin (VITAMIN B-12) 1000 MCG tablet Take 1 tablet (1,000 mcg) by mouth daily  Qty: 90 tablet, Refills: 1    Associated Diagnoses: Vitamin B12 deficiency (non anemic)      ferrous sulfate (FEROSUL) 325 (65 Fe) MG tablet Use 1 tab every other day with orange juice  Qty: 60 tablet, Refills: 1    Associated Diagnoses: Iron deficiency anemia, unspecified iron deficiency anemia type      levETIRAcetam (KEPPRA) 250 MG tablet Take 1 tablet (250 mg) by mouth 2 times daily      multivitamin (OCUVITE) TABS tablet Take 1 tablet by mouth daily      pantoprazole (PROTONIX) 40 MG EC tablet Take 40 mg by mouth daily      pramipexole (MIRAPEX) 0.125 MG tablet Take 3 tablets (0.375 mg) by mouth At Bedtime  Qty: 90 tablet, Refills: 2    Associated Diagnoses: Restless leg syndrome      predniSONE (DELTASONE) 10 MG tablet Take 1 tablet (10 mg) by mouth daily  Qty: 30 tablet, Refills: 0      senna-docusate (SENOKOT-S/PERICOLACE) 8.6-50 MG tablet Take 1 tablet by mouth 2 times daily  Qty: 60 tablet, Refills: 0    Associated Diagnoses: Idiopathic normal pressure hydrocephalus (H)      simvastatin (ZOCOR) 20 MG tablet Take 1 tablet (20 mg) by mouth At Bedtime  Qty: 90 tablet, Refills: 1    Associated Diagnoses: Hyperlipidemia LDL goal <130      Sodium Chloride, Hypertonic, (MASOOD 128 OP) Place 0.25 inches into both eyes At Bedtime Uses Masood 128 ointment 5%. once a day at night      tamsulosin (FLOMAX) 0.4 MG capsule Take 1 capsule (0.4 mg) by mouth daily      triamcinolone (KENALOG) 0.1 % external cream Apply a thin layer up to twice daily to affected areas as needed.  Qty: 30 g, Refills: 3    Associated  Diagnoses: Seborrheic dermatitis      vitamin D3 (CHOLECALCIFEROL) 2000 units tablet Take 1 tablet by mouth daily  Qty: 90 tablet, Refills: 1    Associated Diagnoses: Vitamin D deficiency       !! - Potential duplicate medications found. Please discuss with provider.        Allergies   Allergies   Allergen Reactions     Mycophenolate Other (See Comments)     Confusion, abnormal movements     Nkda [No Known Drug Allergies]

## 2022-12-13 NOTE — PLAN OF CARE
Status: POD #1 C4-7 Lami  Vitals: VSS, RA  Neuros: A/O x4 4/5 strength  IV: PIV SL  Labs/Electrolytes: WNL  Resp/trach: RA O2 sats stable, LS clear  Diet: Reg diet  Bowel status: BS+ BM 12/11. Bowel meds given  : Rodriguez removed at 1230pm  Skin: Back incision covered w/primapore (marked w/no change). Hemovac in place. C-collar placed w/orthotics this AM. Reddened and bruised arms, Skin tears on R arm and L shin  Pain: Incisional pain managed w/tylenol   Activity: Up w/1 GB/Walker   Plan: Continue to monitor and follow POC  Updates this shift: C-collar placed, Worked with therapies.

## 2022-12-13 NOTE — PROGRESS NOTES
12/13/22 1072   Appointment Info   Signing Clinician's Name / Credentials (PT) Maritza Coleman DPT   Rehab Comments (PT) Re-eval POD 1. no brace needed, soft collar for comfort. Cervical precautions   Living Environment   Living Environment Comments see eval from 12/9 for PLOF   General Information   Pertinent History of Current Problem (include personal factors and/or comorbidities that impact the POC) POD 1 C 4-7 laminectomy   Existing Precautions/Restrictions fall;spinal   Weight-Bearing Status - LUE partial weight-bearing (% in comments)  (<10#)   Weight-Bearing Status - RUE partial weight-bearing (% in comments)  (<10#)   General Observations no brace needed   Pain Assessment   Patient Currently in Pain Yes, see Vital Sign flowsheet  (tolerable, at cervical spine)   Strength (Manual Muscle Testing)   Strength Comments ~4/5 BLE, R>L weakness.   Bed Mobility   Comment, (Bed Mobility) NT, pt up in chair   Transfers   Comment, (Transfers) Edilson sit<>stand to FWW   Gait/Stairs (Locomotion)   Comment, (Gait/Stairs) CGA w/ FWW, occ RLE instability/ mild buckling but able to correct w/o assist w/ UEs on device   Balance   Balance Comments IND sitting balance, fair static standing, poor dynamic standing, needs FWW for support   Clinical Impression   Clinical Impression Comments Re-eval completed 2/2 POD 1 cervical procedure. Pt will continue to benefit from skilled PT while IP to progress functional strength, balance and overall IND.   PT Total Evaluation Time   PT Eval, Re-eval Minutes (34332) 5   Plan of Care Review   Plan of Care Reviewed With patient   Physical Therapy Goals   PT Goals Bed Mobility;Transfers   PT: Bed Mobility Independent;Supine to/from sit;Rolling;Within precautions   PT: Transfers Sit to/from stand;Bed to/from chair;Assistive device;Modified independent   PT Discharge Planning   PT Plan gait training w/ FWW, bed mobility and STS IND. Standing LE ex circuit as tolerated   PT Discharge  Recommendation (DC Rec) Transitional Care Facility;home with assist;home with home care physical therapy   PT Rationale for DC Rec Pt below baseline s/p cervical procedure. Needs consistent 24/7 assist with all transfers and gait. Primary recommendation is a TCU stay as pt would benefit from rehab to progress functional strength, balance and gait IND. Per notes pt's facility is able to provide 24/7 assist; however would need to be able to provide consistent physical A of 1 to be able to return to prior living arrangement. If so, would recommend follow up w/ HH PT.   PT Brief overview of current status CGA w/ FWW for gait, Edilson for transfers.

## 2022-12-13 NOTE — PROGRESS NOTES
"Goal outcome evaluation:  Time: 2300 - 0700  Plan of care reviewed with: Patient  Overall patient progress: No change  VS BP (!) 160/80 (BP Location: Left arm, Patient Position: Semi-Stallworth's, Cuff Size: Adult Regular)   Pulse 74   Temp (!) 96  F (35.6  C) (Oral)   Resp 14   Ht 1.753 m (5' 9\")   Wt 79.5 kg (175 lb 3.2 oz)   SpO2 97%   BMI 25.87 kg/m      Activity: Assist x 1. Able to shift weight and turn while in bed. SCD's on.  Neuros: A&O x4. Calls appropriately. Able to make needs known. Clear and appropriate speech. Follows instructions. Strength 4/5 throughout.  Respiratory: VSS on 2 L via NC. RR 14, unlabored and regular. On capno.  GI/: Rodriguez catheter in place, adequate output. +BS. LBM 12/11/22. Bedside commode available.  Diet: Regular diet. Eager to order breakfast.  Skin/Incisions: Back incision. Bruising/redness on both arms. Skin tear on right arm and left shin  Lines/Drains: RPIV infusing NS @ 85 mL/hr.  Labs: BS check POD 1 @ 6 AM  Pain/Nausea: Denies pain, N/V  Plan: Continue POC.      "

## 2022-12-13 NOTE — PROGRESS NOTES
S: Pt. seen at . Male. Dr. Paiz CNP. RX: [place] J collar. DX: P/O spine surgery, C4-C7 lamioplasty.  O:A: Today I F/D a [place] J 300 collar to support surgical site. Donning doffing wear and care instructions given.  P: Pt. to be seen as needed.    Deon Verdugo CO,LO

## 2022-12-13 NOTE — PROGRESS NOTES
Care Management Follow Up    Length of Stay (days): 5    Expected Discharge Date: 12/16/2022     Concerns to be Addressed: discharge planning      Patient plan of care discussed at interdisciplinary rounds: Yes    Anticipated Discharge Disposition: Home vs TCU  Anticipated Discharge Services:  Home Health if returning home  Anticipated Discharge DME:  TBD    Additional Information:  Patient status reviewed, discussed in discharge rounds. Patient from [facility], Assisted Living/Memory Care. Therapy recommending return to facility vs TCU stay pending ability of facility to meet Stephen's needs.    Call placed to [facility] (ph: [phone]), spoke with Kadeem to discuss patient's current needs. Kadeem states at baseline patient uses a walker and only requires reminders and safety checks. He is SBA assist for showering and staff manage his medications. Per therapy documentation, to safely return to facility patient would need 24/7 assist and A of 1 for all I/ADLs, mobility, transfers. Kadeem states the facility would be able to provide this level of assistance should he return.     If/when patient does return, Kadeem states any week day before 5 pm is best. He would like discharge medications sent to [facility] (Ph: [phone] Fax: [phone]) and discharge orders faxed to [phone]. Nurse to nurse report can be called to [phone].    If patient returns and needs home health therapies, Kadeem states they generally use [facility], but thinks Stephen may have received services from [facility] in the past.     Discussed above information with PT/OT. Even though facility is able to meet Stephen's new needs, they still feel he would benefit from a rehab stay as he is currently below his baseline. SW updated. RNCC will remain available should patient progress and be able to return directly back to [facility].     1321 Addendum:  Call received from [facility]. Patient is not currently receiving therapy  services, but has previously, last episode ended in June 2022.     [facility] Care  [address] [place] [address]  Phone: [phone]  Fax: [phone]  Pharmacy: [facility] (ph: [phone] fax: [phone])  Nurse to Nurse: [phone]    Conchis Boyd, RN, BSN  6A RN Care Coordinator  Ph: [phone]   Pager: [phone]

## 2022-12-13 NOTE — PROVIDER NOTIFICATION
"Spoke with Dr. CEZAR Ewing from Neurosurgery to clarify the need for a neck brace. Per orders from last evening, \"no brace needed, soft collar for comfort\" noted by OT this afternoon. She verified that this is their recc.  Pt is a [facility] pt and they had placed order for orthotic consult who placed pt in a [place] J collar this am. Pt has been very uncomfortable in MJ and feels it is difficult to eat and does not like it.   Updated [facility] with Neurosurgery's recc and that pt would prefer to try a soft collar. Soft collar obtained and placed on pt.   Updated pt with POC  "

## 2022-12-13 NOTE — OP NOTE
Procedure Date: 12/12/2022    PREOPERATIVE DIAGNOSIS:  Severe cervical canal stenosis with myelopathy.    POSTOPERATIVE DIAGNOSIS:  Severe cervical canal stenosis with myelopathy.    PROCEDURES PERFORMED:  1.  Cervical 4-6 right-side open door laminoplasty.  2.  Partial cervical 7 decompression.  3.  Use of intraoperative neuromonitoring.  4.  Use of intraoperative C-arm.  5.  Use of structural allograft.    ATTENDING SURGEON:  Judie Levin MD, PhD    RESIDENT SURGEONS:  1.  Hakan Hair MD  2.  Radha Loomis MD    ANESTHESIA:  General.    ESTIMATED BLOOD LOSS:  50 mL    INTRAOPERATIVE FINDINGS:  Expansile laminoplasty completed with good thecal sac decompression.  Decompression at cervical 7 demonstrated well decompressed thecal sac.    INDICATIONS FOR PROCEDURE:  Mr. 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  He presented to the hospital with a 6-month history of worsening bilateral lower extremity weakness as well as weakness involving the right upper extremity.  He was now having difficulty with ambulation.  MRI of his cervical spine demonstrated severe canal stenosis at C5-C7 levels with T2 signal change.  Given the patient's presenting symptoms, physical exam, and imaging findings, he was indicated for the aforementioned procedure.  After a thorough conversation of the risks and benefits of surgery, the patient elected to move forward with the offered surgical procedure.    DESCRIPTION OF PROCEDURE:  The patient was marked and consented in the preoperative area.  He was brought to the operating room where general anesthesia was induced and the patient was intubated.  An arterial line was placed.  A Rodriguez was placed.  Neuromonitoring leads were placed.  Pre-flip baselines were obtained.  Norris headholder was attached to the patient's head, and the patient was rotated prone on a Darrian table.  The Norris was attached  to the Levo headholder.  All pressure points were adequately padded.  Post-flip neuromonitoring was stable.  Small amounts of hair along the back of his neck were shaved using electrical clippers.  C-arm was then brought to the field and we localized the C4 level as well as the C7 level.  We then planned our incision.  The patient was then prepped and draped in normal sterile fashion.  10 mL of local anesthetic with epinephrine was injected along the intended incision line.  Time-out was called confirming the patient's identity as well as intended procedure.    A #10 blade was used to open the skin.  Monopolar cautery was used to carry the dissection deep to subcuticular tissues and to open the fascia bilaterally.  We then dissected along the spinous process and out over a small portion of the lateral mass.  The facet joints were not disrupted.  C-arm was then brought back to the field and we obtained several intraoperative x-rays with the final x-ray demonstrating a Kocher on C4.  This was our spinal timeout.    The high-speed drill was then used to create a through and through trough on the right side.  We then used the high-speed drill to create a partial trough on the left side.  Kerrison punches were used to open the ligament on the right side.  We then also used the rongeur to take down the tissues between the caudal and cephalad portions of the laminoplasty.  Allograft rib graft was then cut and the several segments approximately 1 cm long and notches were drilled into the ends to be able to place them securely.  Kerrison punches were then used to undercut the cephalad portion of C7, completing the partial decompression at that level.  Gentle traction was applied to the open door laminoplasty and the lamina opened nicely.  The autologous rib structural grafts were then placed at all 3 levels.  We ensured that they were held in place quite nicely.  The spinal canal was very well decompressed following the  expansile laminoplasty.  Neuromonitoring detected a decrease in the motor responses in the left hand.  Sensory signals remained unchanged.  No etiology for the change was noted at the field.  We elected to continue to observe the finding as there was no discernible reason for this neuromonitoring change.  Hemostasis was obtained.  Subfascial Hemovac was then tunneled.  The wound was copiously irrigated with 1 liter of saline.    The muscle was loosely reapproximated using 0 Vicryl sutures in a simple interrupted fashion.  Fascia was closed using 0 Vicryl sutures in a simple interrupted fashion.  Final neuromonitoring remained stable.  Adipose tissue was closed using 0 Vicryl sutures in a horizontal mattress fashion.  Dermis was closed using 2-0 Vicryl sutures in an inverted interrupted fashion.  Skin was closed using a 3-0 nylon in a running fashion.  The drain exit site was secured using a 3-0 nylon suture.  The wound was cleaned with a wet followed by dry sponge.  ChloraPrep was applied to the incision.  Once this had dried, the wound was dressed with Primapore dressings.  The drapes were taken down, and the Norris was released from the Levo head negrete.  The patient was then rotated back supine on a hospital bed where the [place] was released from his head.  General anesthesia was gently reversed and the patient was extubated.  The patient was then taken to the postoperative care area for further postoperative cares.  All counts were correct at the end of the procedure x 2.  Dr. Judie Levin was present and scrubbed throughout all critical portions of the case and otherwise immediately available.        Judie Levin MD    As Dictated by TEO MOSQUEDA MD        D: 2022   T: 2022   MT: [id number]    Name:     DAISHA LOZOYA  MRN:      [id number]        Account:        [id number]   :      1940           Procedure Date: 2022     Document: [id number]  I was present for the critical  portions of the operation and immediately available for the remainder.  AMP

## 2022-12-13 NOTE — PROGRESS NOTES
[facility], [place]   Neurosurgery Progress Note:    Date of service: 12/13/2022    Assessment: Gustavo Ramon is a 82 year old male with a past medical history of polyneuropathy, NPH with certas at 2,  and myasthenia gravis presents with a 6 month history of worsening bilateral lower extremity weakness and right upper extremity. MRI C-Spine revealed C5-C7 spinal canal stenosis.     Patient is POD #1 Cervical 4-7 laminoplasty with Dr. Levin.     Clinically Significant Risk Factors Present on Admission            # Hypertension  # Ocular Myasthenia Gravis  # Steroid-induced diabetes mellitus      Recommendations:  - Serial neuro checks  - Hold aspirin and subcutaneous heparin DVT prophylaxis and do not restart until cleared with neurosurgery.  - PT/OT  - Orthosis Consult for C-Collar for comfort only    - Hemovac drain in place  - Activity as tolerated with assist  - Regular diet  - Pain management per primary team  - Agree with ceftriaxone for UTI  - Please remove Rodriguez catheter today  - All other cares per primary team      CORINA Guaman, CNP  Neurosurgery  Pager [phone]      Interval History:  No acute events overnight. Patient neurologically stable on exam. Hemovac to remain in place today.  Patient should work with PT/OT today and increase activity. Rodriguez to be removed today.     Objective:   Temp:  [96  F (35.6  C)-98.4  F (36.9  C)] 98.4  F (36.9  C)  Pulse:  [68-89] 75  Resp:  [11-18] 13  BP: (117-160)/() 126/74  MAP:  [101 mmHg-122 mmHg] 119 mmHg  Arterial Line BP: (150-170)/(68-81) 167/76  SpO2:  [92 %-98 %] 97 %  I/O last 3 completed shifts:  In: 3281.67 [P.O.:280; I.V.:2501.67]  Out: 2815 [Urine:2650; Drains:115; Blood:50]    Gen: Appears comfortable, NAD  Neurologic:  - Alert & Oriented to person, place, time, and situation  - Follows commands briskly  - Speech fluent, spontaneous. No aphasia or dysarthria.  - No gaze preference. No apparent hemineglect.  - PER,  EOMI  - Strong eye closure, jaw clench, and cheek puff  - Face symmetric with sensation intact to light touch  - Trapezii and sternocleidomastoid muscles 5/5 bilaterally  - Jorge L negative today  - No clonus bilaterally       Del Tr Bi WE WF Gr   R 5 4+ 5 5 5 5   L 5 5 5 5 5 5    HF KE KF DF PF EHL   R 5 5 5 5 5 5   L 5 5 5 5 5 5       Sensation intact and symmetric to light touch throughout    LABS  Recent Labs   Lab Test 12/12/22  0701 12/11/22  1351 12/11/22  0744   WBC 7.0 9.2 8.4   HGB 13.0* 14.7 13.7   MCV 90 94 89    185 198       Recent Labs   Lab Test 12/13/22  0656 12/12/22  1800 12/12/22  1138 12/12/22  0701 12/12/22  0049 12/11/22  1351 12/11/22  0744   NA  --   --   --  140  --  140 141   POTASSIUM  --   --   --  3.8  --  4.2 3.9   CHLORIDE  --   --   --  109*  --  107 110*   CO2  --   --   --  21*  --  16* 21*   BUN  --   --   --  16.9  --  26.4* 25.6*   CR  --   --   --  0.89  --  0.85 0.95   ANIONGAP  --   --   --  10  --  17* 10   TRISTIAN  --   --   --  8.3*  --  9.2 8.9   * 122* 118* 87   < > 118* 92    < > = values in this interval not displayed.       IMAGING    No results found for this or any previous visit (from the past 24 hour(s)).

## 2022-12-13 NOTE — PROGRESS NOTES
"   Occupational Therapy Re-evaluation 12/13/22 1600   Appointment Info   Signing Clinician's Name / Credentials (OT) Nallely Balderrama OTR/L   Living Environment   People in Home spouse   Current Living Arrangements extended care facility;house   Home Accessibility no concerns   Transportation Anticipated family or friend will provide   Living Environment Comments Per previous eval, \"Pt lives with spouse in one story home, no RILEY or stairs inside. Pt reports living at [place] most recently d/t increase in falls and weakness. Pt wife available for support 24/7. Has tub transfer bench, no grab bars in bathroom.\"   Self-Care   Usual Activity Tolerance good   Current Activity Tolerance moderate   Equipment Currently Used at Home walker, rolling   Fall history within last six months yes   Number of times patient has fallen within last six months 2   Activity/Exercise/Self-Care Comment Pt reports IND with ADLs at baseline except for A w/ washing his back when showering. Pt utilized a walker at baseline.   Instrumental Activities of Daily Living (IADL)   IADL Comments Pt ABI provides meals for him and  A w/ med mgmt. Pt does amb ~90 steps to/from the dining area.   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 Laminoplasty for C5-C7 spinal canal stenosis 12/12.   Existing Precautions/Restrictions fall;spinal   Left " Upper Extremity (Weight-bearing Status) partial weight-bearing (PWB)   Right Upper Extremity (Weight-bearing Status) partial weight-bearing (PWB)   General Observations and Info No brace needed per activity orders (upon working w/ pt, pt had been fitted w/ [place] j that appeared to not fit well and followed up w/ RN regarding order/need for collar)   Cognitive Status Examination   Orientation Status orientation to person, place and time   Follows Commands follows one-step commands;over 90% accuracy   Memory Deficit minimal deficit   Executive Function Deficit minimal deficit   Visual Perception   Impact of Vision Impairment on Function (Vision) Pt w/ some minor difficulty seeing items during session d/t recent cataracts procedure   Pain Assessment   Patient Currently in Pain Yes, see Vital Sign flowsheet   Posture   Posture not impaired   Range of Motion Comprehensive   General Range of Motion no range of motion deficits identified   Strength Comprehensive (MMT)   Comment, General Manual Muscle Testing (MMT) Assessment Not formally tested d/t spinal precautions, appear to be WFL   Coordination   Upper Extremity Coordination No deficits were identified   Transfers   Transfers sit-stand transfer   Sit-Stand Transfer   Sit-Stand [place] (Transfers) contact guard   Assistive Device (Sit-Stand Transfers) walker, front-wheeled   Activities of Daily Living   BADL Assessment/Intervention bathing;upper body dressing;toileting;grooming;lower body dressing   Bathing Assessment/Intervention   [place] Level (Bathing) not tested   Comment, (Bathing) Per clinical judgement anticipate min A   Upper Body Dressing Assessment/Training   Comment, (Upper Body Dressing) Per clinical judgement anticipate min A   [place] Level (Upper Body Dressing) not tested   Lower Body Dressing Assessment/Training   [place] Level (Lower Body Dressing) verbal cues;supervision   Grooming Assessment/Training   [place] Level  (Grooming) supervision;set up   Toileting   [place] Level (Toileting) minimum assist (75% patient effort)   Clinical Impression   Criteria for Skilled Therapeutic Interventions Met (OT) Yes, treatment indicated   OT Diagnosis Decreased ADL IND and functional mobility   OT Problem List-Impairments impacting ADL problems related to;activity tolerance impaired;balance;cognition;mobility;strength;pain;post-surgical precautions   Assessment of Occupational Performance 5 or more Performance Deficits   Identified Performance Deficits dressing, bathing, toileting, amb, transfers, bed mobility   Planned Therapy Interventions (OT) ADL retraining;balance training;bed mobility training;cognition;strengthening;transfer training;home program guidelines;progressive activity/exercise   Clinical Decision Making Complexity (OT) low complexity   Anticipated Equipment Needs Upon Discharge (OT)   (tbd)   Risk & Benefits of therapy have been explained evaluation/treatment results reviewed;care plan/treatment goals reviewed;risks/benefits reviewed;current/potential barriers reviewed;participants voiced agreement with care plan;participants included;patient   OT Total Evaluation Time   OT Eval, Low Complexity Minutes (54442) 10   OT Goals   Therapy Frequency (OT) 4 times/wk   OT Predicted Duration/Target Date for Goal Attainment 12/23/22   OT Goals Hygiene/Grooming;Upper Body Dressing;Lower Body Dressing;Lower Body Bathing;Transfers;Toilet Transfer/Toileting   OT: Hygiene/Grooming modified independent;within precautions;while standing   OT: Upper Body Dressing Modified independent;within precautions;including set-up/clothing retrieval   OT: Lower Body Dressing Modified independent;within precautions;including set-up/clothing retrieval   OT: Lower Body Bathing Modified independent;with precautions   OT: Transfer Independent;within precautions  (tub/shower transfer)   OT: Toilet Transfer/Toileting Modified independent;toilet  transfer;cleaning and garment management;within precautions   OT Discharge Planning   OT Discharge Recommendation (DC Rec) Transitional Care Facility   OT Rationale for DC Rec Pt presents below functional baseline and is primarily limited by balance deficits and generalized weakness. Rec short rehab stay prior to d/c back to [facility].   OT Brief overview of current status A x 1 w/ FWW   Total Session Time   Total Session Time (sum of timed and untimed services) 10

## 2022-12-13 NOTE — PROGRESS NOTES
CLINICAL NUTRITION SERVICES  Provider consult received to provide Nutrition education regarding Patient needs high-protein education and ordering of preferred supplements at 1.5 g protein per kg body weight.  Interventions:  Met with pt to provided diet education on goal of consuming 1.5 g protein/kg/body weight (120 g protein per day or 20 grams x 6 meals per day, including oral nutritional supplements). Reviewed good sources of protein in the diet, including use of oral nutrition supplement to help meet protein goal.   Provided handouts on protein content of the foods on the hospital menu, in addition to list of high protein foods to consume at home. Also provided list of oral nutritional supplements available for pt to utilize while in the hospital, as well as at home.   Also entered order for pt to receive Ensure Enlive supplement with meals and betw meals (@ 10 am daily)    Gregoria Barnett RD,LD  6A pager [phone]

## 2022-12-13 NOTE — ANESTHESIA CARE TRANSFER NOTE
Patient: Gustavo Ramon    Procedure: Procedure(s):  POSTERIOR APPROACH Cervical 4-7 laminoplasty       Diagnosis: Cervical stenosis of spinal canal [M48.02]  Diagnosis Additional Information: No value filed.    Anesthesia Type:   General     Note:    Oropharynx: oropharynx clear of all foreign objects  Level of Consciousness: drowsy  Oxygen Supplementation: nasal cannula    Independent Airway: airway patency satisfactory and stable  Dentition: dentition unchanged  Vital Signs Stable: post-procedure vital signs reviewed and stable  Report to RN Given: handoff report given  Patient transferred to: PACU    Handoff Report: Identifed the Patient, Identified the Reponsible Provider, Reviewed the pertinent medical history, Discussed the surgical course, Reviewed Intra-OP anesthesia mangement and issues during anesthesia, Set expectations for post-procedure period and Allowed opportunity for questions and acknowledgement of understanding      Vitals:  Vitals Value Taken Time   /86 12/12/22 1800   Temp     Pulse 71 12/12/22 1805   Resp 8 12/12/22 1805   SpO2 98 % 12/12/22 1805   Vitals shown include unvalidated device data.    Electronically Signed By: CORINA Chester CRNA  December 12, 2022  6:06 PM

## 2022-12-14 ENCOUNTER — APPOINTMENT (OUTPATIENT)
Dept: OCCUPATIONAL THERAPY | Facility: CLINIC | Age: 82
DRG: 520 | End: 2022-12-14
Payer: MEDICARE

## 2022-12-14 ENCOUNTER — APPOINTMENT (OUTPATIENT)
Dept: PHYSICAL THERAPY | Facility: CLINIC | Age: 82
DRG: 520 | End: 2022-12-14
Payer: MEDICARE

## 2022-12-14 LAB
ERYTHROCYTE [DISTWIDTH] IN BLOOD BY AUTOMATED COUNT: 14.6 % (ref 10–15)
HCT VFR BLD AUTO: 40.7 % (ref 40–53)
HGB BLD-MCNC: 13.1 G/DL (ref 13.3–17.7)
MCH RBC QN AUTO: 28.7 PG (ref 26.5–33)
MCHC RBC AUTO-ENTMCNC: 32.2 G/DL (ref 31.5–36.5)
MCV RBC AUTO: 89 FL (ref 78–100)
PLATELET # BLD AUTO: 166 10E3/UL (ref 150–450)
RBC # BLD AUTO: 4.56 10E6/UL (ref 4.4–5.9)
WBC # BLD AUTO: 13.7 10E3/UL (ref 4–11)

## 2022-12-14 PROCEDURE — 85018 HEMOGLOBIN: CPT | Performed by: STUDENT IN AN ORGANIZED HEALTH CARE EDUCATION/TRAINING PROGRAM

## 2022-12-14 PROCEDURE — 97535 SELF CARE MNGMENT TRAINING: CPT | Mod: GO | Performed by: OCCUPATIONAL THERAPIST

## 2022-12-14 PROCEDURE — 250N000013 HC RX MED GY IP 250 OP 250 PS 637: Performed by: STUDENT IN AN ORGANIZED HEALTH CARE EDUCATION/TRAINING PROGRAM

## 2022-12-14 PROCEDURE — 99232 SBSQ HOSP IP/OBS MODERATE 35: CPT | Performed by: INTERNAL MEDICINE

## 2022-12-14 PROCEDURE — 36415 COLL VENOUS BLD VENIPUNCTURE: CPT | Performed by: STUDENT IN AN ORGANIZED HEALTH CARE EDUCATION/TRAINING PROGRAM

## 2022-12-14 PROCEDURE — 97530 THERAPEUTIC ACTIVITIES: CPT | Mod: GP | Performed by: REHABILITATION PRACTITIONER

## 2022-12-14 PROCEDURE — 250N000012 HC RX MED GY IP 250 OP 636 PS 637: Performed by: STUDENT IN AN ORGANIZED HEALTH CARE EDUCATION/TRAINING PROGRAM

## 2022-12-14 PROCEDURE — 120N000002 HC R&B MED SURG/OB UMMC

## 2022-12-14 PROCEDURE — 97110 THERAPEUTIC EXERCISES: CPT | Mod: GP | Performed by: REHABILITATION PRACTITIONER

## 2022-12-14 PROCEDURE — 999N000128 HC STATISTIC PERIPHERAL IV START W/O US GUIDANCE

## 2022-12-14 PROCEDURE — 97530 THERAPEUTIC ACTIVITIES: CPT | Mod: GO | Performed by: OCCUPATIONAL THERAPIST

## 2022-12-14 RX ADMIN — FAMOTIDINE 20 MG: 20 TABLET ORAL at 07:41

## 2022-12-14 RX ADMIN — Medication 1 TABLET: at 07:40

## 2022-12-14 RX ADMIN — KETOCONAZOLE: 20 CREAM TOPICAL at 07:42

## 2022-12-14 RX ADMIN — FAMOTIDINE 20 MG: 20 TABLET ORAL at 20:09

## 2022-12-14 RX ADMIN — PREDNISONE 10 MG: 10 TABLET ORAL at 07:41

## 2022-12-14 RX ADMIN — ACETAMINOPHEN 650 MG: 325 TABLET, FILM COATED ORAL at 15:29

## 2022-12-14 RX ADMIN — PANTOPRAZOLE SODIUM 40 MG: 40 TABLET, DELAYED RELEASE ORAL at 07:41

## 2022-12-14 RX ADMIN — TAMSULOSIN HYDROCHLORIDE 0.4 MG: 0.4 CAPSULE ORAL at 07:41

## 2022-12-14 RX ADMIN — Medication 1000 MCG: at 07:41

## 2022-12-14 RX ADMIN — Medication 90 MG: at 20:10

## 2022-12-14 RX ADMIN — PRAMIPEXOLE DIHYDROCHLORIDE 0.38 MG: 0.25 TABLET ORAL at 20:11

## 2022-12-14 RX ADMIN — FERROUS SULFATE TAB 325 MG (65 MG ELEMENTAL FE) 325 MG: 325 (65 FE) TAB at 07:42

## 2022-12-14 RX ADMIN — ACETAMINOPHEN 650 MG: 325 TABLET, FILM COATED ORAL at 09:35

## 2022-12-14 RX ADMIN — Medication 50 MCG: at 07:41

## 2022-12-14 RX ADMIN — SENNOSIDES AND DOCUSATE SODIUM 1 TABLET: 8.6; 5 TABLET ORAL at 20:09

## 2022-12-14 RX ADMIN — POLYETHYLENE GLYCOL 3350 17 G: 17 POWDER, FOR SOLUTION ORAL at 07:42

## 2022-12-14 RX ADMIN — SENNOSIDES AND DOCUSATE SODIUM 1 TABLET: 8.6; 5 TABLET ORAL at 07:41

## 2022-12-14 RX ADMIN — Medication 90 MG: at 07:40

## 2022-12-14 RX ADMIN — GABAPENTIN 300 MG: 300 CAPSULE ORAL at 20:09

## 2022-12-14 RX ADMIN — LEVETIRACETAM 250 MG: 250 TABLET, FILM COATED ORAL at 20:10

## 2022-12-14 RX ADMIN — LEVETIRACETAM 250 MG: 250 TABLET, FILM COATED ORAL at 07:41

## 2022-12-14 ASSESSMENT — ACTIVITIES OF DAILY LIVING (ADL)
ADLS_ACUITY_SCORE: 50

## 2022-12-14 NOTE — PROGRESS NOTES
Neurosurgery Brief Progress Note    Hemovac drain removal    Drain not deemed to be necessary with low output. Drain site was prepped in usual sterile fashion with chloraprep. The drain was taken off suction. The sutures securing the drain were cut and the drain was removed with tip intact. Surgical incision with intact sutures was cleansed with betadine and a new dressing was placed.  No immediate complication was observed and the patient tolerated the procedure well.       CORINA Guaman, CNP  Neurosurgery  Pager [phone]

## 2022-12-14 NOTE — PLAN OF CARE
Status: POD #1 C4-7 Lami  Vitals: VSS, RA  Neuros: A/O x4 4/5 strength  IV: PIV SL  Labs/Electrolytes: WNL  Resp/trach: RA O2 sats stable, LS clear  Diet: Reg diet- good intake for dinner   Bowel status: BS+ BM 12/11. Bowel meds given  : Rodriguez removed at 1230 pm today. Pt voided x1 this norbert at approx 1800 with PVR 200cc, updated oncoming RN  Skin: Back incision covered w/primapore (marked w/no change). Hemovac in place. Soft collar in place for comfort. Reddened and bruised arms, Skin tears on R arm and L shin  Pain: Incisional pain managed w/tylenol   Activity: Up w/1 GB/Walker. Sat in chair for approx 3 hours this afternoon.   Plan: Continue to monitor and follow POC

## 2022-12-14 NOTE — PROGRESS NOTES
[facility], [place]   Neurosurgery Progress Note:    Date of service: 12/14/2022    Assessment: Gustavo Ramon is a 82 year old male with a past medical history of polyneuropathy, NPH with certas at 2, and myasthenia gravis presents with a 6 month history of worsening bilateral lower extremity weakness and right upper extremity. MRI C-Spine revealed C5-C7 spinal canal stenosis.     Patient is POD #2 Cervical 4-7 laminoplasty with Dr. Levin.     Clinically Significant Risk Factors Present on Admission            # Hypertension  # Ocular Myasthenia Gravis  # Steroid-induced diabetes mellitus      Recommendations:  - Serial neuro checks  - Hold aspirin until POD #7 (12/20/2022)  - Soft collar for comfort only    - Hemovac drain removed today  - Activity as tolerated   - Pain management per primary team  - All other cares per primary team      CORINA Guaman, CNP  Neurosurgery  Pager [phone]      Interval History:  No acute events overnight. Reports neck pain improved. Hemovac removed.    Objective:   Temp:  [96.7  F (35.9  C)-100.1  F (37.8  C)] 96.7  F (35.9  C)  Pulse:  [65-92] 65  Resp:  [14-16] 16  BP: (126-142)/(64-74) 133/68  SpO2:  [95 %-97 %] 97 %  I/O last 3 completed shifts:  In: -   Out: 1715 [Urine:1600; Drains:115]    Gen: Appears comfortable, NAD  Neurologic:  - Alert & Oriented to person, place, time, and situation  - Follows commands briskly  - Speech fluent, spontaneous. No aphasia or dysarthria.  - No gaze preference. No apparent hemineglect.  - PER, EOMI  - Strong eye closure, jaw clench, and cheek puff  - Face symmetric with sensation intact to light touch  - Trapezii and sternocleidomastoid muscles 5/5 bilaterally  - Jorge L negative today  - No clonus bilaterally       Del Tr Bi WE WF Gr   R 5 4+ 5 5 5 5   L 5 5 5 5 5 5    HF KE KF DF PF EHL   R 5 5 5 5 5 5   L 5 5 5 5 5 5       Sensation intact and symmetric to light touch throughout    LABS  Recent Labs   Lab  Test 12/13/22  1023 12/12/22  0701 12/11/22  1351   WBC 13.0* 7.0 9.2   HGB 13.2* 13.0* 14.7   MCV 90 90 94    162 185       Recent Labs   Lab Test 12/13/22  1023 12/13/22  0656 12/12/22  1800 12/12/22  1138 12/12/22  0701 12/12/22  0049 12/11/22  1351     --   --   --  140  --  140   POTASSIUM 3.8  --   --   --  3.8  --  4.2   CHLORIDE 105  --   --   --  109*  --  107   CO2 22  --   --   --  21*  --  16*   BUN 11.2  --   --   --  16.9  --  26.4*   CR 0.83  --   --   --  0.89  --  0.85   ANIONGAP 10  --   --   --  10  --  17*   TRISTIAN 8.4*  --   --   --  8.3*  --  9.2   * 103* 122*   < > 87   < > 118*    < > = values in this interval not displayed.       IMAGING    No results found for this or any previous visit (from the past 24 hour(s)).

## 2022-12-14 NOTE — PLAN OF CARE
POD 2 cervical laminectomy. Neck incision covered marked no change, Hemovac to accordion suction. Neuros: forgetful, 4/5 throughout, baseline BLE neuropathy. VSS ex Tmax 100.1, on continuous SAT monitoring. Tylenol for Tmax given x1. Triggered sepsis, lac 1.7. MD aware. Up 1/gb/walker. Incontinent of urine, elevated PVR in 200s. Last BM 12/11. Soft collar on for comfort. Regular diet. Continue to monitor. Plan discharge TCU when ready. Continue to monitor.

## 2022-12-14 NOTE — PLAN OF CARE
Status: 12/12 Cervical 4-7 laminoplasty   Vitals: VSS  Neuros: A/Ox4. 4/5 throughout. Right pupil> left. Neuropathy at baseline to hands and feet, pt states it is improving in hands post-op.   IV: PIV-SL   Diet: Regular  Bowel status: no BM   : voids via urinal, intermittently incontinent   Skin: Bruising throughout, Mepilex to both elbows. Hemovac pulled. Primapore to neck w/ marked drainage.   Pain: tylenol x2 for neck pain   Activity: up w/1, GB walker. Soft collar for comfort   Social: Wife at bedside this afternoon   Plan: Continue to follow POC

## 2022-12-14 NOTE — PROGRESS NOTES
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 Laminoplasty for C5-C7 spinal canal stenosis 12/12. Recovering well post-op. Hemovac removed 12/14. Clinically ready for discharge, pending TCU.      Changes Today:  - Hold aspirin until POD #7 (12/19)  - Hemovac with low output, removed per NSGY  - Social work coordinating TCU     # Weakness and falls  # Frailty and debilitation  # Hx of NPH s/p  shunt (07/2022)  # Hx myasthenia gravis with predominant ocular symptoms  # C5-C7 Spinal canal stenosis, severe, s/p laminoplasty  The patient presented with increased weakness, unsteadiness, 2 recent falls, b/l numbness in hands and hyperreflexia on exam. 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 Overall, weakness and unsteadiness most likely multifactorial and related to severe cervical stenosis, sensory neuropathy, and age-related frailty and debilitation. Laminoplasty performed by neurosurgery 12/12, and patient is recovering well post-op.   - Neurosurgery consulted, appreciate recs              - Surgery (laminoplasty) Monday (12/12)              - Hemovac removed 12/14  - Neurology consult, signed off 12/8, appreciate recs  - Q4hr neuro checks  - Soft c-collar for comfort  - PT/OT consult, recs to discharge to TCU  - Hold aspirin until POD #7  - PTA prednisone (10mg) and pyridostigmine  "(60mg) for myasthenia gravis              - Prednisone held for surgery, restarted 12/13     # Asymptomatic bacteruria (resolved)  No reported dysuria, changes in frequency, hematuria. Some reported urinary retention but this is not new. Treated empirically in the setting of neurosurgical intervention 12/12. Completed ceftriaxone 12/8-12/13.   - monitor for symptoms of UTI     # Elevated troponin, stable  Noted at 26 on admission and platueaued on recheck. Denies chest pain. EKG with sinus rhythym     Chronic medical problems:  Restless leg syndrome, myoclonus - PTA pramipexole, levetiracetam  Neuropathy - PTA gabapentin  GERD - PTA pantoprazole  HLD - PTA simvastatin HELD  Urinary retention - PTA tamsulosin  Prostate cancer s/p cryotherapy (07/2022)  Prediabetes (Hghb A1c 6.2 - 12/5/22)  Constipation - PTA senna  Anemia - PTA ferrous sulfate       Diet: Advance Diet as Tolerated: Regular Diet Adult  Snacks/Supplements Adult: Ensure Enlive; With Meals  Snacks/Supplements Adult: Ensure Enlive; Between Meals  Room Service    DVT Prophylaxis: Pneumatic Compression Devices  Rodriguez Catheter: Not present  Fluids: none  Central Lines: None  Cardiac Monitoring: None  Code Status: Full Code      Disposition Plan      Expected Discharge Date: 12/16/2022        Discharge Comments: awaiting recs        The patient's care was discussed with the Attending Physician, Dr. Curran.    Karthik De La Fuente MD   PGY1, Internal Medicine  Medicine Service, [address]  Securely message with the Vocera Web Console (learn more here)  Text page via [facility] Paging/Directory   Please see signed in provider for up to date coverage information      Clinically Significant Risk Factors                        # Overweight: Estimated body mass index is 25.87 kg/m  as calculated from the following:    Height as of this encounter: 1.753 m (5' 9\").    Weight as of this encounter: 79.5 kg (175 lb 3.2 " oz).          ______________________________________________________________________    Interval History   No acute events overnight. Patient feels overall well this morning. Using soft c-collar except when eating. Tolerating regular diet well. No urinary issues since weiner removal yesterday. Having regular BMs, last was yesterday. Hemovac removed per neurosurgery today.     Data reviewed today: I reviewed all medications, new labs and imaging results over the last 24 hours. I personally reviewed no images or EKG's today.    Physical Exam   Vital Signs: Temp: 98.8  F (37.1  C) Temp src: Oral BP: 134/67 Pulse: 90   Resp: 16 SpO2: 96 % O2 Device: Nasal cannula Oxygen Delivery: 1 LPM  Weight: 175 lbs 3.2 oz  Gen: NAD, appears comfortable  HEENT: Neck ROM grossly normal. Slight tenderness over posterior neck. Dressing in place over posterior neck base without active bleeding or drainage.   CV: RRR. No extra heart sounds.   Pulm: Non-labored breathing. Clear to auscultation bilaterally.   Abd: Soft, non-tender. Normoactive bowel sounds.   Extr: No lower extremity edema bilaterally.   Skin: Warm and dry.   Neuro: A&O x3. No apparent focal neurological deficits.   Psych: Appropriate affect.     Data   Recent Labs   Lab 12/14/22  0943 12/13/22  1023 12/13/22  0656 12/12/22  1800 12/12/22  1138 12/12/22  0701 12/12/22  0049 12/11/22  1351 12/09/22  0638 12/07/22  1639   WBC 13.7* 13.0*  --   --   --  7.0  --  9.2   < > 9.2   HGB 13.1* 13.2*  --   --   --  13.0*  --  14.7   < > 15.0   MCV 89 90  --   --   --  90  --  94   < > 91    167  --   --   --  162  --  185   < > 229   INR  --   --   --   --   --   --   --  1.09  --   --    NA  --  137  --   --   --  140  --  140   < > 144   POTASSIUM  --  3.8  --   --   --  3.8  --  4.2   < > 4.3   CHLORIDE  --  105  --   --   --  109*  --  107   < > 106   CO2  --  22  --   --   --  21*  --  16*   < > 25   BUN  --  11.2  --   --   --  16.9  --  26.4*   < > 19.6   CR  --  0.83   --   --   --  0.89  --  0.85   < > 1.10   ANIONGAP  --  10  --   --   --  10  --  17*   < > 13   TRISTIAN  --  8.4*  --   --   --  8.3*  --  9.2   < > 9.3   GLC  --  103* 103* 122*   < > 87   < > 118*   < > 91   ALBUMIN  --   --   --   --   --   --   --   --   --  4.0   PROTTOTAL  --   --   --   --   --   --   --   --   --  6.5   BILITOTAL  --   --   --   --   --   --   --   --   --  0.3   ALKPHOS  --   --   --   --   --   --   --   --   --  78   ALT  --   --   --   --   --   --   --   --   --  25   AST  --   --   --   --   --   --   --   --   --  20    < > = values in this interval not displayed.     No results found for this or any previous visit (from the past 24 hour(s)).

## 2022-12-15 ENCOUNTER — APPOINTMENT (OUTPATIENT)
Dept: OCCUPATIONAL THERAPY | Facility: CLINIC | Age: 82
DRG: 520 | End: 2022-12-15
Payer: MEDICARE

## 2022-12-15 ENCOUNTER — APPOINTMENT (OUTPATIENT)
Dept: PHYSICAL THERAPY | Facility: CLINIC | Age: 82
DRG: 520 | End: 2022-12-15
Payer: MEDICARE

## 2022-12-15 LAB
ERYTHROCYTE [DISTWIDTH] IN BLOOD BY AUTOMATED COUNT: 14.7 % (ref 10–15)
GLUCOSE SERPL-MCNC: 98 MG/DL (ref 70–99)
HCT VFR BLD AUTO: 37.9 % (ref 40–53)
HGB BLD-MCNC: 12.2 G/DL (ref 13.3–17.7)
MCH RBC QN AUTO: 28.8 PG (ref 26.5–33)
MCHC RBC AUTO-ENTMCNC: 32.2 G/DL (ref 31.5–36.5)
MCV RBC AUTO: 89 FL (ref 78–100)
PLATELET # BLD AUTO: 164 10E3/UL (ref 150–450)
RBC # BLD AUTO: 4.24 10E6/UL (ref 4.4–5.9)
WBC # BLD AUTO: 10 10E3/UL (ref 4–11)

## 2022-12-15 PROCEDURE — 97110 THERAPEUTIC EXERCISES: CPT | Mod: GP | Performed by: REHABILITATION PRACTITIONER

## 2022-12-15 PROCEDURE — 250N000012 HC RX MED GY IP 250 OP 636 PS 637: Performed by: STUDENT IN AN ORGANIZED HEALTH CARE EDUCATION/TRAINING PROGRAM

## 2022-12-15 PROCEDURE — 36415 COLL VENOUS BLD VENIPUNCTURE: CPT | Performed by: INTERNAL MEDICINE

## 2022-12-15 PROCEDURE — 99232 SBSQ HOSP IP/OBS MODERATE 35: CPT | Performed by: INTERNAL MEDICINE

## 2022-12-15 PROCEDURE — 97530 THERAPEUTIC ACTIVITIES: CPT | Mod: GP | Performed by: REHABILITATION PRACTITIONER

## 2022-12-15 PROCEDURE — 250N000013 HC RX MED GY IP 250 OP 250 PS 637: Performed by: STUDENT IN AN ORGANIZED HEALTH CARE EDUCATION/TRAINING PROGRAM

## 2022-12-15 PROCEDURE — 85027 COMPLETE CBC AUTOMATED: CPT | Performed by: STUDENT IN AN ORGANIZED HEALTH CARE EDUCATION/TRAINING PROGRAM

## 2022-12-15 PROCEDURE — 97535 SELF CARE MNGMENT TRAINING: CPT | Mod: GO | Performed by: OCCUPATIONAL THERAPIST

## 2022-12-15 PROCEDURE — 82947 ASSAY GLUCOSE BLOOD QUANT: CPT | Performed by: INTERNAL MEDICINE

## 2022-12-15 PROCEDURE — 120N000002 HC R&B MED SURG/OB UMMC

## 2022-12-15 RX ORDER — KETOCONAZOLE 20 MG/G
CREAM TOPICAL DAILY
Qty: 60 G | Refills: 3 | DISCHARGE
Start: 2022-12-15 | End: 2023-03-06

## 2022-12-15 RX ORDER — ASPIRIN 325 MG
325 TABLET ORAL DAILY
DISCHARGE
Start: 2022-12-20

## 2022-12-15 RX ORDER — METHOCARBAMOL 500 MG/1
500 TABLET, FILM COATED ORAL EVERY 6 HOURS PRN
DISCHARGE
Start: 2022-12-15 | End: 2024-10-04

## 2022-12-15 RX ORDER — POLYETHYLENE GLYCOL 3350 17 G/17G
17 POWDER, FOR SOLUTION ORAL DAILY PRN
Qty: 510 G | DISCHARGE
Start: 2022-12-15

## 2022-12-15 RX ORDER — OXYCODONE HYDROCHLORIDE 5 MG/1
5 TABLET ORAL EVERY 4 HOURS PRN
Refills: 0 | Status: SHIPPED | DISCHARGE
Start: 2022-12-15 | End: 2023-03-06

## 2022-12-15 RX ORDER — FAMOTIDINE 20 MG/1
20 TABLET, FILM COATED ORAL 2 TIMES DAILY
DISCHARGE
Start: 2022-12-15 | End: 2022-12-15

## 2022-12-15 RX ORDER — ONDANSETRON 4 MG/1
4 TABLET, ORALLY DISINTEGRATING ORAL EVERY 6 HOURS PRN
DISCHARGE
Start: 2022-12-15 | End: 2023-03-06

## 2022-12-15 RX ORDER — GABAPENTIN 300 MG/1
300 CAPSULE ORAL AT BEDTIME
DISCHARGE
Start: 2022-12-15 | End: 2023-04-06

## 2022-12-15 RX ORDER — PYRIDOSTIGMINE BROMIDE 60 MG/1
90 TABLET ORAL 2 TIMES DAILY
DISCHARGE
Start: 2022-12-15 | End: 2023-11-02

## 2022-12-15 RX ADMIN — ACETAMINOPHEN 650 MG: 325 TABLET, FILM COATED ORAL at 10:24

## 2022-12-15 RX ADMIN — FAMOTIDINE 20 MG: 20 TABLET ORAL at 07:38

## 2022-12-15 RX ADMIN — POLYETHYLENE GLYCOL 3350 17 G: 17 POWDER, FOR SOLUTION ORAL at 07:36

## 2022-12-15 RX ADMIN — KETOCONAZOLE: 20 CREAM TOPICAL at 07:38

## 2022-12-15 RX ADMIN — SENNOSIDES AND DOCUSATE SODIUM 1 TABLET: 8.6; 5 TABLET ORAL at 07:38

## 2022-12-15 RX ADMIN — GABAPENTIN 300 MG: 300 CAPSULE ORAL at 20:30

## 2022-12-15 RX ADMIN — LEVETIRACETAM 250 MG: 250 TABLET, FILM COATED ORAL at 07:38

## 2022-12-15 RX ADMIN — PANTOPRAZOLE SODIUM 40 MG: 40 TABLET, DELAYED RELEASE ORAL at 07:39

## 2022-12-15 RX ADMIN — Medication 50 MCG: at 07:38

## 2022-12-15 RX ADMIN — Medication 1 TABLET: at 07:39

## 2022-12-15 RX ADMIN — FAMOTIDINE 20 MG: 20 TABLET ORAL at 20:30

## 2022-12-15 RX ADMIN — Medication 90 MG: at 20:32

## 2022-12-15 RX ADMIN — Medication 1000 MCG: at 07:38

## 2022-12-15 RX ADMIN — PREDNISONE 10 MG: 10 TABLET ORAL at 07:39

## 2022-12-15 RX ADMIN — PRAMIPEXOLE DIHYDROCHLORIDE 0.38 MG: 0.25 TABLET ORAL at 20:31

## 2022-12-15 RX ADMIN — Medication 90 MG: at 07:38

## 2022-12-15 RX ADMIN — TAMSULOSIN HYDROCHLORIDE 0.4 MG: 0.4 CAPSULE ORAL at 07:38

## 2022-12-15 RX ADMIN — LEVETIRACETAM 250 MG: 250 TABLET, FILM COATED ORAL at 20:30

## 2022-12-15 ASSESSMENT — ACTIVITIES OF DAILY LIVING (ADL)
ADLS_ACUITY_SCORE: 50

## 2022-12-15 NOTE — PROGRESS NOTES
[facility], [place]   Neurosurgery Progress Note:    Date of service: 12/15/2022    Assessment: Gustavo Ramon is a 82 year old male with a past medical history of polyneuropathy, NPH with certas at 2, and myasthenia gravis presents with a 6 month history of worsening bilateral lower extremity weakness and right upper extremity. MRI C-Spine revealed C5-C7 spinal canal stenosis.     Patient is POD #3 Cervical 4-7 laminoplasty with Dr. Levin.     Clinically Significant Risk Factors Present on Admission            # Hypertension  # Ocular Myasthenia Gravis  # Steroid-induced diabetes mellitus      Recommendations:  - Serial neuro checks  - Hold aspirin until POD #7 (12/20/2022)  - Soft collar for comfort only    - Pain management per primary team  - All other cares per primary team      Wound cares after surgery  - You are ok to shower, but do not soak your incisions. Pat them dry if they get damp.   - No baths, hot tubs or pools for 4-6 weeks after surgery.   - Follow-up with Neurosurgery in 2 weeks for wound check and suture removal. (request submitted for appointment on 12/14/2022    - Neurosurgery will sign-off at this time.  Please call with questions or concerns.       CORINA Guaman, CNP  Neurosurgery  Pager [phone]      Interval History:  No acute events overnight. Reports neck pain improved. Hemovac removed yesterday.  Waiting TCU placement. Neurosurgery will sign-off at this time, follow-up as outpatient in 2 weeks for sutural removal.     Objective:   Temp:  [96.7  F (35.9  C)-98  F (36.7  C)] 96.7  F (35.9  C)  Pulse:  [69-79] 72  Resp:  [16-18] 18  BP: (112-129)/(57-65) 115/61  SpO2:  [95 %-98 %] 96 %  I/O last 3 completed shifts:  In: -   Out: 425 [Urine:425]    Gen: Appears comfortable, NAD  Neurologic:  - Alert & Oriented to person, place, time, and situation  - Follows commands briskly  - Speech fluent, spontaneous. No aphasia or dysarthria.  - No gaze preference. No  apparent hemineglect.  - PER, EOMI  - Strong eye closure, jaw clench, and cheek puff  - Face symmetric with sensation intact to light touch  - Trapezii and sternocleidomastoid muscles 5/5 bilaterally  - Jorge L negative today  - No clonus bilaterally       Del Tr Bi WE WF Gr   R 5 4+ 5 5 5 5   L 5 5 5 5 5 5    HF KE KF DF PF EHL   R 5 5 5 5 5 5   L 5 5 5 5 5 5       Sensation intact and symmetric to light touch throughout    LABS  Recent Labs   Lab Test 12/15/22  0550 12/14/22  0943 12/13/22  1023   WBC 10.0 13.7* 13.0*   HGB 12.2* 13.1* 13.2*   MCV 89 89 90    166 167       Recent Labs   Lab Test 12/15/22  0550 12/13/22  1023 12/13/22  0656 12/12/22  1138 12/12/22  0701 12/12/22  0049 12/11/22  1351   NA  --  137  --   --  140  --  140   POTASSIUM  --  3.8  --   --  3.8  --  4.2   CHLORIDE  --  105  --   --  109*  --  107   CO2  --  22  --   --  21*  --  16*   BUN  --  11.2  --   --  16.9  --  26.4*   CR  --  0.83  --   --  0.89  --  0.85   ANIONGAP  --  10  --   --  10  --  17*   TRISTIAN  --  8.4*  --   --  8.3*  --  9.2   GLC 98 103* 103*   < > 87   < > 118*    < > = values in this interval not displayed.       IMAGING    No results found for this or any previous visit (from the past 24 hour(s)).

## 2022-12-15 NOTE — PLAN OF CARE
Status: 12/12 Cervical 4-7 laminoplasty   Vitals: VSS  Neuros: A/Ox4. 4/5 throughout. Right pupil> left. Neuropathy at baseline to hands and feet, pt states it is improving in hands post-op.   IV: PIV-SL   Diet: Regular  Bowel status: large BM x1  : voids via urinal, intermittently incontinent   Skin: Bruising throughout, Mepilex to both elbows. Primapore to neck w/ marked drainage.   Pain: tylenol x1  Activity: up w/1, GB walker. Soft collar for comfort   Plan: Continue to follow POC

## 2022-12-15 NOTE — PROGRESS NOTES
"Care Management Follow Up    Length of Stay (days): 7    Expected Discharge Date: 12/15/2022     Concerns to be Addressed:   Disposition planning    Patient plan of care discussed at interdisciplinary rounds: Yes    Anticipated Discharge Disposition: OT and Physical Therapy are recommending a short term TCU stay     Anticipated Discharge Services:  Short term TCU placement  Anticipated Discharge DME:  Not applicable at this time    Patient/family educated on Medicare website which has current facility and service quality ratings:  Yes, \"Medicare Care Compare\" provided  Education Provided on the Discharge Plan:  Yes  Patient/Family in Agreement with the Plan:  Yes    Referrals Placed by CM/SW:  None at this time  Private pay costs discussed: Not applicable at this time    Additional Information:  SW is following pt for discharge planning.   OT and Physical Therapy are recommending a short term TCU stay and per [facility] physician, pt is medically ready for discharge .  KUSH met with pt to discuss discharge planning.  Pt states that he was aware that a TCU stay is recommended and he is agreeable.  SW provided \"Medicare Care Compare\" document.  Pt states that he does not want to choose the facility's and instead would like his wife to choose for him.   SW will follow up with pt's wife.    ANA LILIA Roldan  Social Work, 6A  Phone:  [phone]  Pager:  [phone]  12/15/2022          SULTANA Wellington      "

## 2022-12-15 NOTE — PROGRESS NOTES
"Care Management Follow Up     Length of Stay (days): 7     Expected Discharge Date: 12/15/2022     Concerns to be Addressed:   Disposition planning    Patient plan of care discussed at interdisciplinary rounds: Yes     Anticipated Discharge Disposition: OT and Physical Therapy are recommending a short term TCU stay     Anticipated Discharge Services:  Short term TCU placement  Anticipated Discharge DME:  Not applicable at this time     Patient/family educated on Medicare website which has current facility and service quality ratings:  Yes, \"Medicare Care Compare\" provided  Education Provided on the Discharge Plan:  Yes  Patient/Family in Agreement with the Plan:  Yes     Referrals Placed by CM/SW:  None at this time  Private pay costs discussed: Not applicable at this time     Additional Information:  SW is following pt for discharge planning.   OT and Physical Therapy are recommending a short term TCU stay and per [facility] physician, pt is medically ready for discharge . KUSH phoned pt's wife  (Ceci) to obtain TCU preferences as per pt request.  Pt's wife requested referrals to the following SNF's:  - [facility], KUSH phoned this facility and it is permanently closed  - [facility], KUSH phoned Admissions (Paul) and left a message referring pt, SW faxed assessment materials via [facility], KUSH spoke with Admissions (Rosita) and openings are anticipated tomorrow. KUSH referred pt and faxed assessment materials.  Pt had a previous stay at this facility this past summer.    KUSH phoned pt's wife and updated in regards to the above calls.  KUSH met with pt and with Sugar Suh MD and updated.      ANA LILIA Roldan  Social Work, 6A  Phone:  [phone]  Pager:  [phone]  12/15/2022  "

## 2022-12-15 NOTE — PLAN OF CARE
POD 2 cervical laminectomy. Neck incision covered marked no change. Neuros: forgetful, 4/5 throughout, baseline BLE neuropathy. VSS on 1 L NC NOC. Up 1/gb/walker. Incontinent of urine, elevated PVR in 200-300s. Soft collar on for comfort. Regular diet. Plan discharge TCU when ready. Continue to monitor.

## 2022-12-15 NOTE — PROGRESS NOTES
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 Laminoplasty for C5-C7 spinal canal stenosis 12/12. Recovering well post-op. Hemovac removed 12/14. Clinically ready for discharge, pending TCU.      Changes Today:  - Hold aspirin until POD #7 (12/19)  - Social work coordinating TCU     # Weakness and falls  # Frailty and debilitation  # Hx of NPH s/p  shunt (07/2022)  # Hx myasthenia gravis with predominant ocular symptoms  # C5-C7 Spinal canal stenosis, severe, s/p laminoplasty  The patient presented with increased weakness, unsteadiness, 2 recent falls, b/l numbness in hands and hyperreflexia on exam. 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 Overall, weakness and unsteadiness most likely multifactorial and related to severe cervical stenosis, sensory neuropathy, and age-related frailty and debilitation. Laminoplasty performed by neurosurgery 12/12, and patient is recovering well post-op.   - Neurosurgery consulted, appreciate recs              - Surgery (laminoplasty) Monday (12/12)              - Hemovac removed 12/14  - Neurology consult, signed off 12/8, appreciate recs  - Soft c-collar for comfort  - PT/OT consult, recs to discharge to TCU  - Hold aspirin until POD #7  - PTA prednisone (10mg) and pyridostigmine (60mg) for myasthenia gravis              - Prednisone held for  "surgery, restarted 12/13     # Asymptomatic bacteruria (resolved)  No reported dysuria, changes in frequency, hematuria. Some reported urinary retention but this is not new. Treated empirically in the setting of neurosurgical intervention 12/12. Completed ceftriaxone 12/8-12/13.   - monitor for symptoms of UTI     # Elevated troponin, stable  Noted at 26 on admission and platueaued on recheck. Denies chest pain. EKG with sinus rhythym     Chronic medical problems:  Restless leg syndrome, myoclonus - PTA pramipexole, levetiracetam  Neuropathy - PTA gabapentin  GERD - PTA pantoprazole  HLD - PTA simvastatin HELD  Urinary retention - PTA tamsulosin  Prostate cancer s/p cryotherapy (07/2022)  Prediabetes (Hghb A1c 6.2 - 12/5/22)  Constipation - PTA senna  Anemia - PTA ferrous sulfate       Diet: Advance Diet as Tolerated: Regular Diet Adult  Snacks/Supplements Adult: Ensure Enlive; With Meals  Snacks/Supplements Adult: Ensure Enlive; Between Meals  Room Service    DVT Prophylaxis: Pneumatic Compression Devices  Rodriguez Catheter: Not present  Fluids: none  Central Lines: None  Cardiac Monitoring: None  Code Status: Full Code      Disposition Plan      Expected Discharge Date: 12/15/2022    Discharge Delays: *Medically Ready for Discharge    Discharge Comments: Hemovac removed, ready for discharge to TCU        The patient's care was discussed with the Attending Physician, Dr. Curran .    Karthik De La Fuente MD  Medicine Service, [facility] TEAM [address] [place] [address]  Securely message with the Vocera Web Console (learn more here)  Text page via [employer] Paging/Directory   Please see signed in provider for up to date coverage information      Clinically Significant Risk Factors                        # Overweight: Estimated body mass index is 25.87 kg/m  as calculated from the following:    Height as of this encounter: 1.753 m (5' 9\").    Weight as of this encounter: 79.5 kg (175 lb 3.2 oz).      "     ______________________________________________________________________    Interval History   No acute events overnight. Patient feeling overall well. Eager to work with PT and get stronger. Still has some very mild posterior neck pain. Tolerating diet and urinating well. Denies any shortness of breath or chest pain.     Data reviewed today: I reviewed all medications, new labs and imaging results over the last 24 hours. I personally reviewed no images or EKG's today.    Physical Exam   Vital Signs: Temp: (!) 96.7  F (35.9  C) Temp src: Axillary BP: 115/61 Pulse: 72   Resp: 18 SpO2: 96 % O2 Device: Nasal cannula    Weight: 175 lbs 3.2 oz  Physical Exam  Constitutional:       General: He is not in acute distress.     Appearance: Normal appearance.   Eyes:      Conjunctiva/sclera: Conjunctivae normal.   Neck:      Comments: Soft c-collar in place.  Cardiovascular:      Rate and Rhythm: Normal rate and regular rhythm.      Heart sounds: Normal heart sounds.   Pulmonary:      Effort: Pulmonary effort is normal. No respiratory distress.      Breath sounds: Normal breath sounds.   Abdominal:      General: Bowel sounds are normal.      Palpations: Abdomen is soft.      Tenderness: There is no abdominal tenderness.   Musculoskeletal:      Comments: Grossly normal.   Skin:     General: Skin is warm and dry.   Neurological:      General: No focal deficit present.      Mental Status: He is alert and oriented to person, place, and time.   Psychiatric:         Mood and Affect: Mood normal.           Data   Recent Labs   Lab 12/15/22  0550 12/14/22  0943 12/13/22  1023 12/13/22  0656 12/12/22  1138 12/12/22  0701 12/12/22  0049 12/11/22  1351   WBC 10.0 13.7* 13.0*  --   --  7.0  --  9.2   HGB 12.2* 13.1* 13.2*  --   --  13.0*  --  14.7   MCV 89 89 90  --   --  90  --  94    166 167  --   --  162  --  185   INR  --   --   --   --   --   --   --  1.09   NA  --   --  137  --   --  140  --  140   POTASSIUM  --   --  3.8   --   --  3.8  --  4.2   CHLORIDE  --   --  105  --   --  109*  --  107   CO2  --   --  22  --   --  21*  --  16*   BUN  --   --  11.2  --   --  16.9  --  26.4*   CR  --   --  0.83  --   --  0.89  --  0.85   ANIONGAP  --   --  10  --   --  10  --  17*   TRISTIAN  --   --  8.4*  --   --  8.3*  --  9.2   GLC 98  --  103* 103*   < > 87   < > 118*    < > = values in this interval not displayed.

## 2022-12-16 VITALS
DIASTOLIC BLOOD PRESSURE: 61 MMHG | HEIGHT: 69 IN | SYSTOLIC BLOOD PRESSURE: 114 MMHG | OXYGEN SATURATION: 97 % | WEIGHT: 175.2 LBS | RESPIRATION RATE: 18 BRPM | HEART RATE: 69 BPM | BODY MASS INDEX: 25.95 KG/M2 | TEMPERATURE: 98.2 F

## 2022-12-16 LAB
ERYTHROCYTE [DISTWIDTH] IN BLOOD BY AUTOMATED COUNT: 14.6 % (ref 10–15)
HCT VFR BLD AUTO: 38.5 % (ref 40–53)
HGB BLD-MCNC: 12.3 G/DL (ref 13.3–17.7)
HOLD SPECIMEN: NORMAL
MCH RBC QN AUTO: 28.6 PG (ref 26.5–33)
MCHC RBC AUTO-ENTMCNC: 31.9 G/DL (ref 31.5–36.5)
MCV RBC AUTO: 90 FL (ref 78–100)
PLATELET # BLD AUTO: 184 10E3/UL (ref 150–450)
RBC # BLD AUTO: 4.3 10E6/UL (ref 4.4–5.9)
WBC # BLD AUTO: 9.2 10E3/UL (ref 4–11)

## 2022-12-16 PROCEDURE — 36415 COLL VENOUS BLD VENIPUNCTURE: CPT | Performed by: STUDENT IN AN ORGANIZED HEALTH CARE EDUCATION/TRAINING PROGRAM

## 2022-12-16 PROCEDURE — 250N000012 HC RX MED GY IP 250 OP 636 PS 637: Performed by: STUDENT IN AN ORGANIZED HEALTH CARE EDUCATION/TRAINING PROGRAM

## 2022-12-16 PROCEDURE — 99239 HOSP IP/OBS DSCHRG MGMT >30: CPT | Performed by: INTERNAL MEDICINE

## 2022-12-16 PROCEDURE — 85027 COMPLETE CBC AUTOMATED: CPT | Performed by: STUDENT IN AN ORGANIZED HEALTH CARE EDUCATION/TRAINING PROGRAM

## 2022-12-16 PROCEDURE — 250N000013 HC RX MED GY IP 250 OP 250 PS 637: Performed by: STUDENT IN AN ORGANIZED HEALTH CARE EDUCATION/TRAINING PROGRAM

## 2022-12-16 RX ADMIN — PREDNISONE 10 MG: 10 TABLET ORAL at 09:52

## 2022-12-16 RX ADMIN — SENNOSIDES AND DOCUSATE SODIUM 1 TABLET: 8.6; 5 TABLET ORAL at 09:52

## 2022-12-16 RX ADMIN — KETOCONAZOLE: 20 CREAM TOPICAL at 09:53

## 2022-12-16 RX ADMIN — PANTOPRAZOLE SODIUM 40 MG: 40 TABLET, DELAYED RELEASE ORAL at 09:51

## 2022-12-16 RX ADMIN — FERROUS SULFATE TAB 325 MG (65 MG ELEMENTAL FE) 325 MG: 325 (65 FE) TAB at 09:52

## 2022-12-16 RX ADMIN — Medication 50 MCG: at 09:51

## 2022-12-16 RX ADMIN — Medication 1000 MCG: at 09:52

## 2022-12-16 RX ADMIN — Medication 90 MG: at 09:45

## 2022-12-16 RX ADMIN — Medication 1 TABLET: at 09:50

## 2022-12-16 RX ADMIN — FAMOTIDINE 20 MG: 20 TABLET ORAL at 09:51

## 2022-12-16 RX ADMIN — LEVETIRACETAM 250 MG: 250 TABLET, FILM COATED ORAL at 09:52

## 2022-12-16 RX ADMIN — TAMSULOSIN HYDROCHLORIDE 0.4 MG: 0.4 CAPSULE ORAL at 09:51

## 2022-12-16 ASSESSMENT — ACTIVITIES OF DAILY LIVING (ADL)
ADLS_ACUITY_SCORE: 50

## 2022-12-16 NOTE — PROGRESS NOTES
Pt left floor with EMS, writer called [facility] and spoke to Carlitos. Informed him patient was en route, but was told that RN was not available for report, gave him a phone number and would call when she was ready      1600- handover completed, Micky from [facility] given report

## 2022-12-16 NOTE — PROGRESS NOTES
"Care Management Discharge Note    Discharge Date: 12/16/2022       [facility] ([phone])    Discharge Services:  Short term rehab placement at [facility]    Discharge DME:  Not applicable at this time    Discharge Transportation: fSW spoke with pt's floor nurse (Alem) who indicates that pt can travel by w/c.  KUSH arranged for [facility] ([place] [phone]) to provide private pay w/c transport at 1pm    Private pay costs discussed:  Pt's wife has been informed that the cost of transport from [facility] to [facility] is private pay    PAS Confirmation Code: [id number]   Patient/family educated on Medicare website which has current facility and service quality ratings:  yes    Education Provided on the Discharge Plan:  yes  Persons Notified of Discharge Plans: pt, wife, 6A nursing and Dr. Curran  Patient/Family in Agreement with the Plan:  yes  Handoff Referral Completed: Yes    Additional Information:  - Dr. Curran has confirmed readiness for discharge  - Admissions (Jeanette) at [facility] has confirmed acceptance for admit  - SW completed \"Important Message from Medicare\" with pt's wife via phone call  - KUSH faxed pt's completed/signed discharge orders and discharge summary to [facility]  - Arrangements had been in place for pt to discharge to [facility] today at 2, however, arrangements cancelled as pt's wife changed mind this am and requested [facility], who assessed and approved pt for admit  - [facility] will be providing pt with a private room that has a extra daily private pay charge of $12.00  Per day.  KUSH informed pt's wife of this charge and pt's wife states that they will accept this charge.    Cheryl " ANA LILIA Fernandez  Social Work, 6A  Phone:  [phone]  Pager:  [phone]  12/16/2022            SULTANA Wellington

## 2022-12-16 NOTE — PROGRESS NOTES
[facility], no answer. Will attempt to recall before pt leaves floor

## 2022-12-16 NOTE — PLAN OF CARE
Physical Therapy Discharge Summary    Reason for therapy discharge:    Discharged to transitional care facility.    Progress towards therapy goal(s). See goals on Care Plan in [facility] electronic health record for goal details.  Goals not met.  Barriers to achieving goals:   discharge from facility.    Therapy recommendation(s):    Continued therapy is recommended.  Rationale/Recommendations:  Progress to functional IND.

## 2022-12-16 NOTE — PLAN OF CARE
POD 2 cervical laminectomy. Neck incision covered marked no change. Neuros: forgetful, 4/5 throughout, baseline BLE neuropathy. VSS on 1 L NC NOC. Up 1/gb/walker. Incontinent of urine, elevated PVR 100s. Soft collar on for comfort. Regular diet. Plan discharge TCU when ready. Continue to monitor

## 2022-12-16 NOTE — PLAN OF CARE
Occupational Therapy Discharge Summary    Reason for therapy discharge:    Discharged to transitional care facility.    Progress towards therapy goal(s). See goals on Care Plan in [facility] electronic health record for goal details.  Goals partially met.  Barriers to achieving goals:   discharge from facility.    Therapy recommendation(s):    Continued therapy is recommended.  Rationale/Recommendations:  recommend continued skilled OT services to progress IND, safety, and activity tolerance for ADLs..

## 2022-12-16 NOTE — PROGRESS NOTES
Discharge time/date: 12/16 1:00  Walked or Wheelchair: WC  PIV removed: yes  Reviewed AVS with patient: N/A- packet sent with patient  Medication due times added to AVS in EPIC: N/A  Medications retrieved from pharmacy: eye drops sent with patient from home supply  Supplies sent home: N/A  Belongings from security with patient: N/A      Pt assessed, no changes from prior note. See flowsheets for details.

## 2022-12-16 NOTE — PROGRESS NOTES
"Care Management Follow Up     Length of Stay (days): 8     Expected Discharge Date: 12/16/2022     Concerns to be Addressed:   Disposition planning    Patient plan of care discussed at interdisciplinary rounds: Yes     Anticipated Discharge Disposition: OT and Physical Therapy are recommending a short term TCU stay     Anticipated Discharge Services:  Short term TCU placement  Anticipated Discharge DME:  Not applicable at this time     Patient/family educated on Medicare website which has current facility and service quality ratings:  Yes, \"Medicare Care Compare\" provided  Education Provided on the Discharge Plan:  Yes  Patient/Family in Agreement with the Plan:  Yes     Referrals Placed by CM/SW:  None at this time  Private pay costs discussed: Not applicable at this time     Additional Information:  SW is following pt for discharge planning.   OT and Physical Therapy are recommending a short term TCU stay and per Dr. Curran, pt is medically ready for discharge.  Pt requires a private room from [facility].    The following SNF's are not able to accept pt for admit:  - [facility], [place] phoned this facility and it is permanently closed  - [facility], KUSH phoned Admissions (Paul), assessed and declined pt for admit stating that pt is to high acuity for their staff.    The following facility has assessed and approved pt for admit:    - [facility].    SW updated pt.  SW phoned pt's wife and relayed that pt is approved for admit to [facility].  Pt's wife voiced agreement with accepting available bed at [facility] but then asked SW to try just one more facility ([facility] as it would be closer.  Pt's wife states that if [facility] approves, she would want the destination changed to that location and if pt is declined, she is comfortable with [facility]. SW phoned Admissions at [facility] , referred pt and faxed assessment materials.    SW will coordinate discharge.      ANA LILIA Roldan  Social Work, 6A  Phone:  [phone]  Pager:  [phone]  12/16/2022

## 2022-12-19 ENCOUNTER — TELEPHONE (OUTPATIENT)
Dept: DERMATOLOGY | Facility: CLINIC | Age: 82
End: 2022-12-19

## 2022-12-19 NOTE — TELEPHONE ENCOUNTER
[facility] Call Center    Phone Message    May a detailed message be left on voicemail: yes     Reason for Call: Other: Per wife Ceci Pt needs to move the MOHS lip procedure from 1/9 to March. Please call to reschedule.      Action Taken: Message routed to:  Other: [facility]    Travel Screening: Not Applicable

## 2022-12-20 ENCOUNTER — TELEPHONE (OUTPATIENT)
Dept: NEUROSURGERY | Facility: CLINIC | Age: 82
End: 2022-12-20

## 2022-12-20 NOTE — TELEPHONE ENCOUNTER
[facility] Call Center    Phone Message    May a detailed message be left on voicemail: yes     Reason for Call: Other: Patients wife is returning call to reschedule appt. Patient is currently in rehab and unable to make this appointment. Please call back to reschedule.    Action Taken: Message routed to:  Other: [facility] Neurosurgery    Travel Screening: Not Applicable

## 2022-12-20 NOTE — TELEPHONE ENCOUNTER
[facility] Call Center    Phone Message    May a detailed message be left on voicemail: yes     Reason for Call: Other: Pt's wife Ceci is calling because pt has an appt on 12/26 with Yodit Barrientos and Ceci was unaware of that appt. Ceci would like Amada or a nurse to call back to help reschedule that appt.    Action Taken: Message routed to:  [facility] Neurosurgery    Travel Screening: Not Applicable

## 2022-12-21 ENCOUNTER — TELEPHONE (OUTPATIENT)
Dept: NEUROLOGY | Facility: CLINIC | Age: 82
End: 2022-12-21

## 2022-12-21 NOTE — TELEPHONE ENCOUNTER
[facility] Call Center    Phone Message    May a detailed message be left on voicemail: yes     Reason for Call: Other: Pt spouse called because she is confused about the MRI he has scheduled on Friday December 23rd. Spouse states he is in rehab for 2 weeks and is unable to leave.    Please call Spouse Ceci at [phone] to discuss.    Action Taken: Message routed to:  [facility] Neurology    Travel Screening: Not Applicable

## 2022-12-23 ENCOUNTER — TELEPHONE (OUTPATIENT)
Dept: NEUROSURGERY | Facility: CLINIC | Age: 82
End: 2022-12-23

## 2022-12-23 NOTE — TELEPHONE ENCOUNTER
Writer routed to Yodit Barrientos PA-C   *Request for suture removal order.   Send to Neurosurgery Clinic Scheduling with cancel of appointment and schedule next post op follow up.     Nichol Quintanilla LPN  Neurosurgery

## 2022-12-23 NOTE — TELEPHONE ENCOUNTER
[facility] Call Center    Phone Message    May a detailed message be left on voicemail: yes     Reason for Call: Other: Ceci calling to inform Yodit that she is needing to cancel Stephen's appointment on 12/26/22 due to him currently being in a TCU. Ceci stated that she is needing an order for the suture removal to be faxed to [facility] at [phone]. Please call Ceci with any questions or concerns.     Action Taken: Message routed to:  [facility] NEUROSURGERY    Travel Screening: Not Applicable

## 2022-12-24 DIAGNOSIS — Z98.890 H/O CERVICAL SPINE SURGERY: Primary | ICD-10-CM

## 2022-12-26 NOTE — TELEPHONE ENCOUNTER
[facility] Call [facility]    Phone Message    May a detailed message be left on voicemail: yes     Reason for Call: Other: Ceci Pt's wife called to inform care team that orders were recieved, however there wasn't anything stating suture removal.     Please send order for suture removal to: [phone]    Please call Ceci back at [phone] to discuss and confirm new orders were sent.    Action Taken: Message routed to:  [facility] Neurosurgery    Travel Screening: Not Applicable

## 2022-12-28 NOTE — TELEPHONE ENCOUNTER
Writer faxed order for suture removal to fax; [phone]  [facility].     Nichol Quintanilla LPN  Neurosurgery

## 2023-01-05 ENCOUNTER — PATIENT OUTREACH (OUTPATIENT)
Dept: ONCOLOGY | Facility: CLINIC | Age: 83
End: 2023-01-05

## 2023-01-05 NOTE — PROGRESS NOTES
1/2 Received voicemail from Jelena with [employer] (with pt's PCP office), states that patient lives in an AL facility and has labs drawn weekly at his residence.  She inquires about labs needed prior to patient's upcoming visit with Dr. Darden to see if they could be drawn by them prior.      1/5/2023  Attempted to reach Jelena, message left requesting return call.  Patient will be due for:    Ferritin  [place] and Lambda Light Chain  Protein Immunofixation Serum  Protein Electrophoresis  CMP  CBC w/plts & diff    Patient is currently scheduled at [place] lab on 2/1 for 2/8/2023 visit.    Susy Edwards RN

## 2023-01-06 NOTE — PROGRESS NOTES
Message received from Daphne with [facility] inquiring about lab work for provider, they are able to do them at the residence.    Message left for Daphne with labs needed for visit, planned for 2/1 for 2/8 visit.  Requested return call if questions.    Susy Edwards RN

## 2023-01-09 NOTE — PROGRESS NOTES
Attempted to reach nursing staff at residence, call transferred to voice mail with no name provided.  No message left as unsure if it is secure voice mail.     Susy Edwards RN

## 2023-01-10 NOTE — PROGRESS NOTES
Attempted to reach [facility] with [facility], message left requesting return call.     Susy Edwards RN

## 2023-01-16 NOTE — PROGRESS NOTES
Orders faxed to [facility] at [phone], requested that labs be drawn on 2/1/2023 for visit on 2/8/2023.  Requested results be returned via fax to [phone].  2/2/2023 lab appointment cancelled.    Susy Edwards RN

## 2023-01-23 ENCOUNTER — OFFICE VISIT (OUTPATIENT)
Dept: NEUROSURGERY | Facility: CLINIC | Age: 83
End: 2023-01-23
Payer: MEDICARE

## 2023-01-23 ENCOUNTER — ANCILLARY PROCEDURE (OUTPATIENT)
Dept: GENERAL RADIOLOGY | Facility: CLINIC | Age: 83
End: 2023-01-23
Attending: PHYSICIAN ASSISTANT
Payer: MEDICARE

## 2023-01-23 VITALS — SYSTOLIC BLOOD PRESSURE: 119 MMHG | OXYGEN SATURATION: 97 % | HEART RATE: 65 BPM | DIASTOLIC BLOOD PRESSURE: 75 MMHG

## 2023-01-23 DIAGNOSIS — Z98.890 H/O CERVICAL SPINE SURGERY: ICD-10-CM

## 2023-01-23 DIAGNOSIS — Z98.890 H/O CERVICAL SPINE SURGERY: Primary | ICD-10-CM

## 2023-01-23 PROCEDURE — 72040 X-RAY EXAM NECK SPINE 2-3 VW: CPT | Mod: GC | Performed by: RADIOLOGY

## 2023-01-23 PROCEDURE — 99024 POSTOP FOLLOW-UP VISIT: CPT | Performed by: PHYSICIAN ASSISTANT

## 2023-01-23 ASSESSMENT — PAIN SCALES - GENERAL: PAINLEVEL: NO PAIN (0)

## 2023-01-23 NOTE — LETTER
1/23/2023       RE: Gustavo Ramon  [address] [facility] [id number]     Dear Colleague,    Thank you for referring your patient, Gustavo Ramon, to the [facility]UROS[facility]GERY [facility] [place] [facility]. Please see a copy of my visit note below.        Neurosurgery Clinic Note    Chief Complaint: 6 week post-op visit    DATE OF VISIT: 1/23/2023    Procedure Date: 12/12/2022     PREOPERATIVE DIAGNOSIS:  Severe cervical canal stenosis with myelopathy.     POSTOPERATIVE DIAGNOSIS:  Severe cervical canal stenosis with myelopathy.     PROCEDURES PERFORMED:  1.  Cervical 4-6 right-side open door laminoplasty.  2.  Partial cervical 7 decompression.  3.  Use of intraoperative neuromonitoring.  4.  Use of intraoperative C-arm.  5.  Use of structural allograft.     ATTENDING SURGEON:  Judie Levin MD, PhD     RESIDENT SURGEONS:  1.  Hakan Hair MD  2.  Radha Loomis MD     ANESTHESIA:  General.     ESTIMATED BLOOD LOSS:  50 mL     INTRAOPERATIVE FINDINGS:  Expansile laminoplasty completed with good thecal sac decompression.  Decompression at cervical 7 demonstrated well decompressed thecal sac.     INDICATIONS FOR PROCEDURE:  Mr. 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  He presented to the hospital with a 6-month history of worsening bilateral lower extremity weakness as well as weakness involving the right upper extremity.  He was now having difficulty with ambulation.  MRI of his cervical spine demonstrated severe canal stenosis at C5-C7 levels with T2 signal change.  Given the patient's presenting symptoms, physical exam, and imaging findings, he was indicated for the aforementioned procedure.  After a thorough conversation of the risks and benefits of surgery, the patient elected to move forward with the offered surgical procedure.    HPI: Gustavo  CEZAR Ramon is a pleasant 82 year old male with PMH of NPH s/p shunt (Certas 4 per 12/22/22 note), myasthenia gravis who is s/p C4-6 right laminoplasty, and partial C7 decompression by Dr. Levin on 12/12/22 for severe CCS with myelopathy.  Prior to surgery, patient had BLE and RUE weakness, and difficulty ambulating. His MRI revealed C5-7 severe central stenosis with T2 signal change.  He was placed in a c-collar for comfort after the procedure.    Currently, patient is about 6 weeks out from surgery.  He presents with his wife today.  He is getting around well using a walker at [employer].  He is working daily with PT on improving his mobility and balance.  He notes continued paresthesias and weakness in his UE R>L, but does think that this is lightening up some.  He has more trouble with his 4th and 5th digit on his right hand locking up.  He c/o bilateral hip pain that occurs overnight and gets better as the day goes on.  He finds transitional movements difficult because of weakness in his legs.  He denies edie neck pain, but does have cervical muscle soreness and feels his head is leaning forward.  He has been using a soft collar at night and during the day as this seems to give him some confidence and he states it makes him feel better.  He denies bowel/bladder issues.  He denies issues with his incision.      He is a retired  and is interested in details about the surgery and how it works.       Physical Exam   There were no vitals taken for this visit.    Constitutional: Alert, no acute distress.  Beatty:  ambulates with use of a walker, is able to take small steps in office without it    Neurological:  Strength (L/R)  4/4-5 Deltoid  4+/4 Bicep  4+/4- Wrist Extensor  4+/4- Tricep  4/4- Finger Flexion  5/4- Finger Abduction (4th & 5th digits on right hand only)  5/4 Handgrip    5/5 Hip Flexion  5/5 Knee Extension  5/5 Ankle Dorsiflexion  5/5 Extensor Hallucis Longus  5/5 Plantar  "Flexion    Reflexes (L/R)  1+/1+ Bicep  1+/1+ Brachioradialis  1+/1+ Patellar  1+/1+ Ankle    No GT bursa TTP bilaterally on palpation.      Sensation: scattered hand/finger paresthesias    Incision:  Healing well.  No concern for infection or dehiscence.        IMAGING:    Imaging independently reviewed.    Upright cervical XR 1/23/23 -  S/p laminoplasty C4-6; slight anterolisthesis at C5/6, C7/T1, appears to have interval development of head dropping.      \"FINDINGS: AP and lateral views of the cervical spine were obtained.  Postoperative changes of C4-C7 laminoplasty with autologous rib  grafts. Minimal grade 1 degenerative anterolisthesis of C4 on C5, and  C5 on C6. Trace retrolisthesis of C6 on C7. Multilevel disc height  loss most significant at C6-7. No prevertebral edema. No mass in the  visualized lung apices. Partially visualized right-sided shunt  catheter appears intact.                                                            IMPRESSION:  Postoperative changes of C4 C7 laminoplasty with  autologous rib grafts, cervical alignment is stable. Multilevel  cervical spondylosis similar to previous.\"        ASSESSMENT:  Gustavo Ramon is a 82 year old male with PMH of NPH s/p shunt (Certas 4 per 12/22/22 note), myasthenia gravis who is s/p C4-6 right laminoplasty, partial C7 decompression by Dr. Levin on 12/12/22 for severe CCS with myelopathy.  Prior to surgery, patient had BLE and RUE weakness, and difficulty ambulating. His MRI revealed C5-7 severe central stenosis with T2 signal change.  He was placed in a c-collar for comfort after the procedure.    Patient is now approximately 6 weeks out from surgery.  His XR does appear to be developing head dropping on his imaging, but otherwise grossly stable alignment.  He is overall doing well so far in his recovery with minimal pain.  Recommend he now start to work on neck strengthening and on posture, keeping his head in a more neutral position with PT.  We " discussed only using the soft collar sparingly if needed and not relying on it.  Do not wear at bed or when eating.  Continue working to increase ambulatory ability.      The external hip pain in the morning that gets better after moving is likely from being more sedentary and arthritis.  Recommend he work with PT to do stretches in the morning before getting out of bed that will help with this.       PLAN:    Okay to resume aspirin if indicated.   Avoid NSAIDs (Ibuprofen, Motrin, Advil, etc.) for 3 months post-op.  Take Tylenol, ice/heat for pain.     Continue to work with PT to increase mobility, start to work on posture and cervical strengthening.    Continue to increase time ambulating, avoid excessive/repetitive bending, twisting, strenuous exercise, or heavy lifting (greater than 25 pounds).     Soft collar to be worn sparingly for comfort.  Do not wear at night or while eating.      Follow up in 6 weeks with XR with Dr. Levin for 3 month post-op.    Patient is in agreement with this plan and states no further questions.        Yodit Barrientos PA-C  Department of Neurosurgery  Office: [phone]

## 2023-01-23 NOTE — PROGRESS NOTES
Neurosurgery Clinic Note    Chief Complaint: 6 week post-op visit    DATE OF VISIT: 1/23/2023    Procedure Date: 12/12/2022     PREOPERATIVE DIAGNOSIS:  Severe cervical canal stenosis with myelopathy.     POSTOPERATIVE DIAGNOSIS:  Severe cervical canal stenosis with myelopathy.     PROCEDURES PERFORMED:  1.  Cervical 4-6 right-side open door laminoplasty.  2.  Partial cervical 7 decompression.  3.  Use of intraoperative neuromonitoring.  4.  Use of intraoperative C-arm.  5.  Use of structural allograft.     ATTENDING SURGEON:  Judie Levin MD, PhD     RESIDENT SURGEONS:  1.  Hakan Hair MD  2.  Radha Loomis MD     ANESTHESIA:  General.     ESTIMATED BLOOD LOSS:  50 mL     INTRAOPERATIVE FINDINGS:  Expansile laminoplasty completed with good thecal sac decompression.  Decompression at cervical 7 demonstrated well decompressed thecal sac.     INDICATIONS FOR PROCEDURE:  Mr. 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  He presented to the hospital with a 6-month history of worsening bilateral lower extremity weakness as well as weakness involving the right upper extremity.  He was now having difficulty with ambulation.  MRI of his cervical spine demonstrated severe canal stenosis at C5-C7 levels with T2 signal change.  Given the patient's presenting symptoms, physical exam, and imaging findings, he was indicated for the aforementioned procedure.  After a thorough conversation of the risks and benefits of surgery, the patient elected to move forward with the offered surgical procedure.    HPI: Gustavo Ramon is a pleasant 82 year old male with PMH of NPH s/p shunt (Certas 4 per 12/22/22 note), myasthenia gravis who is s/p C4-6 right laminoplasty, and partial C7 decompression by Dr. Levin on 12/12/22 for severe CCS with myelopathy.  Prior to surgery, patient had BLE and RUE weakness, and difficulty ambulating. His MRI revealed  C5-7 severe central stenosis with T2 signal change.  He was placed in a c-collar for comfort after the procedure.    Currently, patient is about 6 weeks out from surgery.  He presents with his wife today.  He is getting around well using a walker at [facility].  He is working daily with PT on improving his mobility and balance.  He notes continued paresthesias and weakness in his UE R>L, but does think that this is lightening up some.  He has more trouble with his 4th and 5th digit on his right hand locking up.  He c/o bilateral hip pain that occurs overnight and gets better as the day goes on.  He finds transitional movements difficult because of weakness in his legs.  He denies edie neck pain, but does have cervical muscle soreness and feels his head is leaning forward.  He has been using a soft collar at night and during the day as this seems to give him some confidence and he states it makes him feel better.  He denies bowel/bladder issues.  He denies issues with his incision.      He is a retired  and is interested in details about the surgery and how it works.       Physical Exam   There were no vitals taken for this visit.    Constitutional: Alert, no acute distress.  [place]:  ambulates with use of a walker, is able to take small steps in office without it    Neurological:  Strength (L/R)  4/4-5 Deltoid  4+/4 Bicep  4+/4- Wrist Extensor  4+/4- Tricep  4/4- Finger Flexion  5/4- Finger Abduction (4th & 5th digits on right hand only)  5/4 Handgrip    5/5 Hip Flexion  5/5 Knee Extension  5/5 Ankle Dorsiflexion  5/5 Extensor Hallucis Longus  5/5 Plantar Flexion    Reflexes (L/R)  1+/1+ Bicep  1+/1+ Brachioradialis  1+/1+ Patellar  1+/1+ Ankle    No GT bursa TTP bilaterally on palpation.      Sensation: scattered hand/finger paresthesias    Incision:  Healing well.  No concern for infection or dehiscence.        IMAGING:    Imaging independently reviewed.    Upright cervical XR 1/23/23 -  S/p  "laminoplasty C4-6; slight anterolisthesis at C5/6, C7/T1, appears to have interval development of head dropping.      \"FINDINGS: AP and lateral views of the cervical spine were obtained.  Postoperative changes of C4-C7 laminoplasty with autologous rib  grafts. Minimal grade 1 degenerative anterolisthesis of C4 on C5, and  C5 on C6. Trace retrolisthesis of C6 on C7. Multilevel disc height  loss most significant at C6-7. No prevertebral edema. No mass in the  visualized lung apices. Partially visualized right-sided shunt  catheter appears intact.                                                            IMPRESSION:  Postoperative changes of C4 C7 laminoplasty with  autologous rib grafts, cervical alignment is stable. Multilevel  cervical spondylosis similar to previous.\"        ASSESSMENT:  Gustavo Ramon is a 82 year old male with PMH of NPH s/p shunt (Certas 4 per 12/22/22 note), myasthenia gravis who is s/p C4-6 right laminoplasty, partial C7 decompression by Dr. Levin on 12/12/22 for severe CCS with myelopathy.  Prior to surgery, patient had BLE and RUE weakness, and difficulty ambulating. His MRI revealed C5-7 severe central stenosis with T2 signal change.  He was placed in a c-collar for comfort after the procedure.    Patient is now approximately 6 weeks out from surgery.  His XR does appear to be developing head dropping on his imaging, but otherwise grossly stable alignment.  He is overall doing well so far in his recovery with minimal pain.  Recommend he now start to work on neck strengthening and on posture, keeping his head in a more neutral position with PT.  We discussed only using the soft collar sparingly if needed and not relying on it.  Do not wear at bed or when eating.  Continue working to increase ambulatory ability.      The external hip pain in the morning that gets better after moving is likely from being more sedentary and arthritis.  Recommend he work with PT to do stretches in the " morning before getting out of bed that will help with this.       PLAN:    Okay to resume aspirin if indicated.   Avoid NSAIDs (Ibuprofen, Motrin, Advil, etc.) for 3 months post-op.  Take Tylenol, ice/heat for pain.     Continue to work with PT to increase mobility, start to work on posture and cervical strengthening.    Continue to increase time ambulating, avoid excessive/repetitive bending, twisting, strenuous exercise, or heavy lifting (greater than 25 pounds).     Soft collar to be worn sparingly for comfort.  Do not wear at night or while eating.      Follow up in 6 weeks with XR with Dr. Levin for 3 month post-op.    Patient is in agreement with this plan and states no further questions.        Yodit Barrientos PA-C  Department of Neurosurgery  Office: [phone]

## 2023-01-23 NOTE — PATIENT INSTRUCTIONS
You saw Yodit Barrientos PA-C today in clinic.  Please schedule your follow up with Dr. Levin on your way out today with an X-ray in 6 weeks.  You may call [phone] to schedule your appointment if you are unable to do so today.     Avoid lifting more than 25 pounds and any excessive bending/twisting activities.  Work on posture and walking more.

## 2023-02-03 ENCOUNTER — CARE COORDINATION (OUTPATIENT)
Dept: ONCOLOGY | Facility: CLINIC | Age: 83
End: 2023-02-03
Payer: MEDICARE

## 2023-02-03 NOTE — PROGRESS NOTES
Patient scheduled for follow up with Dr Darden on 2/8/23.    Labs from [facility] are not in Epic.    Left voicemail for Gregory Bacon to please fax lab results prior to 2/8/23 appointment or call if there will be difficulties getting results before the appt.    Routing to Susy WATTS to watch for results.    Cecelia Russ RN on 2/3/2023 at 1:48 PM     1. You were seen in the ED and underwent testing for the novel coronarvirus (COVID-19). The results are not back yet and you will be contacted with the result which can take up to 7 days. You will be notified if you test positive for any of these.    2. Until your test results are back, YOU MUST SELF-QUARANTINE until you are told to other otherwise by [facility] or the local [facility] department. This is extremely important to limit the spread of this virus. Please refer closely to the packet provided to you on the specifics of the process of self-quarantine.    3. If you end up testing positive for the virus, you will instructed as to when you can return to your usual activities. If you do not hear from anyone in 7 days, please call [phone] or your local health department for guidance.     4. Return to the ED for difficulty breathing.    5. You may over the counter acetaminophen (Tylenol) 650mg every 6 hours as needed for fever or pain. There is some concern in the medical community about using ibuprofen (Advil, Motrin) and other NSAIDs in people with COVID infections and until there is more research on this subject it may be best to avoid NSAIDs like ibuprofen at the moment unless you have an allergy to acetaminophen (Tylenol).  Do NOT exceed 3500mg acetaminophen in 24 hours.  Please do not take these medications if you do not have pain or fever or if you have any history of liver disease.     -------------    What is a coronavirus?  Coronaviruses are a large family of viruses that cause illnesses ranging from the common cold to more severe diseases such as Middle East Respiratory Syndrome (MERS) and Severe Acute Respiratory Syndrome (SARS).    What is Novel Coronavirus (COVID-19)?  The Centers for Disease Control and Prevention (CDC) is closely monitoring the outbreak caused by COVID-19. For the latest information about COVID-19, visit the CDC website at CDC.gov/Coronavirus    How are coronaviruses spread?  Coronaviruses can be transmitted from person to person, usually after close contact with an infected  person (for example, in a household, workplace, or healthcare setting), via droplets that become airborne after a cough or sneeze. These droplets can then infect a nearby person. Transmission can also occur by touching recently contaminated surfaces.    Is there a treatment for a COVID-19?  There is no specific treatment for disease caused by COVID-19. However, many of the symptoms can be treated based on the patient’s clinical condition. Supportive care for infected persons can be highly effective.    What are the symptoms of coronavirus infection?  It depends on the virus, but common signs include fever and/or respiratory symptoms such as cough and shortness of breath. In more severe cases, infection can cause pneumonia, severe acute respiratory syndrome, kidney failure and even death. Fortunately, most cases of COVID-19 have an illness no different than the influenza (flu), with a majority of these patients having mild symptoms and overall mortality which appears to be not much different than the flu.    What can I do to protect myself?  The best precautionary measures:  – washing your hands  – covering your cough  – disinfecting surfaces  – it is also advisable to avoid close contact with anyone showing symptoms of respiratory illness such as coughing and sneezing  – those with symptoms should wear a surgical mask when around others    What can I do to protect those around me?  If you have been identified as someone who may be infected with COVID-19, we recommend you follow the self-isolation procedures outlined on the following page to protect those around you and to limit the spread of this virus.    We recommend the below precautionary steps from now until 14 days from when you returned from your travel or date of your last known possible contact:    — Do not go to work, school or public areas. Avoid using public transportation, ridesharing or taxis.  — As much as possible, separate yourself from other people in your home. If you can, you should stay in a room and away from other people. Also, you should use a separate bathroom if available.  — Wear the supplied mask whenever you are around other people.  — If you have a non-urgent medical appointment, please reschedule for a later date. If the appointment is urgent, please call the health care provider and tell them that you are on self-isolation for possible COVID-19. This will help the health care provider’s office take steps to keep other people from getting infected or exposed. If you can reschedule routine appointments, do so.  — Wash your hands often with soap and water for at least 15 to 20 seconds or clean your hands with an alcohol-based hand  that contains 60 to 95% alcohol, covering all surfaces of your hands and rubbing them together until they feel dry. Soap and water should be used preferentially if hands are visibly dirty.  — Cover your mouth and nose with a tissue when you cough or sneeze. Throw used tissues in a lined trash can. Immediately wash your hands.  — Avoid touching your eyes, nose, and mouth with your hands.  — Avoid sharing personal household items. You should not share dishes, drinking glasses, cups, eating utensils, towels, or bedding with other people or pets in your home. After using these items, they should be washed thoroughly with soap and water.  — Clean and disinfect all “high-touch” surfaces every day. High touch surfaces include counters, tabletops, doorknobs, light switches, remote controls, bathroom fixtures, toilets, phones, keyboards, tablets, and bedside tables. Also, clean any surfaces that may have blood, stool, or body fluids on them.    ------------------------------------------  Information for patients who have received a COVID-19 test.    The COVID-19 (novel coronavirus) test  Results may take up to 7 days to become available.      If your result is positive, you will receive a phone call from one of our coronavirus specialists. While we will do our best to also call patients with a negative test result, the sheer volume of tests being performed may make this difficult to do in a timely fashion. If you haven’t heard from us within 5 days and you’d like to check on your results, you can check our [employer] sun or call one of our coronavirus specialists at [address] [place] [address] (available 24/7)    Please DO NOT call the site where you received the test to obtain your results.    If the test is positive -   You will continue home isolation until you are completely well, you have no fever, and it has been at least 14 days since your positive test. The health department in your city or [facility] may also contact you with additional instructions.    If your test is negative -    You will be able to stop home isolation and resume standard precautions, similiar to how you would manage the common cold or flu.  If you have any questions, you can reach out to one of our coronavirus specialists at [address] [place] [address].    REMEMBER - a negative COVID test means you were negative AT THE TIME OF TESTING, and it is possible to have contracted COVID after being tested.

## 2023-02-08 ENCOUNTER — VIRTUAL VISIT (OUTPATIENT)
Dept: ONCOLOGY | Facility: CLINIC | Age: 83
End: 2023-02-08
Attending: INTERNAL MEDICINE
Payer: MEDICARE

## 2023-02-08 VITALS
SYSTOLIC BLOOD PRESSURE: 153 MMHG | OXYGEN SATURATION: 99 % | RESPIRATION RATE: 16 BRPM | DIASTOLIC BLOOD PRESSURE: 80 MMHG | BODY MASS INDEX: 26.35 KG/M2 | TEMPERATURE: 97.9 F | WEIGHT: 178.4 LBS | HEART RATE: 66 BPM

## 2023-02-08 DIAGNOSIS — D47.2 MGUS (MONOCLONAL GAMMOPATHY OF UNKNOWN SIGNIFICANCE): Primary | ICD-10-CM

## 2023-02-08 DIAGNOSIS — C61 PROSTATE CANCER (H): ICD-10-CM

## 2023-02-08 PROCEDURE — 99214 OFFICE O/P EST MOD 30 MIN: CPT | Mod: VID | Performed by: INTERNAL MEDICINE

## 2023-02-08 ASSESSMENT — PAIN SCALES - GENERAL: PAINLEVEL: NO PAIN (0)

## 2023-02-08 NOTE — PROGRESS NOTES
[facility] Hematology / Oncology  Progress Note  Name: Gustavo Ramon  :  1940    MRN:  [id number]    --------------------    Assessment / Plan:  From an MGUS standpoint, Stephen appears to be well clinically.  Overall, he has no endorgan complications or manifestations to suggest an underlying progression towards plasma cell or lymphoproliferative disorder.  His MGUS labs all appear stable.  We reviewed need for ongoing age-appropriate vaccinations and he has completed flu and shingles and pneumonia vaccinations and will plan to complete his COVID vaccinations once he is through these recent illnesses.  From a prostate cancer standpoint, he continues to follow with urology his PSAs appear to be stable.  We will plan to see him back in 12 months with labs 5 to 7 days prior.    Hakan Darden MD    --------------------    Interval History:  Gustavo returns for follow up of MGUS.  All in all, Stephen is feeling well.  The majority of his time has been spent recovering from a couple rounds of COVID and spinal stenosis surgery that have left him weak and deconditioned and requiring a care facility for rehabilitation.  He does feel like slowly he is getting stronger.  His appetite and weight have been stable.  No unexplained fevers, chills or sweats.  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  He remains on Mestinon and prednisone.    --------------------    Physical Exam:  VS: BP (!) 153/80 (BP Location: Left arm, Patient Position: Sitting, Cuff Size: Adult Regular)   Pulse 66   Temp 97.9  F (36.6  C) (Oral)   Resp 16   Wt 80.9 kg (178 lb 6.4 oz)   SpO2 99%   BMI 26.35 kg/m    GEN: Well appearing.    Labs / Imaging / Path:  We reviewed CBC, CMP, SPEP, FLC assay.    Video Visit:  Gustavo is a 82 year old male who is being evaluated via a billable video visit.  }    Video start time: 10:17 AM  Video end time: 10:29 AM    Provider location: Off-site  Patient location: [facility]  [place]    Mode of transmission: DENISSE Johnson / Yuli.

## 2023-02-08 NOTE — PROGRESS NOTES
"Stephen is a 82 year old who is being evaluated via a billable video visit.      How would you like to obtain your AVS? MyChart  If the video visit is dropped, the invitation should be resent by: Text to cell phone: [phone]  Will anyone else be joining your video visit? Wife in clinic today with patient.   {If patient encounters technical issues they should call [phone] :[id number]}      Video-Visit Details    Type of service:  Video Visit   Video Start Time: {video visit start/end time for provider to select:655427}  Video End Time:{video visit start/end time for provider to select:672846}    Originating Location (pt. Location): {video visit patient location:545570::\"Home\"}  {PROVIDER LOCATION On-site should be selected for visits conducted from your clinic location or adjoining [facility] hospital, academic office, or other nearby [facility] building. Off-site should be selected for all other provider locations, including home:365791}  Distant Location (provider location):  {virtual location provider:823203}  Platform used for Video Visit: {Virtual Visit Platforms:764394::\"Modafirma\"}    "

## 2023-02-08 NOTE — NURSING NOTE
"Oncology Rooming Note    February 8, 2023 10:54 AM   Gustavo Ramon is a 82 year old male who presents for:    Chief Complaint   Patient presents with     Oncology Clinic Visit     Follow up appt     Initial Vitals: BP (!) 153/80 (BP Location: Left arm, Patient Position: Sitting, Cuff Size: Adult Regular)   Pulse 66   Temp 97.9  F (36.6  C) (Oral)   Resp 16   Wt 80.9 kg (178 lb 6.4 oz)   SpO2 99%   BMI 26.35 kg/m   Estimated body mass index is 26.35 kg/m  as calculated from the following:    Height as of 12/8/22: 1.753 m (5' 9\").    Weight as of this encounter: 80.9 kg (178 lb 6.4 oz). Body surface area is 1.98 meters squared.  No Pain (0) Comment: Data Unavailable   No LMP for male patient.  Allergies reviewed: Yes  Medications reviewed: Yes    Medications: Medication refills not needed today.  Pharmacy name entered into EPIC:    [facility] EPRESCRIB[facility] ONLY  [facility] - [address] [facility] [place] - [address] [facility] [place]  [facility] [place] [facility] [place] [facility] [address] [address]    Clinical concerns: Patient states that the care facility is currently managing his medications. Spouse present for visit today.       Anne Musa LPN February 8, 2023 10:55 AM              "

## 2023-02-08 NOTE — LETTER
2023         RE: Gustavo Ramon  [address] [facility] [address]        Dear Colleague,    Thank you for referring your patient, Gustavo Ramon, to the [facility]. Please see a copy of my visit note below.    [facility] Hematology / Oncology  Progress Note  Name: Gustavo Ramon  :  1940    MRN:  [id number]    --------------------    Assessment / Plan:  From an MGUS standpoint, Stephen appears to be well clinically.  Overall, he has no endorgan complications or manifestations to suggest an underlying progression towards plasma cell or lymphoproliferative disorder.  His MGUS labs all appear stable.  We reviewed need for ongoing age-appropriate vaccinations and he has completed flu and shingles and pneumonia vaccinations and will plan to complete his COVID vaccinations once he is through these recent illnesses.  From a prostate cancer standpoint, he continues to follow with urology his PSAs appear to be stable.  We will plan to see him back in 12 months with labs 5 to 7 days prior.    Hakan Darden MD    --------------------    Interval History:  Gustavo returns for follow up of MGUS.  All in all, Stephen is feeling well.  The majority of his time has been spent recovering from a couple rounds of COVID and spinal stenosis surgery that have left him weak and deconditioned and requiring a care facility for rehabilitation.  He does feel like slowly he is getting stronger.  His appetite and weight have been stable.  No unexplained fevers, chills or sweats.  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  He remains on Mestinon and prednisone.    --------------------    Physical Exam:  VS: BP (!) 153/80 (BP Location: Left arm, Patient Position: Sitting, Cuff Size: Adult Regular)   Pulse 66   Temp 97.9  F (36.6  C) (Oral)   Resp 16   Wt 80.9 kg (178 lb 6.4 oz)   SpO2 99%   BMI 26.35 kg/m    GEN: Well appearing.    Labs /  Imaging / Path:  We reviewed CBC, CMP, SPEP, FLC assay.    Video Visit:  Gustavo is a 82 year old male who is being evaluated via a billable video visit.  }    Video start time: 10:17 AM  Video end time: 10:29 AM    Provider location: Off-site  Patient location: [facility]    Mode of transmission: [employer] / [employer].        Again, thank you for allowing me to participate in the care of your patient.        Sincerely,        Hakan Darden MD

## 2023-02-09 NOTE — PATIENT INSTRUCTIONS
BJT MG 12 months 15 mins w/ labs 5-7 days prior to visit (CBC, CMP, SPEP, FLC assay).    Hakan Darden MD.

## 2023-03-06 ENCOUNTER — DOCUMENTATION ONLY (OUTPATIENT)
Dept: OPHTHALMOLOGY | Facility: CLINIC | Age: 83
End: 2023-03-06

## 2023-03-06 ENCOUNTER — TELEPHONE (OUTPATIENT)
Dept: DERMATOLOGY | Facility: CLINIC | Age: 83
End: 2023-03-06

## 2023-03-06 NOTE — TELEPHONE ENCOUNTER
Excision/Mohs previsit information                                                    Diagnosis: trichoepithelioma  Site(s): left upper cutaneous lip    Over the counter Chlorhexidine surgical soap to wash all skin below the belly button twice before surgery should be recommended for the following:  - Surgical sites below the waist  - Immunosuppressed  - Previous surgical site infection  - Anticipated wound care challenges    Medication & Allergy Information                                                      Review and update allergy and medication list.    Do you take the following medications:    Coumadin, Eliquis, Pradaxa, Xarelto:  NO   -If on Coumadin, INR should be checked within 7 days of surgery.  Range should be 3.5 or less or within therapeutic range.    Past Medical History                                                    Do you currently or have you previously had any of the following conditions:      Hepatitis:  NO    HIV/AIDS:  NO    Prolonged bleeding or bleeding disorder:  NO    Pacemaker or Defibrillator:  NO.      History of artificial or heart valve replacement:  NO    Endocarditis (inflammation of the inner lining of the heart's chambers and valves):  NO    Have you ever had a prosthetic joint infection:  NO    Pregnant or Breastfeeding:  N/A    Mobility device (wheelchair, transfer difficulty): YES, walker    Important Reminders:                                                        Ok to take all of their medications as prescribed    Patients can eat, no need to be fasting    Patient will not be able to get the site wet for 48 hrs    No submerging wound in standing water (lake, pool, bathtub, hot tub) for 2 weeks    No physical activity for 48 hrs (further restrictions will be discussed by MD at time of visit)    Marsha Dahl RN

## 2023-03-13 ENCOUNTER — OFFICE VISIT (OUTPATIENT)
Dept: DERMATOLOGY | Facility: CLINIC | Age: 83
End: 2023-03-13
Payer: MEDICARE

## 2023-03-13 VITALS — HEART RATE: 71 BPM | SYSTOLIC BLOOD PRESSURE: 145 MMHG | DIASTOLIC BLOOD PRESSURE: 86 MMHG

## 2023-03-13 DIAGNOSIS — L57.0 AK (ACTINIC KERATOSIS): ICD-10-CM

## 2023-03-13 DIAGNOSIS — D23.9 TRICHOEPITHELIOMA: ICD-10-CM

## 2023-03-13 DIAGNOSIS — D48.5 NEOPLASM OF UNCERTAIN BEHAVIOR OF SKIN: Primary | ICD-10-CM

## 2023-03-13 DIAGNOSIS — L82.0 INFLAMED SEBORRHEIC KERATOSIS: ICD-10-CM

## 2023-03-13 PROCEDURE — 11103 TANGNTL BX SKIN EA SEP/ADDL: CPT | Mod: 59 | Performed by: DERMATOLOGY

## 2023-03-13 PROCEDURE — 88341 IMHCHEM/IMCYTCHM EA ADD ANTB: CPT | Performed by: PATHOLOGY

## 2023-03-13 PROCEDURE — 88305 TISSUE EXAM BY PATHOLOGIST: CPT | Performed by: PATHOLOGY

## 2023-03-13 PROCEDURE — 17003 DESTRUCT PREMALG LES 2-14: CPT | Mod: 59 | Performed by: DERMATOLOGY

## 2023-03-13 PROCEDURE — 17000 DESTRUCT PREMALG LESION: CPT | Mod: 59 | Performed by: DERMATOLOGY

## 2023-03-13 PROCEDURE — 17110 DESTRUCTION B9 LES UP TO 14: CPT | Mod: 59 | Performed by: DERMATOLOGY

## 2023-03-13 PROCEDURE — 88342 IMHCHEM/IMCYTCHM 1ST ANTB: CPT | Performed by: PATHOLOGY

## 2023-03-13 PROCEDURE — 11102 TANGNTL BX SKIN SINGLE LES: CPT | Mod: 59 | Performed by: DERMATOLOGY

## 2023-03-13 PROCEDURE — 11441 EXC FACE-MM B9+MARG 0.6-1 CM: CPT | Performed by: DERMATOLOGY

## 2023-03-13 PROCEDURE — 12051 INTMD RPR FACE/MM 2.5 CM/<: CPT | Performed by: DERMATOLOGY

## 2023-03-13 NOTE — NURSING NOTE
The following medication was given:     MEDICATION:  Lidocaine with epinephrine 1% 1:[id number]  ROUTE: SQ  SITE: see procedure note  DOSE: 3.5 ml  LOT #: [id number]  : [employer] [place]  EXPIRATION DATE: 09/30/2024  NDC#: 56346-176-44  Was there drug waste? 2.5 ml  Multi-dose vial: Yes    Kylee Jon CMA  March 13, 2023      Vaseline and pressure dressing applied to Mohs site on left upper cutaneous lip.  Wound care instructions reviewed with patient and AVS provided.  Patient verbalized understanding.  Patient will follow up for suture removal: N/A.  No further questions or concerns at this time.

## 2023-03-13 NOTE — PROGRESS NOTES
[facility] Procedure Note    Dermatology Problem List:  Last skin check 9/23/22, recommended next in 6 months  0. NUB - R scaphoid fossa, L preauricular cheek, L conchal bowl s/p biopsy 3/13/23    1. 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 Christiansen's note 11/21/11), s/p excision 5/12/09   2. Actinic keratoses  - R tragus s/p LN2 3/13/23; s/p biopsy 9/23/22  - s/p Efudex 2015, Fall 2020 to face, Spring 2021 to forearms  - s/p PDT (x3)  - s/p LN2  3. Seborrheic dermatitis  - clobetasol 0.05% solution for scalp, triam 0.1% cream for ears  4. Relevant medical history: MGUS, myasthenia gravis currently on prednisone  5. Tinea cruris  - current tx: ketoconazole cream  6. 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 Lipoma L bicep, s/p biopsy 9/23/22    Social history: The patient is retired from working as an . He has a daughter and son. He is Fij[place].  Family history: Negative for skin cancer  ____________________________________________      Date of Service:  Mar 13, 2023  Surgery: Excision benign lesion with Frozen Specimen Review    Case 1  Repair Type: intermediate  Repair Size: 2.0 cm  Suture Material: Fast Absorbing Gut 5-0  Tumor Type: Other (comments) (Features consistent with trichoepithelioma)  Location: left upper cutaneous lip  Derm-Path Accession #: [id number]  PreOp Size: 0.6 x 0.5 cm  PostOp Size: 1.0 x 0.9 cm  Mohs Accession #: [id number]  Level of Defect: fascia      Procedure:  We discussed the principles of treatment and most likely complications  including scarring, bleeding, infection, swelling, pain, crusting, nerve damage, large wound,  incomplete excision, wound dehiscence,  nerve damage, recurrence, and a second procedure may be recommended to obtain the best cosmetic or functional result.    Informed consent was obtained and the patient underwent the procedure as follows:  The patient was placed supine on the operating table.  The cancer was identified, outlined with a marker, and verified by the patient.  The entire surgical field was prepped with Hibiclens;Sterile saline.  The surgical site was anesthetized using Lidocaine 1% with epi 1:100,000.      The area of clinically apparent tumor was debulked. The layer of tissue was then surgically excised using a #15 blade and was then transferred onto a specimen sheet maintaining the orientation of the specimen. Hemostasis was obtained using bipolar electrocoagulation. The wound site was then covered with a dressing while the tissue samples were processed for examination.    The excised tissue was transported to the Mohs histology laboratory maintaining the tissue orientation.  The tissue specimen was relaxed so that the entire surgical margin was in a a single horizontal plane for sectioning and inked for precise mapping.  A precise reference map was drawn to reflect the sectioning of the specimen, colored inking of the margins, and orientation on the patient. The tissue was processed using horizontal sectioning of the base and continuous peripheral margins.  The histopathologic sections were reviewed in conjunction with the reference map.    Total blocks: 1    Total slides:  2    There were no cancer cells visualized on examination, therefore excision benign lesion with frozen specimen review surgery was complete.    REPAIR: An intermediate layered linear closure was selected as the procedure which would maximally preserve both function and cosmesis.    After the excision of the tumor, the area was  undermined. Hemostasis was obtained with bipolar electrocoagulation.  Closure was oriented so that the wound was in the patient's natural skin tension lines. The deep subcutaneous and dermal layers were then closed with 5-0 monocryl buried vertical mattress sutures. The epidermis was then carefully approximated along the length of the wound using 5-0 Fast Absorbing Gut simple running sutures.     Estimated blood loss was less than 10 ml for all surgical sites. A sterile pressure dressing was applied and wound care instructions, with a written handout, were given. The patient was discharged from the [facility] alert and ambulatory.      Neoplasm of uncertain behavior of skin x3  - R scaphoid fossa, superficially eroded pink papule  - L conchal bowl, scaly papule over cartilagenous prominence.   - L preauricular cheek, pale skin colored papule    Shave biopsy: Location(s) R scaphoid fossa, L conchal bowl, L preauricular cheek.  After discussion of benefits and risks including but not limited to bleeding/bruising, pain/swelling, infection, scar, incomplete removal, nerve damage/numbness, recurrence, and non-diagnostic biopsy, verbal consent and photographs were obtained. Time-out was performed. The area was cleaned with isopropyl alcohol. 0.5mL of 1% lidocaine with epinephrine was injected to obtain adequate anesthesia of each lesion. Shave biopsy was performed x3. Hemostasis was achieved with aluminium chloride. Vaseline and a sterile dressing were applied. The patient tolerated the procedure and no complications were noted. The patient was provided with verbal and written post care instructions.     - Additionally there was a 4th NUB of the anterior left sideburn (visible in picture)  - poorly defined pink plaque.   - No scale to confirm AK, no features to confirm BCC.   - Patient has skin cancer screening with Dr. Treviño in about 1 month, will request a second opinion for her.       Inflamed  seborrheic Keratosis x2  Actinic Keratosis x2 (R tragus and L scaphoid fossa)    Cryotherapy procedure note: After verbal consent and discussion of risks and benefits including but no limited to dyspigmentation/scar, blister, and pain, 2AK's and 2 ISK's were treated with 1-2mm freeze border for 2 cycles with liquid nitrogen. Post cryotherapy instructions were provided.         Follow-up pending biopsy results vs as scheduled with Dr. Treviño.     I personally performed the procedures today.      Shashank Victor DO    Department of Dermatology  [facility]: Phone: [phone], Fax:[phone]  [facility]: Phone: [phone], Fax: [phone]    Care and Laboratory Testing Performed at:  [facility]   Dermatology Clinic  [address] [place] [address]

## 2023-03-13 NOTE — PROGRESS NOTES
The following medication was given:     MEDICATION:  Lidocaine with epinephrine 1% 1:[id number]  ROUTE: SQ  SITE: see procedure note  DOSE:   LOT #: [id number]  : [employer]  EXPIRATION DATE: 09-  NDC#: 31076-264-96  Was there drug waste?   Multi-dose vial: Yes    Jeanette Varghese LPN  March 13, 2023      Vaseline and pressure dressing applied to Mohs site on Left upper cutaneous lip.  Wound care instructions reviewed with patient and AVS provided.  Patient verbalized understanding.  Patient will follow up for suture removal: N/A.  No further questions or concerns at this time.

## 2023-03-13 NOTE — NURSING NOTE
Gustavo Ramon's chief complaint for this visit includes:  Chief Complaint   Patient presents with     Procedure     Mohs - left upper cutaneous lip     PCP: Chelsea Mcneill    Referring Provider:  No referring provider defined for this encounter.    BP (!) 145/86   Pulse 71   Data Unavailable        Allergies   Allergen Reactions     Mycophenolate Other (See Comments)     Confusion, abnormal movements         Do you need any medication refills at today's visit? No    Kylee Jon CMA

## 2023-03-13 NOTE — PATIENT INSTRUCTIONS
Mohs Wound Care Instructions  I will experience scar, altered skin color, bleeding, swelling, pain, crusting and redness. I may experience altered sensation. Risks are excessive bleeding, infection, muscle weakness, thick (hypertrophic or keloidal) scar, and recurrence. A second procedure may be recommended to obtain the best cosmetic or functional result.  Possible complications of any surgical procedure are bleeding, infection, scarring, alteration in skin color and sensation, muscle weakness in the area, wound dehiscence or seperation, or recurrence of the lesion or disease. On occasion, after healing, a secondary procedure or revision may be recommended in order to obtain the best cosmetic or functional result.   After your surgery, a pressure bandage will be placed over the area that has sutures. This will help prevent bleeding. Please follow these instructions as they will help you to prevent complications as your wound heals.  For the First 48 hours After Surgery:  Leave the pressure bandage on and keep it dry. If it should come loose, you may retape it, but do not take it off.  Relax and take it easy. Do not do any vigorous exercise, heavy lifting, or bending forward. This could cause the wound to bleed.  Post-operative pain is usually mild. You may alternate between 1000 mg of Tylenol (acetaminophen) and 400 mg of Ibuprofen every 4 hours.  Do not take more than 4,000mg of acetaminophen in a 24 hour period or 3200 mg of Ibuprofen in a 24 hr period.  Avoid alcohol and vitamin E as these may increase your tendency to bleed.  You may put an ice pack around the bandaged area for 20 minutes every 2-3 hours. This may help reduce swelling, bruising, and pain. Make sure the ice pack is waterproof so that the pressure bandage does not get wet.   You may see a small amount of drainage or blood on your pressure bandage. This is normal. However, if drainage or bleeding continues or saturates the bandage, you will need  to apply firm pressure over the bandage with a washcloth for 15 minutes. If bleeding continues after applying pressure for 15 minutes then go to the nearest emergency room.  48 Hours After Surgery  Carefully remove the bandage and start daily wound care and dressing changes. You may also now shower and get the wound wet.  Daily Wound Care:  Wash wound with a mild soap and water.  Use caution when washing the wound, be gentle and do not let the forceful shower stream hit the wound directly.  Pat dry.  Apply Vaseline (from a new container or tube) over the suture line with a Q-tip. It is very important to keep the wound continuously moist, as wounds heal best in a moist environment.  Keep the site covered until sutures have either been dissolved.  You can cover it with a Telfa (non-stick) dressing and tape or a band-aid.    If you are unable to keep wound covered, you must apply Vaseline every 2-3 hours (while awake) to ensure it is being kept moist for optimal healing. A dressing overnight is recommended to keep the area moist.  Call Us If:  You have pain that is not controlled with Tylenol/Ibuprofen  You have signs or symptoms of an infection, such as: fever over 100 degrees F, redness, warmth, or foul-smelling or yellow drainage from the wound.  Wound Care After a Biopsy    What is a skin biopsy?  A skin biopsy allows the doctor to examine a very small piece of tissue under the microscope to determine the diagnosis and the best treatment for the skin condition. A local anesthetic (numbing medicine)  is injected with a very small needle into the skin area to be tested. A small piece of skin is taken from the area. Sometimes a suture (stitch) is used.     What are the risks of a skin biopsy?  I will experience scar, bleeding, swelling, pain, crusting and redness. I may experience incomplete removal or recurrence. Risks of this procedure are excessive bleeding, bruising, infection, nerve damage, numbness, thick  (hypertrophic or keloidal) scar and non-diagnostic biopsy.    How should I care for my wound for the first 24 hours?  Keep the wound dry and covered for 24 hours  If it bleeds, hold direct pressure on the area for 15 minutes. If bleeding does not stop then go to the emergency room  Avoid strenuous exercise the first 1-2 days or as your doctor instructs you    How should I care for the wound after 24 hours?  After 24 hours, remove the bandage  You may bathe or shower as normal  If you had a scalp biopsy, you can shampoo as usual and can use shower water to clean the biopsy site daily  Clean the wound twice a day with gentle soap and water  Do not scrub, be gentle  Apply white petroleum/Vaseline after cleaning the wound with a cotton swab or a clean finger, and keep the site covered with a Bandaid /bandage. Bandages are not necessary with a scalp biopsy  If you are unable to cover the site with a Bandaid /bandage, re-apply ointment 2-3 times a day to keep the site moist. Moisture will help with healing  Avoid strenuous activity for first 1-2 days  Avoid lakes, rivers, pools, and oceans until the stitches are removed or the site is healed    How do I clean my wound?  Wash hands thoroughly with soap or use hand  before all wound care  Clean the wound with gentle soap and water  Apply white petroleum/Vaseline  to wound after it is clean  Replace the Bandaid /bandage to keep the wound covered for the first few days or as instructed by your doctor  If you had a scalp biopsy, warm shower water to the area on a daily basis should suffice    What should I use to clean my wound?   Cotton-tipped applicators (Qtips )  White petroleum jelly (Vaseline ). Use a clean new container and use Q-tips to apply.  Bandaids   as needed  Gentle soap     How should I care for my wound long term?  Do not get your wound dirty  Keep up with wound care for one week or until the area is healed.  A small scab will form and fall off by  itself when the area is completely healed. The area will be red and will become pink in color as it heals. Sun protection is very important for how your scar will turn out. Sunscreen with an SPF 30 or greater is recommended once the area is healed.  You should have some soreness but it should be mild and slowly go away over several days. Talk to your doctor about using tylenol for pain,    When should I call my doctor?  If you have increased:   Pain or swelling  Pus or drainage (clear or slightly yellow drainage is ok)  Temperature over 100F  Spreading redness or warmth around wound    When will I hear about my results?  The biopsy results can take 2 weeks to come back.  Your results will automatically release to [facility] before your provider has even reviewed them.  The clinic will call you with the results, send you a [facility] message, or have you schedule a follow-up clinic or phone time to discuss the results.  Contact our clinics if you do not hear from us in 2 weeks.    Cryotherapy    What is it?  Use of a very cold liquid, such as liquid nitrogen, to freeze and destroy abnormal skin cells that need to be removed    What should I expect?  Tenderness and redness  A small blister that might grow and fill with dark purple blood. There may be crusting.  More than one treatment may be needed if the lesions do not go away.    How do I care for the treated area?  Gently wash the area with your hands when bathing.  Use a thin layer of Vaseline to help with healing. You may use a Band-Aid.   The area should heal within 7-10 days and may leave behind a pink or lighter color.   Do not use an antibiotic or Neosporin ointment.   You may take acetaminophen (Tylenol) for pain.     Call your doctor if you have:  Severe pain  Signs of infection (warmth, redness, cloudy yellow drainage, and or a bad smell)  Questions or concerns    Who should I call with questions?      [facility]: [phone]       [facility]: [phone]      For urgent needs outside of business hours call the [facility] at [phone] and ask for the dermatology resident on call     Who should I call with questions?  [facility]: [phone] ask for [facility]  For urgent needs outside of business hours call the [facility] at [phone] and ask for the dermatology resident on call

## 2023-03-14 ENCOUNTER — ANCILLARY PROCEDURE (OUTPATIENT)
Dept: GENERAL RADIOLOGY | Facility: CLINIC | Age: 83
End: 2023-03-14
Attending: PHYSICIAN ASSISTANT
Payer: MEDICARE

## 2023-03-14 ENCOUNTER — OFFICE VISIT (OUTPATIENT)
Dept: NEUROSURGERY | Facility: CLINIC | Age: 83
End: 2023-03-14
Payer: MEDICARE

## 2023-03-14 VITALS
BODY MASS INDEX: 27.07 KG/M2 | OXYGEN SATURATION: 97 % | WEIGHT: 178.6 LBS | HEIGHT: 68 IN | HEART RATE: 60 BPM | SYSTOLIC BLOOD PRESSURE: 145 MMHG | DIASTOLIC BLOOD PRESSURE: 81 MMHG

## 2023-03-14 DIAGNOSIS — Z98.890 H/O CERVICAL SPINE SURGERY: ICD-10-CM

## 2023-03-14 DIAGNOSIS — Z98.890 H/O CERVICAL SPINE SURGERY: Primary | ICD-10-CM

## 2023-03-14 PROCEDURE — 72040 X-RAY EXAM NECK SPINE 2-3 VW: CPT | Performed by: RADIOLOGY

## 2023-03-14 PROCEDURE — 99024 POSTOP FOLLOW-UP VISIT: CPT | Performed by: NEUROLOGICAL SURGERY

## 2023-03-14 ASSESSMENT — PAIN SCALES - GENERAL: PAINLEVEL: NO PAIN (0)

## 2023-03-14 NOTE — PATIENT INSTRUCTIONS
You saw Yodit Barrientos PA-C and Dr. Levin today.   Recommend PT to work on neck stretch and strengthening as well as pool therapy to work on increasing ambulation and balance.    Follow up PRN with neurosurgery.

## 2023-03-14 NOTE — PROGRESS NOTES
Neurosurgery Clinic Note    Chief Complaint: 3 month post-op visit    DATE OF VISIT: 3/14/23    Procedure Date: 12/12/2022     PREOPERATIVE DIAGNOSIS:  Severe cervical canal stenosis with myelopathy.     POSTOPERATIVE DIAGNOSIS:  Severe cervical canal stenosis with myelopathy.     PROCEDURES PERFORMED:  1.  Cervical 4-6 right-side open door laminoplasty.  2.  Partial cervical 7 decompression.  3.  Use of intraoperative neuromonitoring.  4.  Use of intraoperative C-arm.  5.  Use of structural allograft.     ATTENDING SURGEON:  Judie Levin MD, PhD     RESIDENT SURGEONS:  1.  Hakan Hair MD  2.  Radha Loomis MD     ANESTHESIA:  General.     ESTIMATED BLOOD LOSS:  50 mL     INTRAOPERATIVE FINDINGS:  Expansile laminoplasty completed with good thecal sac decompression.  Decompression at cervical 7 demonstrated well decompressed thecal sac.     INDICATIONS FOR PROCEDURE:  Mr. 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  He presented to the hospital with a 6-month history of worsening bilateral lower extremity weakness as well as weakness involving the right upper extremity.  He was now having difficulty with ambulation.  MRI of his cervical spine demonstrated severe canal stenosis at C5-C7 levels with T2 signal change.  Given the patient's presenting symptoms, physical exam, and imaging findings, he was indicated for the aforementioned procedure.  After a thorough conversation of the risks and benefits of surgery, the patient elected to move forward with the offered surgical procedure.    HPI: Gustavo Ramon is a pleasant 82 year old male with PMH of NPH s/p shunt (Certas 4 per 12/22/22 note), myasthenia gravis who is s/p C4-6 right laminoplasty, and partial C7 decompression by Dr. Levin on 12/12/22 for severe CCS with myelopathy.  Prior to surgery, patient had BLE and RUE weakness, and difficulty ambulating. His MRI revealed  C5-7 severe central stenosis with T2 signal change.  He was placed in a c-collar for comfort after the procedure.    1/23/23 visit ~ 6 weeks out from surgery.  He presents with his wife today.  He is getting around well using a walker at [facility].  He is working daily with PT on improving his mobility and balance.  He notes continued paresthesias and weakness in his UE R>L, but does think that this is lightening up some.  He has more trouble with his 4th and 5th digit on his right hand locking up.  He c/o bilateral hip pain that occurs overnight and gets better as the day goes on.  He finds transitional movements difficult because of weakness in his legs.  He denies edie neck pain, but does have cervical muscle soreness and feels his head is leaning forward.  He has been using a soft collar at night and during the day as this seems to give him some confidence and he states it makes him feel better.  He denies bowel/bladder issues.  He denies issues with his incision.  He is a retired  and is interested in details about the surgery and how it works.      Currently, he is 3 months out from surgery. He continues to have improvement with the numbness in his hands/fingers and weakness in his right hand.  He does feel that after having 2 bouts of COVID that he had some weakness on the right side of his body.  He has been living at [facility], but will be going home at the end of the month.  He will be starting PT for 4-6 weeks for his left hip soon.  He did have some generalized exercise at [place], but they did not do any strengthening exercises for his neck.  He struggles with muscle fatigue and then uses his cervical collar.  He notes more issues with RLS, but has follow up with neurology coming up.       Physical Exam   There were no vitals taken for this visit.    Constitutional: Alert, no acute distress.  [place]:  ambulates with use of a walker, is able to take small  "steps in office without it but is unsteady    Neurological:  Strength (L/R) (tremors noted)  5/5 Deltoid  5/5 Bicep  5/5 Wrist Extensor  5/5 Tricep  5/5 Finger Flexion  5/4- Finger Abduction (4th & 5th digits on right hand only)  5/4 Handgrip    5/5 Hip Flexion  5/5 Knee Extension  5/5 Ankle Dorsiflexion  5/5 Extensor Hallucis Longus  5/5 Plantar Flexion    Reflexes (L/R)  3+/3+ Bicep  3+/3+ Brachioradialis  2+/2+ Patellar  2+/2+ Ankle  -/+ Harvey's    Sensation: SILT    Incision:  Well healed      IMAGING:    Imaging independently reviewed.    Upright cervical XR 3/14/23 - stable alignment    \"Impression:  1. Stable alignment status post laminoplasty.  2. Multilevel cervical degenerative changes, most pronounced at C5-6.\"      ASSESSMENT:  Gustavo Ramon is a 82 year old male with PMH of NPH s/p shunt (Certas 4 per 12/22/22 note), myasthenia gravis who is s/p C4-6 right laminoplasty, partial C7 decompression by Dr. Levin on 12/12/22 for severe CCS with myelopathy.  Prior to surgery, patient had BLE and RUE weakness, and difficulty ambulating. His MRI revealed C5-7 severe central stenosis with T2 signal change.  He was placed in a c-collar for comfort after the procedure.    He is now 3 months out for the procedure.  He is doing well.  He continues to endorse some numbness on a glove-like pattern in his hands, but this is getting better.  Also has residual right hand and finger weakness (right hand dominant).  XR today is stable.  He is using walker because of balance and left hip issues.     Recommend PT to concentrate on neck strength, stretching for cervical muscle soreness and fatigue. He might also benefit from pool therapy.      PLAN:    Okay to take NSAIDs (Ibuprofen, Motrin, Advil, etc.) now.      Take Tylenol, ice/heat for pain.     Referral to PT provided to include aqua therapy and cervical strengthening/stretching.      Okay to do activity as tolerated.      Follow up PRN with nsgy.    Follow up as " planned with Neurology in April.     Patient is in agreement with this plan and states no further questions.      Patient seen and discussed with Dr. Levin.       KAZ Olvera-C  Department of Neurosurgery  Office: [phone]      I have seen this patient with the PA and formulated a plan and agree with this note.  AMP

## 2023-03-14 NOTE — LETTER
3/14/2023       RE: Gustavo Ramon  [address] [facility] [address]     Dear Colleague,    Thank you for referring your patient, Gustavo Ramon, to the [facility] [place] [facility]. Please see a copy of my visit note below.        Neurosurgery Clinic Note    Chief Complaint: 3 month post-op visit    DATE OF VISIT: 3/14/23    Procedure Date: 12/12/2022     PREOPERATIVE DIAGNOSIS:  Severe cervical canal stenosis with myelopathy.     POSTOPERATIVE DIAGNOSIS:  Severe cervical canal stenosis with myelopathy.     PROCEDURES PERFORMED:  1.  Cervical 4-6 right-side open door laminoplasty.  2.  Partial cervical 7 decompression.  3.  Use of intraoperative neuromonitoring.  4.  Use of intraoperative C-arm.  5.  Use of structural allograft.     ATTENDING SURGEON:  Judie Levin MD, PhD     RESIDENT SURGEONS:  1.  Hakan Hair MD  2.  Radha Loomis MD     ANESTHESIA:  General.     ESTIMATED BLOOD LOSS:  50 mL     INTRAOPERATIVE FINDINGS:  Expansile laminoplasty completed with good thecal sac decompression.  Decompression at cervical 7 demonstrated well decompressed thecal sac.     INDICATIONS FOR PROCEDURE:  Mr. 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  He presented to the hospital with a 6-month history of worsening bilateral lower extremity weakness as well as weakness involving the right upper extremity.  He was now having difficulty with ambulation.  MRI of his cervical spine demonstrated severe canal stenosis at C5-C7 levels with T2 signal change.  Given the patient's presenting symptoms, physical exam, and imaging findings, he was indicated for the aforementioned procedure.  After a thorough conversation of the risks and benefits of surgery, the patient elected to move forward with the offered surgical procedure.    HPI: Gustavo  CEZAR Ramon is a pleasant 82 year old male with PMH of NPH s/p shunt (Certas 4 per 12/22/22 note), myasthenia gravis who is s/p C4-6 right laminoplasty, and partial C7 decompression by Dr. Levin on 12/12/22 for severe CCS with myelopathy.  Prior to surgery, patient had BLE and RUE weakness, and difficulty ambulating. His MRI revealed C5-7 severe central stenosis with T2 signal change.  He was placed in a c-collar for comfort after the procedure.    1/23/23 visit ~ 6 weeks out from surgery.  He presents with his wife today.  He is getting around well using a walker at [facility].  He is working daily with PT on improving his mobility and balance.  He notes continued paresthesias and weakness in his UE R>L, but does think that this is lightening up some.  He has more trouble with his 4th and 5th digit on his right hand locking up.  He c/o bilateral hip pain that occurs overnight and gets better as the day goes on.  He finds transitional movements difficult because of weakness in his legs.  He denies edie neck pain, but does have cervical muscle soreness and feels his head is leaning forward.  He has been using a soft collar at night and during the day as this seems to give him some confidence and he states it makes him feel better.  He denies bowel/bladder issues.  He denies issues with his incision.  He is a retired  and is interested in details about the surgery and how it works.      Currently, he is 3 months out from surgery. He continues to have improvement with the numbness in his hands/fingers and weakness in his right hand.  He does feel that after having 2 bouts of COVID that he had some weakness on the right side of his body.  He has been living at [facility], but will be going home at the end of the month.  He will be starting PT for 4-6 weeks for his left hip soon.  He did have some generalized exercise at [place], but they did not do any strengthening exercises  "for his neck.  He struggles with muscle fatigue and then uses his cervical collar.  He notes more issues with RLS, but has follow up with neurology coming up.       Physical Exam   There were no vitals taken for this visit.    Constitutional: Alert, no acute distress.  [place]:  ambulates with use of a walker, is able to take small steps in office without it but is unsteady    Neurological:  Strength (L/R) (tremors noted)  5/5 Deltoid  5/5 Bicep  5/5 Wrist Extensor  5/5 Tricep  5/5 Finger Flexion  5/4- Finger Abduction (4th & 5th digits on right hand only)  5/4 Handgrip    5/5 Hip Flexion  5/5 Knee Extension  5/5 Ankle Dorsiflexion  5/5 Extensor Hallucis Longus  5/5 Plantar Flexion    Reflexes (L/R)  3+/3+ Bicep  3+/3+ Brachioradialis  2+/2+ Patellar  2+/2+ Ankle  -/+ Harvey's    Sensation: SILT    Incision:  Well healed      IMAGING:    Imaging independently reviewed.    Upright cervical XR 3/14/23 - stable alignment    \"Impression:  1. Stable alignment status post laminoplasty.  2. Multilevel cervical degenerative changes, most pronounced at C5-6.\"      ASSESSMENT:  Gustavo Ramon is a 82 year old male with PMH of NPH s/p shunt (Certas 4 per 12/22/22 note), myasthenia gravis who is s/p C4-6 right laminoplasty, partial C7 decompression by Dr. Levin on 12/12/22 for severe CCS with myelopathy.  Prior to surgery, patient had BLE and RUE weakness, and difficulty ambulating. His MRI revealed C5-7 severe central stenosis with T2 signal change.  He was placed in a c-collar for comfort after the procedure.    He is now 3 months out for the procedure.  He is doing well.  He continues to endorse some numbness on a glove-like pattern in his hands, but this is getting better.  Also has residual right hand and finger weakness (right hand dominant).  XR today is stable.  He is using walker because of balance and left hip issues.     Recommend PT to concentrate on neck strength, stretching for cervical muscle soreness and " fatigue. He might also benefit from pool therapy.      PLAN:    Okay to take NSAIDs (Ibuprofen, Motrin, Advil, etc.) now.      Take Tylenol, ice/heat for pain.     Referral to PT provided to include aqua therapy and cervical strengthening/stretching.      Okay to do activity as tolerated.      Follow up PRN with nsgy.    Follow up as planned with Neurology in April.     Patient is in agreement with this plan and states no further questions.      Patient seen and discussed with Dr. Levin.       Yodit Barrientos PA-C  Department of Neurosurgery  Office: [phone]        Sincerely,    Judie Levin MD

## 2023-03-15 ENCOUNTER — TRANSFERRED RECORDS (OUTPATIENT)
Dept: HEALTH INFORMATION MANAGEMENT | Facility: CLINIC | Age: 83
End: 2023-03-15
Payer: MEDICARE

## 2023-03-29 ENCOUNTER — MEDICAL CORRESPONDENCE (OUTPATIENT)
Dept: HEALTH INFORMATION MANAGEMENT | Facility: CLINIC | Age: 83
End: 2023-03-29

## 2023-04-04 ENCOUNTER — TELEPHONE (OUTPATIENT)
Dept: DERMATOLOGY | Facility: CLINIC | Age: 83
End: 2023-04-04
Payer: MEDICARE

## 2023-04-04 NOTE — TELEPHONE ENCOUNTER
Called patient to schedule surgery with Dr. Victor    Date of Surgery: 05/23    Surgery type: mohs    Consult scheduled: Not Applicable    Has patient had mohs with us before? Yes    Additional comments: denisa Downs on 4/4/2023 at 11:10 AM

## 2023-04-06 ENCOUNTER — OFFICE VISIT (OUTPATIENT)
Dept: FAMILY MEDICINE | Facility: CLINIC | Age: 83
End: 2023-04-06
Payer: MEDICARE

## 2023-04-06 VITALS
OXYGEN SATURATION: 96 % | DIASTOLIC BLOOD PRESSURE: 74 MMHG | RESPIRATION RATE: 20 BRPM | SYSTOLIC BLOOD PRESSURE: 134 MMHG | BODY MASS INDEX: 26.95 KG/M2 | TEMPERATURE: 97.5 F | WEIGHT: 177.8 LBS | HEART RATE: 77 BPM | HEIGHT: 68 IN

## 2023-04-06 DIAGNOSIS — G91.2 NPH (NORMAL PRESSURE HYDROCEPHALUS) (H): Primary | ICD-10-CM

## 2023-04-06 DIAGNOSIS — G70.00 OCULAR MYASTHENIA GRAVIS (H): ICD-10-CM

## 2023-04-06 DIAGNOSIS — E27.40 ADRENAL INSUFFICIENCY (H): ICD-10-CM

## 2023-04-06 DIAGNOSIS — H61.22 IMPACTED CERUMEN OF LEFT EAR: ICD-10-CM

## 2023-04-06 DIAGNOSIS — G99.2 MYELOPATHY IN DISEASES CLASSIFIED ELSEWHERE (H): ICD-10-CM

## 2023-04-06 DIAGNOSIS — L72.3 INFECTED SEBACEOUS CYST: ICD-10-CM

## 2023-04-06 DIAGNOSIS — N18.31 STAGE 3A CHRONIC KIDNEY DISEASE (H): ICD-10-CM

## 2023-04-06 DIAGNOSIS — L08.9 INFECTED SEBACEOUS CYST: ICD-10-CM

## 2023-04-06 DIAGNOSIS — E09.9 STEROID-INDUCED DIABETES MELLITUS, INITIAL ENCOUNTER (H): ICD-10-CM

## 2023-04-06 DIAGNOSIS — T38.0X5A STEROID-INDUCED DIABETES MELLITUS, INITIAL ENCOUNTER (H): ICD-10-CM

## 2023-04-06 DIAGNOSIS — M48.02 SPINAL STENOSIS IN CERVICAL REGION: ICD-10-CM

## 2023-04-06 DIAGNOSIS — G25.81 RESTLESS LEG SYNDROME: ICD-10-CM

## 2023-04-06 PROBLEM — J80 ACUTE RESPIRATORY DISTRESS SYNDROME (ARDS) DUE TO COVID-19 VIRUS (H): Status: RESOLVED | Noted: 2022-01-31 | Resolved: 2023-04-06

## 2023-04-06 PROBLEM — U07.1 ACUTE RESPIRATORY DISTRESS SYNDROME (ARDS) DUE TO COVID-19 VIRUS (H): Status: RESOLVED | Noted: 2022-01-31 | Resolved: 2023-04-06

## 2023-04-06 PROBLEM — J96.01 ACUTE RESPIRATORY FAILURE WITH HYPOXIA (H): Status: RESOLVED | Noted: 2021-11-23 | Resolved: 2023-04-06

## 2023-04-06 PROBLEM — D84.821 IMMUNODEFICIENCY DUE TO DRUGS (CODE) (H): Status: RESOLVED | Noted: 2022-01-31 | Resolved: 2023-04-06

## 2023-04-06 PROCEDURE — 69209 REMOVE IMPACTED EAR WAX UNI: CPT | Performed by: PHYSICIAN ASSISTANT

## 2023-04-06 PROCEDURE — 99214 OFFICE O/P EST MOD 30 MIN: CPT | Mod: 25 | Performed by: PHYSICIAN ASSISTANT

## 2023-04-06 RX ORDER — PRAMIPEXOLE DIHYDROCHLORIDE 0.25 MG/1
0.25 TABLET ORAL 3 TIMES DAILY
Qty: 270 TABLET | Refills: 1 | Status: SHIPPED | OUTPATIENT
Start: 2023-04-06 | End: 2024-06-04

## 2023-04-06 RX ORDER — CEPHALEXIN 500 MG/1
500 CAPSULE ORAL 2 TIMES DAILY
Qty: 20 CAPSULE | Refills: 0 | Status: SHIPPED | OUTPATIENT
Start: 2023-04-06 | End: 2023-04-16

## 2023-04-06 RX ORDER — GABAPENTIN 300 MG/1
300 CAPSULE ORAL 3 TIMES DAILY
Qty: 270 CAPSULE | Refills: 1 | Status: SHIPPED | OUTPATIENT
Start: 2023-04-06 | End: 2024-02-07

## 2023-04-06 ASSESSMENT — PAIN SCALES - GENERAL: PAINLEVEL: NO PAIN (0)

## 2023-04-06 NOTE — PROGRESS NOTES
"      Wally Mitchell is a 82 year old, presenting for the following health issues:  RECHECK (Discharge from transitional care 3/31/23)        4/6/2023     1:41 PM   Additional Questions   Roomed by Darcy Veloz CMA   Accompanied by None         4/6/2023     1:47 PM   Patient Reported Additional Medications   Patient reports taking the following new medications Ceci - Wife     [facility]       Hospital Follow-up Visit:    Hospital/Nursing Home/ Rehab Facility: [facility]  Date of Admission: June 2022  Date of Discharge: 03/31/23  Reason(s) for Admission: NPH    Was your hospitalization related to COVID-19? No   Problems taking medications regularly:  None  Medication changes since discharge: None  Problems adhering to non-medication therapy:  None    Summary of hospitalization:  [facility] and [facility]    NPH history. MG. Overall conditions are stable. Using his walker. Some ongoing leg weakness. Vision stable. No urinary incontinence currently. Some nocturia. Followed by neurology. Ocular MG considered to be in remission.     History of mgus. No noc sweats. No bruising or bleeding. No fevers. Followed by oncology.   No chest pain/sob/palpitations/dizziness/ha's  Some restless leg syndrome.   Neck pain has improved markedly.   Review of Systems   Constitutional, HEENT, cardiovascular, pulmonary, GI, , musculoskeletal, neuro, skin, endocrine and psych systems are negative, except as otherwise noted.      Objective    /74   Pulse 77   Temp 97.5  F (36.4  C) (Temporal)   Resp 20   Ht 1.715 m (5' 7.5\")   Wt 80.6 kg (177 lb 12.8 oz)   SpO2 96%   BMI 27.44 kg/m    Body mass index is 27.44 kg/m .  Physical Exam     Eye exam - right eye normal lid, conjunctiva, cornea, pupil and fundus, left eye normal lid, conjunctiva, cornea, pupil and fundus.  Thyroid not palpable, not enlarged, no nodules detected.  CHEST:chest clear to IPPA, no tachypnea, " retractions or cyanosis and S1, S2 normal, no murmur, no gallop, rate regular.  No edema  His disks are flat. Pupils equal, round, reactive to light. Extraocular movements full. Visual fields full. Face moves symmetrically. Tongue midline. Hearing mildly decreased to finger-rubbing at approximately 6-8 inches. Neck without bruits. CV: S1, S2. Motor strength 5/5. Reflexes were 2/4. Toe signs were downgoing. Normal position sense. Good finger-nose-finger and fine finger movement. Gait: he jeremiah from a chair without difficulty and has a mildly broad-based gait.  Mildly infected sebaceous cyst right hip.   Left ext canal cerumen impaction. Successfully treatment with irrigation.    Gustavo was seen today for recheck.    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   Start physical therapy at [facility].

## 2023-04-08 ENCOUNTER — HEALTH MAINTENANCE LETTER (OUTPATIENT)
Age: 83
End: 2023-04-08

## 2023-04-10 ENCOUNTER — OFFICE VISIT (OUTPATIENT)
Dept: FAMILY MEDICINE | Facility: CLINIC | Age: 83
End: 2023-04-10
Payer: MEDICARE

## 2023-04-10 VITALS
BODY MASS INDEX: 27.04 KG/M2 | DIASTOLIC BLOOD PRESSURE: 64 MMHG | RESPIRATION RATE: 20 BRPM | SYSTOLIC BLOOD PRESSURE: 104 MMHG | HEART RATE: 71 BPM | HEIGHT: 68 IN | WEIGHT: 178.4 LBS | OXYGEN SATURATION: 96 % | TEMPERATURE: 97.7 F

## 2023-04-10 DIAGNOSIS — Z23 NEED FOR DIPHTHERIA-TETANUS-PERTUSSIS (TDAP) VACCINE: ICD-10-CM

## 2023-04-10 DIAGNOSIS — H02.403 PTOSIS OF EYELID, BILATERAL: ICD-10-CM

## 2023-04-10 DIAGNOSIS — N18.31 STAGE 3A CHRONIC KIDNEY DISEASE (H): ICD-10-CM

## 2023-04-10 DIAGNOSIS — Z01.818 PREOP GENERAL PHYSICAL EXAM: Primary | ICD-10-CM

## 2023-04-10 LAB
BASOPHILS # BLD AUTO: 0.1 10E3/UL (ref 0–0.2)
BASOPHILS NFR BLD AUTO: 1 %
EOSINOPHIL # BLD AUTO: 0.2 10E3/UL (ref 0–0.7)
EOSINOPHIL NFR BLD AUTO: 2 %
ERYTHROCYTE [DISTWIDTH] IN BLOOD BY AUTOMATED COUNT: 14.8 % (ref 10–15)
HCT VFR BLD AUTO: 44.5 % (ref 40–53)
HGB BLD-MCNC: 14.3 G/DL (ref 13.3–17.7)
LYMPHOCYTES # BLD AUTO: 1.9 10E3/UL (ref 0.8–5.3)
LYMPHOCYTES NFR BLD AUTO: 24 %
MCH RBC QN AUTO: 29.4 PG (ref 26.5–33)
MCHC RBC AUTO-ENTMCNC: 32.1 G/DL (ref 31.5–36.5)
MCV RBC AUTO: 92 FL (ref 78–100)
MONOCYTES # BLD AUTO: 1 10E3/UL (ref 0–1.3)
MONOCYTES NFR BLD AUTO: 12 %
NEUTROPHILS # BLD AUTO: 5.1 10E3/UL (ref 1.6–8.3)
NEUTROPHILS NFR BLD AUTO: 62 %
PLATELET # BLD AUTO: 186 10E3/UL (ref 150–450)
RBC # BLD AUTO: 4.86 10E6/UL (ref 4.4–5.9)
WBC # BLD AUTO: 8.2 10E3/UL (ref 4–11)

## 2023-04-10 PROCEDURE — 93000 ELECTROCARDIOGRAM COMPLETE: CPT | Performed by: PHYSICIAN ASSISTANT

## 2023-04-10 PROCEDURE — 85025 COMPLETE CBC W/AUTO DIFF WBC: CPT | Performed by: PHYSICIAN ASSISTANT

## 2023-04-10 PROCEDURE — 36415 COLL VENOUS BLD VENIPUNCTURE: CPT | Performed by: PHYSICIAN ASSISTANT

## 2023-04-10 PROCEDURE — 99214 OFFICE O/P EST MOD 30 MIN: CPT | Performed by: PHYSICIAN ASSISTANT

## 2023-04-10 ASSESSMENT — PAIN SCALES - GENERAL: PAINLEVEL: NO PAIN (0)

## 2023-04-10 NOTE — PROGRESS NOTES
[facility] [place]  [address]  [place] [address]  Phone: [phone]  Primary Provider: Keyshawn Rhoades  Pre-op Performing Provider: KEYSHAWN RHOADES      PREOPERATIVE EVALUATION:  Today's date: 4/10/2023    Gustavo Ramon is a 82 year old male who presents for a preoperative evaluation.      4/10/2023    11:08 AM   Additional Questions   Roomed by Craig CMA   Accompanied by Spouse Ceci         4/10/2023    11:08 AM   Patient Reported Additional Medications   Patient reports taking the following new medications None     Surgical Information:  Surgery/Procedure: Bilateral eyelid lift  Surgery Location: [facility] [place]  Surgeon: Dr. Schultz  Surgery Date: 10/10/23  Time of Surgery: To be determined  Where patient plans to recover: At home with family  Fax number for surgical facility: [phone]    Gustavo was seen today for pre-op exam.    Diagnoses and all orders for this visit:    Preop general physical exam    Need for diphtheria-tetanus-pertussis (Tdap) vaccine    Stage 3a chronic kidney disease (H)  -     Albumin Random Urine Quantitative with Creat Ratio; Future  -     CBC with platelets and differential; Future  -     EKG 12-lead complete w/read - Clinics    Ptosis of eyelid, bilateral      Stephen is cleared for surgery and appropriate anesthesia     Subjective     HPI related to upcoming procedure: Bilateral Eyelid lift        4/5/2023     5:57 PM   Preop Questions   1. Have you ever had a heart attack or stroke? No   2. Have you ever had surgery on your heart or blood vessels, such as a stent placement, a coronary artery bypass, or surgery on an artery in your head, neck, heart, or legs? No   3. Do you have chest pain with activity? No   4. Do you have a history of  heart failure? No   5. Do you currently have a cold, bronchitis or symptoms of other infection? No   6. Do you have a cough, shortness of breath, or wheezing? No    7. Do you or anyone in your family have previous history of blood clots? No   8. Do you or does anyone in your family have a serious bleeding problem such as prolonged bleeding following surgeries or cuts? No   9. Have you ever had problems with anemia or been told to take iron pills? YES - currently not anemic    10. Have you had any abnormal blood loss such as black, tarry or bloody stools? No   11. Have you ever had a blood transfusion? UNKNOWN    12. Are you willing to have a blood transfusion if it is medically needed before, during, or after your surgery? Yes   13. Have you or any of your relatives ever had problems with anesthesia? No   14. Do you have sleep apnea, excessive snoring or daytime drowsiness? No   15. Do you have any artifical heart valves or other implanted medical devices like a pacemaker, defibrillator, or continuous glucose monitor? YES    15a. What type of device do you have? shunt in head   15b. Name of the clinic that manages your device:  [facility]   16. Do you have artificial joints? No   17. Are you allergic to latex? No       Health Care Directive:  Patient has a Health Care Directive on file      Preoperative Review of :   reviewed - no record of controlled substances prescribed.      Status of Chronic Conditions:  See problem list for active medical problems.  Problems all longstanding and stable, except as noted/documented.  See ROS for pertinent symptoms related to these conditions.      Review of Systems  CONSTITUTIONAL: NEGATIVE for fever, chills, change in weight  INTEGUMENTARY/SKIN: NEGATIVE for worrisome rashes, moles or lesions  EYES: NEGATIVE for vision changes or irritation  ENT/MOUTH: NEGATIVE for ear, mouth and throat problems  RESP: NEGATIVE for significant cough or SOB  CV: NEGATIVE for chest pain, palpitations or peripheral edema  GI: NEGATIVE for nausea, abdominal pain, heartburn, or change in bowel habits  : NEGATIVE for frequency, dysuria, or  hematuria  MUSCULOSKELETAL: NEGATIVE for significant arthralgias or myalgia  NEURO: NEGATIVE for weakness, dizziness or paresthesias  ENDOCRINE: NEGATIVE for temperature intolerance, skin/hair changes  HEME: NEGATIVE for bleeding problems  PSYCHIATRIC: NEGATIVE for changes in mood or affect    Patient Active Problem List    Diagnosis Date Noted     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) 04/06/2023     Priority: Medium     Weakness of both lower extremities 12/08/2022     Priority: Medium     Gait instability 12/05/2022     Priority: Medium     Hip pain, acute, left 12/05/2022     Priority: Medium     Idiopathic normal pressure hydrocephalus (H) 06/27/2022     Priority: Medium     Acute atopic conjunctivitis 06/06/2022     Priority: Medium     Dementia without behavioral disturbance, unspecified dementia type 05/31/2022     Priority: Medium     Infection due to 2019 novel coronavirus 05/20/2022     Priority: Medium     Encephalopathy 05/20/2022     Priority: Medium     Myoclonus 05/17/2022     Priority: Medium     NPH (normal pressure hydrocephalus) (H) 05/02/2022     Priority: Medium     COVID-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 04/14/2022     Priority: Medium     Urinary frequency 04/14/2022     Priority: Medium     Physical deconditioning 02/01/2022     Priority: Medium     Steroid-induced diabetes mellitus, 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     Priority: Medium     Stage 3a chronic kidney disease (H) 01/31/2022     Priority: Medium     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     Priority: Medium     Sepsis due to COVID-19 (H) 11/19/2021     Priority: Medium     Pneumonia due to COVID-19 virus 11/15/2021     Priority: Medium     Gastroesophageal reflux disease without esophagitis 08/29/2016     Priority: Medium     Essential hypertension with goal blood pressure less than 140/90 08/25/2016     Priority: Medium     Nuclear sclerosis of both eyes 06/29/2016     Priority: Medium     Actinic keratosis 09/30/2015      Priority: Medium     Peripheral neuropathy 09/30/2015     Priority: Medium     Advanced directives, counseling/discussion 05/18/2015     Priority: Medium     9/30/2015  Pt reports that he and his wife both have advanced directives/living will.  They would like to be DNR, and his wife will be the MPOA, then alternate would be his Son.  He will bring in a copy.         PVD (posterior vitreous detachment) 12/02/2013     Priority: Medium     Amaurosis fugax by Hx neg carotid W/U 12/02/2013     Priority: Medium     Malignant neoplasm of prostate (H) 11/19/2013     Priority: Medium     History  of basal cell carcinoma 11/15/2012     Priority: Medium     IMO update changed this record. Please review for accuracy       Seborrhea 11/15/2012     Priority: Medium     AK (actinic keratosis) 11/15/2012     Priority: Medium     Retinal hole OS, operculated 11/13/2012     Priority: Medium     Horseshoe tear of retina without detachment OD 11/13/2012     Priority: Medium     MGUS (monoclonal gammopathy of unknown significance)      Priority: Medium     IgA kappa       HYPERLIPIDEMIA LDL GOAL <130 10/31/2010     Priority: Medium     Macular pigment deposit 08/30/2010     Priority: Medium     Dysphagia 06/01/2010     Priority: Medium     Kidney disorder 06/01/2010     Priority: Medium     Restless leg syndrome due to iron deficiency anemia 06/01/2010     Priority: Medium     Tear of medial cartilage or meniscus of knee, current 02/08/2010     Priority: Medium     Rosacea      Priority: Medium     DJD (degenerative joint disease)      Priority: Medium     Cancer of the skin, basal cell 05/12/2009     Priority: Medium     Formatting of this note might be different from the original.  Right nose       Ocular myasthenia gravis (H) 02/01/2009     Priority: Medium     [place] consult       Degeneration of intervertebral disc 06/01/2006     Priority: Medium     Diverticulitis of intestine 06/01/2006     Priority: Medium     History of  osteoporosis 06/01/2006     Priority: Medium     Vitamin D deficiency 06/01/2006     Priority: Medium      Past Medical History:   Diagnosis Date     Actinic keratosis      AK (actinic keratosis) 11/15/2012     Amaurosis fugax      Basal cell carcinoma 2009    R medial cheek/nose     Central serous retinopathy left    Eye     Diverticulosis 08/2006     DJD (degenerative joint disease)      GERD (gastroesophageal reflux disease)      Hearing loss     High frequency     History of nonmelanoma skin cancer      History of nonmelanoma skin cancer      HTN (hypertension)      Hyperlipidemia LDL goal < 130      Hyperplastic colon polyp 08/2006     IgA monoclonal gammopathy     IgA kappa     Infection due to 2019 novel coronavirus 10/13/2022     Infection due to 2019 novel coronavirus 11/2021     Lattice degeneration of retina right    Eye     Macular degeneration      Nonsenile cataract      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    [place] consult     Rosacea      Steroid-induced diabetes mellitus, 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     Strabismus      Vitreous detachment left    Eye     Past Surgical History:   Procedure Laterality Date     ARTHROSCOPY KNEE RT/LT Left Jan 2010    Knee     BIOPSY  2009/2013/2016    Nose; Prostate; BCC - left arm     COLONOSCOPY  9/24/2019    w/Endoscopy     COLONOSCOPY WITH CO2 INSUFFLATION N/A 9/24/2019    Procedure: COLONOSCOPY, WITH CO2 INSUFFLATION;  Surgeon: Loi Levine MD;  Location: [facility] OR     COMBINED ESOPHAGOSCOPY, GASTROSCOPY, DUODENOSCOPY (EGD) WITH CO2 INSUFFLATION N/A 9/24/2019    Procedure: ESOPHAGOGASTRODUODENOSCOPY, WITH CO2 INSUFFLATION;  Surgeon: Loi Levine MD;  Location: [facility] OR     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     DISKECTOMY, LUMBAR, SINGLE SP  2003    L2-3     ENT SURGERY  1943    Tonsils      ENT SURGERY  2-22-12    Biopsy lesion right pinna     ENT SURGERY  2-24-12    Biopsy leson right pinna     ESOPHAGOSCOPY, GASTROSCOPY, DUODENOSCOPY (EGD),  COMBINED N/A 9/24/2019    Procedure: Esophagogastroduodenoscopy, With Biopsy;  Surgeon: Loi Levine MD;  Location: [facility] OR     IR LUMBAR DRAIN PLACEMENT W FLUORO  6/27/2022     LAMINOPLASTY CERVICAL POSTERIOR THREE+ LEVELS N/A 12/12/2022    Procedure: POSTERIOR APPROACH Cervical 4-7 laminoplasty;  Surgeon: Judie Levin MD;  Location: [facility] OR     LAPAROSCOPIC ASSISTED IMPLANT SHUNT VENTRICULOPERITONEAL N/A 7/7/2022    Procedure: possible INSERTION, SHUNT, VENTRICULOPERITONEAL, LAPAROSCOPY-ASSISTED;  Surgeon: Joe Damon MD;  Location: [facility] OR     OPTICAL TRACKING SYSTEM IMPLANT SHUNT VENTRICULOPERITONEAL N/A 7/7/2022    Procedure: INSERTION, SHUNT, VENTRICULOPERITONEAL, USING OPTICAL TRACKING SYSTEM;  Surgeon: Jean-Paul Childs MD;  Location: [facility] OR     SOFT TISSUE SURGERY  May 2010    Basal Cell/right side nose     Current Outpatient Medications   Medication Sig Dispense Refill     acetaminophen (TYLENOL) 325 MG tablet Take 1-2 tablets (325-650 mg) by mouth every 4 hours as needed for mild pain       aspirin (ASA) 325 MG tablet Take 1 tablet (325 mg) by mouth daily       cephALEXin (KEFLEX) 500 MG capsule Take 1 capsule (500 mg) by mouth 2 times daily for 10 days 20 capsule 0     cyanocobalamin (VITAMIN B-12) 1000 MCG tablet Take 1 tablet (1,000 mcg) by mouth daily 90 tablet 1     ferrous sulfate (FEROSUL) 325 (65 Fe) MG tablet Use 1 tab every other day with orange juice (Patient taking differently: Take 325 mg by mouth daily Use 1 tab daily with orange juice) 60 tablet 1     gabapentin (NEURONTIN) 300 MG capsule Take 1 capsule (300 mg) by mouth 3 times daily 270 capsule 1     levETIRAcetam (KEPPRA) 250 MG tablet Take 1 tablet (250 mg) by mouth 2 times daily       methocarbamol (ROBAXIN) 500 MG tablet Take 1 tablet (500 mg) by mouth every 6 hours as needed for muscle spasms       multivitamin (OCUVITE) TABS tablet Take 1 tablet by mouth daily       pantoprazole (PROTONIX) 40 MG EC tablet  "Take 40 mg by mouth daily       polyethylene glycol (MIRALAX) 17 GM/Dose powder Take 17 g by mouth daily as needed for constipation 510 g      pramipexole (MIRAPEX) 0.25 MG tablet Take 1 tablet (0.25 mg) by mouth 3 times daily 270 tablet 1     predniSONE (DELTASONE) 10 MG tablet Take 1 tablet (10 mg) by mouth daily 30 tablet 0     pyridostigmine (MESTINON) 60 MG tablet Take 1.5 tablets (90 mg) by mouth 2 times daily       senna-docusate (SENOKOT-S/PERICOLACE) 8.6-50 MG tablet Take 1 tablet by mouth 2 times daily 60 tablet 0     simvastatin (ZOCOR) 20 MG tablet Take 1 tablet (20 mg) by mouth At Bedtime 90 tablet 1     tamsulosin (FLOMAX) 0.4 MG capsule Take 1 capsule (0.4 mg) by mouth daily       triamcinolone (KENALOG) 0.1 % external cream Apply a thin layer up to twice daily to affected areas as needed. 30 g 3     vitamin D3 (CHOLECALCIFEROL) 2000 units tablet Take 1 tablet by mouth daily 90 tablet 1     ACE/ARB/ARNI NOT PRESCRIBED (INTENTIONAL) Please choose reason not prescribed from choices below.         Allergies   Allergen Reactions     Mycophenolate Other (See Comments)     Confusion, abnormal movements        Social History     Tobacco Use     Smoking status: Never     Smokeless tobacco: Never     Tobacco comments:     Never   Vaping Use     Vaping status: Never Used   Substance Use Topics     Alcohol use: No     Family History   Problem Relation Age of Onset     Heart Disease Mother      Alzheimer Disease Mother 73     C.A.D. Father      Coronary Artery Disease Father      Diabetes Grandchild         using a pump      Diabetes Paternal Uncle      Heart Disease Paternal Grandmother      Glaucoma No family hx of      Macular Degeneration No family hx of      Melanoma No family hx of      Skin Cancer No family hx of      History   Drug Use No         Objective     Pulse 71   Temp 97.7  F (36.5  C) (Temporal)   Resp 20   Ht 1.715 m (5' 7.5\")   Wt 80.9 kg (178 lb 6.4 oz)   SpO2 96%   BMI 27.53 kg/m  "     Physical Exam    GENERAL APPEARANCE: healthy, alert and no distress     EYES: EOMI,  PERRL     HENT: ear canals and TM's normal and nose and mouth without ulcers or lesions     NECK: no adenopathy, no asymmetry, masses, or scars and thyroid normal to palpation     RESP: lungs clear to auscultation - no rales, rhonchi or wheezes     CV: regular rates and rhythm, normal S1 S2, no S3 or S4 and no murmur, click or rub     ABDOMEN:  soft, nontender, no HSM or masses and bowel sounds normal     MS: extremities normal- no gross deformities noted, no evidence of inflammation in joints, FROM in all extremities.     SKIN: no suspicious lesions or rashes     NEURO: Normal strength and tone, sensory exam grossly normal, mentation intact and speech normal     PSYCH: mentation appears normal. and affect normal/bright     LYMPHATICS: No cervical adenopathy    Recent Labs   Lab Test 12/16/22  0910 12/15/22  0550 12/14/22  0943 12/13/22  1023 12/12/22  0701 12/11/22  1351 12/07/22  1639 12/05/22  1738 07/08/22  0701 06/27/22  1006 02/15/22  1057 02/07/22  1112   HGB 12.3* 12.2*   < > 13.2* 13.0* 14.7   < > 14.7   < >  --    < > 11.8*    164   < > 167 162 185   < > 201   < > 249   < > 267   INR  --   --   --   --   --  1.09  --   --   --  1.12  --   --    NA  --   --   --  137 140 140   < >  --    < >  --    < > 141   POTASSIUM  --   --   --  3.8 3.8 4.2   < >  --    < >  --    < > 4.0   CR  --   --   --  0.83 0.89 0.85   < >  --    < >  --    < > 1.04   A1C  --   --   --   --   --   --   --  6.2*  --   --   --  5.2    < > = values in this interval not displayed.        Diagnostics:  Labs pending at this time.  Results will be reviewed when available.   EKG: first degree av block. Pac's (history of ectopic beats). No acute ischemic changes.    Revised Cardiac Risk Index (RCRI):  The patient has the following serious cardiovascular risks for perioperative complications:   - No serious cardiac risks = 0 points     RCRI  Interpretation: 0 points: Class I (very low risk - 0.4% complication rate)           Signed Electronically by: Winston Silverman PA-C  Copy of this evaluation report is provided to requesting physician.

## 2023-04-18 ENCOUNTER — TELEPHONE (OUTPATIENT)
Dept: FAMILY MEDICINE | Facility: CLINIC | Age: 83
End: 2023-04-18
Payer: MEDICARE

## 2023-04-18 ENCOUNTER — MEDICAL CORRESPONDENCE (OUTPATIENT)
Dept: HEALTH INFORMATION MANAGEMENT | Facility: CLINIC | Age: 83
End: 2023-04-18
Payer: MEDICARE

## 2023-04-18 NOTE — TELEPHONE ENCOUNTER
Forms received from: [employer]   Phone number listed: [phone]   Fax listed: [phone]  Date received: 4/13/23  Form description: PT effective 4/16/23 1WK1/ID [id number]  Once forms are completed, please return to [email or web address] [facility]  via fax [phone].  Is patient requesting to be contacted when forms are completed: na  Phone: na  Form placed:  Klaus Allen

## 2023-04-20 ENCOUNTER — OFFICE VISIT (OUTPATIENT)
Dept: DERMATOLOGY | Facility: CLINIC | Age: 83
End: 2023-04-20
Payer: MEDICARE

## 2023-04-20 DIAGNOSIS — L82.0 INFLAMED SEBORRHEIC KERATOSIS: ICD-10-CM

## 2023-04-20 DIAGNOSIS — L57.0 AK (ACTINIC KERATOSIS): ICD-10-CM

## 2023-04-20 DIAGNOSIS — H61.002 CNH (CHONDRODERMATITIS NODULARIS HELICIS), LEFT: ICD-10-CM

## 2023-04-20 DIAGNOSIS — Z85.828 HISTORY OF NONMELANOMA SKIN CANCER: Primary | ICD-10-CM

## 2023-04-20 DIAGNOSIS — B35.6 TINEA CRURIS: ICD-10-CM

## 2023-04-20 PROCEDURE — 17110 DESTRUCTION B9 LES UP TO 14: CPT | Performed by: DERMATOLOGY

## 2023-04-20 PROCEDURE — 99214 OFFICE O/P EST MOD 30 MIN: CPT | Mod: 25 | Performed by: DERMATOLOGY

## 2023-04-20 RX ORDER — KETOCONAZOLE 20 MG/G
CREAM TOPICAL
Qty: 120 G | Refills: 3 | Status: SHIPPED | OUTPATIENT
Start: 2023-04-20 | End: 2024-10-04

## 2023-04-20 ASSESSMENT — PAIN SCALES - GENERAL: PAINLEVEL: NO PAIN (0)

## 2023-04-20 NOTE — LETTER
4/20/2023         RE: Gustavo Ramon  [address] [facility] [address]        Dear Colleague,    Thank you for referring your patient, Gustavo Ramon, to the [facility]. Please see a copy of my visit note below.    [facility] Dermatology Note  Encounter Date: Apr 20, 2023  Office Visit     Dermatology Problem List:  1. NMSC  -2SCC  pending mohs, left preauricular cheek and R scaphoid fossa  - BCC nodular and infiltrating, left nasal side wall, MMS 9/24/20  - BCC, Right upper arm, s/p ED&C 8/27/2020  - BCC, Left upper back, s/p ED&C 8/27/2020  - SCCIS, left infraorbital, s/p MMS 9/6/18  - BCC, right elbow, s/p ED & C 1/12/16  - BCC, left forearm, s/p excision 1/12/16  - BCC, right posterior shoulder and left posterior shoulder, s/p Aldara 11/2015  - BCC, right side of nose per pathology, (right medial cheek per Dr. Christiansen's note 11/21/11), s/p excision 5/12/09   2. Actinic keratoses  - R tragus s/p LN2 3/13/23; s/p biopsy 9/23/22  - s/p Efudex 2015, Fall 2020 to face, Spring 2021 to forearms  - s/p PDT (x3)  - s/p LN2  3. Seborrheic dermatitis  - clobetasol 0.05% solution for scalp, triam 0.1% cream for ears  4. Relevant medical history: MGUS, myasthenia gravis currently on prednisone  5. Tinea cruris  - current tx: ketoconazole cream  6. 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 Lipoma L bicep, s/p biopsy 9/23/22     Social history: The patient is retired from working as an . He has a daughter and son. He is Vietnamese.  Family history: Negative for skin cancer  ____________________________________________    Assessment & Plan:    # HX of NMSC  -Recommend sunscreens SPF #30 or greater, protective clothing and avoidance of tanning beds.  -pending mohs right scaphoid fossa and left preauricular cheek for SCC         # Tinea cruris. Chronic, improved and nearly clear but now on feet   - Apply ketoconazole cream BID until resolved  , add to feet and lower legs for ketoconazole cream     # Immunosuppressed with prednisone for myasthenia gravis. Discussed increased risk of skin cancers with immunosuppressing medications.       #left conchal bowl is CNH- trial of cryo today    #Left anterior shoulder- SK    #Left upper lip well healed surgical scar-  -Recheck at follow up      Procedures Performed:   - Cryotherapy procedure note, location(s): temples, scalp, forehead cheeks and the CNH on left ear . After verbal consent and discussion of risks and benefits including, but not limited to, dyspigmentation/scar, blister, and pain, 18 lesion(s) was(were) treated with 1-2 mm freeze border for 1-2 cycles with liquid nitrogen. Post cryotherapy instructions were provided.       Follow-up: for mohs and then 6 months skin check  Staff    Brooklyn Treviño MD    Department of Dermatology  [facility]: Phone: [phone], Fax:[phone]  [facility]: Phone: [phone], Fax: [phone]      ____________________________________________    CC: Skin Check (FBSE: left anterior shoulder, left ear, and left upper lip are areas of concern. Hx of NMSC.)    HPI:  Mr. Gustavo Ramon is a(n) 82 year old male who presents today as a return patient for skin spots as above    Wants scars checked    Pending mohs    Patient is otherwise feeling well, without additional skin concerns.    Labs Reviewed:  A(1). Skin, Right scaphoid fossa, shave:  - Superficially invasive squamous cell carcinoma - (see description)       B(2). Skin, Left conchal bowl, shave:  - Chondrodermatitis nodularis helicis - (see description)      C(3). Skin, Left preauricular cheek, shave:  - Squamous cell carcinoma, poorly differentiated - (see comment)    Physical Exam:  Vitals: There were no vitals taken for this visit.  SKIN: Total skin excluding the undergarment areas  was performed. The exam included the head/face, neck, both arms, chest, back, abdomen, both legs, digits and/or nails.  Normal groin exam  - well healed scars at sites of prior skin cancers     Left anterior shoulder stuck on papule left anterior shoulder   Left upper lip well healed surgical scar  - There are erythematous macules with overyling adherent scale on the forehead, cheeks, scalp    Left ear with well healing biopsy scar at conchal bowl     - No other lesions of concern on areas examined.     Medications:  Current Outpatient Medications   Medication     acetaminophen (TYLENOL) 325 MG tablet     aspirin (ASA) 325 MG tablet     cyanocobalamin (VITAMIN B-12) 1000 MCG tablet     ferrous sulfate (FEROSUL) 325 (65 Fe) MG tablet     gabapentin (NEURONTIN) 300 MG capsule     levETIRAcetam (KEPPRA) 250 MG tablet     methocarbamol (ROBAXIN) 500 MG tablet     multivitamin (OCUVITE) TABS tablet     pantoprazole (PROTONIX) 40 MG EC tablet     polyethylene glycol (MIRALAX) 17 GM/Dose powder     pramipexole (MIRAPEX) 0.25 MG tablet     predniSONE (DELTASONE) 10 MG tablet     pyridostigmine (MESTINON) 60 MG tablet     senna-docusate (SENOKOT-S/PERICOLACE) 8.6-50 MG tablet     simvastatin (ZOCOR) 20 MG tablet     tamsulosin (FLOMAX) 0.4 MG capsule     triamcinolone (KENALOG) 0.1 % external cream     vitamin D3 (CHOLECALCIFEROL) 2000 units tablet     ACE/ARB/ARNI NOT PRESCRIBED (INTENTIONAL)     No current facility-administered medications for this visit.      Past Medical History:   Patient Active Problem List   Diagnosis     Rosacea     DJD (degenerative joint disease)     Ocular myasthenia gravis (H)     Macular pigment deposit     HYPERLIPIDEMIA LDL GOAL <130     MGUS (monoclonal gammopathy of unknown significance)     Retinal hole OS, operculated     Horseshoe tear of retina without detachment OD     History  of basal cell carcinoma     Seborrhea     AK (actinic keratosis)     Malignant neoplasm of prostate (H)      PVD (posterior vitreous detachment)     Amaurosis fugax by Hx neg carotid W/U     Advanced directives, counseling/discussion     Actinic keratosis     Peripheral neuropathy     Nuclear sclerosis of both eyes     Essential hypertension with goal blood pressure less than 140/90     Gastroesophageal reflux disease without esophagitis     Steroid-induced diabetes mellitus, initial encounter (H)     Stage 3a chronic kidney disease (H)     Secondary adrenocortical insufficiency (H)     Physical deconditioning     NPH (normal pressure hydrocephalus) (H)     Myoclonus     Infection due to 2019 novel coronavirus     Encephalopathy     Dementia without behavioral disturbance, unspecified dementia type     Idiopathic normal pressure hydrocephalus (H)     Acute atopic conjunctivitis     Cancer of the skin, basal cell     COVID-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     Degeneration of intervertebral disc     Diverticulitis of intestine     Dysphagia     Gait instability     Hip pain, acute, left     History of osteoporosis     Kidney disorder     Pneumonia due to COVID-19 virus     Restless leg syndrome due to iron deficiency anemia     Sepsis due to COVID-19 (H)     Tear of medial cartilage or meniscus of knee, current     Urinary frequency     Vitamin D deficiency     Weakness of both lower extremities     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)     Past Medical History:   Diagnosis Date     Actinic keratosis      AK (actinic keratosis) 11/15/2012     Amaurosis fugax      Basal cell carcinoma 2009    R medial cheek/nose     Central serous retinopathy left    Eye     Diverticulosis 08/2006     DJD (degenerative joint disease)      GERD (gastroesophageal reflux disease)      Hearing loss     High frequency     History of nonmelanoma skin cancer      History of nonmelanoma skin cancer      HTN (hypertension)      Hyperlipidemia LDL goal < 130      Hyperplastic colon polyp 08/2006     IgA monoclonal gammopathy     IgA kappa     Infection due to  2019 novel coronavirus 10/13/2022     Infection due to 2019 novel coronavirus 11/2021     Lattice degeneration of retina right    Eye     Macular degeneration      Nonsenile cataract      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 on close of this encounter.    Again, thank you for allowing me to participate in the care of your patient.        Sincerely,        Brooklyn Treviño MD

## 2023-04-20 NOTE — NURSING NOTE
Gustavo Ramon's chief complaint for this visit includes:  Chief Complaint   Patient presents with     Skin Check     FBSE: left anterior shoulder, left ear, and left upper lip are areas of concern. Hx of NMSC.     PCP: Winston Silverman    Referring Provider:  No referring provider defined for this encounter.    There were no vitals taken for this visit.  No Pain (0)        Allergies   Allergen Reactions     Mycophenolate Other (See Comments)     Confusion, abnormal movements         Do you need any medication refills at today's visit? No    Kylee Jon CMA

## 2023-04-20 NOTE — PATIENT INSTRUCTIONS
Patient Education     Checking for Skin Cancer  You can find cancer early by checking your skin each month. There are 3 kinds of skin cancer. They are melanoma, basal cell carcinoma, and squamous cell carcinoma. Doing monthly skin checks is the best way to find new marks or skin changes. Follow the instructions below for checking your skin.   The ABCDEs of checking moles for melanoma   Check your moles or growths for signs of melanoma using ABCDE:   Asymmetry: the sides of the mole or growth don t match  Border: the edges are ragged, notched, or blurred  Color: the color within the mole or growth varies  Diameter: the mole or growth is larger than 6 mm (size of a pencil eraser)  Evolving: the size, shape, or color of the mole or growth is changing (evolving is not shown in the images below)    Checking for other types of skin cancer  Basal cell carcinoma or squamous cell carcinoma have symptoms such as:     A spot or mole that looks different from all other marks on your skin  Changes in how an area feels, such as itching, tenderness, or pain  Changes in the skin's surface, such as oozing, bleeding, or scaliness  A sore that does not heal  New swelling or redness beyond the border of a mole    Who s at risk?  Anyone can get skin cancer. But you are at greater risk if you have:   Fair skin, light-colored hair, or light-colored eyes  Many moles or abnormal moles on your skin  A history of sunburns from sunlight or tanning beds  A family history of skin cancer  A history of exposure to radiation or chemicals  A weakened immune system  If you have had skin cancer in the past, you are at risk for recurring skin cancer.   How to check your skin  Do your monthly skin checkups in front of a full-length mirror. Check all parts of your body, including your:   Head (ears, face, neck, and scalp)  Torso (front, back, and sides)  Arms (tops, undersides, upper, and lower armpits)  Hands (palms, backs, and fingers,  including under the nails)  Buttocks and genitals  Legs (front, back, and sides)  Feet (tops, soles, toes, including under the nails, and between toes)  If you have a lot of moles, take digital photos of them each month. Make sure to take photos both up close and from a distance. These can help you see if any moles change over time.   Most skin changes are not cancer. But if you see any changes in your skin, call your doctor right away. Only he or she can diagnose a problem. If you have skin cancer, seeing your doctor can be the first step toward getting the treatment that could save your life.   [email or web address] last reviewed this educational content on 4/1/2019 2000-2020 [employer]. [address] [place] [address], [place] [address]. All rights reserved. This information is not intended as a substitute for professional medical care. Always follow your healthcare professional's instructions.       When should I call my doctor?  If you are worsening or not improving, please, contact us or seek urgent care as noted below.     Who should I call with questions (adults)?  [facility] (adult and pediatric): [phone]  [facility] (adult): [phone]  For urgent needs outside of business hours call the [facility] at [phone] and ask for the dermatology resident on call to be paged  If this is a medical emergency and you are unable to reach an ER, Call [phone]    Who should I call with questions (pediatric)?  [facility]- Pediatric Dermatology  Dr. Phoebe Eddy, Dr. Sukhi Delaney, Dr. Carmella Perales, KAZ Siegel, Dr. Ashley Avila, Dr. Liz Alexandra & Dr. Joe English  Non-urgent nurse triage line; [phone]- Regla and Lauren AMBROSE Care Coordinatorash Braxton (/Complex ) [phone]    If you need a prescription refill, please contact your pharmacy. Refills are approved or denied by  our Physicians during normal business hours, Monday through Fridays  Per office policy, refills will not be granted if you have not been seen within the past year (or sooner depending on your child's condition)    Scheduling Information:  Pediatric Appointment Scheduling and Call Center [phone]  Radiology Scheduling- [phone]  Sedation Unit Scheduling- [phone]  [facility] Scheduling- General [phone]; Pediatric Dermatology [phone]  Main  Services: [phone]  Palestinian: [phone]  Citizen of the [place]: [phone]  Hmong/Welsh/Albanian: [phone]  Preadmission Nursing Department Fax Number: [phone] (Fax all pre-operative paperwork to this number)    For urgent matters arising during evenings, weekends, or holidays that cannot wait for normal business hours please call [phone] and ask for the dermatology resident on call to be paged.     For the body, start vanicream twice daily from the neck down.

## 2023-04-20 NOTE — PROGRESS NOTES
[facility] Dermatology Note  Encounter Date: Apr 20, 2023  Office Visit     Dermatology Problem List:  1. NMSC  -2SCC  pending mohs, left preauricular cheek and R scaphoid fossa  - BCC nodular and infiltrating, left nasal side wall, MMS 9/24/20  - BCC, Right upper arm, s/p ED&C 8/27/2020  - BCC, Left upper back, s/p ED&C 8/27/2020  - SCCIS, left infraorbital, s/p MMS 9/6/18  - BCC, right elbow, s/p ED & C 1/12/16  - BCC, left forearm, s/p excision 1/12/16  - BCC, right posterior shoulder and left posterior shoulder, s/p Aldara 11/2015  - BCC, right side of nose per pathology, (right medial cheek per Dr. Christiansen's note 11/21/11), s/p excision 5/12/09   2. Actinic keratoses  - R tragus s/p LN2 3/13/23; s/p biopsy 9/23/22  - s/p Efudex 2015, Fall 2020 to face, Spring 2021 to forearms  - s/p PDT (x3)  - s/p LN2  3. Seborrheic dermatitis  - clobetasol 0.05% solution for scalp, triam 0.1% cream for ears  4. Relevant medical history: MGUS, myasthenia gravis currently on prednisone  5. Tinea cruris  - current tx: ketoconazole cream  6. 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 Lipoma L bicep, s/p biopsy 9/23/22     Social history: The patient is retired from working as an . He has a daughter and son. He is Gambian.  Family history: Negative for skin cancer  ____________________________________________    Assessment & Plan:    # HX of NMSC  -Recommend sunscreens SPF #30 or greater, protective clothing and avoidance of tanning beds.  -pending mohs right scaphoid fossa and left preauricular cheek for SCC         # Tinea cruris. Chronic, improved and nearly clear but now on feet   - Apply ketoconazole cream BID until resolved , add to feet and lower legs for ketoconazole cream     # Immunosuppressed with prednisone for myasthenia gravis. Discussed increased risk of skin cancers with immunosuppressing medications.       #left conchal bowl is CNH- trial of cryo today    #Left anterior  shoulder- SK    #Left upper lip well healed surgical scar-  -Recheck at follow up      Procedures Performed:   - Cryotherapy procedure note, location(s): temples, scalp, forehead cheeks and the CNH on left ear . After verbal consent and discussion of risks and benefits including, but not limited to, dyspigmentation/scar, blister, and pain, 18 lesion(s) was(were) treated with 1-2 mm freeze border for 1-2 cycles with liquid nitrogen. Post cryotherapy instructions were provided.       Follow-up: for mohs and then 6 months skin check  Staff    Brooklyn Treviño MD    Department of Dermatology  [facility]: Phone: [phone], Fax:[phone]  [facility]: Phone: [phone], Fax: [phone]      ____________________________________________    CC: Skin Check (FBSE: left anterior shoulder, left ear, and left upper lip are areas of concern. Hx of NMSC.)    HPI:  Mr. Gustavo Ramon is a(n) 82 year old male who presents today as a return patient for skin spots as above    Wants scars checked    Pending mohs    Patient is otherwise feeling well, without additional skin concerns.    Labs Reviewed:  A(1). Skin, Right scaphoid fossa, shave:  - Superficially invasive squamous cell carcinoma - (see description)       B(2). Skin, Left conchal bowl, shave:  - Chondrodermatitis nodularis helicis - (see description)      C(3). Skin, Left preauricular cheek, shave:  - Squamous cell carcinoma, poorly differentiated - (see comment)    Physical Exam:  Vitals: There were no vitals taken for this visit.  SKIN: Total skin excluding the undergarment areas was performed. The exam included the head/face, neck, both arms, chest, back, abdomen, both legs, digits and/or nails.  Normal groin exam  - well healed scars at sites of prior skin cancers     Left anterior shoulder stuck on papule left anterior shoulder   Left  upper lip well healed surgical scar  - There are erythematous macules with overyling adherent scale on the forehead, cheeks, scalp    Left ear with well healing biopsy scar at conchal bowl     - No other lesions of concern on areas examined.     Medications:  Current Outpatient Medications   Medication     acetaminophen (TYLENOL) 325 MG tablet     aspirin (ASA) 325 MG tablet     cyanocobalamin (VITAMIN B-12) 1000 MCG tablet     ferrous sulfate (FEROSUL) 325 (65 Fe) MG tablet     gabapentin (NEURONTIN) 300 MG capsule     levETIRAcetam (KEPPRA) 250 MG tablet     methocarbamol (ROBAXIN) 500 MG tablet     multivitamin (OCUVITE) TABS tablet     pantoprazole (PROTONIX) 40 MG EC tablet     polyethylene glycol (MIRALAX) 17 GM/Dose powder     pramipexole (MIRAPEX) 0.25 MG tablet     predniSONE (DELTASONE) 10 MG tablet     pyridostigmine (MESTINON) 60 MG tablet     senna-docusate (SENOKOT-S/PERICOLACE) 8.6-50 MG tablet     simvastatin (ZOCOR) 20 MG tablet     tamsulosin (FLOMAX) 0.4 MG capsule     triamcinolone (KENALOG) 0.1 % external cream     vitamin D3 (CHOLECALCIFEROL) 2000 units tablet     ACE/ARB/ARNI NOT PRESCRIBED (INTENTIONAL)     No current facility-administered medications for this visit.      Past Medical History:   Patient Active Problem List   Diagnosis     Rosacea     DJD (degenerative joint disease)     Ocular myasthenia gravis (H)     Macular pigment deposit     HYPERLIPIDEMIA LDL GOAL <130     MGUS (monoclonal gammopathy of unknown significance)     Retinal hole OS, operculated     Horseshoe tear of retina without detachment OD     History  of basal cell carcinoma     Seborrhea     AK (actinic keratosis)     Malignant neoplasm of prostate (H)     PVD (posterior vitreous detachment)     Amaurosis fugax by Hx neg carotid W/U     Advanced directives, counseling/discussion     Actinic keratosis     Peripheral neuropathy     Nuclear sclerosis of both eyes     Essential hypertension with goal blood pressure less  than 140/90     Gastroesophageal reflux disease without esophagitis     Steroid-induced diabetes mellitus, initial encounter (H)     Stage 3a chronic kidney disease (H)     Secondary adrenocortical insufficiency (H)     Physical deconditioning     NPH (normal pressure hydrocephalus) (H)     Myoclonus     Infection due to 2019 novel coronavirus     Encephalopathy     Dementia without behavioral disturbance, unspecified dementia type     Idiopathic normal pressure hydrocephalus (H)     Acute atopic conjunctivitis     Cancer of the skin, basal cell     COVID-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     Degeneration of intervertebral disc     Diverticulitis of intestine     Dysphagia     Gait instability     Hip pain, acute, left     History of osteoporosis     Kidney disorder     Pneumonia due to COVID-19 virus     Restless leg syndrome due to iron deficiency anemia     Sepsis due to COVID-19 (H)     Tear of medial cartilage or meniscus of knee, current     Urinary frequency     Vitamin D deficiency     Weakness of both lower extremities     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)     Past Medical History:   Diagnosis Date     Actinic keratosis      AK (actinic keratosis) 11/15/2012     Amaurosis fugax      Basal cell carcinoma 2009    R medial cheek/nose     Central serous retinopathy left    Eye     Diverticulosis 08/2006     DJD (degenerative joint disease)      GERD (gastroesophageal reflux disease)      Hearing loss     High frequency     History of nonmelanoma skin cancer      History of nonmelanoma skin cancer      HTN (hypertension)      Hyperlipidemia LDL goal < 130      Hyperplastic colon polyp 08/2006     IgA monoclonal gammopathy     IgA kappa     Infection due to 2019 novel coronavirus 10/13/2022     Infection due to 2019 novel coronavirus 11/2021     Lattice degeneration of retina right    Eye     Macular degeneration      Nonsenile cataract      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 on close of this encounter.

## 2023-04-24 ENCOUNTER — OFFICE VISIT (OUTPATIENT)
Dept: NEUROLOGY | Facility: CLINIC | Age: 83
End: 2023-04-24
Payer: MEDICARE

## 2023-04-24 VITALS — SYSTOLIC BLOOD PRESSURE: 159 MMHG | OXYGEN SATURATION: 97 % | DIASTOLIC BLOOD PRESSURE: 91 MMHG | HEART RATE: 84 BPM

## 2023-04-24 DIAGNOSIS — T38.0X5A STEROID-INDUCED OSTEOPOROSIS: Primary | ICD-10-CM

## 2023-04-24 DIAGNOSIS — M47.12 CERVICAL SPONDYLOSIS WITH MYELOPATHY: ICD-10-CM

## 2023-04-24 DIAGNOSIS — G25.81 RESTLESS LEG SYNDROME: ICD-10-CM

## 2023-04-24 DIAGNOSIS — G70.00 OCULAR MYASTHENIA (H): ICD-10-CM

## 2023-04-24 DIAGNOSIS — M81.8 STEROID-INDUCED OSTEOPOROSIS: Primary | ICD-10-CM

## 2023-04-24 DIAGNOSIS — G91.2 NPH (NORMAL PRESSURE HYDROCEPHALUS) (H): ICD-10-CM

## 2023-04-24 PROCEDURE — 99214 OFFICE O/P EST MOD 30 MIN: CPT | Mod: GC | Performed by: PSYCHIATRY & NEUROLOGY

## 2023-04-24 ASSESSMENT — PAIN SCALES - GENERAL: PAINLEVEL: MILD PAIN (2)

## 2023-04-24 NOTE — PROGRESS NOTES
[facility], [place]   Neuromuscular Clinic Progress Note  Gustavo Ramon  [id number]  04/24/2023    Subjective:   Gustavo Ramon is a 82 year old man with past medical history including ocular predominant myasthenia gravis, cervical stenosis, NPH s/p  shunt, restless legs, monoclonal IgA kappa gammopathy who presents for follow up.  He presented with his wife who assisted with providing history.      He has predominantly ocular myasthenia gravis diagnosed in 2009 with single-fiber EMG.  Acetylcholine antibody status is unknown.  Current treatment includes prednisone 10 mg daily and pyridostigmine 90 mg twice daily.  He has not experienced diplopia in several years.  He endorses constant ptosis, for which he will be getting bilateral blepharoplasty surgery on 5/10/2023.  He denies worsening ptosis with fatigue.  He denies respiratory impairment, denies difficulty with chewing or swallowing.  He denies difficulty brushing teeth or combing hair.  Ability to get up from a chair is limited due to comorbidities, not specifically attributed to fatigue.    He was diagnosed with normal pressure hydrocephalus in March 2022 with ventriculomegaly present on brain MRI in the setting of subacute cognitive impairment and worsening gait with imbalance and freezing.  He underwent  shunt placement in July 2022.  This was followed by gradual improvement in gait, but he continued to experience proximal leg weakness.  There was further concern for myelopathy given brisk reflexes at last appointment in December 2022.  He underwent MRI cervical spine December 2022 which demonstrated significant cervical spinal stenosis, and C5-7 spinal canal laminoplasty was performed on 12/12/2022.  He reports improvement in hand function after the surgery with somewhat improved ability to sense objects with his fingertips.  He has experienced variable ambulatory stability without any significant declines in  ambulatory ability.  He continues to utilize a walker for ambulation.  He is currently attending physical therapy to address gait.  He will also be starting upper body therapy on Thursday at [facility].    Regarding restless leg syndrome, recent laboratory studies do not reveal iron deficiency.  He was on pramipexole in the past but then developed augmentation with symptoms occurring earlier in the morning and becoming more severe.  He was then switched to gabapentin, current prescribed dose is 300 mg 3 times daily.    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  His family has reassured him that they are able to transport him as needed.    Objective    BP (!) 159/91 (BP Location: Right arm, Patient Position: Chair, Cuff Size: Adult Regular)   Pulse 84   SpO2 97%   General: pt sitting comfortably in room not in acute distress  HEENT: no icterus   Chest: no respiratory distress  Ext: Bilateral pitting lower extremity edema  Neuro:   -MS: alert, awake, speech fluent and coherent.  Able to relate history well.  -CN: Bilateral ptosis present without worsening upon 60 second prolonged upgaze, no diplopia with 60 second upgaze, extraocular movements intact, facial sensation intact, strong orbicularis oculi, strong orbicularis oris, hearing intact to conversation, palate raises symmetrically, shoulder shrug strong, tongue midline  -Motor: Paratonia present, bulk normal.  Postural tremor present.   Right Left   Neck flexion 5    Neck extension: 5    Shoulder abduction:  5 5   Elbow Flexion: 5 5   Elbow Extension:  5 5   Wrist Extension:  5 5   Finger Extension:  5 5   FDI 5 5   Thumb abduction 5 5   Finger Flexion 5 5   Wrist Flexion 5 5   Hip Flexion 5 5   Knee Extension 5 5   Knee Flexion 5 5   Dorsiflexion 5 5   Plantar flexion 5 5   Toe extension 5 5     -Reflexes: 3+ bilateral biceps, triceps, brachioradialis, patellar.  Trace bilateral Achilles.   -Sensory: intact to light touch in  "all extremities. Vibration 2 to 3 seconds at toes, 6 seconds right ankle, 5 seconds left ankle, intact at fingers.  -Coordination: intact to FNF  -Gait: Stance stable.  Cautious wide-based gait with short shuffling stride.  10-step turn.    Investigations    A1C 6.2% on 12/5/2022    Ferritin 46.7 on 2/2/2023    MRI cervical spine 12/7/2022:  \"Impression:   1. Marked cervical spondylosis, with severe spinal canal stenosis at  C5-C6 and moderate at C6-C7, with myelopathic T2 hyperintense cord  signal at C5-C6.  2. Extensive uncovertebral joint spurring and facet arthropathy with  multilevel lower cervical predominant severe left, moderate right  neural foraminal stenosis as above.\"    Impression:  Gustavo Ramon is a 82 year old male with past medical history including ocular myasthenia gravis, cervical stenosis s/p spinal canal laminoplasty, NPH s/p  shunt, and restless legs who presents for follow-up.  For myasthenia gravis, it appears well controlled on prednisone 10 mg daily and pyridostigmine 90 mg twice daily.  We will continue to monitor for prednisone side effects.  Restless legs syndrome also appears to be well controlled at this time.  He will continue current gabapentin use and decrease pramipexole from TID to evening dose only.      Plan:   - Continue prednisone 10 mg daily    - Obtain updated DEXA scan, last obtained 2/2022    - Continue to monitor A1c on a yearly basis, last measured 12/2022  - Continue pyridostigmine 90 mg twice daily  - Decrease pramipexole to evening dose only (0.25 mg)  - Continue gabapentin 300 mg 3 times daily  - Follow-up in 6 months    Patient was seen and discussed with attending neurologist, Dr. Wheat, who agrees with above.    Maxine Love MD  CNP Fellow    Attending addendum: Patient was seen and examined with fellow Dr. Love at the [facility] clinic on April 24, 2023.  I agree with her impression and plan.  Billing is [facility] level 4 (moderate) based on: #1 problems " assessed: Three or more stable chronic conditions (ocular myasthenia gravis, restless leg syndrome, NPH, cervical spondylosis with myelopathy) and #2 risk: Prescription drug management, see above.  It should be noted that the patient needs annual DEXA scan for osteoporosis, as he is chronically on oral prednisone for myasthenia gravis, and this treatment can induce accelerated bone loss.  DEXA scan every 2 years is not appropriate for this population.    Tyler Wheat MD, FAAN

## 2023-04-24 NOTE — LETTER
4/24/2023       RE: Gustavo Ramon  [address] [facility] [id number]     Dear Colleague,    Thank you for referring your patient, Gustavo Ramon, to the [facility]UR[facility] [place] at [facility]. Please see a copy of my visit note below.    [facility], [place]   Neuromuscular Clinic Progress Note  Gustavo Ramon  [id number]  04/24/2023    Subjective:   Gustavo Ramon is a 82 year old man with past medical history including ocular predominant myasthenia gravis, cervical stenosis, NPH s/p  shunt, restless legs, monoclonal IgA kappa gammopathy who presents for follow up.  He presented with his wife who assisted with providing history.      He has predominantly ocular myasthenia gravis diagnosed in 2009 with single-fiber EMG.  Acetylcholine antibody status is unknown.  Current treatment includes prednisone 10 mg daily and pyridostigmine 90 mg twice daily.  He has not experienced diplopia in several years.  He endorses constant ptosis, for which he will be getting bilateral blepharoplasty surgery on 5/10/2023.  He denies worsening ptosis with fatigue.  He denies respiratory impairment, denies difficulty with chewing or swallowing.  He denies difficulty brushing teeth or combing hair.  Ability to get up from a chair is limited due to comorbidities, not specifically attributed to fatigue.    He was diagnosed with normal pressure hydrocephalus in March 2022 with ventriculomegaly present on brain MRI in the setting of subacute cognitive impairment and worsening gait with imbalance and freezing.  He underwent  shunt placement in July 2022.  This was followed by gradual improvement in gait, but he continued to experience proximal leg weakness.  There was further concern for myelopathy given brisk reflexes at last appointment in December 2022.  He underwent MRI cervical spine December 2022 which  demonstrated significant cervical spinal stenosis, and C5-7 spinal canal laminoplasty was performed on 12/12/2022.  He reports improvement in hand function after the surgery with somewhat improved ability to sense objects with his fingertips.  He has experienced variable ambulatory stability without any significant declines in ambulatory ability.  He continues to utilize a walker for ambulation.  He is currently attending physical therapy to address gait.  He will also be starting upper body therapy on Thursday at [facility].    Regarding restless leg syndrome, recent laboratory studies do not reveal iron deficiency.  He was on pramipexole in the past but then developed augmentation with symptoms occurring earlier in the morning and becoming more severe.  He was then switched to gabapentin, current prescribed dose is 300 mg 3 times daily.    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  His family has reassured him that they are able to transport him as needed.    Objective    BP (!) 159/91 (BP Location: Right arm, Patient Position: Chair, Cuff Size: Adult Regular)   Pulse 84   SpO2 97%   General: pt sitting comfortably in room not in acute distress  HEENT: no icterus   Chest: no respiratory distress  Ext: Bilateral pitting lower extremity edema  Neuro:   -MS: alert, awake, speech fluent and coherent.  Able to relate history well.  -CN: Bilateral ptosis present without worsening upon 60 second prolonged upgaze, no diplopia with 60 second upgaze, extraocular movements intact, facial sensation intact, strong orbicularis oculi, strong orbicularis oris, hearing intact to conversation, palate raises symmetrically, shoulder shrug strong, tongue midline  -Motor: Paratonia present, bulk normal.  Postural tremor present.   Right Left   Neck flexion 5    Neck extension: 5    Shoulder abduction:  5 5   Elbow Flexion: 5 5   Elbow Extension:  5 5   Wrist Extension:  5 5   Finger Extension:  5 5  "  FDI 5 5   Thumb abduction 5 5   Finger Flexion 5 5   Wrist Flexion 5 5   Hip Flexion 5 5   Knee Extension 5 5   Knee Flexion 5 5   Dorsiflexion 5 5   Plantar flexion 5 5   Toe extension 5 5     -Reflexes: 3+ bilateral biceps, triceps, brachioradialis, patellar.  Trace bilateral Achilles.   -Sensory: intact to light touch in all extremities. Vibration 2 to 3 seconds at toes, 6 seconds right ankle, 5 seconds left ankle, intact at fingers.  -Coordination: intact to FNF  -Gait: Stance stable.  Cautious wide-based gait with short shuffling stride.  10-step turn.    Investigations    A1C 6.2% on 12/5/2022    Ferritin 46.7 on 2/2/2023    MRI cervical spine 12/7/2022:  \"Impression:   1. Marked cervical spondylosis, with severe spinal canal stenosis at  C5-C6 and moderate at C6-C7, with myelopathic T2 hyperintense cord  signal at C5-C6.  2. Extensive uncovertebral joint spurring and facet arthropathy with  multilevel lower cervical predominant severe left, moderate right  neural foraminal stenosis as above.\"    Impression:  Gustavo Ramon is a 82 year old male with past medical history including ocular myasthenia gravis, cervical stenosis s/p spinal canal laminoplasty, NPH s/p  shunt, and restless legs who presents for follow-up.  For myasthenia gravis, it appears well controlled on prednisone 10 mg daily and pyridostigmine 90 mg twice daily.  We will continue to monitor for prednisone side effects.  Restless legs syndrome also appears to be well controlled at this time.  He will continue current gabapentin use and decrease pramipexole from TID to evening dose only.      Plan:   - Continue prednisone 10 mg daily    - Obtain updated DEXA scan, last obtained 2/2022    - Continue to monitor A1c on a yearly basis, last measured 12/2022  - Continue pyridostigmine 90 mg twice daily  - Decrease pramipexole to evening dose only (0.25 mg)  - Continue gabapentin 300 mg 3 times daily  - Follow-up in 6 months    Patient was seen " and discussed with attending neurologist, Dr. Wheat, who agrees with above.    Maxine Love MD  CNP Fellow    Attending addendum: Patient was seen and examined with fellow Dr. Love at the [facility] clinic on April 24, 2023.  I agree with her impression and plan.  Billing is [facility] level 4 (moderate) based on: #1 problems assessed: Three or more stable chronic conditions (ocular myasthenia gravis, restless leg syndrome, NPH, cervical spondylosis with myelopathy) and #2 risk: Prescription drug management, see above.  It should be noted that the patient needs annual DEXA scan for osteoporosis, as he is chronically on oral prednisone for myasthenia gravis, and this treatment can induce accelerated bone loss.  DEXA scan every 2 years is not appropriate for this population.        Again, thank you for allowing me to participate in the care of your patient.      Sincerely,    Tyler Wheat MD

## 2023-04-24 NOTE — PATIENT INSTRUCTIONS
Follow up 6 months  Bone density scan for osteoporosis, repeat  Continue current prednisone dose  Please stop the morning and noon doses of pramipexole (because your restless leg syndrome is quite well controlled now, and pramipexole taken in the morning can make people sleepy and increase the risk of falling)

## 2023-04-25 NOTE — ANESTHESIA POSTPROCEDURE EVALUATION
Patient: Gustavo Ramon    Procedure: Procedure(s):  POSTERIOR APPROACH Cervical 4-7 laminoplasty       Anesthesia Type:  General    Note:  Disposition: Inpatient   Postop Pain Control: Uneventful            Sign Out: Well controlled pain   PONV: No   Neuro/Psych: Uneventful            Sign Out: Acceptable/Baseline neuro status   Airway/Respiratory: Uneventful            Sign Out: Acceptable/Baseline resp. status   CV/Hemodynamics: Uneventful            Sign Out: Acceptable CV status; No obvious hypovolemia; No obvious fluid overload   Other NRE: NONE   DID A NON-ROUTINE EVENT OCCUR? No           Last vitals:  Vitals Value Taken Time   /102 12/12/22 2000   Temp 36.1  C (97  F) 12/12/22 1930   Pulse 84 12/12/22 2030   Resp 16 12/12/22 2030   SpO2 92 % 12/12/22 2030       Electronically Signed By: Glenn Schofield MD  April 25, 2023  2:23 PM

## 2023-05-23 ENCOUNTER — TELEPHONE (OUTPATIENT)
Dept: DERMATOLOGY | Facility: CLINIC | Age: 83
End: 2023-05-23

## 2023-05-23 ENCOUNTER — OFFICE VISIT (OUTPATIENT)
Dept: DERMATOLOGY | Facility: CLINIC | Age: 83
End: 2023-05-23
Payer: MEDICARE

## 2023-05-23 VITALS — HEART RATE: 78 BPM | SYSTOLIC BLOOD PRESSURE: 145 MMHG | DIASTOLIC BLOOD PRESSURE: 76 MMHG

## 2023-05-23 DIAGNOSIS — C44.222 SCC (SQUAMOUS CELL CARCINOMA), EAR, RIGHT: ICD-10-CM

## 2023-05-23 DIAGNOSIS — C44.329 SQUAMOUS CELL CARCINOMA OF SKIN OF LEFT CHEEK: Primary | ICD-10-CM

## 2023-05-23 PROCEDURE — 12052 INTMD RPR FACE/MM 2.6-5.0 CM: CPT | Mod: GC | Performed by: DERMATOLOGY

## 2023-05-23 PROCEDURE — 17311 MOHS 1 STAGE H/N/HF/G: CPT | Mod: GC | Performed by: DERMATOLOGY

## 2023-05-23 ASSESSMENT — PAIN SCALES - GENERAL: PAINLEVEL: NO PAIN (0)

## 2023-05-23 NOTE — NURSING NOTE
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     Delia ANDRADE RN

## 2023-05-23 NOTE — PROGRESS NOTES
[facility] Mohs Surgery Procedure Note    Dermatology Problem List:  1. 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 Christiasnen's note 11/21/11), s/p excision 5/12/09   2. Actinic keratoses  - R tragus s/p LN2 3/13/23; s/p biopsy 9/23/22  - s/p Efudex 2015, Fall 2020 to face, Spring 2021 to forearms  - s/p PDT (x3)  - s/p LN2  3. Seborrheic dermatitis  - clobetasol 0.05% solution for scalp, triam 0.1% cream for ears  4. Relevant medical history: MGUS, myasthenia gravis currently on prednisone  5. Tinea cruris  - current tx: ketoconazole cream  6. 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 Lipoma L bicep, s/p biopsy 9/23/22     Social history: The patient is retired from working as an . He has a daughter and son. He is [place].  Family history: Negative for skin cancer   _________________________________________________    Case #: 1  Date of Service:  May 23, 2023  Surgery: Mohs micrographic surgery (MMS)  Staff surgeon: Shashank Victor DO  Fellow surgeon: Winston Abdul MD  Resident surgeon: Erin Yang MD  Nurse: Jeanette Heredai CMA    Tumor Type: SCC  Location: Left preauricular cheek  Derm-Path Accession #: [id number]      Mohs Accession #:   Pre-Op Size: 1.0 cm x 0.7 cm  Final Defect Size: 2.0 cm x 1.7 cm  Number of Mohs stages: 1  Level of Defect: Fat  Local anesthetic: 9 mL 1% lidocaine with epinephrine  Repair Type: Intermediate linear  Repair Size: 4.0 cm  Suture Material: 4-0 Monocryl; 5-0 fast  absorbing gut    Procedure:    Stage I  We discussed the principles of treatment and most likely complications including scarring, bleeding, infection, swelling, pain, crusting, nerve damage, large wound,  incomplete excision, wound dehiscence,  nerve damage, recurrence, and a second procedure may be recommended to obtain the best cosmetic or functional result.    Informed consent was obtained and the patient underwent the procedure as follows:  The patient was placed supine on the operating table.  The cancer was identified, outlined with a marker, and verified by the patient.  The entire surgical field was prepped with chlorhexidine.  The surgical site was anesthetized using lidocaine with epinephrine.    The area of clinically apparent tumor was debulked. The layer of tissue was then surgically excised using a #15 blade and was then transferred onto a specimen sheet maintaining the orientation of the specimen. Hemostasis was obtained using electrocoagulation. The wound site was then covered with a dressing while the tissue samples were processed for examination.    The excised tissue was transported to the Mohs histology laboratory maintaining the tissue orientation.  The tissue specimen was relaxed so that the entire surgical margin was in a a single horizontal plane for sectioning and inked for precise mapping.  A precise reference map was drawn to reflect the sectioning of the specimen, colored inking of the margins, and orientation on the patient.  The tissue was processed using horizontal sectioning of the base and continuous peripheral margins.  The histopathologic sections were reviewed in conjunction with the reference map.    Total blocks: 1  Total slides: 2    There were no cancer cells visualized on examination, therefore Mohs surgery was complete.    REPAIR: An intermediate layered linear closure was selected as the procedure which would maximally preserve both function and cosmesis.    After the  excision of the tumor, the area was carefully undermined. Hemostasis was obtained with electrocoagulation.  Closure was oriented so that the wound was parallel to the patient's relaxed skin tension lines.   The deep subcutaneous and dermal layers were then closed with 4-0 Monocryl sutures. The epidermis was then carefully approximated along the length of the wound using 5-0 fast absorbing gut simple running sutures.     Estimated blood loss was less than 10 ml for all surgical sites. A sterile pressure dressing was applied and wound care instructions, with a written handout, were given. The patient was discharged from the [facility] Center alert and ambulatory.    Follow-up for suture removal: Not applicable as only dissolving sutures used    Shashank Victor DO was immediately available for the entire surgery and was physicially present for the key portions of the procedure.    Dr. Winston Abdul (Mohs micrographic surgery fellow) performed the Mohs micrographic surgery and reconstruction under the direct supervision of Shashank Victor DO, who was present for the entire micrographic surgery and key portions of the reconstruction, and always immediately available.      Case #: 2  Date of Service:  May 23, 2023  Surgery: Mohs micrographic surgery (MMS)  Staff surgeon: Shashank Victor DO  Fellow surgeon: Winston Abdul MD  Resident surgeon: Erin Yang MD  Nurse: Jeanette Heredia CMA    Tumor Type: SCC  Location: Right scaphoid fossa  Derm-Path Accession #: [id number]    Mohs Accession #:   Pre-Op Size: 1.0 cm x 0.8 cm  Final Defect Size: 1.3 cm x 1.3 cm  Number of Mohs stages: 1  Level of Defect: Perichondrium  Local anesthetic: 9 mL 1% lidocaine with epinephrine  Repair Type: Intermediate linear  Repair Size: 1.0 cm  Suture Material: 4-0 Monocryl    Procedure:    Stage I  We discussed the principles of treatment and most likely complications including scarring, bleeding, infection, swelling, pain, crusting,  nerve damage, large wound,  incomplete excision, wound dehiscence,  nerve damage, recurrence, and a second procedure may be recommended to obtain the best cosmetic or functional result.    Informed consent was obtained and the patient underwent the procedure as follows:  The patient was placed supine on the operating table.  The cancer was identified, outlined with a marker, and verified by the patient.  The entire surgical field was prepped with chlorhexidine.  The surgical site was anesthetized using lidocaine with epinephrine.    The area of clinically apparent tumor was debulked. The layer of tissue was then surgically excised using a #15 blade and was then transferred onto a specimen sheet maintaining the orientation of the specimen. Hemostasis was obtained using electrocoagulation. The wound site was then covered with a dressing while the tissue samples were processed for examination.    The excised tissue was transported to the Mohs histology laboratory maintaining the tissue orientation.  The tissue specimen was relaxed so that the entire surgical margin was in a a single horizontal plane for sectioning and inked for precise mapping.  A precise reference map was drawn to reflect the sectioning of the specimen, colored inking of the margins, and orientation on the patient.  The tissue was processed using horizontal sectioning of the base and continuous peripheral margins.  The histopathologic sections were reviewed in conjunction with the reference map.    Total blocks: 1  Total slides: 2    There were no cancer cells visualized on examination, therefore Mohs surgery was complete.    REPAIR: An intermediate linear closure was selected as the procedure which would maximally preserve both function and cosmesis.    After the excision of the tumor, the area was carefully undermined. Hemostasis was obtained with electrocoagulation. The deep subcutaneous and dermal layers were then directed medial so as to limit  distortion of free margin with scar contracture using buried vertical mattress 4-0 Monocryl sutures.    Estimated blood loss was less than 10 ml for all surgical sites. A sterile pressure dressing was applied and wound care instructions, with a written handout, were given. The patient was discharged from the [facility] Center alert and ambulatory.    Follow-up for suture removal: Not applicable as only dissolving sutures used    Shashank Victor DO was immediately available for the entire surgery and was physicially present for the key portions of the procedure.    Dr. Winston Abdul (Mohs micrographic surgery fellow) performed the Mohs micrographic surgery and reconstruction under the direct supervision of Shashank Victor DO, who was present for the entire micrographic surgery and key portions of the reconstruction, and always immediately available.    Winston Abdul MD  Dermatology, PGY-5  Mohs surgery fellow    Scribe Disclosure:  I, Spencer Jones, am serving as a scribe to document services personally performed by Shashank Victor DO based on data collection and the provider's statements to me.     Staff Physician Comments:   I saw and evaluated the patient with the Mohs Surgery Fellow (Dr. Winston Abdul) and I agree with the assessment and plan and the above description of the procedure as documented by the scribe. I was present for the key portions of the procedure and entire exam. I was immediately available in the clinic throughout the procedures.     Shashank Victor DO    Department of Dermatology  [facility]: Phone: [phone], Fax:[phone]  [facility]: Phone: [phone], Fax: [phone]    Care and Laboratory Testing Performed at:  [facility]   [facility]ics and Surgery Center - Dermatologic Surgery Clinic  [address] [place] [address]    Mail Code [id number]   [place] [address]

## 2023-05-23 NOTE — LETTER
5/23/2023       RE: Gustavo Ramon  [address] [facility] [address]     Dear Colleague,    Thank you for referring your patient, Gustavo Ramon, to the [facility] DERMATOLOGIC SURGERY CLINIC [place] [facility]. Please see a copy of my visit note below.    [facility] Mohs Surgery Procedure Note    Dermatology Problem List:  1. 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 Christiansen's note 11/21/11), s/p excision 5/12/09   2. Actinic keratoses  - R tragus s/p LN2 3/13/23; s/p biopsy 9/23/22  - s/p Efudex 2015, Fall 2020 to face, Spring 2021 to forearms  - s/p PDT (x3)  - s/p LN2  3. Seborrheic dermatitis  - clobetasol 0.05% solution for scalp, triam 0.1% cream for ears  4. Relevant medical history: MGUS, myasthenia gravis currently on prednisone  5. Tinea cruris  - current tx: ketoconazole cream  6. 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 Lipoma L bicep, s/p biopsy 9/23/22     Social history: The patient is retired from working as an . He has a daughter and son. He is [place].  Family history: Negative for skin cancer   _________________________________________________    Case #: 1  Date of Service:  May 23, 2023  Surgery: Mohs micrographic surgery (MMS)  Staff surgeon: Shashank Victor DO  Fellow surgeon: Winston Abdul MD  Resident surgeon: Erin Yang MD  Nurse: Jeanette Heredia CMA    Tumor Type: SCC  Location: Left  preauricular cheek  Derm-Path Accession #: [id number]      Mohs Accession #:   Pre-Op Size: 1.0 cm x 0.7 cm  Final Defect Size: 2.0 cm x 1.7 cm  Number of Mohs stages: 1  Level of Defect: Fat  Local anesthetic: 9 mL 1% lidocaine with epinephrine  Repair Type: Intermediate linear  Repair Size: 4.0 cm  Suture Material: 4-0 Monocryl; 5-0 fast absorbing gut    Procedure:    Stage I  We discussed the principles of treatment and most likely complications including scarring, bleeding, infection, swelling, pain, crusting, nerve damage, large wound,  incomplete excision, wound dehiscence,  nerve damage, recurrence, and a second procedure may be recommended to obtain the best cosmetic or functional result.    Informed consent was obtained and the patient underwent the procedure as follows:  The patient was placed supine on the operating table.  The cancer was identified, outlined with a marker, and verified by the patient.  The entire surgical field was prepped with chlorhexidine.  The surgical site was anesthetized using lidocaine with epinephrine.    The area of clinically apparent tumor was debulked. The layer of tissue was then surgically excised using a #15 blade and was then transferred onto a specimen sheet maintaining the orientation of the specimen. Hemostasis was obtained using electrocoagulation. The wound site was then covered with a dressing while the tissue samples were processed for examination.    The excised tissue was transported to the [facility] histology laboratory maintaining the tissue orientation.  The tissue specimen was relaxed so that the entire surgical margin was in a a single horizontal plane for sectioning and inked for precise mapping.  A precise reference map was drawn to reflect the sectioning of the specimen, colored inking of the margins, and orientation on the patient.  The tissue was processed using horizontal sectioning of the base and continuous peripheral margins.  The histopathologic  sections were reviewed in conjunction with the reference map.    Total blocks: 1  Total slides: 2    There were no cancer cells visualized on examination, therefore Mohs surgery was complete.    REPAIR: An intermediate layered linear closure was selected as the procedure which would maximally preserve both function and cosmesis.    After the excision of the tumor, the area was carefully undermined. Hemostasis was obtained with electrocoagulation.  Closure was oriented so that the wound was parallel to the patient's relaxed skin tension lines.   The deep subcutaneous and dermal layers were then closed with 4-0 Monocryl sutures. The epidermis was then carefully approximated along the length of the wound using 5-0 fast absorbing gut simple running sutures.     Estimated blood loss was less than 10 ml for all surgical sites. A sterile pressure dressing was applied and wound care instructions, with a written handout, were given. The patient was discharged from the Dermatologic Surgery Center alert and ambulatory.    Follow-up for suture removal: Not applicable as only dissolving sutures used    Shashank Victor DO was immediately available for the entire surgery and was physicially present for the key portions of the procedure.    Dr. Winston Abdul (Mohs micrographic surgery fellow) performed the Mohs micrographic surgery and reconstruction under the direct supervision of Shashank Victor DO, who was present for the entire micrographic surgery and key portions of the reconstruction, and always immediately available.      Case #: 2  Date of Service:  May 23, 2023  Surgery: Mohs micrographic surgery (MMS)  Staff surgeon: Shashank Victor DO  Fellow surgeon: Winston Abdul MD  Resident surgeon: Erin Yang MD  Nurse: Jeanette Heredia CMA    Tumor Type: SCC  Location: Right scaphoid fossa  Derm-Path Accession #: [id number]    Mohs Accession #:   Pre-Op Size: 1.0 cm x 0.8 cm  Final Defect Size: 1.3 cm x 1.3 cm  Number of Mohs  stages: 1  Level of Defect: Perichondrium  Local anesthetic: 9 mL 1% lidocaine with epinephrine  Repair Type: Intermediate linear  Repair Size: 1.0 cm  Suture Material: 4-0 Monocryl    Procedure:    Stage I  We discussed the principles of treatment and most likely complications including scarring, bleeding, infection, swelling, pain, crusting, nerve damage, large wound,  incomplete excision, wound dehiscence,  nerve damage, recurrence, and a second procedure may be recommended to obtain the best cosmetic or functional result.    Informed consent was obtained and the patient underwent the procedure as follows:  The patient was placed supine on the operating table.  The cancer was identified, outlined with a marker, and verified by the patient.  The entire surgical field was prepped with chlorhexidine.  The surgical site was anesthetized using lidocaine with epinephrine.    The area of clinically apparent tumor was debulked. The layer of tissue was then surgically excised using a #15 blade and was then transferred onto a specimen sheet maintaining the orientation of the specimen. Hemostasis was obtained using electrocoagulation. The wound site was then covered with a dressing while the tissue samples were processed for examination.    The excised tissue was transported to the Mohs histology laboratory maintaining the tissue orientation.  The tissue specimen was relaxed so that the entire surgical margin was in a a single horizontal plane for sectioning and inked for precise mapping.  A precise reference map was drawn to reflect the sectioning of the specimen, colored inking of the margins, and orientation on the patient.  The tissue was processed using horizontal sectioning of the base and continuous peripheral margins.  The histopathologic sections were reviewed in conjunction with the reference map.    Total blocks: 1  Total slides: 2    There were no cancer cells visualized on examination, therefore Mohs surgery  was complete.    REPAIR: An intermediate linear closure was selected as the procedure which would maximally preserve both function and cosmesis.    After the excision of the tumor, the area was carefully undermined. Hemostasis was obtained with electrocoagulation. The deep subcutaneous and dermal layers were then directed medial so as to limit distortion of free margin with scar contracture using buried vertical mattress 4-0 Monocryl sutures.    Estimated blood loss was less than 10 ml for all surgical sites. A sterile pressure dressing was applied and wound care instructions, with a written handout, were given. The patient was discharged from the Dermatologic Surgery Center alert and ambulatory.    Follow-up for suture removal: Not applicable as only dissolving sutures used    Shashank Victor DO was immediately available for the entire surgery and was physicially present for the key portions of the procedure.    Dr. Winston Abdul (Mohs micrographic surgery fellow) performed the Mohs micrographic surgery and reconstruction under the direct supervision of Shashank Victor DO, who was present for the entire micrographic surgery and key portions of the reconstruction, and always immediately available.    Winston Abdul MD  Dermatology, PGY-5  Mohs surgery fellow    Scribe Disclosure:  I, Spencer Jones, am serving as a scribe to document services personally performed by Shashank Victor DO based on data collection and the provider's statements to me.     Staff Physician Comments:   I saw and evaluated the patient with the Mohs Surgery Fellow (Dr. Winston Abdul) and I agree with the assessment and plan and the above description of the procedure as documented by the scribe. I was present for the key portions of the procedure and entire exam. I was immediately available in the clinic throughout the procedures.     Shashank Victor DO    Department of Dermatology  [facility]  [facility]: Phone: [phone], Fax:[phone]  [facility]: Phone: [phone], Fax: [phone]    Care and Laboratory Testing Performed at:  [facility]   Clinics and Surgery Center - Dermatologic Surgery Clinic  [address] [place] [address]   Mail Code [id number], [place] [address]

## 2023-05-23 NOTE — TELEPHONE ENCOUNTER
[facility] Call Center    Phone Message    May a detailed message be left on voicemail: no     Reason for Call: Other: Pt's wife, Ceci calling.  PtStephen had surgery today and would like to schedule a recheck- 2 weeks around June 6th.  Call Ceci at: [phone]     Action Taken: Message routed to:  [facility] ([facility]): [facility] D[facility]M [facility]    Travel Screening: Not Applicable

## 2023-05-23 NOTE — PATIENT INSTRUCTIONS

## 2023-05-23 NOTE — TELEPHONE ENCOUNTER
Called and spoke to Ceci. Scheduled pt for a follow up.     Madonna Downs, Procedure  5/23/2023 4:25 PM

## 2023-05-24 ENCOUNTER — TELEPHONE (OUTPATIENT)
Dept: DERMATOLOGY | Facility: CLINIC | Age: 83
End: 2023-05-24
Payer: MEDICARE

## 2023-05-24 NOTE — CONFIDENTIAL NOTE
Follow up call attempted following Mohs procedure with Dr. Victor       Are you having pain?  Are you taking pain medication?   Are you applying ice?    Have you had any noticeable bleeding through the bandage?    Do you have any other concerns?          Please call [phone] option 3 if you have any questions or concerns.

## 2023-05-30 ENCOUNTER — ANCILLARY PROCEDURE (OUTPATIENT)
Dept: BONE DENSITY | Facility: CLINIC | Age: 83
End: 2023-05-30
Attending: PSYCHIATRY & NEUROLOGY
Payer: MEDICARE

## 2023-05-30 DIAGNOSIS — T38.0X5A STEROID-INDUCED OSTEOPOROSIS: ICD-10-CM

## 2023-05-30 DIAGNOSIS — M81.8 STEROID-INDUCED OSTEOPOROSIS: ICD-10-CM

## 2023-05-30 PROCEDURE — 77080 DXA BONE DENSITY AXIAL: CPT | Performed by: INTERNAL MEDICINE

## 2023-05-31 ENCOUNTER — TELEPHONE (OUTPATIENT)
Dept: NEUROSURGERY | Facility: CLINIC | Age: 83
End: 2023-05-31
Payer: MEDICARE

## 2023-05-31 NOTE — TELEPHONE ENCOUNTER
[facility] Call Center    Phone Message    May a detailed message be left on voicemail: yes     Reason for Call: Pt's wife Ceci is requesting a call back to discuss the Pt's shunt and having an MRI and what they need to do so he can have the MRI.  Thanks.

## 2023-05-31 NOTE — TELEPHONE ENCOUNTER
Patient scheduling for MRI Lumbar spine with Ortho provider, explained he can have shunt check 24 hours after MRI, given good days for shunt check appointment.  Wife will callback tomorrow with options.   S/p  shunt placement on 7/7/2022 (Codman Certas at 4.0)

## 2023-06-01 ENCOUNTER — OFFICE VISIT (OUTPATIENT)
Dept: FAMILY MEDICINE | Facility: CLINIC | Age: 83
End: 2023-06-01
Payer: MEDICARE

## 2023-06-01 VITALS
OXYGEN SATURATION: 97 % | HEIGHT: 68 IN | WEIGHT: 183 LBS | RESPIRATION RATE: 20 BRPM | DIASTOLIC BLOOD PRESSURE: 88 MMHG | TEMPERATURE: 97.8 F | SYSTOLIC BLOOD PRESSURE: 120 MMHG | BODY MASS INDEX: 27.74 KG/M2 | HEART RATE: 76 BPM

## 2023-06-01 DIAGNOSIS — R60.0 BILATERAL LEG EDEMA: Primary | ICD-10-CM

## 2023-06-01 DIAGNOSIS — G62.9 NEUROPATHY: ICD-10-CM

## 2023-06-01 LAB
ALBUMIN SERPL BCG-MCNC: 3.9 G/DL (ref 3.5–5.2)
ALP SERPL-CCNC: 60 U/L (ref 40–129)
ALT SERPL W P-5'-P-CCNC: 15 U/L (ref 10–50)
ANION GAP SERPL CALCULATED.3IONS-SCNC: 11 MMOL/L (ref 7–15)
AST SERPL W P-5'-P-CCNC: 19 U/L (ref 10–50)
BILIRUB SERPL-MCNC: 0.4 MG/DL
BUN SERPL-MCNC: 17.4 MG/DL (ref 8–23)
CALCIUM SERPL-MCNC: 9.3 MG/DL (ref 8.8–10.2)
CHLORIDE SERPL-SCNC: 105 MMOL/L (ref 98–107)
CREAT SERPL-MCNC: 0.99 MG/DL (ref 0.67–1.17)
DEPRECATED HCO3 PLAS-SCNC: 26 MMOL/L (ref 22–29)
GFR SERPL CREATININE-BSD FRML MDRD: 75 ML/MIN/1.73M2
GLUCOSE SERPL-MCNC: 112 MG/DL (ref 70–99)
POTASSIUM SERPL-SCNC: 3.8 MMOL/L (ref 3.4–5.3)
PROT SERPL-MCNC: 6.3 G/DL (ref 6.4–8.3)
SODIUM SERPL-SCNC: 142 MMOL/L (ref 136–145)

## 2023-06-01 PROCEDURE — 80053 COMPREHEN METABOLIC PANEL: CPT | Performed by: PHYSICIAN ASSISTANT

## 2023-06-01 PROCEDURE — 99214 OFFICE O/P EST MOD 30 MIN: CPT | Performed by: PHYSICIAN ASSISTANT

## 2023-06-01 PROCEDURE — 36415 COLL VENOUS BLD VENIPUNCTURE: CPT | Performed by: PHYSICIAN ASSISTANT

## 2023-06-01 RX ORDER — PREDNISONE 10 MG/1
10 TABLET ORAL DAILY
Qty: 30 TABLET | Refills: 0 | COMMUNITY
Start: 2023-06-01 | End: 2023-10-18

## 2023-06-01 RX ORDER — FUROSEMIDE 20 MG
10 TABLET ORAL DAILY
Qty: 45 TABLET | Refills: 1 | Status: SHIPPED | OUTPATIENT
Start: 2023-06-01 | End: 2023-11-08

## 2023-06-01 NOTE — PROGRESS NOTES
"Wally Mitchell is a 82 year old, presenting for the following health issues:  Swelling        6/1/2023     1:13 PM   Additional Questions   Roomed by MP   Accompanied by spouse         6/1/2023     1:13 PM   Patient Reported Additional Medications   Patient reports taking the following new medications None per patient     History of Present Illness       Reason for visit:  Leg issues and swollen feet  Symptom onset:  3-4 weeks ago    He eats 4 or more servings of fruits and vegetables daily.He consumes 0 sweetened beverage(s) daily.He exercises with enough effort to increase his heart rate 9 or less minutes per day.  He exercises with enough effort to increase his heart rate 3 or less days per week.   He is taking medications regularly.       Some neuropathy symptoms involving both feet.   No sob/wheezing. No cough.   Some minimal leg edema. No dizziness. No syncope. No chest pain/palps   Weights up 5 lbs since our last visit.  Suspect his prednisone is partly to blame here.    Review of Systems   Constitutional, HEENT, cardiovascular, pulmonary, GI, , musculoskeletal, neuro, skin, endocrine and psych systems are negative, except as otherwise noted.      Objective    /88   Pulse 76   Temp 97.8  F (36.6  C) (Temporal)   Resp 20   Ht 1.715 m (5' 7.5\")   Wt 83 kg (183 lb)   SpO2 97%   BMI 28.24 kg/m    Body mass index is 28.24 kg/m .  Physical Exam     Eye exam - right eye normal lid, conjunctiva, cornea, pupil and fundus, left eye normal lid, conjunctiva, cornea, pupil and fundus.  Thyroid not palpable, not enlarged, no nodules detected.  CHEST:chest clear to IPPA, no tachypnea, retractions or cyanosis and Heart exam detail:heart multiple pvc's. chest is clear without rales or wheezing, no pedal edema, no JVD, no hepatosplenomegaly.  Pulses in feet is normal.   Mild leg edema.    Gustavo was seen today for swelling.    Diagnoses and all orders for this visit:    Bilateral leg edema  -     " Comprehensive metabolic panel (BMP + Alb, Alk Phos, ALT, AST, Total. Bili, TP); Future  -     furosemide (LASIX) 20 MG tablet; Take 0.5 tablets (10 mg) by mouth daily    Neuropathy      Lower salt diet  Compression socks.

## 2023-06-01 NOTE — TELEPHONE ENCOUNTER
Patient has MRI scheduled for Monday 6/5 [facility] at [facility].by Orthopedic Provider  Shunt evaluation on Tuesday 6/6   Shunt Codman Certas valve at 4.0 7/7/22  Voices understanding, all is set.

## 2023-06-06 ENCOUNTER — CARE COORDINATION (OUTPATIENT)
Dept: NEUROSURGERY | Facility: CLINIC | Age: 83
End: 2023-06-06

## 2023-06-06 ENCOUNTER — OFFICE VISIT (OUTPATIENT)
Dept: NEUROSURGERY | Facility: CLINIC | Age: 83
End: 2023-06-06
Payer: MEDICARE

## 2023-06-06 VITALS
SYSTOLIC BLOOD PRESSURE: 144 MMHG | BODY MASS INDEX: 28.08 KG/M2 | OXYGEN SATURATION: 98 % | WEIGHT: 182 LBS | HEART RATE: 60 BPM | RESPIRATION RATE: 16 BRPM | DIASTOLIC BLOOD PRESSURE: 81 MMHG

## 2023-06-06 DIAGNOSIS — Z98.2 VENTRICULO-PERITONEAL SHUNT STATUS: Primary | ICD-10-CM

## 2023-06-06 PROCEDURE — 99212 OFFICE O/P EST SF 10 MIN: CPT | Performed by: NURSE PRACTITIONER

## 2023-06-06 ASSESSMENT — PAIN SCALES - GENERAL: PAINLEVEL: MILD PAIN (2)

## 2023-06-06 NOTE — PROGRESS NOTES
Writer contacted [facility] X2 for imaging to be pushed to [facility] and report faxed.     Nichol Quintanilla LPN  Neurosurgery

## 2023-06-06 NOTE — LETTER
2023       RE: Gsutavo Ramon  [address] [facility] [address]     Dear Colleague,    Thank you for referring your patient, Gustavo Ramon, to the [facility] NEUROSURGERY CLINIC [place] at [facility]. Please see a copy of my visit note below.    [facility]  Department of Neurosurgery      Name: Gustavo Ramon  MRN: [id number]  Age: 82 year old  : 1940  Referring provider: Referred Self  2023      Chief Complaint:   NPH  S/p  shunt placement on 2022 (Codman Certas at 4.0)  Shunt check after MRI    History of Present Illness:   Gustavo Ramon is a 82 year old male with a history of NPH, s/p  shunt placement on 2022 who is seen today for shunt check after an MRI. Most recently seen in our clinic on 2022 and his Codman Certas shunt was at 4.0 at that time.     Patient had a Lumbar spine MRI yesterday and is here for shunt check post MRI. Today, patient presents for the evaluation with his wife. Since his  shunt placement in 2022, patient presented to the ER in 2022 with bilateral LE weakness and right upper extremity weakness. MRI of his cervical spine demonstrated severe canal stenosis at C5-C7 levels with T2 signal change. Subsequently underwent Cervical 4-6 right-side open door laminoplasty by Dr. Levin.     Patient reports that he had improvement in his walking (more steady), cognition and bladder function since  shunt placement. He continues to use the walker as he feels more steady with it. He is able to walk short distances independently.      Review of Systems:   Pertinent items are noted in HPI or as in patient entered ROS below, remainder of complete ROS is negative.        View : No data to display.                 Physical Exam:   There were no vitals taken for this visit.   General: No acute distress.    Neuro: The patient is fully oriented. Speech is  normal.  Psych: Normal mood and affect. Behavior is normal.      Imaging:  No new imaging.    Procedures:  With Codman Certas  shunt , shunt setting checked and it remains at 4.0. Verified x 3.     Assessment:  NPH  S/p  shunt placement on 7/7/2022 (Codman Certas at 4.0)  Shunt check after MRI    Plan:  Overall, patient is very happy with the progress he made after the shunt placement. Shunt setting verified and it remains at 4.0. Patient to follow-up with us as needed.        I spent 10 minutes on patient care activities related to this encounter on the date of service, including time spent reviewing the chart, obtaining history and examination and in counseling the patient, and in documentation in the electronic medical record.      Antionette ARRIAGA CNP  Department of Neurosurgery

## 2023-06-06 NOTE — PROGRESS NOTES
[facility]  Department of Neurosurgery      Name: Gustavo Ramon  MRN: [id number]  Age: 82 year old  : 1940  Referring provider: Referred Self  2023      Chief Complaint:   NPH  S/p  shunt placement on 2022 (Codman Certas at 4.0)  Shunt check after MRI    History of Present Illness:   Gustavo Ramon is a 82 year old male with a history of NPH, s/p  shunt placement on 2022 who is seen today for shunt check after an MRI. Most recently seen in our clinic on 2022 and his Codman Certas shunt was at 4.0 at that time.     Patient had a Lumbar spine MRI yesterday and is here for shunt check post MRI. Today, patient presents for the evaluation with his wife. Since his  shunt placement in 2022, patient presented to the ER in 2022 with bilateral LE weakness and right upper extremity weakness. MRI of his cervical spine demonstrated severe canal stenosis at C5-C7 levels with T2 signal change. Subsequently underwent Cervical 4-6 right-side open door laminoplasty by Dr. Levin.     Patient reports that he had improvement in his walking (more steady), cognition and bladder function since  shunt placement. He continues to use the walker as he feels more steady with it. He is able to walk short distances independently.      Review of Systems:   Pertinent items are noted in HPI or as in patient entered ROS below, remainder of complete ROS is negative.        View : No data to display.                 Physical Exam:   There were no vitals taken for this visit.   General: No acute distress.    Neuro: The patient is fully oriented. Speech is normal.  Psych: Normal mood and affect. Behavior is normal.      Imaging:  No new imaging.    Procedures:  With Codman Certas  shunt , shunt setting checked and it remains at 4.0. Verified x 3.     Assessment:  NPH  S/p  shunt placement on 2022 (Codman Certas at 4.0)  Shunt check after MRI    Plan:  Overall, patient is very  happy with the progress he made after the shunt placement. Shunt setting verified and it remains at 4.0. Patient to follow-up with us as needed.        I spent 10 minutes on patient care activities related to this encounter on the date of service, including time spent reviewing the chart, obtaining history and examination and in counseling the patient, and in documentation in the electronic medical record.      Antionette ARRIAGA, CNP  Department of Neurosurgery

## 2023-06-15 ENCOUNTER — OFFICE VISIT (OUTPATIENT)
Dept: DERMATOLOGY | Facility: CLINIC | Age: 83
End: 2023-06-15
Payer: MEDICARE

## 2023-06-15 DIAGNOSIS — Z85.828 HISTORY OF SQUAMOUS CELL CARCINOMA OF SKIN: ICD-10-CM

## 2023-06-15 DIAGNOSIS — Z51.89 VISIT FOR WOUND CHECK: Primary | ICD-10-CM

## 2023-06-15 PROCEDURE — 99024 POSTOP FOLLOW-UP VISIT: CPT | Performed by: DERMATOLOGY

## 2023-06-15 ASSESSMENT — PAIN SCALES - GENERAL: PAINLEVEL: NO PAIN (0)

## 2023-06-15 NOTE — PROGRESS NOTES
[facility] Dermatology Note  Encounter Date: Luis Enrique 15, 2023  Office Visit     Dermatology Problem List:  1. 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 Christiansen's note 11/21/11), s/p excision 5/12/09   2. Actinic keratoses  - R tragus s/p LN2 3/13/23; s/p biopsy 9/23/22  - s/p Efudex 2015, Fall 2020 to face, Spring 2021 to forearms  - s/p PDT (x3)  - s/p LN2  3. Seborrheic dermatitis  - clobetasol 0.05% solution for scalp, triam 0.1% cream for ears  4. Relevant medical history: MGUS, myasthenia gravis currently on prednisone  5. Tinea cruris  - current tx: ketoconazole cream  6. 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 Lipoma L bicep, s/p biopsy 9/23/22     Social history: The patient is retired from working as an . He has a daughter and son. He is [place].  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   - Okay to discontinue wound care on the left cheek.  Schedule 3 month scar evaluation to address possible redundancy at the ends of the scar.   -Right scaphoid fossa, healing appropriately. Continue wound care until healed over with new pink skin.     Staff:     Shashank Victor DO    Department of Dermatology  [facility]  Clinics: Phone: [phone], Fax:[phone]  [facility]: Phone: [phone], Fax: [phone]    ____________________________________________    CC: Wound Check (Wound check: Left cheek /DOS: 05-)    HPI:  Mr. 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     The wound on his cheek has healed well and sutures dissolved.  There is a slight fullness on the front side of the scar.     His right ear continues to heal.  There is a little bit of crust and a filamentous material at the edge.     Patient is otherwise feeling well, without additional skin concerns.     Labs Reviewed:  N/A    Physical Exam:  Vitals: There were no vitals taken for this visit.  SKIN: Focused examination of right ear and left cheek was performed.  - Left preauricular cheek with linear surgical scar skin edges opposed minimal adjacent erythema and edema  -Right antihelix with superficial erosion and minimal crust.  Slight deviation of helical rim.  Minimal adjacent erythema and edema.     - No other lesions of concern on areas examined.     Medications:  Current Outpatient Medications   Medication     ACE/ARB/ARNI NOT PRESCRIBED (INTENTIONAL)     acetaminophen (TYLENOL) 325 MG tablet     aspirin (ASA) 325 MG tablet     cyanocobalamin (VITAMIN B-12) 1000 MCG tablet     ferrous sulfate (FEROSUL) 325 (65 Fe) MG tablet     furosemide (LASIX) 20 MG tablet     gabapentin (NEURONTIN) 300 MG capsule     ketoconazole (NIZORAL) 2 % external cream     levETIRAcetam (KEPPRA) 250 MG tablet     methocarbamol (ROBAXIN) 500 MG tablet     multivitamin (OCUVITE) TABS tablet     pantoprazole (PROTONIX) 40 MG EC tablet     polyethylene glycol (MIRALAX) 17 GM/Dose powder     pramipexole (MIRAPEX) 0.25 MG tablet     predniSONE (DELTASONE) 10 MG tablet     pyridostigmine  (MESTINON) 60 MG tablet     senna-docusate (SENOKOT-S/PERICOLACE) 8.6-50 MG tablet     simvastatin (ZOCOR) 20 MG tablet     tamsulosin (FLOMAX) 0.4 MG capsule     triamcinolone (KENALOG) 0.1 % external cream     vitamin D3 (CHOLECALCIFEROL) 2000 units tablet     No current facility-administered medications for this visit.      Past Medical History:   Patient Active Problem List   Diagnosis     Rosacea     DJD (degenerative joint disease)     Ocular myasthenia gravis (H)     Macular pigment deposit     HYPERLIPIDEMIA LDL GOAL <130     MGUS (monoclonal gammopathy of unknown significance)     Retinal hole OS, operculated     Horseshoe tear of retina without detachment OD     History  of basal cell carcinoma     Seborrhea     AK (actinic keratosis)     Malignant neoplasm of prostate (H)     PVD (posterior vitreous detachment)     Amaurosis fugax by Hx neg carotid W/U     Advanced directives, counseling/discussion     Actinic keratosis     Peripheral neuropathy     Nuclear sclerosis of both eyes     Essential hypertension with goal blood pressure less than 140/90     Gastroesophageal reflux disease without esophagitis     Steroid-induced diabetes mellitus, initial encounter (H)     Stage 3a chronic kidney disease (H)     Secondary adrenocortical insufficiency (H)     Physical deconditioning     NPH (normal pressure hydrocephalus) (H)     Myoclonus     Infection due to 2019 novel coronavirus     Encephalopathy     Dementia without behavioral disturbance, unspecified dementia type     Idiopathic normal pressure hydrocephalus (H)     Acute atopic conjunctivitis     Cancer of the skin, basal cell     COVID-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     Degeneration of intervertebral disc     Diverticulitis of intestine     Dysphagia     Gait instability     Hip pain, acute, left     History of osteoporosis     Kidney disorder     Pneumonia due to COVID-19 virus     Restless leg syndrome due to iron deficiency anemia     Sepsis due to COVID-19 (H)      Tear of medial cartilage or meniscus of knee, current     Urinary frequency     Vitamin D deficiency     Weakness of both lower extremities     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)     Past Medical History:   Diagnosis Date     Actinic keratosis      AK (actinic keratosis) 11/15/2012     Amaurosis fugax      Basal cell carcinoma 2009    R medial cheek/nose     Central serous retinopathy left    Eye     Diverticulosis 08/2006     DJD (degenerative joint disease)      GERD (gastroesophageal reflux disease)      Hearing loss     High frequency     History of nonmelanoma skin cancer      History of nonmelanoma skin cancer      HTN (hypertension)      Hyperlipidemia LDL goal < 130      Hyperplastic colon polyp 08/2006     IgA monoclonal gammopathy     IgA kappa     Infection due to 2019 novel coronavirus 10/13/2022     Infection due to 2019 novel coronavirus 11/2021     Lattice degeneration of retina right    Eye     Macular degeneration      Nonsenile cataract      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    [facility] consult     Rosacea      Steroid-induced diabetes mellitus, 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     Strabismus      Vitreous detachment left    Eye

## 2023-06-15 NOTE — PROGRESS NOTES
Gustavo Ramon's chief complaint for this visit includes:  Chief Complaint   Patient presents with     Wound Check     Wound check: Left cheek   DOS: 05-     PCP: Winston Silverman    Referring Provider:  No referring provider defined for this encounter.    There were no vitals taken for this visit.  No Pain (0)        Allergies   Allergen Reactions     Mycophenolate Other (See Comments)     Confusion, abnormal movements         Do you need any medication refills at today's visit?    No.       Jeanette Varghese LPN

## 2023-06-15 NOTE — LETTER
6/15/2023         RE: Gustavo Ramon  [address] [facility] [address]        Dear Colleague,    Thank you for referring your patient, Gustavo Ramon, to the [facility]. Please see a copy of my visit note below.    Gustavo Ramon's chief complaint for this visit includes:  Chief Complaint   Patient presents with     Wound Check     Wound check: Left cheek   DOS: 05-     PCP: Winston Silverman    Referring Provider:  No referring provider defined for this encounter.    There were no vitals taken for this visit.  No Pain (0)        Allergies   Allergen Reactions     Mycophenolate Other (See Comments)     Confusion, abnormal movements         Do you need any medication refills at today's visit?    No.       Jeanette Varghese LPN     [facility] Dermatology Note  Encounter Date: Luis Enrique 15, 2023  Office Visit     Dermatology Problem List:  1. 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 Christiansen's note 11/21/11), s/p excision 5/12/09   2. Actinic keratoses  - R tragus s/p LN2 3/13/23; s/p biopsy 9/23/22  - s/p Efudex 2015, Fall 2020 to face, Spring 2021 to forearms  - s/p PDT (x3)  - s/p LN2  3. Seborrheic dermatitis  - clobetasol 0.05% solution for scalp, triam 0.1% cream for ears  4. Relevant medical history: MGUS, myasthenia gravis currently on prednisone  5. Tinea cruris  - current tx: ketoconazole cream  6. 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  Lipoma L bicep, s/p biopsy 9/23/22     Social history: The patient is retired from working as an . He has a daughter and son. He is [place].  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   - Okay to discontinue wound care on the left cheek.  Schedule 3 month scar evaluation to address possible redundancy at the ends of the scar.   -Right scaphoid fossa, healing appropriately. Continue wound care until healed over with new pink skin.     Staff:     Shashank Victor DO    Department of Dermatology  [facility] Clinics: Phone: [phone], Fax:[phone]  [facility]: Phone: [phone], Fax: [phone]    ____________________________________________    CC: Wound Check (Wound check: Left cheek /DOS: 05-)    HPI:  Mr. 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     The wound on his cheek has healed well and sutures dissolved.  There is a slight fullness on the front side of the scar.     His right ear continues to heal.  There is a little bit of crust and a filamentous material at the edge.     Patient is otherwise feeling well, without additional skin concerns.     Labs Reviewed:  N/A    Physical Exam:  Vitals: There were no vitals taken for this visit.  SKIN: Focused examination of right ear and left cheek was performed.  - Left preauricular cheek with linear surgical scar skin edges opposed minimal adjacent erythema and edema  -Right antihelix with superficial erosion and minimal crust.  Slight deviation of helical rim.   Minimal adjacent erythema and edema.     - No other lesions of concern on areas examined.     Medications:  Current Outpatient Medications   Medication     ACE/ARB/ARNI NOT PRESCRIBED (INTENTIONAL)     acetaminophen (TYLENOL) 325 MG tablet     aspirin (ASA) 325 MG tablet     cyanocobalamin (VITAMIN B-12) 1000 MCG tablet     ferrous sulfate (FEROSUL) 325 (65 Fe) MG tablet     furosemide (LASIX) 20 MG tablet     gabapentin (NEURONTIN) 300 MG capsule     ketoconazole (NIZORAL) 2 % external cream     levETIRAcetam (KEPPRA) 250 MG tablet     methocarbamol (ROBAXIN) 500 MG tablet     multivitamin (OCUVITE) TABS tablet     pantoprazole (PROTONIX) 40 MG EC tablet     polyethylene glycol (MIRALAX) 17 GM/Dose powder     pramipexole (MIRAPEX) 0.25 MG tablet     predniSONE (DELTASONE) 10 MG tablet     pyridostigmine (MESTINON) 60 MG tablet     senna-docusate (SENOKOT-S/PERICOLACE) 8.6-50 MG tablet     simvastatin (ZOCOR) 20 MG tablet     tamsulosin (FLOMAX) 0.4 MG capsule     triamcinolone (KENALOG) 0.1 % external cream     vitamin D3 (CHOLECALCIFEROL) 2000 units tablet     No current facility-administered medications for this visit.      Past Medical History:   Patient Active Problem List   Diagnosis     Rosacea     DJD (degenerative joint disease)     Ocular myasthenia gravis (H)     Macular pigment deposit     HYPERLIPIDEMIA LDL GOAL <130     MGUS (monoclonal gammopathy of unknown significance)     Retinal hole OS, operculated     Horseshoe tear of retina without detachment OD     History  of basal cell carcinoma     Seborrhea     AK (actinic keratosis)     Malignant neoplasm of prostate (H)     PVD (posterior vitreous detachment)     Amaurosis fugax by Hx neg carotid W/U     Advanced directives, counseling/discussion     Actinic keratosis     Peripheral neuropathy     Nuclear sclerosis of both eyes     Essential hypertension with goal blood pressure less than 140/90     Gastroesophageal reflux disease without esophagitis      Steroid-induced diabetes mellitus, initial encounter (H)     Stage 3a chronic kidney disease (H)     Secondary adrenocortical insufficiency (H)     Physical deconditioning     NPH (normal pressure hydrocephalus) (H)     Myoclonus     Infection due to 2019 novel coronavirus     Encephalopathy     Dementia without behavioral disturbance, unspecified dementia type     Idiopathic normal pressure hydrocephalus (H)     Acute atopic conjunctivitis     Cancer of the skin, basal cell     COVID-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     Degeneration of intervertebral disc     Diverticulitis of intestine     Dysphagia     Gait instability     Hip pain, acute, left     History of osteoporosis     Kidney disorder     Pneumonia due to COVID-19 virus     Restless leg syndrome due to iron deficiency anemia     Sepsis due to COVID-19 (H)     Tear of medial cartilage or meniscus of knee, current     Urinary frequency     Vitamin D deficiency     Weakness of both lower extremities     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)     Past Medical History:   Diagnosis Date     Actinic keratosis      AK (actinic keratosis) 11/15/2012     Amaurosis fugax      Basal cell carcinoma 2009    R medial cheek/nose     Central serous retinopathy left    Eye     Diverticulosis 08/2006     DJD (degenerative joint disease)      GERD (gastroesophageal reflux disease)      Hearing loss     High frequency     History of nonmelanoma skin cancer      History of nonmelanoma skin cancer      HTN (hypertension)      Hyperlipidemia LDL goal < 130      Hyperplastic colon polyp 08/2006     IgA monoclonal gammopathy     IgA kappa     Infection due to 2019 novel coronavirus 10/13/2022     Infection due to 2019 novel coronavirus 11/2021     Lattice degeneration of retina right    Eye     Macular degeneration      Nonsenile cataract      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        Sincerely,        Shashank Victor MD

## 2023-06-23 ENCOUNTER — TELEPHONE (OUTPATIENT)
Dept: NEUROSURGERY | Facility: CLINIC | Age: 83
End: 2023-06-23
Payer: MEDICARE

## 2023-07-07 NOTE — TELEPHONE ENCOUNTER
Result Care Coordination      Result Notes   Shaylee Livingston LPN   10/14/2022  8:10 AM CDT Back to Top      I spoke to Gustavo Ramon and his wife and gave results. Patient understood and had no further questions or concerns.     Unable to complete flowsheet questions. Telephone consult scheduled for Oct 26th at 11:30 am.      KALEIGH Mcnair MD   10/11/2022 10:40 AM CDT       1. AK on ear, recheck at follow up as scheduled for cryo  2. Part B, Dr. Victor, trichoeip, could you do Mohs?  3. Part C lipoma- no follow up needed  [facility] derm hold result until haylie responds        No acute findings

## 2023-08-11 ENCOUNTER — OFFICE VISIT (OUTPATIENT)
Dept: URGENT CARE | Facility: URGENT CARE | Age: 83
End: 2023-08-11
Payer: MEDICARE

## 2023-08-11 ENCOUNTER — ANCILLARY PROCEDURE (OUTPATIENT)
Dept: GENERAL RADIOLOGY | Facility: CLINIC | Age: 83
End: 2023-08-11
Attending: EMERGENCY MEDICINE
Payer: MEDICARE

## 2023-08-11 VITALS
TEMPERATURE: 97.9 F | OXYGEN SATURATION: 95 % | DIASTOLIC BLOOD PRESSURE: 54 MMHG | RESPIRATION RATE: 12 BRPM | SYSTOLIC BLOOD PRESSURE: 92 MMHG | HEART RATE: 74 BPM | BODY MASS INDEX: 28.39 KG/M2 | WEIGHT: 184 LBS

## 2023-08-11 DIAGNOSIS — J31.0 CHRONIC RHINITIS: ICD-10-CM

## 2023-08-11 DIAGNOSIS — R21 RASH: ICD-10-CM

## 2023-08-11 DIAGNOSIS — J06.9 UPPER RESPIRATORY TRACT INFECTION, UNSPECIFIED TYPE: Primary | ICD-10-CM

## 2023-08-11 DIAGNOSIS — J06.9 UPPER RESPIRATORY TRACT INFECTION, UNSPECIFIED TYPE: ICD-10-CM

## 2023-08-11 PROCEDURE — 71046 X-RAY EXAM CHEST 2 VIEWS: CPT | Mod: TC | Performed by: RADIOLOGY

## 2023-08-11 PROCEDURE — 99214 OFFICE O/P EST MOD 30 MIN: CPT | Performed by: EMERGENCY MEDICINE

## 2023-08-11 RX ORDER — FLUTICASONE PROPIONATE 50 MCG
1 SPRAY, SUSPENSION (ML) NASAL DAILY
Qty: 11.1 ML | Refills: 0 | Status: SHIPPED | OUTPATIENT
Start: 2023-08-11

## 2023-08-11 RX ORDER — GUAIFENESIN 600 MG/1
1200 TABLET, EXTENDED RELEASE ORAL 2 TIMES DAILY
Qty: 20 TABLET | Refills: 0 | Status: SHIPPED | OUTPATIENT
Start: 2023-08-11 | End: 2024-07-29

## 2023-08-11 NOTE — PROGRESS NOTES
Assessment & Plan     Diagnosis:    ICD-10-CM    1. Upper respiratory tract infection, unspecified type  J06.9 XR Chest 2 Views     guaiFENesin (MUCINEX) 600 MG 12 hr tablet     fluticasone (FLONASE) 50 MCG/ACT nasal spray      2. Chronic rhinitis  J31.0 guaiFENesin (MUCINEX) 600 MG 12 hr tablet     fluticasone (FLONASE) 50 MCG/ACT nasal spray      3. Rash  R21    Rash on right shin not cellulitic appearing; will try ketoconazole first and if worsening return for evaluation. Has derm appt in 2 months.        Medical Decision Making:  Gustavo Ramon is a 83 year old male who presents for evaluation of nasal discharge, post-nasal drip, cough and sore throat without fever, myalgias or known sick contacts. On exam the patient has swollen nasal mucosa with clear discharge.  Patient notes no history of seasonal allergies but has had frequent watering of the eyes and nose since cataract surgeries over the last few months.    There is no signs at this point of serious bacterial infection such as OM, RPA, epiglottitis, PTA, strep pharyngitis, meningitis.      Given duration of symptoms, I did a CXR to eval for pneumonia. This shows no acute infiltrate or effusion on my read, radiology notes as per below. There are no signs of complications of pneumonia at this point such as septic shock, bacteremia, empyema, hypoxia, respiratory failure or compromise.     There are no gastrointestinal symptoms at this point and no signs of dehydration.  Close followup with PCP is indicated.  Go to ED for fever > 102 F, protracted vomiting, worsening shortness of breath, chest pain or other concerns.  Patient verbalized understanding and agreement with the plan was discharged in stable condition.    Given history and exam, I feel the most likely etiology is allergic rhinitis.  Will start medication as noted above for symptoms. Doubt bacterial etiology. Doubt intracerebral issues.  Certainly a viral syndrome is also a possibility.      Follow up with PCP in 2-3 days as needed. All questions answered.      Salazar Lan PA-C  [facility]    Subjective     Gustavo Ramon is a 83 year old male who presents with his wife to clinic today for the following health issues:  Chief Complaint   Patient presents with    URI     Cold, cough, phlegm, sneezing x1-2 week    Derm Problem     Right lower leg concern       HPI    Onset of symptoms was 2 weeks ago.  Course of illness is waxing and waning.    Severity moderate  Current and Associated symptoms: runny nose, stuffy nose, cough - productive, and sore throat  Denies fever, wheezing, shortness of breath, ear pain, ear drainage, facial pain/pressure, body aches, nausea, vomiting, and diarrhea  Treatment measures tried include: cough medication.   Predisposing factors include: has frequent watering of the eyes and postnasal drip after having recent cataract surgery. No vision changes noted.    No history of seasonal allergies but notes they have no sick contacts, fever, chest pain.    No concerns for wheezing, shortness of breath, vomiting or difficulties swallowing food/fluids at this time.     Review of Systems    See HPI    Objective      Vitals: BP 92/54   Pulse 74   Temp 97.9  F (36.6  C) (Oral)   Resp 12   Wt 83.5 kg (184 lb)   SpO2 95%   BMI 28.39 kg/m    Resp:     Patient Vitals for the past 24 hrs:   BP Temp Temp src Pulse Resp SpO2 Weight   08/11/23 1034 92/54 97.9  F (36.6  C) Oral 74 12 95 % 83.5 kg (184 lb)       Vital signs reviewed by: Salazar Lan PA-C    Physical Exam   Constitutional: Alert and active. Non-toxic appearing.  No acute distress.  HENT:   Right Ear: External ear normal. TM: pale/grey  Left Ear: External ear normal. TM: pale/grey  Nose: Nasal turbinates are sweollen and slightly erythematous; R > L. Clear rhinorrhea noted. No septal masses or epistaxis noted.  Eyes: Conjunctivae, EOM are normal. Eyes-lids are normal.  Mouth: Mucous membranes  are moist. Posterior oropharynx is clear. No exudates. Normal tongue and tonsil. Uvula is midline..  Neck: Normal ROM. No meningismus  Cardiovascular: Regular rate and rhythm  Pulmonary/Chest: Effort normal. No respiratory distress. Lungs are clear to auscultation throughout.  GI: Abdomen is soft and non-tender throughout.   Neurological: Alert and oriented x3.  Skin: faint macular erythematous rash on the right shin; blanches normally.  No vesicles, blisters, open wounds, drainage, fluctuance, areas of pointing or lymphangitis.  DKA appropriate.      Labs/Imaging:  Results for orders placed or performed in visit on 08/11/23   XR Chest 2 Views     Status: None    Narrative    XR CHEST 2 VIEWS 8/11/2023 11:16 AM    HISTORY: Upper respiratory tract infection, unspecified type    COMPARISON: 1/31/2022    FINDINGS: Similar appearance to prior. No consolidation. Probable  nodular scarring in the right upper lung. No pleural effusions or  pneumothorax. Unchanged cardiac size. Right  shunt tubing.    ZAID SUTTON MD         SYSTEM ID:  [id number]       Reading per radiology      Salazar Lan PA-C, August 11, 2023

## 2023-09-08 ENCOUNTER — OFFICE VISIT (OUTPATIENT)
Dept: FAMILY MEDICINE | Facility: CLINIC | Age: 83
End: 2023-09-08
Payer: MEDICARE

## 2023-09-08 VITALS
HEIGHT: 68 IN | OXYGEN SATURATION: 97 % | SYSTOLIC BLOOD PRESSURE: 102 MMHG | DIASTOLIC BLOOD PRESSURE: 68 MMHG | TEMPERATURE: 97.5 F | HEART RATE: 90 BPM | WEIGHT: 181.6 LBS | RESPIRATION RATE: 16 BRPM | BODY MASS INDEX: 27.52 KG/M2

## 2023-09-08 DIAGNOSIS — R42 DIZZINESS: ICD-10-CM

## 2023-09-08 DIAGNOSIS — H81.11 BENIGN PAROXYSMAL POSITIONAL VERTIGO OF RIGHT EAR: Primary | ICD-10-CM

## 2023-09-08 PROCEDURE — 99213 OFFICE O/P EST LOW 20 MIN: CPT | Performed by: PHYSICIAN ASSISTANT

## 2023-09-08 PROCEDURE — 93000 ELECTROCARDIOGRAM COMPLETE: CPT | Performed by: PHYSICIAN ASSISTANT

## 2023-09-08 RX ORDER — MECLIZINE HYDROCHLORIDE 25 MG/1
12.5-25 TABLET ORAL 3 TIMES DAILY PRN
Qty: 30 TABLET | Refills: 0 | Status: SHIPPED | OUTPATIENT
Start: 2023-09-08

## 2023-09-08 ASSESSMENT — PAIN SCALES - GENERAL: PAINLEVEL: NO PAIN (0)

## 2023-09-08 NOTE — PATIENT INSTRUCTIONS
Johann Mitchell,    Thank you for allowing [facility] to manage your care.    This is very likely BPPV on the right side.    If you develop worsening/changing symptoms at any time, please call 911 or go to the emergency department for evaluation as we discussed.    I sent your prescriptions to your pharmacy.    For dizziness, please use meclizine as prescribed. Do not use this medication while driving, operating machinery, with other sedating medications, or while drinking alcohol as it will make you drowsy.    I made a referral to physical therapy. They will be calling in approximately 1 week to set up your appointment.  If you do not hear from them, please call the specialty number on your after visit summary.     Drink 8-10 glasses of fluid daily to stay well-hydrated.    If you have any questions or concerns, please feel free to call us at [phone]    Sincerely,    Maicol Cox PA-C    Did you know?      You can schedule a video visit for follow-up appointments as well as future appointments for certain conditions.  Please see the below link.     [email or web address]    If you have not already done so,  I encourage you to sign up for [facility] ([email or web address]).  This will allow you to review your results, securely communicate with a provider, and schedule virtual visits as well.

## 2023-09-21 ENCOUNTER — OFFICE VISIT (OUTPATIENT)
Dept: DERMATOLOGY | Facility: CLINIC | Age: 83
End: 2023-09-21
Payer: MEDICARE

## 2023-09-21 ENCOUNTER — THERAPY VISIT (OUTPATIENT)
Dept: PHYSICAL THERAPY | Facility: CLINIC | Age: 83
End: 2023-09-21
Attending: PHYSICIAN ASSISTANT
Payer: MEDICARE

## 2023-09-21 DIAGNOSIS — L90.5 SCAR: Primary | ICD-10-CM

## 2023-09-21 DIAGNOSIS — H81.11 BENIGN PAROXYSMAL POSITIONAL VERTIGO, RIGHT: Primary | ICD-10-CM

## 2023-09-21 DIAGNOSIS — H81.11 BENIGN PAROXYSMAL POSITIONAL VERTIGO OF RIGHT EAR: ICD-10-CM

## 2023-09-21 PROCEDURE — 99207 PR NO BILLABLE SERVICE THIS VISIT: CPT | Mod: GC | Performed by: DERMATOLOGY

## 2023-09-21 PROCEDURE — 95992 CANALITH REPOSITIONING PROC: CPT | Mod: GP | Performed by: PHYSICAL THERAPIST

## 2023-09-21 PROCEDURE — 97162 PT EVAL MOD COMPLEX 30 MIN: CPT | Mod: GP | Performed by: PHYSICAL THERAPIST

## 2023-09-21 NOTE — NURSING NOTE
Gustavo Ramon's goals for this visit include:   Chief Complaint   Patient presents with    Scar Management     4 month s/p Mohs left preauricular cheek and right scaphoid fossa       He requests these members of his care team be copied on today's visit information: n/a    PCP: Winston Silverman    Referring Provider:  No referring provider defined for this encounter.    There were no vitals taken for this visit.    Do you need any medication refills at today's visit? No  Marsha Dahl RN

## 2023-09-21 NOTE — LETTER
9/21/2023         RE: Gustavo Ramon  [address] [facility] [address]        Dear Colleague,    Thank you for referring your patient, Gustavo Ramon, to the [facility]. Please see a copy of my visit note below.    Dermatologic Surgery Post-Op Scar Check     CC: Scar Management      Dermatology Problem List:  1. 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 Christiansen's note 11/21/11), s/p excision 5/12/09   2. Actinic keratoses  - R tragus s/p LN2 3/13/23; s/p biopsy 9/23/22  - s/p Efudex 2015, Fall 2020 to face, Spring 2021 to forearms  - s/p PDT (x3)  - s/p LN2  3. Seborrheic dermatitis  - clobetasol 0.05% solution for scalp, triam 0.1% cream for ears  4. Relevant medical history: MGUS, myasthenia gravis currently on prednisone  5. Tinea cruris  - current tx: ketoconazole cream  6. 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 Lipoma L bicep, s/p biopsy 9/23/22     Social history: The patient is retired from working as an . He has a daughter and son. He is [place].  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   - feels like the cheek healed well except there is a firm bump at one end that bothers him quite a bit. Does pick at it sometimes.  - feels  like the ear healed very well   - also has noticed a new spot on his right chest.     Objective: An exam of the L cheek, R ear, R lateral chest was performed today   - well-healed scar on the right antihelix and helical rim  - well-healed scar on the left cheek. One small firm skin-colored papule at medial edge   - waxy stuck-on yellow-brown papule on R lateral chest     Assessment and Plan:     # SCC, L preauricular cheek, s/p [facility] 5/23/2023  # SCC, R scaphoid fossa, s/p [facility] 5/23/2023  - Surgical site healed well. Some hypertrophic scar and mild standing cone deformity at medial edge. Discussed otpions including ILK and dermabrasion. Would like to try ILK today.     Procedure: ILK Injection  After discussing with patient the benefits and risks including, but not limited to, pain, temporary swelling, skin thinning, skin discoloration, verbal consent was obtained. 0.2 cc of kenalog 10 mg/mL was used to inject into 2 site(s) on the medial aspect of the scar line on the left lateral cheek. Patient tolerated procedure well.     # Seborrheic keratosis, R lateral chest  - Reassured patient of the benign nature/appearance of this lesion     The patient should follow up with dermatologic surgery PRN (encouraged him to let us know if the bump we injected today doesn't go away with the ILK), as well as continue with regular skin exams in general dermatology clinic.    Patient was discussed with and evaluated by attending physician Dr. Victor.    Ana Maria Saul MD  PGY-5, Micrographic Surgery and Dermatologic Oncology (MSDO) Fellow       Staff Physician Comments:   I saw and evaluated the patient with the Mohs Surgery Fellow (Dr. Ana Maria Saul) and I agree with the assessment and plan and the above description of the procedure. I was present for the key portions of the procedure and entire exam.    No charge for triamcinolone injection as it was done to revise a scar created here.     Shashank Victor DO  Assistant  Professor  Department of Dermatology  [facility]: Phone: [phone], Fax:[phone]  [facility] [place]: Phone: [phone], Fax: [phone]        Again, thank you for allowing me to participate in the care of your patient.        Sincerely,        Shashank Victor MD

## 2023-09-21 NOTE — PROGRESS NOTES
PHYSICAL THERAPY EVALUATION  Type of Visit: Evaluation    See electronic medical record for Abuse and Falls Screening details.    Subjective       Presenting condition or subjective complaint: vertigo. Suddenly picked it up about 3 weeks ago. Woke up one morning and getting out of bed I was dizzi. That night laying down a little dizziness. If I do it quickly it really spins, really nasty. If I take it slows its better. Has it only for a few seconds. No nausea or disorientation except things are whirling around. Otherwise it is uneasy. No falls since it started. But other falls. Weakness is the issue. I can't get myself dedicated to do the exercises.   Date of onset: 09/08/23 (date of order)    Relevant medical history:   see EPIC  Dates & types of surgery:  NPH shunt 7/2022, C4-C6 12/12/2022, ocular myasthenia gravis 2009    Prior diagnostic imaging/testing results:       Prior therapy history for the same diagnosis, illness or injury: No  [place] [facility] [place] Ended in July after 13 sessions. They have not gone to aftercare to do any IND swimming exercise.     Prior Level of Function  Transfers: Independent  Ambulation: Assistive equipment, 2ww in house almost always. Practicing a bit without it at times.  ADL: Assistive equipment Equipment in the tub and wife around. Takes longer to do shower: shower bench and hand held shower head. Grab bars  IADL:  not driving, not doing much for household chores. No yard work.     Living Environment  Social support: With family members   Type of home: 1 level   Stairs to enter the home: No       Ramp: No   Stairs inside the home: No       Help at home: Self Cares (home health aide/personal care attendant, family, etc)  Equipment owned: Walker with wheels purchased a 4ww but haven't used it much, it is a bit too big for house.   Employment: No    Hobbies/Interests:      Patient goals for therapy: sit up & lay down quicker    Pain assessment: Pain present no pain  "anywhere. In this whole 2 year process no pain. Neck very briefly in mornings sometimes (my pillow).      Objective   Cognitive Status Examination  Orientation: Oriented to person, place and time   Level of Consciousness: Alert  Follows Commands and Answers Questions: 100% of the time  Personal Safety and Judgement: Intact  Memory: Impaired    OBSERVATION:   INTEGUMENTARY:   POSTURE:   PALPATION:   RANGE OF MOTION:  CROM WFLS. Very limited neck extension (chronic and surgery)  STRENGTH:   BED MOBILITY: Independent  TRANSFERS: Independent      GAIT:   Level of [place]: Independent  Assistive Device(s): Walker (front wheeled)  Gait Deviations: WFL  Gait Distance:   Stairs:     SPECIAL TESTS  Functional Gait Assessment (FGA)      10 Meter Walk Test (Comfortable)  2ww 9.12 seconds and 12 steps. Neck is flexed and looking down always     Gait with head motion horizontal with 2ww is \"effort\" and slowing. Vertical head motion-he has no neck extension past neutral when upright, looking straight ahead with 2ww and gait he has to concentrate.             SENSATION:    fingertips N/T since COVID, still hard to  small items. Has numbness form knees down both feet and bottom of soles. Almost feels like leather in feet. I have been identified as having neuropathy. Not diabetic.       VESTIBULAR EVALUATION  ADDITIONAL HISTORY:  Description of symptoms:    Dizzy attacks:   Start:     Last attack:     Frequency of occurrences:     Length of attack:    Difficulty hearing:    Noise in ears?      Alleviates symptoms:    Worsens symptoms:    Activities that bring on symptoms:         Pertinent visual history:   Pertinent history of current vestibular problem:   DHI:  12/100      Infrared Goggle Exam Vestibular Suppressant in Last 24 Hours? Yes  Exam Completed With: Infrared goggles   Spontaneous Nystagmus Negative   Gaze Evoked Nystagmus Negative   Head Shake Horizontal Nystagmus Negative   Positional Testing Started with " head in midline and supine position (two pillow luis manuel head and he had nystagmus/symptoms for <10 seconds .    He has VERY limited neck motion in all directions (rotation) and absolutely no neck extension for the Dhs. Two pillow luis manuel his back to try to help with trunk extension.   Supine Head-Hanging Test     Left Right   Rodrigo-Hallpike Negative, both times. Negative, Upbeating R torsional  First time was negative, second time big burst on right side.   Sidelying Test     HSCC Supine Roll Test Negative Negative   HSCC Forward Roll Test     [place] and Lean Test -  Sitting Erect    [place] and Lean Test - Seated, Head Bent 60 Degrees Forward    [place] and Lean Test - Seated, Head Bent Backwards       BPPV Canal(s): R Posterior  BPPV Type: Canalithasis    Assessment & Plan   CLINICAL IMPRESSIONS  Medical Diagnosis: BPPV Right ear    Treatment Diagnosis: BPPV Right PSCC   Impression/Assessment: Patient is a 83 year old male with vertigo complaints.  He has right PSCC BPPV. He has very limited neck motion so performing CRPS is difficult and will take multiple attempts. IT would best to use a tilt table or trendelenburg table for future therapy. The following significant findings have been identified: Decreased ROM/flexibility, Decreased joint mobility, Decreased strength, Impaired balance, Impaired gait, Impaired muscle performance, Decreased activity tolerance, Impaired posture, and Dizziness. These impairments interfere with their ability to perform self care tasks, recreational activities, household chores, household mobility, and community mobility as compared to previous level of function.     Clinical Decision Making (Complexity):  Clinical Presentation: Evolving/Changing  Clinical Presentation Rationale: based on medical and personal factors listed in PT evaluation  Clinical Decision Making (Complexity): Moderate complexity    PLAN OF CARE  Treatment Interventions:  Interventions: Gait Training, Neuromuscular Re-education,  Therapeutic Exercise, Self-Care/Home Management, Canalith Repositioning    Long Term Goals     PT Goal 1  Goal Identifier: dizziness/vertigo  Goal Description: no longer reporting dizziness/vertigo when moving in/out of the bed  Rationale: to maximize safety and independence with performance of ADLs and functional tasks;to maximize safety and independence with self cares  Target Date: 12/19/23  PT Goal 2  Goal Identifier: posture  Goal Description: he will be able to do HEP of 2-3 exs for his posture/neck to prevent it from worsening  Rationale: to maximize safety and independence with performance of ADLs and functional tasks;to maximize safety and independence with self cares  Target Date: 12/20/23      Frequency of Treatment: 1time every week  Duration of Treatment: 12 weeks    Recommended Referrals to Other Professionals:     Education Assessment:   Learner/Method: Patient;Significant Other;Demonstration;Listening    Risks and benefits of evaluation/treatment have been explained.   Patient/Family/caregiver agrees with Plan of Care.     Evaluation Time:     PT Eval, Moderate Complexity Minutes (19979): 35     Signing Clinician: Tamy Pritchett PT, NCS      [facility]                                                                                   OUTPATIENT PHYSICAL THERAPY      PLAN OF TREATMENT FOR OUTPATIENT REHABILITATION   Patient's Last Name, First Name, Gustavo Nielsen YOB: 1940   Provider's Name   [facility]   Medical Record No.  [id number]     Onset Date: 09/08/23 (date of order)  Start of Care Date: 09/21/23     Medical Diagnosis:  BPPV Right ear      PT Treatment Diagnosis:  BPPV Right PSCC Plan of Treatment  Frequency/Duration: 1time every week/ 12 weeks    Certification date from 09/21/23 to 12/19/23         See note for plan of treatment details and functional goals     Tamy Pritchett, PT                         I  CERTIFY THE NEED FOR THESE SERVICES FURNISHED UNDER        THIS PLAN OF TREATMENT AND WHILE UNDER MY CARE     (Physician attestation of this document indicates review and certification of the therapy plan).                Referring Provider:  Corey Cox      Initial Assessment  See Epic Evaluation- Start of Care Date: 09/21/23

## 2023-09-21 NOTE — PROGRESS NOTES
Dermatologic Surgery Post-Op Scar Check     CC: Scar Management      Dermatology Problem List:  1. 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 Christiansen's note 11/21/11), s/p excision 5/12/09   2. Actinic keratoses  - R tragus s/p LN2 3/13/23; s/p biopsy 9/23/22  - s/p Efudex 2015, Fall 2020 to face, Spring 2021 to forearms  - s/p PDT (x3)  - s/p LN2  3. Seborrheic dermatitis  - clobetasol 0.05% solution for scalp, triam 0.1% cream for ears  4. Relevant medical history: MGUS, myasthenia gravis currently on prednisone  5. Tinea cruris  - current tx: ketoconazole cream  6. 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 Lipoma L bicep, s/p biopsy 9/23/22     Social history: The patient is retired from working as an . He has a daughter and son. He is [place].  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   - feels like the cheek healed well except there is a firm bump at one end that bothers him quite a bit. Does pick at it sometimes.  - feels like the ear healed very well   - also has noticed a new spot on his right chest.     Objective: An exam of the L cheek, R ear, R lateral chest was performed today   - well-healed scar on the right antihelix and helical rim  - well-healed scar on the left cheek. One  small firm skin-colored papule at medial edge   - waxy stuck-on yellow-brown papule on R lateral chest     Assessment and Plan:     # SCC, L preauricular cheek, s/p [facility] 5/23/2023  # SCC, R scaphoid fossa, s/p [facility] 5/23/2023  - Surgical site healed well. Some hypertrophic scar and mild standing cone deformity at medial edge. Discussed otpions including ILK and dermabrasion. Would like to try ILK today.     Procedure: ILK Injection  After discussing with patient the benefits and risks including, but not limited to, pain, temporary swelling, skin thinning, skin discoloration, verbal consent was obtained. 0.2 cc of kenalog 10 mg/mL was used to inject into 2 site(s) on the medial aspect of the scar line on the left lateral cheek. Patient tolerated procedure well.     # Seborrheic keratosis, R lateral chest  - Reassured patient of the benign nature/appearance of this lesion     The patient should follow up with dermatologic surgery PRN (encouraged him to let us know if the bump we injected today doesn't go away with the ILK), as well as continue with regular skin exams in general dermatology clinic.    Patient was discussed with and evaluated by attending physician Dr. Victor.    Ana Maria Saul MD  PGY-5, Micrographic Surgery and Dermatologic Oncology (MSDO) Fellow       Staff Physician Comments:   I saw and evaluated the patient with the Mohs Surgery Fellow (Dr. Ana Maria Saul) and I agree with the assessment and plan and the above description of the procedure. I was present for the key portions of the procedure and entire exam.    No charge for triamcinolone injection as it was done to revise a scar created here.     Shashank Victor DO    Department of Dermatology  [facility]: Phone: [phone], Fax:[phone]  [facility] [place]: Phone: [phone], Fax: [phone]

## 2023-09-26 ASSESSMENT — ENCOUNTER SYMPTOMS
DYSURIA: 0
EYE PAIN: 0
NERVOUS/ANXIOUS: 0
HEARTBURN: 0
SHORTNESS OF BREATH: 1
ABDOMINAL PAIN: 0
HEADACHES: 0
FEVER: 0
WEAKNESS: 1
CHILLS: 0
MYALGIAS: 0
COUGH: 0
ARTHRALGIAS: 0
HEMATOCHEZIA: 0
PARESTHESIAS: 0
HEMATURIA: 0
FREQUENCY: 0
CONSTIPATION: 0
PALPITATIONS: 0
DIARRHEA: 0
DIZZINESS: 1
SORE THROAT: 0
JOINT SWELLING: 0
NAUSEA: 0

## 2023-09-26 ASSESSMENT — ACTIVITIES OF DAILY LIVING (ADL)
CURRENT_FUNCTION: LAUNDRY REQUIRES ASSISTANCE
CURRENT_FUNCTION: SHOPPING REQUIRES ASSISTANCE
CURRENT_FUNCTION: MEDICATION ADMINISTRATION REQUIRES ASSISTANCE
CURRENT_FUNCTION: PREPARING MEALS REQUIRES ASSISTANCE
CURRENT_FUNCTION: TRANSPORTATION REQUIRES ASSISTANCE
CURRENT_FUNCTION: HOUSEWORK REQUIRES ASSISTANCE

## 2023-10-03 ENCOUNTER — OFFICE VISIT (OUTPATIENT)
Dept: FAMILY MEDICINE | Facility: CLINIC | Age: 83
End: 2023-10-03
Payer: MEDICARE

## 2023-10-03 VITALS
WEIGHT: 187 LBS | HEART RATE: 77 BPM | TEMPERATURE: 96.9 F | SYSTOLIC BLOOD PRESSURE: 124 MMHG | HEIGHT: 67 IN | BODY MASS INDEX: 29.35 KG/M2 | DIASTOLIC BLOOD PRESSURE: 72 MMHG | RESPIRATION RATE: 24 BRPM | OXYGEN SATURATION: 96 %

## 2023-10-03 DIAGNOSIS — Z01.818 PREOP GENERAL PHYSICAL EXAM: ICD-10-CM

## 2023-10-03 DIAGNOSIS — Z00.00 ENCOUNTER FOR MEDICARE ANNUAL WELLNESS EXAM: ICD-10-CM

## 2023-10-03 DIAGNOSIS — R06.02 SOB (SHORTNESS OF BREATH): ICD-10-CM

## 2023-10-03 DIAGNOSIS — N18.31 STAGE 3A CHRONIC KIDNEY DISEASE (H): ICD-10-CM

## 2023-10-03 DIAGNOSIS — Z00.00 WELLNESS EXAMINATION: Primary | ICD-10-CM

## 2023-10-03 DIAGNOSIS — J30.0 VASOMOTOR RHINITIS: ICD-10-CM

## 2023-10-03 LAB
BASO+EOS+MONOS # BLD AUTO: NORMAL 10*3/UL
BASO+EOS+MONOS NFR BLD AUTO: NORMAL %
BASOPHILS # BLD AUTO: 0 10E3/UL (ref 0–0.2)
BASOPHILS NFR BLD AUTO: 1 %
CREAT UR-MCNC: 119 MG/DL
EOSINOPHIL # BLD AUTO: 0.3 10E3/UL (ref 0–0.7)
EOSINOPHIL NFR BLD AUTO: 3 %
ERYTHROCYTE [DISTWIDTH] IN BLOOD BY AUTOMATED COUNT: 13.5 % (ref 10–15)
HCT VFR BLD AUTO: 47.3 % (ref 40–53)
HGB BLD-MCNC: 15.3 G/DL (ref 13.3–17.7)
IMM GRANULOCYTES # BLD: 0 10E3/UL
IMM GRANULOCYTES NFR BLD: 0 %
LYMPHOCYTES # BLD AUTO: 1.5 10E3/UL (ref 0.8–5.3)
LYMPHOCYTES NFR BLD AUTO: 18 %
MCH RBC QN AUTO: 30.7 PG (ref 26.5–33)
MCHC RBC AUTO-ENTMCNC: 32.3 G/DL (ref 31.5–36.5)
MCV RBC AUTO: 95 FL (ref 78–100)
MICROALBUMIN UR-MCNC: <12 MG/L
MICROALBUMIN/CREAT UR: NORMAL MG/G{CREAT}
MONOCYTES # BLD AUTO: 0.9 10E3/UL (ref 0–1.3)
MONOCYTES NFR BLD AUTO: 10 %
NEUTROPHILS # BLD AUTO: 5.8 10E3/UL (ref 1.6–8.3)
NEUTROPHILS NFR BLD AUTO: 68 %
PLATELET # BLD AUTO: 188 10E3/UL (ref 150–450)
RBC # BLD AUTO: 4.98 10E6/UL (ref 4.4–5.9)
WBC # BLD AUTO: 8.5 10E3/UL (ref 4–11)

## 2023-10-03 PROCEDURE — 84443 ASSAY THYROID STIM HORMONE: CPT | Performed by: PHYSICIAN ASSISTANT

## 2023-10-03 PROCEDURE — 80048 BASIC METABOLIC PNL TOTAL CA: CPT | Performed by: PHYSICIAN ASSISTANT

## 2023-10-03 PROCEDURE — G0439 PPPS, SUBSEQ VISIT: HCPCS | Performed by: PHYSICIAN ASSISTANT

## 2023-10-03 PROCEDURE — 83880 ASSAY OF NATRIURETIC PEPTIDE: CPT | Performed by: PHYSICIAN ASSISTANT

## 2023-10-03 PROCEDURE — 85025 COMPLETE CBC W/AUTO DIFF WBC: CPT | Performed by: PHYSICIAN ASSISTANT

## 2023-10-03 PROCEDURE — 82570 ASSAY OF URINE CREATININE: CPT | Performed by: PHYSICIAN ASSISTANT

## 2023-10-03 PROCEDURE — 82043 UR ALBUMIN QUANTITATIVE: CPT | Performed by: PHYSICIAN ASSISTANT

## 2023-10-03 PROCEDURE — 36415 COLL VENOUS BLD VENIPUNCTURE: CPT | Performed by: PHYSICIAN ASSISTANT

## 2023-10-03 PROCEDURE — 99213 OFFICE O/P EST LOW 20 MIN: CPT | Mod: 25 | Performed by: PHYSICIAN ASSISTANT

## 2023-10-03 PROCEDURE — G0008 ADMIN INFLUENZA VIRUS VAC: HCPCS | Performed by: PHYSICIAN ASSISTANT

## 2023-10-03 PROCEDURE — 90662 IIV NO PRSV INCREASED AG IM: CPT | Performed by: PHYSICIAN ASSISTANT

## 2023-10-03 RX ORDER — IPRATROPIUM BROMIDE 42 UG/1
2 SPRAY, METERED NASAL 4 TIMES DAILY
Qty: 15 ML | Refills: 4 | Status: SHIPPED | OUTPATIENT
Start: 2023-10-03 | End: 2024-07-29

## 2023-10-03 ASSESSMENT — ENCOUNTER SYMPTOMS
SORE THROAT: 0
CONSTIPATION: 0
HEARTBURN: 0
HEMATOCHEZIA: 0
DIZZINESS: 1
DIARRHEA: 0
DYSURIA: 0
PARESTHESIAS: 0
ARTHRALGIAS: 0
NAUSEA: 0
HEADACHES: 0
JOINT SWELLING: 0
HEMATURIA: 0
WEAKNESS: 1
SHORTNESS OF BREATH: 1
MYALGIAS: 0
COUGH: 0
FREQUENCY: 0
ABDOMINAL PAIN: 0
NERVOUS/ANXIOUS: 0
PALPITATIONS: 0
CHILLS: 0
EYE PAIN: 0
FEVER: 0

## 2023-10-03 ASSESSMENT — ACTIVITIES OF DAILY LIVING (ADL)
CURRENT_FUNCTION: TRANSPORTATION REQUIRES ASSISTANCE
CURRENT_FUNCTION: HOUSEWORK REQUIRES ASSISTANCE
CURRENT_FUNCTION: MEDICATION ADMINISTRATION REQUIRES ASSISTANCE
CURRENT_FUNCTION: LAUNDRY REQUIRES ASSISTANCE
CURRENT_FUNCTION: SHOPPING REQUIRES ASSISTANCE
CURRENT_FUNCTION: PREPARING MEALS REQUIRES ASSISTANCE

## 2023-10-03 ASSESSMENT — PAIN SCALES - GENERAL: PAINLEVEL: NO PAIN (0)

## 2023-10-03 NOTE — PATIENT INSTRUCTIONS
"Patient Education   Personalized Prevention Plan  You are due for the preventive services outlined below.  Your care team is available to assist you in scheduling these services.  If you have already completed any of these items, please share that information with your care team to update in your medical record.  Health Maintenance Due   Topic Date Due     COVID-19 Vaccine (3 - Pfizer risk series) 03/25/2021     Cholesterol Lab  01/31/2023     Kidney Microalbumin Urine Test  02/07/2023     Flu Vaccine (1) 09/01/2023     Learning About Being Physically Active  What is physical activity?     Being physically active means doing any kind of activity that gets your body moving.  The types of physical activity that can help you get fit and stay healthy include:  Aerobic or \"cardio\" activities. These make your heart beat faster and make you breathe harder, such as brisk walking, riding a bike, or running. They strengthen your heart and lungs and build up your endurance.  Strength training activities. These make your muscles work against, or \"resist,\" something. Examples include lifting weights or doing push-ups. These activities help tone and strengthen your muscles and bones.  Stretches. These let you move your joints and muscles through their full range of motion. Stretching helps you be more flexible.  Reaching a balance between these three types of physical activity is important because each one contributes to your overall fitness.  What are the benefits of being active?  Being active is one of the best things you can do for your health. It helps you to:  Feel stronger and have more energy to do all the things you like to do.  Focus better at school or work.  Feel, think, and sleep better.  Reach and stay at a healthy weight.  Lose fat and build lean muscle.  Lower your risk for serious health problems, including diabetes, heart attack, high blood pressure, and some cancers.  Keep your heart, lungs, bones, muscles, and " "joints strong and healthy.  How can you make being active part of your life?  Start slowly. Make it your long-term goal to get at least 30 minutes of exercise on most days of the week. Walking is a good choice. You also may want to do other activities, such as running, swimming, cycling, or playing tennis or team sports.  Pick activities that you like--ones that make your heart beat faster, your muscles stronger, and your muscles and joints more flexible. If you find more than one thing you like doing, do them all. You don't have to do the same thing every day.  Get your heart pumping every day. Any activity that makes your heart beat faster and keeps it at that rate for a while counts.  Here are some great ways to get your heart beating faster:  Go for a brisk walk, run, or bike ride.  Go for a hike or swim.  Go in-line skating.  Play a game of touch football, basketball, or soccer.  Ride a bike.  Play tennis or racquetball.  Climb stairs.  Even some household chores can be aerobic--just do them at a faster pace. Vacuuming, raking or mowing the lawn, sweeping the garage, and washing and waxing the car all can help get your heart rate up.  Strengthen your muscles during the week. You don't have to lift heavy weights or grow big, bulky muscles to get stronger. Doing a few simple activities that make your muscles work against, or \"resist,\" something can help you get stronger.  For example, you can:  Do push-ups or sit-ups, which use your own body weight as resistance.  Lift weights or dumbbells or use stretch bands at home or in a gym or community center.  Stretch your muscles often. Stretching will help you as you become more active. It can help you stay flexible, loosen tight muscles, and avoid injury. It can also help improve your balance and posture and can be a great way to relax.  Be sure to stretch the muscles you'll be using when you work out. It's best to warm your muscles slightly before you stretch them. " "Walk or do some other light aerobic activity for a few minutes, and then start stretching.  When you stretch your muscles:  Do it slowly. Stretching is not about going fast or making sudden movements.  Don't push or bounce during a stretch.  Hold each stretch for at least 15 to 30 seconds, if you can. You should feel a stretch in the muscle, but not pain.  Breathe out as you do the stretch. Then breathe in as you hold the stretch. Don't hold your breath.  If you're worried about how more activity might affect your health, have a checkup before you start. Follow any special advice your doctor gives you for getting a smart start.  Where can you learn more?  Go to https://www.[employer].net/patiented  Enter W332 in the search box to learn more about \"Learning About Being Physically Active.\"  Current as of: October 10, 2022               Content Version: 13.7    1512-3091 [employer].   Care instructions adapted under license by your healthcare professional. If you have questions about a medical condition or this instruction, always ask your healthcare professional. [employer] disclaims any warranty or liability for your use of this information.      Activities of Daily Living    Your Health Risk Assessment indicates you have difficulties with activities of daily living such as housework, bathing, preparing meals, taking medication, etc. Please make a follow up appointment for us to address this issue in more detail.  Bladder Training: Care Instructions  Your Care Instructions     Bladder training is used to treat urge incontinence and stress incontinence. Urge incontinence means that the need to urinate comes on so fast that you can't get to a toilet in time. Stress incontinence means that you leak urine because of pressure on your bladder. For example, it may happen when you laugh, cough, or lift something heavy.  Bladder training can increase how long you can wait before you have to " urinate. It can also help your bladder hold more urine. And it can give you better control over the urge to urinate.  It is important to remember that bladder training takes a few weeks to a few months to make a difference. You may not see results right away, but don't give up.  Follow-up care is a key part of your treatment and safety. Be sure to make and go to all appointments, and call your doctor if you are having problems. It's also a good idea to know your test results and keep a list of the medicines you take.  How can you care for yourself at home?  Work with your doctor to come up with a bladder training program that is right for you. You may use one or more of the following methods.  Delayed urination  In the beginning, try to keep from urinating for 5 minutes after you first feel the need to go.  While you wait, take deep, slow breaths to relax. Kegel exercises can also help you delay the need to go to the bathroom.  After some practice, when you can easily wait 5 minutes to urinate, try to wait 10 minutes before you urinate.  Slowly increase the waiting period until you are able to control when you have to urinate.  Scheduled urination  Empty your bladder when you first wake up in the morning.  Schedule times throughout the day when you will urinate.  Start by going to the bathroom every hour, even if you don't need to go.  Slowly increase the time between trips to the bathroom.  When you have found a schedule that works well for you, keep doing it.  If you wake up during the night and have to urinate, do it. Apply your schedule to waking hours only.  Kegel exercises  These tighten and strengthen pelvic muscles, which can help you control the flow of urine. (If doing these exercises causes pain, stop doing them and talk with your doctor.) To do Kegel exercises:  Squeeze your muscles as if you were trying not to pass gas. Or squeeze your muscles as if you were stopping the flow of urine. Your belly, legs,  "and buttocks shouldn't move.  Hold the squeeze for 3 seconds, then relax for 5 to 10 seconds.  Start with 3 seconds, then add 1 second each week until you are able to squeeze for 10 seconds.  Repeat the exercise 10 times a session. Do 3 to 8 sessions a day.  When should you call for help?  Watch closely for changes in your health, and be sure to contact your doctor if:    Your incontinence is getting worse.     You do not get better as expected.   Where can you learn more?  Go to https://www.[email or web address]/patiented  Enter V684 in the search box to learn more about \"Bladder Training: Care Instructions.\"  Current as of: March 1, 2023               Content Version: 13.7    2343-0699 [employer].   Care instructions adapted under license by your healthcare professional. If you have questions about a medical condition or this instruction, always ask your healthcare professional. [employer] disclaims any warranty or liability for your use of this information.         "

## 2023-10-03 NOTE — PROGRESS NOTES
"SUBJECTIVE:   Stephen is a 83 year old who presents for Preventive Visit.      10/3/2023     1:22 PM   Additional Questions   Roomed by Darcy Velzo CMA   Accompanied by Wife - Ceci         10/3/2023     1:22 PM   Patient Reported Additional Medications   Patient reports taking the following new medications none       Are you in the first 12 months of your Medicare coverage?  No    Healthy Habits:     In general, how would you rate your overall health?  Excellent    Frequency of exercise:  None    Do you usually eat at least 4 servings of fruit and vegetables a day, include whole grains    & fiber and avoid regularly eating high fat or \"junk\" foods?  Yes    Taking medications regularly:  Yes    Medication side effects:  None    Ability to successfully perform activities of daily living:  Transportation requires assistance, shopping requires assistance, preparing meals requires assistance, housework requires assistance, laundry requires assistance and medication administration requires assistance    Home Safety:  No safety concerns identified    Hearing Impairment:  No hearing concerns    In the past 6 months, have you been bothered by leaking of urine? Yes    In general, how would you rate your overall mental or emotional health?  Excellent    Additional concerns today:  No      Today's PHQ-2 Score:       10/2/2023     4:38 PM   PHQ-2 ( 1999 Pfizer)   Q1: Little interest or pleasure in doing things 0   Q2: Feeling down, depressed or hopeless 0   PHQ-2 Score 0   Q1: Little interest or pleasure in doing things Not at all   Q2: Feeling down, depressed or hopeless Not at all   PHQ-2 Score 0       Some sob. Mild foot edema.  No chest pain . No palpitations.  No orthopnea.  No diarrhea/constipation.   Chronic rhinitis    Have you ever done Advance Care Planning? (For example, a Health Directive, POLST, or a discussion with a medical provider or your loved ones about your wishes): Yes, patient states has an Advance Care " Planning document and will bring a copy to the clinic.       Fall risk  Fallen 2 or more times in the past year?: No  Any fall with injury in the past year?: No    Cognitive Screening   1) Repeat 3 items (Leader, Season, Table)    2) Clock draw: NORMAL  3) 3 item recall: Recalls 2 objects   Results: NORMAL clock, 1-2 items recalled: COGNITIVE IMPAIRMENT LESS LIKELY    Mini-CogTM Copyright CARLOS Carroll. Licensed by the author for use in [facility]; reprinted with permission (rupali@[facility]). All rights reserved.      Do you have sleep apnea, excessive snoring or daytime drowsiness? : no    Reviewed and updated as needed this visit by clinical staff   Tobacco  Allergies  Meds              Reviewed and updated as needed this visit by Provider                 Social History     Tobacco Use    Smoking status: Never     Passive exposure: Never    Smokeless tobacco: Never    Tobacco comments:     Never   Substance Use Topics    Alcohol use: No             9/26/2023     9:23 AM   Alcohol Use   Prescreen: >3 drinks/day or >7 drinks/week? Not Applicable     Do you have a current opioid prescription? No  Do you use any other controlled substances or medications that are not prescribed by a provider? None      Current providers sharing in care for this patient include:   Patient Care Team:  Winston Silverman PA-C as PCP - General (Family Medicine)  Winston Silverman PA-C as Assigned PCP  Hakan Darden MD as Assigned Cancer Care Provider  Ethel Rust, RN as Registered Nurse (Hematology & Oncology)  [facility]  as Assigned Behavioral Health Provider  Glenn Jones MD as MD (Urology)  Shashank Victor MD as Assigned Surgical Provider  Antionette Hunt APRN CNP as Assigned Neuroscience Provider  Tamy Pritchett PT as Physical Therapist (Physical Therapy)    The following health maintenance items are reviewed in Epic and correct as of today:  Health  Maintenance   Topic Date Due    COVID-19 Vaccine (3 - Pfizer risk series) 03/25/2021    MEDICARE ANNUAL WELLNESS VISIT  11/17/2021    LIPID  01/31/2023    MICROALBUMIN  02/07/2023    INFLUENZA VACCINE (1) 09/01/2023    ANNUAL REVIEW OF HM ORDERS  04/06/2024    HEMOGLOBIN  04/10/2024    BMP  06/01/2024    FALL RISK ASSESSMENT  10/03/2024    ADVANCE CARE PLANNING  10/03/2028    DTAP/TDAP/TD IMMUNIZATION (3 - Td or Tdap) 04/10/2033    PHQ-2 (once per calendar year)  Completed    Pneumococcal Vaccine: 65+ Years  Completed    URINALYSIS  Completed    ZOSTER IMMUNIZATION  Completed    IPV IMMUNIZATION  Aged Out    HPV IMMUNIZATION  Aged Out    MENINGITIS IMMUNIZATION  Aged Out     Lab work is in process  Labs reviewed in EPIC  BP Readings from Last 3 Encounters:   10/03/23 124/72   09/08/23 102/68   08/11/23 92/54    Wt Readings from Last 3 Encounters:   10/03/23 84.8 kg (187 lb)   09/08/23 82.4 kg (181 lb 9.6 oz)   08/11/23 83.5 kg (184 lb)                  Patient Active Problem List   Diagnosis    Rosacea    DJD (degenerative joint disease)    Ocular myasthenia gravis (H)    Macular pigment deposit    HYPERLIPIDEMIA LDL GOAL <130    MGUS (monoclonal gammopathy of unknown significance)    Retinal hole OS, operculated    Horseshoe tear of retina without detachment OD    History  of basal cell carcinoma    Seborrhea    AK (actinic keratosis)    Malignant neoplasm of prostate (H)    PVD (posterior vitreous detachment)    Amaurosis fugax by Hx neg carotid W/U    Advanced directives, counseling/discussion    Actinic keratosis    Peripheral neuropathy    Nuclear sclerosis of both eyes    Essential hypertension with goal blood pressure less than 140/90    Gastroesophageal reflux disease without esophagitis    Steroid-induced diabetes mellitus, initial encounter     Stage 3a chronic kidney disease (H)    Secondary adrenocortical insufficiency (H24)    Physical deconditioning    NPH (normal pressure hydrocephalus) (H)     Myoclonus    Infection due to 2019 novel coronavirus    Encephalopathy    Dementia without behavioral disturbance, unspecified dementia type    Idiopathic normal pressure hydrocephalus (H)    Acute atopic conjunctivitis    Cancer of the skin, basal cell    COVID-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    Degeneration of intervertebral disc    Diverticulitis of intestine    Dysphagia    Gait instability    Hip pain, acute, left    History of osteoporosis    Kidney disorder    Pneumonia due to COVID-19 virus    Restless leg syndrome due to iron deficiency anemia    Sepsis due to COVID-19 (H)    Tear of medial cartilage or meniscus of knee, current    Urinary frequency    Vitamin D deficiency    Weakness of both lower extremities    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)    Benign paroxysmal positional vertigo, right     Past Surgical History:   Procedure Laterality Date    ARTHROSCOPY KNEE RT/LT Left Jan 2010    Knee    BIOPSY  2009/2013/2016    Nose; Prostate; BCC - left arm    COLONOSCOPY  9/24/2019    w/Endoscopy    COLONOSCOPY WITH CO2 INSUFFLATION N/A 9/24/2019    Procedure: COLONOSCOPY, WITH CO2 INSUFFLATION;  Surgeon: Loi Levine MD;  Location: [facility] OR    COMBINED ESOPHAGOSCOPY, GASTROSCOPY, DUODENOSCOPY (EGD) WITH CO2 INSUFFLATION N/A 9/24/2019    Procedure: ESOPHAGOGASTRODUODENOSCOPY, WITH CO2 INSUFFLATION;  Surgeon: Loi Levine MD;  Location: [facility] OR    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    DISKECTOMY, LUMBAR, SINGLE SP  2003    L2-3    ENT SURGERY  1943    Tonsils     ENT SURGERY  2-22-12    Biopsy lesion right pinna    ENT SURGERY  2-24-12    Biopsy leson right pinna    ESOPHAGOSCOPY, GASTROSCOPY, DUODENOSCOPY (EGD), COMBINED N/A 9/24/2019    Procedure: Esophagogastroduodenoscopy, With Biopsy;  Surgeon: Loi Levine MD;  Location: [facility] OR    IR LUMBAR DRAIN PLACEMENT W FLUORO  6/27/2022    LAMINOPLASTY CERVICAL POSTERIOR THREE+ LEVELS N/A 12/12/2022    Procedure: POSTERIOR APPROACH Cervical 4-7  laminoplasty;  Surgeon: Judie Levin MD;  Location: [facility]    LAPAROSCOPIC ASSISTED IMPLANT SHUNT VENTRICULOPERITONEAL N/A 7/7/2022    Procedure: possible INSERTION, SHUNT, VENTRICULOPERITONEAL, LAPAROSCOPY-ASSISTED;  Surgeon: Joe Damon MD;  Location: [facility] OR    OPTICAL TRACKING SYSTEM IMPLANT SHUNT VENTRICULOPERITONEAL N/A 7/7/2022    Procedure: INSERTION, SHUNT, VENTRICULOPERITONEAL, USING OPTICAL TRACKING SYSTEM;  Surgeon: Jean-Paul Childs MD;  Location: [facility] OR    SOFT TISSUE SURGERY  May 2010    Basal Cell/right side nose       Social History     Tobacco Use    Smoking status: Never     Passive exposure: Never    Smokeless tobacco: Never    Tobacco comments:     Never   Substance Use Topics    Alcohol use: No     Family History   Problem Relation Age of Onset    Heart Disease Mother     Alzheimer Disease Mother 73    C.A.D. Father     Coronary Artery Disease Father     Diabetes Grandchild         using a pump     Diabetes Paternal Uncle     Heart Disease Paternal Grandmother     Glaucoma No family hx of     Macular Degeneration No family hx of     Melanoma No family hx of     Skin Cancer No family hx of          Current Outpatient Medications   Medication Sig Dispense Refill    acetaminophen (TYLENOL) 325 MG tablet Take 1-2 tablets (325-650 mg) by mouth every 4 hours as needed for mild pain      aspirin (ASA) 325 MG tablet Take 1 tablet (325 mg) by mouth daily      cyanocobalamin (VITAMIN B-12) 1000 MCG tablet Take 1 tablet (1,000 mcg) by mouth daily 90 tablet 1    ferrous sulfate (FEROSUL) 325 (65 Fe) MG tablet Use 1 tab every other day with orange juice (Patient taking differently: Take 325 mg by mouth daily Use 1 tab daily with orange juice) 60 tablet 1    fluticasone (FLONASE) 50 MCG/ACT nasal spray Spray 1 spray into both nostrils daily 11.1 mL 0    furosemide (LASIX) 20 MG tablet Take 0.5 tablets (10 mg) by mouth daily 45 tablet 1    gabapentin (NEURONTIN) 300 MG capsule Take 1 capsule  (300 mg) by mouth 3 times daily 270 capsule 1    guaiFENesin (MUCINEX) 600 MG 12 hr tablet Take 2 tablets (1,200 mg) by mouth 2 times daily 20 tablet 0    ketoconazole (NIZORAL) 2 % external cream Apply twice daily for 8 weeks from knees down to both legs and feet. 120 g 3    levETIRAcetam (KEPPRA) 250 MG tablet Take 1 tablet (250 mg) by mouth 2 times daily      meclizine (ANTIVERT) 25 MG tablet Take 0.5-1 tablets (12.5-25 mg) by mouth 3 times daily as needed for dizziness 30 tablet 0    methocarbamol (ROBAXIN) 500 MG tablet Take 1 tablet (500 mg) by mouth every 6 hours as needed for muscle spasms      multivitamin (OCUVITE) TABS tablet Take 1 tablet by mouth daily      pantoprazole (PROTONIX) 40 MG EC tablet Take 40 mg by mouth daily      polyethylene glycol (MIRALAX) 17 GM/Dose powder Take 17 g by mouth daily as needed for constipation 510 g     pramipexole (MIRAPEX) 0.25 MG tablet Take 1 tablet (0.25 mg) by mouth 3 times daily 270 tablet 1    predniSONE (DELTASONE) 10 MG tablet Take 1 tablet (10 mg) by mouth daily 30 tablet 0    pyridostigmine (MESTINON) 60 MG tablet Take 1.5 tablets (90 mg) by mouth 2 times daily      senna-docusate (SENOKOT-S/PERICOLACE) 8.6-50 MG tablet Take 1 tablet by mouth 2 times daily 60 tablet 0    simvastatin (ZOCOR) 20 MG tablet Take 1 tablet (20 mg) by mouth At Bedtime 90 tablet 1    tamsulosin (FLOMAX) 0.4 MG capsule Take 1 capsule (0.4 mg) by mouth daily      triamcinolone (KENALOG) 0.1 % external cream Apply a thin layer up to twice daily to affected areas as needed. 30 g 3    vitamin D3 (CHOLECALCIFEROL) 2000 units tablet Take 1 tablet by mouth daily 90 tablet 1    ACE/ARB/ARNI NOT PRESCRIBED (INTENTIONAL) Please choose reason not prescribed from choices below.       Allergies   Allergen Reactions    Mycophenolate Other (See Comments)     Confusion, abnormal movements     Recent Labs   Lab Test 06/01/23  1353 12/13/22  1023 12/09/22  0638 12/07/22  5359 12/05/22  1093  05/02/22  0650 03/31/22  1342 02/15/22  1057 02/07/22  1112 01/31/22  1357 01/17/22  1718 01/17/22  1718 05/19/21  1059 11/09/20  0824 09/22/20  1351 09/03/19  0940 08/15/19  1240   A1C  --   --   --   --  6.2*  --   --   --  5.2  --   --  5.5 5.7* 5.8*  --   --    < >   LDL  --   --   --   --   --   --   --   --   --  92  --   --   --  92 94  --   --    HDL  --   --   --   --   --   --   --   --   --  79  --   --   --  77 90  --   --    TRIG  --   --   --   --   --   --   --   --   --  183*  --   --   --  100 75  --   --    ALT 15  --   --  25  --   --  27   < >  --   --   --  30  --   --   --   --   --    CR 0.99 0.83   < > 1.10  --    < > 0.91   < > 1.04  --    < > 0.97 1.18 1.25  --   --   --    GFRESTIMATED 75 86   < > 66  --    < > 84   < > 71  --    < > 77 57* 53*  --   --   --    GFRESTBLACK  --   --   --   --   --   --   --   --   --   --   --   --  66 62  --   --   --    POTASSIUM 3.8 3.8   < > 4.3  --    < > 4.2   < > 4.0  --    < > 4.2 3.7 4.8  --   --   --    TSH  --   --   --   --   --   --  1.50  --   --   --   --   --   --   --   --  1.94  --     < > = values in this interval not displayed.            Review of Systems   Constitutional:  Negative for chills and fever.   HENT:  Positive for congestion and hearing loss. Negative for ear pain and sore throat.    Eyes:  Negative for pain and visual disturbance.   Respiratory:  Positive for shortness of breath. Negative for cough.    Cardiovascular:  Positive for peripheral edema. Negative for chest pain and palpitations.   Gastrointestinal:  Negative for abdominal pain, constipation, diarrhea, heartburn, hematochezia and nausea.   Genitourinary:  Positive for impotence. Negative for dysuria, frequency, genital sores, hematuria, penile discharge and urgency.   Musculoskeletal:  Negative for arthralgias, joint swelling and myalgias.   Skin:  Negative for rash.   Neurological:  Positive for dizziness and weakness. Negative for headaches and paresthesias.  "  Psychiatric/Behavioral:  Negative for mood changes. The patient is not nervous/anxious.      Constitutional, HEENT, cardiovascular, pulmonary, GI, , musculoskeletal, neuro, skin, endocrine and psych systems are negative, except as otherwise noted.    OBJECTIVE:   /72   Pulse 77   Temp 96.9  F (36.1  C) (Temporal)   Resp 24   Ht 1.702 m (5' 7\")   Wt 84.8 kg (187 lb)   SpO2 96%   BMI 29.29 kg/m   Estimated body mass index is 29.29 kg/m  as calculated from the following:    Height as of this encounter: 1.702 m (5' 7\").    Weight as of this encounter: 84.8 kg (187 lb).  Physical Exam  GENERAL: healthy, alert and no distress  EYES: Eyes grossly normal to inspection, PERRL and conjunctivae and sclerae normal  HENT: ear canals and TM's normal, nose and mouth without ulcers or lesions  NECK: no adenopathy, no asymmetry, masses, or scars and thyroid normal to palpation  RESP: lungs clear to auscultation - no rales, rhonchi or wheezes  CV: regular rate and rhythm, normal S1 S2, no S3 or S4, no murmur, click or rub, no peripheral edema and peripheral pulses strong  ABDOMEN: soft, nontender, no hepatosplenomegaly, no masses and bowel sounds normal  MS: no gross musculoskeletal defects noted, no edema  SKIN: no suspicious lesions or rashes  NEURO: Normal strength and tone, mentation intact and speech normal  PSYCH: mentation appears normal, affect normal/bright    Diagnostic Test Results:  Labs reviewed in Epic    ASSESSMENT / PLAN:       ICD-10-CM    1. Wellness examination  Z00.00       2. Stage 3a chronic kidney disease (H)  N18.31 Basic metabolic panel  (Ca, Cl, CO2, Creat, Gluc, K, Na, BUN)      3. SOB (shortness of breath)  R06.02 CBC with platelets and differential     BNP-N terminal pro     TSH with free T4 reflex        Continue current meds.   Lower sugar/fat diet  Exercise    Patient has been advised of split billing requirements and indicates understanding: Yes      COUNSELING:  Reviewed preventive " "health counseling, as reflected in patient instructions       Regular exercise       Healthy diet/nutrition      BMI:   Estimated body mass index is 29.29 kg/m  as calculated from the following:    Height as of this encounter: 1.702 m (5' 7\").    Weight as of this encounter: 84.8 kg (187 lb).   Weight management plan: Discussed healthy diet and exercise guidelines      He reports that he has never smoked. He has never been exposed to tobacco smoke. He has never used smokeless tobacco.      Appropriate preventive services were discussed with this patient, including applicable screening as appropriate for fall prevention, nutrition, physical activity, Tobacco-use cessation, weight loss and cognition.  Checklist reviewing preventive services available has been given to the patient.    Reviewed patients plan of care and provided an AVS. The Basic Care Plan (routine screening as documented in Health Maintenance) for Gustavo meets the Care Plan requirement. This Care Plan has been established and reviewed with the Patient.          Winston Silverman PA-C  [facility]    Identified Health Risks:    He is at risk for lack of exercise and has been provided with information to increase physical activity for the benefit of his well-being.  The patient reports that he has difficulty with activities of daily living. I have asked that the patient make a follow up appointment in  weeks where this issue will be further evaluated and addressed.  Information on urinary incontinence and treatment options given to patient.  "

## 2023-10-04 LAB
ANION GAP SERPL CALCULATED.3IONS-SCNC: 11 MMOL/L (ref 7–15)
BUN SERPL-MCNC: 16.1 MG/DL (ref 8–23)
CALCIUM SERPL-MCNC: 9.1 MG/DL (ref 8.8–10.2)
CHLORIDE SERPL-SCNC: 104 MMOL/L (ref 98–107)
CREAT SERPL-MCNC: 1.11 MG/DL (ref 0.67–1.17)
DEPRECATED HCO3 PLAS-SCNC: 25 MMOL/L (ref 22–29)
EGFRCR SERPLBLD CKD-EPI 2021: 65 ML/MIN/1.73M2
GLUCOSE SERPL-MCNC: 109 MG/DL (ref 70–99)
NT-PROBNP SERPL-MCNC: 167 PG/ML (ref 0–1800)
POTASSIUM SERPL-SCNC: 4.5 MMOL/L (ref 3.4–5.3)
SODIUM SERPL-SCNC: 140 MMOL/L (ref 135–145)
TSH SERPL DL<=0.005 MIU/L-ACNC: 2.51 UIU/ML (ref 0.3–4.2)

## 2023-10-12 ENCOUNTER — THERAPY VISIT (OUTPATIENT)
Dept: PHYSICAL THERAPY | Facility: CLINIC | Age: 83
End: 2023-10-12
Attending: PHYSICIAN ASSISTANT
Payer: MEDICARE

## 2023-10-12 DIAGNOSIS — H81.11 BENIGN PAROXYSMAL POSITIONAL VERTIGO, RIGHT: Primary | ICD-10-CM

## 2023-10-12 PROCEDURE — 95992 CANALITH REPOSITIONING PROC: CPT | Mod: GP | Performed by: PHYSICAL THERAPIST

## 2023-10-12 PROCEDURE — 97110 THERAPEUTIC EXERCISES: CPT | Mod: GP,59 | Performed by: PHYSICAL THERAPIST

## 2023-10-19 ENCOUNTER — THERAPY VISIT (OUTPATIENT)
Dept: PHYSICAL THERAPY | Facility: CLINIC | Age: 83
End: 2023-10-19
Attending: PHYSICIAN ASSISTANT
Payer: MEDICARE

## 2023-10-19 DIAGNOSIS — H81.11 BENIGN PAROXYSMAL POSITIONAL VERTIGO, RIGHT: Primary | ICD-10-CM

## 2023-10-19 PROCEDURE — 97112 NEUROMUSCULAR REEDUCATION: CPT | Mod: GP | Performed by: PHYSICAL THERAPIST

## 2023-10-19 PROCEDURE — 97110 THERAPEUTIC EXERCISES: CPT | Mod: GP | Performed by: PHYSICAL THERAPIST

## 2023-10-23 ENCOUNTER — OFFICE VISIT (OUTPATIENT)
Dept: NEUROLOGY | Facility: CLINIC | Age: 83
End: 2023-10-23
Payer: MEDICARE

## 2023-10-23 VITALS
SYSTOLIC BLOOD PRESSURE: 114 MMHG | DIASTOLIC BLOOD PRESSURE: 78 MMHG | OXYGEN SATURATION: 98 % | WEIGHT: 187 LBS | BODY MASS INDEX: 29.29 KG/M2 | HEART RATE: 61 BPM

## 2023-10-23 DIAGNOSIS — G25.81 RESTLESS LEG SYNDROME: ICD-10-CM

## 2023-10-23 DIAGNOSIS — G91.2 NPH (NORMAL PRESSURE HYDROCEPHALUS) (H): ICD-10-CM

## 2023-10-23 DIAGNOSIS — G70.00 OCULAR MYASTHENIA (H): ICD-10-CM

## 2023-10-23 DIAGNOSIS — M47.12 CERVICAL SPONDYLOSIS WITH MYELOPATHY: ICD-10-CM

## 2023-10-23 DIAGNOSIS — Z79.52 CURRENT CHRONIC USE OF SYSTEMIC STEROIDS: ICD-10-CM

## 2023-10-23 DIAGNOSIS — R53.83 OTHER FATIGUE: ICD-10-CM

## 2023-10-23 DIAGNOSIS — H51.0: ICD-10-CM

## 2023-10-23 DIAGNOSIS — R41.3 SHORT-TERM MEMORY LOSS: Primary | ICD-10-CM

## 2023-10-23 PROCEDURE — 99215 OFFICE O/P EST HI 40 MIN: CPT | Performed by: PSYCHIATRY & NEUROLOGY

## 2023-10-23 ASSESSMENT — PAIN SCALES - GENERAL: PAINLEVEL: MILD PAIN (2)

## 2023-10-23 NOTE — PATIENT INSTRUCTIONS
Repeat neuropsych testing for your issues with short term memory and repeating the same words  EMG to test the status of your myasthenia, and figure out if it's the myasthenia causing your fatigue or something else. You will have to stop pyridostigmine for 24 hours prior to the test    Labs today (A1c)    I am referring you to the neuroopthalmology (eye doctors) to seek some help in diagnosis- question is whether your difficulty gazing up is myasthenia related or not    For the restless leg syndrome: Please change the way you take the pramipexole and gabapentin.  Take 1 pill of each drug in the morning, and 2 pills of each drug in the evening.  Please do not take those medications at noon.    Follow-up in 4 months in clinic.

## 2023-10-23 NOTE — LETTER
10/23/2023       RE: Gustavo Ramon  [address] [facility] [id number]     Dear Colleague,    Thank you for referring your patient, Gustavo Ramon, to the [facility] [place] at [facility]. Please see a copy of my visit note below.    Dear Mr Silverman,    I had the pleasure to see Mr. Ramon in follow-up at the [facility]/neuromuscular clinic today.  Mr. Ramon has several neurological issues, including ocular myasthenia gravis (antibody status unknown), history of normal pressure hydrocephalus status post  shunt placement in 2022, cervical spondylosis with myelopathy status post decompression last year with fusion, and restless leg syndrome.  Compared to the last time I saw him, he remains on prednisone 10 mg daily, and pyridostigmine 60 mg 3 times a day.  He reports frequent eyelid fluttering/twitching.  He also has somewhat blurry or double vision occasionally.  He is not sure if it is binocular or monocular.  He underwent bilateral blepharoplasty surgery a few months ago.  After the surgery, he feels that he prefers that his eyes are shut at times because perhaps light irritates him.  He does not describe ptosis per se.  Denies dysphagia or difficulty chewing.    Regarding his speech, Mr. Ramon notes that lately he has been stumbling on words more, and he repeats the same word a couple of times when he has conversation with his friends.  He also suffers from mild short-term memory difficulties, whereas his long-term memories remain preserved.  He last had neuropsych testing in June 2022.  This was after his  shunt placement, and showed improved results compared to previous shunt.  He had some frontal and subcortical executive dysfunction, as well as parietal dysfunction.    One of the patient's major complaint is a persistent feeling of generalized fatigue during the day, reduced energy and  stamina.  He does not feel he can do as much physical exercise as he could do a few years ago.  His night sleep is quite disrupted, because he has to wake up often to go to the bathroom, and he has difficult to control restless leg syndrome.  He is on pramipexole 0.25 mg 3 times a day, and gabapentin 300 mg 3 times a day, but his evening RLS symptoms persist.  His ferritin levels were excellent (over 50), and he never needed iron replacement.    He had a bone density scan in May 2023 which was normal.  Last A1c was normal, but it was obtained over a year ago.    /78 (BP Location: Right arm, Patient Position: Right side, Cuff Size: Adult Regular)   Pulse 61   Wt 84.8 kg (187 lb)   SpO2 98%   BMI 29.29 kg/m      EXAM: I see frequent eyelid fasciculations today, but no ptosis after 30 seconds of sustained upward gaze.  There is perhaps very mild weakness of the right orbicularis oculi, none on the left.  He has markedly restricted upgaze, but full range of downgaze, eye abduction and adduction.  He probably has some diplopia when attempting to gaze upwards.  Unfortunately, I could not do a good oculocephalic maneuver, because of his history of cervical spine fusion. He can accommodate with both eyes fairly well. His neck flexion and extension strength are full.  He has no weakness of orbicularis oris, uvula, jaw, palate, or tongue.  I did not appreciate any dysphonia or dysarthria.    In summary, Mr Ramon presents with a number of neurological issues.  As for his ocular myasthenia, I am a little uncertain how well controlled this is, exactly because of his marked restricted upgaze.  While the latter can sometimes be a physiologic phenomenon in the elderly, I do not recall the patient having this degree of upgaze palsy previously.  Differential diagnosis includes a supranuclear issue, for example related to high ICP and hydrocephalus, versus a peripheral issue related to MG.  Unfortunately, I could not do a  good oculocephalic maneuver due to the restrictions mentioned above.  I would like some help from neuro-ophthalmology in this case, because the diagnosis of the cause of upgaze palsy is quite critical to determine the next steps in treatment.  Of note, he recently had an evaluation in the neurosurgery clinic in June 2023, and the  shunt seems to be functioning very well.    Regarding his concerns about repeating words, and short-term memory loss, I recommend a repeat neuropsych testing evaluation.  If there is worsening from the previous study, with a well-functioning shunt, then alternative causes should be sought, including medication side effects (pramipexole, gabapentin), disrupted sleep, or early MCI due to amyloid deposition.    He has restless legs not optimally controlled, and this affects his sleep quality.  I suggested to the patient to take 1 pill of gabapentin in the morning and 2 (total 600 mg) in the evening, when he needs it the most.  He should skip the noon dose.  He should do the same with pramipexole, 0.25 mg in the morning, and 0.5 mg in the evening.  If this intervention is not helpful for his restless legs, then we may need to increase the evening dose of the medications even further.    Lastly, his fatigue could be due to multiple reasons, but the lingering question is whether MG is playing a role.  I would like him to get an EMG with repetitive nerve stimulation to assess the status of his neuromuscular junction.  He has to stop the pyridostigmine 24 hours before the test, to prevent any false negatives.  He understands this, and we will schedule the test today.    Follow-up in clinic in 4 months, sooner if needed.  Total time spent on this encounter today 50 minutes of which 30 face-to-face, 10 in postvisit note dictation, editing, and orders, and 10 in previsit chart review.      Again, thank you for allowing me to participate in the care of your patient.      Sincerely,    Tyler  Francis Wheat MD

## 2023-10-23 NOTE — PROGRESS NOTES
Winston Silverman PA-C  [place], October 23, 2023    Dear Mr Silverman,    I had the pleasure to see Mr. Ramon in follow-up at the [facility]/neuromuscular clinic today.  Mr. Ramon has several neurological issues, including ocular myasthenia gravis (antibody status unknown), history of normal pressure hydrocephalus status post  shunt placement in 2022, cervical spondylosis with myelopathy status post decompression last year with fusion, and restless leg syndrome.  Compared to the last time I saw him, he remains on prednisone 10 mg daily, and pyridostigmine 60 mg 3 times a day.  He reports frequent eyelid fluttering/twitching.  He also has somewhat blurry or double vision occasionally.  He is not sure if it is binocular or monocular.  He underwent bilateral blepharoplasty surgery a few months ago.  After the surgery, he feels that he prefers that his eyes are shut at times because perhaps light irritates him.  He does not describe ptosis per se.  Denies dysphagia or difficulty chewing.    Regarding his speech, Mr. Ramon notes that lately he has been stumbling on words more, and he repeats the same word a couple of times when he has conversation with his friends.  He also suffers from mild short-term memory difficulties, whereas his long-term memories remain preserved.  He last had neuropsych testing in June 2022.  This was after his  shunt placement, and showed improved results compared to previous shunt.  He had some frontal and subcortical executive dysfunction, as well as parietal dysfunction.    One of the patient's major complaint is a persistent feeling of generalized fatigue during the day, reduced energy and stamina.  He does not feel he can do as much physical exercise as he could do a few years ago.  His night sleep is quite disrupted, because he has to wake up often to go to the bathroom, and he has difficult to control restless leg syndrome.  He is on pramipexole 0.25 mg 3 times  a day, and gabapentin 300 mg 3 times a day, but his evening RLS symptoms persist.  His ferritin levels were excellent (over 50), and he never needed iron replacement.    He had a bone density scan in May 2023 which was normal.  Last A1c was normal, but it was obtained over a year ago.    /78 (BP Location: Right arm, Patient Position: Right side, Cuff Size: Adult Regular)   Pulse 61   Wt 84.8 kg (187 lb)   SpO2 98%   BMI 29.29 kg/m      EXAM: I see frequent eyelid fasciculations today, but no ptosis after 30 seconds of sustained upward gaze.  There is perhaps very mild weakness of the right orbicularis oculi, none on the left.  He has markedly restricted upgaze, but full range of downgaze, eye abduction and adduction.  He probably has some diplopia when attempting to gaze upwards.  Unfortunately, I could not do a good oculocephalic maneuver, because of his history of cervical spine fusion. He can accommodate with both eyes fairly well. His neck flexion and extension strength are full.  He has no weakness of orbicularis oris, uvula, jaw, palate, or tongue.  I did not appreciate any dysphonia or dysarthria.    In summary, Mr Ramon presents with a number of neurological issues.  As for his ocular myasthenia, I am a little uncertain how well controlled this is, exactly because of his marked restricted upgaze.  While the latter can sometimes be a physiologic phenomenon in the elderly, I do not recall the patient having this degree of upgaze palsy previously.  Differential diagnosis includes a supranuclear issue, for example related to high ICP and hydrocephalus, versus a peripheral issue related to MG.  Unfortunately, I could not do a good oculocephalic maneuver due to the restrictions mentioned above.  I would like some help from neuro-ophthalmology in this case, because the diagnosis of the cause of upgaze palsy is quite critical to determine the next steps in treatment.  Of note, he recently had an  evaluation in the neurosurgery clinic in June 2023, and the  shunt seems to be functioning very well.    Regarding his concerns about repeating words, and short-term memory loss, I recommend a repeat neuropsych testing evaluation.  If there is worsening from the previous study, with a well-functioning shunt, then alternative causes should be sought, including medication side effects (pramipexole, gabapentin), disrupted sleep, or early MCI due to amyloid deposition.    He has restless legs not optimally controlled, and this affects his sleep quality.  I suggested to the patient to take 1 pill of gabapentin in the morning and 2 (total 600 mg) in the evening, when he needs it the most.  He should skip the noon dose.  He should do the same with pramipexole, 0.25 mg in the morning, and 0.5 mg in the evening.  If this intervention is not helpful for his restless legs, then we may need to increase the evening dose of the medications even further.    Lastly, his fatigue could be due to multiple reasons, but the lingering question is whether MG is playing a role.  I would like him to get an EMG with repetitive nerve stimulation to assess the status of his neuromuscular junction.  He has to stop the pyridostigmine 24 hours before the test, to prevent any false negatives.  He understands this, and we will schedule the test today.    Follow-up in clinic in 4 months, sooner if needed.  Total time spent on this encounter today 50 minutes of which 30 face-to-face, 10 in postvisit note dictation, editing, and orders, and 10 in previsit chart review.    Sincerely,    Tyler Wheat MD, FAAN

## 2023-10-23 NOTE — NURSING NOTE
Chief Complaint   Patient presents with    RECHECK    Myasthenia Gravis       Vitals were taken and medications were reconciled.      Clint Davies, Technician  2:20 PM  October 23, 2023

## 2023-10-26 ENCOUNTER — THERAPY VISIT (OUTPATIENT)
Dept: PHYSICAL THERAPY | Facility: CLINIC | Age: 83
End: 2023-10-26
Attending: PHYSICIAN ASSISTANT
Payer: MEDICARE

## 2023-10-26 DIAGNOSIS — H81.11 BENIGN PAROXYSMAL POSITIONAL VERTIGO, RIGHT: Primary | ICD-10-CM

## 2023-10-26 PROCEDURE — 95992 CANALITH REPOSITIONING PROC: CPT | Mod: GP | Performed by: PHYSICAL THERAPIST

## 2023-10-26 PROCEDURE — 97112 NEUROMUSCULAR REEDUCATION: CPT | Mod: GP,59 | Performed by: PHYSICAL THERAPIST

## 2023-10-27 ENCOUNTER — TELEPHONE (OUTPATIENT)
Dept: OPHTHALMOLOGY | Facility: CLINIC | Age: 83
End: 2023-10-27
Payer: MEDICARE

## 2023-10-27 NOTE — TELEPHONE ENCOUNTER
Spoke with Ceci and scheduled patient on January 11th at 1:30.  Ceci aware of date, time and location.  Provided direct phone number in case of any questions.    Alfredo Tep on 10/27/2023 at 12:19 PM

## 2023-10-27 NOTE — TELEPHONE ENCOUNTER
----- Message from Tyler Wheat MD sent at 10/24/2023  9:04 AM CDT -----  Regarding: RE: Re: upgaze restriction  Thanks Trevor!        ----- Message -----  From: Trevor Galvin MD  Sent: 10/24/2023   8:57 AM CDT  To: Alfredo Tep; Tyler Wheat MD  Subject: RE: Re: upgaze restriction                       Hi Gilbert,    Happy to see him.  Thank you for the background information. Acknowledging that I don't have the full clinical picture yet it would seem unlikely for a patient to have asymptomatic up gaze deficits from under treated myasthenia gravis.... as we would typically expect patients with ocular myasthenia gravis with that extent of ophthalmoplegia to have significant symptoms of variable diplopia.  NPH really also shouldn't have any relationship to up gaze deficits unless perhaps he has progressive supranuclear palsy mimicking NPH.... or I suppose two unrelated diagnoses.    Alfredo, can you please call this patient to set him up for a new patient appointment to see me?    Thank you, Gilbert.   Trevor     ----- Message -----  From: Tyler Wheat MD  Sent: 10/23/2023   3:14 PM CDT  To: Trevor Galvin MD  Subject: Re: upgaze restriction                           Hi Trevor,    I would appreciate if you could see this patient.  He has known ocular myasthenia gravis.  His symptoms were quite well controlled for a while on 10 mg of prednisone daily.  He recently underwent eyelid lift surgery (not on my recommendation).  His exam today shows marked restriction of upgaze.  While this sometimes can be physiologic in elderly, I do not recall him having this upgaze restriction previously.  What perplexes things is that he also has a history of normal pressure hydrocephalus, status post shunt placement.  The shunt is well-functioning.  When I examined his accommodation it seems to be working pretty well.  Unfortunately, I cannot do a good oculocephalic  maneuver, because he had a previous cervical spine fusion surgery.  Therefore, I cannot tell with confidence whether the upgaze restriction is supranuclear or nuclear.    Could you please help me with this?  If you think it is a peripheral problem, then this could be MG related, and I need to upgrade his immunotherapy.    Thanks!    Gilbert

## 2023-10-30 ENCOUNTER — OFFICE VISIT (OUTPATIENT)
Dept: DERMATOLOGY | Facility: CLINIC | Age: 83
End: 2023-10-30
Payer: MEDICARE

## 2023-10-30 DIAGNOSIS — L57.0 ACTINIC KERATOSES: ICD-10-CM

## 2023-10-30 DIAGNOSIS — E78.5 HYPERLIPIDEMIA LDL GOAL <130: ICD-10-CM

## 2023-10-30 DIAGNOSIS — L82.1 SEBORRHEIC KERATOSIS: Primary | ICD-10-CM

## 2023-10-30 DIAGNOSIS — D18.01 CHERRY ANGIOMA: ICD-10-CM

## 2023-10-30 DIAGNOSIS — L81.4 SOLAR LENTIGO: ICD-10-CM

## 2023-10-30 DIAGNOSIS — Z85.828 HISTORY OF NONMELANOMA SKIN CANCER: ICD-10-CM

## 2023-10-30 DIAGNOSIS — L82.0 INFLAMED SEBORRHEIC KERATOSIS: ICD-10-CM

## 2023-10-30 DIAGNOSIS — B35.4 TINEA CORPORIS: ICD-10-CM

## 2023-10-30 DIAGNOSIS — L72.0 MILIAL CYST: ICD-10-CM

## 2023-10-30 PROCEDURE — 99213 OFFICE O/P EST LOW 20 MIN: CPT | Mod: 25 | Performed by: DERMATOLOGY

## 2023-10-30 PROCEDURE — 17000 DESTRUCT PREMALG LESION: CPT | Mod: XS | Performed by: DERMATOLOGY

## 2023-10-30 PROCEDURE — 17110 DESTRUCTION B9 LES UP TO 14: CPT | Performed by: DERMATOLOGY

## 2023-10-30 PROCEDURE — 17003 DESTRUCT PREMALG LES 2-14: CPT | Mod: XS | Performed by: DERMATOLOGY

## 2023-10-30 RX ORDER — PRENATAL VIT 91/IRON/FOLIC/DHA 28-975-200
COMBINATION PACKAGE (EA) ORAL 2 TIMES DAILY
Qty: 42 G | Refills: 11 | Status: SHIPPED | OUTPATIENT
Start: 2023-10-30

## 2023-10-30 NOTE — NURSING NOTE
Gustavo Ramon's goals for this visit include:   Chief Complaint   Patient presents with    Skin Check     FBSE  6 month check .  Areas of concern area a spot on scalp, skin tag on left side of neck,  two spots on left shoulder and a spot on right leg.  HX of NMSC.       He requests these members of his care team be copied on today's visit information:     PCP: Winston Silverman    Referring Provider:  No referring provider defined for this encounter.    There were no vitals taken for this visit.    Do you need any medication refills at today's visit?   .Yesica Dempsey on 10/30/2023 at 1:43 PM

## 2023-10-30 NOTE — PROGRESS NOTES
[facility] Dermatology Note    Encounter Date: Oct 30, 2023    Dermatology Problem List:  1. 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 Christiansen's note 11/21/11), s/p excision 5/12/09   2. Actinic keratoses  - R tragus s/p LN2 3/13/23; s/p biopsy 9/23/22  - s/p Efudex 2015, Fall 2020 to face, Spring 2021 to forearms  - s/p PDT (x3)  - s/p LN2  3. Seborrheic dermatitis  - clobetasol 0.05% solution for scalp, triam 0.1% cream for ears  4. Relevant medical history: MGUS, myasthenia gravis currently on prednisone  5. Tinea cruris  - current tx: ketoconazole cream  6. 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 Lipoma L bicep, s/p biopsy 9/23/22    ______________________________________    Impression/Plan:  1. History of NMSC  - no evidence of recurrence  2. Lesions x2 On scalp and L superior shoulder, Ddx: actinic keratosis vs. Early skin cancer  - we discussed risks and benefits of cryotherapy vs biopsy today and will treat as an AK with cryotherapy  - if lesion does not respond, will perform shave biopsy at next visit  - Cryotherapy procedure note: After verbal consent and discussion of risks and benefits including, but not limited to, dyspigmentation/scar, blister, and pain, 2 was(were) treated with 1-2 mm freeze border for 1-2 cycles with liquid nitrogen. Post cryotherapy instructions were provided.   3. AKs x 12 on the face, scalp, and upper back  - Cryotherapy procedure note: After verbal consent and  discussion of risks and benefits including, but not limited to, dyspigmentation/scar, blister, and pain, 12 was(were) treated with 1-2 mm freeze border for 1-2 cycles with liquid nitrogen. Post cryotherapy instructions were provided.    4. ISKs x2  -Cryotherapy procedure note: After verbal consent and discussion of risks and benefits including, but not limited to, dyspigmentation/scar, blister, and pain, 2 was(were) treated with 1-2 mm freeze border for 1-2 cycles with liquid nitrogen. Post cryotherapy instructions were provided.    5. Reassurance provided for benign lesions not treated today including cherry angiomata, solar lentigines, seborrheic keratoses, milial cysts, and banal-appearing melanocytic nevi.   6.Tinea corporis  - present on R leg and both feet  - prior tx: ketoconazole and triamcinolone  - pt will stop ketoconazole and triamcinolone and instead use prescribed terbinafine 2-3x per day      Follow-up in 4-6 months.      Staff Involved:  Staff and Scribe    I, Micki House, am serving as a scribe; to document services personally performed by Joe Grimaldo MD -based on data collection and the provider's statements to me.     Provider Disclosure:   The documentation recorded by the scribe accurately reflects the services I personally performed and the decisions made by me.    Joe Grimaldo MD   of Dermatology  Department of Dermatology  [facility]        CC:   Chief Complaint   Patient presents with    Skin Check     FBSE  6 month check .  Areas of concern area a spot on scalp, skin tag on left side of neck,  two spots on left shoulder and a spot on right leg.  HX of NMSC.        History of Present Illness:  Mr. Gustavo Ramon is a 83 year old male who presents as a return patient. He is here for a FBSE. He has areas of concern on the scalp, left side of neck, two on left shoulder, and on the right leg. Wife reports that the spot on the  scalp constantly scabs over. Wife has been using triamcinolone and ketoconazole on his problem areas.       Labs:  N/A    Physical exam:  Vitals: There were no vitals taken for this visit.  GEN: This is a well developed, well-nourished male in no acute distress, in a pleasant mood.    SKIN: Montemayor phototype 2  - Full skin, which includes the head/face, both arms, chest, back, abdomen,both legs, genitalia and/or groin buttocks, digits and/or nails, was examined.  -There are dome shaped bright red papules on the head/neck, trunk, extremities.   - Multiple regular brown pigmented macules and papules are identified on the head/neck, trunk, extremities.   - Scattered brown macules on sun exposed areas.  - There are waxy stuck on tan to brown papules on the head/neck, trunk, extremities.   - 14 erythematous scaly macules on the scalp, face, and upper back including the L superior shoulder and crown of the scalp  - 2 erythematous papules on the L neck and L anterior shoulder  - erythematous anular scaly patches on the R leg   - erythema and scaling of both plantar feet  - 3 subcutaneous white papules on the face and chest  - No other lesions of concern on areas examined.     Past Medical History:   Past Medical History:   Diagnosis Date    Actinic keratosis     AK (actinic keratosis) 11/15/2012    Amaurosis fugax     Basal cell carcinoma 2009    R medial cheek/nose    Central serous retinopathy left    Eye    Diverticulosis 08/2006    DJD (degenerative joint disease)     GERD (gastroesophageal reflux disease)     Hearing loss     High frequency    History of nonmelanoma skin cancer     History of nonmelanoma skin cancer     HTN (hypertension)     Hyperlipidemia LDL goal < 130     Hyperplastic colon polyp 08/2006    IgA monoclonal gammopathy     IgA kappa    Infection due to 2019 novel coronavirus 10/13/2022    Infection due to 2019 novel coronavirus 11/2021    Lattice degeneration of retina right    Eye    Macular  degeneration     Nonsenile cataract     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    [place] consult    Rosacea     Steroid-induced diabetes mellitus, 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    Strabismus     Vitreous detachment left    Eye     Past Surgical History:   Procedure Laterality Date    ARTHROSCOPY KNEE RT/LT Left Jan 2010    Knee    BIOPSY  2009/2013/2016    Nose; Prostate; BCC - left arm    COLONOSCOPY  9/24/2019    w/Endoscopy    COLONOSCOPY WITH CO2 INSUFFLATION N/A 9/24/2019    Procedure: COLONOSCOPY, WITH CO2 INSUFFLATION;  Surgeon: Loi Levine MD;  Location: [facility] OR    COMBINED ESOPHAGOSCOPY, GASTROSCOPY, DUODENOSCOPY (EGD) WITH CO2 INSUFFLATION N/A 9/24/2019    Procedure: ESOPHAGOGASTRODUODENOSCOPY, WITH CO2 INSUFFLATION;  Surgeon: Loi Levine MD;  Location: [facility] OR    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    DISKECTOMY, LUMBAR, SINGLE SP  2003    L2-3    ENT SURGERY  1943    Tonsils     ENT SURGERY  2-22-12    Biopsy lesion right pinna    ENT SURGERY  2-24-12    Biopsy leson right pinna    ESOPHAGOSCOPY, GASTROSCOPY, DUODENOSCOPY (EGD), COMBINED N/A 9/24/2019    Procedure: Esophagogastroduodenoscopy, With Biopsy;  Surgeon: Loi Levine MD;  Location: [facility] OR    IR LUMBAR DRAIN PLACEMENT W FLUORO  6/27/2022    LAMINOPLASTY CERVICAL POSTERIOR THREE+ LEVELS N/A 12/12/2022    Procedure: POSTERIOR APPROACH Cervical 4-7 laminoplasty;  Surgeon: Judie Levin MD;  Location: [facility] OR    LAPAROSCOPIC ASSISTED IMPLANT SHUNT VENTRICULOPERITONEAL N/A 7/7/2022    Procedure: possible INSERTION, SHUNT, VENTRICULOPERITONEAL, LAPAROSCOPY-ASSISTED;  Surgeon: Joe Damon MD;  Location: [facility] OR    OPTICAL TRACKING SYSTEM IMPLANT SHUNT VENTRICULOPERITONEAL N/A 7/7/2022    Procedure: INSERTION, SHUNT, VENTRICULOPERITONEAL, USING OPTICAL TRACKING SYSTEM;  Surgeon: Jean-Paul Childs MD;  Location: [facility] OR    SOFT TISSUE SURGERY  May 2010    Basal Cell/right side nose       Social History:   reports  that he has never smoked. He has never been exposed to tobacco smoke. He has never used smokeless tobacco. He reports that he does not drink alcohol and does not use drugs.    Family History:  Family History   Problem Relation Age of Onset    Heart Disease Mother     Alzheimer Disease Mother 73    C.A.D. Father     Coronary Artery Disease Father     Diabetes Grandchild         using a pump     Diabetes Paternal Uncle     Heart Disease Paternal Grandmother     Glaucoma No family hx of     Macular Degeneration No family hx of     Melanoma No family hx of     Skin Cancer No family hx of        Medications:  Current Outpatient Medications   Medication Sig Dispense Refill    ACE/ARB/ARNI NOT PRESCRIBED (INTENTIONAL) Please choose reason not prescribed from choices below.      acetaminophen (TYLENOL) 325 MG tablet Take 1-2 tablets (325-650 mg) by mouth every 4 hours as needed for mild pain      aspirin (ASA) 325 MG tablet Take 1 tablet (325 mg) by mouth daily      cyanocobalamin (VITAMIN B-12) 1000 MCG tablet Take 1 tablet (1,000 mcg) by mouth daily 90 tablet 1    fluticasone (FLONASE) 50 MCG/ACT nasal spray Spray 1 spray into both nostrils daily 11.1 mL 0    furosemide (LASIX) 20 MG tablet Take 0.5 tablets (10 mg) by mouth daily 45 tablet 1    gabapentin (NEURONTIN) 300 MG capsule Take 1 capsule (300 mg) by mouth 3 times daily 270 capsule 1    guaiFENesin (MUCINEX) 600 MG 12 hr tablet Take 2 tablets (1,200 mg) by mouth 2 times daily 20 tablet 0    ipratropium (ATROVENT) 0.06 % nasal spray Spray 2 sprays into both nostrils 4 times daily 15 mL 4    ketoconazole (NIZORAL) 2 % external cream Apply twice daily for 8 weeks from knees down to both legs and feet. 120 g 3    levETIRAcetam (KEPPRA) 250 MG tablet TAKE 1 TABLET BY MOUTH TWO TIMES A DAY 60 tablet PRN    multivitamin (OCUVITE) TABS tablet Take 1 tablet by mouth daily      pantoprazole (PROTONIX) 40 MG EC tablet Take 40 mg by mouth daily      polyethylene glycol  (MIRALAX) 17 GM/Dose powder Take 17 g by mouth daily as needed for constipation 510 g     pramipexole (MIRAPEX) 0.25 MG tablet Take 1 tablet (0.25 mg) by mouth 3 times daily (Patient taking differently: Take 0.25 mg by mouth 2 times daily) 270 tablet 1    predniSONE (DELTASONE) 10 MG tablet TAKE 1 TABLET BY MOUTH ONCE DAILY 30 tablet PRN    pyridostigmine (MESTINON) 60 MG tablet Take 1.5 tablets (90 mg) by mouth 2 times daily      senna-docusate (SENOKOT-S/PERICOLACE) 8.6-50 MG tablet Take 1 tablet by mouth 2 times daily 60 tablet 0    simvastatin (ZOCOR) 20 MG tablet Take 1 tablet (20 mg) by mouth At Bedtime 90 tablet 1    tamsulosin (FLOMAX) 0.4 MG capsule Take 1 capsule (0.4 mg) by mouth daily      triamcinolone (KENALOG) 0.1 % external cream Apply a thin layer up to twice daily to affected areas as needed. 30 g 3    vitamin D3 (CHOLECALCIFEROL) 2000 units tablet Take 1 tablet by mouth daily 90 tablet 1    meclizine (ANTIVERT) 25 MG tablet Take 0.5-1 tablets (12.5-25 mg) by mouth 3 times daily as needed for dizziness (Patient not taking: Reported on 10/30/2023) 30 tablet 0    methocarbamol (ROBAXIN) 500 MG tablet Take 1 tablet (500 mg) by mouth every 6 hours as needed for muscle spasms (Patient not taking: Reported on 10/30/2023)       Allergies   Allergen Reactions    Mycophenolate Other (See Comments)     Confusion, abnormal movements

## 2023-10-30 NOTE — LETTER
10/30/2023         RE: Gustavo Ramon  [address] [facility] [id number]        Dear Colleague,    Thank you for referring your patient, Gustavo Ramon, to the [facility]. Please see a copy of my visit note below.    [facility] Dermatology Note    Encounter Date: Oct 30, 2023    Dermatology Problem List:  1. 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 Christiansen's note 11/21/11), s/p excision 5/12/09   2. Actinic keratoses  - R tragus s/p LN2 3/13/23; s/p biopsy 9/23/22  - s/p Efudex 2015, Fall 2020 to face, Spring 2021 to forearms  - s/p PDT (x3)  - s/p LN2  3. Seborrheic dermatitis  - clobetasol 0.05% solution for scalp, triam 0.1% cream for ears  4. Relevant medical history: MGUS, myasthenia gravis currently on prednisone  5. Tinea cruris  - current tx: ketoconazole cream  6. 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 Lipoma L bicep, s/p biopsy 9/23/22    ______________________________________    Impression/Plan:  1. History of NMSC  - no evidence of recurrence  2. Lesions x2 On scalp and L superior shoulder, Ddx: actinic keratosis vs. Early skin cancer  - we discussed risks and benefits of cryotherapy vs biopsy today and will treat as an AK with cryotherapy  - if lesion does not respond, will perform shave biopsy at next visit  - Cryotherapy procedure note: After verbal consent and discussion of risks and benefits including, but not limited to,  dyspigmentation/scar, blister, and pain, 2 was(were) treated with 1-2 mm freeze border for 1-2 cycles with liquid nitrogen. Post cryotherapy instructions were provided.   3. AKs x 12 on the face, scalp, and upper back  - Cryotherapy procedure note: After verbal consent and discussion of risks and benefits including, but not limited to, dyspigmentation/scar, blister, and pain, 12 was(were) treated with 1-2 mm freeze border for 1-2 cycles with liquid nitrogen. Post cryotherapy instructions were provided.    4. ISKs x2  -Cryotherapy procedure note: After verbal consent and discussion of risks and benefits including, but not limited to, dyspigmentation/scar, blister, and pain, 2 was(were) treated with 1-2 mm freeze border for 1-2 cycles with liquid nitrogen. Post cryotherapy instructions were provided.    5. Reassurance provided for benign lesions not treated today including cherry angiomata, solar lentigines, seborrheic keratoses, milial cysts, and banal-appearing melanocytic nevi.   6.Tinea corporis  - present on R leg and both feet  - prior tx: ketoconazole and triamcinolone  - pt will stop ketoconazole and triamcinolone and instead use prescribed terbinafine 2-3x per day      Follow-up in 4-6 months.      Staff Involved:  Staff and Scribe    I, Micki House, am serving as a scribe; to document services personally performed by Joe Grimaldo MD -based on data collection and the provider's statements to me.     Provider Disclosure:   The documentation recorded by the scribe accurately reflects the services I personally performed and the decisions made by me.    Joe Grimaldo MD   of Dermatology  Department of Dermatology  [employer] [facility] [employer]        CC:   Chief Complaint   Patient presents with     Skin Check     FBSE  6 month check .  Areas of concern area a spot on scalp, skin tag on left side of neck,  two spots on left shoulder and a spot on right leg.  HX of NMSC.         History of Present Illness:  Mr. Gustavo Ramon is a 83 year old male who presents as a return patient. He is here for a FBSE. He has areas of concern on the scalp, left side of neck, two on left shoulder, and on the right leg. Wife reports that the spot on the scalp constantly scabs over. Wife has been using triamcinolone and ketoconazole on his problem areas.       Labs:  N/A    Physical exam:  Vitals: There were no vitals taken for this visit.  GEN: This is a well developed, well-nourished male in no acute distress, in a pleasant mood.    SKIN: Montemayor phototype 2  - Full skin, which includes the head/face, both arms, chest, back, abdomen,both legs, genitalia and/or groin buttocks, digits and/or nails, was examined.  -There are dome shaped bright red papules on the head/neck, trunk, extremities.   - Multiple regular brown pigmented macules and papules are identified on the head/neck, trunk, extremities.   - Scattered brown macules on sun exposed areas.  - There are waxy stuck on tan to brown papules on the head/neck, trunk, extremities.   - 14 erythematous scaly macules on the scalp, face, and upper back including the L superior shoulder and crown of the scalp  - 2 erythematous papules on the L neck and L anterior shoulder  - erythematous anular scaly patches on the R leg   - erythema and scaling of both plantar feet  - 3 subcutaneous white papules on the face and chest  - No other lesions of concern on areas examined.     Past Medical History:   Past Medical History:   Diagnosis Date     Actinic keratosis      AK (actinic keratosis) 11/15/2012     Amaurosis fugax      Basal cell carcinoma 2009    R medial cheek/nose     Central serous retinopathy left    Eye     Diverticulosis 08/2006     DJD (degenerative joint disease)      GERD (gastroesophageal reflux disease)      Hearing loss     High frequency     History of nonmelanoma skin cancer      History of nonmelanoma skin cancer      HTN  (hypertension)      Hyperlipidemia LDL goal < 130      Hyperplastic colon polyp 08/2006     IgA monoclonal gammopathy     IgA kappa     Infection due to 2019 novel coronavirus 10/13/2022     Infection due to 2019 novel coronavirus 11/2021     Lattice degeneration of retina right    Eye     Macular degeneration      Nonsenile cataract      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    [facility] consult     Rosacea      Steroid-induced diabetes mellitus, 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     Strabismus      Vitreous detachment left    Eye     Past Surgical History:   Procedure Laterality Date     ARTHROSCOPY KNEE RT/LT Left Jan 2010    Knee     BIOPSY  2009/2013/2016    Nose; Prostate; BCC - left arm     COLONOSCOPY  9/24/2019    w/Endoscopy     COLONOSCOPY WITH CO2 INSUFFLATION N/A 9/24/2019    Procedure: COLONOSCOPY, WITH CO2 INSUFFLATION;  Surgeon: Loi Levine MD;  Location: [facility]     COMBINED ESOPHAGOSCOPY, GASTROSCOPY, DUODENOSCOPY (EGD) WITH CO2 INSUFFLATION N/A 9/24/2019    Procedure: ESOPHAGOGASTRODUODENOSCOPY, WITH CO2 INSUFFLATION;  Surgeon: Loi Levine MD;  Location: [facility] OR     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     DISKECTOMY, LUMBAR, SINGLE SP  2003    L2-3     ENT SURGERY  1943    Tonsils      ENT SURGERY  2-22-12    Biopsy lesion right pinna     ENT SURGERY  2-24-12    Biopsy leson right pinna     ESOPHAGOSCOPY, GASTROSCOPY, DUODENOSCOPY (EGD), COMBINED N/A 9/24/2019    Procedure: Esophagogastroduodenoscopy, With Biopsy;  Surgeon: Loi Levine MD;  Location: [facility]     IR LUMBAR DRAIN PLACEMENT W FLUORO  6/27/2022     LAMINOPLASTY CERVICAL POSTERIOR THREE+ LEVELS N/A 12/12/2022    Procedure: POSTERIOR APPROACH Cervical 4-7 laminoplasty;  Surgeon: Judie Levin MD;  [facility]: [facility]     LAPAROSCOPIC ASSISTED IMPLANT SHUNT VENTRICULOPERITONEAL N/A 7/7/2022    Procedure: possible INSERTION, SHUNT, VENTRICULOPERITONEAL, LAPAROSCOPY-ASSISTED;  Surgeon: Joe Damon MD;   Location: [facility] OR     OPTICAL TRACKING SYSTEM IMPLANT SHUNT VENTRICULOPERITONEAL N/A 7/7/2022    Procedure: INSERTION, SHUNT, VENTRICULOPERITONEAL, USING OPTICAL TRACKING SYSTEM;  Surgeon: Jean-Paul Childs MD;  Location: [facility]     SOFT TISSUE SURGERY  May 2010    Basal Cell/right side nose       Social History:   reports that he has never smoked. He has never been exposed to tobacco smoke. He has never used smokeless tobacco. He reports that he does not drink alcohol and does not use drugs.    Family History:  Family History   Problem Relation Age of Onset     Heart Disease Mother      Alzheimer Disease Mother 73     C.A.D. Father      Coronary Artery Disease Father      Diabetes Grandchild         using a pump      Diabetes Paternal Uncle      Heart Disease Paternal Grandmother      Glaucoma No family hx of      Macular Degeneration No family hx of      Melanoma No family hx of      Skin Cancer No family hx of        Medications:  Current Outpatient Medications   Medication Sig Dispense Refill     ACE/ARB/ARNI NOT PRESCRIBED (INTENTIONAL) Please choose reason not prescribed from choices below.       acetaminophen (TYLENOL) 325 MG tablet Take 1-2 tablets (325-650 mg) by mouth every 4 hours as needed for mild pain       aspirin (ASA) 325 MG tablet Take 1 tablet (325 mg) by mouth daily       cyanocobalamin (VITAMIN B-12) 1000 MCG tablet Take 1 tablet (1,000 mcg) by mouth daily 90 tablet 1     fluticasone (FLONASE) 50 MCG/ACT nasal spray Spray 1 spray into both nostrils daily 11.1 mL 0     furosemide (LASIX) 20 MG tablet Take 0.5 tablets (10 mg) by mouth daily 45 tablet 1     gabapentin (NEURONTIN) 300 MG capsule Take 1 capsule (300 mg) by mouth 3 times daily 270 capsule 1     guaiFENesin (MUCINEX) 600 MG 12 hr tablet Take 2 tablets (1,200 mg) by mouth 2 times daily 20 tablet 0     ipratropium (ATROVENT) 0.06 % nasal spray Spray 2 sprays into both nostrils 4 times daily 15 mL 4     ketoconazole (NIZORAL) 2 %  external cream Apply twice daily for 8 weeks from knees down to both legs and feet. 120 g 3     levETIRAcetam (KEPPRA) 250 MG tablet TAKE 1 TABLET BY MOUTH TWO TIMES A DAY 60 tablet PRN     multivitamin (OCUVITE) TABS tablet Take 1 tablet by mouth daily       pantoprazole (PROTONIX) 40 MG EC tablet Take 40 mg by mouth daily       polyethylene glycol (MIRALAX) 17 GM/Dose powder Take 17 g by mouth daily as needed for constipation 510 g      pramipexole (MIRAPEX) 0.25 MG tablet Take 1 tablet (0.25 mg) by mouth 3 times daily (Patient taking differently: Take 0.25 mg by mouth 2 times daily) 270 tablet 1     predniSONE (DELTASONE) 10 MG tablet TAKE 1 TABLET BY MOUTH ONCE DAILY 30 tablet PRN     pyridostigmine (MESTINON) 60 MG tablet Take 1.5 tablets (90 mg) by mouth 2 times daily       senna-docusate (SENOKOT-S/PERICOLACE) 8.6-50 MG tablet Take 1 tablet by mouth 2 times daily 60 tablet 0     simvastatin (ZOCOR) 20 MG tablet Take 1 tablet (20 mg) by mouth At Bedtime 90 tablet 1     tamsulosin (FLOMAX) 0.4 MG capsule Take 1 capsule (0.4 mg) by mouth daily       triamcinolone (KENALOG) 0.1 % external cream Apply a thin layer up to twice daily to affected areas as needed. 30 g 3     vitamin D3 (CHOLECALCIFEROL) 2000 units tablet Take 1 tablet by mouth daily 90 tablet 1     meclizine (ANTIVERT) 25 MG tablet Take 0.5-1 tablets (12.5-25 mg) by mouth 3 times daily as needed for dizziness (Patient not taking: Reported on 10/30/2023) 30 tablet 0     methocarbamol (ROBAXIN) 500 MG tablet Take 1 tablet (500 mg) by mouth every 6 hours as needed for muscle spasms (Patient not taking: Reported on 10/30/2023)       Allergies   Allergen Reactions     Mycophenolate Other (See Comments)     Confusion, abnormal movements                 Again, thank you for allowing me to participate in the care of your patient.        Sincerely,        Joe Grimaldo MD

## 2023-10-31 RX ORDER — SIMVASTATIN 20 MG
20 TABLET ORAL AT BEDTIME
Qty: 90 TABLET | Refills: 1 | Status: SHIPPED | OUTPATIENT
Start: 2023-10-31 | End: 2024-04-22

## 2023-11-01 ENCOUNTER — THERAPY VISIT (OUTPATIENT)
Dept: PHYSICAL THERAPY | Facility: CLINIC | Age: 83
End: 2023-11-01
Attending: PHYSICIAN ASSISTANT
Payer: MEDICARE

## 2023-11-01 DIAGNOSIS — H81.11 BENIGN PAROXYSMAL POSITIONAL VERTIGO, RIGHT: Primary | ICD-10-CM

## 2023-11-01 PROCEDURE — 97110 THERAPEUTIC EXERCISES: CPT | Mod: GP | Performed by: PHYSICAL THERAPIST

## 2023-11-01 PROCEDURE — 97112 NEUROMUSCULAR REEDUCATION: CPT | Mod: GP | Performed by: PHYSICAL THERAPIST

## 2023-11-06 ENCOUNTER — LAB (OUTPATIENT)
Dept: LAB | Facility: CLINIC | Age: 83
End: 2023-11-06
Payer: MEDICARE

## 2023-11-06 DIAGNOSIS — C61 MALIGNANT NEOPLASM OF PROSTATE (H): ICD-10-CM

## 2023-11-06 DIAGNOSIS — E09.9 STEROID-INDUCED DIABETES MELLITUS, INITIAL ENCOUNTER (H): Primary | ICD-10-CM

## 2023-11-06 DIAGNOSIS — T38.0X5A STEROID-INDUCED DIABETES MELLITUS, INITIAL ENCOUNTER (H): Primary | ICD-10-CM

## 2023-11-06 PROCEDURE — 36415 COLL VENOUS BLD VENIPUNCTURE: CPT

## 2023-11-06 PROCEDURE — 84153 ASSAY OF PSA TOTAL: CPT

## 2023-11-07 LAB — PSA SERPL DL<=0.01 NG/ML-MCNC: 0.65 NG/ML

## 2023-11-08 DIAGNOSIS — R60.0 BILATERAL LEG EDEMA: ICD-10-CM

## 2023-11-08 RX ORDER — FUROSEMIDE 20 MG
10 TABLET ORAL DAILY
Qty: 45 TABLET | Refills: 1 | Status: SHIPPED | OUTPATIENT
Start: 2023-11-08 | End: 2024-07-29

## 2023-11-13 ENCOUNTER — OFFICE VISIT (OUTPATIENT)
Dept: UROLOGY | Facility: CLINIC | Age: 83
End: 2023-11-13
Payer: MEDICARE

## 2023-11-13 VITALS — DIASTOLIC BLOOD PRESSURE: 69 MMHG | OXYGEN SATURATION: 96 % | HEART RATE: 75 BPM | SYSTOLIC BLOOD PRESSURE: 133 MMHG

## 2023-11-13 DIAGNOSIS — R39.15 URINARY URGENCY: Primary | ICD-10-CM

## 2023-11-13 DIAGNOSIS — C61 MALIGNANT NEOPLASM OF PROSTATE (H): ICD-10-CM

## 2023-11-13 LAB
ALBUMIN UR-MCNC: NEGATIVE MG/DL
APPEARANCE UR: CLEAR
BILIRUB UR QL STRIP: NEGATIVE
COLOR UR AUTO: YELLOW
GLUCOSE UR STRIP-MCNC: NEGATIVE MG/DL
HGB UR QL STRIP: NEGATIVE
KETONES UR STRIP-MCNC: NEGATIVE MG/DL
LEUKOCYTE ESTERASE UR QL STRIP: NEGATIVE
NITRATE UR QL: NEGATIVE
PH UR STRIP: 5.5 [PH] (ref 5–7)
SP GR UR STRIP: 1.02 (ref 1–1.03)
UROBILINOGEN UR STRIP-ACNC: 0.2 E.U./DL

## 2023-11-13 PROCEDURE — 81003 URINALYSIS AUTO W/O SCOPE: CPT | Performed by: UROLOGY

## 2023-11-13 PROCEDURE — 51798 US URINE CAPACITY MEASURE: CPT | Performed by: UROLOGY

## 2023-11-13 PROCEDURE — 99214 OFFICE O/P EST MOD 30 MIN: CPT | Mod: 25 | Performed by: UROLOGY

## 2023-11-13 RX ORDER — TROSPIUM CHLORIDE 20 MG/1
20 TABLET, FILM COATED ORAL
Qty: 60 TABLET | Refills: 1 | Status: SHIPPED | OUTPATIENT
Start: 2023-11-13 | End: 2024-01-04

## 2023-11-13 NOTE — PROGRESS NOTES
Patient presents to the clinic today for yearly. Patient relays the following information: Nocturia and leakage, ok to still be on Flomax? Hard to tell if it is making a difference.     Bladder Scan performed. 50 ml maximum residual urine detected after 3 scans. MD informed.   Katlyn GONZALEZ RN Urology 11/13/2023 11:47 AM       UA obtained.     Katlyn GONZALEZ RN Urology 11/13/2023 11:36 AM

## 2023-11-13 NOTE — PROGRESS NOTES
No chief complaint on file.      Gustavo Ramon is a 83 year old male who presents today for follow up of   No chief complaint on file.   f/u for prostate cancer.  He is s/p cryotherapy on 7/13 for prostate cancer kari 8 with initial psa of 7.4.  He did receive a hormonal shot prior to treatment. Recent psa was 0.65.  He only has more problems with frequency/urgency/nocturia/some incontinence this year.  He is on flomax.    Current Outpatient Medications   Medication Sig Dispense Refill    ACE/ARB/ARNI NOT PRESCRIBED (INTENTIONAL) Please choose reason not prescribed from choices below.      acetaminophen (TYLENOL) 325 MG tablet Take 1-2 tablets (325-650 mg) by mouth every 4 hours as needed for mild pain      aspirin (ASA) 325 MG tablet Take 1 tablet (325 mg) by mouth daily      cyanocobalamin (VITAMIN B-12) 1000 MCG tablet Take 1 tablet (1,000 mcg) by mouth daily 90 tablet 1    fluticasone (FLONASE) 50 MCG/ACT nasal spray Spray 1 spray into both nostrils daily 11.1 mL 0    furosemide (LASIX) 20 MG tablet TAKE ONE-HALF TABLET BY MOUTH EVERY DAY 45 tablet 1    gabapentin (NEURONTIN) 300 MG capsule Take 1 capsule (300 mg) by mouth 3 times daily 270 capsule 1    guaiFENesin (MUCINEX) 600 MG 12 hr tablet Take 2 tablets (1,200 mg) by mouth 2 times daily 20 tablet 0    ipratropium (ATROVENT) 0.06 % nasal spray Spray 2 sprays into both nostrils 4 times daily 15 mL 4    ketoconazole (NIZORAL) 2 % external cream Apply twice daily for 8 weeks from knees down to both legs and feet. 120 g 3    levETIRAcetam (KEPPRA) 250 MG tablet TAKE 1 TABLET BY MOUTH TWO TIMES A DAY 60 tablet PRN    meclizine (ANTIVERT) 25 MG tablet Take 0.5-1 tablets (12.5-25 mg) by mouth 3 times daily as needed for dizziness 30 tablet 0    methocarbamol (ROBAXIN) 500 MG tablet Take 1 tablet (500 mg) by mouth every 6 hours as needed for muscle spasms      multivitamin (OCUVITE) TABS tablet Take 1 tablet by mouth daily      pantoprazole (PROTONIX) 40 MG  EC tablet Take 40 mg by mouth daily      polyethylene glycol (MIRALAX) 17 GM/Dose powder Take 17 g by mouth daily as needed for constipation 510 g     pramipexole (MIRAPEX) 0.25 MG tablet Take 1 tablet (0.25 mg) by mouth 3 times daily (Patient taking differently: Take 0.25 mg by mouth 2 times daily) 270 tablet 1    predniSONE (DELTASONE) 10 MG tablet TAKE 1 TABLET BY MOUTH ONCE DAILY 30 tablet PRN    pyRIDostigmine (MESTINON) 60 MG tablet TAKE ONE AND ONE-HALF TABLETS BY MOUTH TWICE A DAY 90 tablet PRN    senna-docusate (SENOKOT-S/PERICOLACE) 8.6-50 MG tablet Take 1 tablet by mouth 2 times daily 60 tablet 0    simvastatin (ZOCOR) 20 MG tablet Take 1 tablet (20 mg) by mouth at bedtime 90 tablet 1    tamsulosin (FLOMAX) 0.4 MG capsule Take 1 capsule (0.4 mg) by mouth daily      terbinafine (LAMISIL) 1 % external cream Apply topically 2 times daily 42 g 11    triamcinolone (KENALOG) 0.1 % external cream Apply a thin layer up to twice daily to affected areas as needed. 30 g 3    trospium (SANCTURA) 20 MG tablet Take 1 tablet (20 mg) by mouth 2 times daily (before meals) 60 tablet 1    vitamin D3 (CHOLECALCIFEROL) 2000 units tablet Take 1 tablet by mouth daily 90 tablet 1     Allergies   Allergen Reactions    Mycophenolate Other (See Comments)     Confusion, abnormal movements      Past Medical History:   Diagnosis Date    Actinic keratosis     AK (actinic keratosis) 11/15/2012    Amaurosis fugax     Basal cell carcinoma 2009    R medial cheek/nose    Central serous retinopathy left    Eye    Diverticulosis 08/2006    DJD (degenerative joint disease)     GERD (gastroesophageal reflux disease)     Hearing loss     High frequency    History of nonmelanoma skin cancer     History of nonmelanoma skin cancer     HTN (hypertension)     Hyperlipidemia LDL goal < 130     Hyperplastic colon polyp 08/2006    IgA monoclonal gammopathy     IgA kappa    Infection due to 2019 novel coronavirus 10/13/2022    Infection due to 2019 novel  coronavirus 11/2021    Lattice degeneration of retina right    Eye    Macular degeneration     Nonsenile cataract     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    [facility] consult    Rosacea     Steroid-induced diabetes mellitus, 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    Strabismus     Vitreous detachment left    Eye     Past Surgical History:   Procedure Laterality Date    ARTHROSCOPY KNEE RT/LT Left Jan 2010    Knee    BIOPSY  2009/2013/2016    Nose; Prostate; BCC - left arm    COLONOSCOPY  9/24/2019    w/Endoscopy    COLONOSCOPY WITH CO2 INSUFFLATION N/A 9/24/2019    Procedure: COLONOSCOPY, WITH CO2 INSUFFLATION;  Surgeon: Loi Levine MD;  [facility]: [facility]    COMBINED ESOPHAGOSCOPY, GASTROSCOPY, DUODENOSCOPY (EGD) WITH CO2 INSUFFLATION N/A 9/24/2019    Procedure: ESOPHAGOGASTRODUODENOSCOPY, WITH CO2 INSUFFLATION;  Surgeon: Loi Levine MD;  Location: [facility]    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    DISKECTOMY, LUMBAR, SINGLE SP  2003    L2-3    ENT SURGERY  1943    Tonsils     ENT SURGERY  2-22-12    Biopsy lesion right pinna    ENT SURGERY  2-24-12    Biopsy leson right pinna    ESOPHAGOSCOPY, GASTROSCOPY, DUODENOSCOPY (EGD), COMBINED N/A 9/24/2019    Procedure: Esophagogastroduodenoscopy, With Biopsy;  Surgeon: Loi Lveine MD;  Location: [facility]    IR LUMBAR DRAIN PLACEMENT W FLUORO  6/27/2022    LAMINOPLASTY CERVICAL POSTERIOR THREE+ LEVELS N/A 12/12/2022    Procedure: POSTERIOR APPROACH Cervical 4-7 laminoplasty;  Surgeon: Judie Levin MD;  [facility]: [facility]    LAPAROSCOPIC ASSISTED IMPLANT SHUNT VENTRICULOPERITONEAL N/A 7/7/2022    Procedure: possible INSERTION, SHUNT, VENTRICULOPERITONEAL, LAPAROSCOPY-ASSISTED;  Surgeon: Joe Damon MD;  [facility]    OPTICAL TRACKING SYSTEM IMPLANT SHUNT VENTRICULOPERITONEAL N/A 7/7/2022    Procedure: INSERTION, SHUNT, VENTRICULOPERITONEAL, USING OPTICAL TRACKING SYSTEM;  Surgeon: Jean-Paul Childs MD;  [facility]: [facility]    SOFT TISSUE  SURGERY  May 2010    Basal Cell/right side nose     Family History   Problem Relation Age of Onset    Heart Disease Mother     Alzheimer Disease Mother 73    C.A.D. Father     Coronary Artery Disease Father     Diabetes Grandchild         using a pump     Diabetes Paternal Uncle     Heart Disease Paternal Grandmother     Glaucoma No family hx of     Macular Degeneration No family hx of     Melanoma No family hx of     Skin Cancer No family hx of      History     Social History    Marital Status:      Spouse Name: Ceci     Number of Children: 2    Years of Education: 16     Occupational History     Retired     2001, Electrical     Social History Main Topics    Smoking status: Never Smoker     Smokeless tobacco: Never Used    Alcohol Use: No    Drug Use: No    Sexual Activity: No     Other Topics Concern    Not on file     Social History Narrative       REVIEW OF SYSTEMS  =================  C: NEGATIVE for fever, chills, change in weight  I: NEGATIVE for worrisome rashes, moles or lesions  E/M: NEGATIVE for ear, mouth and throat problems  R: NEGATIVE for significant cough or SHORTNESS OF BREATH,   CV: NEGATIVE for chest pain, palpitations or peripheral edema  GI: NEGATIVE for nausea, abdominal pain, heartburn, or change in bowel habits  NEURO: NEGATIVE any motor/sensory changes  PSYCH: NEGATIVE for recent mood disorder    Physical Exam:  GENERAL: Healthy, alert and no distress  EYES: Eyes grossly normal to inspection.  No discharge or erythema, or obvious scleral/conjunctival abnormalities.  RESP: No audible wheeze, cough, or visible cyanosis.  No visible retractions or increased work of breathing.    SKIN: Visible skin clear. No significant rash, abnormal pigmentation or lesions.  NEURO: Cranial nerves grossly intact.  Mentation and speech appropriate for age.  PSYCH: Mentation appears normal, affect normal/bright, judgement and insight intact, normal speech and appearance  well-groomed.  Assessment/Plan:   (185) Malignant neoplasm of prostate  (primary encounter diagnosis)  Comment:  psa 0.6  Plan: see in 12 months with psa.    OAB:  trial of sanctura.  Side effects discussed.  Patient to contact me in one month for response.

## 2023-11-16 ENCOUNTER — THERAPY VISIT (OUTPATIENT)
Dept: PHYSICAL THERAPY | Facility: CLINIC | Age: 83
End: 2023-11-16
Attending: PHYSICIAN ASSISTANT
Payer: MEDICARE

## 2023-11-16 DIAGNOSIS — H81.11 BENIGN PAROXYSMAL POSITIONAL VERTIGO, RIGHT: Primary | ICD-10-CM

## 2023-11-16 PROCEDURE — 97110 THERAPEUTIC EXERCISES: CPT | Mod: GP | Performed by: PHYSICAL THERAPIST

## 2023-11-16 PROCEDURE — 97112 NEUROMUSCULAR REEDUCATION: CPT | Mod: GP | Performed by: PHYSICAL THERAPIST

## 2023-11-16 PROCEDURE — 97116 GAIT TRAINING THERAPY: CPT | Mod: GP | Performed by: PHYSICAL THERAPIST

## 2023-12-21 ENCOUNTER — THERAPY VISIT (OUTPATIENT)
Dept: PHYSICAL THERAPY | Facility: CLINIC | Age: 83
End: 2023-12-21
Attending: PHYSICIAN ASSISTANT
Payer: MEDICARE

## 2023-12-21 DIAGNOSIS — H81.11 BENIGN PAROXYSMAL POSITIONAL VERTIGO, RIGHT: Primary | ICD-10-CM

## 2023-12-21 PROCEDURE — 97110 THERAPEUTIC EXERCISES: CPT | Mod: GP | Performed by: PHYSICAL THERAPIST

## 2023-12-21 PROCEDURE — 97112 NEUROMUSCULAR REEDUCATION: CPT | Mod: GP | Performed by: PHYSICAL THERAPIST

## 2023-12-21 NOTE — PROGRESS NOTES
Discharge note   23 0500   PT Goal 1   Goal Identifier dizziness/vertigo   Goal Description no longer reporting dizziness/vertigo when moving in/out of the bed   Rationale to maximize safety and independence with performance of ADLs and functional tasks;to maximize safety and independence with self cares   Goal Progress met. just has a tiny bit of symptoms coming up out of bed but waits and it is gone. Non in bed.   Target Date 23   Date Met 23   PT Goal 2   Goal Identifier posture   Goal Description he will be able to do HEP of 2-3 exs for his posture/neck to prevent it from worsening   Rationale to maximize safety and independence with performance of ADLs and functional tasks;to maximize safety and independence with self cares   Goal Progress MET He is doing the HEP for posture   Target Date 23   Date Met 23     He was seen for 7 visits. Diagnosis of right BPPV. He has very limited neck ROM so he did not respond to canalith repositioning maneuvers. Even with a tilt table. He was able to do habituation exercises to right side and his symptoms have significantly improved. WE have also worked on his posture and he is doing the HEP for this. He also got a 4ww to use for some community mobility. Goals are met.They know to reach out if having any further vertigo or balance problems.     Valentine Pritchett DPT, MPT, NCS  Physical Therapist   Board Certified Neurologic Clinical Specialist     [facility]   [address] [place] [address]   [email or web address]   [phone]   Clinic: [phone] //   Fax: [phone]

## 2023-12-21 NOTE — PROGRESS NOTES
[facility]                                                                                   OUTPATIENT PHYSICAL THERAPY    PLAN OF TREATMENT FOR OUTPATIENT REHABILITATION   Patient's Last Name, First Name, Gustavo Nielsen YOB: 1940   Provider's Name   [facility]   Medical Record No.  [id number]     Onset Date: 09/08/23 (date of order)  Start of Care Date: 09/21/23     Medical Diagnosis:  BPPV Right ear      PT Treatment Diagnosis:  BPPV Right PSCC Plan of Treatment  Frequency/Duration: seen today on 12/21 and no further PT planned after today.    Certification date from 12/20/23 to 12/30/23         See note for plan of treatment details and functional goals     Tamy Pritchett, PT                         I CERTIFY THE NEED FOR THESE SERVICES FURNISHED UNDER        THIS PLAN OF TREATMENT AND WHILE UNDER MY CARE     (Physician attestation of this document indicates review and certification of the therapy plan).              Referring Provider:  Corey Cox    Initial Assessment  See Epic Evaluation- Start of Care Date: 09/21/23

## 2024-01-04 DIAGNOSIS — R39.15 URINARY URGENCY: ICD-10-CM

## 2024-01-04 NOTE — TELEPHONE ENCOUNTER
Requested Prescriptions   Pending Prescriptions Disp Refills    trospium (SANCTURA) 20 MG tablet 60 tablet 1     Sig: Take 1 tablet (20 mg) by mouth 2 times daily (before meals)       There is no refill protocol information for this order       Last Refill: 12/6/2023  QTY: 60

## 2024-01-05 RX ORDER — TROSPIUM CHLORIDE 20 MG/1
20 TABLET, FILM COATED ORAL
Qty: 180 TABLET | Refills: 3 | Status: SHIPPED | OUTPATIENT
Start: 2024-01-05

## 2024-01-05 NOTE — TELEPHONE ENCOUNTER
Refill prescription approved per [facility] Refill Protocol. Last OV= 11/2023.    Katlyn GONZALEZ RN Urology 1/5/2024 9:35 AM

## 2024-01-08 ENCOUNTER — TELEPHONE (OUTPATIENT)
Dept: NEUROLOGY | Facility: CLINIC | Age: 84
End: 2024-01-08
Payer: MEDICARE

## 2024-01-09 ENCOUNTER — OFFICE VISIT (OUTPATIENT)
Dept: NEUROLOGY | Facility: CLINIC | Age: 84
End: 2024-01-09
Attending: PSYCHIATRY & NEUROLOGY
Payer: MEDICARE

## 2024-01-09 DIAGNOSIS — G70.00 MYASTHENIA GRAVIS IN REMISSION (H): Primary | ICD-10-CM

## 2024-01-09 PROCEDURE — 95937 NEUROMUSCULAR JUNCTION TEST: CPT | Performed by: PSYCHIATRY & NEUROLOGY

## 2024-01-09 PROCEDURE — 95907 NVR CNDJ TST 1-2 STUDIES: CPT | Performed by: PSYCHIATRY & NEUROLOGY

## 2024-01-09 NOTE — PROGRESS NOTES
[facility]  Electrodiagnostic Laboratory                 Department of Neurology                                                                                                         Test Date:  2024    Patient: Stephen Ramon : 1940 Physician: Tyler Wheat MD   Sex: Male AGE: 83 year Ref Phys: Same   ID#: [id number]   Technician: Jelena Jensen     History and Examination:     83-year-old man with history of ocular myasthenia gravis, on Mestinon and 10 mg of prednisone daily.  He reports generalized fatigue especially in the last 1 to 2 years.  Repetitive nerve stimulation studies (RNS) were requested to assess status of neuromuscular junction.  He stopped pyridostigmine 24 hours prior to this appointment.    Techniques:    RNS studies were done with surface recording electrodes.     Results:    3 Hz RNS of the right facial nerve (recorded from the nasalis), and the right ulnar nerve (recorded from the ADM) were normal at rest, as well as after 1 minute of maximal isometric exercise.  No pathologic CMAP decrement was noted.  Right facial (nasalis) and ulnar (ADM) routine motor nerve conduction studies were also normal.    Interpretation:    Normal study.  No electrodiagnostic evidence of an active neuromuscular junction disorder, like myasthenia gravis.    ___________________________  Tyler Wheat MD        Nerve Conduction Studies  Motor Sites      Latency Amplitude Neg. Amp Diff Segment Distance Velocity Neg. Dur Neg Area Diff Temperature Comment   Site (ms) Norm (mV) Norm (%)  cm m/s Norm (ms) (%) ( C)    Right Facial - Nasalis Motor   Mastoid 3.2 - 1.48 -      5.2  21.9    Right Ulnar (ADM) Motor   Wrist 3.4  < 3.5 6.4  > 5.0  Wrist-ADM 8   5.3  27.1    Bel Elbow 6.5 - 3.9 - -39 Bel Elbow-Wrist 18.6 60  > 48 5.7 -43 27    Abv Elbow 8.6 - 8.0 - 105 Abv Elbow-Bel Elbow 12.2 58  > 48 6.1 141 27      RNS     Trial # Label Amp 1 (mV)  O-P Amp  4 (mV)  O-P Amp % Dif Area 1 (mV ms) Area 4 (mV ms) Area % Dif Rep Rate Train Length Pause Time (min:sec) Comments   Right Abductor Digiti Minimi   Tr 1 Baseline 8.30 8.21 -1.1 30.92 29.22 -5.5 3.00 6 00:30    Tr 2 Post Exercise 8.70 8.77 0.9 31.15 28.18 -9.5 3.00 6 01:00    Tr 3 1 min Post 8.60 8.76 1.8 29.83 28.65 -3.9 3.00 6 01:00    Tr 4 2 min Post 8.62 8.58 -0.5 30.47 29.22 -4.1 3.00 6 01:00    5 3 min Post       3.00 6 00:00    Right Nasalis   Tr 1 Baseline 1.91 1.83 -4.2 5.60 5.12 -8.5 3.00 6 00:30    Tr 2 Post Exercise 1.67 1.55 -7.7 4.77 4.16 -12.8 3.00 6 01:00    Tr 3 1 min Post 1.80 1.79 -0.5 5.23 5.14 -1.7 3.00 6 01:00    4 2 min Post       3.00 6 01:00    5 3 min Post       3.00 6 00:00            NCS Waveforms:    Motor                                          Ultrasound Images:

## 2024-01-09 NOTE — LETTER
2024         RE: Gustavo Ramon  [address] [facility] [address]        Dear Colleague,    Thank you for referring your patient, Gustavo Ramon, to the [facility]. Please see a copy of my visit note below.                            [facility]  Electrodiagnostic Laboratory                 Department of Neurology                                                                                                         Test Date:  2024    Patient: Stephen Ramon : 1940 Physician: Tyler Wheat MD   Sex: Male AGE: 83 year Ref Phys: Same   ID#: [id number]   Technician: Jelena Jensen     History and Examination:     83-year-old man with history of ocular myasthenia gravis, on Mestinon and 10 mg of prednisone daily.  He reports generalized fatigue especially in the last 1 to 2 years.  Repetitive nerve stimulation studies (RNS) were requested to assess status of neuromuscular junction.  He stopped pyridostigmine 24 hours prior to this appointment.    Techniques:    RNS studies were done with surface recording electrodes.     Results:    3 Hz RNS of the right facial nerve (recorded from the nasalis), and the right ulnar nerve (recorded from the ADM) were normal at rest, as well as after 1 minute of maximal isometric exercise.  No pathologic CMAP decrement was noted.  Right facial (nasalis) and ulnar (ADM) routine motor nerve conduction studies were also normal.    Interpretation:    Normal study.  No electrodiagnostic evidence of an active neuromuscular junction disorder, like myasthenia gravis.    ___________________________  Tyler Wheat MD        Nerve Conduction Studies  Motor Sites      Latency Amplitude Neg. Amp Diff Segment Distance Velocity Neg. Dur Neg Area Diff Temperature Comment   Site (ms) Norm (mV) Norm (%)  cm m/s Norm (ms) (%) ( C)    Right Facial - Nasalis Motor   Mastoid 3.2 - 1.48 -      5.2   21.9    Right Ulnar (ADM) Motor   Wrist 3.4  < 3.5 6.4  > 5.0  Wrist-ADM 8   5.3  27.1    Bel Elbow 6.5 - 3.9 - -39 Bel Elbow-Wrist 18.6 60  > 48 5.7 -43 27    Abv Elbow 8.6 - 8.0 - 105 Abv Elbow-Bel Elbow 12.2 58  > 48 6.1 141 27      RNS     Trial # Label Amp 1 (mV)  O-P Amp 4 (mV)  O-P Amp % Dif Area 1 (mV ms) Area 4 (mV ms) Area % Dif Rep Rate Train Length Pause Time (min:sec) Comments   Right Abductor Digiti Minimi   Tr 1 Baseline 8.30 8.21 -1.1 30.92 29.22 -5.5 3.00 6 00:30    Tr 2 Post Exercise 8.70 8.77 0.9 31.15 28.18 -9.5 3.00 6 01:00    Tr 3 1 min Post 8.60 8.76 1.8 29.83 28.65 -3.9 3.00 6 01:00    Tr 4 2 min Post 8.62 8.58 -0.5 30.47 29.22 -4.1 3.00 6 01:00    5 3 min Post       3.00 6 00:00    Right Nasalis   Tr 1 Baseline 1.91 1.83 -4.2 5.60 5.12 -8.5 3.00 6 00:30    Tr 2 Post Exercise 1.67 1.55 -7.7 4.77 4.16 -12.8 3.00 6 01:00    Tr 3 1 min Post 1.80 1.79 -0.5 5.23 5.14 -1.7 3.00 6 01:00    4 2 min Post       3.00 6 01:00    5 3 min Post       3.00 6 00:00            NCS Waveforms:    Motor                                          Ultrasound Images:                Again, thank you for allowing me to participate in the care of your patient.        Sincerely,        Tyler Wheat MD

## 2024-01-10 NOTE — PROGRESS NOTES
1. 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    Pattern of strabismus today is a divergence insufficiency pattern esotropia (resolves at near) and is comitant at distance with a moderate up gaze deficit.  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     On exam today patient had normal speed of horizontal and vertical saccades without dysmetria. No convergence-retraction nystagmus. No nystagmus. No fatigue on prolonged up-gaze testing.  No ptosis.  No significant improvement in up gaze using VOR. No cancellation of VOR.  No hummingbird sign on 04/16/2022 MRI.    In conclusion, it appears on the pattern of strabismus and up gaze deficit that this is very unlikely under-treated myasthenia gravis.   Similarly this is very unlikely progressive supranuclear palsy and would be unrelated to his prior diagnosis of NPH.    Plan:  -Continue with current dose of Mestinon and prednisone.  -Follow-up with Dr. Nelson for primary eye care and possible ground in prism glasses in 1 year.  Stephen defers prism glasses today for his intermittent diplopia from small angle esotropia, but would like to revisit in 1 year.    Gustavo Ramon is a pleasant 83 year old White male who presents to my neuro-ophthalmology clinic today having been referred by Dr. Tyler Wheat for restricted upgaze in the setting of myasthenia gravis and normal pressure hydrocephalus s/p  shunt.    HPI: 83-year-old male with past medical history of ocular myasthenia gravis (2009) on 60 mg Mestinon and 10 mg of prednisone daily.   Single-fiber EMG was reportedly indicative of myasthenia back in 2009.  While there is some confusion in the initial notes from Dr. Wheat on his antibody status (note 09/01/21) it does appear that  "subsequent review of record showed he was acetylcholine receptor antibody positive.    He underwent  shunt placement for normal pressure hydrocephalus in 2022.  Also has a history of cervical spondylosis with myelopathy which required surgical decompression with cervical spine fusion.  He recently underwent eyelid surgery, and follow-up with his neurologist in fall 2023 showed marked upgaze restriction.  Unsure if this is related to his ocular myasthenia gravis or known diagnosis of normal pressure hydrocephalus.  He was referred to neuro-ophthalmology for evaluation.      Patient notes that he has had difficulty looking up for years. He noticed this because he has limited neck mobility; he feels he cannot compensate for this by looking up. For years patient has actually had intermittent double vision with images [id number] at distance.  It was actually this that led him to stop driving.  This has come and gone through the years and every time he would go to get evaluated it had spontaneously improved.  This type of double vision did not occur up close.  He does not have prism in his habitual glasses.    He had bilateral blepharoplasty 1 year ago; his difficulty with upgaze preceded his surgery.  Denies eyelid drooping.  His  shunt was last checked in June 2023, and was working well.    He denies difficulty swallowing.  He reports that he had a normal EMG result on 1/11/24 (previously had discussed increased fatigue and some \"deeper breathing\" with Dr. Wheat).    Independent historians:  Patient    Review of outside testing:  EMG 1/9/23:  Interpretation: Normal study.  No electrodiagnostic evidence of an active neuromuscular junction disorder, like myasthenia gravis.    4/16/2022-MRI brain with and without contrast  1.  No recent infarct, intracranial mass, abnormal enhancement or evidence of intracranial hemorrhage.   2.  Ventriculomegaly disproportionate to the degree of volume loss elsewhere raising " the possibility of a normal pressure/communicating hydrocephalus.   3.  Evidence of mild chronic small vessel ischemic changes.      Review of outside clinical notes:  10/23/23 -- Visit with Dr. Tyler Wheat MD, FAAN    In summary, Mr Ramon presents with a number of neurological issues.  As for his ocular myasthenia, I am a little uncertain how well controlled this is, exactly because of his marked restricted upgaze.  While the latter can sometimes be a physiologic phenomenon in the elderly, I do not recall the patient having this degree of upgaze palsy previously.  Differential diagnosis includes a supranuclear issue, for example related to high ICP and hydrocephalus, versus a peripheral issue related to MG.  Unfortunately, I could not do a good oculocephalic maneuver due to the restrictions mentioned above.  I would like some help from neuro-ophthalmology in this case, because the diagnosis of the cause of upgaze palsy is quite critical to determine the next steps in treatment.  Of note, he recently had an evaluation in the neurosurgery clinic in June 2023, and the  shunt seems to be functioning very well.     Regarding his concerns about repeating words, and short-term memory loss, I recommend a repeat neuropsych testing evaluation.  If there is worsening from the previous study, with a well-functioning shunt, then alternative causes should be sought, including medication side effects (pramipexole, gabapentin), disrupted sleep, or early MCI due to amyloid deposition.     He has restless legs not optimally controlled, and this affects his sleep quality.  I suggested to the patient to take 1 pill of gabapentin in the morning and 2 (total 600 mg) in the evening, when he needs it the most.  He should skip the noon dose.  He should do the same with pramipexole, 0.25 mg in the morning, and 0.5 mg in the evening.  If this intervention is not helpful for his restless legs, then we may need to increase the  evening dose of the medications even further.     Lastly, his fatigue could be due to multiple reasons, but the lingering question is whether MG is playing a role.  I would like him to get an EMG with repetitive nerve stimulation to assess the status of his neuromuscular junction.  He has to stop the pyridostigmine 24 hours before the test, to prevent any false negatives.  He understands this, and we will schedule the test today.     Follow-up in clinic in 4 months, sooner if needed.  Total time spent on this encounter today 50 minutes of which 30 face-to-face, 10 in postvisit note dictation, editing, and orders, and 10 in previsit chart review.     Sincerely,     Tyler Wheat MD, FAAN     Past medical history:    Patient Active Problem List   Diagnosis    Rosacea    DJD (degenerative joint disease)    Ocular myasthenia gravis (H)    Macular pigment deposit    HYPERLIPIDEMIA LDL GOAL <130    MGUS (monoclonal gammopathy of unknown significance)    Retinal hole OS, operculated    Horseshoe tear of retina without detachment OD    History  of basal cell carcinoma    Seborrhea    AK (actinic keratosis)    Malignant neoplasm of prostate (H)    PVD (posterior vitreous detachment)    Amaurosis fugax by Hx neg carotid W/U    Advanced directives, counseling/discussion    Actinic keratosis    Peripheral neuropathy    Nuclear sclerosis of both eyes    Essential hypertension with goal blood pressure less than 140/90    Gastroesophageal reflux disease without esophagitis    Steroid-induced diabetes mellitus, initial encounter  (H24)    Stage 3a chronic kidney disease (H)    Secondary adrenocortical insufficiency (H24)    Physical deconditioning    NPH (normal pressure hydrocephalus) (H)    Myoclonus    Infection due to 2019 novel coronavirus    Encephalopathy    Dementia without behavioral disturbance, unspecified dementia type    Idiopathic normal pressure hydrocephalus (H)    Acute atopic conjunctivitis    Cancer of the  skin, basal cell    COVID-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    Degeneration of intervertebral disc    Diverticulitis of intestine    Dysphagia    Gait instability    Hip pain, acute, left    History of osteoporosis    Kidney disorder    Pneumonia due to COVID-19 virus    Restless leg syndrome due to iron deficiency anemia    Sepsis due to COVID-19 (H)    Tear of medial cartilage or meniscus of knee, current    Urinary frequency    Vitamin D deficiency    Weakness of both lower extremities    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)       Patient has a current medication list which includes the following prescription(s): aspirin, cyanocobalamin, furosemide, gabapentin, multivitamin, prednisone, pyridostigmine, simvastatin, tamsulosin, trospium, vitamin d3, ace/arb/arni not prescribed, acetaminophen, fluticasone, gabapentin, guaifenesin, ipratropium, ketoconazole, levetiracetam, meclizine, methocarbamol, pantoprazole, polyethylene glycol, pramipexole, senna-docusate, terbinafine, and triamcinolone.    Family history / social history:  Patient's family history includes Alzheimer Disease (age of onset: 73) in his mother; C.A.D. in his father; Coronary Artery Disease in his father; Diabetes in his grandchild and paternal uncle; Heart Disease in his mother and paternal grandmother.     Patient  reports that he has never smoked. He has never been exposed to tobacco smoke. He has never used smokeless tobacco. He reports that he does not drink alcohol and does not use drugs.     Exam:  Corrected distance visual acuity was 20/20 -2 in the right eye and 20/40 -2 in the left eye with pin hole to 20/30. Intraocular pressure was 09 in the right eye and 09 in the left eye using ICare.  Color vision 11/11 right eye and 11/11 left eye.  Pupils isocoric with no APD.  Please see epic chart for complete exam     Sensorimotor exam shows a small angle divergence insufficiency pattern esotropia at distance that resolves at near. Esotropia is  concomitant in pattern at distance. Fuses well at distance with 6 base out but has intermittent fusion without prism. There is a moderate up gaze deficit in both eyes that does not improve with Dolls eyes maneuvers. No nystagmus. Normal saccades and normal smooth pursuit.     45 minutes were spent on the date of the encounter by me doing chart review, history and exam, documentation, and further activities as noted above    Complete documentation of historical and exam elements from today's encounter can be found in the full encounter summary report (not reduplicated in this progress note).  I personally obtained the chief complaint(s) and history of present illness.  I confirmed and edited as necessary the review of systems, past medical/surgical history, family history, social history, and examination findings as documented by others; and I examined the patient myself.  I personally reviewed the relevant tests, images, and reports as documented above.  I formulated and edited as necessary the assessment and plan and discussed the findings and management plan with the patient and family     Trevor Galvin MD    Precharting:  Shashank Solano MD, PGY3  Ophthalmology Resident  [facility]    Jelena Nelson OD

## 2024-01-11 ENCOUNTER — OFFICE VISIT (OUTPATIENT)
Dept: OPHTHALMOLOGY | Facility: CLINIC | Age: 84
End: 2024-01-11
Attending: OPHTHALMOLOGY
Payer: MEDICARE

## 2024-01-11 DIAGNOSIS — H51.0: ICD-10-CM

## 2024-01-11 DIAGNOSIS — G91.2 NPH (NORMAL PRESSURE HYDROCEPHALUS) (H): ICD-10-CM

## 2024-01-11 DIAGNOSIS — H53.10 SUBJECTIVE VISUAL DISTURBANCE: ICD-10-CM

## 2024-01-11 DIAGNOSIS — H53.2 DOUBLE VISION: Primary | ICD-10-CM

## 2024-01-11 DIAGNOSIS — G70.00 OCULAR MYASTHENIA (H): ICD-10-CM

## 2024-01-11 DIAGNOSIS — H50.05 ALTERNATING ESOTROPIA: ICD-10-CM

## 2024-01-11 PROCEDURE — G0463 HOSPITAL OUTPT CLINIC VISIT: HCPCS | Performed by: OPHTHALMOLOGY

## 2024-01-11 PROCEDURE — 99207 SENSORIMOTOR: CPT | Mod: 26 | Performed by: OPHTHALMOLOGY

## 2024-01-11 PROCEDURE — 99204 OFFICE O/P NEW MOD 45 MIN: CPT | Performed by: OPHTHALMOLOGY

## 2024-01-11 PROCEDURE — 92060 SENSORIMOTOR EXAMINATION: CPT | Mod: 26 | Performed by: OPHTHALMOLOGY

## 2024-01-11 PROCEDURE — 92060 SENSORIMOTOR EXAMINATION: CPT

## 2024-01-11 PROCEDURE — 92060 SENSORIMOTOR EXAMINATION: CPT | Performed by: OPHTHALMOLOGY

## 2024-01-11 ASSESSMENT — REFRACTION_WEARINGRX
OD_AXIS: 160
SPECS_TYPE: PAL
OS_ADD: +2.50
OS_SPHERE: -2.00
OS_CYLINDER: +0.50
OD_CYLINDER: +1.25
OD_ADD: +2.50
OS_AXIS: 140
OD_SPHERE: -1.50

## 2024-01-11 ASSESSMENT — VISUAL ACUITY
OS_PH_CC: 20/30
CORRECTION_TYPE: GLASSES
OD_CC+: -2
OS_CC: 20/40
METHOD: SNELLEN - LINEAR
OS_PH_CC+: -2
OD_CC: 20/20
OS_CC+: -2

## 2024-01-11 ASSESSMENT — CONF VISUAL FIELD
OD_SUPERIOR_NASAL_RESTRICTION: 0
OS_SUPERIOR_TEMPORAL_RESTRICTION: 0
OD_NORMAL: 1
OD_SUPERIOR_TEMPORAL_RESTRICTION: 0
OD_INFERIOR_NASAL_RESTRICTION: 0
OS_SUPERIOR_NASAL_RESTRICTION: 0
METHOD: COUNTING FINGERS
OS_INFERIOR_NASAL_RESTRICTION: 0
OS_NORMAL: 1
OD_INFERIOR_TEMPORAL_RESTRICTION: 0
OS_INFERIOR_TEMPORAL_RESTRICTION: 0

## 2024-01-11 ASSESSMENT — TONOMETRY
IOP_METHOD: ICARE
OD_IOP_MMHG: 09
OS_IOP_MMHG: 09

## 2024-01-11 ASSESSMENT — EXTERNAL EXAM - LEFT EYE: OS_EXAM: NORMAL

## 2024-01-11 ASSESSMENT — CUP TO DISC RATIO
OS_RATIO: 0.2
OD_RATIO: 0.2

## 2024-01-11 ASSESSMENT — EXTERNAL EXAM - RIGHT EYE: OD_EXAM: NORMAL

## 2024-01-11 NOTE — LETTER
February 15, 2024    RE: Gustavo Ramon  : 1940  MRN: [id number]    Dear Dr. Wheat,    Thank you for referring your patient, Gustavo Ramon, to my neuro-ophthalmology clinic recently.  After a thorough neuro-ophthalmic history and examination, I came to the following conclusions:     1. 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    Pattern of strabismus today is a divergence insufficiency pattern esotropia (resolves at near) and is comitant at distance with a moderate up gaze deficit.  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     On exam today patient had normal speed of horizontal and vertical saccades without dysmetria. No convergence-retraction nystagmus. No nystagmus. No fatigue on prolonged up-gaze testing.  No ptosis.  No significant improvement in up gaze using VOR. No cancellation of VOR.  No hummingbird sign on 2022 MRI.    In conclusion, it appears on the pattern of strabismus and up gaze deficit that this is very unlikely under-treated myasthenia gravis.   Similarly this is very unlikely progressive supranuclear palsy and would be unrelated to his prior diagnosis of NPH.    Plan:  -Continue with current dose of Mestinon and prednisone.  -Follow-up with Dr. Nelson for primary eye care and possible ground in prism glasses in 1 year.  Stephen defers prism glasses today for his intermittent diplopia from small angle esotropia, but would like to revisit in 1 year.    Gustavo Ramon is a pleasant 83 year old White male who presents to my neuro-ophthalmology clinic today having been referred by Dr. Tyler Wheat for restricted upgaze in the setting of myasthenia gravis and normal pressure hydrocephalus s/p  shunt.    HPI: 83-year-old male with past medical  "history of ocular myasthenia gravis (2009) on 60 mg Mestinon and 10 mg of prednisone daily.   Single-fiber EMG was reportedly indicative of myasthenia back in 2009.  While there is some confusion in the initial notes from Dr. Wheat on his antibody status (note 09/01/21) it does appear that subsequent review of record showed he was acetylcholine receptor antibody positive.    He underwent  shunt placement for normal pressure hydrocephalus in 2022.  Also has a history of cervical spondylosis with myelopathy which required surgical decompression with cervical spine fusion.  He recently underwent eyelid surgery, and follow-up with his neurologist in fall 2023 showed marked upgaze restriction.  Unsure if this is related to his ocular myasthenia gravis or known diagnosis of normal pressure hydrocephalus.  He was referred to neuro-ophthalmology for evaluation.      Patient notes that he has had difficulty looking up for years. He noticed this because he has limited neck mobility; he feels he cannot compensate for this by looking up. For years patient has actually had intermittent double vision with images [id number] at distance.  It was actually this that led him to stop driving.  This has come and gone through the years and every time he would go to get evaluated it had spontaneously improved.  This type of double vision did not occur up close.  He does not have prism in his habitual glasses.    He had bilateral blepharoplasty 1 year ago; his difficulty with upgaze preceded his surgery.  Denies eyelid drooping.  His  shunt was last checked in June 2023, and was working well.    He denies difficulty swallowing.  He reports that he had a normal EMG result on 1/11/24 (previously had discussed increased fatigue and some \"deeper breathing\" with Dr. Wheat).    Independent historians:  Patient    Review of outside testing:  EMG 1/9/23:  Interpretation: Normal study.  No electrodiagnostic evidence of an active " neuromuscular junction disorder, like myasthenia gravis.    4/16/2022-MRI brain with and without contrast  1.  No recent infarct, intracranial mass, abnormal enhancement or evidence of intracranial hemorrhage.   2.  Ventriculomegaly disproportionate to the degree of volume loss elsewhere raising the possibility of a normal pressure/communicating hydrocephalus.   3.  Evidence of mild chronic small vessel ischemic changes.      Review of outside clinical notes:  10/23/23 -- Visit with Dr. Tyler Wheat MD, FAAN    In summary, Mr Ramon presents with a number of neurological issues.  As for his ocular myasthenia, I am a little uncertain how well controlled this is, exactly because of his marked restricted upgaze.  While the latter can sometimes be a physiologic phenomenon in the elderly, I do not recall the patient having this degree of upgaze palsy previously.  Differential diagnosis includes a supranuclear issue, for example related to high ICP and hydrocephalus, versus a peripheral issue related to MG.  Unfortunately, I could not do a good oculocephalic maneuver due to the restrictions mentioned above.  I would like some help from neuro-ophthalmology in this case, because the diagnosis of the cause of upgaze palsy is quite critical to determine the next steps in treatment.  Of note, he recently had an evaluation in the neurosurgery clinic in June 2023, and the  shunt seems to be functioning very well.     Regarding his concerns about repeating words, and short-term memory loss, I recommend a repeat neuropsych testing evaluation.  If there is worsening from the previous study, with a well-functioning shunt, then alternative causes should be sought, including medication side effects (pramipexole, gabapentin), disrupted sleep, or early MCI due to amyloid deposition.     He has restless legs not optimally controlled, and this affects his sleep quality.  I suggested to the patient to take 1 pill of gabapentin  in the morning and 2 (total 600 mg) in the evening, when he needs it the most.  He should skip the noon dose.  He should do the same with pramipexole, 0.25 mg in the morning, and 0.5 mg in the evening.  If this intervention is not helpful for his restless legs, then we may need to increase the evening dose of the medications even further.     Lastly, his fatigue could be due to multiple reasons, but the lingering question is whether MG is playing a role.  I would like him to get an EMG with repetitive nerve stimulation to assess the status of his neuromuscular junction.  He has to stop the pyridostigmine 24 hours before the test, to prevent any false negatives.  He understands this, and we will schedule the test today.     Follow-up in clinic in 4 months, sooner if needed.  Total time spent on this encounter today 50 minutes of which 30 face-to-face, 10 in postvisit note dictation, editing, and orders, and 10 in previsit chart review.     Sincerely,     Tyler Wheat MD, FAAN     Past medical history:    Patient Active Problem List   Diagnosis    Rosacea    DJD (degenerative joint disease)    Ocular myasthenia gravis (H)    Macular pigment deposit    HYPERLIPIDEMIA LDL GOAL <130    MGUS (monoclonal gammopathy of unknown significance)    Retinal hole OS, operculated    Horseshoe tear of retina without detachment OD    History  of basal cell carcinoma    Seborrhea    AK (actinic keratosis)    Malignant neoplasm of prostate (H)    PVD (posterior vitreous detachment)    Amaurosis fugax by Hx neg carotid W/U    Advanced directives, counseling/discussion    Actinic keratosis    Peripheral neuropathy    Nuclear sclerosis of both eyes    Essential hypertension with goal blood pressure less than 140/90    Gastroesophageal reflux disease without esophagitis    Steroid-induced diabetes mellitus, initial encounter  (H24)    Stage 3a chronic kidney disease (H)    Secondary adrenocortical insufficiency (H24)    Physical  deconditioning    NPH (normal pressure hydrocephalus) (H)    Myoclonus    Infection due to 2019 novel coronavirus    Encephalopathy    Dementia without behavioral disturbance, unspecified dementia type    Idiopathic normal pressure hydrocephalus (H)    Acute atopic conjunctivitis    Cancer of the skin, basal cell    COVID-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    Degeneration of intervertebral disc    Diverticulitis of intestine    Dysphagia    Gait instability    Hip pain, acute, left    History of osteoporosis    Kidney disorder    Pneumonia due to COVID-19 virus    Restless leg syndrome due to iron deficiency anemia    Sepsis due to COVID-19 (H)    Tear of medial cartilage or meniscus of knee, current    Urinary frequency    Vitamin D deficiency    Weakness of both lower extremities    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)       Patient has a current medication list which includes the following prescription(s): aspirin, cyanocobalamin, furosemide, gabapentin, multivitamin, prednisone, pyridostigmine, simvastatin, tamsulosin, trospium, vitamin d3, ace/arb/arni not prescribed, acetaminophen, fluticasone, gabapentin, guaifenesin, ipratropium, ketoconazole, levetiracetam, meclizine, methocarbamol, pantoprazole, polyethylene glycol, pramipexole, senna-docusate, terbinafine, and triamcinolone.    Family history / social history:  Patient's family history includes Alzheimer Disease (age of onset: 73) in his mother; C.A.D. in his father; Coronary Artery Disease in his father; Diabetes in his grandchild and paternal uncle; Heart Disease in his mother and paternal grandmother.     Patient  reports that he has never smoked. He has never been exposed to tobacco smoke. He has never used smokeless tobacco. He reports that he does not drink alcohol and does not use drugs.     Exam:  Corrected distance visual acuity was 20/20 -2 in the right eye and 20/40 -2 in the left eye with pin hole to 20/30. Intraocular pressure was 09 in the right eye  and 09 in the left eye using ICare.  Color vision 11/11 right eye and 11/11 left eye.  Pupils isocoric with no APD.  Please see epic chart for complete exam     Sensorimotor exam shows a small angle divergence insufficiency pattern esotropia at distance that resolves at near. Esotropia is concomitant in pattern at distance. Fuses well at distance with 6 base out but has intermittent fusion without prism. There is a moderate up gaze deficit in both eyes that does not improve with Dolls eyes maneuvers. No nystagmus. Normal saccades and normal smooth pursuit.    Again, thank you for trusting me with the care of your patient.  For further exam details, please feel free to contact our office for additional records.  If you wish to contact me regarding this patient please email me at [facility] [place] [email or web address] or give my clinic a call to arrange a phone conversation.    Sincerely,    Trevor Galvin MD  , Neuro-Ophthalmology and Adult Strabismus Surgery  The Arm[facility] Cortes Chair in Neuro-Ophthalmology  Department of Ophthalmology and Visual Neurosciences  [facility]

## 2024-01-11 NOTE — NURSING NOTE
Chief Complaint(s) and History of Present Illness(es)       Strabismus Evaluation    In both eyes.  Associated symptoms include Negative for eye pain and blurred vision.  Pain was noted as 0/10.             Comments    Gustavo Ramon is a 83 year old male sent for consultation by Dr. Wheat for up-gaze restriction.    Stephen reports eyes feel tight when he looks up.  He had a lid lift surgery a year ago and he feels a pressure when trying to looking up.  For the last few months, he has been noticing double vision in the distance while in the car.  He says the horizontal double vision comes on briefly.  Recently he was diagnosed with macula degeneration.  Has been using oral Prednisone for management, currently on 10 mg of Prednisone daily.  He reports only part-time wear of his glasses.   Hx of cataract surgery x2 in each eye.    EZEKIEL Hastings 1/11/2024 1:30 PM

## 2024-01-31 ENCOUNTER — LAB (OUTPATIENT)
Dept: LAB | Facility: CLINIC | Age: 84
End: 2024-01-31
Payer: MEDICARE

## 2024-01-31 DIAGNOSIS — D47.2 MGUS (MONOCLONAL GAMMOPATHY OF UNKNOWN SIGNIFICANCE): ICD-10-CM

## 2024-01-31 DIAGNOSIS — Z79.52 CURRENT CHRONIC USE OF SYSTEMIC STEROIDS: ICD-10-CM

## 2024-01-31 DIAGNOSIS — E09.9 STEROID-INDUCED DIABETES MELLITUS, INITIAL ENCOUNTER (H): ICD-10-CM

## 2024-01-31 DIAGNOSIS — T38.0X5A STEROID-INDUCED DIABETES MELLITUS, INITIAL ENCOUNTER (H): ICD-10-CM

## 2024-01-31 LAB
BASOPHILS # BLD AUTO: 0 10E3/UL (ref 0–0.2)
BASOPHILS NFR BLD AUTO: 1 %
EOSINOPHIL # BLD AUTO: 0.2 10E3/UL (ref 0–0.7)
EOSINOPHIL NFR BLD AUTO: 3 %
ERYTHROCYTE [DISTWIDTH] IN BLOOD BY AUTOMATED COUNT: 13.9 % (ref 10–15)
HBA1C MFR BLD: 5.8 % (ref 0–5.6)
HCT VFR BLD AUTO: 47.1 % (ref 40–53)
HGB BLD-MCNC: 15.1 G/DL (ref 13.3–17.7)
IMM GRANULOCYTES # BLD: 0 10E3/UL
IMM GRANULOCYTES NFR BLD: 1 %
LYMPHOCYTES # BLD AUTO: 2.2 10E3/UL (ref 0.8–5.3)
LYMPHOCYTES NFR BLD AUTO: 26 %
MCH RBC QN AUTO: 29.7 PG (ref 26.5–33)
MCHC RBC AUTO-ENTMCNC: 32.1 G/DL (ref 31.5–36.5)
MCV RBC AUTO: 93 FL (ref 78–100)
MONOCYTES # BLD AUTO: 0.8 10E3/UL (ref 0–1.3)
MONOCYTES NFR BLD AUTO: 9 %
NEUTROPHILS # BLD AUTO: 5.4 10E3/UL (ref 1.6–8.3)
NEUTROPHILS NFR BLD AUTO: 62 %
PLATELET # BLD AUTO: 177 10E3/UL (ref 150–450)
RBC # BLD AUTO: 5.08 10E6/UL (ref 4.4–5.9)
WBC # BLD AUTO: 8.8 10E3/UL (ref 4–11)

## 2024-01-31 PROCEDURE — 85025 COMPLETE CBC W/AUTO DIFF WBC: CPT

## 2024-01-31 PROCEDURE — 80053 COMPREHEN METABOLIC PANEL: CPT

## 2024-01-31 PROCEDURE — 83036 HEMOGLOBIN GLYCOSYLATED A1C: CPT | Mod: GA

## 2024-01-31 PROCEDURE — 36415 COLL VENOUS BLD VENIPUNCTURE: CPT

## 2024-01-31 PROCEDURE — 80061 LIPID PANEL: CPT

## 2024-01-31 PROCEDURE — 84165 PROTEIN E-PHORESIS SERUM: CPT | Performed by: PATHOLOGY

## 2024-01-31 PROCEDURE — 83521 IG LIGHT CHAINS FREE EACH: CPT

## 2024-01-31 PROCEDURE — 84155 ASSAY OF PROTEIN SERUM: CPT | Mod: 59

## 2024-02-01 LAB
ALBUMIN SERPL BCG-MCNC: 4 G/DL (ref 3.5–5.2)
ALBUMIN SERPL ELPH-MCNC: 3.7 G/DL (ref 3.7–5.1)
ALP SERPL-CCNC: 65 U/L (ref 40–150)
ALPHA1 GLOB SERPL ELPH-MCNC: 0.2 G/DL (ref 0.2–0.4)
ALPHA2 GLOB SERPL ELPH-MCNC: 0.7 G/DL (ref 0.5–0.9)
ALT SERPL W P-5'-P-CCNC: 26 U/L (ref 0–70)
ANION GAP SERPL CALCULATED.3IONS-SCNC: 9 MMOL/L (ref 7–15)
AST SERPL W P-5'-P-CCNC: 23 U/L (ref 0–45)
B-GLOBULIN SERPL ELPH-MCNC: 0.6 G/DL (ref 0.6–1)
BILIRUB SERPL-MCNC: 0.4 MG/DL
BUN SERPL-MCNC: 20.8 MG/DL (ref 8–23)
CALCIUM SERPL-MCNC: 9 MG/DL (ref 8.8–10.2)
CHLORIDE SERPL-SCNC: 108 MMOL/L (ref 98–107)
CHOLEST SERPL-MCNC: 159 MG/DL
CREAT SERPL-MCNC: 1.05 MG/DL (ref 0.67–1.17)
DEPRECATED HCO3 PLAS-SCNC: 26 MMOL/L (ref 22–29)
EGFRCR SERPLBLD CKD-EPI 2021: 70 ML/MIN/1.73M2
FASTING STATUS PATIENT QL REPORTED: YES
GAMMA GLOB SERPL ELPH-MCNC: 0.8 G/DL (ref 0.7–1.6)
GLUCOSE SERPL-MCNC: 81 MG/DL (ref 70–99)
HDLC SERPL-MCNC: 57 MG/DL
KAPPA LC FREE SER-MCNC: 2.81 MG/DL (ref 0.33–1.94)
KAPPA LC FREE/LAMBDA FREE SER NEPH: 2.15 {RATIO} (ref 0.26–1.65)
LAMBDA LC FREE SERPL-MCNC: 1.31 MG/DL (ref 0.57–2.63)
LDLC SERPL CALC-MCNC: 82 MG/DL
LOCATION OF TASK: ABNORMAL
M PROTEIN SERPL ELPH-MCNC: 0.3 G/DL
NONHDLC SERPL-MCNC: 102 MG/DL
POTASSIUM SERPL-SCNC: 4.1 MMOL/L (ref 3.4–5.3)
PROT PATTERN SERPL ELPH-IMP: ABNORMAL
PROT SERPL-MCNC: 6.3 G/DL (ref 6.4–8.3)
SODIUM SERPL-SCNC: 143 MMOL/L (ref 135–145)
TOTAL PROTEIN SERUM FOR ELP: 6 G/DL (ref 6.4–8.3)
TRIGL SERPL-MCNC: 99 MG/DL

## 2024-02-07 ENCOUNTER — ONCOLOGY VISIT (OUTPATIENT)
Dept: ONCOLOGY | Facility: CLINIC | Age: 84
End: 2024-02-07
Attending: INTERNAL MEDICINE
Payer: MEDICARE

## 2024-02-07 ENCOUNTER — OFFICE VISIT (OUTPATIENT)
Dept: FAMILY MEDICINE | Facility: CLINIC | Age: 84
End: 2024-02-07
Payer: MEDICARE

## 2024-02-07 VITALS
BODY MASS INDEX: 30.39 KG/M2 | DIASTOLIC BLOOD PRESSURE: 50 MMHG | HEART RATE: 92 BPM | SYSTOLIC BLOOD PRESSURE: 112 MMHG | OXYGEN SATURATION: 94 % | WEIGHT: 193.6 LBS | RESPIRATION RATE: 16 BRPM | TEMPERATURE: 97.1 F | HEIGHT: 67 IN

## 2024-02-07 VITALS
HEART RATE: 64 BPM | RESPIRATION RATE: 18 BRPM | BODY MASS INDEX: 30.02 KG/M2 | HEIGHT: 67 IN | WEIGHT: 191.3 LBS | DIASTOLIC BLOOD PRESSURE: 80 MMHG | SYSTOLIC BLOOD PRESSURE: 142 MMHG | OXYGEN SATURATION: 98 %

## 2024-02-07 DIAGNOSIS — N18.31 STAGE 3A CHRONIC KIDNEY DISEASE (H): ICD-10-CM

## 2024-02-07 DIAGNOSIS — E27.49 SECONDARY ADRENOCORTICAL INSUFFICIENCY (H): ICD-10-CM

## 2024-02-07 DIAGNOSIS — R06.09 DOE (DYSPNEA ON EXERTION): ICD-10-CM

## 2024-02-07 DIAGNOSIS — Z29.11 NEED FOR VACCINATION AGAINST RESPIRATORY SYNCYTIAL VIRUS: ICD-10-CM

## 2024-02-07 DIAGNOSIS — C61 MALIGNANT NEOPLASM OF PROSTATE (H): ICD-10-CM

## 2024-02-07 DIAGNOSIS — R60.0 BILATERAL LEG EDEMA: Primary | ICD-10-CM

## 2024-02-07 DIAGNOSIS — G25.81 RESTLESS LEG SYNDROME: ICD-10-CM

## 2024-02-07 DIAGNOSIS — D47.2 MGUS (MONOCLONAL GAMMOPATHY OF UNKNOWN SIGNIFICANCE): Primary | ICD-10-CM

## 2024-02-07 DIAGNOSIS — I87.2 VENOUS (PERIPHERAL) INSUFFICIENCY: ICD-10-CM

## 2024-02-07 DIAGNOSIS — G99.2 MYELOPATHY IN DISEASES CLASSIFIED ELSEWHERE (H): ICD-10-CM

## 2024-02-07 PROBLEM — E09.9 STEROID-INDUCED DIABETES MELLITUS, INITIAL ENCOUNTER (H): Status: RESOLVED | Noted: 2022-01-31 | Resolved: 2024-02-07

## 2024-02-07 PROBLEM — T38.0X5A STEROID-INDUCED DIABETES MELLITUS, INITIAL ENCOUNTER (H): Status: RESOLVED | Noted: 2022-01-31 | Resolved: 2024-02-07

## 2024-02-07 PROCEDURE — G0463 HOSPITAL OUTPT CLINIC VISIT: HCPCS | Performed by: INTERNAL MEDICINE

## 2024-02-07 PROCEDURE — 99214 OFFICE O/P EST MOD 30 MIN: CPT | Performed by: PHYSICIAN ASSISTANT

## 2024-02-07 PROCEDURE — 99213 OFFICE O/P EST LOW 20 MIN: CPT | Performed by: INTERNAL MEDICINE

## 2024-02-07 RX ORDER — RESPIRATORY SYNCYTIAL VIRUS VACCINE 120MCG/0.5
0.5 KIT INTRAMUSCULAR ONCE
Qty: 1 EACH | Refills: 0 | Status: SHIPPED | OUTPATIENT
Start: 2024-02-07 | End: 2024-02-07

## 2024-02-07 RX ORDER — PRAMIPEXOLE DIHYDROCHLORIDE 0.12 MG/1
0.38 TABLET ORAL AT BEDTIME
Qty: 90 TABLET | Refills: 1 | Status: SHIPPED | OUTPATIENT
Start: 2024-02-07 | End: 2024-06-04

## 2024-02-07 ASSESSMENT — PAIN SCALES - GENERAL
PAINLEVEL: NO PAIN (0)
PAINLEVEL: NO PAIN (0)

## 2024-02-07 NOTE — LETTER
2024         RE: Gustavo Ramon  [address] [facility] [address]        Dear Colleague,    Thank you for referring your patient, Gustavo Ramon, to the [facility]. Please see a copy of my visit note below.    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  Counts, chemistries stable.  Myeloma markers stable.  Immunizations: UTD flu, PNA, shingles; holding on covid and RSV.  RHM: PSA WNL ; not planning colon w/ age.  RTC 12 months w/ labs 5-7 days prior (CBC, CMP, SPEP, FLC).    Hakan Darden MD    --------------------    Interval History:  Stephen returns for follow-up of MGUS accompanied by his wife.  All in all, he describes his health is pretty good.  He is actually feeling fine and his wife agrees.  He has had fortunate improvement in his NPH with improvement in his ambulation as well as cognition.  No adenopathy.  Stable appetite, energy and weight.  No fevers or sweats.  No recurrent or severe infections.  Neuropathy has been stable in his feet.  No unexplained bruising outside of aspirin related complaints.    Social History:  Social History     Tobacco Use     Smoking status: Never     Passive exposure: Never     Smokeless tobacco: Never     Tobacco comments:     Never   Substance Use Topics     Alcohol use: No     Family History:  Family History   Problem Relation Age of Onset     Heart Disease Mother      Alzheimer Disease Mother 73     C.A.D. Father      Coronary Artery Disease Father      Diabetes Grandchild         using a pump      Diabetes Paternal Uncle      Heart Disease Paternal Grandmother      Glaucoma No family hx of      Macular Degeneration No family hx of      Melanoma No family hx of      Skin Cancer No family hx of   "    Immunizations:  Immunization History   Administered Date(s) Administered     COVID-19 MONOVALENT 12+ (Pfizer) 02/04/2021, 02/25/2021     Influenza (High Dose) 3 valent vaccine 10/14/2010     Influenza (IIV3) PF 12/04/2003, 10/24/2009     Influenza Vaccine 65+ (FLUAD) 09/23/2022     Influenza Vaccine 65+ (Fluzone HD) 09/11/2020, 01/31/2022, 10/03/2023     Pneumo Conj 13-V (2010&after) 06/06/2016     Pneumococcal 23 valent 07/11/2002, 08/15/2012     TD,PF 7+ (Tenivac) 08/25/2000     TDAP (Adacel,Boostrix) 04/10/2023     TDAP Vaccine (Adacel) 08/15/2012     Zoster recombinant adjuvanted (SHINGRIX) 04/10/2019, 07/23/2019     Zoster vaccine, live 06/22/2007     Health Maintenance Due   Topic Date Due     COVID-19 Vaccine (3 - Pfizer risk series) 03/25/2021     --------------------    Physical Exam:  VS: BP (!) 142/80 (BP Location: Right arm)   Pulse 64   Resp 18   Ht 1.702 m (5' 7.01\")   Wt 86.8 kg (191 lb 4.8 oz)   SpO2 98%   BMI 29.95 kg/m  .  GEN: Well appearing.  JOSE ENRIQUE: No cervical, clavicular, axillary adenopathy.    Labs / Imaging:  Reviewed CBC, CMP, SPEP, FLC assay.      Again, thank you for allowing me to participate in the care of your patient.        Sincerely,        Hakan Darden MD  "

## 2024-02-07 NOTE — PROGRESS NOTES
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  Counts, chemistries stable.  Myeloma markers stable.  Immunizations: UTD flu, PNA, shingles; holding on covid and RSV.  RHM: PSA WNL ; not planning colon w/ age.  RTC 12 months w/ labs 5-7 days prior (CBC, CMP, SPEP, FLC).    Hakan Darden MD    --------------------    Interval History:  Stephen returns for follow-up of MGUS accompanied by his wife.  All in all, he describes his health is pretty good.  He is actually feeling fine and his wife agrees.  He has had fortunate improvement in his NPH with improvement in his ambulation as well as cognition.  No adenopathy.  Stable appetite, energy and weight.  No fevers or sweats.  No recurrent or severe infections.  Neuropathy has been stable in his feet.  No unexplained bruising outside of aspirin related complaints.    Social History:  Social History     Tobacco Use    Smoking status: Never     Passive exposure: Never    Smokeless tobacco: Never    Tobacco comments:     Never   Substance Use Topics    Alcohol use: No     Family History:  Family History   Problem Relation Age of Onset    Heart Disease Mother     Alzheimer Disease Mother 73    C.A.D. Father     Coronary Artery Disease Father     Diabetes Grandchild         using a pump     Diabetes Paternal Uncle     Heart Disease Paternal Grandmother     Glaucoma No family hx of     Macular Degeneration No family hx of     Melanoma No family hx of     Skin Cancer No family hx of      Immunizations:  Immunization History   Administered Date(s) Administered    COVID-19 MONOVALENT 12+ (Pfizer) 2021, 2021    Influenza (High Dose) 3 valent vaccine 10/14/2010    Influenza (IIV3) PF 2003, 10/24/2009    Influenza Vaccine 65+ (FLUAD) 2022    Influenza Vaccine 65+  "(Fluzone HD) 09/11/2020, 01/31/2022, 10/03/2023    Pneumo Conj 13-V (2010&after) 06/06/2016    Pneumococcal 23 valent 07/11/2002, 08/15/2012    TD,PF 7+ (Tenivac) 08/25/2000    TDAP (Adacel,Boostrix) 04/10/2023    TDAP Vaccine (Adacel) 08/15/2012    Zoster recombinant adjuvanted (SHINGRIX) 04/10/2019, 07/23/2019    Zoster vaccine, live 06/22/2007     Health Maintenance Due   Topic Date Due    COVID-19 Vaccine (3 - Pfizer risk series) 03/25/2021     --------------------    Physical Exam:  VS: BP (!) 142/80 (BP Location: Right arm)   Pulse 64   Resp 18   Ht 1.702 m (5' 7.01\")   Wt 86.8 kg (191 lb 4.8 oz)   SpO2 98%   BMI 29.95 kg/m  .  GEN: Well appearing.  JOSE ENRIQUE: No cervical, clavicular, axillary adenopathy.    Labs / Imaging:  Reviewed CBC, CMP, SPEP, FLC assay.  "

## 2024-02-07 NOTE — PROGRESS NOTES
"    Subjective   Stephen is a 83 year old, presenting for the following health issues:  Foot Swelling        2/7/2024     2:20 PM   Additional Questions   Roomed by NENITA Lai   Accompanied by wife- Ceci         2/7/2024     2:20 PM   Patient Reported Additional Medications   Patient reports taking the following new medications None per patient     History of Present Illness       Reason for visit:  Swollen feet    He eats 2-3 servings of fruits and vegetables daily.He consumes 1 sweetened beverage(s) daily.He exercises with enough effort to increase his heart rate 10 to 19 minutes per day.  He exercises with enough effort to increase his heart rate 3 or less days per week.   He is taking medications regularly.     Compression socks not helping much.    History of MGUS. Followed by heme/onc. Stable.  History of ckd . No voiding changes.   History of prostate ca. No profound irritative/obstructive voiding symptoms. S/p prostate ablation. Followed by urology   History of renal insufficiency. No concerning symptoms at this time.    No orthopnea. Some CHUA. Some fatigue.    No chest pain/palpitations     Review of Systems  Constitutional, neuro, ENT, endocrine, pulmonary, cardiac, gastrointestinal, genitourinary, musculoskeletal, integument and psychiatric systems are negative, except as otherwise noted.      Objective    /50   Pulse 92   Temp 97.1  F (36.2  C) (Temporal)   Resp 16   Ht 1.702 m (5' 7\")   Wt 87.8 kg (193 lb 9.6 oz)   SpO2 94%   BMI 30.32 kg/m    Body mass index is 30.32 kg/m .  Physical Exam   Eye exam - right eye normal lid, conjunctiva, cornea, pupil and fundus, left eye normal lid, conjunctiva, cornea, pupil and fundus.  Thyroid not palpable, not enlarged, no nodules detected.  CHEST:chest clear to IPPA, no tachypnea, retractions or cyanosis, and S1, S2 normal, no murmur, no gallop, rate regular.  Bilateral foot and lower leg edema.  Stephen was seen today for foot swelling.    Diagnoses and " all orders for this visit:    Bilateral leg edema  -     US Lower Extremity Venous Duplex Bilateral; Future  -     Lymphedema Therapy Referral; Future    Need for vaccination against respiratory syncytial virus  -     respiratory syncytial virus vaccine, bivalent (ABRYSVO) injection; Inject 0.5 mLs into the muscle once for 1 dose    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)    Secondary adrenocortical insufficiency (H24)    Malignant neoplasm of prostate (H)    Stage 3a chronic kidney disease (H)    Restless leg syndrome  -     pramipexole (MIRAPEX) 0.125 MG tablet; Take 3 tablets (0.375 mg) by mouth at bedtime    CHUA (dyspnea on exertion)  -     Echocardiogram Complete; Future    Venous (peripheral) insufficiency      Elevate legs. Compression socks. Lower salt diet. More walking.   Signed Electronically by: Winston Silverman PA-C

## 2024-02-07 NOTE — NURSING NOTE
"Oncology Rooming Note    February 7, 2024 10:30 AM   uGstavo Ramon is a 83 year old male who presents for:    Chief Complaint   Patient presents with    Oncology Clinic Visit     1 year follow up     Initial Vitals: BP (!) 142/80 (BP Location: Right arm)   Pulse 64   Resp 18   Ht 1.702 m (5' 7.01\")   Wt 86.8 kg (191 lb 4.8 oz)   SpO2 98%   BMI 29.95 kg/m   Estimated body mass index is 29.95 kg/m  as calculated from the following:    Height as of this encounter: 1.702 m (5' 7.01\").    Weight as of this encounter: 86.8 kg (191 lb 4.8 oz). Body surface area is 2.03 meters squared.  No Pain (0) Comment: Data Unavailable   No LMP for male patient.  Allergies reviewed: Yes  Medications reviewed: Yes    Medications: Medication refills not needed today.  Pharmacy name entered into EPIC:    [facility] - EPRESCRIB[facility] ONLY  [facility] - [facility], [place] - [address] [place] [address] [facility] - [id number] [facility]    Frailty Screening:   Is the patient here for a new oncology consult visit in cancer care? 2. No      Clinical concerns: No new concerns         Saskia Townsend LPN              "

## 2024-02-19 ENCOUNTER — ANCILLARY PROCEDURE (OUTPATIENT)
Dept: CARDIOLOGY | Facility: CLINIC | Age: 84
End: 2024-02-19
Attending: PHYSICIAN ASSISTANT
Payer: MEDICARE

## 2024-02-19 ENCOUNTER — ANCILLARY PROCEDURE (OUTPATIENT)
Dept: ULTRASOUND IMAGING | Facility: CLINIC | Age: 84
End: 2024-02-19
Attending: PHYSICIAN ASSISTANT
Payer: MEDICARE

## 2024-02-19 DIAGNOSIS — R06.09 DOE (DYSPNEA ON EXERTION): ICD-10-CM

## 2024-02-19 DIAGNOSIS — R60.0 BILATERAL LEG EDEMA: ICD-10-CM

## 2024-02-19 LAB — BI-PLANE LVEF ECHO: NORMAL

## 2024-02-19 PROCEDURE — 93306 TTE W/DOPPLER COMPLETE: CPT | Performed by: INTERNAL MEDICINE

## 2024-02-19 PROCEDURE — 93970 EXTREMITY STUDY: CPT | Performed by: RADIOLOGY

## 2024-02-26 ENCOUNTER — OFFICE VISIT (OUTPATIENT)
Dept: NEUROLOGY | Facility: CLINIC | Age: 84
End: 2024-02-26
Attending: PSYCHIATRY & NEUROLOGY
Payer: MEDICARE

## 2024-02-26 VITALS
BODY MASS INDEX: 29.25 KG/M2 | WEIGHT: 193 LBS | HEART RATE: 67 BPM | DIASTOLIC BLOOD PRESSURE: 93 MMHG | OXYGEN SATURATION: 96 % | SYSTOLIC BLOOD PRESSURE: 165 MMHG | HEIGHT: 68 IN

## 2024-02-26 DIAGNOSIS — G25.81 RESTLESS LEG SYNDROME: ICD-10-CM

## 2024-02-26 DIAGNOSIS — H53.2 DIPLOPIA: ICD-10-CM

## 2024-02-26 DIAGNOSIS — R41.3 MEMORY LOSS: Primary | ICD-10-CM

## 2024-02-26 DIAGNOSIS — G91.2 NPH (NORMAL PRESSURE HYDROCEPHALUS) (H): ICD-10-CM

## 2024-02-26 DIAGNOSIS — G70.00 MG, OCULAR (MYASTHENIA GRAVIS) (H): ICD-10-CM

## 2024-02-26 DIAGNOSIS — M47.12 CERVICAL SPONDYLOSIS WITH MYELOPATHY: ICD-10-CM

## 2024-02-26 PROCEDURE — 99214 OFFICE O/P EST MOD 30 MIN: CPT | Performed by: PSYCHIATRY & NEUROLOGY

## 2024-02-26 NOTE — PATIENT INSTRUCTIONS
"I would strongly recommend following up with a general neurologist in our Clinic.You have a lot of issues that have nothing to do with the myasthenia and are outside my area of expertise in neuromuscular medicine, including  cognitive concerns, previous diagnosis of normal pressure hydrocephalus status post shunt, cervical spine surgery, restless leg syndrome, etc.    Please reduce your prednisone dose to 10 mg every other day starting tomorrow.  Please contact me in about 2 weeks by Carlita.  I want to know what is happening on the \"off\" day that you are not taking prednisone.  If you notice that your double vision or droopy eye are much worse on that day, then perhaps we should continue 10 mg daily.    For the restless leg syndrome, I recommend that your primary care doctor increases the evening pramipexole and gabapentin dose by 1 pill (1 at a time).  If this does not work, then she should consider offering you a low-dose opioid.  Our clinic does not prescribe opioid/narcotic) drugs, so this should be done through your primary if it is needed.    Follow up with me as necessary    "

## 2024-02-26 NOTE — PROGRESS NOTES
Winston Silverman PA-C  [place], February 26, 2024    Dear Mr. Silverman,    I had the pleasure to see Stephen in follow-up at the [facility]/neuromuscular clinic.  I used to follow him for predominantly ocular, acetylcholine receptor antibody positive myasthenia gravis that previously responded well to prednisone and pyridostigmine treatments.  He had been stable on 10 mg of prednisone daily for a long time.  Lately he was complaining again of more diplopia and I had seen some restriction of his upgaze on exam.  However, recent repetitive nerve stimulation in 1/2024 study showed no decrement, indicating intact neuromuscular junction.  Therefore, I had a hard time ascribing his current symptoms to MG.  He saw my colleague Dr. Galvin from neuro-ophthalmology, and I appreciate his consult.  He proposed that the patient's diplopia, and ptosis could be due to sagging eye syndrome and LR-SR band relaxation.  He asked the patient to return to his regular ophthalmologist for prescription of prisms.    Mr. Ramon denies any other myasthenia symptoms such as dysphagia, dysarthria, or difficulty chewing.  He does have a general feeling of fatigue, which is worse when he walks outdoors and forces him to stop.  He does not describe any weakness of any specific muscle group however.    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  He still uses a walker to get around but he feels a little more independent and confident on his skills lately.  He has not had any recent falls.    Mr Ramon has chronic memory and cognitive concerns and there is a repeat neuropsych testing that is pending and scheduled for May 2024.    He has really difficult to control restless leg syndrome.  His left or right leg are jerking when he sits in a chair in an alternating fashion, and this still wakes  "him up at night.  There is also some spread of the symptoms to his arms with occasional twitching in the morning.  His ferritin levels were normal last year.  He takes gabapentin 300 mg in the morning and 600 in the evening, and also pramipexole 0.125 in the morning and 0.25 in the evening.  Despite this combination of medications, the symptoms persist.    BP (!) 165/93 (BP Location: Right arm, Patient Position: Sitting, Cuff Size: Adult Regular)   Pulse 67   Ht 1.727 m (5' 8\")   Wt 87.5 kg (193 lb)   SpO2 96%   BMI 29.35 kg/m      Neuro exam was not repeated.    In summary, Mr Ramon had a diagnosis of ocular MG in the past, that responded well to prednisone.  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  The patient should follow with his local ophthalmologist for prism prescription.  I cannot offer any additional help for this diagnosis.    I recommended to the patient to reduce his prednisone dose to 10 mg every other day for 1 month.  In about 2 weeks, he should contact me by MyChart.  If he does not report any worsening diplopia or ptosis on the \"off\" days, then we will continue this prednisone dose for a total of a month and then stop the prednisone altogether.  If he does report worsening diplopia or ptosis on the \"off\" days, then I think he should continue on 10 mg of prednisone daily long-term.    Otherwise, the patient has several neurological issues, but frankly all of those are outside my area of expertise, as they are not neuromuscular issues.  Those include cervical spondylotic myelopathy status post surgery, previous NPH, restless leg syndrome, and cognitive concerns.  For all those reasons, I proposed to him to follow-up with one of our general neurologists at the [place] [facility], who would " be more suitable to address those.    For his refractory RLS, I would propose increasing the evening gabapentin dose to 3 pills, and a week later the evening pramipexole dose can be increased to 3 pills as well.  If none of the 2 work, his primary care doctor may consider prescribing him a low-dose of oxycodone or methadone at bedtime.    Stephen is in agreement with the above plan.  I will see him in follow-up as necessary.    Sincerely,      Tyler Wheat MD, FAAN    Billing is MDM level 4 (moderate) based on: #1 problems assessed: Two stable chronic conditions (ocular myasthenia, restless leg syndrome), #2 risk: Prescription drug management, see above discussion.

## 2024-02-26 NOTE — LETTER
2/26/2024       RE: Gustavo Ramon  [id number] [facility] [address]     Dear Colleague,    Thank you for referring your patient, Gustavo Ramon, to the [facility] [place] at [facility]. Please see a copy of my visit note below.    Winston Silverman PA-C  [place], February 26, 2024    Dear Mr. Silverman,    I had the pleasure to see Stephen in follow-up at the [facility]/neuromuscular clinic.  I used to follow him for predominantly ocular, acetylcholine receptor antibody positive myasthenia gravis that previously responded well to prednisone and pyridostigmine treatments.  He had been stable on 10 mg of prednisone daily for a long time.  Lately he was complaining again of more diplopia and I had seen some restriction of his upgaze on exam.  However, recent repetitive nerve stimulation in 1/2024 study showed no decrement, indicating intact neuromuscular junction.  Therefore, I had a hard time ascribing his current symptoms to MG.  He saw my colleague Dr. Galvin from neuro-ophthalmology, and I appreciate his consult.  He proposed that the patient's diplopia, and ptosis could be due to sagging eye syndrome and LR-SR band relaxation.  He asked the patient to return to his regular ophthalmologist for prescription of prisms.    Mr. Ramon denies any other myasthenia symptoms such as dysphagia, dysarthria, or difficulty chewing.  He does have a general feeling of fatigue, which is worse when he walks outdoors and forces him to stop.  He does not describe any weakness of any specific muscle group however.    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  He still uses a walker to get around but he feels a little more independent and confident on his skills lately.  He has not  "had any recent falls.    Mr Ramon has chronic memory and cognitive concerns and there is a repeat neuropsych testing that is pending and scheduled for May 2024.    He has really difficult to control restless leg syndrome.  His left or right leg are jerking when he sits in a chair in an alternating fashion, and this still wakes him up at night.  There is also some spread of the symptoms to his arms with occasional twitching in the morning.  His ferritin levels were normal last year.  He takes gabapentin 300 mg in the morning and 600 in the evening, and also pramipexole 0.125 in the morning and 0.25 in the evening.  Despite this combination of medications, the symptoms persist.    BP (!) 165/93 (BP Location: Right arm, Patient Position: Sitting, Cuff Size: Adult Regular)   Pulse 67   Ht 1.727 m (5' 8\")   Wt 87.5 kg (193 lb)   SpO2 96%   BMI 29.35 kg/m      Neuro exam was not repeated.    In summary, Mr Ramon had a diagnosis of ocular MG in the past, that responded well to prednisone.  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  The patient should follow with his local ophthalmologist for prism prescription.  I cannot offer any additional help for this diagnosis.    I recommended to the patient to reduce his prednisone dose to 10 mg every other day for 1 month.  In about 2 weeks, he should contact me by [place].  If he does not report any worsening diplopia or ptosis on the \"off\" days, then we will continue this prednisone dose for a total of a month and then stop the prednisone altogether.  If he does report worsening diplopia or ptosis on the \"off\" days, then I think he should continue on 10 mg of prednisone daily long-term.    Otherwise, the patient has several neurological issues, but frankly all " of those are outside my area of expertise, as they are not neuromuscular issues.  Those include cervical spondylotic myelopathy status post surgery, previous NPH, restless leg syndrome, and cognitive concerns.  For all those reasons, I proposed to him to follow-up with one of our general neurologists at the [facility], who would be more suitable to address those.    For his refractory RLS, I would propose increasing the evening gabapentin dose to 3 pills, and a week later the evening pramipexole dose can be increased to 3 pills as well.  If none of the 2 work, his primary care doctor may consider prescribing him a low-dose of oxycodone or methadone at bedtime.    Stephen is in agreement with the above plan.  I will see him in follow-up as necessary.      Billing is MDM level 4 (moderate) based on: #1 problems assessed: Two stable chronic conditions (ocular myasthenia, restless leg syndrome), #2 risk: Prescription drug management, see above discussion.      Again, thank you for allowing me to participate in the care of your patient.      Sincerely,    Tyler Wheat MD

## 2024-02-26 NOTE — NURSING NOTE
Chief Complaint   Patient presents with    Myasthenia Gravis    RECHECK     Vitals were taken and medications were reconciled.  Aubrey Broussard, EMT  3:02 PM

## 2024-03-01 ENCOUNTER — THERAPY VISIT (OUTPATIENT)
Dept: OCCUPATIONAL THERAPY | Facility: CLINIC | Age: 84
End: 2024-03-01
Attending: PHYSICIAN ASSISTANT
Payer: MEDICARE

## 2024-03-01 DIAGNOSIS — I89.0 LYMPHEDEMA: Primary | Chronic | ICD-10-CM

## 2024-03-01 DIAGNOSIS — R60.0 BILATERAL LEG EDEMA: Chronic | ICD-10-CM

## 2024-03-01 PROCEDURE — 97140 MANUAL THERAPY 1/> REGIONS: CPT | Mod: GO

## 2024-03-01 PROCEDURE — 97165 OT EVAL LOW COMPLEX 30 MIN: CPT | Mod: GO

## 2024-03-01 NOTE — PROGRESS NOTES
OCCUPATIONAL THERAPY EVALUATION  Type of Visit: Evaluation    See electronic medical record for Abuse and Falls Screening details.    Subjective      Presenting condition or subjective complaint: Swollen feet  Date of onset:      Relevant medical history: Cancer; Hearing problems; High blood pressure; Implanted device; Incontinence; Progressive neurological deficits; Significant weakness; Vision problems   Dates & types of surgery: 2022  neck and NPH  shunt; three eye surgeries    Prior diagnostic imaging/testing results:       Prior therapy history for the same diagnosis, illness or injury: No      Living Environment  Social support:     Type of home: House; 1 level   Stairs to enter the home: No       Ramp: No   Stairs inside the home: No       Help at home: Self Cares (home health aide/personal care attendant, family, etc); Home and Yard maintenance tasks; Assist for driving and community activities  Equipment owned: Walker; Walker with wheels; Bedrail; Grab bars; Bath bench     Employment: Not Applicable    Hobbies/Interests: computer; [employer];  Shinto; travel    Patient goals for therapy: nothing    Pain assessment:  heaviness in LEs     Objective       EDEMA EVALUATION  Additional history:  Body part affected by edema: top of feet  If cancer related, treatment:    If not cancer related, problems with veins or cause of swelling: no  Distance able to walk: 1/2 mile  Time able to stand: 10 minutes  Sensation problems in hands/feet: Yes sensitivity and tingling  Edema etiology: Chronic Venous Insufficiency, sedentary lifestyle/decreased use of muscle pump    Cognitive Status Examination  Orientation: Oriented to person, place and time   Level of Consciousness: Alert  Follows Commands and Answers Questions: 100% of the time  Personal Safety and Judgement: Intact  Memory: Intact    EDEMA  Skin Condition: Dryness, Hemosiderin deposits, Pitting  Scar: No  Capillary Refill: Symmetrical  Ulceration: No    GIRTH  MEASUREMENTS: Refer to separate girth measurement flowsheet.     VOLUME LE  Right LE (mL) 2109   Left LE (mL) 2055   LE Volume Comparison    % Difference    Head/Neck Volume     Trunk Volume    Genital Volume       RANGE OF MOTION: LE ROM WFL  STRENGTH: UE Strength WFL  PALPATION: 2 plus pitting tapering toward knee  ACTIVITIES OF DAILY LIVING: some assist  BED MOBILITY: WNL  TRANSFERS: WNL  GAIT/LOCOMOTION: walker  BALANCE: WFL  SENSATION:  neuropathy  VASCULAR: Vascular concerns    Assessment & Plan   CLINICAL IMPRESSIONS  Medical Diagnosis:      Treatment Diagnosis:      Impression/Assessment: Pt is a 83 year old male presenting to Occupational Therapy due to BLE swelling that is no longer reversible.  The following significant findings have been identified: Impaired activity tolerance, Impaired mobility, and Impaired ROM.  These identified deficits interfere with their ability to perform self care tasks, household mobility, and community mobility as compared to previous level of function.     Clinical Decision Making (Complexity):  Assessment of Occupational Performance: 1-3 Performance Deficits  Occupational Performance Limitations: dressing, functional mobility, and home establishment and management  Clinical Decision Making (Complexity): Low complexity    PLAN OF CARE  Treatment Interventions:  Interventions: Self-Care/Home Management, Therapeutic Activity, Therapeutic Exercise, Manual Therapy, Neuromuscular Re-education    Long Term Goals    Volume   Pt will demonstrate a reduction of at least 200 mL in BLE to improve skin integrity, safety with mobility and fit of clothing/shoes.   In order to maximize safety and independence with performance of self-care activities;   Target date: 5/23/24    Garments   Pt will be fit for and demonstrate independence using new compression garments for long term lymphedema management as evidenced by a no more than 25% increase in volume after transitioning into garments.    In order to maximize safety and independence with performance of self-care activities;   Target date: 5/23/24    Home management   Pt will verbalize understanding of long term management/prevention of lymphedema, including wear schedule for recommended compression garments, manual techniques, skin care, elevation and exercise, reporting completion 5/7 days by discharge.   In order to maximize safety and independence with performance of self-care activities;   Target date: 5/23/24      Frequency of Treatment:  1x a week  Duration of Treatment:   12 weeks    Recommended Referrals to Other Professionals: none  Education Assessment:  Patient;Significant Other;Reading;Listening;Demonstration;Pictures/Video;      Risks and benefits of evaluation/treatment have been explained.   Patient/Family/caregiver agrees with Plan of Care.     Evaluation Time:     15    Signing Clinician: LEIGH ANN Fajardo [facility]                                                                                   OUTPATIENT OCCUPATIONAL THERAPY      PLAN OF TREATMENT FOR OUTPATIENT REHABILITATION   Patient's Last Name, First Name, MARKGustavo Dupont YOB: 1940   Provider's Name   [facility]   Medical Record No.  [id number]     Onset Date:  2/7/2024 Start of Care Date:  3/1/2024     Medical Diagnosis:   R60.0 (ICD-10-CM) - Bilateral leg edema     OT Treatment Diagnosis:   BLE lymphedema  Plan of Treatment  Frequency/Duration: 1x a week/12 weeks     Certification date from     3/1/2024 To  5/23/2024        See note for plan of treatment details and functional goals     Ella Norris OT                         I CERTIFY THE NEED FOR THESE SERVICES FURNISHED UNDER        THIS PLAN OF TREATMENT AND WHILE UNDER MY CARE     (Physician attestation of this document indicates review and certification of the therapy plan).              Referring Provider:  Winston  Cuca Silverman    Initial Assessment  See Epic Evaluation-  3/1/2024

## 2024-03-04 ENCOUNTER — OFFICE VISIT (OUTPATIENT)
Dept: DERMATOLOGY | Facility: CLINIC | Age: 84
End: 2024-03-04
Attending: DERMATOLOGY
Payer: MEDICARE

## 2024-03-04 DIAGNOSIS — L82.1 SEBORRHEIC KERATOSIS: ICD-10-CM

## 2024-03-04 DIAGNOSIS — D22.9 MULTIPLE MELANOCYTIC NEVI: ICD-10-CM

## 2024-03-04 DIAGNOSIS — L72.0 MILIA: ICD-10-CM

## 2024-03-04 DIAGNOSIS — D18.01 CHERRY ANGIOMA: ICD-10-CM

## 2024-03-04 DIAGNOSIS — L57.0 ACTINIC KERATOSIS: ICD-10-CM

## 2024-03-04 DIAGNOSIS — L81.4 SOLAR LENTIGO: ICD-10-CM

## 2024-03-04 DIAGNOSIS — L82.0 INFLAMED SEBORRHEIC KERATOSIS: ICD-10-CM

## 2024-03-04 DIAGNOSIS — Z85.828 HISTORY OF NONMELANOMA SKIN CANCER: Primary | ICD-10-CM

## 2024-03-04 PROCEDURE — 17110 DESTRUCTION B9 LES UP TO 14: CPT | Mod: XS | Performed by: DERMATOLOGY

## 2024-03-04 PROCEDURE — 99213 OFFICE O/P EST LOW 20 MIN: CPT | Mod: 25 | Performed by: DERMATOLOGY

## 2024-03-04 PROCEDURE — 17004 DESTROY PREMAL LESIONS 15/>: CPT | Performed by: DERMATOLOGY

## 2024-03-04 NOTE — LETTER
3/4/2024         RE: Gustavo Ramon  [address] [facility] [id number]        Dear Colleague,    Thank you for referring your patient, Gustavo Ramon, to the [facility]. Please see a copy of my visit note below.    [facility] Dermatology Note    Encounter Date: Mar 4, 2024    Dermatology Problem List:  1. 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 Christiansen's note 11/21/11), s/p excision 5/12/09   2. Actinic keratoses  - R tragus s/p LN2 3/13/23; s/p biopsy 9/23/22  - s/p Efudex 2015, Fall 2020 to face, Spring 2021 to forearms  - s/p PDT (x3)  - s/p LN2  3. Seborrheic dermatitis  - clobetasol 0.05% solution for scalp, triam 0.1% cream for ears  4. Relevant medical history: MGUS, myasthenia gravis currently on prednisone  5. Tinea cruris  - current tx: ketoconazole cream  6. 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 Lipoma L bicep, s/p biopsy 9/23/22    ______________________________________    Impression/Plan:  1. History of NMSC  - no evidence of recurrence on exam today    2. Actinic keratosis, diffuse and numerous   Cryotherapy procedure note: After verbal consent and discussion of risks and benefits including but no limited to dyspigmentation/scar, blister, and pain, 19 was(were) treated with 1-2mm freeze border for 2 cycles with liquid nitrogen. Post cryotherapy instructions were provided.  - start topical fluorouracil twice a day for 2 weeks after  returning from [place].   - future tx: refreeze, biopsy    3. iSKs, right flank  Cryotherapy procedure note: After verbal consent and discussion of risks and benefits including but no limited to dyspigmentation/scar, blister, and pain, 2 was treated with 1-2mm freeze border for 2 cycles with liquid nitrogen. Post cryotherapy instructions were provided.    4. Reassurance provided for benign lesions not treated today including cherry angiomata, solar lentigines, seborrheic keratoses, milia cysts, and banal-appearing melanocytic nevi.        Follow-up in 4 months.       Staff Involved:  Staff and Scribe    Scribe Disclosure:   I, GLORY STINSON, am serving as a scribe; to document services personally performed by Joe Grimaldo MD -based on data collection and the provider's statements to me.     Provider Disclosure:   The documentation recorded by the scribe accurately reflects the services I personally performed and the decisions made by me.    Joe Grimaldo MD   of Dermatology  Department of Dermatology  [facility]        CC:   Chief Complaint   Patient presents with     Skin Check     Full body skin check. Recheck spots on scalp, left superior shoulder, and right breast. Personal history of NMSC, AKs.       History of Present Illness:  Mr. Gustavo Ramon is a 83 year old male who presents as a return patient for FBSE. He is here with his wife today. He reports a few spots of concern on his scalp, left shoulder. His wife mentioned another spot of concern on top of his foot. Patient has no other areas of concerns today.         Labs:  N/A    Physical exam:  Vitals: There were no vitals taken for this visit.  GEN: This is a well developed, well-nourished male in no acute distress, in a pleasant mood.    SKIN: Montemayor phototype 2  - Total skin excluding the undergarment areas was performed. The exam included the head/face, neck, both arms, chest, back,  abdomen, both legs, digits and/or nails.   - 2 erythematous macules on the right flank  - few scattered white papules on the face and torso.   - numerous erythematous scaly macules on the face with scattered erythematous macules on the trunk, arms and hands.   - There are dome shaped bright red papules on the head/neck, trunk, extremities.   - Multiple regular brown pigmented macules and papules are identified on the head/neck, trunk, extremities.   - Scattered brown macules on sun exposed areas.  - There are waxy stuck on tan to brown papules on the head/neck, trunk, extremities.  - No other lesions of concern on areas examined.     Past Medical History:   Past Medical History:   Diagnosis Date     Actinic keratosis      AK (actinic keratosis) 11/15/2012     Amaurosis fugax      Basal cell carcinoma 2009    R medial cheek/nose     Central serous retinopathy left    Eye     Diverticulosis 08/2006     DJD (degenerative joint disease)      GERD (gastroesophageal reflux disease)      Hearing loss     High frequency     History of nonmelanoma skin cancer      History of nonmelanoma skin cancer      HTN (hypertension)      Hyperlipidemia LDL goal < 130      Hyperplastic colon polyp 08/2006     IgA monoclonal gammopathy     IgA kappa     Infection due to 2019 novel coronavirus 10/13/2022     Infection due to 2019 novel coronavirus 11/2021     Lattice degeneration of retina right    Eye     Macular degeneration      Nonsenile cataract      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    [facility] consult     Rosacea      Steroid-induced diabetes mellitus, 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     Strabismus      Vitreous detachment left    Eye     Past Surgical History:   Procedure Laterality Date     ARTHROSCOPY KNEE RT/LT Left Jan 2010    Knee     BIOPSY  2009/2013/2016    Nose; Prostate; BCC - left arm     COLONOSCOPY  9/24/2019    w/Endoscopy     COLONOSCOPY WITH CO2 INSUFFLATION N/A 9/24/2019    Procedure: COLONOSCOPY, WITH CO2  INSUFFLATION;  Surgeon: Loi Levine MD;  Location: [facility]     COMBINED ESOPHAGOSCOPY, GASTROSCOPY, DUODENOSCOPY (EGD) WITH CO2 INSUFFLATION N/A 9/24/2019    Procedure: ESOPHAGOGASTRODUODENOSCOPY, WITH CO2 INSUFFLATION;  Surgeon: Loi Levine MD;  Location: [facility] OR     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     DISKECTOMY, LUMBAR, SINGLE SP  2003    L2-3     ENT SURGERY  1943    Tonsils      ENT SURGERY  2-22-12    Biopsy lesion right pinna     ENT SURGERY  2-24-12    Biopsy leson right pinna     ESOPHAGOSCOPY, GASTROSCOPY, DUODENOSCOPY (EGD), COMBINED N/A 9/24/2019    Procedure: Esophagogastroduodenoscopy, With Biopsy;  Surgeon: Loi Levine MD;  Location: [facility] OR     IR LUMBAR DRAIN PLACEMENT W FLUORO  6/27/2022     LAMINOPLASTY CERVICAL POSTERIOR THREE+ LEVELS N/A 12/12/2022    Procedure: POSTERIOR APPROACH Cervical 4-7 laminoplasty;  Surgeon: Judie Levin MD;  [facility]: [facility]     LAPAROSCOPIC ASSISTED IMPLANT SHUNT VENTRICULOPERITONEAL N/A 7/7/2022    Procedure: possible INSERTION, SHUNT, VENTRICULOPERITONEAL, LAPAROSCOPY-ASSISTED;  Surgeon: Joe Damon MD;  Location: [facility] OR     OPTICAL TRACKING SYSTEM IMPLANT SHUNT VENTRICULOPERITONEAL N/A 7/7/2022    Procedure: INSERTION, SHUNT, VENTRICULOPERITONEAL, USING OPTICAL TRACKING SYSTEM;  Surgeon: Jean-Paul Childs MD;  Location: [facility] OR     SOFT TISSUE SURGERY  May 2010    Basal Cell/right side nose       Social History:   reports that he has never smoked. He has never been exposed to tobacco smoke. He has never used smokeless tobacco. He reports that he does not drink alcohol and does not use drugs.    Family History:  Family History   Problem Relation Age of Onset     Heart Disease Mother      Alzheimer Disease Mother 73     C.A.D. Father      Coronary Artery Disease Father      Diabetes Grandchild         using a pump      Diabetes Paternal Uncle      Heart Disease Paternal Grandmother      Glaucoma No family hx of       Macular Degeneration No family hx of      Melanoma No family hx of      Skin Cancer No family hx of        Medications:  Current Outpatient Medications   Medication Sig Dispense Refill     ACE/ARB/ARNI NOT PRESCRIBED (INTENTIONAL) Please choose reason not prescribed from choices below.       acetaminophen (TYLENOL) 325 MG tablet Take 1-2 tablets (325-650 mg) by mouth every 4 hours as needed for mild pain       aspirin (ASA) 325 MG tablet Take 1 tablet (325 mg) by mouth daily       cyanocobalamin (VITAMIN B-12) 1000 MCG tablet Take 1 tablet (1,000 mcg) by mouth daily 90 tablet 1     fluticasone (FLONASE) 50 MCG/ACT nasal spray Spray 1 spray into both nostrils daily 11.1 mL 0     furosemide (LASIX) 20 MG tablet TAKE ONE-HALF TABLET BY MOUTH EVERY DAY 45 tablet 1     gabapentin (NEURONTIN) 300 MG capsule 1 capsule in the morning and 2 in the evening 270 capsule 1     guaiFENesin (MUCINEX) 600 MG 12 hr tablet Take 2 tablets (1,200 mg) by mouth 2 times daily 20 tablet 0     ipratropium (ATROVENT) 0.06 % nasal spray Spray 2 sprays into both nostrils 4 times daily 15 mL 4     ketoconazole (NIZORAL) 2 % external cream Apply twice daily for 8 weeks from knees down to both legs and feet. 120 g 3     levETIRAcetam (KEPPRA) 250 MG tablet TAKE 1 TABLET BY MOUTH TWO TIMES A DAY 60 tablet PRN     meclizine (ANTIVERT) 25 MG tablet Take 0.5-1 tablets (12.5-25 mg) by mouth 3 times daily as needed for dizziness 30 tablet 0     methocarbamol (ROBAXIN) 500 MG tablet Take 1 tablet (500 mg) by mouth every 6 hours as needed for muscle spasms       multivitamin (OCUVITE) TABS tablet Take 1 tablet by mouth daily       pantoprazole (PROTONIX) 40 MG EC tablet Take 40 mg by mouth daily       polyethylene glycol (MIRALAX) 17 GM/Dose powder Take 17 g by mouth daily as needed for constipation 510 g      predniSONE (DELTASONE) 10 MG tablet TAKE 1 TABLET BY MOUTH ONCE DAILY 30 tablet PRN     pyRIDostigmine (MESTINON) 60 MG tablet TAKE ONE AND  ONE-HALF TABLETS BY MOUTH TWICE A DAY 90 tablet PRN     senna-docusate (SENOKOT-S/PERICOLACE) 8.6-50 MG tablet Take 1 tablet by mouth 2 times daily 60 tablet 0     simvastatin (ZOCOR) 20 MG tablet Take 1 tablet (20 mg) by mouth at bedtime 90 tablet 1     tamsulosin (FLOMAX) 0.4 MG capsule Take 1 capsule (0.4 mg) by mouth daily       terbinafine (LAMISIL) 1 % external cream Apply topically 2 times daily 42 g 11     triamcinolone (KENALOG) 0.1 % external cream Apply a thin layer up to twice daily to affected areas as needed. 30 g 3     trospium (SANCTURA) 20 MG tablet Take 1 tablet (20 mg) by mouth 2 times daily (before meals) 180 tablet 3     vitamin D3 (CHOLECALCIFEROL) 2000 units tablet Take 1 tablet by mouth daily 90 tablet 1     pramipexole (MIRAPEX) 0.125 MG tablet Take 3 tablets (0.375 mg) by mouth at bedtime (Patient not taking: Reported on 3/4/2024) 90 tablet 1     pramipexole (MIRAPEX) 0.25 MG tablet Take 1 tablet (0.25 mg) by mouth 3 times daily (Patient not taking: Reported on 3/4/2024) 270 tablet 1     Allergies   Allergen Reactions     Mycophenolate Other (See Comments)     Confusion, abnormal movements         Again, thank you for allowing me to participate in the care of your patient.        Sincerely,        Joe Grimaldo MD

## 2024-03-04 NOTE — PROGRESS NOTES
[facility] Dermatology Note    Encounter Date: Mar 4, 2024    Dermatology Problem List:  1. 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 Christiansen's note 11/21/11), s/p excision 5/12/09   2. Actinic keratoses  - R tragus s/p LN2 3/13/23; s/p biopsy 9/23/22  - s/p Efudex 2015, Fall 2020 to face, Spring 2021 to forearms  - s/p PDT (x3)  - s/p LN2  3. Seborrheic dermatitis  - clobetasol 0.05% solution for scalp, triam 0.1% cream for ears  4. Relevant medical history: MGUS, myasthenia gravis currently on prednisone  5. Tinea cruris  - current tx: ketoconazole cream  6. 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 Lipoma L bicep, s/p biopsy 9/23/22    ______________________________________    Impression/Plan:  1. History of NMSC  - no evidence of recurrence on exam today    2. Actinic keratosis, diffuse and numerous   Cryotherapy procedure note: After verbal consent and discussion of risks and benefits including but no limited to dyspigmentation/scar, blister, and pain, 19 was(were) treated with 1-2mm freeze border for 2 cycles with liquid nitrogen. Post cryotherapy instructions were provided.  - start topical fluorouracil twice a day for 2 weeks after returning from [place].   - future tx: refreeze, biopsy    3. iSKs, right flank  Cryotherapy procedure note: After verbal consent and discussion of risks and benefits including but no limited to dyspigmentation/scar, blister, and pain, 2 was treated with 1-2mm freeze  border for 2 cycles with liquid nitrogen. Post cryotherapy instructions were provided.    4. Reassurance provided for benign lesions not treated today including cherry angiomata, solar lentigines, seborrheic keratoses, milia cysts, and banal-appearing melanocytic nevi.        Follow-up in 4 months.       Staff Involved:  Staff and Scribe    Scribe Disclosure:   I, GLORY STINSON, am serving as a scribe; to document services personally performed by Joe Grimaldo MD -based on data collection and the provider's statements to me.     Provider Disclosure:   The documentation recorded by the scribe accurately reflects the services I personally performed and the decisions made by me.    Joe Grimaldo MD   of Dermatology  Department of Dermatology  [facility] [employer]        CC:   Chief Complaint   Patient presents with    Skin Check     Full body skin check. Recheck spots on scalp, left superior shoulder, and right breast. Personal history of NMSC, AKs.       History of Present Illness:  Mr. Gustavo Ramon is a 83 year old male who presents as a return patient for FBSE. He is here with his wife today. He reports a few spots of concern on his scalp, left shoulder. His wife mentioned another spot of concern on top of his foot. Patient has no other areas of concerns today.         Labs:  N/A    Physical exam:  Vitals: There were no vitals taken for this visit.  GEN: This is a well developed, well-nourished male in no acute distress, in a pleasant mood.    SKIN: Montemayor phototype 2  - Total skin excluding the undergarment areas was performed. The exam included the head/face, neck, both arms, chest, back, abdomen, both legs, digits and/or nails.   - 2 erythematous macules on the right flank  - few scattered white papules on the face and torso.   - numerous erythematous scaly macules on the face with scattered erythematous macules on the trunk, arms and hands.   - There  are dome shaped bright red papules on the head/neck, trunk, extremities.   - Multiple regular brown pigmented macules and papules are identified on the head/neck, trunk, extremities.   - Scattered brown macules on sun exposed areas.  - There are waxy stuck on tan to brown papules on the head/neck, trunk, extremities.  - No other lesions of concern on areas examined.     Past Medical History:   Past Medical History:   Diagnosis Date    Actinic keratosis     AK (actinic keratosis) 11/15/2012    Amaurosis fugax     Basal cell carcinoma 2009    R medial cheek/nose    Central serous retinopathy left    Eye    Diverticulosis 08/2006    DJD (degenerative joint disease)     GERD (gastroesophageal reflux disease)     Hearing loss     High frequency    History of nonmelanoma skin cancer     History of nonmelanoma skin cancer     HTN (hypertension)     Hyperlipidemia LDL goal < 130     Hyperplastic colon polyp 08/2006    IgA monoclonal gammopathy     IgA kappa    Infection due to 2019 novel coronavirus 10/13/2022    Infection due to 2019 novel coronavirus 11/2021    Lattice degeneration of retina right    Eye    Macular degeneration     Nonsenile cataract     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    [facility] consult    Rosacea     Steroid-induced diabetes mellitus, 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    Strabismus     Vitreous detachment left    Eye     Past Surgical History:   Procedure Laterality Date    ARTHROSCOPY KNEE RT/LT Left Jan 2010    Knee    BIOPSY  2009/2013/2016    Nose; Prostate; BCC - left arm    COLONOSCOPY  9/24/2019    w/Endoscopy    COLONOSCOPY WITH CO2 INSUFFLATION N/A 9/24/2019    Procedure: COLONOSCOPY, WITH CO2 INSUFFLATION;  Surgeon: Loi Levine MD;  Location:  OR    COMBINED ESOPHAGOSCOPY, GASTROSCOPY, DUODENOSCOPY (EGD) WITH CO2 INSUFFLATION N/A 9/24/2019    Procedure: ESOPHAGOGASTRODUODENOSCOPY, WITH CO2 INSUFFLATION;  Surgeon: Loi Levine MD;  Location: [facility] OR    CRYOTHERAPY  2013     Postate cancer    DISKECTOMY, LUMBAR, SINGLE SP  2003    L2-3    ENT SURGERY  1943    Tonsils     ENT SURGERY  2-22-12    Biopsy lesion right pinna    ENT SURGERY  2-24-12    Biopsy leson right pinna    ESOPHAGOSCOPY, GASTROSCOPY, DUODENOSCOPY (EGD), COMBINED N/A 9/24/2019    Procedure: Esophagogastroduodenoscopy, With Biopsy;  Surgeon: Loi Levine MD;  Location: [facility] OR    IR LUMBAR DRAIN PLACEMENT W FLUORO  6/27/2022    LAMINOPLASTY CERVICAL POSTERIOR THREE+ LEVELS N/A 12/12/2022    Procedure: POSTERIOR APPROACH Cervical 4-7 laminoplasty;  Surgeon: Judie Levin MD;  Location: [facility] OR    LAPAROSCOPIC ASSISTED IMPLANT SHUNT VENTRICULOPERITONEAL N/A 7/7/2022    Procedure: possible INSERTION, SHUNT, VENTRICULOPERITONEAL, LAPAROSCOPY-ASSISTED;  Surgeon: Joe Damon MD;  Location: [facility] OR    OPTICAL TRACKING SYSTEM IMPLANT SHUNT VENTRICULOPERITONEAL N/A 7/7/2022    Procedure: INSERTION, SHUNT, VENTRICULOPERITONEAL, USING OPTICAL TRACKING SYSTEM;  Surgeon: Jean-Paul Childs MD;  [facility]    SOFT TISSUE SURGERY  May 2010    Basal Cell/right side nose       Social History:   reports that he has never smoked. He has never been exposed to tobacco smoke. He has never used smokeless tobacco. He reports that he does not drink alcohol and does not use drugs.    Family History:  Family History   Problem Relation Age of Onset    Heart Disease Mother     Alzheimer Disease Mother 73    C.A.D. Father     Coronary Artery Disease Father     Diabetes Grandchild         using a pump     Diabetes Paternal Uncle     Heart Disease Paternal Grandmother     Glaucoma No family hx of     Macular Degeneration No family hx of     Melanoma No family hx of     Skin Cancer No family hx of        Medications:  Current Outpatient Medications   Medication Sig Dispense Refill    ACE/ARB/ARNI NOT PRESCRIBED (INTENTIONAL) Please choose reason not prescribed from choices below.      acetaminophen (TYLENOL) 325 MG tablet  Take 1-2 tablets (325-650 mg) by mouth every 4 hours as needed for mild pain      aspirin (ASA) 325 MG tablet Take 1 tablet (325 mg) by mouth daily      cyanocobalamin (VITAMIN B-12) 1000 MCG tablet Take 1 tablet (1,000 mcg) by mouth daily 90 tablet 1    fluticasone (FLONASE) 50 MCG/ACT nasal spray Spray 1 spray into both nostrils daily 11.1 mL 0    furosemide (LASIX) 20 MG tablet TAKE ONE-HALF TABLET BY MOUTH EVERY DAY 45 tablet 1    gabapentin (NEURONTIN) 300 MG capsule 1 capsule in the morning and 2 in the evening 270 capsule 1    guaiFENesin (MUCINEX) 600 MG 12 hr tablet Take 2 tablets (1,200 mg) by mouth 2 times daily 20 tablet 0    ipratropium (ATROVENT) 0.06 % nasal spray Spray 2 sprays into both nostrils 4 times daily 15 mL 4    ketoconazole (NIZORAL) 2 % external cream Apply twice daily for 8 weeks from knees down to both legs and feet. 120 g 3    levETIRAcetam (KEPPRA) 250 MG tablet TAKE 1 TABLET BY MOUTH TWO TIMES A DAY 60 tablet PRN    meclizine (ANTIVERT) 25 MG tablet Take 0.5-1 tablets (12.5-25 mg) by mouth 3 times daily as needed for dizziness 30 tablet 0    methocarbamol (ROBAXIN) 500 MG tablet Take 1 tablet (500 mg) by mouth every 6 hours as needed for muscle spasms      multivitamin (OCUVITE) TABS tablet Take 1 tablet by mouth daily      pantoprazole (PROTONIX) 40 MG EC tablet Take 40 mg by mouth daily      polyethylene glycol (MIRALAX) 17 GM/Dose powder Take 17 g by mouth daily as needed for constipation 510 g     predniSONE (DELTASONE) 10 MG tablet TAKE 1 TABLET BY MOUTH ONCE DAILY 30 tablet PRN    pyRIDostigmine (MESTINON) 60 MG tablet TAKE ONE AND ONE-HALF TABLETS BY MOUTH TWICE A DAY 90 tablet PRN    senna-docusate (SENOKOT-S/PERICOLACE) 8.6-50 MG tablet Take 1 tablet by mouth 2 times daily 60 tablet 0    simvastatin (ZOCOR) 20 MG tablet Take 1 tablet (20 mg) by mouth at bedtime 90 tablet 1    tamsulosin (FLOMAX) 0.4 MG capsule Take 1 capsule (0.4 mg) by mouth daily      terbinafine (LAMISIL)  1 % external cream Apply topically 2 times daily 42 g 11    triamcinolone (KENALOG) 0.1 % external cream Apply a thin layer up to twice daily to affected areas as needed. 30 g 3    trospium (SANCTURA) 20 MG tablet Take 1 tablet (20 mg) by mouth 2 times daily (before meals) 180 tablet 3    vitamin D3 (CHOLECALCIFEROL) 2000 units tablet Take 1 tablet by mouth daily 90 tablet 1    pramipexole (MIRAPEX) 0.125 MG tablet Take 3 tablets (0.375 mg) by mouth at bedtime (Patient not taking: Reported on 3/4/2024) 90 tablet 1    pramipexole (MIRAPEX) 0.25 MG tablet Take 1 tablet (0.25 mg) by mouth 3 times daily (Patient not taking: Reported on 3/4/2024) 270 tablet 1     Allergies   Allergen Reactions    Mycophenolate Other (See Comments)     Confusion, abnormal movements

## 2024-03-04 NOTE — NURSING NOTE
Gustavo Ramon's goals for this visit include:   Chief Complaint   Patient presents with    Skin Check     Full body skin check. Recheck spots on scalp, left superior shoulder, and right breast. Personal history of NMSC, AKs.       He requests these members of his care team be copied on today's visit information:     PCP: Winston Silverman    Referring Provider:  Joe Grimaldo MD  [address] [place] [id number]    There were no vitals taken for this visit.    Do you need any medication refills at today's visit? Darcie Garvin CMA

## 2024-03-15 ENCOUNTER — THERAPY VISIT (OUTPATIENT)
Dept: OCCUPATIONAL THERAPY | Facility: CLINIC | Age: 84
End: 2024-03-15
Attending: PHYSICIAN ASSISTANT
Payer: MEDICARE

## 2024-03-15 DIAGNOSIS — I89.0 LYMPHEDEMA: Primary | ICD-10-CM

## 2024-03-15 PROCEDURE — 97140 MANUAL THERAPY 1/> REGIONS: CPT | Mod: GO

## 2024-03-18 ENCOUNTER — TELEPHONE (OUTPATIENT)
Dept: FAMILY MEDICINE | Facility: CLINIC | Age: 84
End: 2024-03-18
Payer: MEDICARE

## 2024-03-18 DIAGNOSIS — G25.81 RESTLESS LEG SYNDROME: ICD-10-CM

## 2024-03-18 NOTE — TELEPHONE ENCOUNTER
Patient is requesting Gabapentin 300mg for their vacation, but they say that the dosage is to increase to 1qam and 3qpm. Without a new prscription, that will cause problems next time. Could we get a new prescription?   Thank you,  Ronaldo Magdaleno, [facility]  @~~~~~~

## 2024-03-21 RX ORDER — GABAPENTIN 300 MG/1
CAPSULE ORAL
Qty: 360 CAPSULE | Refills: 3 | Status: SHIPPED | OUTPATIENT
Start: 2024-03-21 | End: 2024-06-04

## 2024-04-22 DIAGNOSIS — E78.5 HYPERLIPIDEMIA LDL GOAL <130: ICD-10-CM

## 2024-04-22 RX ORDER — SIMVASTATIN 20 MG
20 TABLET ORAL
Qty: 90 TABLET | Refills: 1 | Status: SHIPPED | OUTPATIENT
Start: 2024-04-22

## 2024-05-06 ENCOUNTER — OFFICE VISIT (OUTPATIENT)
Dept: NEUROPSYCHOLOGY | Facility: CLINIC | Age: 84
End: 2024-05-06
Attending: PSYCHIATRY & NEUROLOGY
Payer: MEDICARE

## 2024-05-06 DIAGNOSIS — G91.2 NPH (NORMAL PRESSURE HYDROCEPHALUS) (H): ICD-10-CM

## 2024-05-06 DIAGNOSIS — R41.3 SHORT-TERM MEMORY LOSS: ICD-10-CM

## 2024-05-06 DIAGNOSIS — F06.8 OTHER SPECIFIED MENTAL DISORDERS DUE TO KNOWN PHYSIOLOGICAL CONDITION: Primary | ICD-10-CM

## 2024-05-06 PROCEDURE — 96132 NRPSYC TST EVAL PHYS/QHP 1ST: CPT | Performed by: CLINICAL NEUROPSYCHOLOGIST

## 2024-05-06 PROCEDURE — 96138 PSYCL/NRPSYC TECH 1ST: CPT | Performed by: CLINICAL NEUROPSYCHOLOGIST

## 2024-05-06 PROCEDURE — 90791 PSYCH DIAGNOSTIC EVALUATION: CPT | Performed by: CLINICAL NEUROPSYCHOLOGIST

## 2024-05-06 PROCEDURE — 96139 PSYCL/NRPSYC TST TECH EA: CPT | Performed by: CLINICAL NEUROPSYCHOLOGIST

## 2024-05-06 NOTE — LETTER
"5/6/2024       RE: Gustavo Ramon  [address] [facility] [address]     Dear Colleague,    Thank you for referring your patient, Gustavo Ramon, to the [facility] NEUROPSYCHOLOGY [place] at [facility]. Please see a copy of my visit note below.    Name: Gustavo Ramon  MR#: [id number]  YOB: 1940  Date of Exam: 5/6/2024    NEUROPSYCHOLOGICAL EVALUATION?????     IDENTIFYING INFORMATION  Gustavo Ramon is an 83-year-old, right-handed, former , with 16 years of formal education. He was accompanied to the appointment by his wife, Ceci.    The patient was in-clinic of the entirety of this evaluation. The interview and testing for this evaluation were completed face-to-face.    BACKGROUND INFORMATION/INTERVIEW FINDINGS  According to records, Mr. Ramon became ill with COVID-19 in November 2021. He was hospitalized at [facility], from November 19th through December 1st 2021. By mid-February 2022 and he developed a tremor, gait and balance concerns, urinary incontinence, and cognitive symptoms. He was hospitalized for 20 days in April 2022 for pain, though there was suspicion of normal pressure hydrocephalus. He completed 2 unsuccessful lumbar punctures and was rehabilitated at [facility]. An MRI of his brain on March 31, 2022, documented \"diffuse generalized cerebral volume loss. Slightly disproportionate dilation of the lateral ventricles with narrow pericallosal angle compared to the adjacent cerebral sulci, which can be seen with normal pressure hydrocephalus.\"  No other acute findings or hemorrhage were noted.  An EEG study on April 16, 2022, was read as abnormal due to generalized slowing of the background.  No seizures or epileptiform features were noted. Mr. Ramon was admitted to the [facility] on June 27, 2022.  A lumbar drain was placed on " that same date.  He subsequently participated in physical therapy and speech-language pathology.  shunt was placed on July 7, 2022. By August 2022, Mr. Ramon reported improvement in his balance, cognition, and bladder function.     Mr. Ramon underwent 2 prior neuropsychological evaluations under the direction of Dr. Easton Persaud. The first occurred on 5/12/2022, prior to lumbar drain trial, and the second occurred on 6/30/2022, 3 days following lumbar drain trial. Please see these reports for additional background information and impressions not recapitulated here. Results from the first neuropsychological evaluation were consistent with normal pressure hydrocephalus and included dysfunction of frontal, subcortical, and temporal lobe brain networks. Primary deficits were noted in learning, memory, executive functioning, attention, working memory, semantic verbal fluency, verbal comprehension, and fine motor speed. Milder weaknesses were noted in basic visuospatial judgements. His performances were otherwise within normal limits. He did not report elevated symptoms of depression or anxiety. Importantly, results from the second neuropsychological evaluation were relatively improved compared to his evaluation on 5/12/2022. There was suspicion that the lumbar drain was effective. Nevertheless, he continued to demonstrate multifocal brain dysfunction, with most prominent weaknesses in frontal and subcortical brain networks. There was also mild evidence of dysfunction to the right parietal and left lateral temporal regions. Continued weaknesses were noted in verbal comprehension, visual problem-solving, basic visuospatial judgements, executive functioning, verbal comprehension, learning, and recall. Results were otherwise within normal limits. He did not report elevated symptoms of depression or anxiety.      CT of the head from 12/7/2022 reported that,  volume loss and presumed chronic small vessel ischemia are  stable.  Blood labs were notable for elevated hemoglobin A1C on 1/31/2024.     Mr. Ramon's other medical history includes ocular myasthenia gravis, encephalopathy, amaurosis fugax, peripheral neuropathy, myoclonus, left hip pain, weakness of both lower extremities, myelopathy, gastroesophageal reflux disease, diverticulitis of intestine, dysphagia, vitamin D deficiency, hyperlipidemia, secondary adrenocortical insufficiency, essential hypertension, degenerative joint disease, horseshoe tear of retina, basal cell carcinoma, seborrhea, actinic keratosis, posterior vitreous detachment, restless leg syndrome, tear of medial cartilage of meniscus of knee, monoclonal gammopathy of unknown significance, acute atopic conjunctivitis, malignant neoplasm of prostate, stage 3 chronic kidney disease, osteoporosis, nuclear sclerosis of both eyes, and rosacea. His current medications include aspirin, cyanocobalamin, fluticasone, furosemide, gabapentin, guaifenesin, ipratropium, ketoconazole, levetiracetam, meclizine, methocarbamol, polyethylene glycol, prednisone,  pyridostigmine, senna-docusate, simvastatin, terbinafine, triamcinolone, trospium, vitamin D3, acetaminophen, multivitamin, pantoprazole, and tamsulosin. His family medical history includes Alzheimer's disease (mother - began age 73), heart disease (mother), coronary artery disease (father), and diabetes (paternal uncle, grandchild).    In October 2023, Mr. Ramon reported stumbling on his words more often, repeating the same words during conversation, and short-term memory concerns. Mr. Ramon was referred for neuropsychological evaluation by his neurologist, Tyler Wheat MD, in this context.     Today, Mr. Ramon reported initial cognitive improvement associated with shunt placement in 2022, followed by gradual cognitive decline. He described slowed thinking speed, word-finding difficulties, and short-term memory concerns. His wife reported significant  improvement in his cognition since shunt placement, without any noticeable decline. She explained that in the time leading up to his shunt placement, he had poor memory and could not operate his cell phone. In contrast, she added that he recently wrote a 41-page booklet.    Mr. Ramon lives with his wife in their home. He is reportedly slow with showering, due to motor concerns, and his wife occasionally helps him if they have time constraints. His wife manages his medications, including prompts to take pills with meals. His wife has always managed housework, shopping, and meal preparation. He has returned to managing his finances independently. He no longer drives due to double vision.     As previously noted, Mr. Ramon's neurologic history includes normal pressure hydrocephalus and shunt placement. He reported minor ongoing incontinence, weakness, and balance concerns. He uses a walker. Additionally, he reported 4  freezing  episodes, where he can hear, but is unable to move or communicate.     Mr. Ramon gets 8-9 hours of sleep nightly. Restless leg syndrome impacts his ability to stay asleep. His medication for restless leg syndrome is reportedly helpful. He occasionally talks in his sleep and has rare apnea episodes. He described himself as tired and weak throughout the day. He rarely takes daytime naps. His appetite is lower, and he has lost weight. He reported chronic hip and neck pain.     Psychiatrically, Mr. Ramon described his mood as great. He denied significant symptoms of anxiety or depression. He denied suicidal ideations, plans, or intentions. He denied hallucinations or delusions. He denied use of alcohol, tobacco, marijuana, or other illicit substances.    Regarding psychosocial changes, Mr. Ramon and his wife recently welcomed great grandchildren. Again, please see other details about his history in my prior reports.     BEHAVIORAL OBSERVATIONS???  Mr. Ramon arrived on time. He was  well-groomed and appropriately dressed. He was alert and oriented. He wore hearing aids. Vision and speech were unremarkable. Gait was mildly unstable, and he used a walker. His mood was euthymic. He presented as a good historian. His thought process was tangential. Throughout testing, he provided good effort and passed measures of performance validity. Results from the present evaluation are likely to reflect his current cognitive functioning.??     RESULTS OF EXAM   Mr. Ramon's performances on measures of neuropsychological functioning were as follows: ?     He was oriented to time, place, and various aspects of personal information. He named 6 out of the 6 most recent [place] presidents. Visual acuity was 20/20 without glasses. Auditory attention for digits was high average. Mental arithmetic was average. Sustained attention and tracking was average. Learning of words in a list format was low average across trials (4, 6, 7). Recall of list words was average following a delay. Delayed recognition of list words was average, including 2 false positive errors. Learning of simple geometric figures and their spatial locations was average across trials (5, 3, 9). Delayed recall for the figures and their locations was below average. Delayed recognition on this task was completed without error. His copy of a complex geometric figure was average. Visuospatial judgments for variably oriented lines were exceptionally high. Comprehension of spoken sentences and paragraphs was average. Verbal abstract reasoning was exceptionally high. Verbal associative fluency was high average, including 4 repetition errors. Semantic fluency was average. Naming to confrontation was above average. Visual abstract reasoning was high average. Speeded visual sequencing under focused attention was exceptionally low. A similar measure with a divided attention component was exceptionally low including 1 set-loss error. Speeded psychomotor coding was  average. Fine motor speed and dexterity was low average for his dominant right hand and non-dominant left hand.   ?   He endorsed items consistent with minimal symptoms of depression and minimal symptoms of anxiety on self-report questionnaires.?     IMPRESSIONS??  Results from today's evaluation were indicative of global improvement when compared to his prior neuropsychological evaluations in 2022. Results were not indicative of mild cognitive impairment or dementia at this time. He continues to demonstrate relative weakness on measures of processing speed, as well as variability in learning and memory. These findings suggest mild chronic compromise of subcortical circuitry and may represent residual impact from normal pressure hydrocephalus. His performances were otherwise within normal limits across cognitive domains. His cognitive baseline falls in the well above average range. Psychiatrically, he described his mood as great and endorsed minimal symptoms of anxiety and depression.    RECOMMEDATIONS  Mr. Ramon is encouraged to follow-up with Dr. Wheat about the results of today's evaluation.   Mr. Ramon described freezing episodes wherein he cannot move or communicate. He is encouraged to continue consulting with Dr. Wheat about these episodes including providing updates about frequency and severity.   Mr. Ramon described weakness and unsteady gait. He is encouraged to continue using his walker. He may also benefit from adding stability aids around the home, especially in the riskiest places. This may include installing grab bars to the bathroom, adding a non-slip mat to the shower, or tacking down the edges of rugs.   Considering cognitive inefficiencies, additional strategies which may provide further benefit include:    Compartmentalize problem solving. Breaking larger tasks down into smaller, more manageable parts and focusing on one task at a time until it is complete. Written checklists can  be helpful to track progress. Avoiding multitasking when possible.     Use external aids to increase structure in daily life. Ongoing use of compensatory strategies such as notes, lists, and a centralized calendar are supported. Tools such as smart phones with alarms and reminders are helpful in bringing structure to daily activities. Consider pairing tasks (like taking medications with meals) or create a designated area for frequently lost items.    Allow for time. Take the time needed to learn and recall information. Some people tend to worry if they can't immediately recall something. Instead, you should take time to express a thought or complete a task rather than be critical or harsh on yourself.   The results of the evaluation now constitute an update of the patient's cognitive and emotional functioning. If further cognitive difficulties are observed in the future, a referral for a neuropsychological re-evaluation at that time might be considered.??     The results and recommendations were discussed with Mr. Ramon and his wife over the telephone on 05/08/2024 and all of his questions were answered.     All services provided by the Postdoctoral Fellow were supervised by this licensed psychologist and all billing noted here is for professional services provided by the psychologist and trained psychometrist.??????   ??????   Ana Maria Moreno, Ph.D.??????   Postdoctoral Neuropsychology Fellow??????   ??????   Easton Persaud, Ph.D., L.P., [facility]??????   Board Certified in Clinical Neuropsychology??????    / Licensed Psychologist [id number]??????   ????   Time spent: One unit psychiatric diagnostic interview including interview, clinical assessment of thinking, reasoning, and judgment by licensed and board-certified neuropsychologist (CPT 66858). One unit (80 minutes) neuropsychological testing evaluation by licensed and board-certified neuropsychologist, including integration of patient data,  interpretation of standardized test results and clinical data, clinical decision-making, treatment planning, supervision, report, and interactive feedback to the patient, first hour (CPT 51972). ?One unit (30 minutes) of psychological and neuropsychological test administration and scoring by technician, first 30 minutes (CPT 13535). Five units (163 minutes) psychological or neuropsychological test administration and scoring by technician, subsequent 30 minutes (CPT 38094). Diagnoses: G91.2, R41.3, F06.8.

## 2024-05-07 NOTE — NURSING NOTE
The patient was seen for neuropsychological evaluation at the request of Dr. Tyler Wheat, for the purposes of diagnostic clarification and treatment planning. 193 minutes of test administration and scoring were provided by this writer, Mike Garcia. Please see Dr. Easton Persaud's report for a full interpretation of the findings.

## 2024-05-08 NOTE — PROGRESS NOTES
"Name: Gustavo Ramon  MR#: [id number]  YOB: 1940  Date of Exam: 5/6/2024    NEUROPSYCHOLOGICAL EVALUATION?????     IDENTIFYING INFORMATION  Gustavo Ramon is an 83-year-old, right-handed, former , with 16 years of formal education. He was accompanied to the appointment by his wife, Ceci.    The patient was in-clinic of the entirety of this evaluation. The interview and testing for this evaluation were completed face-to-face.    BACKGROUND INFORMATION/INTERVIEW FINDINGS  According to records, Mr. Ramon became ill with COVID-19 in November 2021. He was hospitalized at [facility], from November 19th through December 1st 2021. By mid-February 2022 and he developed a tremor, gait and balance concerns, urinary incontinence, and cognitive symptoms. He was hospitalized for 20 days in April 2022 for pain, though there was suspicion of normal pressure hydrocephalus. He completed 2 unsuccessful lumbar punctures and was rehabilitated at [facility]. An MRI of his brain on March 31, 2022, documented \"diffuse generalized cerebral volume loss. Slightly disproportionate dilation of the lateral ventricles with narrow pericallosal angle compared to the adjacent cerebral sulci, which can be seen with normal pressure hydrocephalus.\"  No other acute findings or hemorrhage were noted.  An EEG study on April 16, 2022, was read as abnormal due to generalized slowing of the background.  No seizures or epileptiform features were noted. Mr. Ramon was admitted to the [facility] on June 27, 2022.  A lumbar drain was placed on that same date.  He subsequently participated in physical therapy and speech-language pathology.  shunt was placed on July 7, 2022. By August 2022, Mr. Ramon reported improvement in his balance, cognition, and bladder function.     Mr. Ramon underwent 2 prior neuropsychological evaluations under the direction of Dr. Mccormack " Cori. The first occurred on 5/12/2022, prior to lumbar drain trial, and the second occurred on 6/30/2022, 3 days following lumbar drain trial. Please see these reports for additional background information and impressions not recapitulated here. Results from the first neuropsychological evaluation were consistent with normal pressure hydrocephalus and included dysfunction of frontal, subcortical, and temporal lobe brain networks. Primary deficits were noted in learning, memory, executive functioning, attention, working memory, semantic verbal fluency, verbal comprehension, and fine motor speed. Milder weaknesses were noted in basic visuospatial judgements. His performances were otherwise within normal limits. He did not report elevated symptoms of depression or anxiety. Importantly, results from the second neuropsychological evaluation were relatively improved compared to his evaluation on 5/12/2022. There was suspicion that the lumbar drain was effective. Nevertheless, he continued to demonstrate multifocal brain dysfunction, with most prominent weaknesses in frontal and subcortical brain networks. There was also mild evidence of dysfunction to the right parietal and left lateral temporal regions. Continued weaknesses were noted in verbal comprehension, visual problem-solving, basic visuospatial judgements, executive functioning, verbal comprehension, learning, and recall. Results were otherwise within normal limits. He did not report elevated symptoms of depression or anxiety.      CT of the head from 12/7/2022 reported that,  volume loss and presumed chronic small vessel ischemia are stable.  Blood labs were notable for elevated hemoglobin A1C on 1/31/2024.     Mr. Ramon's other medical history includes ocular myasthenia gravis, encephalopathy, amaurosis fugax, peripheral neuropathy, myoclonus, left hip pain, weakness of both lower extremities, myelopathy, gastroesophageal reflux disease, diverticulitis of  intestine, dysphagia, vitamin D deficiency, hyperlipidemia, secondary adrenocortical insufficiency, essential hypertension, degenerative joint disease, horseshoe tear of retina, basal cell carcinoma, seborrhea, actinic keratosis, posterior vitreous detachment, restless leg syndrome, tear of medial cartilage of meniscus of knee, monoclonal gammopathy of unknown significance, acute atopic conjunctivitis, malignant neoplasm of prostate, stage 3 chronic kidney disease, osteoporosis, nuclear sclerosis of both eyes, and rosacea. His current medications include aspirin, cyanocobalamin, fluticasone, furosemide, gabapentin, guaifenesin, ipratropium, ketoconazole, levetiracetam, meclizine, methocarbamol, polyethylene glycol, prednisone,  pyridostigmine, senna-docusate, simvastatin, terbinafine, triamcinolone, trospium, vitamin D3, acetaminophen, multivitamin, pantoprazole, and tamsulosin. His family medical history includes Alzheimer's disease (mother - began age 73), heart disease (mother), coronary artery disease (father), and diabetes (paternal uncle, grandchild).    In October 2023, Mr. Ramon reported stumbling on his words more often, repeating the same words during conversation, and short-term memory concerns. Mr. Ramon was referred for neuropsychological evaluation by his neurologist, Tyler Wheat MD, in this context.     Today, Mr. Ramon reported initial cognitive improvement associated with shunt placement in 2022, followed by gradual cognitive decline. He described slowed thinking speed, word-finding difficulties, and short-term memory concerns. His wife reported significant improvement in his cognition since shunt placement, without any noticeable decline. She explained that in the time leading up to his shunt placement, he had poor memory and could not operate his cell phone. In contrast, she added that he recently wrote a 41-page booklet.    Mr. Ramon lives with his wife in their home. He is  reportedly slow with showering, due to motor concerns, and his wife occasionally helps him if they have time constraints. His wife manages his medications, including prompts to take pills with meals. His wife has always managed housework, shopping, and meal preparation. He has returned to managing his finances independently. He no longer drives due to double vision.     As previously noted, Mr. Ramon's neurologic history includes normal pressure hydrocephalus and shunt placement. He reported minor ongoing incontinence, weakness, and balance concerns. He uses a walker. Additionally, he reported 4  freezing  episodes, where he can hear, but is unable to move or communicate.     Mr. Ramon gets 8-9 hours of sleep nightly. Restless leg syndrome impacts his ability to stay asleep. His medication for restless leg syndrome is reportedly helpful. He occasionally talks in his sleep and has rare apnea episodes. He described himself as tired and weak throughout the day. He rarely takes daytime naps. His appetite is lower, and he has lost weight. He reported chronic hip and neck pain.     Psychiatrically, Mr. Ramon described his mood as great. He denied significant symptoms of anxiety or depression. He denied suicidal ideations, plans, or intentions. He denied hallucinations or delusions. He denied use of alcohol, tobacco, marijuana, or other illicit substances.    Regarding psychosocial changes, Mr. Ramon and his wife recently welcomed great grandchildren. Again, please see other details about his history in my prior reports.     BEHAVIORAL OBSERVATIONS???  Mr. Ramon arrived on time. He was well-groomed and appropriately dressed. He was alert and oriented. He wore hearing aids. Vision and speech were unremarkable. Gait was mildly unstable, and he used a walker. His mood was euthymic. He presented as a good historian. His thought process was tangential. Throughout testing, he provided good effort and passed measures of  performance validity. Results from the present evaluation are likely to reflect his current cognitive functioning.??     RESULTS OF EXAM   Mr. Ramon's performances on measures of neuropsychological functioning were as follows: ?     He was oriented to time, place, and various aspects of personal information. He named 6 out of the 6 most recent [place] presidents. Visual acuity was 20/20 without glasses. Auditory attention for digits was high average. Mental arithmetic was average. Sustained attention and tracking was average. Learning of words in a list format was low average across trials (4, 6, 7). Recall of list words was average following a delay. Delayed recognition of list words was average, including 2 false positive errors. Learning of simple geometric figures and their spatial locations was average across trials (5, 3, 9). Delayed recall for the figures and their locations was below average. Delayed recognition on this task was completed without error. His copy of a complex geometric figure was average. Visuospatial judgments for variably oriented lines were exceptionally high. Comprehension of spoken sentences and paragraphs was average. Verbal abstract reasoning was exceptionally high. Verbal associative fluency was high average, including 4 repetition errors. Semantic fluency was average. Naming to confrontation was above average. Visual abstract reasoning was high average. Speeded visual sequencing under focused attention was exceptionally low. A similar measure with a divided attention component was exceptionally low including 1 set-loss error. Speeded psychomotor coding was average. Fine motor speed and dexterity was low average for his dominant right hand and non-dominant left hand.   ?   He endorsed items consistent with minimal symptoms of depression and minimal symptoms of anxiety on self-report questionnaires.?     IMPRESSIONS??  Results from today's evaluation were indicative of global  improvement when compared to his prior neuropsychological evaluations in 2022. Results were not indicative of mild cognitive impairment or dementia at this time. He continues to demonstrate relative weakness on measures of processing speed, as well as variability in learning and memory. These findings suggest mild chronic compromise of subcortical circuitry and may represent residual impact from normal pressure hydrocephalus. His performances were otherwise within normal limits across cognitive domains. His cognitive baseline falls in the well above average range. Psychiatrically, he described his mood as great and endorsed minimal symptoms of anxiety and depression.    RECOMMEDATIONS  Mr. Ramon is encouraged to follow-up with Dr. Wheat about the results of today's evaluation.   Mr. Ramon described freezing episodes wherein he cannot move or communicate. He is encouraged to continue consulting with Dr. Wheat about these episodes including providing updates about frequency and severity.   Mr. Ramon described weakness and unsteady gait. He is encouraged to continue using his walker. He may also benefit from adding stability aids around the home, especially in the riskiest places. This may include installing grab bars to the bathroom, adding a non-slip mat to the shower, or tacking down the edges of rugs.   Considering cognitive inefficiencies, additional strategies which may provide further benefit include:    Compartmentalize problem solving. Breaking larger tasks down into smaller, more manageable parts and focusing on one task at a time until it is complete. Written checklists can be helpful to track progress. Avoiding multitasking when possible.     Use external aids to increase structure in daily life. Ongoing use of compensatory strategies such as notes, lists, and a centralized calendar are supported. Tools such as smart phones with alarms and reminders are helpful in bringing structure to daily  activities. Consider pairing tasks (like taking medications with meals) or create a designated area for frequently lost items.    Allow for time. Take the time needed to learn and recall information. Some people tend to worry if they can't immediately recall something. Instead, you should take time to express a thought or complete a task rather than be critical or harsh on yourself.   The results of the evaluation now constitute an update of the patient's cognitive and emotional functioning. If further cognitive difficulties are observed in the future, a referral for a neuropsychological re-evaluation at that time might be considered.??     The results and recommendations were discussed with Mr. Ramon and his wife over the telephone on 05/08/2024 and all of his questions were answered.     All services provided by the Postdoctoral Fellow were supervised by this licensed psychologist and all billing noted here is for professional services provided by the psychologist and trained psychometrist.??????   ??????   Ana Maria Moreno, Ph.D.??????   Postdoctoral Neuropsychology Fellow??????   ??????   Easton Persaud, Ph.D., L.P., [facility]??????   Board Certified in Clinical Neuropsychology??????    / Licensed Psychologist [id number]??????   ????   Time spent: One unit psychiatric diagnostic interview including interview, clinical assessment of thinking, reasoning, and judgment by licensed and board-certified neuropsychologist (CPT 22038). One unit (80 minutes) neuropsychological testing evaluation by licensed and board-certified neuropsychologist, including integration of patient data, interpretation of standardized test results and clinical data, clinical decision-making, treatment planning, supervision, report, and interactive feedback to the patient, first hour (CPT 09610). ?One unit (30 minutes) of psychological and neuropsychological test administration and scoring by technician, first 30 minutes (CPT 21166).  Five units (163 minutes) psychological or neuropsychological test administration and scoring by technician, subsequent 30 minutes (CPT 63638). Diagnoses: G91.2, R41.3, F06.8.

## 2024-05-08 NOTE — PROGRESS NOTES
NAME  Gustavo Ramon    MRN  [id number]      [birth year]     AGE  83     SEX  Male     HANDEDNESS Right     EDUCATION 16     CHUA  24     PROVIDER  EW     TECH  KB     STATION  OP            ORIENTATION      Time  0     Personal Info.     Place      Presidents             WAIS-IV       FSIQ:  WMI: 108    VCI:  PSI:     VIANCA:  RDS: 10             Raw ScS    Similarities  30 16    Matrix Reasoning 11 12    Digit Span  25 12    Arithmetic  12 11    Coding  30 9           JOAQUÍN-O COMPLEX FIGURE       Raw T %ile   Copy  35  >16   Time to Copy 528  2-5          COWAT FAS       Raw 47      ScS 12      T 57             ANIMAL FLUENCY      Raw 15      ScS 8      T 46              BOSTON NAMING TEST     Raw 57 /60     ScS 14      %ile 90-94             COMPLEX IDEATIONAL MATERIAL     Raw 12      ScS 12      T 56             TRAIL MAKING TEST       Time Errors ScS %ile   A 162 0 2 <1   B 283 1 3 1           ALLIE   H   Raw 30      %ile 99             GROOVED PEGBOARD       Raw Drops ScS T    0 2 37    0 3 41          GDS       Raw 4      Interp. Minimal             GAS        Raw      Somatic 3      Cognitive 0      Affective 0      Total 3              HVLT   5     Raw T    Trial 1  4     Trial 2  6     Trial 3  7     Learning  3     Total Recall 17 35    Delayed Recall 6 44    Percent Retention 86% 52    True Positives 11     False Positives 2     Disc. Index  9 45            BVMT   1     Raw T/%ile    Trial 1  5     Trial 2  3     Trial 3  9     Learning  4     Total Recall 17 0.4    Delayed Recall 2 -1.61    Percent Retention 22% N/A    Recognition Hits 6     Recognition F.P 0     Disc. Index  6 0.88           TSAT        Raw z     Time 84 0.32     Errors 0 0.92            DCT       E-Score 7

## 2024-05-21 ENCOUNTER — TELEPHONE (OUTPATIENT)
Dept: NEUROSURGERY | Facility: CLINIC | Age: 84
End: 2024-05-21
Payer: MEDICARE

## 2024-05-21 DIAGNOSIS — Z98.2 VENTRICULO-PERITONEAL SHUNT STATUS: Primary | ICD-10-CM

## 2024-05-21 NOTE — TELEPHONE ENCOUNTER
Attn: Judie Sykes RNCC   *Spouse report patient tired,present for 6 months, per spouse. Has shunt.     Nichol Quintanilla LPN  Neurosurgery

## 2024-05-21 NOTE — TELEPHONE ENCOUNTER
CT Head without contrast and Xray Shunt Malfunction study scheduled near home [facility] [place]  Appointment 6/4/24 ! 1pm with Antionette Hunt NP with Imaging completed prior.    Note sent to scheduling  Wife voices understanding

## 2024-05-21 NOTE — TELEPHONE ENCOUNTER
[facility] Call Center    Phone Message    May a detailed message be left on voicemail: yes     Reason for Call: Symptoms or Concerns     If patient has red-flag symptoms, warm transfer to triage line    Current symptom or concern:  Pt spouse Ceci is calling and stating that it is has been 2 years since he had his shunt put in and Pt is very tired all the time. Please call Pt spouse back to discuss.    Symptoms have been present for:  6 month(s)    Has patient previously been seen for this? Yes    By : Antionette Hunt        Are there any new or worsening symptoms? Yes: Pt spouse is stating that when he goes for a walk he is getting tired more often Pt feels tired even when he wakes up    Action Taken: Message routed to:  [facility] Neurosurgery    Travel Screening: Not Applicable

## 2024-05-21 NOTE — TELEPHONE ENCOUNTER
"Patient wife states patient is very tired the last few months, fatigue with walking just a small distance, and sleeping more often.  Patient complains of bilateral leg weakness and walking has been a \"chore\".   Wife states his cognitive ability has slowed some.   Shunt placement 7/7/22  [facility] Certa at 4.0  LOV 6/6/23 Antionette Hunt NP  Patient was improving at this OV.    Wife asking for shunt evaluation.  CT Head without contrast and   Xray Shunt Malfunction orders entered with OV with Antionette Hunt NP        "

## 2024-05-22 NOTE — TELEPHONE ENCOUNTER
[facility] Call Center    Phone Message    May a detailed message be left on voicemail: yes     Reason for Call: Other: Wife calling to speak with Judie regarding the June 4th appt.  Please try her back.      Action Taken: Message routed to:  [facility]    Travel Screening: Not Applicable

## 2024-05-29 NOTE — LETTER
"    5/31/2022        RE: Gustavo Ramon  [address] [facility] [address]        [facility] GERIATRICS    Chief Complaint   Patient presents with     RECHECK     HPI:  Gustavo Ramon is a 81 year old  (1940), who is being seen today for an episodic care visit at: [facility] ([facility] [[id number]]. Today's concern is:   1. Dementia without behavioral disturbance, unspecified dementia type (H)    2. Encephalopathy    3. Other mucopurulent conjunctivitis of both eyes      Patient seen for follow up, moderate cognitive limitations with SLUMs 11/30, pleasant, I&O adequate, appears safety aware, transfers with assist, intermittent hallucinations no distress, newer dry skin on back, bilateral eye sclera redness with purulent discharge, has been rubbing eyes.    Allergies, and PMH/PSH reviewed in EPIC today.  REVIEW OF SYSTEMS:  4 point ROS including Respiratory, CV, GI and , other than that noted in the HPI,  is negative    Objective:   Pulse 54   Temp 97.5  F (36.4  C)   Resp 16   Ht 1.727 m (5' 8\")   Wt 78.9 kg (173 lb 14.4 oz)   SpO2 95%   BMI 26.44 kg/m    GENERAL APPEARANCE:  in no distress, appears healthy  ENT:  Mouth and posterior oropharynx normal, moist mucous membranes  RESP:  lungs clear to auscultation   CV:  no edema  ABDOMEN:  bowel sounds normal  M/S:   Gait and station abnormal unsteady gait  SKIN:  Inspection of skin and subcutaneous tissue baseline  NEURO:   Examination of sensation by touch normal  PSYCH:  oriented X 3    [facility] done in SNF [facility]. Please refer to them using [employer]Care Everywhere.    Assessment/Plan:  (F03.90) Dementia without behavioral disturbance, unspecified dementia type (H)  (primary encounter diagnosis)  (G93.40) Encephalopathy  Comment: moderate cognitive limitations, pleasant  Plan:   -follow up , neuropsych  -to have lumbar drain trial install in June  -staff to support as indicated  -probable discharge " back to home soon    (H10.023) Other mucopurulent conjunctivitis of both eyes  Comment: bilateral eye sclera redness with discharge  Plan: trimethoprim-polymyxin QID x7 days  -if eyes still itchy will trial ketotifen          Electronically signed by: CORINA Dumont CNP           Sincerely,        CORINA Dumont CNP     Detail Level: Zone General Sunscreen Counseling: I recommended a broad spectrum sunscreen with a SPF of 30 or higher.  I explained that SPF 30 sunscreens block approximately 97 percent of the sun's harmful rays.  Sunscreens should be applied at least 15 minutes prior to expected sun exposure and then every 2 hours after that as long as sun exposure continues. If swimming or exercising sunscreen should be reapplied every 45 minutes to an hour after getting wet or sweating.  One ounce, or the equivalent of a shot glass full of sunscreen, is adequate to protect the skin not covered by a bathing suit. I also recommended a lip balm with a sunscreen as well. Sun protective clothing can be used in lieu of sunscreen but must be worn the entire time you are exposed to the sun's rays.

## 2024-05-31 ENCOUNTER — ANCILLARY PROCEDURE (OUTPATIENT)
Dept: GENERAL RADIOLOGY | Facility: CLINIC | Age: 84
End: 2024-05-31
Attending: NEUROLOGICAL SURGERY
Payer: MEDICARE

## 2024-05-31 ENCOUNTER — ANCILLARY PROCEDURE (OUTPATIENT)
Dept: CT IMAGING | Facility: CLINIC | Age: 84
End: 2024-05-31
Attending: NEUROLOGICAL SURGERY
Payer: MEDICARE

## 2024-05-31 DIAGNOSIS — Z98.2 VENTRICULO-PERITONEAL SHUNT STATUS: ICD-10-CM

## 2024-05-31 PROCEDURE — 70450 CT HEAD/BRAIN W/O DYE: CPT | Mod: MG | Performed by: STUDENT IN AN ORGANIZED HEALTH CARE EDUCATION/TRAINING PROGRAM

## 2024-05-31 PROCEDURE — 74018 RADEX ABDOMEN 1 VIEW: CPT | Mod: GC | Performed by: RADIOLOGY

## 2024-05-31 PROCEDURE — 71045 X-RAY EXAM CHEST 1 VIEW: CPT | Mod: GC | Performed by: RADIOLOGY

## 2024-05-31 PROCEDURE — 70250 X-RAY EXAM OF SKULL: CPT | Mod: GC | Performed by: RADIOLOGY

## 2024-05-31 PROCEDURE — G1010 CDSM STANSON: HCPCS | Mod: GC | Performed by: STUDENT IN AN ORGANIZED HEALTH CARE EDUCATION/TRAINING PROGRAM

## 2024-06-04 ENCOUNTER — OFFICE VISIT (OUTPATIENT)
Dept: NEUROSURGERY | Facility: CLINIC | Age: 84
End: 2024-06-04
Payer: MEDICARE

## 2024-06-04 ENCOUNTER — OFFICE VISIT (OUTPATIENT)
Dept: FAMILY MEDICINE | Facility: CLINIC | Age: 84
End: 2024-06-04
Payer: MEDICARE

## 2024-06-04 VITALS
WEIGHT: 175.8 LBS | SYSTOLIC BLOOD PRESSURE: 104 MMHG | BODY MASS INDEX: 26.64 KG/M2 | OXYGEN SATURATION: 96 % | DIASTOLIC BLOOD PRESSURE: 56 MMHG | TEMPERATURE: 98 F | RESPIRATION RATE: 20 BRPM | HEART RATE: 93 BPM | HEIGHT: 68 IN

## 2024-06-04 VITALS
HEART RATE: 95 BPM | DIASTOLIC BLOOD PRESSURE: 80 MMHG | WEIGHT: 175 LBS | RESPIRATION RATE: 16 BRPM | BODY MASS INDEX: 26.52 KG/M2 | SYSTOLIC BLOOD PRESSURE: 130 MMHG | OXYGEN SATURATION: 96 % | HEIGHT: 68 IN

## 2024-06-04 DIAGNOSIS — R06.6 HICCUPS: ICD-10-CM

## 2024-06-04 DIAGNOSIS — Z87.19 HISTORY OF BARRETT'S ESOPHAGUS: Primary | ICD-10-CM

## 2024-06-04 DIAGNOSIS — R63.4 WEIGHT LOSS: ICD-10-CM

## 2024-06-04 DIAGNOSIS — R63.0 DECREASED APPETITE: ICD-10-CM

## 2024-06-04 DIAGNOSIS — G91.2 NPH (NORMAL PRESSURE HYDROCEPHALUS) (H): Primary | ICD-10-CM

## 2024-06-04 DIAGNOSIS — E05.90 HYPERTHYROIDISM: ICD-10-CM

## 2024-06-04 DIAGNOSIS — R53.83 FATIGUE, UNSPECIFIED TYPE: ICD-10-CM

## 2024-06-04 DIAGNOSIS — G25.81 RESTLESS LEG SYNDROME: ICD-10-CM

## 2024-06-04 LAB
BASOPHILS # BLD AUTO: 0 10E3/UL (ref 0–0.2)
BASOPHILS NFR BLD AUTO: 0 %
EOSINOPHIL # BLD AUTO: 0 10E3/UL (ref 0–0.7)
EOSINOPHIL NFR BLD AUTO: 0 %
ERYTHROCYTE [DISTWIDTH] IN BLOOD BY AUTOMATED COUNT: 13.2 % (ref 10–15)
HCT VFR BLD AUTO: 44.3 % (ref 40–53)
HGB BLD-MCNC: 14.6 G/DL (ref 13.3–17.7)
IMM GRANULOCYTES # BLD: 0 10E3/UL
IMM GRANULOCYTES NFR BLD: 0 %
LYMPHOCYTES # BLD AUTO: 1.4 10E3/UL (ref 0.8–5.3)
LYMPHOCYTES NFR BLD AUTO: 11 %
MCH RBC QN AUTO: 29.6 PG (ref 26.5–33)
MCHC RBC AUTO-ENTMCNC: 33 G/DL (ref 31.5–36.5)
MCV RBC AUTO: 90 FL (ref 78–100)
MONOCYTES # BLD AUTO: 1.3 10E3/UL (ref 0–1.3)
MONOCYTES NFR BLD AUTO: 11 %
NEUTROPHILS # BLD AUTO: 9.3 10E3/UL (ref 1.6–8.3)
NEUTROPHILS NFR BLD AUTO: 78 %
PLATELET # BLD AUTO: 184 10E3/UL (ref 150–450)
RBC # BLD AUTO: 4.93 10E6/UL (ref 4.4–5.9)
WBC # BLD AUTO: 12 10E3/UL (ref 4–11)

## 2024-06-04 PROCEDURE — 84439 ASSAY OF FREE THYROXINE: CPT | Performed by: PHYSICIAN ASSISTANT

## 2024-06-04 PROCEDURE — 84443 ASSAY THYROID STIM HORMONE: CPT | Performed by: PHYSICIAN ASSISTANT

## 2024-06-04 PROCEDURE — 36415 COLL VENOUS BLD VENIPUNCTURE: CPT | Performed by: PHYSICIAN ASSISTANT

## 2024-06-04 PROCEDURE — 85025 COMPLETE CBC W/AUTO DIFF WBC: CPT | Performed by: PHYSICIAN ASSISTANT

## 2024-06-04 PROCEDURE — 83690 ASSAY OF LIPASE: CPT | Performed by: PHYSICIAN ASSISTANT

## 2024-06-04 PROCEDURE — 80053 COMPREHEN METABOLIC PANEL: CPT | Performed by: PHYSICIAN ASSISTANT

## 2024-06-04 PROCEDURE — 99214 OFFICE O/P EST MOD 30 MIN: CPT | Performed by: PHYSICIAN ASSISTANT

## 2024-06-04 PROCEDURE — 99213 OFFICE O/P EST LOW 20 MIN: CPT | Performed by: NURSE PRACTITIONER

## 2024-06-04 RX ORDER — PANTOPRAZOLE SODIUM 40 MG/1
40 TABLET, DELAYED RELEASE ORAL DAILY
Qty: 90 TABLET | Refills: 3 | Status: SHIPPED | OUTPATIENT
Start: 2024-06-04 | End: 2024-09-10

## 2024-06-04 RX ORDER — GABAPENTIN 300 MG/1
CAPSULE ORAL
Qty: 360 CAPSULE | Refills: 3 | Status: SHIPPED | OUTPATIENT
Start: 2024-06-04

## 2024-06-04 RX ORDER — PRAMIPEXOLE DIHYDROCHLORIDE 0.25 MG/1
TABLET ORAL
COMMUNITY
Start: 2024-06-04 | End: 2024-07-31

## 2024-06-04 ASSESSMENT — PAIN SCALES - GENERAL
PAINLEVEL: NO PAIN (0)
PAINLEVEL: NO PAIN (0)

## 2024-06-04 NOTE — PROGRESS NOTES
"Wally Mitchell is a 83 year old, presenting for the following health issues:  Hiccups (5-6 times a day), Musculoskeletal Problem (Bliateral leg stiffness/Right arm stiffness/h/o restless leg syndrome), Tremors (Bilateral arm tremors staying the same/Recheck?), Gastric Problem (Always feels full, loss of appetite, losing weight/Several months/Bloating/Fatigue and weakness), and Neck Pain (H/o stenosis, previous surgery/Recheck?)        6/4/2024    10:50 AM   Additional Questions   Roomed by Darcy Veloz CMA   Accompanied by Ceci - wife     History of Present Illness       Reason for visit:  Loss of appetite & weakness    He eats 0-1 servings of fruits and vegetables daily.He consumes 0 sweetened beverage(s) daily.He exercises with enough effort to increase his heart rate 9 or less minutes per day.  He exercises with enough effort to increase his heart rate 3 or less days per week.   He is taking medications regularly.     Transient hiccups. No abd pain. No dysphagia. Occasional hoarseness.   Coughing up stuff and spitting out.   Patient notes rhinitis with pnd.   Some fatigue.  No n/v/d/c.   No chest pain/sob.  Dec appetite. No abd pain.     Reviewed recent echo.     Review of Systems  Constitutional, HEENT, cardiovascular, pulmonary, GI, , musculoskeletal, neuro, skin, endocrine and psych systems are negative, except as otherwise noted.      Objective    /56   Pulse 93   Temp 98  F (36.7  C) (Oral)   Resp 20   Ht 1.721 m (5' 7.75\")   Wt 79.7 kg (175 lb 12.8 oz)   SpO2 96%   BMI 26.93 kg/m    Body mass index is 26.93 kg/m .  Physical Exam   Eye exam - right eye normal lid, conjunctiva, cornea, pupil and fundus, left eye normal lid, conjunctiva, cornea, pupil and fundus.  Thyroid not palpable, not enlarged, no nodules detected.  CHEST:chest clear to IPPA, no tachypnea, retractions or cyanosis, and S1, S2 normal, no murmur, no gallop, rate regular.  The abdomen is soft without tenderness, " guarding, mass, rebound or organomegaly. Bowel sounds are normal. No CVA tenderness or inguinal adenopathy noted.  No edema.  Stephen was seen today for hiccups, musculoskeletal problem, tremors, gastric problem and neck pain.    Diagnoses and all orders for this visit:    History of Stewart's esophagus  -     Adult GI  Referral - Procedure Only; Future  -     pantoprazole (PROTONIX) 40 MG EC tablet; Take 1 tablet (40 mg) by mouth daily    Hiccups    Decreased appetite    Weight loss  -     Comprehensive metabolic panel (BMP + Alb, Alk Phos, ALT, AST, Total. Bili, TP); Future  -     Lipase; Future  -     CT Chest Abdomen Pelvis w/o Contrast; Future  -     TSH with free T4 reflex; Future  -     CBC with platelets and differential; Future    Restless leg syndrome  -     gabapentin (NEURONTIN) 300 MG capsule; 1 capsule in the morning, 1 mid day, and 3 in the evening    Other orders  -     REVIEW OF HEALTH MAINTENANCE PROTOCOL ORDERS      work on lifestyle modification      Signed Electronically by: Winston Silverman PA-C

## 2024-06-04 NOTE — PROGRESS NOTES
"[facility]  Department of Neurosurgery      Name: Gustavo Ramon  MRN: [id number]  Age: 83 year old  : 1940  Referring provider: Winston Silverman  2024      Chief Complaint:   NPH  S/p  shunt placement on 2022 (Codman Certas at 4.0)  Follow-up     History of Present Illness:   Gustavo Ramon is a 83 year old male with a history of NPH, s/p  shunt placement on 2022 who is seen today for  a follow up.     Most recently seen in clinic on 2023. He was seen for shunt check after an MRI. He reported improvement in his walking (more steady), cognition and bladder function since  shunt placement.     Patient recently contacted the clinic with worsening fatigue, bilateral leg weakness and today's appointment was arranged. Today, patient presents for the follow-up with his wife. For the past 6 months or so, he has been feeling more \"tired\" than usual. He also reports weakness in his legs and left hip pain. He had a steroid injection a few day ago which helped with hip pain. He uses a wheeled walker for ambulation. No recent falls. Bladder function remains good. He also reports poor appetite and \"feels full\". Normal BMs. Patient is wondering whether his  shunt could cause any of these issues.    He also reports some speech issues- using wrong words, but he is able to catch it on his own and is able to correct himself.     Patient recently completed a head CT and Xray shunt series. Please see the results below.     Review of Systems:   Pertinent items are noted in HPI or as in patient entered ROS below, remainder of complete ROS is negative.        No data to display                 Physical Exam:   /80   Pulse 95   Resp 16   Ht 1.721 m (5' 7.75\")   Wt 79.4 kg (175 lb)   SpO2 96%   BMI 26.81 kg/m     General: No acute distress.    Neuro: The patient is fully oriented. Speech is normal. Gait: ambulates with a walker.   Psych: Normal mood and affect. Behavior is " normal.        Imagin2024 Head CT:  Impression:   1.  No acute intracranial pathology.  2.  Stable shunted ventricular system.  3.  Mild leukoaraiosis.    2024 XR Shunt series:  IMPRESSION:   Intact ventriculoperitoneal shunt.     Procedures:  With patient's permission, Right sided shunt setting was checked and is at 4.0. verified x 3.     Assessment:  NPH  S/p  shunt placement on 2022 (Codman Certas at 4.0)  Follow-up     Plan:  Recent head CT shows stable shunted ventricular system and XR shunt series shows intact  shunt. No shunt related issues which explain his current symptoms. Will message his PCP about the possibility of MTM appointment to rule out possible medication side effects.     Patient to establish care with Dr. Narvaez in Neurology as scheduled.        I spent 25 minutes on patient care activities related to this encounter on the date of service, including time spent reviewing the chart, obtaining history and examination and in counseling the patient, and in documentation in the electronic medical record.      Antionette ARRIAGA, CNP  Department of Neurosurgery

## 2024-06-04 NOTE — LETTER
"2024       RE: Gustavo Ramon  [address] [facility] [address]       Dear Colleague,    Thank you for referring your patient, Gustavo Ramon, to the [facility] NEUROS[facility] [place] [facility]. Please see a copy of my visit note below.    [facility]  Department of Neurosurgery      Name: Gustavo Ramon  MRN: [id number]  Age: 83 year old  : 1940  Referring provider: Winston Silverman  2024      Chief Complaint:   NPH  S/p  shunt placement on 2022 (Codman Certas at 4.0)  Follow-up     History of Present Illness:   Gustavo Ramon is a 83 year old male with a history of NPH, s/p  shunt placement on 2022 who is seen today for  a follow up.     Most recently seen in clinic on 2023. He was seen for shunt check after an MRI. He reported improvement in his walking (more steady), cognition and bladder function since  shunt placement.     Patient recently contacted the clinic with worsening fatigue, bilateral leg weakness and today's appointment was arranged. Today, patient presents for the follow-up with his wife. For the past 6 months or so, he has been feeling more \"tired\" than usual. He also reports weakness in his legs and left hip pain. He had a steroid injection a few day ago which helped with hip pain. He uses a wheeled walker for ambulation. No recent falls. Bladder function remains good. He also reports poor appetite and \"feels full\". Normal BMs. Patient is wondering whether his  shunt could cause any of these issues.    He also reports some speech issues- using wrong words, but he is able to catch it on his own and is able to correct himself.     Patient recently completed a head CT and Xray shunt series. Please see the results below.     Review of Systems:   Pertinent items are noted in HPI or as in patient entered ROS below, remainder of complete ROS is negative.     " "   No data to display                 Physical Exam:   /80   Pulse 95   Resp 16   Ht 1.721 m (5' 7.75\")   Wt 79.4 kg (175 lb)   SpO2 96%   BMI 26.81 kg/m     General: No acute distress.    Neuro: The patient is fully oriented. Speech is normal. Gait: ambulates with a walker.   Psych: Normal mood and affect. Behavior is normal.        Imagin2024 Head CT:  Impression:   1.  No acute intracranial pathology.  2.  Stable shunted ventricular system.  3.  Mild leukoaraiosis.    2024 XR Shunt series:  IMPRESSION:   Intact ventriculoperitoneal shunt.     Procedures:  With patient's permission, Right sided shunt setting was checked and is at 4.0. verified x 3.     Assessment:  NPH  S/p  shunt placement on 2022 (Codman Certas at 4.0)  Follow-up     Plan:  Recent head CT shows stable shunted ventricular system and XR shunt series shows intact  shunt. No shunt related issues which explain his current symptoms. Will message his PCP about the possibility of MTM appointment to rule out possible medication side effects.     Patient to establish care with Dr. Narvaez in Neurology as scheduled.        I spent 25 minutes on patient care activities related to this encounter on the date of service, including time spent reviewing the chart, obtaining history and examination and in counseling the patient, and in documentation in the electronic medical record.        Again, thank you for allowing me to participate in the care of your patient.      Sincerely,    CORINA Jackson CNP    "

## 2024-06-05 LAB
ALBUMIN SERPL BCG-MCNC: 3.9 G/DL (ref 3.5–5.2)
ALP SERPL-CCNC: 103 U/L (ref 40–150)
ALT SERPL W P-5'-P-CCNC: 28 U/L (ref 0–70)
ANION GAP SERPL CALCULATED.3IONS-SCNC: 11 MMOL/L (ref 7–15)
AST SERPL W P-5'-P-CCNC: 32 U/L (ref 0–45)
BILIRUB SERPL-MCNC: 0.3 MG/DL
BUN SERPL-MCNC: 25.3 MG/DL (ref 8–23)
CALCIUM SERPL-MCNC: 9.6 MG/DL (ref 8.8–10.2)
CHLORIDE SERPL-SCNC: 107 MMOL/L (ref 98–107)
CREAT SERPL-MCNC: 0.93 MG/DL (ref 0.67–1.17)
DEPRECATED HCO3 PLAS-SCNC: 23 MMOL/L (ref 22–29)
EGFRCR SERPLBLD CKD-EPI 2021: 80 ML/MIN/1.73M2
GLUCOSE SERPL-MCNC: 99 MG/DL (ref 70–99)
LIPASE SERPL-CCNC: 27 U/L (ref 13–60)
POTASSIUM SERPL-SCNC: 4.8 MMOL/L (ref 3.4–5.3)
PROT SERPL-MCNC: 6.8 G/DL (ref 6.4–8.3)
SODIUM SERPL-SCNC: 141 MMOL/L (ref 135–145)
T4 FREE SERPL-MCNC: 2.59 NG/DL (ref 0.9–1.7)
TSH SERPL DL<=0.005 MIU/L-ACNC: 0.01 UIU/ML (ref 0.3–4.2)

## 2024-06-06 ENCOUNTER — TELEPHONE (OUTPATIENT)
Dept: GASTROENTEROLOGY | Facility: CLINIC | Age: 84
End: 2024-06-06
Payer: MEDICARE

## 2024-06-06 NOTE — TELEPHONE ENCOUNTER
"Endoscopy Scheduling Screen    Have you had a positive Covid test in the last 14 days?  No    What is your communication preference for Instructions and/or Bowel Prep?   MyChart    What insurance is in the chart?  Other:  medicare/Asseta    Ordering/Referring Provider:     KEYSHAWN RHOADES      (If ordering provider performs procedure, schedule with ordering provider unless otherwise instructed. )    BMI: Estimated body mass index is 26.81 kg/m  as calculated from the following:    Height as of 6/4/24: 1.721 m (5' 7.75\").    Weight as of 6/4/24: 79.4 kg (175 lb).     Sedation Ordered  moderate sedation.   If patient BMI > 50 do not schedule in [facility].    If patient BMI > 45 do not schedule at [facility].    Are you taking methadone or Suboxone?  No    Have you had difficulties, pain, or discomfort during past endoscopy procedures?  No    Are you taking any prescription medications for pain 3 or more times per week?   NO, No RN review required.    Do you have a history of malignant hyperthermia?  No    (Females) Are you currently pregnant?   No     Have you been diagnosed or told you have pulmonary hypertension?   No    Do you have an LVAD?  No    Have you been told you have moderate to severe sleep apnea?  No    Have you been told you have COPD, asthma, or any other lung disease?  No    Do you have any heart conditions?  No     Have you ever had or are you waiting for an organ transplant?  No. Continue scheduling, no site restrictions.    Have you had a stroke or transient ischemic attack (TIA aka \"mini stroke\" in the last 6 months?   No    Have you been diagnosed with or been told you have cirrhosis of the liver?   No    Are you currently on dialysis?   No    Do you need assistance transferring?   No    BMI: Estimated body mass index is 26.81 kg/m  as calculated from the following:    Height as of 6/4/24: 1.721 m (5' 7.75\").    Weight as of 6/4/24: 79.4 kg (175 lb).     Is patients BMI > 40 and scheduling location " [facility]?  No    Do you take an injectable medication for weight loss or diabetes (excluding insulin)?  No    Do you take the medication Naltrexone?  No    Do you take blood thinners?  No       Prep   Are you currently on dialysis or do you have chronic kidney disease?  No problem list says yes, but spouse says not    Do you have a diagnosis of diabetes?  No    Do you have a diagnosis of cystic fibrosis (CF)?  No    On a regular basis do you go 3 -5 days between bowel movements?  No    BMI > 40?  No    Preferred Pharmacy:    [facility]  [address] [address]  [facility] [address]  Phone: [phone] Fax: [phone]    [facility] - [place], [place] - [address] [facility] [address]  Phone: [phone] Fax: [phone]    [facility] [place], [place] - [address] [facility] [place] [place]  [address] [address] [facility] [address]  Phone: [phone] Fax: [phone]      Final Scheduling Details     Procedure scheduled  Upper endoscopy (EGD)    Surgeon:  Nata     Date of procedure:  7/29     Pre-OP / PAC:   No - Not required for this site.    Location  [facility] - [facility] - Per order.    Sedation   Moderate Sedation - Per order.      Patient Reminders:   You will receive a call from a Nurse to review instructions and health history.  This assessment must be completed prior to your procedure.  Failure to complete the Nurse assessment may result in the procedure being cancelled.      On the day of your procedure, please designate an adult(s) who can drive you home stay with you for the next 24 hours. The medicines used in the exam will make you sleepy. You will not be able to drive.      You cannot take public transportation, ride share services, or non-medical taxi service without a responsible caregiver.  Medical transport services are allowed with the requirement that a responsible caregiver will receive you at your  destination.  We require that drivers and caregivers are confirmed prior to your procedure.

## 2024-06-08 ASSESSMENT — SLEEP AND FATIGUE QUESTIONNAIRES
HOW LIKELY ARE YOU TO NOD OFF OR FALL ASLEEP WHEN YOU ARE A PASSENGER IN A CAR FOR AN HOUR WITHOUT A BREAK: SLIGHT CHANCE OF DOZING
HOW LIKELY ARE YOU TO NOD OFF OR FALL ASLEEP IN A CAR, WHILE STOPPED FOR A FEW MINUTES IN TRAFFIC: WOULD NEVER DOZE
HOW LIKELY ARE YOU TO NOD OFF OR FALL ASLEEP WHILE LYING DOWN TO REST IN THE AFTERNOON WHEN CIRCUMSTANCES PERMIT: HIGH CHANCE OF DOZING
HOW LIKELY ARE YOU TO NOD OFF OR FALL ASLEEP WHILE SITTING QUIETLY AFTER LUNCH WITHOUT ALCOHOL: SLIGHT CHANCE OF DOZING
HOW LIKELY ARE YOU TO NOD OFF OR FALL ASLEEP WHILE WATCHING TV: SLIGHT CHANCE OF DOZING
HOW LIKELY ARE YOU TO NOD OFF OR FALL ASLEEP WHILE SITTING AND TALKING TO SOMEONE: WOULD NEVER DOZE
HOW LIKELY ARE YOU TO NOD OFF OR FALL ASLEEP WHILE SITTING AND READING: SLIGHT CHANCE OF DOZING
HOW LIKELY ARE YOU TO NOD OFF OR FALL ASLEEP WHILE SITTING INACTIVE IN A PUBLIC PLACE: MODERATE CHANCE OF DOZING

## 2024-06-10 ENCOUNTER — OFFICE VISIT (OUTPATIENT)
Dept: SLEEP MEDICINE | Facility: CLINIC | Age: 84
End: 2024-06-10
Attending: PHYSICIAN ASSISTANT
Payer: MEDICARE

## 2024-06-10 VITALS
SYSTOLIC BLOOD PRESSURE: 113 MMHG | WEIGHT: 173 LBS | RESPIRATION RATE: 16 BRPM | OXYGEN SATURATION: 96 % | HEART RATE: 86 BPM | DIASTOLIC BLOOD PRESSURE: 76 MMHG | BODY MASS INDEX: 27.15 KG/M2 | HEIGHT: 67 IN

## 2024-06-10 DIAGNOSIS — R40.0 DAYTIME SOMNOLENCE: ICD-10-CM

## 2024-06-10 DIAGNOSIS — R06.81 WITNESSED APNEIC SPELLS: ICD-10-CM

## 2024-06-10 DIAGNOSIS — G47.9 SLEEP DISTURBANCE: Primary | ICD-10-CM

## 2024-06-10 DIAGNOSIS — G47.33 OSA (OBSTRUCTIVE SLEEP APNEA): ICD-10-CM

## 2024-06-10 DIAGNOSIS — R53.83 FATIGUE, UNSPECIFIED TYPE: ICD-10-CM

## 2024-06-10 DIAGNOSIS — G47.00 INSOMNIA, UNSPECIFIED TYPE: ICD-10-CM

## 2024-06-10 DIAGNOSIS — G25.81 RESTLESS LEG SYNDROME DUE TO IRON DEFICIENCY ANEMIA: ICD-10-CM

## 2024-06-10 DIAGNOSIS — R06.83 SNORING: ICD-10-CM

## 2024-06-10 DIAGNOSIS — D50.9 RESTLESS LEG SYNDROME DUE TO IRON DEFICIENCY ANEMIA: ICD-10-CM

## 2024-06-10 PROCEDURE — 99205 OFFICE O/P NEW HI 60 MIN: CPT | Performed by: NURSE PRACTITIONER

## 2024-06-10 RX ORDER — ZOLPIDEM TARTRATE 5 MG/1
TABLET ORAL
Qty: 1 TABLET | Refills: 0 | Status: SHIPPED | OUTPATIENT
Start: 2024-06-10 | End: 2024-09-09

## 2024-06-10 NOTE — PATIENT INSTRUCTIONS
"          MY TREATMENT INFORMATION FOR SLEEP APNEA-  Gustavo Ramon    DOCTOR : CORINA Rouse CNP    Am I having a sleep study at a sleep center?  --->Due to normal delays, you will be contacted within 2-4 weeks to schedule    Am I having a home sleep study?  --->Watch the video for the device you are using:    -/drop off device-   https://www.AppMesh.com/watch?v=yGGFBdELGhk    -Disposable device sent out require phone/computer application-   https://www.AppMesh.com/watch?v=BCce_vbiwxE      Frequently asked questions:  1. What is Obstructive Sleep Apnea (ANANT)? ANANT is the most common type of sleep apnea. Apnea means, \"without breath.\"  Apnea is most often caused by narrowing or collapse of the upper airway as muscles relax during sleep.   Almost everyone has occasional apneas. Most people with sleep apnea have had brief interruptions at night frequently for many years.  The severity of sleep apnea is related to how frequent and severe the events are.   2. What are the consequences of ANANT? Symptoms include: feeling sleepy during the day, snoring loudly, gasping or stopping of breathing, trouble sleeping, and occasionally morning headaches or heartburn at night.  Sleepiness can be serious and even increase the risk of falling asleep while driving. Other health consequences may include development of high blood pressure and other cardiovascular disease in persons who are susceptible. Untreated ANANT  can contribute to heart disease, stroke and diabetes.   3. What are the treatment options? In most situations, sleep apnea is a lifelong disease that must be managed with daily therapy. Medications are not effective for sleep apnea and surgery is generally not considered until other therapies have been tried. Your treatment is your choice . Continuous Positive Airway (CPAP) works right away and is the therapy that is effective in nearly everyone. An oral device to hold your jaw forward is usually the next " most reliable option. Other options include postioning devices (to keep you off your back), weight loss, and surgery including a tongue pacing device. There is more detail about some of these options below.  4. Are my sleep studies covered by insurance? Although we will request verification of coverage, we advise you also check in advance of the study to ensure there is coverage.    Important tips for those choosing CPAP and similar devices  REMEMBER-IF YOU RECEIVE A CALL FROM  [phone]-->IT IS TO SETUP A DEVICE  For new devices, sign up for device SEAMUS to monitor your device for your followup visits  We encourage you to utilize the [employer] seamus or website ( https://Peppercorn.[place] [employer]/ ) to monitor your therapy progress and share the data with your healthcare team when you discuss your sleep apnea.                                                    Know your equipment:  CPAP is continuous positive airway pressure that prevents obstructive sleep apnea by keeping the throat from collapsing while you are sleeping. In most cases, the device is  smart  and can slowly self-adjusts if your throat collapses and keeps a record every day of how well you are treated-this information is available to you and your care team.  BPAP is bilevel positive airway pressure that keeps your throat open and also assists each breath with a pressure boost to maintain adequate breathing.  Special kinds of BPAP are used in patients who have inadequate breathing from lung or heart disease. In most cases, the device is  smart  and can slowly self-adjusts to assist breathing. Like CPAP, the device keeps a record of how well you are treated.  Your mask is your connection to the device. You get to choose what feels most comfortable and the staff will help to make sure if fits. Here: are some examples of the different masks that are available: Magnetic mask aids may assist with use but there are safety issues that should be addressed when  considering with magnets* ( see end of discussion).       Key points to remember on your journey with sleep apnea:  Sleep study.  PAP devices often need to be adjusted during a sleep study to show that they are effective and adjusted right.  Good tips to remember: Try wearing just the mask during a quiet time during the day so your body adapts to wearing it. A humidifier is recommended for comfort in most cases to prevent drying of your nose and throat. Allergy medication from your provider may help you if you are having nasal congestion.  Getting settled-in. It takes more than one night for most of us to get used to wearing a mask. Try wearing just the mask during a quiet time during the day so your body adapts to wearing it. A humidifier is recommended for comfort in most cases. Our team will work with you carefully on the first day and will be in contact within 4 days and again at 2 and 4 weeks for advice and remote device adjustments. Your therapy is evaluated by the device each day.   Use it every night. The more you are able to sleep naturally for 7-8 hours, the more likely you will have good sleep and to prevent health risks or symptoms from sleep apnea. Even if you use it 4 hours it helps. Occasionally all of us are unable to use a medical therapy, in sleep apnea, it is not dangerous to miss one night.   Communicate. Call our skilled team on the number provided on the first day if your visit for problems that make it difficult to wear the device. Over 2 out of 3 patients can learn to wear the device long-term with help from our team. Remember to call our team or your sleep providers if you are unable to wear the device as we may have other solutions for those who cannot adapt to mask CPAP therapy. It is recommended that you sleep your sleep provider within the first 3 months and yearly after that if you are not having problems.   Use it for your health. We encourage use of CPAP masks during daytime quiet  periods to allow your face and brain to adapt to the sensation of CPAP so that it will be a more natural sensation to awaken to at night or during naps. This can be very useful during the first few weeks or months of adapting to CPAP though it does not help medically to wear CPAP during wakefulness and  should not be used as a strategy just to meet guidelines.  Take care of your equipment. Make sure you clean your mask and tubing using directions every day and that your filter and mask are replaced as recommended or if they are not working.     *Masks with magnets:  Updated Contraindications  Masks with magnetic components are contraindicated for use by patients where they, or anyone in close physical contact while using the mask, have the following:   Active medical implants that interact with magnets (i.e., pacemakers, implantable cardioverter defibrillators (ICD), neurostimulators, cerebrospinal fluid (CSF) shunts, insulin/infusion pumps)   Metallic implants/objects containing ferromagnetic material (i.e., aneurysm clips/flow disruption devices, embolic coils, stents, valves, electrodes, implants to restore hearing or balance with implanted magnets, ocular implants, metallic splinters in the eye)  Updated Warning  Keep the mask magnets at a safe distance of at least 6 inches (150 mm) away from implants or medical devices that may be adversely affected by magnetic interference. This warning applies to you or anyone in close physical contact with your mask. The magnets are in the frame and lower headgear clips, with a magnetic field strength of up to 400mT. When worn, they connect to secure the mask but may inadvertently detach while asleep.  Implants/medical devices, including those listed within contraindications, may be adversely affected if they change function under external magnetic fields or contain ferromagnetic materials that attract/repel to magnetic fields (some metallic implants, e.g., contact lenses  with metal, dental implants, metallic cranial plates, screws, jaylon hole covers, and bone substitute devices). Consult your physician and  of your implant / other medical device for information on the potential adverse effects of magnetic fields.    BESIDES CPAP, WHAT OTHER THERAPIES ARE THERE?    Positioning Device  Positioning devices are generally used when sleep apnea is mild and only occurs on your back.This example shows a pillow that straps around the waist. It may be appropriate for those whose sleep study shows milder sleep apnea that occurs primarily when lying flat on one's back. Preliminary studies have shown benefit but effectiveness at home may need to be verified by a home sleep test. These devices are generally not covered by medical insurance.  Examples of devices that maintain sleeping on the back to prevent snoring and mild sleep apnea.    Belt type body positioner  http://[employer]/    Electronic reminder  http://nightshifttherapy.[employer]/            Oral Appliance  What is oral appliance therapy?  An oral appliance device fits on your teeth at night like a retainer used after having braces. The device is made by a specialized dentist and requires several visits over 1-2 months before a manufactured device is made to fit your teeth and is adjusted to prevent your sleep apnea. Once an oral device is working properly, snoring should be improved. A home sleep test may be recommended at that time if to determine whether the sleep apnea is adequately treated.       Some things to remember:  -Oral devices are often, but not always, covered by your medical insurance. Be sure to check with your insurance provider.   -If you are referred for oral therapy, you will be given a list of specialized dentists to consider or you may choose to visit the Web site of the American Academy of Dental Sleep Medicine  -Oral devices are less likely to work if you have severe sleep apnea or are extremely  overweight.     More detailed information  An oral appliance is a small acrylic device that fits over the upper and lower teeth  (similar to a retainer or a mouth guard). This device slightly moves jaw forward, which moves the base of the tongue forward, opens the airway, improves breathing for effective treat snoring and obstructive sleep apnea in perhaps 7 out of 10 people .  The best working devices are custom-made by a dental device  after a mold is made of the teeth 1, 2, 3.  When is an oral appliance indicated?  Oral appliance therapy is recommended as a first-line treatment for patients with primary snoring, mild sleep apnea, and for patients with moderate sleep apnea who prefer appliance therapy to use of CPAP4, 5. Severity of sleep apnea is determined by sleep testing and is based on the number of respiratory events per hour of sleep.   How successful is oral appliance therapy?  The success rate of oral appliance therapy in patients with mild sleep apnea is 75-80% while in patients with moderate sleep apnea it is 50-70%. The chance of success in patients with severe sleep apnea is 40-50%. The research also shows that oral appliances have a beneficial effect on the cardiovascular health of ANANT patients at the same magnitude as CPAP therapy7.  Oral appliances should be a second-line treatment in cases of severe sleep apnea, but if not completely successful then a combination therapy utilizing CPAP plus oral appliance therapy may be effective. Oral appliances tend to be effective in a broad range of patients although studies show that the patients who have the highest success are females, younger patients, those with milder disease, and less severe obesity. 3, 6.   Finding a dentist that practices dental sleep medicine  Specific training is available through the American Academy of Dental Sleep Medicine for dentists interested in working in the field of sleep. To find a dentist who is educated in  the field of sleep and the use of oral appliances, near you, visit the Web site of the American Academy of Dental Sleep Medicine.    References  1. Gee, et al. Objectively measured vs self-reported compliance during oral appliance therapy for sleep-disordered breathing. Chest 2013; 144(5): 0112-8007.  2. Mayo et al. Objective measurement of compliance during oral appliance therapy for sleep-disordered breathing. Thorax 2013; 68(1): 91-96.  3. Haley et al. Mandibular advancement devices in 620 men and women with ANANT and snoring: tolerability and predictors of treatment success. Chest 2004; 125: 7075-9049.  4. Junior, et al. Oral appliances for snoring and ANANT: a review. Sleep 2006; 29: 244-262.  5. Sabrina et al. Oral appliance treatment for ANANT: an update. J Clin Sleep Med 2014; 10(2): 215-227.  6. Trever et al. Predictors of OSAH treatment outcome. J Dent Res 2007; 86: 0877-9348.      Weight Loss:   Your Body mass index is 27.1 kg/m .    Being overweight does not necessarily mean you will have health consequences.  Those who have BMI over 35 or over 27 with existing medical conditions carries greater risk.   Weight loss decreases severity of sleep apnea in most people with obesity. For those with mild obesity who have developed snoring with weight gain, even 15-30 pound weight loss can improve and occasionally milder eliminate sleep apnea.  Structured and life-long dietary and health habits are necessary to lose weight and keep healthier weight levels.     The Comprehensive Weight loss program offers all aspects of weight loss strategies including two Non-Surgical Weight Loss Programs: Medical Weight Management and our 24 Week Healthy Lifestyle Program:    Medical Weight Management: You will meet with a Medical Weight Management Provider, as well as a Registered Dietician. The program may include medication therapy, dietary education, recommended exercise and physical therapy programs,  monthly support group meetings, and possible psychological counseling. Follow up visits with the provider or dietician are scheduled based on your progress and needs.    24 Week Healthy Lifestyle Program: This unique program is designed to give you the support of weekly appointments and activities thru a 24-week period. It may include all of the components of the basic program (above), with the addition of 11 individual Health  Visits, 24-week access to the [employer] website for over 700 online classes, and monthly support group meetings. This program has an out-of-pocket expense of $499 to cover the items that can not be billed to insurance (health coaches and k[employer] access), and is non-refundable/non-transferable (you may be able to use a Health Savings Account; ask your HSA provider). There may be an optional meal replacement plan prescribed as well.   Surgical management achieves meaningful long-term weight loss and improvement in health risks in most patients with more severe obesity.      Sleep Apnea Surgery:    Surgery for obstructive sleep apnea is considered generally only when other therapies fail to work. Surgery may be discussed with you if you are having a difficult time tolerating CPAP and or when there is an abnormal structure that requires surgical correction.  Nose and throat surgeries often enlarge the airway to prevent collapse.  Most of these surgeries create pain for 1-2 weeks and up to half of the most common surgeries are not effective throughout life.  You should carefully discuss the benefits and drawbacks to surgery with your sleep provider and surgeon to determine if it is the best solution for you.   More information  Surgery for ANANT is directed at areas that are responsible for narrowing or complete obstruction of the airway during sleep.  There are a wide range of procedures available to enlarge and/or stabilize the airway to prevent blockage of breathing in the three major  areas where it can occur: the palate, tongue, and nasal regions.  Successful surgical treatment depends on the accurate identification of the factors responsible for obstructive sleep apnea in each person.  A personalized approach is required because there is no single treatment that works well for everyone.  Because of anatomic variation, consultation with an examination by a sleep surgeon is a critical first step in determining what surgical options are best for each patient.  In some cases, examination during sedation may be recommended in order to guide the selection of procedures.  Patients will be counseled about risks and benefits as well as the typical recovery course after surgery. Surgery is typically not a cure for a person s ANANT.  However, surgery will often significantly improve one s ANANT severity (termed  success rate ).  Even in the absence of a cure, surgery will decrease the cardiovascular risk associated with OSA7; improve overall quality of life8 (sleepiness, functionality, sleep quality, etc).      Palate Procedures:  Patients with ANANT often have narrowing of their airway in the region of their tonsils and uvula.  The goals of palate procedures are to widen the airway in this region as well as to help the tissues resist collapse.  Modern palate procedure techniques focus on tissue conservation and soft tissue rearrangement, rather than tissue removal.  Often the uvula is preserved in this procedure. Residual sleep apnea is common in patient after pharyngoplasty with an average reduction in sleep apnea events of 33%2.      Tongue Procedures:  ExamWhile patients are awake, the muscles that surround the throat are active and keep this region open for breathing. These muscles relax during sleep, allowing the tongue and other structures to collapse and block breathing.  There are several different tongue procedures available.  Selection of a tongue base procedure depends on characteristics seen on  physical exam.  Generally, procedures are aimed at removing bulky tissues in this area or preventing the back of the tongue from falling back during sleep.  Success rates for tongue surgery range from 50-62%3.    Hypoglossal Nerve Stimulation:  Hypoglossal nerve stimulation has recently received approval from the [place] Food and Drug Administration for the treatment of obstructive sleep apnea.  This is based on research showing that the system was safe and effective in treating sleep apnea6.  Results showed that the median AHI score decreased 68%, from 29.3 to 9.0. This therapy uses an implant system that senses breathing patterns and delivers mild stimulation to airway muscles, which keeps the airway open during sleep.  The system consists of three fully implanted components: a small generator (similar in size to a pacemaker), a breathing sensor, and a stimulation lead.  Using a small handheld remote, a patient turns the therapy on before bed and off upon awakening.    Candidates for this device must be greater than 18 years of age, have moderate to severe obstructive sleep apnea with less than 25% central events  (AHI between 15-65), BMI less than 35, have tried CPAP/oral appliance for at least 8 weeks without success, and have appropriate upper airway anatomy (determined by a sleep endoscopy performed by Dr. Didier Disla or Dr. Asher Segovia).    Nasal Procedures:  Nasal obstruction can interfere with nasal breathing during the day and night.  Studies have shown that relief of nasal obstruction can improve the ability of some patients to tolerate positive airway pressure therapy for obstructive sleep apnea1.  Treatment options include medications such as nasal saline, topical corticosteroid and antihistamine sprays, and oral medications such as antihistamines or decongestants. Non-surgical treatments can include external nasal dilators for selected patients. If these are not successful by themselves,  surgery can improve the nasal airway either alone or in combination with these other options.        Combination Procedures:  Combination of surgical procedures and other treatments may be recommended, particularly if patients have more than one area of narrowing or persistent positional disease.  The success rate of combination surgery ranges from 66-80%2,3.    References  Ben HERRERA. The Role of the Nose in Snoring and Obstructive Sleep Apnoea: An Update.  Eur Arch Otorhinolaryngol. 2011; 268: 1365-73.   Alie SM; Ashlyn JA; Reginaldo JR; Pallanch JF; Beatrice MB; Paul SG; Joao GUNTER. Surgical modifications of the upper airway for obstructive sleep apnea in adults: a systematic review and meta-analysis. SLEEP 2010;33(10):7218-2845. Kelsea HDEZ. Hypopharyngeal surgery in obstructive sleep apnea: an evidence-based medicine review.  Arch Otolaryngol Head Neck Surg. 2006 Feb;132(2):206-13.  Kev YH1, Kaye Y, Tu TONY. The efficacy of anatomically based multilevel surgery for obstructive sleep apnea. Otolaryngol Head Neck Surg. 2003 Oct;129(4):327-35.  Kelsea HDEZ, Goldberg A. Hypopharyngeal Surgery in Obstructive Sleep Apnea: An Evidence-Based Medicine Review. Arch Otolaryngol Head Neck Surg. 2006 Feb;132(2):206-13.  Jeannie ROBB et al. Upper-Airway Stimulation for Obstructive Sleep Apnea.  N Engl J Med. 2014 Jan 9;370(2):139-49.  Matthew Y et al. Increased Incidence of Cardiovascular Disease in Middle-aged Men with Obstructive Sleep Apnea. Am J Respir Crit Care Med; 2002 166: 159-165  Sales EM et al. Studying Life Effects and Effectiveness of Palatopharyngoplasty (SLEEP) study: Subjective Outcomes of Isolated Uvulopalatopharyngoplasty. Otolaryngol Head Neck Surg. 2011; 144: 623-631.        WHAT IF I ONLY HAVE SNORING?    Mandibular advancement devices, lateral sleep positioning, long-term weight loss and treatment of nasal allergies have been shown to improve snoring.  Exercising tongue muscles with a game  (https://SimGym.Wantr.SafeAwake/us/seamus/soundly-reduce-snoring/zx5736809201) or stimulating the tongue during the day with a device (https://doi.org/10.3390/bfh33307699) have improved snoring in some individuals.  https://www.CenturyLink.SafeAwake/  https://www.sleepfoundation.org/best-anti-snoring-mouthpieces-and-mouthguards    Remember to Drive Safe... Drive Alive     Sleep health profoundly affects your health, mood, and your safety.  Thirty three percent of the population (one in three of us) is not getting enough sleep and many have a sleep disorder. Not getting enough sleep or having an untreated / undertreated sleep condition may make us sleepy without even knowing it. In fact, our driving could be dramatically impaired due to our sleep health. As your provider, here are some things I would like you to know about driving:     Here are some warning signs for impairment and dangerous drowsy driving:              -Having been awake more than 16 hours               -Looking tired               -Eyelid drooping              -Head nodding (it could be too late at this point)              -Driving for more than 30 minutes     Some things you could do to make the driving safer if you are experiencing some drowsiness:              -Stop driving and rest              -Call for transportation              -Make sure your sleep disorder is adequately treated     Some things that have been shown NOT to work when experiencing drowsiness while driving:              -Turning on the radio              -Opening windows              -Eating any  distracting  /  entertaining  foods (e.g., sunflower seeds, candy, or any other)              -Talking on the phone      Your decision may not only impact your life, but also the life of others. Please, remember to drive safe for yourself and all of us.

## 2024-06-10 NOTE — PROGRESS NOTES
Outpatient Sleep Medicine Consultation:      Name: Gustavo Ramon MRN# [id number]   Age: 83 year old YOB: 1940     Date of Consultation: Elisa 10, 2024  Consultation is requested by: Winston Silverman PA-C  [id number] [place] [address] Winston Silverman  Primary care provider: Winston Silverman       Reason for Sleep Consult:     Gustavo Ramon is sent by Winston Silverman for a sleep consultation regarding possible ANANT, history of RLS.    Patient s Reason for visit  Gustavo Ramon main reason for visit: I'm constantly tired, and weak  Patient states problem(s) started: Few months ago, and increasing  Gustavo Ramon's goals for this visit: Have more energy           Assessment and Plan:     Summary Sleep Diagnoses:  1. Sleep disturbance  2. ANANT (obstructive sleep apnea)  3. Snoring  4. Witnessed apneic spells  5. Daytime somnolence  6. Restless leg syndrome due to iron deficiency anemia  7. Fatigue, unspecified type  8. Insomnia, unspecified type  - Adult Sleep Eval & Management  Referral  - Comprehensive Sleep Study; Future      Comorbid Diagnoses:  HTN  GERD  CKD stage IIIa  Normal pressure hydrocephalus (NPH)-s/p shunt  Myoclonus  MGUS  Iron deficiency anemia  Urinary frequency      Summary Recommendations:  Recommend further evaluation with diagnostic in-lab polysomnography (PSG) with RLS/PLMD protocol.  He has a high pretest probability of ANANT with symptoms of snoring, witnessed apneas, daytime somnolence, difficulty falling/staying asleep, occasional difficulty falling back to sleep once awakened, restless leg syndrome, and nonrestorative sleep for several years, worse in the last several months.  His STOP-BANG score is 5 today which suggests a high risk of ANANT.  His symptoms are consistent with probable obstructive sleep apnea.  We discussed the pathophysiology of obstructive sleep apnea and the risks associated with untreated ANANT.  We also discussed the pros  and cons of in lab polysomnography versus home sleep testing.  The patient has elected to undergo in lab polysomnography (PSG) to further evaluate his symptoms of possible obstructive sleep apnea and restless leg syndrome/periodic limb movement disorder.  All potential therapeutic options including positive airway pressure, mandibular advancing oral appliances, positional therapy, and surgical options were discussed. Also counseled about impact of weight loss on ANANT.   His insomnia severity index is 8 today which is consistent with mild severity clinical insomnia.  This is largely associated with his restless leg symptoms and is fairly well-controlled at this time, however, unclear as to whether possible sleep disordered breathing is contributing.  Information regarding RLS was provided in the AVS for his review.  He is iron saturation is within normal limits (24%) and ferritin level is just mildly suboptimal at 61 ng/mL looking at last testing in 2022.  Consideration for adding supplemental OTC iron was briefly discussed.  Currently being treated for RLS with pramipexole and gabapentin with adequate relief, managed by primary care.  Follow-up in approximately 2 weeks after the sleep study to review the results and to determine next steps.    Orders Placed This Encounter   Procedures    Comprehensive Sleep Study         Summary Counseling:    Sleep Testing Reviewed  Obstructive Sleep Apnea Reviewed  Complications of Untreated Sleep Apnea Reviewed  Previous recent chart notes, lab, imaging, and cardiology results reviewed    Medical Decision-making:   Educational materials provided in instructions    Total time spent reviewing medical records, history and physical examination, review of previous testing and interpretation as well as documentation on this date: 70 minutes    CC: Winston Silverman          History of Present Illness:     Gustavo Ramon is an 83-year-old male with a PMH significant for HTN, GERD, CKD  stage IIIa, normal pressure hydrocephalus-s/p shunt, myoclonus, MGUS, RLS, iron deficiency anemia, and urinary frequency who presents today, accompanied by his wife Ceci, with symptoms of snoring, witnessed apneas, occasional difficulty falling/staying asleep, occasional difficulty falling back to sleep once awakened, restless legs during daytime, primarily, but has had occasionally disrupting his ability to fall asleep at night getting up to urinate more than once, and occasional nonrestorative sleep for several years, worse in the last several months.  He is currently taking pramipexole and recently added gabapentin which seems to be helping his symptoms at night as well as during the daytime.  He was referred by his primary care provider for further evaluation of possible sleep disordered breathing.    Past Sleep Evaluations: No    SLEEP-WAKE SCHEDULE:     Work/School Days: Patient goes to school/work: No   Usually gets into bed at 11:30 PM  Takes patient about 10 minutes, if RLS is not present to fall asleep  Has trouble falling asleep Seldom nights per week  Wakes up in the middle of the night Only for bathroom, 2 or 3 times times.  Wakes up due to Use the bathroom  He has trouble falling back asleep seldom times a week.   It usually takes Few minutes, unless I start thinking of something to get back to sleep  Patient is usually up at 8:30 AM  Uses alarm: No    Weekends/Non-work Days/All Other Days:  Usually gets into bed at Same all week   Takes patient about Same all week to fall asleep  Patient is usually up at Same all week  Uses alarm: No    Sleep Need  Patient gets  8 hours sleep on average   Patient thinks he needs about 9 hours sleep    Gustavo Ramon prefers to sleep in this position(s): Back;Side;Head Elevated   Patient states they do the following activities in bed:      Naps  Patient takes a purposeful nap Twice per day times a week and naps are usually Half hour + in duration  He feels better  after a nap: Yes  He dozes off unintentionally 2-3 days per week  Patient has had a driving accident or near-miss due to sleepiness/drowsiness: No      SLEEP DISRUPTIONS:    Breathing/Snoring  Patient snores:No  Other people complain about his snoring: No  Patient has been told he stops breathing in his sleep:Yes  He has issues with the following: Morning mouth dryness;Stuffy nose when you wake up;Getting up to urinate more than once    Movement:  Patient gets pain, discomfort, with an urge to move:  No  It happens when he is resting:  No  It happens more at night:  Yes  Patient has been told he kicks his legs at night:  Yes     Behaviors in Sleep:  Gustavo Ramon has experienced the following behaviors while sleeping: Sleep-talking  He has experienced sudden muscle weakness during the day: Yes      Is there anything else you would like your sleep provider to know:        CAFFEINE AND OTHER SUBSTANCES:    Patient consumes caffeinated beverages per day:  none  Last caffeine use is usually:    List of any prescribed or over the counter stimulants that patient takes:    List of any prescribed or over the counter sleep medication patient takes: none  List of previous sleep medications that patient has tried: none  Patient drinks alcohol to help them sleep: No  Patient drinks alcohol near bedtime: No    Family History:  Patient has a family member been diagnosed with a sleep disorder: No            SCALES:    EPWORTH SLEEPINESS SCALE         6/8/2024    12:50 PM    [place] Sleepiness Scale ( JOY Luong  [id number]<br>ESS - [place]/English - Final version - 21 Nov 07 - [facility][1] Research [place].)   Sitting and reading Slight chance of dozing   Watching TV Slight chance of dozing   Sitting, inactive in a public place (e.g. a theatre or a meeting) Moderate chance of dozing   As a passenger in a car for an hour without a break Slight chance of dozing   Lying down to rest in the afternoon when circumstances permit High chance of  "dozing   Sitting and talking to someone Would never doze   Sitting quietly after a lunch without alcohol Slight chance of dozing   In a car, while stopped for a few minutes in traffic Would never doze   [place] Score (MC) 9   [place] Score (Sleep) 9         INSOMNIA SEVERITY INDEX (KRISTINA)          6/7/2024     3:48 PM   Insomnia Severity Index (KRISTINA)   Difficulty falling asleep 1   Difficulty staying asleep 1   Problems waking up too early 1   How SATISFIED/DISSATISFIED are you with your CURRENT sleep pattern? 1   How NOTICEABLE to others do you think your sleep problem is in terms of impairing the quality of your life? 1   How WORRIED/DISTRESSED are you about your current sleep problem? 1   To what extent do you consider your sleep problem to INTERFERE with your daily functioning (e.g. daytime fatigue, mood, ability to function at work/daily chores, concentration, memory, mood, etc.) CURRENTLY? 2   KRISTINA Total Score 8       Guidelines for Scoring/Interpretation:  Total score categories:  0-7 = No clinically significant insomnia   8-14 = Subthreshold insomnia   15-21 = Clinical insomnia (moderate severity)  22-28 = Clinical insomnia (severe)  Used via courtesy of www.[employer].va.gov with permission from Deep Chance PhD., [facility]      STOP BANG         6/10/2024     3:28 PM   STOP BANG Questionnaire (  2008, the American Society of Anesthesiologists, Inc. [employer]   Neck Cir (cm) Clinic: 43 cm   B/P Clinic: 113/76   BMI Clinic: 27.1         GAD7         No data to display                  CAGE-AID         No data to display                CAGE-AID reprinted with permission from the [place] Medical Journal, YOKO Ferrari and ОЛЬГА Miller, \"Conjoint screening questionnaires for alcohol and drug abuse\" [place] Medical Journal 94: 135-140, 1995.      PATIENT HEALTH QUESTIONNAIRE-9 (PHQ - 9)         No data to display                Developed by Conchita Pulido, Shahrzad WEATHERS " Rosendo Aguilar and colleagues, with an educational dominic from [employer] No permission required to reproduce, translate, display or distribute.        Allergies:    Allergies   Allergen Reactions    Mycophenolate Other (See Comments)     Confusion, abnormal movements       Medications:    Current Outpatient Medications   Medication Sig Dispense Refill    ACE/ARB/ARNI NOT PRESCRIBED (INTENTIONAL) Please choose reason not prescribed from choices below.      acetaminophen (TYLENOL) 325 MG tablet Take 1-2 tablets (325-650 mg) by mouth every 4 hours as needed for mild pain      aspirin (ASA) 325 MG tablet Take 1 tablet (325 mg) by mouth daily      cyanocobalamin (VITAMIN B-12) 1000 MCG tablet Take 1 tablet (1,000 mcg) by mouth daily 90 tablet 1    fluticasone (FLONASE) 50 MCG/ACT nasal spray Spray 1 spray into both nostrils daily 11.1 mL 0    furosemide (LASIX) 20 MG tablet TAKE ONE-HALF TABLET BY MOUTH EVERY DAY 45 tablet 1    gabapentin (NEURONTIN) 300 MG capsule 1 capsule in the morning, 1 mid day, and 3 in the evening 360 capsule 3    guaiFENesin (MUCINEX) 600 MG 12 hr tablet Take 2 tablets (1,200 mg) by mouth 2 times daily 20 tablet 0    ipratropium (ATROVENT) 0.06 % nasal spray Spray 2 sprays into both nostrils 4 times daily 15 mL 4    ketoconazole (NIZORAL) 2 % external cream Apply twice daily for 8 weeks from knees down to both legs and feet. 120 g 3    levETIRAcetam (KEPPRA) 250 MG tablet TAKE 1 TABLET BY MOUTH TWO TIMES A DAY 60 tablet PRN    meclizine (ANTIVERT) 25 MG tablet Take 0.5-1 tablets (12.5-25 mg) by mouth 3 times daily as needed for dizziness 30 tablet 0    methocarbamol (ROBAXIN) 500 MG tablet Take 1 tablet (500 mg) by mouth every 6 hours as needed for muscle spasms      multivitamin (OCUVITE) TABS tablet Take 1 tablet by mouth daily      pantoprazole (PROTONIX) 40 MG EC tablet Take 1 tablet (40 mg) by mouth daily 90 tablet 3    polyethylene glycol (MIRALAX) 17 GM/Dose powder Take 17 g by  mouth daily as needed for constipation 510 g     pramipexole (MIRAPEX) 0.25 MG tablet Take 2 tabs in the AM and 3 tabs qhs      pyRIDostigmine (MESTINON) 60 MG tablet TAKE ONE AND ONE-HALF TABLETS BY MOUTH TWICE A DAY 90 tablet PRN    senna-docusate (SENOKOT-S/PERICOLACE) 8.6-50 MG tablet Take 1 tablet by mouth 2 times daily 60 tablet 0    simvastatin (ZOCOR) 20 MG tablet TAKE ONE TABLET BY MOUTH AT BEDTIME 90 tablet 1    tamsulosin (FLOMAX) 0.4 MG capsule Take 1 capsule (0.4 mg) by mouth daily      terbinafine (LAMISIL) 1 % external cream Apply topically 2 times daily 42 g 11    triamcinolone (KENALOG) 0.1 % external cream Apply a thin layer up to twice daily to affected areas as needed. 30 g 3    trospium (SANCTURA) 20 MG tablet Take 1 tablet (20 mg) by mouth 2 times daily (before meals) 180 tablet 3    vitamin D3 (CHOLECALCIFEROL) 2000 units tablet Take 1 tablet by mouth daily 90 tablet 1    zolpidem (AMBIEN) 5 MG tablet Take tablet by mouth 15 minutes prior to sleep, for Sleep Study 1 tablet 0       Problem List:  Patient Active Problem List    Diagnosis Date Noted    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) 04/06/2023     Priority: Medium    Weakness of both lower extremities 12/08/2022     Priority: Medium    Gait instability 12/05/2022     Priority: Medium    Hip pain, acute, left 12/05/2022     Priority: Medium    Idiopathic normal pressure hydrocephalus (H) 06/27/2022     Priority: Medium    Acute atopic conjunctivitis 06/06/2022     Priority: Medium    Dementia without behavioral disturbance, unspecified dementia type 05/31/2022     Priority: Medium    Infection due to 2019 novel coronavirus 05/20/2022     Priority: Medium    Encephalopathy 05/20/2022     Priority: Medium    Myoclonus 05/17/2022     Priority: Medium    NPH (normal pressure hydrocephalus) (H) 05/02/2022     Priority: Medium    COVID-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 04/14/2022     Priority: Medium    Urinary frequency 04/14/2022     Priority: Medium     Physical deconditioning 02/01/2022     Priority: Medium    Stage 3a chronic kidney disease (H) 01/31/2022     Priority: Medium    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     Priority: Medium    Sepsis due to COVID-19 (H) 11/19/2021     Priority: Medium    Pneumonia due to COVID-19 virus 11/15/2021     Priority: Medium    Gastroesophageal reflux disease without esophagitis 08/29/2016     Priority: Medium    Essential hypertension with goal blood pressure less than 140/90 08/25/2016     Priority: Medium    Nuclear sclerosis of both eyes 06/29/2016     Priority: Medium    Actinic keratosis 09/30/2015     Priority: Medium    Peripheral neuropathy 09/30/2015     Priority: Medium    Advanced directives, counseling/discussion 05/18/2015     Priority: Medium     9/30/2015  Pt reports that he and his wife both have advanced directives/living will.  They would like to be DNR, and his wife will be the MPOA, then alternate would be his Son.  He will bring in a copy.        PVD (posterior vitreous detachment) 12/02/2013     Priority: Medium    Amaurosis fugax by Hx neg carotid W/U 12/02/2013     Priority: Medium    Malignant neoplasm of prostate (H) 11/19/2013     Priority: Medium    History  of basal cell carcinoma 11/15/2012     Priority: Medium     IMO update changed this record. Please review for accuracy      Seborrhea 11/15/2012     Priority: Medium    AK (actinic keratosis) 11/15/2012     Priority: Medium    Retinal hole OS, operculated 11/13/2012     Priority: Medium    Horseshoe tear of retina without detachment OD 11/13/2012     Priority: Medium    MGUS (monoclonal gammopathy of unknown significance)      Priority: Medium     IgA kappa      HYPERLIPIDEMIA LDL GOAL <130 10/31/2010     Priority: Medium    Macular pigment deposit 08/30/2010     Priority: Medium    Dysphagia 06/01/2010     Priority: Medium    Kidney disorder 06/01/2010     Priority: Medium    Restless leg syndrome due to iron deficiency  anemia 06/01/2010     Priority: Medium    Tear of medial cartilage or meniscus of knee, current 02/08/2010     Priority: Medium    Rosacea      Priority: Medium    DJD (degenerative joint disease)      Priority: Medium    Cancer of the skin, basal cell 05/12/2009     Priority: Medium     Formatting of this note might be different from the original.  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     Priority: Medium     [place] consult      Degeneration of intervertebral disc 06/01/2006     Priority: Medium    Diverticulitis of intestine 06/01/2006     Priority: Medium    History of osteoporosis 06/01/2006     Priority: Medium    Vitamin D deficiency 06/01/2006     Priority: Medium        Past Medical/Surgical History:  Past Medical History:   Diagnosis Date    Actinic keratosis     AK (actinic keratosis) 11/15/2012    Amaurosis fugax     Basal cell carcinoma 2009    R medial cheek/nose    Central serous retinopathy left    Eye    Diverticulosis 08/2006    DJD (degenerative joint disease)     GERD (gastroesophageal reflux disease)     Hearing loss     High frequency    History of nonmelanoma skin cancer     History of nonmelanoma skin cancer     HTN (hypertension)     Hyperlipidemia LDL goal < 130     Hyperplastic colon polyp 08/2006    IgA monoclonal gammopathy     IgA kappa    Infection due to 2019 novel coronavirus 10/13/2022    Infection due to 2019 novel coronavirus 11/2021    Lattice degeneration of retina right    Eye    Macular degeneration     Nonsenile cataract     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    [place] consult    Rosacea     Steroid-induced diabetes mellitus, 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    Strabismus     Vitreous detachment left    Eye     Past Surgical History:   Procedure Laterality Date    ARTHROSCOPY KNEE RT/LT Left Jan 2010    Knee    BIOPSY  2009/2013/2016    Nose; Prostate; BCC - left arm    COLONOSCOPY  9/24/2019    w/Endoscopy    COLONOSCOPY WITH CO2 INSUFFLATION N/A 9/24/2019     Procedure: COLONOSCOPY, WITH CO2 INSUFFLATION;  Surgeon: Loi Levine MD;  Location: [facility] OR    COMBINED ESOPHAGOSCOPY, GASTROSCOPY, DUODENOSCOPY (EGD) WITH CO2 INSUFFLATION N/A 9/24/2019    Procedure: ESOPHAGOGASTRODUODENOSCOPY, WITH CO2 INSUFFLATION;  Surgeon: Loi Levine MD;  Location: [facility] OR    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    DISKECTOMY, LUMBAR, SINGLE SP  2003    L2-3    ENT SURGERY  1943    Tonsils     ENT SURGERY  2-22-12    Biopsy lesion right pinna    ENT SURGERY  2-24-12    Biopsy leson right pinna    ESOPHAGOSCOPY, GASTROSCOPY, DUODENOSCOPY (EGD), COMBINED N/A 9/24/2019    Procedure: Esophagogastroduodenoscopy, With Biopsy;  Surgeon: Loi Levine MD;  Location: [facility] OR    IR LUMBAR DRAIN PLACEMENT W FLUORO  6/27/2022    LAMINOPLASTY CERVICAL POSTERIOR THREE+ LEVELS N/A 12/12/2022    Procedure: POSTERIOR APPROACH Cervical 4-7 laminoplasty;  Surgeon: Judie Levin MD;  Location: [facility] OR    LAPAROSCOPIC ASSISTED IMPLANT SHUNT VENTRICULOPERITONEAL N/A 7/7/2022    Procedure: possible INSERTION, SHUNT, VENTRICULOPERITONEAL, LAPAROSCOPY-ASSISTED;  Surgeon: Joe Damon MD;  Location: [facility] OR    OPTICAL TRACKING SYSTEM IMPLANT SHUNT VENTRICULOPERITONEAL N/A 7/7/2022    Procedure: INSERTION, SHUNT, VENTRICULOPERITONEAL, USING OPTICAL TRACKING SYSTEM;  Surgeon: Jean-Paul Childs MD;  Location: [facility] OR    SOFT TISSUE SURGERY  May 2010    Basal Cell/right side nose       Social History:  Social History     Socioeconomic History    Marital status:      Spouse name: Ceci    Number of children: 2    Years of education: 16    Highest education level: Not on file   Occupational History    Occupation:      Employer: RETIRED     Comment: 2001,    Tobacco Use    Smoking status: Never     Passive exposure: Never    Smokeless tobacco: Never    Tobacco comments:     Never   Vaping Use    Vaping status: Never Used   Substance and Sexual Activity     Alcohol use: No    Drug use: No    Sexual activity: Never   Other Topics Concern    Parent/sibling w/ CABG, MI or angioplasty before 65F 55M? No   Social History Narrative    Not on file     Social Determinants of Health     Financial Resource Strain: Low Risk  (9/26/2023)    Financial Resource Strain     Within the past 12 months, have you or your family members you live with been unable to get utilities (heat, electricity) when it was really needed?: No   Food Insecurity: Low Risk  (9/26/2023)    Food Insecurity     Within the past 12 months, did you worry that your food would run out before you got money to buy more?: No     Within the past 12 months, did the food you bought just not last and you didn t have money to get more?: No   Transportation Needs: Low Risk  (9/26/2023)    Transportation Needs     Within the past 12 months, has lack of transportation kept you from medical appointments, getting your medicines, non-medical meetings or appointments, work, or from getting things that you need?: No   Physical Activity: Not on file   Stress: Not on file   Social Connections: Unknown (12/22/2021)    [facility]    Social Connections     Frequency of Communication with Friends and Family: Not on file   Interpersonal Safety: Low Risk  (10/3/2023)    Interpersonal Safety     Do you feel physically and emotionally safe where you currently live?: Yes     Within the past 12 months, have you been hit, slapped, kicked or otherwise physically hurt by someone?: No     Within the past 12 months, have you been humiliated or emotionally abused in other ways by your partner or ex-partner?: No   Housing Stability: Low Risk  (9/26/2023)    Housing Stability     Do you have housing? : Yes     Are you worried about losing your housing?: No       Family History:  Family History   Problem Relation Age of Onset    Heart Disease Mother      "Alzheimer Disease Mother 73    C.A.D. Father     Coronary Artery Disease Father     Diabetes Grandchild         using a pump     Diabetes Paternal Uncle     Heart Disease Paternal Grandmother     Glaucoma No family hx of     Macular Degeneration No family hx of     Melanoma No family hx of     Skin Cancer No family hx of        Review of Systems:  A complete review of systems reviewed by me is negative with the exeption of what has been mentioned in the history of present illness.  In the last TWO WEEKS have you experienced any of the following symptoms?  Fevers: No  Night Sweats: No  Weight Gain: No  Pain at Night: No  Double Vision: Yes  Changes in Vision: No  Difficulty Breathing through Nose: No  Sore Throat in Morning: No  Dry Mouth in the Morning: Yes  Shortness of Breath Lying Flat: No  Shortness of Breath With Activity: Yes  Awakening with Shortness of Breath: No  Increased Cough: Yes  Heart Racing at Night: No  Swelling in Feet or Legs: Yes  Diarrhea at Night: No  Heartburn at Night: No  Urinating More than Once at Night: Yes  Losing Control of Urine at Night: No  Joint Pains at Night: Yes  Headaches in Morning: No  Weakness in Arms or Legs: Yes  Depressed Mood: No  Anxiety: No     Physical Examination:  Vitals: /76   Pulse 86   Resp 16   Ht 1.702 m (5' 7\")   Wt 78.5 kg (173 lb)   SpO2 96%   BMI 27.10 kg/m    BMI= Body mass index is 27.1 kg/m .    Neck Cir (cm): 43 cm      GENERAL APPEARANCE: healthy, alert, no distress, and cooperative  EYES: Eyes grossly normal to inspection, PERRL, conjunctivae and sclerae normal, and lids and lashes normal  HENT: nose and mouth without ulcers or lesions, uvula elongated, tongue base enlarged, oral mucous membranes moist, and normal cephalic/atraumatic  NECK: no adenopathy, no asymmetry, masses, or scars, thyroid normal to palpation, and trachea midline and normal to palpation  RESP: lungs clear to auscultation - no rales, rhonchi or wheezes  CV: regular " "rates and rhythm, normal S1 S2, no S3 or S4, and no murmur, click or rub  ABDOMEN: bowel sounds normal and soft, non-tender  MS: extremities normal- no gross deformities noted and uses a wheeled walker for stability with ambulation  NEURO: Normal strength and tone, mentation intact, and speech normal  PSYCH: mentation appears normal and affect normal/bright  Mallampati Class: II.  Tonsillar Stage: 0  surgically removed.         Data: All pertinent previous laboratory data reviewed     Recent Labs   Lab Test 06/04/24  1201 01/31/24  1030    143   POTASSIUM 4.8 4.1   CHLORIDE 107 108*   CO2 23 26   ANIONGAP 11 9   GLC 99 81   BUN 25.3* 20.8   CR 0.93 1.05   TRISTIAN 9.6 9.0       Recent Labs   Lab Test 06/04/24  1201   WBC 12.0*   RBC 4.93   HGB 14.6   HCT 44.3   MCV 90   MCH 29.6   MCHC 33.0   RDW 13.2          Recent Labs   Lab Test 06/04/24  1201   PROTTOTAL 6.8   ALBUMIN 3.9   BILITOTAL 0.3   ALKPHOS 103   AST 32   ALT 28       TSH   Date Value   06/04/2024 0.01 uIU/mL (L)   10/03/2023 2.51 uIU/mL   03/31/2022 1.50 mU/L   09/03/2019 1.94 mU/L   08/15/2019 1.90 mU/L       No results found for: \"UAMP\", \"UBARB\", \"BENZODIAZEUR\", \"UCANN\", \"UCOC\", \"OPIT\", \"UPCP\"    Iron Saturation Index   Date/Time Value Ref Range Status   09/22/2020 01:50 PM 14 (L) 15 - 46 % Final     Iron Sat Index   Date/Time Value Ref Range Status   02/15/2022 10:57 AM 24 15 - 46 % Final     Ferritin   Date/Time Value Ref Range Status   12/05/2022 05:38 PM 61 31 - 409 ng/mL Final   09/22/2020 01:50 PM 24 (L) 26 - 388 ng/mL Final       No results found for: \"PH\", \"PHARTERIAL\", \"PO2\", \"YP4YXUNBAHE\", \"SAT\", \"PCO2\", \"HCO3\", \"BASEEXCESS\", \"IRIS\", \"BEB\"    @LABRCNTIPR(phv:4,pco2v:4,po2v:4,hco3v:4,syd:4,o2per:4)@    Echocardiology: No results found for this or any previous visit (from the past 4320 hour(s)).    Chest x-ray:   XR Chest 2 Views 08/11/2023    Narrative  XR CHEST 2 VIEWS 8/11/2023 11:16 AM    HISTORY: Upper respiratory tract infection, " "unspecified type    COMPARISON: 1/31/2022    FINDINGS: Similar appearance to prior. No consolidation. Probable  nodular scarring in the right upper lung. No pleural effusions or  pneumothorax. Unchanged cardiac size. Right  shunt tubing.    ZAID SUTTON MD      SYSTEM ID:  [id number]      Chest CT:   No results found for this or any previous visit from the past 365 days.      PFT: Most Recent [facility] Pulmonary Function Testing    No results found for: \"20001\"  No results found for: \"20002\"  No results found for: \"20003\"  No results found for: \"20015\"  No results found for: \"20016\"  No results found for: \"20027\"  No results found for: \"20028\"  No results found for: \"20029\"  No results found for: \"20079\"  No results found for: \"20080\"  No results found for: \"20081\"  No results found for: \"20335\"  No results found for: \"20105\"  No results found for: \"20053\"  No results found for: \"20054\"  No results found for: \"20055\"      CORINA Rouse CNP 6/10/2024   Sleep Medicine      This note was written with the assistance of the Dragon voice-dictation technology software. The final document, although reviewed, may contain errors. For corrections, please contact the office.          "

## 2024-06-12 ENCOUNTER — ANCILLARY PROCEDURE (OUTPATIENT)
Dept: CT IMAGING | Facility: CLINIC | Age: 84
End: 2024-06-12
Attending: PHYSICIAN ASSISTANT
Payer: MEDICARE

## 2024-06-12 DIAGNOSIS — R63.4 WEIGHT LOSS: ICD-10-CM

## 2024-06-12 PROCEDURE — G1010 CDSM STANSON: HCPCS | Performed by: RADIOLOGY

## 2024-06-12 PROCEDURE — 71250 CT THORAX DX C-: CPT | Mod: TC | Performed by: RADIOLOGY

## 2024-06-12 PROCEDURE — 74176 CT ABD & PELVIS W/O CONTRAST: CPT | Mod: TC | Performed by: RADIOLOGY

## 2024-06-25 NOTE — CONFIDENTIAL NOTE
RECORDS RECEIVED FROM: internal    DATE RECEIVED: 6.26.24    NOTES (FOR ALL VISITS) STATUS DETAILS   OFFICE NOTES from referring provider internal    Winston Silverman PA-C      MEDICATION LIST internal     IMAGING      DEXASCAN internal  5.30.23   XR (Chest) internal  8.11.24   CT (HEAD/NECK/CHEST/ABDOMEN) internal  6.12.24, 5.31.24, 12.8.22, 10.15.22, 7/7/22   LABS     DIABETES: HBGA1C, CREATININE, FASTING LIPIDS, MICROALBUMIN URINE, POTASSIUM, TSH, T4    THYROID: TSH, T4, CBC, THYRODLONULIN, TOTAL T3, FREE T4, CALCITONIN, CEA internal  TSH/T4- 6.4.24    Cbc- 6.4.24    CMP- 6.4.24    HBGA1C- 1.31.24   Lipid- 1.31.24  Bmp- 10.3.23  Glucose- 12.15.22

## 2024-06-26 ENCOUNTER — PRE VISIT (OUTPATIENT)
Dept: ENDOCRINOLOGY | Facility: CLINIC | Age: 84
End: 2024-06-26

## 2024-06-26 ENCOUNTER — OFFICE VISIT (OUTPATIENT)
Dept: ENDOCRINOLOGY | Facility: CLINIC | Age: 84
End: 2024-06-26
Attending: PHYSICIAN ASSISTANT
Payer: MEDICARE

## 2024-06-26 ENCOUNTER — LAB (OUTPATIENT)
Dept: LAB | Facility: CLINIC | Age: 84
End: 2024-06-26
Payer: MEDICARE

## 2024-06-26 VITALS
HEIGHT: 67 IN | HEART RATE: 82 BPM | OXYGEN SATURATION: 95 % | WEIGHT: 171 LBS | DIASTOLIC BLOOD PRESSURE: 68 MMHG | BODY MASS INDEX: 26.84 KG/M2 | SYSTOLIC BLOOD PRESSURE: 97 MMHG

## 2024-06-26 DIAGNOSIS — E05.90 HYPERTHYROIDISM: ICD-10-CM

## 2024-06-26 LAB
T4 FREE SERPL-MCNC: 1.75 NG/DL (ref 0.9–1.7)
TSH SERPL DL<=0.005 MIU/L-ACNC: 0.02 UIU/ML (ref 0.3–4.2)

## 2024-06-26 PROCEDURE — 99000 SPECIMEN HANDLING OFFICE-LAB: CPT | Performed by: PATHOLOGY

## 2024-06-26 PROCEDURE — 84443 ASSAY THYROID STIM HORMONE: CPT | Performed by: PATHOLOGY

## 2024-06-26 PROCEDURE — G2211 COMPLEX E/M VISIT ADD ON: HCPCS | Performed by: STUDENT IN AN ORGANIZED HEALTH CARE EDUCATION/TRAINING PROGRAM

## 2024-06-26 PROCEDURE — 99205 OFFICE O/P NEW HI 60 MIN: CPT | Performed by: STUDENT IN AN ORGANIZED HEALTH CARE EDUCATION/TRAINING PROGRAM

## 2024-06-26 PROCEDURE — 84439 ASSAY OF FREE THYROXINE: CPT | Performed by: PATHOLOGY

## 2024-06-26 PROCEDURE — 36415 COLL VENOUS BLD VENIPUNCTURE: CPT | Performed by: PATHOLOGY

## 2024-06-26 PROCEDURE — 84480 ASSAY TRIIODOTHYRONINE (T3): CPT | Performed by: STUDENT IN AN ORGANIZED HEALTH CARE EDUCATION/TRAINING PROGRAM

## 2024-06-26 PROCEDURE — 86376 MICROSOMAL ANTIBODY EACH: CPT | Performed by: PHYSICIAN ASSISTANT

## 2024-06-26 PROCEDURE — 84445 ASSAY OF TSI GLOBULIN: CPT | Mod: 90 | Performed by: PATHOLOGY

## 2024-06-26 PROCEDURE — 86800 THYROGLOBULIN ANTIBODY: CPT | Performed by: PHYSICIAN ASSISTANT

## 2024-06-26 PROCEDURE — 83520 IMMUNOASSAY QUANT NOS NONAB: CPT | Mod: 90 | Performed by: PATHOLOGY

## 2024-06-26 ASSESSMENT — PAIN SCALES - GENERAL: PAINLEVEL: NO PAIN (0)

## 2024-06-26 NOTE — LETTER
6/26/2024       RE: Gustavo Ramon  [address] [facility] [address]     Dear Colleague,    Thank you for referring your patient, Gustavo Ramon, to the [facility] [place] at [facility]. Please see a copy of my visit note below.    Endocrinology Clinic Visit 6/26/2024    NAME:  Gustavo Ramon  PCP:  Winston Silverman  MRN:  [id number]  Reason for Consult:  hyperthyroidism  Requesting Provider:  Winston Silverman    Chief Complaint     Chief Complaint   Patient presents with    Thyroid Problem     Hyperthyroidism       History of Present Illness     Gustavo Ramon is a 84 year old male who is seen in clinic for new onset hyperthyroidism.    Sx of fatigue and generalized weakness since 3/2024, decreased appetite. Lost 20 lbs unintentional in few month.  She denied any tremor, no palpitation, no chest pain, sometimes OSB in the morning after putting clothes on.  No new GI sx, no change in bowel habits.   Intermittent hoarseness, chronic.  No new eye sx.    He reported URI end of March, recovered well.      No anterior neck sx, no difficulty swallowing or SOB when lying down.  No new meds, no recent contrast, no amiodarone or lithium.  Not aware of thyroid disease in the family.  No hx of radiation exposure.     Latest Ref Rng 10/3/2023  2:11 PM 6/4/2024  12:01 PM   ENDO THYROID LABS-[facility]      TSH 0.30 - 4.20 uIU/mL 2.51  0.01 (L)    FREE T4 0.90 - 1.70 ng/dL  2.59 (H)          Problem List     Patient Active Problem List   Diagnosis    Rosacea    DJD (degenerative joint disease)    Ocular myasthenia gravis (H)    Macular pigment deposit    HYPERLIPIDEMIA LDL GOAL <130    MGUS (monoclonal gammopathy of unknown significance)    Retinal hole OS, operculated    Horseshoe tear of retina without detachment OD    History  of basal cell carcinoma    Seborrhea    AK (actinic keratosis)    Malignant neoplasm of prostate  (H)    PVD (posterior vitreous detachment)    Amaurosis fugax by Hx neg carotid W/U    Actinic keratosis    Peripheral neuropathy    Nuclear sclerosis of both eyes    Essential hypertension with goal blood pressure less than 140/90    Gastroesophageal reflux disease without esophagitis    Stage 3a chronic kidney disease (H)    Secondary adrenocortical insufficiency (H24)    Physical deconditioning    NPH (normal pressure hydrocephalus) (H)    Myoclonus    Infection due to 2019 novel coronavirus    Encephalopathy    Dementia without behavioral disturbance, unspecified dementia type    Idiopathic normal pressure hydrocephalus (H)    Acute atopic conjunctivitis    Cancer of the skin, basal cell    COVID-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    Degeneration of intervertebral disc    Diverticulitis of intestine    Dysphagia    Gait instability    Hip pain, acute, left    History of osteoporosis    Kidney disorder    Pneumonia due to COVID-19 virus    Restless leg syndrome due to iron deficiency anemia    Sepsis due to COVID-19 (H)    Tear of medial cartilage or meniscus of knee, current    Urinary frequency    Vitamin D deficiency    Weakness of both lower extremities    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)        Medications     Current Outpatient Medications   Medication Sig Dispense Refill    ACE/ARB/ARNI NOT PRESCRIBED (INTENTIONAL) Please choose reason not prescribed from choices below.      acetaminophen (TYLENOL) 325 MG tablet Take 1-2 tablets (325-650 mg) by mouth every 4 hours as needed for mild pain      aspirin (ASA) 325 MG tablet Take 1 tablet (325 mg) by mouth daily      cyanocobalamin (VITAMIN B-12) 1000 MCG tablet Take 1 tablet (1,000 mcg) by mouth daily 90 tablet 1    fluticasone (FLONASE) 50 MCG/ACT nasal spray Spray 1 spray into both nostrils daily 11.1 mL 0    furosemide (LASIX) 20 MG tablet TAKE ONE-HALF TABLET BY MOUTH EVERY DAY 45 tablet 1    gabapentin (NEURONTIN) 300 MG capsule 1 capsule in the morning, 1 mid  day, and 3 in the evening 360 capsule 3    guaiFENesin (MUCINEX) 600 MG 12 hr tablet Take 2 tablets (1,200 mg) by mouth 2 times daily 20 tablet 0    ipratropium (ATROVENT) 0.06 % nasal spray Spray 2 sprays into both nostrils 4 times daily 15 mL 4    ketoconazole (NIZORAL) 2 % external cream Apply twice daily for 8 weeks from knees down to both legs and feet. 120 g 3    levETIRAcetam (KEPPRA) 250 MG tablet TAKE 1 TABLET BY MOUTH TWO TIMES A DAY 60 tablet PRN    meclizine (ANTIVERT) 25 MG tablet Take 0.5-1 tablets (12.5-25 mg) by mouth 3 times daily as needed for dizziness 30 tablet 0    methocarbamol (ROBAXIN) 500 MG tablet Take 1 tablet (500 mg) by mouth every 6 hours as needed for muscle spasms      multivitamin (OCUVITE) TABS tablet Take 1 tablet by mouth daily      pantoprazole (PROTONIX) 40 MG EC tablet Take 1 tablet (40 mg) by mouth daily 90 tablet 3    polyethylene glycol (MIRALAX) 17 GM/Dose powder Take 17 g by mouth daily as needed for constipation 510 g     pramipexole (MIRAPEX) 0.25 MG tablet Take 2 tabs in the AM and 3 tabs qhs      pyRIDostigmine (MESTINON) 60 MG tablet TAKE ONE AND ONE-HALF TABLETS BY MOUTH TWICE A DAY 90 tablet PRN    senna-docusate (SENOKOT-S/PERICOLACE) 8.6-50 MG tablet Take 1 tablet by mouth 2 times daily 60 tablet 0    simvastatin (ZOCOR) 20 MG tablet TAKE ONE TABLET BY MOUTH AT BEDTIME 90 tablet 1    tamsulosin (FLOMAX) 0.4 MG capsule Take 1 capsule (0.4 mg) by mouth daily      terbinafine (LAMISIL) 1 % external cream Apply topically 2 times daily 42 g 11    triamcinolone (KENALOG) 0.1 % external cream Apply a thin layer up to twice daily to affected areas as needed. 30 g 3    trospium (SANCTURA) 20 MG tablet Take 1 tablet (20 mg) by mouth 2 times daily (before meals) 180 tablet 3    vitamin D3 (CHOLECALCIFEROL) 2000 units tablet Take 1 tablet by mouth daily 90 tablet 1    zolpidem (AMBIEN) 5 MG tablet Take tablet by mouth 15 minutes prior to sleep, for Sleep Study 1 tablet 0      No current facility-administered medications for this visit.        Allergies     Allergies   Allergen Reactions    Mycophenolate Other (See Comments)     Confusion, abnormal movements       Medical / Surgical History     Past Medical History:   Diagnosis Date    Actinic keratosis     AK (actinic keratosis) 11/15/2012    Amaurosis fugax     Basal cell carcinoma 2009    R medial cheek/nose    Central serous retinopathy left    Eye    Diverticulosis 08/2006    DJD (degenerative joint disease)     GERD (gastroesophageal reflux disease)     Hearing loss     High frequency    History of nonmelanoma skin cancer     History of nonmelanoma skin cancer     HTN (hypertension)     Hyperlipidemia LDL goal < 130     Hyperplastic colon polyp 08/2006    IgA monoclonal gammopathy     IgA kappa    Infection due to 2019 novel coronavirus 10/13/2022    Infection due to 2019 novel coronavirus 11/2021    Lattice degeneration of retina right    Eye    Macular degeneration     Nonsenile cataract     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    [facility] consult    Rosacea     Steroid-induced diabetes mellitus, 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    Strabismus     Vitreous detachment left    Eye     Past Surgical History:   Procedure Laterality Date    ARTHROSCOPY KNEE RT/LT Left Jan 2010    Knee    BIOPSY  2009/2013/2016    Nose; Prostate; BCC - left arm    COLONOSCOPY  9/24/2019    w/Endoscopy    COLONOSCOPY WITH CO2 INSUFFLATION N/A 9/24/2019    Procedure: COLONOSCOPY, WITH CO2 INSUFFLATION;  Surgeon: Loi Levine MD;  Location: [facility] OR    COMBINED ESOPHAGOSCOPY, GASTROSCOPY, DUODENOSCOPY (EGD) WITH CO2 INSUFFLATION N/A 9/24/2019    Procedure: ESOPHAGOGASTRODUODENOSCOPY, WITH CO2 INSUFFLATION;  Surgeon: Loi Levine MD;  Location: [facility] OR    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    DISKECTOMY, LUMBAR, SINGLE SP  2003    L2-3    ENT SURGERY  1943    Tonsils     ENT SURGERY  2-22-12    Biopsy lesion right pinna    ENT SURGERY   2-24-12    Biopsy leson right pinna    ESOPHAGOSCOPY, GASTROSCOPY, DUODENOSCOPY (EGD), COMBINED N/A 9/24/2019    Procedure: Esophagogastroduodenoscopy, With Biopsy;  Surgeon: Loi Levine MD;  Location: [facility] OR    IR LUMBAR DRAIN PLACEMENT W FLUORO  6/27/2022    LAMINOPLASTY CERVICAL POSTERIOR THREE+ LEVELS N/A 12/12/2022    Procedure: POSTERIOR APPROACH Cervical 4-7 laminoplasty;  Surgeon: Judie Levin MD;  Location: [facility] OR    LAPAROSCOPIC ASSISTED IMPLANT SHUNT VENTRICULOPERITONEAL N/A 7/7/2022    Procedure: possible INSERTION, SHUNT, VENTRICULOPERITONEAL, LAPAROSCOPY-ASSISTED;  Surgeon: Joe Damon MD;  Location: [facility] OR    OPTICAL TRACKING SYSTEM IMPLANT SHUNT VENTRICULOPERITONEAL N/A 7/7/2022    Procedure: INSERTION, SHUNT, VENTRICULOPERITONEAL, USING OPTICAL TRACKING SYSTEM;  Surgeon: Jean-Paul Childs MD;  Location: [facility] OR    SOFT TISSUE SURGERY  May 2010    Basal Cell/right side nose       Social History     Social History     Socioeconomic History    Marital status:      Spouse name: Ceci    Number of children: 2    Years of education: 16    Highest education level: Not on file   Occupational History    Occupation:      Employer: RETIRED     Comment: 2001,    Tobacco Use    Smoking status: Never     Passive exposure: Never    Smokeless tobacco: Never    Tobacco comments:     Never   Vaping Use    Vaping status: Never Used   Substance and Sexual Activity    Alcohol use: No    Drug use: No    Sexual activity: Never   Other Topics Concern    Parent/sibling w/ CABG, MI or angioplasty before 65F 55M? No   Social History Narrative    Not on file     Social Determinants of Health     Financial Resource Strain: Low Risk  (9/26/2023)    Financial Resource Strain     Within the past 12 months, have you or your family members you live with been unable to get utilities (heat, electricity) when it was really needed?: No   Food Insecurity: Low Risk  (9/26/2023)  "   Food Insecurity     Within the past 12 months, did you worry that your food would run out before you got money to buy more?: No     Within the past 12 months, did the food you bought just not last and you didn t have money to get more?: No   Transportation Needs: Low Risk  (9/26/2023)    Transportation Needs     Within the past 12 months, has lack of transportation kept you from medical appointments, getting your medicines, non-medical meetings or appointments, work, or from getting things that you need?: No   Physical Activity: Not on file   Stress: Not on file   Social Connections: Unknown (12/22/2021)    Received from [facility], [email or web address] [facility]    Social Connections     Frequency of Communication with Friends and Family: Not on file   Interpersonal Safety: Low Risk  (10/3/2023)    Interpersonal Safety     Do you feel physically and emotionally safe where you currently live?: Yes     Within the past 12 months, have you been hit, slapped, kicked or otherwise physically hurt by someone?: No     Within the past 12 months, have you been humiliated or emotionally abused in other ways by your partner or ex-partner?: No   Housing Stability: Low Risk  (9/26/2023)    Housing Stability     Do you have housing? : Yes     Are you worried about losing your housing?: No       Family History     Family History   Problem Relation Age of Onset    Heart Disease Mother     Alzheimer Disease Mother 73    C.A.D. Father     Coronary Artery Disease Father     Diabetes Grandchild         using a pump     Diabetes Paternal Uncle     Heart Disease Paternal Grandmother     Glaucoma No family hx of     Macular Degeneration No family hx of     Melanoma No family hx of     Skin Cancer No family hx of        ROS     12 ROS completed, pertinent positive and negative in HPI    Physical Exam   BP 97/68   Pulse 82   Ht 1.702 m (5' 7\")   Wt 77.6 kg (171 lb)   SpO2 95%   BMI " "26.78 kg/m       General: Comfortable, no obvious distress, normal body habitus  Eyes: Sclera anicteric, moist conjunctiva  HENT: Atraumatic, oropharynx clear, moist mucous membranes with no mucosal ulcerations  Neck: Trachea midline, supple. Thyroid: Thyroid non palpable  CV: normal rate.   Resp:  good effort, no evidence of loud wheezing  Abdomen:  obese, non distended.   Skin: No rashes, lesions, or subcutaneous nodules on exposed skin.   Psych: Alert and oriented x 3. Appropriate affect, good insight  Extremities: No peripheral edema  Musculoskeletal: Appropriate muscle bulk and strength  Neuro: Moves all four extremities. No focal deficits on limited exam. Uses a walker.  Has mild tremor on extended hands L>R    Labs/Imaging     Pertinent Labs were reviewed and updated in EPIC and discussed briefly.  Radiology Results were  reviewed and updated in EPIC, reviewed, and discussed briefly.    Summary of recent findings:   Lab Results   Component Value Date    A1C 5.8 01/31/2024    A1C 6.2 12/05/2022    A1C 5.2 02/07/2022    A1C 5.5 01/17/2022    A1C 5.7 05/19/2021    A1C 5.8 11/09/2020       TSH   Date Value Ref Range Status   06/04/2024 0.01 (L) 0.30 - 4.20 uIU/mL Final   10/03/2023 2.51 0.30 - 4.20 uIU/mL Final   03/31/2022 1.50 0.40 - 4.00 mU/L Final   09/03/2019 1.94 0.40 - 4.00 mU/L Final   08/15/2019 1.90 0.40 - 4.00 mU/L Final   06/20/2017 1.24 0.40 - 4.00 mU/L Final   02/18/2014 1.27 0.4 - 5.0 mU/L Final     Free T4   Date Value Ref Range Status   06/04/2024 2.59 (H) 0.90 - 1.70 ng/dL Final       Creatinine   Date Value Ref Range Status   06/04/2024 0.93 0.67 - 1.17 mg/dL Final   05/19/2021 1.18 0.66 - 1.25 mg/dL Final       Recent Labs   Lab Test 01/31/24  1030 01/31/22  1357   CHOL 159 208*   HDL 57 79   LDL 82 92   TRIG 99 183*       No results found for: \"[id number]\", \"[id number]\", \"[id number]\"    I personally reviewed the patient's outside records from [facility] EMR and Care Everywhere. Summary of " pertinent findings in HPI.    Impression / Plan     Hyperthyroidism    Based on the lab profile, the patient has hyperthyroidism, as evidenced by elevated thyroid hormone levels. Since TSH is low, this points to the diagnosis of primary hyperthyroidism.     The differential diagnosis of primary hyperthyroidism includes 3 main etiologies: Graves  disease, Toxic nodular goiter (single or multiple nodules), and Thyroiditis. The most common etiology by far is Graves  disease, which is an auto-immune process that results in activation of the thyroid gland, usually with elevated thyroid stimulating hormone (TSI) and resultant goiter with increased avidity to iodine. This manifests as high and diffuse uptake on radioactive (or I-123) iodine uptake and scan.  People with Graves disease are more likely to have other autoimmune diseases or a family propensity for autoimmune (including, but not restricted to, thyroid disease).  In 8 to 10% of cases the autoimmune process can also affect the eyes (exophtalmos). This can occur several years after hyperthyroidism is diagnosed. The eye involvement is usually mild but in some cases it requires medical or surgical treatment. Unlike the diffuse nature of Graves  disease, a toxic nodule would appear as a focal uptake on the I-123 scan. In thyroiditis, there is an inflammatory destructive process involving the thyroid gland, which could be infectious or auto-immune. It results in leakage of thyroid hormone into the circulation, but a very low uptake on the scan.   Thus in order to establish the etiology, we need the following:  I-123 uptake and scan  TSI level    Today we discussed treatment options for Graves  disease. Specifically, we discussed radioactive iodine ablation (I-131 ablation) as one option, which is likely to result in permanent hypothyroidism. In a minority of cases, one dose of I-131 is not enough and more doses may be required. Also in a minority, radiation  thyroiditis might occur pos-treatment but is usually self-limited. Another approach is anti-thyroid medications which decrease thyroid hormone production, specifically methimazole and PTU. We discussed the potential rare side effects of methimazole, including hepatotoxicity and agranulocytosis, and the need for frequent lab monitoring, and the need for urgent CBC in case the patient develops a high fever or flu-like illness. A third and less favorable options (last resort) is surgery (i.e. thyroidectomy). Surgery cannot be done unless the hyperthyroidism is controlled because of the risk for thyroid storm. Risks of surgery include, the risks of general anesthesia, bleeding, infection, blood clots, etc. Risks specific to surgery of the thyroid include hoarseness due to recurrent laryngeal nerve injury and damage to the parathyroid glands causing low calcium levels. Most patients decline surgery as it is more invasive.       Plan:  - labs below  - thyroid uptake and scan  - he is indecisive about ATD vs RAIA, he will think about it and get back to me after we complete the work up below.    Test and/or medications prescribed today:  Orders Placed This Encounter   Procedures    NM Thyroid uptake and scan    TSH    T4 free    T3 total    Thyroid stimulating immunoglobulin    Thyrotropin Receptor Antibody         Follow up: 2-3 m    60 minutes spent on the date of the encounter doing chart review, history and exam, documentation and further activities as noted above.     The longitudinal plan of care for the diagnosis(es)/condition(s) as documented were addressed during this visit. Due to the added complexity in care, I will continue to support Stephen in the subsequent management and with ongoing continuity of care.      Jackie Holland MD  Endocrinology, Diabetes and Metabolism  [facility]    Review of the result(s) of each unique test -  60 minutes spent on the date of the encounter doing chart review,  history and exam, documentation and further activities per the note

## 2024-06-26 NOTE — PROGRESS NOTES
Endocrinology Clinic Visit 6/26/2024    NAME:  Gustavo Ramon  PCP:  Winston Silverman  MRN:  [id number]  Reason for Consult:  hyperthyroidism  Requesting Provider:  Winston Silverman    Chief Complaint     Chief Complaint   Patient presents with    Thyroid Problem     Hyperthyroidism       History of Present Illness     Gustavo Ramon is a 84 year old male who is seen in clinic for new onset hyperthyroidism.    Sx of fatigue and generalized weakness since 3/2024, decreased appetite. Lost 20 lbs unintentional in few month.  She denied any tremor, no palpitation, no chest pain, sometimes OSB in the morning after putting clothes on.  No new GI sx, no change in bowel habits.   Intermittent hoarseness, chronic.  No new eye sx.    He reported URI end of March, recovered well.      No anterior neck sx, no difficulty swallowing or SOB when lying down.  No new meds, no recent contrast, no amiodarone or lithium.  Not aware of thyroid disease in the family.  No hx of radiation exposure.     Latest Ref Rng 10/3/2023  2:11 PM 6/4/2024  12:01 PM   [facility] THYROID [facility]      TSH 0.30 - 4.20 uIU/mL 2.51  0.01 (L)    FREE T4 0.90 - 1.70 ng/dL  2.59 (H)          Problem List     Patient Active Problem List   Diagnosis    Rosacea    DJD (degenerative joint disease)    Ocular myasthenia gravis (H)    Macular pigment deposit    HYPERLIPIDEMIA LDL GOAL <130    MGUS (monoclonal gammopathy of unknown significance)    Retinal hole OS, operculated    Horseshoe tear of retina without detachment OD    History  of basal cell carcinoma    Seborrhea    AK (actinic keratosis)    Malignant neoplasm of prostate (H)    PVD (posterior vitreous detachment)    Amaurosis fugax by Hx neg carotid W/U    Actinic keratosis    Peripheral neuropathy    Nuclear sclerosis of both eyes    Essential hypertension with goal blood pressure less than 140/90    Gastroesophageal reflux disease without esophagitis    Stage 3a chronic kidney disease (H)     Secondary adrenocortical insufficiency (H24)    Physical deconditioning    NPH (normal pressure hydrocephalus) (H)    Myoclonus    Infection due to 2019 novel coronavirus    Encephalopathy    Dementia without behavioral disturbance, unspecified dementia type    Idiopathic normal pressure hydrocephalus (H)    Acute atopic conjunctivitis    Cancer of the skin, basal cell    COVID-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    Degeneration of intervertebral disc    Diverticulitis of intestine    Dysphagia    Gait instability    Hip pain, acute, left    History of osteoporosis    Kidney disorder    Pneumonia due to COVID-19 virus    Restless leg syndrome due to iron deficiency anemia    Sepsis due to COVID-19 (H)    Tear of medial cartilage or meniscus of knee, current    Urinary frequency    Vitamin D deficiency    Weakness of both lower extremities    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)        Medications     Current Outpatient Medications   Medication Sig Dispense Refill    ACE/ARB/ARNI NOT PRESCRIBED (INTENTIONAL) Please choose reason not prescribed from choices below.      acetaminophen (TYLENOL) 325 MG tablet Take 1-2 tablets (325-650 mg) by mouth every 4 hours as needed for mild pain      aspirin (ASA) 325 MG tablet Take 1 tablet (325 mg) by mouth daily      cyanocobalamin (VITAMIN B-12) 1000 MCG tablet Take 1 tablet (1,000 mcg) by mouth daily 90 tablet 1    fluticasone (FLONASE) 50 MCG/ACT nasal spray Spray 1 spray into both nostrils daily 11.1 mL 0    furosemide (LASIX) 20 MG tablet TAKE ONE-HALF TABLET BY MOUTH EVERY DAY 45 tablet 1    gabapentin (NEURONTIN) 300 MG capsule 1 capsule in the morning, 1 mid day, and 3 in the evening 360 capsule 3    guaiFENesin (MUCINEX) 600 MG 12 hr tablet Take 2 tablets (1,200 mg) by mouth 2 times daily 20 tablet 0    ipratropium (ATROVENT) 0.06 % nasal spray Spray 2 sprays into both nostrils 4 times daily 15 mL 4    ketoconazole (NIZORAL) 2 % external cream Apply twice daily for 8 weeks from  knees down to both legs and feet. 120 g 3    levETIRAcetam (KEPPRA) 250 MG tablet TAKE 1 TABLET BY MOUTH TWO TIMES A DAY 60 tablet PRN    meclizine (ANTIVERT) 25 MG tablet Take 0.5-1 tablets (12.5-25 mg) by mouth 3 times daily as needed for dizziness 30 tablet 0    methocarbamol (ROBAXIN) 500 MG tablet Take 1 tablet (500 mg) by mouth every 6 hours as needed for muscle spasms      multivitamin (OCUVITE) TABS tablet Take 1 tablet by mouth daily      pantoprazole (PROTONIX) 40 MG EC tablet Take 1 tablet (40 mg) by mouth daily 90 tablet 3    polyethylene glycol (MIRALAX) 17 GM/Dose powder Take 17 g by mouth daily as needed for constipation 510 g     pramipexole (MIRAPEX) 0.25 MG tablet Take 2 tabs in the AM and 3 tabs qhs      pyRIDostigmine (MESTINON) 60 MG tablet TAKE ONE AND ONE-HALF TABLETS BY MOUTH TWICE A DAY 90 tablet PRN    senna-docusate (SENOKOT-S/PERICOLACE) 8.6-50 MG tablet Take 1 tablet by mouth 2 times daily 60 tablet 0    simvastatin (ZOCOR) 20 MG tablet TAKE ONE TABLET BY MOUTH AT BEDTIME 90 tablet 1    tamsulosin (FLOMAX) 0.4 MG capsule Take 1 capsule (0.4 mg) by mouth daily      terbinafine (LAMISIL) 1 % external cream Apply topically 2 times daily 42 g 11    triamcinolone (KENALOG) 0.1 % external cream Apply a thin layer up to twice daily to affected areas as needed. 30 g 3    trospium (SANCTURA) 20 MG tablet Take 1 tablet (20 mg) by mouth 2 times daily (before meals) 180 tablet 3    vitamin D3 (CHOLECALCIFEROL) 2000 units tablet Take 1 tablet by mouth daily 90 tablet 1    zolpidem (AMBIEN) 5 MG tablet Take tablet by mouth 15 minutes prior to sleep, for Sleep Study 1 tablet 0     No current facility-administered medications for this visit.        Allergies     Allergies   Allergen Reactions    Mycophenolate Other (See Comments)     Confusion, abnormal movements       Medical / Surgical History     Past Medical History:   Diagnosis Date    Actinic keratosis     AK (actinic keratosis) 11/15/2012     Amaurosis fugax     Basal cell carcinoma 2009    R medial cheek/nose    Central serous retinopathy left    Eye    Diverticulosis 08/2006    DJD (degenerative joint disease)     GERD (gastroesophageal reflux disease)     Hearing loss     High frequency    History of nonmelanoma skin cancer     History of nonmelanoma skin cancer     HTN (hypertension)     Hyperlipidemia LDL goal < 130     Hyperplastic colon polyp 08/2006    IgA monoclonal gammopathy     IgA kappa    Infection due to 2019 novel coronavirus 10/13/2022    Infection due to 2019 novel coronavirus 11/2021    Lattice degeneration of retina right    Eye    Macular degeneration     Nonsenile cataract     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    [facility] consult    Rosacea     Steroid-induced diabetes mellitus, 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    Strabismus     Vitreous detachment left    Eye     Past Surgical History:   Procedure Laterality Date    ARTHROSCOPY KNEE RT/LT Left Jan 2010    Knee    BIOPSY  2009/2013/2016    Nose; Prostate; BCC - left arm    COLONOSCOPY  9/24/2019    w/Endoscopy    COLONOSCOPY WITH CO2 INSUFFLATION N/A 9/24/2019    Procedure: COLONOSCOPY, WITH CO2 INSUFFLATION;  Surgeon: Loi Levine MD;  Location: [facility] OR    COMBINED ESOPHAGOSCOPY, GASTROSCOPY, DUODENOSCOPY (EGD) WITH CO2 INSUFFLATION N/A 9/24/2019    Procedure: ESOPHAGOGASTRODUODENOSCOPY, WITH CO2 INSUFFLATION;  Surgeon: Loi Levine MD;  Location: [facility] OR    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    DISKECTOMY, LUMBAR, SINGLE SP  2003    L2-3    ENT SURGERY  1943    Tonsils     ENT SURGERY  2-22-12    Biopsy lesion right pinna    ENT SURGERY  2-24-12    Biopsy leson right pinna    ESOPHAGOSCOPY, GASTROSCOPY, DUODENOSCOPY (EGD), COMBINED N/A 9/24/2019    Procedure: Esophagogastroduodenoscopy, With Biopsy;  Surgeon: Loi Levine MD;  Location: [facility] OR    IR LUMBAR DRAIN PLACEMENT W FLUORO  6/27/2022    LAMINOPLASTY CERVICAL POSTERIOR THREE+ LEVELS N/A 12/12/2022     Procedure: POSTERIOR APPROACH Cervical 4-7 laminoplasty;  Surgeon: Judie Levin MD;  Location: [facility] OR    LAPAROSCOPIC ASSISTED IMPLANT SHUNT VENTRICULOPERITONEAL N/A 7/7/2022    Procedure: possible INSERTION, SHUNT, VENTRICULOPERITONEAL, LAPAROSCOPY-ASSISTED;  Surgeon: Joe Damon MD;  Location: [facility] OR    OPTICAL TRACKING SYSTEM IMPLANT SHUNT VENTRICULOPERITONEAL N/A 7/7/2022    Procedure: INSERTION, SHUNT, VENTRICULOPERITONEAL, USING OPTICAL TRACKING SYSTEM;  Surgeon: Jean-Paul Childs MD;  Location: [facility] OR    SOFT TISSUE SURGERY  May 2010    Basal Cell/right side nose       Social History     Social History     Socioeconomic History    Marital status:      Spouse name: Ceci    Number of children: 2    Years of education: 16    Highest education level: Not on file   Occupational History    Occupation:      Employer: RETIRED     Comment: 2001,    Tobacco Use    Smoking status: Never     Passive exposure: Never    Smokeless tobacco: Never    Tobacco comments:     Never   Vaping Use    Vaping status: Never Used   Substance and Sexual Activity    Alcohol use: No    Drug use: No    Sexual activity: Never   Other Topics Concern    Parent/sibling w/ CABG, MI or angioplasty before 65F 55M? No   Social History Narrative    Not on file     Social Determinants of Health     Financial Resource Strain: Low Risk  (9/26/2023)    Financial Resource Strain     Within the past 12 months, have you or your family members you live with been unable to get utilities (heat, electricity) when it was really needed?: No   Food Insecurity: Low Risk  (9/26/2023)    Food Insecurity     Within the past 12 months, did you worry that your food would run out before you got money to buy more?: No     Within the past 12 months, did the food you bought just not last and you didn t have money to get more?: No   Transportation Needs: Low Risk  (9/26/2023)    Transportation Needs     Within the  "past 12 months, has lack of transportation kept you from medical appointments, getting your medicines, non-medical meetings or appointments, work, or from getting things that you need?: No   Physical Activity: Not on file   Stress: Not on file   Social Connections: Unknown (12/22/2021)    [facility]    Social Connections     Frequency of Communication with Friends and Family: Not on file   Interpersonal Safety: Low Risk  (10/3/2023)    Interpersonal Safety     Do you feel physically and emotionally safe where you currently live?: Yes     Within the past 12 months, have you been hit, slapped, kicked or otherwise physically hurt by someone?: No     Within the past 12 months, have you been humiliated or emotionally abused in other ways by your partner or ex-partner?: No   Housing Stability: Low Risk  (9/26/2023)    Housing Stability     Do you have housing? : Yes     Are you worried about losing your housing?: No       Family History     Family History   Problem Relation Age of Onset    Heart Disease Mother     Alzheimer Disease Mother 73    C.A.D. Father     Coronary Artery Disease Father     Diabetes Grandchild         using a pump     Diabetes Paternal Uncle     Heart Disease Paternal Grandmother     Glaucoma No family hx of     Macular Degeneration No family hx of     Melanoma No family hx of     Skin Cancer No family hx of        ROS     12 ROS completed, pertinent positive and negative in HPI    Physical Exam   BP 97/68   Pulse 82   Ht 1.702 m (5' 7\")   Wt 77.6 kg (171 lb)   SpO2 95%   BMI 26.78 kg/m       General: Comfortable, no obvious distress, normal body habitus  Eyes: Sclera anicteric, moist conjunctiva  HENT: Atraumatic, oropharynx clear, moist mucous membranes with no mucosal ulcerations  Neck: Trachea midline, supple. Thyroid: Thyroid non palpable  CV: normal rate.   Resp:  good effort, no evidence of loud " "wheezing  Abdomen:  obese, non distended.   Skin: No rashes, lesions, or subcutaneous nodules on exposed skin.   Psych: Alert and oriented x 3. Appropriate affect, good insight  Extremities: No peripheral edema  Musculoskeletal: Appropriate muscle bulk and strength  Neuro: Moves all four extremities. No focal deficits on limited exam. Uses a walker.  Has mild tremor on extended hands L>R    Labs/Imaging     Pertinent Labs were reviewed and updated in EPIC and discussed briefly.  Radiology Results were  reviewed and updated in EPIC, reviewed, and discussed briefly.    Summary of recent findings:   Lab Results   Component Value Date    A1C 5.8 01/31/2024    A1C 6.2 12/05/2022    A1C 5.2 02/07/2022    A1C 5.5 01/17/2022    A1C 5.7 05/19/2021    A1C 5.8 11/09/2020       TSH   Date Value Ref Range Status   06/04/2024 0.01 (L) 0.30 - 4.20 uIU/mL Final   10/03/2023 2.51 0.30 - 4.20 uIU/mL Final   03/31/2022 1.50 0.40 - 4.00 mU/L Final   09/03/2019 1.94 0.40 - 4.00 mU/L Final   08/15/2019 1.90 0.40 - 4.00 mU/L Final   06/20/2017 1.24 0.40 - 4.00 mU/L Final   02/18/2014 1.27 0.4 - 5.0 mU/L Final     Free T4   Date Value Ref Range Status   06/04/2024 2.59 (H) 0.90 - 1.70 ng/dL Final       Creatinine   Date Value Ref Range Status   06/04/2024 0.93 0.67 - 1.17 mg/dL Final   05/19/2021 1.18 0.66 - 1.25 mg/dL Final       Recent Labs   Lab Test 01/31/24  1030 01/31/22  1357   CHOL 159 208*   HDL 57 79   LDL 82 92   TRIG 99 183*       No results found for: \"[id number]\", \"[id number]\", \"[id number]\"    I personally reviewed the patient's outside records from [facility] EMR and Care Everywhere. Summary of pertinent findings in HPI.    Impression / Plan     Hyperthyroidism    Based on the lab profile, the patient has hyperthyroidism, as evidenced by elevated thyroid hormone levels. Since TSH is low, this points to the diagnosis of primary hyperthyroidism.     The differential diagnosis of primary hyperthyroidism includes 3 main etiologies: " Graves  disease, Toxic nodular goiter (single or multiple nodules), and Thyroiditis. The most common etiology by far is Graves  disease, which is an auto-immune process that results in activation of the thyroid gland, usually with elevated thyroid stimulating hormone (TSI) and resultant goiter with increased avidity to iodine. This manifests as high and diffuse uptake on radioactive (or I-123) iodine uptake and scan.  People with Graves disease are more likely to have other autoimmune diseases or a family propensity for autoimmune (including, but not restricted to, thyroid disease).  In 8 to 10% of cases the autoimmune process can also affect the eyes (exophtalmos). This can occur several years after hyperthyroidism is diagnosed. The eye involvement is usually mild but in some cases it requires medical or surgical treatment. Unlike the diffuse nature of Graves  disease, a toxic nodule would appear as a focal uptake on the I-123 scan. In thyroiditis, there is an inflammatory destructive process involving the thyroid gland, which could be infectious or auto-immune. It results in leakage of thyroid hormone into the circulation, but a very low uptake on the scan.   Thus in order to establish the etiology, we need the following:  I-123 uptake and scan  TSI level    Today we discussed treatment options for Graves  disease. Specifically, we discussed radioactive iodine ablation (I-131 ablation) as one option, which is likely to result in permanent hypothyroidism. In a minority of cases, one dose of I-131 is not enough and more doses may be required. Also in a minority, radiation thyroiditis might occur pos-treatment but is usually self-limited. Another approach is anti-thyroid medications which decrease thyroid hormone production, specifically methimazole and PTU. We discussed the potential rare side effects of methimazole, including hepatotoxicity and agranulocytosis, and the need for frequent lab monitoring, and the need  for urgent CBC in case the patient develops a high fever or flu-like illness. A third and less favorable options (last resort) is surgery (i.e. thyroidectomy). Surgery cannot be done unless the hyperthyroidism is controlled because of the risk for thyroid storm. Risks of surgery include, the risks of general anesthesia, bleeding, infection, blood clots, etc. Risks specific to surgery of the thyroid include hoarseness due to recurrent laryngeal nerve injury and damage to the parathyroid glands causing low calcium levels. Most patients decline surgery as it is more invasive.       Plan:  - labs below  - thyroid uptake and scan  - he is indecisive about ATD vs RAIA, he will think about it and get back to me after we complete the work up below.    Test and/or medications prescribed today:  Orders Placed This Encounter   Procedures    NM Thyroid uptake and scan    TSH    T4 free    T3 total    Thyroid stimulating immunoglobulin    Thyrotropin Receptor Antibody         Follow up: 2-3 m    60 minutes spent on the date of the encounter doing chart review, history and exam, documentation and further activities as noted above.     The longitudinal plan of care for the diagnosis(es)/condition(s) as documented were addressed during this visit. Due to the added complexity in care, I will continue to support Stephen in the subsequent management and with ongoing continuity of care.      Jackie Holland MD  Endocrinology, Diabetes and Metabolism  [facility]    Review of the result(s) of each unique test -  60 minutes spent on the date of the encounter doing chart review, history and exam, documentation and further activities per the note

## 2024-06-26 NOTE — PATIENT INSTRUCTIONS
Based on the lab profile, the patient has hyperthyroidism, as evidenced by elevated thyroid hormone levels. Since TSH is low, this points to the diagnosis of primary hyperthyroidism.     The differential diagnosis of primary hyperthyroidism includes 3 main etiologies: Graves  disease, Toxic nodular goiter (single or multiple nodules), and Thyroiditis. The most common etiology by far is Graves  disease, which is an auto-immune process that results in activation of the thyroid gland, usually with elevated thyroid stimulating hormone (TSI) and resultant goiter with increased avidity to iodine. This manifests as high and diffuse uptake on radioactive (or I-123) iodine uptake and scan.  People with Graves disease are more likely to have other autoimmune diseases or a family propensity for autoimmune (including, but not restricted to, thyroid disease).  In 8 to 10% of cases the autoimmune process can also affect the eyes (exophtalmos). This can occur several years after hyperthyroidism is diagnosed. The eye involvement is usually mild but in some cases it requires medical or surgical treatment. Unlike the diffuse nature of Graves  disease, a toxic nodule would appear as a focal uptake on the I-123 scan. In thyroiditis, there is an inflammatory destructive process involving the thyroid gland, which could be infectious or auto-immune. It results in leakage of thyroid hormone into the circulation, but a very low uptake on the scan.   Thus in order to establish the etiology, we need the following:  I-123 uptake and scan  TSI level    Today we discussed treatment options for Graves  disease and toxic nodule. Specifically, we discussed radioactive iodine ablation (I-131 ablation) as one option, which is likely to result in permanent hypothyroidism. In a minority of cases, one dose of I-131 is not enough and more doses may be required. Also in a minority, radiation thyroiditis might occur pos-treatment but is usually  self-limited. Another approach is anti-thyroid medications which decrease thyroid hormone production, specifically methimazole and PTU. We discussed the potential rare side effects of methimazole, including hepatotoxicity and agranulocytosis, and the need for frequent lab monitoring, and the need for urgent CBC in case the patient develops a high fever or flu-like illness. A third and less favorable options (last resort) is surgery (i.e. thyroidectomy). Surgery cannot be done unless the hyperthyroidism is controlled because of the risk for thyroid storm. Risks of surgery include, the risks of general anesthesia, bleeding, infection, blood clots, etc. Risks specific to surgery of the thyroid include hoarseness due to recurrent laryngeal nerve injury and damage to the parathyroid glands causing low calcium levels. Most patients decline surgery as it is more invasive.

## 2024-06-26 NOTE — NURSING NOTE
"Chief Complaint   Patient presents with    Thyroid Problem     Hyperthyroidism     Vital signs:      BP: 97/68 Pulse: 82     SpO2: 95 %     Height: 170.2 cm (5' 7\") Weight: 77.6 kg (171 lb)  Estimated body mass index is 26.78 kg/m  as calculated from the following:    Height as of this encounter: 1.702 m (5' 7\").    Weight as of this encounter: 77.6 kg (171 lb).        "

## 2024-06-27 LAB
T3 SERPL-MCNC: 127 NG/DL (ref 85–202)
THYROGLOB AB SERPL IA-ACNC: <20 IU/ML
THYROPEROXIDASE AB SERPL-ACNC: 719 IU/ML
TSH RECEP AB SER-ACNC: <1.1 IU/L (ref 0–1.75)

## 2024-06-28 ENCOUNTER — TELEPHONE (OUTPATIENT)
Dept: UROLOGY | Facility: CLINIC | Age: 84
End: 2024-06-28
Payer: MEDICARE

## 2024-06-28 DIAGNOSIS — R39.15 URINARY URGENCY: Primary | ICD-10-CM

## 2024-06-28 RX ORDER — TAMSULOSIN HYDROCHLORIDE 0.4 MG/1
0.4 CAPSULE ORAL DAILY
Qty: 90 CAPSULE | Refills: 1 | Status: SHIPPED | OUTPATIENT
Start: 2024-06-28

## 2024-06-28 NOTE — TELEPHONE ENCOUNTER
Health Call Center    Phone Message    May a detailed message be left on voicemail: yes     Reason for Call: Medication Refill Request    Has the patient contacted the pharmacy for the refill? Yes   Name of medication being requested: Flomax  Provider who prescribed the medication: Jones  Pharmacy:   [facility] - [id number] [facility]     Date medication is needed: today if possible    Pt has been off of med for one month (overlooked it) and is now having urgency/retention issues. Please refill today if possible. Thank you!    Action Taken: Message routed to:  [facility] ([facility]    Travel Screening: Not Applicable     Date of Service:

## 2024-06-28 NOTE — TELEPHONE ENCOUNTER
Patient requesting refill for Flomax. Medication was prescribed by another provider on 4/28/22.  New order pended and will route to MD for review and signature if appropriate.  Conchis Suggs RN on 6/28/2024 at 9:12 AM

## 2024-07-02 ENCOUNTER — OFFICE VISIT (OUTPATIENT)
Dept: NEUROSURGERY | Facility: CLINIC | Age: 84
End: 2024-07-02
Attending: NURSE PRACTITIONER
Payer: MEDICARE

## 2024-07-02 VITALS
DIASTOLIC BLOOD PRESSURE: 76 MMHG | HEART RATE: 89 BPM | SYSTOLIC BLOOD PRESSURE: 120 MMHG | RESPIRATION RATE: 16 BRPM | OXYGEN SATURATION: 98 %

## 2024-07-02 DIAGNOSIS — G91.2 NPH (NORMAL PRESSURE HYDROCEPHALUS) (H): Primary | ICD-10-CM

## 2024-07-02 LAB — TSI SER-ACNC: <1 TSI INDEX

## 2024-07-02 PROCEDURE — 62252 CSF SHUNT REPROGRAM: CPT | Performed by: NURSE PRACTITIONER

## 2024-07-02 PROCEDURE — 99213 OFFICE O/P EST LOW 20 MIN: CPT | Mod: 25 | Performed by: NURSE PRACTITIONER

## 2024-07-02 ASSESSMENT — PAIN SCALES - GENERAL: PAINLEVEL: NO PAIN (0)

## 2024-07-02 NOTE — PATIENT INSTRUCTIONS
Your  shunt setting was changed from 4.0 to 3.0.     Follow-up Head CT in 2 weeks and clinic visit with me after.

## 2024-07-02 NOTE — LETTER
2024       RE: Gustavo Ramon  [address] [facility] [address]       Dear Colleague,    Thank you for referring your patient, Gustavo Ramon, to the [facility] NEUROSURGERY CLINIC [place] at [facility]. Please see a copy of my visit note below.    [facility]  Department of Neurosurgery      Name: Gustavo Ramon  MRN: [id number]  Age: 84 year old  : 1940  Referring provider: Antionette Hunt  2024      Chief Complaint:   NPH  S/p  shunt placement on 2022 (Codman Certas at 4.0)  Follow-up    History of Present Illness:   Gustavo Ramon is a 83 year old male with a history of NPH, s/p  shunt placement on 2022 who is seen today for  a follow up.     Most recent visit with me on 2024. He reported fatigue and difficulty walking. Dr. Childs recommended shunt setting changed to 3.0 from 4.0. Today, patient presents for shunt adjustment. He was recently diagnosed with hyperthyroidism and had an appointment with Endocrinology.        Review of Systems:   Pertinent items are noted in HPI or as in patient entered ROS below, remainder of complete ROS is negative.        No data to display                 Physical Exam:   /76 (BP Location: Right arm, Patient Position: Sitting)   Pulse 89   Resp 16   SpO2 98%    General: No acute distress.    Neuro: The patient is fully oriented. Speech is normal.  Gait: Ambulates with a walker.   Psych: Normal mood and affect. Behavior is normal.        Procedures:  With Codman Certas  shunt , shunt setting changed from 4.0 to 3.0. verified x 3.     Assessment:  NPH  S/p  shunt placement on 2022 (Codman Certas at 4.0)  Setting changed from 4.0 to 3.0 on 2024  Follow-up    Plan:  We will plan for a follow-up head CT in 2 weeks. I'll plan to see him after the head CT. If he notes increased headache, weakness, speech or vision  issues, confusion, or other neurologic changes he should call 911 or present to the nearest emergency room for further evaluation.             I spent 20 minutes on patient care activities related to this encounter on the date of service, including time spent reviewing the chart, obtaining history and examination and in counseling the patient, and in documentation in the electronic medical record.        Again, thank you for allowing me to participate in the care of your patient.      Sincerely,    CORINA Jackson CNP

## 2024-07-02 NOTE — NURSING NOTE
Chief Complaint   Patient presents with    Procedure     Shunt check         Alexandra Resendiz NRP

## 2024-07-02 NOTE — PROGRESS NOTES
[facility]  Department of Neurosurgery      Name: Gustavo Ramon  MRN: [id number]  Age: 84 year old  : 1940  Referring provider: Antionette Hunt  2024      Chief Complaint:   NPH  S/p  shunt placement on 2022 (Codman Certas at 4.0)  Follow-up    History of Present Illness:   Gustavo Ramon is a 83 year old male with a history of NPH, s/p  shunt placement on 2022 who is seen today for  a follow up.     Most recent visit with me on 2024. He reported fatigue and difficulty walking. Dr. Childs recommended shunt setting changed to 3.0 from 4.0. Today, patient presents for shunt adjustment. He was recently diagnosed with hyperthyroidism and had an appointment with Endocrinology.        Review of Systems:   Pertinent items are noted in HPI or as in patient entered ROS below, remainder of complete ROS is negative.        No data to display                 Physical Exam:   /76 (BP Location: Right arm, Patient Position: Sitting)   Pulse 89   Resp 16   SpO2 98%    General: No acute distress.    Neuro: The patient is fully oriented. Speech is normal.  Gait: Ambulates with a walker.   Psych: Normal mood and affect. Behavior is normal.        Procedures:  With Codman Certas  shunt , shunt setting changed from 4.0 to 3.0. verified x 3.     Assessment:  NPH  S/p  shunt placement on 2022 (Codman Certas at 4.0)  Setting changed from 4.0 to 3.0 on 2024  Follow-up    Plan:  We will plan for a follow-up head CT in 2 weeks. I'll plan to see him after the head CT. If he notes increased headache, weakness, speech or vision issues, confusion, or other neurologic changes he should call 911 or present to the nearest emergency room for further evaluation.           I spent 20 minutes on patient care activities related to this encounter on the date of service, including time spent reviewing the chart, obtaining history and examination and in counseling the  patient, and in documentation in the electronic medical record.      Antionette ARRIAGA, CNP  Department of Neurosurgery

## 2024-07-03 ENCOUNTER — OFFICE VISIT (OUTPATIENT)
Dept: OPTOMETRY | Facility: CLINIC | Age: 84
End: 2024-07-03
Payer: MEDICARE

## 2024-07-03 DIAGNOSIS — H51.8 DIVERGENCE INSUFFICIENCY: ICD-10-CM

## 2024-07-03 DIAGNOSIS — H52.4 PRESBYOPIA: Primary | ICD-10-CM

## 2024-07-03 PROCEDURE — 92015 DETERMINE REFRACTIVE STATE: CPT

## 2024-07-03 RX ORDER — METHIMAZOLE 5 MG/1
5 TABLET ORAL DAILY
Qty: 90 TABLET | Refills: 0 | Status: SHIPPED | OUTPATIENT
Start: 2024-07-03 | End: 2024-09-09

## 2024-07-03 ASSESSMENT — REFRACTION_MANIFEST
OD_SPHERE: -2.25
OS_SPHERE: -2.00
OD_CYLINDER: +1.25
OS_CYLINDER: +0.50
OD_AXIS: 165
OS_SPHERE: -2.25
OS_CYLINDER: +0.50
OS_AXIS: 133
OD_ADD: +2.50
OS_AXIS: 145
METHOD_AUTOREFRACTION: 1
OD_CYLINDER: +1.25
OS_ADD: +2.50
OD_AXIS: 173
OD_SPHERE: -2.50

## 2024-07-03 ASSESSMENT — REFRACTION_FINALRX
OS_VPRISM: 1 BU
OS_HPRISM: 5 BO
OD_HPRISM: 5 BO
OD_VPRISM: 1 BD

## 2024-07-03 ASSESSMENT — REFRACTION_WEARINGRX
OD_ADD: +2.50
SPECS_TYPE: PAL
OS_CYLINDER: +0.50
OS_AXIS: 140
OD_CYLINDER: +1.25
OS_ADD: +2.50
OD_AXIS: 160
OS_SPHERE: -2.00
OD_SPHERE: -1.50

## 2024-07-03 ASSESSMENT — VISUAL ACUITY
OD_CC: 20/25
OS_CC: 20/70
CORRECTION_TYPE: GLASSES
OS_CC: 20/60
OD_CC: 20/20
OD_CC+: -1
OS_PH_CC+: -1
METHOD: SNELLEN - LINEAR
OS_CC+: -1
OS_PH_CC: 20/50

## 2024-07-03 ASSESSMENT — KERATOMETRY
OS_K1POWER_DIOPTERS: 43.25
OD_K1POWER_DIOPTERS: 42.75
OD_AXISANGLE2_DEGREES: 69
OS_AXISANGLE2_DEGREES: 180
OD_AXISANGLE_DEGREES: 159
OD_K2POWER_DIOPTERS: 43.75
OS_K2POWER_DIOPTERS: 43.25
OS_AXISANGLE_DEGREES: 90

## 2024-07-03 NOTE — LETTER
7/3/2024      Gustavo Ramon  [address] [facility] [address]      Dear Colleague,    Thank you for referring your patient, Gustavo Ramon, to the [facility]. Please see a copy of my visit note below.    Chief Complaint   Patient presents with     Refraction     Has double vision and is here for prism in glasses.     Lavon Bauer - Optometric Assistant      OBJECTIVE: See Ophthalmology exam    ASSESSMENT:    ICD-10-CM    1. 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  Discussed adaptation period and advised Stephen to reach out with any concerns.  Sagging Eye/Divergence Insufficiency: Patient evaluated by Dr. Galvin Jan 2024; return for CEE with me Jan 2025.    Jelena Nelson, OD      Again, thank you for allowing me to participate in the care of your patient.        Sincerely,        Jelena Nelson, OD

## 2024-07-03 NOTE — PROGRESS NOTES
Chief Complaint   Patient presents with    Refraction     Has double vision and is here for prism in glasses.     Lavon Bauer - Optometric Assistant      OBJECTIVE: See Ophthalmology exam    ASSESSMENT:    ICD-10-CM    1. 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  Discussed adaptation period and advised Stephen to reach out with any concerns.  Sagging Eye/Divergence Insufficiency: Patient evaluated by Dr. Galvin Jan 2024; return for CEE with me Jan 2025.    Jelena Nelson, OD

## 2024-07-03 NOTE — PATIENT INSTRUCTIONS
Myopia with regular astigmatism and presbyopia: Updated prism progressive glasses prescription for full-time wear.  Discussed adaptation period and advised Stephen to reach out with any concerns.  Sagging Eye/Divergence Insufficiency: Patient evaluated by Dr. Galvin Jan 2024; return for CEE with me Jan 2025.

## 2024-07-05 ENCOUNTER — TELEPHONE (OUTPATIENT)
Dept: ENDOCRINOLOGY | Facility: CLINIC | Age: 84
End: 2024-07-05
Payer: MEDICARE

## 2024-07-08 ENCOUNTER — HOSPITAL ENCOUNTER (OUTPATIENT)
Dept: NUCLEAR MEDICINE | Facility: CLINIC | Age: 84
Setting detail: NUCLEAR MEDICINE
Discharge: HOME | End: 2024-07-08
Attending: STUDENT IN AN ORGANIZED HEALTH CARE EDUCATION/TRAINING PROGRAM
Payer: MEDICARE

## 2024-07-08 DIAGNOSIS — E05.90 HYPERTHYROIDISM: ICD-10-CM

## 2024-07-08 PROCEDURE — A9516 IODINE I-123 SOD IODIDE MIC: HCPCS | Performed by: STUDENT IN AN ORGANIZED HEALTH CARE EDUCATION/TRAINING PROGRAM

## 2024-07-08 PROCEDURE — G1010 CDSM STANSON: HCPCS | Performed by: RADIOLOGY

## 2024-07-08 PROCEDURE — 78014 THYROID IMAGING W/BLOOD FLOW: CPT | Mod: 26 | Performed by: RADIOLOGY

## 2024-07-08 PROCEDURE — 343N000001 HC RX 343: Performed by: STUDENT IN AN ORGANIZED HEALTH CARE EDUCATION/TRAINING PROGRAM

## 2024-07-08 PROCEDURE — 78014 THYROID IMAGING W/BLOOD FLOW: CPT | Mod: MG

## 2024-07-08 RX ADMIN — Medication 230 MICROCURIE: at 12:56

## 2024-07-09 ENCOUNTER — HOSPITAL ENCOUNTER (OUTPATIENT)
Dept: NUCLEAR MEDICINE | Facility: CLINIC | Age: 84
Setting detail: NUCLEAR MEDICINE
Discharge: HOME | End: 2024-07-09
Attending: STUDENT IN AN ORGANIZED HEALTH CARE EDUCATION/TRAINING PROGRAM
Payer: MEDICARE

## 2024-07-12 NOTE — PROGRESS NOTES
[facility] Dermatology Note    Encounter Date: Jul 15, 2024    Dermatology Problem List:  1. 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 Christiansen's note 11/21/11), s/p excision 5/12/09   2. Actinic keratoses  - R tragus s/p LN2 3/13/23; s/p biopsy 9/23/22  - s/p Efudex 2015, Fall 2020 to face, Spring 2021 to forearms  - s/p PDT (x3)  - s/p LN2  3. Seborrheic dermatitis  - clobetasol 0.05% solution for scalp, triam 0.1% cream for ears  4. Relevant medical history: MGUS, myasthenia gravis currently on prednisone  5. Tinea cruris  - current tx: ketoconazole cream  6. 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 Lipoma L bicep, s/p biopsy 9/23/22    ______________________________________    Impression/Plan:  1. History of NMSC  - no evidence of recurrence on exam today  - start niacinamide 500 MG tablet, take 1 tablet (500 mg) by mouth 2 times daily (with meals)    2. Actinic keratosis x 21 on the face, scalp, trunk, and extremities.   Cryotherapy procedure note: After verbal consent and discussion of risks and benefits including but no limited to dyspigmentation/scar, blister, and pain, 21 were treated with 1-2mm freeze border for 2 cycles with liquid nitrogen. Post cryotherapy instructions were provided.  - start topical fluorouracil BID for 2 weeks.  - future tx: refreeze, biopsy    3. Neoplasm of uncertain behavior of skin x 1 on the left temple  - Shave biopsy: Location(s) left temple.  After discussion of  benefits and risks including but not limited to bleeding/bruising, pain/swelling, infection, scar, incomplete removal, nerve damage/numbness, recurrence, and non-diagnostic biopsy,  verbal consent and photographs were obtained. Time-out was performed. The area was cleaned with isopropyl alcohol. 0.5mL of 1% lidocaine with epinephrine was injected to obtain adequate anesthesia of each lesion. Shave biopsy was performed. Hemostasis was achieved with aluminium chloride. Vaseline and a sterile dressing were applied. The patient tolerated the procedure and no complications were noted. The patient was provided with verbal and written post care instructions.     4. Dermatitis, seborrheic, face  - start ketoconazole (NIZORAL) 2 % external cream, apply topically 2 times daily to face    5. Reassurance provided for benign lesions not treated today including cherry angiomata, solar lentigines, seborrheic keratoses, milia cysts, and banal-appearing melanocytic nevi.    Follow-up in 4 months.    Staff Involved:  Staff/Scribe    Scribe Disclosure:   I, Bren Gonzalez, am serving as a scribe to document services personally performed by Joe Grimaldo MD based on data collection and the provider's statements to me.     Provider Disclosure:   The documentation recorded by the scribe accurately reflects the services I personally performed and the decisions made by me.    Joe Grimaldo MD   of Dermatology  Department of Dermatology  [employer] [employer]      CC:   Chief Complaint   Patient presents with    Skin Check     FBSE, areas of concern on scalp, face shoulders.  HX of NMSC.         History of Present Illness:  Mr. Gustavo Ramon is a 84 year old male who presents as a return patient for a full body skin check. He reports areas of concern on his scalp, face, and shoulders. He states that he had a shunt installed 2 years ago and reports thinning and bleeding from picking the  area. His wife also has brown spots and yellow spots on his chest of concern on him that she wants examined.  He has a hx of NMSC.    Labs:  N/A    Physical exam:  Physical exam:  Vitals: There were no vitals taken for this visit.  GEN: This is a well developed, well-nourished male in no acute distress, in a pleasant mood.    SKIN: Montemayor phototype 2  - Full skin, which includes the head/face, both arms, chest, back, abdomen,both legs, genitalia and/or groin buttocks, digits and/or nails, was examined.  - 21 erythematous scaly macules on the scalp, face, trunk, and extremities.  - 1 erythematous crusted and atrophic plaque on the left temple  - There are dome shaped bright red papules on the head/neck, trunk, extremities.   - Multiple regular brown pigmented macules and papules are identified on the head/neck, trunk, extremities.   - Scattered brown macules on sun exposed areas.  - There are waxy stuck on tan to brown papules on the head/neck, trunk, extremities.  - erythema and scaling in the nasolabial folds  - No other lesions of concern on areas examined.     Past Medical History:   Past Medical History:   Diagnosis Date    Actinic keratosis     AK (actinic keratosis) 11/15/2012    Amaurosis fugax     Basal cell carcinoma 2009    R medial cheek/nose    Central serous retinopathy left    Eye    Diverticulosis 08/2006    DJD (degenerative joint disease)     GERD (gastroesophageal reflux disease)     Hearing loss     High frequency    History of nonmelanoma skin cancer     History of nonmelanoma skin cancer     HTN (hypertension)     Hyperlipidemia LDL goal < 130     Hyperplastic colon polyp 08/2006    IgA monoclonal gammopathy     IgA kappa    Infection due to 2019 novel coronavirus 10/13/2022    Infection due to 2019 novel coronavirus 11/2021    Lattice degeneration of retina right    Eye    Macular degeneration     Nonsenile cataract     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    [facility] consult    Rosacea      Steroid-induced diabetes mellitus, 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    Strabismus     Vitreous detachment left    Eye     Past Surgical History:   Procedure Laterality Date    ARTHROSCOPY KNEE RT/LT Left Jan 2010    Knee    BIOPSY  2009/2013/2016    Nose; Prostate; BCC - left arm    COLONOSCOPY  9/24/2019    w/Endoscopy    COLONOSCOPY WITH CO2 INSUFFLATION N/A 9/24/2019    Procedure: COLONOSCOPY, WITH CO2 INSUFFLATION;  Surgeon: Loi Levine MD;  Location: [facility] OR    COMBINED ESOPHAGOSCOPY, GASTROSCOPY, DUODENOSCOPY (EGD) WITH CO2 INSUFFLATION N/A 9/24/2019    Procedure: ESOPHAGOGASTRODUODENOSCOPY, WITH CO2 INSUFFLATION;  Surgeon: Loi Levine MD;  Location: [facility] OR    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    DISKECTOMY, LUMBAR, SINGLE SP  2003    L2-3    ENT SURGERY  1943    Tonsils     ENT SURGERY  2-22-12    Biopsy lesion right pinna    ENT SURGERY  2-24-12    Biopsy leson right pinna    ESOPHAGOSCOPY, GASTROSCOPY, DUODENOSCOPY (EGD), COMBINED N/A 9/24/2019    Procedure: Esophagogastroduodenoscopy, With Biopsy;  Surgeon: Loi Levine MD;  Location: [facility] OR    IR LUMBAR DRAIN PLACEMENT W FLUORO  6/27/2022    LAMINOPLASTY CERVICAL POSTERIOR THREE+ LEVELS N/A 12/12/2022    Procedure: POSTERIOR APPROACH Cervical 4-7 laminoplasty;  Surgeon: Judie Levin MD;  Location: [facility] OR    LAPAROSCOPIC ASSISTED IMPLANT SHUNT VENTRICULOPERITONEAL N/A 7/7/2022    Procedure: possible INSERTION, SHUNT, VENTRICULOPERITONEAL, LAPAROSCOPY-ASSISTED;  Surgeon: Joe Damon MD;  Location: [facility] OR    OPTICAL TRACKING SYSTEM IMPLANT SHUNT VENTRICULOPERITONEAL N/A 7/7/2022    Procedure: INSERTION, SHUNT, VENTRICULOPERITONEAL, USING OPTICAL TRACKING SYSTEM;  Surgeon: Jean-Paul Childs MD;  Location: [facility] OR    SOFT TISSUE SURGERY  May 2010    Basal Cell/right side nose       Social History:   reports that he has never smoked. He has never been exposed to tobacco smoke. He has never used smokeless  tobacco. He reports that he does not drink alcohol and does not use drugs.    Family History:  Family History   Problem Relation Age of Onset    Heart Disease Mother     Alzheimer Disease Mother 73    C.A.D. Father     Coronary Artery Disease Father     Diabetes Grandchild         using a pump     Diabetes Paternal Uncle     Heart Disease Paternal Grandmother     Glaucoma No family hx of     Macular Degeneration No family hx of     Melanoma No family hx of     Skin Cancer No family hx of        Medications:  Current Outpatient Medications   Medication Sig Dispense Refill    ACE/ARB/ARNI NOT PRESCRIBED (INTENTIONAL) Please choose reason not prescribed from choices below.      acetaminophen (TYLENOL) 325 MG tablet Take 1-2 tablets (325-650 mg) by mouth every 4 hours as needed for mild pain      aspirin (ASA) 325 MG tablet Take 1 tablet (325 mg) by mouth daily      cyanocobalamin (VITAMIN B-12) 1000 MCG tablet Take 1 tablet (1,000 mcg) by mouth daily 90 tablet 1    fluorouracil (EFUDEX) 5 % external cream Apply topically 2 times daily 2-3 weeks for scalp 40 g 3    fluticasone (FLONASE) 50 MCG/ACT nasal spray Spray 1 spray into both nostrils daily 11.1 mL 0    furosemide (LASIX) 20 MG tablet TAKE ONE-HALF TABLET BY MOUTH EVERY DAY 45 tablet 1    gabapentin (NEURONTIN) 300 MG capsule 1 capsule in the morning, 1 mid day, and 3 in the evening 360 capsule 3    guaiFENesin (MUCINEX) 600 MG 12 hr tablet Take 2 tablets (1,200 mg) by mouth 2 times daily 20 tablet 0    ipratropium (ATROVENT) 0.06 % nasal spray Spray 2 sprays into both nostrils 4 times daily 15 mL 4    ketoconazole (NIZORAL) 2 % external cream Apply topically 2 times daily To face 60 g 3    ketoconazole (NIZORAL) 2 % external cream Apply twice daily for 8 weeks from knees down to both legs and feet. 120 g 3    levETIRAcetam (KEPPRA) 250 MG tablet TAKE 1 TABLET BY MOUTH TWO TIMES A DAY 60 tablet PRN    meclizine (ANTIVERT) 25 MG tablet Take 0.5-1 tablets (12.5-25  mg) by mouth 3 times daily as needed for dizziness 30 tablet 0    methimazole (TAPAZOLE) 5 MG tablet Take 1 tablet (5 mg) by mouth daily 90 tablet 0    methocarbamol (ROBAXIN) 500 MG tablet Take 1 tablet (500 mg) by mouth every 6 hours as needed for muscle spasms      multivitamin (OCUVITE) TABS tablet Take 1 tablet by mouth daily      niacinamide 500 MG tablet Take 1 tablet (500 mg) by mouth 2 times daily (with meals) 180 tablet 3    pantoprazole (PROTONIX) 40 MG EC tablet Take 1 tablet (40 mg) by mouth daily 90 tablet 3    polyethylene glycol (MIRALAX) 17 GM/Dose powder Take 17 g by mouth daily as needed for constipation 510 g     pramipexole (MIRAPEX) 0.25 MG tablet Take 2 tabs in the AM and 3 tabs qhs      pyRIDostigmine (MESTINON) 60 MG tablet TAKE ONE AND ONE-HALF TABLETS BY MOUTH TWICE A DAY 90 tablet PRN    senna-docusate (SENOKOT-S/PERICOLACE) 8.6-50 MG tablet Take 1 tablet by mouth 2 times daily 60 tablet 0    simvastatin (ZOCOR) 20 MG tablet TAKE ONE TABLET BY MOUTH AT BEDTIME 90 tablet 1    tamsulosin (FLOMAX) 0.4 MG capsule Take 1 capsule (0.4 mg) by mouth daily 90 capsule 1    tamsulosin (FLOMAX) 0.4 MG capsule Take 1 capsule (0.4 mg) by mouth daily      terbinafine (LAMISIL) 1 % external cream Apply topically 2 times daily 42 g 11    triamcinolone (KENALOG) 0.1 % external cream Apply a thin layer up to twice daily to affected areas as needed. 30 g 3    trospium (SANCTURA) 20 MG tablet Take 1 tablet (20 mg) by mouth 2 times daily (before meals) 180 tablet 3    vitamin D3 (CHOLECALCIFEROL) 2000 units tablet Take 1 tablet by mouth daily 90 tablet 1    zolpidem (AMBIEN) 5 MG tablet Take tablet by mouth 15 minutes prior to sleep, for Sleep Study 1 tablet 0     Allergies   Allergen Reactions    Mycophenolate Other (See Comments)     Confusion, abnormal movements

## 2024-07-15 ENCOUNTER — OFFICE VISIT (OUTPATIENT)
Dept: DERMATOLOGY | Facility: CLINIC | Age: 84
End: 2024-07-15
Payer: MEDICARE

## 2024-07-15 DIAGNOSIS — L21.9 DERMATITIS, SEBORRHEIC: ICD-10-CM

## 2024-07-15 DIAGNOSIS — D22.9 MULTIPLE MELANOCYTIC NEVI: ICD-10-CM

## 2024-07-15 DIAGNOSIS — L82.1 SEBORRHEIC KERATOSIS: ICD-10-CM

## 2024-07-15 DIAGNOSIS — Z85.828 HISTORY OF SKIN CANCER: Primary | ICD-10-CM

## 2024-07-15 DIAGNOSIS — D48.5 NEOPLASM OF UNCERTAIN BEHAVIOR OF SKIN: ICD-10-CM

## 2024-07-15 DIAGNOSIS — L57.0 ACTINIC KERATOSIS: ICD-10-CM

## 2024-07-15 DIAGNOSIS — D18.01 CHERRY ANGIOMA: ICD-10-CM

## 2024-07-15 DIAGNOSIS — L81.4 SOLAR LENTIGO: ICD-10-CM

## 2024-07-15 PROCEDURE — 17004 DESTROY PREMAL LESIONS 15/>: CPT | Performed by: DERMATOLOGY

## 2024-07-15 PROCEDURE — 11102 TANGNTL BX SKIN SINGLE LES: CPT | Mod: XS | Performed by: DERMATOLOGY

## 2024-07-15 PROCEDURE — 99214 OFFICE O/P EST MOD 30 MIN: CPT | Mod: 25 | Performed by: DERMATOLOGY

## 2024-07-15 PROCEDURE — 88305 TISSUE EXAM BY PATHOLOGIST: CPT | Performed by: DERMATOLOGY

## 2024-07-15 RX ORDER — LIDOCAINE HYDROCHLORIDE AND EPINEPHRINE 10; 10 MG/ML; UG/ML
3 INJECTION, SOLUTION INFILTRATION; PERINEURAL ONCE
Status: ACTIVE | OUTPATIENT
Start: 2024-07-15

## 2024-07-15 RX ORDER — NIACINAMIDE 500 MG
500 TABLET ORAL 2 TIMES DAILY WITH MEALS
Qty: 180 TABLET | Refills: 3 | Status: SHIPPED | OUTPATIENT
Start: 2024-07-15 | End: 2024-09-09

## 2024-07-15 RX ORDER — KETOCONAZOLE 20 MG/G
CREAM TOPICAL 2 TIMES DAILY
Qty: 60 G | Refills: 3 | Status: SHIPPED | OUTPATIENT
Start: 2024-07-15

## 2024-07-15 RX ORDER — FLUOROURACIL 50 MG/G
CREAM TOPICAL 2 TIMES DAILY
Qty: 40 G | Refills: 3 | Status: SHIPPED | OUTPATIENT
Start: 2024-07-15 | End: 2024-09-09

## 2024-07-15 NOTE — PATIENT INSTRUCTIONS
Wound Care After a Biopsy    What is a skin biopsy?  A skin biopsy allows the doctor to examine a very small piece of tissue under the microscope to determine the diagnosis and the best treatment for the skin condition. A local anesthetic (numbing medicine) is injected with a very small needle into the skin area to be tested. A small piece of skin is taken from the area. Sometimes a suture (stitch) is used.     What are the risks of a skin biopsy?  I will experience scar, bleeding, swelling, pain, crusting and redness. I may experience incomplete removal or recurrence. Risks of this procedure are excessive bleeding, bruising, infection, nerve damage, numbness, thick (hypertrophic or keloidal) scar and non-diagnostic biopsy.    How should I care for my wound for the first 24 hours?  Keep the wound dry and covered for 24 hours  If it bleeds, hold direct pressure on the area for 15 minutes. If bleeding does not stop, call us or go to the emergency room  Avoid strenuous exercise the first 1-2 days or as your doctor instructs you    How should I care for the wound after 24 hours?  After 24 hours, remove the bandage  You may bathe or shower as normal  If you had a scalp biopsy, you can shampoo as usual and can use shower water to clean the biopsy site daily  Clean the wound once a day with gentle soap and water  Do not scrub, be gentle  Apply white petroleum/Vaseline after cleaning the wound with a cotton swab or a clean finger, and keep the site covered with a Bandaid /bandage. Bandages are not necessary with a scalp biopsy  If you are unable to cover the site with a Bandaid /bandage, re-apply ointment 2-3 times a day to keep the site moist. Moisture will help with healing  Avoid strenuous activity for first 1-2 days  Avoid lakes, rivers, pools, and oceans until the stitches are removed or the site is healed    How do I clean my wound?  Wash hands thoroughly with soap or use hand  before all wound care  Clean  the wound with gentle soap and water  Apply white petroleum/Vaseline  to wound after it is clean  Replace the Bandaid /bandage to keep the wound covered for the first few days or as instructed by your doctor  If you had a scalp biopsy, warm shower water to the area on a daily basis should suffice    What should I use to clean my wound?   Cotton-tipped applicators (Qtips )  White petroleum jelly (Vaseline ). Use a clean new container and use Q-tips to apply.  Bandaids  as needed  Gentle soap     How should I care for my wound long term?  Do not get your wound dirty  Keep up with wound care for one week or until the area is healed.  A small scab will form and fall off by itself when the area is completely healed. The area will be red and will become pink in color as it heals. Sun protection is very important for how your scar will turn out. Sunscreen with an SPF 30 or greater is recommended once the area is healed.  You should have some soreness but it should be mild and slowly go away over several days. Talk to your doctor about using tylenol for pain,    When should I call my doctor?  If you have increased:   Pain or swelling  Pus or drainage (clear or slightly yellow drainage is ok)  Temperature over 100F  Spreading redness or warmth around wound    When will I hear about my results?  The biopsy results can take 2 weeks to come back.  Your results will automatically release to [employer] before your provider has even reviewed them.  The clinic will call you with the results, send you a [employer] message, or have you schedule a follow-up clinic or phone time to discuss the results.  Contact our clinics if you do not hear from us in 2 weeks.    Who should I call with questions?  [facility]: [phone]  [facility]: [phone]  For urgent needs outside of business hours call the [facility] at [phone] and ask for the dermatology resident on call

## 2024-07-15 NOTE — NURSING NOTE
The following medication was given:     MEDICATION:  Lidocaine with epinephrine 1% [id number]  ROUTE: SQ  SITE: see procedure note  DOSE: 1 ml  LOT #: [id number]  : [employer]  EXPIRATION DATE: 10/31/2025  NDC#: 38802-809-03  Was there drug waste? yes  Multi-dose vial: Yes    Yesica Dempsey  July 15, 2024

## 2024-07-15 NOTE — LETTER
7/15/2024      Gustavo Ramon  [address] [facility] [address]      Dear Colleague,    Thank you for referring your patient, Gustavo Ramon, to the [facility]. Please see a copy of my visit note below.    [facility] Dermatology Note    Encounter Date: Jul 15, 2024    Dermatology Problem List:  1. 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 Christiansen's note 11/21/11), s/p excision 5/12/09   2. Actinic keratoses  - R tragus s/p LN2 3/13/23; s/p biopsy 9/23/22  - s/p Efudex 2015, Fall 2020 to face, Spring 2021 to forearms  - s/p PDT (x3)  - s/p LN2  3. Seborrheic dermatitis  - clobetasol 0.05% solution for scalp, triam 0.1% cream for ears  4. Relevant medical history: MGUS, myasthenia gravis currently on prednisone  5. Tinea cruris  - current tx: ketoconazole cream  6. 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 Lipoma L bicep, s/p biopsy 9/23/22    ______________________________________    Impression/Plan:  1. History of NMSC  - no evidence of recurrence on exam today  - start niacinamide 500 MG tablet, take 1 tablet (500 mg) by mouth 2 times daily (with meals)    2. Actinic keratosis x 21 on the face, scalp, trunk, and extremities.   Cryotherapy procedure note: After verbal consent and discussion of risks and benefits including but no limited to dyspigmentation/scar, blister, and pain, 21 were treated with 1-2mm freeze border for 2 cycles with liquid nitrogen.  Post cryotherapy instructions were provided.  - start topical fluorouracil BID for 2 weeks.  - future tx: refreeze, biopsy    3. Neoplasm of uncertain behavior of skin x 1 on the left temple  - Shave biopsy: Location(s) left temple.  After discussion of benefits and risks including but not limited to bleeding/bruising, pain/swelling, infection, scar, incomplete removal, nerve damage/numbness, recurrence, and non-diagnostic biopsy,  verbal consent and photographs were obtained. Time-out was performed. The area was cleaned with isopropyl alcohol. 0.5mL of 1% lidocaine with epinephrine was injected to obtain adequate anesthesia of each lesion. Shave biopsy was performed. Hemostasis was achieved with aluminium chloride. Vaseline and a sterile dressing were applied. The patient tolerated the procedure and no complications were noted. The patient was provided with verbal and written post care instructions.     4. Dermatitis, seborrheic, face  - start ketoconazole (NIZORAL) 2 % external cream, apply topically 2 times daily to face    5. Reassurance provided for benign lesions not treated today including cherry angiomata, solar lentigines, seborrheic keratoses, milia cysts, and banal-appearing melanocytic nevi.    Follow-up in 4 months.    Staff Involved:  Staff/Scribe    Scribe Disclosure:   I, Bren Gonazlez, am serving as a scribe to document services personally performed by Joe Grimaldo MD based on data collection and the provider's statements to me.     Provider Disclosure:   The documentation recorded by the scribe accurately reflects the services I personally performed and the decisions made by me.    Joe Grimaldo MD   of Dermatology  Department of Dermatology  [facility] [employer] [facility]      CC:   Chief Complaint   Patient presents with     Skin Check     FBSE, areas of concern on scalp, face shoulders.  HX of NMSC.         History of Present Illness:  Mr. Gustavo ROBB  Tristen is a 84 year old male who presents as a return patient for a full body skin check. He reports areas of concern on his scalp, face, and shoulders. He states that he had a shunt installed 2 years ago and reports thinning and bleeding from picking the area. His wife also has brown spots and yellow spots on his chest of concern on him that she wants examined.  He has a hx of NMSC.    Labs:  N/A    Physical exam:  Physical exam:  Vitals: There were no vitals taken for this visit.  GEN: This is a well developed, well-nourished male in no acute distress, in a pleasant mood.    SKIN: Montemayor phototype 2  - Full skin, which includes the head/face, both arms, chest, back, abdomen,both legs, genitalia and/or groin buttocks, digits and/or nails, was examined.  - 21 erythematous scaly macules on the scalp, face, trunk, and extremities.  - 1 erythematous crusted and atrophic plaque on the left temple  - There are dome shaped bright red papules on the head/neck, trunk, extremities.   - Multiple regular brown pigmented macules and papules are identified on the head/neck, trunk, extremities.   - Scattered brown macules on sun exposed areas.  - There are waxy stuck on tan to brown papules on the head/neck, trunk, extremities.  - erythema and scaling in the nasolabial folds  - No other lesions of concern on areas examined.     Past Medical History:   Past Medical History:   Diagnosis Date     Actinic keratosis      AK (actinic keratosis) 11/15/2012     Amaurosis fugax      Basal cell carcinoma 2009    R medial cheek/nose     Central serous retinopathy left    Eye     Diverticulosis 08/2006     DJD (degenerative joint disease)      GERD (gastroesophageal reflux disease)      Hearing loss     High frequency     History of nonmelanoma skin cancer      History of nonmelanoma skin cancer      HTN (hypertension)      Hyperlipidemia LDL goal < 130      Hyperplastic colon polyp 08/2006     IgA monoclonal gammopathy     IgA kappa      Infection due to 2019 novel coronavirus 10/13/2022     Infection due to 2019 novel coronavirus 11/2021     Lattice degeneration of retina right    Eye     Macular degeneration      Nonsenile cataract      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    [facility] consult     Rosacea      Steroid-induced diabetes mellitus, 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     Strabismus      Vitreous detachment left    Eye     Past Surgical History:   Procedure Laterality Date     ARTHROSCOPY KNEE RT/LT Left Jan 2010    Knee     BIOPSY  2009/2013/2016    Nose; Prostate; BCC - left arm     COLONOSCOPY  9/24/2019    w/Endoscopy     COLONOSCOPY WITH CO2 INSUFFLATION N/A 9/24/2019    Procedure: COLONOSCOPY, WITH CO2 INSUFFLATION;  Surgeon: Loi Levine MD;  Location: [facility] OR     COMBINED ESOPHAGOSCOPY, GASTROSCOPY, DUODENOSCOPY (EGD) WITH CO2 INSUFFLATION N/A 9/24/2019    Procedure: ESOPHAGOGASTRODUODENOSCOPY, WITH CO2 INSUFFLATION;  Surgeon: Loi Levine MD;  Location: [facility] OR     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     DISKECTOMY, LUMBAR, SINGLE SP  2003    L2-3     ENT SURGERY  1943    Tonsils      ENT SURGERY  2-22-12    Biopsy lesion right pinna     ENT SURGERY  2-24-12    Biopsy leson right pinna     ESOPHAGOSCOPY, GASTROSCOPY, DUODENOSCOPY (EGD), COMBINED N/A 9/24/2019    Procedure: Esophagogastroduodenoscopy, With Biopsy;  Surgeon: Loi Levine MD;  Location: [facility] OR     IR LUMBAR DRAIN PLACEMENT W FLUORO  6/27/2022     LAMINOPLASTY CERVICAL POSTERIOR THREE+ LEVELS N/A 12/12/2022    Procedure: POSTERIOR APPROACH Cervical 4-7 laminoplasty;  Surgeon: Judie Levin MD;  Location: [facility] OR     LAPAROSCOPIC ASSISTED IMPLANT SHUNT VENTRICULOPERITONEAL N/A 7/7/2022    Procedure: possible INSERTION, SHUNT, VENTRICULOPERITONEAL, LAPAROSCOPY-ASSISTED;  Surgeon: Joe Damon MD;  Location: [facility] OR     OPTICAL TRACKING SYSTEM IMPLANT SHUNT VENTRICULOPERITONEAL N/A 7/7/2022    Procedure: INSERTION, SHUNT,  VENTRICULOPERITONEAL, USING OPTICAL TRACKING SYSTEM;  Surgeon: Jean-Paul Childs MD;  Location: [facility] OR     SOFT TISSUE SURGERY  May 2010    Basal Cell/right side nose       Social History:   reports that he has never smoked. He has never been exposed to tobacco smoke. He has never used smokeless tobacco. He reports that he does not drink alcohol and does not use drugs.    Family History:  Family History   Problem Relation Age of Onset     Heart Disease Mother      Alzheimer Disease Mother 73     C.A.D. Father      Coronary Artery Disease Father      Diabetes Grandchild         using a pump      Diabetes Paternal Uncle      Heart Disease Paternal Grandmother      Glaucoma No family hx of      Macular Degeneration No family hx of      Melanoma No family hx of      Skin Cancer No family hx of        Medications:  Current Outpatient Medications   Medication Sig Dispense Refill     ACE/ARB/ARNI NOT PRESCRIBED (INTENTIONAL) Please choose reason not prescribed from choices below.       acetaminophen (TYLENOL) 325 MG tablet Take 1-2 tablets (325-650 mg) by mouth every 4 hours as needed for mild pain       aspirin (ASA) 325 MG tablet Take 1 tablet (325 mg) by mouth daily       cyanocobalamin (VITAMIN B-12) 1000 MCG tablet Take 1 tablet (1,000 mcg) by mouth daily 90 tablet 1     fluorouracil (EFUDEX) 5 % external cream Apply topically 2 times daily 2-3 weeks for scalp 40 g 3     fluticasone (FLONASE) 50 MCG/ACT nasal spray Spray 1 spray into both nostrils daily 11.1 mL 0     furosemide (LASIX) 20 MG tablet TAKE ONE-HALF TABLET BY MOUTH EVERY DAY 45 tablet 1     gabapentin (NEURONTIN) 300 MG capsule 1 capsule in the morning, 1 mid day, and 3 in the evening 360 capsule 3     guaiFENesin (MUCINEX) 600 MG 12 hr tablet Take 2 tablets (1,200 mg) by mouth 2 times daily 20 tablet 0     ipratropium (ATROVENT) 0.06 % nasal spray Spray 2 sprays into both nostrils 4 times daily 15 mL 4     ketoconazole (NIZORAL) 2 % external cream  Apply topically 2 times daily To face 60 g 3     ketoconazole (NIZORAL) 2 % external cream Apply twice daily for 8 weeks from knees down to both legs and feet. 120 g 3     levETIRAcetam (KEPPRA) 250 MG tablet TAKE 1 TABLET BY MOUTH TWO TIMES A DAY 60 tablet PRN     meclizine (ANTIVERT) 25 MG tablet Take 0.5-1 tablets (12.5-25 mg) by mouth 3 times daily as needed for dizziness 30 tablet 0     methimazole (TAPAZOLE) 5 MG tablet Take 1 tablet (5 mg) by mouth daily 90 tablet 0     methocarbamol (ROBAXIN) 500 MG tablet Take 1 tablet (500 mg) by mouth every 6 hours as needed for muscle spasms       multivitamin (OCUVITE) TABS tablet Take 1 tablet by mouth daily       niacinamide 500 MG tablet Take 1 tablet (500 mg) by mouth 2 times daily (with meals) 180 tablet 3     pantoprazole (PROTONIX) 40 MG EC tablet Take 1 tablet (40 mg) by mouth daily 90 tablet 3     polyethylene glycol (MIRALAX) 17 GM/Dose powder Take 17 g by mouth daily as needed for constipation 510 g      pramipexole (MIRAPEX) 0.25 MG tablet Take 2 tabs in the AM and 3 tabs qhs       pyRIDostigmine (MESTINON) 60 MG tablet TAKE ONE AND ONE-HALF TABLETS BY MOUTH TWICE A DAY 90 tablet PRN     senna-docusate (SENOKOT-S/PERICOLACE) 8.6-50 MG tablet Take 1 tablet by mouth 2 times daily 60 tablet 0     simvastatin (ZOCOR) 20 MG tablet TAKE ONE TABLET BY MOUTH AT BEDTIME 90 tablet 1     tamsulosin (FLOMAX) 0.4 MG capsule Take 1 capsule (0.4 mg) by mouth daily 90 capsule 1     tamsulosin (FLOMAX) 0.4 MG capsule Take 1 capsule (0.4 mg) by mouth daily       terbinafine (LAMISIL) 1 % external cream Apply topically 2 times daily 42 g 11     triamcinolone (KENALOG) 0.1 % external cream Apply a thin layer up to twice daily to affected areas as needed. 30 g 3     trospium (SANCTURA) 20 MG tablet Take 1 tablet (20 mg) by mouth 2 times daily (before meals) 180 tablet 3     vitamin D3 (CHOLECALCIFEROL) 2000 units tablet Take 1 tablet by mouth daily 90 tablet 1     zolpidem  (AMBIEN) 5 MG tablet Take tablet by mouth 15 minutes prior to sleep, for Sleep Study 1 tablet 0     Allergies   Allergen Reactions     Mycophenolate Other (See Comments)     Confusion, abnormal movements     Again, thank you for allowing me to participate in the care of your patient.        Sincerely,        Joe Grimaldo MD

## 2024-07-15 NOTE — NURSING NOTE
Gustavo Ramon's goals for this visit include:   Chief Complaint   Patient presents with    Skin Check     FBSE, areas of concern on scalp, face shoulders.  HX of NMSC.         He requests these members of his care team be copied on today's visit information:     PCP: Winston Silverman    Referring Provider:  Joe Grimaldo MD  [address] [place] [id number]    There were no vitals taken for this visit.    Do you need any medication refills at today's visit?     Yesica Dempsey on 7/15/2024 at 2:09 PM

## 2024-07-16 ENCOUNTER — ANCILLARY PROCEDURE (OUTPATIENT)
Dept: CT IMAGING | Facility: CLINIC | Age: 84
End: 2024-07-16
Attending: NURSE PRACTITIONER
Payer: MEDICARE

## 2024-07-16 DIAGNOSIS — G91.2 NPH (NORMAL PRESSURE HYDROCEPHALUS) (H): ICD-10-CM

## 2024-07-16 PROCEDURE — G1010 CDSM STANSON: HCPCS | Performed by: RADIOLOGY

## 2024-07-16 PROCEDURE — 70450 CT HEAD/BRAIN W/O DYE: CPT | Mod: TC | Performed by: RADIOLOGY

## 2024-07-17 LAB
PATH REPORT.COMMENTS IMP SPEC: ABNORMAL
PATH REPORT.COMMENTS IMP SPEC: ABNORMAL
PATH REPORT.COMMENTS IMP SPEC: YES
PATH REPORT.FINAL DX SPEC: ABNORMAL
PATH REPORT.GROSS SPEC: ABNORMAL
PATH REPORT.MICROSCOPIC SPEC OTHER STN: ABNORMAL
PATH REPORT.RELEVANT HX SPEC: ABNORMAL

## 2024-07-18 ENCOUNTER — TELEPHONE (OUTPATIENT)
Dept: ENDOCRINOLOGY | Facility: CLINIC | Age: 84
End: 2024-07-18

## 2024-07-18 NOTE — TELEPHONE ENCOUNTER
I spoke with his spouse who was confused as she thought Dr Holland had written the Methimazole order . He has not taken any Methimazole  she just had it on hold waiting to hear . He will schedule labs in 4 weeks . I explained the rationale for the abnormal thyroid function and what the uptake and scan would show if graves needing the medication versus thyroiditis not needing it. Sofía Vee RN on 7/18/2024 at 3:40 PM

## 2024-07-19 ENCOUNTER — OFFICE VISIT (OUTPATIENT)
Dept: NEUROSURGERY | Facility: CLINIC | Age: 84
End: 2024-07-19
Attending: NURSE PRACTITIONER
Payer: MEDICARE

## 2024-07-19 ENCOUNTER — DOCUMENTATION ONLY (OUTPATIENT)
Dept: VASCULAR SURGERY | Facility: CLINIC | Age: 84
End: 2024-07-19

## 2024-07-19 ENCOUNTER — TELEPHONE (OUTPATIENT)
Dept: GASTROENTEROLOGY | Facility: CLINIC | Age: 84
End: 2024-07-19

## 2024-07-19 VITALS
SYSTOLIC BLOOD PRESSURE: 113 MMHG | DIASTOLIC BLOOD PRESSURE: 73 MMHG | OXYGEN SATURATION: 97 % | HEIGHT: 68 IN | HEART RATE: 86 BPM | BODY MASS INDEX: 25.46 KG/M2 | WEIGHT: 168 LBS

## 2024-07-19 DIAGNOSIS — G91.2 NPH (NORMAL PRESSURE HYDROCEPHALUS) (H): Primary | ICD-10-CM

## 2024-07-19 DIAGNOSIS — I72.3 ILIAC ARTERY ANEURYSM (H): Primary | ICD-10-CM

## 2024-07-19 PROCEDURE — 99213 OFFICE O/P EST LOW 20 MIN: CPT | Mod: 24 | Performed by: NURSE PRACTITIONER

## 2024-07-19 ASSESSMENT — PAIN SCALES - GENERAL: PAINLEVEL: MODERATE PAIN (5)

## 2024-07-19 NOTE — TELEPHONE ENCOUNTER
[facility] to schedule vascular consult with Dr. Daily, Maritza Cloud, or in the fellow clinic. [phone]

## 2024-07-19 NOTE — NURSING NOTE
Chief Complaint   Patient presents with    RECHECK     2 week follow up      Vitals were taken and medications were reconciled.   Aubrey Broussard, EMT  11:27 AM

## 2024-07-19 NOTE — PROGRESS NOTES
"[facility]  Department of Neurosurgery      Name: Gustavo Ramon  MRN: [id number]  Age: 84 year old  : 1940  Referring provider: Antionette Hunt  2024      Chief Complaint:   NPH  S/p  shunt placement on 2022 (Codman Certas at 4.0)  Setting changed from 4.0 to 3.0 on 2024   Follow-up    History of Present Illness:   Gustavo Ramon is a 83 year old male with a history of NPH, s/p  shunt placement on 2022 who is seen today for  a follow up.     Most recent visit with me on 2024. Patient reported ongoing fatigue and difficulty walking. Shunt setting was changed from 4.0 ot 3.0. follow-up head CT in 2 weeks was recommended.     Today patient presents for the follow-up with his wife. Per patient, no improvement in walking or fatigue after shunt setting change. Per wife, he appears to be more alert and awake. She agrees with no improvement in walking.     He completed a head CT on 2024. This unfortunately showed new hypodense subdural collections over bilateral cerebral hemispheres, measuring up to 4 mm on the left and 2 mm on the right. He denies any headaches, vision or speech concerns. No new neurological concerns. He uses a wheeled walker for ambulation. No recent falls.       Review of Systems:   Pertinent items are noted in HPI or as in patient entered ROS below, remainder of complete ROS is negative.        No data to display                 Physical Exam:   /73 (BP Location: Left arm, Patient Position: Sitting, Cuff Size: Adult Regular)   Pulse 86   Ht 1.715 m (5' 7.52\")   Wt 76.2 kg (168 lb)   SpO2 97%   BMI 25.91 kg/m     General: No acute distress.    Neuro: The patient is fully oriented. Speech is normal. Gait: ambulates with a walker.   Psych: Normal mood and affect. Behavior is normal.        Imagin2024 Head CT;  IMPRESSION:   1. Stable positioning of right posterior approach shunt catheter with  unchanged ventricular size. "   2. New hypodense subdural collections over bilateral cerebral  hemispheres, measuring up to 4 mm on the left and 2 mm on the right.    Procedures:  With Codman Certas  shunt , shunt setting was checked and it is at 3.0. verified x 3.     Assessment:  NPH  S/p  shunt placement on 7/7/2022 (Codman Certas at 4.0)  Setting changed from 4.0 to 3.0 on 7/2/2024  Follow-up    Plan:  Recent head CT from 7/16 showed new hypodense subdural collections over bilateral cerebral hemispheres. Clinical exam stable. Patient denies any new neurological concerns. We discussed his recent head CT results. Will keep the  shunt at 3.0 and will complete a follow-up head CT in 3 weeks. If he should sustain new trauma or injury or note increased headache, weakness, speech or vision issues, confusion, or other neurologic changes, patient to call 911 or present to the nearest emergency room for further evaluation.           I spent 20 minutes on patient care activities related to this encounter on the date of service, including time spent reviewing the chart, obtaining history and examination and in counseling the patient, and in documentation in the electronic medical record.      Antionette ARRIAGA, CNP  Department of Neurosurgery

## 2024-07-19 NOTE — TELEPHONE ENCOUNTER
Attempted to contact patient in order to complete pre assessment questions.     Spoke to wife (consent to communicate on file), she is at a doctors appt with patient and will return call later.  Gave information to return call to [phone] option 4    Callback communication sent via [employer].    Corinne Kliber, RN  Endoscopy Procedure Pre Assessment RN  [phone] option 4

## 2024-07-19 NOTE — TELEPHONE ENCOUNTER
Pre visit planning completed.      Procedure details:    Patient scheduled for Upper endoscopy (EGD) on 7/29/24.     Arrival time: 1315. Procedure time 1400    Facility location: [facility]; [address], [place] [address]. Check in location: 2nd Floor at Surgery desk.    Sedation type: Conscious sedation     Pre op exam needed? No.    Indication for procedure: History of Stewart's esophagus       Chart review:     Electronic implanted devices? No    Recent diagnosis of diverticulitis within the last 6 weeks? No    Diabetic? No      Medication review:    Anticoagulants? No    NSAIDS? No NSAID medications per patient's medication list.  RN will verify with pre-assessment call.    Other medication HOLDING recommendations:  N/A      Prep for procedure:         Prep instructions sent via [employer]         Maritza Yun RN  Endoscopy Procedure Pre Assessment RN  [phone] option 4

## 2024-07-19 NOTE — LETTER
"2024       RE: Gustavo Ramon  [address] [facility] [address]     Dear Colleague,    Thank you for referring your patient, Gustavo Ramon, to the [facility] NEUROS[facility] [place] [facility]. Please see a copy of my visit note below.    [facility]  Department of Neurosurgery      Name: Gustavo Ramon  MRN: [id number]  Age: 84 year old  : 1940  Referring provider: Antionette Hunt  2024      Chief Complaint:   NPH  S/p  shunt placement on 2022 (Codman Certas at 4.0)  Setting changed from 4.0 to 3.0 on 2024   Follow-up    History of Present Illness:   Gustavo Ramon is a 83 year old male with a history of NPH, s/p  shunt placement on 2022 who is seen today for  a follow up.     Most recent visit with me on 2024. Patient reported ongoing fatigue and difficulty walking. Shunt setting was changed from 4.0 ot 3.0. follow-up head CT in 2 weeks was recommended.     Today patient presents for the follow-up with his wife. Per patient, no improvement in walking or fatigue after shunt setting change. Per wife, he appears to be more alert and awake. She agrees with no improvement in walking.     He completed a head CT on 2024. This unfortunately showed new hypodense subdural collections over bilateral cerebral hemispheres, measuring up to 4 mm on the left and 2 mm on the right. He denies any headaches, vision or speech concerns. No new neurological concerns. He uses a wheeled walker for ambulation. No recent falls.       Review of Systems:   Pertinent items are noted in HPI or as in patient entered ROS below, remainder of complete ROS is negative.        No data to display                 Physical Exam:   /73 (BP Location: Left arm, Patient Position: Sitting, Cuff Size: Adult Regular)   Pulse 86   Ht 1.715 m (5' 7.52\")   Wt 76.2 kg (168 lb)   SpO2 97%   BMI " 25.91 kg/m     General: No acute distress.    Neuro: The patient is fully oriented. Speech is normal. Gait: ambulates with a walker.   Psych: Normal mood and affect. Behavior is normal.        Imagin2024 Head CT;  IMPRESSION:   1. Stable positioning of right posterior approach shunt catheter with  unchanged ventricular size.   2. New hypodense subdural collections over bilateral cerebral  hemispheres, measuring up to 4 mm on the left and 2 mm on the right.    Procedures:  With Codman Certas  shunt , shunt setting was checked and it is at 3.0. verified x 3.     Assessment:  NPH  S/p  shunt placement on 2022 (Codman Certas at 4.0)  Setting changed from 4.0 to 3.0 on 2024  Follow-up    Plan:  Recent head CT from  showed new hypodense subdural collections over bilateral cerebral hemispheres. Clinical exam stable. Patient denies any new neurological concerns. We discussed his recent head CT results. Will keep the  shunt at 3.0 and will complete a follow-up head CT in 3 weeks. If he should sustain new trauma or injury or note increased headache, weakness, speech or vision issues, confusion, or other neurologic changes, patient to call 911 or present to the nearest emergency room for further evaluation.           I spent 20 minutes on patient care activities related to this encounter on the date of service, including time spent reviewing the chart, obtaining history and examination and in counseling the patient, and in documentation in the electronic medical record.      Antionette ARRIAGA CNP  Department of Neurosurgery

## 2024-07-19 NOTE — PATIENT INSTRUCTIONS
Head CT in 3 weeks. Follow-up with me after.    If you should sustain new trauma or injury or note increased headache, weakness, speech or vision issues, confusion, or other neurologic changes please call 911 or present to your nearest emergency room for further evaluation.

## 2024-07-19 NOTE — PROGRESS NOTES
Assessment/Plan:    Controlled diabetes mellitus ([facility] [address] )  Lab Results   Component Value Date    HGBA1C 6 3 09/14/2018       No results for input(s): POCGLU in the last 72 hours  Blood Sugar Average: Last 72 hrs:   patient did have recent lab tests as noted patient's hemoglobin A1c is now 6 3 showing outstanding control  Patient feels that the improvement in the control of his sugars is secondary to him now adding cinnamon to his dietary regime  We will continue to monitor his blood sugar, hemoglobin A1c  He refused a diabetic foot exam stating he saw his podiatrist yesterday  Hypertension  Patient's blood pressure showing acceptable control with present treatment  Patient will continue with present medication and we will continued to monitor his blood pressure and make adjustments to medication if needed  Chronic kidney disease, stage 3  Recent lab testing shows a continued deterioration in his renal function  Patient does have an appointment to be seen by Nephrology next week for further evaluation and consideration of modification of treatment plan  With his continued deterioration in his her renal status we may consider stopping his Glucophage that he takes for his diabetes  Enlarged prostate without lower urinary tract symptoms (luts)  In discussion patient states that he has been having no new urinary complaints, frequency, burning, decreased flow  Patient will remain on Hytrin 1 mg a day  Hyperlipidemia  Patient remains on statin, Zocor 20 mg daily for his hyperlipidemia  Patient admits that he is not always perfect with his diet  We will continue to monitor his cholesterol levels and make adjustments to medication as needed    Palpitations  Patient complains of some irregularity to her heart beat with occasional pauses in his rhythm  Patient is asymptomatic from this    We will send the patient for 24 hr Holter for further evaluation and discuss the results when they are available Vascular Referral Intake    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          Diagnoses and all orders for this visit:    Cardiac arrhythmia, unspecified cardiac arrhythmia type  -     Holter monitor - 24 hour; Future    Need for influenza vaccination  -     influenza vaccine, 5258-0021, high-dose, PF 0 5 mL, for patients 65 yr+ (FLUZONE HIGH-DOSE)    Controlled type 2 diabetes mellitus with stage 3 chronic kidney disease, without long-term current use of insulin (HCC)  -     Basic metabolic panel; Future  -     Hemoglobin A1C; Future    Essential hypertension  -     Basic metabolic panel; Future    Chronic kidney disease, stage 3  -     Basic metabolic panel; Future    Healthcare maintenance    Abnormal kidney function    Enlarged prostate without lower urinary tract symptoms (luts)    Pure hypercholesterolemia    Anemia in other chronic diseases classified elsewhere  -     CBC and differential; Future    Other orders  -     CINNAMON PO; Take by mouth          Subjective:      Patient ID: Pastor Sher is a 78 y o  male  The patient is a 78-year-old male with a history of hypertension, chronic kidney disease stage 3, diabetes mellitus type 2, hyperlipidemia  Patient is here today for routine follow-up  Patient did have labs performed prior to the visit today we did discuss the results  He continues to show some minor deterioration in his renal status  Also a very mild anemia which she states is chronic  He has no problems with bleeding, black stools or blood in the stools  He states again that he has had this chronically for an extended period time        The following portions of the patient's history were reviewed and updated as appropriate:   He  has a past medical history of Anemia in CKD (chronic kidney disease); Diabetes mellitus ([address] ); Hyperlipidemia; Hypertension; and Osteoarthritis    He   Patient Active Problem List    Diagnosis Date Noted    Palpitations 09/25/2018    Healthcare maintenance 05/25/2018    Chronic kidney disease, stage 3 02/18/2014    Abnormal kidney function 01/03/2014    Enlarged prostate without lower urinary tract symptoms (luts) 03/07/2013    Controlled diabetes mellitus ([facility] [address] ) 08/09/2012    Hyperlipidemia 08/09/2012    Hypertension 08/09/2012     He  has a past surgical history that includes Colonoscopy (2012); Hemorroidectomy; and Appendectomy  His family history includes Diabetes in his father and mother; Emphysema in his father; Other in his mother  He  reports that he has quit smoking  He has never used smokeless tobacco  He reports that he drinks alcohol  He reports that he does not use drugs  Current Outpatient Prescriptions   Medication Sig Dispense Refill    amLODIPine (NORVASC) 2 5 mg tablet TAKE ONE TABLET BY MOUTH EVERY DAY 90 tablet 3    Cholecalciferol ([employer] VITAMIN D3) 1000 units capsule Take 1 capsule by mouth daily      CINNAMON PO Take by mouth      cyanocobalamin (VITAMIN B-12) 500 mcg tablet Take 1 tablet by mouth daily      glimepiride (AMARYL) 1 mg tablet       lisinopril (ZESTRIL) 20 mg tablet TAKE ONE TABLET BY MOUTH TWICE A  tablet 3    metFORMIN (GLUCOPHAGE) 850 mg tablet TAKE ONE (1) TABLET(S) TWICE DAILY WITH MORNING AND EVENING MEAL 180 tablet 3    Omega-3 Fatty Acids (OMEGA-3 FISH OIL) 300 MG CAPS Take 1 capsule by mouth daily      PRECISION XTRA TEST STRIPS test strip USE TO TEST BLOOD GLUCOSE ONCE DAILY 100 each 2    simvastatin (ZOCOR) 20 mg tablet TAKE ONE TABLET BY MOUTH EVERY DAY 90 tablet 3    terazosin (HYTRIN) 1 mg capsule TAKE ONE CAPSULE BY MOUTH AT BEDTIME 90 capsule 3     No current facility-administered medications for this visit        Current Outpatient Prescriptions on File Prior to Visit   Medication Sig    amLODIPine (NORVASC) 2 5 mg tablet TAKE ONE TABLET BY MOUTH EVERY DAY    Cholecalciferol ([employer] VITAMIN D3) 1000 units capsule Take 1 capsule by mouth daily    cyanocobalamin (VITAMIN B-12) 500 mcg tablet Take 1 tablet by mouth daily    glimepiride (AMARYL) 1 mg tablet     lisinopril (ZESTRIL) 20 mg tablet TAKE ONE TABLET BY MOUTH TWICE A DAY    metFORMIN (GLUCOPHAGE) 850 mg tablet TAKE ONE (1) TABLET(S) TWICE DAILY WITH MORNING AND EVENING MEAL    Omega-3 Fatty Acids (OMEGA-3 FISH OIL) 300 MG CAPS Take 1 capsule by mouth daily    PRECISION XTRA TEST STRIPS test strip USE TO TEST BLOOD GLUCOSE ONCE DAILY    simvastatin (ZOCOR) 20 mg tablet TAKE ONE TABLET BY MOUTH EVERY DAY    terazosin (HYTRIN) 1 mg capsule TAKE ONE CAPSULE BY MOUTH AT BEDTIME     No current facility-administered medications on file prior to visit  He has No Known Allergies       Review of Systems   Constitutional: Negative  HENT: Negative  Eyes: Negative  Respiratory: Negative  Cardiovascular: Positive for palpitations  Negative for chest pain and leg swelling  Gastrointestinal: Negative  Endocrine: Negative  Genitourinary: Negative  Musculoskeletal: Positive for arthralgias (Some minor diffuse arthritic aches and pains but nothing new or disabling)  Negative for back pain, gait problem, joint swelling, myalgias, neck pain and neck stiffness  Skin: Negative  Allergic/Immunologic: Negative  Neurological: Negative  Hematological: Negative  Psychiatric/Behavioral: Negative  Objective:      /50   Pulse 70   Temp (!) 96 9 °F (36 1 °C)   Ht 5' 10" (1 778 m)   Wt 85 7 kg (189 lb)   SpO2 95%   BMI 27 12 kg/m²          Physical Exam   Constitutional: He is oriented to person, place, and time  He appears well-developed and well-nourished  No distress  Pleasant, cheerful, mildly overweight 33-year-old male who is awake alert no acute distress and oriented   HENT:   Head: Normocephalic and atraumatic  Right Ear: External ear normal    Left Ear: External ear normal    Nose: Nose normal    Mouth/Throat: Oropharynx is clear and moist  No oropharyngeal exudate  Eyes: Conjunctivae and EOM are normal  Pupils are equal, round, and reactive to light   Right eye exhibits no discharge  Left eye exhibits no discharge  No scleral icterus  Neck: Normal range of motion  Neck supple  No JVD present  No tracheal deviation present  No thyromegaly present  Cardiovascular: Normal rate, normal heart sounds and intact distal pulses  Exam reveals no gallop and no friction rub  No murmur heard  Patient on heart examination today does have some irregularity to his rhythm and occasional pauses  Patient will be going for a Holter for further evaluation   Pulmonary/Chest: Effort normal  No stridor  No respiratory distress  He has no wheezes  He has no rales  He exhibits no tenderness  Slightly decreased breath sounds anteriorly and posteriorly but no rales rhonchi or wheezes could be appreciated on exam   Abdominal: Soft  Bowel sounds are normal  He exhibits no distension and no mass  There is no tenderness  There is no rebound and no guarding  Musculoskeletal: Normal range of motion  He exhibits deformity (Mild diffuse arthritic changes but nothing acute)  He exhibits no edema or tenderness  Lymphadenopathy:     He has cervical adenopathy  Neurological: He is alert and oriented to person, place, and time  He displays normal reflexes  No cranial nerve deficit  He exhibits normal muscle tone  Coordination normal    Patient does have an absence of patella and Achilles tendon reflexes bilaterally   Skin: Skin is warm and dry  No rash noted  He is not diaphoretic  No erythema  No pallor  Psychiatric: He has a normal mood and affect  His behavior is normal  Judgment and thought content normal    Nursing note and vitals reviewed

## 2024-07-20 DIAGNOSIS — C44.41 BCC (BASAL CELL CARCINOMA), SCALP/NECK: Primary | ICD-10-CM

## 2024-07-22 ENCOUNTER — TELEPHONE (OUTPATIENT)
Dept: DERMATOLOGY | Facility: CLINIC | Age: 84
End: 2024-07-22
Payer: MEDICARE

## 2024-07-22 NOTE — TELEPHONE ENCOUNTER
Called patient to schedule surgery with Dr. Victor    Date of Surgery: 09/05    Surgery type: Mohs    Consult scheduled: No    Has patient had mohs with us before? Yes    Additional comments: denisa Downs on 7/22/2024 at 3:50 PM

## 2024-07-22 NOTE — TELEPHONE ENCOUNTER
Pre assessment completed for upcoming procedure.   (Please see previous telephone encounter notes for complete details)    Family member , Ceci (wife C2C on file) returned call.       Procedure details:    Arrival time and facility location reviewed.    Pre op exam needed? No.    Designated  policy reviewed. Instructed to have someone stay 6  hours post procedure.       Medication review:    Medications reviewed. Please see supporting documentation below. Holding recommendations discussed (if applicable).       Prep for procedure:     Procedure prep instructions reviewed.        Any additional information needed:  Has to take meds with food so is holding those morning of.       Family member  verbalized understanding and had no questions or concerns at this time.      Kelsey Davis RN  Endoscopy Procedure Pre Assessment   [phone] option 4

## 2024-07-25 ENCOUNTER — TELEPHONE (OUTPATIENT)
Dept: NEUROSURGERY | Facility: CLINIC | Age: 84
End: 2024-07-25
Payer: MEDICARE

## 2024-07-25 NOTE — TELEPHONE ENCOUNTER
Patient confirmed scheduled appointment:  Date: 8/20/2024  Time: 1:30pm  Visit type: Return Adult Neurosur  Provider: Antionette Hunt  Location: [facility]  Testing/imaging: MRI prior in [facility]  Additional notes: R/s 8/13 appt    Johanny Young on 7/25/2024 at 4:49 PM

## 2024-07-29 ENCOUNTER — HOSPITAL ENCOUNTER (OUTPATIENT)
Facility: AMBULATORY SURGERY CENTER | Age: 84
Discharge: HOME | End: 2024-07-29
Attending: INTERNAL MEDICINE | Admitting: INTERNAL MEDICINE
Payer: MEDICARE

## 2024-07-29 VITALS
OXYGEN SATURATION: 94 % | RESPIRATION RATE: 16 BRPM | TEMPERATURE: 97.2 F | SYSTOLIC BLOOD PRESSURE: 115 MMHG | DIASTOLIC BLOOD PRESSURE: 71 MMHG | HEART RATE: 74 BPM

## 2024-07-29 LAB — UPPER GI ENDOSCOPY: NORMAL

## 2024-07-29 PROCEDURE — 43239 EGD BIOPSY SINGLE/MULTIPLE: CPT

## 2024-07-29 PROCEDURE — G8907 PT DOC NO EVENTS ON DISCHARG: HCPCS

## 2024-07-29 PROCEDURE — G8918 PT W/O PREOP ORDER IV AB PRO: HCPCS

## 2024-07-29 PROCEDURE — 88305 TISSUE EXAM BY PATHOLOGIST: CPT | Performed by: PATHOLOGY

## 2024-07-29 RX ORDER — SODIUM CHLORIDE, SODIUM LACTATE, POTASSIUM CHLORIDE, CALCIUM CHLORIDE 600; 310; 30; 20 MG/100ML; MG/100ML; MG/100ML; MG/100ML
INJECTION, SOLUTION INTRAVENOUS CONTINUOUS PRN
Status: COMPLETED | OUTPATIENT
Start: 2024-07-29 | End: 2024-07-29

## 2024-07-29 RX ORDER — PROCHLORPERAZINE MALEATE 5 MG
5 TABLET ORAL EVERY 6 HOURS PRN
Status: DISCONTINUED | OUTPATIENT
Start: 2024-07-29 | End: 2024-07-30 | Stop reason: HOSPADM

## 2024-07-29 RX ORDER — NALOXONE HYDROCHLORIDE 0.4 MG/ML
0.2 INJECTION, SOLUTION INTRAMUSCULAR; INTRAVENOUS; SUBCUTANEOUS
Status: DISCONTINUED | OUTPATIENT
Start: 2024-07-29 | End: 2024-07-30 | Stop reason: HOSPADM

## 2024-07-29 RX ORDER — LIDOCAINE 40 MG/G
CREAM TOPICAL
Status: DISCONTINUED | OUTPATIENT
Start: 2024-07-29 | End: 2024-07-30 | Stop reason: HOSPADM

## 2024-07-29 RX ORDER — NALOXONE HYDROCHLORIDE 0.4 MG/ML
0.4 INJECTION, SOLUTION INTRAMUSCULAR; INTRAVENOUS; SUBCUTANEOUS
Status: DISCONTINUED | OUTPATIENT
Start: 2024-07-29 | End: 2024-07-30 | Stop reason: HOSPADM

## 2024-07-29 RX ORDER — ONDANSETRON 2 MG/ML
4 INJECTION INTRAMUSCULAR; INTRAVENOUS EVERY 6 HOURS PRN
Status: DISCONTINUED | OUTPATIENT
Start: 2024-07-29 | End: 2024-07-30 | Stop reason: HOSPADM

## 2024-07-29 RX ORDER — FLUMAZENIL 0.1 MG/ML
0.2 INJECTION, SOLUTION INTRAVENOUS
Status: DISCONTINUED | OUTPATIENT
Start: 2024-07-29 | End: 2024-07-30 | Stop reason: HOSPADM

## 2024-07-29 RX ORDER — ONDANSETRON 2 MG/ML
4 INJECTION INTRAMUSCULAR; INTRAVENOUS
Status: DISCONTINUED | OUTPATIENT
Start: 2024-07-29 | End: 2024-07-30 | Stop reason: HOSPADM

## 2024-07-29 RX ORDER — FENTANYL CITRATE 50 UG/ML
INJECTION, SOLUTION INTRAMUSCULAR; INTRAVENOUS PRN
Status: DISCONTINUED | OUTPATIENT
Start: 2024-07-29 | End: 2024-07-29 | Stop reason: HOSPADM

## 2024-07-29 RX ORDER — ONDANSETRON 4 MG/1
4 TABLET, ORALLY DISINTEGRATING ORAL EVERY 6 HOURS PRN
Status: DISCONTINUED | OUTPATIENT
Start: 2024-07-29 | End: 2024-07-30 | Stop reason: HOSPADM

## 2024-07-29 NOTE — H&P
ENDOSCOPY PRE-SEDATION H&P FOR OUTPATIENT PROCEDURES    Gustavo Ramon  [id number]  1940    Procedure: EGD    Pre-procedure diagnosis: hx Stewart esophagus, early satiety    Past medical history:   Past Medical History:   Diagnosis Date    Actinic keratosis     AK (actinic keratosis) 11/15/2012    Amaurosis fugax     Basal cell carcinoma 2009    R medial cheek/nose    Central serous retinopathy left    Eye    Diverticulosis 08/2006    DJD (degenerative joint disease)     GERD (gastroesophageal reflux disease)     Hearing loss     High frequency    History of nonmelanoma skin cancer     History of nonmelanoma skin cancer     HTN (hypertension)     Hyperlipidemia LDL goal < 130     Hyperplastic colon polyp 08/2006    IgA monoclonal gammopathy     IgA kappa    Infection due to 2019 novel coronavirus 10/13/2022    Infection due to 2019 novel coronavirus 11/2021    Lattice degeneration of retina right    Eye    Macular degeneration     Nonsenile cataract     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    [facility] consult    Rosacea     Steroid-induced diabetes mellitus, 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    Strabismus     Vitreous detachment left    Eye       Past surgical history:   Past Surgical History:   Procedure Laterality Date    ARTHROSCOPY KNEE RT/LT Left 01/2010    Knee    BIOPSY  2009/2013/2016    Nose; Prostate; BCC - left arm    COLONOSCOPY  09/24/2019    w/Endoscopy    COLONOSCOPY  07/29/2024    COLONOSCOPY WITH CO2 INSUFFLATION N/A 09/24/2019    Procedure: COLONOSCOPY, WITH CO2 INSUFFLATION;  Surgeon: Loi Levine MD;  Location:  OR    COMBINED ESOPHAGOSCOPY, GASTROSCOPY, DUODENOSCOPY (EGD) WITH CO2 INSUFFLATION N/A 09/24/2019    Procedure: ESOPHAGOGASTRODUODENOSCOPY, WITH CO2 INSUFFLATION;  Surgeon: Loi Levine MD;  Location:  OR    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    DISKECTOMY, LUMBAR, SINGLE SP  2003    L2-3    ENT SURGERY  1943    Tonsils     ENT SURGERY  02/22/2012    Biopsy  lesion right pinna    ENT SURGERY  02/24/2012    Biopsy leson right pinna    ESOPHAGOSCOPY, GASTROSCOPY, DUODENOSCOPY (EGD), COMBINED N/A 09/24/2019    Procedure: Esophagogastroduodenoscopy, With Biopsy;  Surgeon: Loi Levine MD;  Location: [facility] OR    IR LUMBAR DRAIN PLACEMENT W FLUORO  06/27/2022    LAMINOPLASTY CERVICAL POSTERIOR THREE+ LEVELS N/A 12/12/2022    Procedure: POSTERIOR APPROACH Cervical 4-7 laminoplasty;  Surgeon: Judie Levin MD;  Location: [facility] OR    LAPAROSCOPIC ASSISTED IMPLANT SHUNT VENTRICULOPERITONEAL N/A 07/07/2022    Procedure: possible INSERTION, SHUNT, VENTRICULOPERITONEAL, LAPAROSCOPY-ASSISTED;  Surgeon: Joe Damon MD;  Location: [facility] OR    OPTICAL TRACKING SYSTEM IMPLANT SHUNT VENTRICULOPERITONEAL N/A 07/07/2022    Procedure: INSERTION, SHUNT, VENTRICULOPERITONEAL, USING OPTICAL TRACKING SYSTEM;  Surgeon: Jean-Paul Childs MD;  Location: [facility] OR    SOFT TISSUE SURGERY  05/2010    Basal Cell/right side nose       Current Outpatient Medications   Medication Sig Dispense Refill    ACE/ARB/ARNI NOT PRESCRIBED (INTENTIONAL) Please choose reason not prescribed from choices below.      acetaminophen (TYLENOL) 325 MG tablet Take 1-2 tablets (325-650 mg) by mouth every 4 hours as needed for mild pain      aspirin (ASA) 325 MG tablet Take 1 tablet (325 mg) by mouth daily      cyanocobalamin (VITAMIN B-12) 1000 MCG tablet Take 1 tablet (1,000 mcg) by mouth daily 90 tablet 1    fluorouracil (EFUDEX) 5 % external cream Apply topically 2 times daily 2-3 weeks for scalp 40 g 3    fluticasone (FLONASE) 50 MCG/ACT nasal spray Spray 1 spray into both nostrils daily 11.1 mL 0    gabapentin (NEURONTIN) 300 MG capsule 1 capsule in the morning, 1 mid day, and 3 in the evening 360 capsule 3    ketoconazole (NIZORAL) 2 % external cream Apply topically 2 times daily To face 60 g 3    ketoconazole (NIZORAL) 2 % external cream Apply twice daily for 8 weeks from knees down to both legs and  feet. 120 g 3    levETIRAcetam (KEPPRA) 250 MG tablet TAKE 1 TABLET BY MOUTH TWO TIMES A DAY 60 tablet PRN    meclizine (ANTIVERT) 25 MG tablet Take 0.5-1 tablets (12.5-25 mg) by mouth 3 times daily as needed for dizziness 30 tablet 0    methimazole (TAPAZOLE) 5 MG tablet Take 1 tablet (5 mg) by mouth daily 90 tablet 0    methocarbamol (ROBAXIN) 500 MG tablet Take 1 tablet (500 mg) by mouth every 6 hours as needed for muscle spasms      multivitamin (OCUVITE) TABS tablet Take 1 tablet by mouth daily      niacinamide 500 MG tablet Take 1 tablet (500 mg) by mouth 2 times daily (with meals) 180 tablet 3    pantoprazole (PROTONIX) 40 MG EC tablet Take 1 tablet (40 mg) by mouth daily 90 tablet 3    polyethylene glycol (MIRALAX) 17 GM/Dose powder Take 17 g by mouth daily as needed for constipation 510 g     pramipexole (MIRAPEX) 0.125 MG tablet Take 1 tablet (0.125 mg) by mouth at bedtime Take one tablet by mouth at bedtime in addition to 0.25mg dose 90 tablet 3    pramipexole (MIRAPEX) 0.25 MG tablet Take 2 tabs in the AM and 3 tabs qhs      pyRIDostigmine (MESTINON) 60 MG tablet TAKE ONE AND ONE-HALF TABLETS BY MOUTH TWICE A DAY 90 tablet PRN    senna-docusate (SENOKOT-S/PERICOLACE) 8.6-50 MG tablet Take 1 tablet by mouth 2 times daily 60 tablet 0    simvastatin (ZOCOR) 20 MG tablet TAKE ONE TABLET BY MOUTH AT BEDTIME 90 tablet 1    tamsulosin (FLOMAX) 0.4 MG capsule Take 1 capsule (0.4 mg) by mouth daily      terbinafine (LAMISIL) 1 % external cream Apply topically 2 times daily 42 g 11    triamcinolone (KENALOG) 0.1 % external cream Apply a thin layer up to twice daily to affected areas as needed. 30 g 3    trospium (SANCTURA) 20 MG tablet Take 1 tablet (20 mg) by mouth 2 times daily (before meals) 180 tablet 3    vitamin D3 (CHOLECALCIFEROL) 2000 units tablet Take 1 tablet by mouth daily 90 tablet 1    zolpidem (AMBIEN) 5 MG tablet Take tablet by mouth 15 minutes prior to sleep, for Sleep Study 1 tablet 0     tamsulosin (FLOMAX) 0.4 MG capsule Take 1 capsule (0.4 mg) by mouth daily 90 capsule 1     Current Facility-Administered Medications   Medication Dose Route Frequency Provider Last Rate Last Admin    lidocaine (LMX4) kit   Topical Q1H PRN Jeff Peace MD        lidocaine 1 % 0.1-1 mL  0.1-1 mL Other Q1H PRN Jeff Peace MD        lidocaine 1% with EPINEPHrine 1:100,000 injection 3 mL  3 mL Intradermal Once         ondansetron (ZOFRAN) injection 4 mg  4 mg Intravenous Once PRN Jeff Peace MD        sodium chloride (PF) 0.9% PF flush 3 mL  3 mL Intracatheter Q8H Jeff Peace MD        sodium chloride (PF) 0.9% PF flush 3 mL  3 mL Intracatheter q1 min prn Jeff Peace MD           Allergies   Allergen Reactions    Mycophenolate Other (See Comments)     Confusion, abnormal movements       History of Anesthesia/Sedation Problems: no    PHYSICAL EXAMINATION:  Constitutional: aaox3, cooperative, pleasant  Vitals reviewed: BP (!) 143/84 (Cuff Size: Adult Large)   Pulse 90   Temp 97.2  F (36.2  C) (Temporal)   Resp 18   SpO2 97%   Wt:   Wt Readings from Last 2 Encounters:   07/19/24 76.2 kg (168 lb)   06/26/24 77.6 kg (171 lb)      Eyes: Sclera anicteric/injected  Ears/nose/mouth/throat: Normal oropharynx without ulcers or exudate, mucus membranes moist, hearing intact  Neck: supple, thyroid normal size  CV: No edema  Respiratory: Unlabored breathing  Lymph: No submandibular, supraclavicular or inguinal lymphadenopathy  Abd: Nondistended, no masses, nontender  Skin: warm, perfused, no jaundice  Psych: Normal affect  MSK: normal movement on limited exam.    ASA Score: See Provation note    Assessment/Plan:     The patient is an appropriate candidate to receive sedation.    Informed consent was discussed with the patient/family, including the risks, benefits, potential complications and any alternative options associated with sedation.    Patient  assessment completed just prior to sedation and while under constant observation by the provider. Condition determined to be adequate for proceeding with sedation.    The specific risks for the procedure were discussed with the patient at the time of informed consent and include but are not limited to perforation which could require surgery, missing significant neoplasm or lesion, hemorrhage and adverse sedative complication.      Jeff Peace MD

## 2024-07-31 ENCOUNTER — OFFICE VISIT (OUTPATIENT)
Dept: NEUROLOGY | Facility: CLINIC | Age: 84
End: 2024-07-31
Attending: PSYCHIATRY & NEUROLOGY
Payer: MEDICARE

## 2024-07-31 VITALS
BODY MASS INDEX: 25.91 KG/M2 | WEIGHT: 168 LBS | SYSTOLIC BLOOD PRESSURE: 132 MMHG | HEART RATE: 74 BPM | DIASTOLIC BLOOD PRESSURE: 72 MMHG

## 2024-07-31 DIAGNOSIS — R79.9 ABNORMAL FINDING OF BLOOD CHEMISTRY, UNSPECIFIED: ICD-10-CM

## 2024-07-31 DIAGNOSIS — M47.12 CERVICAL SPONDYLOSIS WITH MYELOPATHY: ICD-10-CM

## 2024-07-31 DIAGNOSIS — G25.81 RESTLESS LEG SYNDROME: ICD-10-CM

## 2024-07-31 DIAGNOSIS — R41.3 MEMORY LOSS: ICD-10-CM

## 2024-07-31 DIAGNOSIS — G91.2 NPH (NORMAL PRESSURE HYDROCEPHALUS) (H): ICD-10-CM

## 2024-07-31 LAB
FERRITIN SERPL-MCNC: 100 NG/ML (ref 31–409)
IRON BINDING CAPACITY (ROCHE): 253 UG/DL (ref 240–430)
IRON SATN MFR SERPL: 21 % (ref 15–46)
IRON SERPL-MCNC: 52 UG/DL (ref 61–157)
PATH REPORT.COMMENTS IMP SPEC: NORMAL
PATH REPORT.COMMENTS IMP SPEC: NORMAL
PATH REPORT.FINAL DX SPEC: NORMAL
PATH REPORT.GROSS SPEC: NORMAL
PATH REPORT.MICROSCOPIC SPEC OTHER STN: NORMAL
PATH REPORT.RELEVANT HX SPEC: NORMAL
PHOTO IMAGE: NORMAL

## 2024-07-31 PROCEDURE — 99417 PROLNG OP E/M EACH 15 MIN: CPT | Performed by: INTERNAL MEDICINE

## 2024-07-31 PROCEDURE — 99215 OFFICE O/P EST HI 40 MIN: CPT | Performed by: INTERNAL MEDICINE

## 2024-07-31 PROCEDURE — 82728 ASSAY OF FERRITIN: CPT | Performed by: INTERNAL MEDICINE

## 2024-07-31 PROCEDURE — 83540 ASSAY OF IRON: CPT | Performed by: INTERNAL MEDICINE

## 2024-07-31 PROCEDURE — 83550 IRON BINDING TEST: CPT | Performed by: INTERNAL MEDICINE

## 2024-07-31 PROCEDURE — 36415 COLL VENOUS BLD VENIPUNCTURE: CPT | Performed by: INTERNAL MEDICINE

## 2024-07-31 RX ORDER — ROPINIROLE 2 MG/1
2 TABLET, FILM COATED, EXTENDED RELEASE ORAL AT BEDTIME
Qty: 30 TABLET | Refills: 6 | Status: SHIPPED | OUTPATIENT
Start: 2024-07-31 | End: 2024-09-25

## 2024-07-31 NOTE — PATIENT INSTRUCTIONS
Let us know if the transition from Pramipexole to Ropinirole is not going well for the Restless Leg Syndrome    Let us know if the jerking movements are worse in the future and you would like to increase the Keppra

## 2024-07-31 NOTE — LETTER
7/31/2024      Gustavo Ramon  [address] [facility] [id number]      Dear Colleague,    Thank you for referring your patient, Gustavo Ramon, to the [facility] [place]. Please see a copy of my visit note below.    [facility] Neurology Consultation    Gustavo Ramon MRN# [id number]   Age: 84 year old YOB: 1940     Requesting physician: Tyler Ness*  Winston Silverman     Reason for Consultation: imbalance, cognitive changes, and restless leg symptoms           Assessment and Plan:   Assessment:  Gustavo Ramon is a 84 year old male who presents today for evaluation of imbalance, cognitive changes, and restless leg syndrome (RLS). Imbalance is likely multifactorial related to history of NPH, cervical myelopathy, and polyneuropathy. He is going to discuss with neurosurgery possibly changing setting of  shunt. Cognition overall is doing well today. MoCA today was 27/30. Comparatively, prior to  shunt placement, patient scored 11/30. With regard to RLS patient is likely suffering from augmentation given high dosage of Mirapex. We will try switching to Ropinirole ER 2 mg. If needed we can increase this dosage to 2 mg BID. We will also check iron studies today.        Plan:  - Stop Mirapex; start Ropinirole ER 2 mg nightly, call with worsening symptoms  - Patient can discuss refills with PCP if symptoms are controlled with this  - Iron, TIBC, ferritin    Follow up in Neurology clinic as needed should new concerns arise.    Minh Narvaez MD   of Neurology  [facility]  ---------------------------------------------------------------------------------------------------------------------------        History of Presenting Symptoms:   Gustavo Ramon is a 84 year old male who presents today for evaluation of imbalance, cognitive changes, and restless leg symptoms.     He had COVID in December 2021. After this  he developed walking problems. By January he was walking better. In February 2022 walking worsened and memory issues started. He also had incontinence. He was diagnosed with NPH in 6/2022. Patient had shunt placement in 7/2022. This helped with walking and imbalance, but he still had use the walker afterwards. The incontinence got better to a degree. It also improved his memory.     Short term memory has been stable since then. Walking is stable to mildly worse now. He still uses walker. Incontinence is still an issue at times.     He had cervical surgery in 12/2022. He was having pain from the neck down the right arm prior to surgery. He had cord signal change on MRI.    He has had restless leg syndrome for several years. He takes Mirapex 0.50 mg AM and 0.75 mg PM. He also takes gabapentin 900/900. Symptoms bother him intermittently throughout the whole day and aren't as bad at night. There isn't a particular time of day that is worse.       Past Medical History:     Patient Active Problem List   Diagnosis     Rosacea     DJD (degenerative joint disease)     Ocular myasthenia gravis (H)     Macular pigment deposit     HYPERLIPIDEMIA LDL GOAL <130     MGUS (monoclonal gammopathy of unknown significance)     Retinal hole OS, operculated     Horseshoe tear of retina without detachment OD     History  of basal cell carcinoma     Seborrhea     AK (actinic keratosis)     Malignant neoplasm of prostate (H)     PVD (posterior vitreous detachment)     Amaurosis fugax by Hx neg carotid W/U     Actinic keratosis     Peripheral neuropathy     Nuclear sclerosis of both eyes     Essential hypertension with goal blood pressure less than 140/90     Gastroesophageal reflux disease without esophagitis     Stage 3a chronic kidney disease (H)     Secondary adrenocortical insufficiency (H24)     Physical deconditioning     NPH (normal pressure hydrocephalus) (H)     Myoclonus     Infection due to 2019 novel coronavirus      Encephalopathy     Dementia without behavioral disturbance, unspecified dementia type     Idiopathic normal pressure hydrocephalus (H)     Acute atopic conjunctivitis     Cancer of the skin, basal cell     COVID-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     Degeneration of intervertebral disc     Diverticulitis of intestine     Dysphagia     Gait instability     Hip pain, acute, left     History of osteoporosis     Kidney disorder     Pneumonia due to COVID-19 virus     Restless leg syndrome due to iron deficiency anemia     Sepsis due to COVID-19 (H)     Tear of medial cartilage or meniscus of knee, current     Urinary frequency     Vitamin D deficiency     Weakness of both lower extremities     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)     Past Medical History:   Diagnosis Date     Actinic keratosis      AK (actinic keratosis) 11/15/2012     Amaurosis fugax      Basal cell carcinoma 2009    R medial cheek/nose     Central serous retinopathy left    Eye     Diverticulosis 08/2006     DJD (degenerative joint disease)      GERD (gastroesophageal reflux disease)      Hearing loss     High frequency     History of nonmelanoma skin cancer      History of nonmelanoma skin cancer      HTN (hypertension)      Hyperlipidemia LDL goal < 130      Hyperplastic colon polyp 08/2006     IgA monoclonal gammopathy     IgA kappa     Infection due to 2019 novel coronavirus 10/13/2022     Infection due to 2019 novel coronavirus 11/2021     Lattice degeneration of retina right    Eye     Macular degeneration      Nonsenile cataract      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    [place] consult     Rosacea      Steroid-induced diabetes mellitus, 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     Strabismus      Vitreous detachment left    Eye        Past Surgical History:     Past Surgical History:   Procedure Laterality Date     ARTHROSCOPY KNEE RT/LT Left 01/2010    Knee     BIOPSY  2009/2013/2016    Nose; Prostate; BCC - left arm     COLONOSCOPY  09/24/2019     w/Endoscopy     COLONOSCOPY  07/29/2024     COLONOSCOPY WITH CO2 INSUFFLATION N/A 09/24/2019    Procedure: COLONOSCOPY, WITH CO2 INSUFFLATION;  Surgeon: Loi Levine MD;  Location: [facility] OR     COMBINED ESOPHAGOSCOPY, GASTROSCOPY, DUODENOSCOPY (EGD) WITH CO2 INSUFFLATION N/A 09/24/2019    Procedure: ESOPHAGOGASTRODUODENOSCOPY, WITH CO2 INSUFFLATION;  Surgeon: Loi Levine MD;  Location: [facility] OR     COMBINED ESOPHAGOSCOPY, GASTROSCOPY, DUODENOSCOPY (EGD) WITH CO2 INSUFFLATION N/A 7/29/2024    Procedure: Combined Esophagoscopy, Gastroscopy, Duodenoscopy (Egd) With Co2 Insufflation;  Surgeon: Jeff Peace MD;  Location: [facility] OR     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     DISKECTOMY, LUMBAR, SINGLE SP  2003    L2-3     ENT SURGERY  1943    Tonsils      ENT SURGERY  02/22/2012    Biopsy lesion right pinna     ENT SURGERY  02/24/2012    Biopsy leson right pinna     ESOPHAGOSCOPY, GASTROSCOPY, DUODENOSCOPY (EGD), COMBINED N/A 09/24/2019    Procedure: Esophagogastroduodenoscopy, With Biopsy;  Surgeon: Loi Levine MD;  Location: [facility] OR     ESOPHAGOSCOPY, GASTROSCOPY, DUODENOSCOPY (EGD), COMBINED N/A 7/29/2024    Procedure: ESOPHAGOGASTRODUODENOSCOPY, WITH BIOPSY;  Surgeon: Jeff Peace MD;  Location: [facility] OR     IR LUMBAR DRAIN PLACEMENT W FLUORO  06/27/2022     LAMINOPLASTY CERVICAL POSTERIOR THREE+ LEVELS N/A 12/12/2022    Procedure: POSTERIOR APPROACH Cervical 4-7 laminoplasty;  Surgeon: Judie Levin MD;  Location: [facility] OR     LAPAROSCOPIC ASSISTED IMPLANT SHUNT VENTRICULOPERITONEAL N/A 07/07/2022    Procedure: possible INSERTION, SHUNT, VENTRICULOPERITONEAL, LAPAROSCOPY-ASSISTED;  Surgeon: Joe Damon MD;  Location: [facility] OR     OPTICAL TRACKING SYSTEM IMPLANT SHUNT VENTRICULOPERITONEAL N/A 07/07/2022    Procedure: INSERTION, SHUNT, VENTRICULOPERITONEAL, USING OPTICAL TRACKING SYSTEM;  Surgeon: Jean-Paul Childs MD;  Location: [facility] OR     SOFT TISSUE  SURGERY  05/2010    Basal Cell/right side nose        Social History:     Social History     Tobacco Use     Smoking status: Never     Passive exposure: Never     Smokeless tobacco: Never     Tobacco comments:     Never   Vaping Use     Vaping status: Never Used   Substance Use Topics     Alcohol use: No     Drug use: No        Family History:     Family History   Problem Relation Age of Onset     Heart Disease Mother      Alzheimer Disease Mother 73     C.A.D. Father      Coronary Artery Disease Father      Diabetes Grandchild         using a pump      Diabetes Paternal Uncle      Heart Disease Paternal Grandmother      Glaucoma No family hx of      Macular Degeneration No family hx of      Melanoma No family hx of      Skin Cancer No family hx of         Medications:     Current Outpatient Medications   Medication Sig Dispense Refill     ACE/ARB/ARNI NOT PRESCRIBED (INTENTIONAL) Please choose reason not prescribed from choices below.       acetaminophen (TYLENOL) 325 MG tablet Take 1-2 tablets (325-650 mg) by mouth every 4 hours as needed for mild pain       aspirin (ASA) 325 MG tablet Take 1 tablet (325 mg) by mouth daily       cyanocobalamin (VITAMIN B-12) 1000 MCG tablet Take 1 tablet (1,000 mcg) by mouth daily 90 tablet 1     fluorouracil (EFUDEX) 5 % external cream Apply topically 2 times daily 2-3 weeks for scalp 40 g 3     fluticasone (FLONASE) 50 MCG/ACT nasal spray Spray 1 spray into both nostrils daily 11.1 mL 0     gabapentin (NEURONTIN) 300 MG capsule 1 capsule in the morning, 1 mid day, and 3 in the evening 360 capsule 3     ketoconazole (NIZORAL) 2 % external cream Apply topically 2 times daily To face 60 g 3     ketoconazole (NIZORAL) 2 % external cream Apply twice daily for 8 weeks from knees down to both legs and feet. 120 g 3     levETIRAcetam (KEPPRA) 250 MG tablet TAKE 1 TABLET BY MOUTH TWO TIMES A DAY 60 tablet PRN     meclizine (ANTIVERT) 25 MG tablet Take 0.5-1 tablets (12.5-25 mg) by mouth  3 times daily as needed for dizziness 30 tablet 0     methimazole (TAPAZOLE) 5 MG tablet Take 1 tablet (5 mg) by mouth daily 90 tablet 0     methocarbamol (ROBAXIN) 500 MG tablet Take 1 tablet (500 mg) by mouth every 6 hours as needed for muscle spasms       multivitamin (OCUVITE) TABS tablet Take 1 tablet by mouth daily       niacinamide 500 MG tablet Take 1 tablet (500 mg) by mouth 2 times daily (with meals) 180 tablet 3     pantoprazole (PROTONIX) 40 MG EC tablet Take 1 tablet (40 mg) by mouth daily 90 tablet 3     polyethylene glycol (MIRALAX) 17 GM/Dose powder Take 17 g by mouth daily as needed for constipation 510 g      pramipexole (MIRAPEX) 0.125 MG tablet Take 1 tablet (0.125 mg) by mouth at bedtime Take one tablet by mouth at bedtime in addition to 0.25mg dose 90 tablet 3     pramipexole (MIRAPEX) 0.25 MG tablet Take 2 tabs in the AM and 3 tabs qhs       pyRIDostigmine (MESTINON) 60 MG tablet Take 1.5 tablets (90 mg) by mouth 2 times daily 270 tablet 0     senna-docusate (SENOKOT-S/PERICOLACE) 8.6-50 MG tablet Take 1 tablet by mouth 2 times daily 60 tablet 0     simvastatin (ZOCOR) 20 MG tablet TAKE ONE TABLET BY MOUTH AT BEDTIME 90 tablet 1     tamsulosin (FLOMAX) 0.4 MG capsule Take 1 capsule (0.4 mg) by mouth daily 90 capsule 1     tamsulosin (FLOMAX) 0.4 MG capsule Take 1 capsule (0.4 mg) by mouth daily       terbinafine (LAMISIL) 1 % external cream Apply topically 2 times daily 42 g 11     triamcinolone (KENALOG) 0.1 % external cream Apply a thin layer up to twice daily to affected areas as needed. 30 g 3     trospium (SANCTURA) 20 MG tablet Take 1 tablet (20 mg) by mouth 2 times daily (before meals) 180 tablet 3     vitamin D3 (CHOLECALCIFEROL) 2000 units tablet Take 1 tablet by mouth daily 90 tablet 1     zolpidem (AMBIEN) 5 MG tablet Take tablet by mouth 15 minutes prior to sleep, for Sleep Study 1 tablet 0     Current Facility-Administered Medications   Medication Dose Route Frequency Provider  Last Rate Last Admin     lidocaine 1% with EPINEPHrine 1:100,000 injection 3 mL  3 mL Intradermal Once             Allergies:     Allergies   Allergen Reactions     Mycophenolate Other (See Comments)     Confusion, abnormal movements        Review of Systems:   As above     Physical Exam:   Vitals: /72 (BP Location: Right arm, Patient Position: Sitting, Cuff Size: Adult Regular)   Pulse 74   Wt 76.2 kg (168 lb)   BMI 25.91 kg/m     General: Seated comfortably in no acute distress.  HEENT: Optic discs sharp on funduscopic exam.   Lungs: breathing comfortably  Neurologic:     Mental Status: MoCA 27/30 (-2 delayed recall, -1 repeating sentence).     Cranial Nerves: Visual fields intact. PERRL. EOMI with normal smooth pursuit. Facial sensation intact/symmetric. Facial movements symmetric. Hearing not formally tested but intact to conversation. Palate elevation symmetric, uvula midline. No dysarthria. Shoulder shrug strong bilaterally. Tongue protrusion midline.     Motor: No significant tremors or other abnormal movements observed. Muscle tone normal throughout with exception of some paratonia. Mild decrement in bilateral rapid finger tapping. Strength 5/5 throughout upper and lower extremities.     Deep Tendon Reflexes: 2+/symmetric throughout upper and lower extremities with exception of 1+ ankle jerks. No clonus. Toes downgoing bilaterally.     Sensory: Decreased sensation to light touch in the bottoms of the feet. Vibration is ~2 seconds in bilateral great toes and proprioception diminished in toes, otherwise intact. Temperature sensation is decreased in feet. Positive Romberg.      Coordination: Finger-nose-finger and heel-shin intact without dysmetria.      Gait: Slow, shuffling cautious gait with decreased stride height.          Data: Pertinent prior to visit   Imaging:  MRI cervical 12/2022  Impression:   1. Marked cervical spondylosis, with severe spinal canal stenosis at  C5-C6 and moderate at C6-C7,  with myelopathic T2 hyperintense cord  signal at C5-C6.  2. Extensive uncovertebral joint spurring and facet arthropathy with  multilevel lower cervical predominant severe left, moderate right  neural foraminal stenosis as above.    MRI brain 3/2022  Impression:  Diffuse generalized cerebral volume loss. Slightly disproportionate  dilation of the lateral ventricles with narrow pericallosal angle  compared with the adjacent cerebral sulci, which can be seen with  normal pressure hydrocephalus. Recommend clinical correlation.  Otherwise no acute infarct or intracranial hemorrhage.        MRI lumbar 2020  IMPRESSION:    1. Multilevel degenerative change.  2. Central spinal stenosis and subarticular stenosis at L1-L2, L2-L3,  L3-L4, and L4-L5. The central stenosis is most severe at the L4-L5  level.  3. Foraminal stenosis at multiple levels due to the degenerative  changes.     Laboratory:  Iron/TIBC normal 2022  Ferritin 46 (2023)  A1c 5.8 (1/2024)  B12 1372 (2022)  IgA kappa (2023)  CBC with WBC 12, CMP unremarkable (6/2024)    The total time of this encounter today amounted to 74 minutes. This time included time spent with the patient, prep work, ordering tests, and performing post visit documentation.      Again, thank you for allowing me to participate in the care of your patient.        Sincerely,        Minh Narvaez MD

## 2024-08-04 NOTE — PLAN OF CARE
Problem: Adult Inpatient Plan of Care  Goal: Plan of Care Review  Outcome: Progressing  Goal: Patient-Specific Goal (Individualized)  Outcome: Progressing  Goal: Absence of Hospital-Acquired Illness or Injury  Outcome: Progressing  Goal: Optimal Comfort and Wellbeing  Outcome: Progressing  Goal: Readiness for Transition of Care  Outcome: Progressing     Problem: Wound  Goal: Optimal Coping  Outcome: Progressing  Goal: Optimal Functional Ability  Outcome: Progressing  Goal: Absence of Infection Signs and Symptoms  Outcome: Progressing  Goal: Improved Oral Intake  Outcome: Progressing  Goal: Optimal Pain Control and Function  Outcome: Progressing  Goal: Skin Health and Integrity  Outcome: Progressing  Goal: Optimal Wound Healing  Outcome: Progressing     Problem: Sepsis/Septic Shock  Goal: Optimal Coping  Outcome: Progressing  Goal: Absence of Bleeding  Outcome: Progressing  Goal: Blood Glucose Level Within Targeted Range  Outcome: Progressing  Goal: Absence of Infection Signs and Symptoms  Outcome: Progressing  Goal: Optimal Nutrition Intake  Outcome: Progressing      Normal Pressure Hydrocephalus - Physical Therapy Assessment    Date:  06/28/2022  Assessment:        1. Timed Up and Go (TUG) - average of 3 trials  Assistive device used:  Pre-drain: N/A  Post-drain day 1:  3 min 8 sec, 1 trial as pt too fatigued for additional trials  Day 2: 58 sec, 1 trial performed only as pt needed to urgently use the toilet  Day 3:  Minimal Detectable Change for patients with Alzheimer s = 4.09 sec   Minimal Detectable Change for patients with Parkinson s Disease = 3.5 sec   according to Amna & John Mullins 2011      Gait Observations: gait speed yet slowed, less stable but improved from yesterday as less assist required, pt ambulating on TUG in less time. Pt ambulated with FWW, CGA-JANE of 1.       2. Additional Functional Measure: modified 30 sec sit<>stand  Pre-drain: N/A  Post-drain Day 1: pt unable to perform (without assist), 0 reps  Day 2: 5 reps, CGA  Day 3:     Balance Observations: still presently with gait instability and reduced eccentric control with transfers, but improvements noted today compared to yesterday     3. General Observations (command following, safety awareness, motor planning): improved response time today, still with reduced safety awareness and deficits in motor planning    4. General ADL performance (Assist level at baseline and currently): encouraged pt to attempt pericares after BM, but pt unsteady in standing and due to this required total assist for pericares for safety. Pt also required assist to annmarie and doff socks.

## 2024-08-09 ENCOUNTER — ANCILLARY PROCEDURE (OUTPATIENT)
Dept: CT IMAGING | Facility: CLINIC | Age: 84
End: 2024-08-09
Attending: NURSE PRACTITIONER
Payer: MEDICARE

## 2024-08-09 DIAGNOSIS — G91.2 NPH (NORMAL PRESSURE HYDROCEPHALUS) (H): ICD-10-CM

## 2024-08-09 PROCEDURE — G1010 CDSM STANSON: HCPCS | Performed by: RADIOLOGY

## 2024-08-09 PROCEDURE — 70450 CT HEAD/BRAIN W/O DYE: CPT | Mod: TC | Performed by: RADIOLOGY

## 2024-08-12 ENCOUNTER — TELEPHONE (OUTPATIENT)
Dept: NEUROLOGY | Facility: CLINIC | Age: 84
End: 2024-08-12
Payer: MEDICARE

## 2024-08-12 NOTE — TELEPHONE ENCOUNTER
Called and spoke with patient's wife who is call back authorized. Relayed message from below from Dr. Narvaez. No further questions at this time.

## 2024-08-12 NOTE — TELEPHONE ENCOUNTER
Star Micthell,     Your iron was mildly low. It would be reasonable to try an iron supplement to see if this also helps restless legs.     I would recommend buying over the counter ferrous sulfate 650 mg daily. You can also take it every other day if it causes upset stomach or constipation. For some people, taking the iron with vitamin C helps it get better absorbed.     Let us know if you have any questions.     Regards,  Minh Narvaez MD   Dressing: pressure dressing

## 2024-08-14 ENCOUNTER — MYC MEDICAL ADVICE (OUTPATIENT)
Dept: NEUROSURGERY | Facility: CLINIC | Age: 84
End: 2024-08-14
Payer: MEDICARE

## 2024-08-15 ENCOUNTER — MYC MEDICAL ADVICE (OUTPATIENT)
Dept: FAMILY MEDICINE | Facility: CLINIC | Age: 84
End: 2024-08-15
Payer: MEDICARE

## 2024-08-15 DIAGNOSIS — L21.9 SEBORRHEIC DERMATITIS: ICD-10-CM

## 2024-08-16 RX ORDER — TRIAMCINOLONE ACETONIDE 1 MG/G
CREAM TOPICAL
Qty: 30 G | Refills: 3 | Status: SHIPPED | OUTPATIENT
Start: 2024-08-16

## 2024-08-20 ENCOUNTER — OFFICE VISIT (OUTPATIENT)
Dept: NEUROSURGERY | Facility: CLINIC | Age: 84
End: 2024-08-20
Attending: NURSE PRACTITIONER
Payer: MEDICARE

## 2024-08-20 VITALS
HEART RATE: 69 BPM | RESPIRATION RATE: 16 BRPM | DIASTOLIC BLOOD PRESSURE: 78 MMHG | OXYGEN SATURATION: 97 % | SYSTOLIC BLOOD PRESSURE: 126 MMHG

## 2024-08-20 DIAGNOSIS — G91.2 NPH (NORMAL PRESSURE HYDROCEPHALUS) (H): Primary | ICD-10-CM

## 2024-08-20 PROCEDURE — 62252 CSF SHUNT REPROGRAM: CPT | Performed by: NURSE PRACTITIONER

## 2024-08-20 PROCEDURE — 99213 OFFICE O/P EST LOW 20 MIN: CPT | Mod: 25 | Performed by: NURSE PRACTITIONER

## 2024-08-20 ASSESSMENT — PAIN SCALES - GENERAL: PAINLEVEL: NO PAIN (0)

## 2024-08-20 NOTE — PATIENT INSTRUCTIONS
Your  shunt setting was changed from 3.0 to 5.0.     Follow-up head CT in 4 weeks. Follow-up with me after the head CT.

## 2024-08-20 NOTE — PROGRESS NOTES
[facility]  Department of Neurosurgery      Name: Gustavo Ramon  MRN: [id number]  Age: 84 year old  : 1940  Referring provider: Antionette Hunt  2024      Chief Complaint:   NPH  S/p  shunt placement on 2022 (Codman Certas at 4.0)  Setting changed from 4.0 to 3.0 on 2024   Follow-up    History of Present Illness:   Gustavo Ramon is a 83 year old male with a history of NPH, s/p  shunt placement on 2022 who is seen today for  a follow up.     On 2024, shunt setting was changed from 4.0 to 3.0 due to fatigue and worsening walking. He had a follow-up head CT on 2024. This unfortunately showed new hypodense subdural collections over bilateral cerebral hemispheres, measuring up to 4 mm on the left and 2 mm on the right. No changes in shunt setting was made at that time. We recommended him to follow-up in 3 weeks with a head CT.     Today, patient presents for a follow-up with his wife. Per patient, his walking became worse. He had one fall while he was in the bathroom. He denies any injury from this fall. He has been walking with a wheeled walker. He denies any headaches, nausea or vomiting.     He completed a head CT a few weeks ago. Please see the results below.       Review of Systems:   Pertinent items are noted in HPI or as in patient entered ROS below, remainder of complete ROS is negative.        No data to display                 Physical Exam:   /78 (BP Location: Right arm, Patient Position: Sitting)   Pulse 69   Resp 16   SpO2 97%    General: No acute distress.    Neuro: The patient is fully oriented. Speech is normal. Gait: ambulates with a walker.   Psych: Normal mood and affect. Behavior is normal.        Imagin2024 Head CT:    IMPRESSION:   1. Slight increase in size of thin bilateral subdural fluid  collections.  2. Slight decrease in size of the ventricular system.    Procedures:  With Codman Certas  shunt , shunt  setting was checked and it is at 3.0. With the , setting changed from 3.0 to 5.0. Verified x 3.     Assessment:  NPH  S/p  shunt placement on 7/7/2022 (Codman Certas at 4.0)  Setting changed from 4.0 to 3.0 on 7/2/2024   Follow-up    Plan:  Discussed and images reviewed with Dr. Childs. His head CT from 8/9/2024 showed slight increase in size of thin bilateral subdural fluid collections. We changed the shunt setting from 3.0 to 5.0. Will plan to see the patient in 4 weeks after a follow-up head CT.        I spent 25 minutes on patient care activities related to this encounter on the date of service, including time spent reviewing the chart, obtaining history and examination and in counseling the patient, and in documentation in the electronic medical record.      Antionette ARRIAGA, CNP  Department of Neurosurgery

## 2024-08-20 NOTE — LETTER
2024       RE: Gustavo Ramon  [address] [facility] [address]     Dear Colleague,    Thank you for referring your patient, Gustavo Ramon, to the [facility]UROS[facility] [place] at [facility]. Please see a copy of my visit note below.    [facility]  Department of Neurosurgery      Name: Gustavo Ramon  MRN: [id number]  Age: 84 year old  : 1940  Referring provider: Antionette Hunt  2024      Chief Complaint:   NPH  S/p  shunt placement on 2022 (Codman Certas at 4.0)  Setting changed from 4.0 to 3.0 on 2024   Follow-up    History of Present Illness:   Gustavo Ramon is a 83 year old male with a history of NPH, s/p  shunt placement on 2022 who is seen today for  a follow up.     On 2024, shunt setting was changed from 4.0 to 3.0 due to fatigue and worsening walking. He had a follow-up head CT on 2024. This unfortunately showed new hypodense subdural collections over bilateral cerebral hemispheres, measuring up to 4 mm on the left and 2 mm on the right. No changes in shunt setting was made at that time. We recommended him to follow-up in 3 weeks with a head CT.     Today, patient presents for a follow-up with his wife. Per patient, his walking became worse. He had one fall while he was in the bathroom. He denies any injury from this fall. He has been walking with a wheeled walker. He denies any headaches, nausea or vomiting.     He completed a head CT a few weeks ago. Please see the results below.       Review of Systems:   Pertinent items are noted in HPI or as in patient entered ROS below, remainder of complete ROS is negative.        No data to display                 Physical Exam:   /78 (BP Location: Right arm, Patient Position: Sitting)   Pulse 69   Resp 16   SpO2 97%    General: No acute distress.    Neuro: The patient is fully oriented. Speech is  normal. Gait: ambulates with a walker.   Psych: Normal mood and affect. Behavior is normal.        Imagin2024 Head CT:    IMPRESSION:   1. Slight increase in size of thin bilateral subdural fluid  collections.  2. Slight decrease in size of the ventricular system.    Procedures:  With Codman Certas  shunt , shunt setting was checked and it is at 3.0. With the , setting changed from 3.0 to 5.0. Verified x 3.     Assessment:  NPH  S/p  shunt placement on 2022 (Codman Certas at 4.0)  Setting changed from 4.0 to 3.0 on 2024   Follow-up    Plan:  Discussed and images reviewed with Dr. Childs. His head CT from 2024 showed slight increase in size of thin bilateral subdural fluid collections. We changed the shunt setting from 3.0 to 5.0. Will plan to see the patient in 4 weeks after a follow-up head CT.        I spent 25 minutes on patient care activities related to this encounter on the date of service, including time spent reviewing the chart, obtaining history and examination and in counseling the patient, and in documentation in the electronic medical record.      Antionette ARRIAGA CNP  Department of Neurosurgery      Again, thank you for allowing me to participate in the care of your patient.      Sincerely,    CORINA Jackson CNP

## 2024-08-21 NOTE — PATIENT INSTRUCTIONS
Cryotherapy    What is it?    Use of a very cold liquid, such as liquid nitrogen, to freeze and destroy abnormal skin cells that need to be removed    What should I expect?    Tenderness and redness    A small blister that might grow and fill with dark purple blood. There may be crusting.    More than one treatment may be needed if the lesions do not go away.    How do I care for the treated area?    Gently wash the area with your hands when bathing.    Use a thin layer of Vaseline to help with healing. You may use a Band-Aid.     The area should heal within 7-10 days and may leave behind a pink or lighter color.     Do not use an antibiotic or Neosporin ointment.     You may take acetaminophen (Tylenol) for pain.     Call your doctor if you have:    Severe pain    Signs of infection (warmth, redness, cloudy yellow drainage, and or a bad smell)    Questions or concerns    Who should I call with questions?       [facility]: [phone]       [facility]: [phone]       For urgent needs outside of business hours call the [facility] at [phone] and ask for the dermatology resident on call    
no

## 2024-08-30 ENCOUNTER — TELEPHONE (OUTPATIENT)
Dept: DERMATOLOGY | Facility: CLINIC | Age: 84
End: 2024-08-30
Payer: MEDICARE

## 2024-08-30 NOTE — TELEPHONE ENCOUNTER
Excision/Mohs previsit information                                                    Diagnosis: basal cell carcinoma  Site(s): left temple    Is the surgical site below the waist?  NO  If yes, instruct the patient to purchase over the counter chlorhexidine surgical soap and wash all skin below the belly button twice before surgery    Allergies   Allergen Reactions    Mycophenolate Other (See Comments)     Confusion, abnormal movements       Review and update allergy and medication list    Do you take the following medications:  Coumadin, Eliquis, Pradaxa, Xarelto:  NO.  If on Coumadin, INR should be checked within 7 days of surgery.  Range should be 3.5 or less or within therapeutic range.    Do you currently or have you previously had any of the following conditions:  Hepatitis:  NO  HIV/AIDS:  NO  Prolonged bleeding or bleeding disorder:  NO  Pacemaker: NO  Defibrillator:  NO  History of artificial or heart valve replacement:  NO  Endocarditis (inflammation of the inner lining of the heart's chambers and valves):  NO  Have you ever had a prosthetic joint infection:  NO  Pregnant or Breastfeeding:  N/A  Mobility device (wheelchair, transfer difficulty): NO    Important Reminders:                                                      -Ok to take all of their medications as prescribed  -Patients can eat, no need to be fasting  -If face is being treated, please come with a make-up free face  -If scalp is being treated, please come with clean hair free from product  -Patient will not be able to get the site wet for 48 hrs  -No submerging wound in standing water (lake, pool, bathtub, hot tub) for 2 weeks  -No physical activity for 48 hrs (further restrictions will be discussed by MD at time of visit)    Marsha Dahl RN

## 2024-09-04 ENCOUNTER — TELEPHONE (OUTPATIENT)
Dept: SLEEP MEDICINE | Facility: CLINIC | Age: 84
End: 2024-09-04
Payer: MEDICARE

## 2024-09-04 DIAGNOSIS — G47.9 SLEEP DISTURBANCE: Primary | ICD-10-CM

## 2024-09-04 DIAGNOSIS — G25.81 RESTLESS LEG SYNDROME DUE TO IRON DEFICIENCY ANEMIA: ICD-10-CM

## 2024-09-04 DIAGNOSIS — R53.83 FATIGUE, UNSPECIFIED TYPE: ICD-10-CM

## 2024-09-04 DIAGNOSIS — R40.0 DAYTIME SOMNOLENCE: ICD-10-CM

## 2024-09-04 DIAGNOSIS — R06.83 SNORING: ICD-10-CM

## 2024-09-04 DIAGNOSIS — R06.81 WITNESSED APNEIC SPELLS: ICD-10-CM

## 2024-09-04 DIAGNOSIS — D50.9 RESTLESS LEG SYNDROME DUE TO IRON DEFICIENCY ANEMIA: ICD-10-CM

## 2024-09-04 DIAGNOSIS — G47.00 INSOMNIA, UNSPECIFIED TYPE: ICD-10-CM

## 2024-09-04 NOTE — TELEPHONE ENCOUNTER
Please advise:    Patient Name:                        Gustavo Ramon    :                                      1940    MRN:                                      [id number]         Dr. Ewing,         The diagnosis code on the order for the [facility] is not meeting medical necessity per Medicare guidelines. If you were to choose another diagnosis code and update the existing order, we will review it again.         For your reference, here is the link to the payer's policy:    https://www.cms.gov/medicare-coverage-database/view/article.aspx?[email or web address]&[email or web address]         Please note: medical policies are not authorizations and are set by the payer. Medicare does not allow authorizations or appeals to these policies pre-service.         Please let us know how you wish to proceed as soon as possible. We may contact the patient if we do not receive a response.         Thank you,         Brisa Ng Prior Authorization

## 2024-09-05 ENCOUNTER — OFFICE VISIT (OUTPATIENT)
Dept: DERMATOLOGY | Facility: CLINIC | Age: 84
End: 2024-09-05
Payer: MEDICARE

## 2024-09-05 VITALS — SYSTOLIC BLOOD PRESSURE: 121 MMHG | OXYGEN SATURATION: 98 % | DIASTOLIC BLOOD PRESSURE: 71 MMHG | HEART RATE: 76 BPM

## 2024-09-05 DIAGNOSIS — C44.41 BCC (BASAL CELL CARCINOMA), SCALP/NECK: ICD-10-CM

## 2024-09-05 PROCEDURE — 12052 INTMD RPR FACE/MM 2.6-5.0 CM: CPT | Mod: GC | Performed by: DERMATOLOGY

## 2024-09-05 PROCEDURE — 17311 MOHS 1 STAGE H/N/HF/G: CPT | Mod: GC | Performed by: DERMATOLOGY

## 2024-09-05 NOTE — NURSING NOTE
Gustavo Ramon's chief complaint for this visit includes:  Chief Complaint   Patient presents with    Mohs     BCC left temple     PCP: Winston Silverman    Referring Provider:  Joe Grimaldo MD  [address] [place] [id number]    /71   Pulse 76   SpO2 98%   Data Unavailable        Allergies   Allergen Reactions    Mycophenolate Other (See Comments)     Confusion, abnormal movements         Do you need any medication refills at today's visit? No    Kylee Jon CMA      The following medication was given:     MEDICATION:  Lidocaine with epinephrine 1% [id number]  ROUTE: SQ  SITE: see procedure note  DOSE: 7.75ml  LOT #: [id number]  : [employer]  EXPIRATION DATE: 1/31/2026  NDC#: 49122-102-87  Was there drug waste? Yes 1.25  Multi-dose vial: Yes    Delia Tse  September 5, 2024       Vaseline and pressure dressing applied to Mohs site on L temple.  Wound care instructions reviewed with patient and AVS provided.  Patient verbalized understanding.  Patient will follow up for suture removal: N/A.  No further questions or concerns at this time.

## 2024-09-05 NOTE — PATIENT INSTRUCTIONS
Caring for your skin after surgery    After your surgery, a pressure bandage will be placed over the area. This will prevent bleeding. Please follow these instructions over the next 1 to 2 weeks. Following this regimen will help to prevent complications as your wound heals.     For the first 48 hours after your surgery:    Leave the pressure dressing on and keep it dry. If it should come loose, you may re-tape it, but do not take it off.  Relax and take it easy. Do not do any vigorous exercise, heavy lifting or bending forward. This could cause the wound to bleed.  Post-operative pain is usually mild. You may alternate between 1000 mg of Tylenol (acetaminophen) and 400 mg of Ibuprofen every 4 hours.  Do not take more than 4,000 mg of acetaminophen in a 24-hour period or 3200 mg of Ibuprofen in a 24-hr period.  Avoid alcohol and vitamin E as these may increase your tendency to bleed.  You may put an ice pack around the bandaged area for 20 minutes at a time as needed. This may help reduce swelling, bruising, and pain. Make sure the ice pack is waterproof so that the pressure bandage doesn't get wet.  You may see a small amount of drainage or blood on your pressure bandage. This is normal. However:  If drainage or bleeding continues or saturates the bandage, you will need to apply firm pressure over the bandage with a clean washcloth for 15 minutes.  If bleeding continues after applying pressure for 15 minutes, apply an ice pack with gentle pressure to the bandaged area for another 15 minutes.  If bleeding still continues, call our office or go to the nearest emergency room.    48 Hours After Surgery:  Carefully remove the pressure bandage. If it seems sticky or too difficult to get off, you may need to soak it off in the shower.  Wash wound with a mild soap and water.  Use caution when washing the wound, be gentle and do not let the forceful shower stream hit the wound directly.  Pat dry.  Apply Vaseline (from a new  container or tube) over the suture line with a Q-tip.  Cover the site with a bandage.  Do this daily until the sutures have dissolved.      What to expect:    The first couple of days your wound may be tender and may bleed slightly when doing wound care.  There may be swelling and bruising around the wound, especially if it is near the eyes. For your comfort, you may apply ice or cold compresses to the area.  The area around your wound may be numb for several weeks or even months.  You may experience periodic sharp pain or mild itching around the wound as it heals.   The suture line will look dark pink at first and the edges of the wound will be reddened. This will lighten up each day.    Call Us If:    You have bleeding that will not stop after applying pressure and ice.  You have pain that is not controlled with Tylenol and Ibuprofen.  You have signs or symptoms of an infection such as fever over 100 degrees Fahrenheit, redness, warmth or foul-smelling drainage from the wound    [facility]: [phone]   [facility]: [phone]  For urgent needs outside of business hours call the [facility] at [phone] and ask to speak with the dermatology resident on call

## 2024-09-05 NOTE — LETTER
9/5/2024      Gustavo Ramon  [address] [place] [address]      Dear Colleague,    Thank you for referring your patient, Gustavo Ramon, to the [facility]. Please see a copy of my visit note below.    [facility] [place] Procedure Note    Dermatology Problem List:  1. 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 Christiansen's note 11/21/11), s/p excision 5/12/09   2. Actinic keratoses  - R tragus s/p LN2 3/13/23; s/p biopsy 9/23/22  - s/p Efudex 2015, Fall 2020 to face, Spring 2021 to forearms  - s/p PDT (x3)  - s/p LN2  3. Seborrheic dermatitis  - clobetasol 0.05% solution for scalp, triam 0.1% cream for ears  4. Relevant medical history: MGUS, myasthenia gravis currently on prednisone  5. Tinea cruris  - current tx: ketoconazole cream  6. 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 Lipoma L bicep, s/p biopsy 9/23/22     ______________________________________       Date of Service:  Sep 5, 2024  Surgery: Mohs micrographic surgery    Case 1  Repair Type: intermediate  Repair Size: 4.5 cm  Suture Material: monocryl 4-0; Fast Absorbing Gut 5-0  Tumor Type: BCC - Basal cell carcinoma  Location: left temple  Derm-Path Accession #: [id number]  PreOp Size: 2.5x2.0 cm  PostOp Size: 3.0x2.5 cm  Mohs Accession #: [id number]  Level of Defect: fascia      Procedure:  We discussed the principles of treatment and most likely complications  including scarring, bleeding, infection, swelling, pain, crusting, nerve damage, large wound,  incomplete excision, wound dehiscence,  nerve damage, recurrence, and a second procedure may be recommended to obtain the best cosmetic or functional result.    Informed consent was obtained and the patient underwent the procedure as follows:  The patient was placed supine on the operating table.  The cancer was identified, outlined with a marker, and verified by the patient.  The entire surgical field was prepped with ChoraPrep.  The surgical site was anesthetized using Lidocaine 1% with epi 1:100,000.      The area of clinically apparent tumor was debulked. The layer of tissue was then surgically excised using a #15 blade and was then transferred onto a specimen sheet maintaining the orientation of the specimen. Hemostasis was obtained using bipolar electrocoagulation. The wound site was then covered with a dressing while the tissue samples were processed for examination.    The excised tissue was transported to the Mohs histology laboratory maintaining the tissue orientation.  The tissue specimen was relaxed so that the entire surgical margin was in a a single horizontal plane for sectioning and inked for precise mapping.  A precise reference map was drawn to reflect the sectioning of the specimen, colored inking of the margins, and orientation on the patient. The tissue was processed using horizontal sectioning of the base and continuous peripheral margins.  The histopathologic sections were reviewed in conjunction with the reference map.    Total blocks: 1    Total slides:  2    There were no cancer cells visualized on examination, therefore Mohs surgery was complete.    Reconstruction: Intermediate Linear Closure      The patient was taken to the operative suite and placed supine on the operating room table. The defect was identified.  Appropriate markings were made with a marking pen to plan the repair. The area was  infiltrated with Lidocaine 1% with epi 1:100,000 and prepped with ChoraPrep and draped with sterile towels.     The wound was debeveled and undermined widely. Cones were excised within relaxed skin tension lines on both sides of the defect. Hemostasis was obtained using bipolar electrocoagulation. The deeper layers of subcutaneous and superficial (nonmuscle) fascia tissues were then approximated using 4-0 Monocryl buried vertical mattress sutures (deep layer suturing). The wound edges were then approximated additional  buried sutures were placed in a similar fashion where needed. Fast Absorbing Gut 5-0 simple running sutures (superficial layer suturing) were carefully placed for maximum eversion and meticulous approximation.    Repair Size: 4.5 cm    The wound was cleansed with saline and ointment was applied along the wound surface.     A sterile pressure dressing was applied.  Wound care instructions were given verbally and in writing.  The patient left the operating suite in stable condition.  Patient was informed that additional refinement of the resulting surgical scar may be used as a second stage of this reconstruction.     The attending surgeon was present for key portions of the above major procedure and examination.    Scribe Disclosure:   ILauren, am serving as a scribe; to document services personally performed by Dr. Shashank Victor - -based on data collection and the provider's statements to me.     Staff Physician Comments:   I saw and evaluated the patient with the Mohs Surgery Fellow (Dr. Audie Bridges) and I agree with the assessment and plan and the above description of the procedure as documented by the scribe. I was present for the key portions of the procedure and entire exam. I was immediately available in the clinic throughout the procedures.     Shashank Victor DO    Department of Dermatology  [facility]:  Phone: [phone], Fax:[phone]  [facility]: Phone: [phone], Fax: [phone]    Care and Laboratory Testing Performed at:  [facility]   Dermatology Clinic  [address] [place] [address]      Again, thank you for allowing me to participate in the care of your patient.        Sincerely,        Shashank Victor MD

## 2024-09-09 ENCOUNTER — OFFICE VISIT (OUTPATIENT)
Dept: FAMILY MEDICINE | Facility: CLINIC | Age: 84
End: 2024-09-09
Payer: MEDICARE

## 2024-09-09 VITALS
OXYGEN SATURATION: 97 % | RESPIRATION RATE: 20 BRPM | WEIGHT: 168.8 LBS | BODY MASS INDEX: 26.49 KG/M2 | HEART RATE: 88 BPM | TEMPERATURE: 98.2 F | SYSTOLIC BLOOD PRESSURE: 122 MMHG | HEIGHT: 67 IN | DIASTOLIC BLOOD PRESSURE: 74 MMHG

## 2024-09-09 DIAGNOSIS — R55 SYNCOPE, UNSPECIFIED SYNCOPE TYPE: ICD-10-CM

## 2024-09-09 DIAGNOSIS — R53.83 OTHER FATIGUE: Primary | ICD-10-CM

## 2024-09-09 DIAGNOSIS — E06.9 THYROIDITIS: ICD-10-CM

## 2024-09-09 DIAGNOSIS — R29.898 WEAKNESS OF BOTH LOWER EXTREMITIES: ICD-10-CM

## 2024-09-09 DIAGNOSIS — R29.898 IMPAIRED FLEXIBILITY OF LOWER EXTREMITY: ICD-10-CM

## 2024-09-09 DIAGNOSIS — Z29.11 NEED FOR VACCINATION AGAINST RESPIRATORY SYNCYTIAL VIRUS: ICD-10-CM

## 2024-09-09 DIAGNOSIS — Z87.19 HISTORY OF BARRETT'S ESOPHAGUS: ICD-10-CM

## 2024-09-09 DIAGNOSIS — N18.31 STAGE 3A CHRONIC KIDNEY DISEASE (H): ICD-10-CM

## 2024-09-09 DIAGNOSIS — R79.89 LOW TSH LEVEL: ICD-10-CM

## 2024-09-09 LAB
BASOPHILS # BLD AUTO: 0 10E3/UL (ref 0–0.2)
BASOPHILS NFR BLD AUTO: 0 %
EOSINOPHIL # BLD AUTO: 0.3 10E3/UL (ref 0–0.7)
EOSINOPHIL NFR BLD AUTO: 5 %
ERYTHROCYTE [DISTWIDTH] IN BLOOD BY AUTOMATED COUNT: 14.7 % (ref 10–15)
HCT VFR BLD AUTO: 43.7 % (ref 40–53)
HGB BLD-MCNC: 14.8 G/DL (ref 13.3–17.7)
IMM GRANULOCYTES # BLD: 0 10E3/UL
IMM GRANULOCYTES NFR BLD: 0 %
LYMPHOCYTES # BLD AUTO: 1.7 10E3/UL (ref 0.8–5.3)
LYMPHOCYTES NFR BLD AUTO: 22 %
MCH RBC QN AUTO: 29.9 PG (ref 26.5–33)
MCHC RBC AUTO-ENTMCNC: 33.9 G/DL (ref 31.5–36.5)
MCV RBC AUTO: 88 FL (ref 78–100)
MONOCYTES # BLD AUTO: 0.7 10E3/UL (ref 0–1.3)
MONOCYTES NFR BLD AUTO: 10 %
NEUTROPHILS # BLD AUTO: 4.6 10E3/UL (ref 1.6–8.3)
NEUTROPHILS NFR BLD AUTO: 63 %
PLATELET # BLD AUTO: 182 10E3/UL (ref 150–450)
RBC # BLD AUTO: 4.95 10E6/UL (ref 4.4–5.9)
WBC # BLD AUTO: 7.4 10E3/UL (ref 4–11)

## 2024-09-09 PROCEDURE — 84443 ASSAY THYROID STIM HORMONE: CPT | Performed by: PHYSICIAN ASSISTANT

## 2024-09-09 PROCEDURE — 84439 ASSAY OF FREE THYROXINE: CPT | Performed by: PHYSICIAN ASSISTANT

## 2024-09-09 PROCEDURE — 82043 UR ALBUMIN QUANTITATIVE: CPT | Performed by: PHYSICIAN ASSISTANT

## 2024-09-09 PROCEDURE — 90662 IIV NO PRSV INCREASED AG IM: CPT | Performed by: PHYSICIAN ASSISTANT

## 2024-09-09 PROCEDURE — 80053 COMPREHEN METABOLIC PANEL: CPT | Performed by: PHYSICIAN ASSISTANT

## 2024-09-09 PROCEDURE — 85025 COMPLETE CBC W/AUTO DIFF WBC: CPT | Performed by: PHYSICIAN ASSISTANT

## 2024-09-09 PROCEDURE — 99214 OFFICE O/P EST MOD 30 MIN: CPT | Performed by: PHYSICIAN ASSISTANT

## 2024-09-09 PROCEDURE — 82570 ASSAY OF URINE CREATININE: CPT | Performed by: PHYSICIAN ASSISTANT

## 2024-09-09 PROCEDURE — G2211 COMPLEX E/M VISIT ADD ON: HCPCS | Performed by: PHYSICIAN ASSISTANT

## 2024-09-09 PROCEDURE — G0008 ADMIN INFLUENZA VIRUS VAC: HCPCS | Performed by: PHYSICIAN ASSISTANT

## 2024-09-09 PROCEDURE — 36415 COLL VENOUS BLD VENIPUNCTURE: CPT | Performed by: PHYSICIAN ASSISTANT

## 2024-09-09 ASSESSMENT — ENCOUNTER SYMPTOMS: FATIGUE: 1

## 2024-09-09 NOTE — PROGRESS NOTES
"Wally Mitchell is a 84 year old, presenting for the following health issues:  Fatigue (Weakness throughout whole body x several months)        9/9/2024     1:44 PM   Additional Questions   Roomed by Darcy Veloz CMA   Accompanied by wife - Ceci         9/9/2024     1:44 PM   Patient Reported Additional Medications   Patient reports taking the following new medications none     History of Present Illness       Reason for visit:  FollowupHe consumes 2 sweetened beverage(s) daily. He exercises with enough effort to increase his heart rate 3 or less days per week.   He is taking medications regularly.     Some general weakness and two recent episodes of what appear to be syncopal episodes. Dose drool and lose control of bowels.episodes last for a few minutes.   No postictal states.   No chest pain/sob/palpitations. Some sweats upon coming out of the spell.    History of NPH. Working with neuro on managing this.         Review of Systems  Constitutional, HEENT, cardiovascular, pulmonary, GI, , musculoskeletal, neuro, skin, endocrine and psych systems are negative, except as otherwise noted.      Objective    /74   Pulse 88   Temp 98.2  F (36.8  C) (Oral)   Resp 20   Ht 1.708 m (5' 7.25\")   Wt 76.6 kg (168 lb 12.8 oz)   SpO2 97%   BMI 26.24 kg/m    Body mass index is 26.24 kg/m .  Physical Exam   Eye exam - right eye normal lid, conjunctiva, cornea, pupil and fundus, left eye normal lid, conjunctiva, cornea, pupil and fundus.  Thyroid not palpable, not enlarged, no nodules detected.  CHEST:chest clear to IPPA, no tachypnea, retractions or cyanosis, and S1, S2 normal, no murmur, no gallop, rate regular.  No edema  His disks are flat. Pupils equal, round, reactive to light. Extraocular movements full. Visual fields full. Face moves symmetrically. Tongue midline. Hearing mildly decreased to finger-rubbing at approximately 6-8 inches. Neck without bruits. CV: S1, S2. Motor strength 5/5. Reflexes were " 2/4. Toe signs were downgoing. Normal position sense. Good finger-nose-finger and fine finger movement. Gait: he jeremiah from a chair without difficulty and has a mildly broad-based gait.    Stephen was seen today for fatigue.    Diagnoses and all orders for this visit:    Other fatigue  -     CBC with platelets and differential; Future  -     Comprehensive metabolic panel (BMP + Alb, Alk Phos, ALT, AST, Total. Bili, TP); Future    Need for vaccination against respiratory syncytial virus    Stage 3a chronic kidney disease (H)  -     Albumin Random Urine Quantitative with Creat Ratio; Future    Low TSH level  -     TSH with free T4 reflex; Future    Thyroiditis  -     TSH with free T4 reflex; Future    Syncope, unspecified syncope type  -     US Carotid Bilateral; Future    Other orders  -     INFLUENZA HIGH DOSE, TRIVALENT, PF (FLUZONE)      Slower movements . Rest. Low intensity exercise.           Signed Electronically by: Winston Silverman PA-C

## 2024-09-10 LAB
ALBUMIN SERPL BCG-MCNC: 4 G/DL (ref 3.5–5.2)
ALP SERPL-CCNC: 133 U/L (ref 40–150)
ALT SERPL W P-5'-P-CCNC: 18 U/L (ref 0–70)
ANION GAP SERPL CALCULATED.3IONS-SCNC: 9 MMOL/L (ref 7–15)
AST SERPL W P-5'-P-CCNC: 24 U/L (ref 0–45)
BILIRUB SERPL-MCNC: 0.3 MG/DL
BUN SERPL-MCNC: 16.2 MG/DL (ref 8–23)
CALCIUM SERPL-MCNC: 9.3 MG/DL (ref 8.8–10.4)
CHLORIDE SERPL-SCNC: 106 MMOL/L (ref 98–107)
CREAT SERPL-MCNC: 0.98 MG/DL (ref 0.67–1.17)
CREAT UR-MCNC: 256 MG/DL
EGFRCR SERPLBLD CKD-EPI 2021: 75 ML/MIN/1.73M2
GLUCOSE SERPL-MCNC: 89 MG/DL (ref 70–99)
HCO3 SERPL-SCNC: 26 MMOL/L (ref 22–29)
MICROALBUMIN UR-MCNC: <12 MG/L
MICROALBUMIN/CREAT UR: NORMAL MG/G{CREAT}
POTASSIUM SERPL-SCNC: 4.2 MMOL/L (ref 3.4–5.3)
PROT SERPL-MCNC: 6.7 G/DL (ref 6.4–8.3)
SODIUM SERPL-SCNC: 141 MMOL/L (ref 135–145)
T4 FREE SERPL-MCNC: 0.96 NG/DL (ref 0.9–1.7)
TSH SERPL DL<=0.005 MIU/L-ACNC: 4.64 UIU/ML (ref 0.3–4.2)

## 2024-09-10 RX ORDER — PANTOPRAZOLE SODIUM 40 MG/1
40 TABLET, DELAYED RELEASE ORAL DAILY
Qty: 90 TABLET | Refills: 2 | Status: SHIPPED | OUTPATIENT
Start: 2024-09-10

## 2024-09-16 ENCOUNTER — OFFICE VISIT (OUTPATIENT)
Dept: VASCULAR SURGERY | Facility: CLINIC | Age: 84
End: 2024-09-16
Attending: PHYSICIAN ASSISTANT
Payer: MEDICARE

## 2024-09-16 VITALS
BODY MASS INDEX: 27.16 KG/M2 | RESPIRATION RATE: 18 BRPM | WEIGHT: 174.7 LBS | HEART RATE: 80 BPM | TEMPERATURE: 97.5 F | SYSTOLIC BLOOD PRESSURE: 120 MMHG | OXYGEN SATURATION: 98 % | DIASTOLIC BLOOD PRESSURE: 74 MMHG

## 2024-09-16 DIAGNOSIS — I72.3 ILIAC ARTERY ANEURYSM (H): ICD-10-CM

## 2024-09-16 DIAGNOSIS — R09.89 OTHER SPECIFIED SYMPTOMS AND SIGNS INVOLVING THE CIRCULATORY AND RESPIRATORY SYSTEMS: ICD-10-CM

## 2024-09-16 PROCEDURE — G0463 HOSPITAL OUTPT CLINIC VISIT: HCPCS

## 2024-09-16 ASSESSMENT — PAIN SCALES - GENERAL: PAINLEVEL: NO PAIN (0)

## 2024-09-16 NOTE — PROGRESS NOTES
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     Questions patient would like addressed today are: see above note.    Refills are needed: No    Has homecare services and agency name:  No

## 2024-09-16 NOTE — PATIENT INSTRUCTIONS
Star Chen,    Thank you for entrusting your care with us today. After your visit today with Dr. Ugalde this is the plan that was discussed at your appointment.    You will be contacted to schedule a follow-up in a couple of months with CTA and ultrasound      I am including additional information on these things and our contact information if you have any questions or concerns.   Please do not hesitate to reach out to us if you felt we did not answer your questions or you are unsure of the treatment plan after your visit today. Our number is [phone].Thank you for trusting us with your care.         Again thank you for your time.      Computed Tomography (CT) Scan: About This Test    What is it?  A computed tomography (CT) scan uses X-rays to make detailed pictures of parts of your body and the structures inside your body. During the test, you will lie on a table that is attached to the CT scanner. The CT scanner is a large doughnut-shaped machine.    Why is this test done?  Doctors use CT scans to study areas of the body, such as the brain, chest, belly, spine, bones, or joints. CT scans are also used to assist with or check on the success of a procedure or surgery.    How do you prepare for the test?  In general, there's nothing you have to do before this test, unless your doctor tells you to.    Tell your doctor if you get nervous in tight spaces. You may get a medicine to help you relax. If you think you'll get this medicine, be sure you have someone to take you home.    How is the test done?  Before the test  You may have to take off jewelry.  You will take off all or most of your clothes and change into a gown. If you do leave some clothes on, make sure you take everything out of your pockets.  You may have contrast material (dye) put into your arm through a tube called an IV.    During the test  You will lie on a table that is attached to the CT scanner.  The table slides into the round opening of  the scanner. The table will move during the scan. The scanner moves within the doughnut-shaped casing around your body.  You will be asked to hold still during the scan. You may be asked to hold your breath for short periods.  You may be alone in the scanning room. But a technologist will watch you through a window and talk with you during the test.    How does the test feel?  The test will not cause pain, but some people feel nervous inside the CT scanner.    If a medicine to help you relax (sedative) or dye is used, you may feel a quick sting or pinch when the IV is started. The dye may make you feel warm and flushed and give you a metallic taste in your mouth. Some people feel sick to their stomach or get a headache. Tell the technologist or your doctor how you are feeling.    How long does the test take?  The test will take about 30 to 60 minutes. Most of this time is spent getting ready for the scan. The actual test takes a few minutes.    What happens after the test?  You will probably be able to go home right away.  You can go back to your usual activities right away.  If dye was used, drink plenty of fluids for 24 hours after the test, unless your doctor tells you not to.  Follow-up care is a key part of your treatment and safety. Be sure to make and go to all appointments, and call your doctor if you are having problems. It's also a good idea to keep a list of the medicines you take. Ask your doctor when you can expect to have your test results.    Current as of: December 19, 2022  Author: [employer]wise Staff  Medical Review:Thang Braxton MD - Family Medicine & CORNELIUS Azul MD - Internal Medicine & Shashank Herrera MD - Family Medicine & Nelson Ohara MD - Family Medicine & Giovanny Iverson MD - Diagnostic Radiology

## 2024-09-16 NOTE — PROGRESS NOTES
VASCULAR SURGERY CLINIC CONSULTATION   VASCULAR SURGEON: Dr. 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  As I discussed with the patient, he does not meet size criteria for repair.    RECOMMENDATION: He will continue with best medical therapy and will return to clinic in 6 months for CTA chest abdomen pelvis as well as limited arterial duplex ultrasound of the bilateral popliteal arteries to assess for popliteal aneurysms.    30-minute spent on encounter    Discussed with staff, Dr. 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  This was found incidentally during workup for weight loss.  He denies any abdominal pain or back pain.    REVIEW OF SYSTEMS:    A 12 point ROS was reviewed and except for what is listed in the HPI above, all others are negative    PHH:    Past Medical History:   Diagnosis Date    Actinic keratosis     AK (actinic keratosis) 11/15/2012    Amaurosis fugax     Basal cell carcinoma 2009    R medial cheek/nose    Central serous retinopathy left    Eye    Diverticulosis 08/2006    DJD (degenerative joint disease)     GERD (gastroesophageal reflux disease)     Hearing loss     High frequency    History of nonmelanoma skin cancer     History of nonmelanoma skin cancer     HTN (hypertension)     Hyperlipidemia LDL goal < 130     Hyperplastic colon polyp 08/2006    IgA monoclonal gammopathy     IgA kappa    Infection due to 2019 novel coronavirus 10/13/2022    Infection due to 2019 novel coronavirus  11/2021    Lattice degeneration of retina right    Eye    Macular degeneration     Nonsenile cataract     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    [facility] consult    Rosacea     Steroid-induced diabetes mellitus, 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    Strabismus     Vitreous detachment left    Eye        Past Surgical History:   Procedure Laterality Date    ARTHROSCOPY KNEE RT/LT Left 01/2010    Knee    BIOPSY  2009/2013/2016    Nose; Prostate; BCC - left arm    COLONOSCOPY  09/24/2019    w/Endoscopy    COLONOSCOPY  07/29/2024    COLONOSCOPY WITH CO2 INSUFFLATION N/A 09/24/2019    Procedure: COLONOSCOPY, WITH CO2 INSUFFLATION;  Surgeon: Loi Levine MD;  Location: [facility] OR    COMBINED ESOPHAGOSCOPY, GASTROSCOPY, DUODENOSCOPY (EGD) WITH CO2 INSUFFLATION N/A 09/24/2019    Procedure: ESOPHAGOGASTRODUODENOSCOPY, WITH CO2 INSUFFLATION;  Surgeon: Loi Levine MD;  Location: [facility] OR    COMBINED ESOPHAGOSCOPY, GASTROSCOPY, DUODENOSCOPY (EGD) WITH CO2 INSUFFLATION N/A 7/29/2024    Procedure: Combined Esophagoscopy, Gastroscopy, Duodenoscopy (Egd) With Co2 Insufflation;  Surgeon: Jeff Peace MD;  Location: [facility] OR    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    DISKECTOMY, LUMBAR, SINGLE SP  2003    L2-3    ENT SURGERY  1943    Tonsils     ENT SURGERY  02/22/2012    Biopsy lesion right pinna    ENT SURGERY  02/24/2012    Biopsy leson right pinna    ESOPHAGOSCOPY, GASTROSCOPY, DUODENOSCOPY (EGD), COMBINED N/A 09/24/2019    Procedure: Esophagogastroduodenoscopy, With Biopsy;  Surgeon: Loi Levine MD;  Location: [facility] OR    ESOPHAGOSCOPY, GASTROSCOPY, DUODENOSCOPY (EGD), COMBINED N/A 7/29/2024    Procedure: ESOPHAGOGASTRODUODENOSCOPY, WITH BIOPSY;  Surgeon: Jeff Peace MD;  Location: [facility] OR    IR LUMBAR DRAIN PLACEMENT W FLUORO  06/27/2022    LAMINOPLASTY CERVICAL POSTERIOR THREE+ LEVELS N/A 12/12/2022    Procedure: POSTERIOR APPROACH Cervical 4-7 laminoplasty;  Surgeon: Judie Levin  MD Angelita;  Location: [facility] OR    LAPAROSCOPIC ASSISTED IMPLANT SHUNT VENTRICULOPERITONEAL N/A 07/07/2022    Procedure: possible INSERTION, SHUNT, VENTRICULOPERITONEAL, LAPAROSCOPY-ASSISTED;  Surgeon: Joe Damon MD;  Location: [facility] OR    OPTICAL TRACKING SYSTEM IMPLANT SHUNT VENTRICULOPERITONEAL N/A 07/07/2022    Procedure: INSERTION, SHUNT, VENTRICULOPERITONEAL, USING OPTICAL TRACKING SYSTEM;  Surgeon: Jean-Paul Childs MD;  Location: [facility] OR    SOFT TISSUE SURGERY  05/2010    Basal Cell/right side nose       ALLERGIES:    Allergies   Allergen Reactions    Mycophenolate Other (See Comments)     Confusion, abnormal movements       MEDS:    Current Outpatient Medications:     ACE/ARB/ARNI NOT PRESCRIBED (INTENTIONAL), Please choose reason not prescribed from choices below., Disp: , Rfl:     acetaminophen (TYLENOL) 325 MG tablet, Take 1-2 tablets (325-650 mg) by mouth every 4 hours as needed for mild pain, Disp: , Rfl:     aspirin (ASA) 325 MG tablet, Take 1 tablet (325 mg) by mouth daily, Disp: , Rfl:     cyanocobalamin (VITAMIN B-12) 1000 MCG tablet, Take 1 tablet (1,000 mcg) by mouth daily, Disp: 90 tablet, Rfl: 1    fluticasone (FLONASE) 50 MCG/ACT nasal spray, Spray 1 spray into both nostrils daily, Disp: 11.1 mL, Rfl: 0    gabapentin (NEURONTIN) 300 MG capsule, 1 capsule in the morning, 1 mid day, and 3 in the evening, Disp: 360 capsule, Rfl: 3    ketoconazole (NIZORAL) 2 % external cream, Apply topically 2 times daily To face, Disp: 60 g, Rfl: 3    ketoconazole (NIZORAL) 2 % external cream, Apply twice daily for 8 weeks from knees down to both legs and feet., Disp: 120 g, Rfl: 3    levETIRAcetam (KEPPRA) 250 MG tablet, TAKE 1 TABLET BY MOUTH TWO TIMES A DAY, Disp: 60 tablet, Rfl: PRN    meclizine (ANTIVERT) 25 MG tablet, Take 0.5-1 tablets (12.5-25 mg) by mouth 3 times daily as needed for dizziness, Disp: 30 tablet, Rfl: 0    methocarbamol (ROBAXIN) 500 MG tablet, Take 1 tablet (500 mg) by mouth  every 6 hours as needed for muscle spasms, Disp: , Rfl:     multivitamin (OCUVITE) TABS tablet, Take 1 tablet by mouth daily, Disp: , Rfl:     pantoprazole (PROTONIX) 40 MG EC tablet, Take 1 tablet (40 mg) by mouth daily., Disp: 90 tablet, Rfl: 2    polyethylene glycol (MIRALAX) 17 GM/Dose powder, Take 17 g by mouth daily as needed for constipation, Disp: 510 g, Rfl:     pyRIDostigmine (MESTINON) 60 MG tablet, Take 1.5 tablets (90 mg) by mouth 2 times daily, Disp: 270 tablet, Rfl: 0    rOPINIRole (REQUIP ER) 2 MG 24 hr tablet, Take 1 tablet (2 mg) by mouth at bedtime, Disp: 30 tablet, Rfl: 6    senna-docusate (SENOKOT-S/PERICOLACE) 8.6-50 MG tablet, Take 1 tablet by mouth 2 times daily, Disp: 60 tablet, Rfl: 0    simvastatin (ZOCOR) 20 MG tablet, TAKE ONE TABLET BY MOUTH AT BEDTIME, Disp: 90 tablet, Rfl: 1    tamsulosin (FLOMAX) 0.4 MG capsule, Take 1 capsule (0.4 mg) by mouth daily, Disp: 90 capsule, Rfl: 1    tamsulosin (FLOMAX) 0.4 MG capsule, Take 1 capsule (0.4 mg) by mouth daily, Disp: , Rfl:     terbinafine (LAMISIL) 1 % external cream, Apply topically 2 times daily, Disp: 42 g, Rfl: 11    triamcinolone (KENALOG) 0.1 % external cream, Apply a thin layer up to twice daily to affected areas as needed., Disp: 30 g, Rfl: 3    trospium (SANCTURA) 20 MG tablet, Take 1 tablet (20 mg) by mouth 2 times daily (before meals), Disp: 180 tablet, Rfl: 3    vitamin D3 (CHOLECALCIFEROL) 2000 units tablet, Take 1 tablet by mouth daily, Disp: 90 tablet, Rfl: 1    Current Facility-Administered Medications:     lidocaine 1% with EPINEPHrine 1:100,000 injection 3 mL, 3 mL, Intradermal, Once,     SOCIAL HABITS:   Social History    Substance and Sexual Activity      Alcohol use: No      History   Drug Use No      History   Smoking Status    Never   Smokeless Tobacco    Never        FAMILY HISTORY:    Family History   Problem Relation Age of Onset    Heart Disease Mother     Alzheimer Disease Mother 73    C.A.D. Father     Coronary  Artery Disease Father     Diabetes Grandchild         using a pump     Diabetes Paternal Uncle     Heart Disease Paternal Grandmother     Glaucoma No family hx of     Macular Degeneration No family hx of     Melanoma No family hx of     Skin Cancer No family hx of        PE:  /74   Pulse 80   Temp 97.5  F (36.4  C)   Resp 18   Wt 79.2 kg (174 lb 11.2 oz)   SpO2 98%   BMI 27.16 kg/m    Wt Readings from Last 1 Encounters:   09/16/24 79.2 kg (174 lb 11.2 oz)     Body mass index is 27.16 kg/m .    EXAM:    Gen: no acute distress, sitting comfortably in exam chair   HEENT: Atraumatic, normocephalic  Neuro: Alert and oriented. No gross neurologic deficits   Pulm: nonlabored breathing on room air, no cough  CV: RRR by radial pulse, noncyanotic   ABD:soft, non-tender, nondistended  MSK:  Normal active range of motion, no edema  Skin: Warm, dry, no rashes or abrasions on exposed skin  Psych: Normal affect, cooperative  Pulses: Palpable bilateral femoral pulses.  Strong Doppler signals in the bilateral DP and PT arteries at the ankle.  Both feet are warm, normal color, no lower extremity wounds.        DIAGNOSTIC STUDIES:     Images:  We reviewed his noncontrast CT chest abdomen pelvis performed on 6/12/2024.  This demonstrates an isolated 2.4 cm aneurysm of the left internal iliac artery.  The remainder of the aortoiliac system appears to be normal caliber is best we can assess on this noncontrast study.    LABS:      Sodium   Date Value Ref Range Status   09/09/2024 141 135 - 145 mmol/L Final   06/04/2024 141 135 - 145 mmol/L Final     Comment:     Reference intervals for this test were updated on 09/26/2023 to more accurately reflect our healthy population. There may be differences in the flagging of prior results with similar values performed with this method. Interpretation of those prior results can be made in the context of the updated reference intervals.    01/31/2024 143 135 - 145 mmol/L Final      Comment:     Reference intervals for this test were updated on 09/26/2023 to more accurately reflect our healthy population. There may be differences in the flagging of prior results with similar values performed with this method. Interpretation of those prior results can be made in the context of the updated reference intervals.    05/19/2021 139 133 - 144 mmol/L Final   11/09/2020 140 133 - 144 mmol/L Final   08/15/2019 141 133 - 144 mmol/L Final     Urea Nitrogen   Date Value Ref Range Status   09/09/2024 16.2 8.0 - 23.0 mg/dL Final   06/04/2024 25.3 (H) 8.0 - 23.0 mg/dL Final   01/31/2024 20.8 8.0 - 23.0 mg/dL Final   05/02/2022 15 8 - 28 mg/dL Final   03/31/2022 23 7 - 30 mg/dL Final   02/15/2022 20 7 - 30 mg/dL Final   05/19/2021 24 7 - 30 mg/dL Final   11/09/2020 26 7 - 30 mg/dL Final   08/15/2019 18 7 - 30 mg/dL Final     Hemoglobin   Date Value Ref Range Status   09/09/2024 14.8 13.3 - 17.7 g/dL Final   06/04/2024 14.6 13.3 - 17.7 g/dL Final   01/31/2024 15.1 13.3 - 17.7 g/dL Final   09/22/2020 14.4 13.3 - 17.7 g/dL Final   12/30/2019 11.2 (L) 13.3 - 17.7 g/dL Final   09/03/2019 12.7 (L) 13.3 - 17.7 g/dL Final     Platelet Count   Date Value Ref Range Status   09/09/2024 182 150 - 450 10e3/uL Final   06/04/2024 184 150 - 450 10e3/uL Final   01/31/2024 177 150 - 450 10e3/uL Final   09/22/2020 218 150 - 450 10e9/L Final   09/03/2019 228 150 - 450 10e9/L Final   08/15/2019 210 150 - 450 10e9/L Final     INR   Date Value Ref Range Status   12/11/2022 1.09 0.85 - 1.15 Final   06/27/2022 1.12 0.85 - 1.15 Final

## 2024-09-17 ENCOUNTER — ANCILLARY PROCEDURE (OUTPATIENT)
Dept: CT IMAGING | Facility: CLINIC | Age: 84
End: 2024-09-17
Attending: NURSE PRACTITIONER
Payer: MEDICARE

## 2024-09-17 DIAGNOSIS — G91.2 NPH (NORMAL PRESSURE HYDROCEPHALUS) (H): ICD-10-CM

## 2024-09-17 PROCEDURE — 70450 CT HEAD/BRAIN W/O DYE: CPT | Mod: TC | Performed by: RADIOLOGY

## 2024-09-17 PROCEDURE — G1010 CDSM STANSON: HCPCS | Performed by: RADIOLOGY

## 2024-09-17 NOTE — CONFIDENTIAL NOTE
Previous order for diagnostic PSG not covered by Medicare, however, they did suggest changing the order and resubmitting for coverage, suggesting order for split-night PSG, possibly for coverage.    1. Sleep disturbance  2. Snoring  3. Witnessed apneic spells  4. Daytime somnolence  5. Restless leg syndrome due to iron deficiency anemia  6. Fatigue, unspecified type  7. Insomnia, unspecified type  - Comprehensive Sleep Study; Future    Please cancel previous diagnostic PSG and schedule split-night PSG as noted above see if we can get this covered.    CORINA Rouse CNP  Sleep Medicine

## 2024-09-20 ENCOUNTER — OFFICE VISIT (OUTPATIENT)
Dept: NEUROSURGERY | Facility: CLINIC | Age: 84
End: 2024-09-20
Attending: NURSE PRACTITIONER
Payer: MEDICARE

## 2024-09-20 VITALS
SYSTOLIC BLOOD PRESSURE: 126 MMHG | DIASTOLIC BLOOD PRESSURE: 75 MMHG | HEART RATE: 81 BPM | RESPIRATION RATE: 16 BRPM | OXYGEN SATURATION: 94 %

## 2024-09-20 DIAGNOSIS — G91.2 NPH (NORMAL PRESSURE HYDROCEPHALUS) (H): Primary | ICD-10-CM

## 2024-09-20 PROCEDURE — 99212 OFFICE O/P EST SF 10 MIN: CPT | Mod: 25 | Performed by: NURSE PRACTITIONER

## 2024-09-20 PROCEDURE — 62252 CSF SHUNT REPROGRAM: CPT | Performed by: NURSE PRACTITIONER

## 2024-09-20 ASSESSMENT — PAIN SCALES - GENERAL: PAINLEVEL: MODERATE PAIN (4)

## 2024-09-20 NOTE — PATIENT INSTRUCTIONS
Your  shunt setting was changed from 5.0 to 4.0.     Follow-up head CT in 2 weeks.     MTM appointment to review meds.

## 2024-09-20 NOTE — PROGRESS NOTES
[facility]  Department of Neurosurgery      Name: Gustavo Ramon  MRN: [id number]  Age: 84 year old  : 1940  Referring provider: Antionette Hunt  2024      Chief Complaint:   NPH  S/p  shunt placement on 2022 (Codman Certas at 4.0)  Follow-up    History of Present Illness:   Gustavo Ramon is a 83 year old male with a history of NPH, s/p  shunt placement on 2022 who is seen today for  a follow up.      On 2024, shunt setting was changed from 4.0 to 3.0 due to fatigue and worsening walking.  On a follow-up head CT on 2024, patient was found to have new hypodense subdural collections over the bilateral cerebral hemispheres.  No change in shunt setting was made at that time.  On a follow-up head CT on 2024, there was increase in size of thin bilateral subdural fluid collections.  Shunt setting was changed from 3 to 5 with a recommendation to follow-up head CT in 4 weeks.    Today patient presents for follow-up with his wife, Ceci.  Patient reports worsening fatigue since shunt setting has been changed.  He reported 1 episode of dizziness with SHERRILL which lasted for about 8 minutes.  No falls.  He ambulates using a walker.      Review of Systems:   Pertinent items are noted in HPI or as in patient entered ROS below, remainder of complete ROS is negative.        No data to display                 Physical Exam:   /75 (BP Location: Right arm, Patient Position: Sitting)   Pulse 81   Resp 16   SpO2 94%    General: No acute distress.    Neuro: The patient is fully oriented. Speech is normal.  Gait: Ambulates with a wheeled walker  Psych: Normal mood and affect. Behavior is normal.        Imagin2024 CT head:  MPRESSION:  1.  Interval decrease in size in the bilateral hypodense subdural  fluid collections. No acute hemorrhage.  2.  Ventricles are stable to minimally increased in caliber.  3.  No acute intracranial findings.    Procedures:  With  Codman Certas  shunt , shunt setting was checked and it is at 5.0. With the , setting changed from 5.0 to 4.0. Verified x 3.     Assessment:  NPH  S/p  shunt placement on 7/7/2022 (Codman Certas at 4.0)   Follow-up after imaging    Plan:  Today's head CT shows interval decrease in bilateral hypodense subdural fluid collections.  Today we discussed 2 options.  1. Changing the shunt setting to 4 and repeating the head CT in 2 weeks.  2. Keeping the shunt setting at 5 and repeating the head CT in 4 weeks.      Patient would like to get the shunt changed to 4 and repeating the head CT in 2 weeks.  I will plan to see the patient after 2 weeks.    We also talked about medication therapy management appointment with a pharmacist to rule out polypharmacy as a reason for his fatigue and patient is interested.  This has been ordered.       I spent 30 minutes on patient care activities related to this encounter on the date of service, including time spent reviewing the chart, obtaining history and examination and in counseling the patient, and in documentation in the electronic medical record.      Antionette ARRIAGA, CNP  Department of Neurosurgery

## 2024-09-20 NOTE — LETTER
2024       RE: Gustavo Ramon  [address] [place] [address]     Dear Colleague,    Thank you for referring your patient, Gustavo Ramon, to the [facility] [place] [facility]. Please see a copy of my visit note below.    [facility]  Department of Neurosurgery      Name: Gustavo Ramon  MRN: [id number]  Age: 84 year old  : 1940  Referring provider: Antionette Hunt  2024      Chief Complaint:   NPH  S/p  shunt placement on 2022 (Codman Certas at 4.0)  Follow-up    History of Present Illness:   Gustavo Ramon is a 83 year old male with a history of NPH, s/p  shunt placement on 2022 who is seen today for  a follow up.      On 2024, shunt setting was changed from 4.0 to 3.0 due to fatigue and worsening walking.  On a follow-up head CT on 2024, patient was found to have new hypodense subdural collections over the bilateral cerebral hemispheres.  No change in shunt setting was made at that time.  On a follow-up head CT on 2024, there was increase in size of thin bilateral subdural fluid collections.  Shunt setting was changed from 3 to 5 with a recommendation to follow-up head CT in 4 weeks.    Today patient presents for follow-up with his wife, Ceci.  Patient reports worsening fatigue since shunt setting has been changed.  He reported 1 episode of dizziness with SHERRILL which lasted for about 8 minutes.  No falls.  He ambulates using a walker.      Review of Systems:   Pertinent items are noted in HPI or as in patient entered ROS below, remainder of complete ROS is negative.        No data to display                 Physical Exam:   /75 (BP Location: Right arm, Patient Position: Sitting)   Pulse 81   Resp 16   SpO2 94%    General: No acute distress.    Neuro: The patient is fully oriented. Speech is normal.  Gait: Ambulates with a wheeled  walker  Psych: Normal mood and affect. Behavior is normal.        Imagin2024 CT head:  MPRESSION:  1.  Interval decrease in size in the bilateral hypodense subdural  fluid collections. No acute hemorrhage.  2.  Ventricles are stable to minimally increased in caliber.  3.  No acute intracranial findings.    Procedures:  With Codman Certas  shunt , shunt setting was checked and it is at 5.0. With the , setting changed from 5.0 to 4.0. Verified x 3.     Assessment:  NPH  S/p  shunt placement on 2022 (Codman Certas at 4.0)   Follow-up after imaging    Plan:  Today's head CT shows interval decrease in bilateral hypodense subdural fluid collections.  Today we discussed 2 options.  1. Changing the shunt setting to 4 and repeating the head CT in 2 weeks.  2. Keeping the shunt setting at 5 and repeating the head CT in 4 weeks.      Patient would like to get the shunt changed to 4 and repeating the head CT in 2 weeks.  I will plan to see the patient after 2 weeks.    We also talked about medication therapy management appointment with a pharmacist to rule out polypharmacy as a reason for his fatigue and patient is interested.  This has been ordered.       I spent 30 minutes on patient care activities related to this encounter on the date of service, including time spent reviewing the chart, obtaining history and examination and in counseling the patient, and in documentation in the electronic medical record.      Antionette ARRIAGA CNP  Department of Neurosurgery      Again, thank you for allowing me to participate in the care of your patient.      Sincerely,    CORINA Jackson CNP

## 2024-09-24 ENCOUNTER — TELEPHONE (OUTPATIENT)
Dept: FAMILY MEDICINE | Facility: CLINIC | Age: 84
End: 2024-09-24
Payer: MEDICARE

## 2024-09-24 NOTE — TELEPHONE ENCOUNTER
MTM referral from: [facility] clinic visit (referral by provider)    MTM referral outreach attempt #2 on September 24, 2024 at 11:36 AM      Outcome: Left Message    Use vbc for the carrier/Plan on the flowsheet      [employer] Message Sent    Mariana Britton CMA  MTM

## 2024-09-27 ENCOUNTER — THERAPY VISIT (OUTPATIENT)
Dept: PHYSICAL THERAPY | Facility: CLINIC | Age: 84
End: 2024-09-27
Attending: PHYSICIAN ASSISTANT
Payer: MEDICARE

## 2024-09-27 DIAGNOSIS — R29.898 WEAKNESS OF BOTH LOWER EXTREMITIES: ICD-10-CM

## 2024-09-27 DIAGNOSIS — R29.898 IMPAIRED FLEXIBILITY OF LOWER EXTREMITY: ICD-10-CM

## 2024-09-27 PROCEDURE — 97110 THERAPEUTIC EXERCISES: CPT | Mod: GP | Performed by: PHYSICAL THERAPIST

## 2024-09-27 PROCEDURE — 97162 PT EVAL MOD COMPLEX 30 MIN: CPT | Mod: GP | Performed by: PHYSICAL THERAPIST

## 2024-09-27 NOTE — PROGRESS NOTES
PHYSICAL THERAPY EVALUATION  Type of Visit: Evaluation        Fall Risk Screen:  Fall screen completed by: PT  Have you fallen 2 or more times in the past year?: Yes  Have you fallen and had an injury in the past year?: No  Is patient a fall risk?: Yes  Fall screen comments: using walker, instability    Subjective       Presenting condition or subjective complaint: Extreme weakness, and inability to walk  HX of NPH. Still having issues with balance like before. He has neck, shoulders and wrist pain at times. Feels really tight in lower legs, like a wrap is around his legs. Stays the same even with walking and moving around more. Shunt change, went from 3-5 but now back to 4. This has been a week. He also has been more tired but this has improved in the last couple weeks. Balance feels maybe a little bit better since the shunt change too. Will try walking sometimes short distance at home without walker. Has fallen 1 time over the last several months, grabbed for sink and slipped but did not hit head, knee had sensation of giving out. Dizziness sometimes with sitting up still, but its brief, sitting in place. He has not been walking much or exercising.      Date of onset: 07/07/22    Relevant medical history: Bladder or bowel problems   Dates & types of surgery: NPH shunt - 2021    Prior diagnostic imaging/testing results: Other Have balance and strength issues with walking   Prior therapy history for the same diagnosis, illness or injury: Yes [facility] - a year ago      Living Environment  Social support: With a significant other or spouse   Type of home: 1 level   Stairs to enter the home: No       Ramp: No   Stairs inside the home: No       Help at home: Self Cares (home health aide/personal care attendant, family, etc)  Equipment owned: Straight Cane; Walker; Walker with wheels; Standard wheelchair; Grab bars; Bath bench; Lift chair     Employment: No    Hobbies/Interests: Music,  photography    Patient goals for therapy: Walk    Pain assessment:  pain in R hand/wrist area- more of a tightness; L hip pain (did well with injection to hip for 1 year, but now is more painful)      Objective      Cognitive Status Examination  Orientation: Oriented to person, place and time   Level of Consciousness: Alert  Follows Commands and Answers Questions: 100% of the time  Personal Safety and Judgement: Intact  Memory: Intact (at times can have more trouble with this d/t NPH)    OBSERVATION: arrives with spouse Ceci and two wheeled walker  INTEGUMENTARY:  shows me a scar on R arm from fall.  POSTURE:  forward head posture with very stiff neck and forward shoulder posture; knees with slight flexion in standing.  PALPATION: n/t  RANGE OF MOTION:  shoulders ROM to about 145-150 degrees in standing B; HS and gastroc tightness with inability to full extend at B knees (R worse than L), wrist extension <45 degrees on B sides.  STRENGTH:  4/5 L, 4+/5 DF, 4/5 hip flexor strength, with proper set up can do sit to stand without UEs.    BED MOBILITY:  reports being IND     TRANSFERS:  prefers hands on chair or walker for balance.    GAIT:   Level of [place]: Independent  Assistive Device(s):  2WW  Gait Deviations:  small steps, narrow SHAKILA, slow speed  Gait Distance: around clinic  Stairs: did not perform today.    BALANCE:  able to stand without UEs support on surface for up to 30 secs    SPECIAL TESTS  10 Meter Walk Test (Comfortable)  8.7 secs with 2WW   10 Meter Walk Test (Fast) Did not measure time but he is able to change pace/take longer steps when cued by PT.   6 Minute Walk Test (6MWT)           Garvin Balance Scale (BBS)     5 Times Sit-to-Stand (5TSTS)  22.56 secs     Dynamic Gait Index (DGI)  7/12 on short form DGI, used his 2WW   Timed Up and Go (TUG) - sec 28.4 secs     SENSATION:  decreased sensation in B LEs distal to knees       Assessment & Plan   CLINICAL IMPRESSIONS  Medical Diagnosis: Impaired  flexibility of LEs; weakness of B LEs    Treatment Diagnosis: imbalance, muscle tightness, weakness and decreased activity tolerance   Impression/Assessment: Patient is a 84 year old male with hx of NPH and recent reports of more stiffness and weakness complaints. Presents with impaired balance, weakness and decreased ROM. He was moving better when he was exercising more regularly so will benefit from balance training, stretches, strengthening and walking. The following significant findings have been identified: Pain, Decreased ROM/flexibility, Decreased strength, Impaired balance, Impaired sensation, Impaired gait, Decreased activity tolerance, and Impaired posture. These impairments interfere with their ability to perform self care tasks, recreational activities, household mobility, and community mobility as compared to previous level of function.     Clinical Decision Making (Complexity):  Clinical Presentation: Evolving/Changing  Clinical Presentation Rationale: based on medical and personal factors listed in PT evaluation  Clinical Decision Making (Complexity): Moderate complexity    PLAN OF CARE  Treatment Interventions:  Interventions: Gait Training, Neuromuscular Re-education, Therapeutic Activity, Therapeutic Exercise, Self-Care/Home Management    Long Term Goals     PT Goal 1  Goal Identifier: TUG  Goal Description: Stephen will perform TUG in 20 secs or < without AD in order to impove his mobility at home with turning and show decrease in risk of falls.  Target Date: 12/20/24  PT Goal 2  Goal Identifier: 6MWT  Goal Description: Stephen will perform 6MWT with no rest breaks and 4WW at speed of .8 m/s or > in order to show improved walking endurance and ability to ambulate in his neighborhood.  Target Date: 12/20/24  PT Goal 3  Goal Identifier: posture  Goal Description: Stephen will be able to stand at walll and raise shoulders into flexion through at least 160 degrees of shoulder flexion in order to show  improved full body lengthening to improve upright position.  Target Date: 12/20/24      Frequency of Treatment: 1 time per week  Duration of Treatment: 12 week    Recommended Referrals to Other Professionals:   Education Assessment:   Learner/Method: Patient;Listening;Reading;Demonstration    Risks and benefits of evaluation/treatment have been explained.   Patient/Family/caregiver agrees with Plan of Care.     Evaluation Time:     PT Eval, Moderate Complexity Minutes (93067): 25       Signing Clinician: [facility]                                                                                   OUTPATIENT PHYSICAL THERAPY      PLAN OF TREATMENT FOR OUTPATIENT REHABILITATION   Patient's Last Name, First Name, Gustavo Nielsen YOB: 1940   Provider's Name   [facility]   Medical Record No.  [id number]     Onset Date: 07/07/22  Start of Care Date: 09/27/24     Medical Diagnosis:  Impaired flexibility of LEs; weakness of B LEs      PT Treatment Diagnosis:  imbalance, muscle tightness, weakness and decreased activity tolerance Plan of Treatment  Frequency/Duration: 1 time per week/ 12 week    Certification date from 09/27/24 to 12/20/24         See note for plan of treatment details and functional goals     Alem Mccain PT                         I CERTIFY THE NEED FOR THESE SERVICES FURNISHED UNDER        THIS PLAN OF TREATMENT AND WHILE UNDER MY CARE     (Physician attestation of this document indicates review and certification of the therapy plan).              Referring Provider:  Winston Silverman    Initial Assessment  See Epic Evaluation- Start of Care Date: 09/27/24

## 2024-10-01 ENCOUNTER — ANCILLARY PROCEDURE (OUTPATIENT)
Dept: CT IMAGING | Facility: CLINIC | Age: 84
End: 2024-10-01
Attending: NURSE PRACTITIONER
Payer: MEDICARE

## 2024-10-01 DIAGNOSIS — G91.2 NPH (NORMAL PRESSURE HYDROCEPHALUS) (H): ICD-10-CM

## 2024-10-01 PROCEDURE — 70450 CT HEAD/BRAIN W/O DYE: CPT | Mod: TC | Performed by: RADIOLOGY

## 2024-10-01 PROCEDURE — G1010 CDSM STANSON: HCPCS | Performed by: RADIOLOGY

## 2024-10-01 NOTE — TELEPHONE ENCOUNTER
Provider placed more detailed order ([facility] [employer] [facility] [employer]. Sent for insurance approval.  Annette Manriquez, CMA

## 2024-10-02 NOTE — PROGRESS NOTES
"Medication Therapy Management (MTM) Encounter    ASSESSMENT:                            Medication Adherence/Access: No issues identified    1. Restless leg syndrome due to iron deficiency anemia  Patient reports worsening symptoms of RLS lately. He describes it as muscle jerking in legs and shoulders. A common side effect from pyridostigmine is muscular fasciculation which could be a possible cause of current symptoms. Recommend discussing with neurology if it may be worth doing a holiday off pyridostigmine or reducing the dose to see if muscle jerking improves at all. Could consider increasing gabapentin if symptoms continue but this would likely exacerbate his fatigue and would be a risk benefit discussion.    2. Ocular myasthenia gravis (H)  Controlled on current regimen. See above regarding pyridostigmine.     3. HYPERLIPIDEMIA LDL GOAL <130  Patient complaining of fatigue in his muscles. This could be a side effect from simvastatin. Based on his age and the fact he is using simvastatin for primary prevention, it may be appropriate to discontinue to see if muscle fatigue improves.    4. Urinary frequency  Stable.    PLAN:                            **Patient was triaged by RN due to syncope and hypotension during MTM visit today. See Triage encounter for details. Below plan was developed prior to patient's syncopal episode.    1. My suggestion would be to stop simvastatin to see if your muscle fatigue/pain improves  2. Might be worth talking with neurology or hematology about your iron levels in relation to your RLS  3. One of the common side effects of pyridostigmine is \"muscle fasciculation\" or involuntary movement/tremors of muscles. If you think this medication may be causing your RLS, you may want to discuss with neurology. Could try a lower dose or stop it temporarily to monitor for symptoms  4. A higher dose of gabapentin may help with your RLS (taking 900 mg AM) but this may also make you more " tired    Follow-up: Return in about 4 weeks (around 10/31/2024) for Follow up, with me.    SUBJECTIVE/OBJECTIVE:                          Gustavo Ramon is a 84 year old male coming in for an initial visit. He was referred to me from Neurology (Antionette Hunt CNP). Pt was accompanied by his wife Ceci who helps manage his medications. Of note, visit was cut short due to patient developing syncope and requiring emergent triage. See triage note from Gabriella Campbell RN for details.    Reason for visit: pt would like to know if his meds are causing fatigue and interested in help managing his RLS.     Allergies/ADRs: Reviewed in chart  Past Medical History: Reviewed in chart  Tobacco: He reports that he has never smoked. He has never been exposed to tobacco smoke. He has never used smokeless tobacco.  Alcohol: none    Medication Adherence/Access: Patient takes medications 2 time(s) per day (11am and 6pm) except ropinirole at 9am and 10pm.    Restless Leg Syndrome/Myoclonus:   Gabapentin 600 mg AM and 900 mg bedtime  Ropinirole ER 2 mg twice daily   Levetiracetam 250 mg twice daily   Methocarbamol 500 mg as needed for spasms    Pt has longstanding history or RLS  Reports it has been going on for over 15 years  He feels it has worsened lately and he isn't sure why  Describes his RLS as muscle jerking that happens in his legs but also his shoulders and arms now  Used to be on pramipexole but this was recently changed to ropinirole  He thinks ropinirole is not working as well  Wife reports the RLS is more frequent now  He is also complaining of worsening fatigue lately     Ocular Myasthenia Gravis:   Pyridostigmine 90 mg twice daily     Patient was diagnosed with MG over 15 years ago  Has been on pyridostigmine for ocular MG and reports it works very well  He was told his ocular MG is in remission now  We discussed a common side effect with pyridostigmine is muscular fasciculation  Patient was unaware of this    Hyperlipidemia   Simvastatin 20 mg at bedtime    Patient endorses chronic muscle fatigue/tightness  He is taking simvastatin for cholesterol  Denies history of MI, CAD, or diabetes      Urinary urgency/BPH:  Trospium 20 mg twice daily before meals  Tamsulosin 0.4 mg daily as needed    Pt reports he tolerates medications well  He only takes tamsulosin as needed  Hasn't taken it recently  He takes trospium twice daily and thinks this does help with symptoms    Today's Vitals:   BP (!) 72/42 (BP Location: Right arm, Patient Position: Sitting, Cuff Size: Adult Regular)   Pulse 76     ----------------  I spent 60 minutes with this patient today. All changes were made via collaborative practice agreement with Winston Silverman PA-C. A copy of the visit note was provided to the patient's provider(s).    A summary of these recommendations was given to the patient.    Lucas Jerome, PharmD, [facility]  Medication Therapy Management Provider  [phone]     Medication Therapy Recommendations  Hyperlipidemia LDL goal <130    Current Medication: simvastatin (ZOCOR) 20 MG tablet   Rationale: No medical indication at this time - Unnecessary medication therapy - Indication   Recommendation: Discontinue Medication   Status: Contact Provider - Awaiting Response         Ocular myasthenia gravis (H)    Current Medication: pyRIDostigmine (MESTINON) 60 MG tablet   Rationale: Undesirable effect - Adverse medication event - Safety   Recommendation: Decrease Dose - pyRIDostigmine Bromide 30 MG Tabs   Status: Contact Provider - Awaiting Response

## 2024-10-03 ENCOUNTER — NURSE TRIAGE (OUTPATIENT)
Dept: FAMILY MEDICINE | Facility: CLINIC | Age: 84
End: 2024-10-03

## 2024-10-03 ENCOUNTER — OFFICE VISIT (OUTPATIENT)
Dept: PHARMACY | Facility: CLINIC | Age: 84
End: 2024-10-03
Attending: NURSE PRACTITIONER
Payer: OTHER GOVERNMENT

## 2024-10-03 ENCOUNTER — APPOINTMENT (OUTPATIENT)
Dept: GENERAL RADIOLOGY | Facility: CLINIC | Age: 84
End: 2024-10-03
Attending: EMERGENCY MEDICINE
Payer: MEDICARE

## 2024-10-03 ENCOUNTER — HOSPITAL ENCOUNTER (OUTPATIENT)
Facility: CLINIC | Age: 84
Setting detail: OBSERVATION
Discharge: HOME | End: 2024-10-04
Attending: EMERGENCY MEDICINE | Admitting: STUDENT IN AN ORGANIZED HEALTH CARE EDUCATION/TRAINING PROGRAM
Payer: MEDICARE

## 2024-10-03 VITALS — SYSTOLIC BLOOD PRESSURE: 72 MMHG | HEART RATE: 76 BPM | DIASTOLIC BLOOD PRESSURE: 42 MMHG

## 2024-10-03 DIAGNOSIS — R35.0 URINARY FREQUENCY: ICD-10-CM

## 2024-10-03 DIAGNOSIS — G70.00 OCULAR MYASTHENIA GRAVIS (H): ICD-10-CM

## 2024-10-03 DIAGNOSIS — E78.5 HYPERLIPIDEMIA LDL GOAL <130: ICD-10-CM

## 2024-10-03 DIAGNOSIS — G25.81 RESTLESS LEG SYNDROME DUE TO IRON DEFICIENCY ANEMIA: Primary | ICD-10-CM

## 2024-10-03 DIAGNOSIS — D50.9 RESTLESS LEG SYNDROME DUE TO IRON DEFICIENCY ANEMIA: Primary | ICD-10-CM

## 2024-10-03 DIAGNOSIS — R55 SYNCOPE, UNSPECIFIED SYNCOPE TYPE: ICD-10-CM

## 2024-10-03 DIAGNOSIS — R52 BODY ACHES: ICD-10-CM

## 2024-10-03 DIAGNOSIS — G25.81 RESTLESS LEG SYNDROME: ICD-10-CM

## 2024-10-03 LAB
ALBUMIN SERPL BCG-MCNC: 4 G/DL (ref 3.5–5.2)
ALP SERPL-CCNC: 128 U/L (ref 40–150)
ALT SERPL W P-5'-P-CCNC: 16 U/L (ref 0–70)
ANION GAP SERPL CALCULATED.3IONS-SCNC: 8 MMOL/L (ref 7–15)
AST SERPL W P-5'-P-CCNC: 37 U/L (ref 0–45)
ATRIAL RATE - MUSE: 71 BPM
BASOPHILS # BLD AUTO: 0.1 10E3/UL (ref 0–0.2)
BASOPHILS NFR BLD AUTO: 1 %
BILIRUB SERPL-MCNC: 0.4 MG/DL
BUN SERPL-MCNC: 16.6 MG/DL (ref 8–23)
CALCIUM SERPL-MCNC: 9.2 MG/DL (ref 8.8–10.4)
CHLORIDE SERPL-SCNC: 104 MMOL/L (ref 98–107)
CREAT SERPL-MCNC: 0.9 MG/DL (ref 0.67–1.17)
DIASTOLIC BLOOD PRESSURE - MUSE: NORMAL MMHG
EGFRCR SERPLBLD CKD-EPI 2021: 83 ML/MIN/1.73M2
EOSINOPHIL # BLD AUTO: 0.3 10E3/UL (ref 0–0.7)
EOSINOPHIL NFR BLD AUTO: 5 %
ERYTHROCYTE [DISTWIDTH] IN BLOOD BY AUTOMATED COUNT: 14.9 % (ref 10–15)
GLUCOSE SERPL-MCNC: 91 MG/DL (ref 70–99)
HCO3 SERPL-SCNC: 25 MMOL/L (ref 22–29)
HCT VFR BLD AUTO: 44.1 % (ref 40–53)
HGB BLD-MCNC: 14.5 G/DL (ref 13.3–17.7)
HOLD SPECIMEN: NORMAL
IMM GRANULOCYTES # BLD: 0 10E3/UL
IMM GRANULOCYTES NFR BLD: 0 %
INTERPRETATION ECG - MUSE: NORMAL
LYMPHOCYTES # BLD AUTO: 1.5 10E3/UL (ref 0.8–5.3)
LYMPHOCYTES NFR BLD AUTO: 24 %
MCH RBC QN AUTO: 29.2 PG (ref 26.5–33)
MCHC RBC AUTO-ENTMCNC: 32.9 G/DL (ref 31.5–36.5)
MCV RBC AUTO: 89 FL (ref 78–100)
MONOCYTES # BLD AUTO: 0.6 10E3/UL (ref 0–1.3)
MONOCYTES NFR BLD AUTO: 9 %
NEUTROPHILS # BLD AUTO: 3.9 10E3/UL (ref 1.6–8.3)
NEUTROPHILS NFR BLD AUTO: 61 %
NRBC # BLD AUTO: 0 10E3/UL
NRBC BLD AUTO-RTO: 0 /100
NT-PROBNP SERPL-MCNC: 173 PG/ML (ref 0–1800)
P AXIS - MUSE: -7 DEGREES
PLATELET # BLD AUTO: 161 10E3/UL (ref 150–450)
POTASSIUM SERPL-SCNC: 4.5 MMOL/L (ref 3.4–5.3)
PR INTERVAL - MUSE: 210 MS
PROT SERPL-MCNC: 6.8 G/DL (ref 6.4–8.3)
QRS DURATION - MUSE: 102 MS
QT - MUSE: 428 MS
QTC - MUSE: 465 MS
R AXIS - MUSE: -10 DEGREES
RBC # BLD AUTO: 4.96 10E6/UL (ref 4.4–5.9)
SODIUM SERPL-SCNC: 137 MMOL/L (ref 135–145)
SYSTOLIC BLOOD PRESSURE - MUSE: NORMAL MMHG
T AXIS - MUSE: 20 DEGREES
T4 FREE SERPL-MCNC: 0.99 NG/DL (ref 0.9–1.7)
TROPONIN T SERPL HS-MCNC: 30 NG/L
TROPONIN T SERPL HS-MCNC: 35 NG/L
TSH SERPL DL<=0.005 MIU/L-ACNC: 7.92 UIU/ML (ref 0.3–4.2)
VENTRICULAR RATE- MUSE: 71 BPM
WBC # BLD AUTO: 6.3 10E3/UL (ref 4–11)

## 2024-10-03 PROCEDURE — G0378 HOSPITAL OBSERVATION PER HR: HCPCS

## 2024-10-03 PROCEDURE — 99207 PR NO CHARGE LOS: CPT | Performed by: PHARMACIST

## 2024-10-03 PROCEDURE — 71046 X-RAY EXAM CHEST 2 VIEWS: CPT

## 2024-10-03 PROCEDURE — 99222 1ST HOSP IP/OBS MODERATE 55: CPT | Mod: GC | Performed by: STUDENT IN AN ORGANIZED HEALTH CARE EDUCATION/TRAINING PROGRAM

## 2024-10-03 PROCEDURE — 250N000013 HC RX MED GY IP 250 OP 250 PS 637: Performed by: STUDENT IN AN ORGANIZED HEALTH CARE EDUCATION/TRAINING PROGRAM

## 2024-10-03 PROCEDURE — 85025 COMPLETE CBC W/AUTO DIFF WBC: CPT | Performed by: EMERGENCY MEDICINE

## 2024-10-03 PROCEDURE — 250N000013 HC RX MED GY IP 250 OP 250 PS 637

## 2024-10-03 PROCEDURE — 99285 EMERGENCY DEPT VISIT HI MDM: CPT | Performed by: EMERGENCY MEDICINE

## 2024-10-03 PROCEDURE — 36415 COLL VENOUS BLD VENIPUNCTURE: CPT | Performed by: EMERGENCY MEDICINE

## 2024-10-03 PROCEDURE — 93005 ELECTROCARDIOGRAM TRACING: CPT | Performed by: EMERGENCY MEDICINE

## 2024-10-03 PROCEDURE — 83880 ASSAY OF NATRIURETIC PEPTIDE: CPT | Performed by: EMERGENCY MEDICINE

## 2024-10-03 PROCEDURE — 84443 ASSAY THYROID STIM HORMONE: CPT

## 2024-10-03 PROCEDURE — 84439 ASSAY OF FREE THYROXINE: CPT

## 2024-10-03 PROCEDURE — 84155 ASSAY OF PROTEIN SERUM: CPT | Performed by: EMERGENCY MEDICINE

## 2024-10-03 PROCEDURE — 99285 EMERGENCY DEPT VISIT HI MDM: CPT | Mod: 25 | Performed by: EMERGENCY MEDICINE

## 2024-10-03 PROCEDURE — 84484 ASSAY OF TROPONIN QUANT: CPT | Performed by: EMERGENCY MEDICINE

## 2024-10-03 PROCEDURE — 71046 X-RAY EXAM CHEST 2 VIEWS: CPT | Mod: 26 | Performed by: RADIOLOGY

## 2024-10-03 PROCEDURE — 258N000003 HC RX IP 258 OP 636

## 2024-10-03 PROCEDURE — 93010 ELECTROCARDIOGRAM REPORT: CPT | Performed by: EMERGENCY MEDICINE

## 2024-10-03 RX ORDER — ROPINIROLE 2 MG/1
2 TABLET, FILM COATED, EXTENDED RELEASE ORAL 2 TIMES DAILY
Status: DISCONTINUED | OUTPATIENT
Start: 2024-10-03 | End: 2024-10-04 | Stop reason: HOSPADM

## 2024-10-03 RX ORDER — VIT C/E/ZN/COPPR/LUTEIN/ZEAXAN 60 MG-6 MG
1 CAPSULE ORAL DAILY
Status: DISCONTINUED | OUTPATIENT
Start: 2024-10-04 | End: 2024-10-04 | Stop reason: HOSPADM

## 2024-10-03 RX ORDER — AMOXICILLIN 250 MG
2 CAPSULE ORAL 2 TIMES DAILY PRN
Status: DISCONTINUED | OUTPATIENT
Start: 2024-10-03 | End: 2024-10-04 | Stop reason: HOSPADM

## 2024-10-03 RX ORDER — ASPIRIN 325 MG
325 TABLET ORAL DAILY
Status: DISCONTINUED | OUTPATIENT
Start: 2024-10-04 | End: 2024-10-04 | Stop reason: HOSPADM

## 2024-10-03 RX ORDER — CALCIUM CARBONATE 500 MG/1
1000 TABLET, CHEWABLE ORAL 4 TIMES DAILY PRN
Status: DISCONTINUED | OUTPATIENT
Start: 2024-10-03 | End: 2024-10-04 | Stop reason: HOSPADM

## 2024-10-03 RX ORDER — TAMSULOSIN HYDROCHLORIDE 0.4 MG/1
0.4 CAPSULE ORAL DAILY
Status: DISCONTINUED | OUTPATIENT
Start: 2024-10-03 | End: 2024-10-03

## 2024-10-03 RX ORDER — ACETAMINOPHEN 325 MG/1
325-650 TABLET ORAL EVERY 4 HOURS PRN
Status: CANCELLED | OUTPATIENT
Start: 2024-10-03

## 2024-10-03 RX ORDER — VITAMIN B COMPLEX
50 TABLET ORAL DAILY
Status: DISCONTINUED | OUTPATIENT
Start: 2024-10-04 | End: 2024-10-04 | Stop reason: HOSPADM

## 2024-10-03 RX ORDER — FLUTICASONE PROPIONATE 50 MCG
1 SPRAY, SUSPENSION (ML) NASAL DAILY
Status: CANCELLED | OUTPATIENT
Start: 2024-10-03

## 2024-10-03 RX ORDER — LANOLIN ALCOHOL/MO/W.PET/CERES
1000 CREAM (GRAM) TOPICAL DAILY
Status: DISCONTINUED | OUTPATIENT
Start: 2024-10-04 | End: 2024-10-04 | Stop reason: HOSPADM

## 2024-10-03 RX ORDER — TAMSULOSIN HYDROCHLORIDE 0.4 MG/1
0.4 CAPSULE ORAL DAILY
Status: DISCONTINUED | OUTPATIENT
Start: 2024-10-04 | End: 2024-10-04 | Stop reason: HOSPADM

## 2024-10-03 RX ORDER — LIDOCAINE 40 MG/G
CREAM TOPICAL
Status: DISCONTINUED | OUTPATIENT
Start: 2024-10-03 | End: 2024-10-04 | Stop reason: HOSPADM

## 2024-10-03 RX ORDER — LEVETIRACETAM 250 MG/1
250 TABLET ORAL 2 TIMES DAILY
Status: DISCONTINUED | OUTPATIENT
Start: 2024-10-03 | End: 2024-10-04 | Stop reason: HOSPADM

## 2024-10-03 RX ORDER — SIMVASTATIN 20 MG
20 TABLET ORAL AT BEDTIME
Status: DISCONTINUED | OUTPATIENT
Start: 2024-10-03 | End: 2024-10-04 | Stop reason: HOSPADM

## 2024-10-03 RX ORDER — GABAPENTIN 300 MG/1
300 CAPSULE ORAL 3 TIMES DAILY
Status: DISCONTINUED | OUTPATIENT
Start: 2024-10-03 | End: 2024-10-03

## 2024-10-03 RX ORDER — POLYETHYLENE GLYCOL 3350 17 G/17G
17 POWDER, FOR SOLUTION ORAL 2 TIMES DAILY PRN
Status: DISCONTINUED | OUTPATIENT
Start: 2024-10-03 | End: 2024-10-04 | Stop reason: HOSPADM

## 2024-10-03 RX ORDER — GABAPENTIN 300 MG/1
600 CAPSULE ORAL EVERY MORNING
Status: DISCONTINUED | OUTPATIENT
Start: 2024-10-04 | End: 2024-10-04 | Stop reason: HOSPADM

## 2024-10-03 RX ORDER — GABAPENTIN 300 MG/1
600 CAPSULE ORAL ONCE
Status: COMPLETED | OUTPATIENT
Start: 2024-10-03 | End: 2024-10-03

## 2024-10-03 RX ORDER — AMOXICILLIN 250 MG
1 CAPSULE ORAL 2 TIMES DAILY PRN
Status: DISCONTINUED | OUTPATIENT
Start: 2024-10-03 | End: 2024-10-04 | Stop reason: HOSPADM

## 2024-10-03 RX ORDER — TRIAMCINOLONE ACETONIDE 1 MG/G
CREAM TOPICAL 2 TIMES DAILY PRN
Status: DISCONTINUED | OUTPATIENT
Start: 2024-10-03 | End: 2024-10-04 | Stop reason: HOSPADM

## 2024-10-03 RX ORDER — PANTOPRAZOLE SODIUM 40 MG/1
40 TABLET, DELAYED RELEASE ORAL DAILY
Status: DISCONTINUED | OUTPATIENT
Start: 2024-10-04 | End: 2024-10-04 | Stop reason: HOSPADM

## 2024-10-03 RX ORDER — VITS A,C,E/LUTEIN/MINERALS 300MCG-200
1 TABLET ORAL DAILY
Status: DISCONTINUED | OUTPATIENT
Start: 2024-10-04 | End: 2024-10-03

## 2024-10-03 RX ORDER — GABAPENTIN 300 MG/1
900 CAPSULE ORAL AT BEDTIME
Status: DISCONTINUED | OUTPATIENT
Start: 2024-10-04 | End: 2024-10-04 | Stop reason: HOSPADM

## 2024-10-03 RX ADMIN — PYRIDOSTIGMINE BROMIDE 90 MG: 60 TABLET ORAL at 20:14

## 2024-10-03 RX ADMIN — SODIUM CHLORIDE, POTASSIUM CHLORIDE, SODIUM LACTATE AND CALCIUM CHLORIDE 1000 ML: 600; 310; 30; 20 INJECTION, SOLUTION INTRAVENOUS at 23:48

## 2024-10-03 RX ADMIN — GABAPENTIN 300 MG: 300 CAPSULE ORAL at 20:16

## 2024-10-03 RX ADMIN — LEVETIRACETAM 250 MG: 250 TABLET, FILM COATED ORAL at 20:15

## 2024-10-03 RX ADMIN — GABAPENTIN 600 MG: 300 CAPSULE ORAL at 22:27

## 2024-10-03 RX ADMIN — SIMVASTATIN 20 MG: 20 TABLET, FILM COATED ORAL at 22:27

## 2024-10-03 RX ADMIN — ROPINIROLE 2 MG: 2 TABLET, FILM COATED, EXTENDED RELEASE ORAL at 20:15

## 2024-10-03 ASSESSMENT — ACTIVITIES OF DAILY LIVING (ADL)
ADLS_ACUITY_SCORE: 38

## 2024-10-03 ASSESSMENT — COLUMBIA-SUICIDE SEVERITY RATING SCALE - C-SSRS
1. IN THE PAST MONTH, HAVE YOU WISHED YOU WERE DEAD OR WISHED YOU COULD GO TO SLEEP AND NOT WAKE UP?: NO
6. HAVE YOU EVER DONE ANYTHING, STARTED TO DO ANYTHING, OR PREPARED TO DO ANYTHING TO END YOUR LIFE?: NO
2. HAVE YOU ACTUALLY HAD ANY THOUGHTS OF KILLING YOURSELF IN THE PAST MONTH?: NO

## 2024-10-03 NOTE — Clinical Note
JOHNI - saw patient to review meds that may be causing/worsening his fatigue and RLS. We did discuss that simvastatin may be worsening muscular fatigue. Also discussed that pyridostigmine commonly causes muscular fasciculation and could be worsening his RLS/tremors. Since I don't routinely follow the patient, I recommended he discuss these further with both of you to see if it may be worth adjusting doses or stopping either medication. We did have to cut our visit short because he developed syncope during our visit with blood pressure of 60/31 and was transferred to the ED.

## 2024-10-03 NOTE — TELEPHONE ENCOUNTER
Reason for Disposition    Systolic BP < 90 and feeling dizzy, lightheaded, or weak    Protocols used: Blood Pressure - Low-A-OH

## 2024-10-03 NOTE — ED PROVIDER NOTES
"ED Provider Note  [facility]      History     Chief Complaint   Patient presents with    Syncope     The history is provided by the patient and medical records.     Gustavo Ramon is a 84 year old male with history of NPH s/p  shunt placement in 2022, left internal iliac artery aneurysm, myasthenia gravis, RLS, and vertigo who presents to the ED from clinic after near-syncopal episode. Patient was being seen in clinic today for medication management and states that about prison through the appointment he began feeling unwell. He describes slow thinking/\"fuzzy thoughts\", dizziness, and blurry vision. This occurred while just sitting and chatting. He states that he told the provider this and they took his BP which was 60/31. BP improved to 118/72 in laying position. He never lost consciousness, but felt like he was going to. He does note that he has been feeling generally weak all day today. He feels a little tired now and states that when ambulating this worsens, but otherwise he is feeling much better. He states that he was able to walk to the car. He denies having any chest pain, shortness of breath, palpitations, sweats, leg pain or swelling, abdominal pain, fevers, or diarrhea. He does report having a similar episode 3 weeks ago while at a park. He was trying to eat a hot dog when he began experiencing visual changes which he describes as \"colors turning white\" and then had a period of about 5 minutes where he was slumped over and \"paused\". He eventually recovered and had some vomiting followed by some dry heaves, but this was short-lived and has not recurred. He states that he has had these \"pausing\" episodes in the past, but not for a while. He denies any known cardiac issues. He also notes that he started PT this week to work on strengthening muscles.     Past Medical History  Past Medical History:   Diagnosis Date    Actinic keratosis     AK (actinic keratosis) 11/15/2012    " Amaurosis fugax     Basal cell carcinoma 2009    R medial cheek/nose    Central serous retinopathy left    Eye    Diverticulosis 08/2006    DJD (degenerative joint disease)     GERD (gastroesophageal reflux disease)     Hearing loss     High frequency    History of nonmelanoma skin cancer     History of nonmelanoma skin cancer     HTN (hypertension)     Hyperlipidemia LDL goal < 130     Hyperplastic colon polyp 08/2006    IgA monoclonal gammopathy     IgA kappa    Infection due to 2019 novel coronavirus 10/13/2022    Infection due to 2019 novel coronavirus 11/2021    Lattice degeneration of retina right    Eye    Macular degeneration     Nonsenile cataract     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    [facility] consult    Rosacea     Steroid-induced diabetes mellitus, 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    Strabismus     Vitreous detachment left    Eye     Past Surgical History:   Procedure Laterality Date    ARTHROSCOPY KNEE RT/LT Left 01/2010    Knee    BIOPSY  2009/2013/2016    Nose; Prostate; BCC - left arm    COLONOSCOPY  09/24/2019    w/Endoscopy    COLONOSCOPY  07/29/2024    COLONOSCOPY WITH CO2 INSUFFLATION N/A 09/24/2019    Procedure: COLONOSCOPY, WITH CO2 INSUFFLATION;  Surgeon: Loi Levine MD;  Location: [facility] OR    COMBINED ESOPHAGOSCOPY, GASTROSCOPY, DUODENOSCOPY (EGD) WITH CO2 INSUFFLATION N/A 09/24/2019    Procedure: ESOPHAGOGASTRODUODENOSCOPY, WITH CO2 INSUFFLATION;  Surgeon: Loi Levine MD;  Location: [facility] OR    COMBINED ESOPHAGOSCOPY, GASTROSCOPY, DUODENOSCOPY (EGD) WITH CO2 INSUFFLATION N/A 7/29/2024    Procedure: Combined Esophagoscopy, Gastroscopy, Duodenoscopy (Egd) With Co2 Insufflation;  Surgeon: Jeff Peace MD;  Location: [facility] OR    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    DISKECTOMY, LUMBAR, SINGLE SP  2003    L2-3    ENT SURGERY  1943    Tonsils     ENT SURGERY  02/22/2012    Biopsy lesion right pinna    ENT SURGERY  02/24/2012    Biopsy leson right pinna     ESOPHAGOSCOPY, GASTROSCOPY, DUODENOSCOPY (EGD), COMBINED N/A 09/24/2019    Procedure: Esophagogastroduodenoscopy, With Biopsy;  Surgeon: Loi Levine MD;  Location: [facility] OR    ESOPHAGOSCOPY, GASTROSCOPY, DUODENOSCOPY (EGD), COMBINED N/A 7/29/2024    Procedure: ESOPHAGOGASTRODUODENOSCOPY, WITH BIOPSY;  Surgeon: Jeff Peace MD;  Location: [facility] OR    IR LUMBAR DRAIN PLACEMENT W FLUORO  06/27/2022    LAMINOPLASTY CERVICAL POSTERIOR THREE+ LEVELS N/A 12/12/2022    Procedure: POSTERIOR APPROACH Cervical 4-7 laminoplasty;  Surgeon: Judie Levin MD;  Location: [facility]    LAPAROSCOPIC ASSISTED IMPLANT SHUNT VENTRICULOPERITONEAL N/A 07/07/2022    Procedure: possible INSERTION, SHUNT, VENTRICULOPERITONEAL, LAPAROSCOPY-ASSISTED;  Surgeon: Joe Damon MD;  Location: [facility] OR    OPTICAL TRACKING SYSTEM IMPLANT SHUNT VENTRICULOPERITONEAL N/A 07/07/2022    Procedure: INSERTION, SHUNT, VENTRICULOPERITONEAL, USING OPTICAL TRACKING SYSTEM;  Surgeon: Jean-Paul Childs MD;  Location: [facility] OR    SOFT TISSUE SURGERY  05/2010    Basal Cell/right side nose     ACE/ARB/ARNI NOT PRESCRIBED (INTENTIONAL)  acetaminophen (TYLENOL) 325 MG tablet  aspirin (ASA) 325 MG tablet  cyanocobalamin (VITAMIN B-12) 1000 MCG tablet  fluticasone (FLONASE) 50 MCG/ACT nasal spray  gabapentin (NEURONTIN) 300 MG capsule  ketoconazole (NIZORAL) 2 % external cream  ketoconazole (NIZORAL) 2 % external cream  levETIRAcetam (KEPPRA) 250 MG tablet  meclizine (ANTIVERT) 25 MG tablet  methocarbamol (ROBAXIN) 500 MG tablet  multivitamin (OCUVITE) TABS tablet  pantoprazole (PROTONIX) 40 MG EC tablet  polyethylene glycol (MIRALAX) 17 GM/Dose powder  pyRIDostigmine (MESTINON) 60 MG tablet  rOPINIRole (REQUIP ER) 2 MG 24 hr tablet  senna-docusate (SENOKOT-S/PERICOLACE) 8.6-50 MG tablet  simvastatin (ZOCOR) 20 MG tablet  tamsulosin (FLOMAX) 0.4 MG capsule  tamsulosin (FLOMAX) 0.4 MG capsule  terbinafine (LAMISIL) 1 % external  cream  triamcinolone (KENALOG) 0.1 % external cream  trospium (SANCTURA) 20 MG tablet  vitamin D3 (CHOLECALCIFEROL) 2000 units tablet      Allergies   Allergen Reactions    Mycophenolate Other (See Comments)     Confusion, abnormal movements     Family History  Family History   Problem Relation Age of Onset    Heart Disease Mother     Alzheimer Disease Mother 73    C.A.D. Father     Coronary Artery Disease Father     Diabetes Grandchild         using a pump     Diabetes Paternal Uncle     Heart Disease Paternal Grandmother     Glaucoma No family hx of     Macular Degeneration No family hx of     Melanoma No family hx of     Skin Cancer No family hx of      Social History   Social History     Tobacco Use    Smoking status: Never     Passive exposure: Never    Smokeless tobacco: Never    Tobacco comments:     Never   Vaping Use    Vaping status: Never Used   Substance Use Topics    Alcohol use: No    Drug use: No      A complete review of systems was performed with pertinent positives and negatives noted in the HPI, and all other systems negative.    Physical Exam   BP: 118/77  Pulse: 69  Temp: 97.6  F (36.4  C)  Resp: 15  SpO2: 97 %  Physical Exam  Physical Exam   Constitutional: oriented to person, place, and time. appears well-developed and well-nourished.   HENT:   Head: Normocephalic and atraumatic.   Neck: Normal range of motion.   Pulmonary/Chest: Effort normal. No respiratory distress.   Cardiac: No murmurs, rubs, gallops. RRR.  Abdominal: Abdomen soft, nontender, nondistended. No rebound tenderness.  MSK: Long bones without deformity or evidence of trauma  Neurological: alert and oriented to person, place, and time.   Skin: Skin is warm and dry.   Psychiatric:  normal mood and affect.  behavior is normal. Thought content normal.       ED Course, Procedures, & Data      Procedures            EKG Interpretation:      Interpreted by Reynaldo Wolf MD  Time reviewed: 1400  Symptoms at time of EKG:  Syncope  Rhythm: normal sinus   Rate: normal  Axis: normal  Ectopy: none  Conduction: First-degree AV block  ST Segments/ T Waves: No ST-T wave changes  Q Waves: none  Comparison to prior: Unchanged    Clinical Impression: First-degree AV block, no acute ischemic changes         Results for orders placed or performed during the hospital encounter of 10/03/24   XR Chest 2 Views     Status: None    Narrative    Exam: XR CHEST 2 VIEWS, 10/3/2024 1:49 PM    Comparison: 8/11/2023    History: sob    Findings:  Single AP upright and 2 lateral views of the chest. Shunt tubing is  seen in the anterior soft tissues of the chest.    Trachea is midline. Mediastinum is within normal limits.  Cardiopulmonary silhouette is within normal limits. Mild streaky  basilar opacities. There is no pneumothorax or pleural effusion. The  upper abdomen is unremarkable.      Impression    Impression: Mild basilar atelectasis. No focal consolidation.    I have personally reviewed the examination and initial interpretation  and I agree with the findings.    JOSE HUNG MD         SYSTEM ID:  [id number]   Comprehensive metabolic panel     Status: Normal   Result Value Ref Range    Sodium 137 135 - 145 mmol/L    Potassium 4.5 3.4 - 5.3 mmol/L    Carbon Dioxide (CO2) 25 22 - 29 mmol/L    Anion Gap 8 7 - 15 mmol/L    Urea Nitrogen 16.6 8.0 - 23.0 mg/dL    Creatinine 0.90 0.67 - 1.17 mg/dL    GFR Estimate 83 >60 mL/min/1.73m2    Calcium 9.2 8.8 - 10.4 mg/dL    Chloride 104 98 - 107 mmol/L    Glucose 91 70 - 99 mg/dL    Alkaline Phosphatase 128 40 - 150 U/L    AST 37 0 - 45 U/L    ALT 16 0 - 70 U/L    Protein Total 6.8 6.4 - 8.3 g/dL    Albumin 4.0 3.5 - 5.2 g/dL    Bilirubin Total 0.4 <=1.2 mg/dL   Troponin T, High Sensitivity     Status: Abnormal   Result Value Ref Range    Troponin T, High Sensitivity 35 (H) <=22 ng/L   Nt probnp inpatient (BNP)     Status: Normal   Result Value Ref Range    N terminal Pro BNP Inpatient 173 0 - 1,800 pg/mL    CBC with platelets and differential     Status: None   Result Value Ref Range    WBC Count 6.3 4.0 - 11.0 10e3/uL    RBC Count 4.96 4.40 - 5.90 10e6/uL    Hemoglobin 14.5 13.3 - 17.7 g/dL    Hematocrit 44.1 40.0 - 53.0 %    MCV 89 78 - 100 fL    MCH 29.2 26.5 - 33.0 pg    MCHC 32.9 31.5 - 36.5 g/dL    RDW 14.9 10.0 - 15.0 %    Platelet Count 161 150 - 450 10e3/uL    % Neutrophils 61 %    % Lymphocytes 24 %    % Monocytes 9 %    % Eosinophils 5 %    % Basophils 1 %    % Immature Granulocytes 0 %    NRBCs per 100 WBC 0 <1 /100    Absolute Neutrophils 3.9 1.6 - 8.3 10e3/uL    Absolute Lymphocytes 1.5 0.8 - 5.3 10e3/uL    Absolute Monocytes 0.6 0.0 - 1.3 10e3/uL    Absolute Eosinophils 0.3 0.0 - 0.7 10e3/uL    Absolute Basophils 0.1 0.0 - 0.2 10e3/uL    Absolute Immature Granulocytes 0.0 <=0.4 10e3/uL    Absolute NRBCs 0.0 10e3/uL   EKG 12-lead, tracing only     Status: None   Result Value Ref Range    Systolic Blood Pressure  mmHg    Diastolic Blood Pressure  mmHg    Ventricular Rate 71 BPM    Atrial Rate 71 BPM    IL Interval 210 ms    QRS Duration 102 ms     ms    QTc 465 ms    P Axis -7 degrees    R AXIS -10 degrees    T Axis 20 degrees    Interpretation ECG       Sinus rhythm with 1st degree A-V block  Otherwise normal ECG  Unconfirmed report - interpretation of this ECG is computer generated - see medical record for final interpretation  Confirmed by - EMERGENCY ROOM, PHYSICIAN ([id number]),  IESHA FLORES ([id number]) on 10/3/2024 3:35:17 PM     CBC with platelets differential     Status: None    Narrative    The following orders were created for panel order CBC with platelets differential.  Procedure                               Abnormality         Status                     ---------                               -----------         ------                     CBC with platelets and d...[[id number]]                      Final result                 Please view results for these tests on the individual  orders.     Medications - No data to display  Labs Ordered and Resulted from Time of ED Arrival to Time of ED Departure - No data to display  No orders to display          Critical care was not performed.     Medical Decision Making  The patient's presentation was of high complexity (an acute health issue posing potential threat to life or bodily function).    The patient's evaluation involved:  ordering and/or review of 3+ test(s) in this encounter (see separate area of note for details)  discussion of management or test interpretation with another health professional (hospitalist)    The patient's management necessitated high risk (a decision regarding hospitalization).    Assessment & Plan    [facility]  Patient senting with syncope.  Blood pressure in clinic low, it is normal here.  He is otherwise asymptomatic.  No chest pain, shortness of breath.  Suspicion for pulmonary embolism or intra-abdominal TASV causing syncope.  EKG here is reassuring and troponin is not far off of his baseline.  At this point we will admit for observation for cardiac monitoring.  Patient agrees with plan.    I have reviewed the nursing notes. I have reviewed the findings, diagnosis, plan and need for follow up with the patient.    New Prescriptions    No medications on file       Final diagnoses:   Syncope, unspecified syncope type   I, Micki Solis, am serving as a trained medical scribe to document services personally performed by Reynaldo Wolf MD, based on the provider's statements to me.     I, Reynaldo Wolf MD, was physically present and have reviewed and verified the accuracy of this note documented by Micki Solis.      Reynaldo Wolf MD  [facility] EMERGENCY DEPARTMENT  10/3/2024           Reynaldo Wolf MD  10/03/24 [id number]

## 2024-10-03 NOTE — TELEPHONE ENCOUNTER
"RN called to exam room    Patient B/P 60/31    Patient stated he was feeling dizzy and his vision becam e blurry.    Patient has been having episodes where he is sitting and will\"slump over\" in chair.  Lasting about 5 min.  Last episode 3 weeks ago.    Patient states feeling much weaker today.    Patient has hydrocephalus with shunt adjusted 10/1    Patient move to laying position and B/P  118/72    Patient states he ate breakfast.    Patient taking no blood pressure medications.    RN called 911 and patient transported to ER.      Answer Assessment - Initial Assessment Questions  1. BLOOD PRESSURE: \"What is your blood pressure?\" \"Did you take at least two measurements 5 minutes apart?\"      At visit 72/42  2. ONSET: \"When did you take your blood pressure?\"      Just now  3. HOW: \"How did you take your blood pressure?\" (e.g., visiting nurse, automatic home BP monitor)      electric  4. HISTORY: \"Do you have a history of low blood pressure?\" \"What is your blood pressure normally?\"      No  runs 120/70-80  5. MEDICINES: \"Are you taking any medicines for blood pressure?\" If Yes, ask: \"Have they been changed recently?\"      no  6. PULSE RATE: \"Do you know what your pulse rate is?\"       84  7. OTHER SYMPTOMS: \"Have you been sick recently?\" \"Have you had a recent injury?\"      Feeling normal  8. PREGNANCY: \"Is there any chance you are pregnant?\" \"When was your last menstrual period?\"      Na    Protocols used: Blood Pressure - Low-A-OH      Esdras Campbell RN, BSN, PHN  [facility]        "

## 2024-10-03 NOTE — ED TRIAGE NOTES
Pt BIBA with c/o near syncopal episode. Per EMS pt was in clinic and began feeling light headed and passed out. Post episode pt hypotensive. Pt now normotensive, but states he is feeling more weak than normal.

## 2024-10-03 NOTE — H&P
[facility]    History and Physical - Medicine Service, [facility] TEAM        Date of Admission:  10/3/2024    Assessment & Plan      Gustavo Ramon is a 84 year old male with pmh significant for NPH s/p  shunt placement in 2022, left internal iliac artery aneurysm, myasthenia gravis, RLS, and vertigo, presenting with fatigue, syncopal episode with orthostatic hypotension, now back to baseline with EKG showing NSR and first-degree AV block and normal CXR and labs. Patient admitted for work-up of cause of syncope.    #Syncopal Episode  #Hypotension  Etiology of syncopal episode most likely multifactorial with component of orthostatic hypotension in setting of hypovolemia from po intact in setting of polypharmacy/medication side effects. Notably, ropinirole can cause orthostatic hypotension and is a new medication for the patient. Although, prior similar episodes proceeded patient taking ropinirole. Patient's description of prior episodes involve either heat or poor po intake. Less likely vasovagal syncope as no prodromic symptoms and finding of orthostatic BP during episode. Less likely cardiogenic syncope as EKG showing NSR with first-degree AV block, no cardiac history, no CP, palpitations, SOB, or diaphoresis.  Troponin T mildly elevated to 35 but peaked, may represent demand ischemia in setting of hypovolemia. Latest echo 2/19/2024 showing LV EF 57%, mild LVH with grade 1 diastolic dysfunction, RV size and function nl, mild aortic insufficienncy with aortic valve sclerosis and mitral insufficiency, aortic root dilation. Not likely infectious cause, CXR showing bibasilar mild atelectasis, but no consolidation and no leukocytosis on CBC. Will admit patient for telemetry observation to rule out cardiogenic cause.  - Telemetry  - TSH  - orthostatics  - 1L LR bolus  - UA as patient reports not urinating all day    #RLS  - Continue PTA ropinirole    #NPH s/p  shunt  (2022)  No clinical evidence of infection. No abdominal pain, no signs of active NPH. Follows with neurosurgery as outpatient.          Diet:  regular adult diet  DVT Prophylaxis: Pneumatic Compression Devices  Rodriguez Catheter: Not present  Fluids: 1L LR bolus  Lines: None     Cardiac Monitoring: None  Code Status:  DNR/DNI    Disposition Plan      Expected Discharge Date: 10/04/2024                The patient's care was discussed with the Attending Physician, Dr. Alejo .      Jeff Quezada MD  Medicine Service, [facility]  Securely message with [employer] (more info)  Text page via [facility] Paging/Directory   See signed in provider for up to date coverage information  ______________________________________________________________________    Chief Complaint   Syncope  Fatigue  Hypotension    History is obtained from the patient, supplemented by patient's wife at bedside.    History of Present Illness   Gustavo Ramon is a 84 year old male who was at a PCP visit this afternoon discussing his medications and suddenly had blurry vision which persisted for a few minutes. He told the provider about his symptoms and his BP was checked and found to be hypotensive to 60/31, improved to 118/77 laying down. He denied dizziness, palpitations or CP but states he had some lightheadedness which resolved with laying down. His vision changes also resolved with laying down. He denied confusion, tongue biting, urinary incontinence, focal weakness or sensation changes.  He states he currently feels well.    He denies any recent illness, diarrhea, nausea, vomiting, bleeding. He states he does not drink much fluids, stating he only had a glass of orange juice and small glass of water with medication this morning. He states he only took his ropinerole this morning prior to the current episode.     Patient reports he had 4 similar episodes in the past 4 years. The most recent  was three weeks ago, out with wife, hot day, did not drink much fluid, had similar symptoms, except had brief LOC for a minute or so with drooling and rhinorrhea per wife. He also had  nausea and dry heaving but recovered and was able to walk with walker to vehicle. No confusion, urinary incontinence, tongue biting at that time.    Recent changes:  - His VPS shunt was downsized by neurosurgery from size 5 to 4 recently in an effort to address patient's complaint of chronic fatigue for the past several months. He reports mild improvement in fatigue since this change. The current episode occurred while he was meeting with a pharmacist to address potential polypharmacy as a cause of his chronic fatigue.  - Only new medication changes is started ropinerole on 7/31/2024 for RLS.    Past Medical History    Past Medical History:   Diagnosis Date    Actinic keratosis     AK (actinic keratosis) 11/15/2012    Amaurosis fugax     Basal cell carcinoma 2009    R medial cheek/nose    Central serous retinopathy left    Eye    Diverticulosis 08/2006    DJD (degenerative joint disease)     GERD (gastroesophageal reflux disease)     Hearing loss     High frequency    History of nonmelanoma skin cancer     History of nonmelanoma skin cancer     HTN (hypertension)     Hyperlipidemia LDL goal < 130     Hyperplastic colon polyp 08/2006    IgA monoclonal gammopathy     IgA kappa    Infection due to 2019 novel coronavirus 10/13/2022    Infection due to 2019 novel coronavirus 11/2021    Lattice degeneration of retina right    Eye    Macular degeneration     Nonsenile cataract     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    [place] consult    Rosacea     Steroid-induced diabetes mellitus, 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    Strabismus     Vitreous detachment left    Eye       Past Surgical History   Past Surgical History:   Procedure Laterality Date    ARTHROSCOPY KNEE RT/LT Left 01/2010    Knee    BIOPSY  2009/2013/2016    Nose; Prostate; BCC -  left arm    COLONOSCOPY  09/24/2019    w/Endoscopy    COLONOSCOPY  07/29/2024    COLONOSCOPY WITH CO2 INSUFFLATION N/A 09/24/2019    Procedure: COLONOSCOPY, WITH CO2 INSUFFLATION;  Surgeon: Loi Levine MD;  Location: [facility] OR    COMBINED ESOPHAGOSCOPY, GASTROSCOPY, DUODENOSCOPY (EGD) WITH CO2 INSUFFLATION N/A 09/24/2019    Procedure: ESOPHAGOGASTRODUODENOSCOPY, WITH CO2 INSUFFLATION;  Surgeon: Loi Levine MD;  Location: [facility] OR    COMBINED ESOPHAGOSCOPY, GASTROSCOPY, DUODENOSCOPY (EGD) WITH CO2 INSUFFLATION N/A 7/29/2024    Procedure: Combined Esophagoscopy, Gastroscopy, Duodenoscopy (Egd) With Co2 Insufflation;  Surgeon: Jeff Peace MD;  Location: [facility] OR    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    DISKECTOMY, LUMBAR, SINGLE SP  2003    L2-3    ENT SURGERY  1943    Tonsils     ENT SURGERY  02/22/2012    Biopsy lesion right pinna    ENT SURGERY  02/24/2012    Biopsy leson right pinna    ESOPHAGOSCOPY, GASTROSCOPY, DUODENOSCOPY (EGD), COMBINED N/A 09/24/2019    Procedure: Esophagogastroduodenoscopy, With Biopsy;  Surgeon: Loi Levine MD;  Location: [facility] OR    ESOPHAGOSCOPY, GASTROSCOPY, DUODENOSCOPY (EGD), COMBINED N/A 7/29/2024    Procedure: ESOPHAGOGASTRODUODENOSCOPY, WITH BIOPSY;  Surgeon: Jeff Peace MD;  Location: [facility] OR    IR LUMBAR DRAIN PLACEMENT W FLUORO  06/27/2022    LAMINOPLASTY CERVICAL POSTERIOR THREE+ LEVELS N/A 12/12/2022    Procedure: POSTERIOR APPROACH Cervical 4-7 laminoplasty;  Surgeon: Judie Levin MD;  Location: [facility] OR    LAPAROSCOPIC ASSISTED IMPLANT SHUNT VENTRICULOPERITONEAL N/A 07/07/2022    Procedure: possible INSERTION, SHUNT, VENTRICULOPERITONEAL, LAPAROSCOPY-ASSISTED;  Surgeon: Joe Damon MD;  Location: [facility] OR    OPTICAL TRACKING SYSTEM IMPLANT SHUNT VENTRICULOPERITONEAL N/A 07/07/2022    Procedure: INSERTION, SHUNT, VENTRICULOPERITONEAL, USING OPTICAL TRACKING SYSTEM;  Surgeon: Jean-Paul Childs MD;  Location: [facility]  OR    SOFT TISSUE SURGERY  2010    Basal Cell/right side nose       Prior to Admission Medications   Prior to Admission Medications   Prescriptions Last Dose Informant Patient Reported? Taking?   ACE/ARB/ARNI NOT PRESCRIBED (INTENTIONAL)   No No   Sig: Please choose reason not prescribed from choices below.   acetaminophen (TYLENOL) 325 MG tablet   Yes No   Sig: Take 1-2 tablets (325-650 mg) by mouth every 4 hours as needed for mild pain   aspirin (ASA) 325 MG tablet   No No   Sig: Take 1 tablet (325 mg) by mouth daily   cyanocobalamin (VITAMIN B-12) 1000 MCG tablet   No No   Sig: Take 1 tablet (1,000 mcg) by mouth daily   fluticasone (FLONASE) 50 MCG/ACT nasal spray   No No   Sig: Spray 1 spray into both nostrils daily   gabapentin (NEURONTIN) 300 MG capsule   No No   Si capsule in the morning, 1 mid day, and 3 in the evening   Patient taking differently: 600 mg in the morning and 900 mg in the evening   ketoconazole (NIZORAL) 2 % external cream   No No   Sig: Apply twice daily for 8 weeks from knees down to both legs and feet.   ketoconazole (NIZORAL) 2 % external cream   No No   Sig: Apply topically 2 times daily To face   levETIRAcetam (KEPPRA) 250 MG tablet   No No   Sig: TAKE 1 TABLET BY MOUTH TWO TIMES A DAY   meclizine (ANTIVERT) 25 MG tablet   No No   Sig: Take 0.5-1 tablets (12.5-25 mg) by mouth 3 times daily as needed for dizziness   methocarbamol (ROBAXIN) 500 MG tablet   No No   Sig: Take 1 tablet (500 mg) by mouth every 6 hours as needed for muscle spasms   multivitamin (OCUVITE) TABS tablet   Yes No   Sig: Take 1 tablet by mouth daily   pantoprazole (PROTONIX) 40 MG EC tablet   No No   Sig: Take 1 tablet (40 mg) by mouth daily.   polyethylene glycol (MIRALAX) 17 GM/Dose powder   No No   Sig: Take 17 g by mouth daily as needed for constipation   pyRIDostigmine (MESTINON) 60 MG tablet   No No   Sig: Take 1.5 tablets (90 mg) by mouth 2 times daily.   rOPINIRole (REQUIP ER) 2 MG 24 hr tablet   No  No   Sig: Take 1 tablet (2 mg) by mouth 2 times daily.   senna-docusate (SENOKOT-S/PERICOLACE) 8.6-50 MG tablet   No No   Sig: Take 1 tablet by mouth 2 times daily   simvastatin (ZOCOR) 20 MG tablet   No No   Sig: TAKE ONE TABLET BY MOUTH AT BEDTIME   tamsulosin (FLOMAX) 0.4 MG capsule   Yes No   Sig: Take 1 capsule (0.4 mg) by mouth daily   tamsulosin (FLOMAX) 0.4 MG capsule   No No   Sig: Take 1 capsule (0.4 mg) by mouth daily   terbinafine (LAMISIL) 1 % external cream   No No   Sig: Apply topically 2 times daily   triamcinolone (KENALOG) 0.1 % external cream   No No   Sig: Apply a thin layer up to twice daily to affected areas as needed.   trospium (SANCTURA) 20 MG tablet   No No   Sig: Take 1 tablet (20 mg) by mouth 2 times daily (before meals)   vitamin D3 (CHOLECALCIFEROL) 2000 units tablet   No No   Sig: Take 1 tablet by mouth daily      Facility-Administered Medications Last Administration Doses Remaining   lidocaine 1% with EPINEPHrine 1:100,000 injection 3 mL None recorded 1           Review of Systems    The 10 point Review of Systems is negative other than noted in the HPI or here.     Social History   I have reviewed this patient's social history and updated it with pertinent information if needed.  Social History     Tobacco Use    Smoking status: Never     Passive exposure: Never    Smokeless tobacco: Never    Tobacco comments:     Never   Vaping Use    Vaping status: Never Used   Substance Use Topics    Alcohol use: No    Drug use: No         Family History   I have reviewed this patient's family history and updated it with pertinent information if needed.  Family History   Problem Relation Age of Onset    Heart Disease Mother     Alzheimer Disease Mother 73    C.A.D. Father     Coronary Artery Disease Father     Diabetes Grandchild         using a pump     Diabetes Paternal Uncle     Heart Disease Paternal Grandmother     Glaucoma No family hx of     Macular Degeneration No family hx of     Melanoma  No family hx of     Skin Cancer No family hx of          Allergies   Allergies   Allergen Reactions    Mycophenolate Other (See Comments)     Confusion, abnormal movements        Physical Exam   Vital Signs: Temp: 97.4  F (36.3  C) Temp src: Oral BP: 117/72 Pulse: 77   Resp: 16 SpO2: 98 % O2 Device: None (Room air)    Weight: 0 lbs 0 oz    Constitutional: awake, alert, cooperative, no apparent distress, and appears stated age  ENT: Normocephalic, without obvious abnormality, atraumatic, sinuses nontender on palpation, external ears without lesions, oral pharynx with moist mucous membranes, tonsils without erythema or exudates, gums normal and good dentition.  Hematologic / Lymphatic: no cervical lymphadenopathy  Respiratory: No increased work of breathing, good air exchange, clear to auscultation bilaterally, no crackles or wheezing  Cardiovascular: Normal apical impulse, regular rate and rhythm, normal S1 and S2, no S3 or S4, and no murmur noted  GI: No scars, normal bowel sounds, soft, non-distended, non-tender, no masses palpated, no hepatosplenomegally  Skin: no bruising or bleeding, no rashes, and no lesions  Neurologic: Awake, alert, oriented to name, place and time.  Cranial nerves II-XII are grossly intact.  Motor is 5 out of 5 bilaterally.  Cerebellar finger to nose intact.  Sensory is intact. Right leg involuntary movements.      Data     I have personally reviewed the following data over the past 24 hrs:    6.3  \   14.5   / 161     137 104 16.6 /  91   4.5 25 0.90 \     ALT: 16 AST: 37 AP: 128 TBILI: 0.4   ALB: 4.0 TOT PROTEIN: 6.8 LIPASE: N/A     Trop: 30 (H) BNP: 173     TSH: 7.92 (H) T4: N/A A1C: N/A       Imaging results reviewed over the past 24 hrs:   Recent Results (from the past 24 hour(s))   XR Chest 2 Views    Narrative    Exam: XR CHEST 2 VIEWS, 10/3/2024 1:49 PM    Comparison: 8/11/2023    History: sob    Findings:  Single AP upright and 2 lateral views of the chest. Shunt tubing is  seen in  the anterior soft tissues of the chest.    Trachea is midline. Mediastinum is within normal limits.  Cardiopulmonary silhouette is within normal limits. Mild streaky  basilar opacities. There is no pneumothorax or pleural effusion. The  upper abdomen is unremarkable.      Impression    Impression: Mild basilar atelectasis. No focal consolidation.    I have personally reviewed the examination and initial interpretation  and I agree with the findings.    JOSE HUNG MD         SYSTEM ID:  [id number]

## 2024-10-04 VITALS
WEIGHT: 172 LBS | HEIGHT: 68 IN | DIASTOLIC BLOOD PRESSURE: 75 MMHG | RESPIRATION RATE: 18 BRPM | SYSTOLIC BLOOD PRESSURE: 123 MMHG | HEART RATE: 66 BPM | TEMPERATURE: 98 F | OXYGEN SATURATION: 96 % | BODY MASS INDEX: 26.07 KG/M2

## 2024-10-04 LAB
ALBUMIN UR-MCNC: NEGATIVE MG/DL
ANION GAP SERPL CALCULATED.3IONS-SCNC: 7 MMOL/L (ref 7–15)
APPEARANCE UR: CLEAR
BILIRUB UR QL STRIP: NEGATIVE
BUN SERPL-MCNC: 16.6 MG/DL (ref 8–23)
CALCIUM SERPL-MCNC: 9.3 MG/DL (ref 8.8–10.4)
CAOX CRY #/AREA URNS HPF: ABNORMAL /HPF
CHLORIDE SERPL-SCNC: 108 MMOL/L (ref 98–107)
COLOR UR AUTO: ABNORMAL
CREAT SERPL-MCNC: 1 MG/DL (ref 0.67–1.17)
EGFRCR SERPLBLD CKD-EPI 2021: 73 ML/MIN/1.73M2
ERYTHROCYTE [DISTWIDTH] IN BLOOD BY AUTOMATED COUNT: 14.6 % (ref 10–15)
GLUCOSE SERPL-MCNC: 81 MG/DL (ref 70–99)
GLUCOSE UR STRIP-MCNC: NEGATIVE MG/DL
HCO3 SERPL-SCNC: 26 MMOL/L (ref 22–29)
HCT VFR BLD AUTO: 42.8 % (ref 40–53)
HGB BLD-MCNC: 14.1 G/DL (ref 13.3–17.7)
HGB UR QL STRIP: NEGATIVE
KETONES UR STRIP-MCNC: NEGATIVE MG/DL
LEUKOCYTE ESTERASE UR QL STRIP: NEGATIVE
MCH RBC QN AUTO: 29.1 PG (ref 26.5–33)
MCHC RBC AUTO-ENTMCNC: 32.9 G/DL (ref 31.5–36.5)
MCV RBC AUTO: 88 FL (ref 78–100)
MUCOUS THREADS #/AREA URNS LPF: PRESENT /LPF
NITRATE UR QL: NEGATIVE
PH UR STRIP: 7 [PH] (ref 5–7)
PLATELET # BLD AUTO: 173 10E3/UL (ref 150–450)
POTASSIUM SERPL-SCNC: 3.9 MMOL/L (ref 3.4–5.3)
RBC # BLD AUTO: 4.85 10E6/UL (ref 4.4–5.9)
RBC URINE: 1 /HPF
SODIUM SERPL-SCNC: 141 MMOL/L (ref 135–145)
SP GR UR STRIP: 1.01 (ref 1–1.03)
UROBILINOGEN UR STRIP-MCNC: NORMAL MG/DL
WBC # BLD AUTO: 5.4 10E3/UL (ref 4–11)
WBC URINE: <1 /HPF

## 2024-10-04 PROCEDURE — 99239 HOSP IP/OBS DSCHRG MGMT >30: CPT | Performed by: INTERNAL MEDICINE

## 2024-10-04 PROCEDURE — 250N000013 HC RX MED GY IP 250 OP 250 PS 637: Performed by: STUDENT IN AN ORGANIZED HEALTH CARE EDUCATION/TRAINING PROGRAM

## 2024-10-04 PROCEDURE — G0378 HOSPITAL OBSERVATION PER HR: HCPCS

## 2024-10-04 PROCEDURE — 250N000013 HC RX MED GY IP 250 OP 250 PS 637

## 2024-10-04 PROCEDURE — 80048 BASIC METABOLIC PNL TOTAL CA: CPT

## 2024-10-04 PROCEDURE — 82550 ASSAY OF CK (CPK): CPT

## 2024-10-04 PROCEDURE — 250N000013 HC RX MED GY IP 250 OP 250 PS 637: Performed by: INTERNAL MEDICINE

## 2024-10-04 PROCEDURE — 36415 COLL VENOUS BLD VENIPUNCTURE: CPT

## 2024-10-04 PROCEDURE — 85027 COMPLETE CBC AUTOMATED: CPT

## 2024-10-04 PROCEDURE — 81003 URINALYSIS AUTO W/O SCOPE: CPT

## 2024-10-04 RX ORDER — ACETAMINOPHEN 325 MG/1
650 TABLET ORAL EVERY 6 HOURS PRN
Status: DISCONTINUED | OUTPATIENT
Start: 2024-10-04 | End: 2024-10-04 | Stop reason: HOSPADM

## 2024-10-04 RX ORDER — ROPINIROLE 2 MG/1
2 TABLET, FILM COATED, EXTENDED RELEASE ORAL AT BEDTIME
Status: SHIPPED
Start: 2024-10-04 | End: 2024-10-29

## 2024-10-04 RX ADMIN — GABAPENTIN 600 MG: 300 CAPSULE ORAL at 08:22

## 2024-10-04 RX ADMIN — PANTOPRAZOLE SODIUM 40 MG: 40 TABLET, DELAYED RELEASE ORAL at 08:22

## 2024-10-04 RX ADMIN — CYANOCOBALAMIN TAB 1000 MCG 1000 MCG: 1000 TAB at 08:22

## 2024-10-04 RX ADMIN — ACETAMINOPHEN 650 MG: 325 TABLET ORAL at 03:20

## 2024-10-04 RX ADMIN — ASPIRIN 325 MG ORAL TABLET 325 MG: 325 PILL ORAL at 08:22

## 2024-10-04 RX ADMIN — ROPINIROLE 2 MG: 2 TABLET, FILM COATED, EXTENDED RELEASE ORAL at 09:39

## 2024-10-04 RX ADMIN — Medication 1 CAPSULE: at 08:22

## 2024-10-04 RX ADMIN — Medication 50 MCG: at 08:22

## 2024-10-04 RX ADMIN — LEVETIRACETAM 250 MG: 250 TABLET, FILM COATED ORAL at 09:40

## 2024-10-04 RX ADMIN — PYRIDOSTIGMINE BROMIDE 90 MG: 60 TABLET ORAL at 09:39

## 2024-10-04 RX ADMIN — TAMSULOSIN HYDROCHLORIDE 0.4 MG: 0.4 CAPSULE ORAL at 08:22

## 2024-10-04 ASSESSMENT — ACTIVITIES OF DAILY LIVING (ADL)
ADLS_ACUITY_SCORE: 35
ADLS_ACUITY_SCORE: 35
ADLS_ACUITY_SCORE: 41
ADLS_ACUITY_SCORE: 43
ADLS_ACUITY_SCORE: 41
ADLS_ACUITY_SCORE: 41
ADLS_ACUITY_SCORE: 47
ADLS_ACUITY_SCORE: 43
ADLS_ACUITY_SCORE: 41
ADLS_ACUITY_SCORE: 43
ADLS_ACUITY_SCORE: 47

## 2024-10-04 NOTE — PATIENT INSTRUCTIONS
"Terrance Chen, it was great talking with you!     Recommendations from today's MTM visit:                                                      1. My suggestion would be to stop simvastatin to see if your muscle fatigue/pain improves  2. Might be worth talking with neurology or hematology about your iron levels in relation to your RLS  3. One of the common side effects of pyridostigmine is \"muscle fasciculation\" or involuntary movement/tremors of muscles. If you think this medication may be causing your RLS, you may want to discuss with neurology. Could try a lower dose or stop it temporarily to monitor for symptoms  4. A higher dose of gabapentin may help with your RLS (taking 900 mg AM) but this may also make you more tired    I value your experience and would be very grateful for your time with providing feedback on our clinic survey. You may receive a survey via email or text message in the next few days.     To schedule another MTM appointment, please call the clinic directly or you may call the MTM scheduling line at [phone] or toll-free at [phone].     My Clinical Pharmacist's contact information:                                                      Please feel free to contact me with any questions or concerns you have.      Lucas Jerome, PharmD, BCANew [facility]  Phone: [phone]   "

## 2024-10-04 NOTE — PLAN OF CARE
"[id number]    Plan of Care Reviewed With: patient    Overall Patient Progress: no change    Outcome Evaluation: Pt is A & O x4 on RA. Denies pain, nausea, chest pain & SOB. Pt has L PIV - SL. Pt is assist x1 with walker and gait belt, voids spontaneously intermittently utilizing bedside commode, and uses call light appropriately.    Shift Updates  Pt is on tele - strip analyzed during shift.    Vitals: /60 (BP Location: Left arm, Patient Position: Semi-Stallworth's, Cuff Size: Adult Regular)   Pulse 78   Temp 97.8  F (36.6  C) (Oral)   Resp 18   Ht 1.727 m (5' 8\")   Wt 78 kg (172 lb)   SpO2 95%   BMI 26.15 kg/m      Plan: Pt discharging home today. Continue with plan of care.    "

## 2024-10-04 NOTE — PLAN OF CARE
6MS DISCHARGE    D: Patient discharged to home at 1345. Patient accompanied by transportation via wheelchair and spouse.    I: No discharge prescriptions given to patient. All discharge medication changes and instructions reviewed with pt and spouse. Patient instructed to seek care if experiencing worsening symptoms. Other phone numbers to call with questions or concerns after discharge reviewed. L PIV removed. Education completed.    A: Pt verbalized understanding of discharge medication changes and instructions. No prescribed home medications given to patient. Belongings sent with patient.    P: Patient to follow-up with PCP within 7 days of discharge. Pt also has various other appts scheduled in the near future.

## 2024-10-04 NOTE — PLAN OF CARE
"Goal Outcome Evaluation:      Plan of Care Reviewed With: patient, spouse, child    Overall Patient Progress: improvingOverall Patient Progress: improving         VS: /75 HR 78 Temp 97.8 Resp 21  Orthostatic BP taken before and after LR bolus   On telemetry    O2: 94% on RA   Output: Frequent urination at night after initiation of LR bolus - several episodes of urinary incontinence   Urine sample sent for UA   Last BM: 10/4 @ 0100   Activity: Up with 1 assist + walker   Up for meals? Yes = regular diet    Skin: WDL   Pain: States pain with activity in hip - resolved with acetaminophen    CMS: A&O x4, baseline neuropathy bilaterally on lower extremities w/ traces of edema. Restless leg unresolved by gabapentin    Dressing: N/a   Diet: Regular   LDA: Left PIV - saline locked - patent    Equipment: Uses hearing aids - they are charging under the TV at night - ensure patient wears them during the day   Plan: Syncope eval - possible discharge 10/4  Wait for UA results    Additional Info: Family is very involved in the care - described timeline of 5 prior syncopal episodes - referred to as pt \"going offline\"   Pt freezes, is unresponsive, in some episodes experiences drooling, rhinorrhea, apnea, immobility of lower extremity. Episodes resolved with shunt placement until 9/8/24 and recent episode on 10/3/24. Investigating cause of episodes.         "

## 2024-10-04 NOTE — PROGRESS NOTES
2100    D: Patient admitted from ED- via [facility] for syncope.     I: Upon arrival to the unit patient was oriented to room, unit, and call light. Patient s height, weight, and vital signs were obtained. Allergies reviewed and allergy band applied. Provider notified of patient s arrival on the unit. Adult AVS completed. Head to toe assessment completed. Education assessment completed. Care plan initiated.    A: Vital signs stable upon admission. No reports of pain. Two RN skin assessment completed, Second RN was Willis SHIELDS No skin issues.     P: Continue to monitor patient and intervene as needed. Continue with plan of care. Notify provider with any concerns or changes in patient status.

## 2024-10-04 NOTE — DISCHARGE SUMMARY
"[facility]  Hospitalist Discharge Summary      Date of Admission:  10/3/2024  Date of Discharge:  10/4/2024  Discharging Provider: David Hair DO  Discharge Service: Hospitalist Service, GOLD TEAM 17    Discharge Diagnoses   Syncopal episode  Hypotension  Restless leg syndrome  Normal pressure hydrocephalus status post  shunt    Clinically Significant Risk Factors     # Overweight: Estimated body mass index is 26.15 kg/m  as calculated from the following:    Height as of this encounter: 1.727 m (5' 8\").    Weight as of this encounter: 78 kg (172 lb).       Follow-ups Needed After Discharge   Follow-up Appointments     Adult [facility] Follow-up and recommended labs and tests      Please follow-up with primary care provider at patient's earliest   convenience.  Please continue to follow-up with outpatient neurology.    Appointments on [place] and/or [facility] (with [facility] or [facility]   provider or service). Call [phone] if you haven't heard regarding   these appointments within 7 days of discharge.            Unresulted Labs Ordered in the Past 30 Days of this Admission       No orders found for last 31 day(s).          Discharge Disposition   Discharged to home  Condition at discharge: Stable    Hospital Course      Gustavo Ramon is a 84 year old male with pmh significant for NPH s/p  shunt placement in 2022, left internal iliac artery aneurysm, myasthenia gravis, RLS, and vertigo, presenting with fatigue, syncopal episode with orthostatic hypotension, now back to baseline with EKG showing NSR and first-degree AV block and normal CXR and labs. Patient admitted for work-up of cause of syncope.    #Syncopal Episode  #Hypotension  Etiology of syncopal episode most likely multifactorial with component of orthostatic hypotension in setting of hypovolemia from po intact in setting of polypharmacy/medication side effects. Notably, ropinirole can cause orthostatic " hypotension and is a new medication for the patient. Although, prior similar episodes proceeded patient taking ropinirole. Patient's description of prior episodes involve either heat or poor po intake. Less likely vasovagal syncope as no prodromic symptoms and finding of orthostatic BP during episode. Less likely cardiogenic syncope as EKG showing NSR with first-degree AV block, no cardiac history, no CP, palpitations, SOB, or diaphoresis.  Troponin T mildly elevated to 35 but peaked, may represent demand ischemia in setting of hypovolemia. Latest echo 2/19/2024 showing LV EF 57%, mild LVH with grade 1 diastolic dysfunction, RV size and function nl, mild aortic insufficienncy with aortic valve sclerosis and mitral insufficiency, aortic root dilation. Not likely infectious cause, CXR showing bibasilar mild atelectasis, but no consolidation and no leukocytosis on CBC. Will admit patient for telemetry observation to rule out cardiogenic cause.  - Telemetry  - TSH  - orthostatics  - 1L LR bolus  - UA as patient reports not urinating all day    #RLS  - Continue PTA ropinirole    #NPH s/p  shunt (2022)  No clinical evidence of infection. No abdominal pain, no signs of active NPH. Follows with neurosurgery as outpatient.    Consultations This Hospital Stay   None    Code Status   No CPR- Do NOT Intubate    Time Spent on this Encounter   I, David Hair DO, personally saw the patient today and spent greater than 30 minutes discharging this patient.       David Hair DO, [facility] MED SURG  [address] [place] [id number]  Phone: [phone]  Fax: [phone]  ______________________________________________________________________    Primary Care Physician   Winston Silverman    Discharge Orders      Reason for your hospital stay    Patient was admitted to the hospital for syncope.     Activity    Your activity upon discharge: activity as tolerated     Adult [facility] Follow-up and  recommended labs and tests    Please follow-up with primary care provider at patient's earliest convenience.  Please continue to follow-up with outpatient neurology.    Appointments on [place] and/or [facility] (with [facility] or [facility] provider or service). Call [phone] if you haven't heard regarding these appointments within 7 days of discharge.     Diet    Follow this diet upon discharge: Current Diet:Orders Placed This Encounter      Combination Diet Regular Diet Adult       Significant Results and Procedures   Results for orders placed or performed during the hospital encounter of 10/03/24   XR Chest 2 Views    Narrative    Exam: XR CHEST 2 VIEWS, 10/3/2024 1:49 PM    Comparison: 8/11/2023    History: sob    Findings:  Single AP upright and 2 lateral views of the chest. Shunt tubing is  seen in the anterior soft tissues of the chest.    Trachea is midline. Mediastinum is within normal limits.  Cardiopulmonary silhouette is within normal limits. Mild streaky  basilar opacities. There is no pneumothorax or pleural effusion. The  upper abdomen is unremarkable.      Impression    Impression: Mild basilar atelectasis. No focal consolidation.    I have personally reviewed the examination and initial interpretation  and I agree with the findings.    JOSE HUNG MD         SYSTEM ID:  [id number]     *Note: Due to a large number of results and/or encounters for the requested time period, some results have not been displayed. A complete set of results can be found in Results Review.       Discharge Medications   Current Discharge Medication List        CONTINUE these medications which have CHANGED    Details   rOPINIRole (REQUIP ER) 2 MG 24 hr tablet Take 1 tablet (2 mg) by mouth at bedtime.    Associated Diagnoses: Restless leg syndrome           CONTINUE these medications which have NOT CHANGED    Details   ACE/ARB/ARNI NOT PRESCRIBED (INTENTIONAL) Please choose reason not prescribed from choices below.     Associated Diagnoses: Stage 3a chronic kidney disease (H)      acetaminophen (TYLENOL) 325 MG tablet Take 1-2 tablets (325-650 mg) by mouth every 4 hours as needed for mild pain      aspirin (ASA) 325 MG tablet Take 1 tablet (325 mg) by mouth daily    Associated Diagnoses: Spinal stenosis in cervical region      cyanocobalamin (VITAMIN B-12) 1000 MCG tablet Take 1 tablet (1,000 mcg) by mouth daily  Qty: 90 tablet, Refills: 1    Associated Diagnoses: Vitamin B12 deficiency (non anemic)      fluticasone (FLONASE) 50 MCG/ACT nasal spray Spray 1 spray into both nostrils daily  Qty: 11.1 mL, Refills: 0    Associated Diagnoses: Upper respiratory tract infection, unspecified type; Chronic rhinitis      gabapentin (NEURONTIN) 300 MG capsule 1 capsule in the morning, 1 mid day, and 3 in the evening  Qty: 360 capsule, Refills: 3    Associated Diagnoses: Restless leg syndrome      !! ketoconazole (NIZORAL) 2 % external cream Apply topically 2 times daily To face  Qty: 60 g, Refills: 3    Associated Diagnoses: Dermatitis, seborrheic      !! ketoconazole (NIZORAL) 2 % external cream Apply twice daily for 8 weeks from knees down to both legs and feet.  Qty: 120 g, Refills: 3    Associated Diagnoses: Tinea cruris      levETIRAcetam (KEPPRA) 250 MG tablet TAKE 1 TABLET BY MOUTH TWO TIMES A DAY  Qty: 60 tablet, Refills: PRN    Associated Diagnoses: Myoclonus      meclizine (ANTIVERT) 25 MG tablet Take 0.5-1 tablets (12.5-25 mg) by mouth 3 times daily as needed for dizziness  Qty: 30 tablet, Refills: 0    Associated Diagnoses: Benign paroxysmal positional vertigo of right ear      methocarbamol (ROBAXIN) 500 MG tablet Take 1 tablet (500 mg) by mouth every 6 hours as needed for muscle spasms    Associated Diagnoses: Spinal stenosis in cervical region      multivitamin (OCUVITE) TABS tablet Take 1 tablet by mouth daily      pantoprazole (PROTONIX) 40 MG EC tablet Take 1 tablet (40 mg) by mouth daily.  Qty: 90 tablet, Refills: 2     Associated Diagnoses: History of Stewart's esophagus      polyethylene glycol (MIRALAX) 17 GM/Dose powder Take 17 g by mouth daily as needed for constipation  Qty: 510 g    Associated Diagnoses: Spinal stenosis in cervical region      pyRIDostigmine (MESTINON) 60 MG tablet Take 1.5 tablets (90 mg) by mouth 2 times daily.  Qty: 270 tablet, Refills: 1    Associated Diagnoses: Myasthenia gravis without exacerbation (H)      senna-docusate (SENOKOT-S/PERICOLACE) 8.6-50 MG tablet Take 1 tablet by mouth 2 times daily  Qty: 60 tablet, Refills: 0    Associated Diagnoses: Idiopathic normal pressure hydrocephalus (H)      simvastatin (ZOCOR) 20 MG tablet TAKE ONE TABLET BY MOUTH AT BEDTIME  Qty: 90 tablet, Refills: 1    Associated Diagnoses: Hyperlipidemia LDL goal <130      !! tamsulosin (FLOMAX) 0.4 MG capsule Take 1 capsule (0.4 mg) by mouth daily  Qty: 90 capsule, Refills: 1    Associated Diagnoses: Urinary urgency      !! tamsulosin (FLOMAX) 0.4 MG capsule Take 1 capsule (0.4 mg) by mouth daily      terbinafine (LAMISIL) 1 % external cream Apply topically 2 times daily  Qty: 42 g, Refills: 11    Associated Diagnoses: Tinea corporis      triamcinolone (KENALOG) 0.1 % external cream Apply a thin layer up to twice daily to affected areas as needed.  Qty: 30 g, Refills: 3    Associated Diagnoses: Seborrheic dermatitis      trospium (SANCTURA) 20 MG tablet Take 1 tablet (20 mg) by mouth 2 times daily (before meals)  Qty: 180 tablet, Refills: 3    Associated Diagnoses: Urinary urgency      vitamin D3 (CHOLECALCIFEROL) 2000 units tablet Take 1 tablet by mouth daily  Qty: 90 tablet, Refills: 1    Associated Diagnoses: Vitamin D deficiency       !! - Potential duplicate medications found. Please discuss with provider.        Allergies   Allergies   Allergen Reactions    Mycophenolate Other (See Comments)     Confusion, abnormal movements

## 2024-10-07 ENCOUNTER — PATIENT OUTREACH (OUTPATIENT)
Dept: FAMILY MEDICINE | Facility: CLINIC | Age: 84
End: 2024-10-07
Payer: MEDICARE

## 2024-10-07 SDOH — HEALTH STABILITY: PHYSICAL HEALTH: ON AVERAGE, HOW MANY DAYS PER WEEK DO YOU ENGAGE IN MODERATE TO STRENUOUS EXERCISE (LIKE A BRISK WALK)?: 2 DAYS

## 2024-10-07 SDOH — HEALTH STABILITY: PHYSICAL HEALTH: ON AVERAGE, HOW MANY MINUTES DO YOU ENGAGE IN EXERCISE AT THIS LEVEL?: 10 MIN

## 2024-10-07 ASSESSMENT — SOCIAL DETERMINANTS OF HEALTH (SDOH): HOW OFTEN DO YOU GET TOGETHER WITH FRIENDS OR RELATIVES?: ONCE A WEEK

## 2024-10-07 NOTE — TELEPHONE ENCOUNTER
Gustavo Ramon is a 84 year old male with pmh significant for NPH s/p  shunt placement in 2022, left internal iliac artery aneurysm, myasthenia gravis, RLS, and vertigo, presenting with fatigue, syncopal episode with orthostatic hypotension, now back to baseline with EKG showing NSR and first-degree AV block and normal CXR and labs. Patient admitted for work-up of cause of syncope.     Notably, ropinirole can cause orthostatic hypotension and is a new medication for the patient. Although, prior similar episodes proceeded patient taking ropinirole.     Patient instructed to take rOPINIRole (REQUIP ER) 2 MG 24 hr tablet Take 1 tablet (2 mg) by mouth at bedtime. Previously ordered as 2 times daily.     Patient kept in ED for Observation from 10/3/24 at 2048 until 10/4/24 at 1345.     Adult [facility] Follow-up and recommended labs and tests  Please follow-up with primary care provider at patient's earliest  convenience.  Please continue to follow-up with outpatient neurology.    Patient has annual exam scheduled with PCP Winston Silverman PA-C on 10/9/24.     Susy Dudley RN BSN  [facility]

## 2024-10-07 NOTE — TELEPHONE ENCOUNTER
"  Transitions of Care Outreach  Chief Complaint   Patient presents with    Hospital F/U       Most Recent Admission Date: 10/3/2024   Most Recent Admission Diagnosis: Syncope, unspecified syncope type - R55     Most Recent Discharge Date: 10/4/2024   Most Recent Discharge Diagnosis: Syncope, unspecified syncope type - R55  Restless leg syndrome - G25.81     Transitions of Care Assessment    Discharge Assessment  How are you doing now that you are home?: Patient is doing better now. Patient explains that it was one of those \"time out\" moments in his life. He explains that he has experienced these same symptoms in the past many times before.  How are your symptoms? (Red Flag symptoms escalate to triage hotline per guidelines): Improved  Do you know how to contact your clinic care team if you have future questions or changes to your health status? : Yes  Does the patient have their discharge instructions? : Yes  Does the patient have questions regarding their discharge instructions? : No  Were you started on any new medications or were there changes to any of your previous medications? : No  Does the patient have all of their medications?: Yes  Do you have questions regarding any of your medications? : No  Do you have all of your needed medical supplies or equipment (DME)?  (i.e. oxygen tank, CPAP, cane, etc.): No - What equipment or supplies are needed?    Follow up Plan     Discharge Follow-Up  Discharge follow up appointment scheduled in alignment with recommended follow up timeframe or Transitions of Risk Category? (Low = within 30 days; Moderate= within 14 days; High= within 7 days): No  Discharge Follow Up Appointment Date: 10/09/24  Discharge Follow Up Appointment Scheduled with?: Primary Care Provider  Patient's follow up appointment not scheduled: Patient declined scheduling support. Education on the importance of transitions of care follow up. Provided scheduling phone number.    Patient has upcoming " appointment with PCP on 10/9. Declining any further appointments.    Future Appointments   Date Time Provider Department Center   10/8/2024 10:30 AM Antionette Hunt APRN CNP [facility]   10/9/2024 11:20 AM Winston Silverman PA-C [facility]   10/10/2024  1:30 PM Callisto, Aelm, PT [facility]   10/16/2024 11:30 AM [facility]   11/4/2024 10:30 AM Callisto, Alem, PT [facility]   11/6/2024  1:00 PM [facility]   11/11/2024 11:15 AM Callisto, Alem, PT [facility]   11/11/2024  3:00 PM Darwin Ewing APRN CNP [facility]   11/15/2024 11:30 AM Glenn Jones MD [facility]   11/18/2024  2:00 PM [facility]   11/21/2024  2:15 PM Callisto, Alem, PT [facility]   11/25/2024 11:15 AM Callisto, Alem, PT [facility]   1/28/2025 11:45 AM [facility]   2/4/2025  2:00 PM Hakan Darden MD [facility]       Outpatient Plan as outlined on AVS reviewed with patient.    For any urgent concerns, please contact our 24 hour nurse triage line: [phone] ([phone])       Gaby Rider RN

## 2024-10-07 NOTE — TELEPHONE ENCOUNTER
Left message on voice mail for patient to call clinic at   [phone] & ask to speak with a Triage Nurse.     Susy Dudley RN BSN  [facility]

## 2024-10-08 ENCOUNTER — TELEPHONE (OUTPATIENT)
Dept: NEUROSURGERY | Facility: CLINIC | Age: 84
End: 2024-10-08

## 2024-10-08 ENCOUNTER — OFFICE VISIT (OUTPATIENT)
Dept: NEUROSURGERY | Facility: CLINIC | Age: 84
End: 2024-10-08
Attending: NURSE PRACTITIONER
Payer: MEDICARE

## 2024-10-08 VITALS
RESPIRATION RATE: 16 BRPM | SYSTOLIC BLOOD PRESSURE: 104 MMHG | BODY MASS INDEX: 25.39 KG/M2 | WEIGHT: 167 LBS | DIASTOLIC BLOOD PRESSURE: 72 MMHG | HEART RATE: 73 BPM | OXYGEN SATURATION: 97 %

## 2024-10-08 DIAGNOSIS — G91.2 NPH (NORMAL PRESSURE HYDROCEPHALUS) (H): Primary | ICD-10-CM

## 2024-10-08 PROCEDURE — 99213 OFFICE O/P EST LOW 20 MIN: CPT | Performed by: NURSE PRACTITIONER

## 2024-10-08 ASSESSMENT — PAIN SCALES - GENERAL: PAINLEVEL: NO PAIN (0)

## 2024-10-08 NOTE — PROGRESS NOTES
[facility]  Department of Neurosurgery      Name: Gustavo Ramon  MRN: [id number]  Age: 84 year old  : 1940  Referring provider: Antionette Hunt  10/08/2024      Chief Complaint:   NPH  S/p  shunt placement on 2022 (Codman Certas at 4.0)  Follow-up    History of Present Illness:   Gustavo Ramon is a 83 year old male with a history of NPH, s/p  shunt placement on 2022 who is seen today for  a follow up.     We recently made some shunt setting changes due to fatigue and worsening walking. 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 Most recently seen on 2024. His shunt was changed from 5.0 to 4.0. he was recommended to follow-up in 4 weeks after a head CT.     Today patient presents for the follow-up with his wife. He had an appointment with pharmacist for MTM. He had an episode of syncope during that visit and was sent to the ER. Please see ER notes for additional details. He is scheduled with his PCP for a follow-up.     Patient denies any headaches or new neurological concerns. He reports bilateral shoulder and hip pain. He is working with PT every week, but hip pain has been somewhat limiting for him to do the recommended exercises. He uses a walker for ambulation. No recent falls.      Review of Systems:   Pertinent items are noted in HPI or as in patient entered ROS below, remainder of complete ROS is negative.        No data to display                 Physical Exam:   /72   Pulse 73   Resp 16   Wt 75.8 kg (167 lb)   SpO2 97%   BMI 25.39 kg/m     General: No acute distress.    Neuro: The patient is fully oriented. Speech is normal. Gait: ambulates with a walker.   Psych: Normal mood and affect. Behavior is normal.        Imaging:  10/1/2024 Head CT:  IMPRESSION:   1.  shunt catheter again noted. No hydrocephalus.  2. Interval  decrease in extent of the hypodense extra-axial collection  along the left cerebral convexity. No evidence for new or increasing  intracranial hemorrhage.  3. Diffuse cerebral volume loss and cerebral white matter changes  consistent with chronic small vessel ischemic disease.   4. No evidence for acute intracranial pathology.    Procedures:  With Codman Certas  shunt , shunt setting was checked and it is at 4.0. Verified x 3.     Assessment:  NPH  S/p  shunt placement on 7/7/2022 (Codman Certas at 4.0)   Follow-up after imaging    Plan:  Recent head CT shows interval decrease in extent of the hypodense extra-axial collection along the left cerebral convexity. No evidence for new or increasing intracranial hemorrhage. We will leave the  shunt setting at 4.0. Patient to follow-up with me in 6 months.        I spent 30 minutes on patient care activities related to this encounter on the date of service, including time spent reviewing the chart, obtaining history and examination and in counseling the patient, and in documentation in the electronic medical record.      Antionette ARRIAGA, CNP  Department of Neurosurgery

## 2024-10-08 NOTE — TELEPHONE ENCOUNTER
Left Voicemail (1st Attempt) for the patient to call back and schedule the following:    Appointment type: New cervical spine - in person  Provider: Yodit Reddy  Return date: Next available  Specialty phone number: [phone]  Additional appointment(s) needed: N/A  Additonal Notes: H/o cervical surgery

## 2024-10-08 NOTE — LETTER
10/8/2024       RE: Gustavo Ramon  [address] [place] [address]     Dear Colleague,    Thank you for referring your patient, Gustavo Ramon, to the [facility]UROS[facility] [place] at [facility]. Please see a copy of my visit note below.    [facility]  Department of Neurosurgery      Name: Gustavo Ramon  MRN: [id number]  Age: 84 year old  : 1940  Referring provider: Antionette Hunt  10/08/2024      Chief Complaint:   NPH  S/p  shunt placement on 2022 (Codman Certas at 4.0)  Follow-up    History of Present Illness:   Gustavo Ramon is a 83 year old male with a history of NPH, s/p  shunt placement on 2022 who is seen today for  a follow up.     We recently made some shunt setting changes due to fatigue and worsening walking. 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 Most recently seen on 2024. His shunt was changed from 5.0 to 4.0. he was recommended to follow-up in 4 weeks after a head CT.     Today patient presents for the follow-up with his wife. He had an appointment with pharmacist for MTM. He had an episode of syncope during that visit and was sent to the ER. Please see ER notes for additional details. He is scheduled with his PCP for a follow-up.     Patient denies any headaches or new neurological concerns. He reports bilateral shoulder and hip pain. He is working with PT every week, but hip pain has been somewhat limiting for him to do the recommended exercises. He uses a walker for ambulation. No recent falls.      Review of Systems:   Pertinent items are noted in HPI or as in patient entered ROS below, remainder of complete ROS is negative.        No data to display                 Physical Exam:   /72   Pulse 73   Resp 16   Wt 75.8 kg (167  lb)   SpO2 97%   BMI 25.39 kg/m     General: No acute distress.    Neuro: The patient is fully oriented. Speech is normal. Gait: ambulates with a walker.   Psych: Normal mood and affect. Behavior is normal.        Imaging:  10/1/2024 Head CT:  IMPRESSION:   1.  shunt catheter again noted. No hydrocephalus.  2. Interval decrease in extent of the hypodense extra-axial collection  along the left cerebral convexity. No evidence for new or increasing  intracranial hemorrhage.  3. Diffuse cerebral volume loss and cerebral white matter changes  consistent with chronic small vessel ischemic disease.   4. No evidence for acute intracranial pathology.    Procedures:  With Codman Certas  shunt , shunt setting was checked and it is at 4.0. Verified x 3.     Assessment:  NPH  S/p  shunt placement on 7/7/2022 (Codman Certas at 4.0)   Follow-up after imaging    Plan:  Recent head CT shows interval decrease in extent of the hypodense extra-axial collection along the left cerebral convexity. No evidence for new or increasing intracranial hemorrhage. We will leave the  shunt setting at 4.0. Patient to follow-up with me in 6 months.        I spent 30 minutes on patient care activities related to this encounter on the date of service, including time spent reviewing the chart, obtaining history and examination and in counseling the patient, and in documentation in the electronic medical record.      Antionette ARRIAGA CNP  Department of Neurosurgery      Again, thank you for allowing me to participate in the care of your patient.      Sincerely,    CORINA Jackson CNP

## 2024-10-09 ENCOUNTER — TELEPHONE (OUTPATIENT)
Dept: DERMATOLOGY | Facility: CLINIC | Age: 84
End: 2024-10-09

## 2024-10-09 ENCOUNTER — OFFICE VISIT (OUTPATIENT)
Dept: FAMILY MEDICINE | Facility: CLINIC | Age: 84
End: 2024-10-09
Payer: MEDICARE

## 2024-10-09 VITALS
TEMPERATURE: 97.9 F | DIASTOLIC BLOOD PRESSURE: 54 MMHG | WEIGHT: 171 LBS | HEART RATE: 92 BPM | BODY MASS INDEX: 26.84 KG/M2 | SYSTOLIC BLOOD PRESSURE: 102 MMHG | OXYGEN SATURATION: 98 % | HEIGHT: 67 IN | RESPIRATION RATE: 18 BRPM

## 2024-10-09 DIAGNOSIS — M62.81 GENERALIZED MUSCLE WEAKNESS: ICD-10-CM

## 2024-10-09 DIAGNOSIS — Z00.00 ENCOUNTER FOR ANNUAL WELLNESS EXAM IN MEDICARE PATIENT: Primary | ICD-10-CM

## 2024-10-09 DIAGNOSIS — R52 BODY ACHES: ICD-10-CM

## 2024-10-09 LAB — CK SERPL-CCNC: 197 U/L (ref 39–308)

## 2024-10-09 PROCEDURE — G0439 PPPS, SUBSEQ VISIT: HCPCS | Performed by: PHYSICIAN ASSISTANT

## 2024-10-09 PROCEDURE — 99213 OFFICE O/P EST LOW 20 MIN: CPT | Mod: 25 | Performed by: PHYSICIAN ASSISTANT

## 2024-10-09 NOTE — TELEPHONE ENCOUNTER
[facility] Call Center    Phone Message    May a detailed message be left on voicemail: yes     Reason for Call: Other: Wife Ceci is advising there is [facility] that hasn't come out, she is saying they'll be at the clinic tomorrow for another appt could they swing in to have this removed? The other appt is from 1:30-2:30 area is a little red possibly from Ceci tugging on it a bit to see if she could loosen it.     Action Taken: Other: [facility]    Travel Screening: Not Applicable     Date of Service:

## 2024-10-09 NOTE — TELEPHONE ENCOUNTER
I called and spoke to Ceci. I advised that dissolvable stitches can sometimes take up to 3 months to go away. I scheduled Stephen for a post op nurse visit tomorrow at 2:30.     Ella Sanches RN on 10/9/2024 at 3:08 PM

## 2024-10-09 NOTE — PROGRESS NOTES
Preventive Care Visit  [facility] RAISA Silverman PA-C, Family Medicine  Oct 9, 2024        Wally Mitchell is a 84 year old, presenting for the following:  Physical        10/9/2024    10:53 AM   Additional Questions   Roomed by Juliet AMARO   Accompanied by Wife         Health Care Directive  Patient has a Health Care Directive on file  Advance care planning document is on file and is current.    HPI  Another presyncopal episode last week. Had one 4 wks ago as well. History of nph. Associated with a hypotensive event. Some general sense of weakness. Notes more rapid muscle fatigue.   Chronic neck and shoulder pain. History of spinal stenosis.        10/7/2024   General Health   How would you rate your overall physical health? Good   Feel stress (tense, anxious, or unable to sleep) Not at all            10/7/2024   Nutrition   Diet: Regular (no restrictions)            10/7/2024   Exercise   Days per week of moderate/strenous exercise 2 days   Average minutes spent exercising at this level 10 min      (!) EXERCISE CONCERN      10/7/2024   Social Factors   Frequency of gathering with friends or relatives Once a week   Worry food won't last until get money to buy more No   Food not last or not have enough money for food? No   Do you have housing? (Housing is defined as stable permanent housing and does not include staying ouside in a car, in a tent, in an abandoned building, in an overnight shelter, or couch-surfing.) Yes   Are you worried about losing your housing? No   Lack of transportation? No   Unable to get utilities (heat,electricity)? No            10/9/2024   Fall Risk   Reason Gait Speed Test Not Completed Patient declines             10/7/2024   Activities of Daily Living- Home Safety   Needs help with the following daily activites Transportation    Shopping    Preparing meals    Housework    Laundry    Medication administration   Safety concerns in the home None of the above        Multiple values from one day are sorted in reverse-chronological order         10/7/2024   Dental   Dentist two times every year? Yes            10/7/2024   Hearing Screening   Hearing concerns? (!) IT'S HARD TO FOLLOW A CONVERSATION IN A NOISY RESTAURANT OR CROWDED ROOM.    (!) TROUBLE UNDERSTANDING SOFT OR WHISPERED SPEECH.       Multiple values from one day are sorted in reverse-chronological order         10/7/2024   Driving Risk Screening   Patient/family members have concerns about driving (!) DECLINE            10/7/2024   General Alertness/Fatigue Screening   Have you been more tired than usual lately? No            10/7/2024   Urinary Incontinence Screening   Bothered by leaking urine in past 6 months Yes            10/7/2024   TB Screening   Were you born outside of the [place]? No            Today's PHQ-2 Score:       10/9/2024    10:46 AM   PHQ-2 ( 1999 Pfizer)   Q1: Little interest or pleasure in doing things 0   Q2: Feeling down, depressed or hopeless 0   PHQ-2 Score 0   Q1: Little interest or pleasure in doing things Not at all   Q2: Feeling down, depressed or hopeless Not at all   PHQ-2 Score 0           10/7/2024   Substance Use   Alcohol more than 3/day or more than 7/wk Not Applicable   Do you have a current opioid prescription? No   How severe/bad is pain from 1 to 10? 3/10   Do you use any other substances recreationally? No        Social History     Tobacco Use    Smoking status: Never     Passive exposure: Never    Smokeless tobacco: Never    Tobacco comments:     Never   Vaping Use    Vaping status: Never Used   Substance Use Topics    Alcohol use: No    Drug use: No             Reviewed and updated as needed this visit by Provider                    Past Medical History:   Diagnosis Date    Actinic keratosis     AK (actinic keratosis) 11/15/2012    Amaurosis fugax     Basal cell carcinoma 2009    R medial cheek/nose    Central serous retinopathy left    Eye    Diverticulosis 08/2006    DJD  (degenerative joint disease)     GERD (gastroesophageal reflux disease)     Hearing loss     High frequency    History of nonmelanoma skin cancer     History of nonmelanoma skin cancer     HTN (hypertension)     Hyperlipidemia LDL goal < 130     Hyperplastic colon polyp 08/2006    IgA monoclonal gammopathy     IgA kappa    Infection due to 2019 novel coronavirus 10/13/2022    Infection due to 2019 novel coronavirus 11/2021    Lattice degeneration of retina right    Eye    Macular degeneration     Nonsenile cataract     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    [place] consult    Rosacea     Steroid-induced diabetes mellitus, 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    Strabismus     Vitreous detachment left    Eye     Past Surgical History:   Procedure Laterality Date    ARTHROSCOPY KNEE RT/LT Left 01/2010    Knee    BIOPSY  2009/2013/2016    Nose; Prostate; BCC - left arm    COLONOSCOPY  09/24/2019    w/Endoscopy    COLONOSCOPY  07/29/2024    COLONOSCOPY WITH CO2 INSUFFLATION N/A 09/24/2019    Procedure: COLONOSCOPY, WITH CO2 INSUFFLATION;  Surgeon: Loi Levine MD;  Location: [facility]    COMBINED ESOPHAGOSCOPY, GASTROSCOPY, DUODENOSCOPY (EGD) WITH CO2 INSUFFLATION N/A 09/24/2019    Procedure: ESOPHAGOGASTRODUODENOSCOPY, WITH CO2 INSUFFLATION;  Surgeon: Loi Levine MD;  Location: [facility]    COMBINED ESOPHAGOSCOPY, GASTROSCOPY, DUODENOSCOPY (EGD) WITH CO2 INSUFFLATION N/A 7/29/2024    Procedure: Combined Esophagoscopy, Gastroscopy, Duodenoscopy (Egd) With Co2 Insufflation;  Surgeon: Jeff Peace MD;  Location: [facility]    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    DISKECTOMY, LUMBAR, SINGLE SP  2003    L2-3    ENT SURGERY  1943    Tonsils     ENT SURGERY  02/22/2012    Biopsy lesion right pinna    ENT SURGERY  02/24/2012    Biopsy leson right pinna    ESOPHAGOSCOPY, GASTROSCOPY, DUODENOSCOPY (EGD), COMBINED N/A 09/24/2019    Procedure: Esophagogastroduodenoscopy, With Biopsy;  Surgeon: Loi Levine  MD Mark;  Location: [facility]    ESOPHAGOSCOPY, GASTROSCOPY, DUODENOSCOPY (EGD), COMBINED N/A 7/29/2024    Procedure: ESOPHAGOGASTRODUODENOSCOPY, WITH BIOPSY;  Surgeon: Jeff Peace MD;  [facility]: [facility]    IR LUMBAR DRAIN PLACEMENT W FLUORO  06/27/2022    LAMINOPLASTY CERVICAL POSTERIOR THREE+ LEVELS N/A 12/12/2022    Procedure: POSTERIOR APPROACH Cervical 4-7 laminoplasty;  Surgeon: Judie Levin MD;  [facility]    LAPAROSCOPIC ASSISTED IMPLANT SHUNT VENTRICULOPERITONEAL N/A 07/07/2022    Procedure: possible INSERTION, SHUNT, VENTRICULOPERITONEAL, LAPAROSCOPY-ASSISTED;  Surgeon: Joe Damon MD;  [facility]: [facility]    OPTICAL TRACKING SYSTEM IMPLANT SHUNT VENTRICULOPERITONEAL N/A 07/07/2022    Procedure: INSERTION, SHUNT, VENTRICULOPERITONEAL, USING OPTICAL TRACKING SYSTEM;  Surgeon: Jean-Paul Childs MD;  [facility]    SOFT TISSUE SURGERY  05/2010    Basal Cell/right side nose     BP Readings from Last 3 Encounters:   10/09/24 102/54   10/08/24 104/72   10/04/24 123/75    Wt Readings from Last 3 Encounters:   10/09/24 77.6 kg (171 lb)   10/08/24 75.8 kg (167 lb)   10/03/24 78 kg (172 lb)                  Patient Active Problem List   Diagnosis    Rosacea    DJD (degenerative joint disease)    Ocular myasthenia gravis (H)    Macular pigment deposit    HYPERLIPIDEMIA LDL GOAL <130    MGUS (monoclonal gammopathy of unknown significance)    Retinal hole OS, operculated    Horseshoe tear of retina without detachment OD    History  of basal cell carcinoma    Seborrhea    AK (actinic keratosis)    Malignant neoplasm of prostate (H)    PVD (posterior vitreous detachment)    Amaurosis fugax by Hx neg carotid W/U    Actinic keratosis    Peripheral neuropathy    Nuclear sclerosis of both eyes    Essential hypertension with goal blood pressure less than 140/90    Gastroesophageal reflux disease without esophagitis    Stage 3a chronic kidney disease (H)    Secondary adrenocortical  insufficiency (H)    Physical deconditioning    NPH (normal pressure hydrocephalus) (H)    Myoclonus    Infection due to 2019 novel coronavirus    Encephalopathy    Dementia without behavioral disturbance, unspecified dementia type    Idiopathic normal pressure hydrocephalus (H)    Acute atopic conjunctivitis    Cancer of the skin, basal cell    COVID-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    Degeneration of intervertebral disc    Diverticulitis of intestine    Dysphagia    Gait instability    Hip pain, acute, left    History of osteoporosis    Kidney disorder    Pneumonia due to COVID-19 virus    Restless leg syndrome due to iron deficiency anemia    Sepsis due to COVID-19 (H)    Tear of medial cartilage or meniscus of knee, current    Urinary frequency    Vitamin D deficiency    Weakness of both lower extremities    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)    Impaired flexibility of lower extremity    Syncope, unspecified syncope type     Past Surgical History:   Procedure Laterality Date    ARTHROSCOPY KNEE RT/LT Left 01/2010    Knee    BIOPSY  2009/2013/2016    Nose; Prostate; BCC - left arm    COLONOSCOPY  09/24/2019    w/Endoscopy    COLONOSCOPY  07/29/2024    COLONOSCOPY WITH CO2 INSUFFLATION N/A 09/24/2019    Procedure: COLONOSCOPY, WITH CO2 INSUFFLATION;  Surgeon: Loi Levine MD;  [facility]    COMBINED ESOPHAGOSCOPY, GASTROSCOPY, DUODENOSCOPY (EGD) WITH CO2 INSUFFLATION N/A 09/24/2019    Procedure: ESOPHAGOGASTRODUODENOSCOPY, WITH CO2 INSUFFLATION;  Surgeon: Loi Levine MD;  [facility]    COMBINED ESOPHAGOSCOPY, GASTROSCOPY, DUODENOSCOPY (EGD) WITH CO2 INSUFFLATION N/A 7/29/2024    Procedure: Combined Esophagoscopy, Gastroscopy, Duodenoscopy (Egd) With Co2 Insufflation;  Surgeon: Jeff Peace MD;  [facility]    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    DISKECTOMY, LUMBAR, SINGLE SP  2003    L2-3    ENT SURGERY  1943    Tonsils     ENT SURGERY  02/22/2012    Biopsy lesion  right pinna    ENT SURGERY  02/24/2012    Biopsy leson right pinna    ESOPHAGOSCOPY, GASTROSCOPY, DUODENOSCOPY (EGD), COMBINED N/A 09/24/2019    Procedure: Esophagogastroduodenoscopy, With Biopsy;  Surgeon: Loi Levine MD;  [facility]    ESOPHAGOSCOPY, GASTROSCOPY, DUODENOSCOPY (EGD), COMBINED N/A 7/29/2024    Procedure: ESOPHAGOGASTRODUODENOSCOPY, WITH BIOPSY;  Surgeon: Jeff Peace MD;  [facility]    IR LUMBAR DRAIN PLACEMENT W FLUORO  06/27/2022    LAMINOPLASTY CERVICAL POSTERIOR THREE+ LEVELS N/A 12/12/2022    Procedure: POSTERIOR APPROACH Cervical 4-7 laminoplasty;  Surgeon: Judie Levin MD;  [facility]    LAPAROSCOPIC ASSISTED IMPLANT SHUNT VENTRICULOPERITONEAL N/A 07/07/2022    Procedure: possible INSERTION, SHUNT, VENTRICULOPERITONEAL, LAPAROSCOPY-ASSISTED;  Surgeon: Joe Damon MD;  [facility]    OPTICAL TRACKING SYSTEM IMPLANT SHUNT VENTRICULOPERITONEAL N/A 07/07/2022    Procedure: INSERTION, SHUNT, VENTRICULOPERITONEAL, USING OPTICAL TRACKING SYSTEM;  Surgeon: Jean-Paul Childs MD;  [facility]    SOFT TISSUE SURGERY  05/2010    Basal Cell/right side nose       Social History     Tobacco Use    Smoking status: Never     Passive exposure: Never    Smokeless tobacco: Never    Tobacco comments:     Never   Substance Use Topics    Alcohol use: No     Family History   Problem Relation Age of Onset    Heart Disease Mother     Alzheimer Disease Mother 73    C.A.D. Father     Coronary Artery Disease Father     Diabetes Grandchild         using a pump     Diabetes Paternal Uncle     Heart Disease Paternal Grandmother     Glaucoma No family hx of     Macular Degeneration No family hx of     Melanoma No family hx of     Skin Cancer No family hx of          Current Outpatient Medications   Medication Sig Dispense Refill    ACE/ARB/ARNI NOT PRESCRIBED (INTENTIONAL) Please choose reason not prescribed from choices below.      acetaminophen  (TYLENOL) 325 MG tablet Take 1-2 tablets (325-650 mg) by mouth every 4 hours as needed for mild pain      aspirin (ASA) 325 MG tablet Take 1 tablet (325 mg) by mouth daily      cyanocobalamin (VITAMIN B-12) 1000 MCG tablet Take 1 tablet (1,000 mcg) by mouth daily 90 tablet 1    fluticasone (FLONASE) 50 MCG/ACT nasal spray Spray 1 spray into both nostrils daily 11.1 mL 0    gabapentin (NEURONTIN) 300 MG capsule 1 capsule in the morning, 1 mid day, and 3 in the evening (Patient taking differently: 600 mg in the morning and 900 mg in the evening) 360 capsule 3    ketoconazole (NIZORAL) 2 % external cream Apply topically 2 times daily To face 60 g 3    levETIRAcetam (KEPPRA) 250 MG tablet TAKE 1 TABLET BY MOUTH TWO TIMES A DAY 60 tablet PRN    meclizine (ANTIVERT) 25 MG tablet Take 0.5-1 tablets (12.5-25 mg) by mouth 3 times daily as needed for dizziness 30 tablet 0    multivitamin (OCUVITE) TABS tablet Take 1 tablet by mouth daily      pantoprazole (PROTONIX) 40 MG EC tablet Take 1 tablet (40 mg) by mouth daily. 90 tablet 2    polyethylene glycol (MIRALAX) 17 GM/Dose powder Take 17 g by mouth daily as needed for constipation 510 g     pyRIDostigmine (MESTINON) 60 MG tablet Take 1.5 tablets (90 mg) by mouth 2 times daily. 270 tablet 1    rOPINIRole (REQUIP ER) 2 MG 24 hr tablet Take 1 tablet (2 mg) by mouth at bedtime.      simvastatin (ZOCOR) 20 MG tablet TAKE ONE TABLET BY MOUTH AT BEDTIME 90 tablet 1    tamsulosin (FLOMAX) 0.4 MG capsule Take 1 capsule (0.4 mg) by mouth daily 90 capsule 1    terbinafine (LAMISIL) 1 % external cream Apply topically 2 times daily 42 g 11    triamcinolone (KENALOG) 0.1 % external cream Apply a thin layer up to twice daily to affected areas as needed. 30 g 3    trospium (SANCTURA) 20 MG tablet Take 1 tablet (20 mg) by mouth 2 times daily (before meals) 180 tablet 3    vitamin D3 (CHOLECALCIFEROL) 2000 units tablet Take 1 tablet by mouth daily 90 tablet 1     Allergies   Allergen  Reactions    Mycophenolate Other (See Comments)     Confusion, abnormal movements     Recent Labs   Lab Test 10/04/24  0839 10/03/24  1729 10/03/24  1355 09/09/24  1448 06/26/24  1553 06/04/24  1201 01/31/24  1030 12/07/22  1639 12/05/22  1738 02/15/22  1057 02/07/22  1112 01/31/22  1357 01/17/22  1718 05/19/21  1059 11/09/20  0824   A1C  --   --   --   --   --   --  5.8*  --  6.2*  --  5.2  --    < > 5.7* 5.8*   LDL  --   --   --   --   --   --  82  --   --   --   --  92  --   --  92   HDL  --   --   --   --   --   --  57  --   --   --   --  79  --   --  77   TRIG  --   --   --   --   --   --  99  --   --   --   --  183*  --   --  100   ALT  --   --  16 18  --  28 26   < >  --    < >  --   --    < >  --   --    CR 1.00  --  0.90 0.98  --  0.93 1.05   < >  --    < > 1.04  --    < > 1.18 1.25   GFRESTIMATED 73  --  83 75  --  80 70   < >  --    < > 71  --    < > 57* 53*   GFRESTBLACK  --   --   --   --   --   --   --   --   --   --   --   --   --  66 62   POTASSIUM 3.9  --  4.5 4.2  --  4.8 4.1   < >  --    < > 4.0  --    < > 3.7 4.8   TSH  --  7.92*  --  4.64*   < > 0.01*  --    < >  --    < >  --   --   --   --   --     < > = values in this interval not displayed.      Current providers sharing in care for this patient include:  Patient Care Team:  Winston Silverman PA-C as PCP - General (Family Medicine)  Winston Silverman PA-C as Assigned PCP  Ethel Rust, RN as Registered Nurse (Hematology & Oncology)  [facility] ([facility]  Tamy Pritchett PT as Physical Therapist (Physical Therapy)  Easton Persaud, PhD LP as MD (Neuropsychology)  Hakan Darden MD as Assigned Pediatric Specialist Provider  Marjorie Mcclellan MD as MD (Ophthalmology)  Easton Persaud, PhD LP as Assigned Behavioral Health Provider  Darwin Ewing APRN CNP as Assigned Sleep Provider  Antionette Hunt APRN CNP as Assigned Neuroscience Provider  Jackie Holland MD as  "Assigned Endocrinology Provider  Joe Grimaldo MD as Assigned Surgical Provider  Lucas Jerome RPH as Pharmacist (Pharmacist)    The following health maintenance items are reviewed in Epic and correct as of today:  Health Maintenance   Topic Date Due    RSV VACCINE (1 - 1-dose 75+ series) Never done    COVID-19 Vaccine (3 - Pfizer risk series) 03/25/2021    MEDICARE ANNUAL WELLNESS VISIT  10/03/2024    LIPID  01/31/2025    ANNUAL REVIEW OF HM ORDERS  06/04/2025    MICROALBUMIN  09/09/2025    BMP  10/04/2025    HEMOGLOBIN  10/04/2025    FALL RISK ASSESSMENT  10/09/2025    ADVANCE CARE PLANNING  10/03/2028    DTAP/TDAP/TD IMMUNIZATION (3 - Td or Tdap) 04/10/2033    PHQ-2 (once per calendar year)  Completed    INFLUENZA VACCINE  Completed    Pneumococcal Vaccine: 65+ Years  Completed    URINALYSIS  Completed    ZOSTER IMMUNIZATION  Completed    HPV IMMUNIZATION  Aged Out    MENINGITIS IMMUNIZATION  Aged Out    RSV MONOCLONAL ANTIBODY  Aged Out         Review of Systems  Constitutional, HEENT, cardiovascular, pulmonary, GI, , musculoskeletal, neuro, skin, endocrine and psych systems are negative, except as otherwise noted.     Objective    Exam  /54   Pulse 92   Temp 97.9  F (36.6  C) (Oral)   Resp 18   Ht 1.71 m (5' 7.32\")   Wt 77.6 kg (171 lb)   SpO2 98%   BMI 26.53 kg/m     Estimated body mass index is 26.53 kg/m  as calculated from the following:    Height as of this encounter: 1.71 m (5' 7.32\").    Weight as of this encounter: 77.6 kg (171 lb).    Physical Exam  GENERAL: alert and no distress  EYES: Eyes grossly normal to inspection, PERRL and conjunctivae and sclerae normal  HENT: ear canals and TM's normal, nose and mouth without ulcers or lesions  NECK: no adenopathy, no asymmetry, masses, or scars  RESP: lungs clear to auscultation - no rales, rhonchi or wheezes  CV: regular rate and rhythm, normal S1 S2, no S3 or S4, no murmur, click or rub, no peripheral edema  ABDOMEN: soft, nontender, " no hepatosplenomegaly, no masses and bowel sounds normal  MS: no gross musculoskeletal defects noted, no edema  SKIN: no suspicious lesions or rashes  NEURO: Normal strength and tone, mentation intact and speech normal  PSYCH: mentation appears normal, affect normal/bright        10/9/2024   Mini Cog   Clock Draw Score 2 Normal   3 Item Recall 1 object recalled   Mini Cog Total Score 3               Stephen was seen today for physical.    Diagnoses and all orders for this visit:    Encounter for annual wellness exam in Medicare patient    Body aches  -     CK total; Future      Query some of his weakness is due to med side effects .  Trial off of simvastatin for 2-3 wks.   Consider discontinuing requip, gabapentin and/or keppra. Would like neurology to weigh in on this first.  F/up neuro   Signed Electronically by: Winston Silverman PA-C

## 2024-10-10 ENCOUNTER — ALLIED HEALTH/NURSE VISIT (OUTPATIENT)
Dept: DERMATOLOGY | Facility: CLINIC | Age: 84
End: 2024-10-10
Payer: MEDICARE

## 2024-10-10 ENCOUNTER — THERAPY VISIT (OUTPATIENT)
Dept: PHYSICAL THERAPY | Facility: CLINIC | Age: 84
End: 2024-10-10
Attending: PHYSICIAN ASSISTANT
Payer: MEDICARE

## 2024-10-10 DIAGNOSIS — R29.898 WEAKNESS OF BOTH LOWER EXTREMITIES: Primary | ICD-10-CM

## 2024-10-10 DIAGNOSIS — Z51.89 VISIT FOR WOUND CHECK: Primary | ICD-10-CM

## 2024-10-10 PROCEDURE — 97110 THERAPEUTIC EXERCISES: CPT | Mod: GP | Performed by: PHYSICAL THERAPIST

## 2024-10-10 PROCEDURE — 97116 GAIT TRAINING THERAPY: CPT | Mod: GP | Performed by: PHYSICAL THERAPIST

## 2024-10-10 PROCEDURE — 99207 PR NO CHARGE NURSE ONLY: CPT | Performed by: DERMATOLOGY

## 2024-10-10 NOTE — NURSING NOTE
Gustavo Ramon comes into clinic today at the request of Dr. Victor Ordering Provider for wound check.    Stephen is 5 weeks s/p Mohs to left temple.  He presents to clinic for concern of a suture that hasn't dissolved.  He denies pain, bleeding, open wound, drainage.    On exam there is a well healed surgical scar on the left temple.  One suture is seen.  This was easily clipped flush with the skin.  Vaseline applied.    This service provided today was under the supervising provider of the day Dr. Victor, who was available if needed.    Marsha Dahl RN

## 2024-10-11 ENCOUNTER — TELEPHONE (OUTPATIENT)
Dept: NEUROSURGERY | Facility: CLINIC | Age: 84
End: 2024-10-11
Payer: MEDICARE

## 2024-10-11 NOTE — TELEPHONE ENCOUNTER
Left Voicemail (2nd Attempt) for the patient to call back and schedule the following:    Appointment type: New cervical spine - in person  Provider: Yodit Barrientos  Return date: Next available  Specialty phone number: [phone]  Additional appointment(s) needed: N/A  Additonal Notes: H/o cervical surgery

## 2024-10-14 ENCOUNTER — ANCILLARY PROCEDURE (OUTPATIENT)
Dept: ULTRASOUND IMAGING | Facility: CLINIC | Age: 84
End: 2024-10-14
Attending: PHYSICIAN ASSISTANT
Payer: MEDICARE

## 2024-10-14 DIAGNOSIS — R55 SYNCOPE, UNSPECIFIED SYNCOPE TYPE: ICD-10-CM

## 2024-10-14 PROCEDURE — 93880 EXTRACRANIAL BILAT STUDY: CPT | Mod: TC | Performed by: RADIOLOGY

## 2024-10-15 NOTE — TELEPHONE ENCOUNTER
REASON FOR VISIT: cervical surgery    DATE OF APPT: 10/28/2024   NOTES (FOR ALL VISITS) STATUS DETAILS   OFFICE NOTE from referring provider N/A    OFFICE NOTE from other specialist Internal [facility]  Antionette Hunt APRN CNP 10/08/2024   MEDICATION LIST Internal    IMAGING  (FOR ALL VISITS)     XR N/A    MRI (HEAD, NECK, SPINE) N/A    CT (HEAD, NECK, SPINE) N/A

## 2024-10-16 ENCOUNTER — OFFICE VISIT (OUTPATIENT)
Dept: ENDOCRINOLOGY | Facility: CLINIC | Age: 84
End: 2024-10-16
Payer: MEDICARE

## 2024-10-16 VITALS
WEIGHT: 166.9 LBS | RESPIRATION RATE: 18 BRPM | DIASTOLIC BLOOD PRESSURE: 85 MMHG | HEART RATE: 78 BPM | SYSTOLIC BLOOD PRESSURE: 128 MMHG | OXYGEN SATURATION: 100 % | BODY MASS INDEX: 25.89 KG/M2

## 2024-10-16 DIAGNOSIS — E05.90 HYPERTHYROIDISM: ICD-10-CM

## 2024-10-16 PROCEDURE — G2211 COMPLEX E/M VISIT ADD ON: HCPCS | Performed by: STUDENT IN AN ORGANIZED HEALTH CARE EDUCATION/TRAINING PROGRAM

## 2024-10-16 PROCEDURE — 99214 OFFICE O/P EST MOD 30 MIN: CPT | Performed by: STUDENT IN AN ORGANIZED HEALTH CARE EDUCATION/TRAINING PROGRAM

## 2024-10-16 NOTE — LETTER
10/16/2024      Gustavo Ramon  [address] [place] [address]      Dear Colleague,    Thank you for referring your patient, Gustavo Ramon, to the [facility]. Please see a copy of my visit note below.    Endocrinology Clinic Visit 6/26/2024    NAME:  Gustavo Ramon  PCP:  Winston Silverman  MRN:  [id number]  Reason for Consult:  hyperthyroidism  Requesting Provider:  Winston Silverman    Chief Complaint     Chief Complaint   Patient presents with     Follow Up     hypothyroid       History of Present Illness     Gustavo Ramon is a 84 year old male who is seen in clinic for new onset hyperthyroidism. Last seen 6/2024.    Sx of fatigue and generalized weakness since 3/2024, decreased appetite. Lost 20 lbs unintentional in few month. he denied any tremor, no palpitation, no chest pain, sometimes OSB in the morning after putting clothes on. No new GI sx, no change in bowel habits.   Intermittent hoarseness, chronic. No new eye sx.    He reported URI end of March, recovered well.      No anterior neck sx, no difficulty swallowing or SOB when lying down.  No new meds, no recent contrast, no amiodarone or lithium.  Not aware of thyroid disease in the family.  No hx of radiation exposure.        Since last visit we have the following labs and imaging:     Latest Ref Rng 10/3/2023  2:11 PM 6/4/2024  12:01 PM 6/26/2024  3:53 PM 9/9/2024  2:48 PM 10/3/2024  5:29 PM   ENDO THYROID LABS-[facility]         TSH 0.30 - 4.20 uIU/mL 2.51  0.01 (L)  0.02 (L)  4.64 (H)  7.92 (H)    Triiodothyronine (T3) 85 - 202 ng/dL   127      FREE T4 0.90 - 1.70 ng/dL  2.59 (H)  1.75 (H)  0.96  0.99    Thyroglobulin Antibody <40 IU/mL   <20      Thyroid Peroxidase Antibody <35 IU/mL   719 (H)      Thyroid Stim Immunog <=1.3 TSI index   <1.0         Methimazole was prescribed by his PCP but he never took it.    7/2024 thyroid uptake and scan: No uptake of the radiotracer by the thyroid tissue. Given the  lab findings of hyperthyroidism, findings are suggestive of thyroiditis.     He reported low energy level. No other sx. No change in bowel habits.   He reported today his BP was low and was feeling weak but than it improved and decided to come to the visist.     He had a hospitalization early October with fatigue and pre-syncope with orthostatic hypotension thought to be related to low PO intake.     Problem List     Patient Active Problem List   Diagnosis     Rosacea     DJD (degenerative joint disease)     Ocular myasthenia gravis (H)     Macular pigment deposit     HYPERLIPIDEMIA LDL GOAL <130     MGUS (monoclonal gammopathy of unknown significance)     Retinal hole OS, operculated     Horseshoe tear of retina without detachment OD     History  of basal cell carcinoma     Seborrhea     AK (actinic keratosis)     Malignant neoplasm of prostate (H)     PVD (posterior vitreous detachment)     Amaurosis fugax by Hx neg carotid W/U     Actinic keratosis     Peripheral neuropathy     Nuclear sclerosis of both eyes     Essential hypertension with goal blood pressure less than 140/90     Gastroesophageal reflux disease without esophagitis     Stage 3a chronic kidney disease (H)     Secondary adrenocortical insufficiency (H)     Physical deconditioning     NPH (normal pressure hydrocephalus) (H)     Myoclonus     Infection due to 2019 novel coronavirus     Encephalopathy     Dementia without behavioral disturbance, unspecified dementia type     Idiopathic normal pressure hydrocephalus (H)     Acute atopic conjunctivitis     Cancer of the skin, basal cell     COVID-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     Degeneration of intervertebral disc     Diverticulitis of intestine     Dysphagia     Gait instability     Hip pain, acute, left     History of osteoporosis     Kidney disorder     Pneumonia due to COVID-19 virus     Restless leg syndrome due to iron deficiency anemia     Sepsis due to COVID-19 (H)     Tear of medial cartilage or meniscus of  knee, current     Urinary frequency     Vitamin D deficiency     Weakness of both lower extremities     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)     Impaired flexibility of lower extremity     Syncope, unspecified syncope type        Medications     Current Outpatient Medications   Medication Sig Dispense Refill     ACE/ARB/ARNI NOT PRESCRIBED (INTENTIONAL) Please choose reason not prescribed from choices below.       acetaminophen (TYLENOL) 325 MG tablet Take 1-2 tablets (325-650 mg) by mouth every 4 hours as needed for mild pain       aspirin (ASA) 325 MG tablet Take 1 tablet (325 mg) by mouth daily       cyanocobalamin (VITAMIN B-12) 1000 MCG tablet Take 1 tablet (1,000 mcg) by mouth daily 90 tablet 1     fluticasone (FLONASE) 50 MCG/ACT nasal spray Spray 1 spray into both nostrils daily 11.1 mL 0     gabapentin (NEURONTIN) 300 MG capsule 600 mg in the morning and 900 mg in the evening 450 capsule 1     ketoconazole (NIZORAL) 2 % external cream Apply topically 2 times daily To face 60 g 3     levETIRAcetam (KEPPRA) 250 MG tablet TAKE 1 TABLET BY MOUTH TWO TIMES A DAY 60 tablet PRN     meclizine (ANTIVERT) 25 MG tablet Take 0.5-1 tablets (12.5-25 mg) by mouth 3 times daily as needed for dizziness 30 tablet 0     multivitamin (OCUVITE) TABS tablet Take 1 tablet by mouth daily       pantoprazole (PROTONIX) 40 MG EC tablet Take 1 tablet (40 mg) by mouth daily. 90 tablet 2     polyethylene glycol (MIRALAX) 17 GM/Dose powder Take 17 g by mouth daily as needed for constipation 510 g      pyRIDostigmine (MESTINON) 60 MG tablet Take 1.5 tablets (90 mg) by mouth 2 times daily. 270 tablet 1     rOPINIRole (REQUIP ER) 2 MG 24 hr tablet Take 1 tablet (2 mg) by mouth at bedtime.       simvastatin (ZOCOR) 20 MG tablet TAKE ONE TABLET BY MOUTH AT BEDTIME 90 tablet 1     tamsulosin (FLOMAX) 0.4 MG capsule Take 1 capsule (0.4 mg) by mouth daily 90 capsule 1     terbinafine (LAMISIL) 1 % external cream Apply topically 2  times daily 42 g 11     triamcinolone (KENALOG) 0.1 % external cream Apply a thin layer up to twice daily to affected areas as needed. 30 g 3     trospium (SANCTURA) 20 MG tablet Take 1 tablet (20 mg) by mouth 2 times daily (before meals) 180 tablet 3     vitamin D3 (CHOLECALCIFEROL) 2000 units tablet Take 1 tablet by mouth daily 90 tablet 1     Current Facility-Administered Medications   Medication Dose Route Frequency Provider Last Rate Last Admin     lidocaine 1% with EPINEPHrine 1:100,000 injection 3 mL  3 mL Intradermal Once             Allergies     Allergies   Allergen Reactions     Mycophenolate Other (See Comments)     Confusion, abnormal movements       Medical / Surgical History     Past Medical History:   Diagnosis Date     Actinic keratosis      AK (actinic keratosis) 11/15/2012     Amaurosis fugax      Basal cell carcinoma 2009    R medial cheek/nose     Central serous retinopathy left    Eye     Diverticulosis 08/2006     DJD (degenerative joint disease)      GERD (gastroesophageal reflux disease)      Hearing loss     High frequency     History of nonmelanoma skin cancer      History of nonmelanoma skin cancer      HTN (hypertension)      Hyperlipidemia LDL goal < 130      Hyperplastic colon polyp 08/2006     IgA monoclonal gammopathy     IgA kappa     Infection due to 2019 novel coronavirus 10/13/2022     Infection due to 2019 novel coronavirus 11/2021     Lattice degeneration of retina right    Eye     Macular degeneration      Nonsenile cataract      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    [facility] consult     Rosacea      Steroid-induced diabetes mellitus, 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     Strabismus      Vitreous detachment left    Eye     Past Surgical History:   Procedure Laterality Date     ARTHROSCOPY KNEE RT/LT Left 01/2010    Knee     BIOPSY  2009/2013/2016    Nose; Prostate; BCC - left arm     COLONOSCOPY  09/24/2019    w/Endoscopy     COLONOSCOPY  07/29/2024     COLONOSCOPY WITH CO2  INSUFFLATION N/A 09/24/2019    Procedure: COLONOSCOPY, WITH CO2 INSUFFLATION;  Surgeon: Loi Levine MD;  Location: [facility] OR     COMBINED ESOPHAGOSCOPY, GASTROSCOPY, DUODENOSCOPY (EGD) WITH CO2 INSUFFLATION N/A 09/24/2019    Procedure: ESOPHAGOGASTRODUODENOSCOPY, WITH CO2 INSUFFLATION;  Surgeon: Loi Levine MD;  Location: [facility]     COMBINED ESOPHAGOSCOPY, GASTROSCOPY, DUODENOSCOPY (EGD) WITH CO2 INSUFFLATION N/A 7/29/2024    Procedure: Combined Esophagoscopy, Gastroscopy, Duodenoscopy (Egd) With Co2 Insufflation;  Surgeon: Jeff Peace MD;  Location: [facility] OR     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     DISKECTOMY, LUMBAR, SINGLE SP  2003    L2-3     ENT SURGERY  1943    Tonsils      ENT SURGERY  02/22/2012    Biopsy lesion right pinna     ENT SURGERY  02/24/2012    Biopsy leson right pinna     ESOPHAGOSCOPY, GASTROSCOPY, DUODENOSCOPY (EGD), COMBINED N/A 09/24/2019    Procedure: Esophagogastroduodenoscopy, With Biopsy;  Surgeon: Loi Levine MD;  Location: [facility]     ESOPHAGOSCOPY, GASTROSCOPY, DUODENOSCOPY (EGD), COMBINED N/A 7/29/2024    Procedure: ESOPHAGOGASTRODUODENOSCOPY, WITH BIOPSY;  Surgeon: Jeff Peace MD;  Location: [facility]     IR LUMBAR DRAIN PLACEMENT W FLUORO  06/27/2022     LAMINOPLASTY CERVICAL POSTERIOR THREE+ LEVELS N/A 12/12/2022    Procedure: POSTERIOR APPROACH Cervical 4-7 laminoplasty;  Surgeon: Judie Levin MD;  Location: [facility]     LAPAROSCOPIC ASSISTED IMPLANT SHUNT VENTRICULOPERITONEAL N/A 07/07/2022    Procedure: possible INSERTION, SHUNT, VENTRICULOPERITONEAL, LAPAROSCOPY-ASSISTED;  Surgeon: Joe Damon MD;  Location: [facility] OR     OPTICAL TRACKING SYSTEM IMPLANT SHUNT VENTRICULOPERITONEAL N/A 07/07/2022    Procedure: INSERTION, SHUNT, VENTRICULOPERITONEAL, USING OPTICAL TRACKING SYSTEM;  Surgeon: Jean-Paul Childs MD;  Location: [facility] OR     SOFT TISSUE SURGERY  05/2010    Basal Cell/right side nose       Social History      Social History     Socioeconomic History     Marital status:      Spouse name: Ceci     Number of children: 2     Years of education: 16     Highest education level: Not on file   Occupational History     Occupation:      Employer: RETIRED     Comment: 2001,    Tobacco Use     Smoking status: Never     Passive exposure: Never     Smokeless tobacco: Never     Tobacco comments:     Never   Vaping Use     Vaping status: Never Used   Substance and Sexual Activity     Alcohol use: No     Drug use: No     Sexual activity: Never   Other Topics Concern     Parent/sibling w/ CABG, MI or angioplasty before 65F 55M? No   Social History Narrative     Not on file     Social Determinants of Health     Financial Resource Strain: Low Risk  (10/7/2024)    Financial Resource Strain      Within the past 12 months, have you or your family members you live with been unable to get utilities (heat, electricity) when it was really needed?: No   Food Insecurity: Low Risk  (10/7/2024)    Food Insecurity      Within the past 12 months, did you worry that your food would run out before you got money to buy more?: No      Within the past 12 months, did the food you bought just not last and you didn t have money to get more?: No   Transportation Needs: Low Risk  (10/7/2024)    Transportation Needs      Within the past 12 months, has lack of transportation kept you from medical appointments, getting your medicines, non-medical meetings or appointments, work, or from getting things that you need?: No   Physical Activity: Insufficiently Active (10/7/2024)    Exercise Vital Sign      Days of Exercise per Week: 2 days      Minutes of Exercise per Session: 10 min   Stress: No Stress Concern Present (10/7/2024)    [employer] - Occupational Stress Questionnaire      Feeling of Stress : Not at all   Social Connections: Unknown (10/7/2024)    Social Connection and Isolation Panel  [NHANES]      Frequency of Communication with Friends and Family: Not on file      Frequency of Social Gatherings with Friends and Family: Once a week      Attends Druze Services: Not on file      Active Member of Clubs or Organizations: Not on file      Attends Club or Organization Meetings: Not on file      Marital Status: Not on file   Interpersonal Safety: Low Risk  (10/9/2024)    Interpersonal Safety      Do you feel physically and emotionally safe where you currently live?: Yes      Within the past 12 months, have you been hit, slapped, kicked or otherwise physically hurt by someone?: No      Within the past 12 months, have you been humiliated or emotionally abused in other ways by your partner or ex-partner?: No   Housing Stability: Low Risk  (10/7/2024)    Housing Stability      Do you have housing? : Yes      Are you worried about losing your housing?: No   Recent Concern: Housing Stability - High Risk (10/3/2024)    Housing Stability      Do you have housing? : No      Are you worried about losing your housing?: No       Family History     Family History   Problem Relation Age of Onset     Heart Disease Mother      Alzheimer Disease Mother 73     C.A.D. Father      Coronary Artery Disease Father      Diabetes Grandchild         using a pump      Diabetes Paternal Uncle      Heart Disease Paternal Grandmother      Glaucoma No family hx of      Macular Degeneration No family hx of      Melanoma No family hx of      Skin Cancer No family hx of        ROS     12 ROS completed, pertinent positive and negative in HPI    Physical Exam   /85 (BP Location: Left arm, Patient Position: Sitting, Cuff Size: Adult Regular)   Pulse 78   Resp 18   Wt 75.7 kg (166 lb 14.4 oz)   SpO2 100%   BMI 25.89 kg/m       GENERAL: alert and no distress  EYES: Eyes grossly normal to inspection.  No discharge or erythema, or obvious scleral/conjunctival abnormalities.  RESP: No audible wheeze, cough, or visible cyanosis.   "  SKIN: Visible skin clear. No significant rash, abnormal pigmentation or lesions.  NEURO: Cranial nerves grossly intact.  Mentation and speech appropriate for age.  PSYCH: Appropriate affect, tone, and pace of words      Labs/Imaging     Pertinent Labs were reviewed and updated in EPIC and discussed briefly.  Radiology Results were  reviewed and updated in EPIC, reviewed, and discussed briefly.    Summary of recent findings:   Lab Results   Component Value Date    A1C 5.8 01/31/2024    A1C 6.2 12/05/2022    A1C 5.2 02/07/2022    A1C 5.5 01/17/2022    A1C 5.7 05/19/2021    A1C 5.8 11/09/2020       TSH   Date Value Ref Range Status   10/03/2024 7.92 (H) 0.30 - 4.20 uIU/mL Final   09/09/2024 4.64 (H) 0.30 - 4.20 uIU/mL Final   06/26/2024 0.02 (L) 0.30 - 4.20 uIU/mL Final   06/04/2024 0.01 (L) 0.30 - 4.20 uIU/mL Final   10/03/2023 2.51 0.30 - 4.20 uIU/mL Final   03/31/2022 1.50 0.40 - 4.00 mU/L Final   09/03/2019 1.94 0.40 - 4.00 mU/L Final   08/15/2019 1.90 0.40 - 4.00 mU/L Final   06/20/2017 1.24 0.40 - 4.00 mU/L Final   02/18/2014 1.27 0.4 - 5.0 mU/L Final     Free T4   Date Value Ref Range Status   10/03/2024 0.99 0.90 - 1.70 ng/dL Final   09/09/2024 0.96 0.90 - 1.70 ng/dL Final   06/26/2024 1.75 (H) 0.90 - 1.70 ng/dL Final   06/04/2024 2.59 (H) 0.90 - 1.70 ng/dL Final       Creatinine   Date Value Ref Range Status   10/04/2024 1.00 0.67 - 1.17 mg/dL Final   05/19/2021 1.18 0.66 - 1.25 mg/dL Final       Recent Labs   Lab Test 01/31/24  1030 01/31/22  1357   CHOL 159 208*   HDL 57 79   LDL 82 92   TRIG 99 183*       No results found for: \"[id number]\", \"[id number]\", \"[id number]\"    I personally reviewed the patient's outside records from [facility] EMR and Care Everywhere. Summary of pertinent findings in HPI.    Impression / Plan     Thyrotoxicosis- resolved  Thyroiditis  Subclinical hypothyroidism  Positive TPO abx    We reviewed his results. He had thyroiditis followed by subclinical hypothyroidism, his TSH is < 10. I " think we can hold off on lt4 replacement for now given his age. With positive TPO abx higher risk of persistent/overt hypothymism. We will recheck labs in 2-3 month.    5. Fatigue  6. Orthostatic hypotension  Screening for AI with morning cortisol.     Test and/or medications prescribed today:  Orders Placed This Encounter   Procedures     Cortisol     TSH     T4 free         Follow up: 2-3 m        The longitudinal plan of care for the diagnosis(es)/condition(s) as documented were addressed during this visit. Due to the added complexity in care, I will continue to support Stephen in the subsequent management and with ongoing continuity of care.      Jackie Holland MD  Endocrinology, Diabetes and Metabolism  [facility]          Again, thank you for allowing me to participate in the care of your patient.        Sincerely,        Jackie Holland MD

## 2024-10-16 NOTE — NURSING NOTE
Gustavo Ramon's goals for this visit include:   Chief Complaint   Patient presents with    Follow Up     hypothyroid       He requests these members of his care team be copied on today's visit information: yes    PCP: Winston Silverman    Referring Provider:  Winston Silverman PA-C  [id number] [place] [id number]    /85 (BP Location: Left arm, Patient Position: Sitting, Cuff Size: Adult Regular)   Pulse 78   Resp 18   Wt 75.7 kg (166 lb 14.4 oz)   SpO2 100%   BMI 25.89 kg/m      Do you need any medication refills at today's visit? None

## 2024-10-16 NOTE — PROGRESS NOTES
Endocrinology Clinic Visit 6/26/2024    NAME:  Gustavo Ramon  PCP:  Winston Silverman  MRN:  [id number]  Reason for Consult:  hyperthyroidism  Requesting Provider:  Winston Silverman    Chief Complaint     Chief Complaint   Patient presents with    Follow Up     hypothyroid       History of Present Illness     Gustavo Ramon is a 84 year old male who is seen in clinic for new onset hyperthyroidism. Last seen 6/2024.    Sx of fatigue and generalized weakness since 3/2024, decreased appetite. Lost 20 lbs unintentional in few month. he denied any tremor, no palpitation, no chest pain, sometimes OSB in the morning after putting clothes on. No new GI sx, no change in bowel habits.   Intermittent hoarseness, chronic. No new eye sx.    He reported URI end of March, recovered well.      No anterior neck sx, no difficulty swallowing or SOB when lying down.  No new meds, no recent contrast, no amiodarone or lithium.  Not aware of thyroid disease in the family.  No hx of radiation exposure.        Since last visit we have the following labs and imaging:     Latest Ref Rng 10/3/2023  2:11 PM 6/4/2024  12:01 PM 6/26/2024  3:53 PM 9/9/2024  2:48 PM 10/3/2024  5:29 PM   ENDO THYROID LABS-[facility]         TSH 0.30 - 4.20 uIU/mL 2.51  0.01 (L)  0.02 (L)  4.64 (H)  7.92 (H)    Triiodothyronine (T3) 85 - 202 ng/dL   127      FREE T4 0.90 - 1.70 ng/dL  2.59 (H)  1.75 (H)  0.96  0.99    Thyroglobulin Antibody <40 IU/mL   <20      Thyroid Peroxidase Antibody <35 IU/mL   719 (H)      Thyroid Stim Immunog <=1.3 TSI index   <1.0         Methimazole was prescribed by his PCP but he never took it.    7/2024 thyroid uptake and scan: No uptake of the radiotracer by the thyroid tissue. Given the lab findings of hyperthyroidism, findings are suggestive of thyroiditis.     He reported low energy level. No other sx. No change in bowel habits.   He reported today his BP was low and was feeling weak but than it improved and decided to come to the  [facility].     He had a hospitalization early October with fatigue and pre-syncope with orthostatic hypotension thought to be related to low PO intake.     Problem List     Patient Active Problem List   Diagnosis    Rosacea    DJD (degenerative joint disease)    Ocular myasthenia gravis (H)    Macular pigment deposit    HYPERLIPIDEMIA LDL GOAL <130    MGUS (monoclonal gammopathy of unknown significance)    Retinal hole OS, operculated    Horseshoe tear of retina without detachment OD    History  of basal cell carcinoma    Seborrhea    AK (actinic keratosis)    Malignant neoplasm of prostate (H)    PVD (posterior vitreous detachment)    Amaurosis fugax by Hx neg carotid W/U    Actinic keratosis    Peripheral neuropathy    Nuclear sclerosis of both eyes    Essential hypertension with goal blood pressure less than 140/90    Gastroesophageal reflux disease without esophagitis    Stage 3a chronic kidney disease (H)    Secondary adrenocortical insufficiency (H)    Physical deconditioning    NPH (normal pressure hydrocephalus) (H)    Myoclonus    Infection due to 2019 novel coronavirus    Encephalopathy    Dementia without behavioral disturbance, unspecified dementia type    Idiopathic normal pressure hydrocephalus (H)    Acute atopic conjunctivitis    Cancer of the skin, basal cell    COVID-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    Degeneration of intervertebral disc    Diverticulitis of intestine    Dysphagia    Gait instability    Hip pain, acute, left    History of osteoporosis    Kidney disorder    Pneumonia due to COVID-19 virus    Restless leg syndrome due to iron deficiency anemia    Sepsis due to COVID-19 (H)    Tear of medial cartilage or meniscus of knee, current    Urinary frequency    Vitamin D deficiency    Weakness of both lower extremities    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)    Impaired flexibility of lower extremity    Syncope, unspecified syncope type        Medications     Current Outpatient Medications   Medication  Sig Dispense Refill    ACE/ARB/ARNI NOT PRESCRIBED (INTENTIONAL) Please choose reason not prescribed from choices below.      acetaminophen (TYLENOL) 325 MG tablet Take 1-2 tablets (325-650 mg) by mouth every 4 hours as needed for mild pain      aspirin (ASA) 325 MG tablet Take 1 tablet (325 mg) by mouth daily      cyanocobalamin (VITAMIN B-12) 1000 MCG tablet Take 1 tablet (1,000 mcg) by mouth daily 90 tablet 1    fluticasone (FLONASE) 50 MCG/ACT nasal spray Spray 1 spray into both nostrils daily 11.1 mL 0    gabapentin (NEURONTIN) 300 MG capsule 600 mg in the morning and 900 mg in the evening 450 capsule 1    ketoconazole (NIZORAL) 2 % external cream Apply topically 2 times daily To face 60 g 3    levETIRAcetam (KEPPRA) 250 MG tablet TAKE 1 TABLET BY MOUTH TWO TIMES A DAY 60 tablet PRN    meclizine (ANTIVERT) 25 MG tablet Take 0.5-1 tablets (12.5-25 mg) by mouth 3 times daily as needed for dizziness 30 tablet 0    multivitamin (OCUVITE) TABS tablet Take 1 tablet by mouth daily      pantoprazole (PROTONIX) 40 MG EC tablet Take 1 tablet (40 mg) by mouth daily. 90 tablet 2    polyethylene glycol (MIRALAX) 17 GM/Dose powder Take 17 g by mouth daily as needed for constipation 510 g     pyRIDostigmine (MESTINON) 60 MG tablet Take 1.5 tablets (90 mg) by mouth 2 times daily. 270 tablet 1    rOPINIRole (REQUIP ER) 2 MG 24 hr tablet Take 1 tablet (2 mg) by mouth at bedtime.      simvastatin (ZOCOR) 20 MG tablet TAKE ONE TABLET BY MOUTH AT BEDTIME 90 tablet 1    tamsulosin (FLOMAX) 0.4 MG capsule Take 1 capsule (0.4 mg) by mouth daily 90 capsule 1    terbinafine (LAMISIL) 1 % external cream Apply topically 2 times daily 42 g 11    triamcinolone (KENALOG) 0.1 % external cream Apply a thin layer up to twice daily to affected areas as needed. 30 g 3    trospium (SANCTURA) 20 MG tablet Take 1 tablet (20 mg) by mouth 2 times daily (before meals) 180 tablet 3    vitamin D3 (CHOLECALCIFEROL) 2000 units tablet Take 1 tablet by mouth  daily 90 tablet 1     Current Facility-Administered Medications   Medication Dose Route Frequency Provider Last Rate Last Admin    lidocaine 1% with EPINEPHrine 1:100,000 injection 3 mL  3 mL Intradermal Once             Allergies     Allergies   Allergen Reactions    Mycophenolate Other (See Comments)     Confusion, abnormal movements       Medical / Surgical History     Past Medical History:   Diagnosis Date    Actinic keratosis     AK (actinic keratosis) 11/15/2012    Amaurosis fugax     Basal cell carcinoma 2009    R medial cheek/nose    Central serous retinopathy left    Eye    Diverticulosis 08/2006    DJD (degenerative joint disease)     GERD (gastroesophageal reflux disease)     Hearing loss     High frequency    History of nonmelanoma skin cancer     History of nonmelanoma skin cancer     HTN (hypertension)     Hyperlipidemia LDL goal < 130     Hyperplastic colon polyp 08/2006    IgA monoclonal gammopathy     IgA kappa    Infection due to 2019 novel coronavirus 10/13/2022    Infection due to 2019 novel coronavirus 11/2021    Lattice degeneration of retina right    Eye    Macular degeneration     Nonsenile cataract     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    [facility] consult    Rosacea     Steroid-induced diabetes mellitus, 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    Strabismus     Vitreous detachment left    Eye     Past Surgical History:   Procedure Laterality Date    ARTHROSCOPY KNEE RT/LT Left 01/2010    Knee    BIOPSY  2009/2013/2016    Nose; Prostate; BCC - left arm    COLONOSCOPY  09/24/2019    w/Endoscopy    COLONOSCOPY  07/29/2024    COLONOSCOPY WITH CO2 INSUFFLATION N/A 09/24/2019    Procedure: COLONOSCOPY, WITH CO2 INSUFFLATION;  Surgeon: Loi Levine MD;  Location:  OR    COMBINED ESOPHAGOSCOPY, GASTROSCOPY, DUODENOSCOPY (EGD) WITH CO2 INSUFFLATION N/A 09/24/2019    Procedure: ESOPHAGOGASTRODUODENOSCOPY, WITH CO2 INSUFFLATION;  Surgeon: Loi Levine MD;  Location: [facility] OR    COMBINED  ESOPHAGOSCOPY, GASTROSCOPY, DUODENOSCOPY (EGD) WITH CO2 INSUFFLATION N/A 7/29/2024    Procedure: Combined Esophagoscopy, Gastroscopy, Duodenoscopy (Egd) With Co2 Insufflation;  Surgeon: Jeff Peace MD;  Location: [facility] OR    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    DISKECTOMY, LUMBAR, SINGLE SP  2003    L2-3    ENT SURGERY  1943    Tonsils     ENT SURGERY  02/22/2012    Biopsy lesion right pinna    ENT SURGERY  02/24/2012    Biopsy leson right pinna    ESOPHAGOSCOPY, GASTROSCOPY, DUODENOSCOPY (EGD), COMBINED N/A 09/24/2019    Procedure: Esophagogastroduodenoscopy, With Biopsy;  Surgeon: Loi Levine MD;  Location: [facility] OR    ESOPHAGOSCOPY, GASTROSCOPY, DUODENOSCOPY (EGD), COMBINED N/A 7/29/2024    Procedure: ESOPHAGOGASTRODUODENOSCOPY, WITH BIOPSY;  Surgeon: Jeff Peace MD;  Location: [facility] OR    IR LUMBAR DRAIN PLACEMENT W FLUORO  06/27/2022    LAMINOPLASTY CERVICAL POSTERIOR THREE+ LEVELS N/A 12/12/2022    Procedure: POSTERIOR APPROACH Cervical 4-7 laminoplasty;  Surgeon: Judie Levin MD;  Location: [facility] OR    LAPAROSCOPIC ASSISTED IMPLANT SHUNT VENTRICULOPERITONEAL N/A 07/07/2022    Procedure: possible INSERTION, SHUNT, VENTRICULOPERITONEAL, LAPAROSCOPY-ASSISTED;  Surgeon: Joe Damon MD;  Location: [facility] OR    OPTICAL TRACKING SYSTEM IMPLANT SHUNT VENTRICULOPERITONEAL N/A 07/07/2022    Procedure: INSERTION, SHUNT, VENTRICULOPERITONEAL, USING OPTICAL TRACKING SYSTEM;  Surgeon: Jean-Paul Childs MD;  Location: [facility] OR    SOFT TISSUE SURGERY  05/2010    Basal Cell/right side nose       Social History     Social History     Socioeconomic History    Marital status:      Spouse name: Ceci    Number of children: 2    Years of education: 16    Highest education level: Not on file   Occupational History    Occupation:      Employer: RETIRED     Comment: 2001,    Tobacco Use    Smoking status: Never     Passive exposure: Never    Smokeless  tobacco: Never    Tobacco comments:     Never   Vaping Use    Vaping status: Never Used   Substance and Sexual Activity    Alcohol use: No    Drug use: No    Sexual activity: Never   Other Topics Concern    Parent/sibling w/ CABG, MI or angioplasty before 65F 55M? No   Social History Narrative    Not on file     Social Determinants of Health     Financial Resource Strain: Low Risk  (10/7/2024)    Financial Resource Strain     Within the past 12 months, have you or your family members you live with been unable to get utilities (heat, electricity) when it was really needed?: No   Food Insecurity: Low Risk  (10/7/2024)    Food Insecurity     Within the past 12 months, did you worry that your food would run out before you got money to buy more?: No     Within the past 12 months, did the food you bought just not last and you didn t have money to get more?: No   Transportation Needs: Low Risk  (10/7/2024)    Transportation Needs     Within the past 12 months, has lack of transportation kept you from medical appointments, getting your medicines, non-medical meetings or appointments, work, or from getting things that you need?: No   Physical Activity: Insufficiently Active (10/7/2024)    Exercise Vital Sign     Days of Exercise per Week: 2 days     Minutes of Exercise per Session: 10 min   Stress: No Stress Concern Present (10/7/2024)    [employer] - Occupational Stress Questionnaire     Feeling of Stress : Not at all   Social Connections: Unknown (10/7/2024)    Social Connection and Isolation Panel [NHANES]     Frequency of Communication with Friends and Family: Not on file     Frequency of Social Gatherings with Friends and Family: Once a week     Attends Anglican Services: Not on file     Active Member of Clubs or Organizations: Not on file     Attends Club or Organization Meetings: Not on file     Marital Status: Not on file   Interpersonal Safety: Low Risk  (10/9/2024)    Interpersonal  Safety     Do you feel physically and emotionally safe where you currently live?: Yes     Within the past 12 months, have you been hit, slapped, kicked or otherwise physically hurt by someone?: No     Within the past 12 months, have you been humiliated or emotionally abused in other ways by your partner or ex-partner?: No   Housing Stability: Low Risk  (10/7/2024)    Housing Stability     Do you have housing? : Yes     Are you worried about losing your housing?: No   Recent Concern: Housing Stability - High Risk (10/3/2024)    Housing Stability     Do you have housing? : No     Are you worried about losing your housing?: No       Family History     Family History   Problem Relation Age of Onset    Heart Disease Mother     Alzheimer Disease Mother 73    C.A.D. Father     Coronary Artery Disease Father     Diabetes Grandchild         using a pump     Diabetes Paternal Uncle     Heart Disease Paternal Grandmother     Glaucoma No family hx of     Macular Degeneration No family hx of     Melanoma No family hx of     Skin Cancer No family hx of        ROS     12 ROS completed, pertinent positive and negative in HPI    Physical Exam   /85 (BP Location: Left arm, Patient Position: Sitting, Cuff Size: Adult Regular)   Pulse 78   Resp 18   Wt 75.7 kg (166 lb 14.4 oz)   SpO2 100%   BMI 25.89 kg/m       GENERAL: alert and no distress  EYES: Eyes grossly normal to inspection.  No discharge or erythema, or obvious scleral/conjunctival abnormalities.  RESP: No audible wheeze, cough, or visible cyanosis.    SKIN: Visible skin clear. No significant rash, abnormal pigmentation or lesions.  NEURO: Cranial nerves grossly intact.  Mentation and speech appropriate for age.  PSYCH: Appropriate affect, tone, and pace of words      Labs/Imaging     Pertinent Labs were reviewed and updated in EPIC and discussed briefly.  Radiology Results were  reviewed and updated in EPIC, reviewed, and discussed briefly.    Summary of recent  "findings:   Lab Results   Component Value Date    A1C 5.8 01/31/2024    A1C 6.2 12/05/2022    A1C 5.2 02/07/2022    A1C 5.5 01/17/2022    A1C 5.7 05/19/2021    A1C 5.8 11/09/2020       TSH   Date Value Ref Range Status   10/03/2024 7.92 (H) 0.30 - 4.20 uIU/mL Final   09/09/2024 4.64 (H) 0.30 - 4.20 uIU/mL Final   06/26/2024 0.02 (L) 0.30 - 4.20 uIU/mL Final   06/04/2024 0.01 (L) 0.30 - 4.20 uIU/mL Final   10/03/2023 2.51 0.30 - 4.20 uIU/mL Final   03/31/2022 1.50 0.40 - 4.00 mU/L Final   09/03/2019 1.94 0.40 - 4.00 mU/L Final   08/15/2019 1.90 0.40 - 4.00 mU/L Final   06/20/2017 1.24 0.40 - 4.00 mU/L Final   02/18/2014 1.27 0.4 - 5.0 mU/L Final     Free T4   Date Value Ref Range Status   10/03/2024 0.99 0.90 - 1.70 ng/dL Final   09/09/2024 0.96 0.90 - 1.70 ng/dL Final   06/26/2024 1.75 (H) 0.90 - 1.70 ng/dL Final   06/04/2024 2.59 (H) 0.90 - 1.70 ng/dL Final       Creatinine   Date Value Ref Range Status   10/04/2024 1.00 0.67 - 1.17 mg/dL Final   05/19/2021 1.18 0.66 - 1.25 mg/dL Final       Recent Labs   Lab Test 01/31/24  1030 01/31/22  1357   CHOL 159 208*   HDL 57 79   LDL 82 92   TRIG 99 183*       No results found for: \"[id number]\", \"[id number]\", \"[id number]\"    I personally reviewed the patient's outside records from epic EMR and Care Everywhere. Summary of pertinent findings in HPI.    Impression / Plan     Thyrotoxicosis- resolved  Thyroiditis  Subclinical hypothyroidism  Positive TPO abx    We reviewed his results. He had thyroiditis followed by subclinical hypothyroidism, his TSH is < 10. I think we can hold off on lt4 replacement for now given his age. With positive TPO abx higher risk of persistent/overt hypothymism. We will recheck labs in 2-3 month.    5. Fatigue  6. Orthostatic hypotension  Screening for AI with morning cortisol.     Test and/or medications prescribed today:  Orders Placed This Encounter   Procedures    Cortisol    TSH    T4 free         Follow up: 2-3 m        The longitudinal " plan of care for the diagnosis(es)/condition(s) as documented were addressed during this visit. Due to the added complexity in care, I will continue to support Stephen in the subsequent management and with ongoing continuity of care.      Jackie Holland MD  Endocrinology, Diabetes and Metabolism  [facility]

## 2024-10-16 NOTE — PATIENT INSTRUCTIONS
Welcome to the [facility] Endocrinology and Diabetes Clinics     It was nice seeing you.    Please schedule a lab appointment between 8-9 am on Friday.  Thyroid labs in 2month      Our Endocrinology Clinics are here to provide you with a team-based, collaborative approach in the diagnosis and treatment of patients with diabetes and endocrine disorders. The team is made up of Physicians, Physician Assistants, Certified Diabetes Educators, Registered Nurses, Medical Assistants, Emergency Medical Technicians, and many others, all of whom have the unified goal of providing our patients with high quality care.     Please see below for some helpful tips to best navigate and use the [facility] Endocrinology clinic:     [place] Respect: At [facility], we are committed to a respectful and safe space for all patients, visitors, and staff.  We believe that mutual respect between patients and their care team is the foundation of quality care.  It is our expectation that you will be treated with respect by your care team.  In turn, we ask that all communication with the care team (written and verbal) be respectful and free from profanity, threatening, or abusive language.  Disrespectful communication undermines our therapeutic relationship with you and may result in us being unable to continue to provide your care.    Refills: A provider must see you at least annually to prescribe and refill medications. This is to ensure your safety as well as meet insurance and compliance regulations.    Scheduling: Many of our Providers offer both in-person or video visits. Please call to schedule any needed follow ups as soon as possible because our provider schedules fill up very quickly. Our care team has the right to require an in-person visit when they believe that it is medically necessary. Please remember that for any virtual visits, you must be in the [facility] at the time of the visit, otherwise we are  unable to see you and you will need to be rescheduled.    Missed Appointments: If you need to cancel or miss your scheduled appointment, please call the clinic at [phone] to reschedule.  Please note if you repeatedly miss appointments or repeatedly miss appointments without calling to inform us ahead of time (no-show), the clinic may elect to not allow you to reschedule without speaking to a manager, may require a Partnership In Care Agreement prior to rescheduling, or could result in you no longer being able to receive care from the clinic. Providing the clinic with timely notification if you have to miss an appointment, allows us to better serve the needs of all of our patients.    Primary Care Provider: Our Endocrinologists are Specialists in their field. We expect you to have a Primary Care Provider established to handle any needs outside of your diabetes and endocrine care.  We would be happy to assist you find a Primary Care Provider, if you do not have one.    [employer]: [employer] is a wonderful resource that allows you access to your Care Team via online or the seamus. Please ask a member of the team if you would like help creating an account. Please note that it may take up to 2 business days for a response. [employer] messages are not reviewed on weekends or after business hours.  Emergent or urgent care needs should never be communicated via [employer].  If you experience a medical emergency call 911 or go to the nearest emergency room.    Labs: It is recommended that you stay within the [facility] for labs but you are welcome to obtain ordered labs (with some exceptions) from any location of your choice as long as they are able to complete and process the needed labs. If you need us to fax orders to your preferred lab, please provide us the name and fax number of the lab you would like to go to so we can fax the orders. If your labs are drawn outside of the [facility], please have them  fax the results to [phone] ([place]) or [phone] ([place]) or via [facility]. It is your responsibility to ensure that outside lab results are sent to us.    We look forward to working with you. Please do not hesitate to reach out with any questions.    Thank you,    The Endocrine Team    [facility] Address:   [place] Address:     [address] [place] [id number]    Phone: [phone]  Fax: [phone]   [address] [place] [address]    Phone: [phone]  Fax: [phone]     [facility] Cost Estimate Phone Number: [phone]    General Lab and Imaging Scheduling Phone Number: [phone]

## 2024-10-28 ENCOUNTER — PRE VISIT (OUTPATIENT)
Dept: NEUROSURGERY | Facility: CLINIC | Age: 84
End: 2024-10-28

## 2024-10-28 ENCOUNTER — OFFICE VISIT (OUTPATIENT)
Dept: NEUROSURGERY | Facility: CLINIC | Age: 84
End: 2024-10-28
Payer: MEDICARE

## 2024-10-28 ENCOUNTER — ANCILLARY PROCEDURE (OUTPATIENT)
Dept: GENERAL RADIOLOGY | Facility: CLINIC | Age: 84
End: 2024-10-28
Attending: PHYSICIAN ASSISTANT
Payer: MEDICARE

## 2024-10-28 VITALS
DIASTOLIC BLOOD PRESSURE: 70 MMHG | WEIGHT: 169.1 LBS | HEIGHT: 68 IN | BODY MASS INDEX: 25.63 KG/M2 | HEART RATE: 82 BPM | SYSTOLIC BLOOD PRESSURE: 126 MMHG

## 2024-10-28 DIAGNOSIS — M54.42 CHRONIC LEFT-SIDED LOW BACK PAIN WITH LEFT-SIDED SCIATICA: ICD-10-CM

## 2024-10-28 DIAGNOSIS — Z98.890 H/O CERVICAL SPINE SURGERY: ICD-10-CM

## 2024-10-28 DIAGNOSIS — M79.644 THUMB PAIN, RIGHT: ICD-10-CM

## 2024-10-28 DIAGNOSIS — M25.511 PAIN IN JOINT OF RIGHT SHOULDER: ICD-10-CM

## 2024-10-28 DIAGNOSIS — M54.2 NECK PAIN: Primary | ICD-10-CM

## 2024-10-28 DIAGNOSIS — G89.29 CHRONIC LEFT-SIDED LOW BACK PAIN WITH LEFT-SIDED SCIATICA: ICD-10-CM

## 2024-10-28 PROCEDURE — 72050 X-RAY EXAM NECK SPINE 4/5VWS: CPT | Performed by: RADIOLOGY

## 2024-10-28 PROCEDURE — 99215 OFFICE O/P EST HI 40 MIN: CPT | Performed by: PHYSICIAN ASSISTANT

## 2024-10-28 PROCEDURE — 99417 PROLNG OP E/M EACH 15 MIN: CPT | Performed by: PHYSICIAN ASSISTANT

## 2024-10-28 NOTE — PROGRESS NOTES
[facility] NEUROSURGERY CLINIC [place]  [address] [address] [place] [id number]  Phone: [phone]      Patient:  Gustavo Ramon, Date of birth 1940, 84 year old, male  Referring Provider Antionette Hunt, APRN CNP  [address] [facility] [id number]  [place] [address]    ASSESSMENT & PLAN:  Patient presents with a multitude of complaints today that are consistent with arthritis in his neck, bilateral shoulders right greater than left, and thumb.  He has intermittent pain in his low back with possible sciatica than his posterior thigh.  Additionally symptoms that are concerning for carpal tunnel syndrome in his bilateral hands.  We discussed several options for workup and treatment for these symptoms.  Patient agreed to try some conservative management as follows:  1.  Where wrist braces for 4 weeks to see if this helps with the hand and finger numbness.  2.  Try Voltaren gel or lidocaine on sore areas for back and shoulder.  3.  Switch to Tylenol arthritis formula for better and longer coverage.  4.  Avoid excessive stretching, do not push beyond pain.    We will also get a cervical dynamic x-ray to see how his neck is moving and rule out any bony abnormalities.  I will review the imaging and advise if he requires any additional follow-up.  I am happy to see him for his low back if this begins to give him more problems.  I would start with a dynamic x-ray.  This was offered today but patient deferred.    If the wrist braces are not helpful for his hands we can move forward with an EMG study.    If he continues to have shoulder and thumb pain, we discussed orthopedics would be the more appropriate place for him to be evaluated for these complaints and they may consider injections and therapy to help with pain.    ADDENDUM - dynamic cervical XR reviewed.  W/o abnormal motion.  No further imaging needed at this time.      I spent 99 minutes spent in patient care, independent review and  interpretation of medical records/imaging, reviewing old records.    History of Present Illness:    10/28/2024 Visit:   Patient is known to our service for C4-6 right laminoplasty by Dr. Levin on 12/12/2022 for severe CCS C5-7 w/ myelopathy and was discharged from / on 3/14/23.  He had continued imbalance requiring use of a walker and residual glove-like paresthesias and right hand/finger weakness at that time.      He returns today with his wife for neck pain and reduced range of motion, numbness in his 1st through 3rd digits, low back pain on the left side, and thumb pain.  Patient denies any injury.  He notes his pain in the back is worse in the morning and gets better as he walks throughout the day.  He has had a previous back surgery and he points to the area of discomfort towards the bottom of that surgical incision.  He sometimes gets radicular pain that runs down the back of his left thigh.  His pain is better after using Tylenol.  He also endorses generalized weakness.  His neck pain is more of an aching sensation.  He notes sounds him in his neck with range of motion that are not painful.  He notes that with extension of his head and turning to the left that his range of motion is not as good.  He gets more discomfort in these positions.  He denies radicular pain but does endorse right shoulder pain that is focal.  He does have issues with shoulder movements causing more pain.  He also gets a sharp pain at the base of his thumb with certain movements.  He is right-handed and the right shoulder and thumb are worse for him.  He notes that at times when he is using his 4 wheeled walker that he will have numbness in his fingers from the first through the third and possibly the ring finger.  He shakes these out and then that feels better.  He wakes up sometimes with the same numbness.  He denies that it goes further into his arm.    Patient is currently in physical therapy mainly to adjust his imbalance.  He  does exercises for his neck and arms somewhat which causes pain when he is doing them.    Conservative Treatment:  Tylenol as needed-unsure if this is regular or extra strength.  Lidocaine-not tried  Voltaren gel-not tried  Heat-uses periodically.  Cock up wrist braces-not tried  Injections-not tried  Oral steroids-patient is currently off of all oral steroids as he has used them in the past on a more chronic basis.  He has not noticed any increase in his symptoms with being off of the steroids.    IMAGING   Imaging independently reviewed.    ADDENDUM - Dynamic cervical XR 10/28/24 -   FINDINGS: AP, lateral, flexion, and extension views of the cervical  spine were obtained. Grade 1 anterolisthesis of C5 on C6, similar to  prior and unchanged in flexion and extension. Multilevel disc and  possible significant at C5-6 and C6-7. Multilevel degenerative changes  including facet hypertrophy and uncinate spurring, and endplate  osteophytes. No prevertebral edema. No mass in the visualized lung  apices. Partially visualized ventriculoperitoneal shunt catheter along  the right neck appears intact.  IMPRESSION:  Cervical degenerative changes, most pronounced at C5-6 with grade 1  anterolisthesis. Stable cervical alignment in flexion and extension.  Exam is overall not significantly changed compared to 3/14/2023.    X-ray Cervical spine 2-3 vws  Result Date: 3/14/2023  XR CERVICAL SPINE 2/3 VIEWS 3/14/2023 10:55 AM History: s/p C4-6 laminoplasty; H/O cervical spine surgery ICD-10: H/O cervical spine surgery Comparison: Cervical spine radiographs 1/23/2023. Findings: AP and lateral views of the cervical spine were obtained. Grade 1 anterolisthesis at C5-6, unchanged. Also unchanged trace anterolisthesis at C4-5 and trace retrolisthesis at C6-7. Moderate loss of intervertebral disc height at C5-6 and C6-7. Mild loss of disc height at C4-5. Additional multilevel degenerative changes including endplate osteophytosis, uncinate  "hypertrophy, and facet hypertrophy. No prevertebral edema. No mass in the visualized lung apices. Partially visualized shunt catheter in the right neck is intact.   Impression: 1. Stable alignment status post laminoplasty. 2. Multilevel cervical degenerative changes, most pronounced at C5-6. KIERA DIAZ MD   SYSTEM ID:  [id number]    Physical Exam   /70 (BP Location: Right arm, Patient Position: Sitting, Cuff Size: Adult Regular)   Pulse 82   Ht 1.727 m (5' 8\")   Wt 76.7 kg (169 lb 1.6 oz)   BMI 25.71 kg/m      Constitutional: Appears well-developed and well-nourished. Cooperative. No acute distress.   HENT:   Head: Normocephalic and atraumatic.   Eyes: Conjunctivae are normal.  Neck: Neck supple. No tracheal deviation present.  Cardiovascular: Normal rate and regular rhythm.    Pulmonary/Chest: Effort normal and breath sounds normal.  Abdominal: Exhibits no obvious distension.   Musculoskeletal: Able to move all extremities.  No involuntary motor movements. No C/T/L spine tenderness to palpation.  Right shoulder TTP with positive empty bottle.  Cervical flexion-extension range of motion: Flexion-good, extension-not past neutral, right lateral rotation-good, left lateral rotation - reduced   Skin: Skin is warm, dry and intact.   Psychiatric: Normal mood and affect. Speech is normal and behavior is normal.    Neurological:  Alert, NAD, and oriented to person, place, and time.   No cranial nerve deficit   Gait: Patient takes very small steps without the use of a walker.  He is careful and deliberate with ambulation.  It is difficult for him to get out of the chair and it takes him several attempts to do so.  Once he puts his head over his knees, he gets up fairly easily.  He requires assistance to get on and off the exam table.    Strength (L/R)  5/5 Deltoid (has difficulty with abducting shoulders, able to hold in position with passive positioning with 5 out of 5 strength, notes shoulder pain with " resistance)  5/5 Bicep  5/5 Tricep  4+/5 Handgrip (right-handed)    5/5 Hip Flexion  5/5 Knee Extension  5/5 Ankle Dorsiflexion  5/5 Extensor Hallucis Longus  5/5 Plantar Flexion    Reflexes (L/R)  2+/2+ Bicep  2+/2+ Brachioradialis  2+/2+ Patellar  2+/2+ Ankle    No Lhermitte's  No Spurling's  No Jorge L's   No ankle clonus    Sensation: SILT    Positive right thumb grind test (indicating osteoarthritis)  Negative Tinel's, negative Phalen's, negative elbow flexion, negative medial compartment compression.    Review of Systems   See HPI     Past Medical History:   Diagnosis Date    Actinic keratosis     AK (actinic keratosis) 11/15/2012    Amaurosis fugax     Basal cell carcinoma 2009    R medial cheek/nose    Central serous retinopathy left    Eye    Diverticulosis 08/2006    DJD (degenerative joint disease)     GERD (gastroesophageal reflux disease)     Hearing loss     High frequency    History of nonmelanoma skin cancer     History of nonmelanoma skin cancer     HTN (hypertension)     Hyperlipidemia LDL goal < 130     Hyperplastic colon polyp 08/2006    IgA monoclonal gammopathy     IgA kappa    Infection due to 2019 novel coronavirus 10/13/2022    Infection due to 2019 novel coronavirus 11/2021    Lattice degeneration of retina right    Eye    Macular degeneration     Nonsenile cataract     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    [facility] consult    Rosacea     Steroid-induced diabetes mellitus, 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    Strabismus     Vitreous detachment left    Eye       Past Surgical History:   Procedure Laterality Date    ARTHROSCOPY KNEE RT/LT Left 01/2010    Knee    BIOPSY  2009/2013/2016    Nose; Prostate; BCC - left arm    COLONOSCOPY  09/24/2019    w/Endoscopy    COLONOSCOPY  07/29/2024    COLONOSCOPY WITH CO2 INSUFFLATION N/A 09/24/2019    Procedure: COLONOSCOPY, WITH CO2 INSUFFLATION;  Surgeon: Loi Levine MD;  Location: [facility] OR    COMBINED ESOPHAGOSCOPY, GASTROSCOPY, DUODENOSCOPY  (EGD) WITH CO2 INSUFFLATION N/A 09/24/2019    Procedure: ESOPHAGOGASTRODUODENOSCOPY, WITH CO2 INSUFFLATION;  Surgeon: Loi Levine MD;  Location: [facility] OR    COMBINED ESOPHAGOSCOPY, GASTROSCOPY, DUODENOSCOPY (EGD) WITH CO2 INSUFFLATION N/A 7/29/2024    Procedure: Combined Esophagoscopy, Gastroscopy, Duodenoscopy (Egd) With Co2 Insufflation;  Surgeon: Jeff Peace MD;  Location: [facility] OR    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    DISKECTOMY, LUMBAR, SINGLE SP  2003    L2-3    ENT SURGERY  1943    Tonsils     ENT SURGERY  02/22/2012    Biopsy lesion right pinna    ENT SURGERY  02/24/2012    Biopsy leson right pinna    ESOPHAGOSCOPY, GASTROSCOPY, DUODENOSCOPY (EGD), COMBINED N/A 09/24/2019    Procedure: Esophagogastroduodenoscopy, With Biopsy;  Surgeon: Loi Levine MD;  Location: [facility] OR    ESOPHAGOSCOPY, GASTROSCOPY, DUODENOSCOPY (EGD), COMBINED N/A 7/29/2024    Procedure: ESOPHAGOGASTRODUODENOSCOPY, WITH BIOPSY;  Surgeon: Jeff Peace MD;  Location: [facility] OR    IR LUMBAR DRAIN PLACEMENT W FLUORO  06/27/2022    LAMINOPLASTY CERVICAL POSTERIOR THREE+ LEVELS N/A 12/12/2022    Procedure: POSTERIOR APPROACH Cervical 4-7 laminoplasty;  Surgeon: Judie Levin MD;  Location: [facility] OR    LAPAROSCOPIC ASSISTED IMPLANT SHUNT VENTRICULOPERITONEAL N/A 07/07/2022    Procedure: possible INSERTION, SHUNT, VENTRICULOPERITONEAL, LAPAROSCOPY-ASSISTED;  Surgeon: Joe Damon MD;  Location: [facility] OR    OPTICAL TRACKING SYSTEM IMPLANT SHUNT VENTRICULOPERITONEAL N/A 07/07/2022    Procedure: INSERTION, SHUNT, VENTRICULOPERITONEAL, USING OPTICAL TRACKING SYSTEM;  Surgeon: Jean-Paul Childs MD;  Location: [facility] OR    SOFT TISSUE SURGERY  05/2010    Basal Cell/right side nose       Social History     Socioeconomic History    Marital status:      Spouse name: Ceci    Number of children: 2    Years of education: 16   Occupational History    Occupation:      Employer: RETIRED      Comment: 2001,    Tobacco Use    Smoking status: Never     Passive exposure: Never    Smokeless tobacco: Never    Tobacco comments:     Never   Vaping Use    Vaping status: Never Used   Substance and Sexual Activity    Alcohol use: No    Drug use: No    Sexual activity: Never   Other Topics Concern    Parent/sibling w/ CABG, MI or angioplasty before 65F 55M? No     Social Drivers of Health     Financial Resource Strain: Low Risk  (10/7/2024)    Financial Resource Strain     Within the past 12 months, have you or your family members you live with been unable to get utilities (heat, electricity) when it was really needed?: No   Food Insecurity: Low Risk  (10/7/2024)    Food Insecurity     Within the past 12 months, did you worry that your food would run out before you got money to buy more?: No     Within the past 12 months, did the food you bought just not last and you didn t have money to get more?: No   Transportation Needs: Low Risk  (10/7/2024)    Transportation Needs     Within the past 12 months, has lack of transportation kept you from medical appointments, getting your medicines, non-medical meetings or appointments, work, or from getting things that you need?: No   Physical Activity: Insufficiently Active (10/7/2024)    Exercise Vital Sign     Days of Exercise per Week: 2 days     Minutes of Exercise per Session: 10 min   Stress: No Stress Concern Present (10/7/2024)    [employer] - Occupational Stress Questionnaire     Feeling of Stress : Not at all   Social Connections: Unknown (10/7/2024)    Social Connection and Isolation Panel [NHANES]     Frequency of Social Gatherings with Friends and Family: Once a week   Interpersonal Safety: Low Risk  (10/9/2024)    Interpersonal Safety     Do you feel physically and emotionally safe where you currently live?: Yes     Within the past 12 months, have you been hit, slapped, kicked or otherwise physically hurt by  someone?: No     Within the past 12 months, have you been humiliated or emotionally abused in other ways by your partner or ex-partner?: No   Housing Stability: Low Risk  (10/7/2024)    Housing Stability     Do you have housing? : Yes     Are you worried about losing your housing?: No   Recent Concern: Housing Stability - High Risk (10/3/2024)    Housing Stability     Do you have housing? : No     Are you worried about losing your housing?: No       family history includes Alzheimer Disease (age of onset: 73) in his mother; C.A.D. in his father; Coronary Artery Disease in his father; Diabetes in his grandchild and paternal uncle; Heart Disease in his mother and paternal grandmother.       Yodit Barrientos PA-C  Department of Neurosurgery  Office: [phone]

## 2024-10-28 NOTE — LETTER
10/28/2024      Gustavo Ramon  [address] [place] [address]      Dear Colleague,    Thank you for referring your patient, Gustavo Ramon, to the [facility] [place]. Please see a copy of my visit note below.      [facility] [place]  [address]  [facility] [address]  Phone: [phone]      Patient:  Gustavo Ramon, Date of birth 1940, 84 year old, male  Referring Provider Antionette Hunt, APRN CNP  [address] [facility] [id number]  [place] [address]    ASSESSMENT & PLAN:  Patient presents with a multitude of complaints today that are consistent with arthritis in his neck, bilateral shoulders right greater than left, and thumb.  He has intermittent pain in his low back with possible sciatica than his posterior thigh.  Additionally symptoms that are concerning for carpal tunnel syndrome in his bilateral hands.  We discussed several options for workup and treatment for these symptoms.  Patient agreed to try some conservative management as follows:  1.  Where wrist braces for 4 weeks to see if this helps with the hand and finger numbness.  2.  Try Voltaren gel or lidocaine on sore areas for back and shoulder.  3.  Switch to Tylenol arthritis formula for better and longer coverage.  4.  Avoid excessive stretching, do not push beyond pain.    We will also get a cervical dynamic x-ray to see how his neck is moving and rule out any bony abnormalities.  I will review the imaging and advise if he requires any additional follow-up.  I am happy to see him for his low back if this begins to give him more problems.  I would start with a dynamic x-ray.  This was offered today but patient deferred.    If the wrist braces are not helpful for his hands we can move forward with an EMG study.    If he continues to have shoulder and thumb pain, we discussed orthopedics would be the more appropriate place for him to be evaluated  for these complaints and they may consider injections and therapy to help with pain.      I spent 99 minutes spent in patient care, independent review and interpretation of medical records/imaging, reviewing old records.    History of Present Illness:    10/28/2024 Visit:   Patient is known to our service for C4-6 right laminoplasty by Dr. Levin on 12/12/2022 for severe CCS C5-7 w/ myelopathy and was discharged from / on 3/14/23.  He had continued imbalance requiring use of a walker and residual glove-like paresthesias and right hand/finger weakness at that time.      He returns today with his wife for neck pain and reduced range of motion, numbness in his 1st through 3rd digits, low back pain on the left side, and thumb pain.  Patient denies any injury.  He notes his pain in the back is worse in the morning and gets better as he walks throughout the day.  He has had a previous back surgery and he points to the area of discomfort towards the bottom of that surgical incision.  He sometimes gets radicular pain that runs down the back of his left thigh.  His pain is better after using Tylenol.  He also endorses generalized weakness.  His neck pain is more of an aching sensation.  He notes sounds him in his neck with range of motion that are not painful.  He notes that with extension of his head and turning to the left that his range of motion is not as good.  He gets more discomfort in these positions.  He denies radicular pain but does endorse right shoulder pain that is focal.  He does have issues with shoulder movements causing more pain.  He also gets a sharp pain at the base of his thumb with certain movements.  He is right-handed and the right shoulder and thumb are worse for him.  He notes that at times when he is using his 4 wheeled walker that he will have numbness in his fingers from the first through the third and possibly the ring finger.  He shakes these out and then that feels better.  He wakes up  "sometimes with the same numbness.  He denies that it goes further into his arm.    Patient is currently in physical therapy mainly to adjust his imbalance.  He does exercises for his neck and arms somewhat which causes pain when he is doing them.    Conservative Treatment:  Tylenol as needed-unsure if this is regular or extra strength.  Lidocaine-not tried  Voltaren gel-not tried  Heat-uses periodically.  Cock up wrist braces-not tried  Injections-not tried  Oral steroids-patient is currently off of all oral steroids as he has used them in the past on a more chronic basis.  He has not noticed any increase in his symptoms with being off of the steroids.    IMAGING   Imaging independently reviewed.    X-ray Cervical spine 2-3 vws  Result Date: 3/14/2023  XR CERVICAL SPINE 2/3 VIEWS 3/14/2023 10:55 AM History: s/p C4-6 laminoplasty; H/O cervical spine surgery ICD-10: H/O cervical spine surgery Comparison: Cervical spine radiographs 1/23/2023. Findings: AP and lateral views of the cervical spine were obtained. Grade 1 anterolisthesis at C5-6, unchanged. Also unchanged trace anterolisthesis at C4-5 and trace retrolisthesis at C6-7. Moderate loss of intervertebral disc height at C5-6 and C6-7. Mild loss of disc height at C4-5. Additional multilevel degenerative changes including endplate osteophytosis, uncinate hypertrophy, and facet hypertrophy. No prevertebral edema. No mass in the visualized lung apices. Partially visualized shunt catheter in the right neck is intact.   Impression: 1. Stable alignment status post laminoplasty. 2. Multilevel cervical degenerative changes, most pronounced at C5-6. KIERA DIAZ MD   SYSTEM ID:  [id number]    Physical Exam   /70 (BP Location: Right arm, Patient Position: Sitting, Cuff Size: Adult Regular)   Pulse 82   Ht 1.727 m (5' 8\")   Wt 76.7 kg (169 lb 1.6 oz)   BMI 25.71 kg/m      Constitutional: Appears well-developed and well-nourished. Cooperative. No acute distress. "   HENT:   Head: Normocephalic and atraumatic.   Eyes: Conjunctivae are normal.  Neck: Neck supple. No tracheal deviation present.  Cardiovascular: Normal rate and regular rhythm.    Pulmonary/Chest: Effort normal and breath sounds normal.  Abdominal: Exhibits no obvious distension.   Musculoskeletal: Able to move all extremities.  No involuntary motor movements. No C/T/L spine tenderness to palpation.  Right shoulder TTP with positive empty bottle.  Cervical flexion-extension range of motion: Flexion-good, extension-not past neutral, right lateral rotation-good, left lateral rotation - reduced   Skin: Skin is warm, dry and intact.   Psychiatric: Normal mood and affect. Speech is normal and behavior is normal.    Neurological:  Alert, NAD, and oriented to person, place, and time.   No cranial nerve deficit   Gait: Patient takes very small steps without the use of a walker.  He is careful and deliberate with ambulation.  It is difficult for him to get out of the chair and it takes him several attempts to do so.  Once he puts his head over his knees, he gets up fairly easily.  He requires assistance to get on and off the exam table.    Strength (L/R)  5/5 Deltoid (has difficulty with abducting shoulders, able to hold in position with passive positioning with 5 out of 5 strength, notes shoulder pain with resistance)  5/5 Bicep  5/5 Tricep  4+/5 Handgrip (right-handed)    5/5 Hip Flexion  5/5 Knee Extension  5/5 Ankle Dorsiflexion  5/5 Extensor Hallucis Longus  5/5 Plantar Flexion    Reflexes (L/R)  2+/2+ Bicep  2+/2+ Brachioradialis  2+/2+ Patellar  2+/2+ Ankle    No Lhermitte's  No Spurling's  No Jorge L's   No ankle clonus    Sensation: SILT    Positive right thumb grind test (indicating osteoarthritis)  Negative Tinel's, negative Phalen's, negative elbow flexion, negative medial compartment compression.    Review of Systems   See HPI     Past Medical History:   Diagnosis Date     Actinic keratosis      AK (actinic  keratosis) 11/15/2012     Amaurosis fugax      Basal cell carcinoma 2009    R medial cheek/nose     Central serous retinopathy left    Eye     Diverticulosis 08/2006     DJD (degenerative joint disease)      GERD (gastroesophageal reflux disease)      Hearing loss     High frequency     History of nonmelanoma skin cancer      History of nonmelanoma skin cancer      HTN (hypertension)      Hyperlipidemia LDL goal < 130      Hyperplastic colon polyp 08/2006     IgA monoclonal gammopathy     IgA kappa     Infection due to 2019 novel coronavirus 10/13/2022     Infection due to 2019 novel coronavirus 11/2021     Lattice degeneration of retina right    Eye     Macular degeneration      Nonsenile cataract      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    [place] consult     Rosacea      Steroid-induced diabetes mellitus, 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     Strabismus      Vitreous detachment left    Eye       Past Surgical History:   Procedure Laterality Date     ARTHROSCOPY KNEE RT/LT Left 01/2010    Knee     BIOPSY  2009/2013/2016    Nose; Prostate; BCC - left arm     COLONOSCOPY  09/24/2019    w/Endoscopy     COLONOSCOPY  07/29/2024     COLONOSCOPY WITH CO2 INSUFFLATION N/A 09/24/2019    Procedure: COLONOSCOPY, WITH CO2 INSUFFLATION;  Surgeon: Loi Levine MD;  Location: [facility] OR     COMBINED ESOPHAGOSCOPY, GASTROSCOPY, DUODENOSCOPY (EGD) WITH CO2 INSUFFLATION N/A 09/24/2019    Procedure: ESOPHAGOGASTRODUODENOSCOPY, WITH CO2 INSUFFLATION;  Surgeon: Loi Levine MD;  Location: [facility] OR     COMBINED ESOPHAGOSCOPY, GASTROSCOPY, DUODENOSCOPY (EGD) WITH CO2 INSUFFLATION N/A 7/29/2024    Procedure: Combined Esophagoscopy, Gastroscopy, Duodenoscopy (Egd) With Co2 Insufflation;  Surgeon: Jeff Peace MD;  Location: [facility] OR     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     DISKECTOMY, LUMBAR, SINGLE SP  2003    L2-3     ENT SURGERY  1943    Tonsils      ENT SURGERY  02/22/2012    Biopsy lesion right pinna     ENT  SURGERY  02/24/2012    Biopsy leson right pinna     ESOPHAGOSCOPY, GASTROSCOPY, DUODENOSCOPY (EGD), COMBINED N/A 09/24/2019    Procedure: Esophagogastroduodenoscopy, With Biopsy;  Surgeon: Loi Levine MD;  Location: [facility] OR     ESOPHAGOSCOPY, GASTROSCOPY, DUODENOSCOPY (EGD), COMBINED N/A 7/29/2024    Procedure: ESOPHAGOGASTRODUODENOSCOPY, WITH BIOPSY;  Surgeon: Jeff Peace MD;  Location: [facility] OR     IR LUMBAR DRAIN PLACEMENT W FLUORO  06/27/2022     LAMINOPLASTY CERVICAL POSTERIOR THREE+ LEVELS N/A 12/12/2022    Procedure: POSTERIOR APPROACH Cervical 4-7 laminoplasty;  Surgeon: Judie Levin MD;  Location: [facility] OR     LAPAROSCOPIC ASSISTED IMPLANT SHUNT VENTRICULOPERITONEAL N/A 07/07/2022    Procedure: possible INSERTION, SHUNT, VENTRICULOPERITONEAL, LAPAROSCOPY-ASSISTED;  Surgeon: Joe Damon MD;  Location: [facility] OR     OPTICAL TRACKING SYSTEM IMPLANT SHUNT VENTRICULOPERITONEAL N/A 07/07/2022    Procedure: INSERTION, SHUNT, VENTRICULOPERITONEAL, USING OPTICAL TRACKING SYSTEM;  Surgeon: Jean-Paul Childs MD;  Location: [facility] OR     SOFT TISSUE SURGERY  05/2010    Basal Cell/right side nose       Social History     Socioeconomic History     Marital status:      Spouse name: Ceci     Number of children: 2     Years of education: 16   Occupational History     Occupation:      Employer: RETIRED     Comment: 2001,    Tobacco Use     Smoking status: Never     Passive exposure: Never     Smokeless tobacco: Never     Tobacco comments:     Never   Vaping Use     Vaping status: Never Used   Substance and Sexual Activity     Alcohol use: No     Drug use: No     Sexual activity: Never   Other Topics Concern     Parent/sibling w/ CABG, MI or angioplasty before 65F 55M? No     Social Drivers of Health     Financial Resource Strain: Low Risk  (10/7/2024)    Financial Resource Strain      Within the past 12 months, have you or your family members you live with  been unable to get utilities (heat, electricity) when it was really needed?: No   Food Insecurity: Low Risk  (10/7/2024)    Food Insecurity      Within the past 12 months, did you worry that your food would run out before you got money to buy more?: No      Within the past 12 months, did the food you bought just not last and you didn t have money to get more?: No   Transportation Needs: Low Risk  (10/7/2024)    Transportation Needs      Within the past 12 months, has lack of transportation kept you from medical appointments, getting your medicines, non-medical meetings or appointments, work, or from getting things that you need?: No   Physical Activity: Insufficiently Active (10/7/2024)    Exercise Vital Sign      Days of Exercise per Week: 2 days      Minutes of Exercise per Session: 10 min   Stress: No Stress Concern Present (10/7/2024)    [employer] [place] [employer] - Occupational Stress Questionnaire      Feeling of Stress : Not at all   Social Connections: Unknown (10/7/2024)    Social Connection and Isolation Panel [NHANES]      Frequency of Social Gatherings with Friends and Family: Once a week   Interpersonal Safety: Low Risk  (10/9/2024)    Interpersonal Safety      Do you feel physically and emotionally safe where you currently live?: Yes      Within the past 12 months, have you been hit, slapped, kicked or otherwise physically hurt by someone?: No      Within the past 12 months, have you been humiliated or emotionally abused in other ways by your partner or ex-partner?: No   Housing Stability: Low Risk  (10/7/2024)    Housing Stability      Do you have housing? : Yes      Are you worried about losing your housing?: No   Recent Concern: Housing Stability - High Risk (10/3/2024)    Housing Stability      Do you have housing? : No      Are you worried about losing your housing?: No       family history includes Alzheimer Disease (age of onset: 73) in his mother; C.A.D. in his father; Coronary  Artery Disease in his father; Diabetes in his grandchild and paternal uncle; Heart Disease in his mother and paternal grandmother.       Yodit Barrientos PA-C  Department of Neurosurgery  Office: [phone]        Again, thank you for allowing me to participate in the care of your patient.        Sincerely,        Yodit Barrientos PA-C

## 2024-10-29 ENCOUNTER — OFFICE VISIT (OUTPATIENT)
Dept: FAMILY MEDICINE | Facility: CLINIC | Age: 84
End: 2024-10-29
Payer: MEDICARE

## 2024-10-29 VITALS
SYSTOLIC BLOOD PRESSURE: 110 MMHG | TEMPERATURE: 98.2 F | DIASTOLIC BLOOD PRESSURE: 70 MMHG | HEART RATE: 87 BPM | WEIGHT: 171 LBS | HEIGHT: 68 IN | BODY MASS INDEX: 25.91 KG/M2 | OXYGEN SATURATION: 97 % | RESPIRATION RATE: 20 BRPM

## 2024-10-29 DIAGNOSIS — G25.81 RESTLESS LEG SYNDROME: Primary | ICD-10-CM

## 2024-10-29 DIAGNOSIS — M62.81 GENERALIZED MUSCLE WEAKNESS: ICD-10-CM

## 2024-10-29 PROCEDURE — 99214 OFFICE O/P EST MOD 30 MIN: CPT | Performed by: PHYSICIAN ASSISTANT

## 2024-10-29 RX ORDER — PRAMIPEXOLE DIHYDROCHLORIDE 0.12 MG/1
0.12 TABLET ORAL 3 TIMES DAILY
Qty: 90 TABLET | Refills: 2 | Status: SHIPPED | OUTPATIENT
Start: 2024-10-29

## 2024-10-29 NOTE — PROGRESS NOTES
"Subjective   Stephen is a 84 year old, presenting for the following health issues:  Hospital F/U (10/03/24  10/04/24/Syncope/) and Recheck Medication (Discuss simvastatin and restless leg medication)        10/29/2024    12:59 PM   Additional Questions   Roomed by Darcy Veloz CMA   Accompanied by None         10/29/2024    12:59 PM   Patient Reported Additional Medications   Patient reports taking the following new medications none     HPI     Hospital Follow-up Visit:    Hospital/Nursing Home/ Rehab Facility:  [facility]  Date of Admission: 10/03/24  Date of Discharge: 10/4/2024  Reason(s) for Admission: syncope  Was the patient in the ICU or did the patient experience delirium during hospitalization?  No  Do you have any other stressors you would like to discuss with your provider? No    Problems taking medications regularly:  None  Medication changes since discharge: None  Problems adhering to non-medication therapy:  None    Summary of hospitalization:  [facility] discharge summary reviewed  Doing better.   A little more stamina since stopping simvastatin.   Rls an issue during the day.   Will discontinue requip and go back to mirapex but will have him take it tid as opposed to at bedtime.   Has not had anymore spells of pre-syncope since I last saw him       Review of Systems  Constitutional, HEENT, cardiovascular, pulmonary, GI, , musculoskeletal, neuro, skin, endocrine and psych systems are negative, except as otherwise noted.      Objective    /70   Pulse 87   Temp 98.2  F (36.8  C) (Oral)   Resp 20   Ht 1.727 m (5' 8\")   Wt 77.6 kg (171 lb)   SpO2 97%   BMI 26.00 kg/m    Body mass index is 26 kg/m .  Physical Exam   Eye exam - right eye normal lid, conjunctiva, cornea, pupil and fundus, left eye normal lid, conjunctiva, cornea, pupil and fundus.  CHEST:chest clear to IPPA, no tachypnea, retractions or cyanosis, and S1, S2 normal, no murmur, no gallop, rate regular.      Stephen " was seen today for hospital f/u and recheck medication.    Diagnoses and all orders for this visit:    Restless leg syndrome  -     pramipexole (MIRAPEX) 0.125 MG tablet; Take 1 tablet (0.125 mg) by mouth 3 times daily. Take one    Generalized muscle weakness      Discontinue requip and start mirapex.   Remain off of simvastatin.   Signed Electronically by: Winston Silverman PA-C

## 2024-11-04 ENCOUNTER — THERAPY VISIT (OUTPATIENT)
Dept: PHYSICAL THERAPY | Facility: CLINIC | Age: 84
End: 2024-11-04
Attending: PHYSICIAN ASSISTANT
Payer: MEDICARE

## 2024-11-04 DIAGNOSIS — R29.898 WEAKNESS OF BOTH LOWER EXTREMITIES: Primary | ICD-10-CM

## 2024-11-04 PROCEDURE — 97110 THERAPEUTIC EXERCISES: CPT | Mod: GP | Performed by: PHYSICAL THERAPIST

## 2024-11-08 ENCOUNTER — LAB (OUTPATIENT)
Dept: LAB | Facility: CLINIC | Age: 84
End: 2024-11-08
Payer: MEDICARE

## 2024-11-08 DIAGNOSIS — E05.90 HYPERTHYROIDISM: ICD-10-CM

## 2024-11-08 DIAGNOSIS — C61 MALIGNANT NEOPLASM OF PROSTATE (H): ICD-10-CM

## 2024-11-08 LAB
CORTIS SERPL-MCNC: 7.4 UG/DL
PSA SERPL DL<=0.01 NG/ML-MCNC: 0.7 NG/ML

## 2024-11-08 PROCEDURE — 84153 ASSAY OF PSA TOTAL: CPT

## 2024-11-08 PROCEDURE — 82533 TOTAL CORTISOL: CPT

## 2024-11-08 PROCEDURE — 36415 COLL VENOUS BLD VENIPUNCTURE: CPT

## 2024-11-12 ENCOUNTER — THERAPY VISIT (OUTPATIENT)
Dept: PHYSICAL THERAPY | Facility: CLINIC | Age: 84
End: 2024-11-12
Attending: PHYSICIAN ASSISTANT
Payer: MEDICARE

## 2024-11-12 DIAGNOSIS — R29.898 WEAKNESS OF BOTH LOWER EXTREMITIES: Primary | ICD-10-CM

## 2024-11-12 PROCEDURE — 97116 GAIT TRAINING THERAPY: CPT | Mod: GP | Performed by: PHYSICAL THERAPIST

## 2024-11-12 PROCEDURE — 97112 NEUROMUSCULAR REEDUCATION: CPT | Mod: GP | Performed by: PHYSICAL THERAPIST

## 2024-11-12 PROCEDURE — 97110 THERAPEUTIC EXERCISES: CPT | Mod: GP | Performed by: PHYSICAL THERAPIST

## 2024-11-14 PROBLEM — R79.89 LOW SERUM CORTISOL LEVEL: Status: ACTIVE | Noted: 2024-11-14

## 2024-11-15 ENCOUNTER — OFFICE VISIT (OUTPATIENT)
Dept: UROLOGY | Facility: CLINIC | Age: 84
End: 2024-11-15
Payer: MEDICARE

## 2024-11-15 VITALS
DIASTOLIC BLOOD PRESSURE: 70 MMHG | WEIGHT: 169 LBS | OXYGEN SATURATION: 97 % | BODY MASS INDEX: 25.7 KG/M2 | HEART RATE: 89 BPM | SYSTOLIC BLOOD PRESSURE: 107 MMHG

## 2024-11-15 DIAGNOSIS — N32.81 OAB (OVERACTIVE BLADDER): ICD-10-CM

## 2024-11-15 DIAGNOSIS — C61 MALIGNANT NEOPLASM OF PROSTATE (H): Primary | ICD-10-CM

## 2024-11-15 PROCEDURE — 99213 OFFICE O/P EST LOW 20 MIN: CPT | Performed by: UROLOGY

## 2024-11-15 RX ORDER — MIRABEGRON 50 MG/1
50 TABLET, FILM COATED, EXTENDED RELEASE ORAL DAILY
Qty: 30 TABLET | Refills: 1 | Status: SHIPPED | OUTPATIENT
Start: 2024-11-15

## 2024-11-15 NOTE — PROGRESS NOTES
Chief Complaint   Patient presents with    RECHECK       Gustavopadmini Ramon is a 84 year old male who presents today for follow up of   Chief Complaint   Patient presents with    RECHECK    f/u for prostate cancer.  He is s/p cryotherapy on 7/13 for prostate cancer kari 8 with initial psa of 7.4.  He did receive a hormonal shot prior to treatment. Recent psa was 0.7.  He only has more problems with frequency/urgency/nocturia/some incontinence this year.  He is on flomax/trospium.  Trospium is not effective for his urinary symptoms.    Current Outpatient Medications   Medication Sig Dispense Refill    acetaminophen (TYLENOL) 325 MG tablet Take 1-2 tablets (325-650 mg) by mouth every 4 hours as needed for mild pain      aspirin (ASA) 325 MG tablet Take 1 tablet (325 mg) by mouth daily      cyanocobalamin (VITAMIN B-12) 1000 MCG tablet Take 1 tablet (1,000 mcg) by mouth daily 90 tablet 1    fluticasone (FLONASE) 50 MCG/ACT nasal spray Spray 1 spray into both nostrils daily 11.1 mL 0    gabapentin (NEURONTIN) 300 MG capsule 600 mg in the morning and 900 mg in the evening 450 capsule 1    ketoconazole (NIZORAL) 2 % external cream Apply topically 2 times daily To face 60 g 3    levETIRAcetam (KEPPRA) 250 MG tablet TAKE 1 TABLET BY MOUTH TWO TIMES A DAY 60 tablet PRN    meclizine (ANTIVERT) 25 MG tablet Take 0.5-1 tablets (12.5-25 mg) by mouth 3 times daily as needed for dizziness 30 tablet 0    mirabegron (MYRBETRIQ) 50 MG 24 hr tablet Take 1 tablet (50 mg) by mouth daily. 30 tablet 1    multivitamin (OCUVITE) TABS tablet Take 1 tablet by mouth daily      pantoprazole (PROTONIX) 40 MG EC tablet Take 1 tablet (40 mg) by mouth daily. 90 tablet 2    polyethylene glycol (MIRALAX) 17 GM/Dose powder Take 17 g by mouth daily as needed for constipation 510 g     pyRIDostigmine (MESTINON) 60 MG tablet Take 1.5 tablets (90 mg) by mouth 2 times daily. 270 tablet 1    tamsulosin (FLOMAX) 0.4 MG capsule Take 1 capsule (0.4 mg) by  mouth daily 90 capsule 1    terbinafine (LAMISIL) 1 % external cream Apply topically 2 times daily 42 g 11    triamcinolone (KENALOG) 0.1 % external cream Apply a thin layer up to twice daily to affected areas as needed. 30 g 3    vitamin D3 (CHOLECALCIFEROL) 2000 units tablet Take 1 tablet by mouth daily 90 tablet 1    ACE/ARB/ARNI NOT PRESCRIBED (INTENTIONAL) Please choose reason not prescribed from choices below.      pramipexole (MIRAPEX) 0.125 MG tablet Take 1 tablet (0.125 mg) by mouth 3 times daily. Take one (Patient not taking: Reported on 11/15/2024) 90 tablet 2     Allergies   Allergen Reactions    Mycophenolate Other (See Comments)     Confusion, abnormal movements      Past Medical History:   Diagnosis Date    Actinic keratosis     AK (actinic keratosis) 11/15/2012    Amaurosis fugax     Basal cell carcinoma 2009    R medial cheek/nose    Central serous retinopathy left    Eye    Diverticulosis 08/2006    DJD (degenerative joint disease)     GERD (gastroesophageal reflux disease)     Hearing loss     High frequency    History of nonmelanoma skin cancer     History of nonmelanoma skin cancer     HTN (hypertension)     Hyperlipidemia LDL goal < 130     Hyperplastic colon polyp 08/2006    IgA monoclonal gammopathy     IgA kappa    Infection due to 2019 novel coronavirus 10/13/2022    Infection due to 2019 novel coronavirus 11/2021    Lattice degeneration of retina right    Eye    Macular degeneration     Nonsenile cataract     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    [facility] consult    Rosacea     Steroid-induced diabetes mellitus, 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    Strabismus     Vitreous detachment left    Eye     Past Surgical History:   Procedure Laterality Date    ARTHROSCOPY KNEE RT/LT Left 01/2010    Knee    BIOPSY  2009/2013/2016    Nose; Prostate; BCC - left arm    COLONOSCOPY  09/24/2019    w/Endoscopy    COLONOSCOPY  07/29/2024    COLONOSCOPY WITH CO2 INSUFFLATION N/A 09/24/2019    Procedure:  COLONOSCOPY, WITH CO2 INSUFFLATION;  Surgeon: Loi Levine MD;  Location: [facility]    COMBINED ESOPHAGOSCOPY, GASTROSCOPY, DUODENOSCOPY (EGD) WITH CO2 INSUFFLATION N/A 09/24/2019    Procedure: ESOPHAGOGASTRODUODENOSCOPY, WITH CO2 INSUFFLATION;  Surgeon: Loi Levine MD;  Location: [facility]    COMBINED ESOPHAGOSCOPY, GASTROSCOPY, DUODENOSCOPY (EGD) WITH CO2 INSUFFLATION N/A 7/29/2024    Procedure: Combined Esophagoscopy, Gastroscopy, Duodenoscopy (Egd) With Co2 Insufflation;  Surgeon: Jeff Peace MD;  Location: [facility]    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    DISKECTOMY, LUMBAR, SINGLE SP  2003    L2-3    ENT SURGERY  1943    Tonsils     ENT SURGERY  02/22/2012    Biopsy lesion right pinna    ENT SURGERY  02/24/2012    Biopsy leson right pinna    ESOPHAGOSCOPY, GASTROSCOPY, DUODENOSCOPY (EGD), COMBINED N/A 09/24/2019    Procedure: Esophagogastroduodenoscopy, With Biopsy;  Surgeon: Loi Levine MD;  Location: [facility]    ESOPHAGOSCOPY, GASTROSCOPY, DUODENOSCOPY (EGD), COMBINED N/A 7/29/2024    Procedure: ESOPHAGOGASTRODUODENOSCOPY, WITH BIOPSY;  Surgeon: Jeff Peace MD;  Location: [facility]    IR LUMBAR DRAIN PLACEMENT W FLUORO  06/27/2022    LAMINOPLASTY CERVICAL POSTERIOR THREE+ LEVELS N/A 12/12/2022    Procedure: POSTERIOR APPROACH Cervical 4-7 laminoplasty;  Surgeon: Judie Levin MD;  Location: [facility]    LAPAROSCOPIC ASSISTED IMPLANT SHUNT VENTRICULOPERITONEAL N/A 07/07/2022    Procedure: possible INSERTION, SHUNT, VENTRICULOPERITONEAL, LAPAROSCOPY-ASSISTED;  Surgeon: Joe Damon MD;  Location: [facility]    OPTICAL TRACKING SYSTEM IMPLANT SHUNT VENTRICULOPERITONEAL N/A 07/07/2022    Procedure: INSERTION, SHUNT, VENTRICULOPERITONEAL, USING OPTICAL TRACKING SYSTEM;  Surgeon: Jean-Paul Childs MD;  Location: [facility]    SOFT TISSUE SURGERY  05/2010    Basal Cell/right side nose     Family History   Problem Relation Age of Onset    Heart Disease Mother      Alzheimer Disease Mother 73    C.A.D. Father     Coronary Artery Disease Father     Diabetes Grandchild         using a pump     Diabetes Paternal Uncle     Heart Disease Paternal Grandmother     Glaucoma No family hx of     Macular Degeneration No family hx of     Melanoma No family hx of     Skin Cancer No family hx of      History     Social History    Marital Status:      Spouse Name: Ceci     Number of Children: 2    Years of Education: 16     Occupational History     Retired     2001, Electrical     Social History Main Topics    Smoking status: Never Smoker     Smokeless tobacco: Never Used    Alcohol Use: No    Drug Use: No    Sexual Activity: No     Other Topics Concern    Not on file     Social History Narrative       REVIEW OF SYSTEMS  =================  C: NEGATIVE for fever, chills, change in weight  I: NEGATIVE for worrisome rashes, moles or lesions  E/M: NEGATIVE for ear, mouth and throat problems  R: NEGATIVE for significant cough or SHORTNESS OF BREATH,   CV: NEGATIVE for chest pain, palpitations or peripheral edema  GI: NEGATIVE for nausea, abdominal pain, heartburn, or change in bowel habits  NEURO: NEGATIVE any motor/sensory changes  PSYCH: NEGATIVE for recent mood disorder    Physical Exam:  GENERAL: Healthy, alert and no distress  EYES: Eyes grossly normal to inspection.  No discharge or erythema, or obvious scleral/conjunctival abnormalities.  RESP: No audible wheeze, cough, or visible cyanosis.  No visible retractions or increased work of breathing.    SKIN: Visible skin clear. No significant rash, abnormal pigmentation or lesions.  NEURO: Cranial nerves grossly intact.  Mentation and speech appropriate for age.  PSYCH: Mentation appears normal, affect normal/bright, judgement and insight intact, normal speech and appearance well-groomed.    PVR about 200 ml    Assessment/Plan:   (185) Malignant neoplasm of prostate  (primary encounter diagnosis)  Comment:  psa 0.7  Plan: see  in 12 months with psa.    OAB:  failed trospium.  Trial of myrbetriq.

## 2024-11-15 NOTE — PROGRESS NOTES
Nocturia 3-4 times with LOW volume    2-3 COFFEE  3-4 CUPS WATER  1 MIlk  1 Juice  Bladder scan performed 207ml detected in bladder. Sheila Gagnon, CMA

## 2024-11-18 ENCOUNTER — OFFICE VISIT (OUTPATIENT)
Dept: DERMATOLOGY | Facility: CLINIC | Age: 84
End: 2024-11-18
Payer: MEDICARE

## 2024-11-18 ENCOUNTER — TELEPHONE (OUTPATIENT)
Dept: UROLOGY | Facility: CLINIC | Age: 84
End: 2024-11-18

## 2024-11-18 DIAGNOSIS — L57.0 ACTINIC KERATOSIS: Primary | ICD-10-CM

## 2024-11-18 DIAGNOSIS — D18.01 CHERRY ANGIOMA: ICD-10-CM

## 2024-11-18 DIAGNOSIS — D22.9 MULTIPLE MELANOCYTIC NEVI: ICD-10-CM

## 2024-11-18 DIAGNOSIS — L81.4 SOLAR LENTIGO: ICD-10-CM

## 2024-11-18 DIAGNOSIS — L82.1 SEBORRHEIC KERATOSIS: ICD-10-CM

## 2024-11-18 DIAGNOSIS — Z85.828 HISTORY OF SKIN CANCER: ICD-10-CM

## 2024-11-18 PROCEDURE — 99213 OFFICE O/P EST LOW 20 MIN: CPT | Mod: 25 | Performed by: DERMATOLOGY

## 2024-11-18 PROCEDURE — 17004 DESTROY PREMAL LESIONS 15/>: CPT | Performed by: DERMATOLOGY

## 2024-11-18 NOTE — TELEPHONE ENCOUNTER
Below notes contain pts information on pts kegels and bladder training.       ANDERS Diaz RN 11/18/2024 12:06 PM

## 2024-11-18 NOTE — PROGRESS NOTES
[facility] Dermatology Note    Encounter Date: Nov 18, 2024    Dermatology Problem List:  1. 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 Christiansen's note 11/21/11), s/p excision 5/12/09   2. Actinic keratoses  - R tragus s/p LN2 3/13/23; s/p biopsy 9/23/22  - s/p Efudex 2015, Fall 2020 to face, Spring 2021 to forearms  - s/p PDT (x3)  - s/p LN2  3. Seborrheic dermatitis  - clobetasol 0.05% solution for scalp, triam 0.1% cream for ears  4. Relevant medical history: MGUS, myasthenia gravis currently on prednisone  5. Tinea cruris  - current tx: ketoconazole cream  6. 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 Lipoma, L bicep, s/p biopsy 9/23/22    ______________________________________    Impression/Plan:  1. Actinic keratoses x16 on the face, scalp, arms  - Cryotherapy procedure note: After verbal consent and discussion of risks and benefits including, but not limited to, dyspigmentation/scar, blister, and pain, 16 was(were) treated with 1-2 mm freeze border for 1-2 cycles with liquid nitrogen. Post cryotherapy instructions were provided.     2. Reassurance provided for benign lesions not treated today including cherry angiomata, solar lentigines, seborrheic keratoses, and banal-appearing melanocytic nevi.    3. History skin cancer  - recommend skin check q6 months      Follow-up in 6 months.       Staff Involved:  Staff Only    Joe Grimaldo MD   of Dermatology  Department of Dermatology  [facility]  [employer] [employer] [facility]      CC:   Chief Complaint   Patient presents with    Skin Check     Pt is here for FBSC. Back of the left shoulder.        History of Present Illness:  Mr. Gustavo Ramon is a 84 year old male who presents as a return patient.    No specific concerns other than posterior left shoulder is a bit rough    Labs:  N/A    Physical exam:  Vitals: There were no vitals taken for this visit.  GEN: This is a well developed, well-nourished male in no acute distress, in a pleasant mood.    SKIN: Montemayor phototype II  - Full skin, which includes the head/face, both arms, chest, back, abdomen,both legs, genitalia and/or groin buttocks, digits and/or nails, was examined.  - 16 erythematous scaly macules on the face, scalp, trunk, arm  - There are dome shaped bright red papules on the head/neck, trunk, extremities.   - Multiple regular brown pigmented macules and papules are identified on the head/neck, trunk, extremities.   - Scattered brown macules on sun exposed areas.  - There are waxy stuck on tan to brown papules on the head/neck, trunk, extremities.  - No other lesions of concern on areas examined.     Past Medical History:   Past Medical History:   Diagnosis Date    Actinic keratosis     AK (actinic keratosis) 11/15/2012    Amaurosis fugax     Basal cell carcinoma 2009    R medial cheek/nose    Central serous retinopathy left    Eye    Diverticulosis 08/2006    DJD (degenerative joint disease)     GERD (gastroesophageal reflux disease)     Hearing loss     High frequency    History of nonmelanoma skin cancer     History of nonmelanoma skin cancer     HTN (hypertension)     Hyperlipidemia LDL goal < 130     Hyperplastic colon polyp 08/2006    IgA monoclonal gammopathy     IgA kappa    Infection due to 2019 novel coronavirus 10/13/2022    Infection due to 2019 novel coronavirus 11/2021    Lattice degeneration of retina right    Eye    Macular degeneration     Nonsenile cataract     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    [facility] consult    Rosacea     Steroid-induced diabetes mellitus, 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    Strabismus     Vitreous detachment left    Eye     Past Surgical History:   Procedure Laterality Date    ARTHROSCOPY KNEE RT/LT Left 01/2010    Knee    BIOPSY  2009/2013/2016    Nose; Prostate; BCC - left arm    COLONOSCOPY  09/24/2019    w/Endoscopy    COLONOSCOPY  07/29/2024    COLONOSCOPY WITH CO2 INSUFFLATION N/A 09/24/2019    Procedure: COLONOSCOPY, WITH CO2 INSUFFLATION;  Surgeon: Loi Levine MD;  Location: [facility]    COMBINED ESOPHAGOSCOPY, GASTROSCOPY, DUODENOSCOPY (EGD) WITH CO2 INSUFFLATION N/A 09/24/2019    Procedure: ESOPHAGOGASTRODUODENOSCOPY, WITH CO2 INSUFFLATION;  Surgeon: Loi Levine MD;  Location: [facility]    COMBINED ESOPHAGOSCOPY, GASTROSCOPY, DUODENOSCOPY (EGD) WITH CO2 INSUFFLATION N/A 7/29/2024    Procedure: Combined Esophagoscopy, Gastroscopy, Duodenoscopy (Egd) With Co2 Insufflation;  Surgeon: Jeff Peace MD;  Location: [facility]    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    DISKECTOMY, LUMBAR, SINGLE SP  2003    L2-3    ENT SURGERY  1943    Tonsils     ENT SURGERY  02/22/2012    Biopsy lesion right pinna    ENT SURGERY  02/24/2012    Biopsy leson right pinna    ESOPHAGOSCOPY, GASTROSCOPY, DUODENOSCOPY (EGD), COMBINED N/A 09/24/2019    Procedure: Esophagogastroduodenoscopy, With Biopsy;  Surgeon: Loi Levine MD;  Location: [facility]    ESOPHAGOSCOPY, GASTROSCOPY, DUODENOSCOPY (EGD), COMBINED N/A 7/29/2024    Procedure: ESOPHAGOGASTRODUODENOSCOPY, WITH BIOPSY;  Surgeon: Jeff Peace MD;  Location: [facility]    IR LUMBAR DRAIN PLACEMENT W FLUORO  06/27/2022    LAMINOPLASTY CERVICAL POSTERIOR THREE+ LEVELS N/A 12/12/2022    Procedure: POSTERIOR APPROACH Cervical 4-7 laminoplasty;  Surgeon: Judie Levin MD;  [facility]: [facility]    LAPAROSCOPIC ASSISTED IMPLANT SHUNT VENTRICULOPERITONEAL N/A 07/07/2022    Procedure:  possible INSERTION, SHUNT, VENTRICULOPERITONEAL, LAPAROSCOPY-ASSISTED;  Surgeon: Joe Damon MD;  Location: [facility]    OPTICAL TRACKING SYSTEM IMPLANT SHUNT VENTRICULOPERITONEAL N/A 07/07/2022    Procedure: INSERTION, SHUNT, VENTRICULOPERITONEAL, USING OPTICAL TRACKING SYSTEM;  Surgeon: Jean-Paul Childs MD;  Location: [facility]    SOFT TISSUE SURGERY  05/2010    Basal Cell/right side nose       Social History:   reports that he has never smoked. He has never been exposed to tobacco smoke. He has never used smokeless tobacco. He reports that he does not drink alcohol and does not use drugs.    Family History:  Family History   Problem Relation Age of Onset    Heart Disease Mother     Alzheimer Disease Mother 73    C.A.D. Father     Coronary Artery Disease Father     Diabetes Grandchild         using a pump     Diabetes Paternal Uncle     Heart Disease Paternal Grandmother     Glaucoma No family hx of     Macular Degeneration No family hx of     Melanoma No family hx of     Skin Cancer No family hx of        Medications:  Current Outpatient Medications   Medication Sig Dispense Refill    ACE/ARB/ARNI NOT PRESCRIBED (INTENTIONAL) Please choose reason not prescribed from choices below.      acetaminophen (TYLENOL) 325 MG tablet Take 1-2 tablets (325-650 mg) by mouth every 4 hours as needed for mild pain      aspirin (ASA) 325 MG tablet Take 1 tablet (325 mg) by mouth daily      cyanocobalamin (VITAMIN B-12) 1000 MCG tablet Take 1 tablet (1,000 mcg) by mouth daily 90 tablet 1    fluticasone (FLONASE) 50 MCG/ACT nasal spray Spray 1 spray into both nostrils daily 11.1 mL 0    gabapentin (NEURONTIN) 300 MG capsule 600 mg in the morning and 900 mg in the evening 450 capsule 1    ketoconazole (NIZORAL) 2 % external cream Apply topically 2 times daily To face 60 g 3    levETIRAcetam (KEPPRA) 250 MG tablet TAKE 1 TABLET BY MOUTH TWO TIMES A DAY 60 tablet PRN    meclizine (ANTIVERT) 25 MG tablet Take 0.5-1 tablets  (12.5-25 mg) by mouth 3 times daily as needed for dizziness 30 tablet 0    mirabegron (MYRBETRIQ) 50 MG 24 hr tablet Take 1 tablet (50 mg) by mouth daily. 30 tablet 1    multivitamin (OCUVITE) TABS tablet Take 1 tablet by mouth daily      pantoprazole (PROTONIX) 40 MG EC tablet Take 1 tablet (40 mg) by mouth daily. 90 tablet 2    polyethylene glycol (MIRALAX) 17 GM/Dose powder Take 17 g by mouth daily as needed for constipation 510 g     pramipexole (MIRAPEX) 0.125 MG tablet Take 1 tablet (0.125 mg) by mouth 3 times daily. Take one 90 tablet 2    pyRIDostigmine (MESTINON) 60 MG tablet Take 1.5 tablets (90 mg) by mouth 2 times daily. 270 tablet 1    tamsulosin (FLOMAX) 0.4 MG capsule Take 1 capsule (0.4 mg) by mouth daily 90 capsule 1    terbinafine (LAMISIL) 1 % external cream Apply topically 2 times daily 42 g 11    triamcinolone (KENALOG) 0.1 % external cream Apply a thin layer up to twice daily to affected areas as needed. 30 g 3    vitamin D3 (CHOLECALCIFEROL) 2000 units tablet Take 1 tablet by mouth daily 90 tablet 1     Allergies   Allergen Reactions    Mycophenolate Other (See Comments)     Confusion, abnormal movements

## 2024-11-18 NOTE — TELEPHONE ENCOUNTER
Retail Pharmacy Prior Authorization Team   Phone: [phone]    [facility] TEAM RECEIVED A DENIAL LETTER FOR AN EPA SUBMITTED BY THE CARE TEAM

## 2024-11-18 NOTE — TELEPHONE ENCOUNTER
Glenn Jones MD   Physician  Specialty: Urology     Progress Notes  Signed     Note Time: 2/25/2014 10:17 AM     Expand All Collapse All      Chief Complaint   Patient presents with    RECHECK         Gustavo Ramon is a 73 year old male who presents today for follow up of       Chief Complaint   Patient presents with    RECHECK    f/u s/p cryotherapy on 11/13.  He has noticed some incontinence lately usually happened when he is sitting.  He denies any obstructive voiding symptoms/stress incontinence/dysuria.     Current Outpatient Prescriptions        Current Outpatient Prescriptions   Medication Sig    cyanocolbalamin (VITAMIN  B-12) 1000 MCG tablet Take 1 tablet by mouth daily    predniSONE (DELTASONE) 10 MG tablet Take 1 tablet by mouth daily    omeprazole 20 MG tablet Take 1 tablet by mouth daily    pyridostigmine (MESTINON) 60 MG tablet Take 1 tablet (60 mg) by mouth 3 times daily    hydrochlorothiazide (HYDRODIURIL) 25 MG tablet Take 0.5 tablets by mouth daily. .    lisinopril (PRINIVIL,ZESTRIL) 5 MG tablet Take 1 tablet by mouth daily.    Multiple Vitamins-Minerals (PRESERVISION/LUTEIN PO) Take  by mouth daily.         Allergies        Allergies   Allergen Reactions    Nkda (No Known Drug Allergies)           Past Medical History         Past Medical History   Diagnosis Date    Rosacea      DJD (degenerative joint disease)      GERD (gastroesophageal reflux disease)      Hearing loss         high frequency    Hyperlipidemia LDL goal < 130      Hyperplastic colon polyp 08/2006    Diverticulosis 08/2006    Ocular myasthenia gravis 2/2009       [place] consult    Vitreous detachment         left eye    Lattice degeneration of retina         right eye    Central serous retinopathy         left eye    HTN (hypertension)      IgA monoclonal gammopathy         IgA kappa    Actinic keratosis      Basal cell carcinoma 2009       R medial cheek/nose    Amaurosis fugax      AK (actinic keratosis) 11/15/2012          Past Surgical History         Past Surgical History   Procedure Date    Arthroscopy knee rt/lt Jan 2010       Left knee    Diskectomy, lumbar, single sp 2003       L2-3    Soft tissue surgery May 2010       Basal Cell/right side nose    Ent surgery 1943       Tonsils     Ent surgery 2-22-12       Biopsy lesion right pinna    Ent surgery 2-24-12       Biopsy leson right pinna         Family History          Family History   Problem Relation Age of Onset    Heart Mother      C.A.D. Father      Heart Paternal Grandmother      Diabetes Grandchild         using a pump      Diabetes Paternal Uncle           History            Social History    Marital Status:        Spouse Name: Ceci       Number of Children: 2    Years of Education: 16            Occupational History     Retired       2001, Electrical           Social History Main Topics    Smoking status: Never Smoker     Smokeless tobacco: Never Used    Alcohol Use: No    Drug Use: No    Sexually Active: No           Other Topics Concern    None          Social History Narrative    None         REVIEW OF SYSTEMS  =================  C: NEGATIVE for fever, chills, change in weight  I: NEGATIVE for worrisome rashes, moles or lesions  E/M: NEGATIVE for ear, mouth and throat problems  R: NEGATIVE for significant cough or SHORTNESS OF BREATH,   CV: NEGATIVE for chest pain, palpitations or peripheral edema  GI: NEGATIVE for nausea, abdominal pain, heartburn, or change in bowel habits  NEURO: NEGATIVE any motor/sensory changes  PSYCH: NEGATIVE for recent mood disorder     Physical Exam:  /86  Pulse 69  SpO2 96%   Patient is pleasant, in no acute distress, good general condition.  Lung: no evidence of respiratory distress    Abdomen: Soft, nondistended, non tender. No masses. No rebound or guarding.   Exam: no cva tenderness.  Penis no d/c.  Testis no masses.  Prostate small prostate palpated.  Skin: Warm and dry.  No redness.  Psych: normal  "mood and affect  Neuro: alert and oriented     Assessment/Plan:   185 Malignant neoplasm of prostate  (primary encounter diagnosis)  Comment:    Plan: PSA tumor marker              788.30 Unspecified urinary incontinence  Comment:    Plan: *UA reflex to Microscopic and Culture, MEASURE         POST-VOID RESIDUAL URINE/BLADDER CAPACITY, US         NON-IMAGING        ua neg.  Bladder scan zero cc.  kegel exercise next.                                             Office Visit on 2/25/2014            Detailed Report    ---------------------------------------------------------------------------------------------------        Winston Silverman PA-C   Physician Assistant - SHARON  Specialty: Family Medicine     Patient Instructions  Signed     Encounter Date: 10/3/2023       Patient Education   Personalized Prevention Plan  You are due for the preventive services outlined below.  Your care team is available to assist you in scheduling these services.  If you have already completed any of these items, please share that information with your care team to update in your medical record.       Health Maintenance Due   Topic Date Due    COVID-19 Vaccine (3 - Pfizer risk series) 03/25/2021    Cholesterol Lab  01/31/2023    Kidney Microalbumin Urine Test  02/07/2023    Flu Vaccine (1) 09/01/2023      Learning About Being Physically Active  What is physical activity?     Being physically active means doing any kind of activity that gets your body moving.  The types of physical activity that can help you get fit and stay healthy include:  Aerobic or \"cardio\" activities. These make your heart beat faster and make you breathe harder, such as brisk walking, riding a bike, or running. They strengthen your heart and lungs and build up your endurance.  Strength training activities. These make your muscles work against, or \"resist,\" something. Examples include lifting weights or doing push-ups. These activities help tone and strengthen your " muscles and bones.  Stretches. These let you move your joints and muscles through their full range of motion. Stretching helps you be more flexible.  Reaching a balance between these three types of physical activity is important because each one contributes to your overall fitness.  What are the benefits of being active?  Being active is one of the best things you can do for your health. It helps you to:  Feel stronger and have more energy to do all the things you like to do.  Focus better at school or work.  Feel, think, and sleep better.  Reach and stay at a healthy weight.  Lose fat and build lean muscle.  Lower your risk for serious health problems, including diabetes, heart attack, high blood pressure, and some cancers.  Keep your heart, lungs, bones, muscles, and joints strong and healthy.  How can you make being active part of your life?  Start slowly. Make it your long-term goal to get at least 30 minutes of exercise on most days of the week. Walking is a good choice. You also may want to do other activities, such as running, swimming, cycling, or playing tennis or team sports.  Pick activities that you like--ones that make your heart beat faster, your muscles stronger, and your muscles and joints more flexible. If you find more than one thing you like doing, do them all. You don't have to do the same thing every day.  Get your heart pumping every day. Any activity that makes your heart beat faster and keeps it at that rate for a while counts.  Here are some great ways to get your heart beating faster:  Go for a brisk walk, run, or bike ride.  Go for a hike or swim.  Go in-line skating.  Play a game of touch football, basketball, or soccer.  Ride a bike.  Play tennis or racquetball.  Climb stairs.  Even some household chores can be aerobic--just do them at a faster pace. Vacuuming, raking or mowing the lawn, sweeping the garage, and washing and waxing the car all can help get your heart rate up.  Strengthen  "your muscles during the week. You don't have to lift heavy weights or grow big, bulky muscles to get stronger. Doing a few simple activities that make your muscles work against, or \"resist,\" something can help you get stronger.  For example, you can:  Do push-ups or sit-ups, which use your own body weight as resistance.  Lift weights or dumbbells or use stretch bands at home or in a gym or community center.  Stretch your muscles often. Stretching will help you as you become more active. It can help you stay flexible, loosen tight muscles, and avoid injury. It can also help improve your balance and posture and can be a great way to relax.  Be sure to stretch the muscles you'll be using when you work out. It's best to warm your muscles slightly before you stretch them. Walk or do some other light aerobic activity for a few minutes, and then start stretching.  When you stretch your muscles:  Do it slowly. Stretching is not about going fast or making sudden movements.  Don't push or bounce during a stretch.  Hold each stretch for at least 15 to 30 seconds, if you can. You should feel a stretch in the muscle, but not pain.  Breathe out as you do the stretch. Then breathe in as you hold the stretch. Don't hold your breath.  If you're worried about how more activity might affect your health, have a checkup before you start. Follow any special advice your doctor gives you for getting a smart start.  Where can you learn more?  Go to https://www.[employer].net/patiented  Enter W332 in the search box to learn more about \"Learning About Being Physically Active.\"  Current as of: October 10, 2022               Content Version: 13.7    8300-2081 [employer].   Care instructions adapted under license by your healthcare professional. If you have questions about a medical condition or this instruction, always ask your healthcare professional. [employer] disclaims any warranty or liability for your use of " this information.        Activities of Daily Living     Your Health Risk Assessment indicates you have difficulties with activities of daily living such as housework, bathing, preparing meals, taking medication, etc. Please make a follow up appointment for us to address this issue in more detail.  Bladder Training: Care Instructions  Your Care Instructions     Bladder training is used to treat urge incontinence and stress incontinence. Urge incontinence means that the need to urinate comes on so fast that you can't get to a toilet in time. Stress incontinence means that you leak urine because of pressure on your bladder. For example, it may happen when you laugh, cough, or lift something heavy.  Bladder training can increase how long you can wait before you have to urinate. It can also help your bladder hold more urine. And it can give you better control over the urge to urinate.  It is important to remember that bladder training takes a few weeks to a few months to make a difference. You may not see results right away, but don't give up.  Follow-up care is a key part of your treatment and safety. Be sure to make and go to all appointments, and call your doctor if you are having problems. It's also a good idea to know your test results and keep a list of the medicines you take.  How can you care for yourself at home?  Work with your doctor to come up with a bladder training program that is right for you. You may use one or more of the following methods.  Delayed urination  In the beginning, try to keep from urinating for 5 minutes after you first feel the need to go.  While you wait, take deep, slow breaths to relax. Kegel exercises can also help you delay the need to go to the bathroom.  After some practice, when you can easily wait 5 minutes to urinate, try to wait 10 minutes before you urinate.  Slowly increase the waiting period until you are able to control when you have to urinate.  Scheduled urination  Empty  "your bladder when you first wake up in the morning.  Schedule times throughout the day when you will urinate.  Start by going to the bathroom every hour, even if you don't need to go.  Slowly increase the time between trips to the bathroom.  When you have found a schedule that works well for you, keep doing it.  If you wake up during the night and have to urinate, do it. Apply your schedule to waking hours only.  Kegel exercises  These tighten and strengthen pelvic muscles, which can help you control the flow of urine. (If doing these exercises causes pain, stop doing them and talk with your doctor.) To do Kegel exercises:  Squeeze your muscles as if you were trying not to pass gas. Or squeeze your muscles as if you were stopping the flow of urine. Your belly, legs, and buttocks shouldn't move.  Hold the squeeze for 3 seconds, then relax for 5 to 10 seconds.  Start with 3 seconds, then add 1 second each week until you are able to squeeze for 10 seconds.  Repeat the exercise 10 times a session. Do 3 to 8 sessions a day.  When should you call for help?  Watch closely for changes in your health, and be sure to contact your doctor if:    Your incontinence is getting worse.     You do not get better as expected.   Where can you learn more?  Go to https://www.[employer].net/patiented  Enter V684 in the search box to learn more about \"Bladder Training: Care Instructions.\"  Current as of: March 1, 2023               Content Version: 13.7    9459-2624 [employer].   Care instructions adapted under license by your healthcare professional. If you have questions about a medical condition or this instruction, always ask your healthcare professional. [employer] disclaims any warranty or liability for your use of this information.                       Office Visit on 10/3/2023            Detailed Report          Note shared with patient  Additional Documentation    Vitals: /72     Pulse 77   " "  Temp 96.9  F (36.1  C) (Temporal)     Resp 24     Ht 1.702 m (5' 7\")     Wt 84.8 kg (187 lb)     SpO2 96%     BMI 29.29 kg/m      BSA 2 m      Pain Sc No Pain (0)          More Vitals   Flowsheets: Patient-Reported Data,     Visit Details,     ...(3 more)   Encounter Info: Billing Info,     History,     Allergies,     Detailed Report             "

## 2024-11-18 NOTE — NURSING NOTE
Gustavo Ramon's goals for this visit include:   Chief Complaint   Patient presents with    Skin Check     Pt is here for FBSC. Back of the left shoulder.        He requests these members of his care team be copied on today's visit information:     PCP: Winston Silverman    Referring Provider:  Referred Self, MD  No address on file    There were no vitals taken for this visit.    Do you need any medication refills at today's visit?     Carolee Manjarrez LPN on 11/18/2024 at 1:55 PM

## 2024-11-18 NOTE — LETTER
11/18/2024      Gustavo Ramon  [address] [place] [address]      Dear Colleague,    Thank you for referring your patient, Gustavo Ramon, to the [facility]. Please see a copy of my visit note below.    [facility] Dermatology Note    Encounter Date: Nov 18, 2024    Dermatology Problem List:  1. 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 Christiansen's note 11/21/11), s/p excision 5/12/09   2. Actinic keratoses  - R tragus s/p LN2 3/13/23; s/p biopsy 9/23/22  - s/p Efudex 2015, Fall 2020 to face, Spring 2021 to forearms  - s/p PDT (x3)  - s/p LN2  3. Seborrheic dermatitis  - clobetasol 0.05% solution for scalp, triam 0.1% cream for ears  4. Relevant medical history: MGUS, myasthenia gravis currently on prednisone  5. Tinea cruris  - current tx: ketoconazole cream  6. 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 Lipoma, L bicep, s/p biopsy 9/23/22    ______________________________________    Impression/Plan:  1. Actinic keratoses x16 on the face, scalp, arms  - Cryotherapy procedure note: After verbal consent and discussion of risks and benefits including, but not limited to, dyspigmentation/scar, blister, and pain, 16 was(were) treated with 1-2 mm freeze border for 1-2 cycles with liquid nitrogen. Post cryotherapy instructions were provided.     2. Reassurance provided for benign lesions not treated today including cherry angiomata, solar lentigines, seborrheic keratoses, and banal-appearing  melanocytic nevi.    3. History skin cancer  - recommend skin check q6 months      Follow-up in 6 months.       Staff Involved:  Staff Only    Joe Grimaldo MD   of Dermatology  Department of Dermatology  [facility]      CC:   Chief Complaint   Patient presents with     Skin Check     Pt is here for FBSC. Back of the left shoulder.        History of Present Illness:  Mr. Gustavo Ramon is a 84 year old male who presents as a return patient.    No specific concerns other than posterior left shoulder is a bit rough    Labs:  N/A    Physical exam:  Vitals: There were no vitals taken for this visit.  GEN: This is a well developed, well-nourished male in no acute distress, in a pleasant mood.    SKIN: Montemayor phototype II  - Full skin, which includes the head/face, both arms, chest, back, abdomen,both legs, genitalia and/or groin buttocks, digits and/or nails, was examined.  - 16 erythematous scaly macules on the face, scalp, trunk, arm  - There are dome shaped bright red papules on the head/neck, trunk, extremities.   - Multiple regular brown pigmented macules and papules are identified on the head/neck, trunk, extremities.   - Scattered brown macules on sun exposed areas.  - There are waxy stuck on tan to brown papules on the head/neck, trunk, extremities.  - No other lesions of concern on areas examined.     Past Medical History:   Past Medical History:   Diagnosis Date     Actinic keratosis      AK (actinic keratosis) 11/15/2012     Amaurosis fugax      Basal cell carcinoma 2009    R medial cheek/nose     Central serous retinopathy left    Eye     Diverticulosis 08/2006     DJD (degenerative joint disease)      GERD (gastroesophageal reflux disease)      Hearing loss     High frequency     History of nonmelanoma skin cancer      History of nonmelanoma skin cancer      HTN (hypertension)      Hyperlipidemia LDL goal < 130      Hyperplastic colon polyp  08/2006     IgA monoclonal gammopathy     IgA kappa     Infection due to 2019 novel coronavirus 10/13/2022     Infection due to 2019 novel coronavirus 11/2021     Lattice degeneration of retina right    Eye     Macular degeneration      Nonsenile cataract      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    [place] consult     Rosacea      Steroid-induced diabetes mellitus, 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     Strabismus      Vitreous detachment left    Eye     Past Surgical History:   Procedure Laterality Date     ARTHROSCOPY KNEE RT/LT Left 01/2010    Knee     BIOPSY  2009/2013/2016    Nose; Prostate; BCC - left arm     COLONOSCOPY  09/24/2019    w/Endoscopy     COLONOSCOPY  07/29/2024     COLONOSCOPY WITH CO2 INSUFFLATION N/A 09/24/2019    Procedure: COLONOSCOPY, WITH CO2 INSUFFLATION;  Surgeon: Loi Levine MD;  Location: [facility] OR     COMBINED ESOPHAGOSCOPY, GASTROSCOPY, DUODENOSCOPY (EGD) WITH CO2 INSUFFLATION N/A 09/24/2019    Procedure: ESOPHAGOGASTRODUODENOSCOPY, WITH CO2 INSUFFLATION;  Surgeon: Loi Levine MD;  Location: [facility] OR     COMBINED ESOPHAGOSCOPY, GASTROSCOPY, DUODENOSCOPY (EGD) WITH CO2 INSUFFLATION N/A 7/29/2024    Procedure: Combined Esophagoscopy, Gastroscopy, Duodenoscopy (Egd) With Co2 Insufflation;  Surgeon: Jeff Peace MD;  Location: [facility] OR     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     DISKECTOMY, LUMBAR, SINGLE SP  2003    L2-3     ENT SURGERY  1943    Tonsils      ENT SURGERY  02/22/2012    Biopsy lesion right pinna     ENT SURGERY  02/24/2012    Biopsy leson right pinna     ESOPHAGOSCOPY, GASTROSCOPY, DUODENOSCOPY (EGD), COMBINED N/A 09/24/2019    Procedure: Esophagogastroduodenoscopy, With Biopsy;  Surgeon: Loi Levine MD;  Location: [facility] OR     ESOPHAGOSCOPY, GASTROSCOPY, DUODENOSCOPY (EGD), COMBINED N/A 7/29/2024    Procedure: ESOPHAGOGASTRODUODENOSCOPY, WITH BIOPSY;  Surgeon: Jeff Peace MD;  Location: [facility] OR     IR LUMBAR DRAIN PLACEMENT W  FLUORO  06/27/2022     LAMINOPLASTY CERVICAL POSTERIOR THREE+ LEVELS N/A 12/12/2022    Procedure: POSTERIOR APPROACH Cervical 4-7 laminoplasty;  Surgeon: Judie Levin MD;  Location: [facility] OR     LAPAROSCOPIC ASSISTED IMPLANT SHUNT VENTRICULOPERITONEAL N/A 07/07/2022    Procedure: possible INSERTION, SHUNT, VENTRICULOPERITONEAL, LAPAROSCOPY-ASSISTED;  Surgeon: Joe Damon MD;  Location: [facility] OR     OPTICAL TRACKING SYSTEM IMPLANT SHUNT VENTRICULOPERITONEAL N/A 07/07/2022    Procedure: INSERTION, SHUNT, VENTRICULOPERITONEAL, USING OPTICAL TRACKING SYSTEM;  Surgeon: Jean-Paul Childs MD;  Location: [facility] OR     SOFT TISSUE SURGERY  05/2010    Basal Cell/right side nose       Social History:   reports that he has never smoked. He has never been exposed to tobacco smoke. He has never used smokeless tobacco. He reports that he does not drink alcohol and does not use drugs.    Family History:  Family History   Problem Relation Age of Onset     Heart Disease Mother      Alzheimer Disease Mother 73     C.A.D. Father      Coronary Artery Disease Father      Diabetes Grandchild         using a pump      Diabetes Paternal Uncle      Heart Disease Paternal Grandmother      Glaucoma No family hx of      Macular Degeneration No family hx of      Melanoma No family hx of      Skin Cancer No family hx of        Medications:  Current Outpatient Medications   Medication Sig Dispense Refill     ACE/ARB/ARNI NOT PRESCRIBED (INTENTIONAL) Please choose reason not prescribed from choices below.       acetaminophen (TYLENOL) 325 MG tablet Take 1-2 tablets (325-650 mg) by mouth every 4 hours as needed for mild pain       aspirin (ASA) 325 MG tablet Take 1 tablet (325 mg) by mouth daily       cyanocobalamin (VITAMIN B-12) 1000 MCG tablet Take 1 tablet (1,000 mcg) by mouth daily 90 tablet 1     fluticasone (FLONASE) 50 MCG/ACT nasal spray Spray 1 spray into both nostrils daily 11.1 mL 0     gabapentin (NEURONTIN) 300 MG capsule  600 mg in the morning and 900 mg in the evening 450 capsule 1     ketoconazole (NIZORAL) 2 % external cream Apply topically 2 times daily To face 60 g 3     levETIRAcetam (KEPPRA) 250 MG tablet TAKE 1 TABLET BY MOUTH TWO TIMES A DAY 60 tablet PRN     meclizine (ANTIVERT) 25 MG tablet Take 0.5-1 tablets (12.5-25 mg) by mouth 3 times daily as needed for dizziness 30 tablet 0     mirabegron (MYRBETRIQ) 50 MG 24 hr tablet Take 1 tablet (50 mg) by mouth daily. 30 tablet 1     multivitamin (OCUVITE) TABS tablet Take 1 tablet by mouth daily       pantoprazole (PROTONIX) 40 MG EC tablet Take 1 tablet (40 mg) by mouth daily. 90 tablet 2     polyethylene glycol (MIRALAX) 17 GM/Dose powder Take 17 g by mouth daily as needed for constipation 510 g      pramipexole (MIRAPEX) 0.125 MG tablet Take 1 tablet (0.125 mg) by mouth 3 times daily. Take one 90 tablet 2     pyRIDostigmine (MESTINON) 60 MG tablet Take 1.5 tablets (90 mg) by mouth 2 times daily. 270 tablet 1     tamsulosin (FLOMAX) 0.4 MG capsule Take 1 capsule (0.4 mg) by mouth daily 90 capsule 1     terbinafine (LAMISIL) 1 % external cream Apply topically 2 times daily 42 g 11     triamcinolone (KENALOG) 0.1 % external cream Apply a thin layer up to twice daily to affected areas as needed. 30 g 3     vitamin D3 (CHOLECALCIFEROL) 2000 units tablet Take 1 tablet by mouth daily 90 tablet 1     Allergies   Allergen Reactions     Mycophenolate Other (See Comments)     Confusion, abnormal movements         Again, thank you for allowing me to participate in the care of your patient.        Sincerely,        Joe Grimaldo MD

## 2024-11-21 ENCOUNTER — THERAPY VISIT (OUTPATIENT)
Dept: PHYSICAL THERAPY | Facility: CLINIC | Age: 84
End: 2024-11-21
Attending: PHYSICIAN ASSISTANT
Payer: MEDICARE

## 2024-11-21 DIAGNOSIS — R29.898 WEAKNESS OF BOTH LOWER EXTREMITIES: Primary | ICD-10-CM

## 2024-11-21 PROCEDURE — 97110 THERAPEUTIC EXERCISES: CPT | Mod: GP | Performed by: PHYSICAL THERAPIST

## 2024-11-21 PROCEDURE — 97116 GAIT TRAINING THERAPY: CPT | Mod: GP | Performed by: PHYSICAL THERAPIST

## 2024-11-25 ENCOUNTER — THERAPY VISIT (OUTPATIENT)
Dept: PHYSICAL THERAPY | Facility: CLINIC | Age: 84
End: 2024-11-25
Attending: PHYSICIAN ASSISTANT
Payer: MEDICARE

## 2024-11-25 DIAGNOSIS — R29.898 WEAKNESS OF BOTH LOWER EXTREMITIES: Primary | ICD-10-CM

## 2024-11-25 PROCEDURE — 97116 GAIT TRAINING THERAPY: CPT | Mod: GP | Performed by: PHYSICAL THERAPIST

## 2024-11-25 PROCEDURE — 97110 THERAPEUTIC EXERCISES: CPT | Mod: GP | Performed by: PHYSICAL THERAPIST

## 2024-12-02 ENCOUNTER — MYC MEDICAL ADVICE (OUTPATIENT)
Dept: NEUROLOGY | Facility: CLINIC | Age: 84
End: 2024-12-02
Payer: MEDICARE

## 2024-12-02 DIAGNOSIS — G70.00 OCULAR MYASTHENIA (H): Primary | ICD-10-CM

## 2024-12-03 RX ORDER — PREDNISONE 10 MG/1
10 TABLET ORAL DAILY
Qty: 90 TABLET | Refills: 1 | Status: SHIPPED | OUTPATIENT
Start: 2024-12-03

## 2024-12-16 ENCOUNTER — TELEPHONE (OUTPATIENT)
Dept: VASCULAR SURGERY | Facility: CLINIC | Age: 84
End: 2024-12-16

## 2024-12-16 ENCOUNTER — LAB (OUTPATIENT)
Dept: LAB | Facility: CLINIC | Age: 84
End: 2024-12-16
Payer: MEDICARE

## 2024-12-16 DIAGNOSIS — E05.90 HYPERTHYROIDISM: ICD-10-CM

## 2024-12-16 PROCEDURE — 84439 ASSAY OF FREE THYROXINE: CPT

## 2024-12-16 PROCEDURE — 84443 ASSAY THYROID STIM HORMONE: CPT

## 2024-12-16 PROCEDURE — 36415 COLL VENOUS BLD VENIPUNCTURE: CPT

## 2024-12-16 NOTE — TELEPHONE ENCOUNTER
REMEDIOS to schedule US/CTA/6mo follow up with Dr. Murillo around March. [phone]  _______________________________  Angely Arteaga, RN  [facility]  Please call to schedule 6 months with Dr. SIMMONS with CTA and US

## 2024-12-17 DIAGNOSIS — E05.90 HYPERTHYROIDISM: Primary | ICD-10-CM

## 2024-12-17 LAB
T4 FREE SERPL-MCNC: 1.24 NG/DL (ref 0.9–1.7)
TSH SERPL DL<=0.005 MIU/L-ACNC: 4.59 UIU/ML (ref 0.3–4.2)

## 2024-12-19 NOTE — TELEPHONE ENCOUNTER
LMTCB #3 to schedule US/CTA/6mo follow up with Dr. Murillo around March. [phone], send letter if no response

## 2024-12-21 ENCOUNTER — MYC REFILL (OUTPATIENT)
Dept: UROLOGY | Facility: CLINIC | Age: 84
End: 2024-12-21
Payer: MEDICARE

## 2024-12-21 DIAGNOSIS — R39.15 URINARY URGENCY: ICD-10-CM

## 2024-12-23 RX ORDER — TAMSULOSIN HYDROCHLORIDE 0.4 MG/1
0.4 CAPSULE ORAL DAILY
Qty: 90 CAPSULE | Refills: 3 | Status: SHIPPED | OUTPATIENT
Start: 2024-12-23

## 2024-12-23 NOTE — TELEPHONE ENCOUNTER
Refill prescription approved per [facility] Refill Protocol. Last OV= 11/2024.    Pending Prescriptions:                       Disp   Refills    tamsulosin (FLOMAX) 0.4 MG capsule        90 cap*3            Sig: Take 1 capsule (0.4 mg) by mouth daily.      Katlyn GONZALEZ RN Urology 12/23/2024 12:32 PM

## 2024-12-27 ENCOUNTER — NURSE TRIAGE (OUTPATIENT)
Dept: FAMILY MEDICINE | Facility: CLINIC | Age: 84
End: 2024-12-27
Payer: MEDICARE

## 2024-12-27 NOTE — TELEPHONE ENCOUNTER
"Wife, Ceci calling (consent on file)   Diagnosed with syncope this past year.     S-(situation): Patient had syncope episodes today (2 in total)    B-(background): Patient has hypertension and has a shunt placed in the past year per wife    A-(assessment): Had one episode today 11 am and then again at 1 pm. Took blood pressures at the time of the episodes, which was in normal range. Now blood pressure is 150/80. She stated he fell to the side and \"slumped over\". Its brief, happens for about 30 seconds - 1 minute. He can feel this coming on, and does not loose consciousness. He has been seen in ED for this over the past year. After episodes, his mind is clear, alert, Never acted confused. Never had this happen two times in one day, besides today.      Denies chest pain, shortness of breath, headache, numbness, weakness, confusion, abdominal pain, vomiting, diarrhea, vertigo, blurred vision, weakness.     R-(recommendations): Per protocol, be evaluated in ED. She agreed. RN encouraged to not allow patient to drive. She verbalized understanding on recommendations.     Gifty Negro RN on 12/27/2024 at 2:16 PM        Reason for Disposition   History of heart problems or congestive heart failure    Additional Information   Negative: Still unconscious   Negative: Still feels dizzy or lightheaded   Negative: Difficult to awaken or acting confused (e.g., disoriented, slurred speech)   Negative: Difficulty breathing   Negative: Bluish (or gray) lips or face   Negative: Shock suspected (e.g., cold/pale/clammy skin, too weak to stand, low BP, rapid pulse)   Negative: Bleeding (e.g., vomiting blood, rectal bleeding or tarry stools, severe vaginal bleeding)   Negative: Chest pain   Negative: Extra heartbeats, irregular heart beating, or heart is beating very fast (i.e., 'palpitations')   Negative: Heart beating < 50 beats per minute OR > 140 beats per minute   Negative: Fainted suddenly after medicine, allergic food or bee " "sting   Negative: Sounds like a life-threatening emergency to the triager   Negative: Fainted > 15 minutes ago and still looks pale (pale skin, pallor)   Negative: Fainted > 15 minutes ago and still feels weak or dizzy    Answer Assessment - Initial Assessment Questions  1. ONSET: \"How long were you unconscious?\" (e.g., minutes, seconds) \"When did it happen?\"      11 am and then again at 1 pm   2. CONTENT: \"What happened during the period of unconsciousness?\" (e.g., seizure activity)       Patient slumped over - does not believe this was seizure   3. MENTAL STATUS: \"Alert and oriented now?\" (e.g., oriented x 3 = name, month, location)       A&O   4. TRIGGER: \"What do you think caused the fainting?\" \"What were you doing just before you fainted?\"  (e.g., exercise, sudden standing up, prolonged standing)      After eating or during eating   5. RECURRENT SYMPTOM: \"Have you ever passed out before?\" If Yes, ask: \"When was the last time?\" and \"What happened that time?\"       YES several times in past few months.   6. INJURY: \"Did you hurt yourself when you fell?\"       Denies   7. CARDIAC SYMPTOMS: \"Have you had any of the following symptoms: chest pain, difficulty breathing, palpitations?\"      Denies   8. NEUROLOGIC SYMPTOMS: \"Have you had any of the following symptoms: headache, numbness, vertigo, weakness?\"      Denies all   9. GI SYMPTOMS: \"Have you had any of the following symptoms: abdomen pain, vomiting, diarrhea, blood in stools?\"      Denies   10. OTHER SYMPTOMS: \"Do you have any other symptoms?\"        Denies   11. PREGNANCY: \"Is there any chance you are pregnant?\" \"When was your last menstrual period?\"        NA    Protocols used: [id number]    "

## 2025-01-06 ENCOUNTER — OFFICE VISIT (OUTPATIENT)
Dept: FAMILY MEDICINE | Facility: CLINIC | Age: 85
End: 2025-01-06
Payer: MEDICARE

## 2025-01-06 VITALS
WEIGHT: 172.4 LBS | BODY MASS INDEX: 27.06 KG/M2 | SYSTOLIC BLOOD PRESSURE: 112 MMHG | OXYGEN SATURATION: 98 % | HEART RATE: 71 BPM | DIASTOLIC BLOOD PRESSURE: 60 MMHG | HEIGHT: 67 IN | TEMPERATURE: 96.8 F | RESPIRATION RATE: 20 BRPM

## 2025-01-06 DIAGNOSIS — R55 SYNCOPE, UNSPECIFIED SYNCOPE TYPE: Primary | ICD-10-CM

## 2025-01-06 PROCEDURE — 99215 OFFICE O/P EST HI 40 MIN: CPT | Performed by: PHYSICIAN ASSISTANT

## 2025-01-06 RX ORDER — MIDODRINE HYDROCHLORIDE 2.5 MG/1
2.5 TABLET ORAL 3 TIMES DAILY
COMMUNITY
Start: 2024-12-30 | End: 2025-01-06

## 2025-01-06 RX ORDER — MIDODRINE HYDROCHLORIDE 2.5 MG/1
2.5 TABLET ORAL 3 TIMES DAILY
COMMUNITY
Start: 2025-01-06

## 2025-01-06 ASSESSMENT — PAIN SCALES - GENERAL: PAINLEVEL_OUTOF10: NO PAIN (0)

## 2025-01-06 NOTE — PATIENT INSTRUCTIONS
Johann Mitchell,    Thank you for allowing [facility] to manage your care.    I am unsure of the cause of your symptoms, but your exam is reassuring. Continue with our plan to modestly increase your midodrine.  DO NOT TAKE THIS WITHIN 4 HOURS OF BEDTIME. CAN CAUSE INCREASE IN BLOOD PRESSURE WHEN LYING DOWN. DECREASE BACK TO 1/2 TABLET IF BLOOD PRESSURE WHILE LAYING DOWN IS >140/90mmHg.     If you develop worsening/changing symptoms at any time, please call 911/go to the emergency department for evaluation.    Drink 8-10 glasses of fluid daily to stay well-hydrated.    If you have any questions or concerns, please feel free to call us at [phone]    Sincerely,    Maicol Cox PA-C    Did you know?      You can schedule a video visit for follow-up appointments as well as future appointments for certain conditions.  Please see the below link.     [email or web address]    If you have not already done so,  I encourage you to sign up for [employer] ([email or web address]).  This will allow you to review your results, securely communicate with a provider, and schedule virtual visits as well.

## 2025-01-06 NOTE — PROGRESS NOTES
"  Assessment & Plan   Problem List Items Addressed This Visit          Nervous and Auditory    Syncope, unspecified syncope type - Primary      1. Near syncope/Syncope  - Assessment: Orthostatic hypotension from autonomic dysfunction  - Investigations: Zio patch monitoring  - Treatment: Started Midodrine 1.25mg TID, will increase to 2.5mg TID. Discussed boxed warning and other side effects.  - Referrals: Cardiology and Neurology evaluations completed  -pushing fluids.    2. Mobility  - Treatment: Home PT ordered previously    3. Normal Pressure Hydrocephalus  - Investigations:  shunt imaging reassuring    Complete history and physical exam as below. Afebrile with normal vital signs.    DDx and Dx discussed with and explained to the pt to their satisfaction.  All questions were answered at this time. Pt expressed understanding of and agreement with this dx, tx, and plan. No further workup warranted and standard medication warnings given. I have given the patient a list of pertinent indications for re-evaluation. Will go to the Emergency Department if symptoms worsen or new concerning symptoms arise. Patient left in no apparent distress.      MED REC REQUIREDPost Medication Reconciliation Status: discharge medications reconciled and changed, per note/orders  BMI  Estimated body mass index is 27.38 kg/m  as calculated from the following:    Height as of this encounter: 1.69 m (5' 6.54\").    Weight as of this encounter: 78.2 kg (172 lb 6.4 oz).     See Patient Instructions  Review of prior external note(s) from - [facility] information from [facility] reviewed  Prescription drug management  40 minutes spent by me on the date of the encounter doing chart review, history and exam, documentation and further activities per the note    Wally Mitchell is a 84 year old, presenting for the following health issues:  Hospital F/U, Syncope, and Hypertension        1/6/2025     2:10 PM   Additional Questions   Roomed by " Carolee RUIZ MA   Accompanied by n/a         1/6/2025     2:10 PM   Patient Reported Additional Medications   Patient reports taking the following new medications No Medications Missing     History of Present Illness       Reason for visit:  Review my syncope events and general weakness    He eats 2-3 servings of fruits and vegetables daily.He consumes 0 sweetened beverage(s) daily.He exercises with enough effort to increase his heart rate 9 or less minutes per day.  He exercises with enough effort to increase his heart rate 3 or less days per week.   He is taking medications regularly.      Hospital Follow-up Visit:    Hospital/Nursing Home/ Rehab Facility:  [facility]  Date of Admission: 12/27/2024  Date of Discharge: 12/31/2024  /Reason(s) for Admission: Syncope, HTN.  Was the patient in the ICU or did the patient experience delirium during hospitalization?  No  Do you have any other stressors you would like to discuss with your provider? No    Problems taking medications regularly:  None  Medication changes since discharge: Midodrine 2.5 mg  Problems adhering to non-medication therapy:  None    2 syncope episodes since discharge. No falls. Still has Zio patch on and is to be on until 1/14/24.     Summary of hospitalization:  CareEverywhere information obtained and reviewed  Diagnostic Tests/Treatments reviewed.  Follow up needed: primary in one month and home care.  Other Healthcare Providers Involved in Patient s Care:         Physical Therapy at home  Update since discharge: stable.   Plan of care communicated with patient and family     - Admitted 12/27/24 to 12/31/24 for syncope evaluation  - Reports 3 syncopal episodes prior to presentation: brief prodrome followed by few seconds of syncope, returns to baseline  - Episodes increasing in frequency    Past Medical History:  - Normal pressure hydrocephalus with  shunt (2022)  - Myasthenia  "gravis    Previous Objective:  - Head CT: Reassuring  -  shunt X-ray series: Reassuring  - CT pulmonary embolism study: Reassuring  - Troponins: Reassuring  - EKG: Reassuring  - Echocardiogram: Reviewed by cardiology  - Kenji patch: Fitted at discharge    Review of Systems  Constitutional, HEENT, cardiovascular, pulmonary, gi and gu systems are negative, except as otherwise noted.      Objective    /60   Pulse 71   Temp 96.8  F (36  C) (Temporal)   Resp 20   Ht 1.69 m (5' 6.54\")   Wt 78.2 kg (172 lb 6.4 oz)   SpO2 98%   BMI 27.38 kg/m    Body mass index is 27.38 kg/m .  Physical Exam  Vitals and nursing note reviewed.   Constitutional:       General: He is not in acute distress.     Appearance: He is not ill-appearing or diaphoretic.   HENT:      Head: Normocephalic and atraumatic.      Mouth/Throat:      Mouth: Mucous membranes are moist.   Eyes:      Conjunctiva/sclera: Conjunctivae normal.   Cardiovascular:      Rate and Rhythm: Normal rate and regular rhythm.      Heart sounds: Normal heart sounds. No murmur heard.     No friction rub. No gallop.      Comments: 2+ symmetric radial pulses. No LE edema or tenderness.  Pulmonary:      Effort: Pulmonary effort is normal. No respiratory distress.      Breath sounds: Normal breath sounds. No stridor. No wheezing, rhonchi or rales.   Skin:     General: Skin is warm and dry.   Neurological:      General: No focal deficit present.      Mental Status: He is alert. Mental status is at baseline.   Psychiatric:         Mood and Affect: Mood normal.         Behavior: Behavior normal.                Signed Electronically by: KAZ Mendieta    "

## 2025-01-14 ENCOUNTER — TELEPHONE (OUTPATIENT)
Dept: FAMILY MEDICINE | Facility: CLINIC | Age: 85
End: 2025-01-14
Payer: MEDICARE

## 2025-01-14 RX ORDER — MIDODRINE HYDROCHLORIDE 2.5 MG/1
TABLET ORAL
COMMUNITY
Start: 2025-01-14

## 2025-01-14 NOTE — TELEPHONE ENCOUNTER
Spoke with Ceci (spouse), relayed providers message below. Ceci verbalized understanding and stated no further questions.    Amalia Lara, RN on 1/14/2025 at 5:19 PM

## 2025-01-14 NOTE — TELEPHONE ENCOUNTER
Please call patient/spouse. If he was feeling improved symptoms (less dizziness/weakness) on 1 tab of midodrine TID and BP was 140s/70s, I think he could restart this now. If his symptoms were not improved on the increased amount, he should remain on 0.5 tabs TID. Thanks.

## 2025-01-14 NOTE — TELEPHONE ENCOUNTER
RN relayed provider's message below to Ceci (spouse, consent to communicate on file). Ceci stated she was worried for  and was reassured with provider OK with 140's as she thought 140 what the max recommended for SBP while on midodrine. Pt has not had feeling of weakness since being on the 1 tab midodrine TID.     Ceci questions if pt should take 1 tab in the morning and decreased to 0.5 tab in the evening to see if SBP stays closer 140?    Amalia Lara, RN on 1/14/2025 at 3:20 PM

## 2025-01-14 NOTE — TELEPHONE ENCOUNTER
Leslye PT with [employer] calling with Patient and his wife Ceci, Call patient and wife back with recommendation    Patients wife has a question about Midodrine that was discussed at his recent hospital follow up with you 1/6/25, Patient was discharged from hospital on 1/2 tab dose but was discussed at visit increasing to 1 tab 3x/day    Syncope, unspecified syncope type - Primary      1. Near syncope/Syncope  - Assessment: Orthostatic hypotension from autonomic dysfunction  - Investigations: Zio patch monitoring  - Treatment: Started Midodrine 1.25mg TID, will increase to 2.5mg TID. Discussed boxed warning and other side effects.  - Referrals: Cardiology and Neurology evaluations completed  -pushing fluids.      She states they were doing the 1 tab 3x/day as discussed with increasing at this visit. She reports she was told if lying BP is greater than 140/90 they should go back to 1/2 tab 3x/day. Patient currently back on dose of 1/2 tab 3x/day    She reports patients lying BP at night time has been ranging around 143/70. She reports during the day BP is fine it is just at night when he is laying down they are getting the higher reading with systolic over 140, but she states diastolic is never more than 90.     She is wondering with Systolic being slightly higher than 140 should they just stay at 1/2 tab 3x/day or do you recommend going back to 1 tab 3x/day, if so what time frame should they wait to restart 1 tab?     Please advise.       Leticia Christie RN on 1/14/2025 at 12:19 PM

## 2025-01-14 NOTE — TELEPHONE ENCOUNTER
Home Care is calling regarding an established patient with [facility].       Requesting orders from: Winston Silverman  Provider is following patient: Yes  Is this a 60-day recertification request?  No    Orders Requested    Physical Therapy  Request for continuation of care with no increase or decrease in frequency  Frequency:  2x/wk for 3 more wks  for strength, balance, and falls prevention education          Verbal orders given.  Home Care will send orders for provider to sign.  Confirmed ok to leave a detailed message with call back.  Contact information confirmed and updated as needed.    Sheree Hopkins RN

## 2025-01-22 ENCOUNTER — MEDICAL CORRESPONDENCE (OUTPATIENT)
Dept: HEALTH INFORMATION MANAGEMENT | Facility: CLINIC | Age: 85
End: 2025-01-22
Payer: MEDICARE

## 2025-01-22 RX ORDER — PRAMIPEXOLE DIHYDROCHLORIDE 0.25 MG/1
TABLET ORAL
Refills: 0 | OUTPATIENT
Start: 2025-01-22

## 2025-01-27 ENCOUNTER — OFFICE VISIT (OUTPATIENT)
Dept: NEUROLOGY | Facility: CLINIC | Age: 85
End: 2025-01-27
Payer: MEDICARE

## 2025-01-27 VITALS
HEART RATE: 79 BPM | BODY MASS INDEX: 28.76 KG/M2 | DIASTOLIC BLOOD PRESSURE: 71 MMHG | OXYGEN SATURATION: 97 % | SYSTOLIC BLOOD PRESSURE: 116 MMHG | RESPIRATION RATE: 16 BRPM | WEIGHT: 181.1 LBS

## 2025-01-27 DIAGNOSIS — M47.12 CERVICAL SPONDYLOSIS WITH MYELOPATHY: ICD-10-CM

## 2025-01-27 DIAGNOSIS — G70.00 MYASTHENIA GRAVIS WITHOUT EXACERBATION (H): ICD-10-CM

## 2025-01-27 DIAGNOSIS — G91.2 NPH (NORMAL PRESSURE HYDROCEPHALUS) (H): ICD-10-CM

## 2025-01-27 DIAGNOSIS — G25.81 RESTLESS LEG SYNDROME: ICD-10-CM

## 2025-01-27 DIAGNOSIS — I95.1 ORTHOSTATIC HYPOTENSION: ICD-10-CM

## 2025-01-27 DIAGNOSIS — H53.2 DIPLOPIA: Primary | ICD-10-CM

## 2025-01-27 PROCEDURE — G2211 COMPLEX E/M VISIT ADD ON: HCPCS | Performed by: PSYCHIATRY & NEUROLOGY

## 2025-01-27 PROCEDURE — 99215 OFFICE O/P EST HI 40 MIN: CPT | Performed by: PSYCHIATRY & NEUROLOGY

## 2025-01-27 ASSESSMENT — PAIN SCALES - GENERAL: PAINLEVEL_OUTOF10: NO PAIN (0)

## 2025-01-27 NOTE — NURSING NOTE
Chief Complaint   Patient presents with    RECHECK     /74 (BP Location: Right arm, Patient Position: Sitting, Cuff Size: Adult Regular)   Pulse 75   Resp 16   Wt 82.1 kg (181 lb 1.6 oz)   SpO2 96%   BMI 28.76 kg/m      FUAD VALDEZ

## 2025-01-27 NOTE — LETTER
1/27/2025       RE: Gustavo Ramon  [address] [place] [address]     Dear Colleague,    Thank you for referring your patient, Gustavo Ramon, to the [facility] [place] at [facility]. Please see a copy of my visit note below.    Winston Silverman PA-C  [place], January 27, 2025    Dear Mr. Silverman,     I had the pleasure to see Stephen in follow-up at the [facility]/neuromuscular clinic.  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 in 2009, which previously responded well to prednisone and pyridostigmine treatments.  He had been stable on 10 mg of prednisone daily for a long time, and exactly for that reason, I offered him the option to gradually taper the prednisone a year ago until it was discontinued.  He continued pyridostigmine 1-1/2 tablet in the morning and 1 in the evening.  For the first few months after discontinuation of prednisone he was doing okay, but he started experiencing double vision again in the fall 2024.  Of note, the myasthenia was previously labeled acetylcholine receptor antibody positive but I could not find any recent  AchR titer in the chart. Repetitive nerve stimulation in 1/2024 study showed no decrement, indicating intact neuromuscular junction.  He also saw my colleague Dr. Galvin from neuro-ophthalmology in 2023, and he proposed that the patient's diplopia, and ptosis could be due to sagging eye syndrome and LR-SR band relaxation.  He asked the patient to return to his regular ophthalmologist for prescription of prisms, and in fact the patient felt that the prisms led to resolution of his diplopia.  When he called me on December 2024, I was not sure if the diplopia could partially be related to recurrent myasthenia, and that's why I restarted him on prednisone 10 mg daily.  He did not have ptosis,  and while he did not feel that prednisone had any meaningful effect on the diplopia, it was beneficial for his joint pains and overall fatigue, and increased his appetite in the last month.     Of note, Mr. Ramon has several other neurological problems.  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  While he had a good recovery from those procedures, he still has to use a walker to reduce the risk of falling, and he wonders if he will be able to travel to [place] in the next months and walk without the walker.    He also has difficult to control restless leg syndrome with occurrence of augmentation in the last few years..  He is currently on gabapentin 600 mg in the morning and 900 in the evening with incomplete symptom control.  His ferritin levels were 100 on July 2024, so he is definitely not iron deficient.  He is still taking pramipexole 0.125 mg 3 times a day.  He saw my colleague Dr. Narvaez on August 2024, and due to the augmentation phenomenon he recommended switching to ropinirole ER, but she could not tolerate it and she ultimately switched back to pramipexole.      Mr Ramon has chronic memory and cognitive concerns and he had a repeat neuropsych testing in May 2024 at the [facility].  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    Most notably, he was diagnosed with hyperthyroidism in mid 2024.  TSI antibodies were negative, but TPO antibodies were very elevated.  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 In fact his most recent free T4 was normal on October 2024 in December 2024 and his TSH has been going up in the last 3 months (lately 4.59, 3 months ago 7.92) without taking antithyroid drugs    He is  also having increasing frequency of syncopal episodes in the last year.  His first 1 occurred this early as 9 years ago and he had 1 to twice a year but in the last few months they have increased in frequency.  He has a feeling of lightheadedness and general fatigue and she will sometimes pass out when sitting.  Those never occur in the supine position.  Those were attributed to autonomic dysfunction but he has not had dedicated autonomic testing and I am unable right now to retrieve his most recent set of orthostatic vitals.  He was started on midodrine 2.5 mg in the morning and in the afternoon which has helped somewhat.  Of note, he takes an adequate amount of water, no more than 3 glasses/day and he does not use salt in his diet.    Current Outpatient Medications   Medication Sig Dispense Refill     ACE/ARB/ARNI NOT PRESCRIBED (INTENTIONAL) Please choose reason not prescribed from choices below.       acetaminophen (TYLENOL) 325 MG tablet Take 1-2 tablets (325-650 mg) by mouth every 4 hours as needed for mild pain       aspirin (ASA) 325 MG tablet Take 1 tablet (325 mg) by mouth daily       cyanocobalamin (VITAMIN B-12) 1000 MCG tablet Take 1 tablet (1,000 mcg) by mouth daily 90 tablet 1     fluticasone (FLONASE) 50 MCG/ACT nasal spray Spray 1 spray into both nostrils daily 11.1 mL 0     gabapentin (NEURONTIN) 300 MG capsule 600 mg in the morning and 900 mg in the evening 450 capsule 1     ketoconazole (NIZORAL) 2 % external cream Apply topically 2 times daily To face 60 g 3     levETIRAcetam (KEPPRA) 250 MG tablet Take 1 tablet (250 mg) by mouth 2 times daily. 180 tablet 1     levETIRAcetam (KEPPRA) 250 MG tablet Take 1 tablet (250 mg) by mouth 2 times daily. 60 tablet 1     meclizine (ANTIVERT) 25 MG tablet Take 0.5-1 tablets (12.5-25 mg) by mouth 3 times daily as needed for dizziness 30 tablet 0     midodrine (PROAMATINE) 2.5 MG tablet Take 1 tablet (2.5 mg) by mouth in the morning and in the afternoon. Take  0.5 tablet (1.25mg) in the evening. GIVE MORE THAN 4 HOURS BEFORE BEDTIME. CAN CAUSE INCREASE IN BLOOD PRESSURE WHEN LYING DOWN.       multivitamin (OCUVITE) TABS tablet Take 1 tablet by mouth daily       pantoprazole (PROTONIX) 40 MG EC tablet Take 1 tablet (40 mg) by mouth daily. 90 tablet 2     polyethylene glycol (MIRALAX) 17 GM/Dose powder Take 17 g by mouth daily as needed for constipation 510 g      pramipexole (MIRAPEX) 0.125 MG tablet Take 1 tablet (0.125 mg) by mouth 3 times daily. 90 tablet 2     predniSONE (DELTASONE) 10 MG tablet Take 1 tablet (10 mg) by mouth daily. 90 tablet 1     pyRIDostigmine (MESTINON) 60 MG tablet Take 1.5 tablets (90 mg) by mouth 2 times daily. 270 tablet 1     tamsulosin (FLOMAX) 0.4 MG capsule Take 1 capsule (0.4 mg) by mouth daily. 90 capsule 3     terbinafine (LAMISIL) 1 % external cream Apply topically 2 times daily 42 g 11     triamcinolone (KENALOG) 0.1 % external cream Apply a thin layer up to twice daily to affected areas as needed. 30 g 3     vitamin D3 (CHOLECALCIFEROL) 2000 units tablet Take 1 tablet by mouth daily 90 tablet 1     Current Facility-Administered Medications   Medication Dose Route Frequency Provider Last Rate Last Admin     lidocaine 1% with EPINEPHrine 1:100,000 injection 3 mL  3 mL Intradermal Once            VITALS: /71 (BP Location: Right arm, Patient Position: Sitting, Cuff Size: Adult Regular)   Pulse 79   Resp 16   Wt 82.1 kg (181 lb 1.6 oz)   SpO2 97%   BMI 28.76 kg/m      Neuro exam was not repeated.     IMPRESSION:    1) Diplopia, most likely due to sagging eye syndrome. 2) Ocular MG, unlikely to be in exacerbation 3) Severe RLS with augmentation, not iron deficient 4) Multifactorial orthostatic hypotension that will require further workup. 5) History of NPH surgery (shunt) and cervical fusion for spondylosis, stable     Mr. Ramon is a very complicated patient due to multiple neurological problems but I do not think that ocular  myasthenia is the major issue right now.  In fact I doubt that his recurrent diplopia was myasthenia related, exactly because it corrected with prisms.  I suspect is much more likely to be related to sagging eye syndrome.  If this was myasthenia related I would expect some fluctuations and unsatisfactory results with prisms.  Along those lines, I recommended tapering his prednisone to 5 mg daily.  The patient is a bit reluctant to completely stop the prednisone because it improves his energy and his arthralgias.  I will leave him on the 5 mg daily for now until follow-up.    The biggest problem is the combination of his severe RLS with orthostatic hypotension.  Orthostatic hypotension and syncope in the elderly is multifactorial, and while he could have  autonomic dysfunction that requires extensive further workup, I would for start with the low hanging fruit.  A) He is having polypharmacy issues, and specifically the pramipexole he is using for RLS could be contributing to the OH.  I told the patient that ideally I would like to eliminate the morning and noon dose, but I am concerned that this will make his RLS symptoms much worse.  This is a complicated situation with no easy solution.  I will have a conference with our [facility] pharmacist and try to find the optimal way of dealing with this problem.  Perhaps using rotigotine patch, which has a lower risk of orthostatic hypotension can help in this context. B) he clearly takes inadequate water and too low salt in his diet.  He should increase his salt to at least 3 g a day and drinks 6-8 full glasses of water a day.  I explained to him how taking 500 cc of water before getting up can counteract the orthostatic hypotension.  Also regular leg crossing and exercises can help.    I told the patient that the majority of elderly patients who undergo NPH surgery with shunt and cervical spondylosis surgery may improve but their gait rarely normalizes.  Therefore I think it  would be an appropriate not to use his walker and I would recommend continued use to reduce the risk of falling.  He understands that it is unlikely that his lower extremity strength, and coordination will return to what they used to be 20 or 30 years ago.    He will return to clinic for follow-up in 4 months, sooner if needed.      Sincerely,        Tyler Wheat MD, FAAN     This was a long and complicated visit of over 50 minutes of which 25 were face-to-face, 15 in postvisit no dictation, editing, and orders, and 10 in previsit chart review.    The longitudinal plan of care for the diagnosis(es)/condition(s) as documented were addressed during this visit. Due to the added complexity in care, I will continue to support Stephen in the subsequent management and with ongoing continuity of care.        Current Outpatient Medications   Medication Sig Dispense Refill     ACE/ARB/ARNI NOT PRESCRIBED (INTENTIONAL) Please choose reason not prescribed from choices below.       acetaminophen (TYLENOL) 325 MG tablet Take 1-2 tablets (325-650 mg) by mouth every 4 hours as needed for mild pain       aspirin (ASA) 325 MG tablet Take 1 tablet (325 mg) by mouth daily       cyanocobalamin (VITAMIN B-12) 1000 MCG tablet Take 1 tablet (1,000 mcg) by mouth daily 90 tablet 1     fluticasone (FLONASE) 50 MCG/ACT nasal spray Spray 1 spray into both nostrils daily 11.1 mL 0     gabapentin (NEURONTIN) 300 MG capsule 600 mg in the morning and 900 mg in the evening 450 capsule 1     ketoconazole (NIZORAL) 2 % external cream Apply topically 2 times daily To face (Patient taking differently: Apply topically 2 times daily as needed. To face) 60 g 3     levETIRAcetam (KEPPRA) 250 MG tablet Take 1 tablet (250 mg) by mouth 2 times daily. 180 tablet 1     levETIRAcetam (KEPPRA) 250 MG tablet Take 1 tablet (250 mg) by mouth 2 times daily. 60 tablet 1     meclizine (ANTIVERT) 25 MG tablet Take 0.5-1 tablets (12.5-25 mg) by mouth 3 times daily  as needed for dizziness 30 tablet 0     midodrine (PROAMATINE) 2.5 MG tablet Take 1 tablet (2.5 mg) by mouth in the morning and in the afternoon. Take 0.5 tablet (1.25mg) in the evening. GIVE MORE THAN 4 HOURS BEFORE BEDTIME. CAN CAUSE INCREASE IN BLOOD PRESSURE WHEN LYING DOWN.       multivitamin (OCUVITE) TABS tablet Take 1 tablet by mouth daily       pantoprazole (PROTONIX) 40 MG EC tablet Take 1 tablet (40 mg) by mouth daily. 90 tablet 2     polyethylene glycol (MIRALAX) 17 GM/Dose powder Take 17 g by mouth daily as needed for constipation 510 g      pramipexole (MIRAPEX) 0.125 MG tablet Take 1 tablet (0.125 mg) by mouth 3 times daily. 90 tablet 2     predniSONE (DELTASONE) 10 MG tablet Take 1 tablet (10 mg) by mouth daily. 90 tablet 1     pyRIDostigmine (MESTINON) 60 MG tablet Take 1.5 tablets (90 mg) by mouth 2 times daily. 270 tablet 1     tamsulosin (FLOMAX) 0.4 MG capsule Take 1 capsule (0.4 mg) by mouth daily. 90 capsule 3     terbinafine (LAMISIL) 1 % external cream Apply topically 2 times daily (Patient taking differently: Apply topically 2 times daily as needed.) 42 g 11     triamcinolone (KENALOG) 0.1 % external cream Apply a thin layer up to twice daily to affected areas as needed. 30 g 3     vitamin D3 (CHOLECALCIFEROL) 2000 units tablet Take 1 tablet by mouth daily 90 tablet 1     Current Facility-Administered Medications   Medication Dose Route Frequency Provider Last Rate Last Admin     lidocaine 1% with EPINEPHrine 1:100,000 injection 3 mL  3 mL Intradermal Once            Again, thank you for allowing me to participate in the care of your patient.      Sincerely,    Tyler Wheat MD

## 2025-01-27 NOTE — PATIENT INSTRUCTIONS
This is a complicated situation because pramipexole could be aggravating your orthostatic hypotension and causing syncopal episodes, yet at the same time it isrequired to control your restless leg syndrome. I will have to work on finding an alternative solution which is quite complicated and will require me to work a few days with our pharmacist.  Please be a bit patient and I will get back to you with a better solution.    Please reduce your prednisone to 5 mg daily now.    Please take 6-8 full glasses of water per day, and increase the salt in your diet.  This can fight the low blood pressure problem.    I am not convinced that your double vision was due to worsening myasthenia because it corrected even with the prisms before taking the prednisone.  I am keeping you on the lower dose of prednisone because you have found some other health benefits by taking it.

## 2025-01-27 NOTE — PROGRESS NOTES
Current Outpatient Medications   Medication Sig Dispense Refill    ACE/ARB/ARNI NOT PRESCRIBED (INTENTIONAL) Please choose reason not prescribed from choices below.      acetaminophen (TYLENOL) 325 MG tablet Take 1-2 tablets (325-650 mg) by mouth every 4 hours as needed for mild pain      aspirin (ASA) 325 MG tablet Take 1 tablet (325 mg) by mouth daily      cyanocobalamin (VITAMIN B-12) 1000 MCG tablet Take 1 tablet (1,000 mcg) by mouth daily 90 tablet 1    fluticasone (FLONASE) 50 MCG/ACT nasal spray Spray 1 spray into both nostrils daily 11.1 mL 0    gabapentin (NEURONTIN) 300 MG capsule 600 mg in the morning and 900 mg in the evening 450 capsule 1    ketoconazole (NIZORAL) 2 % external cream Apply topically 2 times daily To face (Patient taking differently: Apply topically 2 times daily as needed. To face) 60 g 3    levETIRAcetam (KEPPRA) 250 MG tablet Take 1 tablet (250 mg) by mouth 2 times daily. 180 tablet 1    levETIRAcetam (KEPPRA) 250 MG tablet Take 1 tablet (250 mg) by mouth 2 times daily. 60 tablet 1    meclizine (ANTIVERT) 25 MG tablet Take 0.5-1 tablets (12.5-25 mg) by mouth 3 times daily as needed for dizziness 30 tablet 0    midodrine (PROAMATINE) 2.5 MG tablet Take 1 tablet (2.5 mg) by mouth in the morning and in the afternoon. Take 0.5 tablet (1.25mg) in the evening. GIVE MORE THAN 4 HOURS BEFORE BEDTIME. CAN CAUSE INCREASE IN BLOOD PRESSURE WHEN LYING DOWN.      multivitamin (OCUVITE) TABS tablet Take 1 tablet by mouth daily      pantoprazole (PROTONIX) 40 MG EC tablet Take 1 tablet (40 mg) by mouth daily. 90 tablet 2    polyethylene glycol (MIRALAX) 17 GM/Dose powder Take 17 g by mouth daily as needed for constipation 510 g     pramipexole (MIRAPEX) 0.125 MG tablet Take 1 tablet (0.125 mg) by mouth 3 times daily. 90 tablet 2    predniSONE (DELTASONE) 10 MG tablet Take 1 tablet (10 mg) by mouth daily. 90 tablet 1    pyRIDostigmine (MESTINON) 60 MG tablet Take 1.5 tablets (90 mg) by mouth 2 times  daily. 270 tablet 1    tamsulosin (FLOMAX) 0.4 MG capsule Take 1 capsule (0.4 mg) by mouth daily. 90 capsule 3    terbinafine (LAMISIL) 1 % external cream Apply topically 2 times daily (Patient taking differently: Apply topically 2 times daily as needed.) 42 g 11    triamcinolone (KENALOG) 0.1 % external cream Apply a thin layer up to twice daily to affected areas as needed. 30 g 3    vitamin D3 (CHOLECALCIFEROL) 2000 units tablet Take 1 tablet by mouth daily 90 tablet 1     Current Facility-Administered Medications   Medication Dose Route Frequency Provider Last Rate Last Admin    lidocaine 1% with EPINEPHrine 1:100,000 injection 3 mL  3 mL Intradermal Once

## 2025-01-27 NOTE — PROGRESS NOTES
Winston Silverman PA-C  Jesse, January 27, 2025    Dear Mr. Silverman,     I had the pleasure to see Stephen in follow-up at the [facility]/neuromuscular clinic.  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  He had been stable on 10 mg of prednisone daily for a long time, and exactly for that reason, I offered him the option to gradually taper the prednisone a year ago until it was discontinued.  He continued pyridostigmine 1-1/2 tablet in the morning and 1 in the evening.  For the first few months after discontinuation of prednisone he was doing okay, but he started experiencing double vision again in the fall 2024.  Of note, the myasthenia was previously labeled acetylcholine receptor antibody positive but I could not find any recent  AchR titer in the chart. Repetitive nerve stimulation in 1/2024 study showed no decrement, indicating intact neuromuscular junction.  He also saw my colleague Dr. Galvin from neuro-ophthalmology in 2023, and he proposed that the patient's diplopia, and ptosis could be due to sagging eye syndrome and LR-SR band relaxation.  He asked the patient to return to his regular ophthalmologist for prescription of prisms, and in fact the patient felt that the prisms led to resolution of his diplopia.  When he called me on December 2024, I was not sure if the diplopia could partially be related to recurrent myasthenia, and that's why I restarted him on prednisone 10 mg daily.  He did not have ptosis, and while he did not feel that prednisone had any meaningful effect on the diplopia, it was beneficial for his joint pains and overall fatigue, and increased his appetite in the last month.     Of note, Mr. Ramon has several other neurological problems.  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  While he had a good recovery from those procedures, he still has to use a walker to reduce the risk of falling, and he wonders if he will be able to travel to [place] in the next months and walk without the walker.    He also has difficult to control restless leg syndrome with occurrence of augmentation in the last few years..  He is currently on gabapentin 600 mg in the morning and 900 in the evening with incomplete symptom control.  His ferritin levels were 100 on July 2024, so he is definitely not iron deficient.  He is still taking pramipexole 0.125 mg 3 times a day.  He saw my colleague Dr. Narvaez on August 2024, and due to the augmentation phenomenon he recommended switching to ropinirole ER, but she could not tolerate it and she ultimately switched back to pramipexole.      Mr Ramon has chronic memory and cognitive concerns and he had a repeat neuropsych testing in May 2024 at the [facility].  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    Most notably, he was diagnosed with hyperthyroidism in mid 2024.  TSI antibodies were negative, but TPO antibodies were very elevated.  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 In fact his most recent free T4 was normal on October 2024 in December 2024 and his TSH has been going up in the last 3 months (lately 4.59, 3 months ago 7.92) without taking antithyroid drugs    He is also having increasing frequency of syncopal episodes in the last year.  His first 1 occurred this early as 9 years ago and he had 1 to twice a year but in the last few months they have increased in frequency.  He has a feeling of lightheadedness and general fatigue and she will sometimes pass out when sitting.  Those never  occur in the supine position.  Those were attributed to autonomic dysfunction but he has not had dedicated autonomic testing and I am unable right now to retrieve his most recent set of orthostatic vitals.  He was started on midodrine 2.5 mg in the morning and in the afternoon which has helped somewhat.  Of note, he takes an adequate amount of water, no more than 3 glasses/day and he does not use salt in his diet.    Current Outpatient Medications   Medication Sig Dispense Refill    ACE/ARB/ARNI NOT PRESCRIBED (INTENTIONAL) Please choose reason not prescribed from choices below.      acetaminophen (TYLENOL) 325 MG tablet Take 1-2 tablets (325-650 mg) by mouth every 4 hours as needed for mild pain      aspirin (ASA) 325 MG tablet Take 1 tablet (325 mg) by mouth daily      cyanocobalamin (VITAMIN B-12) 1000 MCG tablet Take 1 tablet (1,000 mcg) by mouth daily 90 tablet 1    fluticasone (FLONASE) 50 MCG/ACT nasal spray Spray 1 spray into both nostrils daily 11.1 mL 0    gabapentin (NEURONTIN) 300 MG capsule 600 mg in the morning and 900 mg in the evening 450 capsule 1    ketoconazole (NIZORAL) 2 % external cream Apply topically 2 times daily To face 60 g 3    levETIRAcetam (KEPPRA) 250 MG tablet Take 1 tablet (250 mg) by mouth 2 times daily. 180 tablet 1    levETIRAcetam (KEPPRA) 250 MG tablet Take 1 tablet (250 mg) by mouth 2 times daily. 60 tablet 1    meclizine (ANTIVERT) 25 MG tablet Take 0.5-1 tablets (12.5-25 mg) by mouth 3 times daily as needed for dizziness 30 tablet 0    midodrine (PROAMATINE) 2.5 MG tablet Take 1 tablet (2.5 mg) by mouth in the morning and in the afternoon. Take 0.5 tablet (1.25mg) in the evening. GIVE MORE THAN 4 HOURS BEFORE BEDTIME. CAN CAUSE INCREASE IN BLOOD PRESSURE WHEN LYING DOWN.      multivitamin (OCUVITE) TABS tablet Take 1 tablet by mouth daily      pantoprazole (PROTONIX) 40 MG EC tablet Take 1 tablet (40 mg) by mouth daily. 90 tablet 2    polyethylene glycol (MIRALAX) 17 GM/Dose  powder Take 17 g by mouth daily as needed for constipation 510 g     pramipexole (MIRAPEX) 0.125 MG tablet Take 1 tablet (0.125 mg) by mouth 3 times daily. 90 tablet 2    predniSONE (DELTASONE) 10 MG tablet Take 1 tablet (10 mg) by mouth daily. 90 tablet 1    pyRIDostigmine (MESTINON) 60 MG tablet Take 1.5 tablets (90 mg) by mouth 2 times daily. 270 tablet 1    tamsulosin (FLOMAX) 0.4 MG capsule Take 1 capsule (0.4 mg) by mouth daily. 90 capsule 3    terbinafine (LAMISIL) 1 % external cream Apply topically 2 times daily 42 g 11    triamcinolone (KENALOG) 0.1 % external cream Apply a thin layer up to twice daily to affected areas as needed. 30 g 3    vitamin D3 (CHOLECALCIFEROL) 2000 units tablet Take 1 tablet by mouth daily 90 tablet 1     Current Facility-Administered Medications   Medication Dose Route Frequency Provider Last Rate Last Admin    lidocaine 1% with EPINEPHrine 1:100,000 injection 3 mL  3 mL Intradermal Once            VITALS: /71 (BP Location: Right arm, Patient Position: Sitting, Cuff Size: Adult Regular)   Pulse 79   Resp 16   Wt 82.1 kg (181 lb 1.6 oz)   SpO2 97%   BMI 28.76 kg/m      Neuro exam was not repeated.     IMPRESSION:    1) Diplopia, most likely due to sagging eye syndrome. 2) Ocular MG, unlikely to be in exacerbation 3) Severe RLS with augmentation, not iron deficient 4) Multifactorial orthostatic hypotension that will require further workup. 5) History of NPH surgery (shunt) and cervical fusion for spondylosis, stable     Mr. Ramon is a very complicated patient due to multiple neurological problems but I do not think that ocular myasthenia is the major issue right now.  In fact I doubt that his recurrent diplopia was myasthenia related, exactly because it corrected with prisms.  I suspect is much more likely to be related to sagging eye syndrome.  If this was myasthenia related I would expect some fluctuations and unsatisfactory results with prisms.  Along those lines, I  recommended tapering his prednisone to 5 mg daily.  The patient is a bit reluctant to completely stop the prednisone because it improves his energy and his arthralgias.  I will leave him on the 5 mg daily for now until follow-up.    The biggest problem is the combination of his severe RLS with orthostatic hypotension.  Orthostatic hypotension and syncope in the elderly is multifactorial, and while he could have  autonomic dysfunction that requires extensive further workup, I would for start with the low hanging fruit.  A) He is having polypharmacy issues, and specifically the pramipexole he is using for RLS could be contributing to the OH.  I told the patient that ideally I would like to eliminate the morning and noon dose, but I am concerned that this will make his RLS symptoms much worse.  This is a complicated situation with no easy solution.  I will have a conference with our [facility] pharmacist and try to find the optimal way of dealing with this problem.  Perhaps using rotigotine patch, which has a lower risk of orthostatic hypotension can help in this context. B) he clearly takes inadequate water and too low salt in his diet.  He should increase his salt to at least 3 g a day and drinks 6-8 full glasses of water a day.  I explained to him how taking 500 cc of water before getting up can counteract the orthostatic hypotension.  Also regular leg crossing and exercises can help.    I told the patient that the majority of elderly patients who undergo NPH surgery with shunt and cervical spondylosis surgery may improve but their gait rarely normalizes.  Therefore I think it would be an appropriate not to use his walker and I would recommend continued use to reduce the risk of falling.  He understands that it is unlikely that his lower extremity strength, and coordination will return to what they used to be 20 or 30 years ago.    He will return to clinic for follow-up in 4 months, sooner if needed.      Sincerely,         Tyler Wheat MD, FAAN     This was a long and complicated visit of over 50 minutes of which 25 were face-to-face, 15 in postvisit no dictation, editing, and orders, and 10 in previsit chart review.    The longitudinal plan of care for the diagnosis(es)/condition(s) as documented were addressed during this visit. Due to the added complexity in care, I will continue to support Stephen in the subsequent management and with ongoing continuity of care.

## 2025-01-28 ENCOUNTER — LAB (OUTPATIENT)
Dept: LAB | Facility: CLINIC | Age: 85
End: 2025-01-28
Payer: MEDICARE

## 2025-01-28 DIAGNOSIS — D47.2 MGUS (MONOCLONAL GAMMOPATHY OF UNKNOWN SIGNIFICANCE): ICD-10-CM

## 2025-01-28 LAB
BASOPHILS # BLD AUTO: 0.1 10E3/UL (ref 0–0.2)
BASOPHILS NFR BLD AUTO: 1 %
EOSINOPHIL # BLD AUTO: 0.3 10E3/UL (ref 0–0.7)
EOSINOPHIL NFR BLD AUTO: 3 %
ERYTHROCYTE [DISTWIDTH] IN BLOOD BY AUTOMATED COUNT: 14.5 % (ref 10–15)
HCT VFR BLD AUTO: 46.1 % (ref 40–53)
HGB BLD-MCNC: 15 G/DL (ref 13.3–17.7)
IMM GRANULOCYTES # BLD: 0 10E3/UL
IMM GRANULOCYTES NFR BLD: 0 %
LYMPHOCYTES # BLD AUTO: 2 10E3/UL (ref 0.8–5.3)
LYMPHOCYTES NFR BLD AUTO: 25 %
MCH RBC QN AUTO: 29.5 PG (ref 26.5–33)
MCHC RBC AUTO-ENTMCNC: 32.5 G/DL (ref 31.5–36.5)
MCV RBC AUTO: 91 FL (ref 78–100)
MONOCYTES # BLD AUTO: 0.8 10E3/UL (ref 0–1.3)
MONOCYTES NFR BLD AUTO: 9 %
NEUTROPHILS # BLD AUTO: 5 10E3/UL (ref 1.6–8.3)
NEUTROPHILS NFR BLD AUTO: 61 %
PLATELET # BLD AUTO: 189 10E3/UL (ref 150–450)
RBC # BLD AUTO: 5.08 10E6/UL (ref 4.4–5.9)
WBC # BLD AUTO: 8.1 10E3/UL (ref 4–11)

## 2025-01-28 PROCEDURE — 85025 COMPLETE CBC W/AUTO DIFF WBC: CPT

## 2025-01-28 PROCEDURE — 36415 COLL VENOUS BLD VENIPUNCTURE: CPT

## 2025-01-28 PROCEDURE — 80053 COMPREHEN METABOLIC PANEL: CPT

## 2025-01-28 PROCEDURE — 83521 IG LIGHT CHAINS FREE EACH: CPT

## 2025-01-28 PROCEDURE — 84165 PROTEIN E-PHORESIS SERUM: CPT | Performed by: PATHOLOGY

## 2025-01-28 PROCEDURE — 84155 ASSAY OF PROTEIN SERUM: CPT | Mod: 59

## 2025-01-29 LAB
ALBUMIN SERPL BCG-MCNC: 4.3 G/DL (ref 3.5–5.2)
ALBUMIN SERPL ELPH-MCNC: 4.3 G/DL (ref 3.7–5.1)
ALP SERPL-CCNC: 106 U/L (ref 40–150)
ALPHA1 GLOB SERPL ELPH-MCNC: 0.2 G/DL (ref 0.2–0.4)
ALPHA2 GLOB SERPL ELPH-MCNC: 0.6 G/DL (ref 0.5–0.9)
ALT SERPL W P-5'-P-CCNC: 25 U/L (ref 0–70)
ANION GAP SERPL CALCULATED.3IONS-SCNC: 12 MMOL/L (ref 7–15)
AST SERPL W P-5'-P-CCNC: 22 U/L (ref 0–45)
B-GLOBULIN SERPL ELPH-MCNC: 0.7 G/DL (ref 0.6–1)
BILIRUB SERPL-MCNC: 0.5 MG/DL
BUN SERPL-MCNC: 16 MG/DL (ref 8–23)
CALCIUM SERPL-MCNC: 9.4 MG/DL (ref 8.8–10.4)
CHLORIDE SERPL-SCNC: 105 MMOL/L (ref 98–107)
CREAT SERPL-MCNC: 1.05 MG/DL (ref 0.67–1.17)
EGFRCR SERPLBLD CKD-EPI 2021: 69 ML/MIN/1.73M2
GAMMA GLOB SERPL ELPH-MCNC: 1 G/DL (ref 0.7–1.6)
GLUCOSE SERPL-MCNC: 93 MG/DL (ref 70–99)
HCO3 SERPL-SCNC: 26 MMOL/L (ref 22–29)
KAPPA LC FREE SER-MCNC: 2.53 MG/DL (ref 0.33–1.94)
KAPPA LC FREE/LAMBDA FREE SER NEPH: 1.98 {RATIO} (ref 0.26–1.65)
LAMBDA LC FREE SERPL-MCNC: 1.28 MG/DL (ref 0.57–2.63)
LOCATION OF TASK: ABNORMAL
M PROTEIN SERPL ELPH-MCNC: 0.1 G/DL
POTASSIUM SERPL-SCNC: 4.8 MMOL/L (ref 3.4–5.3)
PROT PATTERN SERPL ELPH-IMP: ABNORMAL
PROT SERPL-MCNC: 7.1 G/DL (ref 6.4–8.3)
SODIUM SERPL-SCNC: 143 MMOL/L (ref 135–145)
TOTAL PROTEIN SERUM FOR ELP: 6.8 G/DL (ref 6.4–8.3)

## 2025-02-04 ENCOUNTER — ONCOLOGY VISIT (OUTPATIENT)
Dept: ONCOLOGY | Facility: CLINIC | Age: 85
End: 2025-02-04
Attending: INTERNAL MEDICINE
Payer: MEDICARE

## 2025-02-04 VITALS
RESPIRATION RATE: 18 BRPM | HEIGHT: 67 IN | WEIGHT: 179.3 LBS | HEART RATE: 65 BPM | OXYGEN SATURATION: 98 % | SYSTOLIC BLOOD PRESSURE: 159 MMHG | DIASTOLIC BLOOD PRESSURE: 75 MMHG | BODY MASS INDEX: 28.14 KG/M2

## 2025-02-04 DIAGNOSIS — D47.2 MGUS (MONOCLONAL GAMMOPATHY OF UNKNOWN SIGNIFICANCE): Primary | ICD-10-CM

## 2025-02-04 DIAGNOSIS — R55 SYNCOPE, UNSPECIFIED SYNCOPE TYPE: ICD-10-CM

## 2025-02-04 PROCEDURE — G2211 COMPLEX E/M VISIT ADD ON: HCPCS | Performed by: INTERNAL MEDICINE

## 2025-02-04 PROCEDURE — G0463 HOSPITAL OUTPT CLINIC VISIT: HCPCS | Performed by: INTERNAL MEDICINE

## 2025-02-04 PROCEDURE — 99214 OFFICE O/P EST MOD 30 MIN: CPT | Performed by: INTERNAL MEDICINE

## 2025-02-04 ASSESSMENT — PAIN SCALES - GENERAL: PAINLEVEL_OUTOF10: NO PAIN (0)

## 2025-02-04 NOTE — NURSING NOTE
"Oncology Rooming Note    February 4, 2025 2:02 PM   Gustavo Ramon is a 84 year old male who presents for:    Chief Complaint   Patient presents with    Oncology Clinic Visit     1 year follow up     Initial Vitals: BP (!) 159/75   Pulse 65   Resp 18   Ht 1.69 m (5' 6.54\")   Wt 81.3 kg (179 lb 4.8 oz)   SpO2 98%   BMI 28.48 kg/m   Estimated body mass index is 28.48 kg/m  as calculated from the following:    Height as of this encounter: 1.69 m (5' 6.54\").    Weight as of this encounter: 81.3 kg (179 lb 4.8 oz). Body surface area is 1.95 meters squared.  No Pain (0) Comment: Data Unavailable   No LMP for male patient.  Allergies reviewed: Yes  Medications reviewed: Yes    Medications: Medication refills not needed today.  Pharmacy name entered into EPIC:    [facility] - EPRESCRIBE ONLY  [facility] - [facility], [place] - [address] [place] [address] [facility] - [address] [facility] [place]    Frailty Screening:   Is the patient here for a new oncology consult visit in cancer care? 2. No      Clinical concerns: has several notes to review       Saskia Townsend LPN              "

## 2025-02-04 NOTE — Clinical Note
"2025      Gustavo Ramon  [address] [place] [address]      Dear Colleague,    Thank you for referring your patient, Gustavo Ramon, to the [facility]. Please see a copy of my visit note below.    [facility] Hematology / Oncology  Progress Note  Name: Gustavo Ramon  :  1940  MRN:  [id number]    --------------------    Subjective:  ***.    Family History:  ***.    --------------------    Objective:  VS: BP (!) 159/75   Pulse 65   Resp 18   Ht 1.69 m (5' 6.54\")   Wt 81.3 kg (179 lb 4.8 oz)   SpO2 98%   BMI 28.48 kg/m    Gen: ***-appearing.    We reviewed ***.  We reviewed ***.    --------------------    Assessment / Plan:  ***    There are no Patient Instructions on file for this visit.    Hakan Darden MD                Again, thank you for allowing me to participate in the care of your patient.        Sincerely,        Hakan Darden MD    Electronically signed"

## 2025-02-05 NOTE — PATIENT INSTRUCTIONS
1) MARGARITO MG 12 months w/ labs 5-7 days prior (CBC, CMP, SPEP, FLC, IgA).  2) BJT MG 24 months w/ labs 5-7 days prior (CBC, CMP, SPEP, FLC, IgA, PSA; MARGARITO will need to order labs).    Hakan Darden MD.

## 2025-02-05 NOTE — PROGRESS NOTES
"[facility] Hematology / Oncology  Progress Note  Name: Gustavo Ramon  :  1940  MRN:  [id number]    --------------------    Subjective:  Stephen returns for follow-up of MGUS accompanied by his wife.  All in all, he feels like things are stable from a health standpoint.  Unfortunately he has been dealing with some syncopal and presyncopal episodes that he attributes to prolonged sitting and occurring with rising from a chair.  He seems to think that things are improved with some recognition and some slow rising as well as the addition of midodrine.  He otherwise denies any other issues with worsening neuropathy.  No progressive or symptomatic adenopathy.  No unexplained fevers, chills or sweats.  Stable appetite and weight.  No changes to energy began feeling his age to some extent.    --------------------    Objective:  VS: BP (!) 159/75   Pulse 65   Resp 18   Ht 1.69 m (5' 6.54\")   Wt 81.3 kg (179 lb 4.8 oz)   SpO2 98%   BMI 28.48 kg/m    Gen: Well-appearing.  Braeden: No cervical, clavicular, axillary adenopathy.    We reviewed CBC, CMP, SPEP, FLC.    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  Always worthwhile considering amyloidosis with syncope, but his markers have been barely out of range, well-controlled and stable for quite some time now.  Immunizations: UTD flu, PNA, shingles; reviewed COVID and RSV.  RHM: PSA WNL ; not planning colon w/ age.    Patient Instructions   1) MARGARITO MG 12 months w/ labs 5-7 days prior (CBC, CMP, SPEP, FLC, IgA).  2) BJT MG 24 months w/ labs 5-7 days prior (CBC, CMP, SPEP, FLC, IgA, PSA; MAGRARITO will need to order labs).    Hakan Darden MD.  "

## 2025-02-06 ENCOUNTER — OFFICE VISIT (OUTPATIENT)
Dept: PHARMACY | Facility: CLINIC | Age: 85
End: 2025-02-06
Attending: PSYCHIATRY & NEUROLOGY
Payer: MEDICARE

## 2025-02-06 VITALS — DIASTOLIC BLOOD PRESSURE: 60 MMHG | SYSTOLIC BLOOD PRESSURE: 102 MMHG

## 2025-02-06 DIAGNOSIS — K21.9 GASTROESOPHAGEAL REFLUX DISEASE WITHOUT ESOPHAGITIS: ICD-10-CM

## 2025-02-06 DIAGNOSIS — I95.1 ORTHOSTATIC HYPOTENSION: ICD-10-CM

## 2025-02-06 DIAGNOSIS — G25.81 RESTLESS LEG SYNDROME DUE TO IRON DEFICIENCY ANEMIA: Primary | ICD-10-CM

## 2025-02-06 DIAGNOSIS — G25.3 MYOCLONUS: ICD-10-CM

## 2025-02-06 DIAGNOSIS — G70.00 OCULAR MYASTHENIA GRAVIS (H): ICD-10-CM

## 2025-02-06 DIAGNOSIS — D50.9 RESTLESS LEG SYNDROME DUE TO IRON DEFICIENCY ANEMIA: Primary | ICD-10-CM

## 2025-02-06 DIAGNOSIS — L57.0 ACTINIC KERATOSIS: ICD-10-CM

## 2025-02-06 DIAGNOSIS — E78.5 HYPERLIPIDEMIA LDL GOAL <130: ICD-10-CM

## 2025-02-06 DIAGNOSIS — R35.0 URINARY FREQUENCY: ICD-10-CM

## 2025-02-06 DIAGNOSIS — E55.9 VITAMIN D DEFICIENCY: ICD-10-CM

## 2025-02-06 PROCEDURE — G0463 HOSPITAL OUTPT CLINIC VISIT: HCPCS | Performed by: PHARMACIST

## 2025-02-06 RX ORDER — ROTIGOTINE 1 MG/24H
1 PATCH, EXTENDED RELEASE TRANSDERMAL DAILY
Qty: 30 PATCH | Refills: 11 | Status: SHIPPED | OUTPATIENT
Start: 2025-02-06

## 2025-02-06 NOTE — PATIENT INSTRUCTIONS
Recommendations from today's MTM visit:                                                    MTM (medication therapy management) is a service provided by a clinical pharmacist designed to help you get the most of out of your medicines.   Today we reviewed what your medicines are for, how to know if they are working, that your medicines are safe and how to make your medicine regimen as easy as possible.      Start rotigotine (Neupro) patch 1 mg/24 hours   This may need a prior authorization which can take a few days to a week to work on. Once the prior authorization is completed and approved then the pharmacy will be able to get it ready for you to   You will change the patch every day at the same time   When you are going to start, apply the patch when you would take the first dose of the pramipexole.   Apply to abdomen, upper arms, and thighs  Apply patch to clean, dry, hairless area of intact healthy skin on the front of the abdomen, thigh, hip, flank, shoulder, or upper arm at approximately the same time daily. Remove from pouch immediately before use and press patch firmly in place on skin for 30 seconds. Application sites should be rotated on a daily basis. Do not apply to same application site more than once every 14 days or apply patch to skin that is oily, irritated, or damaged, or where it will be rubbed by tight clothing. Avoid exposing patch to external heat sources (eg, heating pad, electric blanket, heat lamp, hot tub, direct sunlight). If applied to hairy area, shave >=3 days prior to applying patch. If patch falls off, immediately apply a new one to a new site  Possible side effects:  nausea, dizziness, orthostatic hypotension, edema, drowsiness with sudden sleep attacks, hallucinations, dyskinesias, impulse control disorders   Consider talking to cardiology about stopping the aspirin  Continue to check blood pressure daily and follow recommendations that Dr. Wheat provided to help with the  "hypotension     Follow-up: 4-6 weeks after starting the medication.       It was great speaking with you today.  I value your experience and would be very thankful for your time in providing feedback in our clinic survey. In the next few days, you may receive an email or text message from [employer] with a link to a survey related to your  clinical pharmacist.\"     To schedule another MTM appointment, please call the clinic directly or you may call the MTM scheduling line at [phone] or toll-free at [phone].     My Clinical Pharmacist's contact information:                                                      Please feel free to contact me with any questions or concerns you have.      Shannon Steven, PharmD, BCACP  Medication Therapy Management Pharmacist   [facility]     "

## 2025-02-06 NOTE — Clinical Note
I met with patient to transition to Neupro patch. I sent the medication in another encounter. I will follow-up with patient about 4 weeks after making the switch. Thanks!

## 2025-02-06 NOTE — Clinical Note
Terrance Montero, I met with pt for a MTM visit to try to help with his RLS medications. We are going to see if the Neupro patch provides more symptoms control than pramipexole. He hasn't taken and iron supplement in about a year. I know he had his iron labs checked this past summer but maybe at your next follow-up they could be repeated again to ensure that is not the cause. Thanks!

## 2025-02-06 NOTE — PROGRESS NOTES
Medication Therapy Management (MTM) Encounter    ASSESSMENT:                            Medication Adherence/Access: No issues identified.    RLS:   Experiencing increase in RLS during the day. He may be experiencing augmentation due to pramipexole. He is also experiencing hypotension, although not symptomatic which is a side effect of pramipexole. Reviewed option of reducing dose of pramipexole during the day or switching to rotigotine patch which may provide a more steady release of medication leading to less hypotension events. Patient and wife would like to switch to patch and recommend switching to lowest dose of 1mg. Reviewed possible side effects and how to properly apply patch. Recommend patient stop pramipexole and start rotigotine patch when he would take his first dose of pramipexole. Patient has not taken a iron supplement in over a year. He may benefit from repeating iron study labs to rule out iron deficiency as a cause of his symptoms. Will send recommendation to primary care provider. Will follow-up with patient in 4-6 weeks.     Ocular MG  Stable, continue with current medication regimen. Pyridostigmine does have a potential side effect of muscles spasms and twitches. Could consider lowering the dose if he is still stable after prednisone reduction to see if the twitches reduce.     Genitourinary Symptoms  BPH  Stable, continue with current medication regimen.      GERD    Stable, continue with current medication regimen    Supplements   Stable, continue with current supplements       Orthostatic HOTN/dizziness  Patient has upcoming appointment with cardiology. Reviewed medications that could be contributing which include tamsulosin and pramipexole. Patient is taking tamsulosin after a meal so unsure if this would be the cause. Will attempt to change pramipexole to rotigotine patch to see if this lessens the episodes. Recommend to continue closely monitoring blood pressure and to bring readings to  cardiology visit.     Myoclonus   Stable, continue with current medication regimen.     Primary CVD prevention   Patient has upcoming appointment with cardiology. Recommend to discuss the necessity of aspirin.     Actinic keratosis  Stable, continue with current medication regimen    PLAN:                            Start rotigotine (Neupro) patch 1 mg/24 hours   This may need a prior authorization which can take a few days to a week to work on. Once the prior authorization is completed and approved then the pharmacy will be able to get it ready for you to   You will change the patch every day at the same time   When you are going to start, apply the patch when you would take the first dose of the pramipexole.   Apply to abdomen, upper arms, and thighs  Apply patch to clean, dry, hairless area of intact healthy skin on the front of the abdomen, thigh, hip, flank, shoulder, or upper arm at approximately the same time daily. Remove from pouch immediately before use and press patch firmly in place on skin for 30 seconds. Application sites should be rotated on a daily basis. Do not apply to same application site more than once every 14 days or apply patch to skin that is oily, irritated, or damaged, or where it will be rubbed by tight clothing. Avoid exposing patch to external heat sources (eg, heating pad, electric blanket, heat lamp, hot tub, direct sunlight). If applied to hairy area, shave >=3 days prior to applying patch. If patch falls off, immediately apply a new one to a new site  Possible side effects:  nausea, dizziness, orthostatic hypotension, edema, drowsiness with sudden sleep attacks, hallucinations, dyskinesias, impulse control disorders   Consider talking to cardiology about stopping the aspirin  Continue to check blood pressure daily and follow recommendations that Dr. Wheat provided to help with the hypotension       Follow-up: 4-6 weeks after starting the medication.      SUBJECTIVE/OBJECTIVE:                          Stephen Ramon is a 84 year old male seen for an initial visit. He was referred to me from Dr. Wheat. Patient was accompanied by his wife álvaro.     Reason for visit: Medication review.    Allergies/ADRs: Reviewed in chart  Past Medical History: Reviewed in chart  Tobacco: He reports that he has never smoked. He has never been exposed to tobacco smoke. He has never used smokeless tobacco.  Alcohol: not currently using    Medication Adherence/Access: no issues reported.    RLS:   -Gabapentin 600mg QAM and 900mg QPM  -pramipexole 0.125mg three times daily - takes at 11AM, 5PM, 10PM     Has tried ropinirole ER- did not seem to help so was switched back to pramipexole  At night the RLS is not bothering him  During the day it is the worse. He will need to get up and move around. If he sits longer than 30 minutes then the RLS worsens.   Has not been taking an iron supplement. Maybe took about a year ago. He was suppose to start taking one this summer  He denies side effects to the medications  Patient will sometimes experience muscle spasms and contractions in one limb that will happen for a few minutes and then stop      Iron Sat Index  15 - 46 % 21     Ferritin   Date Value Ref Range Status   07/31/2024 100 31 - 409 ng/mL Final   09/22/2020 24 (L) 26 - 388 ng/mL Final     Iron   Date Value Ref Range Status   07/31/2024 52 (L) 61 - 157 ug/dL Final   09/22/2020 48 35 - 180 ug/dL Final     Iron Binding Capacity   Date Value Ref Range Status   07/31/2024 253 240 - 430 ug/dL Final   02/15/2022 350 240 - 430 ug/dL Final         Ocular MG  -Prednisone 5mg daily   -Pyridostigmine 90mg BID   Denies side effects. He has felt great every since starting the prednisone. He is having less pain in his hip and shoulders. He also has much more energy, not needing to nap in the middle of the day anymore    Genitourinary Symptoms  BPH  -Tamsulosin 0.4mg nightly   He confirms he takes  this after eating a meal or snack at night  Patient reports no current medication side effects.    Patient feels that current therapy is effective.        GERD    Pantoprazole 40 mg once daily   Patient reports heartburn.   Patient feels that current regimen is effective.  The patient does not notice symptoms if they miss a dose.       Supplements   -Vitamin D 2000 units once daily   -multivitamin daily   -Vitamin B12 1000 mcg once daily   No reported issues at this time.        Orthostatic HOTN/dizziness  -midodrine 2.5mg TID   He will have bouts of possible low blood pressure. He will be sitting and then will pretty much doze off. This will only last for maybe a minute.   He does not feel dizzy or light headed. He reports he will sense a weird feeling, then doze off for a few seconds. Sometime he will try to move and then the feeling will pass and he won't doze off. Seems to happen after he has been eating and he continues to sit. He tries to check his blood pressure in the morning and wife reports seems to be fine. Numbers are in the 120's/70-80's. He is not able to check blood pressure during events because happen so fast and then after the event he feels fine. Patient wore a heart monitor and will be establishing with cardiology next week     Myoclonus   -levetiracetam 250mg BID   Patient reports no concerns or side effects with medications       Primary CVD prevention   -Aspirin 325mg once daily  Patient reports no concerns or side effects with medications. Does report bruising easily and bleeding easily but it stops fast     Actinic keratosis  -triamcinolone 0.1% cream twice daily as needed  -ketoconazole 2% cream twice daily   Patient reports no concerns or side effects with medications       Today's Vitals: /60   ----------------    I spent 63 minutes with this patient today. I offer these suggestions for consideration by Dr. Wheat.     A summary of these recommendations was given to the  patient.    Shannon Steven, PharmD, BCACP  Medication Therapy Management Pharmacist   [facility]     Medication Therapy Recommendations  No medication therapy recommendations to display

## 2025-02-08 ENCOUNTER — MYC REFILL (OUTPATIENT)
Dept: NEUROLOGY | Facility: CLINIC | Age: 85
End: 2025-02-08
Payer: MEDICARE

## 2025-02-08 DIAGNOSIS — G70.00 OCULAR MYASTHENIA (H): ICD-10-CM

## 2025-02-10 ENCOUNTER — TELEPHONE (OUTPATIENT)
Dept: FAMILY MEDICINE | Facility: CLINIC | Age: 85
End: 2025-02-10

## 2025-02-10 DIAGNOSIS — Z53.9 DIAGNOSIS NOT YET DEFINED: Primary | ICD-10-CM

## 2025-02-10 PROCEDURE — G0180 MD CERTIFICATION HHA PATIENT: HCPCS | Performed by: PHYSICIAN ASSISTANT

## 2025-02-10 RX ORDER — PREDNISONE 5 MG/1
5 TABLET ORAL DAILY
Qty: 90 TABLET | Refills: 1 | Status: SHIPPED | OUTPATIENT
Start: 2025-02-10

## 2025-02-10 NOTE — TELEPHONE ENCOUNTER
Forms received from: [facility]   Phone number listed: [phone]   Fax listed: [phone]  Date received: 02/10/2025  Form description: [facility]  Once forms are completed, please return to [facility] via fax.  Form placed: in providers in [facility] [place]

## 2025-02-27 ENCOUNTER — TELEPHONE (OUTPATIENT)
Dept: PHARMACY | Facility: CLINIC | Age: 85
End: 2025-02-27

## 2025-02-27 ENCOUNTER — OFFICE VISIT (OUTPATIENT)
Dept: FAMILY MEDICINE | Facility: CLINIC | Age: 85
End: 2025-02-27
Payer: MEDICARE

## 2025-02-27 VITALS
DIASTOLIC BLOOD PRESSURE: 66 MMHG | WEIGHT: 178 LBS | RESPIRATION RATE: 18 BRPM | OXYGEN SATURATION: 97 % | SYSTOLIC BLOOD PRESSURE: 124 MMHG | BODY MASS INDEX: 28.27 KG/M2 | HEART RATE: 66 BPM | TEMPERATURE: 97.6 F

## 2025-02-27 DIAGNOSIS — I95.1 ORTHOSTATIC HYPOTENSION: ICD-10-CM

## 2025-02-27 DIAGNOSIS — L03.115 CELLULITIS OF RIGHT LOWER EXTREMITY: ICD-10-CM

## 2025-02-27 DIAGNOSIS — G25.81 RESTLESS LEG SYNDROME DUE TO IRON DEFICIENCY ANEMIA: ICD-10-CM

## 2025-02-27 DIAGNOSIS — D50.9 RESTLESS LEG SYNDROME DUE TO IRON DEFICIENCY ANEMIA: ICD-10-CM

## 2025-02-27 DIAGNOSIS — G25.81 RESTLESS LEG SYNDROME: Primary | ICD-10-CM

## 2025-02-27 RX ORDER — ROTIGOTINE 1 MG/24H
1 PATCH, EXTENDED RELEASE TRANSDERMAL DAILY
Qty: 30 PATCH | Refills: 11 | Status: SHIPPED | OUTPATIENT
Start: 2025-02-27

## 2025-02-27 RX ORDER — CEPHALEXIN 500 MG/1
500 CAPSULE ORAL 2 TIMES DAILY
Qty: 14 CAPSULE | Refills: 0 | Status: SHIPPED | OUTPATIENT
Start: 2025-02-27 | End: 2025-03-06

## 2025-02-27 NOTE — PROGRESS NOTES
"  Assessment & Plan   Problem List Items Addressed This Visit    None  Visit Diagnoses       Restless leg syndrome    -  Primary    Cellulitis of right lower extremity        Relevant Medications    cephALEXin (KEFLEX) 500 MG capsule    Orthostatic hypotension                      BMI  Estimated body mass index is 28.27 kg/m  as calculated from the following:    Height as of 2/4/25: 1.69 m (5' 6.54\").    Weight as of this encounter: 80.7 kg (178 lb).   Weight management plan: Discussed healthy diet and exercise guidelines    Work on weight loss  Regular exercise      Wally Mitchell is a 84 year old, presenting for the following health issues:  Recheck Medication        2/27/2025     1:19 PM   Additional Questions   Roomed by Delia   Accompanied by Wife         2/27/2025     1:19 PM   Patient Reported Additional Medications   Patient reports taking the following new medications NA     History of Present Illness       Reason for visit:  Medication review, per doctor request   He is taking medications regularly.      Noting less orthostatic blood pressure issues the past month. Taking midodrine and it seems to be support his pressures with out them rising too high.  Now using rotigotine patch with oral low dose pramipexole for his rls. This has helped some without side effects .     Patient with history of Hypotension arrived for Medication follow-up.     Hypotension Follow-up    Do you check your blood pressure regularly outside of the clinic? Yes - Occasionally   Are your blood pressures ever low? No - Averaging 120/70 per pt     BP Readings from Last 3 Encounters:   02/27/25 124/66   02/06/25 102/60   02/04/25 (!) 159/75            Review of Systems  Constitutional, HEENT, cardiovascular, pulmonary, GI, , musculoskeletal, neuro, skin, endocrine and psych systems are negative, except as otherwise noted.      Objective    /66 (BP Location: Left arm, Patient Position: Chair, Cuff Size: Adult Regular)   " Pulse 66   Temp 97.6  F (36.4  C) (Tympanic)   Resp 18   Wt 80.7 kg (178 lb)   SpO2 97%   BMI 28.27 kg/m    Body mass index is 28.27 kg/m .  Physical Exam   Eye exam - right eye normal lid, conjunctiva, cornea, pupil and fundus, left eye normal lid, conjunctiva, cornea, pupil and fundus.  Thyroid not palpable, not enlarged, no nodules detected.  CHEST:chest clear to IPPA, no tachypnea, retractions or cyanosis, and S1, S2 normal, no murmur, no gallop, rate regular.  Abrasions on both shins. Right shin: surrounding redness and mild tender.     Signed Electronically by: Winston Silverman PA-C

## 2025-02-27 NOTE — TELEPHONE ENCOUNTER
Called patient to schedule MTM visit. Left message requesting a call back to schedule. Will also send a [facility] message.     Shannon Steven, PharmD, BCACP  Medication Therapy Management Pharmacist   [facility]

## 2025-03-04 ENCOUNTER — OFFICE VISIT (OUTPATIENT)
Dept: ENDOCRINOLOGY | Facility: CLINIC | Age: 85
End: 2025-03-04
Payer: MEDICARE

## 2025-03-04 VITALS
HEART RATE: 70 BPM | SYSTOLIC BLOOD PRESSURE: 128 MMHG | BODY MASS INDEX: 28.27 KG/M2 | WEIGHT: 178 LBS | OXYGEN SATURATION: 98 % | DIASTOLIC BLOOD PRESSURE: 79 MMHG

## 2025-03-04 DIAGNOSIS — E05.90 HYPERTHYROIDISM: Primary | ICD-10-CM

## 2025-03-04 DIAGNOSIS — R79.89 LOW SERUM CORTISOL LEVEL: ICD-10-CM

## 2025-03-04 PROCEDURE — 99215 OFFICE O/P EST HI 40 MIN: CPT | Performed by: STUDENT IN AN ORGANIZED HEALTH CARE EDUCATION/TRAINING PROGRAM

## 2025-03-04 PROCEDURE — 3074F SYST BP LT 130 MM HG: CPT | Performed by: STUDENT IN AN ORGANIZED HEALTH CARE EDUCATION/TRAINING PROGRAM

## 2025-03-04 PROCEDURE — 3078F DIAST BP <80 MM HG: CPT | Performed by: STUDENT IN AN ORGANIZED HEALTH CARE EDUCATION/TRAINING PROGRAM

## 2025-03-04 PROCEDURE — G2211 COMPLEX E/M VISIT ADD ON: HCPCS | Performed by: STUDENT IN AN ORGANIZED HEALTH CARE EDUCATION/TRAINING PROGRAM

## 2025-03-04 NOTE — PATIENT INSTRUCTIONS
Welcome to the [facility]     It was nice seeing you.    Continue to monitor thyroid labs every 6 month. No need for thyroid medication for now.    Let me know if at any time you decided to stop your prednisone.         Our Endocrinology Clinics are here to provide you with a team-based, collaborative approach in the diagnosis and treatment of patients with diabetes and endocrine disorders. The team is made up of Physicians, Physician Assistants, Certified Diabetes Educators, Registered Nurses, Medical Assistants, Emergency Medical Technicians, and many others, all of whom have the unified goal of providing our patients with high quality care.     Please see below for some helpful tips to best navigate and use the [facility]:     [place] Respect: At [facility], we are committed to a respectful and safe space for all patients, visitors, and staff.  We believe that mutual respect between patients and their care team is the foundation of quality care.  It is our expectation that you will be treated with respect by your care team.  In turn, we ask that all communication with the care team (written and verbal) be respectful and free from profanity, threatening, or abusive language.  Disrespectful communication undermines our therapeutic relationship with you and may result in us being unable to continue to provide your care.    Refills: A provider must see you at least annually to prescribe and refill medications. This is to ensure your safety as well as meet insurance and compliance regulations.    Scheduling: Many of our Providers offer both in-person or video visits. Please call to schedule any needed follow ups as soon as possible because our provider schedules fill up very quickly. Our care team has the right to require an in-person visit when they believe that it is medically necessary. Please remember that for any virtual visits, you  must be in the state [place] at the time of the visit, otherwise we are unable to see you and you will need to be rescheduled.    Missed Appointments: If you need to cancel or miss your scheduled appointment, please call the clinic at [phone] to reschedule.  Please note if you repeatedly miss appointments or repeatedly miss appointments without calling to inform us ahead of time (no-show), the clinic may elect to not allow you to reschedule without speaking to a manager, may require a Partnership In Care Agreement prior to rescheduling, or could result in you no longer being able to receive care from the clinic. Providing the clinic with timely notification if you have to miss an appointment, allows us to better serve the needs of all of our patients.    Primary Care Provider: Our Endocrinologists are Specialists in their field. We expect you to have a Primary Care Provider established to handle any needs outside of your diabetes and endocrine care.  We would be happy to assist you find a Primary Care Provider, if you do not have one.    [employer]: [employer] is a wonderful resource that allows you access to your Care Team via online or the seamus. Please ask a member of the team if you would like help creating an account. Please note that it may take up to 2 business days for a response. [employer] messages are not reviewed on weekends or after business hours.  Emergent or urgent care needs should never be communicated via [employer].  If you experience a medical emergency call 911 or go to the nearest emergency room.    Labs: It is recommended that you stay within the [facility] for labs but you are welcome to obtain ordered labs (with some exceptions) from any location of your choice as long as they are able to complete and process the needed labs. If you need us to fax orders to your preferred lab, please provide us the name and fax number of the lab you would like to go to so we can fax the orders. If  your labs are drawn outside of the [facility], please have them fax the results to [phone] ([place]) or [phone] ([place]) or via [facility]. It is your responsibility to ensure that outside lab results are sent to us.    We look forward to working with you. Please do not hesitate to reach out with any questions.    Thank you,    The Endocrine Team    [facility] Address:   [place] Address:     [address] [place] [address]    Phone: [phone]  Fax: [phone]   [address] [place] [address]    Phone: [phone]  Fax: [phone]     [facility] [place] [facility] Estimate Phone Number: [phone]    General Lab and Imaging Scheduling Phone Number: [phone]      If you are interested in exploring research opportunities in the Division of Diabetes, Endocrinology and Metabolism and within the [facility] you are welcome to view the following QR codes by scanning them using your phone's camera to access a link to more information.  Participating in a research study is voluntary. Your decision whether or not to participate will not affect your current or future relations with the [facility] or [facility]. Current available studies are:     Type 1 Diabetes  Adults     Pediatrics     Type 2 Diabetes  Weight Loss - People Treated with Diet or Metformin Only     Pediatrics and Young Adults

## 2025-03-04 NOTE — PROGRESS NOTES
Endocrinology Clinic Visit      NAME:  Gustavo Ramon  PCP:  Andra Winston Thurman  MRN:  [id number]  Reason for Consult:  hyperthyroidism  Requesting Provider:  Winston Silverman    Chief Complaint     Chief Complaint   Patient presents with    Follow Up    Endocrine Problem       History of Present Illness     Gustavo Ramon is a 84 year old male who is seen in clinic for transient thyrotoxicosis. Last seen 10/2024. He is here with his wife.     He was referred for thyrotoxicosis on 6/2024 with TSH of 0.02 , FT4 1.75, total t3 127. He had negative TSI and TRAB. High titer TPO abx.  Methimazole was prescribed by his PCP but he never took it.    7/2024 thyroid uptake and scan: No uptake of the radiotracer by the thyroid tissue. Given the lab findings of hyperthyroidism, findings are suggestive of thyroiditis.     TFT on 9/2024, 10/2024 and recent one 12/2024 with mild subclinical hypothyroidism.   He has ongoing issues with low energy and syncope as below but otherwise clinically euthyroid.     ----------------------------------------  # syncope.    He had a hospitalization early October with fatigue and pre-syncope with orthostatic hypotension thought to be related to low PO intake.   Today he reported ongoing syncope and pre-suncope. Last hospitalization for syncope 12/2024. He established with cardiology at [facility], per their note could be orthostatic vagal events versus cardiac syncope either from sinus node dysfunction or some other bradyarrhythmia or tachyarrhythmia. Planning for EP study.  He was also started on midodrine which helped with his low BP.     Last visit we checked a morning cortisol given low energy and low BP , which came back intermediate at 7. The plan was to completed an ACTH stim test which never happened.       He was started on prednisone 10 mg daily on 12/2024 by his neurologist due to concern of reccurence of myasthenia gravis. Dose was tapered down to 5 mg daily on 2/10/24. He does not  think prednisone helped with his syncope or presyncope but it helped with other symptoms. He reported the plan is to stay on it.     Problem List     Patient Active Problem List   Diagnosis    Rosacea    DJD (degenerative joint disease)    Ocular myasthenia gravis (H)    Macular pigment deposit    HYPERLIPIDEMIA LDL GOAL <130    MGUS (monoclonal gammopathy of unknown significance)    Retinal hole OS, operculated    Horseshoe tear of retina without detachment OD    History  of basal cell carcinoma    Seborrhea    AK (actinic keratosis)    Malignant neoplasm of prostate (H)    PVD (posterior vitreous detachment)    Amaurosis fugax by Hx neg carotid W/U    Actinic keratosis    Peripheral neuropathy    Nuclear sclerosis of both eyes    Essential hypertension with goal blood pressure less than 140/90    Gastroesophageal reflux disease without esophagitis    Stage 3a chronic kidney disease (H)    Secondary adrenocortical insufficiency    Physical deconditioning    NPH (normal pressure hydrocephalus) (H)    Myoclonus    Infection due to 2019 novel coronavirus    Encephalopathy    Dementia without behavioral disturbance, unspecified dementia type    Idiopathic normal pressure hydrocephalus (H)    Acute atopic conjunctivitis    Cancer of the skin, basal cell    COVID-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    Degeneration of intervertebral disc    Diverticulitis of intestine    Dysphagia    Gait instability    Hip pain, acute, left    History of osteoporosis    Kidney disorder    Pneumonia due to COVID-19 virus    Restless leg syndrome due to iron deficiency anemia    Sepsis due to COVID-19 (H)    Tear of medial cartilage or meniscus of knee, current    Urinary frequency    Vitamin D deficiency    Weakness of both lower extremities    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)    Impaired flexibility of lower extremity    Syncope, unspecified syncope type    Low serum cortisol level    Syncope        Medications     Current Outpatient  Medications   Medication Sig Dispense Refill    acetaminophen (TYLENOL) 325 MG tablet Take 1-2 tablets (325-650 mg) by mouth every 4 hours as needed for mild pain      cephALEXin (KEFLEX) 500 MG capsule Take 1 capsule (500 mg) by mouth 2 times daily for 7 days. 14 capsule 0    cyanocobalamin (VITAMIN B-12) 1000 MCG tablet Take 1 tablet (1,000 mcg) by mouth daily 90 tablet 1    gabapentin (NEURONTIN) 300 MG capsule 600 mg in the morning and 900 mg in the evening 450 capsule 1    levETIRAcetam (KEPPRA) 250 MG tablet Take 1 tablet (250 mg) by mouth 2 times daily. 180 tablet 1    midodrine (PROAMATINE) 2.5 MG tablet Take 1 tablet (2.5 mg) by mouth 3 times daily. 270 tablet 0    multivitamin (OCUVITE) TABS tablet Take 1 tablet by mouth daily      pantoprazole (PROTONIX) 40 MG EC tablet Take 1 tablet (40 mg) by mouth daily. 90 tablet 2    pramipexole (MIRAPEX) 0.125 MG tablet Take 1 tablet (0.125 mg) by mouth 3 times daily. 90 tablet 2    predniSONE (DELTASONE) 5 MG tablet Take 1 tablet (5 mg) by mouth daily. 90 tablet 1    pyRIDostigmine (MESTINON) 60 MG tablet Take 1.5 tablets (90 mg) by mouth 2 times daily. 270 tablet 1    Rotigotine (NEUPRO) 1 MG/24HR 24 hr patch Place 1 patch over 24 hours onto the skin daily. 30 patch 11    tamsulosin (FLOMAX) 0.4 MG capsule Take 1 capsule (0.4 mg) by mouth daily. 90 capsule 3    terbinafine (LAMISIL) 1 % external cream Apply topically 2 times daily (Patient taking differently: Apply topically 2 times daily as needed.) 42 g 11    triamcinolone (KENALOG) 0.1 % external cream Apply a thin layer up to twice daily to affected areas as needed. 30 g 3    vitamin D3 (CHOLECALCIFEROL) 2000 units tablet Take 1 tablet by mouth daily 90 tablet 1     Current Facility-Administered Medications   Medication Dose Route Frequency Provider Last Rate Last Admin    lidocaine 1% with EPINEPHrine 1:100,000 injection 3 mL  3 mL Intradermal Once             Allergies     Allergies   Allergen Reactions     Mycophenolate Other (See Comments)     Confusion, abnormal movements       Medical / Surgical History     Past Medical History:   Diagnosis Date    Actinic keratosis     AK (actinic keratosis) 11/15/2012    Amaurosis fugax     Basal cell carcinoma 2009    R medial cheek/nose    Central serous retinopathy left    Eye    Diverticulosis 08/2006    DJD (degenerative joint disease)     GERD (gastroesophageal reflux disease)     Hearing loss     High frequency    History of nonmelanoma skin cancer     History of nonmelanoma skin cancer     HTN (hypertension)     Hyperlipidemia LDL goal < 130     Hyperplastic colon polyp 08/2006    IgA monoclonal gammopathy     IgA kappa    Infection due to 2019 novel coronavirus 10/13/2022    Infection due to 2019 novel coronavirus 11/2021    Lattice degeneration of retina right    Eye    Macular degeneration     Nonsenile cataract     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    [place] consult    Rosacea     Steroid-induced diabetes mellitus, 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    Strabismus     Vitreous detachment left    Eye     Past Surgical History:   Procedure Laterality Date    ARTHROSCOPY KNEE RT/LT Left 01/2010    Knee    BIOPSY  2009/2013/2016    Nose; Prostate; BCC - left arm    COLONOSCOPY  09/24/2019    w/Endoscopy    COLONOSCOPY  07/29/2024    COLONOSCOPY WITH CO2 INSUFFLATION N/A 09/24/2019    Procedure: COLONOSCOPY, WITH CO2 INSUFFLATION;  Surgeon: Loi Levine MD;  Location: [facility] OR    COMBINED ESOPHAGOSCOPY, GASTROSCOPY, DUODENOSCOPY (EGD) WITH CO2 INSUFFLATION N/A 09/24/2019    Procedure: ESOPHAGOGASTRODUODENOSCOPY, WITH CO2 INSUFFLATION;  Surgeon: Loi Levine MD;  Location: [facility] OR    COMBINED ESOPHAGOSCOPY, GASTROSCOPY, DUODENOSCOPY (EGD) WITH CO2 INSUFFLATION N/A 7/29/2024    Procedure: Combined Esophagoscopy, Gastroscopy, Duodenoscopy (Egd) With Co2 Insufflation;  Surgeon: Jeff Peace MD;  Location: [facility] OR    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     DISKECTOMY, LUMBAR, SINGLE SP  2003    L2-3    ENT SURGERY  1943    Tonsils     ENT SURGERY  02/22/2012    Biopsy lesion right pinna    ENT SURGERY  02/24/2012    Biopsy leson right pinna    ESOPHAGOSCOPY, GASTROSCOPY, DUODENOSCOPY (EGD), COMBINED N/A 09/24/2019    Procedure: Esophagogastroduodenoscopy, With Biopsy;  Surgeon: Loi Levine MD;  Location: [facility]    ESOPHAGOSCOPY, GASTROSCOPY, DUODENOSCOPY (EGD), COMBINED N/A 7/29/2024    Procedure: ESOPHAGOGASTRODUODENOSCOPY, WITH BIOPSY;  Surgeon: Jeff Peace MD;  Location: [facility] OR    IR LUMBAR DRAIN PLACEMENT W FLUORO  06/27/2022    IR LUMBAR PUNCTURE  4/20/2022    IR LUMBAR PUNCTURE  4/11/2022    LAMINOPLASTY CERVICAL POSTERIOR THREE+ LEVELS N/A 12/12/2022    Procedure: POSTERIOR APPROACH Cervical 4-7 laminoplasty;  Surgeon: Judie Levin MD;  Location: [facility]    LAPAROSCOPIC ASSISTED IMPLANT SHUNT VENTRICULOPERITONEAL N/A 07/07/2022    Procedure: possible INSERTION, SHUNT, VENTRICULOPERITONEAL, LAPAROSCOPY-ASSISTED;  Surgeon: Joe Damon MD;  Location: [facility] OR    OPTICAL TRACKING SYSTEM IMPLANT SHUNT VENTRICULOPERITONEAL N/A 07/07/2022    Procedure: INSERTION, SHUNT, VENTRICULOPERITONEAL, USING OPTICAL TRACKING SYSTEM;  Surgeon: Jean-Paul Childs MD;  Location: [facility] OR    SOFT TISSUE SURGERY  05/2010    Basal Cell/right side nose       Social History     Social History     Socioeconomic History    Marital status:      Spouse name: Ceci    Number of children: 2    Years of education: 16    Highest education level: Not on file   Occupational History    Occupation:      Employer: RETIRED     Comment: 2001,    Tobacco Use    Smoking status: Never     Passive exposure: Never    Smokeless tobacco: Never    Tobacco comments:     Never   Vaping Use    Vaping status: Never Used   Substance and Sexual Activity    Alcohol use: No    Drug use: No    Sexual activity: Never   Other Topics Concern     Parent/sibling w/ CABG, MI or angioplasty before 65F 55M? No   Social History Narrative    Not on file     Social Drivers of Health     Financial Resource Strain: Low Risk  (12/28/2024)    [facility]    Financial Resource Strain     Difficulty of Paying Living Expenses: 3     Difficulty of Paying Living Expenses: Not on file   Food Insecurity: No Food Insecurity (12/28/2024)    Received from [facility]    Food Insecurity     Do you worry your food will run out before you are able to buy more?: 1   Transportation Needs: No Transportation Needs (12/28/2024)    [facility]    Transportation Needs     Does lack of transportation keep you from medical appointments?: 1     Does lack of transportation keep you from work, meetings or getting things that you need?: 1   Physical Activity: Insufficiently Active (10/7/2024)    Exercise Vital Sign     Days of Exercise per Week: 2 days     Minutes of Exercise per Session: 10 min   Stress: No Stress Concern Present (10/7/2024)    [employer] - Occupational Stress Questionnaire     Feeling of Stress : Not at all   Social Connections: Socially Integrated (12/28/2024)    Received from [facility]    Social Connections     Do you often feel lonely or isolated from those around you?: 0   Interpersonal Safety: Low Risk  (10/9/2024)    Interpersonal Safety     Do you feel physically and emotionally safe where you currently live?: Yes     Within the past 12 months, have you been hit, slapped, kicked or otherwise physically hurt by someone?: No     Within the past 12 months, have you been humiliated or emotionally abused in other ways by your partner or ex-partner?: No   Housing Stability: Low Risk  (12/28/2024)    Received from [facility]    Housing Stability     What  is your housing situation today?: 1   Recent Concern: Housing Stability - High Risk (10/3/2024)    Housing Stability     Do you have housing? : No     Are you worried about losing your housing?: No       Family History     Family History   Problem Relation Age of Onset    Heart Disease Mother     Alzheimer Disease Mother 73    C.A.D. Father     Coronary Artery Disease Father     Diabetes Grandchild         using a pump     Diabetes Paternal Uncle     Heart Disease Paternal Grandmother     Glaucoma No family hx of     Macular Degeneration No family hx of     Melanoma No family hx of     Skin Cancer No family hx of        ROS     Limited  ROS completed, pertinent positive and negative in HPI    Physical Exam   /79 (BP Location: Left arm, Patient Position: Sitting, Cuff Size: Adult Large)   Pulse 70   Wt 80.7 kg (178 lb)   SpO2 98%   BMI 28.27 kg/m       GENERAL: alert and no distress  EYES: Eyes grossly normal to inspection.  No discharge or erythema, or obvious scleral/conjunctival abnormalities.  RESP: No audible wheeze, cough, or visible cyanosis.    SKIN: Visible skin clear. No significant rash, abnormal pigmentation or lesions.  NEURO: Cranial nerves grossly intact.  Mentation and speech appropriate for age.  PSYCH: Appropriate affect, tone, and pace of words      Labs/Imaging     Pertinent Labs were reviewed and updated in EPIC and discussed briefly.  Radiology Results were  reviewed and updated in EPIC, reviewed, and discussed briefly.    Summary of recent findings:   Lab Results   Component Value Date    A1C 5.8 01/31/2024    A1C 6.2 12/05/2022    A1C 5.2 02/07/2022    A1C 5.5 01/17/2022    A1C 5.7 05/19/2021    A1C 5.8 11/09/2020       TSH   Date Value Ref Range Status   12/16/2024 4.59 (H) 0.30 - 4.20 uIU/mL Final   10/03/2024 7.92 (H) 0.30 - 4.20 uIU/mL Final   09/09/2024 4.64 (H) 0.30 - 4.20 uIU/mL Final   06/26/2024 0.02 (L) 0.30 - 4.20 uIU/mL Final   06/04/2024 0.01 (L) 0.30 - 4.20 uIU/mL Final  "  03/31/2022 1.50 0.40 - 4.00 mU/L Final   09/03/2019 1.94 0.40 - 4.00 mU/L Final   08/15/2019 1.90 0.40 - 4.00 mU/L Final   06/20/2017 1.24 0.40 - 4.00 mU/L Final   02/18/2014 1.27 0.4 - 5.0 mU/L Final     Free T4   Date Value Ref Range Status   12/16/2024 1.24 0.90 - 1.70 ng/dL Final   10/03/2024 0.99 0.90 - 1.70 ng/dL Final   09/09/2024 0.96 0.90 - 1.70 ng/dL Final   06/26/2024 1.75 (H) 0.90 - 1.70 ng/dL Final   06/04/2024 2.59 (H) 0.90 - 1.70 ng/dL Final       Creatinine   Date Value Ref Range Status   01/28/2025 1.05 0.67 - 1.17 mg/dL Final   05/19/2021 1.18 0.66 - 1.25 mg/dL Final       Recent Labs   Lab Test 01/31/24  1030 01/31/22  1357   CHOL 159 208*   HDL 57 79   LDL 82 92   TRIG 99 183*       No results found for: \"[id number]\", \"[id number]\", \"[id number]\"    I personally reviewed the patient's outside records from [facility] EMR and Care Everywhere. Summary of pertinent findings in HPI.    Impression / Plan     Transient Thyrotoxicosis- resolved  Thyroiditis  Subclinical hypothyroidism  Positive TPO abx    We reviewed his results. He had thyroiditis followed by subclinical hypothyroidism, his TSH is < 10.  we can hold off on lt4 replacement for now given his age. With positive TPO abx higher risk of persistent/overt hypothymism. We will plan to continue to monitor tft every 6 m.    5. Fatigue  6. Orthostatic hypotension  7. Syncope/ presyncope    He is following with cardiology and neurology suspecting  orthostatic vagal events versus cardiac syncope, undergoing work up, planning for EP study.   His morning cortisol was in the intermediate range, the plan was to complete ACTH stim test but he was started on prednisone for his MS. He said the plan is to stay on prednisone 5 mg daily for life, thus no need to complete further cortisol testing. Instructed him to let me know if at anytime prednisone is to be stopped and we will have to confirm his cortisol production status.     Test and/or medications prescribed " today:  Orders Placed This Encounter   Procedures    TSH with free T4 reflex            40 minutes spent on the date of the encounter doing chart review, history and exam, documentation and further activities as noted above.       The longitudinal plan of care for the diagnosis(es)/condition(s) as documented were addressed during this visit. Due to the added complexity in care, I will continue to support Stephen in the subsequent management and with ongoing continuity of care.      Jackie Holland MD  Endocrinology, Diabetes and Metabolism  [facility]

## 2025-03-04 NOTE — NURSING NOTE
Gustavo Ramon's goals for this visit include:   Chief Complaint   Patient presents with    Follow Up    Endocrine Problem     He requests these members of his care team be copied on today's visit information: Yes    PCP: Winston Silverman    Referring Provider:  Referred Self, MD  No address on file    /79 (BP Location: Left arm, Patient Position: Sitting, Cuff Size: Adult Large)   Pulse 70   Wt 80.7 kg (178 lb)   SpO2 98%   BMI 28.27 kg/m      Do you need any medication refills at today's visit? No    Brenda Reyez CMA  Adult Endocrinology   [facility]

## 2025-03-04 NOTE — LETTER
3/4/2025      Gustavo Ramon  [address] [place] [address]      Dear Colleague,    Thank you for referring your patient, Gustavo Ramon, to the [facility]. Please see a copy of my visit note below.    Endocrinology Clinic Visit      NAME:  Gustavo Ramon  PCP:  Winston Silverman  MRN:  [id number]  Reason for Consult:  hyperthyroidism  Requesting Provider:  Winston Silverman    Chief Complaint     Chief Complaint   Patient presents with     Follow Up     Endocrine Problem       History of Present Illness     Gustavo Ramon is a 84 year old male who is seen in clinic for transient thyrotoxicosis. Last seen 10/2024. He is here with his wife.     He was referred for thyrotoxicosis on 6/2024 with TSH of 0.02 , FT4 1.75, total t3 127. He had negative TSI and TRAB. High titer TPO abx.  Methimazole was prescribed by his PCP but he never took it.    7/2024 thyroid uptake and scan: No uptake of the radiotracer by the thyroid tissue. Given the lab findings of hyperthyroidism, findings are suggestive of thyroiditis.     TFT on 9/2024, 10/2024 and recent one 12/2024 with mild subclinical hypothyroidism.   He has ongoing issues with low energy and syncope as below but otherwise clinically euthyroid.     ----------------------------------------  # syncope.    He had a hospitalization early October with fatigue and pre-syncope with orthostatic hypotension thought to be related to low PO intake.   Today he reported ongoing syncope and pre-suncope. Last hospitalization for syncope 12/2024. He established with cardiology at [facility], per their note could be orthostatic vagal events versus cardiac syncope either from sinus node dysfunction or some other bradyarrhythmia or tachyarrhythmia. Planning for EP study.  He was also started on midodrine which helped with his low BP.     Last visit we checked a morning cortisol given low energy and low BP , which came back intermediate at 7.  The plan was to completed an ACTH stim test which never happened.       He was started on prednisone 10 mg daily on 12/2024 by his neurologist due to concern of reccurence of myasthenia gravis. Dose was tapered down to 5 mg daily on 2/10/24. He does not think prednisone helped with his syncope or presyncope but it helped with other symptoms. He reported the plan is to stay on it.     Problem List     Patient Active Problem List   Diagnosis     Rosacea     DJD (degenerative joint disease)     Ocular myasthenia gravis (H)     Macular pigment deposit     HYPERLIPIDEMIA LDL GOAL <130     MGUS (monoclonal gammopathy of unknown significance)     Retinal hole OS, operculated     Horseshoe tear of retina without detachment OD     History  of basal cell carcinoma     Seborrhea     AK (actinic keratosis)     Malignant neoplasm of prostate (H)     PVD (posterior vitreous detachment)     Amaurosis fugax by Hx neg carotid W/U     Actinic keratosis     Peripheral neuropathy     Nuclear sclerosis of both eyes     Essential hypertension with goal blood pressure less than 140/90     Gastroesophageal reflux disease without esophagitis     Stage 3a chronic kidney disease (H)     Secondary adrenocortical insufficiency     Physical deconditioning     NPH (normal pressure hydrocephalus) (H)     Myoclonus     Infection due to 2019 novel coronavirus     Encephalopathy     Dementia without behavioral disturbance, unspecified dementia type     Idiopathic normal pressure hydrocephalus (H)     Acute atopic conjunctivitis     Cancer of the skin, basal cell     COVID-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     Degeneration of intervertebral disc     Diverticulitis of intestine     Dysphagia     Gait instability     Hip pain, acute, left     History of osteoporosis     Kidney disorder     Pneumonia due to COVID-19 virus     Restless leg syndrome due to iron deficiency anemia     Sepsis due to COVID-19 (H)     Tear of medial cartilage or meniscus of knee, current      Urinary frequency     Vitamin D deficiency     Weakness of both lower extremities     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)     Impaired flexibility of lower extremity     Syncope, unspecified syncope type     Low serum cortisol level     Syncope        Medications     Current Outpatient Medications   Medication Sig Dispense Refill     acetaminophen (TYLENOL) 325 MG tablet Take 1-2 tablets (325-650 mg) by mouth every 4 hours as needed for mild pain       cephALEXin (KEFLEX) 500 MG capsule Take 1 capsule (500 mg) by mouth 2 times daily for 7 days. 14 capsule 0     cyanocobalamin (VITAMIN B-12) 1000 MCG tablet Take 1 tablet (1,000 mcg) by mouth daily 90 tablet 1     gabapentin (NEURONTIN) 300 MG capsule 600 mg in the morning and 900 mg in the evening 450 capsule 1     levETIRAcetam (KEPPRA) 250 MG tablet Take 1 tablet (250 mg) by mouth 2 times daily. 180 tablet 1     midodrine (PROAMATINE) 2.5 MG tablet Take 1 tablet (2.5 mg) by mouth 3 times daily. 270 tablet 0     multivitamin (OCUVITE) TABS tablet Take 1 tablet by mouth daily       pantoprazole (PROTONIX) 40 MG EC tablet Take 1 tablet (40 mg) by mouth daily. 90 tablet 2     pramipexole (MIRAPEX) 0.125 MG tablet Take 1 tablet (0.125 mg) by mouth 3 times daily. 90 tablet 2     predniSONE (DELTASONE) 5 MG tablet Take 1 tablet (5 mg) by mouth daily. 90 tablet 1     pyRIDostigmine (MESTINON) 60 MG tablet Take 1.5 tablets (90 mg) by mouth 2 times daily. 270 tablet 1     Rotigotine (NEUPRO) 1 MG/24HR 24 hr patch Place 1 patch over 24 hours onto the skin daily. 30 patch 11     tamsulosin (FLOMAX) 0.4 MG capsule Take 1 capsule (0.4 mg) by mouth daily. 90 capsule 3     terbinafine (LAMISIL) 1 % external cream Apply topically 2 times daily (Patient taking differently: Apply topically 2 times daily as needed.) 42 g 11     triamcinolone (KENALOG) 0.1 % external cream Apply a thin layer up to twice daily to affected areas as needed. 30 g 3     vitamin D3  (CHOLECALCIFEROL) 2000 units tablet Take 1 tablet by mouth daily 90 tablet 1     Current Facility-Administered Medications   Medication Dose Route Frequency Provider Last Rate Last Admin     lidocaine 1% with EPINEPHrine 1:100,000 injection 3 mL  3 mL Intradermal Once             Allergies     Allergies   Allergen Reactions     Mycophenolate Other (See Comments)     Confusion, abnormal movements       Medical / Surgical History     Past Medical History:   Diagnosis Date     Actinic keratosis      AK (actinic keratosis) 11/15/2012     Amaurosis fugax      Basal cell carcinoma 2009    R medial cheek/nose     Central serous retinopathy left    Eye     Diverticulosis 08/2006     DJD (degenerative joint disease)      GERD (gastroesophageal reflux disease)      Hearing loss     High frequency     History of nonmelanoma skin cancer      History of nonmelanoma skin cancer      HTN (hypertension)      Hyperlipidemia LDL goal < 130      Hyperplastic colon polyp 08/2006     IgA monoclonal gammopathy     IgA kappa     Infection due to 2019 novel coronavirus 10/13/2022     Infection due to 2019 novel coronavirus 11/2021     Lattice degeneration of retina right    Eye     Macular degeneration      Nonsenile cataract      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    [facility] consult     Rosacea      Steroid-induced diabetes mellitus, 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     Strabismus      Vitreous detachment left    Eye     Past Surgical History:   Procedure Laterality Date     ARTHROSCOPY KNEE RT/LT Left 01/2010    Knee     BIOPSY  2009/2013/2016    Nose; Prostate; BCC - left arm     COLONOSCOPY  09/24/2019    w/Endoscopy     COLONOSCOPY  07/29/2024     COLONOSCOPY WITH CO2 INSUFFLATION N/A 09/24/2019    Procedure: COLONOSCOPY, WITH CO2 INSUFFLATION;  Surgeon: Loi Levine MD;  Location: [facility] OR     COMBINED ESOPHAGOSCOPY, GASTROSCOPY, DUODENOSCOPY (EGD) WITH CO2 INSUFFLATION N/A 09/24/2019    Procedure: ESOPHAGOGASTRODUODENOSCOPY, WITH  CO2 INSUFFLATION;  Surgeon: Loi Levine MD;  Location: [facility] OR     COMBINED ESOPHAGOSCOPY, GASTROSCOPY, DUODENOSCOPY (EGD) WITH CO2 INSUFFLATION N/A 7/29/2024    Procedure: Combined Esophagoscopy, Gastroscopy, Duodenoscopy (Egd) With Co2 Insufflation;  Surgeon: Jeff Peace MD;  Location: [facility] OR     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     DISKECTOMY, LUMBAR, SINGLE SP  2003    L2-3     ENT SURGERY  1943    Tonsils      ENT SURGERY  02/22/2012    Biopsy lesion right pinna     ENT SURGERY  02/24/2012    Biopsy leson right pinna     ESOPHAGOSCOPY, GASTROSCOPY, DUODENOSCOPY (EGD), COMBINED N/A 09/24/2019    Procedure: Esophagogastroduodenoscopy, With Biopsy;  Surgeon: Loi Levine MD;  Location: [facility]     ESOPHAGOSCOPY, GASTROSCOPY, DUODENOSCOPY (EGD), COMBINED N/A 7/29/2024    Procedure: ESOPHAGOGASTRODUODENOSCOPY, WITH BIOPSY;  Surgeon: Jeff Peace MD;  Location: [facility]     IR LUMBAR DRAIN PLACEMENT W FLUORO  06/27/2022     IR LUMBAR PUNCTURE  4/20/2022     IR LUMBAR PUNCTURE  4/11/2022     LAMINOPLASTY CERVICAL POSTERIOR THREE+ LEVELS N/A 12/12/2022    Procedure: POSTERIOR APPROACH Cervical 4-7 laminoplasty;  Surgeon: Judie Levin MD;  Location: [facility]     LAPAROSCOPIC ASSISTED IMPLANT SHUNT VENTRICULOPERITONEAL N/A 07/07/2022    Procedure: possible INSERTION, SHUNT, VENTRICULOPERITONEAL, LAPAROSCOPY-ASSISTED;  Surgeon: Joe Damon MD;  Location: [facility] OR     OPTICAL TRACKING SYSTEM IMPLANT SHUNT VENTRICULOPERITONEAL N/A 07/07/2022    Procedure: INSERTION, SHUNT, VENTRICULOPERITONEAL, USING OPTICAL TRACKING SYSTEM;  Surgeon: Jean-Paul Childs MD;  Location: [facility] OR     SOFT TISSUE SURGERY  05/2010    Basal Cell/right side nose       Social History     Social History     Socioeconomic History     Marital status:      Spouse name: Ceci     Number of children: 2     Years of education: 16     Highest education level: Not on file   Occupational  History     Occupation:      Employer: RETIRED     Comment: 2001,    Tobacco Use     Smoking status: Never     Passive exposure: Never     Smokeless tobacco: Never     Tobacco comments:     Never   Vaping Use     Vaping status: Never Used   Substance and Sexual Activity     Alcohol use: No     Drug use: No     Sexual activity: Never   Other Topics Concern     Parent/sibling w/ CABG, MI or angioplasty before 65F 55M? No   Social History Narrative     Not on file     Social Drivers of Health     Financial Resource Strain: Low Risk  (12/28/2024)    Received from [facility]    Financial Resource Strain      Difficulty of Paying Living Expenses: 3      Difficulty of Paying Living Expenses: Not on file   Food Insecurity: No Food Insecurity (12/28/2024)    Received from [facility]    Food Insecurity      Do you worry your food will run out before you are able to buy more?: 1   Transportation Needs: No Transportation Needs (12/28/2024)    [facility]    Transportation Needs      Does lack of transportation keep you from medical appointments?: 1      Does lack of transportation keep you from work, meetings or getting things that you need?: 1   Physical Activity: Insufficiently Active (10/7/2024)    Exercise Vital Sign      Days of Exercise per Week: 2 days      Minutes of Exercise per Session: 10 min   Stress: No Stress Concern Present (10/7/2024)    [employer] [place] [employer] - Occupational Stress Questionnaire      Feeling of Stress : Not at all   Social Connections: Socially Integrated (12/28/2024)    [facility]    Social Connections      Do you often feel lonely or isolated from those around you?: 0   Interpersonal Safety: Low Risk  (10/9/2024)    Interpersonal Safety      Do you feel physically and emotionally safe  where you currently live?: Yes      Within the past 12 months, have you been hit, slapped, kicked or otherwise physically hurt by someone?: No      Within the past 12 months, have you been humiliated or emotionally abused in other ways by your partner or ex-partner?: No   Housing Stability: Low Risk  (12/28/2024)    Received from [facility]    Housing Stability      What is your housing situation today?: 1   Recent Concern: Housing Stability - High Risk (10/3/2024)    Housing Stability      Do you have housing? : No      Are you worried about losing your housing?: No       Family History     Family History   Problem Relation Age of Onset     Heart Disease Mother      Alzheimer Disease Mother 73     C.A.D. Father      Coronary Artery Disease Father      Diabetes Grandchild         using a pump      Diabetes Paternal Uncle      Heart Disease Paternal Grandmother      Glaucoma No family hx of      Macular Degeneration No family hx of      Melanoma No family hx of      Skin Cancer No family hx of        ROS     Limited  ROS completed, pertinent positive and negative in HPI    Physical Exam   /79 (BP Location: Left arm, Patient Position: Sitting, Cuff Size: Adult Large)   Pulse 70   Wt 80.7 kg (178 lb)   SpO2 98%   BMI 28.27 kg/m       GENERAL: alert and no distress  EYES: Eyes grossly normal to inspection.  No discharge or erythema, or obvious scleral/conjunctival abnormalities.  RESP: No audible wheeze, cough, or visible cyanosis.    SKIN: Visible skin clear. No significant rash, abnormal pigmentation or lesions.  NEURO: Cranial nerves grossly intact.  Mentation and speech appropriate for age.  PSYCH: Appropriate affect, tone, and pace of words      Labs/Imaging     Pertinent Labs were reviewed and updated in EPIC and discussed briefly.  Radiology Results were  reviewed and updated in EPIC, reviewed, and discussed briefly.    Summary of recent findings:   Lab Results  "  Component Value Date    A1C 5.8 01/31/2024    A1C 6.2 12/05/2022    A1C 5.2 02/07/2022    A1C 5.5 01/17/2022    A1C 5.7 05/19/2021    A1C 5.8 11/09/2020       TSH   Date Value Ref Range Status   12/16/2024 4.59 (H) 0.30 - 4.20 uIU/mL Final   10/03/2024 7.92 (H) 0.30 - 4.20 uIU/mL Final   09/09/2024 4.64 (H) 0.30 - 4.20 uIU/mL Final   06/26/2024 0.02 (L) 0.30 - 4.20 uIU/mL Final   06/04/2024 0.01 (L) 0.30 - 4.20 uIU/mL Final   03/31/2022 1.50 0.40 - 4.00 mU/L Final   09/03/2019 1.94 0.40 - 4.00 mU/L Final   08/15/2019 1.90 0.40 - 4.00 mU/L Final   06/20/2017 1.24 0.40 - 4.00 mU/L Final   02/18/2014 1.27 0.4 - 5.0 mU/L Final     Free T4   Date Value Ref Range Status   12/16/2024 1.24 0.90 - 1.70 ng/dL Final   10/03/2024 0.99 0.90 - 1.70 ng/dL Final   09/09/2024 0.96 0.90 - 1.70 ng/dL Final   06/26/2024 1.75 (H) 0.90 - 1.70 ng/dL Final   06/04/2024 2.59 (H) 0.90 - 1.70 ng/dL Final       Creatinine   Date Value Ref Range Status   01/28/2025 1.05 0.67 - 1.17 mg/dL Final   05/19/2021 1.18 0.66 - 1.25 mg/dL Final       Recent Labs   Lab Test 01/31/24  1030 01/31/22  1357   CHOL 159 208*   HDL 57 79   LDL 82 92   TRIG 99 183*       No results found for: \"[id number]\", \"[id number]\", \"[id number]\"    I personally reviewed the patient's outside records from [facility] EMR and Care Everywhere. Summary of pertinent findings in HPI.    Impression / Plan     Transient Thyrotoxicosis- resolved  Thyroiditis  Subclinical hypothyroidism  Positive TPO abx    We reviewed his results. He had thyroiditis followed by subclinical hypothyroidism, his TSH is < 10.  we can hold off on lt4 replacement for now given his age. With positive TPO abx higher risk of persistent/overt hypothymism. We will plan to continue to monitor tft every 6 m.    5. Fatigue  6. Orthostatic hypotension  7. Syncope/ presyncope    He is following with cardiology and neurology suspecting  orthostatic vagal events versus cardiac syncope, undergoing work up, planning for EP " study.   His morning cortisol was in the intermediate range, the plan was to complete ACTH stim test but he was started on prednisone for his MS. He said the plan is to stay on prednisone 5 mg daily for life, thus no need to complete further cortisol testing. Instructed him to let me know if at anytime prednisone is to be stopped and we will have to confirm his cortisol production status.     Test and/or medications prescribed today:  Orders Placed This Encounter   Procedures     TSH with free T4 reflex            40 minutes spent on the date of the encounter doing chart review, history and exam, documentation and further activities as noted above.       The longitudinal plan of care for the diagnosis(es)/condition(s) as documented were addressed during this visit. Due to the added complexity in care, I will continue to support Stephen in the subsequent management and with ongoing continuity of care.      Jackie Holland MD  Endocrinology, Diabetes and Metabolism  [facility]          Again, thank you for allowing me to participate in the care of your patient.        Sincerely,      Jackie Holland MD    Electronically signed

## 2025-03-15 ENCOUNTER — MYC MEDICAL ADVICE (OUTPATIENT)
Dept: UROLOGY | Facility: CLINIC | Age: 85
End: 2025-03-15
Payer: MEDICARE

## 2025-03-15 DIAGNOSIS — R39.15 URINARY URGENCY: ICD-10-CM

## 2025-03-17 RX ORDER — TAMSULOSIN HYDROCHLORIDE 0.4 MG/1
0.4 CAPSULE ORAL DAILY
Qty: 90 CAPSULE | Refills: 2 | Status: SHIPPED | OUTPATIENT
Start: 2025-03-17

## 2025-03-17 NOTE — TELEPHONE ENCOUNTER
Refill prescription approved per [facility] Refill Protocol. Last OV= 11/2024.    Signed Prescriptions:                        Disp   Refills    tamsulosin (FLOMAX) 0.4 MG capsule         90 cap*2        Sig: Take 1 capsule (0.4 mg) by mouth daily.  Authorizing Provider: FUNMI YOON  Ordering User: DAVID FLOWERS RN Urology 3/17/2025 8:41 AM

## 2025-03-25 ENCOUNTER — OFFICE VISIT (OUTPATIENT)
Dept: FAMILY MEDICINE | Facility: CLINIC | Age: 85
End: 2025-03-25
Payer: MEDICARE

## 2025-03-25 VITALS
WEIGHT: 178.8 LBS | BODY MASS INDEX: 27.1 KG/M2 | SYSTOLIC BLOOD PRESSURE: 120 MMHG | TEMPERATURE: 97.2 F | OXYGEN SATURATION: 98 % | RESPIRATION RATE: 20 BRPM | DIASTOLIC BLOOD PRESSURE: 80 MMHG | HEART RATE: 74 BPM | HEIGHT: 68 IN

## 2025-03-25 DIAGNOSIS — Z01.818 PREOP GENERAL PHYSICAL EXAM: Primary | ICD-10-CM

## 2025-03-25 DIAGNOSIS — R55 SYNCOPE, UNSPECIFIED SYNCOPE TYPE: ICD-10-CM

## 2025-03-25 LAB
HGB BLD-MCNC: 15.3 G/DL (ref 13.3–17.7)
PLATELET # BLD AUTO: 160 10E3/UL (ref 150–450)

## 2025-03-25 PROCEDURE — 99214 OFFICE O/P EST MOD 30 MIN: CPT | Performed by: PHYSICIAN ASSISTANT

## 2025-03-25 PROCEDURE — 85018 HEMOGLOBIN: CPT | Performed by: PHYSICIAN ASSISTANT

## 2025-03-25 PROCEDURE — 3079F DIAST BP 80-89 MM HG: CPT | Performed by: PHYSICIAN ASSISTANT

## 2025-03-25 PROCEDURE — 80048 BASIC METABOLIC PNL TOTAL CA: CPT | Performed by: PHYSICIAN ASSISTANT

## 2025-03-25 PROCEDURE — 85049 AUTOMATED PLATELET COUNT: CPT | Performed by: PHYSICIAN ASSISTANT

## 2025-03-25 PROCEDURE — 1126F AMNT PAIN NOTED NONE PRSNT: CPT | Performed by: PHYSICIAN ASSISTANT

## 2025-03-25 PROCEDURE — 80177 DRUG SCRN QUAN LEVETIRACETAM: CPT | Performed by: PHYSICIAN ASSISTANT

## 2025-03-25 PROCEDURE — 3074F SYST BP LT 130 MM HG: CPT | Performed by: PHYSICIAN ASSISTANT

## 2025-03-25 PROCEDURE — 36415 COLL VENOUS BLD VENIPUNCTURE: CPT | Performed by: PHYSICIAN ASSISTANT

## 2025-03-25 ASSESSMENT — PAIN SCALES - GENERAL: PAINLEVEL_OUTOF10: NO PAIN (0)

## 2025-03-25 NOTE — PROGRESS NOTES
Preoperative Evaluation  [facility] [place]  [address] [address]  [place] [address]  Phone: [phone]  Primary Provider: Winston Silverman PA-C  Pre-op Performing Provider: Winston Silverman PA-C  Mar 25, 2025             3/25/2025   Surgical Information   What procedure is being done? heart surgery     Pre-Op   Facility or [facility] where procedure[facility]    Who is doing the procedure / surgery? dr dickerson    Date of surgery / procedure: 4/7/25    Time of surgery / procedure: 630 am heart procedure    Where do you plan to recover after surgery? at home with family        Proxy-reported    Multiple values from one day are sorted in reverse-chronological order     Fax number for surgical facility: [phone]      Wally Mitchell is a 84 year old, presenting for the following:  Pre-Op Exam          3/25/2025     1:18 PM   Additional Questions   Roomed by Michelle   Accompanied by wife Ceci LIZAMA: history of intermittent syncopal episodes. Evaluate for a cardiac rhythm abnormality           3/25/2025   Pre-Op Questionnaire   Have you ever had a heart attack or stroke? No    Have you ever had surgery on your heart or blood vessels, such as a stent placement, a coronary artery bypass, or surgery on an artery in your head, neck, heart, or legs? NO   Do you have chest pain with activity? No    Do you have a history of heart failure? No    Do you currently have a cold, bronchitis or symptoms of other infection? No    Do you have a cough, shortness of breath, or wheezing? No    Do you or anyone in your family have previous history of blood clots? No    Do you or does anyone in your family have a serious bleeding problem such as prolonged bleeding following surgeries or cuts? No    Have you ever had problems with anemia or been told to take iron pills? (!) YES   Have you had any abnormal blood loss such as black, tarry or bloody stools? No    Have you ever had a blood  transfusion? No    Are you willing to have a blood transfusion if it is medically needed before, during, or after your surgery? Yes    Have you or any of your relatives ever had problems with anesthesia? No    Do you have sleep apnea, excessive snoring or daytime drowsiness? No    Do you have any artifical heart valves or other implanted medical devices like a pacemaker, defibrillator, or continuous glucose monitor? NO   Do you have artificial joints? No    Are you allergic to latex? No        Proxy-reported     Health Care Directive  Patient has a Health Care Directive on file      Preoperative Review of    reviewed - no record of controlled substances prescribed.      Status of Chronic Conditions:  See problem list for active medical problems.  Problems all longstanding and stable, except as noted/documented.  See ROS for pertinent symptoms related to these conditions.    Patient Active Problem List    Diagnosis Date Noted    Syncope 12/27/2024     Priority: Medium    Low serum cortisol level 11/14/2024     Priority: Medium    Syncope, unspecified syncope type 10/03/2024     Priority: Medium    Impaired flexibility of lower extremity 09/27/2024     Priority: Medium    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) 04/06/2023     Priority: Medium    Weakness of both lower extremities 12/08/2022     Priority: Medium    Gait instability 12/05/2022     Priority: Medium    Hip pain, acute, left 12/05/2022     Priority: Medium    Idiopathic normal pressure hydrocephalus (H) 06/27/2022     Priority: Medium    Acute atopic conjunctivitis 06/06/2022     Priority: Medium    Dementia without behavioral disturbance, unspecified dementia type 05/31/2022     Priority: Medium    Infection due to 2019 novel coronavirus 05/20/2022     Priority: Medium    Encephalopathy 05/20/2022     Priority: Medium    Myoclonus 05/17/2022     Priority: Medium    NPH (normal pressure hydrocephalus) (H) 05/02/2022     Priority: Medium     COVID-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 04/14/2022     Priority: Medium    Urinary frequency 04/14/2022     Priority: Medium    Physical deconditioning 02/01/2022     Priority: Medium    Stage 3a chronic kidney disease (H) 01/31/2022     Priority: Medium    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     Priority: Medium    Sepsis due to COVID-19 (H) 11/19/2021     Priority: Medium    Pneumonia due to COVID-19 virus 11/15/2021     Priority: Medium    Gastroesophageal reflux disease without esophagitis 08/29/2016     Priority: Medium    Essential hypertension with goal blood pressure less than 140/90 08/25/2016     Priority: Medium    Nuclear sclerosis of both eyes 06/29/2016     Priority: Medium    Actinic keratosis 09/30/2015     Priority: Medium    Peripheral neuropathy 09/30/2015     Priority: Medium    PVD (posterior vitreous detachment) 12/02/2013     Priority: Medium    Amaurosis fugax by Hx neg carotid W/U 12/02/2013     Priority: Medium    Malignant neoplasm of prostate (H) 11/19/2013     Priority: Medium    History  of basal cell carcinoma 11/15/2012     Priority: Medium     IMO update changed this record. Please review for accuracy      Seborrhea 11/15/2012     Priority: Medium    AK (actinic keratosis) 11/15/2012     Priority: Medium    Retinal hole OS, operculated 11/13/2012     Priority: Medium    Horseshoe tear of retina without detachment OD 11/13/2012     Priority: Medium    MGUS (monoclonal gammopathy of unknown significance)      Priority: Medium     IgA kappa      HYPERLIPIDEMIA LDL GOAL <130 10/31/2010     Priority: Medium    Macular pigment deposit 08/30/2010     Priority: Medium    Dysphagia 06/01/2010     Priority: Medium    Kidney disorder 06/01/2010     Priority: Medium    Restless leg syndrome due to iron deficiency anemia 06/01/2010     Priority: Medium    Tear of medial cartilage or meniscus of knee, current 02/08/2010     Priority: Medium    Rosacea      Priority: Medium    DJD (degenerative joint  disease)      Priority: Medium    Cancer of the skin, basal cell 05/12/2009     Priority: Medium     Formatting of this note might be different from the original.  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     Priority: Medium     [place] consult      Degeneration of intervertebral disc 06/01/2006     Priority: Medium    Diverticulitis of intestine 06/01/2006     Priority: Medium    History of osteoporosis 06/01/2006     Priority: Medium    Vitamin D deficiency 06/01/2006     Priority: Medium      Past Medical History:   Diagnosis Date    Actinic keratosis     AK (actinic keratosis) 11/15/2012    Amaurosis fugax     Basal cell carcinoma 2009    R medial cheek/nose    Central serous retinopathy left    Eye    Diverticulosis 08/2006    DJD (degenerative joint disease)     GERD (gastroesophageal reflux disease)     Hearing loss     High frequency    History of nonmelanoma skin cancer     History of nonmelanoma skin cancer     HTN (hypertension)     Hyperlipidemia LDL goal < 130     Hyperplastic colon polyp 08/2006    IgA monoclonal gammopathy     IgA kappa    Infection due to 2019 novel coronavirus 10/13/2022    Infection due to 2019 novel coronavirus 11/2021    Lattice degeneration of retina right    Eye    Macular degeneration     Nonsenile cataract     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    [place] consult    Rosacea     Steroid-induced diabetes mellitus, 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    Strabismus     Vitreous detachment left    Eye     Past Surgical History:   Procedure Laterality Date    ARTHROSCOPY KNEE RT/LT Left 01/2010    Knee    BIOPSY  2009/2013/2016    Nose; Prostate; BCC - left arm    COLONOSCOPY  09/24/2019    w/Endoscopy    COLONOSCOPY  07/29/2024    COLONOSCOPY WITH CO2 INSUFFLATION N/A 09/24/2019    Procedure: COLONOSCOPY, WITH CO2 INSUFFLATION;  Surgeon: Loi Levine MD;  Location: [facility] OR    COMBINED ESOPHAGOSCOPY, GASTROSCOPY, DUODENOSCOPY (EGD) WITH CO2 INSUFFLATION N/A 09/24/2019     Procedure: ESOPHAGOGASTRODUODENOSCOPY, WITH CO2 INSUFFLATION;  Surgeon: Loi Levine MD;  Location: [facility]    COMBINED ESOPHAGOSCOPY, GASTROSCOPY, DUODENOSCOPY (EGD) WITH CO2 INSUFFLATION N/A 7/29/2024    Procedure: Combined Esophagoscopy, Gastroscopy, Duodenoscopy (Egd) With Co2 Insufflation;  Surgeon: Jeff Peace MD;  Location: [facility] OR    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    DISKECTOMY, LUMBAR, SINGLE SP  2003    L2-3    ENT SURGERY  1943    Tonsils     ENT SURGERY  02/22/2012    Biopsy lesion right pinna    ENT SURGERY  02/24/2012    Biopsy leson right pinna    ESOPHAGOSCOPY, GASTROSCOPY, DUODENOSCOPY (EGD), COMBINED N/A 09/24/2019    Procedure: Esophagogastroduodenoscopy, With Biopsy;  Surgeon: Loi Levine MD;  Location: [facility]    ESOPHAGOSCOPY, GASTROSCOPY, DUODENOSCOPY (EGD), COMBINED N/A 7/29/2024    Procedure: ESOPHAGOGASTRODUODENOSCOPY, WITH BIOPSY;  Surgeon: Jeff Peace MD;  Location: [facility]    IR LUMBAR DRAIN PLACEMENT W FLUORO  06/27/2022    IR LUMBAR PUNCTURE  4/20/2022    IR LUMBAR PUNCTURE  4/11/2022    LAMINOPLASTY CERVICAL POSTERIOR THREE+ LEVELS N/A 12/12/2022    Procedure: POSTERIOR APPROACH Cervical 4-7 laminoplasty;  Surgeon: Judie Levin MD;  Location: [facility] OR    LAPAROSCOPIC ASSISTED IMPLANT SHUNT VENTRICULOPERITONEAL N/A 07/07/2022    Procedure: possible INSERTION, SHUNT, VENTRICULOPERITONEAL, LAPAROSCOPY-ASSISTED;  Surgeon: Joe Damon MD;  Location: [facility] OR    OPTICAL TRACKING SYSTEM IMPLANT SHUNT VENTRICULOPERITONEAL N/A 07/07/2022    Procedure: INSERTION, SHUNT, VENTRICULOPERITONEAL, USING OPTICAL TRACKING SYSTEM;  Surgeon: Jean-Paul Childs MD;  Location: [facility] OR    SOFT TISSUE SURGERY  05/2010    Basal Cell/right side nose     Current Outpatient Medications   Medication Sig Dispense Refill    acetaminophen (TYLENOL) 325 MG tablet Take 1-2 tablets (325-650 mg) by mouth every 4 hours as needed for mild pain       gabapentin (NEURONTIN) 300 MG capsule 600 mg in the morning and 900 mg in the evening 450 capsule 1    levETIRAcetam (KEPPRA) 250 MG tablet Take 1 tablet (250 mg) by mouth 2 times daily. 180 tablet 1    methocarbamol (ROBAXIN) 500 MG tablet Take 500 mg by mouth daily as needed for muscle spasms.      methocarbamol (ROBAXIN) 500 MG tablet Take 1 tablet (500 mg) by mouth nightly as needed for muscle spasms. 20 tablet 0    midodrine (PROAMATINE) 2.5 MG tablet Take 1 tablet (2.5 mg) by mouth 3 times daily. 270 tablet 0    multivitamin (OCUVITE) TABS tablet Take 1 tablet by mouth daily      pantoprazole (PROTONIX) 40 MG EC tablet Take 1 tablet (40 mg) by mouth daily. 90 tablet 2    predniSONE (DELTASONE) 5 MG tablet Take 1 tablet (5 mg) by mouth daily. 90 tablet 1    pyRIDostigmine (MESTINON) 60 MG tablet Take 1.5 tablets (90 mg) by mouth 2 times daily. 270 tablet 1    Rotigotine (NEUPRO) 1 MG/24HR 24 hr patch Place 1 patch over 24 hours onto the skin daily. 30 patch 11    tamsulosin (FLOMAX) 0.4 MG capsule Take 1 capsule (0.4 mg) by mouth daily. 90 capsule 2    terbinafine (LAMISIL) 1 % external cream Apply topically 2 times daily (Patient taking differently: Apply topically 2 times daily as needed.) 42 g 11    triamcinolone (KENALOG) 0.1 % external cream Apply a thin layer up to twice daily to affected areas as needed. 30 g 3       Allergies   Allergen Reactions    Mycophenolate Other (See Comments)     Confusion, abnormal movements        Social History     Tobacco Use    Smoking status: Never     Passive exposure: Never    Smokeless tobacco: Never    Tobacco comments:     Never   Substance Use Topics    Alcohol use: No     Family History   Problem Relation Age of Onset    Heart Disease Mother     Alzheimer Disease Mother 73    C.A.D. Father     Coronary Artery Disease Father     Diabetes Grandchild         using a pump     Diabetes Paternal Uncle     Heart Disease Paternal Grandmother     Glaucoma No family hx of      "Macular Degeneration No family hx of     Melanoma No family hx of     Skin Cancer No family hx of      History   Drug Use No             Review of Systems  Constitutional, HEENT, cardiovascular, pulmonary, GI, , musculoskeletal, neuro, skin, endocrine and psych systems are negative, except as otherwise noted.    Objective    /80   Pulse 74   Temp 97.2  F (36.2  C) (Temporal)   Resp 20   Ht 1.724 m (5' 7.87\")   Wt 81.1 kg (178 lb 12.8 oz)   SpO2 98%   BMI 27.29 kg/m     Estimated body mass index is 27.29 kg/m  as calculated from the following:    Height as of this encounter: 1.724 m (5' 7.87\").    Weight as of this encounter: 81.1 kg (178 lb 12.8 oz).  Physical Exam  GENERAL: alert and no distress  EYES: Eyes grossly normal to inspection, PERRL and conjunctivae and sclerae normal  HENT: ear canals and TM's normal, nose and mouth without ulcers or lesions  NECK: no adenopathy, no asymmetry, masses, or scars  RESP: lungs clear to auscultation - no rales, rhonchi or wheezes  CV: regular rate and rhythm, normal S1 S2, no S3 or S4, no murmur, click or rub, no peripheral edema  ABDOMEN: soft, nontender, no hepatosplenomegaly, no masses and bowel sounds normal  MS: no gross musculoskeletal defects noted, no edema  SKIN: no suspicious lesions or rashes  NEURO: Normal strength and tone, mentation intact and speech normal  PSYCH: mentation appears normal, affect normal/bright    Recent Labs   Lab Test 01/28/25  1138 10/04/24  0839   HGB 15.0 14.1    173    141   POTASSIUM 4.8 3.9   CR 1.05 1.00        Diagnostics  Labs pending at this time.  Results will be reviewed when available.   No EKG this visit, completed in the last 90 days.    Revised Cardiac Risk Index (RCRI)  The patient has the following serious cardiovascular risks for perioperative complications:   - No serious cardiac risks = 0 points     RCRI Interpretation: 0 points: Class I (very low risk - 0.4% complication rate)       Stephen was " seen today for pre-op exam.    Diagnoses and all orders for this visit:    Preop general physical exam  -     Keppra (Levetiracetam) Level; Future    Syncope, unspecified syncope type  -     Basic metabolic panel  (Ca, Cl, CO2, Creat, Gluc, K, Na, BUN); Future  -     Hemoglobin; Future  -     Platelet count; Future    Other orders  -     REVIEW OF HEALTH MAINTENANCE PROTOCOL ORDERS      Stephen is cleared for his procedure and appropriate anesthesia     Signed Electronically by: Winston Silverman PA-C  A copy of this evaluation report is provided to the requesting physician.

## 2025-03-26 LAB
ANION GAP SERPL CALCULATED.3IONS-SCNC: 13 MMOL/L (ref 7–15)
BUN SERPL-MCNC: 16.4 MG/DL (ref 8–23)
CALCIUM SERPL-MCNC: 9.4 MG/DL (ref 8.8–10.4)
CHLORIDE SERPL-SCNC: 103 MMOL/L (ref 98–107)
CREAT SERPL-MCNC: 0.97 MG/DL (ref 0.67–1.17)
EGFRCR SERPLBLD CKD-EPI 2021: 76 ML/MIN/1.73M2
GLUCOSE SERPL-MCNC: 102 MG/DL (ref 70–99)
HCO3 SERPL-SCNC: 25 MMOL/L (ref 22–29)
LEVETIRACETAM SERPL-MCNC: 8.2 ÂΜG/ML (ref 10–40)
POTASSIUM SERPL-SCNC: 4.5 MMOL/L (ref 3.4–5.3)
SODIUM SERPL-SCNC: 141 MMOL/L (ref 135–145)

## 2025-03-28 ENCOUNTER — HOSPITAL ENCOUNTER (OUTPATIENT)
Dept: CT IMAGING | Facility: HOSPITAL | Age: 85
Discharge: HOME | End: 2025-03-28
Attending: STUDENT IN AN ORGANIZED HEALTH CARE EDUCATION/TRAINING PROGRAM
Payer: MEDICARE

## 2025-03-28 ENCOUNTER — ANCILLARY PROCEDURE (OUTPATIENT)
Dept: VASCULAR ULTRASOUND | Facility: CLINIC | Age: 85
End: 2025-03-28
Attending: STUDENT IN AN ORGANIZED HEALTH CARE EDUCATION/TRAINING PROGRAM
Payer: MEDICARE

## 2025-03-28 DIAGNOSIS — I72.3 ILIAC ARTERY ANEURYSM: ICD-10-CM

## 2025-03-28 DIAGNOSIS — R09.89 OTHER SPECIFIED SYMPTOMS AND SIGNS INVOLVING THE CIRCULATORY AND RESPIRATORY SYSTEMS: ICD-10-CM

## 2025-03-28 PROCEDURE — 93926 LOWER EXTREMITY STUDY: CPT

## 2025-03-28 PROCEDURE — 71275 CT ANGIOGRAPHY CHEST: CPT

## 2025-03-28 PROCEDURE — 93926 LOWER EXTREMITY STUDY: CPT | Mod: 26 | Performed by: SURGERY

## 2025-03-28 PROCEDURE — 250N000011 HC RX IP 250 OP 636: Performed by: STUDENT IN AN ORGANIZED HEALTH CARE EDUCATION/TRAINING PROGRAM

## 2025-03-28 RX ORDER — IOPAMIDOL 755 MG/ML
73 INJECTION, SOLUTION INTRAVASCULAR ONCE
Status: COMPLETED | OUTPATIENT
Start: 2025-03-28 | End: 2025-03-28

## 2025-03-28 RX ADMIN — IOPAMIDOL 73 ML: 755 INJECTION, SOLUTION INTRAVENOUS at 13:03

## 2025-03-31 ENCOUNTER — MYC REFILL (OUTPATIENT)
Dept: FAMILY MEDICINE | Facility: CLINIC | Age: 85
End: 2025-03-31
Payer: MEDICARE

## 2025-03-31 DIAGNOSIS — M62.838 MUSCLE SPASM: ICD-10-CM

## 2025-03-31 DIAGNOSIS — G25.81 RESTLESS LEG SYNDROME DUE TO IRON DEFICIENCY ANEMIA: ICD-10-CM

## 2025-03-31 DIAGNOSIS — D50.9 RESTLESS LEG SYNDROME DUE TO IRON DEFICIENCY ANEMIA: ICD-10-CM

## 2025-03-31 RX ORDER — ROTIGOTINE 1 MG/24H
1 PATCH, EXTENDED RELEASE TRANSDERMAL DAILY
Qty: 90 PATCH | Refills: 2 | Status: SHIPPED | OUTPATIENT
Start: 2025-03-31

## 2025-03-31 RX ORDER — METHOCARBAMOL 500 MG/1
TABLET, FILM COATED ORAL
Qty: 30 TABLET | Refills: 2 | Status: SHIPPED | OUTPATIENT
Start: 2025-03-31

## 2025-04-09 ENCOUNTER — OFFICE VISIT (OUTPATIENT)
Dept: NEUROSURGERY | Facility: CLINIC | Age: 85
End: 2025-04-09
Attending: NURSE PRACTITIONER
Payer: MEDICARE

## 2025-04-09 VITALS
OXYGEN SATURATION: 98 % | DIASTOLIC BLOOD PRESSURE: 59 MMHG | RESPIRATION RATE: 16 BRPM | HEART RATE: 69 BPM | SYSTOLIC BLOOD PRESSURE: 98 MMHG

## 2025-04-09 DIAGNOSIS — Z98.2 VENTRICULO-PERITONEAL SHUNT STATUS: Primary | ICD-10-CM

## 2025-04-09 PROCEDURE — 3074F SYST BP LT 130 MM HG: CPT | Performed by: NURSE PRACTITIONER

## 2025-04-09 PROCEDURE — 3078F DIAST BP <80 MM HG: CPT | Performed by: NURSE PRACTITIONER

## 2025-04-09 PROCEDURE — 99212 OFFICE O/P EST SF 10 MIN: CPT | Performed by: NURSE PRACTITIONER

## 2025-04-09 NOTE — PROGRESS NOTES
[facility]  Department of Neurosurgery      Name: Gustavo Ramon  MRN: [id number]  Age: 84 year old  : 1940  Referring provider: Antionette Hunt  2025      Chief Complaint:   NPH  S/p  shunt placement on 2022 (Codman Certas at 4.0)  Follow-up    History of Present Illness:   Gustavo Ramon is a 84 year old male with a history of NPH, s/p  shunt placement on 2022 who is seen today for  a follow up.     Most recently seen on 10/8/2024. He reported no new neurological concerns at that time. 6 months follow-up was recommended at that time.     Today, patient presents for a follow-up with his wife, Ceci. No new neurological concerns. His BP was slightly low at 98/59, but denies any symptoms consistent with it. He had a cardiac ablation on Monday for arrhythmia and also has a cardiac monitor in place.     Review of Systems:   Pertinent items are noted in HPI or as in patient entered ROS below, remainder of complete ROS is negative.        No data to display                 Physical Exam:   BP 98/59 (BP Location: Right arm, Patient Position: Sitting, Cuff Size: Adult Regular)   Pulse 69   Resp 16   SpO2 98%    General: No acute distress.    Neuro: The patient is fully oriented. Speech is normal.  Gait: ambulates with a walker.   Psych: Normal mood and affect. Behavior is normal.        Procedures:  With Codman Certas  shunt , shunt setting was checked and it is at 4.0. Verified x 3.     Assessment:  NPH  S/p  shunt placement on 2022 (Codman Certas at 4.0)  Follow-up    Plan:  Patient denies any new neurological concerns. His  shunt remains at 4.0. Patient to follow-up with me in 6 months.        I spent 17 minutes on patient care activities related to this encounter on the date of service, including time spent reviewing the chart, obtaining history and examination and in counseling the patient, and in documentation in the electronic medical  record.      Antionette ARRIAGA, CNP  Department of Neurosurgery

## 2025-04-09 NOTE — LETTER
2025       RE: Gustavo Ramon  [address] [place] [address]     Dear Colleague,    Thank you for referring your patient, Gustavo Ramon, to the [facility] [place] at [facility]. Please see a copy of my visit note below.    [facility]  Department of Neurosurgery      Name: Gustavo Ramon  MRN: [id number]  Age: 84 year old  : 1940  Referring provider: Antionette Hunt  2025      Chief Complaint:   NPH  S/p  shunt placement on 2022 (Codman Certas at 4.0)  Follow-up    History of Present Illness:   Gustavo Ramon is a 84 year old male with a history of NPH, s/p  shunt placement on 2022 who is seen today for  a follow up.     Most recently seen on 10/8/2024. He reported no new neurological concerns at that time. 6 months follow-up was recommended at that time.     Today, patient presents for a follow-up with his wife, Ceci. No new neurological concerns. His BP was slightly low at 98/59, but denies any symptoms consistent with it. He had a cardiac ablation on Monday for arrhythmia and also has a cardiac monitor in place.     Review of Systems:   Pertinent items are noted in HPI or as in patient entered ROS below, remainder of complete ROS is negative.        No data to display                 Physical Exam:   BP 98/59 (BP Location: Right arm, Patient Position: Sitting, Cuff Size: Adult Regular)   Pulse 69   Resp 16   SpO2 98%    General: No acute distress.    Neuro: The patient is fully oriented. Speech is normal.  Gait: ambulates with a walker.   Psych: Normal mood and affect. Behavior is normal.        Procedures:  With Codman Certas  shunt , shunt setting was checked and it is at 4.0. Verified x 3.     Assessment:  NPH  S/p  shunt placement on 2022 (Codman Certas at 4.0)  Follow-up    Plan:  Patient denies any new neurological concerns. His   shunt remains at 4.0. Patient to follow-up with me in 6 months.        I spent 17 minutes on patient care activities related to this encounter on the date of service, including time spent reviewing the chart, obtaining history and examination and in counseling the patient, and in documentation in the electronic medical record.      Antionette ARRIAGA CNP  Department of Neurosurgery      Again, thank you for allowing me to participate in the care of your patient.      Sincerely,    CORINA Jackson CNP

## 2025-04-09 NOTE — NURSING NOTE
Chief Complaint   Patient presents with    RECHECK     Here for a follow up, confirmed with patient     Do Rawls

## 2025-05-19 ENCOUNTER — OFFICE VISIT (OUTPATIENT)
Dept: NEUROLOGY | Facility: CLINIC | Age: 85
End: 2025-05-19
Payer: MEDICARE

## 2025-05-19 VITALS
DIASTOLIC BLOOD PRESSURE: 76 MMHG | SYSTOLIC BLOOD PRESSURE: 129 MMHG | HEART RATE: 65 BPM | RESPIRATION RATE: 16 BRPM | OXYGEN SATURATION: 96 %

## 2025-05-19 DIAGNOSIS — G70.00 MG, OCULAR (MYASTHENIA GRAVIS) (H): ICD-10-CM

## 2025-05-19 DIAGNOSIS — I95.1 ORTHOSTATIC HYPOTENSION: ICD-10-CM

## 2025-05-19 DIAGNOSIS — M81.8 STEROID-INDUCED OSTEOPOROSIS: Primary | ICD-10-CM

## 2025-05-19 DIAGNOSIS — G25.81 RESTLESS LEG SYNDROME: ICD-10-CM

## 2025-05-19 DIAGNOSIS — H50.60: ICD-10-CM

## 2025-05-19 DIAGNOSIS — G91.2 NPH (NORMAL PRESSURE HYDROCEPHALUS) (H): ICD-10-CM

## 2025-05-19 DIAGNOSIS — T38.0X5A STEROID-INDUCED OSTEOPOROSIS: Primary | ICD-10-CM

## 2025-05-19 PROCEDURE — 1126F AMNT PAIN NOTED NONE PRSNT: CPT | Performed by: PSYCHIATRY & NEUROLOGY

## 2025-05-19 PROCEDURE — 3078F DIAST BP <80 MM HG: CPT | Performed by: PSYCHIATRY & NEUROLOGY

## 2025-05-19 PROCEDURE — G2211 COMPLEX E/M VISIT ADD ON: HCPCS | Performed by: PSYCHIATRY & NEUROLOGY

## 2025-05-19 PROCEDURE — 99214 OFFICE O/P EST MOD 30 MIN: CPT | Performed by: PSYCHIATRY & NEUROLOGY

## 2025-05-19 PROCEDURE — 3074F SYST BP LT 130 MM HG: CPT | Performed by: PSYCHIATRY & NEUROLOGY

## 2025-05-19 RX ORDER — LANOLIN ALCOHOL/MO/W.PET/CERES
1000 CREAM (GRAM) TOPICAL DAILY
COMMUNITY

## 2025-05-19 ASSESSMENT — PAIN SCALES - GENERAL: PAINLEVEL_OUTOF10: NO PAIN (0)

## 2025-05-19 NOTE — NURSING NOTE
Chief Complaint   Patient presents with    RECHECK     Return Neurology      /76 (BP Location: Right arm, Patient Position: Sitting, Cuff Size: Adult Regular)   Pulse 65   Resp 16   SpO2 96%   Lelo Nicole

## 2025-05-19 NOTE — PROGRESS NOTES
Winston Silverman PA-C  [place], May 19, 2025     Dear Mr. Silverman,     I had the pleasure to see Stephen in follow-up at the [facility]/neuromuscular clinic.  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  Repetitive nerve stimulation done in January 2024 showed no decrement, indicating intact neuromuscular junction.  He also saw my colleague Dr. Galvin from neuro-ophthalmology in 2023, who proposed that the patient's diplopia and ptosis could be due to sagging eye syndrome and LR-SR band relaxation.  Along those lines, I decided to stop prednisone in 2024.  However the patient called again in December 2024, as his diplopia had recurred.  Because at that point I was not certain whether this was caused by sagging eye syndrome or recurrent ocular myasthenia decided to restart prednisone at 10 mg daily.  He gradually tapered the dose to 5 mg daily and decided to stay on the low-dose prednisone because it was also beneficial for his arthritis pain. He was evaluated by endocrinology recently due to his history of thyrotoxicosis and discussion of the possibility of adrenal insufficiency was raised.  His morning cortisol level was in the intermediate range and endocrinologist recommended lifelong continuation of low-dose prednisone as above.    The patient tells me that as long as he wears his glasses which have prisms, he does not have double vision.  Without the glasses, he does.  Prednisone is helping his hip arthritis pain.  He wonders if he can try an over-the-counter lidocaine patch for it.  He is also using 1 Tylenol arthritis pills per day    Of note, this patient has developed several other neurological problems that I ended up following him for.  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  While he had a good recovery from those procedures, he still has to use a walker to reduce the risk of falling.    He also has difficult to control restless leg syndrome with augmentation.  He is currently on gabapentin 600 mg in the morning and 900 mg in the evening.  His ferritin levels were 100 on July 2024 so he is definitely not iron deficient.  He used to be on pramipexole 0.125 3 times daily, but I ended up stopping it due to problems with orthostatic hypotension (see below).  I ordered [facility] pharmacy consultation, and we ended up gradually replacing the pramipexole with rotigotine patch which he is currently on.  He is very happy with this change, as his RLS is much better controlled now than it used to be 4 months ago.    Mr Ramon has chronic memory and cognitive concerns and he had repeat neuropsych testing in May 2024 at the [facility].  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     Lastly, he has orthostatic hypotension, leading to an increased frequency of syncopal episodes in the last year.  He is on midodrine 2.5 mg 3 times daily.  After interviewing him during the last visit, I realized that his intake of salt and water was clearly suboptimal.  I asked him to increase the salt in his diet to at least 3 grams a day and drink 6-8 full glasses of water a day.  He is still not adhering to this recommendation.  I also advised him to regularly cross his legs and do exercises to enhance the muscle pump function.  Lastly, as above, we switched his pramipexole to rotigotine, in an effort to reduce the side effect of OH that can be produced by oral dopamine agonists.  Overall, he is quite happy now and he has not had any syncopal episodes since January 2025.  He  "gets a presyncopal feeling at times, even when he sits on the chair for prolonged period of time.  It is a feeling of \"standstill\" and he can usually overcome it by moving his legs around.    Since the last time I saw him, he was evaluated by cardiology (Dr. Roldan) for his syncopal episodes.  Holter monitoring showed some episodes of SVT and he underwent EP study 2 months ago.  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       Current Outpatient Medications   Medication Sig Dispense Refill    acetaminophen (TYLENOL) 325 MG tablet Take 1-2 tablets (325-650 mg) by mouth every 4 hours as needed for mild pain      gabapentin (NEURONTIN) 300 MG capsule 600 mg in the morning and 900 mg in the evening 450 capsule 1    levETIRAcetam (KEPPRA) 250 MG tablet Take 1 tablet (250 mg) by mouth 2 times daily. 180 tablet 1    methocarbamol (ROBAXIN) 500 MG tablet TAKE 1 TABLET NIGHTLY AS NEEDED FOR MUSCLE SPASMS 30 tablet 2    methocarbamol (ROBAXIN) 500 MG tablet Take 500 mg by mouth daily as needed for muscle spasms.      midodrine (PROAMATINE) 2.5 MG tablet TAKE 1 TABLET THREE TIMES A  tablet 3    multivitamin (OCUVITE) TABS tablet Take 1 tablet by mouth daily      pantoprazole (PROTONIX) 40 MG EC tablet Take 1 tablet (40 mg) by mouth daily. 90 tablet 2    predniSONE (DELTASONE) 5 MG tablet Take 1 tablet (5 mg) by mouth daily. 90 tablet 1    pyRIDostigmine (MESTINON) 60 MG tablet TAKE ONE AND ONE-HALF TABLETS TWICE A  tablet 1    Rotigotine (NEUPRO) 1 MG/24HR 24 hr patch Place 1 patch over 24 hours onto the skin daily. 90 patch 2    tamsulosin (FLOMAX) 0.4 MG capsule Take 1 capsule (0.4 mg) by mouth daily. 90 capsule 2    terbinafine (LAMISIL) 1 % external cream Apply topically 2 times daily (Patient taking differently: Apply topically 2 times daily as needed.) 42 g 11    triamcinolone (KENALOG) 0.1 % external cream Apply a thin layer up to twice " daily to affected areas as needed. 30 g 3     Current Facility-Administered Medications   Medication Dose Route Frequency Provider Last Rate Last Admin    lidocaine 1% with EPINEPHrine 1:100,000 injection 3 mL  3 mL Intradermal Once          LABS:the patient's last comprehensive metabolic panel from April 2025 showed a chloride of 108 but was otherwise unremarkable CBC done on the same day was normal without anemia.  Keppra levels drawn on March 2025 where 8.2 and platelet count was normal.  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  Those have been stable for many years.  Patient's last TSH was 4.59 in December 2024 and last free T4 was normal at that time.     Last ferritin level was 100 on July 2024.    VITALS: /76 (BP Location: Right arm, Patient Position: Sitting, Cuff Size: Adult Regular)   Pulse 65   Resp 16   SpO2 96%        Neuro exam was not repeated.      IMPRESSION:     1) Diplopia, most likely due to sagging eye syndrome.   2) Ocular MG, unlikely to be in exacerbation   3) Severe RLS with augmentation. Improved from last visit. Not iron deficient  4) Multifactorial orthostatic hypotension- adrenal insufficiency ? Hypovolemia. Better controlled than last visit  5) History of NPH surgery (shunt) and cervical fusion for spondylosis, stable        The patient was seen in follow-up for the issues listed above.  We are both much happier today because his RLS is much better controlled and we will continue rotigotine patch on the current gabapentin dose.  He is also happier about his syncopal episodes as he has not had any since January 2025.  I doubt that those were related to AVNRT for which he had ablation.  I think they could be due to neurogenic orthostatic hypotension or there could be a small element of adrenal insufficiency due to chronic use of low-dose prednisone.   Once again, I emphasized the importance of ample water intake.  I urged the patient to take 500 cc of water before he stands up too quickly as this can fight the OH.  I also recommended increasing the salt in his diet to 3 g daily although he is somewhat reluctant to do it.  He will continue midodrine at 2.5 mg 3 times daily.    He will continue using a walker at all times to avoid falls.  His gait is unchanged from last visit.    I do believe that his diplopia is mostly due to sagging eye syndrome as it fully corrects with prisms, which would be unusual with ocular MG.  Because prednisone was helpful for his arthritis pain we will continue it at 5 mg daily. The patient is interested in trying lidocaine patches over-the-counter for the pain.  If they work well, we may taper the prednisone to the lowest dose tolerated.  I will get a repeat bone density scan to evaluate for steroid-induced osteoporosis.    He will return to clinic for follow-up in 6 months, sooner if needed.        Sincerely,        Tyler Wheat MD, FAAN        The longitudinal plan of care for the diagnosis(es)/condition(s) as documented were addressed during this visit. Due to the added complexity in care, I will continue to support Stephen in the subsequent management and with ongoing continuity of care.     Billing MDM level 4 (moderate) based on 1) Problems assessed: 2 or more stable chronic disorders (diplopia, ocular MG, RLS, orthostatic hypotension, NPH) and 2)Risk: prescription drug management, see above.

## 2025-05-19 NOTE — LETTER
5/19/2025       RE: Gustavo Ramon  [address] [place] [address]     Dear Colleague,    Thank you for referring your patient, Gustavo Ramon, to the [facility] [place] at [facility]. Please see a copy of my visit note below.    Winston Silverman PA-C  [place], May 19, 2025     Dear Mr. Silverman,     I had the pleasure to see Stephen in follow-up at the [facility]/neuromuscular clinic.  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  Repetitive nerve stimulation done in January 2024 showed no decrement, indicating intact neuromuscular junction.  He also saw my colleague Dr. Galvin from neuro-ophthalmology in 2023, who proposed that the patient's diplopia and ptosis could be due to sagging eye syndrome and LR-SR band relaxation.  Along those lines, I decided to stop prednisone in 2024.  However the patient called again in December 2024, as his diplopia had recurred.  Because at that point I was not certain whether this was caused by sagging eye syndrome or recurrent ocular myasthenia decided to restart prednisone at 10 mg daily.  He gradually tapered the dose to 5 mg daily and decided to stay on the low-dose prednisone because it was also beneficial for his arthritis pain. He was evaluated by endocrinology recently due to his history of thyrotoxicosis and discussion of the possibility of adrenal insufficiency was raised.  His morning cortisol level was in the intermediate range and endocrinologist recommended lifelong continuation of low-dose prednisone as above.    The patient tells me that as long as he wears his glasses which have prisms, he does not have double vision.  Without the glasses, he does.  Prednisone is helping his hip arthritis pain.  He  wonders if he can try an over-the-counter lidocaine patch for it.  He is also using 1 Tylenol arthritis pills per day    Of note, this patient has developed several other neurological problems that I ended up following him for.  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  While he had a good recovery from those procedures, he still has to use a walker to reduce the risk of falling.    He also has difficult to control restless leg syndrome with augmentation.  He is currently on gabapentin 600 mg in the morning and 900 mg in the evening.  His ferritin levels were 100 on July 2024 so he is definitely not iron deficient.  He used to be on pramipexole 0.125 3 times daily, but I ended up stopping it due to problems with orthostatic hypotension (see below).  I ordered [facility] pharmacy consultation, and we ended up gradually replacing the pramipexole with rotigotine patch which he is currently on.  He is very happy with this change, as his RLS is much better controlled now than it used to be 4 months ago.    Mr Ramon has chronic memory and cognitive concerns and he had repeat neuropsych testing in May 2024 at the [facility].  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     Lastly, he has orthostatic hypotension, leading to an increased frequency of syncopal episodes in the last year.  He is on midodrine 2.5 mg 3 times daily.  After interviewing him during the last visit, I realized that his intake of salt and water was clearly suboptimal.  I asked him to increase the salt in his diet to at least 3 grams a day and drink 6-8 full glasses of water a day.  He is still not adhering to this recommendation.  I also advised him to regularly  "cross his legs and do exercises to enhance the muscle pump function.  Lastly, as above, we switched his pramipexole to rotigotine, in an effort to reduce the side effect of OH that can be produced by oral dopamine agonists.  Overall, he is quite happy now and he has not had any syncopal episodes since January 2025.  He gets a presyncopal feeling at times, even when he sits on the chair for prolonged period of time.  It is a feeling of \"standstill\" and he can usually overcome it by moving his legs around.    Since the last time I saw him, he was evaluated by cardiology (Dr. Roldan) for his syncopal episodes.  Holter monitoring showed some episodes of SVT and he underwent EP study 2 months ago.  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       Current Outpatient Medications   Medication Sig Dispense Refill     acetaminophen (TYLENOL) 325 MG tablet Take 1-2 tablets (325-650 mg) by mouth every 4 hours as needed for mild pain       gabapentin (NEURONTIN) 300 MG capsule 600 mg in the morning and 900 mg in the evening 450 capsule 1     levETIRAcetam (KEPPRA) 250 MG tablet Take 1 tablet (250 mg) by mouth 2 times daily. 180 tablet 1     methocarbamol (ROBAXIN) 500 MG tablet TAKE 1 TABLET NIGHTLY AS NEEDED FOR MUSCLE SPASMS 30 tablet 2     methocarbamol (ROBAXIN) 500 MG tablet Take 500 mg by mouth daily as needed for muscle spasms.       midodrine (PROAMATINE) 2.5 MG tablet TAKE 1 TABLET THREE TIMES A  tablet 3     multivitamin (OCUVITE) TABS tablet Take 1 tablet by mouth daily       pantoprazole (PROTONIX) 40 MG EC tablet Take 1 tablet (40 mg) by mouth daily. 90 tablet 2     predniSONE (DELTASONE) 5 MG tablet Take 1 tablet (5 mg) by mouth daily. 90 tablet 1     pyRIDostigmine (MESTINON) 60 MG tablet TAKE ONE AND ONE-HALF TABLETS TWICE A  tablet 1     Rotigotine (NEUPRO) 1 MG/24HR 24 hr patch Place 1 patch over 24 hours onto the skin daily. 90 " patch 2     tamsulosin (FLOMAX) 0.4 MG capsule Take 1 capsule (0.4 mg) by mouth daily. 90 capsule 2     terbinafine (LAMISIL) 1 % external cream Apply topically 2 times daily (Patient taking differently: Apply topically 2 times daily as needed.) 42 g 11     triamcinolone (KENALOG) 0.1 % external cream Apply a thin layer up to twice daily to affected areas as needed. 30 g 3     Current Facility-Administered Medications   Medication Dose Route Frequency Provider Last Rate Last Admin     lidocaine 1% with EPINEPHrine 1:100,000 injection 3 mL  3 mL Intradermal Once          LABS:the patient's last comprehensive metabolic panel from April 2025 showed a chloride of 108 but was otherwise unremarkable CBC done on the same day was normal without anemia.  Keppra levels drawn on March 2025 where 8.2 and platelet count was normal.  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  Those have been stable for many years.  Patient's last TSH was 4.59 in December 2024 and last free T4 was normal at that time.     Last ferritin level was 100 on July 2024.    VITALS: /76 (BP Location: Right arm, Patient Position: Sitting, Cuff Size: Adult Regular)   Pulse 65   Resp 16   SpO2 96%        Neuro exam was not repeated.      IMPRESSION:     1) Diplopia, most likely due to sagging eye syndrome.   2) Ocular MG, unlikely to be in exacerbation   3) Severe RLS with augmentation. Improved from last visit. Not iron deficient  4) Multifactorial orthostatic hypotension- adrenal insufficiency ? Hypovolemia. Better controlled than last visit  5) History of NPH surgery (shunt) and cervical fusion for spondylosis, stable        The patient was seen in follow-up for the issues listed above.  We are both much happier today because his RLS is much better controlled and we will continue rotigotine patch on the current gabapentin  dose.  He is also happier about his syncopal episodes as he has not had any since January 2025.  I doubt that those were related to AVNRT for which he had ablation.  I think they could be due to neurogenic orthostatic hypotension or there could be a small element of adrenal insufficiency due to chronic use of low-dose prednisone.  Once again, I emphasized the importance of ample water intake.  I urged the patient to take 500 cc of water before he stands up too quickly as this can fight the OH.  I also recommended increasing the salt in his diet to 3 g daily although he is somewhat reluctant to do it.  He will continue midodrine at 2.5 mg 3 times daily.    He will continue using a walker at all times to avoid falls.  His gait is unchanged from last visit.    I do believe that his diplopia is mostly due to sagging eye syndrome as it fully corrects with prisms, which would be unusual with ocular MG.  Because prednisone was helpful for his arthritis pain we will continue it at 5 mg daily. The patient is interested in trying lidocaine patches over-the-counter for the pain.  If they work well, we may taper the prednisone to the lowest dose tolerated.  I will get a repeat bone density scan to evaluate for steroid-induced osteoporosis.    He will return to clinic for follow-up in 6 months, sooner if needed.        Sincerely,        Tyler Wheat MD, FAAN        The longitudinal plan of care for the diagnosis(es)/condition(s) as documented were addressed during this visit. Due to the added complexity in care, I will continue to support Stephen in the subsequent management and with ongoing continuity of care.     Billing MDM level 4 (moderate) based on 1) Problems assessed: 2 or more stable chronic disorders (diplopia, ocular MG, RLS, orthostatic hypotension, NPH) and 2)Risk: prescription drug management, see above.      Again, thank you for allowing me to participate in the care of your patient.       Sincerely,    Tyler Wheat MD

## 2025-05-19 NOTE — PATIENT INSTRUCTIONS
Continue your current medications.    The patch that is available to manage pain is called lidocaine patch or Salonpas, and it is available over-the-counter at most pharmacies.  If you want to try it for your hip pain/arthritis, you surely can, and if it works well for you, I am happy to lower your prednisone dose.  It is safe to use the patch with 1 Tylenol arthritis pill per day    I am ordering a bone density scan to rule out steroid-induced osteoporosis and I will contact you with the result.    Follow-up with me in 6 months

## 2025-05-20 NOTE — TELEPHONE ENCOUNTER
Addended by: NINA ALEJANDRO on: 5/20/2025 08:12 AM     Modules accepted: Level of Service     Signature needed on Plan/updated plan of progress for outpatient Rehabilitation. Form placed in Winston's in box

## 2025-06-01 NOTE — PROGRESS NOTES
[facility] Dermatology Note    Encounter Date: Jun 2, 2025    Dermatology Problem List:  Last skin check 6/2/25  1. 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 Christiansen's note 11/21/11), s/p excision 5/12/09   2. Actinic keratoses  - R tragus s/p LN2 3/13/23; s/p biopsy 9/23/22  - s/p Efudex 2015, Fall 2020 to face, Spring 2021 to forearms  - s/p PDT (x3)  - s/p LN2  3. Seborrheic dermatitis  - clobetasol 0.05% solution for scalp, triam 0.1% cream for ears  4. Relevant medical history: MGUS, myasthenia gravis currently on prednisone  5. Tinea cruris  - current tx: ketoconazole cream  6. 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 Lipoma, L bicep, s/p biopsy 9/23/22    ______________________________________    Impression/Plan:    Actinic keratosis, scalp, face and arms x 12  - Cryotherapy performed today. See procedure section.    2. History of nonmelanoma skin cancer, no clincial evidence of recurrence    3. Reassurance provided for benign lesions not treated today including cherry angiomata, solar lentigines, seborrheic keratoses, and banal-appearing melanocytic nevi.    4. Crusted adhesive following cardiac procedure, mid chest.   - Discussed gentle removal options including soaking and application of Vaseline     Procedures:  Cryotherapy procedure note: After verbal consent and discussion of risks and benefits including, but not limited to, dyspigmentation/scar, blister, and pain, 12 AKs was(were) treated with 1-2 mm freeze border  for 1-2 cycles with liquid nitrogen. Post cryotherapy instructions were provided.       Follow-up in 9 months.       Staff Involved:  Staff/Scribe  Scribe Disclosure:   I, Joanne Burton, am serving as a scribe to document services personally performed by Joe Grimaldo MD based on data collection and the provider's statements to me.     Provider Disclosure:   The documentation recorded by the scribe accurately reflects the services I personally performed and the decisions made by me.    Joe Grimaldo MD   of Dermatology  Department of Dermatology  [facility]        CC:   Chief Complaint   Patient presents with    Skin Check     FBSC-one spot on the L shoulder,right temple       History of Present Illness:  Mr. Gustavo Ramon is a 84 year old male who presents as a return patient.    Today, patient reports he is here for a skin check. Area of concern on the L shoulder and R temple.     Labs:  N/a    Physical exam:  Vitals: There were no vitals taken for this visit.  GEN: This is a well developed, well-nourished male in no acute distress, in a pleasant mood.    SKIN: Montemayor phototype II  - Full skin, which includes the head/face, both arms, chest, back, abdomen,both legs, genitalia and/or groin buttocks, digits and/or nails, was examined.  - Erythematous scaly macules on the scalp, face and arms x 12  - There are dome shaped bright red papules on the head/neck, trunk, extremities.   - Multiple regular brown pigmented macules and papules are identified on the head/neck, trunk, extremities.   - Scattered brown macules on sun exposed areas.  - There are waxy stuck on tan to brown papules on the head/neck, trunk, extremities.  - Crusted adhesive on the L mid chest.   - No other lesions of concern on areas examined.     Past Medical History:   Past Medical History:   Diagnosis Date    Actinic keratosis     AK (actinic keratosis) 11/15/2012    Amaurosis  fugax     Basal cell carcinoma 2009    R medial cheek/nose    Central serous retinopathy left    Eye    Diverticulosis 08/2006    DJD (degenerative joint disease)     GERD (gastroesophageal reflux disease)     Hearing loss     High frequency    History of nonmelanoma skin cancer     History of nonmelanoma skin cancer     HTN (hypertension)     Hyperlipidemia LDL goal < 130     Hyperplastic colon polyp 08/2006    IgA monoclonal gammopathy     IgA kappa    Infection due to 2019 novel coronavirus 10/13/2022    Infection due to 2019 novel coronavirus 11/2021    Lattice degeneration of retina right    Eye    Macular degeneration     Nonsenile cataract     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    [place] consult    Rosacea     Steroid-induced diabetes mellitus, 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    Strabismus     Vitreous detachment left    Eye     Past Surgical History:   Procedure Laterality Date    ARTHROSCOPY KNEE RT/LT Left 01/2010    Knee    BIOPSY  2009/2013/2016    Nose; Prostate; BCC - left arm    COLONOSCOPY  09/24/2019    w/Endoscopy    COLONOSCOPY  07/29/2024    COLONOSCOPY WITH CO2 INSUFFLATION N/A 09/24/2019    Procedure: COLONOSCOPY, WITH CO2 INSUFFLATION;  Surgeon: Loi Levine MD;  Location: [facility]    COMBINED ESOPHAGOSCOPY, GASTROSCOPY, DUODENOSCOPY (EGD) WITH CO2 INSUFFLATION N/A 09/24/2019    Procedure: ESOPHAGOGASTRODUODENOSCOPY, WITH CO2 INSUFFLATION;  Surgeon: Loi Levine MD;  [facility]: [facility]    COMBINED ESOPHAGOSCOPY, GASTROSCOPY, DUODENOSCOPY (EGD) WITH CO2 INSUFFLATION N/A 7/29/2024    Procedure: Combined Esophagoscopy, Gastroscopy, Duodenoscopy (Egd) With Co2 Insufflation;  Surgeon: Jeff Peace MD;  Location: [facility]    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    DISKECTOMY, LUMBAR, SINGLE SP  2003    L2-3    ENT SURGERY  1943    Tonsils     ENT SURGERY  02/22/2012    Biopsy lesion right pinna    ENT SURGERY  02/24/2012    Biopsy leson right pinna    ESOPHAGOSCOPY,  GASTROSCOPY, DUODENOSCOPY (EGD), COMBINED N/A 09/24/2019    Procedure: Esophagogastroduodenoscopy, With Biopsy;  Surgeon: Loi Levine MD;  [facility]: [facility]    ESOPHAGOSCOPY, GASTROSCOPY, DUODENOSCOPY (EGD), COMBINED N/A 7/29/2024    Procedure: ESOPHAGOGASTRODUODENOSCOPY, WITH BIOPSY;  Surgeon: Jeff Peace MD;  [facility]: [facility]    IR LUMBAR DRAIN PLACEMENT W FLUORO  06/27/2022    IR LUMBAR PUNCTURE  4/20/2022    IR LUMBAR PUNCTURE  4/11/2022    LAMINOPLASTY CERVICAL POSTERIOR THREE+ LEVELS N/A 12/12/2022    Procedure: POSTERIOR APPROACH Cervical 4-7 laminoplasty;  Surgeon: Judie Levin MD;  [facility]: [facility]    LAPAROSCOPIC ASSISTED IMPLANT SHUNT VENTRICULOPERITONEAL N/A 07/07/2022    Procedure: possible INSERTION, SHUNT, VENTRICULOPERITONEAL, LAPAROSCOPY-ASSISTED;  Surgeon: Joe Damon MD;  [facility]: [facility]    OPTICAL TRACKING SYSTEM IMPLANT SHUNT VENTRICULOPERITONEAL N/A 07/07/2022    Procedure: INSERTION, SHUNT, VENTRICULOPERITONEAL, USING OPTICAL TRACKING SYSTEM;  Surgeon: Jean-Paul Childs MD;  [facility]: [facility]    SOFT TISSUE SURGERY  05/2010    Basal Cell/right side nose       Social History:   reports that he has never smoked. He has never been exposed to tobacco smoke. He has never used smokeless tobacco. He reports that he does not drink alcohol and does not use drugs.    Family History:  Family History   Problem Relation Age of Onset    Heart Disease Mother     Alzheimer Disease Mother 73    C.A.D. Father     Coronary Artery Disease Father     Diabetes Grandchild         using a pump     Diabetes Paternal Uncle     Heart Disease Paternal Grandmother     Glaucoma No family hx of     Macular Degeneration No family hx of     Melanoma No family hx of     Skin Cancer No family hx of        Medications:  Current Outpatient Medications   Medication Sig Dispense Refill    gabapentin (NEURONTIN) 300 MG capsule 600 mg in the morning and 900 mg in the evening 450 capsule 1     levETIRAcetam (KEPPRA) 250 MG tablet Take 1 tablet (250 mg) by mouth 2 times daily. 180 tablet 1    methocarbamol (ROBAXIN) 500 MG tablet TAKE 1 TABLET NIGHTLY AS NEEDED FOR MUSCLE SPASMS 30 tablet 2    methocarbamol (ROBAXIN) 500 MG tablet Take 500 mg by mouth daily as needed for muscle spasms.      midodrine (PROAMATINE) 2.5 MG tablet TAKE 1 TABLET THREE TIMES A  tablet 3    pantoprazole (PROTONIX) 40 MG EC tablet Take 1 tablet (40 mg) by mouth daily. 90 tablet 2    predniSONE (DELTASONE) 5 MG tablet Take 1 tablet (5 mg) by mouth daily. 90 tablet 1    pyRIDostigmine (MESTINON) 60 MG tablet TAKE ONE AND ONE-HALF TABLETS TWICE A  tablet 1    Rotigotine (NEUPRO) 1 MG/24HR 24 hr patch Place 1 patch over 24 hours onto the skin daily. 90 patch 2    tamsulosin (FLOMAX) 0.4 MG capsule Take 1 capsule (0.4 mg) by mouth daily. 90 capsule 2    triamcinolone (KENALOG) 0.1 % external cream Apply a thin layer up to twice daily to affected areas as needed. 30 g 3    acetaminophen (TYLENOL) 325 MG tablet Take 1-2 tablets (325-650 mg) by mouth every 4 hours as needed for mild pain (Patient not taking: Reported on 6/2/2025)      cyanocobalamin (VITAMIN B-12) 1000 MCG tablet Take 1,000 mcg by mouth daily. (Patient not taking: Reported on 6/2/2025)      multivitamin (OCUVITE) TABS tablet Take 1 tablet by mouth daily (Patient not taking: Reported on 6/2/2025)      terbinafine (LAMISIL) 1 % external cream Apply topically 2 times daily (Patient not taking: Reported on 6/2/2025) 42 g 11     Allergies   Allergen Reactions    Mycophenolate Other (See Comments)     Confusion, abnormal movements

## 2025-06-02 ENCOUNTER — OFFICE VISIT (OUTPATIENT)
Dept: DERMATOLOGY | Facility: CLINIC | Age: 85
End: 2025-06-02
Attending: DERMATOLOGY
Payer: MEDICARE

## 2025-06-02 DIAGNOSIS — L81.4 SOLAR LENTIGO: ICD-10-CM

## 2025-06-02 DIAGNOSIS — D18.01 CHERRY ANGIOMA: ICD-10-CM

## 2025-06-02 DIAGNOSIS — Z12.83 SKIN CANCER SCREENING: ICD-10-CM

## 2025-06-02 DIAGNOSIS — L57.0 ACTINIC KERATOSIS: ICD-10-CM

## 2025-06-02 DIAGNOSIS — Z85.828 HISTORY OF SKIN CANCER: Primary | ICD-10-CM

## 2025-06-02 DIAGNOSIS — L82.1 SEBORRHEIC KERATOSIS: ICD-10-CM

## 2025-06-02 DIAGNOSIS — D22.9 MULTIPLE MELANOCYTIC NEVI: ICD-10-CM

## 2025-06-02 PROCEDURE — 17003 DESTRUCT PREMALG LES 2-14: CPT | Performed by: DERMATOLOGY

## 2025-06-02 PROCEDURE — 17000 DESTRUCT PREMALG LESION: CPT | Performed by: DERMATOLOGY

## 2025-06-02 PROCEDURE — 99213 OFFICE O/P EST LOW 20 MIN: CPT | Mod: 25 | Performed by: DERMATOLOGY

## 2025-06-02 NOTE — NURSING NOTE
Gustavo Ramon's goals for this visit include:   Chief Complaint   Patient presents with    Skin Check     FBSC-one spot on the L shoulder,right temple       He requests these members of his care team be copied on today's visit information:     PCP: Winston Silverman    Referring Provider:  Joe Grimaldo MD  [address] [place] [id number]    There were no vitals taken for this visit.    Do you need any medication refills at today's visit?     Carolee Manjarrez LPN on 6/2/2025 at 12:24 PM

## 2025-06-02 NOTE — LETTER
6/2/2025      Gustavo Ramon  [address] [place] [address]      Dear Colleague,    Thank you for referring your patient, Gustavo Ramon, to the [facility]. Please see a copy of my visit note below.    [facility] Dermatology Note    Encounter Date: Jun 2, 2025    Dermatology Problem List:  Last skin check 6/2/25  1. 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 Christiansen's note 11/21/11), s/p excision 5/12/09   2. Actinic keratoses  - R tragus s/p LN2 3/13/23; s/p biopsy 9/23/22  - s/p Efudex 2015, Fall 2020 to face, Spring 2021 to forearms  - s/p PDT (x3)  - s/p LN2  3. Seborrheic dermatitis  - clobetasol 0.05% solution for scalp, triam 0.1% cream for ears  4. Relevant medical history: MGUS, myasthenia gravis currently on prednisone  5. Tinea cruris  - current tx: ketoconazole cream  6. 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 Lipoma, L bicep, s/p biopsy 9/23/22    ______________________________________    Impression/Plan:    Actinic keratosis, scalp, face and arms x 12  - Cryotherapy performed today. See procedure section.    2. History of nonmelanoma skin cancer, no clincial evidence of recurrence    3. Reassurance provided for benign lesions not treated today including cherry angiomata, solar lentigines, seborrheic keratoses, and banal-appearing melanocytic nevi.    4. Crusted adhesive following cardiac procedure, mid chest.   - Discussed gentle removal options including soaking and  application of Vaseline     Procedures:  Cryotherapy procedure note: After verbal consent and discussion of risks and benefits including, but not limited to, dyspigmentation/scar, blister, and pain, 12 AKs was(were) treated with 1-2 mm freeze border for 1-2 cycles with liquid nitrogen. Post cryotherapy instructions were provided.       Follow-up in 9 months.       Staff Involved:  Staff/Scribe  Scribe Disclosure:   I, Joanne Micheal, am serving as a scribe to document services personally performed by Joe Grimaldo MD based on data collection and the provider's statements to me.     Provider Disclosure:   The documentation recorded by the scribe accurately reflects the services I personally performed and the decisions made by me.    Joe Grimaldo MD   of Dermatology  Department of Dermatology  [facility]        CC:   Chief Complaint   Patient presents with     Skin Check     FBSC-one spot on the L shoulder,right temple       History of Present Illness:  Mr. Gustavo Ramon is a 84 year old male who presents as a return patient.    Today, patient reports he is here for a skin check. Area of concern on the L shoulder and R temple.     Labs:  N/a    Physical exam:  Vitals: There were no vitals taken for this visit.  GEN: This is a well developed, well-nourished male in no acute distress, in a pleasant mood.    SKIN: Montemayor phototype II  - Full skin, which includes the head/face, both arms, chest, back, abdomen,both legs, genitalia and/or groin buttocks, digits and/or nails, was examined.  - Erythematous scaly macules on the scalp, face and arms x 12  - There are dome shaped bright red papules on the head/neck, trunk, extremities.   - Multiple regular brown pigmented macules and papules are identified on the head/neck, trunk, extremities.   - Scattered brown macules on sun exposed areas.  - There are waxy stuck on tan to brown papules on the head/neck,  trunk, extremities.  - Crusted adhesive on the L mid chest.   - No other lesions of concern on areas examined.     Past Medical History:   Past Medical History:   Diagnosis Date     Actinic keratosis      AK (actinic keratosis) 11/15/2012     Amaurosis fugax      Basal cell carcinoma 2009    R medial cheek/nose     Central serous retinopathy left    Eye     Diverticulosis 08/2006     DJD (degenerative joint disease)      GERD (gastroesophageal reflux disease)      Hearing loss     High frequency     History of nonmelanoma skin cancer      History of nonmelanoma skin cancer      HTN (hypertension)      Hyperlipidemia LDL goal < 130      Hyperplastic colon polyp 08/2006     IgA monoclonal gammopathy     IgA kappa     Infection due to 2019 novel coronavirus 10/13/2022     Infection due to 2019 novel coronavirus 11/2021     Lattice degeneration of retina right    Eye     Macular degeneration      Nonsenile cataract      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    [place] consult     Rosacea      Steroid-induced diabetes mellitus, 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     Strabismus      Vitreous detachment left    Eye     Past Surgical History:   Procedure Laterality Date     ARTHROSCOPY KNEE RT/LT Left 01/2010    Knee     BIOPSY  2009/2013/2016    Nose; Prostate; BCC - left arm     COLONOSCOPY  09/24/2019    w/Endoscopy     COLONOSCOPY  07/29/2024     COLONOSCOPY WITH CO2 INSUFFLATION N/A 09/24/2019    Procedure: COLONOSCOPY, WITH CO2 INSUFFLATION;  Surgeon: Loi Levine MD;  [facility]: [facility]     COMBINED ESOPHAGOSCOPY, GASTROSCOPY, DUODENOSCOPY (EGD) WITH CO2 INSUFFLATION N/A 09/24/2019    Procedure: ESOPHAGOGASTRODUODENOSCOPY, WITH CO2 INSUFFLATION;  Surgeon: Loi Levine MD;  [facility]: [facility]     COMBINED ESOPHAGOSCOPY, GASTROSCOPY, DUODENOSCOPY (EGD) WITH CO2 INSUFFLATION N/A 7/29/2024    Procedure: Combined Esophagoscopy, Gastroscopy, Duodenoscopy (Egd) With Co2 Insufflation;  Surgeon: Jeff Peace  MD Rod;  Location: [facility]     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     DISKECTOMY, LUMBAR, SINGLE SP  2003    L2-3     ENT SURGERY  1943    Tonsils      ENT SURGERY  02/22/2012    Biopsy lesion right pinna     ENT SURGERY  02/24/2012    Biopsy leson right pinna     ESOPHAGOSCOPY, GASTROSCOPY, DUODENOSCOPY (EGD), COMBINED N/A 09/24/2019    Procedure: Esophagogastroduodenoscopy, With Biopsy;  Surgeon: Loi Levine MD;  [facility]: [facility]     ESOPHAGOSCOPY, GASTROSCOPY, DUODENOSCOPY (EGD), COMBINED N/A 7/29/2024    Procedure: ESOPHAGOGASTRODUODENOSCOPY, WITH BIOPSY;  Surgeon: Jeff Peace MD;  [facility]: [facility]     IR LUMBAR DRAIN PLACEMENT W FLUORO  06/27/2022     IR LUMBAR PUNCTURE  4/20/2022     IR LUMBAR PUNCTURE  4/11/2022     LAMINOPLASTY CERVICAL POSTERIOR THREE+ LEVELS N/A 12/12/2022    Procedure: POSTERIOR APPROACH Cervical 4-7 laminoplasty;  Surgeon: Judie Levin MD;  [facility]     LAPAROSCOPIC ASSISTED IMPLANT SHUNT VENTRICULOPERITONEAL N/A 07/07/2022    Procedure: possible INSERTION, SHUNT, VENTRICULOPERITONEAL, LAPAROSCOPY-ASSISTED;  Surgeon: Joe Damon MD;  [facility]     OPTICAL TRACKING SYSTEM IMPLANT SHUNT VENTRICULOPERITONEAL N/A 07/07/2022    Procedure: INSERTION, SHUNT, VENTRICULOPERITONEAL, USING OPTICAL TRACKING SYSTEM;  Surgeon: Jean-Paul Childs MD;  [facility]     SOFT TISSUE SURGERY  05/2010    Basal Cell/right side nose       Social History:   reports that he has never smoked. He has never been exposed to tobacco smoke. He has never used smokeless tobacco. He reports that he does not drink alcohol and does not use drugs.    Family History:  Family History   Problem Relation Age of Onset     Heart Disease Mother      Alzheimer Disease Mother 73     C.A.D. Father      Coronary Artery Disease Father      Diabetes Grandchild         using a pump      Diabetes Paternal Uncle      Heart Disease Paternal Grandmother      Glaucoma No family  hx of      Macular Degeneration No family hx of      Melanoma No family hx of      Skin Cancer No family hx of        Medications:  Current Outpatient Medications   Medication Sig Dispense Refill     gabapentin (NEURONTIN) 300 MG capsule 600 mg in the morning and 900 mg in the evening 450 capsule 1     levETIRAcetam (KEPPRA) 250 MG tablet Take 1 tablet (250 mg) by mouth 2 times daily. 180 tablet 1     methocarbamol (ROBAXIN) 500 MG tablet TAKE 1 TABLET NIGHTLY AS NEEDED FOR MUSCLE SPASMS 30 tablet 2     methocarbamol (ROBAXIN) 500 MG tablet Take 500 mg by mouth daily as needed for muscle spasms.       midodrine (PROAMATINE) 2.5 MG tablet TAKE 1 TABLET THREE TIMES A  tablet 3     pantoprazole (PROTONIX) 40 MG EC tablet Take 1 tablet (40 mg) by mouth daily. 90 tablet 2     predniSONE (DELTASONE) 5 MG tablet Take 1 tablet (5 mg) by mouth daily. 90 tablet 1     pyRIDostigmine (MESTINON) 60 MG tablet TAKE ONE AND ONE-HALF TABLETS TWICE A  tablet 1     Rotigotine (NEUPRO) 1 MG/24HR 24 hr patch Place 1 patch over 24 hours onto the skin daily. 90 patch 2     tamsulosin (FLOMAX) 0.4 MG capsule Take 1 capsule (0.4 mg) by mouth daily. 90 capsule 2     triamcinolone (KENALOG) 0.1 % external cream Apply a thin layer up to twice daily to affected areas as needed. 30 g 3     acetaminophen (TYLENOL) 325 MG tablet Take 1-2 tablets (325-650 mg) by mouth every 4 hours as needed for mild pain (Patient not taking: Reported on 6/2/2025)       cyanocobalamin (VITAMIN B-12) 1000 MCG tablet Take 1,000 mcg by mouth daily. (Patient not taking: Reported on 6/2/2025)       multivitamin (OCUVITE) TABS tablet Take 1 tablet by mouth daily (Patient not taking: Reported on 6/2/2025)       terbinafine (LAMISIL) 1 % external cream Apply topically 2 times daily (Patient not taking: Reported on 6/2/2025) 42 g 11     Allergies   Allergen Reactions     Mycophenolate Other (See Comments)     Confusion, abnormal movements             Again,  thank you for allowing me to participate in the care of your patient.        Sincerely,      Joe Grimaldo MD    Electronically signed

## 2025-06-03 ENCOUNTER — ANCILLARY PROCEDURE (OUTPATIENT)
Dept: BONE DENSITY | Facility: CLINIC | Age: 85
End: 2025-06-03
Attending: PSYCHIATRY & NEUROLOGY
Payer: MEDICARE

## 2025-06-03 DIAGNOSIS — T38.0X5A STEROID-INDUCED OSTEOPOROSIS: ICD-10-CM

## 2025-06-03 DIAGNOSIS — M81.8 STEROID-INDUCED OSTEOPOROSIS: ICD-10-CM

## 2025-06-03 PROCEDURE — 77080 DXA BONE DENSITY AXIAL: CPT | Mod: TC | Performed by: RADIOLOGY

## 2025-08-18 ENCOUNTER — VIRTUAL VISIT (OUTPATIENT)
Dept: PHARMACY | Facility: CLINIC | Age: 85
End: 2025-08-18
Attending: PSYCHIATRY & NEUROLOGY
Payer: MEDICARE

## 2025-08-18 DIAGNOSIS — I95.1 ORTHOSTATIC HYPOTENSION: ICD-10-CM

## 2025-08-18 DIAGNOSIS — D50.9 RESTLESS LEG SYNDROME DUE TO IRON DEFICIENCY ANEMIA: Primary | ICD-10-CM

## 2025-08-18 DIAGNOSIS — G70.00 OCULAR MYASTHENIA GRAVIS (H): ICD-10-CM

## 2025-08-18 DIAGNOSIS — G25.81 RESTLESS LEG SYNDROME DUE TO IRON DEFICIENCY ANEMIA: Primary | ICD-10-CM

## (undated) DEVICE — DRAPE IOBAN INCISE 13X13" 6640EZ

## (undated) DEVICE — SPONGE COTTONOID 1/2X1/2" 80-1400

## (undated) DEVICE — PIN SKULL MAYFIELD ADULT TITANIUM 3/PK A1120

## (undated) DEVICE — PACK NEURO MINOR UMMC SNE32MNMU4

## (undated) DEVICE — SPONGE COTTONOID 1/2X1/2" 20-04S

## (undated) DEVICE — SOL WATER IRRIG 1000ML BOTTLE 2F7114

## (undated) DEVICE — SU MONOCRYL 3-0 PS-1 27" Y936H

## (undated) DEVICE — ANTIFOG SOLUTION W/FOAM PAD 31142527

## (undated) DEVICE — PREP POVIDONE-IODINE 7.5% SCRUB 4OZ BOTTLE MDS093945

## (undated) DEVICE — SOL NACL 0.9% 10ML VIAL 0409-4888-02

## (undated) DEVICE — SPONGE SURGIFOAM 100 1974

## (undated) DEVICE — SU ETHILON 3-0 PS-1 18" 1663H

## (undated) DEVICE — BLADE KNIFE SURG 11 371111

## (undated) DEVICE — IOM SUPPLIES/CASE FEE

## (undated) DEVICE — BUR STRK CARBIDE ROUND 5.0MM 5820-110-050C

## (undated) DEVICE — SUCTION MANIFOLD NEPTUNE 2 SYS 4 PORT 0702-020-000

## (undated) DEVICE — SU VICRYL 3-0 SH 8X18" UND J864D

## (undated) DEVICE — DRAPE MAYO STAND 23X54 8337

## (undated) DEVICE — LINEN TOWEL PACK X5 5464

## (undated) DEVICE — ANTIFOG SOLUTION W/FOAM PAD CF-1002

## (undated) DEVICE — TIP ASSY MEDT PASSIVE CATH INTRODUCER 9731115

## (undated) DEVICE — NDL INSUFFLATION 13GA 120MM C2201

## (undated) DEVICE — CATH TRAY FOLEY SURESTEP 16FR W/URNE MTR STLK LATEX A303316A

## (undated) DEVICE — SYR 30ML LL W/O NDL 302832

## (undated) DEVICE — DRAIN ROUND W/RESERV KIT JACKSON PRATT 10FR 400ML SU130-402D

## (undated) DEVICE — RX BACITRACIN OINTMENT 0.9G 1/32OZ CUR001109

## (undated) DEVICE — PREP POVIDONE IODINE SOLUTION 10% 4OZ BOTTLE 29906-004

## (undated) DEVICE — PROTECTOR ARM ONE-STEP TRENDELENBURG 40418

## (undated) DEVICE — SPONGE SURGIFOAM 01GM POWDER 1978

## (undated) DEVICE — PERFORATOR CRANIAL DGR-O SURGICAL 11-14MM PEDS 200-271

## (undated) DEVICE — WIPES FOLEY CARE SURESTEP PROVON DFC100

## (undated) DEVICE — ESU GROUND PAD ADULT W/CORD E7507

## (undated) DEVICE — GLOVE PROTEXIS BLUE W/NEU-THERA 7.0  2D73EB70

## (undated) DEVICE — SU VICRYL 2-0 CT-2 CR 8X18" J726D

## (undated) DEVICE — SUTURE BOOTS 051003PBX

## (undated) DEVICE — SU SILK 2-0 TIE 12X30" A305H

## (undated) DEVICE — SHUNT TUNNELER SHEATH 70CM NT9MD170

## (undated) DEVICE — LINEN TOWEL PACK X6 WHITE 5487

## (undated) DEVICE — NDL BLUNT 17GA 1.5" 8881202330

## (undated) DEVICE — PREP CHLORAPREP 26ML TINTED ORANGE  260815

## (undated) DEVICE — SU VICRYL 0 CT-1 CR 8X18" J740D

## (undated) DEVICE — PREP CHLORAPREP CLEAR 3ML 930400

## (undated) DEVICE — ADH SKIN CLOSURE PREMIERPRO EXOFIN 1.0ML 3470

## (undated) DEVICE — DRAPE POUCH IRR 1016

## (undated) DEVICE — PREP SKIN SCRUB TRAY 4461A

## (undated) DEVICE — SOL WATER IRRIG 1000ML BOTTLE 07139-09

## (undated) DEVICE — BUR STRK CARBIDE MATCH HEAD 3.0MM 5820-107-530C

## (undated) DEVICE — CATH VA INTR 10FRX14CM KIT

## (undated) DEVICE — MARKER SPHERES PASSIVE MEDT PACK 5 8801075

## (undated) DEVICE — LABEL MEDICATION SYSTEM 3303-P

## (undated) DEVICE — SOL NACL 0.9% IRRIG 1000ML BOTTLE 2F7124

## (undated) DEVICE — GLOVE PROTEXIS POWDER FREE SMT 7.5  2D72PT75X

## (undated) DEVICE — GLOVE PROTEXIS MICRO 7.0  2D73PM70

## (undated) DEVICE — ENDO TROCAR FIRST ENTRY KII FIOS Z-THRD 05X100MM CTF03

## (undated) DEVICE — TUBING SMOKE EVAC PNEUMOCLEAR HIGH FLOW 0620050250

## (undated) DEVICE — DRAPE C-ARM W/STRAPS 42X72" 07-CA104

## (undated) RX ORDER — FENTANYL CITRATE 50 UG/ML
INJECTION, SOLUTION INTRAMUSCULAR; INTRAVENOUS
Status: DISPENSED
Start: 2019-09-24

## (undated) RX ORDER — FENTANYL CITRATE 50 UG/ML
INJECTION, SOLUTION INTRAMUSCULAR; INTRAVENOUS
Status: DISPENSED
Start: 2022-07-07

## (undated) RX ORDER — FENTANYL CITRATE 50 UG/ML
INJECTION, SOLUTION INTRAMUSCULAR; INTRAVENOUS
Status: DISPENSED
Start: 2022-12-12

## (undated) RX ORDER — PROPOFOL 10 MG/ML
INJECTION, EMULSION INTRAVENOUS
Status: DISPENSED
Start: 2022-12-12

## (undated) RX ORDER — SODIUM CHLORIDE 9 MG/ML
INJECTION, SOLUTION INTRAVENOUS
Status: DISPENSED
Start: 2022-07-07

## (undated) RX ORDER — LIDOCAINE HYDROCHLORIDE AND EPINEPHRINE 10; 10 MG/ML; UG/ML
INJECTION, SOLUTION INFILTRATION; PERINEURAL
Status: DISPENSED
Start: 2022-07-07

## (undated) RX ORDER — SODIUM CHLORIDE 9 MG/ML
INJECTION, SOLUTION INTRAVENOUS
Status: DISPENSED
Start: 2022-06-27

## (undated) RX ORDER — LIDOCAINE HYDROCHLORIDE 20 MG/ML
INJECTION, SOLUTION EPIDURAL; INFILTRATION; INTRACAUDAL; PERINEURAL
Status: DISPENSED
Start: 2022-07-07

## (undated) RX ORDER — CEFAZOLIN SODIUM/WATER 2 G/20 ML
SYRINGE (ML) INTRAVENOUS
Status: DISPENSED
Start: 2022-07-07

## (undated) RX ORDER — SODIUM CHLORIDE 9 MG/ML
INJECTION, SOLUTION INTRAVENOUS
Status: DISPENSED
Start: 2022-12-12

## (undated) RX ORDER — LIDOCAINE HYDROCHLORIDE AND EPINEPHRINE 10; 10 MG/ML; UG/ML
INJECTION, SOLUTION INFILTRATION; PERINEURAL
Status: DISPENSED
Start: 2022-12-12

## (undated) RX ORDER — PROPOFOL 10 MG/ML
INJECTION, EMULSION INTRAVENOUS
Status: DISPENSED
Start: 2022-07-07

## (undated) RX ORDER — CEFAZOLIN SODIUM/WATER 2 G/20 ML
SYRINGE (ML) INTRAVENOUS
Status: DISPENSED
Start: 2022-12-12

## (undated) RX ORDER — FENTANYL CITRATE 50 UG/ML
INJECTION, SOLUTION INTRAMUSCULAR; INTRAVENOUS
Status: DISPENSED
Start: 2024-07-29

## (undated) RX ORDER — DEXAMETHASONE SODIUM PHOSPHATE 4 MG/ML
INJECTION, SOLUTION INTRA-ARTICULAR; INTRALESIONAL; INTRAMUSCULAR; INTRAVENOUS; SOFT TISSUE
Status: DISPENSED
Start: 2022-12-12

## (undated) RX ORDER — LIDOCAINE HYDROCHLORIDE 10 MG/ML
INJECTION, SOLUTION EPIDURAL; INFILTRATION; INTRACAUDAL; PERINEURAL
Status: DISPENSED
Start: 2022-06-27

## (undated) RX ORDER — FENTANYL CITRATE 50 UG/ML
INJECTION, SOLUTION INTRAMUSCULAR; INTRAVENOUS
Status: DISPENSED
Start: 2022-06-27

## (undated) RX ORDER — ONDANSETRON 2 MG/ML
INJECTION INTRAMUSCULAR; INTRAVENOUS
Status: DISPENSED
Start: 2022-07-07

## (undated) RX ORDER — EPHEDRINE SULFATE 50 MG/ML
INJECTION, SOLUTION INTRAMUSCULAR; INTRAVENOUS; SUBCUTANEOUS
Status: DISPENSED
Start: 2022-12-12

## (undated) RX ORDER — CEFAZOLIN SODIUM 1 G/3ML
INJECTION, POWDER, FOR SOLUTION INTRAMUSCULAR; INTRAVENOUS
Status: DISPENSED
Start: 2022-06-27

## (undated) RX ORDER — ONDANSETRON 2 MG/ML
INJECTION INTRAMUSCULAR; INTRAVENOUS
Status: DISPENSED
Start: 2022-12-12

## (undated) RX ORDER — GENTAMICIN 40 MG/ML
INJECTION, SOLUTION INTRAMUSCULAR; INTRAVENOUS
Status: DISPENSED
Start: 2022-07-07